# Patient Record
Sex: FEMALE | Race: BLACK OR AFRICAN AMERICAN | NOT HISPANIC OR LATINO | Employment: UNEMPLOYED | URBAN - METROPOLITAN AREA
[De-identification: names, ages, dates, MRNs, and addresses within clinical notes are randomized per-mention and may not be internally consistent; named-entity substitution may affect disease eponyms.]

---

## 2023-06-27 ENCOUNTER — HOSPITAL ENCOUNTER (INPATIENT)
Facility: CLINIC | Age: 58
LOS: 25 days | Discharge: HOME | DRG: 885 | End: 2023-07-26
Attending: EMERGENCY MEDICINE | Admitting: STUDENT IN AN ORGANIZED HEALTH CARE EDUCATION/TRAINING PROGRAM
Payer: COMMERCIAL

## 2023-06-27 DIAGNOSIS — F31.12 BIPOLAR I DISORDER, MOST RECENT EPISODE (OR CURRENT) MANIC, MODERATE (H): ICD-10-CM

## 2023-06-27 DIAGNOSIS — F30.9 MANIA (H): ICD-10-CM

## 2023-06-27 DIAGNOSIS — F31.9 BIPOLAR AFFECTIVE DISORDER, REMISSION STATUS UNSPECIFIED (H): ICD-10-CM

## 2023-06-27 DIAGNOSIS — K59.03 DRUG-INDUCED CONSTIPATION: Primary | ICD-10-CM

## 2023-06-27 LAB
ALBUMIN UR-MCNC: NEGATIVE MG/DL
AMPHETAMINES UR QL SCN: NORMAL
APPEARANCE UR: CLEAR
BARBITURATES UR QL SCN: NORMAL
BENZODIAZ UR QL SCN: NORMAL
BILIRUB UR QL STRIP: NEGATIVE
BZE UR QL SCN: NORMAL
CANNABINOIDS UR QL SCN: NORMAL
COLOR UR AUTO: NORMAL
GLUCOSE UR STRIP-MCNC: NEGATIVE MG/DL
HCG UR QL: NEGATIVE
HGB UR QL STRIP: NEGATIVE
KETONES UR STRIP-MCNC: NEGATIVE MG/DL
LEUKOCYTE ESTERASE UR QL STRIP: NEGATIVE
NITRATE UR QL: NEGATIVE
OPIATES UR QL SCN: NORMAL
PH UR STRIP: 5 [PH] (ref 5–7)
RBC URINE: <1 /HPF
SP GR UR STRIP: 1 (ref 1–1.03)
SQUAMOUS EPITHELIAL: 1 /HPF
UROBILINOGEN UR STRIP-MCNC: NORMAL MG/DL
WBC URINE: 1 /HPF

## 2023-06-27 PROCEDURE — 90791 PSYCH DIAGNOSTIC EVALUATION: CPT

## 2023-06-27 PROCEDURE — 250N000013 HC RX MED GY IP 250 OP 250 PS 637: Performed by: FAMILY MEDICINE

## 2023-06-27 PROCEDURE — 81025 URINE PREGNANCY TEST: CPT | Performed by: EMERGENCY MEDICINE

## 2023-06-27 PROCEDURE — 250N000013 HC RX MED GY IP 250 OP 250 PS 637: Performed by: EMERGENCY MEDICINE

## 2023-06-27 PROCEDURE — 80307 DRUG TEST PRSMV CHEM ANLYZR: CPT | Performed by: EMERGENCY MEDICINE

## 2023-06-27 PROCEDURE — 99285 EMERGENCY DEPT VISIT HI MDM: CPT | Mod: 25 | Performed by: EMERGENCY MEDICINE

## 2023-06-27 PROCEDURE — 99285 EMERGENCY DEPT VISIT HI MDM: CPT | Performed by: EMERGENCY MEDICINE

## 2023-06-27 PROCEDURE — 81001 URINALYSIS AUTO W/SCOPE: CPT | Performed by: FAMILY MEDICINE

## 2023-06-27 RX ORDER — LAMOTRIGINE 25 MG/1
100 TABLET ORAL ONCE
Status: COMPLETED | OUTPATIENT
Start: 2023-06-27 | End: 2023-06-27

## 2023-06-27 RX ORDER — ARIPIPRAZOLE 10 MG/1
TABLET ORAL DAILY
COMMUNITY
End: 2023-06-27

## 2023-06-27 RX ORDER — OLANZAPINE 5 MG/1
5 TABLET ORAL AT BEDTIME
Status: DISCONTINUED | OUTPATIENT
Start: 2023-06-27 | End: 2023-06-29

## 2023-06-27 RX ORDER — LAMOTRIGINE 100 MG/1
200 TABLET ORAL DAILY
Status: ON HOLD | COMMUNITY
End: 2023-07-26

## 2023-06-27 RX ORDER — OLANZAPINE 5 MG/1
5 TABLET, ORALLY DISINTEGRATING ORAL ONCE
Status: COMPLETED | OUTPATIENT
Start: 2023-06-27 | End: 2023-06-27

## 2023-06-27 RX ORDER — OLANZAPINE 5 MG/1
5 TABLET ORAL AT BEDTIME
Qty: 14 TABLET | Refills: 0 | Status: SHIPPED | OUTPATIENT
Start: 2023-06-27 | End: 2023-07-26

## 2023-06-27 RX ORDER — OLANZAPINE 5 MG/1
5 TABLET ORAL AT BEDTIME
Status: ON HOLD | COMMUNITY
End: 2023-07-26

## 2023-06-27 RX ORDER — LAMOTRIGINE 25 MG/1
25 TABLET ORAL DAILY
Status: DISCONTINUED | OUTPATIENT
Start: 2023-06-28 | End: 2023-06-29

## 2023-06-27 RX ORDER — DIVALPROEX SODIUM 125 MG/1
TABLET, DELAYED RELEASE ORAL
COMMUNITY
End: 2023-06-27

## 2023-06-27 RX ADMIN — OLANZAPINE 5 MG: 5 TABLET, FILM COATED ORAL at 21:00

## 2023-06-27 RX ADMIN — OLANZAPINE 5 MG: 5 TABLET, ORALLY DISINTEGRATING ORAL at 06:23

## 2023-06-27 RX ADMIN — LAMOTRIGINE 100 MG: 25 TABLET ORAL at 09:15

## 2023-06-27 RX ADMIN — OLANZAPINE 5 MG: 5 TABLET, ORALLY DISINTEGRATING ORAL at 04:29

## 2023-06-27 ASSESSMENT — COLUMBIA-SUICIDE SEVERITY RATING SCALE - C-SSRS
1. IN THE PAST MONTH, HAVE YOU WISHED YOU WERE DEAD OR WISHED YOU COULD GO TO SLEEP AND NOT WAKE UP?: YES
TOTAL  NUMBER OF INTERRUPTED ATTEMPTS LIFETIME: NO
1. HAVE YOU WISHED YOU WERE DEAD OR WISHED YOU COULD GO TO SLEEP AND NOT WAKE UP?: YES
6. HAVE YOU EVER DONE ANYTHING, STARTED TO DO ANYTHING, OR PREPARED TO DO ANYTHING TO END YOUR LIFE?: NO
REASONS FOR IDEATION PAST MONTH: MOSTLY TO END OR STOP THE PAIN (YOU COULDN'T GO ON LIVING WITH THE PAIN OR HOW YOU WERE FEELING)
TOTAL  NUMBER OF ABORTED OR SELF INTERRUPTED ATTEMPTS LIFETIME: NO
ATTEMPT LIFETIME: NO
2. HAVE YOU ACTUALLY HAD ANY THOUGHTS OF KILLING YOURSELF?: NO
REASONS FOR IDEATION LIFETIME: MOSTLY TO END OR STOP THE PAIN (YOU COULDN'T GO ON LIVING WITH THE PAIN OR HOW YOU WERE FEELING)

## 2023-06-27 ASSESSMENT — ACTIVITIES OF DAILY LIVING (ADL)
ADLS_ACUITY_SCORE: 35

## 2023-06-27 NOTE — ED NOTES
I received report on this patient and will take over care at 05:00am. Speaks English and answers questions in English good. Requested sandwich and something to drink.Denies any SI/HI thoughts.Vitals are with normal limits. Has been calm and has good eye contact during interactions.Patient observed to care about others patients well being by offering blankets and having difficulty with boundaries not harm just need to monitor she stays in her own personal space. MD did approve 1-1 and also gave another dose of Medication. Patient coloring at the time with The 1-1 PA.Patient is from [place] and is praying this am, and she has lived in the [place] many years it states in notes also speaks good English.

## 2023-06-27 NOTE — PROGRESS NOTES
"Triage & Transition Services, Extended Care     Therapy Progress Note    Patient: Angela goes by \"Angela,\" uses she/her pronouns  Date of Service: June 27, 2023  Site of Service:  ED   Patient was seen in-person.     Presenting problem:   Angela is followed related to Boarding Status. Please see initial DEC/[facility] Crisis Assessment completed by Rohini Daugherty on 6/27 for complete assessment information. Notable concerns include manic behavior, not taking medications.     Individuals Present: Angela & Rachana MCGOWAN Martin    Session 1: 468f-613w  Session 2: 230p-245p  Session duration in minutes: 33  Session number: 1  Anticipated number of sessions or this episode of care: 1  CPT utilized: 04674 - Psychotherapy (with patient) - 30 (16-37*) min    Current Presentation:   Pt presented with calm affect, mood congruent. She was organized and oriented x4. She was slightly confused about her medications, stated that she stopped taking them 2 days ago \"I started to feel better, so I decided to stop\". Pscyhoeducation provided to pt regarding medication adherence.     Pt said that she would like her morning meds and then to go home. She noted that her ex  and her son can help remind her to take her medications. She also requested assistance with getting a dentist appointment, the psych associate present offered to assist with this.      Mental Status Exam:   Appearance: awake, alert and adequately groomed  Attitude: cooperative  Eye Contact: good  Mood: better  Affect: appropriate and in normal range  Speech: clear, coherent  Psychomotor Behavior: no evidence of tardive dyskinesia, dystonia, or tics  Thought Process:  logical and goal oriented  Associations: no loose associations  Thought Content: no evidence of suicidal ideation or homicidal ideation  Insight: fair  Judgement: fair  Oriented to: time, person, and place  Attention Span and Concentration: fair  Recent and Remote Memory: fair    Diagnosis:   296.42 Bipolar I " Disorder Current or Most Recent Episode Manic, Moderate    300.00 (F41.9) Unspecified Anxiety Disorder  - provisional     Therapeutic Intervention(s):   Provided active listening, unconditional positive regard, and validation.     Treatment Objective(s) Addressed:   The focus of this session was on identifying an appropriate aftercare plan .     Case Management:   Spoke to Golden, pt's son. He stated that pt takes 100mg Lamictal in the AM, 5mg Zyprexa in the PM. He will call her in the morning and stop by her house at night to help remind her to take her medications. Golden will call pt's family to see who can pick her up.      General Recommendations:   Continue to monitor for harm.     Plan:   Discharge: Pt presenting as calm, oriented, and organized. She slept for a couple of hours and reports that she feels better. Pt requesting to discharge. Pt's son is able to help with reminders for medications. Pt will take her morning medication here in the ED, and discharge with medications. Pt's son will help her gather refills of her medications.       Plan for Care reviewed with Assigned Medical Provider? Yes. Provider, Brittany, response: agree     Rachana Martin   Licensed Mental Health Professional (LMHP), [facility]  [phone]

## 2023-06-27 NOTE — ED NOTES
Writer was monitoring patient on 1:1 when patient suddenly stood up and ran towards another patient's room. Writer and staff attempted to redirect patient before entering room, but patient entered the room and closed the door behind her and stood in front of the door. She took a bite of the other patient's sandwich then was told it was ham (pork) and spit it out. Patient was then able to be redirected out of the room to rinse her mouth out and brush her teeth. Patient appeared confused, but had no intent to do harm to the other patient and/or staff. Writer continues to monitor and redirect patient through 1:1.    Negative Screen

## 2023-06-27 NOTE — PROGRESS NOTES
Staff reported to this writer that the patient walked into another patient's room, grabbed the patient's sandwich, and started to eat it. After pt. was told it was a ham sandwich, she was spitting a lot and asked to brush her teeth. Pt continues to monitor by 1:1 sitter.

## 2023-06-27 NOTE — PROGRESS NOTES
Triage & Transition Services, Extended Care    Client Name: Angela Basurto    Date: June 27, 2023  Service Type:  Group Therapy     Topic:   Positive Affirmations    Intervention:    Group process: support, challenge, affirm, psycho-education.     Response:  Patient did not participate in group.        Ngozi Denton

## 2023-06-27 NOTE — PHARMACY-ADMISSION MEDICATION HISTORY
Admission Medication History Completed by Pharmacy    See [facility] Admission Navigator for allergy information, preferred outpatient pharmacy, prior to admission medications and immunization status.     Medication history sources:  Patient; patient's son (Golden via phone); [employer] dispense report     Pertinent changes made to PTA medication list:  Added:   - Lamotrigine 100 mg tablets   - Olanzapine 5 mg tablets   Deleted: N/A  Changed: N/A     Additional medication history information:   - Patient has been off her lamotrigine for more than 5 days. Will need to re-titrate starting back at 25 mg daily.   - Patient denies taking any additional Rx/OTC medications other than the ones listed below.    Prior to Admission medications    Medication Sig Last Dose Taking? Auth Provider Long Term End Date   lamoTRIgine (LAMICTAL) 100 MG tablet Take 200 mg by mouth daily Past Month Yes Unknown, Entered By History     OLANZapine (ZYPREXA) 5 MG tablet Take 5 mg by mouth At Bedtime Past Week Yes Unknown, Entered By History Yes           Date completed: 06/27/23    Medication history completed by:   Marilyn Araujo, PharmD  *[id number]

## 2023-06-27 NOTE — PROGRESS NOTES
Message from pt's RN that pt seems to be decompensating.     Spoke with pt alongside ED MD. Pt was responding to internal stimuli, paranoid towards other patients, presents as slightly confused. Endorsing AH. States that she sometimes sees VH, too, of people killing others. She did have periods of linear, logical thinking. States that when she takes her medications consistently, she doesn't hear the voices as much.     Pt agreed to stay overnight for further stabilization prior to discharge. Pt placed in obs status, ED MD in agreement.     Pt's family present. They are visiting with pt.      Extended Care will continue to follow pt in obs status. EC will check in with pt tomorrow for disposition planning.         Rachana Martin on 6/27/2023 at 3:13 PM

## 2023-06-27 NOTE — PLAN OF CARE
Pt address is [address] [place] [address]    Patient's phone is (currently with son Golden)  [phone]  Son who lives with pt is approximately 31 years old.  His phone is  (son has no car but does work often)  Son's grandfather (pt's  father likely) [place] Basurto  who may be able to provide transport when patient stabilizes (writer left vm early am)  Son's grandmother (maybe pt's mother, Nimcho/Nimora) is at   may be able to provide transport when patient stabilizes    Angela Basurto  June 27, 2023  Plan of Care Hand-off Note     Patient Care Path: Discharge -- Following stabilization/observation     Plan for Care:     Patient has been exhibiting manic behavior recently at home and here in ED.  Attending physician Kayli Pimentel, to administer medication prescribed patient to help stabilize patient until pt appears rod enough to discharge to care of family or appears to need a reassessment    Critical Safety Issues: Patient has not shown aggression in ED or recently at home toward self or others.  However, patient is showing signs of active elvis with odd behavior such as removing clothing to wash and intermittent laughter without apparent stimulus.       Overview:  This patient is a child/adolescent: No    This patient has additional special visitor precautions: No    Legal Status: Voluntary    Contacts:   Son Christian; Father Verona; mother, --  See far above     Psychiatry Consult:  Psychiatry Consult not requested because pt has rx medications she has not been recently taking -- both pt and son report these are helpful to pt when taken as Rx     Updated RN and Attending Provider regarding plan of care.    Rohini Daugherty, QAMARSW
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
## 2023-06-27 NOTE — PROGRESS NOTES
Triage & Transition Services, Extended Care    Client Name: Angela Basurto    Date: June 27, 2023  Service Type:  Group Therapy  Session Start Time:  11:20am    Session End Time: 11:35am   Session Length: 15 minutes   Site Location: [facility]  Attendees: Patient and other group members  Facilitator: JULIA Olivarez     Topic:   mindfulness    Intervention:    Group process: support, challenge, affirm, psycho-education.     Response:  Patient did not participate in group.        JULIA Olivarez

## 2023-06-27 NOTE — PROGRESS NOTES
Triage & Transition Services, Extended Care    Client Name: Angela Basurto    Date: June 27, 2023  Service Type:  Group Therapy     Topic:   Masks    Intervention:    Group process: support, challenge, affirm, psycho-education.     Response:  Patient did not participate in group.        Ngozi Denton

## 2023-06-27 NOTE — ED PROVIDER NOTES
ED Provider Note  [facility]      History     Chief Complaint   Patient presents with     Psychiatric Evaluation     Bipolar     HPI  Angela Basurto is a 58 year old female who is a past medical history of bipolar who presents to the emergency department for mental health evaluation.  History is provided by the patient and as well as the son Golden (via telephone).  Son reports that patient has a history of bipolar, and reports that she has not been taking her medications for the past 1 months.  Patient's brother recently had a stroke and since that time she has not been caring for self as she has been his primary caregiver.  Son reports that patient typically takes lamotrigine during the day and olanzapine at night.  States that she has not been taking her last the pain at night.  Son reports this is 5 mg olanzapine at bedtime.  Son reports that he noticed his mother has had some strange behaviors at home, believes she is in a manic episode, has been continuously cleaning the house, and cleaning his close as well as his uncles close.  Son reports that his close were ready clean and the patient continues to clean them.  Uncle reports that she was moving around the house and talking to herself all day.  Patient denies any suicide ideation, homicidal ideation, or intent to self-harm.  No acute medical complaints.      Get meds and son to work with [facility] to follow up with therapist and provider [facility]  [facility] her [facility]  Some behaviors and manic but ok to discahrge if stabilize  Son to help reconnect with provider  No SI, no HI  Not caring for herself since her brother had a stroke  olanzipine 5 mg at night  lamotigine 100 mg   continous cleaning the house, manic  Sons clothes (which are already clean), unlces clothes  Moving around the house, talking to herself all day  Strange behavior at home      Past Medical History  No past medical history on file.  No past  "surgical history on file.  ARIPiprazole (ABILIFY) 10 MG tablet  divalproex sodium delayed-release (DEPAKOTE) 125 MG DR tablet  OLANZapine (ZYPREXA) 5 MG tablet      No Known Allergies  Family History  No family history on file.  Social History       Past medical history, past surgical history, medications, allergies, family history, and social history were reviewed with the patient. No additional pertinent items.      A medically appropriate review of systems was performed with pertinent positives and negatives noted in the HPI, and all other systems negative.    Physical Exam   BP: (!) 145/76  Pulse: 98  Temp: 98  F (36.7  C)  Resp: 18  Height: 175.3 cm (5' 9\")  Weight: 106.6 kg (235 lb)  SpO2: 98 %  Physical Exam  General: Afebrile, no acute distress   HEENT: Normocephalic, atraumatic, conjunctivae normal. MMM  Neck: non-tender, supple  Cardio: regular rate. regular rhythm   Resp: Normal work of breathing, no respiratory distress, lungs clear bilaterally, no wheezing, rhonchi, rales  Chest/Back: no visual signs of trauma, no CVA tenderness   Abdomen: soft, non distension, no tenderness, no peritoneal signs   Neuro: alert and fully oriented. CN II-XII grossly intact. Grossly normal strength and sensation in all extremities.   MSK: no deformities. Normal range of motion  Integumentary/Skin: no rash visualized, normal color  Psych: normal affect, normal behavior, denies suicide ideation, homicidal ideation, or intent to self harm.       ED Course, Procedures, & Data      Procedures    Results for orders placed or performed during the hospital encounter of 06/27/23   HCG qualitative urine     Status: Normal   Result Value Ref Range    hCG Urine Qualitative Negative Negative   Drug abuse screen 1 urine (ED)     Status: Normal   Result Value Ref Range    Amphetamines Urine Screen Negative Screen Negative    Barbituates Urine Screen Negative Screen Negative    Benzodiazepine Urine Screen Negative Screen Negative    " Cannabinoids Urine Screen Negative Screen Negative    Cocaine Urine Screen Negative Screen Negative    Opiates Urine Screen Negative Screen Negative   Urine Drugs of Abuse Screen     Status: Normal    Narrative    The following orders were created for panel order Urine Drugs of Abuse Screen.  Procedure                               Abnormality         Status                     ---------                               -----------         ------                     Drug abuse screen 1 urin...[[id number]]  Normal              Final result                 Please view results for these tests on the individual orders.     Medications   OLANZapine zydis (zyPREXA) ODT tab 5 mg (5 mg Oral $Given 6/27/23 4781)     Labs Ordered and Resulted from Time of ED Arrival to Time of ED Departure   HCG QUALITATIVE URINE - Normal       Result Value    hCG Urine Qualitative Negative     DRUG ABUSE SCREEN 1 URINE (ED) - Normal    Amphetamines Urine Screen Negative      Barbituates Urine Screen Negative      Benzodiazepine Urine Screen Negative      Cannabinoids Urine Screen Negative      Cocaine Urine Screen Negative      Opiates Urine Screen Negative       No orders to display     Critical care was not performed.     Medical Decision Making  The patient's presentation was of high complexity (a chronic illness severe exacerbation, progression, or side effect of treatment).    The patient's evaluation involved:  an assessment requiring an independent historian (Son)  ordering and/or review of 1 test(s) in this encounter (urine test)  discussion of management or test interpretation with another health professional (Behavioral health , morning provider)    The patient's management necessitated moderate risk (prescription drug management including medications given in the ED), high risk (a decision regarding hospitalization) and further care after sign-out to morning provider (see their note for further management).      Assessment &  Plan    Angela Basurto is a 58 year old female who is a past medical history of bipolar who presents to the emergency department for mental health evaluation.  Upon arrival patient is nontoxic-appearing, afebrile, moderate distress.  Patient here with a history of bipolar, brought in for further evaluation by family with concerns as patient is not taking care of herself, not taking her medication, and having a manic episode.  Patient with strange behaviors at home, continuously cleaning the house, talking to herself.  Son reports patient has not been taking her Zyprexa at bedtime.  Behavioral health  eval the patient as well as myself.  In the ED patient in the bathroom continuously brushing her teeth, removing her undergarments and washing them.  I discussed patient's management with son, behavioral health , at this time patient's home dose of Zyprexa was given in the emergency department, will have patient rest, and reevaluate in the morning.  Son is agreeable to discharge home as they do have outpatient resources and are agreeable to close outpatient follow-up with a provider as well as a therapist.  Patient signed out to morning provider pending reevaluation.  If patient stabilizes.  Plan for discharge home.  Patient and son understand agree with the plan.    I have reviewed the nursing notes. I have reviewed the findings, diagnosis, plan and need for follow up with the patient.    New Prescriptions    OLANZAPINE (ZYPREXA) 5 MG TABLET    Take 1 tablet (5 mg) by mouth At Bedtime for 14 days       Final diagnoses:   Bipolar affective disorder, remission status unspecified (H)   Mary (H)       Kayli Pimentel MD  [facility] EMERGENCY DEPARTMENT  6/27/2023     Kayli Pimentel MD  06/27/23 0456

## 2023-06-27 NOTE — DISCHARGE INSTRUCTIONS
Behavioral Discharge Planning and Instructions    Summary: You were admitted on 6/27/2023  due to Bipolar I Disorder, Most Recent Episode ***  You were treated by Dr. Champagne and discharged on 07/26/2023 from [facility] Home    Main Diagnosis: Bipolar 1 Disorder    Psychiatry Appointment: Thursday August 17th at 10:20am in-person  Provider: Dr. Staci Mcdonough  [facility]  [facility], [address], [address] [place] [place] [address]  Phone: [phone]  Fax: [phone]  Notes: Please talk to Dr. Mcdonough at your appointment about therapy and they will create a referral. An appointment for therapy will be made the same day that the referral is placed. [facility] please fax AVS    Attend all scheduled appointments with your outpatient providers. Call at least 24 hours in advance if you need to reschedule an appointment to ensure continued access to your outpatient providers.     Major Treatments, Procedures and Findings:  You were provided with: a psychiatric assessment, assessed for medical stability, medication evaluation and/or management, and milieu management    Symptoms to Report: feeling more aggressive, increased confusion, losing more sleep, mood getting worse, or thoughts of suicide    Early warning signs can include: increased depression or anxiety sleep disturbances increased thoughts or behaviors of suicide or self-harm  increased unusual thinking, such as paranoia or hearing voices    Safety and Wellness:  Take all medicines as directed.  Make no changes unless your doctor suggests them.      Follow treatment recommendations.  Refrain from alcohol and non-prescribed drugs.  If there is a concern for safety, call 911.    Resources:   If I am feeling unsafe or I am in a crisis, I will:   Contact my established care providers   Call the National Suicide Prevention Lifeline: 988  Go to the nearest emergency room   Call 911   [facility] Crisis Line Number:  [phone]        Today you were seen by a licensed mental health professional through Triage and Transition services, Behavioral Healthcare Providers ([facility])  for a crisis assessment in the Emergency Department at [facility].  It is recommended that you follow up with your established providers (psychiatrist, mental health therapist, and/or primary care doctor - as relevant) as soon as possible.     Coordinators from [facility] will be calling you in the next 24-48 hours to ensure that you have the resources you need.  You can also contact [facility] coordinators directly at [phone]. You may have been scheduled for or offered an appointment with a mental health provider. [facility] maintains an extensive network of licensed behavioral health providers to connect patients with the services they need.  We do not charge providers a fee to participate in our referral network.  We match patients with providers based on a patient's specific needs, insurance coverage, and location.  Our first effort will be to refer you to a provider within your care system, and will utilize providers outside your care system as needed.      Warning signs that I or other people might notice when a crisis is developing for me: not taking my medication I need that does help me as prescribed by my doctor.   Putting others' needs so far ahead of my own that I do not get the care I need; having a noticeable mood or behavior change that is concerning to them; worrying about things that may or may not happen to extent that it interferes with my functioning; not regularly getting the care and support of a good therapist and medication provider to give me the kind of support I deserve as much as I give to others!    Things I am able to do on my own to cope or help me feel better:  make sure I have a medication prescriber I can get to as needed and as requested; find a way to get medications that is convenient for me;  get adequate rest; engage in some of  "the activities for my own enjoyment and well-being;     Things that I am able to do with others to cope or help me better: work with [facility] at  to find a good therapist and medication provider who work well with my location, schedule, and other important considerations     Things I can use or do for distraction: get out when weather and time of day allow; engage in healthy activities that bring me enjoyment;     Changes I can make to support my mental health and wellness: take medications regularly; work with [facility] to find a good therapist or counselor for support and ideas for my own overall wellbeing    People in my life that I can ask for help: My new therapist, my medication provider; my various family members who want me to be well and safe     Your [facility] has a mental health crisis team you can call 24/7: [facility] Mobile Crisis  [phone] (adults)  [phone] (children)      Crisis Lines  Crisis Text Line  Text 633755  You will be connected with a trained live crisis counselor to provide support.    Por espanol, texto  CEH a 288219 o texto a 442-AYUDAME en WhatsApp    [employer] (LGBTQ Youth Crisis Line)  9.884.270.0432  text START to 913-989      Community Resources  Fast Tracker  Linking people to mental health and substance use disorder resources  [employer].org     [place] Mental Health Warm Line  Peer to peer support  Monday thru Saturday, 12 pm to 10 pm  754.949.4338 or 3.826.911.7081  Text \"Support\" to 60563    National Independence on Mental Illness (ANAYA)  352.977.2823 or 1.888.ANAYA.HELPS      Mental Health Apps  My3  https://my3app.org/    VirtualHopeBox  https://[employer].org/apps/virtual-hope-box/    General Medication Instructions:   See your medication sheet(s) for instructions.   Take all medicines as directed.  Make no changes unless your doctor suggests them.   Go to all your doctor visits.  Be sure to have all your required lab tests. This way, " your medicines can be refilled on time.  Do not use any drugs not prescribed by your doctor.  Avoid alcohol.    Advance Directives:   Scanned document on file with [place]? No scanned doc  Is document scanned? Pt states no documents  Honoring Choices Your Rights Handout: Informed and given  Was more information offered? Materials given    The Treatment team has appreciated the opportunity to work with you. If you have any questions or concerns about your recent admission, you can contact the unit which can receive your call 24 hours a day, 7 days a week. They will be able to get in touch with a Provider if needed. The unit number is *** .      -Take your medications daily. Have family help remind you to take them every morning and every night.   -Get refills of your medications  -Follow up with your psychiatrist (medication doctor)
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
## 2023-06-27 NOTE — PROGRESS NOTES
Called pt's son, Golden [phone]    Golden has her medications with him. He notes that she is out of Zyprexa, but she has 1/2 bottle full of Lamictal at home. He would like pt to discharge with Zyprexa.     Golden is coordinating with family to  pt from ED and stay with her. Golden has the next two days off of work and will stay with pt.

## 2023-06-28 ENCOUNTER — TELEPHONE (OUTPATIENT)
Dept: BEHAVIORAL HEALTH | Facility: CLINIC | Age: 58
End: 2023-06-28
Payer: COMMERCIAL

## 2023-06-28 PROCEDURE — 250N000013 HC RX MED GY IP 250 OP 250 PS 637: Performed by: EMERGENCY MEDICINE

## 2023-06-28 PROCEDURE — 99254 IP/OBS CNSLTJ NEW/EST MOD 60: CPT | Performed by: PSYCHIATRY & NEUROLOGY

## 2023-06-28 PROCEDURE — 250N000013 HC RX MED GY IP 250 OP 250 PS 637: Performed by: FAMILY MEDICINE

## 2023-06-28 RX ORDER — HYDROXYZINE HYDROCHLORIDE 25 MG/1
25-50 TABLET, FILM COATED ORAL EVERY 6 HOURS PRN
Status: DISCONTINUED | OUTPATIENT
Start: 2023-06-28 | End: 2023-07-01

## 2023-06-28 RX ORDER — OLANZAPINE 5 MG/1
5 TABLET ORAL 2 TIMES DAILY PRN
Status: DISCONTINUED | OUTPATIENT
Start: 2023-06-28 | End: 2023-07-01

## 2023-06-28 RX ORDER — OLANZAPINE 10 MG/2ML
10 INJECTION, POWDER, FOR SOLUTION INTRAMUSCULAR ONCE
Status: COMPLETED | OUTPATIENT
Start: 2023-06-28 | End: 2023-06-28

## 2023-06-28 RX ORDER — OLANZAPINE 5 MG/1
5 TABLET, ORALLY DISINTEGRATING ORAL 2 TIMES DAILY PRN
Status: DISCONTINUED | OUTPATIENT
Start: 2023-06-28 | End: 2023-06-28

## 2023-06-28 RX ADMIN — OLANZAPINE 5 MG: 5 TABLET, FILM COATED ORAL at 22:36

## 2023-06-28 RX ADMIN — LAMOTRIGINE 25 MG: 25 TABLET ORAL at 09:30

## 2023-06-28 RX ADMIN — OLANZAPINE 5 MG: 5 TABLET, FILM COATED ORAL at 23:53

## 2023-06-28 ASSESSMENT — ACTIVITIES OF DAILY LIVING (ADL)
ADLS_ACUITY_SCORE: 35

## 2023-06-28 ASSESSMENT — COLUMBIA-SUICIDE SEVERITY RATING SCALE - C-SSRS
SUICIDE, SINCE LAST CONTACT: NO
6. HAVE YOU EVER DONE ANYTHING, STARTED TO DO ANYTHING, OR PREPARED TO DO ANYTHING TO END YOUR LIFE?: NO
TOTAL  NUMBER OF INTERRUPTED ATTEMPTS SINCE LAST CONTACT: NO
ATTEMPT SINCE LAST CONTACT: NO
1. SINCE LAST CONTACT, HAVE YOU WISHED YOU WERE DEAD OR WISHED YOU COULD GO TO SLEEP AND NOT WAKE UP?: NO
2. HAVE YOU ACTUALLY HAD ANY THOUGHTS OF KILLING YOURSELF?: NO
TOTAL  NUMBER OF ABORTED OR SELF INTERRUPTED ATTEMPTS SINCE LAST CONTACT: NO

## 2023-06-28 NOTE — PROGRESS NOTES
"Triage & Transition Services, Extended Care      Client Name: Angela Basurto \"Angela\"   Date: June 28, 2023  Service Type:  Group Therapy  Site Location: [facility]  Facilitator: Allison Nur     Topic:   Art Group: Coloring/collaging    Patient was in her room and writer asked pt if she would like to participate in group. Patient agreed but then was informed her family had just arrived to visit her, so she met with them instead and did not participate in group.     Allison Nur  Extended Care Coordinator  "

## 2023-06-28 NOTE — ED PROVIDER NOTES
[facility] ED Mental Health Handoff Note:       Brief HPI:  This is a 58 year old female signed out to me by Dr. Antonio.  See initial ED Provider note for full details of the presentation. Interval history is pertinent for persistently paranoid, delusional, experiencing hallucinations, anxious.    Home meds reviewed and ordered/administered: Yes    Medically stable for inpatient mental health admission: Yes.    Evaluated by mental health: Yes.  The recommendation is now for inpatient mental health treatment.    Safety concerns: At the time I received sign out, there were no safety concerns.    Hold Status:  Active Orders   N/A            Exam:   No data found.        ED Course:    Medications   OLANZapine (zyPREXA) tablet 5 mg (5 mg Oral $Given 6/27/23 2100)   lamoTRIgine (LaMICtal) tablet 25 mg (25 mg Oral $Given 6/28/23 0930)   OLANZapine zydis (zyPREXA) ODT tab 5 mg (has no administration in time range)   hydrOXYzine (ATARAX) tablet 25-50 mg (has no administration in time range)   OLANZapine zydis (zyPREXA) ODT tab 5 mg (5 mg Oral $Given 6/27/23 0429)   OLANZapine zydis (zyPREXA) ODT tab 5 mg (5 mg Oral $Given 6/27/23 0623)   lamoTRIgine (LaMICtal) tablet 100 mg (100 mg Oral $Given 6/27/23 0915)            There were significant events during my shift.  She was seen by the behavioral extended care team.  Continues to be paranoid, hallucinating, anxious, delusional and manic.  Recommendation is made to change the patient to inpatient status.  A bed was requested.  Patient had orders placed for as needed hydroxyzine and Zyprexa.    Patient was signed out to the oncoming provider      Impression:    ICD-10-CM    1. Bipolar affective disorder, remission status unspecified (H)  F31.9       2. Mary (H)  F30.9           Plan:    1. Awaiting inpatient mental health admission/transfer.      RESULTS:   No results found for this visit on 06/27/23 (from the past 24 hour(s)).          Nickolas Bailey,  Nickolas Sellers MD  06/28/23 1557
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
## 2023-06-28 NOTE — TELEPHONE ENCOUNTER
11:45 AM: Intake left a voicemail for Dr. Cross for a call back to present Pt for 3B.    11:47 AM: Intake informed that Pt would need a private room d/t aggression and poor boundaries.    12:38 PM: Marifer from [facility] reported that they are not able to accommodate aggression.    1:02 PM: Intake called station 12 CRN to present Pt for case-by-case placement. CRN not available. Intake to call back.    1:07 PM: Pt presented to CRN on station 12. CRN to call Intake after further review.    2:56 PM: Intake called CRN to check the status of Pt review. Pt is not appropriate at this time d/t the acuity and number of SIOs currently in milieu.          [facility] Access Inpatient Bed Call Log 6/28/23 @9:15AM    Intake has called facilities that have not updated their bed status within the last 12 hours.                [facility] is posting 0 beds.                [facility] is posting 0 beds. [phone] Per call @9:18am, No beds available    [facility] is posting 0 beds. [phone]                [facility] is posting 0 beds. [phone] Per call at 9:17am, full    [facility] is posting 0 beds. [phone]              [facility] is posting 0 beds. [phone]              [facility] is posting 0 beds. [phone]              [facility] is posting 0 beds. [phone]       [facility] is posting 0 beds. [phone]              [facility] is posting 0 beds. [phone]                [facility] is posting 3 beds. Mixed unit 12+. Low acuity only [phone] Per website @7:35am    [facility] is positing 0 bed. No aggression.  [phone]      [facility] is posting 0 beds. [phone]      [facility] is posting 2 beds. Low acuity only. [phone] Per website @5:01am    [facility] is posting 1 bed. Low acuity. No current aggression. [phone] Per website @7;00am    [facility] is posting 0 bed. Low acuity. [phone] Per  website @8:51am    [facility] is posting 1 bed. 72 HH preferred. Low acuity. [phone] Per website @8:19am    [facility] is posting 5 beds. Low acuity. [phone] Per website @8:28am    [facility] is posting 2 beds. Vol only, No Hx of aggression, violence, or assault. No sexual offenders. No 72 hr holds.  Per website @12:26am    [facility] is posting 6 beds. (Must have the cognitive ability to do programming. No aggressive or violent behavior or recent HX in the last 2 yrs. MH must be primary.) [phone] 6:55am    [facility] is posting 3 beds. Low acuity only. Violence and aggression capped. [phone] Per call @8:53am    [facility] is posting 1 bed. Low acuity, Neg Covid. [phone] Per website @6:55am    [facility] is posting 6 beds. Covid neg. Vol only. Combined adolescent and adult unit. No aggressive or violent behavior. No registered sex offenders. [phone]. 7:12am    [facility] posting 3 beds. Negative covid. Per website@11:01pm    [facility] is posting 1 bed. [phone] no wounds, lines, drains, C-paps, tubes and must be able to care for themselves; no hx of aggression. Per call @9:01am    [facility] is posting 20 beds. Call for details. [phone] Per call @8:25am    [facility] is posting 4 beds. Mixed unit.  [phone] Per call @8:50am, beds available        Pt remains on work list pending appropriate bed availability.
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
## 2023-06-28 NOTE — ED NOTES
IP MH Referral Acuity Rating Score (RARS)    LMHP complete at referral to IP MH, with DEC; and, daily while awaiting IP MH placement. Call score to PPS.  CRITERIA SCORING   New 72 HH and Involuntary for IP MH (not adolescent) 0/1   Boarding over 24 hours 1/1   Vulnerable adult at least 55+ with multiple co morbidities; or, Patient age 11 or under 1/1   Suicide ideation without relief of precipitating factors 0/1   Current plan for suicide 0/1   Current plan for homicide 0/1   Imminent risk or actual attempt to seriously harm another without relief of factors precipitating the attempt 0/1   Severe dysfunction in daily living (ex: complete neglect for self care, extreme disruption in vegetative function, extreme deterioration in social interactions) 1/1   Recent (last 2 weeks) or current physical aggression in the ED 1/1   Restraints or seclusion episode in ED 0/1   Verbal aggression, agitation, yelling, etc., while in the ED 0/1   Active psychosis with psychomotor agitation or catatonia 1/1   Need for constant or near constant redirection (from leaving, from others, etc).  1/1   Intrusive or disruptive behaviors 1/1   TOTAL Acuity Total Score: 7

## 2023-06-28 NOTE — PROGRESS NOTES
Triage & Transition Services, Extended Care    Client Name: Angela Basurto    Date: June 28, 2023  Service Type:  Group Therapy       Response:  Patient did not participate in group.        Uziel Murrell LGSW

## 2023-06-28 NOTE — CONSULTS
PSYCHIATRIC CONSULTATION    Requesting Physician: Marilynn Silva MD    Admission Date: 06/27/2023  Date of Service: 06/28/2023    The patient was seen, her chart reviewed, report to follow.    Dx: Bipolar Disorder, most recent episode manic         Anxiety Disorder NOS    Plan: Transfer the patient to the psychiatric unit for further evaluation and medication adjustment. Resume Depakote  mg BID and increase Zyprexa to 10 mg at bedtime. Discontinue Lamictal.    Thanks,    Rc Minor MD  [phone] pager

## 2023-06-28 NOTE — CONSULTS
"Consult Date: 06/28/2023    PSYCHIATRIC CONSULTATION    REQUESTED BY:  Dr. Marilynn Silva      IDENTIFICATION: The patient is a 58-year-old  [place] mother of 1 with a long history of bipolar disorder, who was brought to our emergency department for mental health evaluation.  Reportedly, the patient has not been taking her psychotropic medications for about a month.  She has not been sleeping at night and has been increasingly more agitated, continually cleaning the house, her clothes and her brother's clothes.  She has been talking to herself.  Her son started to worry a lot about her and facilitated her admission to the hospital.  Here in the hospital, the patient refused to participate in any groups, was observed to be talking to herself and laughing.  Psychiatric consultation was ordered to assess her current status and advise on further medications.    HISTORY OF PRESENT ILLNESS:  I had reviewed the patient's chart and interviewed the patient in the exam room. She came to the exam room semi-reluctantly, after the nursing staff spent some time convincing her that she needed to come in. While there, she was generally cooperative and somewhat engaging.  She told me that she has a long history of rapid mood swings whereby she would feel depressed for months and then would become \"hyper\" for about 2 weeks.  She admitted to have been hearing voices, but would not elaborate on their content other than saying that they have been saying \"scary things\". Surprisingly, she has not been hospitalized psychiatrically.  She has been seeing a psychiatrist at [facility] by the name of Staci Mcdonough who has been prescribing her medications.  Apparently, she used to be on Lamictal, Abilify and Zyprexa, but as I mentioned before, has not been taking these medications for at least a month.    CHEMICAL USE HISTORY:  The patient adamantly denies any history of alcohol abuse, illicit drug abuse or prescription medication " abuse.    FAMILY HISTORY:  The patient reports that her father might have been depressed.  Outside of that, she was not aware of any incidence of mental illness or chemical dependency in her blood relatives.    PAST MEDICAL HISTORY:  Not contributory.    ALLERGIES:  THE PATIENT IS ALLERGIC TO PORK DERIVED PRODUCTS. TYPE OF REACTION UNKNOWN.    BRIEF SOCIAL HISTORY:  The patient was born in [place], the oldest of 4 children to her parents (she has 3 younger brothers).  She graduated from high school, but her work history prior to her coming to [place] is unclear.  She came here from [place] in 2021 and used to work at [employer] for a while, but stopped working about 1 year ago.  She was  and .  She had 1 son from this marriage who is currently in his 30s.  She has been living with her son and her brother in an apartment in [address].    MENTAL STATUS EXAMINATION:  Revealed a normally built and normally developed 58-year-old [facility] female, appearing about her stated age.  She was alert and oriented x 3.  Her speech was hesitant and non elaborative with mainly monosyllabic answers.  She appeared to be responding to internal stimuli and has been whispering periodically in response to her voices.  However, she was unable to tell me the content of her voices.  Her mood currently appeared to be neutral, but by description, she had clear manic behavior prior to admission.  She denied suicidal ideation or intent.  She had marginal, if any, insight into her problems and her judgment was impaired.    DIAGNOSTIC IMPRESSION:    1.  Bipolar disorder, most recent episode manic with psychotic features.  2.  Anxiety disorder, not otherwise specified.    PLAN:  I will transfer the patient to the psychiatric unit for further evaluation and medication adjustment.  I will resume Depakote  mg b.i.d. and increase Zyprexa to 10 mg at bedtime.  I will discontinue Lamictal at this time.    I thank you very much for letting me  participate in the care of this patient.    Rc Minor MD        D: 2023   T: 2023   MT: leonardo    Name:     JAYLIN WREN  MRN:      [id number]        Account:      [id number]   :      1965           Consult Date: 2023     Document: [id number]    cc:  Marilynn Silva MD
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
## 2023-06-29 ENCOUNTER — TELEPHONE (OUTPATIENT)
Dept: BEHAVIORAL HEALTH | Facility: CLINIC | Age: 58
End: 2023-06-29
Payer: COMMERCIAL

## 2023-06-29 PROCEDURE — 250N000013 HC RX MED GY IP 250 OP 250 PS 637: Performed by: FAMILY MEDICINE

## 2023-06-29 PROCEDURE — 250N000013 HC RX MED GY IP 250 OP 250 PS 637: Performed by: EMERGENCY MEDICINE

## 2023-06-29 PROCEDURE — 250N000013 HC RX MED GY IP 250 OP 250 PS 637: Performed by: PSYCHIATRY & NEUROLOGY

## 2023-06-29 RX ORDER — OLANZAPINE 10 MG/1
10 TABLET ORAL AT BEDTIME
Status: DISCONTINUED | OUTPATIENT
Start: 2023-06-29 | End: 2023-07-05

## 2023-06-29 RX ORDER — DIVALPROEX SODIUM 500 MG/1
500 TABLET, EXTENDED RELEASE ORAL DAILY
Status: DISCONTINUED | OUTPATIENT
Start: 2023-06-29 | End: 2023-07-01

## 2023-06-29 RX ADMIN — OLANZAPINE 5 MG: 5 TABLET, FILM COATED ORAL at 06:26

## 2023-06-29 RX ADMIN — HYDROXYZINE HYDROCHLORIDE 50 MG: 25 TABLET, FILM COATED ORAL at 05:29

## 2023-06-29 RX ADMIN — OLANZAPINE 10 MG: 10 TABLET, FILM COATED ORAL at 22:07

## 2023-06-29 RX ADMIN — DIVALPROEX SODIUM 500 MG: 500 TABLET, FILM COATED, EXTENDED RELEASE ORAL at 14:51

## 2023-06-29 RX ADMIN — LAMOTRIGINE 25 MG: 25 TABLET ORAL at 08:45

## 2023-06-29 ASSESSMENT — ACTIVITIES OF DAILY LIVING (ADL)
ADLS_ACUITY_SCORE: 35

## 2023-06-29 NOTE — TELEPHONE ENCOUNTER
[facility] Access Inpatient Bed Call Log 6/29/23 @1 AM   Intake has called facilities that have not updated their bed status within the last 12 hours.                  [facility] is posting 0 beds.                 [facility] is posting 0 beds. [phone]      [facility] is posting 0 beds. [phone]                 [facility] is posting 0 beds. [phone]      [facility] is posting 0 beds. [phone]               [facility] is posting 0 beds. [phone]               [facility] is posting 0 beds. [phone]               [facility] is posting 0 beds. [phone]        [facility] is posting 0 beds. [phone]                [facility] is posting 0 beds. [phone]                 [facility] is posting 3 beds. Mixed unit 12+. Low acuity only [phone]      [facility] is positing 0 bed. No aggression.  [phone]       [facility] is posting 0 beds. [phone]       [facility] is posting 0 beds. Low acuity only. [phone]     [facility] is posting 0 bed. Low acuity. No current aggression. [phone]      [facility] is posting 0 bed. Low acuity. [phone]      [facility] is posting 2 bed. 72 HH preferred. Low acuity. [phone]     [facility] is posting 5 beds. Low acuity. [phone]      [facility] is posting 2 beds. Vol only, No Hx of aggression, violence, or assault. No sexual offenders. No 72 hr holds. [phone]      [facility] is posting 5 beds. (Must have the cognitive ability to do programming. No aggressive or violent behavior or recent HX in the last 2 yrs. MH must be primary.) [phone] 6:55am     [facility] is posting 0 beds. Low acuity only. Violence and aggression capped. [phone]      [facility] is posting 3 bed. Low acuity, Neg Covid. [phone]      [facility] is posting  6 beds. Covid neg. Vol only. Combined adolescent and adult unit. No aggressive or violent behavior. No registered sex offenders. [phone].      [facility] [place] posting 0 beds. Negative covid.      [facility] is posting 2 bed. [phone] no wounds, lines, drains, C-paps, tubes and must be able to care for themselves; no hx of aggression.      [facility] is posting 14 beds. Call for details. [phone]      [facility] is posting 3 beds. Mixed unit.  [phone]                    Pt remains on work list pending appropriate bed availability.

## 2023-06-29 NOTE — PROGRESS NOTES
"Triage & Transition Services, Extended Care     Angela Basurto  June 29, 2023    Angela is followed related to Boarding Status. Please see initial DEC Crisis Assessment completed for complete assessment information. Medical record is reviewed.  While patient is in the ED, care team is working towards Learn and Demonstrate at Le[facility] One Skill Focused on Crisis Stabilization. Additional notes include symptoms of psychosis     There are not significant status changes.     Met with pt for about 15 minutes. She remains acutely psychotic, responding to internal stimuli by whispering to the voices. She often appears to be in distress while here. She says that she has been hearing the voices all morning, unsure what they are saying to her. She began to cry, saying that the voices are making her sad. She also endorsed visual hallucinations of \"a bunch of people that keep running around\".  Pt did mention that she thinks she jumped off her balcony last week \"I think I did, yeah. I think I jumped\". When attempting to discuss further with pt, she began responding to internal stimuli.     Spoke with pt about psychiatry consult from yesterday in ED. She said that she hopes the new medications help decrease the voices.     She requested water, to brush her teeth, and to use the restroom. Pt's ED staff assisted pt with these requests.     Plan:  Inpatient Mental Health: Pt remains voluntary for admission to inpatient mental health unit and is on the inpatient worklist. Pt continues to present with symptoms of psychosis, is in distress due to these symptoms. She would benefit from further stabilization prior to discharge planning.     Plan for Care reviewed with Assigned Medical Provider? Yes. Provider, Dr. Crandall, response: agree    Extended Care will follow and meet with patient/family/care team as able or requested.     Rachana Martin  [facility], Extended Care   [phone]          "

## 2023-06-29 NOTE — TELEPHONE ENCOUNTER
Updated Bed Search @ 1pm  Per chart review, [place] the [place] and 2 hours out for placement     [facility] has 0 appropriate beds available. Phone: [phone]  [facility] posting 0 available beds. Phone: [phone]  [facility] posting 0 available beds. Phone: [phone]  [facility] posting 0 available beds. Phone: [phone]  [place] posting 0 available beds. Phone: [phone]  [facility] posting 0 available beds. Phone:[phone]  [facility] posting 0 available beds. Phone: [phone]. Negative covid required.   [facility] posting 0 available beds. Phone: [phone]   [place] posting 0 available beds. Phone: [phone] Negative covid required. Low acuity only.   [facility] posting 0 available beds. Phone: [phone]  [facility] posting 3 available beds. Phone: [phone]. Need a negative covid. Mixed unit with adolescents, adults, geriatric patients. Age 12 and up. Low acuity referrals only. Pt not appropriate based on presentation. Pt not appropriate at this time   [place] posting 0 available beds. Phone: [phone]. No aggression. Negative covid required.   [facility] posting 0 available beds. Phone: [phone]  [facility] posting 1 available beds. Phone: [phone]. Covid negative. Per call to Margarita, the pt would not be appropriate for their available beds at this time.   [place] posting 0 available beds. Phone: [phone]. No current aggression. Negative covid required.   [facility] posting 0 available beds. Phone:[phone]  [facility] posting 0 available beds. Phone: [phone]. Very low acuity only.   [facility] posting 0 available beds. Phone:[phone]. 72 HH preferred, low acuity.     Pt remains on work list pending appropriate bed placement.

## 2023-06-29 NOTE — PROGRESS NOTES
Pt's aunt, [facility], [phone] called for update. DENI is signed. Left VM for Vickie.

## 2023-06-29 NOTE — PROGRESS NOTES
Triage & Transition Services, Extended Care    Client Name: Angela Basurto    Date: June 29, 2023  Service Type:  Group Therapy     Topic:   Bottled-Up Emotions    Intervention:    Group process: support, challenge, affirm, psycho-education.     Response:  Patient did not participate in group d/t sleeping.       Ngozi Denton

## 2023-06-29 NOTE — ED PROVIDER NOTES
[facility] ED Mental Health Handoff Note:       Brief HPI:  This is a 58 year old female signed out to me.  See initial ED Provider note for full details of the presentation. Interval history is pertinent for ongoing ED boarding.    Home meds reviewed and ordered/administered: Yes    Medically stable for inpatient mental health admission: Yes.    Evaluated by mental health: Yes. The recommendation is for inpatient mental health treatment. Bed search in process    Safety concerns: At the time I received sign out, there were no safety concerns.    Hold Status:  Active Orders   N/A       Exam:   Patient Vitals for the past 24 hrs:   BP Temp Pulse SpO2   06/29/23 0858 (!) 155/73 98.4  F (36.9  C) 89 100 %       ED Course:    Medications   hydrOXYzine (ATARAX) tablet 25-50 mg (50 mg Oral $Given 6/29/23 0529)   OLANZapine (zyPREXA) tablet 5 mg (5 mg Oral $Given 6/29/23 0626)   OLANZapine (zyPREXA) tablet 10 mg (has no administration in time range)   divalproex sodium extended-release (DEPAKOTE ER) 24 hr tablet 500 mg (500 mg Oral $Given 6/29/23 1451)   OLANZapine zydis (zyPREXA) ODT tab 5 mg (5 mg Oral $Given 6/27/23 0429)   OLANZapine zydis (zyPREXA) ODT tab 5 mg (5 mg Oral $Given 6/27/23 0623)   lamoTRIgine (LaMICtal) tablet 100 mg (100 mg Oral $Given 6/27/23 0915)   OLANZapine (zyPREXA) injection 10 mg (10 mg Intramuscular Not Given 6/28/23 1406)            There were no significant events during my shift.      Impression:    ICD-10-CM    1. Bipolar affective disorder, remission status unspecified (H)  F31.9       2. Mary (H)  F30.9           Plan:    1. Awaiting mental health evaluation/recommendations.      RESULTS:   No results found for this visit on 06/27/23 (from the past 24 hour(s)).          MD Raz Gracia Dung Hoang, MD  06/29/23 9333

## 2023-06-29 NOTE — PROGRESS NOTES
Triage & Transition Services, Extended Care    Client Name: Angela Basurto    Date: June 29, 2023  Service Type:  Group Therapy  Session Start Time:  5:20P    Session End Time: 5:40P  Session Length: 20min  Site Location: [facility]  Attendees: Patient and other group members  Facilitator: Ngozi Denton     Topic:   Vision Board    Intervention:    Group process: support, challenge, affirm, psycho-education.     Response:  Patient did participate in group. Behavior in group was appropriate when prompted, at times distracted but redirectable. Patient shared her work and areas of interest.       Ngozi Denton

## 2023-06-29 NOTE — ED NOTES
Patient became very agitated and was delusional and extremely anxious. PRN vistaril was given, and Zyprexa was requested from pharmacy. Patient has been offered a snack. We will continue to monitor patient.

## 2023-06-29 NOTE — TELEPHONE ENCOUNTER
03:40 - [facility] CRN called to report pt has been declined d/t acuity. [facility] updated. Pt remains on wait list pending appropriate placement

## 2023-06-29 NOTE — ED NOTES
Patient is very delusion and very agitated .ATARAX and zyPREXA PRNs were given.Patient still on 1:1.Will continue to monitor.

## 2023-06-29 NOTE — PROGRESS NOTES
Pt was in her room at the start of the shift, calm, pleasant, and cooperative. The patient appeared anxious, fearful, and confused during the shift. The patient denies SI/HI/SIB but reported auditory hallucinations and cannot elaborate on what the voices are saying. Pt stayed in her room most of the shift, talking and laughing to herself. PT continues on 1:1 sitter. This evening, while the patient was going to the bathroom, the patient unexpectedly hugged another patient. Will continue to monitor PT.

## 2023-06-29 NOTE — PROGRESS NOTES
"Triage & Transition Services, Extended Care      Client Name: Angela Basurto \"Angela\"   Date: June 28, 2023  Service Type:  Group Therapy  Site Location: [facility]  Facilitator: Allison Nur     Topic:   Self Love questions     Intervention:    Patient was in her room and writer asked pt if she would like to participate in group.     Response:  Patient said she would participate in group after she got some tea to drink. Patient did not participate in group.     Allison Nur  Extended Care Coordinator  "

## 2023-06-30 ENCOUNTER — TELEPHONE (OUTPATIENT)
Dept: BEHAVIORAL HEALTH | Facility: CLINIC | Age: 58
End: 2023-06-30
Payer: COMMERCIAL

## 2023-06-30 PROCEDURE — 250N000013 HC RX MED GY IP 250 OP 250 PS 637: Performed by: FAMILY MEDICINE

## 2023-06-30 PROCEDURE — 250N000013 HC RX MED GY IP 250 OP 250 PS 637: Performed by: PSYCHIATRY & NEUROLOGY

## 2023-06-30 PROCEDURE — 250N000013 HC RX MED GY IP 250 OP 250 PS 637: Performed by: EMERGENCY MEDICINE

## 2023-06-30 RX ADMIN — OLANZAPINE 10 MG: 10 TABLET, FILM COATED ORAL at 21:11

## 2023-06-30 RX ADMIN — OLANZAPINE 5 MG: 5 TABLET, FILM COATED ORAL at 03:08

## 2023-06-30 RX ADMIN — DIVALPROEX SODIUM 500 MG: 500 TABLET, FILM COATED, EXTENDED RELEASE ORAL at 09:11

## 2023-06-30 RX ADMIN — HYDROXYZINE HYDROCHLORIDE 50 MG: 25 TABLET, FILM COATED ORAL at 01:28

## 2023-06-30 ASSESSMENT — ACTIVITIES OF DAILY LIVING (ADL)
ADLS_ACUITY_SCORE: 35

## 2023-06-30 NOTE — PROGRESS NOTES
"Triage & Transition Services, Extended Care      Client Name: Angela Basurto \"Angela\"   Date: June 30, 2023  Service Type:  Group Therapy  Site Location: [facility]  Facilitator: Allison Nur     Topic:   Art Group: Collaging     Intervention:    Patient was in the lounge eating and laughing at what appeared to be internal stimuli, and did not participate in group.     Allison Nur  Extended Care Coordinator  "

## 2023-06-30 NOTE — PROGRESS NOTES
"Triage & Transition Services, Extended Care     Angela Basurto  June 30, 2023    Angela is followed related to Long wait time for admission: 81+ hours in the ED. Please see initial DEC Crisis Assessment  for complete assessment information. Medical record is reviewed. Additional notes include: symptoms of psychosis     Patient was observed to be responding to internal stimuli as evidence by looking into the corner and whispering to herself. Patient endorsed auditory hallucinations that are not of command nature. She reports the voices are loud but not saying much. Patient declined check-in stating \"I dont have any questions\" and expressing a desire to be alone in her room.     Plan:  Inpatient Mental Health: Pt remains voluntary for admission to inpatient mental health unit and is on the inpatient worklist. Pt continues to present with symptoms of psychosis, is in distress due to these symptoms. She would benefit from further stabilization prior to discharge planning.     Plan for Care reviewed with Assigned Medical Provider? Yes. Provider, , response: acknowledged    Extended Care will follow and meet with patient/family/care team as able or requested.     Steffany Ignacio, [facility], Extended Care   [phone]        "

## 2023-06-30 NOTE — ED NOTES
Patient slept about 3 hours on this shift.Patient was very delusion in middle of the night talking to herself and laughing very loud. Patient denies any SI but she stated that she endures auditory hallucinations that commends her to laugh.ATARAX 50 mg and olanzapine 5mg PRNs were given.Patient still on 1:1 sitter and she is sleeping at this moment.Will continue to monitor.

## 2023-06-30 NOTE — PROGRESS NOTES
Triage & Transition Services, Extended Care    Client Name: Angela Basurto    Date: June 30, 2023  Service Type:  Group Therapy  Session Start Time:  11:05am                        Session End Time:    11:55am  Session Length: 50 minutes  Site Location: [facility]  Attendees: Patient and other group members  Facilitator: BROOK Juan     Topic:   Gratitude     Intervention:    Group process: support, challenge, affirm, psycho-education.     Response:  Patient did not participate in group.         BROOK Juan

## 2023-06-30 NOTE — TELEPHONE ENCOUNTER
1:16 PM: intake contacted Provider to review Pt for station 10 double.    2:12 PM: Intake paged Provider for review after review of previous Pt.     2:16 PM: Waiting for current admits to be completed and can review after a discharge            [place]  Access Inpatient Bed Call Log 6/30/2023  0926   Intake has called facilities that have not updated their bed status within the last 12 hours.         Adults:          [facility] is posting 0 beds.              [facility] is posting 0 beds. [phone]    [place] is posting 0 beds. [phone]              [facility] is posting 0 beds. [phone]    [facility] is posting 0 beds. [phone]            [facility] is posting 0 beds. [phone]            [facility] is posting 0 beds. [phone]            [facility] is posting 0 beds. [phone]     [facility] is posting 0 beds. [phone]      [facility] is posting 0 beds. [phone]                 [facility] is posting 5 beds. Mixed unit 12+. Low acuity only. [phone]      [facility] is positing 0 bed. No aggression.  [phone]         [facility] is posting 0 beds. [phone]     [facility] is posting 1 bed. Low acuity only. [phone]      [facility] is posting 3 beds. Low acuity. No current aggression. [phone]   [facility] is posting 0 beds. Low acuity. [phone]   [facility] is posting 2 beds. 72 HH preferred. Low acuity. [phone]   [facility] is posting 1 beds. Low acuity. [phone]      [facility] is posting 1 beds. Vol only, No Hx of aggression, violence, or assault. No sexual offenders. No 72 hr holds. [phone]     [facility] is posting 6 beds. (Must have the cognitive ability to do programming. No aggressive or violent behavior or recent HX in the last 2 yrs. MH must be primary.) [phone]     [facility] is posting 2 beds. Low acuity only. Violence and aggression capped. [phone]     [facility] is posting 1 beds. Low acuity, Neg Covid. [phone]    [facility] is posting 4 beds. Covid neg. Vol only. Combined adolescent and adult unit. No aggressive or violent behavior. No registered sex offenders. [phone]   [facility] posting 2 beds. Negative covid.       [facility] is posting 2 beds. [phone] no wounds, lines, drains, C-paps, tubes and must be able to care for themselves; no hx of aggression.    [facility] is posting 16 beds. Call for details. [phone]   [facility] is posting 7 beds. Mixed unit.  [phone]          Pt remains on the work list pending appropriate bed availability.
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
## 2023-06-30 NOTE — TELEPHONE ENCOUNTER
[facility] Access Inpatient Bed Call Log 6/30/23 @1 AM   Intake has called facilities that have not updated their bed status within the last 12 hours.   Placement preference: [facility] + 2 HRS                 [facility] is posting 0 beds.                 [facility] is posting 0 beds. [phone]      [facility] is posting 0 beds. [phone]                 [facility] is posting 0 beds. [phone]. Per call @ 00:24, they do not have beds available [facility] is posting 0 beds. [phone]               [facility] is posting 0 beds. [phone]               [facility] is posting 0 beds. [phone]               [facility] is posting 0 beds. [phone]        [facility] is posting 0 beds. [phone]                [facility] is posting 0 beds. [phone]                 [facility] is posting 5 beds. Mixed unit 12+. Low acuity only [phone]      [facility] is positing 0 bed. No aggression.  [phone]       [facility] is posting 0 beds. [phone]       [facility] is posting 1 beds. Low acuity only. [phone]     [facility] is posting 2 bed. Low acuity. No current aggression. [phone]      [facility] is posting 0 bed. Low acuity. [phone]      [facility] is posting 0 bed. 72 HH preferred. Low acuity. [phone]     [facility] is posting 3 beds. Low acuity. [phone]      [facility] is posting 1 beds. Vol only, No Hx of aggression, violence, or assault. No sexual offenders. No 72 hr holds. [phone]                   Pt remains on work list pending appropriate bed availability.
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
## 2023-06-30 NOTE — TELEPHONE ENCOUNTER
R:  No appropriate beds within [place].  Bed Search update [facility] plus 2 hrs 7:30PM  Facilities not updated in the past 12 hours have been called    [facility]: @ cap per website  [facility]: @cap per website  [facility]: @ cap per website.    [facility]: @ cap per website  [facility]: @ cap per website  [facility]: @ cap per website  [facility]: @ cap per website  [facility]:  @ cap per website  [facility]: 7 LOW ACUITY bed posted.   Mixed unit with children.  Not appropriate.  [facility]: @ cap per website   [facility]: @ cap per website  [facility]: @ cap per website  [facility]:  2 LOW ACUITY bed posted.  Per [facility], pt needs higher acuity bed which is not available.  [facility]: @ cap per website  [facility]: 1 LOW ACUITY bed posted.  Per Annita, pt not appropriate based on acuity  [facility]: 3 LOW ACUITY beds posted.  Per Annita, pt not appropriate based on acuity  [facility]: 6 LOW ACUITY beds posted.  Per Marcie, not appropriate, very low acuity only    Pt remains on PPS worklist awaiting appropriate placement.

## 2023-06-30 NOTE — TELEPHONE ENCOUNTER
5:21 PM Intake received call from Resident on call, intake provided MRN for review for admission onto station 20 and patient under review. Resident provider to call back.

## 2023-06-30 NOTE — TELEPHONE ENCOUNTER
R:  20/Anita    6:09 PM Resident Olga accepted Pt for unit 20.  Per Resident Charge RN is worried about staffing if Pt needs an SIO and is checking with [facility].  Pt can go into que but will be admitted after staffing issue is figured out.    Updated Worklist; did Indicia and added to the admit board.    7:09 PM Updated unit 20; Charge RN said no word from [facility] about additional staffing yet.  Hopefully unit can admit Pt at the end of evening shift.  Otherwise definitely on overnights.  Also reminded unit Charge of need for a Marshallese speaker.    7:03 PM Updated [facility] and the staffing issue that will delay the admit.

## 2023-06-30 NOTE — ED PROVIDER NOTES
[facility] ED Mental Health Handoff Note:       Brief HPI:  This is a 58 year old female signed out to me.  See initial ED Provider note for full details of the presentation. Interval history is pertinent for ongoing ED boarding while she awaits an inpatient MH bed.    Home meds reviewed and ordered/administered: Yes    Medically stable for inpatient mental health admission: Yes.    Evaluated by mental health: Yes. The recommendation is for inpatient mental health treatment. Bed search in process    Safety concerns: At the time I received sign out, there were no safety concerns.    Hold Status:  Active Orders   N/A       Exam:   Patient Vitals for the past 24 hrs:   BP Temp Temp src Pulse Resp SpO2   06/30/23 1222 138/81 97.9  F (36.6  C) Oral 78 18 100 %   06/30/23 0300 (!) 169/90 97.8  F (36.6  C) Oral 81 18 100 %       ED Course:    Medications   hydrOXYzine (ATARAX) tablet 25-50 mg (50 mg Oral $Given 6/30/23 0128)   OLANZapine (zyPREXA) tablet 5 mg (5 mg Oral $Given 6/30/23 0308)   OLANZapine (zyPREXA) tablet 10 mg (10 mg Oral $Given 6/29/23 2207)   divalproex sodium extended-release (DEPAKOTE ER) 24 hr tablet 500 mg (500 mg Oral $Given 6/30/23 0911)   OLANZapine zydis (zyPREXA) ODT tab 5 mg (5 mg Oral $Given 6/27/23 0429)   OLANZapine zydis (zyPREXA) ODT tab 5 mg (5 mg Oral $Given 6/27/23 0623)   lamoTRIgine (LaMICtal) tablet 100 mg (100 mg Oral $Given 6/27/23 0915)   OLANZapine (zyPREXA) injection 10 mg (10 mg Intramuscular Not Given 6/28/23 1406)            There were no significant events during my shift.    Impression:    ICD-10-CM    1. Bipolar affective disorder, remission status unspecified (H)  F31.9       2. Mary (H)  F30.9           Plan:    1. Awaiting mental health evaluation/recommendations.      RESULTS:   No results found for this visit on 06/27/23 (from the past 24 hour(s)).          MD Raz Gracia Dung Hoang, MD  06/30/23 9799

## 2023-07-01 PROBLEM — F30.9 MANIA (H): Status: ACTIVE | Noted: 2023-07-01

## 2023-07-01 PROBLEM — F31.9 BIPOLAR AFFECTIVE DISORDER, REMISSION STATUS UNSPECIFIED (H): Status: ACTIVE | Noted: 2023-07-01

## 2023-07-01 LAB
ALBUMIN SERPL BCG-MCNC: 4.2 G/DL (ref 3.5–5.2)
ALP SERPL-CCNC: 64 U/L (ref 35–104)
ALT SERPL W P-5'-P-CCNC: 34 U/L (ref 0–50)
ANION GAP SERPL CALCULATED.3IONS-SCNC: 11 MMOL/L (ref 7–15)
AST SERPL W P-5'-P-CCNC: 23 U/L (ref 0–45)
BASOPHILS # BLD AUTO: 0 10E3/UL (ref 0–0.2)
BASOPHILS NFR BLD AUTO: 0 %
BILIRUB SERPL-MCNC: 0.4 MG/DL
BUN SERPL-MCNC: 9.1 MG/DL (ref 6–20)
CALCIUM SERPL-MCNC: 9.8 MG/DL (ref 8.6–10)
CHLORIDE SERPL-SCNC: 100 MMOL/L (ref 98–107)
CREAT SERPL-MCNC: 0.66 MG/DL (ref 0.51–0.95)
DEPRECATED HCO3 PLAS-SCNC: 28 MMOL/L (ref 22–29)
EOSINOPHIL # BLD AUTO: 0 10E3/UL (ref 0–0.7)
EOSINOPHIL NFR BLD AUTO: 1 %
ERYTHROCYTE [DISTWIDTH] IN BLOOD BY AUTOMATED COUNT: 12.6 % (ref 10–15)
FOLATE SERPL-MCNC: 18.4 NG/ML (ref 4.6–34.8)
GFR SERPL CREATININE-BSD FRML MDRD: >90 ML/MIN/1.73M2
GLUCOSE SERPL-MCNC: 117 MG/DL (ref 70–99)
HBA1C MFR BLD: 5.6 %
HCT VFR BLD AUTO: 37.9 % (ref 35–47)
HGB BLD-MCNC: 12.5 G/DL (ref 11.7–15.7)
IMM GRANULOCYTES # BLD: 0 10E3/UL
IMM GRANULOCYTES NFR BLD: 0 %
LYMPHOCYTES # BLD AUTO: 2.4 10E3/UL (ref 0.8–5.3)
LYMPHOCYTES NFR BLD AUTO: 43 %
MCH RBC QN AUTO: 32.5 PG (ref 26.5–33)
MCHC RBC AUTO-ENTMCNC: 33 G/DL (ref 31.5–36.5)
MCV RBC AUTO: 98 FL (ref 78–100)
MONOCYTES # BLD AUTO: 0.4 10E3/UL (ref 0–1.3)
MONOCYTES NFR BLD AUTO: 6 %
NEUTROPHILS # BLD AUTO: 2.7 10E3/UL (ref 1.6–8.3)
NEUTROPHILS NFR BLD AUTO: 50 %
NRBC # BLD AUTO: 0 10E3/UL
NRBC BLD AUTO-RTO: 0 /100
PLATELET # BLD AUTO: 269 10E3/UL (ref 150–450)
POTASSIUM SERPL-SCNC: 3.8 MMOL/L (ref 3.4–5.3)
PROT SERPL-MCNC: 7.3 G/DL (ref 6.4–8.3)
RBC # BLD AUTO: 3.85 10E6/UL (ref 3.8–5.2)
SODIUM SERPL-SCNC: 139 MMOL/L (ref 136–145)
TSH SERPL DL<=0.005 MIU/L-ACNC: 0.83 UIU/ML (ref 0.3–4.2)
VIT B12 SERPL-MCNC: 297 PG/ML (ref 232–1245)
WBC # BLD AUTO: 5.5 10E3/UL (ref 4–11)

## 2023-07-01 PROCEDURE — 250N000013 HC RX MED GY IP 250 OP 250 PS 637

## 2023-07-01 PROCEDURE — 93010 ELECTROCARDIOGRAM REPORT: CPT | Performed by: INTERNAL MEDICINE

## 2023-07-01 PROCEDURE — 84443 ASSAY THYROID STIM HORMONE: CPT

## 2023-07-01 PROCEDURE — 250N000013 HC RX MED GY IP 250 OP 250 PS 637: Performed by: FAMILY MEDICINE

## 2023-07-01 PROCEDURE — 124N000002 HC R&B MH UMMC

## 2023-07-01 PROCEDURE — 83036 HEMOGLOBIN GLYCOSYLATED A1C: CPT

## 2023-07-01 PROCEDURE — 93005 ELECTROCARDIOGRAM TRACING: CPT

## 2023-07-01 PROCEDURE — 82607 VITAMIN B-12: CPT

## 2023-07-01 PROCEDURE — 85025 COMPLETE CBC W/AUTO DIFF WBC: CPT

## 2023-07-01 PROCEDURE — G0177 OPPS/PHP; TRAIN & EDUC SERV: HCPCS

## 2023-07-01 PROCEDURE — 250N000013 HC RX MED GY IP 250 OP 250 PS 637: Performed by: STUDENT IN AN ORGANIZED HEALTH CARE EDUCATION/TRAINING PROGRAM

## 2023-07-01 PROCEDURE — 250N000013 HC RX MED GY IP 250 OP 250 PS 637: Performed by: EMERGENCY MEDICINE

## 2023-07-01 PROCEDURE — 82746 ASSAY OF FOLIC ACID SERUM: CPT

## 2023-07-01 PROCEDURE — 36415 COLL VENOUS BLD VENIPUNCTURE: CPT

## 2023-07-01 PROCEDURE — 80053 COMPREHEN METABOLIC PANEL: CPT

## 2023-07-01 RX ORDER — HYDROXYZINE HYDROCHLORIDE 25 MG/1
25 TABLET, FILM COATED ORAL EVERY 4 HOURS PRN
Status: DISCONTINUED | OUTPATIENT
Start: 2023-07-01 | End: 2023-07-26 | Stop reason: HOSPADM

## 2023-07-01 RX ORDER — TRAZODONE HYDROCHLORIDE 50 MG/1
50 TABLET, FILM COATED ORAL
Status: DISCONTINUED | OUTPATIENT
Start: 2023-07-01 | End: 2023-07-26 | Stop reason: HOSPADM

## 2023-07-01 RX ORDER — DIVALPROEX SODIUM 500 MG/1
500 TABLET, EXTENDED RELEASE ORAL ONCE
Status: COMPLETED | OUTPATIENT
Start: 2023-07-01 | End: 2023-07-01

## 2023-07-01 RX ORDER — ACETAMINOPHEN 325 MG/1
650 TABLET ORAL EVERY 4 HOURS PRN
Status: DISCONTINUED | OUTPATIENT
Start: 2023-07-01 | End: 2023-07-26 | Stop reason: HOSPADM

## 2023-07-01 RX ORDER — OLANZAPINE 10 MG/1
10 TABLET ORAL 3 TIMES DAILY PRN
Status: DISCONTINUED | OUTPATIENT
Start: 2023-07-01 | End: 2023-07-26 | Stop reason: HOSPADM

## 2023-07-01 RX ORDER — OLANZAPINE 5 MG/1
5 TABLET ORAL EVERY MORNING
Status: DISCONTINUED | OUTPATIENT
Start: 2023-07-01 | End: 2023-07-06

## 2023-07-01 RX ORDER — DIVALPROEX SODIUM 500 MG/1
1000 TABLET, EXTENDED RELEASE ORAL EVERY EVENING
Status: DISCONTINUED | OUTPATIENT
Start: 2023-07-02 | End: 2023-07-07

## 2023-07-01 RX ORDER — BENZONATATE 100 MG/1
100 CAPSULE ORAL 3 TIMES DAILY PRN
Status: DISCONTINUED | OUTPATIENT
Start: 2023-07-01 | End: 2023-07-26 | Stop reason: HOSPADM

## 2023-07-01 RX ORDER — POLYETHYLENE GLYCOL 3350 17 G/17G
17 POWDER, FOR SOLUTION ORAL DAILY PRN
Status: DISCONTINUED | OUTPATIENT
Start: 2023-07-01 | End: 2023-07-26 | Stop reason: HOSPADM

## 2023-07-01 RX ORDER — OLANZAPINE 10 MG/2ML
10 INJECTION, POWDER, FOR SOLUTION INTRAMUSCULAR 3 TIMES DAILY PRN
Status: DISCONTINUED | OUTPATIENT
Start: 2023-07-01 | End: 2023-07-26 | Stop reason: HOSPADM

## 2023-07-01 RX ADMIN — DIVALPROEX SODIUM 500 MG: 500 TABLET, FILM COATED, EXTENDED RELEASE ORAL at 08:54

## 2023-07-01 RX ADMIN — ACETAMINOPHEN 650 MG: 325 TABLET, FILM COATED ORAL at 08:00

## 2023-07-01 RX ADMIN — OLANZAPINE 5 MG: 5 TABLET, FILM COATED ORAL at 12:05

## 2023-07-01 RX ADMIN — HYDROXYZINE HYDROCHLORIDE 50 MG: 25 TABLET, FILM COATED ORAL at 02:44

## 2023-07-01 RX ADMIN — OLANZAPINE 5 MG: 5 TABLET, FILM COATED ORAL at 02:44

## 2023-07-01 RX ADMIN — TRAZODONE HYDROCHLORIDE 50 MG: 50 TABLET ORAL at 03:34

## 2023-07-01 RX ADMIN — OLANZAPINE 10 MG: 10 TABLET, FILM COATED ORAL at 21:43

## 2023-07-01 RX ADMIN — DIVALPROEX SODIUM 500 MG: 500 TABLET, FILM COATED, EXTENDED RELEASE ORAL at 21:43

## 2023-07-01 ASSESSMENT — ACTIVITIES OF DAILY LIVING (ADL)
ADLS_ACUITY_SCORE: 30
TOILETING_ISSUES: NO
DRESS: INDEPENDENT
DIFFICULTY_EATING/SWALLOWING: NO
ADLS_ACUITY_SCORE: 40
ADLS_ACUITY_SCORE: 40
DRESSING/BATHING_DIFFICULTY: NO
HEARING_DIFFICULTY_OR_DEAF: NO
WEAR_GLASSES_OR_BLIND: YES
ADLS_ACUITY_SCORE: 30
ORAL_HYGIENE: INDEPENDENT
WALKING_OR_CLIMBING_STAIRS_DIFFICULTY: NO
CONCENTRATING,_REMEMBERING_OR_MAKING_DECISIONS_DIFFICULTY: NO
ADLS_ACUITY_SCORE: 30
DIFFICULTY_COMMUNICATING: NO
ADLS_ACUITY_SCORE: 40
ADLS_ACUITY_SCORE: 30
CHANGE_IN_FUNCTIONAL_STATUS_SINCE_ONSET_OF_CURRENT_ILLNESS/INJURY: NO
ADLS_ACUITY_SCORE: 40
ADLS_ACUITY_SCORE: 40
HYGIENE/GROOMING: INDEPENDENT;PROMPTS
ADLS_ACUITY_SCORE: 40
FALL_HISTORY_WITHIN_LAST_SIX_MONTHS: NO
ADLS_ACUITY_SCORE: 30
LAUNDRY: WITH SUPERVISION
ADLS_ACUITY_SCORE: 35
DOING_ERRANDS_INDEPENDENTLY_DIFFICULTY: NO

## 2023-07-01 NOTE — CARE PLAN
Occupational Therapy Group Note:     07/01/23 1534   Group Therapy Session   Group Attendance attended group session   Time Session Began 1315   Time Session Ended 1415   Total Time (minutes) 45   Total # Attendees 7   Group Type recreation   Group Topic Covered leisure exploration/use of leisure time;structured socialization   Group Session Detail OT Leisure Group   Patient Response/Contribution cooperative with task;listened actively   Patient Participation Detail Patient was an active participant in a discussion based bingo activity in order to promote positive socialization, introspection, and cognitive skills. Patient required 1:1 attention and cueing for modifying and simplifying wording on bingo handout. Patient was better able to respond to therapeutic prompts verbally as opposed to writing responses down. One episode of unprompted laughter in group was noted. Patient's verbal responses to therapeutic prompts were generally one worded and superficial. No spontaneous social interaction with other's noted. Patient left group early to use restroom; no return. SIO present for duration.

## 2023-07-01 NOTE — CARE PLAN
07/01/23 0159   Patient Belongings   Did you bring any home meds/supplements to the hospital?  No   Patient Belongings locker   Patient Belongings Put in Hospital Secure Location (Security or Locker, etc.) clothing;shoes   Belongings Search Yes   Clothing Search Yes   Second Staff Claudia Robertson     IN LOCKER:  2 dresses  Small bottle of perfume  Bra  Underwear  Jacket  Shoes  2 scarfs  1 headtie  2 skirts  Socks  Bottle of lotion  A               Admission:  I am responsible for any personal items that are not sent to the safe or pharmacy.  [facility] is not responsible for loss, theft or damage of any property in my possession.    Signature:  _________________________________ Date: _______  Time: _____                                              Staff Signature:  ____________________________ Date: ________  Time: _____      2nd Staff person, if patient is unable/unwilling to sign:    Signature: ________________________________ Date: ________  Time: _____     Discharge:  [facility] has returned all of my personal belongings:    Signature: _________________________________ Date: ________  Time: _____                                          Staff Signature:  ____________________________ Date: ________  Time: _____

## 2023-07-01 NOTE — ED NOTES
I received report and on this patient and will take over care. Patient suppose to move to station 20 [facility]. 1-1 sitting outdoor of patient. Report was given and Station will send someone to pick her up.Medications sent along with Staff and personal items. Patient been up using bathroom about every 40 min observed with even gait and balance.

## 2023-07-01 NOTE — PLAN OF CARE
"  INITIAL PSYCHOSOCIAL ASSESSMENT AND NOTE  I have reviewed the chart met with the patient, and developed Care Plan.  Information for assessment was obtained from: chart review and two charts were reviewed (marked for merge).  Met briefly with pt who was disoriented and unable to participate in an interview.    PRESENTING PROBLEM:  Pt is a 58 year old [place] female admitted to station 20 from the ED due to decompensation: manic symptoms and hallucinations.  She has a hx of Bipolar Disorder and has been off of her medications for the past month it appears.  Her sleep has been disrupted and she is reporting hearing voices.  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  Unit staff indicated pt has been responding to internal stimuli and has been removing her clean bed linen insisting it is soiled.  At home, it was noted she had been cleaning compulsively.    The following areas have been assessed:  History of Mental Health and Chemical Dependency:  There are two open charts for pt that are currently marked for a merger.  From the other chart, her mental health history was located:  Note from an admission on 9/13/2004:   \"The patient is a 39-year-old  [place] female.  She recently returned from [facility] to take a break from her life in [facility].  She gave birth 2 months ago to her 5th child and she has left her 5 children in [facility] and came here with her .  On arrival, she was brought to the ER for psychosis with delusions, hallucinations, and depression.  She was not sleeping.  She has been hostile to her family, and the family stated that she was unable to care for herself and had increased energy levels.\"   PAST MEDICAL HISTORY:    1. This patient was hospitalized in [place] in 1987 for psychosis, and I believe she has a history of a hanging attempt at this time.    2. Post-partum depression in 1992, and she also had depression with the   birth of her 4th child in " "2002.    Note from an admission on 11/4/2004:  \"This 39-year-old woman was admitted to the [facility] psychiatric section for psychotic symptoms.  Initially she was laughing and responding to internal stimulation, was confused, psychotic, delusional. She was treated with group therapy, individual therapy, occupational therapy, and perea activities.  Additionally she was treated with neuroleptic medication and at time of transfer to the [facility] was taking Zyprexa Zydis 30 mg at bedtime, Depakote 250 mg in the morning and 500 mg at bedtime, Geodon 20 mg in the morning and 40 mg at bedtime, and temazepam 15 mg at bedtime.  She also took some Benadryl p.r.n. At the time of transfer to the [facility] she was less psychotic, more focused, and reported feeling considerably like herself.  Followup is uncertain because of her transfer to the [facility].  DISCHARGE DIAGNOSES    AXIS I:   Schizoaffective disorder,   Rule out bipolar,   Rule out paranoid schizophrenia.\"    Substance use history: no known hx of any substance use or abuse.   Living Situation: lives with her son and brother.  Significant Life Events (Illness, Abuse, Trauma, Death): pt was born in [facility].    Family Description (Constellation, Family Psychiatric History): she was the eldest in a sibship of four.  She has three younger brothers.  She apparently moved to the  from [facility].   once, now  she has five children.     Educational Background:  education level completed in [facility] is equivalent to a high school degree.    Occupational History: last job held was a year ago at [employer].   Financial Status: supported by her son.  Legal Issues: unknown   Service History: none  Ethnic/Cultural/Spiritual considerations: [facility]   Social Functioning (organizations, interests, support system): unable to assess  Current Treatment providers:   Unknown   Social Service Assessment/Plan: "   Patient will have psychiatric assessment and medication management by the psychiatrist. Medications will be reviewed and adjusted per DO/MD/APRN CNP as indicated. The treatment team will continue to assess and stabilize the patient's mental health symptoms with the use of medications and therapeutic programming. Hospital staff will provide a safe environment and a therapeutic milieu. Staff will continue to assess patient as needed. Patient will participate in unit groups and activities. Patient will receive individual and group support on the unit.      CTC will do individual inpatient treatment planning and after care planning.    CTC will discuss options for increasing community supports with the patient. CTC will coordinate with outpatient providers and will place referrals to ensure appropriate follow up care is in place.

## 2023-07-01 NOTE — PROGRESS NOTES
"Triage & Transition Services, Extended Care      Client Name: Angela Basurto \"Angela\"   Date: June 30, 2023  Service Type:  Group Therapy  Site Location: [facility]  Facilitator: Allison Nur     Topic:   TIPP Skill practice    Writer stopped by patient's room and she was visiting with family when group started.  Writer came back later and she was eating dinner, so she did not participate in group.     Allison Nur  Extended Care Coordinator  "

## 2023-07-01 NOTE — PLAN OF CARE
"Goal Outcome Evaluation:    Plan of Care Reviewed With: patient        Angela continues with SIO 1:1 staffing for recent intrusive and assaultive behavior.   Angela slept less than 2 hours last night shift. Today, Pt spent 75% of the shift in her room, Pt did come out to eat in the dining room for both meals, and is currently attending 1:30 pm OT group.     Pt denied having suicidal ideation or self harm thoughts. When asked if Pt was feeling sad or depressed, Pt responded \"yes\" When asked if Pt were experiencing auditory or visual hallucinations, Angela replied \"yes\", when asked to elaborate/describe what she was experiencing Angela replied \"I don't know\"  Writer asked Pt if she believed it may be helpful to have an  present for conversations, Pt declined.   It is not clear to this writer that Pt is fully understanding questions.    Pt was observed on multiple occasions laughing to herself. Pt also was observed on one occasion peering down the hallway outside of her room door, and then quickly retreating back into her room.   Pt has had to be repeatedly reminded which room was hers as she would walk past her assigned room and approach other Pt rooms to enter.    Pt was med adherent, her food and fluid intake were WNL.           "

## 2023-07-01 NOTE — PLAN OF CARE
"Pt is a 58 years old female admitted in sta 20 for manic behavior. As per report, pt has not been taking her medications. Pt has Hx of Bipolar, paranoid, delusional, anxiety, and  hallucinations.     Pt was cooperative with search and the admission process. Pt is here voluntarily , contracted for safety while in the unit.  Able to communicate in English ,  stated \" I dont need an interptreter\" when this writer asked if she needed one. Denied SI/SIB ; endorsed for anxiety 7/10.   Denied pain.  PRN Atarax given as ordered. Pt continues to be on SIO for severe intrusiveness.    At one point pt noted laughing loudly inappropriately as she requested for Zyprexa ;  stated,  \" I feel  irritable, I need something \". PRN Zyprexa 5 mg was given with minimal relief.  PRN Trazadone was also given as pt was noted having difficult falling asleep; occassionally noted sitted on the floor by her bed side \" I just can't fall asleep\" she would repeatedly say. Pt  appears to have slept for 1.50 hours. Will continue to monitor and offer support.                       "

## 2023-07-02 LAB
CHOLEST SERPL-MCNC: 195 MG/DL
HDLC SERPL-MCNC: 84 MG/DL
HOLD SPECIMEN: NORMAL
LDLC SERPL CALC-MCNC: 92 MG/DL
NONHDLC SERPL-MCNC: 111 MG/DL
TRIGL SERPL-MCNC: 97 MG/DL

## 2023-07-02 PROCEDURE — 250N000013 HC RX MED GY IP 250 OP 250 PS 637

## 2023-07-02 PROCEDURE — 124N000002 HC R&B MH UMMC

## 2023-07-02 PROCEDURE — 80061 LIPID PANEL: CPT

## 2023-07-02 PROCEDURE — 250N000013 HC RX MED GY IP 250 OP 250 PS 637: Performed by: STUDENT IN AN ORGANIZED HEALTH CARE EDUCATION/TRAINING PROGRAM

## 2023-07-02 PROCEDURE — 36415 COLL VENOUS BLD VENIPUNCTURE: CPT

## 2023-07-02 RX ORDER — LORAZEPAM 2 MG/1
2 TABLET ORAL EVERY 8 HOURS PRN
Status: DISCONTINUED | OUTPATIENT
Start: 2023-07-02 | End: 2023-07-26 | Stop reason: HOSPADM

## 2023-07-02 RX ORDER — HALOPERIDOL 5 MG/1
5 TABLET ORAL EVERY 8 HOURS PRN
Status: DISCONTINUED | OUTPATIENT
Start: 2023-07-02 | End: 2023-07-26 | Stop reason: HOSPADM

## 2023-07-02 RX ORDER — DIPHENHYDRAMINE HCL 50 MG
50 CAPSULE ORAL EVERY 8 HOURS PRN
Status: DISCONTINUED | OUTPATIENT
Start: 2023-07-02 | End: 2023-07-26 | Stop reason: HOSPADM

## 2023-07-02 RX ORDER — LORAZEPAM 2 MG/ML
2 INJECTION INTRAMUSCULAR EVERY 8 HOURS PRN
Status: DISCONTINUED | OUTPATIENT
Start: 2023-07-02 | End: 2023-07-26 | Stop reason: HOSPADM

## 2023-07-02 RX ORDER — HALOPERIDOL 5 MG/ML
5 INJECTION INTRAMUSCULAR EVERY 8 HOURS PRN
Status: DISCONTINUED | OUTPATIENT
Start: 2023-07-02 | End: 2023-07-26 | Stop reason: HOSPADM

## 2023-07-02 RX ORDER — DIPHENHYDRAMINE HYDROCHLORIDE 50 MG/ML
50 INJECTION INTRAMUSCULAR; INTRAVENOUS EVERY 8 HOURS PRN
Status: DISCONTINUED | OUTPATIENT
Start: 2023-07-02 | End: 2023-07-26 | Stop reason: HOSPADM

## 2023-07-02 RX ADMIN — HALOPERIDOL 5 MG: 5 TABLET ORAL at 16:00

## 2023-07-02 RX ADMIN — DIPHENHYDRAMINE HYDROCHLORIDE 50 MG: 50 CAPSULE ORAL at 16:00

## 2023-07-02 RX ADMIN — DIVALPROEX SODIUM 1000 MG: 500 TABLET, FILM COATED, EXTENDED RELEASE ORAL at 19:58

## 2023-07-02 RX ADMIN — OLANZAPINE 10 MG: 10 TABLET, FILM COATED ORAL at 14:00

## 2023-07-02 RX ADMIN — TRAZODONE HYDROCHLORIDE 50 MG: 50 TABLET ORAL at 00:47

## 2023-07-02 RX ADMIN — OLANZAPINE 10 MG: 10 TABLET, FILM COATED ORAL at 20:00

## 2023-07-02 RX ADMIN — OLANZAPINE 5 MG: 5 TABLET, FILM COATED ORAL at 08:07

## 2023-07-02 RX ADMIN — LORAZEPAM 2 MG: 2 TABLET ORAL at 16:00

## 2023-07-02 RX ADMIN — ACETAMINOPHEN 650 MG: 325 TABLET, FILM COATED ORAL at 19:58

## 2023-07-02 ASSESSMENT — ACTIVITIES OF DAILY LIVING (ADL)
ADLS_ACUITY_SCORE: 40

## 2023-07-02 NOTE — PLAN OF CARE
Patient was isolative to room appearing responding to internal stimuli, had family visit twice this shift, the first family went well and patient was appropriately engaging and receive to their cues, however when her son and mother came to visit later and during  visit patient became agitated, aggressive and increasingly impulsive, she required multiple attempts to redirect, she declined to eat dinner, hygiene was adequate, patient was intermittently intrusive with other peers, insight and judgement was poor, will continue to offer support.   Problem: Plan of Care - These are the overarching goals to be used throughout the patient stay.    Goal: Optimal Comfort and Wellbeing  Outcome: Not Progressing  Intervention: Provide Person-Centered Care  Recent Flowsheet Documentation  Taken 7/1/2023 2016 by Renetta Jones, RN  Trust Relationship/Rapport:   choices provided   emotional support provided   empathic listening provided     Problem: Violence Risk or Actual  Goal: Anger and Impulse Control  Outcome: Progressing   Goal Outcome Evaluation:    Plan of Care Reviewed With: patient

## 2023-07-02 NOTE — PLAN OF CARE
At the beginning of the shift pt noted sited on her bed c/o not being able to fall asleep. Denied any mental health symptoms  denied pain as well. PRN Trazodone given as ordered; was helpful. Pt remains on SIO as ordered. Pt appears to have slept for 4.25 hours. Will continue to monitor and offer support.     Problem: Sleep Disturbance  Goal: Adequate Sleep/Rest  Outcome: Progressing   Goal Outcome Evaluation:
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
## 2023-07-03 PROCEDURE — 250N000013 HC RX MED GY IP 250 OP 250 PS 637

## 2023-07-03 PROCEDURE — 250N000013 HC RX MED GY IP 250 OP 250 PS 637: Performed by: STUDENT IN AN ORGANIZED HEALTH CARE EDUCATION/TRAINING PROGRAM

## 2023-07-03 PROCEDURE — 124N000002 HC R&B MH UMMC

## 2023-07-03 PROCEDURE — 99232 SBSQ HOSP IP/OBS MODERATE 35: CPT | Mod: GC | Performed by: PSYCHIATRY & NEUROLOGY

## 2023-07-03 RX ADMIN — TRAZODONE HYDROCHLORIDE 50 MG: 50 TABLET ORAL at 02:39

## 2023-07-03 RX ADMIN — DIVALPROEX SODIUM 1000 MG: 500 TABLET, FILM COATED, EXTENDED RELEASE ORAL at 20:50

## 2023-07-03 RX ADMIN — HYDROXYZINE HYDROCHLORIDE 25 MG: 25 TABLET, FILM COATED ORAL at 17:30

## 2023-07-03 RX ADMIN — ACETAMINOPHEN 650 MG: 325 TABLET, FILM COATED ORAL at 17:30

## 2023-07-03 RX ADMIN — OLANZAPINE 5 MG: 5 TABLET, FILM COATED ORAL at 08:01

## 2023-07-03 RX ADMIN — TRAZODONE HYDROCHLORIDE 50 MG: 50 TABLET ORAL at 20:50

## 2023-07-03 RX ADMIN — OLANZAPINE 10 MG: 10 TABLET, FILM COATED ORAL at 20:50

## 2023-07-03 ASSESSMENT — ACTIVITIES OF DAILY LIVING (ADL)
LAUNDRY: WITH SUPERVISION
ADLS_ACUITY_SCORE: 40
ADLS_ACUITY_SCORE: 30
ADLS_ACUITY_SCORE: 40
ORAL_HYGIENE: INDEPENDENT
DRESS: INDEPENDENT
HYGIENE/GROOMING: INDEPENDENT
ADLS_ACUITY_SCORE: 40
ADLS_ACUITY_SCORE: 40
ADLS_ACUITY_SCORE: 30
ADLS_ACUITY_SCORE: 30
ADLS_ACUITY_SCORE: 40
ADLS_ACUITY_SCORE: 40

## 2023-07-03 NOTE — PROGRESS NOTES
"  ----------------------------------------------------------------------------------------------------------  [facility]  Psychiatry Progress Note  Hospital Day #2     Interim History:     The patient's care was discussed with the treatment team and chart notes were reviewed.    Vitals: VSS, slightly hypertensive (142/[80-83])  Sleep: 6.5 hours (07/03/23 0600)  Scheduled medications: Took all scheduled medications as prescribed  Psychiatric PRN medications: 5 mg haldol / 2mg ativan / benadryl 50 mg (7/2 @1600), atarax (7/1 @ 0244, has since been discontinued), Zyprexa, trazodone.    Staff Report:   Angela was admitted on Saturday 7/1 (slept 2 hours). On Sunday 7/2 (slept 4.25 hours) Angela was generally disorganized and RTIS. She became agitated and received PRN Zyprexa. B52 was administered after continued agitation, disorganization, and inability to respond to cues, after which she was calmer and more receptive to directions. Continues to be on SIO with no roommate, appears paranoid to staff but quieter today. No safety concerns overnight. Please see staff notes for details.      Subjective:     Patient Interview:  Angela Basurto was seen in the interview room. She states her mood is \"okay\" and states she has been hearing voices over the past two weeks. She states she has been diagnosed with bipolar disorder and agrees with this diagnosis. She reports exercise and Koran typically help with manic symptoms. She states she has had similar episodes of elvis and visual and auditory hallucinations in the past (may last one day but less than one week, also said they occur every other day in frequency). She endorsed that she received Depakote which she thinks helped, though she later stated that Depakote does not help. She states she typically stops taking the Depakote after noticing improvement in symptoms, also endorses itchiness with Depakote use.    She reports she slept " "well, but is still kind of tired. Her mood is okay, denies depressive symptoms.  Reports she received her medications, denies side effects. No problem using bathroom, appetite is ok. She denies any other hospitalizations apart from reported 2003 stay, though details around this remain unclear. States she would like to talk to her mother, who is in [place] and will be coming in two days. During the interview there was one instance where Angela appeared to be a bit dazed, mumbled to herself, perhaps responding to internal stimuli, and then appeared to be redirected to our conversation after ~15 seconds. She generally appeared to have a flat affect, and spoke in a soft voice with slow kacey.     Phone call with son, Golden 13:45 7/3/23: Golden reports that his mom has \"always had\" bipolar, throughout his childhood he recalls instances of hyper-irritability, mood swings, and other \"behavior issues.\" States his mom was in denial of her condition for a while and his dad (now ) would try to help but also was not aware of mental health challenges people experience. He learned after calling EMS for his mom's escalating behavioral changes in this recent episode that she had stopped taking her medications secondary to added stressors back in April when they had moved and after her brother suffered a stroke and she needed to take care of him. States more generally that she is usually \"good about her meds\" where she asks him to remind her to take them and get refills from the pharmacy.     oGlden states that she has been hospitalized many times. Last hospitalization was in 2022 after similar sequelae as this episode secondary to stopping her medications when she was visiting [facility]. Episodes between medication non-adherence and subsequent manic episodes are largely uneventful. She has mood swings but notes signficant changes to baseline in episodes like this where she is getting hardly any sleep, laughing hysterically to " "herself, and is especially irritable and anxious. States he does not believe she has ever taken lamictal.     ROS:  Patient has no bothersome physical symptoms  Patient denies acute concerns     Objective:     Vitals:  BP (!) 142/80 (BP Location: Right arm, Patient Position: Sitting, Cuff Size: Adult Large)   Pulse 89   Temp 97.8  F (36.6  C) (Temporal)   Resp 20   Ht 1.753 m (5' 9\")   Wt 104.9 kg (231 lb 3.2 oz)   SpO2 100%   BMI 34.14 kg/m      Allergies:  Allergies   Allergen Reactions     Pork-Derived Products Unknown     Rastafari does not eat pork       Current Medications:  Scheduled:  Current Facility-Administered Medications   Medication Dose Route Frequency     divalproex sodium extended-release  1,000 mg Oral QPM     OLANZapine  10 mg Oral At Bedtime     OLANZapine  5 mg Oral QAM       PRN:  Current Facility-Administered Medications   Medication Dose Route Frequency     acetaminophen  650 mg Oral Q4H PRN     benzonatate  100 mg Oral TID PRN     haloperidol  5 mg Oral Q8H PRN    And     LORazepam  2 mg Oral Q8H PRN    And     diphenhydrAMINE  50 mg Oral Q8H PRN     haloperidol lactate  5 mg Intramuscular Q8H PRN    And     LORazepam  2 mg Intramuscular Q8H PRN    And     diphenhydrAMINE  50 mg Intramuscular Q8H PRN     hydrOXYzine  25 mg Oral Q4H PRN     OLANZapine  10 mg Oral TID PRN    Or     OLANZapine  10 mg Intramuscular TID PRN     polyethylene glycol  17 g Oral Daily PRN     traZODone  50 mg Oral At Bedtime PRN       Labs and Imaging:  New results:   No results found for this or any previous visit (from the past 24 hour(s)).    Data this admission:  - CBC 7/1/23 unremarkable  - CMP 7/1/23 unremarkable  - TSH 7/1/23 normal  - B12 7/1/23 normal  - UDS 6/27/23 negative  - Hgb A1c 7/1/23 normal  - Lipids 7/2/23 unremarkable  - Urinalysis 6/27/23 unremarkable  - EKG sinus rhythm 7/1/23, QTc 447 ms, Per report \"possible left atrial enlargement, cannot rule out anterior infarct, age undetermined. " "Abnormal ECG. No previous ECGs available\"     Mental Status Exam:     Oriented to:  Grossly Oriented, Person/Self, Situation and unclear if oriented to time.  General:  Awake and Alert  Appearance:  appears stated age and Grooming is adequate  Behavior/Attitude:  calm, cooperative, easy to redirect and one episode of apparent disengagement where she was RTIS  Eye Contact:  appropriate  Psychomotor: normal and no evidence of tics, dystonia, or tardive dyskinesia no catatonia present  Speech:  soft volume/tone and normal rate  Language: Fluent in English with appropriate syntax and vocabulary.  Mood:  \"okay\"  Affect:  appeared flat, though this could be more an artifact of restriction  Thought Process:  coherent, responsive to internal stimuli and spoke in brief, short sentences, so thought process not obvious at times  Thought Content:  No SI/HI/AH/VH and appears to be responding to internal stimuli; delusions of paranoia  Associations:  questionable  Insight:  impaired due to being brought in by family, limited by psychosis  Judgment:  impaired due to psychosis, states she does not think her meds work, self-discontinues when she notes an apparent benefit  Impulse control: good during interview, though staff reports patient has attempted to eat other's food, enter other rooms and close the door, attempt to look at other patient's vitals  Attention Span:  adequate for conversation  Concentration:  grossly intact  Recent and Remote Memory:  not formally assessed, able to remember some details about medication history though will reference collateral  Fund of Knowledge:  average  Muscle Strength and Tone: normal  Gait and Station: Normal     Psychiatric Assessment     Angela Basurto is a 58 year old female previously diagnosed with Bipolar Disorder Type 1 and anxiety who presented with son for elvis and psychosis in the context of medication nonadherence after her brother recently had a stroke and 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 Significant symptoms on admission include paranoia, disorganized thinking and behaviors, AVH, and emotional lability. The MSE on admission was pertinent for RTIS. Biological contributions to mental health presentation include diagnosis of Bipolar Disorder, type 1 and taking Lamictal, Depakote, Zyprexa, and Abilify. Psychological contributions to mental health presentation include poor insight into her condition. While she understands why she's in the hospital, she displays intrusive behaviors that require a 1:1. 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 Protective factors include family support and being [place]. Most recent psychiatric hospitalization was in 2022.     In summary, the patient's reported symptoms of elvis and psychosis in the context of medication nonadherence are consistent with Bipolar Disorder, type 1. She will likely benefit from optimizing psychiatric medications this admission.     Given that she presented with psychosis and elvis, patient warranted inpatient psychiatric hospitalization to maintain her safety.      Psychiatric Plan by Diagnosis      Today's changes:  - will note elopement, assault, and sexual precautions after weekend events  - remove seizure precautions     # Bipolar Disorder, current episode, elvis with psychotic features  1. Medications:  - Depakote 1000mg qpm  - Olanzapine 15mg (5mg AM and 10mg PM)     2. Pertinent Labs/Monitoring:   - EKG QTc 447     3. Additional Plans:  - Patient will be treated in therapeutic milieu with appropriate individual and group therapies as described     # Anxiety  - Hydroxyzine prn     Psychiatric Hospital Course:      Angela Basurto was admitted to [facility] as a voluntary patient.     Medications:  ? PTA Olanzapine was continued and dose increased to 15mg for better symptom control (7/1/23).   ? PTA Depakote was continued at  a lower dose of 500mg in the ED. Dose was increased today back to 1000mg daily dose but as a once daily medication (7/2/23).   ? PTA Lamictal were held due to nonadherence and risk for Nava Ruben in outpatient setting (6/29/23).  ? Patient's history of Abilify use is unclear. Would benefit from speaking to outpatient provider.      The risks, benefits, alternatives, and side effects were discussed and understood by the patient and other caregivers.         Medical Assessment and Plan     Medical diagnoses to be addressed this admission:    #Hx of Seizures  -Unclear what precipitated the seizure and what type it was. Given remote history and low concern today, can remove seizure pre-cautions (7/3/23).     Medical course: Patient was physically examined by the ED prior to being transferred to the unit and was found to be medically stable and appropriate for admission.      Consults:  none     Checklist     Legal Status: Voluntary     Safety Assessment:   Behavioral Orders   Procedures     Code 1 - Restrict to Unit     Elopement precautions     Routine Programming     As clinically indicated     Seizure precautions     Sexual precautions     Status 15     Every 15 minutes.     Status Individual Observation     Patient SIO status reviewed with team/RN.  Please also refer to RN/team documentation for add'l detail.    -SIO staff to monitor following which have contributed to patient being on SIO:intrusiveness    -Possible interventions SIO staff could use to support patient's treatment progress: redirection    -When following observed, team will review discontinuation of SIO: improvement of psychosis     Order Specific Question:   CONTINUOUS 24 hours / day     Answer:   Other     Order Specific Question:   Specify distance     Answer:   10 ft     Order Specific Question:   Indications for SIO     Answer:   Assault risk     Order Specific Question:   Indications for SIO     Answer:   Severe intrusiveness       Risk  Assessment:  Risk for harm is moderate.  Risk factors: trauma, impulsive and past behaviors  Protective factors: family, peers and engaged in treatment     SIO: Yes    Disposition: TBD. Disposition pending clinical stabilization, medication optimization and development of an appropriate discharge plan.     Attestations     This patient was seen and discussed with my attending physician.  Melony Storey, MS3  [employer]    Resident Attestation:   I was present with the medical student who participated in the service and in the documentation of the note.  I have verified the history and personally performed the physical exam, mental status exam, and medical decision making. I agree with the assessment and plan of care as documented in the note.    Opal Marshall PGY1  Psychiatry Resident Physician    Attestation:  This patient has been seen and evaluated by me, Mariely Howard MD.  I have discussed this patient with the house staff team including the resident and medical student and I agree with the findings and plan in this note.    I have reviewed today's vital signs, medications, labs and imaging. Mariely Howard MD , PhD.

## 2023-07-03 NOTE — CARE PLAN
07/03/23 5535   Individualization/Patient Specific Goals   Patient Personal Strengths motivated for treatment;family/social support;stable living environment   Patient Vulnerabilities lacks insight into illness   Anxieties, Fears or Concerns Mental healht concerns   Individualized Care Needs None   Patient/Family-Specific Goals (Include Timeframe) Wants to be on the right medications   Interprofessional Rounds   Summary Initial assessment, new pt   Participants psychiatrist;nursing;CTC;other (see comments)   Behavioral Team Discussion   Participants Dr. Howard, Psychiatry resident, 3rd year Med students, Halina RING, Preethi OKEEFE   Progress Initial assessment   Anticipated length of stay 5-7 days   Continued Stay Criteria/Rationale Admitted for psychosis. Needs clinical stabilization and medication management   Medical/Physical No acute medical concerns   Precautions See below   Plan Psychiatry Team will meet with patient daily to assess psychiatric needs and to discuss medication options/side effects; during hospitalization patient will be encouraged to attend therapy groups and to participate in unit programming. CTC will coordinate disposition and aftercare plan   Rationale for change in precautions or plan None   Safety Plan See below   Anticipated Discharge Disposition home with family     PRECAUTIONS AND SAFETY    Behavioral Orders   Procedures    Assault precautions    Code 1 - Restrict to Unit    Elopement precautions    Routine Programming     As clinically indicated    Sexual precautions    Status 15     Every 15 minutes.    Status Individual Observation     Patient SIO status reviewed with team/RN.  Please also refer to RN/team documentation for add'l detail.    -SIO staff to monitor following which have contributed to patient being on SIO:intrusiveness    -Possible interventions SIO staff could use to support patient's treatment progress: redirection    -When following observed, team will review  discontinuation of SIO: improvement of psychosis     Order Specific Question:   CONTINUOUS 24 hours / day     Answer:   Other     Order Specific Question:   Specify distance     Answer:   10 ft     Order Specific Question:   Indications for SIO     Answer:   Assault risk     Order Specific Question:   Indications for SIO     Answer:   Severe intrusiveness       Safety  Safety WDL: WDL  Patient Location: dining room, lounge, patient room, own, bathroom  Observed Behavior: calm, angry/hostile, walking, sitting  Observed Behavior (Comment): See care plan notes.  Safety Measures: safety rounds completed, 1:1 observation maintained  Suicidality: Status 15  Withdrawal: seizure precautions (past history of withdrawal seizure)  Seizure precautions: other (see comment) (Floor pads)  Assault: status 15

## 2023-07-03 NOTE — CARE PLAN
07/03/23 1401   Group Therapy Session   Group Attendance attended group session   Time Session Began 1015   Time Session Ended 1200   Total Time (minutes) 25   Total # Attendees 6-7   Group Type community;recreation   Group Topic Covered balanced lifestyle;leisure exploration/use of leisure time   Group Session Detail OT Clinic: open group activities to promote self-expression and leisure exploration while working to demonstrate cognitive skills   Patient Participation Detail Pt participated in open OT group to explore leisure activities; pt was joined by SIO. Pt spent the first half of their time in group eating an apple and observing peers at the table work on projects. Pt announced they wanted to throw the uneaten core of the apple away; staff directed the pt to the garbage in the group room. Pt appeared interested in completing activities; writer set up and demonstrated activities for pt, such as spot-the-difference picture puzzles and sticker-by-number projects. Pt would begin activities and quickly abandoned them. Pt worked on the sticker-by-number project, with encouragement from a peer, for 10 minutes, peeling stickers and placing them incorrectly on the design. Pt left group after 25 minutes (no charge).

## 2023-07-03 NOTE — PLAN OF CARE
Patient was seen hysterically laughing responding to internal stimuli, patient required prompts and redirections, B52 was administered and patient was receptive, since administration her mood was calm and was receptive to cues, unable to assess patient's emotional state due to guardedness and suspicion, staff will continue to provide care and support.   Problem: Violence Risk or Actual  Goal: Anger and Impulse Control  Outcome: Progressing     Problem: Plan of Care - These are the overarching goals to be used throughout the patient stay.    Goal: Optimal Comfort and Wellbeing  Intervention: Provide Person-Centered Care  Recent Flowsheet Documentation  Taken 7/2/2023 1942 by Renetta Jones, RN  Trust Relationship/Rapport:   choices provided   emotional support provided   empathic listening provided   Goal Outcome Evaluation:    Plan of Care Reviewed With: patient

## 2023-07-03 NOTE — CARE PLAN
"   07/03/23 1535   Group Therapy Session   Group Attendance attended group session   Time Session Began 1415   Time Session Ended 1500   Total Time (minutes) 20   Total # Attendees 3   Group Type community;recreation   Group Topic Covered cognitive activities;leisure exploration/use of leisure time;structured socialization   Group Session Detail OT Leisure Group: Group activities to exercise cognitive skills while exploring leisure and socializing opportunities. \"Betcha Can't\" is a card game that asks players to name items in a category in a time frame (\"Name 8 potato chip flavors\"); collaborative exploration of team work, problem-solving, tracking, and following new directions.   Patient Participation Detail Pt participated in a group activity playing Velteo Can't; pt was joined by SIO. Pt was open to learning and playing a game that was new to them. Pt needed occasional reminders of the instructions and clarification (language barriers) of the instructions on the game cards. Pt was able to list 7 items associated with summer in less than 20 seconds. Pt reported feeling tired and stayed for a round longer before leaving (no charge).       "

## 2023-07-03 NOTE — PLAN OF CARE
Pt did request and given PRN Trazodone for sleep as pt was finding it hard to fall asleep. Denied any mental health symptoms  denied pain as well. Pt remains on SIO as ordered. Prn given for sleep aid was helpful as pt was able to fall asleep for 6.50 hours. Will continue to monitor and offer support.     Problem: Sleep Disturbance  Goal: Adequate Sleep/Rest  Outcome: Progressing   Goal Outcome Evaluation:

## 2023-07-03 NOTE — PLAN OF CARE
"Pt presented with a flat and blunted affect. Medication complainant. Intermittently visible in the milieu.  Comes in and out of her room. She was able to attend community meeting and groups.  Pt appeared suspicious inside her room, looking around while in her room. Pt said to her SIO that she looked familiar, then threw hot water to said staff. She was observed to be laughing out loud while on the hallway /the lounge area. No interactions with peers noted.  Pt was responding to internal stimuli.     Problem: Adult Behavioral Health Plan of Care  Goal: Patient-Specific Goal (Individualization)  Description: You can add care plan individualizations to a care plan. Examples of Individualization might be:  \"Parent requests to be called daily at 9am for status\", \"I have a hard time hearing out of my right ear\", or \"Do not touch me to wake me up as it startles me\".  Outcome: Not Progressing  Goal: Individualized Daily Interaction Plan (IDIP)  Outcome: Not Progressing     Problem: Violence Risk or Actual  Goal: Anger and Impulse Control  Outcome: Progressing   Goal Outcome Evaluation:                        "

## 2023-07-04 LAB
ATRIAL RATE - MUSE: 94 BPM
DIASTOLIC BLOOD PRESSURE - MUSE: NORMAL MMHG
INTERPRETATION ECG - MUSE: NORMAL
P AXIS - MUSE: 58 DEGREES
PR INTERVAL - MUSE: 150 MS
QRS DURATION - MUSE: 70 MS
QT - MUSE: 358 MS
QTC - MUSE: 447 MS
R AXIS - MUSE: 16 DEGREES
SYSTOLIC BLOOD PRESSURE - MUSE: NORMAL MMHG
T AXIS - MUSE: 49 DEGREES
VENTRICULAR RATE- MUSE: 94 BPM

## 2023-07-04 PROCEDURE — 124N000002 HC R&B MH UMMC

## 2023-07-04 PROCEDURE — 250N000013 HC RX MED GY IP 250 OP 250 PS 637: Performed by: STUDENT IN AN ORGANIZED HEALTH CARE EDUCATION/TRAINING PROGRAM

## 2023-07-04 PROCEDURE — 250N000013 HC RX MED GY IP 250 OP 250 PS 637

## 2023-07-04 RX ADMIN — OLANZAPINE 10 MG: 10 TABLET, FILM COATED ORAL at 20:16

## 2023-07-04 RX ADMIN — DIVALPROEX SODIUM 1000 MG: 500 TABLET, FILM COATED, EXTENDED RELEASE ORAL at 20:16

## 2023-07-04 RX ADMIN — OLANZAPINE 5 MG: 5 TABLET, FILM COATED ORAL at 08:00

## 2023-07-04 RX ADMIN — TRAZODONE HYDROCHLORIDE 50 MG: 50 TABLET ORAL at 00:01

## 2023-07-04 RX ADMIN — DIPHENHYDRAMINE HYDROCHLORIDE 50 MG: 50 CAPSULE ORAL at 20:16

## 2023-07-04 RX ADMIN — TRAZODONE HYDROCHLORIDE 50 MG: 50 TABLET ORAL at 23:48

## 2023-07-04 RX ADMIN — TRAZODONE HYDROCHLORIDE 50 MG: 50 TABLET ORAL at 20:16

## 2023-07-04 ASSESSMENT — ACTIVITIES OF DAILY LIVING (ADL)
ADLS_ACUITY_SCORE: 30

## 2023-07-04 NOTE — PLAN OF CARE
"Problem: Adult Behavioral Health Plan of Care  Goal: Plan of Care Review  Outcome: Progressing  Flowsheets (Taken 7/3/2023 1730)  Patient Agreement with Plan of Care: agrees   Goal Outcome Evaluation:    Plan of Care Reviewed With: patient          Pt continued on SIO 1:1 10 Ft for assault risk and severe intrusiveness. Pt was calm and quiet this shift. Affect flat and blunted but pleasant on approach. She was isolative to her room most of the shift. She came out some few times but kept to he self. She did not socialize or engaged with peers or staff. She has no insight of her situation and is forgetful. She complained of a headache, 8/10 and was given prn Tylenol 650 mg which was helpful. She rated anxiety 5/10 and depression 9/10. Pt stated her anxiety and depression is as a result of her missing her family. She was given prn Atarax 25 mg which was helpful. She denied SI and SIB but said she usually have passive SIO but today she is doing okay. She denied HI. She endorsed auditory hallucination stating that the voices are saying, \"go home and see your children and father.\" She contracted for safety. She was cooperative and med compliant. Trazodone 50 mg was given with her bed time medications. She was out for dinner and snacks with good food and fluid intake. She attended OT group partially. Family came to visit her but she refused and said they should come tomorrow. No safety concern noted this shift. Will continue to monitor and will assist if need arise.          "

## 2023-07-04 NOTE — PLAN OF CARE
"  Pt is intermittently visible in the milieu. She was observed mumbling to self  while sitting by the TV area. Comes out for meals. She is medication compliant. She took shower in the morning. She said that her mood is good, breakfast was good and shower was nice. Pt denies anxiety, depression and SI at the time of assessment. When asked if she hears voices, she answered \" Not now\". She used the exercise bike for a short while. She was a little paranoid, would not want to give the bike chord back to a certain staff, but would give it back to others. Pt mood is labile. Played cards with 1 staff. She did mostly talking to self. After taking a nap in the afternoon, pt was laughing out loud inside her room. No behavioral outburst this shift. Pt now doing some coloring by the dining area.     Problem: Adult Behavioral Health Plan of Care  Goal: Patient-Specific Goal (Individualization)  Description: You can add care plan individualizations to a care plan. Examples of Individualization might be:  \"Parent requests to be called daily at 9am for status\", \"I have a hard time hearing out of my right ear\", or \"Do not touch me to wake me up as it startles me\".  Outcome: Not Progressing  Goal: Individualized Daily Interaction Plan (IDIP)  Outcome: Not Progressing     Problem: Violence Risk or Actual  Goal: Anger and Impulse Control  Outcome: Progressing   Goal Outcome Evaluation:    Plan of Care Reviewed With: patient                   "

## 2023-07-04 NOTE — PLAN OF CARE
Per pt's request, PRN Trazodone for sleep was administered. Denied any mental health symptoms ; denied pain as well. Pt remains on SIO for severe intrusiveness. Prn given was useful; pt appears to have slept for 5 hours. Will continue to monitor and offer support.     Problem: Sleep Disturbance  Goal: Adequate Sleep/Rest  Outcome: Progressing   Goal Outcome Evaluation:

## 2023-07-05 LAB
HOLD SPECIMEN: NORMAL
VALPROATE SERPL-MCNC: 94 UG/ML

## 2023-07-05 PROCEDURE — 124N000002 HC R&B MH UMMC

## 2023-07-05 PROCEDURE — 250N000013 HC RX MED GY IP 250 OP 250 PS 637

## 2023-07-05 PROCEDURE — 250N000013 HC RX MED GY IP 250 OP 250 PS 637: Performed by: STUDENT IN AN ORGANIZED HEALTH CARE EDUCATION/TRAINING PROGRAM

## 2023-07-05 PROCEDURE — 36416 COLLJ CAPILLARY BLOOD SPEC: CPT

## 2023-07-05 PROCEDURE — 80164 ASSAY DIPROPYLACETIC ACD TOT: CPT

## 2023-07-05 PROCEDURE — 99232 SBSQ HOSP IP/OBS MODERATE 35: CPT | Mod: GC | Performed by: PSYCHIATRY & NEUROLOGY

## 2023-07-05 RX ADMIN — OLANZAPINE 5 MG: 5 TABLET, FILM COATED ORAL at 08:58

## 2023-07-05 RX ADMIN — OLANZAPINE 10 MG: 10 TABLET, FILM COATED ORAL at 01:45

## 2023-07-05 RX ADMIN — OLANZAPINE 15 MG: 10 TABLET, FILM COATED ORAL at 20:41

## 2023-07-05 RX ADMIN — TRAZODONE HYDROCHLORIDE 50 MG: 50 TABLET ORAL at 20:41

## 2023-07-05 ASSESSMENT — ACTIVITIES OF DAILY LIVING (ADL)
ADLS_ACUITY_SCORE: 31
ADLS_ACUITY_SCORE: 30
HYGIENE/GROOMING: HANDWASHING;INDEPENDENT
ADLS_ACUITY_SCORE: 30
ADLS_ACUITY_SCORE: 31
ADLS_ACUITY_SCORE: 30
ADLS_ACUITY_SCORE: 30
LAUNDRY: UNABLE TO COMPLETE
ADLS_ACUITY_SCORE: 31
HYGIENE/GROOMING: HANDWASHING;SHOWER;INDEPENDENT
DRESS: SCRUBS (BEHAVIORAL HEALTH);INDEPENDENT
DRESS: STREET CLOTHES;INDEPENDENT
ORAL_HYGIENE: INDEPENDENT
ADLS_ACUITY_SCORE: 31
ADLS_ACUITY_SCORE: 30
ADLS_ACUITY_SCORE: 31
ADLS_ACUITY_SCORE: 31
ADLS_ACUITY_SCORE: 30
ORAL_HYGIENE: INDEPENDENT

## 2023-07-05 NOTE — PLAN OF CARE
PRN Trazodone for sleep was given as pt c/o having difficult to fall asleep.  Denied SI/SIB/VH/AH at this time; denied pain as well. Pt remains on SIO for severe intrusiveness. Prn given was effective; pt appears to have slept for 5 hours. Will continue to monitor and offer support    Problem: Sleep Disturbance  Goal: Adequate Sleep/Rest  Outcome: Progressing   Goal Outcome Evaluation:

## 2023-07-05 NOTE — CARE PLAN
[facility] Occupational Therapy Group Intervention Note     07/05/23 1449   Group Therapy Session   Group Attendance attended group session   Time Session Began 1315   Time Session Ended 1430   Total Time (minutes) 25  (no charge)   Group Type task skill;life skill   Group Topic Covered cognitive activities;coping skills/lifestyle management;relapse prevention   Group Session Detail Clinic - coping skill exploration, creative expression within personally meaningful activities, and observation of social, cognitive, and kinesthetic performance skills   Patient Response/Contribution cooperative with task;disorganized   Patient Participation Detail Somewhat restricted affect. SIO present. Dependent for set-up and able to select 1 coloring sheet to complete from 3 options. Able to engage in a simple coloring task for ~5 minute increments.  Intermittently laughed to self and appeared distracted / preoccupied internally. Min-no social interaction with peers. Excused self early w/o return.      Brittany Sheth OT on 7/5/2023 at 2:50 PM

## 2023-07-05 NOTE — PLAN OF CARE
Problem: Suicidal Behavior  Goal: Suicidal Behavior is Absent or Managed  Outcome: Progressing     Problem: Violence Risk or Actual  Goal: Anger and Impulse Control  Outcome: Progressing     Problem: Adult Behavioral Health Plan of Care  Goal: Absence of New-Onset Illness or Injury  Intervention: Identify and Manage Fall Risk  Recent Flowsheet Documentation  Taken 7/5/2023 1717 by Rich Celis RN  Safety Measures:    environmental rounds completed    safety rounds completed   Goal Outcome Evaluation:    Plan of Care Reviewed With: patient        Patient was pleasant, she was visible in milieu. Patientwas cooperative, Interactive with staff members only. Continues on SIO 1:1, no intrusive behaviors noted, continues to be disorganized. Noted responding to internal stimuli (talking and laughing to self). Had periods of irritability, redirectable. Denied any pain or discomfort, ADLs WNL. She denied anxiety or depression, no SI/HI. Declined scheduled HS Depakote.

## 2023-07-05 NOTE — PLAN OF CARE
Assessment/Intervention/Current Symptoms and Care Coordination: Met with team. Reviewed patient's chart and progress notes.     - Patient is on a 1:1 due disorganized behavior and intrusiveness. She is medication compliant. [employer]  was utilized when pt was meeting with tx team.   - Pt is responding to internal stimuli and can be observed talking to self when she out in the milieu.   - Writer placed request with care coordinator to schedule follow up appt with md doan provider at [facility]. Writer placed request for follow up with therapist, unclear if therapist is also at [facility].        Discharge Plan or Goal: Will return home. Will follow up with outpatient providers at [facility].          Barriers to Discharge: Admitted for psychosis. Needs clinical stabilization and medication management.         Referral Status:         Legal Status:  Voluntary

## 2023-07-05 NOTE — PLAN OF CARE
Problem: Plan of Care - These are the overarching goals to be used throughout the patient stay.    Goal: Plan of Care Review  Description: The Plan of Care Review/Shift note should be completed every shift.  The Outcome Evaluation is a brief statement about your assessment that the patient is improving, declining, or no change.  This information will be displayed automatically on your shift note.  Outcome: Progressing     Problem: Adult Behavioral Health Plan of Care  Goal: Plan of Care Review  7/4/2023 2102 by Sarah Stuart, RN  Outcome: Progressing  7/4/2023 1707 by Sarah Stuart, RN  Outcome: Progressing   Goal Outcome Evaluation:      Current Status: Patient continues to be on SIO. She was visible in the milieu this shift. She paced in the [place]arro a few times. She appeared distressed, agitated and irritable. Her hygiene appears to be inadequate. She was socially isolative and withdrawn. She appeared to be responding to internal stimuli. She was observed talking and laughing to herself. She had one episode of yelling at the [facility] during visiting hours when the [facility] was trying setting up the visit for her. No suicidal, homicidal ideations noted or observed. Was given benadryl along with scheduled Zyprexa for agitation. Patient is adherence to medications. No side effects noted or reported. Will continue with current plan of care.

## 2023-07-05 NOTE — PLAN OF CARE
"Goal Outcome Evaluation:    Plan of Care Reviewed With: patient               Problem: Plan of Care - These are the overarching goals to be used throughout the patient stay.    Goal: Plan of Care Review  Description: The Plan of Care Review/Shift note should be completed every shift.  The Outcome Evaluation is a brief statement about your assessment that the patient is improving, declining, or no change.  This information will be displayed automatically on your shift note.  Outcome: Progressing       Behavioral  Pt slept 7 hours overnight; remain on SIO intrusive and assault behavior. Continue presenting with disorganized behavior. Self laughs and responds to internal stimuli. Pt is intermittently irritable and agitated, especially when trying to re direct her from male pt whom she believes he is her son. Pt has no interactions with peers or staff. Observed pacing on chap[address] way. There is minimal poor boundaries observed today. Adequate intake of foods and fluids. Compliant with medications. Pt attended groups. speech is rambling and hyper verbal. Pt denied SI, SIB, HI, and hallucinations; denies pain, depression or anxiety. Pt has poor insight to her mental illness. Affect is flat and blunted; mood is elevated and animated; has Valproic acid labs drawn today. Zyprexa was changed to 15 mg at HS and 5 mg in the morning. Family would like a care conference scheduled. Provider was notified and will contact the family.     Medical    No complaints of physical pain/discomfort this shift. Vital signs stable. Blood pressure 139/83, pulse 77, temperature 97  F (36.1  C), temperature source Temporal, resp. rate 16, height 1.753 m (5' 9\"), weight 105.8 kg (233 lb 4.8 oz), SpO2 100 %.      PRNS:      Plan     "

## 2023-07-05 NOTE — PROGRESS NOTES
----------------------------------------------------------------------------------------------------------  [facility]  Psychiatry Progress Note  Hospital Day #4     Interim History:     The patient's care was discussed with the treatment team and chart notes were reviewed.    Vitals: BP occasionally elevated ([124-156]/[64-82]), rest of vital signs stable and within normal limits  Sleep: 5 hours (07/05/23 0600)  Scheduled medications: Took all scheduled medications as prescribed  Psychiatric PRN medications: Zyprexa, trazodone    Staff Report:   No acute events or safety concerns overnight. Remains on SIO with no roommate. Per staff she is disorganized, is often laughing to herself. Continues to sleep well w/ PRN trazodone. Could be appropriate for groups when calmer. Please see staff notes for details.      Subjective:     Patient Interview:  Angela was seen in the interview room with the aid of a Vincentian . She states she feels good, better than on admission,  but doesn't remember meeting writer. Angela reports that she hears voices fairly regularly though perhaps have decreased in frequency since admission though still quite bothersome.  The voices tend to be that of her friends or familiar figures which is why she finds her laughing aloud. She also notes visual hallucinations. Does not note any side effects with her medications and broadly speaking thinks they have helped, though is agreeable for plan to increase dosing to keep hallucinations at bay. Reports she slept well with trazodone. States she has enjoyed using the exercise bike and playing cards.     Angela expressed some safety concerns for herself in the unit and for her five adult children, as well as concern regarding the up-keep of her house. She was reassured of her personal safety and that her children are safe and her home is secure to our knowledge, writer suggested she call them to check in.  "Angela disclosed her sadness and became tearful when discussing how her children were taken away from her by her ex-. She thought one of her children was on the unit at the time of the interview, though it is suspected by the writer that this is another patient who may look like her son.     ROS:  Patient has no bothersome physical symptoms  Patient denies acute concerns     Objective:     Vitals:  BP (!) 156/82 (Patient Position: Sitting)   Pulse 91   Temp 98.1  F (36.7  C) (Oral)   Resp 16   Ht 1.753 m (5' 9\")   Wt 105.8 kg (233 lb 4.8 oz)   SpO2 100%   BMI 34.45 kg/m      Allergies:  Allergies   Allergen Reactions     Pork-Derived Products Unknown     Hoahaoism does not eat pork       Current Medications:  Scheduled:  Current Facility-Administered Medications   Medication Dose Route Frequency     divalproex sodium extended-release  1,000 mg Oral QPM     OLANZapine  10 mg Oral At Bedtime     OLANZapine  5 mg Oral QAM       PRN:  Current Facility-Administered Medications   Medication Dose Route Frequency     acetaminophen  650 mg Oral Q4H PRN     benzonatate  100 mg Oral TID PRN     haloperidol  5 mg Oral Q8H PRN    And     LORazepam  2 mg Oral Q8H PRN    And     diphenhydrAMINE  50 mg Oral Q8H PRN     haloperidol lactate  5 mg Intramuscular Q8H PRN    And     LORazepam  2 mg Intramuscular Q8H PRN    And     diphenhydrAMINE  50 mg Intramuscular Q8H PRN     hydrOXYzine  25 mg Oral Q4H PRN     OLANZapine  10 mg Oral TID PRN    Or     OLANZapine  10 mg Intramuscular TID PRN     polyethylene glycol  17 g Oral Daily PRN     traZODone  50 mg Oral At Bedtime PRN       Labs and Imaging:  New results:   No results found for this or any previous visit (from the past 24 hour(s)).    Data this admission:  - CBC 7/1/23 unremarkable  - CMP 7/1/23 unremarkable  - TSH 7/1/23 normal  - B12 7/1/23 normal  - UDS 6/27/23 negative  - Hgb A1c 7/1/23 normal  - Lipids 7/2/23 unremarkable  - Urinalysis 6/27/23 unremarkable  - " "EKG sinus rhythm 7/1/23, QTc 447 ms, Per report \"possible left atrial enlargement, cannot rule out anterior infarct, age undetermined. Abnormal ECG. No previous ECGs available\"     Mental Status Exam:     Oriented to:  Grossly Oriented, Person/Self, Situation and unclear if oriented to time.  General:  Awake and Alert  Appearance:  appears stated age and Grooming is adequate, wearing headscarf  Behavior/Attitude:  calm, cooperative and engaged  Eye Contact:  appropriate  Psychomotor: normal and no evidence of tics, dystonia, or tardive dyskinesia no catatonia present  Speech:  appropriate volume/tone and normal rate  Language: Fluent in English with appropriate syntax and vocabulary. [employer]  present for our conversation and interpreted occasionally.  Mood:  \"good\"  Affect:  became tearful when discussing sadness surrounding children, appropriate and congruent with mood  Thought Process:  coherent and no episodes where obviously RTIS  Thought Content:  No SI/HI/AH/VH and reports AVH though none during interview; delusions of paranoia  Associations:  questionable  Insight:  limited due to psychosis, brought in by family, though she is aware she has bipolar and of her hallucinations.  Judgment:  limited due to psychosis, though thinks her medications help her hallucinations and agreeable to continued use to minimize her psychiatric symptoms.  Impulse control: good  Attention Span:  adequate for conversation  Concentration:  grossly intact  Recent and Remote Memory:  not formally assessed, did not remember seeing writer on Monday 7/3 in same context.  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 Significant symptoms on admission include paranoia, disorganized thinking and behaviors, AVH, and emotional lability. The MSE on admission was pertinent for RTIS. Biological contributions to mental health presentation include diagnosis of Bipolar Disorder, type 1 and taking Lamictal, Depakote, Zyprexa, and Abilify. Psychological contributions to mental health presentation include poor insight into her condition. While she understands why she's in the hospital, she displays intrusive behaviors that require a 1:1. 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 Protective factors include family support and being Protestant. Most recent psychiatric hospitalization was in 2022.     In summary, the patient's reported symptoms of elvis and psychosis in the context of medication nonadherence are consistent with Bipolar Disorder, type 1. She will likely benefit from optimizing psychiatric medications this admission.     Given that she presented with psychosis and elvis, patient warranted inpatient psychiatric hospitalization to maintain her safety.      Psychiatric Plan by Diagnosis      Today's changes:  - increase nighttime dose of olanzapine to 15 mg (20 mg daily)  - check serum valproic acid       # Bipolar Disorder, current episode, elvis with psychotic features  1. Medications:  - Depakote 1000mg qpm  - Olanzapine 15mg (5mg AM and 10mg PM), increased to 20 mg daily (5 mg AM and 15 mg PM)     2. Pertinent Labs/Monitoring:   - EKG QTc 447  - serum valproic acid     3. Additional Plans:  - Patient will be treated in therapeutic milieu with appropriate individual and group therapies as described     # Anxiety  - Hydroxyzine prn     Psychiatric Hospital Course:      Angela Basurto was admitted to [facility] as a voluntary patient.     Medications:  ? PTA Olanzapine was continued and dose increased to 15mg  for better symptom control (7/1/23). Dose increased to 20 mg daily (5 mg AM and 15 mg PM) due to continued hallucinations (7/5/23).   ? PTA Depakote was continued at a lower dose of 500mg in the ED. Dose was increased today back to 1000mg daily dose but as a once daily medication (7/2/23).   ? PTA Lamictal were held due to nonadherence and risk for Elijah Miranda in outpatient setting (6/29/23).  ? Patient's history of Abilify use is unclear. Would benefit from speaking to outpatient provider.      The risks, benefits, alternatives, and side effects were discussed and understood by the patient and other caregivers.         Medical Assessment and Plan     Medical diagnoses to be addressed this admission:    #Hx of Seizures  -Unclear what precipitated the seizure and what type it was. Given remote history and low concern today, can remove seizure pre-cautions (7/3/23).     Medical course: Patient was physically examined by the ED prior to being transferred to the unit and was found to be medically stable and appropriate for admission.      Consults:  none     Checklist     Legal Status: Voluntary     Safety Assessment:   Behavioral Orders   Procedures     Assault precautions     Code 1 - Restrict to Unit     Elopement precautions     Routine Programming     As clinically indicated     Sexual precautions     Status 15     Every 15 minutes.     Status Individual Observation     Patient SIO status reviewed with team/RN.  Please also refer to RN/team documentation for add'l detail.    -SIO staff to monitor following which have contributed to patient being on SIO:intrusiveness    -Possible interventions SIO staff could use to support patient's treatment progress: redirection    -When following observed, team will review discontinuation of SIO: improvement of psychosis     Order Specific Question:   CONTINUOUS 24 hours / day     Answer:   Other     Order Specific Question:   Specify distance     Answer:   10 ft     Order  Specific Question:   Indications for SIO     Answer:   Assault risk     Order Specific Question:   Indications for SIO     Answer:   Severe intrusiveness       Risk Assessment:  Risk for harm is moderate.  Risk factors: trauma, impulsive and past behaviors  Protective factors: family, peers and engaged in treatment     SIO: Yes    Disposition: TBD. Disposition pending clinical stabilization, medication optimization and development of an appropriate discharge plan.     Attestations     This patient was seen and discussed with my attending physician.  Melony Storey, MS3  [employer]    Resident Attestation:   I was present with the medical student who participated in the service and in the documentation of the note.  I have verified the history and personally performed the physical exam, mental status exam, and medical decision making. I agree with the assessment and plan of care as documented in the note.    Opal Marshall PGY1  Psychiatry Resident Physician    Attestation:  This patient has been seen and evaluated by me, Mariely Howard MD.  I have discussed this patient with the house staff team including the resident and medical student and I agree with the findings and plan in this note.    I have reviewed today's vital signs, medications, labs and imaging. Mariely Howard MD , PhD.

## 2023-07-06 PROCEDURE — 124N000002 HC R&B MH UMMC

## 2023-07-06 PROCEDURE — 99232 SBSQ HOSP IP/OBS MODERATE 35: CPT | Mod: GC | Performed by: PSYCHIATRY & NEUROLOGY

## 2023-07-06 PROCEDURE — 250N000013 HC RX MED GY IP 250 OP 250 PS 637: Performed by: STUDENT IN AN ORGANIZED HEALTH CARE EDUCATION/TRAINING PROGRAM

## 2023-07-06 PROCEDURE — 250N000013 HC RX MED GY IP 250 OP 250 PS 637

## 2023-07-06 RX ORDER — OLANZAPINE 20 MG/1
20 TABLET ORAL AT BEDTIME
Status: DISCONTINUED | OUTPATIENT
Start: 2023-07-06 | End: 2023-07-26 | Stop reason: HOSPADM

## 2023-07-06 RX ORDER — OLANZAPINE 10 MG/1
10 TABLET ORAL EVERY MORNING
Status: DISCONTINUED | OUTPATIENT
Start: 2023-07-07 | End: 2023-07-26 | Stop reason: HOSPADM

## 2023-07-06 RX ADMIN — OLANZAPINE 20 MG: 20 TABLET, FILM COATED ORAL at 20:31

## 2023-07-06 RX ADMIN — BENZONATATE 100 MG: 100 CAPSULE ORAL at 01:47

## 2023-07-06 RX ADMIN — OLANZAPINE 10 MG: 10 TABLET, FILM COATED ORAL at 02:20

## 2023-07-06 RX ADMIN — TRAZODONE HYDROCHLORIDE 50 MG: 50 TABLET ORAL at 02:17

## 2023-07-06 RX ADMIN — OLANZAPINE 5 MG: 5 TABLET, FILM COATED ORAL at 08:55

## 2023-07-06 RX ADMIN — TRAZODONE HYDROCHLORIDE 50 MG: 50 TABLET ORAL at 20:31

## 2023-07-06 RX ADMIN — DIVALPROEX SODIUM 1000 MG: 500 TABLET, FILM COATED, EXTENDED RELEASE ORAL at 23:09

## 2023-07-06 ASSESSMENT — ACTIVITIES OF DAILY LIVING (ADL)
ADLS_ACUITY_SCORE: 31
DRESS: STREET CLOTHES;INDEPENDENT
ADLS_ACUITY_SCORE: 31
ORAL_HYGIENE: INDEPENDENT
ADLS_ACUITY_SCORE: 31
LAUNDRY: WITH SUPERVISION
LAUNDRY: UNABLE TO COMPLETE
ADLS_ACUITY_SCORE: 31
ADLS_ACUITY_SCORE: 31
HYGIENE/GROOMING: HANDWASHING;SHOWER;INDEPENDENT
HYGIENE/GROOMING: HANDWASHING;INDEPENDENT
DRESS: STREET CLOTHES;INDEPENDENT
ADLS_ACUITY_SCORE: 31
ORAL_HYGIENE: INDEPENDENT
ADLS_ACUITY_SCORE: 31

## 2023-07-06 NOTE — PLAN OF CARE
Assessment/Intervention/Current Symptoms and Care Coordination: Met with team. Reviewed patient's chart and progress notes.      - Patient is on a 1:1 due disorganized behavior and intrusiveness. She is medication compliant. [employer]uc  was utilized when pt was meeting with tx team.   - Pt is responding to internal stimuli and can be observed talking to self and laughing.   - Writer placed request with care coordinator to schedule follow up appt with md mgmt provider at [facility].  - Writer informed by care coordinators that pt will need to schedule therapy appt the day she attend her in person med mgmt appt.    - Writer called [facility] and rescheduled med mgmt appt for 7/11 to 8/17 10:20 am.         Discharge Plan or Goal: Will return home when stable. Will follow up with outpatient providers at [facility].            Barriers to Discharge: Admitted for psychosis. Needs clinical stabilization and medication management.            Referral Status:           Legal Status: Voluntary

## 2023-07-06 NOTE — PROGRESS NOTES
"  ----------------------------------------------------------------------------------------------------------  [facility]  Psychiatry Progress Note  Hospital Day #5     Interim History:     The patient's care was discussed with the treatment team and chart notes were reviewed.    Vitals: BP (!) 150/88 (BP Location: Left arm, Patient Position: Sitting, Cuff Size: Adult Regular)   Pulse 95   Temp 97  F (36.1  C) (Temporal)   Resp 16   Ht 1.753 m (5' 9\")   Wt 105.8 kg (233 lb 4.8 oz)   SpO2 99%   BMI 34.45 kg/m    Sleep: 5.2 hours (07/06/23)  Scheduled medications: Did not take depakote 1000 mg 7/5  Psychiatric PRN medications: Zyprexa, trazodone    Staff Report: Denied Depakote last night. Out in common area laughing to herself. Slept for about 5.25 hours. Still on SIO. No acute events or safety concerns overnight. Remains on SIO with no roommate. Per staff she is disorganized, is often laughing to herself. Continues to sleep well w/ PRN trazodone. Could be appropriate for groups when calmer. Please see staff notes for details.      Subjective:     Patient Interview:  Angela was seen in the interview room with the aid of a [facility] . Reports appetite is normal, ate breakfast. Says she did not sleep well last night because of voices. Says she was talking to a friend named Serenity, like she was here. She says like her brain was playing a trick on her. Says her mood is \"not normal.\" Says a 0/10 for mood.  When asked why she didn't take depakote yesterday, she said \"I don't know.\" but also reports some side effects. She says that depakote might be causing the voices. The team reassures her this is not the case and that depakote will help with her sleep and mood. She expresses concern about there being two beds in her room. Team explains that she does not have a roommate and once there is a discharge she could be moved into a single bed room. Increasing zyprexa was " "discussed. She is agreeable to this plan. Endorses constipation. Reports that milk of magnesia has been helpful in the past.    ROS:  Patient has some constipation.   Patient denies acute concerns     Objective:     Vitals:  BP (!) 150/88 (BP Location: Left arm, Patient Position: Sitting, Cuff Size: Adult Regular)   Pulse 95   Temp 97  F (36.1  C) (Temporal)   Resp 16   Ht 1.753 m (5' 9\")   Wt 105.8 kg (233 lb 4.8 oz)   SpO2 99%   BMI 34.45 kg/m      Allergies:  Allergies   Allergen Reactions     Pork-Derived Products Unknown     Protestant does not eat pork       Current Medications:  Scheduled:  Current Facility-Administered Medications   Medication Dose Route Frequency     divalproex sodium extended-release  1,000 mg Oral QPM     OLANZapine  15 mg Oral At Bedtime     OLANZapine  5 mg Oral QAM       PRN:  Current Facility-Administered Medications   Medication Dose Route Frequency     acetaminophen  650 mg Oral Q4H PRN     benzonatate  100 mg Oral TID PRN     haloperidol  5 mg Oral Q8H PRN    And     LORazepam  2 mg Oral Q8H PRN    And     diphenhydrAMINE  50 mg Oral Q8H PRN     haloperidol lactate  5 mg Intramuscular Q8H PRN    And     LORazepam  2 mg Intramuscular Q8H PRN    And     diphenhydrAMINE  50 mg Intramuscular Q8H PRN     hydrOXYzine  25 mg Oral Q4H PRN     OLANZapine  10 mg Oral TID PRN    Or     OLANZapine  10 mg Intramuscular TID PRN     polyethylene glycol  17 g Oral Daily PRN     traZODone  50 mg Oral At Bedtime PRN       Labs and Imaging:  New results:   Recent Results (from the past 24 hour(s))   Valproic acid    Collection Time: 07/05/23 12:55 PM   Result Value Ref Range    Valproic acid 94.0   ug/mL   Extra Red Top Tube    Collection Time: 07/05/23 12:55 PM   Result Value Ref Range    Hold Specimen JI        Data this admission:  - Depakote 7/5/23 unremarkable  - CBC 7/1/23 unremarkable  - CMP 7/1/23 unremarkable  - TSH 7/1/23 normal  - B12 7/1/23 normal  - UDS 6/27/23 negative  - Hgb A1c " "7/1/23 normal  - Lipids 7/2/23 unremarkable  - Urinalysis 6/27/23 unremarkable  - EKG sinus rhythm 7/1/23, QTc 447 ms, Per report \"possible left atrial enlargement, cannot rule out anterior infarct, age undetermined. Abnormal ECG. No previous ECGs available\"     Mental Status Exam:     Oriented to:  Grossly Oriented, Person/Self, Situation and unclear if oriented to time.  General:  Awake and Alert,   Appearance:  appears stated age and Grooming is adequate, wearing headscarf  Behavior/Attitude:  calm, cooperative and engaged  Eye Contact:  appropriate  Psychomotor: normal and no evidence of tics, dystonia, or tardive dyskinesia no catatonia present  Speech:  Low volume/tone and normal rate. Uses few words  Language: Fluent in English with appropriate syntax and vocabulary. Norwegian  present for our conversation and interpreted occasionally.  Mood:  \"not good\" rated 0/10  Affect:  Euthymic, appropriate and congruent with mood  Thought Process:  coherent and no episodes where obviously RTIS  Thought Content:  No VH but reports AH though none during interview; delusions of paranoia  Associations:  questionable  Insight:  limited due to psychosis, brought in by family, though she is aware she has bipolar and of her hallucinations.  Judgment:  limited due to psychosis, though thinks her medication (depakote) may cause hallucinations agreed to continue use to minimize psychiatric symptoms.  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 " Significant symptoms on admission include paranoia, disorganized thinking and behaviors, AVH, and emotional lability. The MSE on admission was pertinent for RTIS. Biological contributions to mental health presentation include diagnosis of Bipolar Disorder, type 1 and taking Lamictal, Depakote, Zyprexa, and Abilify. Psychological contributions to mental health presentation include poor insight into her condition. While she understands why she's in the hospital, she displays intrusive behaviors that require a 1:1. 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 Protective factors include family support and being Hoahaoism. Most recent psychiatric hospitalization was in 2022.     In summary, the patient's reported symptoms of elvis and psychosis in the context of medication nonadherence are consistent with Bipolar Disorder, type 1. She will likely benefit from optimizing psychiatric medications this admission.     Given that she presented with psychosis and elvis, patient warranted inpatient psychiatric hospitalization to maintain her safety.      Psychiatric Plan by Diagnosis      Today's changes:  - increase nighttime dose of olanzapine to 20 mg, 10mg  in AM (30 mg daily) 7/6       # Bipolar Disorder, current episode, elvis with psychotic features  1. Medications:  - Depakote 1000mg qpm  - Olanzapine increased to 30 mg (10mg AM and 20 mg PM) 7/6     2. Pertinent Labs/Monitoring:   - EKG QTc 447  - serum valproic acid, 94 7/6     3. Additional Plans:  - Patient will be treated in therapeutic milieu with appropriate individual and group therapies as described     # Anxiety  - Hydroxyzine prn     Psychiatric Hospital Course:      Angela Basurto was admitted to Station 20 as a voluntary patient.     Medications:  ? PTA Olanzapine was continued and dose increased to 15mg for better symptom control (7/1/23). Dose increased to  20 mg daily (5 mg AM and 15 mg PM) due to continued hallucinations (7/5/23).   ? Olanzapine increased to 30mg daily (10 mg AM and 20 mg PM) 7/6/23  ? PTA Depakote was continued at a lower dose of 500mg in the ED. Dose was increased today back to 1000mg daily dose but as a once daily medication (7/2/23).   ? PTA Lamictal were held due to nonadherence and risk for Elijah Ruben in outpatient setting (6/29/23).  ? Patient's history of Abilify use is unclear. Would benefit from speaking to outpatient provider.      The risks, benefits, alternatives, and side effects were discussed and understood by the patient and other caregivers.         Medical Assessment and Plan     Medical diagnoses to be addressed this admission:    #Hx of Seizures  -Unclear what precipitated the seizure and what type it was. Given remote history and low concern today, can remove seizure pre-cautions (7/3/23).    #constipation  - PRN milk of magnesia ordered 7/6/23    Medical course: Patient was physically examined by the ED prior to being transferred to the unit and was found to be medically stable and appropriate for admission.      Consults:  none     Checklist     Legal Status: Voluntary     Safety Assessment:   Behavioral Orders   Procedures     Assault precautions     Code 1 - Restrict to Unit     Elopement precautions     Routine Programming     As clinically indicated     Sexual precautions     Status 15     Every 15 minutes.     Status Individual Observation     Patient SIO status reviewed with team/RN.  Please also refer to RN/team documentation for add'l detail.    -SIO staff to monitor following which have contributed to patient being on SIO:intrusiveness    -Possible interventions SIO staff could use to support patient's treatment progress: redirection    -When following observed, team will review discontinuation of SIO: improvement of psychosis     Order Specific Question:   CONTINUOUS 24 hours / day     Answer:   Other     Order  Specific Question:   Specify distance     Answer:   10 ft     Order Specific Question:   Indications for SIO     Answer:   Assault risk     Order Specific Question:   Indications for SIO     Answer:   Severe intrusiveness       Risk Assessment:  Risk for harm is moderate.  Risk factors: trauma, impulsive and past behaviors  Protective factors: family, peers and engaged in treatment     SIO: Yes    Disposition: Disposition pending clinical stabilization, medication optimization and development of an appropriate discharge plan.     Attestations     This patient was seen and discussed with my attending physician.  Rhea Harris, MS3  [employer]    Resident Attestation:   I was present with the medical student who participated in the service and in the documentation of the note.  I have verified the history and personally performed the physical exam, mental status exam, and medical decision making. I agree with the assessment and plan of care as documented in the note.    Opal Marshall PGY1  Psychiatry Resident Physician    Attestation:  This patient has been seen and evaluated by me, Mariely Howard MD.  I have discussed this patient with the house staff team including the resident and medical student and I agree with the findings and plan in this note.    I have reviewed today's vital signs, medications, labs and imaging. Mariely Howard MD , PhD.

## 2023-07-06 NOTE — PLAN OF CARE
Coordination of Care NOTE:    Care Coordinator scheduled the following appointments:    Psychiatry Appointment: Tuesday July 11th at 11:40am in-person  Provider: Dr. Ceasar You [facility]  [facility], [address], [address] [address]  Phone: [phone]  Fax: [phone]  Notes: HUC please fax AVS    Care Coordinator updated pt's AVS

## 2023-07-06 NOTE — PLAN OF CARE
Pt was awake in room at the start of this shift. She was noted laughing out loud and talking to herself She appeared a little agitated and c/o of not being able to fall asleep. PRN Zyprexa and Trazodone given x 1 and was effective as pt was able to fall asleep later. Pt continues to be SIO. No pain issues reported. Appears to have slept for about 5.2 hours. Continue to monitor and provide support.     Problem: Sleep Disturbance  Goal: Adequate Sleep/Rest  Outcome: Progressing

## 2023-07-07 ENCOUNTER — TELEPHONE (OUTPATIENT)
Dept: BEHAVIORAL HEALTH | Facility: CLINIC | Age: 58
End: 2023-07-07
Payer: COMMERCIAL

## 2023-07-07 PROCEDURE — 124N000002 HC R&B MH UMMC

## 2023-07-07 PROCEDURE — 250N000013 HC RX MED GY IP 250 OP 250 PS 637

## 2023-07-07 PROCEDURE — 99232 SBSQ HOSP IP/OBS MODERATE 35: CPT | Performed by: PSYCHIATRY & NEUROLOGY

## 2023-07-07 RX ORDER — DIVALPROEX SODIUM 125 MG/1
500 CAPSULE, COATED PELLETS ORAL 2 TIMES DAILY
Status: DISCONTINUED | OUTPATIENT
Start: 2023-07-07 | End: 2023-07-12

## 2023-07-07 RX ORDER — DIVALPROEX SODIUM 125 MG/1
500 CAPSULE, COATED PELLETS ORAL 2 TIMES DAILY
Status: DISCONTINUED | OUTPATIENT
Start: 2023-07-07 | End: 2023-07-07

## 2023-07-07 RX ORDER — DIVALPROEX SODIUM 125 MG/1
1000 CAPSULE, COATED PELLETS ORAL EVERY EVENING
Status: DISCONTINUED | OUTPATIENT
Start: 2023-07-07 | End: 2023-07-07

## 2023-07-07 RX ADMIN — OLANZAPINE 10 MG: 10 TABLET, FILM COATED ORAL at 08:18

## 2023-07-07 ASSESSMENT — ACTIVITIES OF DAILY LIVING (ADL)
ADLS_ACUITY_SCORE: 31
ADLS_ACUITY_SCORE: 31
HYGIENE/GROOMING: INDEPENDENT
LAUNDRY: WITH SUPERVISION
DRESS: INDEPENDENT
ADLS_ACUITY_SCORE: 31
ADLS_ACUITY_SCORE: 31
ORAL_HYGIENE: INDEPENDENT
ADLS_ACUITY_SCORE: 31
HYGIENE/GROOMING: INDEPENDENT
LAUNDRY: WITH SUPERVISION
ADLS_ACUITY_SCORE: 31
ADLS_ACUITY_SCORE: 31
ORAL_HYGIENE: INDEPENDENT
ADLS_ACUITY_SCORE: 31
ADLS_ACUITY_SCORE: 31
DRESS: INDEPENDENT

## 2023-07-07 NOTE — TELEPHONE ENCOUNTER
3:01 PM Per Dr. Champagne message in provider chat: [2:43 PM] MRN [id number] has been accepted for transfer from station 20 to 12    3:04 PM Intake placed patient in queue for station 12. Writer called station 12, charge RN to reach out for report. 3:16 PM Writer called station 20 with update on patient disposition, station 20 requested callback at 4pm.     4:08 PM Intake called station 20 charge RN, charge RN to callback in 5 minutes.      4:20 PM Intake received call from station 20 charge RN, charge RN to reach out to station 12 charge RN to provide report for transfer to station 12

## 2023-07-07 NOTE — PLAN OF CARE
"Goal Outcome Evaluation:    Plan of Care Reviewed With: patient          Problem: Plan of Care - These are the overarching goals to be used throughout the patient stay.    Goal: Plan of Care Review  Description: The Plan of Care Review/Shift note should be completed every shift.  The Outcome Evaluation is a brief statement about your assessment that the patient is improving, declining, or no change.  This information will be displayed automatically on your shift note.  7/6/2023 2001 by Elizabeth Mendieta RN  Outcome: Progressing     Behavioral  Pt up and visible in milieu, observed intermittently siting on the lounge and pacing on hallway. Pt  remain on SIO intrusive and assault behavior.  Continue presenting with disorganized behavior. Self laughs and responds to internal stimuli. Pt has no interactions with peers or staff.  There is minimal poor boundaries observed today. Adequate intake of foods and fluids. Family brought cultural foods. Partially compliant with medications. She took one Depakote and refused the other one. Several attempted make but pt continue to refuse. Pt speech is rambling and hyper verbal but able to make needs known. Pt denied SI, SIB, HI, and hallucinations; denies pain, depression or anxiety. Pt has poor insight to her mental illness. Affect is flat and blunted; mood is elevated and animated;      Medical     No complaints of physical pain/discomfort this shift. Vital signs stable. Blood pressure (!) 152/91, pulse 98, temperature 97.4  F (36.3  C), temperature source Oral, resp. rate 16, height 1.753 m (5' 9\"), weight 105.8 kg (233 lb 4.8 oz), SpO2 100 %.       PRNS:   - Trazodone      Plan               "

## 2023-07-07 NOTE — PLAN OF CARE
Goal Outcome Evaluation:    Pt refused to be transferred to station 12 with 2 attempts @ the beginning. After an hr pt accepted to be transferred @ 1850 with no issues. Report was given to the receiving Nurse @ st 12, pt was accompanied by staff and security.      Plan of Care Reviewed With: patient

## 2023-07-07 NOTE — PLAN OF CARE
Pt continues on SIO 1:1 for intrusiveness and assault risk. She denies all mental health symptoms upon assessment. She appears responding to internal stimuli; pt frequently can be observed laughing/talking to self. Pt mostly keeps to self, she is selectively social with peers. Pt has maintained appropriate boundaries this shift without issue. Pt presents with a labile mood, she has been generally pleasant today but is irritable at times. She is med compliant without issue, no PRNs given.    Problem: Violence Risk or Actual  Goal: Anger and Impulse Control  Outcome: Progressing   Goal Outcome Evaluation:    Plan of Care Reviewed With: patient

## 2023-07-07 NOTE — PROGRESS NOTES
"  ----------------------------------------------------------------------------------------------------------  [facility]  Psychiatry Progress Note  Hospital Day #6     Interim History:     The patient's care was discussed with the treatment team and chart notes were reviewed.    Vitals: BP (!) 152/91   Pulse 98   Temp 97.4  F (36.3  C) (Oral)   Resp 16   Ht 1.753 m (5' 9\")   Wt 105.8 kg (233 lb 4.8 oz)   SpO2 100%   BMI 34.45 kg/m    Sleep: 4.75 hours (07/06/23)  Scheduled medications: Did not take depakote 1000 mg 7/5 [facility]  Psychiatric PRN medications: Zyprexa, trazodone    Staff Report:  Doing okay. Has been better with boundaries. Still responding to internal stimuli. May have a hard time swallowing. Has been pleasant. Continues to sleep well w/ PRN trazodone. Could be appropriate for groups when calmer. Please see staff notes for details.      Subjective:     Patient Interview:  Angela was seen in the interview room with the aid of a Tajik . She says she slept well and she ate breakfast. When asked about mood, she says that 'I'm not good.\" She misses her children. She feels better than yesterday. Rates mood 2/10. She says she feels her medication is not working and reports she was compliant. Team asks her about depakote sprinkles, she agrees it would be easier to take medications with apple sauce.    Endorses hallucinations, and that it has worsened from yesterday. She says she continues to hear her friend's voice. The voices have led her to believe her friend gave her evil eye because she is jealous of her beauty. She also believes the evil eye may be causing the auditory hallucinations. She says she went to [place] to visit her friend in 1996 and that was when this started. She says she does not feel safe in her room. Feels that  is always looking through the window which is scary to her. The team reassures her that there are " "safety checks every 15 minutes. She requests to be moved to a single bedroom. She requests to see her brothers bill and Marquez. Reports her daughter, cousin, and father came to visit yesterday and they brought her \"delicious\" food.       ROS:  Patient has some constipation.Patient denies acute concerns     Objective:     Vitals:  BP (!) 152/91   Pulse 98   Temp 97.4  F (36.3  C) (Oral)   Resp 16   Ht 1.753 m (5' 9\")   Wt 105.8 kg (233 lb 4.8 oz)   SpO2 100%   BMI 34.45 kg/m      Allergies:  Allergies   Allergen Reactions     Pork-Derived Products Unknown     Mormon does not eat pork       Current Medications:  Scheduled:  Current Facility-Administered Medications   Medication Dose Route Frequency     divalproex sodium extended-release  1,000 mg Oral QPM     OLANZapine  10 mg Oral QAM     OLANZapine  20 mg Oral At Bedtime       PRN:  Current Facility-Administered Medications   Medication Dose Route Frequency     acetaminophen  650 mg Oral Q4H PRN     benzonatate  100 mg Oral TID PRN     haloperidol  5 mg Oral Q8H PRN    And     LORazepam  2 mg Oral Q8H PRN    And     diphenhydrAMINE  50 mg Oral Q8H PRN     haloperidol lactate  5 mg Intramuscular Q8H PRN    And     LORazepam  2 mg Intramuscular Q8H PRN    And     diphenhydrAMINE  50 mg Intramuscular Q8H PRN     hydrOXYzine  25 mg Oral Q4H PRN     magnesium hydroxide  30 mL Oral Daily PRN     OLANZapine  10 mg Oral TID PRN    Or     OLANZapine  10 mg Intramuscular TID PRN     polyethylene glycol  17 g Oral Daily PRN     traZODone  50 mg Oral At Bedtime PRN       Labs and Imaging:  New results:   No results found for this or any previous visit (from the past 24 hour(s)).    Data this admission:  - Depakote 7/5/23 unremarkable  - CBC 7/1/23 unremarkable  - CMP 7/1/23 unremarkable  - TSH 7/1/23 normal  - B12 7/1/23 normal  - UDS 6/27/23 negative  - Hgb A1c 7/1/23 normal  - Lipids 7/2/23 unremarkable  - Urinalysis 6/27/23 unremarkable  - EKG sinus rhythm " "7/1/23, QTc 447 ms, Per report \"possible left atrial enlargement, cannot rule out anterior infarct, age undetermined. Abnormal ECG. No previous ECGs available\"     Mental Status Exam:     Oriented to: Person/Self, not oriented to location, and unclear if oriented to time.  General:  Awake and Alert, covers mouth partially with hand while speaking  Appearance:  appears stated age and Grooming is adequate, wearing headscarf  Behavior/Attitude:  calm, cooperative and engaged  Eye Contact:  appropriate  Psychomotor: normal and no evidence of tics, dystonia, or tardive dyskinesia no catatonia present  Speech:  Low volume/tone and normal rate. Uses few words  Language: Fluent in English with appropriate syntax and vocabulary. Burundian  present for our conversation and interpreted occasionally.  Mood:  \"bad\" rated 2/10  Affect:  Euthymic, appropriate and congruent with mood  Thought Process:  coherent and no episodes where obviously RTIS  Thought Content:  No VH but reports AH though none during interview; delusions of paranoia  Associations:  questionable  Insight:  limited due to psychosis, brought in by family, though she is aware she has bipolar and of her hallucinations.  Judgment:  limited due to psychosis, though thinks her medication (depakote) may cause hallucinations agreed to continue use to minimize psychiatric symptoms.  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 Significant symptoms on admission include paranoia, " disorganized thinking and behaviors, AVH, and emotional lability. The MSE on admission was pertinent for RTIS. Biological contributions to mental health presentation include diagnosis of Bipolar Disorder, type 1 and taking Lamictal, Depakote, Zyprexa, and Abilify. Psychological contributions to mental health presentation include poor insight into her condition. While she understands why she's in the hospital, she displays intrusive behaviors that require a 1:1. 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 Protective factors include family support and being Denominational. Most recent psychiatric hospitalization was in 2022.     In summary, the patient's reported symptoms of elvis and psychosis in the context of medication nonadherence are consistent with Bipolar Disorder, type 1. She will likely benefit from optimizing psychiatric medications this admission.     Given that she presented with psychosis and elvis, patient warranted inpatient psychiatric hospitalization to maintain her safety.      Psychiatric Plan by Diagnosis      Today's changes:  - change depokate pills to sprinkles 7/7/23  - changed to single room 7/7/23       # Bipolar Disorder, current episode, elvis with psychotic features  1. Medications:  - Depakote 1000mg qpm   - Olanzapine increased to 30 mg (10mg AM and 20 mg PM) 7/6     2. Pertinent Labs/Monitoring:   - EKG QTc 447  - serum valproic acid, 94 7/6     3. Additional Plans:  - Patient will be treated in therapeutic milieu with appropriate individual and group therapies as described     # Anxiety  - Hydroxyzine prn     Psychiatric Hospital Course:      Angela Basurto was admitted to [facility] as a voluntary patient.     Medications:  ? PTA Olanzapine was continued and dose increased to 15mg for better symptom control (7/1/23). Dose increased to 20 mg daily (5 mg AM and 15 mg PM) due to continued  hallucinations (7/5/23).   ? Olanzapine increased to 30mg daily (10 mg AM and 20 mg PM) 7/6/23  ? PTA Depakote was continued at a lower dose of 500mg in the ED. Dose was increased today back to 1000mg daily dose but as a once daily medication (7/2/23).   ? PTA 1000 mg Depakote changed to sprinkles 7/7 due to trouble swallowing.  ? PTA Lamictal were held due to nonadherence and risk for Elijah Miranda in outpatient setting (6/29/23).  ? Patient's history of Abilify use is unclear. Would benefit from speaking to outpatient provider.      The risks, benefits, alternatives, and side effects were discussed and understood by the patient and other caregivers.         Medical Assessment and Plan     Medical diagnoses to be addressed this admission:    #Hx of Seizures  -Unclear what precipitated the seizure and what type it was. Given remote history and low concern today, can remove seizure pre-cautions (7/3/23).    #constipation  - PRN milk of magnesia ordered 7/6/23    Medical course: Patient was physically examined by the ED prior to being transferred to the unit and was found to be medically stable and appropriate for admission.      Consults:  none     Checklist     Legal Status: Voluntary     Safety Assessment:   Behavioral Orders   Procedures     Assault precautions     Code 1 - Restrict to Unit     Elopement precautions     Routine Programming     As clinically indicated     Sexual precautions     Status 15     Every 15 minutes.     Status Individual Observation     Patient SIO status reviewed with team/RN.  Please also refer to RN/team documentation for add'l detail.    -SIO staff to monitor following which have contributed to patient being on SIO:intrusiveness    -Possible interventions SIO staff could use to support patient's treatment progress: redirection    -When following observed, team will review discontinuation of SIO: improvement of psychosis     Order Specific Question:   CONTINUOUS 24 hours / day      Answer:   Other     Order Specific Question:   Specify distance     Answer:   10 ft     Order Specific Question:   Indications for SIO     Answer:   Assault risk     Order Specific Question:   Indications for SIO     Answer:   Severe intrusiveness       Risk Assessment:  Risk for harm is moderate.  Risk factors: trauma, impulsive and past behaviors  Protective factors: family, peers and engaged in treatment     SIO: Yes    Disposition: Disposition pending clinical stabilization, medication optimization and development of an appropriate discharge plan.     Attestations     This patient was seen and discussed with my attending physician.  Rhea Harris, MS3  [facility] [employer]    Resident Attestation:   I was present with the medical student who participated in the service and in the documentation of the note.  I have verified the history and personally performed the physical exam, mental status exam, and medical decision making. I agree with the assessment and plan of care as documented in the note.    Opal Marshall PGY1  Psychiatry Resident Physician    Attestation:  This patient has been seen and evaluated by me, Mariely Howard MD.  I have discussed this patient with the house staff team including the resident and medical student and I agree with the findings and plan in this note.    I have reviewed today's vital signs, medications, labs and imaging. Mariely Howard MD , PhD.

## 2023-07-07 NOTE — PLAN OF CARE
Problem: Sleep Disturbance  Goal: Adequate Sleep/Rest  Outcome: Progressing   Goal Outcome Evaluation:  Patient was awake at the beginning of the shift, at times laughing and talking loudly while in own room. Patient also had an episode where she attempted to follow a staff member into the kitchen but was immediately redirected and was receptive. Thought process appears disorganized. No complaints of pain. Remains on SIO for severe intrusiveness. Appears to have slept for 4.75 hours.

## 2023-07-07 NOTE — PLAN OF CARE
Assessment/Intervention/Current Symptoms and Care Coordination: Met with team. Reviewed patient's chart and progress notes.      - Patient is on a 1:1 due disorganized behavior and intrusiveness. She is partially compliant with her medications. [employer]  was utilized when pt was meeting with tx team. Patient requested to be transferred to a single room, she does not want a roommate. Tx team will accommodate pt's request.        Discharge Plan or Goal: Will return home when stable. Will follow up with outpatient providers at [facility].            Barriers to Discharge: Admitted for psychosis. Needs clinical stabilization and medication management.            Referral Status:  [facility] and rescheduled med mgmt appt for 7/11 to 8/17 10:20 am.            Legal Status: Voluntary

## 2023-07-08 PROCEDURE — 124N000002 HC R&B MH UMMC

## 2023-07-08 PROCEDURE — 250N000013 HC RX MED GY IP 250 OP 250 PS 637

## 2023-07-08 RX ADMIN — DIVALPROEX SODIUM 500 MG: 125 CAPSULE, COATED PELLETS ORAL at 08:00

## 2023-07-08 RX ADMIN — DIVALPROEX SODIUM 500 MG: 125 CAPSULE, COATED PELLETS ORAL at 20:55

## 2023-07-08 RX ADMIN — OLANZAPINE 10 MG: 10 TABLET, FILM COATED ORAL at 08:01

## 2023-07-08 RX ADMIN — OLANZAPINE 20 MG: 20 TABLET, FILM COATED ORAL at 20:55

## 2023-07-08 ASSESSMENT — ACTIVITIES OF DAILY LIVING (ADL)
HYGIENE/GROOMING: INDEPENDENT
LAUNDRY: UNABLE TO COMPLETE
ADLS_ACUITY_SCORE: 31
DRESS: INDEPENDENT
ADLS_ACUITY_SCORE: 31
HYGIENE/GROOMING: INDEPENDENT
ADLS_ACUITY_SCORE: 31
ORAL_HYGIENE: INDEPENDENT
ADLS_ACUITY_SCORE: 31

## 2023-07-08 NOTE — PROGRESS NOTES
Pt  Came from unit 20 at 1855 and she was oriented to the unit. Pt immediately got into another pt room but was quickly redirected to her room. Pt quickly unmake her bed and make it bad. Pt speech disorganized, incoherent with poor eye contact. Pt was visited by family briefly in the unit and pt stated she will like to return back to unit 20 because she have other Somalian ladies she socialized with.  Pt refused her HS medications and providing applesauce with her med per her request. No outburst behavior observed or reported.

## 2023-07-08 NOTE — PLAN OF CARE
Problem: Violence Risk or Actual  Goal: Anger and Impulse Control  Outcome: Progressing  Note: Patient manifests no anger/impulsivity thus far     Problem: Sleep Disturbance  Goal: Adequate Sleep/Rest  Outcome: Not Progressing  Note: Patient mostly awake per the sleep and wake board documentation.   Goal Outcome Evaluation:       Patient was mostly awake but remains calm in her room with no complaints or requests. Patient was cooperative with safety checks with no demonstrated behavioral disturbance. Patient achieved 1.75 hours of sleep. Will continue to monitor.           Sammy Mckeon DNP, RN

## 2023-07-08 NOTE — PLAN OF CARE
Problem: Violence Risk or Actual  Goal: Anger and Impulse Control  Outcome: Progressing    Milieu Participation:Behavior:Pt isolative much of the evening coming out of room occasionally to sit in the lounge or get water.  Patient observed yelling to self and pacing while in room.  Pt denied feeling unsafe, or responding to internal stimuli, but state she was appreciative of the check in to make certain she was doing well. Pt denies SI, SIB, AVH, depression and anxiety at this time. Continues on SIO 1:1 for assault, sexual, elopement precautions. Pt ate 100 % of dinner with adequate fluid intake. Denies pain or discomfort. Medication compliant with no side effects noted by pt or observed by writer. Late in shift pt observed laughing to self while in room. Will continue to monitor.    Aggression/agitation/HI: denies, exhibited safe behavior  AVH: denies Affect: labile Mood: calm  Physical Complaints/Issues: denies    PRN Med: No PRNs administered this shift  Medication AE: denies    I & O: eating and drinking well 100%   Sleep: denies concerns  LBM: denies concerns  ADLs: independent  Visits/calls: none  Vitals: Milieu Participation:Behavior: calm, cooperative with staff    Safety: status 15 SI/Self harm: denies  Aggression/agitation/HI: denies, exhibited safe behavior  Affect: blunted Mood: calm    Physical Complaints/Issues: denies    PRN Med: No PRNs administered this shift  Sleep: denies concerns  LBM: denies concerns  ADLs: independent  Visits/calls: none  Vitals:     BP: 165/83  Temp: 98.3  F (36.8  C)  Temp src: Temporal  Pulse: 96  Resp: 16  SpO2: 98 % (07/08 1845)

## 2023-07-08 NOTE — PLAN OF CARE
"Pt reports mood as \"ok\" and presents with blunt affect. Pt was med compliant with AM zyprexa and took Depakote with much encouragement. Pt is responding to internal stimuli, whispering to self and laughing inappropriately. Pt appears paranoid as she is continually scanning the milieu when out for meals and spending the rest of the day isolated to room.    Pt is in street clothes and agreed to take shower, but declined and isolated to room when approached with shower supplies.    Pt denies SI, HI, SIB and thoughts of hurting others. Pt denies depression, anxiety and VH/AH.    No acute medical concerns this day.  "

## 2023-07-09 PROCEDURE — 250N000013 HC RX MED GY IP 250 OP 250 PS 637

## 2023-07-09 PROCEDURE — 250N000013 HC RX MED GY IP 250 OP 250 PS 637: Performed by: STUDENT IN AN ORGANIZED HEALTH CARE EDUCATION/TRAINING PROGRAM

## 2023-07-09 PROCEDURE — 124N000002 HC R&B MH UMMC

## 2023-07-09 RX ADMIN — OLANZAPINE 20 MG: 20 TABLET, FILM COATED ORAL at 20:33

## 2023-07-09 RX ADMIN — ACETAMINOPHEN 650 MG: 325 TABLET, FILM COATED ORAL at 08:50

## 2023-07-09 RX ADMIN — DIVALPROEX SODIUM 500 MG: 125 CAPSULE, COATED PELLETS ORAL at 10:39

## 2023-07-09 RX ADMIN — OLANZAPINE 10 MG: 10 TABLET, FILM COATED ORAL at 09:19

## 2023-07-09 ASSESSMENT — ACTIVITIES OF DAILY LIVING (ADL)
ADLS_ACUITY_SCORE: 31
LAUNDRY: UNABLE TO COMPLETE
ADLS_ACUITY_SCORE: 31
DRESS: SCRUBS (BEHAVIORAL HEALTH)
ORAL_HYGIENE: INDEPENDENT
ADLS_ACUITY_SCORE: 31
HYGIENE/GROOMING: INDEPENDENT
ADLS_ACUITY_SCORE: 31
ADLS_ACUITY_SCORE: 31

## 2023-07-09 NOTE — PLAN OF CARE
Problem: Adult Behavioral Health Plan of Care  Goal: Adheres to Safety Considerations for Self and Others  Outcome: Progressing   Goal Outcome Evaluation:  Patient was present in the milieu observed talking to herself and laughing. She is medication compliant and overall calm. She has a flat affect, but mood is bright. She ate 100% of her breakfast and lunch with no issues. She spent most of this shift in her room watching tv and relaxing. Patient complained of headache rated 5/10. Prn was given with relief. Patient was overall calm and pleasant this shift, with no behavioral issues. She denies hearing voices but writer observed her talking to herself as if she was engaged in a conversation with someone else.

## 2023-07-09 NOTE — PLAN OF CARE
Problem: Violence Risk or Actual  Goal: Anger and Impulse Control  Outcome: Progressing  Note: Patient appears calm, demonstrates no impulsivity     Problem: Suicidal Behavior  Goal: Suicidal Behavior is Absent or Managed  Outcome: Progressing  Note: Patient manifests no suicidal behavior   Goal Outcome Evaluation:       Patient was awake almost all night, though remains calm in room, demonstrates no negative behavior. Achieved 1.5 hours of sleep.           Sammy Mckeon, DNP, RN

## 2023-07-09 NOTE — CARE PLAN
07/09/23 1837   Group Therapy Session   Group Attendance refused to attend group session   Time Session Began 1600   Time Session Ended 1650   Total Time (minutes) 0   Total # Attendees 2   Group Type psychotherapeutic   Group Topic Covered coping skills/lifestyle management;emotions/expression;self-care activities   Group Session Detail Group members discussed/completed worksheets on stress exploration. Worksheet included listing common annoyances or strains on life, healthy coping strategies, and protective factors.     CHRISTINE Liang, LGSW  Clinical Treatment Coordinator  [facility]

## 2023-07-10 ENCOUNTER — APPOINTMENT (OUTPATIENT)
Dept: INTERPRETER SERVICES | Facility: CLINIC | Age: 58
DRG: 885 | End: 2023-07-10
Payer: COMMERCIAL

## 2023-07-10 PROCEDURE — 99232 SBSQ HOSP IP/OBS MODERATE 35: CPT | Performed by: PSYCHIATRY & NEUROLOGY

## 2023-07-10 PROCEDURE — 250N000013 HC RX MED GY IP 250 OP 250 PS 637

## 2023-07-10 PROCEDURE — 124N000002 HC R&B MH UMMC

## 2023-07-10 PROCEDURE — 250N000013 HC RX MED GY IP 250 OP 250 PS 637: Performed by: STUDENT IN AN ORGANIZED HEALTH CARE EDUCATION/TRAINING PROGRAM

## 2023-07-10 RX ADMIN — OLANZAPINE 20 MG: 20 TABLET, FILM COATED ORAL at 20:13

## 2023-07-10 RX ADMIN — TRAZODONE HYDROCHLORIDE 50 MG: 50 TABLET ORAL at 00:56

## 2023-07-10 RX ADMIN — DIVALPROEX SODIUM 500 MG: 125 CAPSULE, COATED PELLETS ORAL at 19:46

## 2023-07-10 RX ADMIN — OLANZAPINE 10 MG: 10 TABLET, FILM COATED ORAL at 08:06

## 2023-07-10 RX ADMIN — DIVALPROEX SODIUM 500 MG: 125 CAPSULE, COATED PELLETS ORAL at 08:06

## 2023-07-10 RX ADMIN — ACETAMINOPHEN 650 MG: 325 TABLET, FILM COATED ORAL at 15:00

## 2023-07-10 ASSESSMENT — ACTIVITIES OF DAILY LIVING (ADL)
ADLS_ACUITY_SCORE: 31
ADLS_ACUITY_SCORE: 31
ORAL_HYGIENE: INDEPENDENT
DRESS: INDEPENDENT
ADLS_ACUITY_SCORE: 31
HYGIENE/GROOMING: INDEPENDENT
ADLS_ACUITY_SCORE: 31
ADLS_ACUITY_SCORE: 31
LAUNDRY: UNABLE TO COMPLETE
ORAL_HYGIENE: INDEPENDENT
ADLS_ACUITY_SCORE: 31
DRESS: INDEPENDENT
ADLS_ACUITY_SCORE: 31
HYGIENE/GROOMING: INDEPENDENT
LAUNDRY: UNABLE TO COMPLETE
ADLS_ACUITY_SCORE: 31

## 2023-07-10 NOTE — PLAN OF CARE
"Pt presents as calm, cooperative with flat affect. Pt denies SI, HI. Pt denies depression, anxiety and VH. Pt endorses SIB thoughts \"once in awhile\" but has no plan or intent to do so. Pt endorses AH, stating the voices are not command in nature and are mostly annoying. Pt states she has aggressive thoughts that come and go. Pt is able to control these thoughts and pt has shown no aggressive behavior since arriving to the unit. Pt also agreed to safe behaviors. Provider made aware and SIO was dc'd. Of note, pt shared that listening to the Koran can help to calm her down if necessary.    Pt continues to appear paranoid as she is constantly scanning the milieu but mostly stays isolated to her room. PA staff reported pt whispers and laughs to herself when she in alone in her room.    Appetite and fluid intake are adequate. No acute behavioral or medical concerns this day.    VS reviewed: /72 (BP Location: Right arm, Patient Position: Sitting, Cuff Size: Adult Large)   Pulse 88   Temp 97.4  F (36.3  C) (Temporal)   Resp 16   Ht 1.753 m (5' 9\")   Wt 105.8 kg (233 lb 4.8 oz)   SpO2 97%   BMI 34.45 kg/m   . Patient denies pain.    Length of stay: 9  "

## 2023-07-10 NOTE — PROVIDER NOTIFICATION
07/10/23 1510   Individualization/Patient Specific Goals   Patient Personal Strengths family/social support;independent living skills;resilient;stable living environment   Patient Vulnerabilities traumatic event   Interprofessional Rounds   Summary Angela presents with symptoms of psychosis. She requires ongoing stabilization prior to a safe discharge.   Participants CTC;nursing;psychiatrist   Behavioral Team Discussion   Participants Dr. Champagne, Dr. Ruano, Kaitlynn RING, Lucinda [facility]   Anticipated length of stay 5 days   Continued Stay Criteria/Rationale Symptoms interfering with safe discharge   Anticipated Discharge Disposition home with family     PRECAUTIONS AND SAFETY    Behavioral Orders   Procedures    Assault precautions    Cheeking Precautions (behavioral units)     Patient Observed swallowing PO medications; Patient asked to drink water after swallowing medication; Patient in Staff line of sight for 15 minutes after medication given; Mouth checks after PO administration (patient asked to open mouth and stick out their tongue).    Code 2    Elopement precautions    Routine Programming     As clinically indicated    Sexual precautions    Status 15     Every 15 minutes.       Safety  Safety WDL: WDL  [facility], own  Observed Behavior: sitting, walking  Observed Behavior (Comment): sitting/walking  Safety Measures: clinical history reviewed, safety plan reviewed (1:1 SIB discontinued)  Diversional Activity: television (taneshaan helps her keep calm)  Suicidality: Status 15  Withdrawal: seizure precautions (past history of withdrawal seizure)  Seizure precautions: room close to team care station (desk), clutter free environment  Assault: status 15  Elopement Assessment: Statements about wanting to leave  Elopement Interventions: status 15, behavioral scrubs (pajamas)  Sexual: status 15, private room

## 2023-07-10 NOTE — PLAN OF CARE
Problem: Violence Risk or Actual  Goal: Anger and Impulse Control  Outcome: Progressing   Milieu Participation:Behavior:Pt isolative to room much of the shift. Observed laughing to self in room. Untrusting of staff at times claiming the sitter outside of room was her daughter and that they were worried about her. Pt supported and reassured which satisfied patient.   Pt had two sets of visitors which went well. Ate 100% of dinner drinking fluids adequately.   Pt requested female nurse to give medications which writer accommodated for patient. Patient took the Zyprexa 20 mg and threw the Depakote 500 mg in the garbage hastily and requested tea from female staff to calm. No PRN medications given. No medication side effects observed or reported.    Recommend female only staff for SIO as patient is more trusting of females at this time.    calm, cooperative with staff    Safety: Continues on SIO 1:1 SI/Self harm: denies  Aggression/agitation/HI: denies, exhibited safe behavior  AVH: denies Affect: blunted Mood: calm    Physical Complaints/Issues: denies  LBM: denies concerns  ADLs: prompts  Vitals:  BP: 154/79  Temp: 98.5  F (36.9  C)  Temp src: Oral  Pulse: 92  Resp: 16  SpO2: 97 % (07/09 0818-07/09 1833)

## 2023-07-10 NOTE — PROGRESS NOTES
[facility], [place]   Psychiatric Progress Note  Hospital Day: 9        Interim History:   The patient's care was discussed with the treatment team during the daily team meeting and/or staff's chart notes were reviewed.  Staff report patient reporting that voices are still loud and derogatory in nature, though noncommand. Does appear to be responding to internal stimuli. Pt said that listening to Quran really helps her. No disrobing over the weekend. She is isolative to her room. Patient did not report any acute medical concerns or side effects with exception of headache on 7/9. No behavioral issues overnight, including violent or aggressive behaviors. Patient did not require seclusion or restraints. Patient is exhibiting signs/sx of psychosis or elvis. Patient did not endorse suicidal ideation. Patient did not endorse HI. Patient is medication adherent, though intermittently reluctant to take Depakote. She also attempted to divert prn hydroxyzine. Patient is not attending groups. Patient is not sleeping well. Patient is eating adequately. Patient is attending to ADLs.    Upon interview, the patient was guarded and appeared suspicious of writer. She declined to meet in her room, though did stand at her room door with  present to address some of my questions. She did not have any questions for this writer. She said that she does not need an , and did answer questions in English.     Suicidal ideation: denies current or recent suicidal ideation or behaviors.    Homicidal ideation: denies current or recent homicidal ideation or behaviors.    Psychotic symptoms: Patient denies AH, VH, paranoia, delusions.     Medication side effects reported: No significant side effects.    Acute medical concerns: none    Other issues reported by patient: Patient had no further questions or concerns.           Medications:       divalproex sodium delayed-release  500 mg Oral BID      "OLANZapine  10 mg Oral QAM     OLANZapine  20 mg Oral At Bedtime          Allergies:     Allergies   Allergen Reactions     Pork-Derived Products Unknown     Yazdanism does not eat pork          Labs:   No results found for this or any previous visit (from the past 24 hour(s)).       Psychiatric Examination:     /72 (BP Location: Right arm, Patient Position: Sitting, Cuff Size: Adult Large)   Pulse 88   Temp 97.4  F (36.3  C) (Temporal)   Resp 16   Ht 1.753 m (5' 9\")   Wt 105.8 kg (233 lb 4.8 oz)   SpO2 97%   BMI 34.45 kg/m    Weight is 233 lbs 4.8 oz  Body mass index is 34.45 kg/m .    Weight over time:  Vitals:    06/27/23 0121 07/02/23 0800 07/04/23 0854   Weight: 106.6 kg (235 lb) 104.9 kg (231 lb 3.2 oz) 105.8 kg (233 lb 4.8 oz)       Orthostatic Vitals       Most Recent      Sitting Orthostatic /72 07/10 0800    Sitting Orthostatic Pulse (bpm) 88 07/10 0800    Standing Orthostatic /81 07/10 0800    Standing Orthostatic Pulse (bpm) 103 07/10 0800            Cardiometabolic risk assessment. 07/10/23      Reviewed patient profile for cardiometabolic risk factors    Date taken /Value  REFERENCE RANGE   Abdominal Obesity  (Waist Circumference)   See nursing flowsheet Women ?35 in (88 cm)   Men ?40 in (102 cm)      Triglycerides  Triglycerides   Date Value Ref Range Status   07/02/2023 97 <150 mg/dL Final       ?150 mg/dL (1.7 mmol/L) or current treatment for elevated triglycerides   HDL cholesterol  Direct Measure HDL   Date Value Ref Range Status   07/02/2023 84 >=50 mg/dL Final   ]   Women <50 mg/dL (1.3 mmol/L) in women or current treatment for low HDL cholesterol  Men <40 mg/dL (1 mmol/L) in men or current treatment for low HDL cholesterol     Fasting plasma glucose (FPG) Lab Results   Component Value Date     07/01/2023      FPG ?100 mg/dL (5.6 mmol/L) or treatment for elevated blood glucose   Blood pressure  BP Readings from Last 3 Encounters:   07/10/23 137/72    Blood " "pressure ?130/85 mmHg or treatment for elevated blood pressure   Family History  See family history     Mental Status Exam:  Appearance: awake, alert, unkempt and disheveled   Attitude:  evasive, guarded and somewhat cooperative  Eye Contact:  intense  Mood:  \"joyful\"  Affect:  mood incongruent, constricted mobility, guarded and suspicious  Speech:  clear, coherent  Language: fluent and intact in English  Psychomotor, Gait, Musculoskeletal:  no evidence of tardive dyskinesia, dystonia, or tics  Throught Process:  linear, evidence of thought blocking present and illogical  Associations:  no loose associations  Thought Content:  no evidence of suicidal ideation or homicidal ideation, patient appears to be responding to internal stimuli and paranoia present  Insight:  limited  Judgement:  limited  Oriented to:  person and place  Attention Span and Concentration:  limited  Recent and Remote Memory:  fair  Fund of Knowledge:  appropriate           Precautions:     Behavioral Orders   Procedures     Assault precautions     Code 2     Elopement precautions     Routine Programming     As clinically indicated     Sexual precautions     Status 15     Every 15 minutes.     Status Individual Observation     Patient SIO status reviewed with team/RN.  Please also refer to RN/team documentation for add'l detail.    -SIO staff to monitor following which have contributed to patient being on SIO:intrusiveness    -Possible interventions SIO staff could use to support patient's treatment progress: redirection    -When following observed, team will review discontinuation of SIO: improvement of psychosis     Order Specific Question:   CONTINUOUS 24 hours / day     Answer:   Other     Order Specific Question:   Specify distance     Answer:   10 ft     Order Specific Question:   Indications for SIO     Answer:   Assault risk     Order Specific Question:   Indications for SIO     Answer:   Severe intrusiveness          Diagnoses: "     Bipolar affective disorder, remission status unspecified (H)  Mary (H)  Bipolar I disorder, most recent episode (or current) manic, moderate (H)         Assessment & Plan:     Assessment and hospital summary:  Angela Basurto is a 58 year old female previously diagnosed with Bipolar Disorder Type 1 and anxiety who presented with son for mary and psychosis in the context of medication nonadherence after her brother recently had a stroke and 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 Significant symptoms on admission include paranoia, disorganized thinking and behaviors, AVH, and emotional lability. The MSE on admission was pertinent for RTIS. Biological contributions to mental health presentation include diagnosis of Bipolar Disorder, type 1 and taking Lamictal, Depakote, Zyprexa, and Abilify. Psychological contributions to mental health presentation include poor insight into her condition. While she understands why she's in the hospital, she displays intrusive behaviors that require a 1:1. 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 Protective factors include family support and being Anabaptism. Most recent psychiatric hospitalization was in 2022.     In summary, the patient's reported symptoms of mary and psychosis in the context of medication nonadherence are consistent with Bipolar Disorder, type 1. She will likely benefit from optimizing psychiatric medications this admission.    ? PTA Olanzapine was continued and dose increased to 15mg for better symptom control (7/1/23). Dose increased to 20 mg daily (5 mg AM and 15 mg PM) due to continued hallucinations (7/5/23).   ? Olanzapine increased to 30mg daily (10 mg AM and 20 mg PM) 7/6/23  ? PTA Depakote was continued at a lower dose of 500mg in the ED. Dose was increased today back to 1000mg daily dose but as a once daily medication (7/2/23).   ? PTA 1000 mg Depakote changed  "to sprinkles 7/7 due to trouble swallowing.  ? PTA Lamictal were held due to nonadherence and risk for Elijah Miranda in outpatient setting (6/29/23).  ? Patient's history of Abilify use is unclear. Would benefit from speaking to outpatient provider.      Given that she presented with psychosis and elvis, patient warranted inpatient psychiatric hospitalization to maintain her safety.       Today's Changes:  Discontinue SIO  Add cheeking precautions  Encourage adherence to Depakote and will check level later this week    Target psychiatric symptoms and interventions:  Depakote  mg BID  Zyprexa 10 mg daily and 20 mg at bedtime    Risks, benefits, and alternatives discussed at length with patient.     Acute Medical Problems and Treatments:  Acute medical concerns:  #Hx of Seizures  -Unclear what precipitated the seizure and what type it was. Given remote history and low concern today, can remove seizure pre-cautions (7/3/23).     #constipation  - PRN milk of magnesia ordered 7/6/23     Medical course: Patient was physically examined by the ED prior to being transferred to the unit and was found to be medically stable and appropriate for admission.     Pertinent labs/imaging:  - Depakote 7/5/23 unremarkable  - CBC 7/1/23 unremarkable  - CMP 7/1/23 unremarkable  - TSH 7/1/23 normal  - B12 7/1/23 normal  - UDS 6/27/23 negative  - Hgb A1c 7/1/23 normal  - Lipids 7/2/23 unremarkable  - Urinalysis 6/27/23 unremarkable  - EKG sinus rhythm 7/1/23, QTc 447 ms, Per report \"possible left atrial enlargement, cannot rule out anterior infarct, age undetermined. Abnormal ECG. No previous ECGs available\"    Behavioral/Psychological/Social:  - Encourage unit programming    Safety:  - Continue precautions as noted above  - Status 15 minute checks  - SIO discontinued    Legal Status: voluntary    Disposition Plan   Reason for ongoing admission: poses an imminent risk to self and is unable to care for self due to severe psychosis " or elvis  Discharge location: TBD  Discharge Medications: not ordered  Follow-up Appointments: not scheduled    Entered by: Denia Champagne MD on 7/10/2023 at 9:11 AM

## 2023-07-10 NOTE — PLAN OF CARE
Assessment/Intervention/Current Symtoms and Care Coordination:  Chart review and met with team, discussed pt progress, symptomology, and response to treatment.  Discussed the discharge plan and any potential impediments to discharge.    Discharge Plan or Goal:  Home     Barriers to Discharge:  Patient presents with paranoia, disorganized thinking and behaviors, AVH, and mood lability     Referral Status:  Psychiatry follow up scheduled with Dr. Mcdonough at [facility] [place]     Legal Status:  Voluntary    Contacts:  Aunt: Vickie, [phone] (DENI)  Son: Golden, [phone]     Upcoming Meetings and Dates/Important Information and next steps:

## 2023-07-10 NOTE — PLAN OF CARE
"  Problem: Sleep Disturbance  Goal: Adequate Sleep/Rest  Outcome: Progressing  Note: Patient seems to be sleeping at the start of the shift     Problem: Suicidal Behavior  Goal: Suicidal Behavior is Absent or Managed  Outcome: Progressing  Note: Patient manifests no suicidal behavior   Goal Outcome Evaluation:       Patient observed not sleeping, writer approached patient for prn medications to help with sleep, patient agreed to take trazodone and atarax stated \" I trust you\" pointing at this RN writer. When medications were brought to her, patient took the Trazodone and disguised the atarax in her hand covered her hand with her hijab, writer instructed patient to show her hand and the medication was in her hand, further educated that the medication would help her to sleep, patient then agreed to take the medication, drank little water and cheeked the medication, then spit it out into the trash bin in her room immediately staff were out but she was caught doing this.  Medication was then flushed in the sink as it was obvious patient didn't want to take it.   Despite taking the trazodone, patient recorded zero sleep hour for the night. Though patient remained calm in her room with no behavioral issues.             Sammy Mckeon, POLINA, RN    "

## 2023-07-11 PROCEDURE — 250N000013 HC RX MED GY IP 250 OP 250 PS 637

## 2023-07-11 PROCEDURE — 250N000013 HC RX MED GY IP 250 OP 250 PS 637: Performed by: STUDENT IN AN ORGANIZED HEALTH CARE EDUCATION/TRAINING PROGRAM

## 2023-07-11 PROCEDURE — 99231 SBSQ HOSP IP/OBS SF/LOW 25: CPT | Performed by: PSYCHIATRY & NEUROLOGY

## 2023-07-11 PROCEDURE — 124N000002 HC R&B MH UMMC

## 2023-07-11 RX ADMIN — OLANZAPINE 20 MG: 20 TABLET, FILM COATED ORAL at 20:41

## 2023-07-11 RX ADMIN — TRAZODONE HYDROCHLORIDE 50 MG: 50 TABLET ORAL at 02:37

## 2023-07-11 RX ADMIN — ACETAMINOPHEN 650 MG: 325 TABLET, FILM COATED ORAL at 22:22

## 2023-07-11 RX ADMIN — HYDROXYZINE HYDROCHLORIDE 25 MG: 25 TABLET, FILM COATED ORAL at 20:45

## 2023-07-11 RX ADMIN — OLANZAPINE 10 MG: 10 TABLET, FILM COATED ORAL at 08:16

## 2023-07-11 RX ADMIN — TRAZODONE HYDROCHLORIDE 50 MG: 50 TABLET ORAL at 20:41

## 2023-07-11 RX ADMIN — DIVALPROEX SODIUM 500 MG: 125 CAPSULE, COATED PELLETS ORAL at 08:16

## 2023-07-11 RX ADMIN — OLANZAPINE 10 MG: 10 TABLET, FILM COATED ORAL at 02:37

## 2023-07-11 RX ADMIN — POLYETHYLENE GLYCOL 3350 17 G: 17 POWDER, FOR SOLUTION ORAL at 20:42

## 2023-07-11 RX ADMIN — DIVALPROEX SODIUM 500 MG: 125 CAPSULE, COATED PELLETS ORAL at 20:28

## 2023-07-11 ASSESSMENT — ACTIVITIES OF DAILY LIVING (ADL)
ORAL_HYGIENE: INDEPENDENT
HYGIENE/GROOMING: INDEPENDENT
LAUNDRY: UNABLE TO COMPLETE
DRESS: INDEPENDENT
ADLS_ACUITY_SCORE: 31
HYGIENE/GROOMING: INDEPENDENT
ADLS_ACUITY_SCORE: 31
ORAL_HYGIENE: INDEPENDENT
ADLS_ACUITY_SCORE: 31
DRESS: INDEPENDENT

## 2023-07-11 NOTE — PLAN OF CARE
"Pt presents as calm, cooperative with flat affect. Pt is med compliant. Pt denies SI, HI. Pt denies depression, anxiety and VH. Pt endorses SIB thoughts \"once in awhile\" but has no plan or intent follow through. Pt endorses AH, stating the voices are not command in nature and are mostly annoying. Pt thinks the medications are helping to \"quiet\" the voices. Pt denies current thoughts of aggression and agrees to safe behaviors.     Pt is visible on the fringes of the milieu and continues to be hypervigilant but mostly stays isolated to her room. PA staff reported pt whispers and laughs to herself when alone in her room.     Appetite and fluid intake are adequate. No acute behavioral or medical concerns this day.  "

## 2023-07-11 NOTE — PLAN OF CARE
Goal Outcome Evaluation:               Problem: Sleep Disturbance  Goal: Adequate Sleep/Rest  Outcome: Not Progressing        Patient was awake all shift, with 0 hours of sleep. Patient noted to be disrobed in room and talking to self. PRN Zyprexa and Trazodone administered, with minimal effect.

## 2023-07-11 NOTE — CARE PLAN
Occupational Therapy Group Note:     07/11/23 1127   Group Therapy Session   Group Attendance attended group session   Time Session Began 1015   Time Session Ended 1100   Total Time (minutes) 35 (no charge)   Total # Attendees 2   Group Type recreation   Group Topic Covered leisure exploration/use of leisure time;structured socialization   Group Session Detail OT Leisure Group   Patient Response/Contribution confronted peers appropriately;cooperative with task;listened actively   Patient Participation Detail Patient engaged in a leisure activity with a visuospatial and cognitive component in order to promote: problem solving skills, improve attention, emphasize new learning, exercise cognitive skills, and foster leisure and relaxation. Patient was agreeable to attend OT group with personal invite. Patient listened actively to instructions of game; verbalized understanding. However, patient's lack of understanding of game rules and instructions was evident throughout game. Patient required continuous verbal cueing and visual demonstration throughout activity. Patient's understanding did not seem to improve despite repetition and cueing. Potential distraction noted as patient would intermittently scan lounge area throughout group. No social interaction with peer. Pleasant in interactions with writer. Patient was able to attend to task for roughly 30 minutes before excusing herself and retreating back to her room. Mood: calm, cooperative, pleasant.

## 2023-07-11 NOTE — PLAN OF CARE
Pt presented in milieu calmly without socializing with peer. Affect is flat  with poor concentration  Pt asked about when she will be discharging and was informed to communicate  it with the provider tomorrow.   Continue on assault, elopement and sexual precautions and no such precautionary behavior this shift.   denies Pain, SI/HI, delusions, hallucination and contracted for safety.   Medication compliant without  stated or observed side effect.   Will continue to monitor and assist as needed.      Problem: Suicidal Behavior  Goal: Suicidal Behavior is Absent or Managed  Outcome: Progressing     Problem: Adult Behavioral Health Plan of Care  Goal: Plan of Care Review  Recent Flowsheet Documentation  Taken 7/10/2023 1900 by Lety Tamez RN  Patient Agreement with Plan of Care: (when  am I going home) agrees with comment (describe)  Goal: Adheres to Safety Considerations for Self and Others  Intervention: Develop and Maintain Individualized Safety Plan  Recent Flowsheet Documentation  Taken 7/10/2023 1900 by Lety Tamez RN  Safety Measures: safety rounds completed  Goal: Absence of New-Onset Illness or Injury  Intervention: Identify and Manage Fall Risk  Recent Flowsheet Documentation  Taken 7/10/2023 1900 by Lety Tamez RN  Safety Measures: safety rounds completed  Goal: Develops/Participates in Therapeutic Otter to Support Successful Transition  Intervention: Foster Therapeutic Otter  Recent Flowsheet Documentation  Taken 7/10/2023 1900 by Lety Tamez RN  Trust Relationship/Rapport:    care explained    choices provided    questions answered    thoughts/feelings acknowledged    reassurance provided   Goal Outcome Evaluation:    Plan of Care Reviewed With: patient

## 2023-07-11 NOTE — PROGRESS NOTES
"[facility], [place]   Psychiatric Progress Note  Hospital Day: 10        Interim History:     Patient seen and chart reviewed. Case discussed in multi-disciplinary treatment team      According to Nursing report: Patient continues to have auditory hallucinations but she states that she is less bothered by them. She feels that the medications are improving. She is doing well without SIO and has no recent aggressive behaviors. She is visible on unit, but can isolate. Minimal interactions with peers. She does stands and stares frequently. She has good self cares.          According to Nursing notes:  Pt presents as calm, cooperative with flat affect. Pt denies SI, HI. Pt denies depression, anxiety and VH. Pt endorses SIB thoughts \"once in awhile\" but has no plan or intent to do so. Pt endorses AH, stating the voices are not command in nature and are mostly annoying. Pt states she has aggressive thoughts that come and go. Pt is able to control these thoughts and pt has shown no aggressive behavior since arriving to the unit. Pt also agreed to safe behaviors. Provider made aware and SIO was dc'd. Of note, pt shared that listening to the Koran can help to calm her down if necessary.     Pt continues to appear paranoid as she is constantly scanning the milieu but mostly stays isolated to her room. PA staff reported pt whispers and laughs to herself when she in alone in her room.     Appetite and fluid intake are adequate. No acute behavioral or medical concerns this day.     Pt presented in milieu calmly without socializing with peer. Affect is flat  with poor concentration  Pt asked about when she will be discharging and was informed to communicate  it with the provider tomorrow.   Continue on assault, elopement and sexual precautions and no such precautionary behavior this shift.   denies Pain, SI/HI, delusions, hallucination and contracted for safety.   Medication compliant without  stated " "or observed side effect.   Will continue to monitor and assist as needed.    Patient was awake all shift, with 0 hours of sleep. Patient noted to be disrobed in room and talking to self. PRN Zyprexa and Trazodone administered, with minimal effect.         According to  :  Patient is voluntary. She will discharge to home when stable      On interview today:  Patient denies suicidal or homicidal thoughts and but still has hallulcination. Patient is not revealing delusions on interview but can be unrealistic. Patient denies side effects to medications.           ROS:  Patient has no other physical complaints today.      Vital signs:  Temp: 98.1  F (36.7  C) Temp src: Temporal BP: 124/79 Pulse: 84   Resp: 16 SpO2: 100 % O2 Device: None (Room air)   Height: 175.3 cm (5' 9\") Weight: 105.8 kg (233 lb 4.8 oz)  Estimated body mass index is 34.45 kg/m  as calculated from the following:    Height as of this encounter: 1.753 m (5' 9\").    Weight as of this encounter: 105.8 kg (233 lb 4.8 oz).         MSE:  Mental Status Exam:  Appearance: awake, alert, unkempt and disheveled   Attitude:  evasive, guarded and somewhat cooperative  Eye Contact:  intense  Mood:  \"joyful\"  Affect:  mood incongruent, constricted mobility, guarded and suspicious  Speech:  clear, coherent  Language: fluent and intact in English  Psychomotor, Gait, Musculoskeletal:  no evidence of tardive dyskinesia, dystonia, or tics  Throught Process:  linear, evidence of thought blocking present and illogical  Associations:  no loose associations  Thought Content:  no evidence of suicidal ideation or homicidal ideation, patient appears to be responding to internal stimuli and paranoia present  Insight:  limited  Judgement:  limited  Oriented to:  person and place  Attention Span and Concentration:  limited  Recent and Remote Memory:  fair  Fund of Knowledge:  Appropriate    Minimal change in mental status in the past 24 hours               " "Medications:       divalproex sodium delayed-release  500 mg Oral BID     OLANZapine  10 mg Oral QAM     OLANZapine  20 mg Oral At Bedtime          Allergies:     Allergies   Allergen Reactions     Pork-Derived Products Unknown     Mu-ism does not eat pork          Labs:   No results found for this or any previous visit (from the past 24 hour(s)).       Psychiatric Examination:     /79   Pulse 84   Temp 98.1  F (36.7  C) (Temporal)   Resp 16   Ht 1.753 m (5' 9\")   Wt 105.8 kg (233 lb 4.8 oz)   SpO2 100%   BMI 34.45 kg/m    Weight is 233 lbs 4.8 oz  Body mass index is 34.45 kg/m .    Weight over time:  Vitals:    06/27/23 0121 07/02/23 0800 07/04/23 0854   Weight: 106.6 kg (235 lb) 104.9 kg (231 lb 3.2 oz) 105.8 kg (233 lb 4.8 oz)       Orthostatic Vitals       Most Recent      Sitting Orthostatic /72 07/10 0800    Sitting Orthostatic Pulse (bpm) 88 07/10 0800    Standing Orthostatic /81 07/10 0800    Standing Orthostatic Pulse (bpm) 103 07/10 0800            Cardiometabolic risk assessment. 07/10/23      Reviewed patient profile for cardiometabolic risk factors    Date taken /Value  REFERENCE RANGE   Abdominal Obesity  (Waist Circumference)   See nursing flowsheet Women ?35 in (88 cm)   Men ?40 in (102 cm)      Triglycerides  Triglycerides   Date Value Ref Range Status   07/02/2023 97 <150 mg/dL Final       ?150 mg/dL (1.7 mmol/L) or current treatment for elevated triglycerides   HDL cholesterol  Direct Measure HDL   Date Value Ref Range Status   07/02/2023 84 >=50 mg/dL Final   ]   Women <50 mg/dL (1.3 mmol/L) in women or current treatment for low HDL cholesterol  Men <40 mg/dL (1 mmol/L) in men or current treatment for low HDL cholesterol     Fasting plasma glucose (FPG) Lab Results   Component Value Date     07/01/2023      FPG ?100 mg/dL (5.6 mmol/L) or treatment for elevated blood glucose   Blood pressure  BP Readings from Last 3 Encounters:   07/11/23 124/79    Blood pressure " ?130/85 mmHg or treatment for elevated blood pressure   Family History  See family history                Precautions:     Behavioral Orders   Procedures     Assault precautions     Cheeking Precautions (behavioral units)     Patient Observed swallowing PO medications; Patient asked to drink water after swallowing medication; Patient in Staff line of sight for 15 minutes after medication given; Mouth checks after PO administration (patient asked to open mouth and stick out their tongue).     Code 2     Elopement precautions     Routine Programming     As clinically indicated     Sexual precautions     Status 15     Every 15 minutes.          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 Significant symptoms on admission include paranoia, disorganized thinking and behaviors, AVH, and emotional lability. The MSE on admission was pertinent for RTIS. Biological contributions to mental health presentation include diagnosis of Bipolar Disorder, type 1 and taking Lamictal, Depakote, Zyprexa, and Abilify. Psychological contributions to mental health presentation include poor insight into her condition. While she understands why she's in the hospital, she displays intrusive behaviors that require a 1:1. 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 Protective factors include family support and being Jewish. Most recent psychiatric hospitalization was in 2022.     In summary, the patient's  reported symptoms of elvis and psychosis in the context of medication nonadherence are consistent with Bipolar Disorder, type 1. She will likely benefit from optimizing psychiatric medications this admission.    ? PTA Olanzapine was continued and dose increased to 15mg for better symptom control (7/1/23). Dose increased to 20 mg daily (5 mg AM and 15 mg PM) due to continued hallucinations (7/5/23).   ? Olanzapine increased to 30mg daily (10 mg AM and 20 mg PM) 7/6/23  ? PTA Depakote was continued at a lower dose of 500mg in the ED. Dose was increased today back to 1000mg daily dose but as a once daily medication (7/2/23).   ? PTA 1000 mg Depakote changed to sprinkles 7/7 due to trouble swallowing.  ? PTA Lamictal were held due to nonadherence and risk for Elijah Ruben in outpatient setting (6/29/23).  ? Patient's history of Abilify use is unclear. Would benefit from speaking to outpatient provider.      Given that she presented with psychosis and elvis, patient warranted inpatient psychiatric hospitalization to maintain her safety.       Today's Changes:  Encourage adherence to Depakote and will check level later this week  No change in treatment plan today    Target psychiatric symptoms and interventions:  Depakote  mg BID  Zyprexa 10 mg daily and 20 mg at bedtime      Acute Medical Problems and Treatments:  Acute medical concerns:  #Hx of Seizures  -Unclear what precipitated the seizure and what type it was. Given remote history and low concern today, can remove seizure pre-cautions (7/3/23).     #constipation  - PRN milk of magnesia ordered 7/6/23     Medical course: Patient was physically examined by the ED prior to being transferred to the unit and was found to be medically stable and appropriate for admission.     Pertinent labs/imaging:  - Depakote 7/5/23 unremarkable  - CBC 7/1/23 unremarkable  - CMP 7/1/23 unremarkable  - TSH 7/1/23 normal  - B12 7/1/23 normal  - UDS 6/27/23 negative  - Hgb A1c  "7/1/23 normal  - Lipids 7/2/23 unremarkable  - Urinalysis 6/27/23 unremarkable  - EKG sinus rhythm 7/1/23, QTc 447 ms, Per report \"possible left atrial enlargement, cannot rule out anterior infarct, age undetermined. Abnormal ECG. No previous ECGs available\"    Behavioral/Psychological/Social:  - Encourage unit programming    Safety:  - Continue precautions as noted above  - Status 15 minute checks  - SIO discontinued    Legal Status: voluntary    Disposition Plan   Reason for ongoing admission: poses an imminent risk to self and is unable to care for self due to severe psychosis or elvis  Discharge location: TBD  Discharge Medications: not ordered  Follow-up Appointments: not scheduled    No further change in treatment plan  Patient seen, chart reviewed, case reviewed with  and with nursing.   Case reviewed in multi-disciplinary treatment team.    Octavio Parikh MD           "

## 2023-07-11 NOTE — PLAN OF CARE
Assessment/Intervention/Current Symtoms and Care Coordination:  Chart review and met with team. Met with patient to verify discharge to home when stable. Wrote down family phone numbers and assisted patient with calling her son.     Discharge Plan or Goal:  Home with family     Barriers to Discharge:  Patient presents with paranoia, disorganized thinking and behaviors, AVH, and mood lability     Referral Status:  Psychiatry follow up [place] with [place]. Mcdonough [place] [facility] [place]     [place] Status:  Voluntary    Contacts:  Aunt: Vickie, [phone] (DENI)  Son: Golden, [phone]     Upcoming Meetings and Dates/Important Information and next steps:

## 2023-07-12 PROCEDURE — 99233 SBSQ HOSP IP/OBS HIGH 50: CPT | Performed by: PSYCHIATRY & NEUROLOGY

## 2023-07-12 PROCEDURE — 250N000013 HC RX MED GY IP 250 OP 250 PS 637

## 2023-07-12 PROCEDURE — 124N000002 HC R&B MH UMMC

## 2023-07-12 PROCEDURE — 250N000013 HC RX MED GY IP 250 OP 250 PS 637: Performed by: PSYCHIATRY & NEUROLOGY

## 2023-07-12 RX ORDER — DIVALPROEX SODIUM 125 MG/1
500 CAPSULE, COATED PELLETS ORAL 2 TIMES DAILY
Status: DISCONTINUED | OUTPATIENT
Start: 2023-07-12 | End: 2023-07-26 | Stop reason: HOSPADM

## 2023-07-12 RX ADMIN — DIVALPROEX SODIUM 500 MG: 125 CAPSULE, COATED PELLETS ORAL at 20:24

## 2023-07-12 RX ADMIN — OLANZAPINE 10 MG: 10 TABLET, FILM COATED ORAL at 08:47

## 2023-07-12 RX ADMIN — OLANZAPINE 20 MG: 20 TABLET, FILM COATED ORAL at 20:24

## 2023-07-12 RX ADMIN — DIVALPROEX SODIUM 500 MG: 125 CAPSULE, COATED PELLETS ORAL at 08:47

## 2023-07-12 ASSESSMENT — ACTIVITIES OF DAILY LIVING (ADL)
HYGIENE/GROOMING: INDEPENDENT
ADLS_ACUITY_SCORE: 31
LAUNDRY: UNABLE TO COMPLETE
HYGIENE/GROOMING: INDEPENDENT
ADLS_ACUITY_SCORE: 31
DRESS: INDEPENDENT
ADLS_ACUITY_SCORE: 31
ORAL_HYGIENE: INDEPENDENT
ORAL_HYGIENE: INDEPENDENT
ADLS_ACUITY_SCORE: 31
DRESS: INDEPENDENT
ADLS_ACUITY_SCORE: 31

## 2023-07-12 NOTE — PLAN OF CARE
Problem: Plan of Care - These are the overarching goals to be used throughout the patient stay.    Goal: Plan of Care Review  Description: The Plan of Care Review/Shift note should be completed every shift.  The Outcome Evaluation is a brief statement about your assessment that the patient is improving, declining, or no change.  This information will be displayed automatically on your shift note.  Outcome: Progressing   Goal Outcome Evaluation:    Plan of Care Reviewed With: patient                 Patient alert on approach. She denied pain. She was seen out in walking the hallways talking to herself. Also seen in her room talking to herself / responding to internal stimuli. Patient has been calm mostly in her room resting. She came out once and after introduction of writer and nurse training, she asked when writer was leaving and writer told her 2300 and she whisper to herself like she was speaking to someone else.    Patient found in another female patient's room during 15 minutes at about 1800 touching the sheets lie she was trying to make the bed -not entirely sure what she was doing with the sheets while the patient was in her room confused and not sure why Angela was there. Staff PA redirected patient out of the room (all report per staff PA). Patient came out and was shown her room and she went into her room. Charge nurse informed by writer and acuity staff was requested. Order placed and patient is now placed on acuity.     Patient visited with family and it went well. She noted that it was her daughter and aunty and the visit was good. She had a bright affect on approach and when in conversation with writer. Patient was medication complaint using apple sauce to take her medications.   No further complaints noted.

## 2023-07-12 NOTE — PLAN OF CARE
Problem: Adult Behavioral Health Plan of Care  Goal: Plan of Care Review  Outcome: Progressing  Flowsheets (Taken 7/11/2023 7462)  Patient Agreement with Plan of Care: agrees   Goal Outcome Evaluation:    Plan of Care Reviewed With: patient      Angela stayed in her room the whole evening but occasionally stood outside to observe activity in the hallway and lounge area. In her room, she watched TV instead. The patient was pleasant, at ease, lively, and engaging. She was x4 oriented and alert. Pt denied VH, HI, and SI. She had depression, anxiety, and auditory hallucinations (the voices were about her friend who traveled). The patient usually eats but has constipation and an odd sleep pattern. She skipped the 4 pm group activity. The patient took Trazodone, Vistaril, Miralax, and her routine medications. She didn't mention any adverse effects.At about 9:30 pm, Pt responded to internal stimuli in her room. She giggled, laughed, and cried. She

## 2023-07-12 NOTE — PLAN OF CARE
"Pt presents as calm, cooperative with flat affect. Pt to scheduled medications w/o incident, no PRNs given this shift.. Pt denies SI, HI. Pt denies depression, anxiety and VH. Pt endorses SIB thoughts \"once in awhile\", but has no plan or intent follow through. Pt endorses AH, stating the voices are not command in nature and are mostly annoying keeping her up at night. Pt states she has restless sleep due to the voices talking about a friend that had traveled. Pt still believes the medication is helping. Pt denies current thoughts of aggression and agrees to safe behaviors.     Pt is visible on the fringes of the milieu and continues to be hypervigilant but mostly stays isolated to her room. PA staff reported pt whispers and laughs to herself when alone in her room.     Appetite and fluid intake are adequate. No acute behavioral or medical concerns this day  "

## 2023-07-12 NOTE — PLAN OF CARE
Goal Outcome Evaluation:           Problem: Violence Risk or Actual  Goal: Anger and Impulse Control  Outcome: Progressing   Patient slept for 4 hours. Patient seen talking to self, laughing and sometime crying. Seems to be responding both inter and external stimuli. No any other  complaints or concerns reported or noticed.

## 2023-07-12 NOTE — PROGRESS NOTES
"[facility], [place]   Psychiatric Progress Note  Hospital Day: 11        Interim History:   The patient's care was discussed with the treatment team during the daily team meeting and/or staff's chart notes were reviewed.  Staff report patient reporting that voices are still loud and derogatory in nature, though noncommand. She mentioned that medications are \"quieting\" the voices. Does appear to be responding to internal stimuli. She is isolative to her room. Patient did not report any acute medical concerns or side effects. No behavioral issues overnight, including violent or aggressive behaviors. Patient did not require seclusion or restraints. Patient is exhibiting signs/sx of psychosis or elvis. Patient did not endorse suicidal ideation. Patient did not endorse HI. Patient is medication adherent, though intermittently reluctant to take Depakote. She also attempted to divert prn hydroxyzine. Patient is not attending groups. Patient is not sleeping well, slept 4 hours overnight. Patient is eating adequately. Patient is attending to ADLs.    Upon interview, the patient was again guarded and appeared suspicious of writer. She did allow me to enter her room briefly. She explained that she had been on psychotropic medications for several years but then stopped Zyprexa \"a few years ago.\" She said that she remained on Lamictal, however. She was actively responding to internal stimuli during the itnerview, but denied hearing voices at that time. She was hyperfocused on the hallway, and said that it is \"calmer now.\" She then said that she wanted to show me something in the hallway, clarified that she was referring to another peer, \"but the hallway is all clear now.\" She then went to the food cart to grab her lunch, but staff brought her lunch a few minutes earlier and she was consuming it while writer was meeting with her. She appeared confused and disoriented. She thought she was at " "\"[place],\" but when asked again, she replied \"[place]?\" She knew she was in [place] and knew the month and year, but not the date today. She then said \"That's it. That's enough for today.\" She then retreated to her room to finish her lunch, and terminated the interview.     Suicidal ideation: denies current or recent suicidal ideation or behaviors.    Homicidal ideation: denies current or recent homicidal ideation or behaviors.    Psychotic symptoms: Patient denies AH, VH, paranoia, delusions.     Medication side effects reported: No significant side effects.    Acute medical concerns: none    Other issues reported by patient: Patient had no further questions or concerns.           Medications:       divalproex sodium delayed-release  500 mg Oral BID     OLANZapine  10 mg Oral QAM     OLANZapine  20 mg Oral At Bedtime          Allergies:     Allergies   Allergen Reactions     Pork-Derived Products Unknown     Confucianism does not eat pork          Labs:   No results found for this or any previous visit (from the past 24 hour(s)).       Psychiatric Examination:     BP (!) 142/84 (BP Location: Left arm, Patient Position: Sitting, Cuff Size: Adult Large)   Pulse 79   Temp (!) 96.7  F (35.9  C) (Temporal)   Resp 16   Ht 1.753 m (5' 9\")   Wt 105.8 kg (233 lb 4.8 oz)   SpO2 98%   BMI 34.45 kg/m    Weight is 233 lbs 4.8 oz  Body mass index is 34.45 kg/m .    Weight over time:  Vitals:    06/27/23 0121 07/02/23 0800 07/04/23 0854   Weight: 106.6 kg (235 lb) 104.9 kg (231 lb 3.2 oz) 105.8 kg (233 lb 4.8 oz)       Orthostatic Vitals       Most Recent      Sitting Orthostatic /72 07/10 0800    Sitting Orthostatic Pulse (bpm) 88 07/10 0800    Standing Orthostatic /81 07/10 0800    Standing Orthostatic Pulse (bpm) 103 07/10 0800            Cardiometabolic risk assessment. 07/10/23      Reviewed patient profile for cardiometabolic risk factors    Date taken /Value  REFERENCE RANGE   Abdominal " "Obesity  (Waist Circumference)   See nursing flowsheet Women ?35 in (88 cm)   Men ?40 in (102 cm)      Triglycerides  Triglycerides   Date Value Ref Range Status   07/02/2023 97 <150 mg/dL Final       ?150 mg/dL (1.7 mmol/L) or current treatment for elevated triglycerides   HDL cholesterol  Direct Measure HDL   Date Value Ref Range Status   07/02/2023 84 >=50 mg/dL Final   ]   Women <50 mg/dL (1.3 mmol/L) in women or current treatment for low HDL cholesterol  Men <40 mg/dL (1 mmol/L) in men or current treatment for low HDL cholesterol     Fasting plasma glucose (FPG) Lab Results   Component Value Date     07/01/2023      FPG ?100 mg/dL (5.6 mmol/L) or treatment for elevated blood glucose   Blood pressure  BP Readings from Last 3 Encounters:   07/11/23 (!) 142/84    Blood pressure ?130/85 mmHg or treatment for elevated blood pressure   Family History  See family history     Mental Status Exam:  Appearance: awake, alert, unkempt and disheveled   Attitude:  evasive, guarded and somewhat cooperative  Eye Contact:  intense  Mood:  \"joyful\"  Affect:  mood incongruent, constricted mobility, guarded and suspicious  Speech:  clear, coherent  Language: fluent and intact in English  Psychomotor, Gait, Musculoskeletal:  no evidence of tardive dyskinesia, dystonia, or tics  Throught Process:  linear, evidence of thought blocking present and illogical  Associations:  no loose associations  Thought Content:  no evidence of suicidal ideation or homicidal ideation, patient appears to be responding to internal stimuli and paranoia present  Insight:  limited  Judgement:  limited  Oriented to:  person and place  Attention Span and Concentration:  limited  Recent and Remote Memory:  fair  Fund of Knowledge:  appropriate           Precautions:     Behavioral Orders   Procedures     Assault precautions     Cheeking Precautions (behavioral units)     Patient Observed swallowing PO medications; Patient asked to drink water after " swallowing medication; Patient in Staff line of sight for 15 minutes after medication given; Mouth checks after PO administration (patient asked to open mouth and stick out their tongue).     Code 2     Elopement precautions     Routine Programming     As clinically indicated     Sexual precautions     Status 15     Every 15 minutes.          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 Significant symptoms on admission include paranoia, disorganized thinking and behaviors, AVH, and emotional lability. The MSE on admission was pertinent for RTIS. Biological contributions to mental health presentation include diagnosis of Bipolar Disorder, type 1 and taking Lamictal, Depakote, Zyprexa, and Abilify. Psychological contributions to mental health presentation include poor insight into her condition. While she understands why she's in the hospital, she displays intrusive behaviors that require a 1:1. 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 Protective factors include family support and being [employer]. Most recent psychiatric hospitalization was in 2022.     In summary, the patient's reported symptoms of mary and psychosis in the context of medication nonadherence are consistent with Bipolar Disorder, type 1. She will likely benefit from optimizing psychiatric medications this admission.    ? PTA Olanzapine was continued and dose increased to 15mg for  "better symptom control (7/1/23). Dose increased to 20 mg daily (5 mg AM and 15 mg PM) due to continued hallucinations (7/5/23).   ? Olanzapine increased to 30mg daily (10 mg AM and 20 mg PM) 7/6/23  ? PTA Depakote was continued at a lower dose of 500mg in the ED. Dose was increased today back to 1000mg daily dose but as a once daily medication (7/2/23).   ? PTA 1000 mg Depakote changed to sprinkles 7/7 due to trouble swallowing.  ? PTA Lamictal were held due to nonadherence and risk for Nava Ruben in outpatient setting (6/29/23).  ? Patient's history of Abilify use is unclear. Would benefit from speaking to outpatient provider.      Given that she presented with psychosis and elvis, patient warranted inpatient psychiatric hospitalization to maintain her safety.       Today's Changes:  Check Depakote level in AM. HOLD AM dose of Depakote prior to blood draw, then may administer.     Target psychiatric symptoms and interventions:  Depakote  mg BID  Zyprexa 10 mg daily and 20 mg at bedtime    Risks, benefits, and alternatives discussed at length with patient.     Acute Medical Problems and Treatments:  Acute medical concerns:  #Hx of Seizures  -Unclear what precipitated the seizure and what type it was. Given remote history and low concern today, can remove seizure pre-cautions (7/3/23).     #constipation  - PRN milk of magnesia ordered 7/6/23     Medical course: Patient was physically examined by the ED prior to being transferred to the unit and was found to be medically stable and appropriate for admission.     Pertinent labs/imaging:  - Depakote 7/5/23 unremarkable  - CBC 7/1/23 unremarkable  - CMP 7/1/23 unremarkable  - TSH 7/1/23 normal  - B12 7/1/23 normal  - UDS 6/27/23 negative  - Hgb A1c 7/1/23 normal  - Lipids 7/2/23 unremarkable  - Urinalysis 6/27/23 unremarkable  - EKG sinus rhythm 7/1/23, QTc 447 ms, Per report \"possible left atrial enlargement, cannot rule out anterior infarct, age " "undetermined. Abnormal ECG. No previous ECGs available\"    Behavioral/Psychological/Social:  - Encourage unit programming    Safety:  - Continue precautions as noted above  - Status 15 minute checks  - SIO discontinued on 7/10    Legal Status: voluntary    Disposition Plan   Reason for ongoing admission: poses an imminent risk to self and is unable to care for self due to severe psychosis or elvis  Discharge location: TBD  Discharge Medications: not ordered  Follow-up Appointments: not scheduled    Entered by: Denia Champagne MD on 7/12/2023 at 8:17 AM              "

## 2023-07-12 NOTE — PLAN OF CARE
Assessment/Intervention/Current Symtoms and Care Coordination:  Chart review and met with team, discussed pt progress, symptomology, and response to treatment.  Discussed the discharge plan and any potential impediments to discharge.    Discharge Plan or Goal:  Home with family     Barriers to Discharge:  Patient presents with paranoia, disorganized thinking and behaviors, AVH, and mood lability     Referral Status:  [place] [facility] [place]:  [place]untary    Contacts:  Aunt: Vickie, [phone] (DENI)  Son: Golden, [phone]      Upcoming Meetings and Dates/Important Information and next steps:

## 2023-07-13 LAB — VALPROATE SERPL-MCNC: 83.5 UG/ML

## 2023-07-13 PROCEDURE — 99233 SBSQ HOSP IP/OBS HIGH 50: CPT | Performed by: PSYCHIATRY & NEUROLOGY

## 2023-07-13 PROCEDURE — 250N000013 HC RX MED GY IP 250 OP 250 PS 637: Performed by: STUDENT IN AN ORGANIZED HEALTH CARE EDUCATION/TRAINING PROGRAM

## 2023-07-13 PROCEDURE — 250N000013 HC RX MED GY IP 250 OP 250 PS 637

## 2023-07-13 PROCEDURE — 250N000013 HC RX MED GY IP 250 OP 250 PS 637: Performed by: PSYCHIATRY & NEUROLOGY

## 2023-07-13 PROCEDURE — 80164 ASSAY DIPROPYLACETIC ACD TOT: CPT | Performed by: PSYCHIATRY & NEUROLOGY

## 2023-07-13 PROCEDURE — 36415 COLL VENOUS BLD VENIPUNCTURE: CPT | Performed by: PSYCHIATRY & NEUROLOGY

## 2023-07-13 PROCEDURE — G0177 OPPS/PHP; TRAIN & EDUC SERV: HCPCS

## 2023-07-13 PROCEDURE — 124N000002 HC R&B MH UMMC

## 2023-07-13 RX ORDER — BENZTROPINE MESYLATE 1 MG/ML
2 INJECTION, SOLUTION INTRAMUSCULAR; INTRAVENOUS DAILY PRN
Status: DISCONTINUED | OUTPATIENT
Start: 2023-07-13 | End: 2023-07-26 | Stop reason: HOSPADM

## 2023-07-13 RX ORDER — BENZTROPINE MESYLATE 1 MG/1
1 TABLET ORAL 2 TIMES DAILY PRN
Status: DISCONTINUED | OUTPATIENT
Start: 2023-07-13 | End: 2023-07-26 | Stop reason: HOSPADM

## 2023-07-13 RX ORDER — HALOPERIDOL 5 MG/1
5 TABLET ORAL 2 TIMES DAILY
Status: DISCONTINUED | OUTPATIENT
Start: 2023-07-13 | End: 2023-07-20

## 2023-07-13 RX ADMIN — OLANZAPINE 10 MG: 10 TABLET, FILM COATED ORAL at 08:09

## 2023-07-13 RX ADMIN — DIVALPROEX SODIUM 500 MG: 125 CAPSULE, COATED PELLETS ORAL at 08:09

## 2023-07-13 RX ADMIN — HALOPERIDOL 5 MG: 5 TABLET ORAL at 12:41

## 2023-07-13 RX ADMIN — OLANZAPINE 20 MG: 20 TABLET, FILM COATED ORAL at 20:15

## 2023-07-13 RX ADMIN — HALOPERIDOL 5 MG: 5 TABLET ORAL at 20:16

## 2023-07-13 RX ADMIN — DIVALPROEX SODIUM 500 MG: 125 CAPSULE, COATED PELLETS ORAL at 20:40

## 2023-07-13 RX ADMIN — OLANZAPINE 10 MG: 10 TABLET, FILM COATED ORAL at 11:47

## 2023-07-13 ASSESSMENT — ACTIVITIES OF DAILY LIVING (ADL)
ADLS_ACUITY_SCORE: 31
HYGIENE/GROOMING: INDEPENDENT
ADLS_ACUITY_SCORE: 31
ADLS_ACUITY_SCORE: 31
ORAL_HYGIENE: INDEPENDENT
ADLS_ACUITY_SCORE: 31
ADLS_ACUITY_SCORE: 31
LAUNDRY: UNABLE TO COMPLETE
ADLS_ACUITY_SCORE: 31
DRESS: INDEPENDENT
ADLS_ACUITY_SCORE: 31
ORAL_HYGIENE: INDEPENDENT
HYGIENE/GROOMING: INDEPENDENT
ADLS_ACUITY_SCORE: 31
LAUNDRY: UNABLE TO COMPLETE
DRESS: INDEPENDENT
ADLS_ACUITY_SCORE: 31
ADLS_ACUITY_SCORE: 31

## 2023-07-13 NOTE — PLAN OF CARE
"Pt presents as increasingly dysregulated this morning as compared to the last two mornings. At AM medication administration, pt was found standing in dark bathroom engaging in self talk. Pt requested to delay medication until breakfast arrived. RN writer reapproached pt in room as she was eating breakfast. Pt intially declined to take medication then immediately said she would take it, during interaction, pt was constantly whispering to self. Speech was unintelligible. Pt was med compliant and compliant with mouth check.    Approx 0940, RN writer observed pt sitting alone in the dining area laughing out loud, covering her face with her hijab. Pt was then observed to sit in her room staring at the door.    Pt remains paranoid, withdrawn to self, internally preoccupied and responding to internal stimuli. When present in the miliue, pt hangs on the fringes with no social interaction with peers/staff.    1145 PA reported pt responding to internal stimuli during group. PRN zyprexa offered and accepted with no cheeking or diversion of medication attempted. Pt complied with mouth check but refused to lift tongue. RN writer stayed within close proximity for 10 minutes to ensure med compliance.    Pt was hypertensive this am. BP reassessed and improved after AM medication administration.    VS reviewed: BP (!) 150/84 (BP Location: Left arm, Patient Position: Sitting, Cuff Size: Adult Large)   Pulse 83   Temp 98.2  F (36.8  C)   Resp 16   Ht 1.753 m (5' 9\")   Wt 105.8 kg (233 lb 4.8 oz)   SpO2 99%   BMI 34.45 kg/m   . Patient denies pain.    Length of stay: 12  "

## 2023-07-13 NOTE — PROGRESS NOTES
"[facility], [place]   Psychiatric Progress Note  Hospital Day: 12        Interim History:   The patient's care was discussed with the treatment team during the daily team meeting and/or staff's chart notes were reviewed.  Staff report pt is visible on the fringes of the milieu and continues to be hypervigilant but mostly stays isolated to her room. PA staff reported pt whispers and laughs to herself when alone in her room. Patient reporting that voices are still loud and derogatory in nature, though noncommand. Does appear to be responding to internal stimuli. She is isolative to her room. Patient did not report any acute medical concerns or side effects. No violent or aggressive behaviors, though pt has been attempting to enter other patients' rooms (see below). Patient did not require seclusion or restraints. Patient is exhibiting signs/sx of psychosis. Patient did not endorse suicidal ideation, though did report SIB thoughts \"once in awhile.\" Denied any plan. Patient did not endorse HI. Patient is now medication adherent per recent notes. She also attempted to divert prn hydroxyzine. Patient is not attending groups. Patient is not sleeping well, slept 4.25 hours overnight. Patient is eating adequately. Patient is attending to ADLs. No reported evidence of cheeking. Hypertensive this morning with BP of 173/97. Repeat was 150/84.    Per RN note dated 7/12:   Patient found in another female patient's room during 15 minutes at about 1800 touching the sheets lie she was trying to make the bed -not entirely sure what she was doing with the sheets while the patient was in her room confused and not sure why Angela was there. Staff PA redirected patient out of the room (all report per staff PA). Patient came out and was shown her room and she went into her room. Charge nurse informed by writer and acuity staff was requested. Order placed and patient is now placed on acuity.      Patient visited " "with family and it went well. She noted that it was her daughter and aunty and the visit was good. She had a bright affect on approach and when in conversation with writer. Patient was medication complaint using apple sauce to take her medications.   No further complaints noted.      Upon interview, Angela [place] [facility] [place] hysterically and responding to internal stimuli. I asked her what she was laughing about, and she replied \"The voices.\" She agreed to enter her room with writer for privacy. She said that she entered another patient's room because \"My ex  told me to take a pill and then go in there.\" I explained why this behavior is concerning, and she said \"You're right.\"  When attempting to reality test, she said that she understands that he is not physically present, but \"I don't know.\" She said that she feels safe in the hospital. She again acknowledges that the voices can be distracting and bothersome for her. She is open to taking Haldol with eventual plan to cross-titrate from Zyprexa to Haldol given ongoing AH. She said that she has been on Haldol in the past, and does not recall experiencing any side effects from it. She is aware that it is an antipsychotic medication and that it falls under the same category as Zyprexa. At this time, she is agreeable to ongoing voluntary hospitalization with tentative plan to have care conference with her family late next week.     Suicidal ideation: denies current or recent suicidal ideation or behaviors.    Homicidal ideation: denies current or recent homicidal ideation or behaviors.    Psychotic symptoms: Patient denies AH, VH, paranoia, delusions.     Medication side effects reported: No significant side effects.    Acute medical concerns: none    Other issues reported by patient: Patient had no further questions or concerns.           Medications:       divalproex sodium delayed-release  500 mg Oral BID     OLANZapine  10 mg Oral QAM     " "OLANZapine  20 mg Oral At Bedtime          Allergies:     Allergies   Allergen Reactions     Pork-Derived Products Unknown     Adventism does not eat pork          Labs:   No results found for this or any previous visit (from the past 24 hour(s)).       Psychiatric Examination:     /85 (BP Location: Left arm, Patient Position: Sitting, Cuff Size: Adult Large)   Pulse 87   Temp 98.2  F (36.8  C)   Resp 16   Ht 1.753 m (5' 9\")   Wt 105.8 kg (233 lb 4.8 oz)   SpO2 97%   BMI 34.45 kg/m    Weight is 233 lbs 4.8 oz  Body mass index is 34.45 kg/m .    Weight over time:  Vitals:    06/27/23 0121 07/02/23 0800 07/04/23 0854   Weight: 106.6 kg (235 lb) 104.9 kg (231 lb 3.2 oz) 105.8 kg (233 lb 4.8 oz)       Orthostatic Vitals       Most Recent      Sitting Orthostatic /72 07/10 0800    Sitting Orthostatic Pulse (bpm) 88 07/10 0800    Standing Orthostatic /81 07/10 0800    Standing Orthostatic Pulse (bpm) 103 07/10 0800            Cardiometabolic risk assessment. 07/10/23      Reviewed patient profile for cardiometabolic risk factors    Date taken /Value  REFERENCE RANGE   Abdominal Obesity  (Waist Circumference)   See nursing flowsheet Women ?35 in (88 cm)   Men ?40 in (102 cm)      Triglycerides  Triglycerides   Date Value Ref Range Status   07/02/2023 97 <150 mg/dL Final       ?150 mg/dL (1.7 mmol/L) or current treatment for elevated triglycerides   HDL cholesterol  Direct Measure HDL   Date Value Ref Range Status   07/02/2023 84 >=50 mg/dL Final   ]   Women <50 mg/dL (1.3 mmol/L) in women or current treatment for low HDL cholesterol  Men <40 mg/dL (1 mmol/L) in men or current treatment for low HDL cholesterol     Fasting plasma glucose (FPG) Lab Results   Component Value Date     07/01/2023      FPG ?100 mg/dL (5.6 mmol/L) or treatment for elevated blood glucose   Blood pressure  BP Readings from Last 3 Encounters:   07/12/23 137/85    Blood pressure ?130/85 mmHg or treatment for elevated " "blood pressure   Family History  See family history     Mental Status Exam:  Appearance: awake, alert, unkempt and disheveled , laughing in lounge while sitting alone  Attitude:  evasive, guarded though cooperative  Eye Contact:  intense  Mood:  \"good\"  Affect:  mood incongruent, constricted mobility, guarded and suspicious  Speech:  clear, coherent  Language: fluent and intact in English, though while responding to internal stimuli, did speak in Uzbek  Psychomotor, Gait, Musculoskeletal:  no evidence of tardive dyskinesia, dystonia, or tics  Throught Process:  linear, evidence of thought blocking present and illogical  Associations:  no loose associations  Thought Content:  no evidence of suicidal ideation or homicidal ideation, patient appears to be responding to internal stimuli and paranoia present  Insight:  limited  Judgement:  limited  Oriented to:  person and place  Attention Span and Concentration:  limited  Recent and Remote Memory:  fair  Fund of Knowledge:  appropriate           Precautions:     Behavioral Orders   Procedures     Assault precautions     Cheeking Precautions (behavioral units)     Patient Observed swallowing PO medications; Patient asked to drink water after swallowing medication; Patient in Staff line of sight for 15 minutes after medication given; Mouth checks after PO administration (patient asked to open mouth and stick out their tongue).     Code 2     Elopement precautions     Routine Programming     As clinically indicated     Sexual precautions     Status 15     Every 15 minutes.          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 Significant symptoms on admission include paranoia, disorganized thinking and behaviors, AVH, and emotional lability. The MSE on admission was pertinent for RTIS. Biological contributions to mental health presentation include diagnosis of Bipolar Disorder, type 1 and taking Lamictal, Depakote, Zyprexa, and Abilify. Psychological contributions to mental health presentation include poor insight into her condition. While she understands why she's in the hospital, she displays intrusive behaviors that require a 1:1. 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 Protective factors include family support and being Quaker. Most recent psychiatric hospitalization was in 2022.     In summary, the patient's reported symptoms of elvis and psychosis in the context of medication nonadherence are consistent with Bipolar Disorder, type 1. She will likely benefit from optimizing psychiatric medications this admission.    ? PTA Olanzapine was continued and dose increased to 15mg for better symptom control (7/1/23). Dose increased to 20 mg daily (5 mg AM and 15 mg PM) due to continued hallucinations (7/5/23).   ? Olanzapine increased to 30mg daily (10 mg AM and 20 mg PM) 7/6/23  ? PTA Depakote was continued at a lower dose of 500mg in the ED. Dose was increased today back to 1000mg daily dose but as a once daily medication (7/2/23).   ? PTA 1000 mg Depakote changed to sprinkles 7/7 due to trouble swallowing.  ? PTA Lamictal were held due to nonadherence and risk for Nava Ruben in outpatient setting (6/29/23).  ? Patient's history of Abilify use is unclear. Would benefit from speaking to outpatient provider.      Given that she presented with psychosis and elvis, patient warranted inpatient psychiatric  "hospitalization to maintain her safety.       Today's Changes:  Add Haldol 5 mg BID to target ongoing severe sx of psychosis  Add Cogentin 1 mg BID prn for potential EPSE  Add Cogentin 2 mg IM daily prn for acute dystonic reaction  Continue 1:1 acuity staff as patient has been quite intrusive, and has entered other patients' rooms on multiple occasions on 7/12  Checked Depakote level this AM and it is therapeutic at 83.5. No changes indicated.      Target psychiatric symptoms and interventions:  Depakote  mg BID  Zyprexa 10 mg daily and 20 mg at bedtime    Risks, benefits, and alternatives discussed at length with patient.     Acute Medical Problems and Treatments:  Acute medical concerns:  #Hx of Seizures  -Unclear what precipitated the seizure and what type it was. Given remote history and low concern today, can remove seizure pre-cautions (7/3/23).     #constipation  - PRN milk of magnesia ordered 7/6/23     Medical course: Patient was physically examined by the ED prior to being transferred to the unit and was found to be medically stable and appropriate for admission.     Pertinent labs/imaging:  - Depakote 7/5/23 unremarkable  - CBC 7/1/23 unremarkable  - CMP 7/1/23 unremarkable  - TSH 7/1/23 normal  - B12 7/1/23 normal  - UDS 6/27/23 negative  - Hgb A1c 7/1/23 normal  - Lipids 7/2/23 unremarkable  - Urinalysis 6/27/23 unremarkable  - EKG sinus rhythm 7/1/23, QTc 447 ms, Per report \"possible left atrial enlargement, cannot rule out anterior infarct, age undetermined. Abnormal ECG. No previous ECGs available\"  - Depakote level on 7/13 was 83.5    Behavioral/Psychological/Social:  - Encourage unit programming    Safety:  - Continue precautions as noted above  - Status 15 minute checks  - SIO discontinued on 7/10, acuity staff added on 7/12 as patient was entering other patients' rooms    Legal Status: voluntary    Disposition Plan   Reason for ongoing admission: poses an imminent risk to self and is unable " to care for self due to severe psychosis or elvis  Discharge location: TBD  Discharge Medications: not ordered  Follow-up Appointments: not scheduled    Entered by: Denia Champagne MD on 7/13/2023 at 8:17 AM

## 2023-07-13 NOTE — CARE PLAN
07/13/23 1556   Group Therapy Session   Group Attendance attended group session   Time Session Began 1030   Time Session Ended 1115   Total Time (minutes) 45   Total # Attendees 2-3   Group Type psychoeducation;task skill   Group Topic Covered coping skills/lifestyle management;leisure exploration/use of leisure time;structured socialization   Group Session Detail check-in, OT clinic   Patient Response/Contribution disorganized;cooperative with task     Pt readily left her room and came to group when invited by writer.  During check-in, pt did share she feels good, and has been helped by the medication, though would like to go home now, or soon.  Pt had difficulty expressing much more, somewhat apologized and said she needed an .  Writer assured her I could understand her, and she was doing well, though I would look into  services so she could better understand me.  As different task activities were offered in OT clinic, pt replied that she would like to color.  Pt also offered her the opportunity to listen to music, and after putting on a [place]ammar[place] female martini, pt became more elated, and focused on this the majority of the session.  Increase in laughing and episodes of talking to self.

## 2023-07-13 NOTE — PLAN OF CARE
Assessment/Intervention/Current Symtoms and Care Coordination:  Chart review and met with team, discussed pt progress, symptomology, and response to treatment.  Discussed the discharge plan and any potential impediments to discharge.  Met with Angela to ask about an DENI for her cousin who left a voicemail wanting to schedule a care conference. Angela declined at this time, discussed mental health status with nurse who reports she's more symptomatic today. Writer will attempt again tomorrow.     Discharge Plan or Goal:  Home with family     Barriers to Discharge:  Patient presents with paranoia, disorganized thinking and behaviors, AVH, and mood lability     Referral Status:  Psychiatry follow up scheduled with Dr. Mcdonough at [facility]     Legal Status:  Voluntary    Contacts:  Aunt: Vickie, [phone] (DENI)  Son: Golden, [phone]   [place]: Lashawn, [phone]     Upcoming Meetings and Dates/Important Information and next steps:  Get DENI for Lashawn when patient is less symptomatic  Care conference prior to discharge with family

## 2023-07-13 NOTE — CARE PLAN
"Occupational Therapy Group Note:     07/13/23 1200   Group Therapy Session   Group Attendance attended group session   Time Session Began 1120   Time Session Ended 1150   Total Time (minutes) 30  (no charge)   Total # Attendees 4   Group Type life skill   Group Topic Covered balanced lifestyle;coping skills/lifestyle management;structured socialization   Group Session Detail dice game - discussion questions & gentle movement   Patient Response/Contribution disorganized;cooperative with task   Patient Participation Detail Pt actively participated in a structured occupational therapy group with a focus on social engagement and movement. Pt had the option to choose a self-reflection question or a gentle exercise/stretching movement, and pt primarily chose to answer questions. Pt appeared comfortable responding to discussion prompts, and also followed along with all guided movements. When prompted to identify what type of work she would like to do, she identified \"cleaning.\" About x5 minutes into group, she began laughing uncontrollably, which continued for a majority of the session. She impulsively attempted to drink a peer's orange juice that was sitting on the table, though was redirectable. Affect appeared elated.       "

## 2023-07-14 PROCEDURE — 99233 SBSQ HOSP IP/OBS HIGH 50: CPT | Performed by: PSYCHIATRY & NEUROLOGY

## 2023-07-14 PROCEDURE — 250N000013 HC RX MED GY IP 250 OP 250 PS 637

## 2023-07-14 PROCEDURE — 250N000013 HC RX MED GY IP 250 OP 250 PS 637: Performed by: PSYCHIATRY & NEUROLOGY

## 2023-07-14 PROCEDURE — 124N000002 HC R&B MH UMMC

## 2023-07-14 RX ADMIN — OLANZAPINE 20 MG: 20 TABLET, FILM COATED ORAL at 19:34

## 2023-07-14 RX ADMIN — DIVALPROEX SODIUM 500 MG: 125 CAPSULE, COATED PELLETS ORAL at 19:34

## 2023-07-14 RX ADMIN — OLANZAPINE 10 MG: 10 TABLET, FILM COATED ORAL at 08:20

## 2023-07-14 RX ADMIN — HALOPERIDOL 5 MG: 5 TABLET ORAL at 19:34

## 2023-07-14 RX ADMIN — DIVALPROEX SODIUM 500 MG: 125 CAPSULE, COATED PELLETS ORAL at 08:20

## 2023-07-14 RX ADMIN — HALOPERIDOL 5 MG: 5 TABLET ORAL at 12:21

## 2023-07-14 ASSESSMENT — ACTIVITIES OF DAILY LIVING (ADL)
ADLS_ACUITY_SCORE: 31
ADLS_ACUITY_SCORE: 31
ORAL_HYGIENE: INDEPENDENT
ORAL_HYGIENE: INDEPENDENT
HYGIENE/GROOMING: INDEPENDENT
HYGIENE/GROOMING: INDEPENDENT
LAUNDRY: WITH SUPERVISION
ADLS_ACUITY_SCORE: 31
ADLS_ACUITY_SCORE: 31
DRESS: INDEPENDENT
ADLS_ACUITY_SCORE: 31
ADLS_ACUITY_SCORE: 31
LAUNDRY: UNABLE TO COMPLETE
ADLS_ACUITY_SCORE: 31
DRESS: INDEPENDENT
ADLS_ACUITY_SCORE: 31

## 2023-07-14 NOTE — PLAN OF CARE
Problem: Violence Risk or Actual  Goal: Anger and Impulse Control  Outcome: Progressing  Note: Patient demonstrates no anger or impulsivity     Problem: Suicidal Behavior  Goal: Suicidal Behavior is Absent or Managed  Outcome: Progressing  Note: Patient manifests no suicidal behavior   Goal Outcome Evaluation:       Patient was up most of the night, patient was in/out of the room coming to the dinning area to pick one or two things from the fridge. Patient would stand by her room door or sits for a short period of time in the lounge before going back to her room. Patient seems to be responding to IS, would frequently burst into laughter and then presents with affective flattening. Patient is labile and sometimes paranoid. Achieved 3.75 hours of sleep. Will continue to monitor and follow plan of care.           Sammy Mckeon DNP, RN
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
## 2023-07-14 NOTE — PLAN OF CARE
"  Patient was observed responding to internal stimuli. Patient does endorse feeling anxious and feeling depressed but denies auditory hallucinations/visual hallucinations. She was encouraged to take a shower at the start of the shift, and patient was able to take a shower.    Patient took her medicattion with verbal prompts. She took Haldol and Zyprexa with out an issue but when she was given Depakote she throw it in the garbage at first and told writer leave me alone , but was able to take it with after verbally promoted to take it. Patient also laughs to herself inappropriately. Patient's blood pressure was a elevated when writer was trying to re-check the patient's blood pressure the patient took of the blood pressure cuff off her hands .     Continue to Monitor Blood pressure.    Blood pressure (!) 144/79, pulse 77, temperature 97  F (36.1  C), temperature source Temporal, resp. rate 16, height 1.753 m (5' 9\"), weight 105.8 kg (233 lb 4.8 oz), SpO2 100 %.              "

## 2023-07-14 NOTE — PLAN OF CARE
Assessment/Intervention/Current Symtoms and Care Coordination:  Chart review and met with team, discussed pt progress, symptomology, and response to treatment.  Discussed the discharge plan and any potential impediments to discharge.  Talked to her cousin, Nona, on the phone to discuss updates and current presentation. Tentatively scheduled a care conference for Thursday, July 20th at 2pm with family. Nona reported that Angela lives at home with her father, brother and son. Discussed challenges with medication adherence and family is confident that they can be influential in encouraging Angela to take medication.   Completed Exhibit A and sent Petition for Commitment to appropriate place, will send those with Examiner's Statement, Affidavit, and Neuroleptic Petition on Monday to [facility].    Discharge Plan or Goal:  Home with family     Barriers to Discharge:  Patient presents with paranoia, disorganized thinking and behaviors, AVH, and mood lability     Referral Status:  Psychiatry follow up scheduled with Dr. Mcdonough at [facility]     Legal Status:  Voluntary    Contacts:  Aunt: Vickie, [phone] (DENI)  Son: Golden, [phone]   Cousin: Nona, [phone] (Consent)     Upcoming Meetings and Dates/Important Information and next steps:  Care conference prior to discharge with family

## 2023-07-14 NOTE — PLAN OF CARE
"Nursing Assessment    Recent Vitals: B/P: 111/65, T: 97.1, P: 88, R: 20     Sleep:  Hours of sleep at night: 3.75    General Shift Summary  Patient has been present in the milieu. She presents as withdrawn. Patient denies AVH but appears to be responding to internal stimuli. She is frequently seen laughing to herself or quietly mumbling to herself. Writer is unable to make out what she is saying since she is speaking in Grenadian when talking to herself. Incite and judgement are limited. Patient is distracted when talking to her. She presents as suspicious when talking medications and made statements such as \"You trying to make me go to sleep?\". Writer assured her that I wasn't. Patient disagreed and refused her Haldol stating that the medication was the wrong color and that it made her sleepy. She was accepting of her Depakote and Zyprexa. Later around noon patient was offered her Haldol again and was accepting of it. Hygiene is fair. She is eating well.    Linn Menendez, RN MSN  "

## 2023-07-14 NOTE — PLAN OF CARE
"Goal Outcome Evaluation:    Plan of Care Reviewed With: patient      Problem: Violence Risk or Actual  Goal: Anger and Impulse Control  Outcome: Progressing     Patient was met in her room. Patient noted too be talking to herself and inappropriately laughing. Patient stated that she was fine in response to RN's question.. RN tried to continue conversing with her, patient removed one of her foot wear, picked it up,  and pointed it to RN and said \"Get away from me.\" Patient observed to be mainly isolative this shift. No interaction with peers. Briefly seen at one time on the exercise bike. Patient did not eat her dinner but goes to the fridge to get snacks. She was visited by her family this evening. In their presence, patient took all her night medications. Patient likes her Depakote Sprinkle opened and mixed with Apple sauce. No other behavior concerns. Vital signs were within normal limit.               "

## 2023-07-15 PROCEDURE — 250N000013 HC RX MED GY IP 250 OP 250 PS 637: Performed by: PSYCHIATRY & NEUROLOGY

## 2023-07-15 PROCEDURE — 250N000013 HC RX MED GY IP 250 OP 250 PS 637

## 2023-07-15 PROCEDURE — 124N000002 HC R&B MH UMMC

## 2023-07-15 PROCEDURE — 250N000013 HC RX MED GY IP 250 OP 250 PS 637: Performed by: STUDENT IN AN ORGANIZED HEALTH CARE EDUCATION/TRAINING PROGRAM

## 2023-07-15 RX ADMIN — HALOPERIDOL 5 MG: 5 TABLET ORAL at 08:34

## 2023-07-15 RX ADMIN — OLANZAPINE 20 MG: 20 TABLET, FILM COATED ORAL at 19:39

## 2023-07-15 RX ADMIN — POLYETHYLENE GLYCOL 3350 17 G: 17 POWDER, FOR SOLUTION ORAL at 16:51

## 2023-07-15 RX ADMIN — DIVALPROEX SODIUM 500 MG: 125 CAPSULE, COATED PELLETS ORAL at 19:38

## 2023-07-15 RX ADMIN — DIVALPROEX SODIUM 500 MG: 125 CAPSULE, COATED PELLETS ORAL at 08:34

## 2023-07-15 RX ADMIN — OLANZAPINE 10 MG: 10 TABLET, FILM COATED ORAL at 08:34

## 2023-07-15 RX ADMIN — HALOPERIDOL 5 MG: 5 TABLET ORAL at 19:39

## 2023-07-15 ASSESSMENT — ACTIVITIES OF DAILY LIVING (ADL)
ADLS_ACUITY_SCORE: 31
ADLS_ACUITY_SCORE: 31
HYGIENE/GROOMING: INDEPENDENT
ADLS_ACUITY_SCORE: 31
ORAL_HYGIENE: INDEPENDENT
ADLS_ACUITY_SCORE: 31
DRESS: INDEPENDENT
ADLS_ACUITY_SCORE: 31
LAUNDRY: WITH SUPERVISION

## 2023-07-15 NOTE — CARE PLAN
Occupational Therapy Group Note:     07/15/23 1511   Group Therapy Session   Group Attendance attended group session   Time Session Began 1115   Time Session Ended 1150   Total Time (minutes) 15 (no charge)   Total # Attendees 4   Group Type recreation   Group Topic Covered leisure exploration/use of leisure time;structured socialization   Group Session Detail OT Leisure Group   Patient Response/Contribution disorganized;listened actively;unable to sequence the task;other (see comments)  (Responding to internal stimuli)   Patient Participation Detail Patient actively engaged in a occupational therapy group with leisure exploration and engagement being the topic and focus. Leisure exploration offered for increased intrinsic motivation to engage in social situations with peers and exercise cognitive skills. Patient agreeable to engage in group activity. Patient reported unfamiliarity with game; however, verbalized willingness to learn. Patient listened actively to instructions of game provided 1:1 by writer. Patient appeared to have difficulty comprehending and following multi-step instructions of game. Patient appeared to have impaired concentration and seemed to be responding to internal stimuli throughout group (laughing to self). Patient left group abruptly without reason and without return. Calm and cooperative, pleasant in interactions.

## 2023-07-15 NOTE — PLAN OF CARE
Problem: Adult Behavioral Health Plan of Care  Goal: Absence of New-Onset Illness or Injury  Outcome: Progressing  Note: Patient reports no new-onset illness or injury     Problem: Suicidal Behavior  Goal: Suicidal Behavior is Absent or Managed  Outcome: Progressing  Note: Patient manifests no suicidal behavior   Goal Outcome Evaluation:        Patient slept well, appears well rested upon awakening this morning. Patient was cooperative with care with no demonstrated physical or verbal aggression toward staff or peers. No signs or symptoms of elvis or psychosis reported or observed, no SI/HI or SIB. Patient did not report or observed responding to visual or auditory hallucination and no angry outburst. Overall, patient achieved 6 hours of sleep.  will continue to monitor and follow plan of care.           Sammy Mckeon DNP, RN

## 2023-07-15 NOTE — PLAN OF CARE
Nursing Assessment    Recent Vitals: B/P: 136/75, T: 97, P: 88, R: 16     General Shift Summary  Patient has been in and out of the milieu, participated in bead making with peers, and presents with a flat affect however brightens upon approach. She is seen responding to internal stimuli when quietly talking to herself or laughing when no one has said anything to her. Writer can't understand what patient is saying to herself, she mumbles to herself in Kenyan. She continues to deny all mental health signs and symptoms. Hygiene is fair, showering was encouraged but patient refused. She was accepting of her medications and didn't question the colors like she did yesterday. Patient tolerates meds well in applesauce. Appetite is good. Incite is limited. Judgment is poor. Concentration is poor and she presents as distracted.

## 2023-07-16 PROCEDURE — 124N000002 HC R&B MH UMMC

## 2023-07-16 PROCEDURE — 250N000013 HC RX MED GY IP 250 OP 250 PS 637

## 2023-07-16 PROCEDURE — 250N000013 HC RX MED GY IP 250 OP 250 PS 637: Performed by: PSYCHIATRY & NEUROLOGY

## 2023-07-16 RX ADMIN — DIVALPROEX SODIUM 500 MG: 125 CAPSULE, COATED PELLETS ORAL at 19:32

## 2023-07-16 RX ADMIN — HALOPERIDOL 5 MG: 5 TABLET ORAL at 08:14

## 2023-07-16 RX ADMIN — DIVALPROEX SODIUM 500 MG: 125 CAPSULE, COATED PELLETS ORAL at 08:14

## 2023-07-16 RX ADMIN — HALOPERIDOL 5 MG: 5 TABLET ORAL at 19:32

## 2023-07-16 RX ADMIN — OLANZAPINE 20 MG: 20 TABLET, FILM COATED ORAL at 19:33

## 2023-07-16 RX ADMIN — OLANZAPINE 10 MG: 10 TABLET, FILM COATED ORAL at 08:14

## 2023-07-16 ASSESSMENT — ACTIVITIES OF DAILY LIVING (ADL)
ADLS_ACUITY_SCORE: 31
DRESS: INDEPENDENT
ADLS_ACUITY_SCORE: 31
HYGIENE/GROOMING: INDEPENDENT
ADLS_ACUITY_SCORE: 31
ORAL_HYGIENE: INDEPENDENT

## 2023-07-16 NOTE — PLAN OF CARE
"Problem: Adult Behavioral Health Plan of Care  Goal: Adheres to Safety Considerations for Self and Others  Outcome: Progressing     Problem: Adult Behavioral Health Plan of Care  Goal: Optimized Coping Skills in Response to Life Stressors  Outcome: Progressing      Goal Outcome Evaluation:     Plan of Care Reviewed With: patient     Patient observed isolative and withdrawn. She spent most of her time in her room watching television. She was out briefly in the milieu to sit in the lounge. Presents with blunted affect and was in a calm mood. She described her day as \"smooth\". Pt denies SI/SIB/HI. She contracted for safety. She claimed she experiences auditory hallucinations. She verbalized \"I hear voices but not right now\". She does not want to elaborate further when asked to described the voices. She does appear to respond to internal stimuli. She was observed talking and laughing to self in her room or during conversations with staff. Pt denies having anxiety and depression. Pt claimed she uses music and television as part of her coping while in the unit.   Pt was visited by family, she claimed it went well.   Medical Concerns: pt denies pain.   Appetite: Consumed 100% of share of dinner and took approximately 720 ml of fluids.   Sleep: pt denies concerns.   LBM: pt could not remember the last time she had a bowel movement and claimed she feels constipated. PRN Miralax given per request.   ADLs: Independent.   PRNs given: PRN Miralax for constipation given per request.     Due medications given. Denies experiencing side effects.    On cheeking, assault, sexual, and elopement precautions. Needs attended to. No further concerns noted as of this time.                    "

## 2023-07-16 NOTE — PLAN OF CARE
Goal Outcome Evaluation:       Pt has acuity staff for safety.  She tried to open all the exits doors on and off throughout the shift.  She ate several snacks of yogurt and juice during the night.  She would have outbreaks of laughing on and off.  She was in and out of her room most of the shift pacing and sitting in the [place].  Pt slept 3.75 hours.

## 2023-07-16 NOTE — PLAN OF CARE
Nursing Assessment    Recent Vitals: B/P: 120/78, T: 96.8, P: 82, R: 16     General Shift Summary  Patient has been visible in the milieu, she primarily has kept to herself. Patient denied all mental health signs and symptoms but is seen responding to internal stimuli. Patient often stares off in space and laughs to herself when no one is talking to her or talking around her. She also mumbles to herself however it is difficult to make out what she is saying and most of the words are in Bots[place]an. Patient started to cry when writer gave her morning medications to her. Writer asked what was wrong but patient didn't want to say and shook her head. She was compliant with her morning medications and accepted the meds in apple sauce. Incite and judgement are poor. Concentration is fair. She is eating well. Hygiene is fair, patient wears her street clothing.

## 2023-07-17 PROCEDURE — 124N000002 HC R&B MH UMMC

## 2023-07-17 PROCEDURE — 99231 SBSQ HOSP IP/OBS SF/LOW 25: CPT | Performed by: PSYCHIATRY & NEUROLOGY

## 2023-07-17 PROCEDURE — 250N000013 HC RX MED GY IP 250 OP 250 PS 637

## 2023-07-17 PROCEDURE — 250N000013 HC RX MED GY IP 250 OP 250 PS 637: Performed by: STUDENT IN AN ORGANIZED HEALTH CARE EDUCATION/TRAINING PROGRAM

## 2023-07-17 PROCEDURE — 250N000013 HC RX MED GY IP 250 OP 250 PS 637: Performed by: PSYCHIATRY & NEUROLOGY

## 2023-07-17 PROCEDURE — H2032 ACTIVITY THERAPY, PER 15 MIN: HCPCS

## 2023-07-17 RX ADMIN — DIVALPROEX SODIUM 500 MG: 125 CAPSULE, COATED PELLETS ORAL at 19:56

## 2023-07-17 RX ADMIN — OLANZAPINE 10 MG: 10 TABLET, FILM COATED ORAL at 10:07

## 2023-07-17 RX ADMIN — HALOPERIDOL 5 MG: 5 TABLET ORAL at 19:56

## 2023-07-17 RX ADMIN — OLANZAPINE 20 MG: 20 TABLET, FILM COATED ORAL at 19:56

## 2023-07-17 RX ADMIN — DIVALPROEX SODIUM 500 MG: 125 CAPSULE, COATED PELLETS ORAL at 10:06

## 2023-07-17 RX ADMIN — HALOPERIDOL 5 MG: 5 TABLET ORAL at 10:07

## 2023-07-17 RX ADMIN — BENZONATATE 100 MG: 100 CAPSULE ORAL at 14:23

## 2023-07-17 ASSESSMENT — ACTIVITIES OF DAILY LIVING (ADL)
ADLS_ACUITY_SCORE: 31
ORAL_HYGIENE: INDEPENDENT
ADLS_ACUITY_SCORE: 31
HYGIENE/GROOMING: INDEPENDENT
LAUNDRY: UNABLE TO COMPLETE
DRESS: INDEPENDENT

## 2023-07-17 NOTE — PLAN OF CARE
Pt has been visible in the milieu withdrawn to self, laughing to self and and engaging in self talk. Pt denies SI, SIB, HI and thoughts of hurting others. Pt was med compliant, hygiene is good. Appetite and fluid intake adequate. No acute behavioral or medical concerns this day.
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
## 2023-07-17 NOTE — PLAN OF CARE
Assessment/Intervention/Current Symtoms and Care Coordination:  Chart review and met with team, discussed pt progress, symptomology, and response to treatment.  Discussed the discharge plan and any potential impediments to discharge.  Examiner's Statement, Petition for Commitment, Exhibit A, Neuroleptic Affidavit, and Neuroleptic Petition sent via secure fax to [facility] at 10:23am. Called [facility] Pre-Petition to verify that they received it, it was received.     Discharge Plan or Goal:  Home with family     Barriers to Discharge:  Patient presents with paranoia, disorganized thinking and behaviors, AVH, and mood lability     Referral Status:  Psychiatry follow up [place]. [place]cdonough [place] [facility] [place]     [place] Status:  Voluntary    Contacts:  Aunt: Vickie, [phone] (DENI)  Son: Golden, [phone]   Cousin: Nona, [phone] (Consent)     Upcoming Meetings and Dates/Important Information and next steps:  Care conference prior to discharge with family

## 2023-07-17 NOTE — PLAN OF CARE
Pt asleep at start of shift.     Breathing quiet and unlabored when sleeping.     Pt had no c/o pain or discomfort during the HS.     Patient attempted to go into peers room during the HS and staff needed to redirect her. She took redirection well and returned to sitting in the hallway. Patient has been talking and laughing out loud when sitting alone.     Appears to have slept 4.25 hours.     Goal Outcome Evaluation:  Problem: Adult Behavioral Health Plan of Care  Goal: Adheres to Safety Considerations for Self and Others  Intervention: Develop and Maintain Individualized Safety Plan  Recent Flowsheet Documentation  Taken 7/17/2023 0000 by Opal Wu, RN  Safety Measures: safety rounds completed  Goal: Absence of New-Onset Illness or Injury  Intervention: Identify and Manage Fall Risk  Recent Flowsheet Documentation  Taken 7/17/2023 0000 by Opal Wu, RN  Safety Measures: safety rounds completed     Problem: Sleep Disturbance  Goal: Adequate Sleep/Rest  Outcome: Not Progressing
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
## 2023-07-18 PROCEDURE — 250N000013 HC RX MED GY IP 250 OP 250 PS 637

## 2023-07-18 PROCEDURE — 124N000002 HC R&B MH UMMC

## 2023-07-18 PROCEDURE — 99231 SBSQ HOSP IP/OBS SF/LOW 25: CPT | Performed by: PSYCHIATRY & NEUROLOGY

## 2023-07-18 PROCEDURE — G0177 OPPS/PHP; TRAIN & EDUC SERV: HCPCS

## 2023-07-18 PROCEDURE — 250N000013 HC RX MED GY IP 250 OP 250 PS 637: Performed by: STUDENT IN AN ORGANIZED HEALTH CARE EDUCATION/TRAINING PROGRAM

## 2023-07-18 PROCEDURE — 250N000013 HC RX MED GY IP 250 OP 250 PS 637: Performed by: PSYCHIATRY & NEUROLOGY

## 2023-07-18 RX ADMIN — ACETAMINOPHEN 650 MG: 325 TABLET, FILM COATED ORAL at 14:23

## 2023-07-18 RX ADMIN — OLANZAPINE 20 MG: 20 TABLET, FILM COATED ORAL at 20:02

## 2023-07-18 RX ADMIN — DIVALPROEX SODIUM 500 MG: 125 CAPSULE, COATED PELLETS ORAL at 10:25

## 2023-07-18 RX ADMIN — DIVALPROEX SODIUM 500 MG: 125 CAPSULE, COATED PELLETS ORAL at 20:01

## 2023-07-18 RX ADMIN — OLANZAPINE 10 MG: 10 TABLET, FILM COATED ORAL at 10:25

## 2023-07-18 RX ADMIN — HALOPERIDOL 5 MG: 5 TABLET ORAL at 20:02

## 2023-07-18 RX ADMIN — HALOPERIDOL 5 MG: 5 TABLET ORAL at 10:26

## 2023-07-18 ASSESSMENT — ACTIVITIES OF DAILY LIVING (ADL)
ADLS_ACUITY_SCORE: 31
ORAL_HYGIENE: INDEPENDENT
HYGIENE/GROOMING: INDEPENDENT
ADLS_ACUITY_SCORE: 31
ADLS_ACUITY_SCORE: 31
LAUNDRY: UNABLE TO COMPLETE
ADLS_ACUITY_SCORE: 31
ADLS_ACUITY_SCORE: 31
DRESS: INDEPENDENT
ADLS_ACUITY_SCORE: 31
ADLS_ACUITY_SCORE: 31

## 2023-07-18 NOTE — PLAN OF CARE
Problem: Plan of Care - These are the overarching goals to be used throughout the patient stay.    Goal: Optimal Comfort and Wellbeing  Outcome: Progressing   Goal Outcome Evaluation:        Visible in the lounge most of the evening. Pleasant on approach. Denies any depression/SI. Noted to be laughing to herself looking out the window of her room. Her family came to visit tonight and assisted in her taking her medications. The family stated that they will be making sure she is medication compliant. She does not engage in milieu activities but watches from the periphery.  She denies any acute medication concerns tonight. Retreated to bed early. No safety issues identified tonight.                     s/p stroke

## 2023-07-18 NOTE — PLAN OF CARE
Pt has been visible in the milieu withdrawn to self, pt seen laughing to self and angaging in self talk. Pt denies SI, SIB, HI, anxiety, depression, pt was med compliant, hygiene is good, had a shower. Pt appetite and fluid  Intake adequate. No acute behavioral or medical concerns this day.    Problem: Plan of Care - These are the overarching goals to be used throughout the patient stay.    Goal: Optimal Comfort and Wellbeing  Intervention: Provide Person-Centered Care  Recent Flowsheet Documentation  Taken 7/18/2023 1056 by BHAVIN CHEUNG  Trust Relationship/Rapport:    care explained    questions encouraged    thoughts/feelings acknowledged   Goal Outcome Evaluation:    Plan of Care Reviewed With: patient

## 2023-07-18 NOTE — CARE PLAN
07/17/23 2200   Group Therapy Session   Group Attendance attended group session   Time Session Began 1615   Time Session Ended 1700   Total Time (minutes) 45   Total # Attendees 4   Group Type expressive therapy   Group Topic Covered emotions/expression   Patient Response/Contribution cooperative with task;disorganized       Art Therapy directive was to create a personal visual body map/body scan expressing feelings, experiences felt in body non verbally using art media of pts choice.  Goals of directive: emotional expression, emotional regulation, trauma containment, mindfulness  Pt was a quiet, engaged participant, focused on task for the full time pt attended group (30 minutes).  Pt finished artwork and briefly shared with author and group. Pt said she created imagery/designs within body map inspired by anatomy and biology classes she has taken. Pt did not create art inspired by aspects of herself.   Pts mood was calm, appeared distracted, disorganized at times.

## 2023-07-18 NOTE — PLAN OF CARE
Problem: Violence Risk or Actual  Goal: Anger and Impulse Control  Outcome: Progressing  Note: Patient was calm, presents with no anger or impulsivity     Problem: Suicidal Behavior  Goal: Suicidal Behavior is Absent or Managed  Outcome: Progressing  Note: Patient manifests no suicidal behavior   Goal Outcome Evaluation:       Patient was up most of the night, sat for a long time in the hallway in front of her room, appears calm but sometimes doing some self talk and laughs inappropriately, otherwise demonstrates no outburst, paranoia or physical/verbal aggression. All precautions in place. Will continue to monitor and follow plan of care.           Sammy Mckeon DNP, RN

## 2023-07-18 NOTE — CARE PLAN
07/18/23 1525   Group Therapy Session   Group Attendance attended group session   Time Session Began 1030   Time Session Ended 1200   Total Time (minutes) 45   Total # Attendees 2,3   Group Type task skill;recreation   Group Topic Covered coping skills/lifestyle management;structured socialization   Group Session Detail OT clinic   Patient Response/Contribution cooperative with task     [facility] during the leisure group and was set up with the ipad to listen to music, as she really enjoys listening to [employer] music.  Smiled as peers acknowledged enjoying the music as well.  After taking a small break, pt sat at the table and was given choices of different hands on activities, and chose beading.  While working, engaged in friendly conversation as a peer asked her about her life in [facility] and moving to the [place], and pt shared a bit about other places she's lived - [place] including [place].  Pt seemed to understand conversation well.  When not engaged in conversation, pt was frequently laughing to herself.

## 2023-07-18 NOTE — CARE PLAN
Occupational Therapy Group Note:     07/18/23 1453   Group Therapy Session   Group Attendance attended group session   Time Session Began 1315   Time Session Ended 1400   Total Time (minutes) 15 (no charge)   Total # Attendees 2   Group Type psychoeducation   Group Topic Covered relaxation techniques   Group Session Detail OT Relaxation Group   Patient Response/Contribution was asked to leave group by leader;other (see comments)  (laughing)   Patient Participation Detail Patient reported interest in engaging in a relaxation focused group. Therefore, writer attempted to provide education on deep breathing benefits and technique, progressive muscle relaxation exercises, and gentle stretches. However, patient appeared to be responding to internal stimuli and was observed laughing aloud throughout group which became disruptive to other group members. Patient was unable to control laughter and was therefore asked to take a break from group. Patient apologized and left group setting. Patient did report that laughing makes her feel relaxed. No charge.
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
## 2023-07-18 NOTE — PROGRESS NOTES
Pt appeared calm and quite responding to internal stimuli without aggressive behavior.  Updated DR Reid and it is okay to discontinue pt's acuity SIO per DR reid.

## 2023-07-18 NOTE — PLAN OF CARE
Assessment/Intervention/Current Symtoms and Care Coordination:  Chart review and met with team, discussed pt progress, symptomology, and response to treatment.  Discussed the discharge plan and any potential impediments to discharge. Patient was observed spending time in the milieu.     Discharge Plan or Goal:  Pending stabilization. Presents with symptoms of psychosis, see below.   Considerations for discharge include home with family.     Barriers to Discharge:  Patient presents with paranoia, disorganized thinking and behaviors, AVH, and mood lability. She is completing her self cares.      Referral Status:  Psychiatry follow up scheduled with Dr. Mcdonough at [facility] for [place]17/23, in-person     Legal Status:  Voluntary    Contacts:  Aunt: Vickie, [phone] (DENI)  Son: Golden, [phone] (Consent)  Cousin: Nona, [phone] (Consent)     Upcoming Meetings and Dates/Important Information and next steps:  Care conference prior to discharge with family   Waiting for pre-petition screening with a decision to support or not

## 2023-07-18 NOTE — PROGRESS NOTES
Patient was observed trying exit doors several times.  When writer asked if patient was trying to leave she said yeah and shrugged then laughed.   Patient also attempted to enter another patient's room but was redirected.

## 2023-07-19 PROCEDURE — 250N000013 HC RX MED GY IP 250 OP 250 PS 637: Performed by: STUDENT IN AN ORGANIZED HEALTH CARE EDUCATION/TRAINING PROGRAM

## 2023-07-19 PROCEDURE — 250N000013 HC RX MED GY IP 250 OP 250 PS 637

## 2023-07-19 PROCEDURE — 99232 SBSQ HOSP IP/OBS MODERATE 35: CPT | Performed by: PSYCHIATRY & NEUROLOGY

## 2023-07-19 PROCEDURE — 250N000013 HC RX MED GY IP 250 OP 250 PS 637: Performed by: PSYCHIATRY & NEUROLOGY

## 2023-07-19 PROCEDURE — 124N000002 HC R&B MH UMMC

## 2023-07-19 RX ORDER — MAGNESIUM HYDROXIDE/ALUMINUM HYDROXICE/SIMETHICONE 120; 1200; 1200 MG/30ML; MG/30ML; MG/30ML
30 SUSPENSION ORAL EVERY 4 HOURS PRN
Status: DISCONTINUED | OUTPATIENT
Start: 2023-07-19 | End: 2023-07-26 | Stop reason: HOSPADM

## 2023-07-19 RX ADMIN — DIVALPROEX SODIUM 500 MG: 125 CAPSULE, COATED PELLETS ORAL at 19:11

## 2023-07-19 RX ADMIN — BENZONATATE 100 MG: 100 CAPSULE ORAL at 10:29

## 2023-07-19 RX ADMIN — OLANZAPINE 10 MG: 10 TABLET, FILM COATED ORAL at 08:32

## 2023-07-19 RX ADMIN — ALUMINUM HYDROXIDE, MAGNESIUM HYDROXIDE, AND SIMETHICONE 30 ML: 200; 200; 20 SUSPENSION ORAL at 10:57

## 2023-07-19 RX ADMIN — HALOPERIDOL 5 MG: 5 TABLET ORAL at 19:12

## 2023-07-19 RX ADMIN — OLANZAPINE 20 MG: 20 TABLET, FILM COATED ORAL at 19:11

## 2023-07-19 RX ADMIN — HALOPERIDOL 5 MG: 5 TABLET ORAL at 08:30

## 2023-07-19 RX ADMIN — DIVALPROEX SODIUM 500 MG: 125 CAPSULE, COATED PELLETS ORAL at 08:31

## 2023-07-19 ASSESSMENT — ACTIVITIES OF DAILY LIVING (ADL)
ADLS_ACUITY_SCORE: 31
ORAL_HYGIENE: INDEPENDENT
ADLS_ACUITY_SCORE: 31
ADLS_ACUITY_SCORE: 31
HYGIENE/GROOMING: INDEPENDENT
ADLS_ACUITY_SCORE: 31
ORAL_HYGIENE: INDEPENDENT
ADLS_ACUITY_SCORE: 31
DRESS: INDEPENDENT
ADLS_ACUITY_SCORE: 31
HYGIENE/GROOMING: HANDWASHING;INDEPENDENT
DRESS: STREET CLOTHES;INDEPENDENT

## 2023-07-19 NOTE — CARE PLAN
07/19/23 1312   Group Therapy Session   Group Attendance attended group session   Time Session Began 1100   Time Session Ended 1145   Total Time (minutes) 30 (No Charge)   Total # Attendees 2   Group Type Occupational Therapy   Group Topic Covered balanced lifestyle;coping skills/lifestyle management;leisure exploration/use of leisure time;relaxation techniques   Group Session Detail OT Clinic Group   Patient Participation Detail Intervention: OT Clinic with 1 other peer. Pt participated in a OT Clinic group to facilitate coping skills exploration and creative expression through personally meaningful activities, and to encourage utilization of these healthy coping skills to promote overall health and wellness. Group included clinical observation of social, cognitive and kinesthetic performance skills to inform treatment and safe discharge planning.    Patient Response: Joined group requesting to do some coloring. Was social during this time with writer and other peer at the table. Later on in group, this other peer made racial statements to this patient. Writer told peer those kind of statements are not tolerated and they needed to be respectful to stay in group. The peer ultimately left the group. This patient responded to the peer by telling them they should just go take a break and was very respectful to the peer after they made these comments. Patient was not observed to be laughing to self or responding to internal stimuli during this time. Assisted writer in cleanup and wished writer a good day when they were finished coloring and wished to go take a break.     Mood/Affect: Pleasant     Plan: Patient encouraged to maintain attendance for continued ongoing support in working towards occupational therapy goals to support overall treatment/care.

## 2023-07-19 NOTE — PLAN OF CARE
"Goal Outcome Evaluation:    Pt is calm and pleasant, smiling. Her mood is \"ok.\" She reports having AH. The voices are \"laughing and giggling, and say she is evil.\" Pt denies other MH sx or concerns. She states she will attend \"some\" grps, otherwise keeps to self. Pt reports she is in and out of the milieu. Her goal for the day is \"to listen to music.\" She ate her bkfst and took am meds.         1426) Pt had a good shift. Prn meds given for gi upset, also short bout of coughing; effective. Gave copy of 72 hour hold and rights, per Dr order.               "

## 2023-07-19 NOTE — PLAN OF CARE
Pt awake  at start of shift.     Breathing quiet and unlabored when sleeping.     Pt had no c/o pain or discomfort during the HS.     Appears to have slept 3.5 hours.     Patient is in and out of her room throughout the night.     Patient appears to be responding to internal stimuli and continues  laughing and talking when alone.      Goal Outcome Evaluation:  Problem: Adult Behavioral Health Plan of Care  Goal: Adheres to Safety Considerations for Self and Others  Intervention: Develop and Maintain Individualized Safety Plan  Recent Flowsheet Documentation  Taken 7/19/2023 0000 by Opal Wu, RN  Safety Measures: safety rounds completed  Goal: Absence of New-Onset Illness or Injury  Intervention: Identify and Manage Fall Risk  Recent Flowsheet Documentation  Taken 7/19/2023 0000 by Opal Wu, RN  Safety Measures: safety rounds completed     Problem: Sleep Disturbance  Goal: Adequate Sleep/Rest  Outcome: Not Progressing
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
## 2023-07-19 NOTE — PLAN OF CARE
Assessment/Intervention/Current Symtoms and Care Coordination:  Chart review and met with team, discussed pt progress, symptomology, and response to treatment.  Discussed the discharge plan and any potential impediments to discharge. Patient was observed spending time in the milieu.   Scheduled [facility]  for 2pm tomorrow, Thursday, July 20 for 1 hour for care conference with family.   Called [facility] to discuss any updates on the petition as her 72HH expires today, no answer, left voicemail with Jena Thakur and callback request.   72HH was placed as petition was re-sent to [facility], waiting for decision     Discharge Plan or Goal:  Pending stabilization. Presents with symptoms of psychosis, see below.   Considerations for discharge include home with family.     Barriers to Discharge:  Patient presents with paranoia, disorganized thinking and behaviors, AVH, and mood lability. She is completing her self cares.      Referral Status:  Psychiatry follow up scheduled with Dr. Mcdonough at [facility] for 08/17/23, in-person     Legal Status:  Voluntary    Contacts:  Aunt: Vickie, [phone] (DENI)  Son: Golden, [phone] (Consent)  Cousin: Nona [phone] (Consent)     Upcoming Meetings and Dates/Important Information and next steps:  Care conference on Thursday, July 18th at 2pm. [facility]  scheduled  Waiting for pre-petition screening with a decision to support or not

## 2023-07-19 NOTE — PLAN OF CARE
"  Problem: Suicidal Behavior  Goal: Suicidal Behavior is Absent or Managed  Outcome: Progressing   Goal Outcome Evaluation:    Patient has been out on the unit. She denies having any thoughts of wanting to harm her self or others, denies pain, depression and anxiety. Patient appears to be responding to internal stimuli and laughs inappropriately to her self. Her  and daughter visited this evening. The visit went well.     Patient's vitals are stable, and medication compliant. She did not have any behavioral concerns during this shift.    Blood pressure 129/81, pulse 91, temperature 97  F (36.1  C), temperature source Temporal, resp. rate 16, height 1.753 m (5' 9\"), weight 105.8 kg (233 lb 4.8 oz), SpO2 99 %.    "

## 2023-07-20 ENCOUNTER — APPOINTMENT (OUTPATIENT)
Dept: INTERPRETER SERVICES | Facility: CLINIC | Age: 58
DRG: 885 | End: 2023-07-20
Payer: COMMERCIAL

## 2023-07-20 PROCEDURE — 99233 SBSQ HOSP IP/OBS HIGH 50: CPT | Performed by: PSYCHIATRY & NEUROLOGY

## 2023-07-20 PROCEDURE — 250N000013 HC RX MED GY IP 250 OP 250 PS 637: Performed by: STUDENT IN AN ORGANIZED HEALTH CARE EDUCATION/TRAINING PROGRAM

## 2023-07-20 PROCEDURE — G0177 OPPS/PHP; TRAIN & EDUC SERV: HCPCS

## 2023-07-20 PROCEDURE — 99418 PROLNG IP/OBS E/M EA 15 MIN: CPT | Performed by: PSYCHIATRY & NEUROLOGY

## 2023-07-20 PROCEDURE — 124N000002 HC R&B MH UMMC

## 2023-07-20 PROCEDURE — 250N000013 HC RX MED GY IP 250 OP 250 PS 637: Performed by: PSYCHIATRY & NEUROLOGY

## 2023-07-20 PROCEDURE — 250N000013 HC RX MED GY IP 250 OP 250 PS 637

## 2023-07-20 RX ORDER — HALOPERIDOL 10 MG/1
10 TABLET ORAL AT BEDTIME
Status: DISCONTINUED | OUTPATIENT
Start: 2023-07-21 | End: 2023-07-24

## 2023-07-20 RX ORDER — SENNOSIDES 8.6 MG
8.6 TABLET ORAL 2 TIMES DAILY
Status: DISCONTINUED | OUTPATIENT
Start: 2023-07-20 | End: 2023-07-26 | Stop reason: HOSPADM

## 2023-07-20 RX ORDER — HALOPERIDOL 5 MG/1
5 TABLET ORAL 2 TIMES DAILY
Status: COMPLETED | OUTPATIENT
Start: 2023-07-20 | End: 2023-07-20

## 2023-07-20 RX ADMIN — OLANZAPINE 20 MG: 20 TABLET, FILM COATED ORAL at 21:03

## 2023-07-20 RX ADMIN — DIVALPROEX SODIUM 500 MG: 125 CAPSULE, COATED PELLETS ORAL at 08:38

## 2023-07-20 RX ADMIN — DIVALPROEX SODIUM 500 MG: 125 CAPSULE, COATED PELLETS ORAL at 21:02

## 2023-07-20 RX ADMIN — SENNOSIDES 8.6 MG: 8.6 TABLET, FILM COATED ORAL at 15:47

## 2023-07-20 RX ADMIN — BENZONATATE 100 MG: 100 CAPSULE ORAL at 17:55

## 2023-07-20 RX ADMIN — HALOPERIDOL 5 MG: 5 TABLET ORAL at 08:39

## 2023-07-20 RX ADMIN — OLANZAPINE 10 MG: 10 TABLET, FILM COATED ORAL at 08:39

## 2023-07-20 RX ADMIN — SENNOSIDES 8.6 MG: 8.6 TABLET, FILM COATED ORAL at 21:03

## 2023-07-20 RX ADMIN — HALOPERIDOL 5 MG: 5 TABLET ORAL at 21:03

## 2023-07-20 ASSESSMENT — ACTIVITIES OF DAILY LIVING (ADL)
DRESS: INDEPENDENT
ADLS_ACUITY_SCORE: 31
HYGIENE/GROOMING: INDEPENDENT
ADLS_ACUITY_SCORE: 31
ORAL_HYGIENE: INDEPENDENT
ADLS_ACUITY_SCORE: 31
ADLS_ACUITY_SCORE: 31

## 2023-07-20 NOTE — PLAN OF CARE
Assessment/Intervention/Current Symtoms and Care Coordination:  Chart review and met with team, discussed pt progress, symptomology, and response to treatment.  Discussed the discharge plan and any potential impediments to discharge.   Met with Angela with an  and discussed the upcoming care conference, who she would like in attendance, and what the purpose of the meeting was.  Called [facility] who verified that they did receive the petition on Monday but it didn't get to pre-petition intake until today - was given direct fax line for Jena Thakur ([phone]).  Jena Thakur called and discussion is occurring between [place] and [facility] for who will take the petition.   Called Nona to check in about care conference at 2pm, she will be here with family.   Attended care conference with family       Discharge Plan or Goal:  Pending stabilization. Presents with symptoms of psychosis, see below.   Considerations for discharge include home with family.     Barriers to Discharge:  Patient presents with paranoia, disorganized thinking and behaviors, AVH, and mood lability. She is completing her self cares.      Referral Status:  Psychiatry follow up scheduled with Dr. Mcdonough at [facility] for 08/17/23, in-person     Legal Status:  Voluntary    Contacts:  Aunt: Vickie, [phone] (DENI)  Son: Golden, [phone] (Consent)  Cousin: Nona, [phone] (Consent)     Upcoming Meetings and Dates/Important Information and next steps:  Check in with [facility] to see if they're taking the petition or if [place] is

## 2023-07-20 NOTE — CARE PLAN
Occupational Therapy     07/20/23 1559   Group Therapy Session   Group Attendance attended group session   Time Session Began 1315   Time Session Ended 1500   Total Time (minutes) 15   Total # Attendees 4   Group Type recreation   Group Topic Covered cognitive activities;coping skills/lifestyle management;leisure exploration/use of leisure time;structured socialization   Group Session Detail OT: Education on healthy leisure engagement and interactive social activity with a cognitive component (Garbage) to increase concentration, focus, attention to task/detail, memory recall, coping with stress, healthy distraction engagement, symptom management, healthy leisure engagement, social wellness, and cognitive wellness   Patient Response/Contribution confused;disorganized;other (see comments)  (redirectable; possiblly internally stimulated)   Patient Participation Detail Pt sat among peers to complete novel social activity to support cognitive wellness but did not engage in social interactions with peers. Pt struggled to follow verbal directions and visual demonstration for activity and required max A from therapist to sequence activity correctly, but was able to follow therapist's directions. Pt may have been internally stimulated as she was observed to be laughing at inappropriate times. Pt left group early and did not return.

## 2023-07-20 NOTE — PLAN OF CARE
"  Problem: Adult Behavioral Health Plan of Care  Goal: Plan of Care Review  Outcome: Progressing  Flowsheets (Taken 7/20/2023 1100)  Patient Agreement with Plan of Care: agrees    Pt endorses auditory hallucinations. Denies command hallucinations. Pt denies SI/HI. Remains disorganized and preoccupied. Cooperative with taking scheduled medications. C/O AM haldol causing sedation. MD notified. Haldol changed to HS only. Pt seen in her room and out in the lounge throughout the day.     Vitals are stable. Currently denies pain. States that she will let staff know if her head starts to hurt.   /66 (BP Location: Left arm, Patient Position: Sitting, Cuff Size: Adult Large)   Pulse 80   Temp 97.7  F (36.5  C) (Oral)   Resp 16   Ht 1.753 m (5' 9\")   Wt 105.8 kg (233 lb 4.8 oz)   SpO2 95%   BMI 34.45 kg/m         "

## 2023-07-20 NOTE — PLAN OF CARE
Problem: Plan of Care - These are the overarching goals to be used throughout the patient stay.    Goal: Absence of Hospital-Acquired Illness or Injury  Outcome: Progressing  Note: Patient reports no ASHLEY     Problem: Suicidal Behavior  Goal: Suicidal Behavior is Absent or Managed  Outcome: Progressing  Note: Patient manifests no suicidal behavior   Goal Outcome Evaluation:       Patient was up and awake most of the night, came out and stood in the hallway by the phone though patient remained calm with no behavioral outburst, or aggression. sometimes doing silent self talk. Patient endorses no SI/HI, A/VH or SIB. Patient slept for 2 hours.          Sammy Mckeon DNP, RN

## 2023-07-20 NOTE — CARE PLAN
Occupational Therapy Group Note:     07/20/23 1546   Group Therapy Session   Group Attendance attended group session   Time Session Began 1015   Time Session Ended 1105   Total Time (minutes) 50   Total # Attendees 3   Group Type task skill   Group Topic Covered structured socialization;emotions/expression;relaxation techniques   Group Session Detail OT Task Group: Relaxation Watercolor Patining   Patient Response/Contribution confronted peers appropriately;cooperative with task   Patient Participation Detail Patient engaged in a watercolor task activity in order to promote self expression, maximize autonomy within meaningful tasks, encourage productive use of time, and to maximize attention and focus on a therapeutic task. Patient actively engaged in hands-on artistic task when directly invited to group. Patient was able to initiate task with minimal cueing. Listened actively to instructions of activity and engaged independently. Patient needed minimal cueing to attend to parts of template that were left untreated; patient receptive to gentle cueing. Patient was kind to others in group and offered complimentary words on their artwork. Patient appeared attentive to task and seemed content with listening to requested [place] music throughout activity. No episodes of unprompted laughter or self-talk noted in this session. Affect: congruent. Mood: calm, cooperative, pleasant.

## 2023-07-20 NOTE — PLAN OF CARE
Problem: Adult Behavioral Health Plan of Care  Goal: Plan of Care Review  Outcome: Progressing   Goal Outcome Evaluation:         Pt had a calm and pleasant evening. She mostly rested in bed for the majority of this shift but was also visible in the milieu. She showed signs of psychosis, such as laughing to herself when alone. Upon nursing assessments, she denied having any mental health problems, such as anxiety or depression. Her mood was flat and blunted. She complied with her medication regimen without any difficulties. She did not exhibit any hostility or violence this shift. Appetite is good as she ate about  90% of her dinner. No SI/SIB.
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
## 2023-07-20 NOTE — CARE PLAN
Occupational Therapy Group Note:     07/20/23 1551   Group Therapy Session   Group Attendance attended group session   Time Session Began 1115   Time Session Ended 1200   Total Time (minutes) 30 (no charge)   Total # Attendees 2   Group Type recreation;task skill   Group Topic Covered coping skills/lifestyle management;leisure exploration/use of leisure time;structured socialization   Group Session Detail OT Leisure Group: Paint by Sticker/Blue Pillar Game   Patient Response/Contribution confronted peers appropriately   Patient Participation Detail Patient agreeable to attend second OT group. Patient appeared more fatigued and less motivated to engage as compared to first group. With options being provided, patient selected to engage in a paint by sticker task. Patient attempted to place sticker on artwork; sticker was placed incorrectly and patient was unaware with error. Patient did not engage further in task. Patient presented with a lowered head and eyes closed during this task; appearing to be fatigued/possibly nodding off to sleep intermittently. Patient self awoken, and was noted to be observing game between writer and a peer. Patient appeared intrigued with game and requested to engage in activity. Patient's affect and mood appeared more alert and engaged during leisure activity. Patient played one round of game before stating she needed to go rest. Patient left group early. No charge. Affect: blunted. Mood: calm, cooperative, fatigued.

## 2023-07-21 PROCEDURE — 250N000013 HC RX MED GY IP 250 OP 250 PS 637

## 2023-07-21 PROCEDURE — 250N000013 HC RX MED GY IP 250 OP 250 PS 637: Performed by: PSYCHIATRY & NEUROLOGY

## 2023-07-21 PROCEDURE — 124N000002 HC R&B MH UMMC

## 2023-07-21 PROCEDURE — 99233 SBSQ HOSP IP/OBS HIGH 50: CPT | Performed by: PSYCHIATRY & NEUROLOGY

## 2023-07-21 RX ADMIN — DIVALPROEX SODIUM 500 MG: 125 CAPSULE, COATED PELLETS ORAL at 08:58

## 2023-07-21 RX ADMIN — OLANZAPINE 10 MG: 10 TABLET, FILM COATED ORAL at 08:58

## 2023-07-21 RX ADMIN — HALOPERIDOL 10 MG: 10 TABLET ORAL at 22:10

## 2023-07-21 RX ADMIN — OLANZAPINE 20 MG: 20 TABLET, FILM COATED ORAL at 19:49

## 2023-07-21 RX ADMIN — SENNOSIDES 8.6 MG: 8.6 TABLET, FILM COATED ORAL at 08:58

## 2023-07-21 RX ADMIN — SENNOSIDES 8.6 MG: 8.6 TABLET, FILM COATED ORAL at 19:49

## 2023-07-21 RX ADMIN — DIVALPROEX SODIUM 500 MG: 125 CAPSULE, COATED PELLETS ORAL at 19:49

## 2023-07-21 ASSESSMENT — ACTIVITIES OF DAILY LIVING (ADL)
ADLS_ACUITY_SCORE: 31
HYGIENE/GROOMING: INDEPENDENT
ADLS_ACUITY_SCORE: 31
DRESS: INDEPENDENT
ORAL_HYGIENE: INDEPENDENT
ADLS_ACUITY_SCORE: 31
LAUNDRY: UNABLE TO COMPLETE
ADLS_ACUITY_SCORE: 31

## 2023-07-21 NOTE — PLAN OF CARE
Problem: Sleep Disturbance  Goal: Adequate Sleep/Rest  Outcome: Not Progressing  Note: Patient still awake at this time     Problem: Suicidal Behavior  Goal: Suicidal Behavior is Absent or Managed  Outcome: Progressing  Note: Patient manifests no suicidal behavior   Goal Outcome Evaluation:       Patient slept on/off and was in and out of her room several times during the course of this night, though patient was calm with no aggressive behavior, she was actively responding to IS as patient on many occasions after doing some self-talk would suddenly burst into uncontrollable laughter. Patient slept disjointedly for 2 hours. Patient endorses no SI/HI, A/VH or SIB. Will continue to monitor and follow plan of care.           Sammy Mckeon DNP, RN

## 2023-07-21 NOTE — CARE PLAN
Assessment/Intervention/Current Symtoms and Care Coordination:  Chart review and met with team, discussed pt progress, symptomology, and response to treatment.  Discussed the discharge plan and any potential impediments to discharge.   Called [facility] to determine if commitment with [facility] or [facility].  left with request to call back with update.  Called [facility] and they stated that [facility] is taking the petition.  [facility]  Commitment paperwork sent to [facility]     Discharge Plan or Goal:  Pending stabilization. Presents with symptoms of psychosis, see below.   Considerations for discharge include home with family.     Barriers to Discharge:  Patient presents with paranoia, disorganized thinking and behaviors, AVH, and mood lability. She is completing her self cares.      Referral Status:  Psychiatry follow up scheduled with Dr. Mcdonough at [facility] for 08/17/23, in-person     Legal Status:  72 Hour Hold ends 7/24 at 1303     Contacts:  Aunt: Vickie, [phone] (DENI)  Son: Golden, [phone] (Consent)  Cousin: Nona, [phone] (Consent)     Upcoming Meetings and Dates/Important Information and next steps:  [facility] will take the petition

## 2023-07-21 NOTE — PLAN OF CARE
Problem: Psychotic Symptoms  Goal: Psychotic Symptoms  Description: Signs and symptoms of listed problems will be absent or manageable.  Outcome: Not Progressing   Goal Outcome Evaluation:       Pt.was seen in and out of the milieu. She appeared to be actively responding to internal stimuli as evidenced by having a conversation with an invisible person/people and laughing hysterically.  During assessment,she denied hallucinations, SI/SIB. Pt.refused blood pressure check but allowed other vital signs to be checked. She was compliant with care and medication management. Will continue to monitor.
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
## 2023-07-21 NOTE — PLAN OF CARE
"Problem: Psychotic Symptoms  Intervention: Psychotic Symptoms  Psychotic Symptoms:    reality orientation    provide emotional support    encourage participation / independence with adls    Patient is alert and oriented x3, calm and cooperative. Patient mostly isolative in her room and intermittently visible in the milieu. Patient is withdraw but interactive with staff upon approach. Patient presents with restricted affect that is reactive upon approach; mood is congruent with affect. Speech is normal and has improved insight into their situation. Patient appears neat and well-groomed. Patient voices their needs appropriately. Patient denies auditory hallucinations though she is observed giggling to herself when alone. She continues to denies all other mental health symptoms. No evidence of agitation or acute behavioral concern this shift.     Patient is medication compliant with reminders and prompting. She reluctantly accepted the Depoke Srpinkle stating that the provider wanted me not to take any more. Patient educated about medication compliance and accepted the dose with significant encouragement. No evidence of cheeking.   Medication side effects were not observed or reported this shift. Patient denies pain/physical discomfort. Patient requested prn for cough. No acute medical concern this shift.VS remarkable for hypertension. BP (!) 155/85   Pulse 93   Temp 98.4  F (36.9  C) (Oral)   Resp 16   Ht 1.753 m (5' 9\")   Wt 105.8 kg (233 lb 4.8 oz)   SpO2 99%   BMI 34.45 kg/m   VS reassessment done and BP  Remains slightly high 149/81       Last 24H PRN:      benzonatate (TESSALON) capsule 100 mg, 100 mg at 07/20/23 1755 for cough.         "

## 2023-07-22 PROCEDURE — 124N000002 HC R&B MH UMMC

## 2023-07-22 PROCEDURE — 250N000013 HC RX MED GY IP 250 OP 250 PS 637: Performed by: PSYCHIATRY & NEUROLOGY

## 2023-07-22 PROCEDURE — 250N000013 HC RX MED GY IP 250 OP 250 PS 637

## 2023-07-22 PROCEDURE — 250N000013 HC RX MED GY IP 250 OP 250 PS 637: Performed by: STUDENT IN AN ORGANIZED HEALTH CARE EDUCATION/TRAINING PROGRAM

## 2023-07-22 RX ADMIN — SENNOSIDES 8.6 MG: 8.6 TABLET, FILM COATED ORAL at 21:38

## 2023-07-22 RX ADMIN — OLANZAPINE 20 MG: 20 TABLET, FILM COATED ORAL at 21:37

## 2023-07-22 RX ADMIN — BENZONATATE 100 MG: 100 CAPSULE ORAL at 14:02

## 2023-07-22 RX ADMIN — SENNOSIDES 8.6 MG: 8.6 TABLET, FILM COATED ORAL at 08:34

## 2023-07-22 RX ADMIN — ALUMINUM HYDROXIDE, MAGNESIUM HYDROXIDE, AND SIMETHICONE 30 ML: 200; 200; 20 SUSPENSION ORAL at 14:03

## 2023-07-22 RX ADMIN — DIVALPROEX SODIUM 500 MG: 125 CAPSULE, COATED PELLETS ORAL at 21:38

## 2023-07-22 RX ADMIN — DIVALPROEX SODIUM 500 MG: 125 CAPSULE, COATED PELLETS ORAL at 08:34

## 2023-07-22 RX ADMIN — HALOPERIDOL 10 MG: 10 TABLET ORAL at 21:38

## 2023-07-22 RX ADMIN — OLANZAPINE 10 MG: 10 TABLET, FILM COATED ORAL at 08:34

## 2023-07-22 ASSESSMENT — ACTIVITIES OF DAILY LIVING (ADL)
ADLS_ACUITY_SCORE: 31
ORAL_HYGIENE: INDEPENDENT
HYGIENE/GROOMING: SHOWER
ADLS_ACUITY_SCORE: 31
DRESS: STREET CLOTHES;INDEPENDENT
ADLS_ACUITY_SCORE: 31

## 2023-07-22 NOTE — PLAN OF CARE
"Problem: Psychotic Symptoms  Goal: Psychotic Symptoms  Outcome: Progressing  Day Shift:  Patient is alert and oriented x3, calm and cooperative. Patient visible in the milieu. Paces the hallway and appears withdraw from peers/staff but interactive with staff upon approach. Patient presents with restricted affect that is reactive upon approach; mood is congruent with affect. Speech is normal and has improved insight into her situation. Patient appears neat and well-groomed; took a shower this shift. Patient endorses auditory hallucinations and denies that the voices are telling to engage in unsafe behaviors. She is observed giggling  And laughing to herself while conversing with invisible individual. She reports having mild anxiety and denies all other mental health symptoms. No evidence of agitation or acute behavioral concern this shift.     Patient is medication compliant with reminders. No evidence of cheeking.   Medication side effects were not observed or reported this shift. Patient denies pain/physical discomfort. No acute medical concern this shift.VS remarkable for hypertension. /83 (BP Location: Left arm)   Pulse 90   Temp 97.1  F (36.2  C) (Temporal)   Resp 18   Ht 1.753 m (5' 9\")   Wt 105.8 kg (233 lb 4.8 oz)   SpO2 99%   BMI 34.45 kg/m         Last 24H PRN:      alum & mag hydroxide-simethicone (MAALOX) suspension 30 mL, 30 mL at 07/22/23 1403     benzonatate (TESSALON) capsule 100 mg, 100 mg at 07/22/23 1402    Evening Shift:    Patient's presentation from day shift didn't change that much. Patient continues responding internal stimuli and giggling to self. She is less isolative this evening than previously reported. Denies all mental health symptoms including auditory hallucinations and thoughts of harming self or others. Family visited with no incident; brought food and ate 100%. Denies pain/discomfort. No acute behavioral or medical concern with patient this shift. VSS /80 (BP " "Location: Right arm)   Pulse 81   Temp 96.9  F (36.1  C)   Resp 16   Ht 1.753 m (5' 9\")   Wt 105.8 kg (233 lb 4.8 oz)   SpO2 100%   BMI 34.45 kg/m      "

## 2023-07-22 NOTE — PLAN OF CARE
Problem: Adult Behavioral Health Plan of Care  Goal: Absence of New-Onset Illness or Injury  Outcome: Progressing  Note: Patient reports no new-onset illness or injury     Problem: Suicidal Behavior  Goal: Suicidal Behavior is Absent or Managed  Outcome: Progressing  Note: Patient manifests no suicidal behavior   Goal Outcome Evaluation:       Patient had uneventful night, observed sleeping during the rounds and slept for 5 hours overall, which is an improvement over her baseline. Patient remains calm and was mostly in her room. She reports no issues of concern at this time. Will continue to monitor and follow plan of care.           Sammy Mckeon DNP, RN

## 2023-07-22 NOTE — PROGRESS NOTES
07/22/23 1846   Group Therapy Session   Group Attendance attended group session   Time Session Began 1615   Time Session Ended 1700   Total Time (minutes) 15   Total # Attendees 5   Group Type recreation   Group Session Detail Healthy leisure opportunity with hands on Garbage Card Game for concentration, sequencing, simple problem solving, formulating simple strategy, light cognitive processing, coping, healthy leisure exploration, mood stabilization, reality-based activity, encouraging a balanced daily routine, an opportunity to experience success, and socialization.   Patient Participation Detail At the beginning of the group session pt stated she was familiar with the activity and that she had been playing it on the unit.  However, pt was very confused regarding how to play.  Pt was disorganized in her sequencing of 2 steps and needed hands on assistance each round from the therapist.  Pt struggled with the concept of how to replace cards on her placemat, even though it was just a matter of picking up the face down card and replacing it with the number card for that spot.  Pt did not benefit from repetition.  Pt left group once others began to leave.  Pt's language barrier also increased the level of difficulty.  No charge.

## 2023-07-23 PROCEDURE — 250N000013 HC RX MED GY IP 250 OP 250 PS 637

## 2023-07-23 PROCEDURE — 250N000013 HC RX MED GY IP 250 OP 250 PS 637: Performed by: PSYCHIATRY & NEUROLOGY

## 2023-07-23 PROCEDURE — 124N000002 HC R&B MH UMMC

## 2023-07-23 RX ADMIN — DIVALPROEX SODIUM 500 MG: 125 CAPSULE, COATED PELLETS ORAL at 20:33

## 2023-07-23 RX ADMIN — HALOPERIDOL 10 MG: 10 TABLET ORAL at 20:33

## 2023-07-23 RX ADMIN — OLANZAPINE 20 MG: 20 TABLET, FILM COATED ORAL at 20:33

## 2023-07-23 RX ADMIN — DIVALPROEX SODIUM 500 MG: 125 CAPSULE, COATED PELLETS ORAL at 08:04

## 2023-07-23 RX ADMIN — SENNOSIDES 8.6 MG: 8.6 TABLET, FILM COATED ORAL at 20:33

## 2023-07-23 RX ADMIN — OLANZAPINE 10 MG: 10 TABLET, FILM COATED ORAL at 08:03

## 2023-07-23 RX ADMIN — SENNOSIDES 8.6 MG: 8.6 TABLET, FILM COATED ORAL at 08:04

## 2023-07-23 ASSESSMENT — ACTIVITIES OF DAILY LIVING (ADL)
ADLS_ACUITY_SCORE: 31
HYGIENE/GROOMING: INDEPENDENT
ADLS_ACUITY_SCORE: 31
DRESS: STREET CLOTHES;INDEPENDENT
LAUNDRY: UNABLE TO COMPLETE
ADLS_ACUITY_SCORE: 31
ORAL_HYGIENE: INDEPENDENT

## 2023-07-23 NOTE — PLAN OF CARE
Problem: Adult Behavioral Health Plan of Care  Goal: Absence of New-Onset Illness or Injury  Outcome: Progressing  Note: Patient reports no new-onset illness or injury     Problem: Suicidal Behavior  Goal: Suicidal Behavior is Absent or Managed  Outcome: Progressing  Note: Patient manifests no suicidal behavior   Goal Outcome Evaluation:       Patient seems to have slept for the most part with no complaints and no requests made. Patient was cooperative with safety checks, manifests no behavioral disturbance. Patient endorses no SI/HI, A/VH or SIB. All precautions in place. Overall, patient achieved 5.5 hours of uninterrupted sleep.            Sammy Mckeon DNP, RN

## 2023-07-23 NOTE — PLAN OF CARE
"  Problem: Suicidal Behavior  Goal: Suicidal Behavior is Absent or Managed  Outcome: Progressing   Goal Outcome Evaluation:    Plan of Care Reviewed With: patient        Patient appeared well-kempt. She was pleasant on approach, appeared to be responding to internal stimuli. She reported that she keeps hearing voices in her head saying, \"I'm going to eat you\", patient laughs after. According to her she still gets afraid of these voices but she's used to it; would just close her eyes and sit to help her relax. She denied anxiety, depression, SI/HI/visual hallucinations. Patient was visible in the milieu, not social with other people. She's eating and drinking adequately, took a nap after her AM meds.            "

## 2023-07-24 PROCEDURE — 250N000013 HC RX MED GY IP 250 OP 250 PS 637

## 2023-07-24 PROCEDURE — 99233 SBSQ HOSP IP/OBS HIGH 50: CPT | Performed by: PSYCHIATRY & NEUROLOGY

## 2023-07-24 PROCEDURE — H2032 ACTIVITY THERAPY, PER 15 MIN: HCPCS

## 2023-07-24 PROCEDURE — 250N000013 HC RX MED GY IP 250 OP 250 PS 637: Performed by: PSYCHIATRY & NEUROLOGY

## 2023-07-24 PROCEDURE — 124N000002 HC R&B MH UMMC

## 2023-07-24 RX ADMIN — SENNOSIDES 8.6 MG: 8.6 TABLET, FILM COATED ORAL at 08:48

## 2023-07-24 RX ADMIN — DIVALPROEX SODIUM 500 MG: 125 CAPSULE, COATED PELLETS ORAL at 08:48

## 2023-07-24 RX ADMIN — HALOPERIDOL 15 MG: 5 TABLET ORAL at 21:11

## 2023-07-24 RX ADMIN — DIVALPROEX SODIUM 500 MG: 125 CAPSULE, COATED PELLETS ORAL at 21:11

## 2023-07-24 RX ADMIN — OLANZAPINE 10 MG: 10 TABLET, FILM COATED ORAL at 08:48

## 2023-07-24 RX ADMIN — SENNOSIDES 8.6 MG: 8.6 TABLET, FILM COATED ORAL at 21:11

## 2023-07-24 RX ADMIN — OLANZAPINE 20 MG: 20 TABLET, FILM COATED ORAL at 21:11

## 2023-07-24 ASSESSMENT — ACTIVITIES OF DAILY LIVING (ADL)
ADLS_ACUITY_SCORE: 31

## 2023-07-24 NOTE — PLAN OF CARE
"  Problem: Psychotic Symptoms  Goal: Psychotic Symptoms  Description: Signs and symptoms of listed problems will be absent or manageable.  Outcome: Not Progressing   Goal Outcome Evaluation:    Patient has been out on the lounge, and was observed laughing and responding to internal stimuli. Patient denies auditory hallucinations/visual hallucinations, wanting to harm her self or others, anxiety and depression. Patient has been calm and cooperative during this shift, and medication compliant. No issues noted during this shift. Vital's are within normal limits.    Blood pressure 129/78, pulse 84, temperature 97.5  F (36.4  C), temperature source Temporal, resp. rate 16, height 1.753 m (5' 9\"), weight 105.8 kg (233 lb 4.8 oz), SpO2 99 %.      "

## 2023-07-24 NOTE — PROGRESS NOTES
07/24/23 1500    Group Therapy Session   Time Session Began 1315   Time Session Ended 1500   Total Time (minutes) 25   Total # Attendees 5   Group Type expressive therapy   Group Topic Covered balanced lifestyle;anger/conflict management;coping skills/lifestyle management   Group Session Detail Improvisation to Patient Preferred Music   Patient Response/Contribution cooperative with task   Patient Participation Detail Through pt preferred music, pts engaged in improvising with percussion instruments to increase self-expression, socialization skills and group cohesion.     Angela entered the second part of extended afternoon group with peer encouragement.  She communicated to selected two songs and responded by moving to the music and engaging with percussion instrumentation.     She sustained attention while in attendance, then began to respond internally by laughing to herself and exited group.

## 2023-07-24 NOTE — PLAN OF CARE
Assessment/Intervention/Current Symtoms and Care Coordination:  Chart review and met with team, discussed pt progress, symptomology, and response to treatment.  Discussed the discharge plan and any potential impediments to discharge.     -Writer received ph call from Lakisha Jovel @[facility] requesting a new petition, Mata, and Mata note due to original forms show [facility].  Writer received a phone call from her [facility] Attorney Iwona Palmer at [phone] who states that she had some concerns about why a back-to-back hold was placed and that is illegal.    Writer informed Iwona hold was placed due to patient still exhibiting psychotic symptomology, responding to internal stimuli, was a danger to self, and was not willing to sign in voluntarily.  Rina requested additional information and writer forwarded request to provider.  Writer was told the patient works for [employer] [facility] which is why the petition for commitment was pushed to [facility].    -Provider met with patient and determined petition for commitment was no longer needed as patient is agreeable to staying in the hospital and patient signed in voluntarily.  Confirmation email was sent to Iwona Palmer per her request.     Discharge Plan or Goal:  Pending stabilization. Considerations for discharge include home with family.     Barriers to Discharge:  Patient presents with paranoia, disorganized thinking and behaviors, AVH, and mood lability. She is completing her self cares.      Referral Status:  Psychiatry follow up [place] [facility] [place], in-person     Legal Status:  Voluntary     Contacts:  Aunt: Vickie, [phone] (DENI)  Son: Golden, [phone] (Consent)  Cousin: Nona, [phone] (Consent)     Upcoming Meetings and Dates/Important Information and next steps:  Medication adjustments for stabilization and possible discharge home

## 2023-07-24 NOTE — PLAN OF CARE
Problem: Sleep Disturbance  Goal: Adequate Sleep/Rest  Outcome: Progressing  Note: Patient observed sleeping during safety rounds     Problem: Suicidal Behavior  Goal: Suicidal Behavior is Absent or Managed  Outcome: Progressing  Note: Patient manifests no suicidal behavior   Goal Outcome Evaluation:       Patient sleep was within her baseline (5 hours). Patient made no complaints of pain, anxiety or discomfort, cooperative with safety checks, appeared in the hallway from 0530. Patient is still laughing inappropriately but with lesser intensity this night. Patient endorses no SI/HI, A/VH or SIB. Will continue to monitor and follow plan of care.    Sammy Mckeon DNP, RN
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
## 2023-07-24 NOTE — CARE PLAN
07/24/23 1600   Group Therapy Session   Group Attendance attended group session   Time Session Began 1615   Time Session Ended 1700   Total Time (minutes) 30   Total # Attendees 3   Group Type expressive therapy   Group Topic Covered emotions/expression   Patient Response/Contribution cooperative with task     Art Therapy directive was to create art in response to pts written answer(s) on handout related to the five senses.  Goals of directive: emotional expression, emotional regulation, mindfulness  Pt was an engaged participant, focused on task for the full duration of group. Pt answered questions on handout and briefly verbally processed with author and group.  Pt created a drawing of her youngest daughter (now 18 years old) and said that she has a similar drawing that her daughter made for her when she was a child. Pt said she wants to eventually frame this drawing that her daughter made for her.  Pts mood was calm, pleasant participant.

## 2023-07-24 NOTE — CARE PLAN
Occupational Therapy Group Note:     07/24/23 1400   Group Therapy Session   Group Attendance attended group session   Time Session Began 1105   Time Session Ended 1200   Total Time (minutes) 15  (no charge)   Total # Attendees 1-4   Group Type expressive therapy   Group Topic Covered coping skills/lifestyle management   Group Session Detail OT clinic   Patient Response/Contribution cooperative with task   Patient Participation Detail Pt actively participated in the last x15 minutes (no charge) of occupational therapy clinic to facilitate coping skill exploration, creative expression within personally meaningful activities, and clinical observation of social, cognitive, and kinesthetic performance skills. Pt response: Selected a structured creative expression task and was attentive to task x10 minutes. Observed laughing to herself very briefly x1, otherwise she was calmly focused on her task throughout her duration in group. Endorsed feeling tired. Calm and pleasant in brief interactions.

## 2023-07-24 NOTE — PLAN OF CARE
"Days  Nursing assessment completed. Patient awake and sitting in the lounge for the majority of the shift. She is observed to be laughing and talking to herself for much of the shift. She told writer that she was \"hearing voices telling her funny things\". Writer attempted to discuss Consent for treatment form with patient, but she was having a difficult time focusing. She stated \"my head is really busy\". When asked, she stated the current medications \"so nothing for me\". She requested to discuss form with her doctor, which was accommodated. She agreed to speak to Dr. Champagne and writer, and after discussion and explanation, she agreed to sign in voluntarily. She agreed to a medication increase and agreed to voluntarily stay in the hospital for a \"couple more days\". Patient remained mostly withdrawn to herself, but did participate in part of an OT group. She denies SI/SIB or thoughts to harm others. Denies AH being command in nature. Medication compliant. Continue to monitor and assess.   Evening  Patient attends and participates in both music and art groups. She spends time sitting in the lounge on the periphery watching the TV.   She is talking and mumbling to herself. Pleasant upon approach. Denies SI/SIB or thoughts to harm others. Medication compliant. Continue to monitor and assess.                       "

## 2023-07-25 PROCEDURE — 250N000013 HC RX MED GY IP 250 OP 250 PS 637: Performed by: PSYCHIATRY & NEUROLOGY

## 2023-07-25 PROCEDURE — 124N000002 HC R&B MH UMMC

## 2023-07-25 PROCEDURE — 250N000013 HC RX MED GY IP 250 OP 250 PS 637

## 2023-07-25 PROCEDURE — 250N000013 HC RX MED GY IP 250 OP 250 PS 637: Performed by: STUDENT IN AN ORGANIZED HEALTH CARE EDUCATION/TRAINING PROGRAM

## 2023-07-25 RX ADMIN — SENNOSIDES 8.6 MG: 8.6 TABLET, FILM COATED ORAL at 08:27

## 2023-07-25 RX ADMIN — SENNOSIDES 8.6 MG: 8.6 TABLET, FILM COATED ORAL at 20:56

## 2023-07-25 RX ADMIN — DIVALPROEX SODIUM 500 MG: 125 CAPSULE, COATED PELLETS ORAL at 20:56

## 2023-07-25 RX ADMIN — MAGNESIUM HYDROXIDE 30 ML: 400 SUSPENSION ORAL at 11:21

## 2023-07-25 RX ADMIN — OLANZAPINE 10 MG: 10 TABLET, FILM COATED ORAL at 08:27

## 2023-07-25 RX ADMIN — HALOPERIDOL 15 MG: 5 TABLET ORAL at 20:56

## 2023-07-25 RX ADMIN — DIVALPROEX SODIUM 500 MG: 125 CAPSULE, COATED PELLETS ORAL at 08:27

## 2023-07-25 RX ADMIN — POLYETHYLENE GLYCOL 3350 17 G: 17 POWDER, FOR SOLUTION ORAL at 21:28

## 2023-07-25 RX ADMIN — OLANZAPINE 20 MG: 20 TABLET, FILM COATED ORAL at 20:56

## 2023-07-25 ASSESSMENT — ACTIVITIES OF DAILY LIVING (ADL)
HYGIENE/GROOMING: INDEPENDENT
DRESS: INDEPENDENT
ADLS_ACUITY_SCORE: 31
ORAL_HYGIENE: INDEPENDENT
ADLS_ACUITY_SCORE: 31

## 2023-07-25 NOTE — PLAN OF CARE
Assessment/Intervention/Current Symtoms and Care Coordination:  Chart review and met with team, discussed pt progress, symptomology, and response to treatment.  Discussed the discharge plan and any potential impediments to discharge.     Discharge Plan or Goal:  Pending stabilization. Presents with symptoms of psychosis, see below.   Considerations for discharge include home with family.     Barriers to Discharge:  Patient presents with paranoia, disorganized thinking and behaviors, AVH, and mood lability. She is completing her self cares.      Referral Status:  Psychiatry follow up scheduled with Dr. Mcdonough at [facility] [place]17/23, in-person     Legal Status:  Voluntary    Contacts:  Aunt: Vickie, [phone] (DENI)  Son: Golden, [phone] (Consent)  Cousin: Nona, [phone] (Consent)     Upcoming Meetings and Dates/Important Information and next steps:  Next steps include psychiatric stabilization for a safe discharge back home with family

## 2023-07-25 NOTE — PLAN OF CARE
Goal Outcome Evaluation:    Problem: Sleep Disturbance  Goal: Adequate Sleep/Rest  Outcome: Progressing     Pt slept for 4.25 hours. No safety or behavioral concerns observed.

## 2023-07-25 NOTE — PLAN OF CARE
Nursing assessment completed. Patient awake and visible throughout the shift. Patient appears responding to internal stimuli, talking to herself, often observed laughing alone. She does endorse having auditory hallucinations. Denies they are command in nature. Denies side effects. Denies SI/SIB or thoughts to harm others. Medication compliant. Continue to monitor and assess.     Patient c/o constipation and states no BM X2 days. MOM PRN given at 1120

## 2023-07-26 ENCOUNTER — APPOINTMENT (OUTPATIENT)
Dept: INTERPRETER SERVICES | Facility: CLINIC | Age: 58
DRG: 885 | End: 2023-07-26
Payer: COMMERCIAL

## 2023-07-26 VITALS
RESPIRATION RATE: 16 BRPM | HEART RATE: 92 BPM | WEIGHT: 233.3 LBS | BODY MASS INDEX: 34.55 KG/M2 | SYSTOLIC BLOOD PRESSURE: 143 MMHG | DIASTOLIC BLOOD PRESSURE: 79 MMHG | HEIGHT: 69 IN | TEMPERATURE: 96.7 F | OXYGEN SATURATION: 97 %

## 2023-07-26 PROCEDURE — 99239 HOSP IP/OBS DSCHRG MGMT >30: CPT | Performed by: PSYCHIATRY & NEUROLOGY

## 2023-07-26 PROCEDURE — 250N000013 HC RX MED GY IP 250 OP 250 PS 637

## 2023-07-26 PROCEDURE — 250N000013 HC RX MED GY IP 250 OP 250 PS 637: Performed by: PSYCHIATRY & NEUROLOGY

## 2023-07-26 RX ORDER — DIVALPROEX SODIUM 125 MG/1
500 CAPSULE, COATED PELLETS ORAL 2 TIMES DAILY
Qty: 60 CAPSULE | Refills: 0 | Status: ON HOLD | OUTPATIENT
Start: 2023-07-26 | End: 2023-10-19

## 2023-07-26 RX ORDER — OLANZAPINE 10 MG/1
10 TABLET ORAL EVERY MORNING
Qty: 30 TABLET | Refills: 0 | Status: ON HOLD | OUTPATIENT
Start: 2023-07-27 | End: 2023-10-19

## 2023-07-26 RX ORDER — OLANZAPINE 20 MG/1
20 TABLET ORAL AT BEDTIME
Qty: 30 TABLET | Refills: 0 | Status: ON HOLD | OUTPATIENT
Start: 2023-07-26 | End: 2023-10-19

## 2023-07-26 RX ORDER — SENNOSIDES 8.6 MG
1 TABLET ORAL 2 TIMES DAILY
Qty: 60 TABLET | Refills: 0 | Status: ON HOLD | OUTPATIENT
Start: 2023-07-26 | End: 2023-10-19

## 2023-07-26 RX ORDER — HALOPERIDOL 5 MG/1
15 TABLET ORAL AT BEDTIME
Qty: 90 TABLET | Refills: 0 | Status: ON HOLD | OUTPATIENT
Start: 2023-07-26 | End: 2023-10-19

## 2023-07-26 RX ADMIN — DIVALPROEX SODIUM 500 MG: 125 CAPSULE, COATED PELLETS ORAL at 08:49

## 2023-07-26 RX ADMIN — SENNOSIDES 8.6 MG: 8.6 TABLET, FILM COATED ORAL at 08:50

## 2023-07-26 RX ADMIN — OLANZAPINE 10 MG: 10 TABLET, FILM COATED ORAL at 08:50

## 2023-07-26 ASSESSMENT — ACTIVITIES OF DAILY LIVING (ADL)
ADLS_ACUITY_SCORE: 31
LAUNDRY: UNABLE TO COMPLETE
ADLS_ACUITY_SCORE: 31
ADLS_ACUITY_SCORE: 31
DRESS: INDEPENDENT
ADLS_ACUITY_SCORE: 31
HYGIENE/GROOMING: INDEPENDENT
ADLS_ACUITY_SCORE: 31
ORAL_HYGIENE: INDEPENDENT

## 2023-07-26 NOTE — PLAN OF CARE
Pt asleep at start of shift.     Breathing quiet and unlabored when sleeping.     Pt had no c/o pain or discomfort during the HS.     Patient has been laughing out loud continuously this morning.     Appears to have slept 4.75 hours.     Goal Outcome Evaluation:  Problem: Sleep Disturbance  Goal: Adequate Sleep/Rest  Outcome: Progressing

## 2023-07-26 NOTE — PLAN OF CARE
Nursing Assessment    Sleep:  Hours of sleep at night: 4.75    General Shift Summary  Patient has been present in the lounge, social with select peers, partakes in groups, and has been calm and cooperative. Patient was noted to be responding to internal stimuli, however it was much less compared to days prior. She was seen mumbling to herself while in her room. No loud laughing or loudly responding to internal was noted. Writer utilized an interpretor to go over AMA paperwork. Patient had all questions answered and voiced having an understanding. She is eating well. Hygiene is good. Insight and judgment are fair. Patient was medication cooperative. Plan is for her to discharge AMA and be picked up by family at 1600.    Linn Menendez RN MSN

## 2023-07-26 NOTE — PLAN OF CARE
"  Problem: Adult Behavioral Health Plan of Care  Goal: Plan of Care Review  Outcome: Progressing  Flowsheets (Taken 7/25/2023 5947)  Patient Agreement with Plan of Care: agrees     Problem: Psychotic Symptoms  Goal: Psychotic Symptoms  Description: Signs and symptoms of listed problems will be absent or manageable.  Outcome: Progressing   Goal Outcome Evaluation:Plan of Care Reviewed With: patient    Angela was mainly visible in the lounge, sitting in a chair or at the phone espinoza, but she sat alone. She avoided social interaction. In her room, she watched TV or sat quietly on her bed. However, she was pleasant during the routine check-in, but she endorsed auditory hallucinations. The writer saw her self-talk or appeared to be responding to internal stimuli in the lounge. She talked and laughed. Pt said the voices were in foreign languages and were not telling her to do anything terrible. Pt denied VH, SI/HI, anxiety, and depression. The patient's appetite was okay, and her sleep was \"so, so,\" and she requested medication for constipation( Had her routine Senna) and prn Miralax. She did not attend the music group activity. She was medication compliant without an attempt to Cheek them, and she reported no adverse reactions.                     "

## 2023-07-26 NOTE — DISCHARGE SUMMARY
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  678.627.6300 or 1.888.ANAYA.HELPS        Mental Health Apps  My3  https://Click & Grow.org/     VirtualHopeBox  https://Sightlogixorg/apps/virtual-hope-box/     General Medication Instructions:   See your medication sheet(s) for instructions.   Take all medicines as directed.  Make no changes unless your doctor suggests them.   Go to all your doctor visits.  Be sure to have all your required lab tests. This way, your medicines can be refilled on time.  Do not use any drugs not prescribed by your doctor.  Avoid alcohol.     Advance Directives:   Scanned document on file with [facility]? No scanned doc  Is document scanned? Pt states no documents  Honoring Choices Your Rights Handout: Informed and given  Was more information offered? Materials given     The Treatment team has appreciated the opportunity to work with you. If you have any questions or concerns about your recent admission, you can contact the unit which can receive your call 24 hours a day, 7 days a week. They will be able to get in touch with a Provider if needed. The unit number is [phone] .        -Take your medications daily. Have family help remind you to take them every morning and every " night.   -Get refills of your medications  -Follow up with your psychiatrist (medication doctor)          Attestation:  The patient has been seen and evaluated by me,  Denia Champagne MD    > 60 minutes total time that was spent and over 50% of this time was spent in counseling and coordination of care with staff, reviewing medical record, educating patient about treatment options, side effects and benefits and alternative treatments for medications, providing supportive therapy and redirection regarding above symptoms.     This document is created with the help of Dragon dictation system.  All grammatical/typing errors or context distortion are unintentional and inherent to software.

## 2023-07-26 NOTE — PLAN OF CARE
"Assessment/Intervention/Current Symtoms and Care Coordination:  Chart review and met with team, discussed pt progress, symptomology, and response to treatment.  Discussed the discharge plan and any potential impediments to discharge.   Called her daughter, Mya, to discuss discharge plans at which time she stated, \"She's doing much better now, she's headed in the right direction.\" When asked if she was comfortable with Angela discharging back to the home with family she stated, \"Yeah I'm comfortable with that.\" She stated that there are no safety concerns and that the family will assist with medication administration. Called Angela's aunt, Vickie, to request that she pick her up per Angela's request. No answer, left a voicemail. Called Nona who confirmed that she and her mom, Vickie, will pick her up at 4pm for discharge.     Discharge Plan or Goal:  Pending stabilization. Presents with symptoms of psychosis, see below.   Considerations for discharge include home with family.     Barriers to Discharge:  Patient presents with paranoia, disorganized thinking and behaviors, AVH, and mood lability. She is completing her self cares.      Referral Status:  Psychiatry follow up scheduled with Dr. Mcdonough at [facility] for 08/17/23, in-person     Legal Status:  Voluntary    Contacts:  Aunt: Vickie, [phone] (DENI)  Son: Golden, [phone] (Consent)  Cousin: Nona, [phone] (Consent)     Upcoming Meetings and Dates/Important Information and next steps:  Next steps include psychiatric stabilization for a safe discharge back home with family                           "

## 2023-07-26 NOTE — DISCHARGE SUMMARY
Goal Outcome Evaluation:      Plan of Care Reviewed With: patient    Overall Patient Progress: improvingOverall Patient Progress: improving    Pt denies all psych symptoms and contracts for safety. Pt given all medications and belongings. Pt verbalized understanding of education. Family member picked up pt at door and was walked out of building with staff at 1620.

## 2023-08-16 ENCOUNTER — TELEPHONE (OUTPATIENT)
Dept: BEHAVIORAL HEALTH | Facility: CLINIC | Age: 58
End: 2023-08-16
Payer: COMMERCIAL

## 2023-08-16 ENCOUNTER — HOSPITAL ENCOUNTER (INPATIENT)
Facility: CLINIC | Age: 58
LOS: 34 days | Discharge: ANOTHER HOSPITAL | DRG: 885 | End: 2023-09-20
Attending: PSYCHIATRY & NEUROLOGY | Admitting: PSYCHIATRY & NEUROLOGY
Payer: COMMERCIAL

## 2023-08-16 ENCOUNTER — MEDICAL CORRESPONDENCE (OUTPATIENT)
Dept: BEHAVIORAL HEALTH | Facility: CLINIC | Age: 58
End: 2023-08-16

## 2023-08-16 DIAGNOSIS — F25.0 SCHIZOAFFECTIVE DISORDER, BIPOLAR TYPE (H): ICD-10-CM

## 2023-08-16 PROCEDURE — 99285 EMERGENCY DEPT VISIT HI MDM: CPT | Mod: 25 | Performed by: PSYCHIATRY & NEUROLOGY

## 2023-08-16 PROCEDURE — 99285 EMERGENCY DEPT VISIT HI MDM: CPT | Performed by: PSYCHIATRY & NEUROLOGY

## 2023-08-16 PROCEDURE — 90791 PSYCH DIAGNOSTIC EVALUATION: CPT

## 2023-08-16 PROCEDURE — 250N000013 HC RX MED GY IP 250 OP 250 PS 637: Performed by: PSYCHIATRY & NEUROLOGY

## 2023-08-16 RX ORDER — SENNOSIDES 8.6 MG
8.6 TABLET ORAL 2 TIMES DAILY PRN
Status: DISCONTINUED | OUTPATIENT
Start: 2023-08-16 | End: 2023-09-08

## 2023-08-16 RX ORDER — OLANZAPINE 10 MG/1
10 TABLET, ORALLY DISINTEGRATING ORAL 2 TIMES DAILY
Status: DISCONTINUED | OUTPATIENT
Start: 2023-08-16 | End: 2023-08-16

## 2023-08-16 RX ORDER — OLANZAPINE 10 MG/1
10 TABLET, ORALLY DISINTEGRATING ORAL ONCE
Status: DISCONTINUED | OUTPATIENT
Start: 2023-08-16 | End: 2023-08-16

## 2023-08-16 RX ORDER — OLANZAPINE 10 MG/2ML
10 INJECTION, POWDER, FOR SOLUTION INTRAMUSCULAR 2 TIMES DAILY PRN
Status: DISCONTINUED | OUTPATIENT
Start: 2023-08-16 | End: 2023-08-17

## 2023-08-16 RX ORDER — OLANZAPINE 10 MG/1
10 TABLET ORAL ONCE
Status: DISCONTINUED | OUTPATIENT
Start: 2023-08-16 | End: 2023-08-16 | Stop reason: ALTCHOICE

## 2023-08-16 RX ORDER — IBUPROFEN 600 MG/1
600 TABLET, FILM COATED ORAL ONCE
Status: COMPLETED | OUTPATIENT
Start: 2023-08-16 | End: 2023-08-16

## 2023-08-16 RX ORDER — HALOPERIDOL 10 MG/1
20 TABLET ORAL AT BEDTIME
Status: DISCONTINUED | OUTPATIENT
Start: 2023-08-16 | End: 2023-09-15

## 2023-08-16 RX ORDER — OLANZAPINE 10 MG/1
10 TABLET ORAL 2 TIMES DAILY
Status: DISCONTINUED | OUTPATIENT
Start: 2023-08-16 | End: 2023-09-01

## 2023-08-16 RX ORDER — HALOPERIDOL 5 MG/1
5 TABLET ORAL 2 TIMES DAILY PRN
Status: DISCONTINUED | OUTPATIENT
Start: 2023-08-16 | End: 2023-08-17

## 2023-08-16 RX ORDER — DIVALPROEX SODIUM 500 MG/1
500 TABLET, DELAYED RELEASE ORAL EVERY 12 HOURS SCHEDULED
Status: DISCONTINUED | OUTPATIENT
Start: 2023-08-16 | End: 2023-08-28

## 2023-08-16 RX ADMIN — IBUPROFEN 600 MG: 600 TABLET ORAL at 20:58

## 2023-08-16 ASSESSMENT — ACTIVITIES OF DAILY LIVING (ADL)
ADLS_ACUITY_SCORE: 35

## 2023-08-16 NOTE — ED NOTES
Bed: ED16  Expected date:   Expected time:   Means of arrival:   Comments:  Hold [facility] 1 57 y/o F Homicidal/psychosis
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
## 2023-08-17 ENCOUNTER — TELEPHONE (OUTPATIENT)
Dept: BEHAVIORAL HEALTH | Facility: CLINIC | Age: 58
End: 2023-08-17
Payer: COMMERCIAL

## 2023-08-17 LAB
ALBUMIN SERPL BCG-MCNC: 4.3 G/DL (ref 3.5–5.2)
ALP SERPL-CCNC: 71 U/L (ref 35–104)
ALT SERPL W P-5'-P-CCNC: 14 U/L (ref 0–50)
ANION GAP SERPL CALCULATED.3IONS-SCNC: 13 MMOL/L (ref 7–15)
AST SERPL W P-5'-P-CCNC: 17 U/L (ref 0–45)
BASOPHILS # BLD AUTO: 0 10E3/UL (ref 0–0.2)
BASOPHILS NFR BLD AUTO: 0 %
BILIRUB SERPL-MCNC: 0.4 MG/DL
BUN SERPL-MCNC: 6.5 MG/DL (ref 6–20)
CALCIUM SERPL-MCNC: 10 MG/DL (ref 8.6–10)
CHLORIDE SERPL-SCNC: 103 MMOL/L (ref 98–107)
CREAT SERPL-MCNC: 0.49 MG/DL (ref 0.51–0.95)
DEPRECATED HCO3 PLAS-SCNC: 25 MMOL/L (ref 22–29)
EOSINOPHIL # BLD AUTO: 0 10E3/UL (ref 0–0.7)
EOSINOPHIL NFR BLD AUTO: 0 %
ERYTHROCYTE [DISTWIDTH] IN BLOOD BY AUTOMATED COUNT: 12.3 % (ref 10–15)
GFR SERPL CREATININE-BSD FRML MDRD: >90 ML/MIN/1.73M2
GLUCOSE SERPL-MCNC: 151 MG/DL (ref 70–99)
HCT VFR BLD AUTO: 40.6 % (ref 35–47)
HGB BLD-MCNC: 13.5 G/DL (ref 11.7–15.7)
IMM GRANULOCYTES # BLD: 0 10E3/UL
IMM GRANULOCYTES NFR BLD: 0 %
LYMPHOCYTES # BLD AUTO: 1.8 10E3/UL (ref 0.8–5.3)
LYMPHOCYTES NFR BLD AUTO: 25 %
MCH RBC QN AUTO: 31.8 PG (ref 26.5–33)
MCHC RBC AUTO-ENTMCNC: 33.3 G/DL (ref 31.5–36.5)
MCV RBC AUTO: 96 FL (ref 78–100)
MONOCYTES # BLD AUTO: 0.4 10E3/UL (ref 0–1.3)
MONOCYTES NFR BLD AUTO: 6 %
NEUTROPHILS # BLD AUTO: 4.9 10E3/UL (ref 1.6–8.3)
NEUTROPHILS NFR BLD AUTO: 69 %
NRBC # BLD AUTO: 0 10E3/UL
NRBC BLD AUTO-RTO: 0 /100
PLATELET # BLD AUTO: 256 10E3/UL (ref 150–450)
POTASSIUM SERPL-SCNC: 3.9 MMOL/L (ref 3.4–5.3)
PROT SERPL-MCNC: 7.6 G/DL (ref 6.4–8.3)
RBC # BLD AUTO: 4.25 10E6/UL (ref 3.8–5.2)
SODIUM SERPL-SCNC: 141 MMOL/L (ref 136–145)
VALPROATE SERPL-MCNC: 18.2 UG/ML
WBC # BLD AUTO: 7.1 10E3/UL (ref 4–11)

## 2023-08-17 PROCEDURE — 250N000013 HC RX MED GY IP 250 OP 250 PS 637: Performed by: FAMILY MEDICINE

## 2023-08-17 PROCEDURE — 99223 1ST HOSP IP/OBS HIGH 75: CPT

## 2023-08-17 PROCEDURE — 80164 ASSAY DIPROPYLACETIC ACD TOT: CPT | Performed by: PSYCHIATRY & NEUROLOGY

## 2023-08-17 PROCEDURE — 250N000013 HC RX MED GY IP 250 OP 250 PS 637: Performed by: PSYCHIATRY & NEUROLOGY

## 2023-08-17 PROCEDURE — 85025 COMPLETE CBC W/AUTO DIFF WBC: CPT | Performed by: PSYCHIATRY & NEUROLOGY

## 2023-08-17 PROCEDURE — 36415 COLL VENOUS BLD VENIPUNCTURE: CPT | Performed by: PSYCHIATRY & NEUROLOGY

## 2023-08-17 PROCEDURE — 80053 COMPREHEN METABOLIC PANEL: CPT | Performed by: PSYCHIATRY & NEUROLOGY

## 2023-08-17 PROCEDURE — 124N000002 HC R&B MH UMMC

## 2023-08-17 RX ORDER — CLONIDINE HYDROCHLORIDE 0.1 MG/1
0.1 TABLET ORAL 2 TIMES DAILY
Status: DISCONTINUED | OUTPATIENT
Start: 2023-08-17 | End: 2023-09-01

## 2023-08-17 RX ORDER — OLANZAPINE 10 MG/2ML
10 INJECTION, POWDER, FOR SOLUTION INTRAMUSCULAR ONCE
Status: COMPLETED | OUTPATIENT
Start: 2023-08-17 | End: 2023-08-17

## 2023-08-17 RX ORDER — DIPHENHYDRAMINE HYDROCHLORIDE 50 MG/ML
50 INJECTION INTRAMUSCULAR; INTRAVENOUS EVERY 8 HOURS PRN
Status: DISCONTINUED | OUTPATIENT
Start: 2023-08-17 | End: 2023-09-20 | Stop reason: HOSPADM

## 2023-08-17 RX ORDER — DIPHENHYDRAMINE HCL 50 MG
50 CAPSULE ORAL EVERY 8 HOURS PRN
Status: DISCONTINUED | OUTPATIENT
Start: 2023-08-17 | End: 2023-09-20 | Stop reason: HOSPADM

## 2023-08-17 RX ORDER — LORAZEPAM 2 MG/ML
2 INJECTION INTRAMUSCULAR EVERY 8 HOURS PRN
Status: DISCONTINUED | OUTPATIENT
Start: 2023-08-17 | End: 2023-09-20 | Stop reason: HOSPADM

## 2023-08-17 RX ORDER — HYDROXYZINE HYDROCHLORIDE 25 MG/1
25 TABLET, FILM COATED ORAL EVERY 6 HOURS PRN
Status: DISCONTINUED | OUTPATIENT
Start: 2023-08-17 | End: 2023-09-20 | Stop reason: HOSPADM

## 2023-08-17 RX ORDER — LORAZEPAM 2 MG/1
2 TABLET ORAL EVERY 8 HOURS PRN
Status: DISCONTINUED | OUTPATIENT
Start: 2023-08-17 | End: 2023-09-20 | Stop reason: HOSPADM

## 2023-08-17 RX ORDER — HALOPERIDOL 5 MG/ML
5 INJECTION INTRAMUSCULAR EVERY 8 HOURS PRN
Status: DISCONTINUED | OUTPATIENT
Start: 2023-08-17 | End: 2023-09-20 | Stop reason: HOSPADM

## 2023-08-17 RX ORDER — HYDROXYZINE HYDROCHLORIDE 50 MG/1
50 TABLET, FILM COATED ORAL EVERY 6 HOURS PRN
Status: DISCONTINUED | OUTPATIENT
Start: 2023-08-17 | End: 2023-09-20 | Stop reason: HOSPADM

## 2023-08-17 RX ORDER — HALOPERIDOL 5 MG/1
5 TABLET ORAL EVERY 8 HOURS PRN
Status: DISCONTINUED | OUTPATIENT
Start: 2023-08-17 | End: 2023-09-20 | Stop reason: HOSPADM

## 2023-08-17 RX ADMIN — CLONIDINE HYDROCHLORIDE 0.1 MG: 0.1 TABLET ORAL at 20:32

## 2023-08-17 RX ADMIN — HALOPERIDOL 20 MG: 10 TABLET ORAL at 21:59

## 2023-08-17 RX ADMIN — DIVALPROEX SODIUM 500 MG: 500 TABLET, DELAYED RELEASE ORAL at 12:37

## 2023-08-17 RX ADMIN — OLANZAPINE 10 MG: 10 TABLET, FILM COATED ORAL at 12:39

## 2023-08-17 RX ADMIN — DIVALPROEX SODIUM 500 MG: 500 TABLET, DELAYED RELEASE ORAL at 19:37

## 2023-08-17 RX ADMIN — OLANZAPINE 10 MG: 10 TABLET, FILM COATED ORAL at 19:37

## 2023-08-17 ASSESSMENT — ACTIVITIES OF DAILY LIVING (ADL)
DRESS: INDEPENDENT;SCRUBS (BEHAVIORAL HEALTH)
HYGIENE/GROOMING: INDEPENDENT
ADLS_ACUITY_SCORE: 35
ADLS_ACUITY_SCORE: 35
ORAL_HYGIENE: INDEPENDENT
ADLS_ACUITY_SCORE: 35
ADLS_ACUITY_SCORE: 41
ADLS_ACUITY_SCORE: 35

## 2023-08-17 NOTE — PHARMACY-ADMISSION MEDICATION HISTORY
Pharmacy Intern Admission Medication History    Admission medication history is complete. The information provided in this note is only as accurate as the sources available at the time of the update.    Medication reconciliation/reorder completed by provider prior to medication history? Yes    Information Source(s): Patient and CareEverywhere/SureScripts via in-person    Pertinent Information: Patient was very knowledgeable of medications that she's been taking at home. She's been taking medications everyday except DEPAKOTE which she felt like she doesn't need them. Her last dose was more than a month ago. Patient was very aware of two different prescriptions of Olanzapine (10 mg AM and 20 mg PM) and additional PRN tablet of haloperidol.    Changes made to PTA medication list:  Added: None  Deleted: None  Changed: None    Allergies reviewed with patient and updates made in EHR: yes    Medication History Completed By: Hayeong Yun 8/16/2023 8:15 PM    Prior to Admission medications    Medication Sig Last Dose Taking? Auth Provider Long Term End Date   divalproex sodium delayed-release (DEPAKOTE SPRINKLE) 125 MG DR capsule Take 500 mg by mouth 2 times daily More than a month Yes Denia Champagne MD Yes    haloperidol (HALDOL) 5 MG tablet Take 3 tablets (15 mg) by mouth At Bedtime And one additional tab (5 mg) daily as needed for hallucinations or sleep disturbances 8/15/2023 at PM Yes Denia Champagne MD Yes    OLANZapine (ZYPREXA) 10 MG tablet Take 1 tablet (10 mg) by mouth every morning 8/16/2023 at AM Yes Denia Champagne MD Yes    OLANZapine (ZYPREXA) 20 MG tablet Take 1 tablet (20 mg) by mouth At Bedtime 8/15/2023 at PM Yes Denia Champagne MD Yes    sennosides (SENOKOT) 8.6 MG tablet Take 1 tablet by mouth 2 times daily 8/16/2023 at AM Yes Denia Champagne MD

## 2023-08-17 NOTE — PROGRESS NOTES
Triage & Transition Services, Extended Care    Client Name: Angela Basurto    Date: August 17, 2023  Service Type:  Group Therapy    Intervention:    Group process: support, challenge, affirm, psycho-education.     Response:  Patient did not participate in group.        Ngozi Denton

## 2023-08-17 NOTE — ED NOTES
Pt is awake, calm, sitting in milieu. Noted to be mumbling/talking to self. She is cooperative with vitals assessment but declines scheduled AM medications. VSS - systolic is elevated. She denies pain. Will re approach.
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
## 2023-08-17 NOTE — ED NOTES
Pt transported to [facility], report called
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
## 2023-08-17 NOTE — ED NOTES
IP MH Referral Acuity Rating Score (RARS)    LMHP complete at referral to IP MH, with DEC; and, daily while awaiting IP MH placement. Call score to PPS.  CRITERIA SCORING   New 72 HH and Involuntary for IP MH (not adolescent) 1/1   Boarding over 24 hours 0/1   Vulnerable adult at least 55+ with multiple co morbidities; or, Patient age 11 or under 0/1   Suicide ideation without relief of precipitating factors 0/1   Current plan for suicide 0/1   Current plan for homicide 0/1   Imminent risk or actual attempt to seriously harm another without relief of factors precipitating the attempt 0/1   Severe dysfunction in daily living (ex: complete neglect for self care, extreme disruption in vegetative function, extreme deterioration in social interactions) 1/1   Recent (last 2 weeks) or current physical aggression in the ED 1/1   Restraints or seclusion episode in ED 0/1   Verbal aggression, agitation, yelling, etc., while in the ED 1/1   Active psychosis with psychomotor agitation or catatonia 1/1   Need for constant or near constant redirection (from leaving, from others, etc).  1/1   Intrusive or disruptive behaviors 1/1   TOTAL Acuity Total Score: 7     JLUIA Acevedo

## 2023-08-17 NOTE — PROGRESS NOTES
Triage & Transition Services, Extended Care    Client Name: Angela Basurto    Date: August 17, 2023  Service Type:  Group Therapy  Session Start Time:  5:30P    Session End Time: 5:45P  Session Length: 15min  Site Location: [facility]  Attendees: Patient and other group members  Facilitator: Ngozi Denton     Topic:   Self-Care    Intervention:    Group process: support, challenge, affirm, psycho-education.     Response:  Patient did participate in group. Behavior in group was appropriate. Patient shared her activities she engages in for self-care.       Ngozi Denton
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
## 2023-08-17 NOTE — PROGRESS NOTES
IP MH Referral Acuity Rating Score (RARS)    LMHP complete at referral to IP MH, with DEC; and, daily while awaiting IP MH placement. Call score to PPS.  CRITERIA SCORING   New 72 HH and Involuntary for IP MH (not adolescent) 0/1   Boarding over 24 hours 0/1   Vulnerable adult at least 55+ with multiple co morbidities; or, Patient age 11 or under 1/1   Suicide ideation without relief of precipitating factors 0/1   Current plan for suicide 0/1   Current plan for homicide 0/1   Imminent risk or actual attempt to seriously harm another without relief of factors precipitating the attempt 0/1   Severe dysfunction in daily living (ex: complete neglect for self care, extreme disruption in vegetative function, extreme deterioration in social interactions) 1/1   Recent (last 2 weeks) or current physical aggression in the ED 0/1   Restraints or seclusion episode in ED 0/1   Verbal aggression, agitation, yelling, etc., while in the ED 0/1   Active psychosis with psychomotor agitation or catatonia 1/1   Need for constant or near constant redirection (from leaving, from others, etc).  0/1   Intrusive or disruptive behaviors 0/1   TOTAL Acuity Total Score: 3

## 2023-08-17 NOTE — ED NOTES
"Pt presents to BEC, calm, pleasant, and cooperative. Pt oriented to unit, BEC process explained. Pt denies SI/HI/SIB and hallucinations but was minimally responsive to assessment question. Writer asked Pt if she is aware why she is at the hospital, and Pt nodded and yes and responded \"I was hearing voices\" but explained that is not hearing them not.  Pt spent much of shift sitting in Milieu or pacing and became confused a couple times trying to enter another Pt's room as this was her room during the last visit to the [facility]. Pt reported headache and received ibuprofen PRN.  VSS, however BP was elevated -- provider notified of this value. Pt was not med compliant to all scheduled psych meds. Pt behaviorally in control throughout shift.   "

## 2023-08-17 NOTE — TELEPHONE ENCOUNTER
No appropriate beds are currently available within the [facility] system.   Bed search update on 8/17/23 @ 4:42AM     [facility]:  [facility]:  @ cap per website   [facility]: @ cap per website   [facility]: @ cap per website.   [facility]: @ cap per website   [facility]: @ cap per website   [facility]: **Opening in OCT 2023  [facility]: @ cap per website   [facility]: @ cap per website   [facility]: Posting 1 Bed  [facility]: @ cap per website     [facility]:  [facility]: Posting 4 beds. Mixed unit/Low acuity only.   [facility]: Posting 1 bed.  [facility]: @ cap per website - per [place], call after 9AM  [facility]:  Posting 2 beds. Low acuity only, no current aggression.  [facility]: Posting 1 bed. Does not review after 10PM.     [facility]: Posting 2 beds. COVID negative test req.  [facility]: @ cap per website Low acuity only. Prefer med adjustments placement.  [facility]: Posting 4 beds.  [facility]: Posting 3 Beds. (No hx of aggression. No sexual offenders. Voluntary patients only).   [facility]:  Posting 5 beds. (Low acuity only. Must have the cognitive ability to do programming. No aggressive or violent behavior or recent HX in the last 2 yrs. MH must be primary).   [facility]: Posting 4 beds. COVID negative test. Must be low acuity ONLY.  [facility]: Posting 2 beds. Low acuity, Neg Covid.   [facility]: Posting 3 beds. Covid neg. Voluntary only. Mixed unit. No aggressive or violent behavior. No registered sex offenders.   [facility]: Posting 2 beds. COVID negative test  [facility]: Posting 3 beds. (No hx of aggression/assault. No lines, drains or tubes. Does not provide detox or CD treatment).   [facility]: Posting 14 beds. Facility is in [place].   [facility]: Posting 5 beds. (Mixed unit/Low acuity/no medical devices - IV, CPAP  etc.).           Pt remains on work list until appropriate placement is available
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
## 2023-08-17 NOTE — PROGRESS NOTES
The patient did not sleep all night. Pt was observed laughing and talking to herself. Pt appears paranoid and hallucinating. The patient declined to take any medication. A safety check is completed every 15 minutes. No respiratory distress was noted or observed. Will continue to monitor PT.

## 2023-08-17 NOTE — TELEPHONE ENCOUNTER
5:24AM - Intake paged Resident for Station 22/Francine to review pt    6:45am - Dr. Valencia called back declining pt admission to station 22 due to pt needing higher level of care and at this time cannot accommodate a roommate.     Pt remains on waitlist
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
## 2023-08-17 NOTE — PLAN OF CARE
Angela Flores Basurto  August 16, 2023  Plan of Care Hand-off Note     Patient Care Path: inpatient mental health    Plan for Care:   Pt presents with elvis and psychosis. Her insight and judgement are impaired.  She is not sleeping much. She is not taking her medications.    Legal Status:  Voluntary.  Has agreed to inpatient.     Psychiatry Consult:  No plan is for admission.      Updated   regarding plan of care.      Raine Tucker, [facility]

## 2023-08-17 NOTE — TELEPHONE ENCOUNTER
R: [place]  Access Inpatient Bed Call Log 8/16/23 3:30 PM ( [facility])   Intake has called facilities that have not updated the bed status within the last 12 hours.                           [facility] is at capacity.            [facility] is posting 0 beds. [phone].    [facility] is posting 0 beds. Negative covid required.               [facility] is posting 1 beds. Negative covid required. [phone]. 8/16 Per call @3:42 pm to Leonard, call or fax chart to review.   [facility] is posting 0 beds.      [facility] is posting 0 beds. [phone]        [facility] is posting 0 beds         [facility] ([facility]) is posting 1 beds.      [facility] is posting 0 beds. Low acuity only.        [facility] is posting 4 beds. LOW acuity ONLY. Mixed unit 12+. Negative covid- [phone]. 8/16 Pt not appropriate for bed d/t presenting with Psychosis.  [facility] has 1 bed posted. No aggression. Negative Covid. Low acuity.  8/16 Pt not appropriate for bed d/t current aggression.    [facility] is posting 0 beds. Negative covid. [phone]. Per call@3:27 pm Brittany at capacity .Call back @ 5pm.   [facility] ([place]) 3 beds. Low acuity only. Negative covid.  [phone].    [facility] is posting 2 beds. Low acuity. No current aggression. 8/16 Pt not appropriate for bed d/t current aggression.   [facility] ([place]) is posting 0 beds Low acuity only. Negative covid.  [phone].      [facility] is posting 2 beds. Low acuity. 72 HH hold preferred. Negative covid required. [phone]    [facility] ([place]) 3 beds. Low acuity only. Negative covid. - [phone].     [facility] in [place] is posting 3 beds.  Negative covid required.   Vol only, No history of aggression, violence, or assault. No sexual offenders. No 72 HH holds. [phone]/16 Pt not appropriate for  beds d/t current aggression.    [facility] is posting 4 beds. Negative covid required.  (Must have the cognitive ability to do programming. No aggressive or violent behavior or recent HX in the last 2 yrs. MH must be primary.) Always low acuity. [phone].  8/16 Pt not appropriate for beds d/t current aggression.    [facility] has 4 beds posted. Negative covid required.  Low acuity only. Violence and aggression capped.  [phone]. Low acuity only. 8/16 Pt not appropriate for beds d/t current aggression.   [facility] is posting 2 beds. Low acuity, Negative covid required.  [phone].    [facility] is posting 3 beds. Negative covid required.  Vol only. Combined adolescent and adult unit. No aggressive or violent behavior. No registered sex offenders.  [phone]. 8/16 Pt not appropriate d/t aggressive behaviors threatening with knife.   [facility] posting 2 beds Negative covid required.  [phone].     [facility] [place] is posting 3 beds. Negative covid. (No lines, drains, or tubes, oxygen, CPAP, IV, etc.). [phone].    [facility] is posting 10 beds. No covid test required.  [phone].     [facility] ([facility]) is posting 5 beds. Negative covid. (No. lines, drains, or tubes, oxygen, CPAP, IV, etc.).   [phone]          Patient remains on the work list pending appropriate bed availability

## 2023-08-17 NOTE — ED PROVIDER NOTES
[facility] ED Mental Health Handoff Note:       Brief HPI:  This is a 58 year old female signed out to me by Dr. Soler.  See initial ED Provider note for full details of the presentation. Interval history is pertinent for patient placed on 72-hour hold on the earlier shift, did agree to take her medications.    Home meds reviewed and ordered/administered: Yes    Medically stable for inpatient mental health admission: Yes.    Evaluated by mental health: Yes. The recommendation is for inpatient mental health treatment. Bed search in process    Safety concerns: At the time I received sign out, there were no safety concerns.    Hold Status:  Active Orders   Legal    Emergency Hospitalization Hold (72 Hr Hold)     Frequency: Effective Now     Start Date/Time: 08/17/23 1230      Number of Occurrences: Until Specified            Exam:   Patient Vitals for the past 24 hrs:   BP Pulse Resp SpO2   08/17/23 0751 (!) 160/80 75 18 99 %   08/16/23 2058 (!) 168/94 92 18 100 %       General: Patient is in no acute distress and is resting comfortably.  HEENT: Normocephalic atraumatic  Neck: Supple  Cardiovascular: Heart rate normal  Pulmonary: Patient is in no respiratory distress  Extremities: No signs of any significant or life-threatening trauma.  Neurologic: No new focal neurologic deficits.      ED Course:    Medications   OLANZapine (zyPREXA) tablet 10 mg (10 mg Oral $Given by Other 8/17/23 1239)   haloperidol (HALDOL) tablet 20 mg (20 mg Oral Not Given 8/16/23 2126)   divalproex sodium delayed-release (DEPAKOTE) DR tablet 500 mg (500 mg Oral $Given by Other 8/17/23 1237)   sennosides (SENOKOT) tablet 8.6 mg (has no administration in time range)   haloperidol lactate (HALDOL) injection 5 mg (has no administration in time range)     And   LORazepam (ATIVAN) injection 2 mg (has no administration in time range)     And   diphenhydrAMINE (BENADRYL) injection 50 mg (has no administration in time range)   haloperidol  (HALDOL) tablet 5 mg (has no administration in time range)     And   LORazepam (ATIVAN) tablet 2 mg (has no administration in time range)     And   diphenhydrAMINE (BENADRYL) capsule 50 mg (has no administration in time range)   hydrOXYzine (ATARAX) tablet 25 mg (has no administration in time range)     Or   hydrOXYzine (ATARAX) tablet 50 mg (has no administration in time range)   ibuprofen (ADVIL/MOTRIN) tablet 600 mg (600 mg Oral $Given 8/16/23 2058)   OLANZapine (zyPREXA) injection 10 mg (10 mg Intramuscular Not Given 8/17/23 1246)            There were no significant events during my shift.    Patient was signed out to the oncoming provider      Impression:    ICD-10-CM    1. Schizoaffective disorder, bipolar type (H)  F25.0           Plan:    Awaiting inpatient mental health admission/transfer.      RESULTS:   Results for orders placed or performed during the hospital encounter of 08/16/23 (from the past 24 hour(s))   Valproic acid (Depakote level)     Status: Abnormal    Collection Time: 08/17/23  9:28 AM   Result Value Ref Range    Valproic acid 18.2 (L)   ug/mL   CBC with platelets differential     Status: None    Collection Time: 08/17/23  9:29 AM    Narrative    The following orders were created for panel order CBC with platelets differential.  Procedure                               Abnormality         Status                     ---------                               -----------         ------                     CBC with platelets and d...[[id number]]                      Final result                 Please view results for these tests on the individual orders.   Comprehensive metabolic panel     Status: Abnormal    Collection Time: 08/17/23  9:29 AM   Result Value Ref Range    Sodium 141 136 - 145 mmol/L    Potassium 3.9 3.4 - 5.3 mmol/L    Chloride 103 98 - 107 mmol/L    Carbon Dioxide (CO2) 25 22 - 29 mmol/L    Anion Gap 13 7 - 15 mmol/L    Urea Nitrogen 6.5 6.0 - 20.0 mg/dL    Creatinine 0.49 (L)  0.51 - 0.95 mg/dL    Calcium 10.0 8.6 - 10.0 mg/dL    Glucose 151 (H) 70 - 99 mg/dL    Alkaline Phosphatase 71 35 - 104 U/L    AST 17 0 - 45 U/L    ALT 14 0 - 50 U/L    Protein Total 7.6 6.4 - 8.3 g/dL    Albumin 4.3 3.5 - 5.2 g/dL    Bilirubin Total 0.4 <=1.2 mg/dL    GFR Estimate >90 >60 mL/min/1.73m2   CBC with platelets and differential     Status: None    Collection Time: 08/17/23  9:29 AM   Result Value Ref Range    WBC Count 7.1 4.0 - 11.0 10e3/uL    RBC Count 4.25 3.80 - 5.20 10e6/uL    Hemoglobin 13.5 11.7 - 15.7 g/dL    Hematocrit 40.6 35.0 - 47.0 %    MCV 96 78 - 100 fL    MCH 31.8 26.5 - 33.0 pg    MCHC 33.3 31.5 - 36.5 g/dL    RDW 12.3 10.0 - 15.0 %    Platelet Count 256 150 - 450 10e3/uL    % Neutrophils 69 %    % Lymphocytes 25 %    % Monocytes 6 %    % Eosinophils 0 %    % Basophils 0 %    % Immature Granulocytes 0 %    NRBCs per 100 WBC 0 <1 /100    Absolute Neutrophils 4.9 1.6 - 8.3 10e3/uL    Absolute Lymphocytes 1.8 0.8 - 5.3 10e3/uL    Absolute Monocytes 0.4 0.0 - 1.3 10e3/uL    Absolute Eosinophils 0.0 0.0 - 0.7 10e3/uL    Absolute Basophils 0.0 0.0 - 0.2 10e3/uL    Absolute Immature Granulocytes 0.0 <=0.4 10e3/uL    Absolute NRBCs 0.0 10e3/uL   Psychiatry IP Consult: 72 , review for commitment; Consultant may enter orders: Yes; Requesting provider? ED Provider     Status: None ()    Collection Time: 08/17/23  1:01 PM    Jose Collazo APRN CNP     8/17/2023  2:55 PM    Angela Basurto MRN# [id number]   Age: 58 year old YOB: 1965   Date of Admission to ED: 8/16/2023    In person visit Details:     Patient was assessed and interviewed face-to-face in person with   this writer shady. Patient was observed to be able to participate in   the assessment as evidenced by verbal consent. Assessment methods   included conducting a formal interview with patient, review of   medical records, collaboration with medical staff, and obtaining   relevant collateral information from  family and community   providers when available.        Reason for Consult:   This note is being entered to supplement the psychiatry   consultation note that was completed on August 16, 2023 by the   licensed mental health professional Garrett [facility]    have reviewed the pertinent clinical details related to their   encounter. I am being consulted to offer additional guidance on   psychiatric pharmacological interventions    Writer met patient in her room face-to-face by herself with   [place]  by phone.  Patient was alert to herself very   confused and disorganized during assessment and interview   continued to respond to internal stimuli unable to follow   command.  Patient is very paranoid while staying in [facility] patient   strikes the nurses very aggressive received as needed Zyprexa IM.  Patient was just discharged last month on July 27, 2023 under the   care of Dr. Champagne for 26 days, patient was discharged home   AGAINST MEDICAL ADVICE  Patient was presented to emergency room due to homicidal threats   toward her family using knife, and exhibiting paranoid and severe   disorganized thought and responding to her internal stimuli while   staying in the emergency room.  We will file civil commitment with [facility] through [facility]   patient may benefit from VELIZ    I have reviewed the nursing notes. I have reviewed the findings,   diagnosis, plan and need for follow up with the patient.         HPI:   Per ED provider Dr. Crandall note Angela Basurto is a 58 year   old female with previous psychiatric diagnoses of schizoaffective   disorder, bipolar type and anxiety who was brought in by EMS from   home for psychiatric evaluation.  Per medics, patient has been   experiencing auditory hallucinations telling her to harm her aunt   and uncle and threatened to kill family with a knife. Patient's   daughter called 911. Knife was removed from patient.      Patient was seen by DEC  as well as  "resident, please see   consult note for further details.  Patient was interviewed with   assistance of .  History is somewhat limited,   throughout the interview she was noted to be laughing, talking to   herself, talking under her breath. Patient does endorse auditory   hallucinations, states that the voices are \"old friends\" and then   muttered something inaudible in Niuean.  When DEC  asked    for clarification,  reports that patient   said that she could not understand what the voices were saying.    Patient states that these are command auditory hallucinations.    She states that she has difficulty sleeping, has only been   sleeping 10pm to midnight and then roaming throughout the house   the rest of the night. She states she is taking her medications.    Denies knife or any unusual events at home. At this point in the   interview she states that she has told us everything she has   wanted to tell us and stopped offering additional details. DEC    attempting to obtain additional history from family via   phone at this time.           Pt has  required locked seclusion or restraints in the past 24   hours to maintain safety, please refer to RN documentation for   further details.  Substance use does not appear to be playing a contributing role   in the patient's presentation.  Brief Therapeutic Intervention(s):   Provided active listening, unconditional positive regard, and   validation. Engaged in cognitive restructuring/ reframing, looked   at common cognitive distortions and challenged negative thoughts.   Engaged in guided discovery, explored patient's perspectives and   helped expand them through socratic dialogue. Provided positive   reinforcement for progress towards goals, gains in knowledge, and   application of skills previously taught.  Engaged in social   skills training. Explored and identified early warning signs to   anger*        Past Psychiatric " History:   Pt with history of Schizoaffective Bipolar Type. She was most   recently hospitalized at [facility] 6/27-7/26/2023.  Left AGAINST   MEDICAL ADVICE        Substance Use and History:     none        Past Medical History:   PAST MEDICAL HISTORY: No past medical history on file.    PAST SURGICAL HISTORY: No past surgical history on file.            Allergies:     Allergies   Allergen Reactions    Pork-Derived Products Unknown     Zoroastrianism does not eat pork             Medications:   I have reviewed this patient's current medications  Current Facility-Administered Medications   Medication    haloperidol (HALDOL) tablet 5 mg    And    LORazepam (ATIVAN) tablet 2 mg    And    diphenhydrAMINE (BENADRYL) capsule 50 mg    haloperidol lactate (HALDOL) injection 5 mg    And    LORazepam (ATIVAN) injection 2 mg    And    diphenhydrAMINE (BENADRYL) injection 50 mg    divalproex sodium delayed-release (DEPAKOTE) DR tablet 500 mg    haloperidol (HALDOL) tablet 20 mg    hydrOXYzine (ATARAX) tablet 25 mg    Or    hydrOXYzine (ATARAX) tablet 50 mg    OLANZapine (zyPREXA) tablet 10 mg    sennosides (SENOKOT) tablet 8.6 mg     Current Outpatient Medications   Medication Sig    divalproex sodium delayed-release (DEPAKOTE SPRINKLE) 125 MG DR   capsule Take 500 mg by mouth 2 times daily    haloperidol (HALDOL) 5 MG tablet Take 3 tablets (15 mg) by mouth   At Bedtime And one additional tab (5 mg) daily as needed for   hallucinations or sleep disturbances    OLANZapine (ZYPREXA) 10 MG tablet Take 1 tablet (10 mg) by mouth   every morning    OLANZapine (ZYPREXA) 20 MG tablet Take 1 tablet (20 mg) by mouth   At Bedtime    sennosides (SENOKOT) 8.6 MG tablet Take 1 tablet by mouth 2   times daily              Family History:   FAMILY HISTORY: No family history on file.        Social History:   SOCIAL HISTORY:   Social History     Tobacco Use    Smoking status: Not on file    Smokeless tobacco: Not on file   Substance Use Topics    Alcohol  use: Not on file            PTA Medications:   (Not in a hospital admission)         Allergies:     Allergies   Allergen Reactions    Pork-Derived Products Unknown     Confucianist does not eat pork          Labs:     Recent Results (from the past 48 hour(s))   Valproic acid (Depakote level)    Collection Time: 08/17/23  9:28 AM   Result Value Ref Range    Valproic acid 18.2 (L)   ug/mL   Comprehensive metabolic panel    Collection Time: 08/17/23  9:29 AM   Result Value Ref Range    Sodium 141 136 - 145 mmol/L    Potassium 3.9 3.4 - 5.3 mmol/L    Chloride 103 98 - 107 mmol/L    Carbon Dioxide (CO2) 25 22 - 29 mmol/L    Anion Gap 13 7 - 15 mmol/L    Urea Nitrogen 6.5 6.0 - 20.0 mg/dL    Creatinine 0.49 (L) 0.51 - 0.95 mg/dL    Calcium 10.0 8.6 - 10.0 mg/dL    Glucose 151 (H) 70 - 99 mg/dL    Alkaline Phosphatase 71 35 - 104 U/L    AST 17 0 - 45 U/L    ALT 14 0 - 50 U/L    Protein Total 7.6 6.4 - 8.3 g/dL    Albumin 4.3 3.5 - 5.2 g/dL    Bilirubin Total 0.4 <=1.2 mg/dL    GFR Estimate >90 >60 mL/min/1.73m2   CBC with platelets and differential    Collection Time: 08/17/23  9:29 AM   Result Value Ref Range    WBC Count 7.1 4.0 - 11.0 10e3/uL    RBC Count 4.25 3.80 - 5.20 10e6/uL    Hemoglobin 13.5 11.7 - 15.7 g/dL    Hematocrit 40.6 35.0 - 47.0 %    MCV 96 78 - 100 fL    MCH 31.8 26.5 - 33.0 pg    MCHC 33.3 31.5 - 36.5 g/dL    RDW 12.3 10.0 - 15.0 %    Platelet Count 256 150 - 450 10e3/uL    % Neutrophils 69 %    % Lymphocytes 25 %    % Monocytes 6 %    % Eosinophils 0 %    % Basophils 0 %    % Immature Granulocytes 0 %    NRBCs per 100 WBC 0 <1 /100    Absolute Neutrophils 4.9 1.6 - 8.3 10e3/uL    Absolute Lymphocytes 1.8 0.8 - 5.3 10e3/uL    Absolute Monocytes 0.4 0.0 - 1.3 10e3/uL    Absolute Eosinophils 0.0 0.0 - 0.7 10e3/uL    Absolute Basophils 0.0 0.0 - 0.2 10e3/uL    Absolute Immature Granulocytes 0.0 <=0.4 10e3/uL    Absolute NRBCs 0.0 10e3/uL          Physical and Psychiatric Examination:     BP (!) 160/80    Pulse 75   Temp 98.1  F (36.7  C) (Oral)     Resp 18   SpO2 99%   Weight is 0 lbs 0 oz  There is no height or weight on file to   calculate BMI.    Mental Status Exam:  Appearance: awake, alert  Attitude:  guarded and uncooperative  Eye Contact:  poor   Mood:  angry  Affect:  guarded  Speech:  mumbling and rambling  Language: fluent and intact in English  Psychomotor, Gait, Musculoskeletal:  physical agitation  Thought Process:  disorganized, illogical, and tangental  Associations:  no loose associations  Thought Content:  auditory hallucinations present, visual   hallucinations present, and patient appears to be responding to   internal stimuli  Insight:  limited  Judgement:  poor  Oriented to: Oriented to time place  Attention Span and Concentration:  limited  Recent and Remote Memory:  limited  Fund of Knowledge:  low-normal         Diagnoses:   Schizoaffective disorder, bipolar type (H)         Recommendations:     1. Pt displays the following risk factors that support , severe   agitation and aggression, strike nurses while in emergency room,   homicidal ideation threatening family with knife unable to   contract for safety. Pt is unable to engage in safety planning to   mitigate risk level in a non-secure setting. Lower levels of care   have not been successful in mitigating risk. Due to this IP is   the least restrictive option of care for pt. Pt should remain in   IP until deemed safe to return to the community and engage in OP   MH supports    - Continue to recommend inpatient psychiatric hospitalizations   for further stabilization   2.  Continue 72-hour hold will file civil commitment with [facility]   due to risk of severe agitation and aggression  3.  Continue current Zyprexa, Haldol as PTA medication add as   needed Haldol 5 mg Ativan 2 mg Benadryl 50 mg p.o. IM for severe   agitation and aggression    4.  Consult psychiatry as needed  5.   Refer to psychiatric provider for medication management.    treatment per ED team    - Consulted with Nayla YAN [facility], ED physician Dr. Chente Mortensen asked if they would like this writer to enter orders   in the EHR,  patient's ED RN regarding this case.    Please call [facility]/DEC at [phone] if you have follow-up   questions or wish to place another consult.  Jose Wilkerson, Psychiatric Nurse practitioner    Attestation:  Time with:  Patient: 20 minutes  Treatment Team: 30 Minutes  Chart Review: 30 minutes    Total time spent was 80 minutes. Over 50% of times was spent   counseling and coordination of care.    I thank  primary ED provider and [facility] teams care team very much   for letting me participate in the care of this patient.    I, oJse Wilkerson, CNP, APRN, Psychiatric Nurse Practitioner have   personally performed an examination of this patient.  I have   edited the note to reflect all relevant changes.  I have   discussed this patient with the care team on August 17, 2023 I   have reviewed all vitals and laboratory findings.    Disclaimer: This note consists of symbols derived from   Nickolas wright MD Gubbrud, David, MD  08/17/23 5016

## 2023-08-18 LAB — GLUCOSE BLDC GLUCOMTR-MCNC: 250 MG/DL (ref 70–99)

## 2023-08-18 PROCEDURE — 250N000013 HC RX MED GY IP 250 OP 250 PS 637: Performed by: PSYCHIATRY & NEUROLOGY

## 2023-08-18 PROCEDURE — 250N000013 HC RX MED GY IP 250 OP 250 PS 637: Performed by: FAMILY MEDICINE

## 2023-08-18 PROCEDURE — 124N000002 HC R&B MH UMMC

## 2023-08-18 PROCEDURE — 99222 1ST HOSP IP/OBS MODERATE 55: CPT | Mod: 95 | Performed by: PSYCHIATRY & NEUROLOGY

## 2023-08-18 RX ORDER — SENNOSIDES 8.6 MG
1 TABLET ORAL 2 TIMES DAILY
Status: DISCONTINUED | OUTPATIENT
Start: 2023-08-18 | End: 2023-09-08

## 2023-08-18 RX ADMIN — SENNOSIDES 1 TABLET: 8.6 TABLET, FILM COATED ORAL at 21:11

## 2023-08-18 RX ADMIN — HALOPERIDOL 20 MG: 10 TABLET ORAL at 21:12

## 2023-08-18 RX ADMIN — CLONIDINE HYDROCHLORIDE 0.1 MG: 0.1 TABLET ORAL at 21:11

## 2023-08-18 RX ADMIN — OLANZAPINE 10 MG: 10 TABLET, FILM COATED ORAL at 21:11

## 2023-08-18 RX ADMIN — OLANZAPINE 10 MG: 10 TABLET, FILM COATED ORAL at 08:37

## 2023-08-18 RX ADMIN — CLONIDINE HYDROCHLORIDE 0.1 MG: 0.1 TABLET ORAL at 08:37

## 2023-08-18 RX ADMIN — DIVALPROEX SODIUM 500 MG: 500 TABLET, DELAYED RELEASE ORAL at 08:37

## 2023-08-18 ASSESSMENT — ACTIVITIES OF DAILY LIVING (ADL)
ADLS_ACUITY_SCORE: 41
LAUNDRY: UNABLE TO COMPLETE
ADLS_ACUITY_SCORE: 41
LAUNDRY: UNABLE TO COMPLETE
HYGIENE/GROOMING: INDEPENDENT
DRESS: SCRUBS (BEHAVIORAL HEALTH)
ORAL_HYGIENE: INDEPENDENT
ADLS_ACUITY_SCORE: 41
DRESS: INDEPENDENT
HYGIENE/GROOMING: INDEPENDENT
ADLS_ACUITY_SCORE: 41
ORAL_HYGIENE: INDEPENDENT

## 2023-08-18 NOTE — H&P
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 past 24 hour(s))   Valproic acid (Depakote level)    Collection Time: 08/17/23  9:28 AM   Result Value Ref Range    Valproic acid 18.2 (L)   ug/mL   Comprehensive metabolic panel    Collection Time: 08/17/23  9:29 AM   Result Value Ref Range    Sodium 141 136 - 145 mmol/L    Potassium 3.9 3.4 - 5.3 mmol/L    Chloride 103 98 - 107 mmol/L    Carbon Dioxide (CO2) 25 22 - 29 mmol/L    Anion Gap 13 7 - 15 mmol/L    Urea Nitrogen 6.5 6.0 - 20.0 mg/dL    Creatinine 0.49 (L) 0.51 - 0.95 mg/dL    Calcium 10.0 8.6 - 10.0 mg/dL    Glucose 151 (H) 70 - 99 mg/dL    Alkaline Phosphatase 71 35 - 104 U/L    AST 17 0 - 45 U/L    ALT 14 0 - 50 U/L    Protein Total 7.6 6.4 - 8.3 g/dL    Albumin 4.3 3.5 - 5.2 g/dL    Bilirubin Total 0.4 <=1.2 mg/dL    GFR Estimate >90 >60 mL/min/1.73m2   CBC with platelets and differential    Collection Time: 08/17/23  9:29 AM   Result Value Ref Range    WBC Count 7.1 4.0 - 11.0 10e3/uL    RBC Count 4.25 3.80 - 5.20 10e6/uL    Hemoglobin 13.5 11.7 - 15.7 g/dL    Hematocrit 40.6 35.0 - 47.0 %    MCV 96 78 - 100 fL    MCH 31.8 26.5 - 33.0 pg    MCHC 33.3 31.5 - 36.5 g/dL    RDW 12.3 10.0 - 15.0 %    Platelet Count 256 150  "- 450 10e3/uL    % Neutrophils 69 %    % Lymphocytes 25 %    % Monocytes 6 %    % Eosinophils 0 %    % Basophils 0 %    % Immature Granulocytes 0 %    NRBCs per 100 WBC 0 <1 /100    Absolute Neutrophils 4.9 1.6 - 8.3 10e3/uL    Absolute Lymphocytes 1.8 0.8 - 5.3 10e3/uL    Absolute Monocytes 0.4 0.0 - 1.3 10e3/uL    Absolute Eosinophils 0.0 0.0 - 0.7 10e3/uL    Absolute Basophils 0.0 0.0 - 0.2 10e3/uL    Absolute Immature Granulocytes 0.0 <=0.4 10e3/uL    Absolute NRBCs 0.0 10e3/uL       /72 (BP Location: Left arm, Patient Position: Sitting, Cuff Size: Adult Large)   Pulse 94   Temp 97.8  F (36.6  C) (Temporal)   Resp 16   Ht 1.753 m (5' 9\")   Wt 100.9 kg (222 lb 6.4 oz)   SpO2 97%   BMI 32.84 kg/m    Weight is 222 lbs 6.4 oz  Body mass index is 32.84 kg/m .         Psychiatric Mental Status Examination:   Appearance: awake, alert, disheveled, sitting in bed  Attitude: evasive, guarded, suspicious of writer  Eye Contact: intense  Mood:  irritable  Affect: mood congruent and heightened intensity, labile  Speech:  clear, coherent and normal prosody  Language: fluent in English  Psychomotor Behavior:  no evidence of tardive dyskinesia, dystonia, or tics  Gait/Station: normal  Thought Process:  disorganized, illogical  Associations:  no loose associations  Thought Content:  Endorsing command AH and paranoia. Denying SI/HI; Exhibiting delusional and paranoid thought content  Insight:  limited  Judgement: limited  Oriented to:  MIGUEL ÁNGEL due to lack of participation in full interview  Attention Span and Concentration:  limited  Recent and Remote Memory:  limited due to psychosis  Fund of Knowledge: appropriate           Physical Exam:   Please refer to physical exam completed by ED provider, Tal Crandall MD, on 8/16/23. I agree with the findings and assessment and have no additional findings to add at this time.     "
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
## 2023-08-18 NOTE — PLAN OF CARE
Problem: Psychotic Signs/Symptoms  Goal: Improved Behavioral Control (Psychotic Signs/Symptoms)  Outcome: Not Progressing   Goal Outcome Evaluation:    Patient was present in the [facility] area, going in and out of her room for most of this shift. Her mood is calm but her affect is guarded, and blunted at times. She seems a bit restless, and responding to internal stimuli. Writer observed her talking and laughing to herself but when writer asked if she is hearing voices, she said no and continued talking to herself. It was reported that patient was entering other patient's rooms and laying in their bed. She was redirected by staff and was able to follow redirection. Writer called and obtained an order for an SIO from her attending doctor. Patient is now on a 10 feet space restriction. She did not complain of pain and no prn was given. Patient is medication compliant, and she is eating and drinking fine. She denies all psych symptoms at the time.     Patient medical coordinator Nathalie ([phone]) called to get an update on patient. She said she will call back as sometime during the week next week to get an update on patient's progress, and plan for after discharge.

## 2023-08-18 NOTE — PLAN OF CARE
Assessment/Intervention/Current Symtoms and Care Coordination:  Chart review and met with team, discussed pt progress, symptomology, and response to treatment.  Discussed the discharge plan and any potential impediments to discharge.    Met with patient and had consent to communicate signed with the assistance of an .     Observed Angela coming out of a different patient's room. Redirected Angela to her bedroom with no issues.     Securely faxed Petition for Judicial Commitment, Exhibit A, Examiner's Statement in Support of Petition for Commitment, Written Request for Authorization to Impose Treatment and Request for Hearing, and Neuroleptic Medication Note for [place] Proceeding to [facility] Pre-Petition. Left voicemail for [facility] letting them know about the historical attempts at petitioning with [facility] and that her employment with the [facility] [employer] [facility] [employer] resulted in [facility] taking the case prior to it being withdrawn last month.     Discharge Plan or Goal:  Patient presents with symptoms of psychosis that put her at risk of harming herself and others. When patient stabilizes considerations include home with family or IRTS.     Barriers to Discharge:  Patient presents with symptoms of psychosis that put her at risk of harming herself and others.      Referral Status:  Referral needs being assessed as patient stabilizes      Legal Status:  72HH    Contacts:  Daughter: Mya, [phone] (Consent)  Aunt: Vickie, [phone] (Consent)     Upcoming Meetings and Dates/Important Information and next steps:      Team Note Due:  Friday

## 2023-08-18 NOTE — ED NOTES
"Pt agitated earlier, pacing, asking to \"see my son!\". Pt was also exhibiting elopement behavior; standing by exit door trying to open it. Pt was redirectable. Cooperative with Vital signs assessment. B/P elevated. MD notified and Clonidine ordered. Pt has been medication compliant this shift. Denies pain. Pt currently laying in bed, resting. Appears to be in behavioral control at this time.   "

## 2023-08-18 NOTE — CARE PLAN
08/17/23 2221   Patient Belongings   Did you bring any home meds/supplements to the hospital?  No   Patient Belongings remains on inpatient care area;locker   Patient Belongings Put in Hospital Secure Location (Security or Locker, etc.) clothing;purse/wallet;shoes;jewelry   Belongings Search Yes   Clothing Search Yes   Second Staff Lorena MORENO RN     Locker: Purple headscarf, black sandals, toothbrushes, green sandals, (2) black headscarf, black and silver dress, leggings, black bra, underwear, red scrunchie, yellow purse, various makeup, body spray, body lotion, deodorant, alexander, gold ring, green brooch    Items brought in 9/2/23  2 sweatshirts  4 scarf/shawls  3 pairs of underwear  4 books  Prayer rug      A               Admission:  I am responsible for any personal items that are not sent to the safe or pharmacy.  [facility] is not responsible for loss, theft or damage of any property in my possession.    Signature:  _________________________________ Date: _______  Time: _____                                              Staff Signature:  ____________________________ Date: ________  Time: _____      2nd Staff person, if patient is unable/unwilling to sign:    Signature: ________________________________ Date: ________  Time: _____     Discharge:  [facility] has returned all of my personal belongings:    Signature: _________________________________ Date: ________  Time: _____                                          Staff Signature:  ____________________________ Date: ________  Time: _____
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
## 2023-08-18 NOTE — TELEPHONE ENCOUNTER
R: 9:20PM Pt presented to Dr Mota for unit 12/Mahi. Dr Mota accepts pt    9:30PM pt added to unit queue, unit called with disposition    9:39PM ED updated with pt placement

## 2023-08-18 NOTE — PLAN OF CARE
"  Problem: Suicidal Behavior  Goal: Suicidal Behavior is Absent or Managed  Outcome: Progressing  Note: Patient manifests no suicidal behavior     Problem: Violence Risk or Actual  Goal: Anger and Impulse Control  Outcome: Progressing  Note: Patient demonstrates no anger or impulsive behavior   Goal Outcome Evaluation:       Patient slept well, she was up at about 0250, wearing hospital scrub, Patient was demanding to have her clothing, she was reassured that she could have them on days. Patient states \"I know you on station 12.\" Patient returned to her room with no further concerns or requests. Patient endorses no SI/HI, A/VH or SIB. Patient manifests no behavioral dyscontrol or impulsivity. Patient achieved 6.25 hours of sleep. Will continue to monitor and follow plan of care.            Sammy Mckeon DNP, RN  "

## 2023-08-18 NOTE — PROGRESS NOTES
"SPIRITUAL HEALTH SERVICES Progress Note  [facility] ([facility] [address]     Met with Angela rose manriquez at admission and introduce myself and oriented her to [facility]. Angela shared that she had been previously hospitalized and is here to \"feel better\". She asked for a hijab and for a prayer mat. I shared that I would check with her nurse and bring one over as soon as possible. She appreciated the visit and welcomed duas (prayers) for her health.     Aide Gee  Chaplain Resident  Pager [phone]    * [facility] remains available 24/7 for emergent requests/referrals, either by having the switchboard page the on-call  or by entering an ASAP/STAT consult in Epic (this will also page the on-call ). Routine Epic consults receive an initial response within 24 hours.*    "

## 2023-08-19 PROCEDURE — 250N000013 HC RX MED GY IP 250 OP 250 PS 637: Performed by: FAMILY MEDICINE

## 2023-08-19 PROCEDURE — 124N000002 HC R&B MH UMMC

## 2023-08-19 PROCEDURE — 250N000013 HC RX MED GY IP 250 OP 250 PS 637: Performed by: PSYCHIATRY & NEUROLOGY

## 2023-08-19 RX ORDER — ACETAMINOPHEN 325 MG/1
325 TABLET ORAL EVERY 4 HOURS PRN
Status: COMPLETED | OUTPATIENT
Start: 2023-08-19 | End: 2023-08-19

## 2023-08-19 RX ADMIN — CLONIDINE HYDROCHLORIDE 0.1 MG: 0.1 TABLET ORAL at 08:56

## 2023-08-19 RX ADMIN — SENNOSIDES 1 TABLET: 8.6 TABLET, FILM COATED ORAL at 20:13

## 2023-08-19 RX ADMIN — DIVALPROEX SODIUM 500 MG: 500 TABLET, DELAYED RELEASE ORAL at 20:13

## 2023-08-19 RX ADMIN — ACETAMINOPHEN 325 MG: 325 TABLET, FILM COATED ORAL at 11:51

## 2023-08-19 RX ADMIN — SENNOSIDES 1 TABLET: 8.6 TABLET, FILM COATED ORAL at 08:57

## 2023-08-19 RX ADMIN — OLANZAPINE 10 MG: 10 TABLET, FILM COATED ORAL at 08:56

## 2023-08-19 RX ADMIN — HALOPERIDOL 20 MG: 10 TABLET ORAL at 20:12

## 2023-08-19 RX ADMIN — DIVALPROEX SODIUM 500 MG: 500 TABLET, DELAYED RELEASE ORAL at 08:56

## 2023-08-19 RX ADMIN — OLANZAPINE 10 MG: 10 TABLET, FILM COATED ORAL at 20:13

## 2023-08-19 RX ADMIN — CLONIDINE HYDROCHLORIDE 0.1 MG: 0.1 TABLET ORAL at 20:13

## 2023-08-19 ASSESSMENT — ACTIVITIES OF DAILY LIVING (ADL)
ADLS_ACUITY_SCORE: 41
DRESS: INDEPENDENT
ORAL_HYGIENE: INDEPENDENT
ADLS_ACUITY_SCORE: 41
HYGIENE/GROOMING: INDEPENDENT
ADLS_ACUITY_SCORE: 41
LAUNDRY: UNABLE TO COMPLETE
ADLS_ACUITY_SCORE: 41
ADLS_ACUITY_SCORE: 41
HYGIENE/GROOMING: INDEPENDENT
ADLS_ACUITY_SCORE: 41

## 2023-08-19 NOTE — PLAN OF CARE
"  Problem: Violence Risk or Actual  Goal: Anger and Impulse Control  Outcome: Progressing        Patient affect brighten upon approach with pleasant/calm mood, had no complaint of pain or discomfort. She was observed on multiple occasions sitting in the lounge area but avoiding social interaction with peers. Patient ate about 50% of dinner with no nausea or stomach discomfort. Continues to be on SIO 1:1 due to severe intrusiveness, had no aggressive or assaultive behavior and made no attempts to eloped from the unit.  Patient family visited around 1915 and they met in the therapy room, patient reported having a good meeting with family. Denies SI/SIB/HI and hallucination, contract for safety and medication compliant.         /77 (BP Location: Left arm)   Pulse 78   Temp 98.2  F (36.8  C) (Temporal)   Resp 16   Ht 1.753 m (5' 9\")   Wt 100.9 kg (222 lb 6.4 oz)   SpO2 98%   BMI 32.84 kg/m                             "

## 2023-08-19 NOTE — PLAN OF CARE
"  Problem: Suicidal Behavior  Goal: Suicidal Behavior is Absent or Managed  Outcome: Progressing     Problem: Violence Risk or Actual  Goal: Anger and Impulse Control  Outcome: Progressing   Goal Outcome Evaluation:       Patient spent the majority of the shift isolated to her room. Writer offered to complete assessment with patient with a Cook Island[place] . Patient declined the use of [place]er  when writer had one on the phone for her. Patient stated in English, \"My mood is fine today\". Patient had asked for a PRN tylenol for a headache. When writer gave the tylenol to her, and completed the pain assessment, patient began laughing hysterically. When writer asked patient what was funny she could not say because she was laughing so hard she couldn't make out words. Patient endorsed hearing voices, and reported that they are funny. Patient endorsed the voices being distressing, however reported that she likes it when they are funny. Writer tried to interview patient again and she began laughing again, but was able to answer more questions. Patient denied the voices being command in nature, she denied visual hallucinations, and denied SI and HI.                  "

## 2023-08-19 NOTE — PLAN OF CARE
Problem: Suicidal Behavior  Goal: Suicidal Behavior is Absent or Managed  Outcome: Progressing  Note: Patient manifests no suicidal behavior     Problem: Psychotic Signs/Symptoms  Goal: Improved Behavioral Control (Psychotic Signs/Symptoms)  Outcome: Progressing  Note: Patient demonstrates no impulsivity or behavioral dyscontrol   Goal Outcome Evaluation:       Patient slept on/off, seen couple times in the hallway covering with herself a blanket, patient remains calm while awake, demonstrates no outburst, anger, paranoia, assaultive or aggressive behavior toward staff or peers. Patient endorses no SI/HI, or SIB. Patient's affect remains blunted, states she is in good mood, not observed responding to IS or laughing inappropriately. Patient achieved 5 hours of sleep. Will continue to monitor and follow plan of care.           Sammy Mckeon DNP, RN

## 2023-08-19 NOTE — PLAN OF CARE
"Problem: Psychotic Signs/Symptoms  Goal: Improved Behavioral Control (Psychotic Signs/Symptoms)  8/18/2023 2013 by Rika Felix, RN  Outcome: Progressing  8/18/2023 1258 by Rika Felix RN  Outcome: Not Progressing   Goal Outcome Evaluation:    Patient spent most of this shift in her room resting. She continues to display auditory hallucination, observed talking to herself and laughing. Her mood is calm and affect is guarded. She was not social with peer and did not attend any of the groups offered. She continues to be on a 10 feet sio due to entering other patients room and laying in their bed. She refused her night time medication when this writer offered them, but took them when another RN offered them with apple sauce. She took all of her med except for the Depakote because \"it is too big\". She is eating and drinking without any issues. She denies hearing voices, and denies thoughts of harming self and others. A shower was encouraged but she did not take a shower. No complain of pain and no prn given.         /59 (BP Location: Left arm, Patient Position: Sitting, Cuff Size: Adult Large)   Pulse 92   Temp 97.8  F (36.6  C) (Temporal)   Resp 16   Ht 1.753 m (5' 9\")   Wt 100.9 kg (222 lb 6.4 oz)   SpO2 98%   BMI 32.84 kg/m     "

## 2023-08-20 PROCEDURE — 250N000013 HC RX MED GY IP 250 OP 250 PS 637: Performed by: FAMILY MEDICINE

## 2023-08-20 PROCEDURE — 124N000002 HC R&B MH UMMC

## 2023-08-20 PROCEDURE — 250N000013 HC RX MED GY IP 250 OP 250 PS 637: Performed by: PSYCHIATRY & NEUROLOGY

## 2023-08-20 RX ADMIN — HALOPERIDOL 20 MG: 10 TABLET ORAL at 20:07

## 2023-08-20 RX ADMIN — SENNOSIDES 1 TABLET: 8.6 TABLET, FILM COATED ORAL at 20:07

## 2023-08-20 RX ADMIN — DIVALPROEX SODIUM 500 MG: 500 TABLET, DELAYED RELEASE ORAL at 07:46

## 2023-08-20 RX ADMIN — DIVALPROEX SODIUM 500 MG: 500 TABLET, DELAYED RELEASE ORAL at 20:07

## 2023-08-20 RX ADMIN — CLONIDINE HYDROCHLORIDE 0.1 MG: 0.1 TABLET ORAL at 20:07

## 2023-08-20 RX ADMIN — SENNOSIDES 1 TABLET: 8.6 TABLET, FILM COATED ORAL at 07:46

## 2023-08-20 RX ADMIN — OLANZAPINE 10 MG: 10 TABLET, FILM COATED ORAL at 20:07

## 2023-08-20 RX ADMIN — OLANZAPINE 10 MG: 10 TABLET, FILM COATED ORAL at 07:46

## 2023-08-20 RX ADMIN — CLONIDINE HYDROCHLORIDE 0.1 MG: 0.1 TABLET ORAL at 07:46

## 2023-08-20 ASSESSMENT — ACTIVITIES OF DAILY LIVING (ADL)
ORAL_HYGIENE: INDEPENDENT
ADLS_ACUITY_SCORE: 41
ADLS_ACUITY_SCORE: 41
HYGIENE/GROOMING: INDEPENDENT
ADLS_ACUITY_SCORE: 41
ORAL_HYGIENE: INDEPENDENT
HYGIENE/GROOMING: INDEPENDENT
DRESS: INDEPENDENT
DRESS: INDEPENDENT
ADLS_ACUITY_SCORE: 41
LAUNDRY: UNABLE TO COMPLETE
ADLS_ACUITY_SCORE: 41

## 2023-08-20 NOTE — PLAN OF CARE
Problem: Suicidal Behavior  Goal: Suicidal Behavior is Absent or Managed  Outcome: Progressing  Note: Patient manifests no suicidal behavior     Problem: Psychotic Signs/Symptoms  Goal: Improved Behavioral Control (Psychotic Signs/Symptoms)  Outcome: Progressing  Note: Patient demonstrates no behavioral dyscontrol   Goal Outcome Evaluation:       Patient had uneventful night, slept well not visible in the hallway like the previous nights. Patient was cooperative with safety checks, upon awakening at about 0540, patient observed responding to IS  and laughing inappropriate and excessively in the hallway. All precautions in place, no attempt made to elope. Patient endorses no SI/HI, or SIB. Patient achieved 6 hours of sleep. Will continue to monitor and follow plan of care.           Sammy Mckeon DNP, RN

## 2023-08-21 PROCEDURE — 250N000013 HC RX MED GY IP 250 OP 250 PS 637: Performed by: FAMILY MEDICINE

## 2023-08-21 PROCEDURE — 124N000002 HC R&B MH UMMC

## 2023-08-21 PROCEDURE — 250N000013 HC RX MED GY IP 250 OP 250 PS 637: Performed by: PSYCHIATRY & NEUROLOGY

## 2023-08-21 PROCEDURE — 99232 SBSQ HOSP IP/OBS MODERATE 35: CPT | Performed by: PSYCHIATRY & NEUROLOGY

## 2023-08-21 RX ADMIN — HALOPERIDOL 20 MG: 10 TABLET ORAL at 20:49

## 2023-08-21 RX ADMIN — CLONIDINE HYDROCHLORIDE 0.1 MG: 0.1 TABLET ORAL at 20:50

## 2023-08-21 RX ADMIN — SENNOSIDES 1 TABLET: 8.6 TABLET, FILM COATED ORAL at 08:51

## 2023-08-21 RX ADMIN — DIVALPROEX SODIUM 500 MG: 500 TABLET, DELAYED RELEASE ORAL at 20:38

## 2023-08-21 RX ADMIN — OLANZAPINE 10 MG: 10 TABLET, FILM COATED ORAL at 08:51

## 2023-08-21 RX ADMIN — CLONIDINE HYDROCHLORIDE 0.1 MG: 0.1 TABLET ORAL at 08:51

## 2023-08-21 RX ADMIN — OLANZAPINE 10 MG: 10 TABLET, FILM COATED ORAL at 20:38

## 2023-08-21 RX ADMIN — SENNOSIDES 1 TABLET: 8.6 TABLET, FILM COATED ORAL at 20:38

## 2023-08-21 RX ADMIN — DIVALPROEX SODIUM 500 MG: 500 TABLET, DELAYED RELEASE ORAL at 08:51

## 2023-08-21 ASSESSMENT — ACTIVITIES OF DAILY LIVING (ADL)
ADLS_ACUITY_SCORE: 41

## 2023-08-21 NOTE — PLAN OF CARE
Problem: Psychotic Signs/Symptoms  Goal: Improved Behavioral Control (Psychotic Signs/Symptoms)  Outcome: Progressing  Note: Patient manifests no behavioral dyscontrol  Goal: Improved Sleep (Psychotic Signs/Symptoms)  Outcome: Progressing  Note: Patient in bed sleeping at the start of the shift     Problem: Psychotic Signs/Symptoms  Goal: Improved Sleep (Psychotic Signs/Symptoms)  Outcome: Progressing  Note: Patient in bed sleeping at the start of the shift   Goal Outcome Evaluation:       Patient slept on/off, vacillated between her room and the hallway, patient was not responding to IS until later in the morning when patient was seen pacing in the [place] with some laughter, otherwise no behavioral dysregulation. Patient endorses no SI/HI or SIB. All precautions in place,patient reports no medication side effect. Overall, patient attained 4.25 hours disjointed sleep. Will continue to monitor and follow plan of care.           Sammy Mckeon DNP, RN

## 2023-08-21 NOTE — PLAN OF CARE
"  Problem: Psychotic Signs/Symptoms  Goal: Optimal Cognitive Function (Psychotic Signs/Symptoms)  Outcome: Not Progressing   Goal Outcome Evaluation:    Patient has been out on the unit pacing, and has been isolative/withdrawn to her self. Patient denies having any thoughts of wanting to harm her self or others, visual hallucinations/auditory hallucinations, depression, and anxiety. Patient was observed to be responding to internal stimuli and laughing inappropriately.patient had two visitor's today her father and her daughter visited.    Vitals are stable, and she was medication compliant, no behavioral concerns noted during this shift.    Blood pressure 110/69, pulse 84, temperature 97.6  F (36.4  C), temperature source Temporal, resp. rate 16, height 1.753 m (5' 9\"), weight 100.9 kg (222 lb 6.4 oz), SpO2 100 %.      "

## 2023-08-21 NOTE — PLAN OF CARE
INITIAL PSYCHOSOCIAL ASSESSMENT AND NOTE    Information for assessment was obtained from:       [x]Patient     []Parent     []Community provider    [x]Hospital records   []Other     []Guardian       Presenting Problem:  Patient is a 58 year old female who uses she/her. Patient was admitted to [facility] on a 72 hour hold placed on 08/17/23 at 1230  on 8/16/2023.     Presenting issues and presentation for admit: Per DEC, Angela Basurto presents to the ED via EMS. Patient is presenting to the ED for the following concerns: Physical aggression, Paranoia. Factors that make the mental health crisis life threatening or complex are: Pt brought to the ED by EMS.  EMS reported that family had called 911 because pt was threatening family members with a knife. Pt is talking and laughing to herself.  She minimizes presenting concerns and denies any of the events reported by her family.  She does identify that she is hearing voices. She begins to say they are the voices of old friends, she then begins to speak Swazi and  is unable to understand what she is saying. She denies command hallucinations to hurt herself or others. She states that she is taking her medications. She is sleeping from about 10p-12midnight and then is up wandering the house.     The following areas have been assessed:    History of Mental Health and Chemical Dependency:  Mental Health History:  Patient has a historical diagnosis of schizoaffective disorder. The patient does not have a history of suicide attempts. Patient has not engaged in non-suicidal self-injury (NSSI). She has engaged in mental health services including inpatient hospitalizations.     Previous psychiatric hospitalizations and treatments:     Most recently hospitalized at [facility] on [facility] from 06/27/23 - 07/26/23.     [facility] 06/03/13 - 06/06/13    Substance Use History  None per chart review        Patient's current relationship status is   . Patient reported having two child(lawson).       Family Description (Constellation, significant information and events, Family Psychiatric History):  Patient is . She lives in [place] with her son, Golden, who is approximately 31 years old. 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 Her daughter, Mya, is in the area for the summer and is supportive. Her aunt, Vickie, and cousin, Lashawn, are also supportive.     Significant Medical issues, Life events or Trauma history:   Patient has a long history of bipolar 1 disorder. Per her son she has been medication adherent in the past, but recently this has been more challenging. She reports taking all of the medications she was prescribed during her last hospitalization with the exception of Depakote.     Living Situation:  Patient's current living/housing situation is staying in own home/apartment. They live with family and they report that housing is stable and they are able to return upon discharge.       Educational Background:    Patient's highest education level is unknown. Patient reports they are able to understand written materials in Maldivian.     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 She reported that the last time she worked was a month and a half ago, approximately right before she was hospitalized the first time at [facility]. Patient reports income is obtained through employment and that her family helps financially support each other.  Patient does identify finances as a current stressor. They are insured under [employer]. Restrictions (No/Yes): Unknown.    Occupational History: PCA    Legal Concerns (current or past history):     Current Concerns: None    Past History: None      Legal Status:  72HH started 8/17 at 1230, expires 8/22 at 1230    Petition sent for civil  "commitment in [facility], they are currently screening.       Commitment History:   She has not been under commitment before.       Service History: None    Ethnic/Cultural/Spiritual considerations:   The patient describes their cultural background as Black/, heterosexual, cisgender.  Contextual influences on patient's health include acuity of illness and cultural considerations.   Patient identified their preferred language to be Pakistani. Patient has a history of declining to meet with staff with an  present, and at times is willing to meet with staff with an  present. She requests the use of an  when signing forms.  Spiritual considerations include: Patient reports that she is Amish. Patient requested a Quran. Spiritual health consult ordered by RN.     Social Functioning (organizations, interests, support system):   In their free time, patient reports they like to \"go to the [employer], listen to music, watch news, and cook.\"     Patient identified her son, Golden, her daughter, Mya, her cousin, Lashawn, and her aunt, Vickie, as part of her support system. Patient identified the quality of these relationships as stable and meaningful.       Current Treatment Providers are:    Medication Management/Psychiatry:  Name/Clinic: Dr. Staci Mcdonough at [facility]    GOALS FOR HOSPITALIZATION:  What do patient want to accomplish during this hospitalization to make things better for the patient.?   Patient priorities: They identified \"stopping Depakote\" as a goal of this hospitalization.    Social Service Assessment/Plan:  Patient will have psychiatric assessment and medication management by the psychiatrist. Medications will be reviewed and adjusted per DO/MD/APRN CNP as indicated. The treatment team will continue to assess and stabilize the patient's mental health symptoms with the use of medications and therapeutic programming. Hospital staff will provide a safe " environment and a therapeutic milieu. Staff will continue to assess patient as needed. Patient will participate in unit groups and activities. Patient will receive individual and group support on the unit.      CTC will do individual inpatient treatment planning and after care planning. CTC will discuss options for increasing community supports with the patient. CTC will coordinate with outpatient providers and will place referrals to ensure appropriate follow up care is in place.

## 2023-08-21 NOTE — PLAN OF CARE
"Nursing assessment completed. Patient continues on 1:1 SIO for intrusiveness. She is up early, ate breakfast in her room. Cooperative with taking scheduled am medications. She states that she does believe her medications are helping. When asked what they help with, she states the medications help with \"the voices\". He states they tell her to laugh. She denies they tell her to hurt herself or others. Affect bright, almost elated while speaking to writer. Spends time resting in her room and using the exercise bike. Denies SI/SIB or thoughts to harm others. Continue to monitor and assess.                       "

## 2023-08-21 NOTE — PLAN OF CARE
Assessment/Intervention/Current Symtoms and Care Coordination:  Chart review and met with team, discussed pt progress, symptomology, and response to treatment.  Discussed the discharge plan and any potential impediments to discharge.    Left voicemail with [facility] asking for an update on who is taking the case.     Received voicemail from Jose Angel Zamora at [facility]Petition.    Talked to Jose Angel Zamora at [facility] who verified that they are taking Angela's case. It has been assigned to Carlos Rushing, [phone].     Met with Angela to complete the Initial Psychosocial Assessment. Identified current needs. Asked RN for assistance in ordering a spiritual health consult.     Discharge Plan or Goal:  Patient presents with symptoms of psychosis that put her at risk of harming herself and others. When patient stabilizes considerations include home with family or IRTS.     Barriers to Discharge:  Patient presents with symptoms of psychosis that put her at risk of harming herself and others.      Referral Status:  Referral needs being assessed as patient stabilizes      Legal Status:  72HH expires 8/22 at 1230    Prepetition screener: Carlos Rushing, [phone]    [facility]: [place]    Contacts:  Daughter: Mya, [phone] (Consent)  Aunt: Vickie, [phone] (Consent)  Son: Golden, [phone]   Cousin: Lashawn, [phone] (Consent)     Upcoming Meetings and Dates/Important Information and next steps:  Prepetition screening interview    Team Note Due:  Friday   [de-identified] : Constitutional:Well nourished , well developed and in no acute distress\par Psychiatric: Alert and oriented to time place and person.Appropriate affect \par Skin:Head, neck, arms and lower extremities:no lesions or discoloration\par HEENT: Normocephalic, EOM intact, Nasal septum midline,\par Respiratory: Unlabored respirations,no audible wheezing ,no tachypnea, no cyanosis\par Cardiovascular: no leg swelling  no ankle edema no JVD, pulse regular\par Vascular: no calf or thigh tenderness, \par Peripheral pulses; intact\par Lymphatics:No groin adenopathy,no lymphedema lower  or upper extremities\par Right knee flexion 0/120 ligaments intact crepitus dorsal pedal pulse 1+\par Left knee range of motion 0/115 crepitus ligaments intact dorsal pedal pulse 1+ [de-identified] : X-rays left knee lateral patellofemoral degenerative narrowing till bone-on-bone contact medial compartment degenerative narrowing varus alignment subchondral sclerosis\par Right knee lateral patellofemoral compartment joint space narrowing with tilt

## 2023-08-21 NOTE — PROGRESS NOTES
"  ----------------------------------------------------------------------------------------------------------  [facility], [place]   Psychiatric Progress Note  Hospital Day #4     Interim History:   The patient's care was discussed with the treatment team and chart notes were reviewed.    Sleep 4.25 hours (08/21/23 0600)  Scheduled Medications: took all scheduled medications as prescribed   PRN medications: no psychiatric PRNs given     Staff Report:  Calm, cooperative, adherent with meds.     Patient Interview:   Angela Basurto was interviewed with the assistance of a telephone  and says \"my life and my kids\" when asked what brought her to the hospital.  She said \"I have voices\" when asked if she is experiencing auditory hallucinations says that she stopped her medication and cannot remember why she stopped it.  She says today that she feels better, denies SI/HI.  Throughout the interview she was mumbling to herself and occasionally laughing inappropriately as if responding to internal stimuli.  She said \"that is all\" repeatedly and terminated the interview.    Review of systems:     ROS was negative unless noted above.          Allergies:     Allergies   Allergen Reactions    Pork-Derived Products Unknown     Sikh does not eat pork            Psychiatric Examination:   /69   Pulse 84   Temp 97.6  F (36.4  C) (Temporal)   Resp 16   Ht 1.753 m (5' 9\")   Wt 100.9 kg (222 lb 6.4 oz)   SpO2 100%   BMI 32.84 kg/m    Weight is 222 lbs 6.4 oz  Body mass index is 32.84 kg/m .    MENTAL STATUS EXAM    Appearance:  well-developed, well-nourished, appears stated age, and disheveled  Attitude:  guarded and disorganized behavior  Psychomotor:  normal  Eye Contact: glances  Speech:  mumbling  Mood: \"can't remember\"  Affect:  restricted  Thought Content: denies suicidal ideation and denies homicidal ideation  Thought Process: rambling, responsive to internal stimuli, and " disorganized  Sensorium: awake, alert, and active auditory hallucinations  Cognition: impaired concentration  Impulse control: impaired  Insight: poor  Judgment: poor         Labs:   No results found for this or any previous visit (from the past 24 hour(s)).     Assessment    Diagnoses:   # Bipolar I disorder, currently manic with psychotic features   # Hx of possible seizure in 1983     Diagnostic Impression:   Angela Basurto is a 58 year old female previously diagnosed with Bipolar Disorder Type 1 and anxiety who presented with elvis and psychosis in the context of medication nonadherence. Patient was presented to emergency room due to homicidal threats toward her family using knife, and exhibiting paranoid and severe disorganized thought and responding to her internal stimuli while staying in the emergency room. Significant symptoms on admission include paranoia, disorganized thinking and behaviors, AVH, and emotional lability. The MSE on admission was pertinent for RTIS. Biological contributions to mental health presentation include diagnosis of Bipolar Disorder, type 1 and taking Depakote, Zyprexa, and Haldol. Psychological contributions to mental health presentation include poor insight into her condition. While she understands why she's in the hospital, she displays intrusive behaviors that require a 1:1. 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 Protective factors include family support and being [employer]. Most recent psychiatric hospitalization was in July of this year during which time she left [place]. In summary, the patient's reported symptoms of elvis and psychosis in the context of medication nonadherence are consistent with Bipolar Disorder, type 1.     Psychiatric Hospital course:   Angela Basurto was admitted to [facility] on a 72 hour hold. The patient's PTA Zyprexa 10 mg BID & Depakote, and  Haldol 20 mg were restarted. PTA hydroxyzine and trazodone were restarted. A petition for MI commitment with Adol[facility] was filed today through [facility].      Discontinued Medications (& Rationale):  N/a     Medical course   Acetaminophen as needed utilized for pain occasionally.  No other major medical issues arose during hospitalization. PTA Senokot continued for constipation.     Data:   Depakote level 18.2 on 8/17/2023, subtherapeutic    Consults:   None    Plan     Today's Changes:  -No changes, awaiting results from petition    Scheduled Medicationss:   cloNIDine  0.1 mg Oral BID    divalproex sodium delayed-release  500 mg Oral Q12H ESTHER (08/20)    haloperidol  20 mg Oral At Bedtime    OLANZapine  10 mg Oral BID    sennosides  1 tablet Oral BID       PRN Medications:  - haloperidol **AND** LORazepam **AND** diphenhydrAMINE, haloperidol lactate **AND** LORazepam **AND** diphenhydrAMINE, hydrOXYzine **OR** hydrOXYzine, sennosides    Patient will be treated in therapeutic milieu with appropriate individual and group therapies as described.    Medical diagnoses to be addressed this admission:    # Constipation:   -PTA Senokot daily    Legal Status:   Orders Placed This Encounter      Emergency Hospitalization Hold (72 Hr Hold)      Safety Assessment:   Behavioral Orders   Procedures    Assault precautions    Code 1 - Restrict to Unit    Elopement precautions    Routine Programming     As clinically indicated    Status 15     Every 15 minutes.    Status Individual Observation     Patient SIO status reviewed with team/RN.  Please also refer to RN/team documentation for add'l detail.    -SIO staff to monitor following which have contributed to patient being on SIO:  Patient is entering patients' rooms and laying in their beds  -Possible interventions SIO staff could use to support patient's treatment progress:  Pharmacologic interventions for psychosis and redirection from staff  -When following observed, team  will review discontinuation of SIO:  Absence of severe intrusiveness for > 24 hours     Order Specific Question:   CONTINUOUS 24 hours / day     Answer:   Other     Order Specific Question:   Specify distance     Answer:   10 ft     Order Specific Question:   Indications for SIO     Answer:   Severe intrusiveness       Disposition: Discharge in approximately 12 days pending stabilization & development of a safe discharge plan.     _________________________________________________________________    This patient was seen and discussed with my attending physician.  -  Henry Thomson,    Psychiatry Resident     _________________________________________________________________  Attending Attestation:

## 2023-08-22 PROCEDURE — 80164 ASSAY DIPROPYLACETIC ACD TOT: CPT | Performed by: STUDENT IN AN ORGANIZED HEALTH CARE EDUCATION/TRAINING PROGRAM

## 2023-08-22 PROCEDURE — 124N000002 HC R&B MH UMMC

## 2023-08-22 PROCEDURE — 250N000013 HC RX MED GY IP 250 OP 250 PS 637: Performed by: FAMILY MEDICINE

## 2023-08-22 PROCEDURE — 36415 COLL VENOUS BLD VENIPUNCTURE: CPT | Performed by: STUDENT IN AN ORGANIZED HEALTH CARE EDUCATION/TRAINING PROGRAM

## 2023-08-22 PROCEDURE — 250N000013 HC RX MED GY IP 250 OP 250 PS 637: Performed by: PSYCHIATRY & NEUROLOGY

## 2023-08-22 RX ADMIN — SENNOSIDES 1 TABLET: 8.6 TABLET, FILM COATED ORAL at 09:04

## 2023-08-22 RX ADMIN — CLONIDINE HYDROCHLORIDE 0.1 MG: 0.1 TABLET ORAL at 20:30

## 2023-08-22 RX ADMIN — SENNOSIDES 1 TABLET: 8.6 TABLET, FILM COATED ORAL at 20:29

## 2023-08-22 RX ADMIN — OLANZAPINE 10 MG: 10 TABLET, FILM COATED ORAL at 20:30

## 2023-08-22 RX ADMIN — DIVALPROEX SODIUM 500 MG: 500 TABLET, DELAYED RELEASE ORAL at 09:04

## 2023-08-22 RX ADMIN — OLANZAPINE 10 MG: 10 TABLET, FILM COATED ORAL at 09:04

## 2023-08-22 RX ADMIN — HALOPERIDOL 20 MG: 10 TABLET ORAL at 20:30

## 2023-08-22 RX ADMIN — DIVALPROEX SODIUM 500 MG: 500 TABLET, DELAYED RELEASE ORAL at 20:30

## 2023-08-22 ASSESSMENT — ACTIVITIES OF DAILY LIVING (ADL)
ADLS_ACUITY_SCORE: 41

## 2023-08-22 NOTE — PROGRESS NOTES
received a phone call from Terrence ([phone]) at [facility] court, stating that as of 8/22/23 at 1226, patient is on a court hold

## 2023-08-22 NOTE — PLAN OF CARE
"  Problem: Psychotic Signs/Symptoms  Goal: Improved Behavioral Control (Psychotic Signs/Symptoms)  Outcome: Not Progressing   Goal Outcome Evaluation:    Plan of Care Reviewed With: patient      Patient was observed out on the unit withdrawn/Isolative to her self, and was responding to internal stimuli. She denies having any physical pain, visual hallucinations/auditory hallucinations, anxiety and depression. Her family visited her today and she was happy about that. She took her night time medication with verbal prompts, but was hesitant to take it at first. Her vitals are stable no behavioral concerns noted during this shift.     Blood pressure 114/72, pulse 69, temperature 97.1  F (36.2  C), temperature source Temporal, resp. rate 16, height 1.753 m (5' 9\"), weight 100.9 kg (222 lb 6.4 oz), SpO2 100 %.             "

## 2023-08-22 NOTE — PROGRESS NOTES
08/22/23 1651   Group Therapy Session   Group Attendance attended group session   Time Session Began 1315   Time Session Ended 1400   Total Time (minutes) 35   Total # Attendees 3   Group Type recreation   Group Session Detail Education and group discussion provided on the importance of healthy leisure participation and the challenge of maintaining healthy leisure activities when struggling with mental illness and/or chemical dependency.  Hands on [place] category activity for concentration, cognitive processing, flexible thinking, creativity, tracking, mood stabilization, reality-based activity, healthy leisure, frustration tolerance, follow through, socialization and an opportunity to experience success   Patient Participation Detail Pt joined group for about 35 min.  She was able to briefly participate in the Compliance 360 game, though needed frequent reminders that her responses for the activity needed to start with the selected letter for each round.  Eventually pt opted to color, though she did not follow through with this.  She was easily distracted, often looking off into space.  Pt was observed to be intermittently talking quietly to herself.  No charge,

## 2023-08-22 NOTE — PLAN OF CARE
Problem: Plan of Care - These are the overarching goals to be used throughout the patient stay.    Goal: Absence of Hospital-Acquired Illness or Injury  Outcome: Progressing  Note: Patient reports no ASHLEY     Problem: Violence Risk or Actual  Goal: Anger and Impulse Control  Outcome: Progressing  Note: Patient demonstrates no impulsiveness or behavioral dyscontrol   Goal Outcome Evaluation:       Patient was awake most of the night, pacing the chaparro.but remains calm with no behavioral demonstration. At times patient would sit for sometimes before heading back to her room. Patient endorses no SI/HI, or SIB, Patient was responding to IS but at a much lesser frequency and intensity. Overall patient slept 4.75 hours.           Sammy Mckeon DNP, RN

## 2023-08-22 NOTE — PLAN OF CARE
Assessment/Intervention/Current Symtoms and Care Coordination:  Chart review and met with team, discussed pt progress, symptomology, and response to treatment.  Discussed the discharge plan and any potential impediments to discharge.    Received verbal support from [facility].     Securely emailed Juliana Hernández at [facility] revised Exhibit A and page 2 of the Mata note.     Called Angela's daughter, Mya, to let her know that [facility] supported the petition, she was encouraged by the news.     Discharge Plan or Goal:  Patient presents with symptoms of psychosis that put her at risk of harming herself and others. When patient stabilizes considerations include home with family or IRTS.     Barriers to Discharge:  Patient presents with symptoms of psychosis that put her at risk of harming herself and others.      Referral Status:  Referral needs being assessed as patient stabilizes      Legal Status:  Court Hold    County: [place]    Contacts:  Daughter: Mya, [phone] (Consent)  Aunt: Vickie, [phone] (Consent)  Son: Golden, [phone]   Cousin: Lashawn, [phone] (Consent)  Prepetition screener: Carlos Rushing, [phone]     Upcoming Meetings and Dates/Important Information and next steps:  Examination and preliminary hearing     Team Note Due:  Friday

## 2023-08-22 NOTE — PLAN OF CARE
"Days  Nursing assessment completed. Patient awake and visible for much of the shift. Continues with 1:1 SIO for intrusive behaviors, which appear to be improving. Will continue to monitor for SIO need. Patient compliant with scheduled am medications. Clonidine held for systolic <110  Patient met with the  today, which appeared to go well. She tells writer that she is \"doing well\", and that her medications are helpful. She tells writer that she is not currently hearing voices, but does appear to be responding to internal stimuli. Laughing and talking to herself almost constantly. Pleasant mood, bright affect. Denies SI/SIB or thoughts to harm others.  Evening  Patient had an uneventful evening. Attended and attempted to participate in OT group, but struggled with concentration and focus. Appeared preoccupied. Appears responding to internal stimuli. Laughing and talking to self. Medication compliant and asking appropriate medication questions. Continue to monitor and assess.                         "

## 2023-08-22 NOTE — CONSULTS
"SPIRITUAL HEALTH SERVICES  SPIRITUAL ASSESSMENT Progress Note  [facility]       REFERRAL SOURCE: Routine [facility] consult for emotional support, Baptism specific.     DEMOGRAPHIC: Pt Angela Basurto identifies as Baptism and is of [place]i descent.        ILLNESS CIRCUMSTANCE: I introduced myself to Angela as the Lead Baptism  and oriented her to [facility]. Assessed emotional/spiritual needs and resources while offering reflective conversation, which integrated elements of illness and family narratives.     Angela stated that, \"I am doing better today. I just could not sleep last night\". Angela shared with me that she is  and has 5 grown children.     SPIRITUAL INTERVENTIONS: Angela welcomed Islamic incantations/prayer at bedside. We prayed together at her request. Angela has received an Korean copy of the Holy Quran. I also provided her with an Islamic prayer booklet for Baptism patients.      PLAN: I will follow up with Angela for the duration of her stay. [facility] is available to Angela per request.     Chandra Truong  Lead Baptism   Pager [phone]    [facility] remains available 24/7 for emergent requests/referrals, either by having the switchboard page the on-call  or by entering an ASAP/STAT consult in Epic (this will also page the on-call ).    "

## 2023-08-23 LAB
VALPROATE FREE MFR SERPL: 16 %
VALPROATE FREE SERPL-MCNC: 17 UG/ML
VALPROATE SERPL-MCNC: 106 UG/ML

## 2023-08-23 PROCEDURE — 250N000013 HC RX MED GY IP 250 OP 250 PS 637: Performed by: PSYCHIATRY & NEUROLOGY

## 2023-08-23 PROCEDURE — 99232 SBSQ HOSP IP/OBS MODERATE 35: CPT | Performed by: PSYCHIATRY & NEUROLOGY

## 2023-08-23 PROCEDURE — 124N000002 HC R&B MH UMMC

## 2023-08-23 PROCEDURE — 250N000013 HC RX MED GY IP 250 OP 250 PS 637: Performed by: FAMILY MEDICINE

## 2023-08-23 RX ADMIN — SENNOSIDES 1 TABLET: 8.6 TABLET, FILM COATED ORAL at 21:11

## 2023-08-23 RX ADMIN — OLANZAPINE 10 MG: 10 TABLET, FILM COATED ORAL at 10:37

## 2023-08-23 RX ADMIN — OLANZAPINE 10 MG: 10 TABLET, FILM COATED ORAL at 21:11

## 2023-08-23 RX ADMIN — DIVALPROEX SODIUM 500 MG: 500 TABLET, DELAYED RELEASE ORAL at 10:36

## 2023-08-23 RX ADMIN — SENNOSIDES 1 TABLET: 8.6 TABLET, FILM COATED ORAL at 10:37

## 2023-08-23 RX ADMIN — CLONIDINE HYDROCHLORIDE 0.1 MG: 0.1 TABLET ORAL at 21:11

## 2023-08-23 RX ADMIN — HALOPERIDOL 20 MG: 10 TABLET ORAL at 21:11

## 2023-08-23 RX ADMIN — DIVALPROEX SODIUM 500 MG: 500 TABLET, DELAYED RELEASE ORAL at 21:11

## 2023-08-23 ASSESSMENT — ACTIVITIES OF DAILY LIVING (ADL)
ADLS_ACUITY_SCORE: 41

## 2023-08-23 NOTE — PLAN OF CARE
"Days  Nursing assessment completed. Patient awake and visible during the morning. Spends time using the exercise bike. She is observed to be, almost non stop, laughing and talking to herself. She tells writer that she does not want to take the depakote because \"it makes her voices worse\". She states sometimes her voices are bad, sometimes they are good. During medication administration, writer encouraged he to take the depakote today and to discuss her concerns with the depakote with the doctor, so they could discuss it together. She agreed. Writer attempted to ask her about when she was going in other patients rooms, to assess need to continue SIO. During this time, patient was extremely distracted and talking to herself. She stated she does not remember and then looked at another patient's room and stated \"is this room empty?\" And started to walk into the room, which was redirected, as it was another pt's room. She then went into her room and strait into the bathroom. Will continue to monitor for diverting meds, as patient does have a history of this.   Evening     Patient walking the hallway and using the exercise bike, but slept for much of the evening. She was cooperative with taking all scheduled HS medications. Denied symptoms or side effects. Writer closely monitored for cheeking or diverting meds. Denies SI/SIB or thoughts to harm others. Continue to monitor and assess.                  "

## 2023-08-23 NOTE — PLAN OF CARE
Assessment/Intervention/Current Symtoms and Care Coordination:  Chart review and met with team, discussed pt progress, symptomology, and response to treatment.  Discussed the discharge plan and any potential impediments to discharge.    Met with Angela to check in about hospitalization and see if she had received a Quran which she had.     Discharge Plan or Goal:  Patient presents with symptoms of psychosis that put her at risk of harming herself and others. When patient stabilizes considerations include home with family or IRTS.     Barriers to Discharge:  Patient presents with symptoms of psychosis that put her at risk of harming herself and others.      Referral Status:  Referral needs being assessed as patient stabilizes      Legal Status:  Court Hold    County: [place]    Contacts:  Daughter: Mya, [phone] (Consent)  Aunt: Vickie, [phone] (Consent)  Son: Golden, [phone]   Cousin: Lashawn, [phone] (Consent)  Prepetition screener: Carlos Rushing, [phone]     Upcoming Meetings and Dates/Important Information and next steps:  Examination and preliminary hearing     Team Note Due:  Friday

## 2023-08-23 NOTE — PROGRESS NOTES
"  ----------------------------------------------------------------------------------------------------------  [facility], [place]   Psychiatric Progress Note  Hospital Day #6     Interim History:   The patient's care was discussed with the treatment team and chart notes were reviewed.    Sleep 6 hours (08/23/23 0600)  Scheduled Medications: took all scheduled medications as prescribed   PRN medications: no psychiatric PRNs given     Staff Report:  Nursing assessment completed. Patient awake and visible for much of the shift. Continues with 1:1 SIO for intrusive behaviors, which appear to be improving. Will continue to monitor for SIO need. Patient compliant with scheduled am medications. Clonidine held for systolic <110  Patient met with the  today, which appeared to go well. She tells writer that she is \"doing well\", and that her medications are helpful. She tells writer that she is not currently hearing voices, but does appear to be responding to internal stimuli. Laughing and talking to herself almost constantly. Pleasant mood, bright affect. Denies SI/SIB or thoughts to harm others.  Evening  Patient had an uneventful evening. Attended and attempted to participate in OT group, but struggled with concentration and focus. Appeared preoccupied. Appears responding to internal stimuli. Laughing and talking to self. Medication compliant and asking appropriate medication questions. Continue to monitor and assess.     Patient Interview:   Angela Basurto was interviewed in the common area where she sat wrapped in a sheet from her bed.  She was occasionally laughing inappropriately to self throughout the interview.  She says that she does not like the Depakote because it makes her gain weight so she does not want to take it.  She was repeating many phrases from the interviewer as answers to questions.  She said when asked if she was taking her meds \"spitting out \" She said she spit " "out her medications yesterday but that was the last time she had done this.  She requested female staff for when she is checked in on overnight as she feels uncomfortable if some male staff checks on her at night.  She said that she acknowledged understanding that it could be potentially dangerous for her to be entering other patient's rooms and that her medications are reducing her auditory hallucinations.    Review of systems:     ROS was negative unless noted above.          Allergies:     Allergies   Allergen Reactions    Pork-Derived Products Unknown     [facility] does not eat pork            Psychiatric Examination:   /70 (BP Location: Right arm)   Pulse 82   Temp 97.3  F (36.3  C) (Tympanic)   Resp 16   Ht 1.753 m (5' 9\")   Wt 100.9 kg (222 lb 6.4 oz)   SpO2 98%   BMI 32.84 kg/m    Weight is 222 lbs 6.4 oz  Body mass index is 32.84 kg/m .    MENTAL STATUS EXAM    Appearance:  well-developed, well-nourished, appears stated age, and disheveled  Attitude:  guarded and disorganized behavior  Psychomotor:  normal  Eye Contact: glances  Speech:  non-fluent, normal tone, normal rate, and mumbling  Mood: \"Good\"  Affect:  inappropriate and restricted  Thought Content: denies suicidal ideation and denies homicidal ideation  Thought Process: rambling, responsive to internal stimuli, and disorganized  Sensorium: awake, alert, and active auditory hallucinations  Cognition: impaired concentration  Impulse control: impaired  Insight: poor  Judgment: poor         Labs:   No results found for this or any previous visit (from the past 24 hour(s)).     Assessment    Diagnoses:   # Bipolar I disorder, currently manic with psychotic features   # Hx of possible seizure in 1983     Diagnostic Impression:   Angela Basurto is a 58 year old female previously diagnosed with Bipolar Disorder Type 1 and anxiety who presented with elvis and psychosis in the context of medication nonadherence. Patient was presented to " emergency room due to homicidal threats toward her family using knife, and exhibiting paranoid and severe disorganized thought and responding to her internal stimuli while staying in the emergency room. Significant symptoms on admission include paranoia, disorganized thinking and behaviors, AVH, and emotional lability. The MSE on admission was pertinent for RTIS. Biological contributions to mental health presentation include diagnosis of Bipolar Disorder, type 1 and taking Depakote, Zyprexa, and Haldol. Psychological contributions to mental health presentation include poor insight into her condition. While she understands why she's in the hospital, she displays intrusive behaviors that require a 1:1. 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 Protective factors include family support and being Gnosticist. Most recent psychiatric hospitalization was in July of this year during which time she left [place]. In summary, the patient's reported symptoms of elvis and psychosis in the context of medication nonadherence are consistent with Bipolar Disorder, type 1.     Psychiatric Hospital course:   Angela Basurto was admitted to [facility] on a 72 hour hold. The patient's PTA Zyprexa 10 mg BID & Depakote, and Haldol 20 mg were restarted. PTA hydroxyzine and trazodone were restarted. A petition for MI commitment with Adolfo was filed today through [facility].      Discontinued Medications (& Rationale):  N/a     Medical course   Acetaminophen as needed utilized for pain occasionally.  No other major medical issues arose during hospitalization. PTA Senokot continued for constipation.     Data:   Depakote level 18.2 on 8/17/2023, subtherapeutic    Consults:   None    Plan     Today's Changes:  -Patient care order for female staff checking added today    Scheduled Medicationss:   cloNIDine  0.1 mg Oral BID    divalproex sodium  delayed-release  500 mg Oral Q12H [facility] (08/20)    haloperidol  20 mg Oral At Bedtime    OLANZapine  10 mg Oral BID    sennosides  1 tablet Oral BID       PRN Medications:  - haloperidol **AND** LORazepam **AND** diphenhydrAMINE, haloperidol lactate **AND** LORazepam **AND** diphenhydrAMINE, hydrOXYzine **OR** hydrOXYzine, sennosides    Patient will be treated in therapeutic milieu with appropriate individual and group therapies as described.    Medical diagnoses to be addressed this admission:    # Constipation:   -PTA Senokot daily    Legal Status:   Orders Placed This Encounter      Legal status Court Hold      Safety Assessment:   Behavioral Orders   Procedures    Assault precautions    Cheeking Precautions (behavioral units)     Patient Observed swallowing PO medications; Patient asked to drink water after swallowing medication; Patient in Staff line of sight for 15 minutes after medication given; Mouth checks after PO administration (patient asked to open mouth and stick out their tongue).    Code 1 - Restrict to Unit    Elopement precautions    Routine Programming     As clinically indicated    Status 15     Every 15 minutes.    Status Individual Observation     Patient SIO status reviewed with team/RN.  Please also refer to RN/team documentation for add'l detail.    -SIO staff to monitor following which have contributed to patient being on SIO:  Patient is entering patients' rooms and laying in their beds  -Possible interventions SIO staff could use to support patient's treatment progress:  Pharmacologic interventions for psychosis and redirection from staff  -When following observed, team will review discontinuation of SIO:  Absence of severe intrusiveness for > 24 hours     Order Specific Question:   CONTINUOUS 24 hours / day     Answer:   Other     Order Specific Question:   Specify distance     Answer:   10 ft     Order Specific Question:   Indications for SIO     Answer:   Severe intrusiveness        Disposition: Discharge in approximately 12 days pending stabilization & development of a safe discharge plan.     _________________________________________________________________    This patient was seen and discussed with my attending physician.  Jules Thomson    Psychiatry Resident     _________________________________________________________________  Attending Attestation:

## 2023-08-23 NOTE — PLAN OF CARE
Pt asleep at start of shift.     Breathing quiet and unlabored when sleeping.     Pt had no c/o pain or discomfort during the HS.     Appears to have slept 6 hours.     Pt continues on 1:1 SIO/10 ft space distance.     Goal Outcome Evaluation:  Problem: Adult Behavioral Health Plan of Care  Goal: Adheres to Safety Considerations for Self and Others  Intervention: Develop and Maintain Individualized Safety Plan  Recent Flowsheet Documentation  Taken 8/23/2023 0000 by Opal Wu, RN  Safety Measures:   1:1 observation maintained   safety rounds completed  Goal: Absence of New-Onset Illness or Injury  Intervention: Identify and Manage Fall Risk  Recent Flowsheet Documentation  Taken 8/23/2023 0000 by Opal Wu, RN  Safety Measures:   1:1 observation maintained   safety rounds completed     Problem: Psychotic Signs/Symptoms  Goal: Improved Sleep (Psychotic Signs/Symptoms)  Outcome: Progressing

## 2023-08-24 PROCEDURE — 99232 SBSQ HOSP IP/OBS MODERATE 35: CPT | Performed by: PSYCHIATRY & NEUROLOGY

## 2023-08-24 PROCEDURE — 250N000013 HC RX MED GY IP 250 OP 250 PS 637: Performed by: PSYCHIATRY & NEUROLOGY

## 2023-08-24 PROCEDURE — 124N000002 HC R&B MH UMMC

## 2023-08-24 PROCEDURE — 250N000013 HC RX MED GY IP 250 OP 250 PS 637: Performed by: FAMILY MEDICINE

## 2023-08-24 RX ADMIN — OLANZAPINE 10 MG: 10 TABLET, FILM COATED ORAL at 19:16

## 2023-08-24 RX ADMIN — OLANZAPINE 10 MG: 10 TABLET, FILM COATED ORAL at 08:56

## 2023-08-24 RX ADMIN — SENNOSIDES 1 TABLET: 8.6 TABLET, FILM COATED ORAL at 08:56

## 2023-08-24 RX ADMIN — DIVALPROEX SODIUM 500 MG: 500 TABLET, DELAYED RELEASE ORAL at 08:55

## 2023-08-24 RX ADMIN — SENNOSIDES 1 TABLET: 8.6 TABLET, FILM COATED ORAL at 19:16

## 2023-08-24 RX ADMIN — HALOPERIDOL 20 MG: 10 TABLET ORAL at 20:15

## 2023-08-24 RX ADMIN — DIVALPROEX SODIUM 500 MG: 500 TABLET, DELAYED RELEASE ORAL at 19:16

## 2023-08-24 ASSESSMENT — ACTIVITIES OF DAILY LIVING (ADL)
ADLS_ACUITY_SCORE: 41

## 2023-08-24 NOTE — PLAN OF CARE
"Nursing assessment completed. Am clonidine held for SBP<110. Provider aware. Cooperative with scheduled am medications. Monitored for cheeking or med diversion. She states her voices are \"going down\". Observed to be laughing and talking to herself all morning. Bright, pleasant. During the afternoon, patient was sitting next to one of the hallway phones, holding a packet of paperwork. Writer asked her what they were, and she responded \"discharge papers\". Upon looking at the papers, they were her court paperwork, stating she has court in the morning. Per CM, CM had recently gone over all this information with patient, indicating patient is not tracking or retaining information well. She told writer that she was thinking about calling her family. When asked why she has not called them yet, she stated \"my mind is too busy\". Denies SI/SIB or thoughts to harm others. Medication compliant. Continue to monitor and assess.                         "

## 2023-08-24 NOTE — PLAN OF CARE
Goal Outcome Evaluation:       Pt remains on an SIO for safety.  She slept 3.25 hours.  She sat in the lounge for the majority of the first half of the shift.  No behaviors noted.  In her room she talked and laughed to herself.

## 2023-08-24 NOTE — PLAN OF CARE
Assessment/Intervention/Current Symtoms and Care Coordination:  Chart review and met with team, discussed pt progress, symptomology, and response to treatment.  Discussed the discharge plan and any potential impediments to discharge.     dropped off court hearing information. Copy given to patient and copy put in chart.     Completed the diagnostic assessment for [facility].     Attempted to complete the WHODAS but was unable to due to symptoms.    Discharge Plan or Goal:  Patient presents with symptoms of psychosis that put her at risk of harming herself and others. When patient stabilizes considerations include home with family or IRTS.     Barriers to Discharge:  Patient presents with symptoms of psychosis that put her at risk of harming herself and others.      Referral Status:  Referral needs being assessed as patient stabilizes      Legal Status:  Court Hold    County: [place]  File: [id number]  Exam: 8/25 at 9:00am  Prelim: 8/25 following exam  Final: 8/30 TBD    Contacts:  Daughter: Mya, [phone] (Consent)  Aunt: Vickie, [phone] (Consent)  Son: Golden, [phone]   Cousin: Lashawn [phone] (Consent)  Prepetition screener: Carlos Rushing, [phone]  Her : John Maynard, [phone]     Upcoming Meetings and Dates/Important Information and next steps:  Examination and preliminary hearing     Team Note Due:  Friday

## 2023-08-24 NOTE — PROGRESS NOTES
[facility], [place]   Psychiatric Progress Note  Hospital Day: 7        Interim History:   The patient's care was discussed with the treatment team during the daily team meeting and/or staff's chart notes were reviewed.  Staff report patient continues to respond loudly to internal stimuli. Patient did not report any acute medical concerns or side effects. No behavioral issues overnight, including violent or aggressive behaviors. Patient did not require seclusion or restraints. Patient is exhibiting signs/sx of psychosis or elvis. Patient did not endorse suicidal ideation. Patient did not endorse HI. Patient is medication adherent, though she has a hx of cheeking during past hospital stay, and recently admitted to spitting out medication. Patient is attending groups. Patient is not sleeping well. Patient is eating adequately. Patient is attending to ADLs. Of note, patient told another patient not to eat his mahajan, and the other patient became severely agitated and threw his tray of breakfast in the lounge next to patient.     Upon interview, the patient was lying in bed. She said that the voices have lessened today. Denied cheeking medications, and believes it is possible that medications are helping her. She again requested female staff only for status 15 minute checks overnight. She said that when it is a male staff member, it scares her. She did not elaborate further. She said that she was tired and wanted to sleep.     Suicidal ideation: denies current or recent suicidal ideation or behaviors.    Homicidal ideation: denies current or recent homicidal ideation or behaviors.    Psychotic symptoms: Pt is experiencing AH and paranoia. Patient denies VH.    Medication side effects reported: No significant side effects.    Acute medical concerns: none    Other issues reported by patient: Patient had no further questions or concerns.           Medications:      cloNIDine  0.1 mg Oral BID     "divalproex sodium delayed-release  500 mg Oral Q12H [facility] (08/20)    haloperidol  20 mg Oral At Bedtime    OLANZapine  10 mg Oral BID    sennosides  1 tablet Oral BID          Allergies:     Allergies   Allergen Reactions    Pork-Derived Products Unknown     [facility]astrian does not eat pork          Labs:   No results found for this or any previous visit (from the past 24 hour(s)).       Psychiatric Examination:     /61 (BP Location: Right arm)   Pulse 82   Temp 97.6  F (36.4  C) (Temporal)   Resp 16   Ht 1.753 m (5' 9\")   Wt 100.9 kg (222 lb 6.4 oz)   SpO2 99%   BMI 32.84 kg/m    Weight is 222 lbs 6.4 oz  Body mass index is 32.84 kg/m .    Weight over time:  Vitals:    08/17/23 2218   Weight: 100.9 kg (222 lb 6.4 oz)       Orthostatic Vitals       None              Cardiometabolic risk assessment. 08/24/23      Reviewed patient profile for cardiometabolic risk factors    Date taken /Value  REFERENCE RANGE   Abdominal Obesity  (Waist Circumference)   See nursing flowsheet Women ?35 in (88 cm)   Men ?40 in (102 cm)      Triglycerides  Triglycerides   Date Value Ref Range Status   07/02/2023 97 <150 mg/dL Final       ?150 mg/dL (1.7 mmol/L) or current treatment for elevated triglycerides   HDL cholesterol  Direct Measure HDL   Date Value Ref Range Status   07/02/2023 84 >=50 mg/dL Final   ]   Women <50 mg/dL (1.3 mmol/L) in women or current treatment for low HDL cholesterol  Men <40 mg/dL (1 mmol/L) in men or current treatment for low HDL cholesterol     Fasting plasma glucose (FPG) Lab Results   Component Value Date     08/18/2023      FPG ?100 mg/dL (5.6 mmol/L) or treatment for elevated blood glucose   Blood pressure  BP Readings from Last 3 Encounters:   08/23/23 122/61   07/25/23 (!) 143/79    Blood pressure ?130/85 mmHg or treatment for elevated blood pressure   Family History  See family history     Mental Status Exam:  Appearance: awake, alert, adequately groomed, and dressed in hospital " scrubs  Attitude:  cooperative and guarded  Eye Contact:  intense  Mood:  anxious  Affect:  mood congruent and intensity is blunted  Speech:  mumbling  Language: fluent and intact in English  Psychomotor, Gait, Musculoskeletal:  no evidence of tardive dyskinesia, dystonia, or tics  Throught Process:  disorganized, evidence of thought blocking present, and illogical  Associations:  no loose associations  Thought Content:  no evidence of suicidal ideation or homicidal ideation, auditory hallucinations present, and patient appears to be responding to internal stimuli  Insight:  fair  Judgement:  fair  Oriented to:  time, person, and place  Attention Span and Concentration:  fair  Recent and Remote Memory:  fair  Fund of Knowledge:  appropriate           Precautions:     Behavioral Orders   Procedures    Assault precautions    Cheeking Precautions (behavioral units)     Patient Observed swallowing PO medications; Patient asked to drink water after swallowing medication; Patient in Staff line of sight for 15 minutes after medication given; Mouth checks after PO administration (patient asked to open mouth and stick out their tongue).    Code 1 - Restrict to Unit    Elopement precautions    Routine Programming     As clinically indicated    Status 15     Every 15 minutes.    Status Individual Observation     Patient SIO status reviewed with team/RN.  Please also refer to RN/team documentation for add'l detail.    -SIO staff to monitor following which have contributed to patient being on SIO:  Patient is entering patients' rooms and laying in their beds  -Possible interventions SIO staff could use to support patient's treatment progress:  Pharmacologic interventions for psychosis and redirection from staff  -When following observed, team will review discontinuation of SIO:  Absence of severe intrusiveness for > 24 hours     Order Specific Question:   CONTINUOUS 24 hours / day     Answer:   Other     Order Specific Question:    Specify distance     Answer:   10 ft     Order Specific Question:   Indications for SIO     Answer:   Severe intrusiveness          Diagnoses:     # Bipolar I disorder, currently manic with psychotic features   # Hx of possible seizure in 1983          Assessment & Plan:     Assessment and hospital summary:  Angela Basurto is a 58 year old female previously diagnosed with Bipolar Disorder Type 1 and anxiety who presented with elvis and psychosis in the context of medication nonadherence. Patient was presented to emergency room due to homicidal threats toward her family using knife, and exhibiting paranoid and severe disorganized thought and responding to her internal stimuli while staying in the emergency room. Significant symptoms on admission include paranoia, disorganized thinking and behaviors, AVH, and emotional lability. The MSE on admission was pertinent for RTIS. Biological contributions to mental health presentation include diagnosis of Bipolar Disorder, type 1 and taking Depakote, Zyprexa, and Haldol. Psychological contributions to mental health presentation include poor insight into her condition. While she understands why she's in the hospital, she displays intrusive behaviors that require a 1:1. 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 Protective factors include family support and being Gnosticist. Most recent psychiatric hospitalization was in July of this year during which time she left [place]. In summary, the patient's reported symptoms of elvis and psychosis in the context of medication nonadherence are consistent with Bipolar Disorder, type 1.      Angela Basurto was admitted to [facility] on a 72 hour hold. The patient's PTA Zyprexa 10 mg BID & Depakote, and Haldol 20 mg were restarted. PTA hydroxyzine and trazodone were restarted. A petition for MI commitment with [facility] was filed on 8/18  through [facility].  She is now on court hold.     Today's Changes:  HOLD clonidine as patient's pressures have been consistently soft.     Target psychiatric symptoms and interventions:  Depakote  mg BID  Haldol 20 mg at bedtime  Zyprexa 10 mg BID    Risks, benefits, and alternatives discussed at length with patient.     Acute Medical Problems and Treatments:  Acute medical concerns:  - No acute medical concerns    Acetaminophen as needed utilized for pain occasionally. No other major medical issues arose during hospitalization. PTA Senokot continued for constipation.     Pertinent labs/imaging:  Depakote level 18.2 on 8/17/2023, subtherapeutic. Concerns for cheeking as patient admitted to diverting medication.     Level obtained again on 8/22 is now therapeutic at 106.    Behavioral/Psychological/Social:  - Encourage unit programming    Safety:  - Continue precautions as noted above  - Status 15 minute checks  - On SIO for severe intrusiveness and vulnerability around other high acuity patients    Legal Status: court hold    Disposition Plan   Reason for ongoing admission: poses an imminent risk to self, poses an imminent risk to others, and is unable to care for self due to severe psychosis or elvis  Discharge location:  TBD  Discharge Medications: not ordered  Follow-up Appointments: not scheduled    Entered by: Denia Champagne MD on 8/24/2023 at 8:19 AM

## 2023-08-24 NOTE — PLAN OF CARE
Pt presents as guarded, withdrawn. Visible in milieu, not observed socializing. Responding to internal stimuli, talking to herself, laughing to herself frequently for no apparent reason. Poor attention at times. Pt demonstrated difficulty following questioning during psychological assessment. Pt endorsed hearing voices. Stated that the voices are her friends. Despite prompting, did not elaborate on content of the voices. Denied anxiety, depression, SI/HI.     Initially refused scheduled haldol despite numerous promptings. Was reluctant to provide a reason. Accepted medication from a different staff member. Refused scheduled vitals.     No behavioral outburst noted. No physical concern voiced.     Problem: Psychotic Signs/Symptoms  Goal: Optimal Cognitive Function (Psychotic Signs/Symptoms)  Outcome: Not Progressing

## 2023-08-25 PROCEDURE — 250N000013 HC RX MED GY IP 250 OP 250 PS 637: Performed by: PSYCHIATRY & NEUROLOGY

## 2023-08-25 PROCEDURE — 124N000002 HC R&B MH UMMC

## 2023-08-25 PROCEDURE — 99233 SBSQ HOSP IP/OBS HIGH 50: CPT | Performed by: PSYCHIATRY & NEUROLOGY

## 2023-08-25 RX ADMIN — OLANZAPINE 10 MG: 10 TABLET, FILM COATED ORAL at 21:02

## 2023-08-25 RX ADMIN — DIVALPROEX SODIUM 500 MG: 500 TABLET, DELAYED RELEASE ORAL at 08:46

## 2023-08-25 RX ADMIN — HALOPERIDOL 20 MG: 10 TABLET ORAL at 21:01

## 2023-08-25 RX ADMIN — SENNOSIDES 1 TABLET: 8.6 TABLET, FILM COATED ORAL at 08:46

## 2023-08-25 RX ADMIN — SENNOSIDES 1 TABLET: 8.6 TABLET, FILM COATED ORAL at 21:01

## 2023-08-25 RX ADMIN — OLANZAPINE 10 MG: 10 TABLET, FILM COATED ORAL at 08:46

## 2023-08-25 ASSESSMENT — ACTIVITIES OF DAILY LIVING (ADL)
ADLS_ACUITY_SCORE: 41
HYGIENE/GROOMING: INDEPENDENT
ADLS_ACUITY_SCORE: 41
ADLS_ACUITY_SCORE: 41
LAUNDRY: UNABLE TO COMPLETE
ORAL_HYGIENE: INDEPENDENT
ADLS_ACUITY_SCORE: 41
ADLS_ACUITY_SCORE: 41
DRESS: INDEPENDENT
ADLS_ACUITY_SCORE: 41

## 2023-08-25 NOTE — PROVIDER NOTIFICATION
08/25/23 6482   Individualization/Patient Specific Goals   Patient Personal Strengths expressive of emotions;family/social support;humor;independent living skills;interests/hobbies;parenting skills;positive attitude;resilient;resourceful;stable living environment;spiritual/Sabianism support   Patient Vulnerabilities lacks insight into illness;traumatic event   Interprofessional Rounds   Summary Angela attended the examination and preliminary hearing for a mental health commitment today. Final hearing is next week. She is still presenting with symptoms of psychosis as evidenced by frequent engagement with internal stimuli.   Participants CTC;psychiatrist;nursing   Behavioral Team Discussion   Participants Dr. Champagne, Laurel Haq, Lucinda Mayes   Anticipated length of stay 7 days   Continued Stay Criteria/Rationale In the commitment process. Medication management ongoing.     PRECAUTIONS AND SAFETY    Behavioral Orders   Procedures    Assault precautions    Cheeking Precautions (behavioral units)     Patient Observed swallowing PO medications; Patient asked to drink water after swallowing medication; Patient in Staff line of sight for 15 minutes after medication given; Mouth checks after PO administration (patient asked to open mouth and stick out their tongue).    Code 1 - Restrict to Unit    Elopement precautions    Routine Programming     As clinically indicated    Status 15     Every 15 minutes.    Status Individual Observation     Patient SIO status reviewed with team/RN.  Please also refer to RN/team documentation for add'l detail.    -SIO staff to monitor following which have contributed to patient being on SIO:  Patient is entering patients' rooms and laying in their beds  -Possible interventions SIO staff could use to support patient's treatment progress:  Pharmacologic interventions for psychosis and redirection from staff  -When following observed, team will review discontinuation of SIO:  Absence  of severe intrusiveness for > 24 hours     Order Specific Question:   CONTINUOUS 24 hours / day     Answer:   Other     Order Specific Question:   Specify distance     Answer:   10 ft     Order Specific Question:   Indications for SIO     Answer:   Severe intrusiveness       Safety  Safety WDL: WDL  Patient Location: [place]  Observed Behavior: sleeping  Safety Measures: safety rounds completed  Diversional Activity: television  Suicidality: Status 15, SIO (Status Individual Observation)  (NOTE - order will specify distance  Assault: private room, behavioral scrubs (pajamas), status continuous sight  Elopement Interventions: status continuous sight, room away from unit doors, behavioral scrubs (pajamas), signs posted on unit entrance / exit doors

## 2023-08-25 NOTE — PLAN OF CARE
"Nursing assessment completed. Patient continues with 1:1 SIO for intrusiveness and vulnerability. Writer checked in with patient during am medication administration. She stated she did not sleep well last night. She stated she only slept for 4 hours.She stated the reason she slept poorly was because he mind was \"so busy\". She stated she was happy her family visited yesterday evening. She stated she did not want to take the Depakote, but did agree to take it, and was cooperative with all schedule am medicatins. During interaction, patient was very distrcted, and almost contantly responding to internal stimuli, whispering to herself. Affect more flat that previous days. Writer asked her if she knew why staff was sitting with her on SIO, which she stated she did not. Writer explained that she had been going into other pt's rooms, which can be bothersome to others, as well as potentially dangerous for her. She stated she did not recall doing that, but did state she gets confused at which room is her room. Patient states she last had a BM yesterday evening, denies current constipation. Patient attended her ecam and preliminary hearing this morning. Final hearing next week.                         "

## 2023-08-25 NOTE — PLAN OF CARE
NOC Shift Report     Pt in bed at beginning of shift, breathing quiet and unlabored. Pt awake for part of the shift. She sat in the chaparro, awake but nodding her head off to sleep. Pain was assessed and patient denied having any. Patient declined medications for sleep. Pt slept 4.75 hours.       No PRNs given. Will continue to monitor.

## 2023-08-25 NOTE — PROGRESS NOTES
"[facility], [place]   Psychiatric Progress Note  Hospital Day: 8        Interim History:   The patient's care was discussed with the treatment team during the daily team meeting and/or staff's chart notes were reviewed.  Staff report patient continues to respond loudly to internal stimuli. Reported that the voices are her friends. Patient did not report any acute medical concerns or side effects. No behavioral issues overnight, including violent or aggressive behaviors. Patient did not require seclusion or restraints. Patient is exhibiting signs/sx of psychosis or elvis. Patient did not endorse suicidal ideation. Patient did not endorse HI. Patient is medication adherent, though she has a hx of cheeking during past hospital stay, and recently admitted to spitting out medication. Patient is attending groups. Patient is not sleeping well, slept 4.75 hours overnight. Patient is eating adequately. Patient is attending to ADLs. Of note, patient continues to appear confused and disorganized. Requiring ongoing redirection.     Upon interview, Angela was actively responding to internal stimuli throughout the interview. She was unable to answer many of my questions due to distractibility and responding to internal stimuli. She said that she had a court hearing this morning and \"they said to refuse Depakote. I need to be on Lamictal and Zyprexa.\" When asked about AH, she said \"all gone.\" She reported pain in her right shoulder, though no other medical concerns. Unclear how long she has been experiencing this pain. She acknowledged feeling confused at times on the unit.     Suicidal ideation: denies current or recent suicidal ideation or behaviors.    Homicidal ideation: denies current or recent homicidal ideation or behaviors.    Psychotic symptoms: Pt is experiencing AH and paranoia. Patient denies VH.    Medication side effects reported: No significant side effects.    Acute medical concerns: " "none    Other issues reported by patient: Patient had no further questions or concerns.           Medications:      [Held by provider] cloNIDine  0.1 mg Oral BID    divalproex sodium delayed-release  500 mg Oral Q12H ESTHER (08/20)    haloperidol  20 mg Oral At Bedtime    OLANZapine  10 mg Oral BID    sennosides  1 tablet Oral BID          Allergies:     Allergies   Allergen Reactions    Pork-Derived Products Unknown     Worship does not eat pork          Labs:   No results found for this or any previous visit (from the past 24 hour(s)).       Psychiatric Examination:     /83 (BP Location: Left arm, Patient Position: Sitting, Cuff Size: Adult Large)   Pulse 90   Temp 98  F (36.7  C) (Temporal)   Resp 16   Ht 1.753 m (5' 9\")   Wt 102.7 kg (226 lb 6.4 oz)   SpO2 100%   BMI 33.43 kg/m    Weight is 226 lbs 6.4 oz  Body mass index is 33.43 kg/m .    Weight over time:  Vitals:    08/17/23 2218 08/24/23 0800   Weight: 100.9 kg (222 lb 6.4 oz) 102.7 kg (226 lb 6.4 oz)       Orthostatic Vitals       None              Cardiometabolic risk assessment. 08/24/23      Reviewed patient profile for cardiometabolic risk factors    Date taken /Value  REFERENCE RANGE   Abdominal Obesity  (Waist Circumference)   See nursing flowsheet Women ?35 in (88 cm)   Men ?40 in (102 cm)      Triglycerides  Triglycerides   Date Value Ref Range Status   07/02/2023 97 <150 mg/dL Final       ?150 mg/dL (1.7 mmol/L) or current treatment for elevated triglycerides   HDL cholesterol  Direct Measure HDL   Date Value Ref Range Status   07/02/2023 84 >=50 mg/dL Final   ]   Women <50 mg/dL (1.3 mmol/L) in women or current treatment for low HDL cholesterol  Men <40 mg/dL (1 mmol/L) in men or current treatment for low HDL cholesterol     Fasting plasma glucose (FPG) Lab Results   Component Value Date     08/18/2023      FPG ?100 mg/dL (5.6 mmol/L) or treatment for elevated blood glucose   Blood pressure  BP Readings from Last 3 Encounters: "   08/25/23 124/83   07/25/23 (!) 143/79    Blood pressure ?130/85 mmHg or treatment for elevated blood pressure   Family History  See family history     Mental Status Exam:  Appearance: awake, alert, adequately groomed, and dressed in hospital scrubs  Attitude:  cooperative and guarded  Eye Contact:  intense  Mood:  anxious  Affect:  mood congruent and intensity is blunted  Speech:  mumbling  Language: fluent and intact in English  Psychomotor, Gait, Musculoskeletal:  no evidence of tardive dyskinesia, dystonia, or tics  Throught Process:  disorganized, evidence of thought blocking present, and illogical  Associations:  no loose associations  Thought Content:  no evidence of suicidal ideation or homicidal ideation, auditory hallucinations present, and patient appears to be responding to internal stimuli throughout entire interview  Insight:  fair  Judgement:  fair  Oriented to:  time, person, and place  Attention Span and Concentration:  fair  Recent and Remote Memory:  fair  Fund of Knowledge:  appropriate           Precautions:     Behavioral Orders   Procedures    Assault precautions    Cheeking Precautions (behavioral units)     Patient Observed swallowing PO medications; Patient asked to drink water after swallowing medication; Patient in Staff line of sight for 15 minutes after medication given; Mouth checks after PO administration (patient asked to open mouth and stick out their tongue).    Code 1 - Restrict to Unit    Elopement precautions    Routine Programming     As clinically indicated    Status 15     Every 15 minutes.    Status Individual Observation     Patient SIO status reviewed with team/RN.  Please also refer to RN/team documentation for add'l detail.    -SIO staff to monitor following which have contributed to patient being on SIO:  Patient is entering patients' rooms and laying in their beds  -Possible interventions SIO staff could use to support patient's treatment progress:  Pharmacologic  interventions for psychosis and redirection from staff  -When following observed, team will review discontinuation of SIO:  Absence of severe intrusiveness for > 24 hours     Order Specific Question:   CONTINUOUS 24 hours / day     Answer:   Other     Order Specific Question:   Specify distance     Answer:   10 ft     Order Specific Question:   Indications for SIO     Answer:   Severe intrusiveness          Diagnoses:     # Bipolar I disorder, currently manic with psychotic features   # Hx of possible seizure in 1983          Assessment & Plan:     Assessment and hospital summary:  Angela Basurto is a 58 year old female previously diagnosed with Bipolar Disorder Type 1 and anxiety who presented with elvis and psychosis in the context of medication nonadherence. Patient was presented to emergency room due to homicidal threats toward her family using knife, and exhibiting paranoid and severe disorganized thought and responding to her internal stimuli while staying in the emergency room. Significant symptoms on admission include paranoia, disorganized thinking and behaviors, AVH, and emotional lability. The MSE on admission was pertinent for RTIS. Biological contributions to mental health presentation include diagnosis of Bipolar Disorder, type 1 and taking Depakote, Zyprexa, and Haldol. Psychological contributions to mental health presentation include poor insight into her condition. While she understands why she's in the hospital, she displays intrusive behaviors that require a 1:1. 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 Protective factors include family support and being Muslim. Most recent psychiatric hospitalization was in July of this year during which time she left [facility]. In summary, the patient's reported symptoms of elvis and psychosis in the context of medication nonadherence are consistent with Bipolar  Disorder, type 1.      Angela Basurto was admitted to [facility] on a 72 hour hold. The patient's PTA Zyprexa 10 mg BID & Depakote, and Haldol 20 mg were restarted. PTA hydroxyzine and trazodone were restarted. A petition for MI commitment with Adol[facility] was filed on 8/18 through [facility].  She is now on court hold.     Today's Changes:  Add Haldol level in AM  Tentatively planning on initiating clozapine if Adolfo is approved.     Target psychiatric symptoms and interventions:  Depakote  mg BID  Haldol 20 mg at bedtime  Zyprexa 10 mg BID    Risks, benefits, and alternatives discussed at length with patient.     Acute Medical Problems and Treatments:  Acute medical concerns:  - No acute medical concerns    Hx of HTN  HOLD clonidine as patient's pressures have been consistently soft.     Acetaminophen as needed utilized for pain occasionally. No other major medical issues arose during hospitalization. PTA Senokot continued for constipation.     Pertinent labs/imaging:  Depakote level 18.2 on 8/17/2023, subtherapeutic. Concerns for cheeking as patient admitted to diverting medication.     Level obtained again on 8/22 is now therapeutic at 106.    Behavioral/Psychological/Social:  - Encourage unit programming    Safety:  - Continue precautions as noted above  - Status 15 minute checks  - On SIO for severe intrusiveness and vulnerability around other high acuity patients    Legal Status: court hold    Disposition Plan   Reason for ongoing admission: poses an imminent risk to self, poses an imminent risk to others, and is unable to care for self due to severe psychosis or elvis  Discharge location:  TBD  Discharge Medications: not ordered  Follow-up Appointments: not scheduled    Entered by: Denia Champagne MD on 8/25/2023 at 8:40 AM

## 2023-08-25 NOTE — PLAN OF CARE
Assessment/Intervention/Current Symtoms and Care Coordination:  Chart review and met with team, discussed pt progress, symptomology, and response to treatment.  Discussed the discharge plan and any potential impediments to discharge.     Angela attended the examination and preliminary hearing with [facility] this morning.     Writer checked in with Angela about hospitalization, she was happy to have seen her family last evening.     Discharge Plan or Goal:  Patient presents with symptoms of psychosis that put her at risk of harming herself and others. When patient stabilizes considerations include home with family or IRTS.     Barriers to Discharge:  Patient presents with symptoms of psychosis that put her at risk of harming herself and others.      Referral Status:  Referral needs being assessed as patient stabilizes      Legal Status:  Court Hold    County: [place]  File: [id number]  Exam: 8/25 at 9:00am  Prelim: 8/25 following exam  Final: 8/30 TBD    Contacts:  Daughter: Mya, [phone] (Consent)  Aunt: [facility], [phone] (Consent)  Son: Golden [phone]   Cousin: Lashawn [phone] (Consent)  Prepetition screener: Carlos Rushing, [phone]  Her : John Maynard, [phone]     Upcoming Meetings and Dates/Important Information and next steps:  Examination and preliminary hearing     Team Note Due:  Friday   [FreeTextEntry1] : Jamil 

## 2023-08-26 PROCEDURE — 250N000013 HC RX MED GY IP 250 OP 250 PS 637: Performed by: PSYCHIATRY & NEUROLOGY

## 2023-08-26 PROCEDURE — 124N000002 HC R&B MH UMMC

## 2023-08-26 RX ADMIN — OLANZAPINE 10 MG: 10 TABLET, FILM COATED ORAL at 08:22

## 2023-08-26 RX ADMIN — SENNOSIDES 1 TABLET: 8.6 TABLET, FILM COATED ORAL at 08:22

## 2023-08-26 RX ADMIN — DIVALPROEX SODIUM 500 MG: 500 TABLET, DELAYED RELEASE ORAL at 08:22

## 2023-08-26 RX ADMIN — OLANZAPINE 10 MG: 10 TABLET, FILM COATED ORAL at 21:38

## 2023-08-26 RX ADMIN — HALOPERIDOL 20 MG: 10 TABLET ORAL at 21:37

## 2023-08-26 RX ADMIN — SENNOSIDES 1 TABLET: 8.6 TABLET, FILM COATED ORAL at 21:36

## 2023-08-26 ASSESSMENT — ACTIVITIES OF DAILY LIVING (ADL)
ORAL_HYGIENE: INDEPENDENT
ADLS_ACUITY_SCORE: 41
DRESS: INDEPENDENT
ADLS_ACUITY_SCORE: 41
LAUNDRY: UNABLE TO COMPLETE
HYGIENE/GROOMING: INDEPENDENT
ADLS_ACUITY_SCORE: 41
ADLS_ACUITY_SCORE: 29
ADLS_ACUITY_SCORE: 41
DRESS: INDEPENDENT
HYGIENE/GROOMING: INDEPENDENT
ADLS_ACUITY_SCORE: 29
ADLS_ACUITY_SCORE: 41
LAUNDRY: UNABLE TO COMPLETE
ORAL_HYGIENE: INDEPENDENT

## 2023-08-26 NOTE — PLAN OF CARE
NOC Shift Report     Pt in bed at beginning of shift, breathing quiet and unlabored. Pt woke up frequently. She was offered medication for sleep but refused. She was noted to be engaging with another peer in the chaparro. Conversation was redirected due to it becoming loud with very loud laughter. Both patients were speaking in [place] of [place]. Pt slept 2.5 hours.     No PRNs given. Will continue to monitor.

## 2023-08-26 NOTE — PLAN OF CARE
Was unable to maintain focus in conversation: almost constantly attending to inner stimuli by talking and laughing. Denied imperatives. Denied fear and anger and thought of harming anyone. No intrusive behaviour. Noticeably clearer in the later evening.    Could not name her diagnosis or any symptoms. Did not know what Depakote is for but acknowledged weight gain is an unwanted issue to deal with. Refused HS Depakote.  Accepted reminder that her insomnia may be a reflection on her mental state. Able to listen to explanations and very polite in voicing appreciation of staff's concern.    Next court date 8/25/23    Remains with 1:1 assistance  for intrusiveness    Plan: Monitor and document mood and behaviour, thought process and content. Establish and maintain therapeutic relationship. Educate about diagnoses, medications, treatment, legal status, plan of care. Address preexisting and concurrent medical concerns.     P:Disturbed thought process  G:Rational thought process  O:Not progressing

## 2023-08-26 NOTE — CARE PLAN
Occupational Therapy Group Note:     08/26/23 1616   Group Therapy Session   Group Attendance attended group session   Time Session Began 1115   Time Session Ended 1200   Total Time (minutes) 30 (no charge)   Total # Attendees 3   Group Type task skill   Group Topic Covered coping skills/lifestyle management;emotions/expression;relaxation techniques;structured socialization   Group Session Detail OT Activity Group   Patient Response/Contribution cooperative with task   Patient Participation Detail Patient entered group late. Patient was agreeable to participate in a relaxing fuzzy poster coloring task to act as a positive distraction to distressing feelings/emotions. Patient selected a poster independently and initiated engagement in task. Patient worked in a slow manner; made minimal progress on project. Patient appeared to be responding to internal stimuli as evidenced by unprompted laughter in group. Affect: blunted. Mood: calm, cooperative.

## 2023-08-26 NOTE — PLAN OF CARE
"  Problem: Adult Behavioral Health Plan of Care  Goal: Adheres to Safety Considerations for Self and Others  Outcome: Progressing   Goal Outcome Evaluation:    Plan of Care Reviewed With: patient      Patient has been calm and cooperative during this shift. Patient has been sitting out on the unit Isolative to her self/withdrawn. She denies having any thoughts of wanting to harm her self or others, depression, and anxiety. Patient was observed to be responding to internal stimuli, and laughing to her self. She was medication compliant. Vitals are stable    Blood pressure 131/82, pulse 87, temperature 97.3  F (36.3  C), temperature source Temporal, resp. rate 16, height 1.753 m (5' 9\"), weight 102.7 kg (226 lb 6.4 oz), SpO2 98 %.            "

## 2023-08-27 PROCEDURE — 124N000002 HC R&B MH UMMC

## 2023-08-27 PROCEDURE — H2032 ACTIVITY THERAPY, PER 15 MIN: HCPCS

## 2023-08-27 PROCEDURE — 80173 ASSAY OF HALOPERIDOL: CPT | Performed by: PSYCHIATRY & NEUROLOGY

## 2023-08-27 PROCEDURE — 36415 COLL VENOUS BLD VENIPUNCTURE: CPT | Performed by: PSYCHIATRY & NEUROLOGY

## 2023-08-27 PROCEDURE — 250N000013 HC RX MED GY IP 250 OP 250 PS 637: Performed by: PSYCHIATRY & NEUROLOGY

## 2023-08-27 RX ADMIN — HALOPERIDOL 20 MG: 10 TABLET ORAL at 20:39

## 2023-08-27 RX ADMIN — SENNOSIDES 1 TABLET: 8.6 TABLET, FILM COATED ORAL at 20:39

## 2023-08-27 RX ADMIN — OLANZAPINE 10 MG: 10 TABLET, FILM COATED ORAL at 20:39

## 2023-08-27 RX ADMIN — SENNOSIDES 1 TABLET: 8.6 TABLET, FILM COATED ORAL at 08:55

## 2023-08-27 RX ADMIN — OLANZAPINE 10 MG: 10 TABLET, FILM COATED ORAL at 08:55

## 2023-08-27 RX ADMIN — DIVALPROEX SODIUM 500 MG: 500 TABLET, DELAYED RELEASE ORAL at 08:55

## 2023-08-27 ASSESSMENT — ACTIVITIES OF DAILY LIVING (ADL)
ADLS_ACUITY_SCORE: 29
ORAL_HYGIENE: INDEPENDENT
HYGIENE/GROOMING: INDEPENDENT
ADLS_ACUITY_SCORE: 29
LAUNDRY: UNABLE TO COMPLETE
DRESS: INDEPENDENT
ADLS_ACUITY_SCORE: 29

## 2023-08-27 NOTE — PLAN OF CARE
Nursing assessment completed. Patient up early this morning, sitting in the lounge. She initially declined to do her lab draw, but was receptive to writer explaining to her that the labs are important, and she did agree and cooperate with the lab draw. She is almost constantly observed to be laughing and talking to herself. She states she understands that she can not go in other patient's rooms, and this might not be safe if she did. She stated she will not go into another pt's room, and that if she sees or hears something in another room that she wants to look at, she will tell writer right away. Denies SI/SIB or thoughts to harm others. Medication compliant, but did ask if she still needed to take the depakote. Writer provided education, and she agreed to take the Depakote. Continue to monitor and assess.

## 2023-08-27 NOTE — PLAN OF CARE
NOC Shift Report     Pt in bed at beginning of shift, breathing quiet and unlabored. Pt woke up a few times during the shift. She walked out to the hallway phone and would sit there for several minutes before going back to her room. She denies pain. NO PRN's given. Pt slept 4.25 hours.

## 2023-08-27 NOTE — PLAN OF CARE
Almost continuous responding to inner stimuli by laughing and talking to self. Also observed walking to windows while still talking to self- did not answer when asked if experiencing visual hallucinations. Denies imperatives, denies fear and anger. Unchanged difficulty trying to focus in conversation and on television. Expressed the desire for the auditory hallucinations to stop but does not believe this will happen.    Will not reconsider her Depakote refusal- politely declines.    Recent sleep at night/hours: 2.5, 3.25, 6, 4.75, 4.25, 6. She stated this is due to the voices she hears, and that this is Allah's will.     Calm behaviour. Initiates hygiene. Does not initate socialisation.       Next court date 8/25/23.     Plan: Monitor and document mood and behaviour, thought process and content. Establish and maintain therapeutic relationship. Educate about diagnoses, medications, treatment, legal status, plan of care. Address preexisting and concurrent medical concerns.      P:Disturbed thought process  G:Rational thought process  O:Not progressing

## 2023-08-28 PROCEDURE — H2032 ACTIVITY THERAPY, PER 15 MIN: HCPCS

## 2023-08-28 PROCEDURE — 124N000002 HC R&B MH UMMC

## 2023-08-28 PROCEDURE — 250N000013 HC RX MED GY IP 250 OP 250 PS 637: Performed by: PSYCHIATRY & NEUROLOGY

## 2023-08-28 PROCEDURE — 99233 SBSQ HOSP IP/OBS HIGH 50: CPT | Performed by: PSYCHIATRY & NEUROLOGY

## 2023-08-28 RX ORDER — DIVALPROEX SODIUM 500 MG/1
1000 TABLET, EXTENDED RELEASE ORAL DAILY
Status: DISCONTINUED | OUTPATIENT
Start: 2023-08-29 | End: 2023-09-06

## 2023-08-28 RX ORDER — ACETAMINOPHEN 325 MG/1
650 TABLET ORAL EVERY 4 HOURS PRN
Status: DISCONTINUED | OUTPATIENT
Start: 2023-08-28 | End: 2023-09-20 | Stop reason: HOSPADM

## 2023-08-28 RX ORDER — DIVALPROEX SODIUM 500 MG/1
500 TABLET, DELAYED RELEASE ORAL ONCE
Status: COMPLETED | OUTPATIENT
Start: 2023-08-28 | End: 2023-08-28

## 2023-08-28 RX ADMIN — HALOPERIDOL 20 MG: 10 TABLET ORAL at 20:00

## 2023-08-28 RX ADMIN — OLANZAPINE 10 MG: 10 TABLET, FILM COATED ORAL at 08:41

## 2023-08-28 RX ADMIN — SENNOSIDES 1 TABLET: 8.6 TABLET, FILM COATED ORAL at 20:00

## 2023-08-28 RX ADMIN — DIVALPROEX SODIUM 500 MG: 500 TABLET, DELAYED RELEASE ORAL at 17:52

## 2023-08-28 RX ADMIN — ACETAMINOPHEN 650 MG: 325 TABLET, FILM COATED ORAL at 14:58

## 2023-08-28 RX ADMIN — OLANZAPINE 10 MG: 10 TABLET, FILM COATED ORAL at 20:00

## 2023-08-28 RX ADMIN — DIVALPROEX SODIUM 500 MG: 500 TABLET, DELAYED RELEASE ORAL at 08:41

## 2023-08-28 RX ADMIN — SENNOSIDES 1 TABLET: 8.6 TABLET, FILM COATED ORAL at 08:41

## 2023-08-28 ASSESSMENT — ACTIVITIES OF DAILY LIVING (ADL)
ADLS_ACUITY_SCORE: 29
LAUNDRY: UNABLE TO COMPLETE
ADLS_ACUITY_SCORE: 29
ORAL_HYGIENE: INDEPENDENT
ADLS_ACUITY_SCORE: 29
DRESS: SCRUBS (BEHAVIORAL HEALTH)
ADLS_ACUITY_SCORE: 29
ADLS_ACUITY_SCORE: 29
HYGIENE/GROOMING: INDEPENDENT

## 2023-08-28 NOTE — PROGRESS NOTES
Angela entered group room this morning where Music Therapy was to be held.  Initially she was laughing hysterically to herself, but when the music started, she calmed and appeared to focus more.  Then staff called her out of session to meet and she did not return.  No charge.    Sangita South MT-BC  Board-Certified Music Therapist

## 2023-08-28 NOTE — PLAN OF CARE
Unchanged almost continuous responding to inner stimuli. Carrying  on conversation with internal stimuli while speaking with this nurse. Sounds discouraged although often laughing and talking  to herself.    Denies thought of harm to anyone as well as anger and fear. Fatalistic attitude.    Also unchanged sleep disturbance related to unremitting auditory hallucinations. (Slept 4.25 hours last night.)    Refuses Depakote for the 3rd day.    Independent ADLs. Ability to participate in conversation but not to stay focused. No initiation of interaction with peers. No intrusiveness.    Final court date: 8/30/23.    Plan: Monitor and document mood and behaviour, thought process and content. Establish and maintain therapeutic relationship. Educate about diagnoses, medications, treatment, legal status, plan of care. Address preexisting and concurrent medical concerns.      P:Disturbed thought process  G:Rational thought process  O:Not progressing

## 2023-08-28 NOTE — CARE PLAN
"   08/27/23 2100   Group Therapy Session   Group Attendance attended group session   Time Session Began 1615   Time Session Ended 1700   Total Time (minutes) 30   Total # Attendees 4   Group Type expressive therapy   Group Topic Covered emotions/expression   Patient Response/Contribution cooperative with task       Art Therapy directive is to draw an image of a safe place and to identify five items within safe place that represent each of the five senses.   Goals of directive: emotional expression, emotional regulation, trauma containment.  Pt hurried through drawing and was a passive observer for the remainder of group. Pt was observed responding to internal stimuli for the majority of time pt was in group. Pt was laughing to self often, however was not disruptive to group process. Pt briefly held up artwork at the end of group to share. Pt rosalind a stick-figure style image with the words \"I miss mom.\" Pt said that her mom currently lives in [place]. Pt was asked about the five senses in regards to her drawing (spending time with her mother.) Pt was laughing and responding to internal stimuli and it was difficult to understand pts answers.           "

## 2023-08-28 NOTE — PLAN OF CARE
Patient was sleeping at the start of this shift. She ate breakfast when she woke up, and took all scheduled medication. She is alert and oriented to person with a flat, guarded affect. She attended group and after group took a shower. While in the [facility] area, patient was observed laughing and talking to herself, but denies auditory hallucination, and psych symptoms. Patient was calm this shift with no episode of agitation, anxiety or behavior disturbance. She complained of pain rated 7/10 and prn tylenol was given. It was overall a good shift

## 2023-08-28 NOTE — PLAN OF CARE
NOC Shift Report     Pt in bed at beginning of shift, breathing quiet and unlabored. Pt woke up once for an hour. She laughed loudly and talked to herself while sitting in the dinning area. Writer assessed patient and offered medication. Patient stated that she wasn't experiencing any hallucinations and didn't need medications. Pt slept 6 hours.      No PRNs given. Will continue to monitor.

## 2023-08-28 NOTE — PROGRESS NOTES
"[facility], [place]   Psychiatric Progress Note  Hospital Day: 11        Interim History:   The patient's care was discussed with the treatment team during the daily team meeting and/or staff's chart notes were reviewed.  Staff report patient continues to respond loudly to internal stimuli. Declining PM dose of Depakote since 8/25. No significant behavioral concerns, acute medical concerns, or side effects. Attending some groups.     Upon interview, Angela appeared slightly less distracted by internal stimuli, though still is actively responding. She said that the voices are laughing, which makes her laugh. Sometimes they tell her she has \"evil eyes.\" We discussed R/B/A of clozapine, and she asked if it was \"more powerful\" than Depakote. We discussed how it differs from Depakote. She said that she would be willing to start clozapine, and would be compliant with need for weekly blood draws. She is aware that Mata is being pursued. She still is uncertain whether she has a mental illness. She is feeling safe in the hospital, but again asked that a female staff do 15 minute checks overnight.     Suicidal ideation: denies current or recent suicidal ideation or behaviors.    Homicidal ideation: denies current or recent homicidal ideation or behaviors.    Psychotic symptoms: Pt is experiencing AH and paranoia. Patient denies VH.    Medication side effects reported: No significant side effects.    Acute medical concerns: none    Other issues reported by patient: Patient had no further questions or concerns.           Medications:      [Held by provider] cloNIDine  0.1 mg Oral BID    divalproex sodium delayed-release  500 mg Oral Once    [START ON 8/29/2023] divalproex sodium extended-release  1,000 mg Oral Daily    haloperidol  20 mg Oral At Bedtime    OLANZapine  10 mg Oral BID    sennosides  1 tablet Oral BID          Allergies:     Allergies   Allergen Reactions    Pork-Derived Products " "Unknown     Bahai does not eat pork          Labs:   No results found for this or any previous visit (from the past 24 hour(s)).       Psychiatric Examination:     /74 (Patient Position: Sitting, Cuff Size: Adult Large)   Pulse 74   Temp 97.5  F (36.4  C) (Oral)   Resp 17   Ht 1.753 m (5' 9\")   Wt 102.7 kg (226 lb 6.4 oz)   SpO2 100%   BMI 33.43 kg/m    Weight is 226 lbs 6.4 oz  Body mass index is 33.43 kg/m .    Weight over time:  Vitals:    08/17/23 2218 08/24/23 0800   Weight: 100.9 kg (222 lb 6.4 oz) 102.7 kg (226 lb 6.4 oz)       Orthostatic Vitals       None              Cardiometabolic risk assessment. 08/24/23      Reviewed patient profile for cardiometabolic risk factors    Date taken /Value  REFERENCE RANGE   Abdominal Obesity  (Waist Circumference)   See nursing flowsheet Women ?35 in (88 cm)   Men ?40 in (102 cm)      Triglycerides  Triglycerides   Date Value Ref Range Status   07/02/2023 97 <150 mg/dL Final       ?150 mg/dL (1.7 mmol/L) or current treatment for elevated triglycerides   HDL cholesterol  Direct Measure HDL   Date Value Ref Range Status   07/02/2023 84 >=50 mg/dL Final   ]   Women <50 mg/dL (1.3 mmol/L) in women or current treatment for low HDL cholesterol  Men <40 mg/dL (1 mmol/L) in men or current treatment for low HDL cholesterol     Fasting plasma glucose (FPG) Lab Results   Component Value Date     08/18/2023      FPG ?100 mg/dL (5.6 mmol/L) or treatment for elevated blood glucose   Blood pressure  BP Readings from Last 3 Encounters:   08/28/23 116/74   07/25/23 (!) 143/79    Blood pressure ?130/85 mmHg or treatment for elevated blood pressure   Family History  See family history     Mental Status Exam:  Appearance: awake, alert, adequately groomed, and dressed in hospital scrubs  Attitude:  cooperative and guarded  Eye Contact:  intense  Mood:  anxious  Affect:  mood congruent and intensity is blunted  Speech:  clear, coherent  Language: fluent and intact in " English  Psychomotor, Gait, Musculoskeletal:  no evidence of tardive dyskinesia, dystonia, or tics  Throught Process:  disorganized, evidence of thought blocking present, and illogical  Associations:  no loose associations  Thought Content:  no evidence of suicidal ideation or homicidal ideation, auditory hallucinations present, and patient appears to be responding to internal stimuli throughout entire interview  Insight:  fair  Judgement:  fair  Oriented to:  time, person, and place  Attention Span and Concentration:  fair  Recent and Remote Memory:  fair  Fund of Knowledge:  appropriate           Precautions:     Behavioral Orders   Procedures    Assault precautions    Cheeking Precautions (behavioral units)     Patient Observed swallowing PO medications; Patient asked to drink water after swallowing medication; Patient in Staff line of sight for 15 minutes after medication given; Mouth checks after PO administration (patient asked to open mouth and stick out their tongue).    Code 1 - Restrict to Unit    Elopement precautions    Routine Programming     As clinically indicated    Status 15     Every 15 minutes.          Diagnoses:     # Bipolar I disorder, currently manic with psychotic features   # Hx of possible seizure in 1983          Assessment & Plan:     Assessment and hospital summary:  Angela Basurto is a 58 year old female previously diagnosed with Bipolar Disorder Type 1 and anxiety who presented with elvis and psychosis in the context of medication nonadherence. Patient was presented to emergency room due to homicidal threats toward her family using knife, and exhibiting paranoid and severe disorganized thought and responding to her internal stimuli while staying in the emergency room. Significant symptoms on admission include paranoia, disorganized thinking and behaviors, AVH, and emotional lability. The MSE on admission was pertinent for RTIS. Biological contributions to mental health presentation  include diagnosis of Bipolar Disorder, type 1 and taking Depakote, Zyprexa, and Haldol. Psychological contributions to mental health presentation include poor insight into her condition. While she understands why she's in the hospital, she displays intrusive behaviors that require a 1:1. 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 Protective factors include family support and being Zoroastrianism. Most recent psychiatric hospitalization was in July of this year during which time she left A. In summary, the patient's reported symptoms of elvis and psychosis in the context of medication nonadherence are consistent with Bipolar Disorder, type 1.      Angela Basurto was admitted to [facility] on a 72 hour hold. The patient's PTA Zyprexa 10 mg BID & Depakote, and Haldol 20 mg were restarted. PTA hydroxyzine and trazodone were restarted. A petition for MI commitment with [employer] was filed on 8/18 through [facility].  She is now on court hold.     Today's Changes:  Haldol level is pending  Tentatively planning on initiating clozapine if Mata is approved.   Switch Depakote  mg BID to Depakote ER 1,000 mg daily as patient has been consistently declining evening doses    Target psychiatric symptoms and interventions:  Depakote  mg BID  Haldol 20 mg at bedtime  Zyprexa 10 mg BID    Risks, benefits, and alternatives discussed at length with patient.     Acute Medical Problems and Treatments:  Acute medical concerns:  - No acute medical concerns    Hx of HTN  HOLD clonidine as patient's pressures have been consistently soft.     Acetaminophen as needed utilized for pain occasionally. No other major medical issues arose during hospitalization. PTA Senokot continued for constipation.     Pertinent labs/imaging:  Depakote level 18.2 on 8/17/2023, subtherapeutic. Concerns for cheeking as patient admitted to diverting  medication.     Level obtained again on 8/22 is now therapeutic at 106.    Behavioral/Psychological/Social:  - Encourage unit programming    Safety:  - Continue precautions as noted above  - Status 15 minute checks  - On SIO for severe intrusiveness and vulnerability around other high acuity patients    Legal Status: court hold    Disposition Plan   Reason for ongoing admission: poses an imminent risk to self, poses an imminent risk to others, and is unable to care for self due to severe psychosis or elvis  Discharge location:  TBD  Discharge Medications: not ordered  Follow-up Appointments: not scheduled    Entered by: Denia Champagne MD on 8/28/2023 at 12:20 PM

## 2023-08-28 NOTE — CARE PLAN
08/28/23 1800   Group Therapy Session   Group Attendance attended group session   Time Session Began 1615   Time Session Ended 1700   Total Time (minutes) 45   Total # Attendees 4   Group Type expressive therapy   Group Topic Covered emotions/expression   Patient Response/Contribution cooperative with task     Art Therapy directive was to create group collaborative art by moving around the room and contributing to each group art piece with a variety of art media.  Goals of directive: to assess how individual functions within a group process, social interest, media exploration, emotional regulation.  Pt was disorganized and distractible. Pt appeared to be responding to internal stimuli throughout group. Pt contributed minimally to each group drawing, creating basic lines, shapes and stick figures. Pt did not share or add to group discussion at the end of group.  Pt was polite and would smile when approached by author.  Pts mood was calm.

## 2023-08-29 LAB — HALOPERIDOL SERPL-MCNC: 13.7 NG/ML

## 2023-08-29 PROCEDURE — 250N000013 HC RX MED GY IP 250 OP 250 PS 637: Performed by: PSYCHIATRY & NEUROLOGY

## 2023-08-29 PROCEDURE — 124N000002 HC R&B MH UMMC

## 2023-08-29 RX ADMIN — OLANZAPINE 10 MG: 10 TABLET, FILM COATED ORAL at 20:17

## 2023-08-29 RX ADMIN — HALOPERIDOL 20 MG: 10 TABLET ORAL at 20:17

## 2023-08-29 RX ADMIN — OLANZAPINE 10 MG: 10 TABLET, FILM COATED ORAL at 08:50

## 2023-08-29 RX ADMIN — SENNOSIDES 1 TABLET: 8.6 TABLET, FILM COATED ORAL at 08:50

## 2023-08-29 RX ADMIN — SENNOSIDES 1 TABLET: 8.6 TABLET, FILM COATED ORAL at 20:17

## 2023-08-29 ASSESSMENT — ACTIVITIES OF DAILY LIVING (ADL)
DRESS: SCRUBS (BEHAVIORAL HEALTH)
ADLS_ACUITY_SCORE: 29
ORAL_HYGIENE: INDEPENDENT
ADLS_ACUITY_SCORE: 29
ADLS_ACUITY_SCORE: 29
ORAL_HYGIENE: INDEPENDENT
ADLS_ACUITY_SCORE: 29
DRESS: SCRUBS (BEHAVIORAL HEALTH)
ADLS_ACUITY_SCORE: 29
LAUNDRY: UNABLE TO COMPLETE
ADLS_ACUITY_SCORE: 29
ADLS_ACUITY_SCORE: 29
HYGIENE/GROOMING: INDEPENDENT
LAUNDRY: UNABLE TO COMPLETE
HYGIENE/GROOMING: INDEPENDENT

## 2023-08-29 NOTE — PLAN OF CARE
Problem: Adult Behavioral Health Plan of Care  Goal: Optimized Coping Skills in Response to Life Stressors  Outcome: Progressing   Goal Outcome Evaluation:             Patient had a good shift. She was out in the lounge much of the evening. She did not require redirection. She has been medication compliant. She denies any physical concerns. She is noted to be smiling and laughing intermittently. She had visitors tonight. She is eating and drinking in good amounts. Uneventful evening noted.

## 2023-08-29 NOTE — PLAN OF CARE
NOC Shift Report     Pt in bed at beginning of shift, breathing quiet and unlabored. Pt slept 5.5 hours.      No pt complaints or concerns at this time.      No PRNs given. Will continue to monitor.

## 2023-08-29 NOTE — CARE PLAN
Occupational Therapy Group Note:     08/29/23 1612   Group Therapy Session   Group Attendance attended group session   Time Session Began 1315   Time Session Ended 1425   Total Time (minutes) 15 (no charge)   Total # Attendees 3   Group Type recreation   Group Topic Covered leisure exploration/use of leisure time;structured socialization   Group Session Detail OT Leisure Group   Patient Response/Contribution other (see comments)  (Responding to internal stimuli)   Patient Participation Detail Patient actively engaged in therapeutic leisure activity in order to promote: cognitive skills (attention, planning, sequencing), leisure exploration, and structured socialization. Patient presented with a bright smiling affect and calm mood. Patient was noted to be laughing and mumbling to self throughout group; seemed to be responding to internal stimuli. Writer encouraged patient to take a break from group as patient's laughter was beginning to become somewhat disruptive to group dynamic; patient agreeable and excused self from group. No return.

## 2023-08-29 NOTE — PLAN OF CARE
"Pt was seen sitting in the lounge by herself around 1115. Pt endorses AH and stated that, \"I hear people laughing, that's it\". Pt denies all other psych symptoms and pain. Pt has been isolative to self and withdrawn. She took all her morning medications except Depakote. Recommend switching the Depakote to sprinkles so that pt can take it in apple sauce as she did in the past. Pt has been calm and pleasant. No behavioral outbursts.     /80 (Patient Position: Sitting, Cuff Size: Adult Large)   Pulse 97   Temp 98.1  F (36.7  C) (Temporal)   Resp 16   Ht 1.753 m (5' 9\")   Wt 102.7 kg (226 lb 6.4 oz)   SpO2 100%   BMI 33.43 kg/m      Problem: Adult Behavioral Health Plan of Care  Goal: Adheres to Safety Considerations for Self and Others  Outcome: Progressing  Intervention: Develop and Maintain Individualized Safety Plan  Recent Flowsheet Documentation  Taken 8/29/2023 1145 by Becky Jeffery, RN  Safety Measures: safety rounds completed     "

## 2023-08-30 PROCEDURE — 250N000013 HC RX MED GY IP 250 OP 250 PS 637: Performed by: PSYCHIATRY & NEUROLOGY

## 2023-08-30 PROCEDURE — 124N000002 HC R&B MH UMMC

## 2023-08-30 PROCEDURE — 99232 SBSQ HOSP IP/OBS MODERATE 35: CPT | Performed by: PSYCHIATRY & NEUROLOGY

## 2023-08-30 RX ADMIN — SENNOSIDES 1 TABLET: 8.6 TABLET, FILM COATED ORAL at 08:51

## 2023-08-30 RX ADMIN — OLANZAPINE 10 MG: 10 TABLET, FILM COATED ORAL at 20:05

## 2023-08-30 RX ADMIN — HALOPERIDOL 20 MG: 10 TABLET ORAL at 20:05

## 2023-08-30 RX ADMIN — SENNOSIDES 1 TABLET: 8.6 TABLET, FILM COATED ORAL at 20:05

## 2023-08-30 RX ADMIN — ACETAMINOPHEN 650 MG: 325 TABLET, FILM COATED ORAL at 20:15

## 2023-08-30 RX ADMIN — OLANZAPINE 10 MG: 10 TABLET, FILM COATED ORAL at 08:51

## 2023-08-30 ASSESSMENT — ACTIVITIES OF DAILY LIVING (ADL)
ORAL_HYGIENE: INDEPENDENT
ADLS_ACUITY_SCORE: 29
HYGIENE/GROOMING: INDEPENDENT
ADLS_ACUITY_SCORE: 29
DRESS: SCRUBS (BEHAVIORAL HEALTH)
ADLS_ACUITY_SCORE: 29
ADLS_ACUITY_SCORE: 29
HYGIENE/GROOMING: INDEPENDENT
DRESS: SCRUBS (BEHAVIORAL HEALTH)
LAUNDRY: UNABLE TO COMPLETE
ADLS_ACUITY_SCORE: 29
ADLS_ACUITY_SCORE: 29
ORAL_HYGIENE: INDEPENDENT
ADLS_ACUITY_SCORE: 29

## 2023-08-30 NOTE — CARE PLAN
08/29/23 2124   Group Therapy Session   Group Attendance attended group session   Time Session Began 1615   Time Session Ended 1700   Total Time (minutes) 20   Total # Attendees 3   Group Type psychotherapeutic   Group Topic Covered coping skills/lifestyle management;emotions/expression   Group Session Detail CTC-Group members completed/discussed worksheets on self-care assessment and ways they can improve areas they were not doing well areas including physical, spiritual, and professional self-care.   Patient Response/Contribution confused;unable to sequence the task   Patient Participation Detail Pt presented to group, pt asked the lights be turned on, asked for glasses to see material pt was given some readers. Pt reviewed worksheet and left group.

## 2023-08-30 NOTE — PLAN OF CARE
"Patient slept for 2.75 hours last night. She was in and out of her room this shift, observed sitting in the lounge area talking and laughing to herself. She is alert and oriented x2 with a flat affect and calm mood. She was not social with peers and she did not attend group. She is medication compliant, but refused to take the schedule Depakote ER because \"it is too big and it makes me dizzy and tired\" Writer asked if she will take it if the tab are smaller and she said \"yes\". She ate 100% of breakfast an lunch without any issue. She denies all psych symptoms and she did not complain of pain this shift.       "

## 2023-08-30 NOTE — PLAN OF CARE
Pt was seen sitting in the lounge at the start of the shift. Pt has been calm and cooperative. Her daughter visited and requested her social workers name and number to get in contact with them. It was given. Order placed for pt to have culturally sensitive food stored on the unit for her. Pt was cooperative with taking scheduled HS medications. Continues to endorse AH's of laughter. Denies all other psych symptoms and pain. She participated in group this evening.     Problem: Adult Behavioral Health Plan of Care  Goal: Adheres to Safety Considerations for Self and Others  Outcome: Progressing  Intervention: Develop and Maintain Individualized Safety Plan  Recent Flowsheet Documentation  Taken 8/29/2023 1823 by Becky Jeffery, RN  Safety Measures: safety rounds completed  Taken 8/29/2023 1145 by Becky Jeffery, RN  Safety Measures: safety rounds completed

## 2023-08-30 NOTE — PLAN OF CARE
Problem: Adult Behavioral Health Plan of Care  Goal: Adheres to Safety Considerations for Self and Others  Outcome: Progressing  Intervention: Develop and Maintain Individualized Safety Plan  Recent Flowsheet Documentation  Taken 8/30/2023 0000 by Juan Ramon Machado RN  Safety Measures: safety rounds completed     Problem: Psychotic Signs/Symptoms  Goal: Improved Sleep (Psychotic Signs/Symptoms)  Outcome: Progressing     Pt was awake walking on the unit and in room for most of the shift. Pt reported no pain or discomfort. No problems with behavior. No concerns reported by pt. Pt slept  2.75 hours.  Staff will continue to monitor.

## 2023-08-30 NOTE — CARE PLAN
08/30/23 1410   Group Therapy Session   Group Attendance attended group session   Time Session Began 1030   Time Session Ended 1115   Total Time (minutes) 25 (No charge)   Total # Attendees 2   Group Type Occupational Therapy   Group Topic Covered balanced lifestyle;coping skills/lifestyle management;leisure exploration/use of leisure time;relaxation techniques   Group Session Detail OT Clinic Group   Patient Participation Detail Intervention: OT Clinic with 1 peer. Pt participated in a OT Clinic group to facilitate coping skills exploration and creative expression through personally meaningful activities, and to encourage utilization of these healthy coping skills to promote overall health and wellness. Group included clinical observation of social, cognitive and kinesthetic performance skills to inform treatment and safe discharge planning.    Patient Response: Patient was observed to be sitting in lounge area. Writer invited to group to continue working on a coloring project started the day prior. Patient worked well on this project for about 15 minutes, making progress. After this however patient made minimal progress and began more actively responding to internal stimuli and laughing. After about 25 minutes patient was no longer working on project and was solely responding and laughing. Patient left group a few minutes later, thanking writer for their time.     Cognition: Distractible    Mood/Affect: Bright,  Pleasant      Thought Content: Delusional, responding and laughing to internal stimuli     Plan: Patient encouraged to maintain attendance for continued ongoing support in working towards occupational therapy goals to support overall treatment/care.

## 2023-08-30 NOTE — PLAN OF CARE
Assessment/Intervention/Current Symtoms and Care Coordination:  Chart review and met with team, discussed pt progress, symptomology, and response to treatment. Discussed the discharge plan and any potential impediments to discharge.    Talked to her daughter, Mya. She is requesting a care conference and information from providers about medications. Writer updated her that we're waiting on the court's decision to guide next steps on medication and we'd have more information once that's received. She was open to waiting until tomorrow for an update. Writer will meet with treatment team and call daughter back tomorrow with information on court orders, medication adjustments, and tentative care conference dates.     Emailed her  with updates.     Checked MCRO at 3:20, case still under advisement at this time.     Discharge Plan or Goal:  Patient presents with symptoms of psychosis that put her at risk of harming herself and others. When patient stabilizes considerations include home with family or IRTS.     Barriers to Discharge:  Patient presents with symptoms of psychosis that put her at risk of harming herself and others.      Referral Status:  Referral needs being assessed as patient stabilizes      Legal Status:  Court Hold    County: [place]  File: [id number]  Final: 8/30 at 9am    Contacts:  Daughter: Mya, [phone] (Consent)  Aunt: Vickie, [phone] (Consent)  Son: Golden, [phone]   Cousin: Lashawn, [phone] (Consent)  Prepetition screener: Carlos Rushing, [phone]  Her : John Maynard, [phone]     Upcoming Meetings and Dates/Important Information and next steps:  Call her daughter, Mya, with court update, medication progress, and tentative care conference dates.     Team Note Due:  Friday

## 2023-08-30 NOTE — PROGRESS NOTES
[facility], [place]   Psychiatric Progress Note  Hospital Day: 13        Interim History:   The patient's care was discussed with the treatment team during the daily team meeting and/or staff's chart notes were reviewed.  Staff report patient declined last two doses of Depakote. Continues to respond to internal stimuli. Not sleeping well. No significant behavioral concerns. Requiring less redirection.     Upon interview, Angela again expressed concerns about weight gain associated with Depakote. After additional education, she agreed to take Depakote, though would like to take it at bedtime. We discussed clozapine at length, and she agreed to a trial once Mata is approved. She is aware of the need for weekly blood draws, and agreed to comply with this as well. She said that she lives with her two children, and they will assist her with medications. Of note, she was less distracted by internal stimuli today. She did share that voices are not as loud.     Suicidal ideation: denies current or recent suicidal ideation or behaviors.    Homicidal ideation: denies current or recent homicidal ideation or behaviors.    Psychotic symptoms: Pt is experiencing AH and paranoia. Patient denies VH.    Medication side effects reported: No significant side effects.    Acute medical concerns: none    Other issues reported by patient: Patient had no further questions or concerns.           Medications:      [Held by provider] cloNIDine  0.1 mg Oral BID    divalproex sodium extended-release  1,000 mg Oral Daily    haloperidol  20 mg Oral At Bedtime    OLANZapine  10 mg Oral BID    sennosides  1 tablet Oral BID          Allergies:     Allergies   Allergen Reactions    Pork-Derived Products Unknown     Jain does not eat pork          Labs:   No results found for this or any previous visit (from the past 24 hour(s)).       Psychiatric Examination:     /82 (BP Location: Right arm)   Pulse 97   Temp  "97  F (36.1  C) (Temporal)   Resp 16   Ht 1.753 m (5' 9\")   Wt 102.7 kg (226 lb 6.4 oz)   SpO2 99%   BMI 33.43 kg/m    Weight is 226 lbs 6.4 oz  Body mass index is 33.43 kg/m .    Weight over time:  Vitals:    08/17/23 2218 08/24/23 0800 08/29/23 0800   Weight: 100.9 kg (222 lb 6.4 oz) 102.7 kg (226 lb 6.4 oz) 102.7 kg (226 lb 6.4 oz)       Orthostatic Vitals       None              Cardiometabolic risk assessment. 08/24/23      Reviewed patient profile for cardiometabolic risk factors    Date taken /Value  REFERENCE RANGE   Abdominal Obesity  (Waist Circumference)   See nursing flowsheet Women ?35 in (88 cm)   Men ?40 in (102 cm)      Triglycerides  Triglycerides   Date Value Ref Range Status   07/02/2023 97 <150 mg/dL Final       ?150 mg/dL (1.7 mmol/L) or current treatment for elevated triglycerides   HDL cholesterol  Direct Measure HDL   Date Value Ref Range Status   07/02/2023 84 >=50 mg/dL Final   ]   Women <50 mg/dL (1.3 mmol/L) in women or current treatment for low HDL cholesterol  Men <40 mg/dL (1 mmol/L) in men or current treatment for low HDL cholesterol     Fasting plasma glucose (FPG) Lab Results   Component Value Date     08/18/2023      FPG ?100 mg/dL (5.6 mmol/L) or treatment for elevated blood glucose   Blood pressure  BP Readings from Last 3 Encounters:   08/30/23 131/82   07/25/23 (!) 143/79    Blood pressure ?130/85 mmHg or treatment for elevated blood pressure   Family History  See family history     Mental Status Exam:  Appearance: awake, alert, adequately groomed, and dressed in hospital scrubs  Attitude:  cooperative and guarded  Eye Contact:  intense  Mood:  anxious  Affect:  mood congruent and intensity is blunted  Speech:  clear, coherent  Language: fluent and intact in English  Psychomotor, Gait, Musculoskeletal:  no evidence of tardive dyskinesia, dystonia, or tics  Throught Process:  disorganized, evidence of thought blocking present, and illogical  Associations:  no loose " associations  Thought Content:  no evidence of suicidal ideation or homicidal ideation, auditory hallucinations present, and patient appears to be responding to internal stimuli throughout entire interview  Insight:  fair  Judgement:  fair  Oriented to:  time, person, and place  Attention Span and Concentration:  fair  Recent and Remote Memory:  fair  Fund of Knowledge:  appropriate           Precautions:     Behavioral Orders   Procedures    Assault precautions    Cheeking Precautions (behavioral units)     Patient Observed swallowing PO medications; Patient asked to drink water after swallowing medication; Patient in Staff line of sight for 15 minutes after medication given; Mouth checks after PO administration (patient asked to open mouth and stick out their tongue).    Code 1 - Restrict to Unit    Elopement precautions    Routine Programming     As clinically indicated    Status 15     Every 15 minutes.          Diagnoses:     # Bipolar I disorder, currently manic with psychotic features   # Hx of possible seizure in 1983          Assessment & Plan:     Assessment and hospital summary:  Angela Basurto is a 58 year old female previously diagnosed with Bipolar Disorder Type 1 and anxiety who presented with evlis and psychosis in the context of medication nonadherence. Patient was presented to emergency room due to homicidal threats toward her family using knife, and exhibiting paranoid and severe disorganized thought and responding to her internal stimuli while staying in the emergency room. Significant symptoms on admission include paranoia, disorganized thinking and behaviors, AVH, and emotional lability. The MSE on admission was pertinent for RTIS. Biological contributions to mental health presentation include diagnosis of Bipolar Disorder, type 1 and taking Depakote, Zyprexa, and Haldol. Psychological contributions to mental health presentation include poor insight into her condition. While she understands  why she's in the hospital, she displays intrusive behaviors that require a 1:1. 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 Protective factors include family support and being [employer]. Most recent psychiatric hospitalization was in July of this year during which time she left AMA. In summary, the patient's reported symptoms of elvis and psychosis in the context of medication nonadherence are consistent with Bipolar Disorder, type 1.      Angela Basurto was [facility] to [facility] on a 72 hour hold. The patient's PTA Zyprexa 10 mg BID & Depakote, and Haldol 20 mg were restarted. PTA hydroxyzine and trazodone were restarted. A petition for MI commitment with [facility]ol[facility] was filed on 8/18 through [facility].  She is now on court hold.     Today's Changes:  Haldol level reviewed and is 13.7.   Tentatively planning on initiating clozapine if Mata is approved.   Switch Depakote ER 1,000 mg to at bedtime dosing per pt request.   Once Mata is approved, will obtain baseline CBC with diff and enroll patient in REMS.     Target psychiatric symptoms and interventions:  Depakote ET 1000 mg QHS  Haldol 20 mg at bedtime  Zyprexa 10 mg BID    Risks, benefits, and alternatives discussed at length with patient.     Acute Medical Problems and Treatments:  Acute medical concerns:  - No acute medical concerns    Hx of HTN  HOLD clonidine as patient's pressures have been consistently soft.     Acetaminophen as needed utilized for pain occasionally. No other major medical issues arose during hospitalization. PTA Senokot continued for constipation.     Pertinent labs/imaging:  Depakote level 18.2 on 8/17/2023, subtherapeutic. Concerns for cheeking as patient admitted to diverting medication.     Level obtained again on 8/22 is now therapeutic at 106.    Behavioral/Psychological/Social:  - Encourage unit programming    Safety:  -  Continue precautions as noted above  - Status 15 minute checks  - On SIO for severe intrusiveness and vulnerability around other high acuity patients    Legal Status: court hold    Disposition Plan   Reason for ongoing admission: poses an imminent risk to self, poses an imminent risk to others, and is unable to care for self due to severe psychosis or elvis  Discharge location:  TBD  Discharge Medications: not ordered  Follow-up Appointments: not scheduled    Entered by: Denia Champagne MD on 8/30/2023 at 1:21 PM

## 2023-08-31 LAB
BASOPHILS # BLD AUTO: 0 10E3/UL (ref 0–0.2)
BASOPHILS NFR BLD AUTO: 0 %
EOSINOPHIL # BLD AUTO: 0 10E3/UL (ref 0–0.7)
EOSINOPHIL NFR BLD AUTO: 0 %
ERYTHROCYTE [DISTWIDTH] IN BLOOD BY AUTOMATED COUNT: 12.4 % (ref 10–15)
HCT VFR BLD AUTO: 37 % (ref 35–47)
HGB BLD-MCNC: 12.2 G/DL (ref 11.7–15.7)
IMM GRANULOCYTES # BLD: 0 10E3/UL
IMM GRANULOCYTES NFR BLD: 0 %
LYMPHOCYTES # BLD AUTO: 2.5 10E3/UL (ref 0.8–5.3)
LYMPHOCYTES NFR BLD AUTO: 38 %
MCH RBC QN AUTO: 32 PG (ref 26.5–33)
MCHC RBC AUTO-ENTMCNC: 33 G/DL (ref 31.5–36.5)
MCV RBC AUTO: 97 FL (ref 78–100)
MONOCYTES # BLD AUTO: 0.6 10E3/UL (ref 0–1.3)
MONOCYTES NFR BLD AUTO: 9 %
NEUTROPHILS # BLD AUTO: 3.5 10E3/UL (ref 1.6–8.3)
NEUTROPHILS NFR BLD AUTO: 53 %
NRBC # BLD AUTO: 0 10E3/UL
NRBC BLD AUTO-RTO: 0 /100
PLATELET # BLD AUTO: 229 10E3/UL (ref 150–450)
RBC # BLD AUTO: 3.81 10E6/UL (ref 3.8–5.2)
WBC # BLD AUTO: 6.5 10E3/UL (ref 4–11)

## 2023-08-31 PROCEDURE — 250N000013 HC RX MED GY IP 250 OP 250 PS 637: Performed by: PSYCHIATRY & NEUROLOGY

## 2023-08-31 PROCEDURE — 99233 SBSQ HOSP IP/OBS HIGH 50: CPT | Performed by: PSYCHIATRY & NEUROLOGY

## 2023-08-31 PROCEDURE — 85004 AUTOMATED DIFF WBC COUNT: CPT | Performed by: PSYCHIATRY & NEUROLOGY

## 2023-08-31 PROCEDURE — 36415 COLL VENOUS BLD VENIPUNCTURE: CPT | Performed by: PSYCHIATRY & NEUROLOGY

## 2023-08-31 PROCEDURE — 124N000002 HC R&B MH UMMC

## 2023-08-31 RX ORDER — CLOZAPINE 25 MG/1
25 TABLET ORAL AT BEDTIME
Status: DISCONTINUED | OUTPATIENT
Start: 2023-08-31 | End: 2023-09-01

## 2023-08-31 RX ORDER — HALOPERIDOL 5 MG/ML
2 INJECTION INTRAMUSCULAR AT BEDTIME
Status: DISCONTINUED | OUTPATIENT
Start: 2023-08-31 | End: 2023-09-01

## 2023-08-31 RX ADMIN — SENNOSIDES 1 TABLET: 8.6 TABLET, FILM COATED ORAL at 21:16

## 2023-08-31 RX ADMIN — CLOZAPINE 25 MG: 25 TABLET ORAL at 21:16

## 2023-08-31 RX ADMIN — OLANZAPINE 10 MG: 10 TABLET, FILM COATED ORAL at 21:16

## 2023-08-31 RX ADMIN — SENNOSIDES 1 TABLET: 8.6 TABLET, FILM COATED ORAL at 10:13

## 2023-08-31 RX ADMIN — HALOPERIDOL 20 MG: 10 TABLET ORAL at 21:16

## 2023-08-31 RX ADMIN — DIVALPROEX SODIUM 1000 MG: 500 TABLET, FILM COATED, EXTENDED RELEASE ORAL at 10:13

## 2023-08-31 RX ADMIN — OLANZAPINE 10 MG: 10 TABLET, FILM COATED ORAL at 10:13

## 2023-08-31 RX ADMIN — ACETAMINOPHEN 650 MG: 325 TABLET, FILM COATED ORAL at 21:22

## 2023-08-31 ASSESSMENT — ACTIVITIES OF DAILY LIVING (ADL)
HYGIENE/GROOMING: INDEPENDENT
ADLS_ACUITY_SCORE: 29
LAUNDRY: UNABLE TO COMPLETE
ADLS_ACUITY_SCORE: 29
HYGIENE/GROOMING: INDEPENDENT
ORAL_HYGIENE: INDEPENDENT
ADLS_ACUITY_SCORE: 29
DRESS: INDEPENDENT
ADLS_ACUITY_SCORE: 29
ADLS_ACUITY_SCORE: 29
DRESS: INDEPENDENT;SCRUBS (BEHAVIORAL HEALTH)
ORAL_HYGIENE: INDEPENDENT
ADLS_ACUITY_SCORE: 29

## 2023-08-31 NOTE — PLAN OF CARE
"Mostly isolating to room. When in milieu, appears withdrawn. Not observed socializing. Upon assessment, denied anxiety, depression, SI/HI, A/VH. Denied pain.     Continues to occasionally laugh to herself for no apparent reason, at times talking to herself while she does. Later in night, endorsed a headache. Given PRN Tylenol. Pt subsequently went to sleep after administration    After initially offering pt her HS medication, pt stated that she wanted her medications later. Upon reapproach, pt took medications without issue.     /67 (BP Location: Left arm, Patient Position: Sitting, Cuff Size: Adult Large)   Pulse 81   Temp 97.2  F (36.2  C) (Temporal)   Resp 16   Ht 1.753 m (5' 9\")   Wt 102.7 kg (226 lb 6.4 oz)   SpO2 100%   BMI 33.43 kg/m      "

## 2023-08-31 NOTE — PLAN OF CARE
Assessment/Intervention/Current Symtoms and Care Coordination:  Chart review and met with team, discussed pt progress, symptomology, and response to treatment. Discussed the discharge plan and any potential impediments to discharge.    Checked MCRO at 8:50am, orders still under advisement.     Called her daughter, care conference is scheduled for 1:30pm tomorrow, Friday, September 1st.     As of 11:08am, per [facility], there is an order for commitment and order authorizing use of neuroleptic medications.     Received order for commitment and order for use of neuroleptics via email at 12:33pm. Copy given to Angela and copy put in chart.     Discharge Plan or Goal:  Patient presents with symptoms of psychosis that put her at risk of harming herself and others. When patient stabilizes considerations include home with family or IRTS.     Barriers to Discharge:  Patient presents with symptoms of psychosis that put her at risk of harming herself and others.      Referral Status:  Referral needs being assessed as patient stabilizes      Legal Status:  Commitment with [facility]: [place]  File: [id number]  Expires: 08/30/23 to 03/01/24    Medications on [facility]: Haloperidol, clozapine, risperidone, olanzapine    Contacts:  Daughter: Mya, [phone] (Consent)  Aunt: Vickie, [phone] (Consent)  Son: Golden, [phone]   Cousin: Lashawn, [phone] (Consent)  Prepetition screener: Carlos Rushing, [phone]  Her : John Maynard, [phone]     Upcoming Meetings and Dates/Important Information and next steps:  Care conference 9/1 at 1:30pm.    Team Note Due:  Friday

## 2023-08-31 NOTE — PLAN OF CARE
"Pt observed intermittently in the milieu, remains asocial, isolated to self. Pt spent the afternoon napping. Pt denies SI, SIB, HI and thoughts of hurting others. Pt denies depression and anxiety. Pt endorses auditory hallucinations and asked RN writer to pause with assessment to respond to internal stimuli. Pt denies command hallucinations, stating the voices are often funny and not scary or mean. Pt observed laughing and talking to self. Pt was med compliant and compliant with mouth checks. No attempt of medication diversion noted.    No physical compliant reported or observed. Food and fluid intake adequate.    VS reviewed: /67 (BP Location: Left arm, Patient Position: Sitting, Cuff Size: Adult Large)   Pulse 84   Temp 97.1  F (36.2  C) (Temporal)   Resp 16   Ht 1.753 m (5' 9\")   Wt 102.7 kg (226 lb 6.4 oz)   SpO2 98%   BMI 33.43 kg/m   . Patient denies  pain.    Length of stay: 14  "

## 2023-08-31 NOTE — PLAN OF CARE
"Pt isolated to room for much of the shift, appeared to be sleeping. Came out to [facility] to watch a movie later in the night. Not observed socializing. Continues to be heard laughing to self and appeared to be responding to internal stimuli. Denied all psych symptoms.     Took all scheduled medication without issue. Complained of a headache, rated pain at 6/10, given PRN Tylenol, with nonverbals indicating relief.     /82 (BP Location: Right arm)   Pulse 97   Temp 97  F (36.1  C) (Temporal)   Resp 16   Ht 1.753 m (5' 9\")   Wt 102.7 kg (226 lb 6.4 oz)   SpO2 99%   BMI 33.43 kg/m          "

## 2023-08-31 NOTE — PLAN OF CARE
Problem: Psychotic Signs/Symptoms  Goal: Improved Behavioral Control (Psychotic Signs/Symptoms)  Outcome: Progressing  Note: Patient manifests no behavioral dyscontrol     Problem: Sleep Disturbance  Goal: Adequate Sleep/Rest  Outcome: Progressing  Note: Patient observed to be sleeping during safety checks   Goal Outcome Evaluation:    Patient slept on/off, came out to the hallway on few occasions, patient was responding to IS, laughing hilariously while pacing the chaparro but was cooperative with redirection and not intrusive to other patients rooms. Patient manifests no behavioral outburst or physical/verbal aggression, reports no side effect from her medication regimen. Patient slept for a total of 4.25 hours. Will continue to monitor                Sammy Mckeon DNP, RN

## 2023-08-31 NOTE — PROGRESS NOTES
"[facility], [place]   Psychiatric Progress Note  Hospital Day: 14        Interim History:   The patient's care was discussed with the treatment team during the daily team meeting and/or staff's chart notes were reviewed.  Staff report patient declined last two doses of Depakote. Continues to respond to internal stimuli. Not sleeping well. No significant behavioral concerns. Requiring less redirection overall.     Upon interview, Angela said that the voices have \"lowered\" since reinitiation of her PTA medication regimen, however, they still persist and are bothersome at times. Again discussed R/B/A of clozapine and tentative plan to start today after baseline CBC with diff and will titrate to 300 mg at bedtime. Discussed timeline for discharge is 2-3 weeks. She tolerated this update well. She is in agreement with plan to have family meeting tomorrow.     Suicidal ideation: denies current or recent suicidal ideation or behaviors.    Homicidal ideation: denies current or recent homicidal ideation or behaviors.    Psychotic symptoms: Pt is experiencing AH and paranoia. Patient denies VH.    Medication side effects reported: No significant side effects.    Acute medical concerns: none    Other issues reported by patient: Patient had no further questions or concerns.           Medications:      [Held by provider] cloNIDine  0.1 mg Oral BID    cloZAPine  25 mg Oral At Bedtime    Or    haloperidol lactate  2 mg Intramuscular At Bedtime    divalproex sodium extended-release  1,000 mg Oral Daily    haloperidol  20 mg Oral At Bedtime    OLANZapine  10 mg Oral BID    sennosides  1 tablet Oral BID          Allergies:     Allergies   Allergen Reactions    Pork-Derived Products Unknown     Gnosticism does not eat pork          Labs:     Recent Results (from the past 24 hour(s))   CBC with platelets and differential    Collection Time: 08/31/23  1:56 PM   Result Value Ref Range    WBC Count 6.5 4.0 - 11.0 " "10e3/uL    RBC Count 3.81 3.80 - 5.20 10e6/uL    Hemoglobin 12.2 11.7 - 15.7 g/dL    Hematocrit 37.0 35.0 - 47.0 %    MCV 97 78 - 100 fL    MCH 32.0 26.5 - 33.0 pg    MCHC 33.0 31.5 - 36.5 g/dL    RDW 12.4 10.0 - 15.0 %    Platelet Count 229 150 - 450 10e3/uL    % Neutrophils 53 %    % Lymphocytes 38 %    % Monocytes 9 %    % Eosinophils 0 %    % Basophils 0 %    % Immature Granulocytes 0 %    NRBCs per 100 WBC 0 <1 /100    Absolute Neutrophils 3.5 1.6 - 8.3 10e3/uL    Absolute Lymphocytes 2.5 0.8 - 5.3 10e3/uL    Absolute Monocytes 0.6 0.0 - 1.3 10e3/uL    Absolute Eosinophils 0.0 0.0 - 0.7 10e3/uL    Absolute Basophils 0.0 0.0 - 0.2 10e3/uL    Absolute Immature Granulocytes 0.0 <=0.4 10e3/uL    Absolute NRBCs 0.0 10e3/uL          Psychiatric Examination:     /67 (BP Location: Left arm, Patient Position: Sitting, Cuff Size: Adult Large)   Pulse 84   Temp 97.1  F (36.2  C) (Temporal)   Resp 16   Ht 1.753 m (5' 9\")   Wt 102.7 kg (226 lb 6.4 oz)   SpO2 98%   BMI 33.43 kg/m    Weight is 226 lbs 6.4 oz  Body mass index is 33.43 kg/m .    Weight over time:  Vitals:    08/17/23 2218 08/24/23 0800 08/29/23 0800   Weight: 100.9 kg (222 lb 6.4 oz) 102.7 kg (226 lb 6.4 oz) 102.7 kg (226 lb 6.4 oz)       Orthostatic Vitals       None              Cardiometabolic risk assessment. 08/24/23      Reviewed patient profile for cardiometabolic risk factors    Date taken /Value  REFERENCE RANGE   Abdominal Obesity  (Waist Circumference)   See nursing flowsheet Women ?35 in (88 cm)   Men ?40 in (102 cm)      Triglycerides  Triglycerides   Date Value Ref Range Status   07/02/2023 97 <150 mg/dL Final       ?150 mg/dL (1.7 mmol/L) or current treatment for elevated triglycerides   HDL cholesterol  Direct Measure HDL   Date Value Ref Range Status   07/02/2023 84 >=50 mg/dL Final   ]   Women <50 mg/dL (1.3 mmol/L) in women or current treatment for low HDL cholesterol  Men <40 mg/dL (1 mmol/L) in men or current treatment for low " HDL cholesterol     Fasting plasma glucose (FPG) Lab Results   Component Value Date     08/18/2023      FPG ?100 mg/dL (5.6 mmol/L) or treatment for elevated blood glucose   Blood pressure  BP Readings from Last 3 Encounters:   08/31/23 115/67   07/25/23 (!) 143/79    Blood pressure ?130/85 mmHg or treatment for elevated blood pressure   Family History  See family history     Mental Status Exam:  Appearance: awake, alert, adequately groomed, and dressed in hospital scrubs  Attitude:  more cooperative, less suspicious of writer.   Eye Contact:  intense  Mood:  anxious  Affect:  mood congruent and intensity is blunted  Speech:  clear, coherent  Language: fluent and intact in English  Psychomotor, Gait, Musculoskeletal:  no evidence of tardive dyskinesia, dystonia, or tics  Throught Process:  disorganized, evidence of thought blocking present, and illogical  Associations:  no loose associations  Thought Content:  no evidence of suicidal ideation or homicidal ideation, auditory hallucinations present, and patient appears to be responding to internal stimuli throughout entire interview  Insight:  fair  Judgement:  fair  Oriented to:  time, person, and place  Attention Span and Concentration:  fair  Recent and Remote Memory:  fair  Fund of Knowledge:  appropriate           Precautions:     Behavioral Orders   Procedures    Assault precautions    Cheeking Precautions (behavioral units)     Patient Observed swallowing PO medications; Patient asked to drink water after swallowing medication; Patient in Staff line of sight for 15 minutes after medication given; Mouth checks after PO administration (patient asked to open mouth and stick out their tongue).    Code 1 - Restrict to Unit    Elopement precautions    Routine Programming     As clinically indicated    Status 15     Every 15 minutes.          Diagnoses:     # Bipolar I disorder, currently manic with psychotic features   # Hx of possible seizure in 1983           Assessment & Plan:     Assessment and hospital summary:  Angela Basurto is a 58 year old female previously diagnosed with Bipolar Disorder Type 1 and anxiety who presented with elvis and psychosis in the context of medication nonadherence. Patient was presented to emergency room due to homicidal threats toward her family using knife, and exhibiting paranoid and severe disorganized thought and responding to her internal stimuli while staying in the emergency room. Significant symptoms on admission include paranoia, disorganized thinking and behaviors, AVH, and emotional lability. The MSE on admission was pertinent for RTIS. Biological contributions to mental health presentation include diagnosis of Bipolar Disorder, type 1 and taking Depakote, Zyprexa, and Haldol. Psychological contributions to mental health presentation include poor insight into her condition. While she understands why she's in the hospital, she displays intrusive behaviors that require a 1:1. 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 Protective factors include family support and being Sikh. Most recent psychiatric hospitalization was in July of this year during which time she left A. In summary, the patient's reported symptoms of elvis and psychosis in the context of medication nonadherence are consistent with Bipolar Disorder, type 1.      Angela Basurto was [facility] to [facility] on a 72 hour hold. The patient's PTA Zyprexa 10 mg BID & Depakote, and Haldol 20 mg were restarted. PTA hydroxyzine and trazodone were restarted. A petition for MI commitment with [facility] was filed on 8/18 through [facility].  She is now committed with [facility]olfo. Clozapine will be initiated on 8/31/23.     Today's Changes:  Obtain baseline CBC with diff and enroll patient in REMS.   Start clozapine 25 mg at bedtime and back up with IM Haldol  Family care  conference tomorrow    Target psychiatric symptoms and interventions:  Depakote ET 1000 mg QHS  Haldol 20 mg at bedtime  Zyprexa 10 mg BID    Risks, benefits, and alternatives discussed at length with patient.     Acute Medical Problems and Treatments:  Acute medical concerns:  - No acute medical concerns    Hx of HTN  HOLD clonidine as patient's pressures have been consistently soft.     Acetaminophen as needed utilized for pain occasionally. No other major medical issues arose during hospitalization. PTA Senokot continued for constipation.     Pertinent labs/imaging:  Depakote level 18.2 on 8/17/2023, subtherapeutic. Concerns for cheeking as patient admitted to diverting medication. Level obtained again on 8/22 is now therapeutic at 106.    Haldol level reviewed and is 13.7 at current dose of 20 mg daily.     Behavioral/Psychological/Social:  - Encourage unit programming    Safety:  - Continue precautions as noted above  - Status 15 minute checks  - On SIO for severe intrusiveness and vulnerability around other high acuity patients    Legal Status: court hold    Disposition Plan   Reason for ongoing admission: poses an imminent risk to self, poses an imminent risk to others, and is unable to care for self due to severe psychosis or elvis  Discharge location:  TBD  Discharge Medications: not ordered  Follow-up Appointments: not scheduled    Entered by: Denia Champagne MD on 8/31/2023 at 3:28 PM

## 2023-09-01 PROCEDURE — 250N000013 HC RX MED GY IP 250 OP 250 PS 637: Performed by: PSYCHIATRY & NEUROLOGY

## 2023-09-01 PROCEDURE — 124N000002 HC R&B MH UMMC

## 2023-09-01 PROCEDURE — 90887 INTERPJ/EXPLNAJ RSLT PSYC XM: CPT

## 2023-09-01 PROCEDURE — 99358 PROLONG SERVICE W/O CONTACT: CPT | Performed by: PSYCHIATRY & NEUROLOGY

## 2023-09-01 RX ORDER — OLANZAPINE 5 MG/1
5 TABLET ORAL 2 TIMES DAILY
Status: DISCONTINUED | OUTPATIENT
Start: 2023-09-01 | End: 2023-09-06

## 2023-09-01 RX ORDER — CLOZAPINE 50 MG/1
50 TABLET ORAL AT BEDTIME
Status: COMPLETED | OUTPATIENT
Start: 2023-09-01 | End: 2023-09-01

## 2023-09-01 RX ORDER — CLOZAPINE 100 MG/1
100 TABLET ORAL AT BEDTIME
Status: COMPLETED | OUTPATIENT
Start: 2023-09-03 | End: 2023-09-03

## 2023-09-01 RX ADMIN — SENNOSIDES 1 TABLET: 8.6 TABLET, FILM COATED ORAL at 20:53

## 2023-09-01 RX ADMIN — DIVALPROEX SODIUM 1000 MG: 500 TABLET, FILM COATED, EXTENDED RELEASE ORAL at 07:54

## 2023-09-01 RX ADMIN — OLANZAPINE 10 MG: 10 TABLET, FILM COATED ORAL at 07:55

## 2023-09-01 RX ADMIN — OLANZAPINE 5 MG: 5 TABLET, FILM COATED ORAL at 20:54

## 2023-09-01 RX ADMIN — HALOPERIDOL 20 MG: 10 TABLET ORAL at 20:54

## 2023-09-01 RX ADMIN — SENNOSIDES 1 TABLET: 8.6 TABLET, FILM COATED ORAL at 07:55

## 2023-09-01 RX ADMIN — CLOZAPINE 50 MG: 50 TABLET ORAL at 20:55

## 2023-09-01 ASSESSMENT — ACTIVITIES OF DAILY LIVING (ADL)
ADLS_ACUITY_SCORE: 29
HYGIENE/GROOMING: INDEPENDENT
ADLS_ACUITY_SCORE: 29
DRESS: INDEPENDENT
ADLS_ACUITY_SCORE: 29
ADLS_ACUITY_SCORE: 29
LAUNDRY: UNABLE TO COMPLETE
ORAL_HYGIENE: INDEPENDENT
HYGIENE/GROOMING: INDEPENDENT
ORAL_HYGIENE: INDEPENDENT
DRESS: INDEPENDENT
ADLS_ACUITY_SCORE: 29
LAUNDRY: UNABLE TO COMPLETE
ADLS_ACUITY_SCORE: 29
ADLS_ACUITY_SCORE: 29

## 2023-09-01 NOTE — PLAN OF CARE
Assessment/Intervention/Current Symtoms and Care Coordination:  Chart review and met with team, discussed pt progress, symptomology, and response to treatment. Discussed the discharge plan and any potential impediments to discharge.    Checked in with Angela, she was pleasant and reported sleeping more last night and that she felt well rested.     Family care conference from 1:40pm to 2:20pm. We discussed medication management and the plan to titrate to a therapeutic dose of Clozapine while tapering other current antipsychotics. We discussed aftercare resources, will refer to an ACT team. Will email daughter with additional resources.     Discharge Plan or Goal:  Patient presents with symptoms of psychosis that put her at risk of harming herself and others. When patient stabilizes considerations include home with family or IRTS.     Barriers to Discharge:  Patient presents with symptoms of psychosis that put her at risk of harming herself and others.      Referral Status:  Referral needs being assessed as patient stabilizes      Legal Status:  Commitment with [facility]: [place]  File: [id number]  Expires: 08/30/23 to 03/01/24    Medications on [facility]: Haloperidol, clozapine, risperidone, olanzapine    Contacts:  Daughter: Mya, [phone], [email or web address] (Consent)  Aunt: [facility], [phone] (Consent)  Son: Golden, [phone]   Cousin: Lashawn, [phone] (Consent)  Prepetition screener: Carlos Rushing, [phone]  Her : John Maynard, [phone]     Upcoming Meetings and Dates/Important Information and next steps:  Care conference 9/1 at 1:30pm.    Team Note Due:  Friday

## 2023-09-01 NOTE — PROGRESS NOTES
[facility], [place]   Psychiatric Progress Note  Hospital Day: 15        Interim History:   The patient's care was discussed with the treatment team during the daily team meeting and/or staff's chart notes were reviewed.  Staff report patient declined last two doses of Depakote. Continues to respond to internal stimuli. Not sleeping well though improving overall. No significant behavioral concerns. Requiring less redirection overall.     Upon interview, Angela said that she continues hearing voices. Denies any problems with clozapine thus far.     Suicidal ideation: denies current or recent suicidal ideation or behaviors.    Homicidal ideation: denies current or recent homicidal ideation or behaviors.    Psychotic symptoms: Pt is experiencing AH and paranoia. Patient denies VH.    Medication side effects reported: No significant side effects.    Acute medical concerns: none    Other issues reported by patient: Patient had no further questions or concerns.      Care conference held this afternoon with patient's family, patient, and CTC for 45 minutes. Plan of care discussed at length. Family supportive.          Medications:      [Held by provider] cloNIDine  0.1 mg Oral BID    cloZAPine  25 mg Oral At Bedtime    Or    haloperidol lactate  2 mg Intramuscular At Bedtime    divalproex sodium extended-release  1,000 mg Oral Daily    haloperidol  20 mg Oral At Bedtime    OLANZapine  10 mg Oral BID    sennosides  1 tablet Oral BID          Allergies:     Allergies   Allergen Reactions    Pork-Derived Products Unknown     Denominational does not eat pork          Labs:     Recent Results (from the past 24 hour(s))   CBC with platelets and differential    Collection Time: 08/31/23  1:56 PM   Result Value Ref Range    WBC Count 6.5 4.0 - 11.0 10e3/uL    RBC Count 3.81 3.80 - 5.20 10e6/uL    Hemoglobin 12.2 11.7 - 15.7 g/dL    Hematocrit 37.0 35.0 - 47.0 %    MCV 97 78 - 100 fL    MCH 32.0 26.5 - 33.0 pg  "   MCHC 33.0 31.5 - 36.5 g/dL    RDW 12.4 10.0 - 15.0 %    Platelet Count 229 150 - 450 10e3/uL    % Neutrophils 53 %    % Lymphocytes 38 %    % Monocytes 9 %    % Eosinophils 0 %    % Basophils 0 %    % Immature Granulocytes 0 %    NRBCs per 100 WBC 0 <1 /100    Absolute Neutrophils 3.5 1.6 - 8.3 10e3/uL    Absolute Lymphocytes 2.5 0.8 - 5.3 10e3/uL    Absolute Monocytes 0.6 0.0 - 1.3 10e3/uL    Absolute Eosinophils 0.0 0.0 - 0.7 10e3/uL    Absolute Basophils 0.0 0.0 - 0.2 10e3/uL    Absolute Immature Granulocytes 0.0 <=0.4 10e3/uL    Absolute NRBCs 0.0 10e3/uL          Psychiatric Examination:     BP 99/62 (BP Location: Left arm, Patient Position: Sitting, Cuff Size: Adult Large)   Pulse 98   Temp 97.5  F (36.4  C) (Oral)   Resp 16   Ht 1.753 m (5' 9\")   Wt 102.7 kg (226 lb 6.4 oz)   SpO2 97%   BMI 33.43 kg/m    Weight is 226 lbs 6.4 oz  Body mass index is 33.43 kg/m .    Weight over time:  Vitals:    08/17/23 2218 08/24/23 0800 08/29/23 0800   Weight: 100.9 kg (222 lb 6.4 oz) 102.7 kg (226 lb 6.4 oz) 102.7 kg (226 lb 6.4 oz)       Orthostatic Vitals       None              Cardiometabolic risk assessment. 08/24/23      Reviewed patient profile for cardiometabolic risk factors    Date taken /Value  REFERENCE RANGE   Abdominal Obesity  (Waist Circumference)   See nursing flowsheet Women ?35 in (88 cm)   Men ?40 in (102 cm)      Triglycerides  Triglycerides   Date Value Ref Range Status   07/02/2023 97 <150 mg/dL Final       ?150 mg/dL (1.7 mmol/L) or current treatment for elevated triglycerides   HDL cholesterol  Direct Measure HDL   Date Value Ref Range Status   07/02/2023 84 >=50 mg/dL Final   ]   Women <50 mg/dL (1.3 mmol/L) in women or current treatment for low HDL cholesterol  Men <40 mg/dL (1 mmol/L) in men or current treatment for low HDL cholesterol     Fasting plasma glucose (FPG) Lab Results   Component Value Date     08/18/2023      FPG ?100 mg/dL (5.6 mmol/L) or treatment for elevated " blood glucose   Blood pressure  BP Readings from Last 3 Encounters:   09/01/23 99/62   07/25/23 (!) 143/79    Blood pressure ?130/85 mmHg or treatment for elevated blood pressure   Family History  See family history     Mental Status Exam:  Appearance: awake, alert, adequately groomed, and dressed in hospital scrubs  Attitude:  more cooperative, less suspicious of writer.   Eye Contact:  intense  Mood:  calm  Affect:  mood congruent and intensity is blunted  Speech:  clear, coherent  Language: fluent and intact in English  Psychomotor, Gait, Musculoskeletal:  no evidence of tardive dyskinesia, dystonia, or tics  Throught Process:  disorganized, evidence of thought blocking present, and illogical  Associations:  no loose associations  Thought Content:  no evidence of suicidal ideation or homicidal ideation, auditory hallucinations present, and patient appears to be responding to internal stimuli throughout entire interview  Insight:  fair  Judgement:  fair  Oriented to:  time, person, and place  Attention Span and Concentration:  fair  Recent and Remote Memory:  fair  Fund of Knowledge:  appropriate           Precautions:     Behavioral Orders   Procedures    Assault precautions    Cheeking Precautions (behavioral units)     Patient Observed swallowing PO medications; Patient asked to drink water after swallowing medication; Patient in Staff line of sight for 15 minutes after medication given; Mouth checks after PO administration (patient asked to open mouth and stick out their tongue).    Code 1 - Restrict to Unit    Elopement precautions    Routine Programming     As clinically indicated    Status 15     Every 15 minutes.          Diagnoses:     # Bipolar I disorder, currently manic with psychotic features   # Hx of possible seizure in 1983          Assessment & Plan:     Assessment and hospital summary:  Angela Basurto is a 58 year old female previously diagnosed with Bipolar Disorder Type 1 and anxiety who  presented with elvis and psychosis in the context of medication nonadherence. Patient was presented to emergency room due to homicidal threats toward her family using knife, and exhibiting paranoid and severe disorganized thought and responding to her internal stimuli while staying in the emergency room. Significant symptoms on admission include paranoia, disorganized thinking and behaviors, AVH, and emotional lability. The MSE on admission was pertinent for RTIS. Biological contributions to mental health presentation include diagnosis of Bipolar Disorder, type 1 and taking Depakote, Zyprexa, and Haldol. Psychological contributions to mental health presentation include poor insight into her condition. While she understands why she's in the hospital, she displays intrusive behaviors that require a 1:1. 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 Protective factors include family support and being [employer]. Most recent psychiatric hospitalization was in July of this year during which time she left [facility]. In summary, the patient's reported symptoms of elvis and psychosis in the context of medication nonadherence are consistent with Bipolar Disorder, type 1.      Angela Basurto was admitted to [facility] on a 72 hour hold. The patient's PTA Zyprexa 10 mg BID & Depakote, and Haldol 20 mg were restarted. PTA hydroxyzine and trazodone were restarted. A petition for MI commitment with [facility] was filed on 8/18 through [facility].  She is now committed with [facility]. Clozapine will be initiated on 8/31/23.     Today's Changes:  Continue clozapine titration, tentative plan is to increase by 25 mg daily until reaching dose of 300 mg at bedtime. Will then check a level.   Reduce Zyprexa to 5 mg BID due to failed trial, ineffectiveness, polypharmacy, and fost pressures. Plan to discontinue next week.   Family care conference  today    Target psychiatric symptoms and interventions:    Clozapine titration  Depakote ER 1000 mg QHS  Haldol 20 mg at bedtime  Zyprexa 5 mg BID    Risks, benefits, and alternatives discussed at length with patient.     Acute Medical Problems and Treatments:  Acute medical concerns:  - No acute medical concerns    Hx of HTN  HOLD clonidine as patient's pressures have been consistently soft.     Acetaminophen as needed utilized for pain occasionally. No other major medical issues arose during hospitalization. PTA Senokot continued for constipation.     Pertinent labs/imaging:  Depakote level 18.2 on 8/17/2023, subtherapeutic. Concerns for cheeking as patient admitted to diverting medication. Level obtained again on 8/22 is now therapeutic at 106.    Haldol level reviewed and is 13.7 at current dose of 20 mg daily.     Behavioral/Psychological/Social:  - Encourage unit programming    Safety:  - Continue precautions as noted above  - Status 15 minute checks  - On SIO for severe intrusiveness and vulnerability around other high acuity patients    Legal Status: court hold    Disposition Plan   Reason for ongoing admission: poses an imminent risk to self, poses an imminent risk to others, and is unable to care for self due to severe psychosis or elvis  Discharge location:  TBD  Discharge Medications: not ordered  Follow-up Appointments: not scheduled    Entered by: Denia Champagne MD on 9/1/2023 at 12:50 PM     Medical Decision Making       120 MINUTES SPENT BY ME on the date of service doing chart review, history, exam, documentation & further activities per the note.

## 2023-09-01 NOTE — PLAN OF CARE
"Pt visible in the milieu throughout the morning, remained isolated to self, observed responding to internal stimuli aeb self talk and loud laughter at times. Pt spent the afternoon in room napping intermittently. Pt denies SI, SIB, HI and thoughts of hurting others. Pt endorses AH and denies VH. Pt denies depression and anxiety. When asked if pt felt safe on the unit, pt stated \"there is a man that comes in my room at night and looks at me\" and it makes her scared. RN writer assured pt that it is only the staff doing 15 minute checks and have no ill intent towards her. Pt did not seem to understand. Pt requires much encouragement regarding this matter.    Pt was med compliant. Food and fluid intake adequate. No physical complaint reported or observed.    VS reviewed: BP 99/62 (BP Location: Left arm, Patient Position: Sitting, Cuff Size: Adult Large)   Pulse 98   Temp 97.5  F (36.4  C) (Oral)   Resp 16   Ht 1.753 m (5' 9\")   Wt 102.7 kg (226 lb 6.4 oz)   SpO2 97%   BMI 33.43 kg/m   . Patient denies  pain.    Length of stay: 15      "

## 2023-09-01 NOTE — PLAN OF CARE
Problem: Plan of Care - These are the overarching goals to be used throughout the patient stay.    Goal: Absence of Hospital-Acquired Illness or Injury  Outcome: Progressing  Note: Patient endorses no ASHLEY     Problem: Sleep Disturbance  Goal: Adequate Sleep/Rest  Note: Patient seems to have no difficulty initiating and maintaining sleep this night   Goal Outcome Evaluation:       Patient observed mostly sleeping during safety checks, this is a great improvement over previous nights, patient was not seen pacing in the hallway as usual, cooperative with safety rounds with no demonstrated behavioral disturbance. Patient reports no side effect from her medication regimen, patient endorses no SI/HI, or SIB. Patient was not observed responding to IS. No symptoms of elvis or psychosis reported or observed. Patient attained a total of 6 hours of uninterrupted sleep. Will continue to monitor.           Sammy Mckeon DNP, RN

## 2023-09-02 PROCEDURE — 250N000013 HC RX MED GY IP 250 OP 250 PS 637: Performed by: PSYCHIATRY & NEUROLOGY

## 2023-09-02 PROCEDURE — 124N000002 HC R&B MH UMMC

## 2023-09-02 RX ADMIN — CLOZAPINE 75 MG: 25 TABLET ORAL at 20:30

## 2023-09-02 RX ADMIN — SENNOSIDES 1 TABLET: 8.6 TABLET, FILM COATED ORAL at 08:40

## 2023-09-02 RX ADMIN — ACETAMINOPHEN 650 MG: 325 TABLET, FILM COATED ORAL at 20:41

## 2023-09-02 RX ADMIN — OLANZAPINE 5 MG: 5 TABLET, FILM COATED ORAL at 20:30

## 2023-09-02 RX ADMIN — HALOPERIDOL 20 MG: 10 TABLET ORAL at 20:30

## 2023-09-02 RX ADMIN — SENNOSIDES 1 TABLET: 8.6 TABLET, FILM COATED ORAL at 20:30

## 2023-09-02 RX ADMIN — OLANZAPINE 5 MG: 5 TABLET, FILM COATED ORAL at 08:41

## 2023-09-02 ASSESSMENT — ACTIVITIES OF DAILY LIVING (ADL)
ADLS_ACUITY_SCORE: 29
ORAL_HYGIENE: INDEPENDENT
ADLS_ACUITY_SCORE: 29
HYGIENE/GROOMING: INDEPENDENT
ADLS_ACUITY_SCORE: 29
LAUNDRY: UNABLE TO COMPLETE
ADLS_ACUITY_SCORE: 29
DRESS: SCRUBS (BEHAVIORAL HEALTH)
ADLS_ACUITY_SCORE: 29

## 2023-09-02 NOTE — PLAN OF CARE
Problem: Plan of Care - These are the overarching goals to be used throughout the patient stay.    Goal: Absence of Hospital-Acquired Illness or Injury  Note: Patient reports no ASHLEY     Problem: Sleep Disturbance  Goal: Adequate Sleep/Rest  Outcome: Progressing  Note: Patient slept maximally this night   Goal Outcome Evaluation:       Patient slept through the night, did not complain of pain, anxiety or any form of discomfort. Cooperative with safety checks without demonstrating any form of negative behavior. All precautions in place. Patient reports no side effect from his medication regimen. Will continue to monitor and follow plan of care.           Sammy Mckeon DNP, RN

## 2023-09-02 NOTE — PLAN OF CARE
"  Problem: Adult Behavioral Health Plan of Care  Goal: Adheres to Safety Considerations for Self and Others  Outcome: Progressing   Goal Outcome Evaluation:    Plan of Care Reviewed With: patient      Patient has been calm and pleasant during this shift. Patient denies having any thoughts of wanting hurt her self or others, anxiety, and depression. She states \" I do hear voices\". When asked about what the voices are saying to her she responded \" laugh\" she then continues to state \" laughing is medicine\". She also states \" I see my family and friends in the room\". Patent was not observed inappropriately laughing this shift. Vitals are stable.    .Blood pressure 99/62, pulse 98, temperature 97.5  F (36.4  C), temperature source Oral, resp. rate 16, height 1.753 m (5' 9\"), weight 102.7 kg (226 lb 6.4 oz), SpO2 97 %.             "

## 2023-09-02 NOTE — PLAN OF CARE
Patient was up and in the [facility] area at the start of this shift. She is isolative and withdrawn to herself, and did not interact with peers. She sat in the hallway by the phone next to her room, talking to herself for most of the afternoon. She has a flat guarded affect and mood is calm. Her insight into her mental health is limited and does not feel she has mental illness. She appears to be responding into internal stimuli, observed talking to herself and laughing. She told writer that she is hearing voices, but did not tell writer what the voices are saying. She had visitors this shift and they brought her some clothes, a prayer mat and the qur'an (items were added to her belongings list and put in patients locker). She denies, SI/SIB, and paranoid thinking. She is medication compliant but refused her morning dose of Depakote.

## 2023-09-03 PROCEDURE — 250N000013 HC RX MED GY IP 250 OP 250 PS 637: Performed by: PSYCHIATRY & NEUROLOGY

## 2023-09-03 PROCEDURE — 124N000002 HC R&B MH UMMC

## 2023-09-03 RX ADMIN — OLANZAPINE 5 MG: 5 TABLET, FILM COATED ORAL at 08:39

## 2023-09-03 RX ADMIN — SENNOSIDES 1 TABLET: 8.6 TABLET, FILM COATED ORAL at 20:45

## 2023-09-03 RX ADMIN — OLANZAPINE 5 MG: 5 TABLET, FILM COATED ORAL at 20:45

## 2023-09-03 RX ADMIN — CLOZAPINE 100 MG: 100 TABLET ORAL at 20:45

## 2023-09-03 RX ADMIN — HALOPERIDOL 20 MG: 10 TABLET ORAL at 20:44

## 2023-09-03 RX ADMIN — SENNOSIDES 1 TABLET: 8.6 TABLET, FILM COATED ORAL at 08:39

## 2023-09-03 ASSESSMENT — ACTIVITIES OF DAILY LIVING (ADL)
ADLS_ACUITY_SCORE: 29

## 2023-09-03 NOTE — PLAN OF CARE
Problem: Psychotic Signs/Symptoms  Goal: Improved Behavioral Control (Psychotic Signs/Symptoms)  Outcome: Progressing  Note: Patient manifests no behavioral dyscontrol     Problem: Sleep Disturbance  Goal: Adequate Sleep/Rest  Outcome: Progressing  Note: Patient observed sleeping during safety rounds   Goal Outcome Evaluation:       Patient slept for the most part,(6 hrs) made no requests for prn medications, cooperative with care, no observable response to IS or inordinate laughter. All precautions in place, enforced with no violation. Patient reports no side effect from her medication regimen. Patient endorses no SI/HI or SIB. Will continue to monitor and follow plan of care.            Sammy Mckeon DNP, RN

## 2023-09-03 NOTE — PLAN OF CARE
Patient slept 6 hours during the night shift. She was up at the start of the shift sitting in the lounge area. She has a guarded affect and her mood is calm. She is medication compliant but continues to refuse to take her schedule Depakote ER. She spent most of this shift sitting in the lounge area talking to herself and laughing. She took a shower after eating breakfast and spent the afternoon withdrawn to herself. She did not complain of pain and no prn was given. She was appropriate and polite with no agitation, and aggression. She denies all psych symptoms but appears to be responding to internal stimuli.

## 2023-09-03 NOTE — PLAN OF CARE
Problem: Adult Behavioral Health Plan of Care  Goal: Adheres to Safety Considerations for Self and Others  Outcome: Progressing   Goal Outcome Evaluation:    Patient has been Isolative to herself during this shift, and appears with flat affect.She denies having anxiety, depression, and thoughts of wanting to harm herself or others. She does endorse hearing voices that are telling her to laugh and that she feels sad because she missed her family. Patient took night time medication. She requested for pain medication and was complaining of having cramps. She rated her pain 5/10, and she was given PRN Tylenol to help relieve the pain.she  appears to be sleeping in her room at this time. No behavioral concerns noted during this shift.

## 2023-09-04 PROCEDURE — H2032 ACTIVITY THERAPY, PER 15 MIN: HCPCS

## 2023-09-04 PROCEDURE — 250N000013 HC RX MED GY IP 250 OP 250 PS 637: Performed by: PSYCHIATRY & NEUROLOGY

## 2023-09-04 PROCEDURE — 124N000002 HC R&B MH UMMC

## 2023-09-04 RX ADMIN — OLANZAPINE 5 MG: 5 TABLET, FILM COATED ORAL at 08:38

## 2023-09-04 RX ADMIN — OLANZAPINE 5 MG: 5 TABLET, FILM COATED ORAL at 20:37

## 2023-09-04 RX ADMIN — SENNOSIDES 1 TABLET: 8.6 TABLET, FILM COATED ORAL at 20:37

## 2023-09-04 RX ADMIN — DIVALPROEX SODIUM 1000 MG: 500 TABLET, FILM COATED, EXTENDED RELEASE ORAL at 08:38

## 2023-09-04 RX ADMIN — SENNOSIDES 1 TABLET: 8.6 TABLET, FILM COATED ORAL at 08:38

## 2023-09-04 RX ADMIN — CLOZAPINE 125 MG: 100 TABLET ORAL at 20:36

## 2023-09-04 RX ADMIN — HALOPERIDOL 20 MG: 10 TABLET ORAL at 20:37

## 2023-09-04 ASSESSMENT — ACTIVITIES OF DAILY LIVING (ADL)
ADLS_ACUITY_SCORE: 29

## 2023-09-04 NOTE — PLAN OF CARE
"  Problem: Psychotic Signs/Symptoms  Goal: Decreased Sensory Symptoms (Psychotic Signs/Symptoms)  Outcome: Not Progressing   Goal Outcome Evaluation:    Patient has been calm and cooperative this shift. She appears isolative/withdrawn to herself. She was observed sitting in the lounge area laughing to herself. She denies having any thoughts of wanting to harm herself or others, depression, and anxiety. Her families came to visit her this evening and the visit went well. Vitals are within normal limits.    Blood pressure 126/70, pulse 82, temperature 98.2  F (36.8  C), temperature source Oral, resp. rate 16, height 1.753 m (5' 9\"), weight 100.6 kg (221 lb 12.8 oz), SpO2 100 %.    "

## 2023-09-04 NOTE — PLAN OF CARE
"Nursing assessment completed. Patient awake and sitting in the lounge at the start of the shift. Ate breakfast in the lounge. Observed to be talking quietly to herself. She tells writer that she is \"somewhat better\". Is unable to tell writer in what way. Endorses she still has AH. During medication administration, she declines to take her depakote. She first states she will not take 2 tablets, then states she does not need any depakote at all and hands it back to writer. After several minutes of education and encouragement, she does agree to take the depakote. Poor insight into medications. Patient spent the majority of the shift sitting on the periphery of the lounge or in the hallway, appearing preoccupied with internal stimuli. Denies SI/SIB or thoughts to harm others. Continue to monitor and assess.                         "

## 2023-09-05 PROCEDURE — 124N000002 HC R&B MH UMMC

## 2023-09-05 PROCEDURE — 250N000013 HC RX MED GY IP 250 OP 250 PS 637: Performed by: PSYCHIATRY & NEUROLOGY

## 2023-09-05 RX ADMIN — CLOZAPINE 150 MG: 100 TABLET ORAL at 21:11

## 2023-09-05 RX ADMIN — OLANZAPINE 5 MG: 5 TABLET, FILM COATED ORAL at 21:11

## 2023-09-05 RX ADMIN — DIVALPROEX SODIUM 1000 MG: 500 TABLET, FILM COATED, EXTENDED RELEASE ORAL at 08:25

## 2023-09-05 RX ADMIN — SENNOSIDES 1 TABLET: 8.6 TABLET, FILM COATED ORAL at 08:25

## 2023-09-05 RX ADMIN — ACETAMINOPHEN 650 MG: 325 TABLET, FILM COATED ORAL at 21:14

## 2023-09-05 RX ADMIN — OLANZAPINE 5 MG: 5 TABLET, FILM COATED ORAL at 08:25

## 2023-09-05 RX ADMIN — HALOPERIDOL 20 MG: 10 TABLET ORAL at 21:11

## 2023-09-05 RX ADMIN — SENNOSIDES 1 TABLET: 8.6 TABLET, FILM COATED ORAL at 21:11

## 2023-09-05 ASSESSMENT — ACTIVITIES OF DAILY LIVING (ADL)
ADLS_ACUITY_SCORE: 29
HYGIENE/GROOMING: INDEPENDENT
ADLS_ACUITY_SCORE: 29
ORAL_HYGIENE: INDEPENDENT
ADLS_ACUITY_SCORE: 29
ADLS_ACUITY_SCORE: 29
DRESS: INDEPENDENT
ADLS_ACUITY_SCORE: 29

## 2023-09-05 NOTE — PLAN OF CARE
Pt in bed at beginning of shift breathing quiet and unlabored.  Pt slept 5.75 hours. No prn given, no patient complaint or concerns.

## 2023-09-05 NOTE — PLAN OF CARE
Nursing assessment completed. Patient awake and visible. Sitting in the lounge, appearing to be talking and mumbling quietly to herself. She is pleasant upon approach, and engages in convorsation with writer and select patients. She states she is feeling better than when she was admitted. During am medication administration, she again, stated she did not want to take the Depakote. She stated the pills are too big and taste bad. She then stated she would take one of the two tablets. Writer provided education and encouraged her to take the full prescribed dose to keep her level consistent. Writer encouraged her to discuss any medication concerns with her doctor, and not not make medication changes without first doing so. Patient showered and completed ADLs independently. Denies SI/SIB or thoughts to harm others. Continue to monitor and assess.

## 2023-09-05 NOTE — PLAN OF CARE
Problem: Psychotic Signs/Symptoms  Goal: Optimal Cognitive Function (Psychotic Signs/Symptoms)  Outcome: Not Progressing   Goal Outcome Evaluation:      Patient has been isolative to herself and spent most of the shift in her room. During assessment questions patient was responding to internal stimuli which most of the conversation is responding to herself with her native language, and she was also laughing inappropriately. Patient denies having any thoughts of wanting to hurt herself or others, and anxiety but does endorse feeling depressed and hearing voices. Which she states the voices are telling her to laugh. She took her night time medication with out any concern . She likes taking her medication with apple sauce. Patient refused to get her evening shift vitals to be checked. No behavioral concerns during this shift.

## 2023-09-05 NOTE — CARE PLAN
09/04/23 1900   Group Therapy Session   Time Session Began 1615   Time Session Ended 1700   Total Time (minutes) 45   Total # Attendees 5   Group Type expressive therapy   Group Topic Covered emotions/expression   Patient Response/Contribution cooperative with task       Art Therapy directive is to create art (using oil pastels) expressing a recent pleasant experience in which pt felt a positive emotion. Pts were then encouraged to create a Haiku poem to further express experience and emotion.  Goals of directive: emotional expression, emotional regulation, mindfulness  Pt was an engaged participant, was more focused and less distractible than in previous author's groups. Pt did not appear to be responding to internal stimuli while in group.    Pt finished artwork and briefly shared with author and group. Pt roaslind her daughter's face and a long poem summarizing her daughter's life from birth to present. Pt seems to enjoy poetry and also recited another long poem for the group.  Pts mood was calm, pleasant participant.

## 2023-09-06 PROCEDURE — 99233 SBSQ HOSP IP/OBS HIGH 50: CPT | Performed by: PSYCHIATRY & NEUROLOGY

## 2023-09-06 PROCEDURE — 250N000013 HC RX MED GY IP 250 OP 250 PS 637: Performed by: PSYCHIATRY & NEUROLOGY

## 2023-09-06 PROCEDURE — 124N000002 HC R&B MH UMMC

## 2023-09-06 RX ORDER — DIVALPROEX SODIUM 125 MG/1
500 CAPSULE, COATED PELLETS ORAL EVERY 12 HOURS SCHEDULED
Status: DISCONTINUED | OUTPATIENT
Start: 2023-09-07 | End: 2023-09-20 | Stop reason: HOSPADM

## 2023-09-06 RX ORDER — OLANZAPINE 5 MG/1
5 TABLET ORAL AT BEDTIME
Status: DISCONTINUED | OUTPATIENT
Start: 2023-09-06 | End: 2023-09-07

## 2023-09-06 RX ORDER — FAMOTIDINE 10 MG
10 TABLET ORAL 2 TIMES DAILY
Status: DISCONTINUED | OUTPATIENT
Start: 2023-09-06 | End: 2023-09-20 | Stop reason: HOSPADM

## 2023-09-06 RX ADMIN — FAMOTIDINE 10 MG: 10 TABLET ORAL at 22:31

## 2023-09-06 RX ADMIN — CLOZAPINE 175 MG: 100 TABLET ORAL at 22:30

## 2023-09-06 RX ADMIN — OLANZAPINE 5 MG: 5 TABLET, FILM COATED ORAL at 08:45

## 2023-09-06 RX ADMIN — SENNOSIDES 1 TABLET: 8.6 TABLET, FILM COATED ORAL at 08:45

## 2023-09-06 RX ADMIN — OLANZAPINE 5 MG: 5 TABLET, FILM COATED ORAL at 22:30

## 2023-09-06 RX ADMIN — SENNOSIDES 1 TABLET: 8.6 TABLET, FILM COATED ORAL at 22:30

## 2023-09-06 RX ADMIN — DIVALPROEX SODIUM 1000 MG: 500 TABLET, FILM COATED, EXTENDED RELEASE ORAL at 08:45

## 2023-09-06 RX ADMIN — HALOPERIDOL 20 MG: 10 TABLET ORAL at 22:29

## 2023-09-06 ASSESSMENT — ACTIVITIES OF DAILY LIVING (ADL)
ADLS_ACUITY_SCORE: 29

## 2023-09-06 NOTE — PLAN OF CARE
Pt asleep at start of shift.     Breathing quiet and unlabored when sleeping.     Pt had no c/o pain or discomfort during the HS.     Appears to have slept 6.5 hours.     Goal Outcome Evaluation:  Problem: Adult Behavioral Health Plan of Care  Goal: Adheres to Safety Considerations for Self and Others  Intervention: Develop and Maintain Individualized Safety Plan  Recent Flowsheet Documentation  Taken 9/6/2023 0000 by Opal Wu, RN  Safety Measures: safety rounds completed  Goal: Absence of New-Onset Illness or Injury  Intervention: Identify and Manage Fall Risk  Recent Flowsheet Documentation  Taken 9/6/2023 0000 by Opal Wu, RN  Safety Measures: safety rounds completed     Problem: Sleep Disturbance  Goal: Adequate Sleep/Rest  Outcome: Progressing

## 2023-09-06 NOTE — PLAN OF CARE
Days  Nursing assessment completed. Patient hesitant to take am scheduled Depakote. She takes one of the two tablets and gives the 2nd tablet to writer, refusing to take it. She states she does not like taking it. She declines writer's offer to half it. After several minutes of discussion and education, she does agree to take the full scheduled dose of Depakote. Patient spends much of the shift sitting in the lounge, withdrawn to self. She is pleasant upon approach. Continues to be observed talking to herself. She states she still has auditory hallucination, and denies they are command in nature. EKG ordered.  Evenings  Patient spends time sitting in the lounge and watching a movie with peers. Scheduled to have EKG. Am WBC lab draw in the morning. Patient's daily Depakote changed to sprinkles with BID dosing, to start tomorrow am. Patient denies current SI/SIB or thoughts to harm others. Medication compliant. Continue to monitor and assess.

## 2023-09-06 NOTE — PROGRESS NOTES
"[facility], [place]   Psychiatric Progress Note  Hospital Day: 20        Interim History:   The patient's care was discussed with the treatment team during the daily team meeting and/or staff's chart notes were reviewed.  Staff report patient does not interact with peers. Continuing to respond to internal stimuli. Declining Depakote at times. Reporting intermittent headache.     Upon interview, Angela reported new cough. Denies fevers/chills, sore throat. She also mentioned experiencing \"heart burn.\" She said that she feels burning in central chest and that it worsened after eating spicy food yesterday. She said that she normally takes Zantac at home. She is OK with taking famotidine. She does not want to take Depakote pills because they are too big. She asked to be switched back to sprinkles. She understands that it will be switched back to BID dosing. I requested a baseline EKG and she agreed. She said that she continues to experience bothersome voices.     Suicidal ideation: denies current or recent suicidal ideation or behaviors.    Homicidal ideation: denies current or recent homicidal ideation or behaviors.    Psychotic symptoms: Pt is experiencing AH and paranoia. Patient denies VH.    Medication side effects reported: No significant side effects.    Acute medical concerns: none    Other issues reported by patient: Patient had no further questions or concerns.           Medications:      cloZAPine  150 mg Oral At Bedtime    divalproex sodium extended-release  1,000 mg Oral Daily    famotidine  10 mg Oral BID    haloperidol  20 mg Oral At Bedtime    OLANZapine  5 mg Oral BID    sennosides  1 tablet Oral BID          Allergies:     Allergies   Allergen Reactions    Pork-Derived Products Unknown     [employer] does not eat pork          Labs:     No results found for this or any previous visit (from the past 24 hour(s)).         Psychiatric Examination:     /64 (BP Location: Right " "arm, Patient Position: Sitting, Cuff Size: Adult Regular)   Pulse 90   Temp 97.3  F (36.3  C) (Temporal)   Resp 16   Ht 1.753 m (5' 9\")   Wt 100.6 kg (221 lb 12.8 oz)   SpO2 99%   BMI 32.75 kg/m    Weight is 221 lbs 12.8 oz  Body mass index is 32.75 kg/m .    Weight over time:  Vitals:    08/17/23 2218 08/24/23 0800 08/29/23 0800 09/03/23 0820   Weight: 100.9 kg (222 lb 6.4 oz) 102.7 kg (226 lb 6.4 oz) 102.7 kg (226 lb 6.4 oz) 100.6 kg (221 lb 12.8 oz)       Orthostatic Vitals       None              Cardiometabolic risk assessment. 08/24/23      Reviewed patient profile for cardiometabolic risk factors    Date taken /Value  REFERENCE RANGE   Abdominal Obesity  (Waist Circumference)   See nursing flowsheet Women ?35 in (88 cm)   Men ?40 in (102 cm)      Triglycerides  Triglycerides   Date Value Ref Range Status   07/02/2023 97 <150 mg/dL Final       ?150 mg/dL (1.7 mmol/L) or current treatment for elevated triglycerides   HDL cholesterol  Direct Measure HDL   Date Value Ref Range Status   07/02/2023 84 >=50 mg/dL Final   ]   Women <50 mg/dL (1.3 mmol/L) in women or current treatment for low HDL cholesterol  Men <40 mg/dL (1 mmol/L) in men or current treatment for low HDL cholesterol     Fasting plasma glucose (FPG) Lab Results   Component Value Date     08/18/2023      FPG ?100 mg/dL (5.6 mmol/L) or treatment for elevated blood glucose   Blood pressure  BP Readings from Last 3 Encounters:   09/06/23 113/64   07/25/23 (!) 143/79    Blood pressure ?130/85 mmHg or treatment for elevated blood pressure   Family History  See family history     Mental Status Exam:  Appearance: awake, alert, adequately groomed, and dressed in hospital scrubs  Attitude:  more cooperative, less suspicious of writer.   Eye Contact:  intense  Mood:  calm  Affect:  mood congruent and intensity is blunted  Speech:  clear, coherent  Language: fluent and intact in English  Psychomotor, Gait, Musculoskeletal:  no evidence of tardive " dyskinesia, dystonia, or tics  Throught Process:  disorganized, evidence of thought blocking present, and illogical  Associations:  no loose associations  Thought Content:  no evidence of suicidal ideation or homicidal ideation, auditory hallucinations present, and patient appears to be responding to internal stimuli throughout entire interview  Insight:  fair  Judgement:  fair  Oriented to:  time, person, and place  Attention Span and Concentration:  fair  Recent and Remote Memory:  fair  Fund of Knowledge:  appropriate           Precautions:     Behavioral Orders   Procedures    Assault precautions    Cheeking Precautions (behavioral units)     Patient Observed swallowing PO medications; Patient asked to drink water after swallowing medication; Patient in Staff line of sight for 15 minutes after medication given; Mouth checks after PO administration (patient asked to open mouth and stick out their tongue).    Code 1 - Restrict to Unit    Elopement precautions    Routine Programming     As clinically indicated    Status 15     Every 15 minutes.          Diagnoses:     # Bipolar I disorder, currently manic with psychotic features   # Hx of possible seizure in 1983          Assessment & Plan:     Assessment and hospital summary:  Angela Basurto is a 58 year old female previously diagnosed with Bipolar Disorder Type 1 and anxiety who presented with elvis and psychosis in the context of medication nonadherence. Patient was presented to emergency room due to homicidal threats toward her family using knife, and exhibiting paranoid and severe disorganized thought and responding to her internal stimuli while staying in the emergency room. Significant symptoms on admission include paranoia, disorganized thinking and behaviors, AVH, and emotional lability. The MSE on admission was pertinent for RTIS. Biological contributions to mental health presentation include diagnosis of Bipolar Disorder, type 1 and taking Depakote,  Zyprexa, and Haldol. Psychological contributions to mental health presentation include poor insight into her condition. While she understands why she's in the hospital, she displays intrusive behaviors that require a 1:1. 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 Protective factors include family support and being Gnosticist. Most recent psychiatric hospitalization was in July of this year during which time she left [facility]. In summary, the patient's reported symptoms of elvis and psychosis in the context of medication nonadherence are consistent with Bipolar Disorder, type 1.      Angela Basurto was [facility] to [facility] on a 72 hour hold. The patient's PTA Zyprexa 10 mg BID & Depakote, and Haldol 20 mg were restarted. PTA hydroxyzine and trazodone were restarted. A petition for MI commitment with [facility] was filed on 8/18 through [facility].  She is now committed with [facility]. Clozapine will be initiated on 8/31/23. Zyprexa reduced to 5 mg BID on 9/1.     Today's Changes:  Continue clozapine titration, tentative plan is to increase by 25 mg daily until reaching dose of 300 mg at bedtime. Will then check a level.   Switch Depakote to sprinkles  mg BID per pt request  Add famotidine 10 mg BID due to suspected acid reflux  Routine EKG ordered  Weekly WBC and diff tomorrow  Reduce Zyprexa to 5 mg QHS due to failed trial, ineffectiveness, polypharmacy, and soft pressures intermittently. Plan to discontinue next week.   OK for pt to have her undergarments, sleepwear, and hijab. Order placed.     Target psychiatric symptoms and interventions:  Clozapine titration  Depakote ER 1000 mg QHS  Haldol 20 mg at bedtime  Zyprexa 5 mg BID    Risks, benefits, and alternatives discussed at length with patient.     Acute Medical Problems and Treatments:  Acute medical concerns:  - No acute medical concerns    Hx of HTN  HOLD  clonidine as patient's pressures have been consistently soft.     Acetaminophen as needed utilized for pain occasionally. No other major medical issues arose during hospitalization. PTA Senokot continued for constipation.     Pertinent labs/imaging:  Depakote level 18.2 on 8/17/2023, subtherapeutic. Concerns for cheeking as patient admitted to diverting medication. Level obtained again on 8/22 is now therapeutic at 106.    Haldol level reviewed and is 13.7 at current dose of 20 mg daily.     Behavioral/Psychological/Social:  - Encourage unit programming    Safety:  - Continue precautions as noted above  - Status 15 minute checks    Legal Status: court hold    Disposition Plan   Reason for ongoing admission: poses an imminent risk to self, poses an imminent risk to others, and is unable to care for self due to severe psychosis or elvis  Discharge location:  TBD  Discharge Medications: not ordered  Follow-up Appointments: not scheduled    Entered by: Denia Champagne MD on 9/6/2023 at 12:41 PM     Nighat Champagne MD  [facility] Psychiatry

## 2023-09-06 NOTE — PLAN OF CARE
Assessment/Intervention/Current Symtoms and Care Coordination:  Chart review and met with team, discussed pt progress, symptomology, and response to treatment. Discussed the discharge plan and any potential impediments to discharge.    Discharge Plan or Goal:  Patient presents with symptoms of psychosis that put her at risk of harming herself and others. When patient stabilizes considerations include home with family or IRTS.     Barriers to Discharge:  Patient presents with symptoms of psychosis that put her at risk of harming herself and others.      Referral Status:  Referral needs being assessed as patient stabilizes      Legal Status:  Commitment with [facility]: [place]  File: [id number]  Expires: 08/30/23 to 03/01/24    Medications on [facility]: Haloperidol, clozapine, risperidone, olanzapine    Contacts:  Daughter: Mya, [phone], [email or web address] (Consent)  Aunt: Vickie, [phone] (Consent)  Son: Golden, [phone]   Cousin: Lashawn, [phone] (Consent)  Prepetition screener: Carlos Rushing, [phone]  Her : John Maynard, [phone]     Upcoming Meetings and Dates/Important Information and next steps:  ACT referral     Team Note Due:  Friday

## 2023-09-06 NOTE — PLAN OF CARE
Problem: Psychotic Signs/Symptoms  Goal: Improved Behavioral Control (Psychotic Signs/Symptoms)  Outcome: Progressing     Problem: Psychotic Signs/Symptoms  Goal: Improved Mood Symptoms  Outcome: Progressing   Goal Outcome Evaluation:    Plan of Care Reviewed With: patient      At the start of the shift, the Pt sat in the lounge and watched TV. She did not interact with her peers. They attended the music group activity. She mainly stayed in the lounge and visited with her family after supper before going to bed. She endorsed anxiety, headaches, poor sleeping patterns, and visual and auditory hallucinations but denied depression, constipation, and decreased appetite. She received Tylenol with her routine meds at , which relieved her headaches. The Pt's mood was calm, and her affect was flat, but she brightened up during the assessment. She was medication compliant, with no adverse effects reported. The writer left a note for the Doctor requesting approval for the pt to have her undergarments, sleepwear, and hijab. The daughter was following up on this request.

## 2023-09-07 LAB
BASOPHILS # BLD AUTO: 0 10E3/UL (ref 0–0.2)
BASOPHILS NFR BLD AUTO: 0 %
EOSINOPHIL # BLD AUTO: 0 10E3/UL (ref 0–0.7)
EOSINOPHIL NFR BLD AUTO: 1 %
IMM GRANULOCYTES # BLD: 0 10E3/UL
IMM GRANULOCYTES NFR BLD: 0 %
LYMPHOCYTES # BLD AUTO: 2.2 10E3/UL (ref 0.8–5.3)
LYMPHOCYTES NFR BLD AUTO: 35 %
MONOCYTES # BLD AUTO: 0.5 10E3/UL (ref 0–1.3)
MONOCYTES NFR BLD AUTO: 7 %
NEUTROPHILS # BLD AUTO: 3.5 10E3/UL (ref 1.6–8.3)
NEUTROPHILS NFR BLD AUTO: 57 %
NRBC # BLD AUTO: 0 10E3/UL
NRBC BLD AUTO-RTO: 0 /100
WBC # BLD AUTO: 6.2 10E3/UL (ref 4–11)

## 2023-09-07 PROCEDURE — 250N000013 HC RX MED GY IP 250 OP 250 PS 637: Performed by: PSYCHIATRY & NEUROLOGY

## 2023-09-07 PROCEDURE — 36415 COLL VENOUS BLD VENIPUNCTURE: CPT | Performed by: PSYCHIATRY & NEUROLOGY

## 2023-09-07 PROCEDURE — 85048 AUTOMATED LEUKOCYTE COUNT: CPT | Performed by: PSYCHIATRY & NEUROLOGY

## 2023-09-07 PROCEDURE — 124N000002 HC R&B MH UMMC

## 2023-09-07 PROCEDURE — 99233 SBSQ HOSP IP/OBS HIGH 50: CPT | Performed by: PSYCHIATRY & NEUROLOGY

## 2023-09-07 PROCEDURE — 93010 ELECTROCARDIOGRAM REPORT: CPT | Performed by: INTERNAL MEDICINE

## 2023-09-07 PROCEDURE — 93005 ELECTROCARDIOGRAM TRACING: CPT

## 2023-09-07 RX ORDER — OLANZAPINE 5 MG/1
5 TABLET ORAL AT BEDTIME
Status: DISCONTINUED | OUTPATIENT
Start: 2023-09-07 | End: 2023-09-08

## 2023-09-07 RX ORDER — CLOZAPINE 200 MG/1
200 TABLET ORAL AT BEDTIME
Status: DISCONTINUED | OUTPATIENT
Start: 2023-09-07 | End: 2023-09-08

## 2023-09-07 RX ADMIN — SENNOSIDES 1 TABLET: 8.6 TABLET, FILM COATED ORAL at 20:09

## 2023-09-07 RX ADMIN — FAMOTIDINE 10 MG: 10 TABLET ORAL at 20:09

## 2023-09-07 RX ADMIN — DIVALPROEX SODIUM 500 MG: 125 CAPSULE, COATED PELLETS ORAL at 08:54

## 2023-09-07 RX ADMIN — HALOPERIDOL 20 MG: 10 TABLET ORAL at 20:10

## 2023-09-07 RX ADMIN — FAMOTIDINE 10 MG: 10 TABLET ORAL at 08:55

## 2023-09-07 RX ADMIN — OLANZAPINE 5 MG: 5 TABLET, FILM COATED ORAL at 20:11

## 2023-09-07 RX ADMIN — CLOZAPINE 200 MG: 200 TABLET ORAL at 20:10

## 2023-09-07 RX ADMIN — SENNOSIDES 1 TABLET: 8.6 TABLET, FILM COATED ORAL at 08:54

## 2023-09-07 RX ADMIN — DIVALPROEX SODIUM 500 MG: 125 CAPSULE, COATED PELLETS ORAL at 20:09

## 2023-09-07 ASSESSMENT — ACTIVITIES OF DAILY LIVING (ADL)
ADLS_ACUITY_SCORE: 29
HYGIENE/GROOMING: INDEPENDENT
ADLS_ACUITY_SCORE: 29
DRESS: INDEPENDENT
ORAL_HYGIENE: INDEPENDENT
ADLS_ACUITY_SCORE: 29

## 2023-09-07 NOTE — PLAN OF CARE
Nursing assessment completed. Patient cooperative with am lab draw. Patient Cooperative with scheduled am medications. Per her request, scheduled depakpote was changed to sprinkles and she took that cooperatively in applesauce. She endorses feeling tired, and rests in her room for much of the shift. Does eat meals in the lounge. Blunted affect, but brightens on approach. Calm and cooperative. Appears to be preoccupied with internal stimuli, but this appears reduced from previous days. Denies SI/SIB or thoughts to harm others. Continue to monitor and assess.

## 2023-09-07 NOTE — PROGRESS NOTES
"[facility], [place]   Psychiatric Progress Note  Hospital Day: 21        Interim History:   The patient's care was discussed with the treatment team during the daily team meeting and/or staff's chart notes were reviewed.    Per RN note dated 9/6/23:  Days  Nursing assessment completed. Patient hesitant to take am scheduled Depakote. She takes one of the two tablets and gives the 2nd tablet to writer, refusing to take it. She states she does not like taking it. She declines writer's offer to half it. After several minutes of discussion and education, she does agree to take the full scheduled dose of Depakote. Patient spends much of the shift sitting in the lounge, withdrawn to self. She is pleasant upon approach. Continues to be observed talking to herself. She states she still has auditory hallucination, and denies they are command in nature. EKG ordered.  Evenings  Patient spends time sitting in the lounge and watching a movie with peers. Scheduled to have EKG. Am WBC lab draw in the morning. Patient's daily Depakote changed to sprinkles with BID dosing, to start tomorrow am. Patient denies current SI/SIB or thoughts to harm others. Medication compliant. Continue to monitor and assess.     Upon interview, Angela reported that she is sleeping better. \"Laughing less.\" However, when saying that, she began laughing. Said that voices were telling her jokes. She asked about timeline for discharge.     Suicidal ideation: denies current or recent suicidal ideation or behaviors.    Homicidal ideation: denies current or recent homicidal ideation or behaviors.    Psychotic symptoms: Pt is experiencing AH. Patient denies VH.    Medication side effects reported: No significant side effects. Having normal BMs. Most recent BM was yesterday.     Acute medical concerns: none    Other issues reported by patient: Patient had no further questions or concerns.           Medications:      cloZAPine  175 mg " "Oral At Bedtime    divalproex sodium delayed-release  500 mg Oral Q12H [facility] (08/20)    famotidine  10 mg Oral BID    haloperidol  20 mg Oral At Bedtime    OLANZapine  5 mg Oral At Bedtime    sennosides  1 tablet Oral BID          Allergies:     Allergies   Allergen Reactions    Pork-Derived Products Unknown     [employer] does not eat pork          Labs:     No results found for this or any previous visit (from the past 24 hour(s)).         Psychiatric Examination:     /79 (BP Location: Left arm, Patient Position: Sitting, Cuff Size: Adult Large)   Pulse 91   Temp 96.9  F (36.1  C) (Temporal)   Resp 16   Ht 1.753 m (5' 9\")   Wt 103.1 kg (227 lb 4.8 oz)   SpO2 99%   BMI 33.57 kg/m    Weight is 227 lbs 4.8 oz  Body mass index is 33.57 kg/m .    Weight over time:  Vitals:    08/17/23 2218 08/24/23 0800 08/29/23 0800 09/03/23 0820   Weight: 100.9 kg (222 lb 6.4 oz) 102.7 kg (226 lb 6.4 oz) 102.7 kg (226 lb 6.4 oz) 100.6 kg (221 lb 12.8 oz)    09/07/23 0800   Weight: 103.1 kg (227 lb 4.8 oz)       Orthostatic Vitals       None              Cardiometabolic risk assessment. 08/24/23      Reviewed patient profile for cardiometabolic risk factors    Date taken /Value  REFERENCE RANGE   Abdominal Obesity  (Waist Circumference)   See nursing flowsheet Women ?35 in (88 cm)   Men ?40 in (102 cm)      Triglycerides  Triglycerides   Date Value Ref Range Status   07/02/2023 97 <150 mg/dL Final       ?150 mg/dL (1.7 mmol/L) or current treatment for elevated triglycerides   HDL cholesterol  Direct Measure HDL   Date Value Ref Range Status   07/02/2023 84 >=50 mg/dL Final   ]   Women <50 mg/dL (1.3 mmol/L) in women or current treatment for low HDL cholesterol  Men <40 mg/dL (1 mmol/L) in men or current treatment for low HDL cholesterol     Fasting plasma glucose (FPG) Lab Results   Component Value Date     08/18/2023      FPG ?100 mg/dL (5.6 mmol/L) or treatment for elevated blood glucose   Blood pressure  BP " Readings from Last 3 Encounters:   09/07/23 114/79   07/25/23 (!) 143/79    Blood pressure ?130/85 mmHg or treatment for elevated blood pressure   Family History  See family history     Mental Status Exam:  Appearance: awake, alert, adequately groomed, and dressed in hospital scrubs  Attitude:  cooperative  Eye Contact:  good  Mood:  calm  Affect:  mood congruent and intensity is blunted  Speech:  clear, coherent  Language: fluent and intact in English  Psychomotor, Gait, Musculoskeletal:  no evidence of tardive dyskinesia, dystonia, or tics  Throught Process:  disorganized, evidence of thought blocking present, and illogical  Associations:  no loose associations  Thought Content:  no evidence of suicidal ideation or homicidal ideation, auditory hallucinations present, and patient appears to be responding to internal stimuli throughout entire interview  Insight:  fair  Judgement:  fair  Oriented to:  time, person, and place  Attention Span and Concentration:  fair  Recent and Remote Memory:  fair  Fund of Knowledge:  appropriate           Precautions:     Behavioral Orders   Procedures    Assault precautions    Cheeking Precautions (behavioral units)     Patient Observed swallowing PO medications; Patient asked to drink water after swallowing medication; Patient in Staff line of sight for 15 minutes after medication given; Mouth checks after PO administration (patient asked to open mouth and stick out their tongue).    Code 1 - Restrict to Unit    Elopement precautions    Routine Programming     As clinically indicated    Status 15     Every 15 minutes.          Diagnoses:     # Bipolar I disorder, currently manic with psychotic features   # Hx of possible seizure in 1983          Assessment & Plan:     Assessment and hospital summary:  Angela Basurto is a 58 year old female previously diagnosed with Bipolar Disorder Type 1 and anxiety who presented with elvis and psychosis in the context of medication  nonadherence. Patient was presented to emergency room due to homicidal threats toward her family using knife, and exhibiting paranoid and severe disorganized thought and responding to her internal stimuli while staying in the emergency room. Significant symptoms on admission include paranoia, disorganized thinking and behaviors, AVH, and emotional lability. The MSE on admission was pertinent for RTIS. Biological contributions to mental health presentation include diagnosis of Bipolar Disorder, type 1 and taking Depakote, Zyprexa, and Haldol. Psychological contributions to mental health presentation include poor insight into her condition. While she understands why she's in the hospital, she displays intrusive behaviors that require a 1:1. 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 Protective factors include family support and being Jainism. Most recent psychiatric hospitalization was in July of this year during which time she left [place]. In summary, the patient's reported symptoms of elvis and psychosis in the context of medication nonadherence are consistent with Bipolar Disorder, type 1.      Angela Basurto was [facility] to [facility] on a 72 hour hold. The patient's PTA Zyprexa 10 mg BID & Depakote, and Haldol 20 mg were restarted. PTA hydroxyzine and trazodone were restarted. A petition for MI commitment with [facility] was filed on 8/18 through [facility].  She is now committed with [facility]ol[facility]. Clozapine will be initiated on 8/31/23. Zyprexa reduced to 5 mg BID on 9/1. On 9/6, Switched Depakote to sprinkles  mg BID per pt request. Reduced Zyprexa to 5 mg QHS due to failed trial, ineffectiveness, polypharmacy, and soft pressures intermittently.     Today's Changes:  Continue clozapine titration, tentative plan is to increase by 25 mg daily until reaching dose of 300 mg at bedtime. Will then check a level.    Depakote level ordered for Tuesday  Routine EKG ordered and reviewed. Normal with normal QTc.   Weekly WBC and diff reviewed today. Stable.   Discontinue scheduled Zyprexa on Monday    Target psychiatric symptoms and interventions:  Clozapine titration  Depakote ER 1000 mg QHS  Haldol 20 mg at bedtime  Zyprexa 5 mg QHS    Risks, benefits, and alternatives discussed at length with patient.     Acute Medical Problems and Treatments:  Acute medical concerns:  - No acute medical concerns    Hx of HTN  HOLD clonidine as patient's pressures have been consistently soft.     Acetaminophen as needed utilized for pain occasionally. No other major medical issues arose during hospitalization. PTA Senokot continued for constipation.     GERD:  Famotidine started on 9/6    Pertinent labs/imaging:  Depakote level 18.2 on 8/17/2023, subtherapeutic. Concerns for cheeking as patient admitted to diverting medication. Level obtained again on 8/22 is now therapeutic at 106.    Haldol level reviewed and is 13.7 at current dose of 20 mg daily.     Behavioral/Psychological/Social:  - Encourage unit programming    Safety:  - Continue precautions as noted above  - Status 15 minute checks    Legal Status: full commitment with [facility]    Disposition Plan   Reason for ongoing admission: poses an imminent risk to self, poses an imminent risk to others, and is unable to care for self due to severe psychosis or elvis  Discharge location:  TBD  Discharge Medications: not ordered  Follow-up Appointments: not scheduled    Entered by: Denia Champagne MD on 9/7/2023 at 8:50 AM     Nighat Champagne MD  [facility] Psychiatry

## 2023-09-07 NOTE — PLAN OF CARE
Goal Outcome Evaluation:         Pt a wake x1 briefly otherwise appeared to be a sleep at all other safety checks.  Pt slept 6.5 hours.

## 2023-09-07 NOTE — PLAN OF CARE
Assessment/Intervention/Current Symtoms and Care Coordination:  Chart review and met with team, discussed pt progress, symptomology, and response to treatment. Discussed the discharge plan and any potential impediments to discharge.    Emailed ACT referral to [employer] at [email or web address] at 11:12am.     Checked in with Angela, she reports feeling better than she did last week. She was smiling and pleasant during our conversation.     Discharge Plan or Goal:  Patient presents with symptoms of psychosis that put her at risk of harming herself and others. When patient stabilizes considerations include home with family or IRTS.     Barriers to Discharge:  Patient presents with symptoms of psychosis that put her at risk of harming herself and others.      Referral Status:  Referral needs being assessed as patient stabilizes      Legal Status:  Commitment with [facility]: [place]  File: [id number]  Expires: 08/30/23 to 03/01/24    Medications on [facility]: Haloperidol, clozapine, risperidone, olanzapine    Contacts:  Daughter: Mya, [phone], [email or web address] (Consent)  Aunt: Vickie, [phone] (Consent)  Son: Golden, [phone]   Cousin: Lashawn, [phone] (Consent)  Prepetition screener: Carlos Rushing, [phone]  Her : John Maynard, [phone]     Upcoming Meetings and Dates/Important Information and next steps:  ACT referral     Team Note Due:  Friday

## 2023-09-08 PROCEDURE — 99233 SBSQ HOSP IP/OBS HIGH 50: CPT | Performed by: PSYCHIATRY & NEUROLOGY

## 2023-09-08 PROCEDURE — 124N000002 HC R&B MH UMMC

## 2023-09-08 PROCEDURE — 250N000013 HC RX MED GY IP 250 OP 250 PS 637: Performed by: PSYCHIATRY & NEUROLOGY

## 2023-09-08 RX ORDER — POLYETHYLENE GLYCOL 3350 17 G/17G
17 POWDER, FOR SOLUTION ORAL DAILY PRN
Status: DISCONTINUED | OUTPATIENT
Start: 2023-09-08 | End: 2023-09-18

## 2023-09-08 RX ORDER — SENNOSIDES 8.6 MG
2 TABLET ORAL 2 TIMES DAILY
Status: DISCONTINUED | OUTPATIENT
Start: 2023-09-08 | End: 2023-09-20 | Stop reason: HOSPADM

## 2023-09-08 RX ORDER — SENNOSIDES 8.6 MG
2 TABLET ORAL 2 TIMES DAILY
Status: DISCONTINUED | OUTPATIENT
Start: 2023-09-08 | End: 2023-09-08

## 2023-09-08 RX ORDER — ATROPINE SULFATE 10 MG/ML
2 SOLUTION/ DROPS OPHTHALMIC
Status: DISCONTINUED | OUTPATIENT
Start: 2023-09-08 | End: 2023-09-14

## 2023-09-08 RX ADMIN — DIVALPROEX SODIUM 500 MG: 125 CAPSULE, COATED PELLETS ORAL at 20:39

## 2023-09-08 RX ADMIN — HALOPERIDOL 20 MG: 10 TABLET ORAL at 20:39

## 2023-09-08 RX ADMIN — FAMOTIDINE 10 MG: 10 TABLET ORAL at 20:39

## 2023-09-08 RX ADMIN — MAGNESIUM HYDROXIDE 30 ML: 400 SUSPENSION ORAL at 13:25

## 2023-09-08 RX ADMIN — FAMOTIDINE 10 MG: 10 TABLET ORAL at 10:35

## 2023-09-08 RX ADMIN — SENNOSIDES 2 TABLET: 8.6 TABLET, FILM COATED ORAL at 13:25

## 2023-09-08 RX ADMIN — CLOZAPINE 225 MG: 200 TABLET ORAL at 20:38

## 2023-09-08 RX ADMIN — DIVALPROEX SODIUM 500 MG: 125 CAPSULE, COATED PELLETS ORAL at 10:35

## 2023-09-08 RX ADMIN — SENNOSIDES 2 TABLET: 8.6 TABLET, FILM COATED ORAL at 20:38

## 2023-09-08 RX ADMIN — POLYETHYLENE GLYCOL 3350 17 G: 17 POWDER, FOR SOLUTION ORAL at 10:35

## 2023-09-08 ASSESSMENT — ACTIVITIES OF DAILY LIVING (ADL)
ADLS_ACUITY_SCORE: 29
ORAL_HYGIENE: INDEPENDENT
ADLS_ACUITY_SCORE: 29
ADLS_ACUITY_SCORE: 29
DRESS: INDEPENDENT
ADLS_ACUITY_SCORE: 29
LAUNDRY: UNABLE TO COMPLETE
ADLS_ACUITY_SCORE: 29
HYGIENE/GROOMING: INDEPENDENT
ADLS_ACUITY_SCORE: 29

## 2023-09-08 NOTE — PROVIDER NOTIFICATION
09/08/23 1429   Individualization/Patient Specific Goals   Patient Personal Strengths expressive of emotions;family/social support;interests/hobbies;motivated for treatment;medication/treatment adherence;positive attitude;resilient;resourceful;spiritual/Gnosticism support;stable living environment   Patient Vulnerabilities traumatic event;occupational insecurity   Interprofessional Rounds   Summary Ongoing medication management to reduce symptoms. ACT referrals being sent.   Participants CTC;psychiatrist;nursing   Behavioral Team Discussion   Participants Dr. Champagne, Laurel YAN RN, Becky PEREZ RN, Malia OKEEFE, Lucinda OKEEFE   Anticipated length of stay 21 days   Continued Stay Criteria/Rationale Clozaril titration, medication management   Anticipated Discharge Disposition home with family     PRECAUTIONS AND SAFETY    Behavioral Orders   Procedures    Assault precautions    Cheeking Precautions (behavioral units)     Patient Observed swallowing PO medications; Patient asked to drink water after swallowing medication; Patient in Staff line of sight for 15 minutes after medication given; Mouth checks after PO administration (patient asked to open mouth and stick out their tongue).    Code 1 - Restrict to Unit    Elopement precautions    Routine Programming     As clinically indicated    Status 15     Every 15 minutes.       Safety  Safety WDL: WDL  Patient Location: patient room, own  Observed Behavior: sleeping  Safety Measures: safety rounds completed  Diversional Activity: television  Suicidality: Status 15, Behavioral scrubs (pajamas), Minimal furniture in room, Minimal personal belongings in room, Perform mouth checks after medication administration to prevent hoarding (CHEEKING precautions)  Assault: status 15, private room  Elopement Assessment: Statements about wanting to leave  Elopement Interventions: status 15, room away from unit doors  Additional Documentation:  (cheeking)

## 2023-09-08 NOTE — PROGRESS NOTES
"[facility], [place]   Psychiatric Progress Note  Hospital Day: 22        Interim History:   The patient's care was discussed with the treatment team during the daily team meeting and/or staff's chart notes were reviewed.  Pt was medication adherent. Calm and cooperative. Still responding to internal stimuli but reduced from previous days per RN staff. Family visited and visit went well. No behavioral or medical concerns. Slept 6.75 hours on night shift.     Upon interview, Angela reports that the voices have \"gone down.\" Denies that they are command in nature or saying anything that is bothersome. States that her sleep has improved. Has not had BM in two days. Feeling mildly sedated this morning. Denies drooling or increased appetite.     Suicidal ideation: denies current or recent suicidal ideation or behaviors.    Homicidal ideation: denies current or recent homicidal ideation or behaviors.    Psychotic symptoms: Pt is experiencing AH. Patient denies VH.    Medication side effects reported: As above    Acute medical concerns: none    Other issues reported by patient: Patient had no further questions or concerns.           Medications:      cloZAPine  200 mg Oral At Bedtime    divalproex sodium delayed-release  500 mg Oral Q12H [facility] (08/20)    famotidine  10 mg Oral BID    haloperidol  20 mg Oral At Bedtime    OLANZapine  5 mg Oral At Bedtime    sennosides  1 tablet Oral BID          Allergies:     Allergies   Allergen Reactions    Pork-Derived Products Unknown     Sikh does not eat pork          Labs:     Recent Results (from the past 24 hour(s))   WBC and Differential    Collection Time: 09/07/23  9:38 AM   Result Value Ref Range    WBC Count 6.2 4.0 - 11.0 10e3/uL    % Neutrophils 57 %    % Lymphocytes 35 %    % Monocytes 7 %    % Eosinophils 1 %    % Basophils 0 %    % Immature Granulocytes 0 %    NRBCs per 100 WBC 0 <1 /100    Absolute Neutrophils 3.5 1.6 - 8.3 10e3/uL    " done "Absolute Lymphocytes 2.2 0.8 - 5.3 10e3/uL    Absolute Monocytes 0.5 0.0 - 1.3 10e3/uL    Absolute Eosinophils 0.0 0.0 - 0.7 10e3/uL    Absolute Basophils 0.0 0.0 - 0.2 10e3/uL    Absolute Immature Granulocytes 0.0 <=0.4 10e3/uL    Absolute NRBCs 0.0 10e3/uL   EKG 12-lead, complete    Collection Time: 09/07/23 10:26 AM   Result Value Ref Range    Systolic Blood Pressure  mmHg    Diastolic Blood Pressure  mmHg    Ventricular Rate 94 BPM    Atrial Rate 94 BPM    AL Interval 176 ms    QRS Duration 72 ms     ms    QTc 440 ms    P Axis 64 degrees    R AXIS 35 degrees    T Axis 33 degrees    Interpretation ECG       Sinus rhythm  Nonspecific T wave abnormality  Abnormal ECG  When compared with ECG of 13-SEP-2004 07:53,  Nonspecific T wave abnormality now evident in Inferior leads  Nonspecific T wave abnormality now evident in Lateral leads              Psychiatric Examination:     /79 (BP Location: Left arm, Patient Position: Sitting, Cuff Size: Adult Large)   Pulse 91   Temp 96.9  F (36.1  C) (Temporal)   Resp 16   Ht 1.753 m (5' 9\")   Wt 103.1 kg (227 lb 4.8 oz)   SpO2 99%   BMI 33.57 kg/m    Weight is 227 lbs 4.8 oz  Body mass index is 33.57 kg/m .    Weight over time:  Vitals:    08/17/23 2218 08/24/23 0800 08/29/23 0800 09/03/23 0820   Weight: 100.9 kg (222 lb 6.4 oz) 102.7 kg (226 lb 6.4 oz) 102.7 kg (226 lb 6.4 oz) 100.6 kg (221 lb 12.8 oz)    09/07/23 0800   Weight: 103.1 kg (227 lb 4.8 oz)       Orthostatic Vitals       None              Cardiometabolic risk assessment. 08/24/23      Reviewed patient profile for cardiometabolic risk factors    Date taken /Value  REFERENCE RANGE   Abdominal Obesity  (Waist Circumference)   See nursing flowsheet Women ?35 in (88 cm)   Men ?40 in (102 cm)      Triglycerides  Triglycerides   Date Value Ref Range Status   07/02/2023 97 <150 mg/dL Final       ?150 mg/dL (1.7 mmol/L) or current treatment for elevated triglycerides   HDL cholesterol  Direct Measure HDL "   Date Value Ref Range Status   07/02/2023 84 >=50 mg/dL Final   ]   Women <50 mg/dL (1.3 mmol/L) in women or current treatment for low HDL cholesterol  Men <40 mg/dL (1 mmol/L) in men or current treatment for low HDL cholesterol     Fasting plasma glucose (FPG) Lab Results   Component Value Date     08/18/2023      FPG ?100 mg/dL (5.6 mmol/L) or treatment for elevated blood glucose   Blood pressure  BP Readings from Last 3 Encounters:   09/07/23 114/79   07/25/23 (!) 143/79    Blood pressure ?130/85 mmHg or treatment for elevated blood pressure   Family History  See family history     Mental Status Exam:  Appearance: awake, alert, adequately groomed, and dressed in her own clothing. Sitting in lounge and responding to internal stimuli.   Attitude:  cooperative  Eye Contact:  good  Mood:  calm  Affect:  mood congruent and intensity is blunted  Speech:  clear, coherent  Language: fluent and intact in English  Psychomotor, Gait, Musculoskeletal:  no evidence of tardive dyskinesia, dystonia, or tics  Throught Process:  disorganized, evidence of thought blocking present, and illogical  Associations:  no loose associations  Thought Content:  no evidence of suicidal ideation or homicidal ideation, auditory hallucinations present, and patient appears to be responding to internal stimuli throughout entire interview  Insight:  fair  Judgement:  fair  Oriented to:  time, person, and place  Attention Span and Concentration:  fair  Recent and Remote Memory:  fair  Fund of Knowledge:  appropriate           Precautions:     Behavioral Orders   Procedures    Assault precautions    Cheeking Precautions (behavioral units)     Patient Observed swallowing PO medications; Patient asked to drink water after swallowing medication; Patient in Staff line of sight for 15 minutes after medication given; Mouth checks after PO administration (patient asked to open mouth and stick out their tongue).    Code 1 - Restrict to Unit     Elopement precautions    Routine Programming     As clinically indicated    Status 15     Every 15 minutes.          Diagnoses:     # Bipolar I disorder, currently manic with psychotic features   # Hx of possible seizure in 1983          Assessment & Plan:     Assessment and hospital summary:  Angela Basurto is a 58 year old female previously diagnosed with Bipolar Disorder Type 1 and anxiety who presented with elvis and psychosis in the context of medication nonadherence. Patient was presented to emergency room due to homicidal threats toward her family using knife, and exhibiting paranoid and severe disorganized thought and responding to her internal stimuli while staying in the emergency room. Significant symptoms on admission include paranoia, disorganized thinking and behaviors, AVH, and emotional lability. The MSE on admission was pertinent for RTIS. Biological contributions to mental health presentation include diagnosis of Bipolar Disorder, type 1 and taking Depakote, Zyprexa, and Haldol. Psychological contributions to mental health presentation include poor insight into her condition. While she understands why she's in the hospital, she displays intrusive behaviors that require a 1:1. 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 Protective factors include family support and being Tenriism. Most recent psychiatric hospitalization was in July of this year during which time she left [facility]. In summary, the patient's reported symptoms of elvis and psychosis in the context of medication nonadherence are consistent with Bipolar Disorder, type 1.      Angela Basurto was admitted to [facility] on a 72 hour hold. The patient's PTA Zyprexa 10 mg BID & Depakote, and Haldol 20 mg were restarted. PTA hydroxyzine and trazodone were restarted. A petition for MI commitment with [facility] was filed on 8/18 through [facility].  She is now committed with [facility]. Clozapine will be initiated on 8/31/23. Zyprexa reduced to 5 mg BID on 9/1. On 9/6, Switched Depakote to sprinkles  mg BID per pt request. Reduced Zyprexa to 5 mg QHS due to failed trial, ineffectiveness, polypharmacy, and soft pressures intermittently. On 9/7, baseline EKG ordered and reviewed. It is normal with normal QTc.     Today's Changes:  Continue clozapine titration, tentative plan is to increase by 25 mg daily until reaching dose of 300 mg at bedtime (titration ordered up to dose of 300 mg). Will then check a level. May consider increasing further on Monday if ongoing sx of psychosis and tolerating well.   Depakote level and clozapine level ordered for Tuesday  Stop Zyprexa due to ineffectiveness and mild sedation in context of clozapine titration  Increase Senokot to 2 tabs BID  Placed pt care order to monitor bowel habits closely  Add Miralax daily prn    Target psychiatric symptoms and interventions:  Clozapine titration (ordered through Monday)  Depakote DT sprinkles 500 mg BID  Haldol 20 mg at bedtime    Risks, benefits, and alternatives discussed at length with patient.     Acute Medical Problems and Treatments:  Acute medical concerns:  - No acute medical concerns    Hx of HTN  HOLDING PTA clonidine as patient's pressures have been consistently soft.     Acetaminophen as needed utilized for pain occasionally. No other major medical issues arose during hospitalization. PTA Senokot continued for constipation.     GERD:  Famotidine started on 9/6    Pertinent labs/imaging:  Depakote level 18.2 on 8/17/2023, subtherapeutic. Concerns for cheeking as patient admitted to diverting medication. Level obtained again on 8/22 is now therapeutic at 106.    Haldol level reviewed and is 13.7 at current dose of 20 mg daily.     Behavioral/Psychological/Social:  - Encourage unit programming    Safety:  - Continue precautions as noted above  - Status 15 minute  checks    Legal Status: full commitment with [facility]    Disposition Plan   Reason for ongoing admission: poses an imminent risk to self, poses an imminent risk to others, and is unable to care for self due to severe psychosis or elvis  Discharge location: Home with family  Discharge Medications: not ordered  Follow-up Appointments: not scheduled    Entered by: Denia Champagne MD on 9/8/2023 at 7:55 AM     Nighat Champagne MD  [facility] Psychiatry

## 2023-09-08 NOTE — PLAN OF CARE
NOC Shift Report     Pt in bed at beginning of shift, breathing quiet and unlabored. Pt slept through shift. Pt slept 6.75 hours.      No pt complaints or concerns at this time.      No PRNs given. Will continue to monitor.

## 2023-09-08 NOTE — PLAN OF CARE
Pt was received sleeping in her room until about 1715. Came out to the lounge around dinner time but  did not socialized with any peer.  Pt was withdrawn to self and brightens up later when her family visited.  has an order for  some of her personal clothing- sleepwear and Hijab  were given to her per her request when her daughter visited.   2200 medications administered early per pt's request when she wanted to go to bed.  No outburst or aggressive behavior this shift. Pt was calm and cooperative with care. denies SI/HI, delusions, and hallucination   Pt is medication compliant  and okay with taking Depakote sprinkle with applesauce.    Problem: Psychotic Signs/Symptoms  Goal: Improved Behavioral Control (Psychotic Signs/Symptoms)  Outcome: Progressing  Goal: Optimal Cognitive Function (Psychotic Signs/Symptoms)  Intervention: Support and Promote Cognitive Ability  Recent Flowsheet Documentation  Taken 9/7/2023 2231 by Lety Tamez RN  Trust Relationship/Rapport:   care explained   thoughts/feelings acknowledged  Goal: Enhanced Social, Occupational or Functional Skills (Psychotic Signs/Symptoms)  Intervention: Promote Social, Occupational and Functional Ability  Recent Flowsheet Documentation  Taken 9/7/2023 2231 by Lety Tamez RN  Trust Relationship/Rapport:   care explained   thoughts/feelings acknowledged   Goal Outcome Evaluation:    Plan of Care Reviewed With: patient

## 2023-09-08 NOTE — PLAN OF CARE
Assessment/Intervention/Current Symtoms and Care Coordination:  Chart review and met with team, discussed pt progress, symptomology, and response to treatment. Discussed the discharge plan and any potential impediments to discharge.    Called [facility] to follow up on ACT referral. They are not taking applicants at this time.     Sent ACT referral to [facility] via email at 1:43 pm.     Discharge Plan or Goal:  Patient presents with symptoms of psychosis that put her at risk of harming herself and others. When patient stabilizes considerations include home with family or IRTS.     Barriers to Discharge:  Patient presents with symptoms of psychosis that put her at risk of harming herself and others.      Referral Status:  ACT at [facility] 9/7, denied 9/8  ACT at [facility] 9/8     Legal Status:  Commitment with [facility]: [place]  File: [id number]  Expires: 08/30/23 to 03/01/24    Medications on [facility]: Haloperidol, clozapine, risperidone, olanzapine    Contacts:  Daughter: Mya, [phone], [email or web address] (Consent)  Aunt: Vickie, [phone] (Consent)  Son: Golden, [phone]   Cousin: Lashawn, [phone] (Consent)  Prepetition screener: Carlos Rushing, [phone]  Her : John Maynard, [phone]     Upcoming Meetings and Dates/Important Information and next steps:  ACT referral     Team Note Due:  Friday

## 2023-09-08 NOTE — PLAN OF CARE
Nursing assessment completed. Patient sleeping in her room for much of the shift. Comes to the lounge for meals. Cooperative with am scheduled medications. She c/o increased sedation and constipation. PRN miralax given with am medications. During the afternoon, she stated she had still not had BM results, with today being day 3 of no BM. Writer contacted provider and order for PRN milk of mag received and administered. Patient stated she will report if successful. Patient sleeping in room except for meals. Denies SI/SIB or thoughts to harm others. Medication compliant. Continue to monitor and document BM and sedation from clozapine titration.

## 2023-09-09 LAB
ATRIAL RATE - MUSE: 94 BPM
DIASTOLIC BLOOD PRESSURE - MUSE: NORMAL MMHG
INTERPRETATION ECG - MUSE: NORMAL
P AXIS - MUSE: 64 DEGREES
PR INTERVAL - MUSE: 176 MS
QRS DURATION - MUSE: 72 MS
QT - MUSE: 352 MS
QTC - MUSE: 440 MS
R AXIS - MUSE: 35 DEGREES
SYSTOLIC BLOOD PRESSURE - MUSE: NORMAL MMHG
T AXIS - MUSE: 33 DEGREES
VENTRICULAR RATE- MUSE: 94 BPM

## 2023-09-09 PROCEDURE — 250N000013 HC RX MED GY IP 250 OP 250 PS 637: Performed by: PSYCHIATRY & NEUROLOGY

## 2023-09-09 PROCEDURE — 124N000002 HC R&B MH UMMC

## 2023-09-09 RX ADMIN — SENNOSIDES 2 TABLET: 8.6 TABLET, FILM COATED ORAL at 20:00

## 2023-09-09 RX ADMIN — MAGNESIUM HYDROXIDE 30 ML: 400 SUSPENSION ORAL at 13:39

## 2023-09-09 RX ADMIN — FAMOTIDINE 10 MG: 10 TABLET ORAL at 20:00

## 2023-09-09 RX ADMIN — POLYETHYLENE GLYCOL 3350 17 G: 17 POWDER, FOR SOLUTION ORAL at 09:03

## 2023-09-09 RX ADMIN — FAMOTIDINE 10 MG: 10 TABLET ORAL at 09:03

## 2023-09-09 RX ADMIN — DIVALPROEX SODIUM 500 MG: 125 CAPSULE, COATED PELLETS ORAL at 09:04

## 2023-09-09 RX ADMIN — DIVALPROEX SODIUM 500 MG: 125 CAPSULE, COATED PELLETS ORAL at 20:00

## 2023-09-09 RX ADMIN — HALOPERIDOL 20 MG: 10 TABLET ORAL at 19:59

## 2023-09-09 RX ADMIN — SENNOSIDES 2 TABLET: 8.6 TABLET, FILM COATED ORAL at 09:03

## 2023-09-09 RX ADMIN — CLOZAPINE 250 MG: 200 TABLET ORAL at 20:07

## 2023-09-09 ASSESSMENT — ACTIVITIES OF DAILY LIVING (ADL)
ADLS_ACUITY_SCORE: 29

## 2023-09-09 NOTE — PLAN OF CARE
Goal Outcome Evaluation:       Pt a wake x1 briefly otherwise appeared to be a sleep at all other safety checks.  Pt slept 6 hours.  No BM noted.

## 2023-09-09 NOTE — PLAN OF CARE
"Continuing endorsement of auditory hallucinations without imperatives. Stated they have decreased in frequency and do not disturb her at night any more. Observed less frequently  to be responding to internal stimuli.  Denies feelings of fear and anger. C/o \"too many pills\" and \"didn't I already take them?\"    Restricted affect, good eye contact, low volume clear speech. No initiation of activity or interactions with staff or peers. Guarded or poverty of thought?     No abnormal muscle movements, no sialorrhea.    Ankles appear swollen but she said they always appear like that.     C/o of no bowel movement for the third day today. Positive bowel sounds, good appetite and fluid intake, no pain. (Had MOM and Miralax and scheduled senna today.) Minimal physical activity.      Plan: Monitor and document mood and behaviour, thought process and content. Establish and maintain therapeutic relationship. Educate about diagnoses, medications, treatment, legal status, plan of care. Address preexisting and concurrent medical concerns.       P:Disturbed thought process  G:Rational thought process  O:Not progressing  "

## 2023-09-09 NOTE — PLAN OF CARE
"Days  Nursing assessment completed. Patient up in the [place] briefly prior to breakfast. Patient c/o increased tiredness. She does not eat her breakfast in the lounge this morning, and instead requests to eat in her room. She states she is \"too tired\". Writer observed her to be fully awake while eating breakfast in her room. Not falling asleep during meal. Do sialorrhea noted. She is cooperative with scheduled am medications. She states she is still constipated. She states she did have a small BM yesterday evening after receiving MOM, but is still feeling constipated. PRN mirallax given with am medications. Patient slept for the remainder of the shift. Awakens to voice. No BM results, so PRN milk of magnesia given at 1340. Patient tells writer that the voices are \"lessening\", and believes the medications are helpful. She denies thoughts of SI/SIB or thoughts to harm others. Medication compliant. Continue to monitor and document sedation and BM. Writer updated on call Dr. Parikh this morning. No changes to orders. Continue to monitor and assess.   Evenings  Patient slept in her room for the majority of the shift. She ate dinner in the lounge when prompted to eat in the lounge. Her daughter visited during visiting hours, which went well. Pt stated she did have a successful BM, and feels better. Cooperative with scheduled HS medications.                       "

## 2023-09-10 PROCEDURE — 250N000013 HC RX MED GY IP 250 OP 250 PS 637: Performed by: PSYCHIATRY & NEUROLOGY

## 2023-09-10 PROCEDURE — 124N000002 HC R&B MH UMMC

## 2023-09-10 RX ADMIN — FAMOTIDINE 10 MG: 10 TABLET ORAL at 09:12

## 2023-09-10 RX ADMIN — DIVALPROEX SODIUM 500 MG: 125 CAPSULE, COATED PELLETS ORAL at 20:30

## 2023-09-10 RX ADMIN — FAMOTIDINE 10 MG: 10 TABLET ORAL at 20:30

## 2023-09-10 RX ADMIN — SENNOSIDES 2 TABLET: 8.6 TABLET, FILM COATED ORAL at 20:30

## 2023-09-10 RX ADMIN — SENNOSIDES 2 TABLET: 8.6 TABLET, FILM COATED ORAL at 09:12

## 2023-09-10 RX ADMIN — CLOZAPINE 275 MG: 200 TABLET ORAL at 21:18

## 2023-09-10 RX ADMIN — HALOPERIDOL 20 MG: 10 TABLET ORAL at 20:30

## 2023-09-10 RX ADMIN — DIVALPROEX SODIUM 500 MG: 125 CAPSULE, COATED PELLETS ORAL at 09:12

## 2023-09-10 ASSESSMENT — ACTIVITIES OF DAILY LIVING (ADL)
ADLS_ACUITY_SCORE: 29
HYGIENE/GROOMING: INDEPENDENT
ORAL_HYGIENE: INDEPENDENT
ADLS_ACUITY_SCORE: 29
ADLS_ACUITY_SCORE: 29
DRESS: INDEPENDENT
ADLS_ACUITY_SCORE: 29

## 2023-09-10 NOTE — PLAN OF CARE
Goal Outcome Evaluation:      Pt a wake x1 briefly otherwise appeared to be a sleep at all other safety checks.  Pt slept 6.75 hours.

## 2023-09-10 NOTE — PLAN OF CARE
"Nursing assessment completed. Patient hypotensive with am BP checks, 90/58. She states she is tired and slightly dizzy, but not unstable on feet. She is encouraged to increase fluid intake. Upon reassessment after breakfast, /74 Pulse 88. She states she started to become dizzy at times last night. Patient ate breakfast in bed, but states she is agreeable to showering and allowing writer to help her with changing her linens after lunch. Patient slept until lunch, ate lunch in the lounge and then returned to her room to rest. She came out to the lounge about an hour latter asking for her lunch. Patient confused that she already ate her lunch. Stated she was hungary and snack provided. Writer encouraged he to shower, but she declined. She states \"I will tomorrow\". Writer reminded her she said she would shower after lunch, but she continued to decline at this time.                  "

## 2023-09-11 PROCEDURE — 124N000002 HC R&B MH UMMC

## 2023-09-11 PROCEDURE — 99231 SBSQ HOSP IP/OBS SF/LOW 25: CPT | Performed by: PSYCHIATRY & NEUROLOGY

## 2023-09-11 PROCEDURE — 250N000013 HC RX MED GY IP 250 OP 250 PS 637: Performed by: PSYCHIATRY & NEUROLOGY

## 2023-09-11 RX ADMIN — FAMOTIDINE 10 MG: 10 TABLET ORAL at 21:04

## 2023-09-11 RX ADMIN — FAMOTIDINE 10 MG: 10 TABLET ORAL at 08:57

## 2023-09-11 RX ADMIN — DIVALPROEX SODIUM 500 MG: 125 CAPSULE, COATED PELLETS ORAL at 21:04

## 2023-09-11 RX ADMIN — DIVALPROEX SODIUM 500 MG: 125 CAPSULE, COATED PELLETS ORAL at 08:57

## 2023-09-11 RX ADMIN — HALOPERIDOL 20 MG: 10 TABLET ORAL at 21:08

## 2023-09-11 RX ADMIN — CLOZAPINE 300 MG: 200 TABLET ORAL at 21:09

## 2023-09-11 RX ADMIN — SENNOSIDES 2 TABLET: 8.6 TABLET, FILM COATED ORAL at 08:57

## 2023-09-11 RX ADMIN — SENNOSIDES 2 TABLET: 8.6 TABLET, FILM COATED ORAL at 21:05

## 2023-09-11 ASSESSMENT — ACTIVITIES OF DAILY LIVING (ADL)
LAUNDRY: UNABLE TO COMPLETE
ADLS_ACUITY_SCORE: 29
ADLS_ACUITY_SCORE: 29
ORAL_HYGIENE: INDEPENDENT
DRESS: INDEPENDENT
ADLS_ACUITY_SCORE: 29
DRESS: INDEPENDENT
HYGIENE/GROOMING: INDEPENDENT
ADLS_ACUITY_SCORE: 29
ORAL_HYGIENE: INDEPENDENT
ADLS_ACUITY_SCORE: 29
LAUNDRY: UNABLE TO COMPLETE
HYGIENE/GROOMING: INDEPENDENT
ADLS_ACUITY_SCORE: 29

## 2023-09-11 NOTE — PROGRESS NOTES
"[facility], [place]   Psychiatric Progress Note  Hospital Day: 25        Interim History:       Patient seen and chart reviewed. Case discussed in multi-disciplinary treatment team      According to Nursing report:  Patient is visible on unit and is not responding to internal stimuli. She is quiet. She has marginal self cares. Constipation is under control.        According to Nursing notes from yesterday:  Nursing assessment completed. Patient hypotensive with am BP checks, 90/58. She states she is tired and slightly dizzy, but not unstable on feet. She is encouraged to increase fluid intake. Upon reassessment after breakfast, /74 Pulse 88. She states she started to become dizzy at times last night. Patient ate breakfast in bed, but states she is agreeable to showering and allowing writer to help her with changing her linens after lunch. Patient slept until lunch, ate lunch in the lounge and then returned to her room to rest. She came out to the lounge about an hour latter asking for her lunch. Patient confused that she already ate her lunch. Stated she was hungary and snack provided. Writer encouraged he to shower, but she declined. She states \"I will tomorrow\". Writer reminded her she said she would shower after lunch, but she continued to decline at this time.    Patient alert and oriented, spent most of the shift sitting in the lounge or in her room lying down. Encouraged patient to take a shower patient declined , and stated \"I am too cold I will take a shower  tomorrow\". Affect flat, but bright on approach. Mood was calmed and quiet not talking to self or laughing. Denied hearing voices and all other mental  health symptoms. Didn't attend group. Took scheduled bedtime medications with encouragement. Refused for writer to check her vitals.  Patient reported not having BM on this shift. Family visited patient this evening. Patient reported eating all her dinner. Patient denied " "pain, no behavior issues or any safety concern.          According to  :  Patient is on commitment      On interview today:  Patient denies suicidal or homicidal thoughts and denies hallucinations. Patient is not revealing delusions on interview. Patient denies side effects to medications.   Patient is mostly vague but denies voices today.      ROS: some constipation    Vital signs:  Temp: 97.1  F (36.2  C) Temp src: Temporal BP: 119/73 Pulse: 94     SpO2: 98 %     Height: 175.3 cm (5' 9\") Weight: 101.9 kg (224 lb 9.6 oz)  Estimated body mass index is 33.17 kg/m  as calculated from the following:    Height as of this encounter: 1.753 m (5' 9\").    Weight as of this encounter: 101.9 kg (224 lb 9.6 oz).             Medications:      cloZAPine  300 mg Oral At Bedtime    divalproex sodium delayed-release  500 mg Oral Q12H ESTHER (08/20)    famotidine  10 mg Oral BID    haloperidol  20 mg Oral At Bedtime    sennosides  2 tablet Oral BID          Allergies:     Allergies   Allergen Reactions    Pork-Derived Products Unknown     Jewish does not eat pork          Labs:     No results found for this or any previous visit (from the past 24 hour(s)).           Psychiatric Examination:     /74 (BP Location: Left arm, Patient Position: Other (comments), Cuff Size: Adult Large)   Pulse 88   Temp 97.4  F (36.3  C) (Temporal)   Resp 16   Ht 1.753 m (5' 9\")   Wt 101.9 kg (224 lb 9.6 oz)   SpO2 99%   BMI 33.17 kg/m    Weight is 224 lbs 9.6 oz  Body mass index is 33.17 kg/m .    Weight over time:  Vitals:    08/17/23 2218 08/24/23 0800 08/29/23 0800 09/03/23 0820   Weight: 100.9 kg (222 lb 6.4 oz) 102.7 kg (226 lb 6.4 oz) 102.7 kg (226 lb 6.4 oz) 100.6 kg (221 lb 12.8 oz)    09/07/23 0800 09/10/23 0800   Weight: 103.1 kg (227 lb 4.8 oz) 101.9 kg (224 lb 9.6 oz)       Orthostatic Vitals       None              Cardiometabolic risk assessment. 08/24/23      Reviewed patient profile for cardiometabolic risk " factors    Date taken /Value  REFERENCE RANGE   Abdominal Obesity  (Waist Circumference)   See nursing flowsheet Women ?35 in (88 cm)   Men ?40 in (102 cm)      Triglycerides  Triglycerides   Date Value Ref Range Status   07/02/2023 97 <150 mg/dL Final       ?150 mg/dL (1.7 mmol/L) or current treatment for elevated triglycerides   HDL cholesterol  Direct Measure HDL   Date Value Ref Range Status   07/02/2023 84 >=50 mg/dL Final   ]   Women <50 mg/dL (1.3 mmol/L) in women or current treatment for low HDL cholesterol  Men <40 mg/dL (1 mmol/L) in men or current treatment for low HDL cholesterol     Fasting plasma glucose (FPG) Lab Results   Component Value Date     08/18/2023      FPG ?100 mg/dL (5.6 mmol/L) or treatment for elevated blood glucose   Blood pressure  BP Readings from Last 3 Encounters:   09/10/23 127/74   07/25/23 (!) 143/79    Blood pressure ?130/85 mmHg or treatment for elevated blood pressure   Family History  See family history     Mental Status Exam:  Appearance: awake, alert, adequately groomed, and dressed in her own clothing. Sitting in lounge and responding to internal stimuli.   Attitude:  cooperative  Eye Contact:  good  Mood:  calm  Affect:  mood congruent and intensity is blunted  Speech:  clear, coherent  Language: fluent and intact in English  Psychomotor, Gait, Musculoskeletal:  no evidence of tardive dyskinesia, dystonia, or tics  Throught Process:  disorganized, evidence of thought blocking present, and illogical  Associations:  no loose associations  Thought Content:  no evidence of suicidal ideation or homicidal ideation throughout entire interview less hallucinations  Insight:  fair  Judgement:  fair  Oriented to:  time, person, and place  Attention Span and Concentration:  fair  Recent and Remote Memory:  fair  Fund of Knowledge:  appropriate    Minimal change in mental status in the past 24 hours but improving over past week           Precautions:     Behavioral Orders    Procedures    Assault precautions    Cheeking Precautions (behavioral units)     Patient Observed swallowing PO medications; Patient asked to drink water after swallowing medication; Patient in Staff line of sight for 15 minutes after medication given; Mouth checks after PO administration (patient asked to open mouth and stick out their tongue).    Code 1 - Restrict to Unit    Elopement precautions    Routine Programming     As clinically indicated    Status 15     Every 15 minutes.          Diagnoses:   Schizoaffective disorder, bipolar type (H)    # Bipolar I disorder, currently manic with psychotic features   # Hx of possible seizure in 1983          Assessment & Plan:     Assessment and hospital summary:  Angela Basurto is a 58 year old female previously diagnosed with Bipolar Disorder Type 1 and anxiety who presented with elvis and psychosis in the context of medication nonadherence. Patient was presented to emergency room due to homicidal threats toward her family using knife, and exhibiting paranoid and severe disorganized thought and responding to her internal stimuli while staying in the emergency room. Significant symptoms on admission include paranoia, disorganized thinking and behaviors, AVH, and emotional lability. The MSE on admission was pertinent for RTIS. Biological contributions to mental health presentation include diagnosis of Bipolar Disorder, type 1 and taking Depakote, Zyprexa, and Haldol. Psychological contributions to mental health presentation include poor insight into her condition. While she understands why she's in the hospital, she displays intrusive behaviors that require a 1:1. 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 Protective factors include family support and being Jew. Most recent psychiatric hospitalization was in July of this year during which time she left [facility]. In  summary, the patient's reported symptoms of elvis and psychosis in the context of medication nonadherence are consistent with Bipolar Disorder, type 1.      Angela Basurto was [facility] to [facility] on a 72 hour hold. The patient's PTA Zyprexa 10 mg BID & Depakote, and Haldol 20 mg were restarted. PTA hydroxyzine and trazodone were restarted. A petition for MI commitment with [facility] was filed on 8/18 through [facility].  She is now committed with [facility]. Clozapine will be initiated on 8/31/23. Zyprexa reduced to 5 mg BID on 9/1. On 9/6, Switched Depakote to sprinkles  mg BID per pt request. Reduced Zyprexa to 5 mg QHS due to failed trial, ineffectiveness, polypharmacy, and soft pressures intermittently. On 9/7, baseline EKG ordered and reviewed. It is normal with normal QTc.     Patient is improving on Clozaril    Today's Changes:  Continue clozapine titration, tentative plan is to increase by 25 mg daily until reaching dose of 300 mg at bedtime (titration ordered up to dose of 300 mg). Will then check a level. May consider increasing further on Monday if ongoing sx of psychosis and tolerating well.   Depakote level and clozapine level ordered for Tuesday  Stopped Zyprexa due to ineffectiveness and mild sedation in context of clozapine titration  Senokot to 2 tabs BID  Monitor bowel habits closely  Miralax daily prn    Target psychiatric symptoms and interventions:  Clozapine titration  Depakote DT sprinkles 500 mg BID  Haldol 20 mg at bedtime    Risks, benefits, and alternatives discussed at length with patient.     Acute Medical Problems and Treatments:  Acute medical concerns:  - No acute medical concerns    Hx of HTN  HOLDING PTA clonidine as patient's pressures have been consistently soft.     Acetaminophen as needed utilized for pain occasionally. No other major medical issues arose during hospitalization. PTA Senokot continued for constipation.     GERD:  Famotidine started on 9/6    Pertinent  labs/imaging:  Depakote level 18.2 on 8/17/2023, subtherapeutic. Concerns for cheeking as patient admitted to diverting medication. Level obtained again on 8/22 is now therapeutic at 106.    Haldol level reviewed and is 13.7 at current dose of 20 mg daily.     Behavioral/Psychological/Social:  - Encourage unit programming    Safety:  - Continue precautions as noted above  - Status 15 minute checks    Legal Status: full commitment with [facility]    Disposition Plan   Reason for ongoing admission: poses an imminent risk to self, poses an imminent risk to others, and is unable to care for self due to severe psychosis or elvis  Discharge location: Home with family  Discharge Medications: not ordered  Follow-up Appointments: not scheduled    .Octavio Parikh MD

## 2023-09-11 NOTE — PLAN OF CARE
Assessment/Intervention/Current Symtoms and Care Coordination:  Chart review and met with team, discussed pt progress, symptomology, and response to treatment. Discussed the discharge plan and any potential impediments to discharge.    Received an email from Lisa Plata with [facility] who is assigning a  in the near future. Will update contact information when received.     Discharge Plan or Goal:  Patient presents with symptoms of psychosis that put her at risk of harming herself and others. When patient stabilizes considerations include home with family or IRTS.     Barriers to Discharge:  Patient presents with symptoms of psychosis that put her at risk of harming herself and others.      Referral Status:  ACT at [facility] 9/7, denied 9/8 (no openings)  ACT at [facility] 9/8     Legal Status:  Commitment with [facility]: [place]  File: [id number]  Expires: 08/30/23 to 03/01/24    Medications on [facility]: Haloperidol, clozapine, risperidone, olanzapine    Contacts:  Daughter: Mya, [phone], [email or web address] (Consent)  Aunt: Vickie, [phone] (Consent)  Son: Golden, [phone]   Cousin: Lashawn, [phone] (Consent)  Prepetition screener: Carlos Rushing, [phone]  Her : John Maynard, [phone]     Upcoming Meetings and Dates/Important Information and next steps:  ACT referral     Team Note Due:  Friday

## 2023-09-11 NOTE — PLAN OF CARE
Goal Outcome Evaluation:     Pt appeared to be a sleep @ all safety checks.  Pt slept 7 hours.

## 2023-09-11 NOTE — PLAN OF CARE
"  Problem: Psychotic Signs/Symptoms  Goal: Decreased Sensory Symptoms (Psychotic Signs/Symptoms)  Outcome: Progressing   Goal Outcome Evaluation:    Patient alert and oriented, spent most of the shift sitting in the lounge or in her room lying down. Encouraged patient to take a shower patient declined , and stated \"I am too cold I will take a shower  tomorrow\". Affect flat, but bright on approach. Mood was calmed and quiet not talking to self or laughing. Denied hearing voices and all other mental  health symptoms. Didn't attend group. Took scheduled bedtime medications with encouragement. Refused for writer to check her vitals.  Patient reported not having BM on this shift. Family visited patient this evening. Patient reported eating all her dinner. Patient denied pain, no behavior issues or any safety concern.                 "

## 2023-09-11 NOTE — PLAN OF CARE
Pt was  withdrawn and isolative to self. denies discomfort  and pain   Pt is on assault, elopement, cheeking precautions. None of these precautionary behavior occurred this shift   Pt denies  SI/HI, delusions, and hallucination   Pt is medication compliant and contracted for safety.   Will continue to monitor and assist as needed.     Problem: Psychotic Signs/Symptoms  Goal: Improved Behavioral Control (Psychotic Signs/Symptoms)  Outcome: Progressing  Goal: Optimal Cognitive Function (Psychotic Signs/Symptoms)  Intervention: Support and Promote Cognitive Ability  Recent Flowsheet Documentation  Taken 9/11/2023 1000 by Lety Tamez RN  Trust Relationship/Rapport: care explained  Goal: Enhanced Social, Occupational or Functional Skills (Psychotic Signs/Symptoms)  Intervention: Promote Social, Occupational and Functional Ability  Recent Flowsheet Documentation  Taken 9/11/2023 1000 by Lety Tamez RN  Trust Relationship/Rapport: care explained   Goal Outcome Evaluation:    Plan of Care Reviewed With: patient

## 2023-09-12 LAB — VALPROATE SERPL-MCNC: 55.9 UG/ML

## 2023-09-12 PROCEDURE — 124N000002 HC R&B MH UMMC

## 2023-09-12 PROCEDURE — 36415 COLL VENOUS BLD VENIPUNCTURE: CPT | Performed by: PSYCHIATRY & NEUROLOGY

## 2023-09-12 PROCEDURE — 250N000013 HC RX MED GY IP 250 OP 250 PS 637: Performed by: PSYCHIATRY & NEUROLOGY

## 2023-09-12 PROCEDURE — 99231 SBSQ HOSP IP/OBS SF/LOW 25: CPT | Performed by: PSYCHIATRY & NEUROLOGY

## 2023-09-12 PROCEDURE — 80159 DRUG ASSAY CLOZAPINE: CPT | Performed by: PSYCHIATRY & NEUROLOGY

## 2023-09-12 PROCEDURE — 80164 ASSAY DIPROPYLACETIC ACD TOT: CPT | Performed by: PSYCHIATRY & NEUROLOGY

## 2023-09-12 RX ADMIN — FAMOTIDINE 10 MG: 10 TABLET ORAL at 08:42

## 2023-09-12 RX ADMIN — CLOZAPINE 300 MG: 200 TABLET ORAL at 20:37

## 2023-09-12 RX ADMIN — HALOPERIDOL 20 MG: 10 TABLET ORAL at 20:37

## 2023-09-12 RX ADMIN — DIVALPROEX SODIUM 500 MG: 125 CAPSULE, COATED PELLETS ORAL at 08:42

## 2023-09-12 RX ADMIN — FAMOTIDINE 10 MG: 10 TABLET ORAL at 20:38

## 2023-09-12 RX ADMIN — SENNOSIDES 2 TABLET: 8.6 TABLET, FILM COATED ORAL at 20:37

## 2023-09-12 RX ADMIN — DIVALPROEX SODIUM 500 MG: 125 CAPSULE, COATED PELLETS ORAL at 20:36

## 2023-09-12 RX ADMIN — SENNOSIDES 2 TABLET: 8.6 TABLET, FILM COATED ORAL at 08:42

## 2023-09-12 ASSESSMENT — ACTIVITIES OF DAILY LIVING (ADL)
ADLS_ACUITY_SCORE: 29
ADLS_ACUITY_SCORE: 29
DRESS: INDEPENDENT
ADLS_ACUITY_SCORE: 29
ORAL_HYGIENE: INDEPENDENT
ADLS_ACUITY_SCORE: 29
HYGIENE/GROOMING: INDEPENDENT
LAUNDRY: UNABLE TO COMPLETE
ADLS_ACUITY_SCORE: 29
ADLS_ACUITY_SCORE: 29

## 2023-09-12 NOTE — PROGRESS NOTES
"[facility], [place]   Psychiatric Progress Note  Hospital Day: 26        Interim History:       Patient seen and chart reviewed. Case discussed in multi-disciplinary treatment team      According to Nursing report:  Patient will laugh quietly to herself at times but no longer laughs loudly. She is isolative. She is still constipated.        According to Nursing notes from yesterday:  Pt was  withdrawn and isolative to self. denies discomfort  and pain   Pt is on assault, elopement, cheeking precautions. None of these precautionary behavior occurred this shift   Pt denies  SI/HI, delusions, and hallucination   Pt is medication compliant and contracted for safety.   Will continue to monitor and assist as needed.  Observed to be responding to inner stimuli but much less frequently as compared to last week.   Acknowledged auditory hallucinations, denied imperatives, denies being disturbed by them during the night. Almost flat affect.   Denied thoughts of harming anyone.  No initiation of activity or interactions with others  Mostly in bed and appearing asleep and expressing  indifference to getting up.   Exasperated with how many medications she is supposed to take (and Famotidine was just added). Disheveled.  Last BM 9/9/23.      According to  :  Legal Status:  Commitment with [facility]      On interview today:  Patient denies suicidal or homicidal thoughts and denies hallucinations. Patient is not revealing delusions on interview. Patient denies side effects to medications except for sedation and constipation  Patient is mostly vague but denies voices today.      ROS: Patient complains of constipation    Vital signs:  Temp: 98.2  F (36.8  C) Temp src: Temporal BP: 117/79 Pulse: 100     SpO2: 97 %     Height: 175.3 cm (5' 9\") Weight: 101.9 kg (224 lb 9.6 oz)  Estimated body mass index is 33.17 kg/m  as calculated from the following:    Height as of this encounter: 1.753 m (5' " "9\").    Weight as of this encounter: 101.9 kg (224 lb 9.6 oz).             Medications:      cloZAPine  300 mg Oral At Bedtime    divalproex sodium delayed-release  500 mg Oral Q12H ESTHER (08/20)    famotidine  10 mg Oral BID    haloperidol  20 mg Oral At Bedtime    sennosides  2 tablet Oral BID          Allergies:     Allergies   Allergen Reactions    Pork-Derived Products Unknown     Episcopalian does not eat pork          Labs:     No results found for this or any previous visit (from the past 24 hour(s)).           Psychiatric Examination:     /79   Pulse 100   Temp 98.2  F (36.8  C)   Resp 16   Ht 1.753 m (5' 9\")   Wt 101.9 kg (224 lb 9.6 oz)   SpO2 97%   BMI 33.17 kg/m    Weight is 224 lbs 9.6 oz  Body mass index is 33.17 kg/m .    Weight over time:  Vitals:    08/17/23 2218 08/24/23 0800 08/29/23 0800 09/03/23 0820   Weight: 100.9 kg (222 lb 6.4 oz) 102.7 kg (226 lb 6.4 oz) 102.7 kg (226 lb 6.4 oz) 100.6 kg (221 lb 12.8 oz)    09/07/23 0800 09/10/23 0800   Weight: 103.1 kg (227 lb 4.8 oz) 101.9 kg (224 lb 9.6 oz)       Orthostatic Vitals       None              Cardiometabolic risk assessment. 08/24/23      Reviewed patient profile for cardiometabolic risk factors    Date taken /Value  REFERENCE RANGE   Abdominal Obesity  (Waist Circumference)   See nursing flowsheet Women ?35 in (88 cm)   Men ?40 in (102 cm)      Triglycerides  Triglycerides   Date Value Ref Range Status   07/02/2023 97 <150 mg/dL Final       ?150 mg/dL (1.7 mmol/L) or current treatment for elevated triglycerides   HDL cholesterol  Direct Measure HDL   Date Value Ref Range Status   07/02/2023 84 >=50 mg/dL Final   ]   Women <50 mg/dL (1.3 mmol/L) in women or current treatment for low HDL cholesterol  Men <40 mg/dL (1 mmol/L) in men or current treatment for low HDL cholesterol     Fasting plasma glucose (FPG) Lab Results   Component Value Date     08/18/2023      FPG ?100 mg/dL (5.6 mmol/L) or treatment for elevated blood " glucose   Blood pressure  BP Readings from Last 3 Encounters:   09/11/23 117/79   07/25/23 (!) 143/79    Blood pressure ?130/85 mmHg or treatment for elevated blood pressure   Family History  See family history     Mental Status Exam:  Appearance: awake, alert, adequately groomed, and dressed in her own clothing. Sitting in lounge and responding to internal stimuli.   Attitude:  cooperative  Eye Contact:  good  Mood:  calm  Affect:  mood congruent and intensity is blunted  Speech:  clear, coherent  Language: fluent and intact in English  Psychomotor, Gait, Musculoskeletal:  no evidence of tardive dyskinesia, dystonia, or tics  Throught Process:  evidence of thought blocking present,  Associations:  no loose associations  Thought Content:  no evidence of suicidal ideation or homicidal ideation  less delusions and less hallucinations  Insight:  fair  Judgement:  fair  Oriented to:  time, person, and place  Attention Span and Concentration:  fair  Recent and Remote Memory:  fair  Fund of Knowledge:  appropriate    Gradual improvement in symptoms noted           Precautions:     Behavioral Orders   Procedures    Assault precautions    Cheeking Precautions (behavioral units)     Patient Observed swallowing PO medications; Patient asked to drink water after swallowing medication; Patient in Staff line of sight for 15 minutes after medication given; Mouth checks after PO administration (patient asked to open mouth and stick out their tongue).    Code 1 - Restrict to Unit    Elopement precautions    Routine Programming     As clinically indicated    Status 15     Every 15 minutes.          Diagnoses:   Schizoaffective disorder, bipolar type (H)    # Bipolar I disorder, currently manic with psychotic features   # Hx of possible seizure in 1983          Assessment & Plan:     Assessment and hospital summary:  Angela Basurto is a 58 year old female previously diagnosed with Bipolar Disorder Type 1 and anxiety who presented  with elvis and psychosis in the context of medication nonadherence. Patient was presented to emergency room due to homicidal threats toward her family using knife, and exhibiting paranoid and severe disorganized thought and responding to her internal stimuli while staying in the emergency room. Significant symptoms on admission include paranoia, disorganized thinking and behaviors, AVH, and emotional lability. The MSE on admission was pertinent for RTIS. Biological contributions to mental health presentation include diagnosis of Bipolar Disorder, type 1 and taking Depakote, Zyprexa, and Haldol. Psychological contributions to mental health presentation include poor insight into her condition. While she understands why she's in the hospital, she displays intrusive behaviors that require a 1:1. 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 Protective factors include family support and being Cheondoism. Most recent psychiatric hospitalization was in July of this year during which time she left [facility]. In summary, the patient's reported symptoms of elvis and psychosis in the context of medication nonadherence are consistent with Bipolar Disorder, type 1.      Angela Basurto was admitted to [facility] on a 72 hour hold. The patient's PTA Zyprexa 10 mg BID & Depakote, and Haldol 20 mg were restarted. PTA hydroxyzine and trazodone were restarted. A petition for MI commitment with [facility] was filed on 8/18 through [facility].  She is now committed with [facility]. Clozapine will be initiated on 8/31/23. Zyprexa reduced to 5 mg BID on 9/1. On 9/6, Switched Depakote to sprinkles  mg BID per pt request. Reduced Zyprexa to 5 mg QHS due to failed trial, ineffectiveness, polypharmacy, and soft pressures intermittently. On 9/7, baseline EKG ordered and reviewed. It is normal with normal QTc.     Patient is improving on Clozaril and  is revealing much less psychosis but does have substantial sedation and constipation    Today's Changes:  Continue clozapine titration, will hold at 300 mg a day for a few days considering side effects and notable improvement in symptoms. Will then check a level. May consider increasing further later this week if ongoing sx of psychosis and tolerating well.   Depakote level and clozapine level ordered for today,Tuesday 9/12  Stopped Zyprexa due to ineffectiveness and mild sedation in context of clozapine titration  Senokot to 2 tabs BID  Monitor bowel habits closely  Miralax daily prn    Target psychiatric symptoms and interventions:  Clozapine titration  Depakote DT sprinkles 500 mg BID  Haldol 20 mg at bedtime    Risks, benefits, and alternatives discussed at length with patient.     Acute Medical Problems and Treatments:  Acute medical concerns:  - No acute medical concerns    Hx of HTN  HOLDING PTA clonidine as patient's pressures have been consistently soft.     Acetaminophen as needed utilized for pain occasionally. No other major medical issues arose during hospitalization. PTA Senokot continued for constipation.     GERD:  Famotidine started on 9/6    Pertinent labs/imaging:  Depakote level 18.2 on 8/17/2023, subtherapeutic. Concerns for cheeking as patient admitted to diverting medication. Level obtained again on 8/22 is now therapeutic at 106.    Haldol level reviewed and is 13.7 at current dose of 20 mg daily.     Behavioral/Psychological/Social:  - Encourage unit programming    Safety:  - Continue precautions as noted above  - Status 15 minute checks    Legal Status: full commitment with [facility]    Disposition Plan   Reason for ongoing admission: poses an imminent risk to self, poses an imminent risk to others, and is unable to care for self due to severe psychosis or elvis  Discharge location: Home with family  Discharge Medications: not ordered  Follow-up Appointments: not scheduled    No further change  in treatment plan  Patient seen, chart reviewed, case reviewed with  and with nursing.   Case reviewed in multi-disciplinary treatment team.    .Octavio Parikh MD

## 2023-09-12 NOTE — PLAN OF CARE
Pt is withdrawn to self. Observed laughing independently  but very quiet  and not disruptive to the unit  Upon interview verbalized that she had a bowel movement yesterday afternoon. Pt denies constipation and abdominal pain.   Denies hallucination and delusions.  Continue on elopement, assault and cheeking precaution without relative issues.    Problem: Psychotic Signs/Symptoms  Goal: Improved Behavioral Control (Psychotic Signs/Symptoms)  Outcome: Progressing  Goal: Optimal Cognitive Function (Psychotic Signs/Symptoms)  Intervention: Support and Promote Cognitive Ability  Recent Flowsheet Documentation  Taken 9/12/2023 1012 by Lety Tamez RN  Trust Relationship/Rapport:   care explained   choices provided  Goal: Enhanced Social, Occupational or Functional Skills (Psychotic Signs/Symptoms)  Intervention: Promote Social, Occupational and Functional Ability  Recent Flowsheet Documentation  Taken 9/12/2023 1012 by Lety Tamez RN  Trust Relationship/Rapport:   care explained   choices provided   Goal Outcome Evaluation:    Plan of Care Reviewed With: patient

## 2023-09-12 NOTE — PLAN OF CARE
Assessment/Intervention/Current Symtoms and Care Coordination:  Chart review and met with team, discussed pt progress, symptomology, and response to treatment. Discussed the discharge plan and any potential impediments to discharge.    Emailed [email or web address] inquiring into ACT availability.     Called [employer] to refer to an ACT team. She was ruled out of eligibility because she is not at risk of homelessness.     Emailed [facility] referral to [facility] ([facility]) ([email or web address]).     Received a voicemail from Helen at [facility] about opening targeted case management with them. Called her back and let her know a referral for ACT had just been sent. She is communicating with the ACT coordinator to see if they will open her case before they open for TCM.     Discharge Plan or Goal:  Patient presents with symptoms of psychosis that put her at risk of harming herself and others. When patient stabilizes considerations include home with family or IRTS.     Barriers to Discharge:  Patient presents with symptoms of psychosis that put her at risk of harming herself and others.      Referral Status:  ACT at the [place] 9/7, denied 9/8 (no openings)  ACT at [facility] 9/8     Legal Status:  Commitment with [facility]: [place]  File: [id number]  Expires: 08/30/23 to 03/01/24    Medications on [facility]: Haloperidol, clozapine, risperidone, olanzapine    Contacts:  Daughter: Mya, [phone], [email or web address] (Consent)  Aunt: [facility], [phone] (Consent)  Son: Golden, [phone]   Cousin: Lashawn, [phone] (Consent)  Prepetition screener: Carlos Rushing, [phone]  Her : John Maynard, [phone]     Upcoming Meetings and Dates/Important Information and next steps:  ACT referral     Team Note Due:  Friday

## 2023-09-12 NOTE — PLAN OF CARE
Goal Outcome Evaluation:    Patient asleep at start of the shift. Breathing quiet and unlabored when sleeping. Pt had no c/o pain or discomfort during the HS. Appears to have slept 7 hours.

## 2023-09-12 NOTE — PLAN OF CARE
Observed to be responding to inner stimuli but much less frequently as compared to last week.   Acknowledged auditory hallucinations, denied imperatives, denies being disturbed by them during the night. Almost flat affect.   Denied thoughts of harming anyone.    No initiation of activity or interactions with others  Mostly in bed and appearing asleep and expressing  indifference to getting up.     Exasperated with how many medications she is supposed to take (and Famotidine was just added). Disheveled.  Last BM 9/9/23.    Plan: Monitor and document mood and behaviour, thought process and content. Establish and maintain therapeutic relationship. Educate about diagnoses, medications, treatment, legal status, plan of care. Address preexisting and concurrent medical concerns.      P:Disturbed thought process  G:Rational thought process  O:Not progressing

## 2023-09-13 PROCEDURE — 99231 SBSQ HOSP IP/OBS SF/LOW 25: CPT | Performed by: PSYCHIATRY & NEUROLOGY

## 2023-09-13 PROCEDURE — 250N000013 HC RX MED GY IP 250 OP 250 PS 637: Performed by: PSYCHIATRY & NEUROLOGY

## 2023-09-13 PROCEDURE — 124N000002 HC R&B MH UMMC

## 2023-09-13 RX ADMIN — FAMOTIDINE 10 MG: 10 TABLET ORAL at 21:13

## 2023-09-13 RX ADMIN — DIVALPROEX SODIUM 500 MG: 125 CAPSULE, COATED PELLETS ORAL at 08:31

## 2023-09-13 RX ADMIN — DIVALPROEX SODIUM 500 MG: 125 CAPSULE, COATED PELLETS ORAL at 21:13

## 2023-09-13 RX ADMIN — CLOZAPINE 300 MG: 200 TABLET ORAL at 21:12

## 2023-09-13 RX ADMIN — HALOPERIDOL 20 MG: 10 TABLET ORAL at 21:13

## 2023-09-13 RX ADMIN — SENNOSIDES 2 TABLET: 8.6 TABLET, FILM COATED ORAL at 08:31

## 2023-09-13 RX ADMIN — SENNOSIDES 2 TABLET: 8.6 TABLET, FILM COATED ORAL at 21:14

## 2023-09-13 RX ADMIN — FAMOTIDINE 10 MG: 10 TABLET ORAL at 08:31

## 2023-09-13 ASSESSMENT — ACTIVITIES OF DAILY LIVING (ADL)
HYGIENE/GROOMING: INDEPENDENT
LAUNDRY: UNABLE TO COMPLETE
DRESS: INDEPENDENT
HYGIENE/GROOMING: INDEPENDENT
DRESS: INDEPENDENT
ADLS_ACUITY_SCORE: 29
ORAL_HYGIENE: INDEPENDENT
ADLS_ACUITY_SCORE: 29
ADLS_ACUITY_SCORE: 29
ORAL_HYGIENE: INDEPENDENT
ADLS_ACUITY_SCORE: 29

## 2023-09-13 NOTE — PROGRESS NOTES
[facility], [place]   Psychiatric Progress Note  Hospital Day: 27        Interim History:       Patient seen and chart reviewed. Case discussed in multi-disciplinary treatment team      According to Nursing report: Patient remains isolative. She responds to internal stimuli but less than before. She reported bowel movement yesterday. She complains of sedation.          According to Nursing notes from yesterday:  Pt is withdrawn to self. Observed laughing independently  but very quiet  and not disruptive to the unit  Upon interview verbalized that she had a bowel movement yesterday afternoon. Pt denies constipation and abdominal pain.   Denies hallucination and delusions.  Continue on elopement, assault and cheeking precaution without relative issues.  On arrival of shift pt sleeping in room. Pt got up to eat dinner in her room. She is isolative and withdrawn in room. Pt denies hearing voices. Denies SI/SIB/HI. Denies paranoia, anxiety and depression. Pt stated the she is in her room most of the shift because she is tired. She stated that the medication is making her tired. She also stated that the medications are helping her. She is calm and cooperative on approach. Pt had visit from family members this shift. She was happy to see her family. She is eating and sleeping fine. No acute behavioral concerns this shift. No medical concerns this shift. No medication side effects stated or observed.      According to  :  Legal Status:  Commitment with [facility]  Referral Status:  ACT at the [place] 9/7, denied 9/8 (no openings)  ACT at [facility] 9/8  Legal Status:  Commitment with [facility]       On interview today:  Patient denies suicidal or homicidal thoughts and denies hallucinations. Patient is not revealing delusions on interview. Patient denies side effects to medications.   Patient is mostly vague but denies hallucinations today      ROS: Less constipation    Vital  "signs:  Temp: 97.9  F (36.6  C) Temp src: Temporal BP: 118/78 Pulse: 101     SpO2: 100 % O2 Device: None (Room air)   Height: 175.3 cm (5' 9\") Weight: 101.9 kg (224 lb 9.6 oz)  Estimated body mass index is 33.17 kg/m  as calculated from the following:    Height as of this encounter: 1.753 m (5' 9\").    Weight as of this encounter: 101.9 kg (224 lb 9.6 oz).             Medications:      cloZAPine  300 mg Oral At Bedtime    divalproex sodium delayed-release  500 mg Oral Q12H ESTHER (08/20)    famotidine  10 mg Oral BID    haloperidol  20 mg Oral At Bedtime    sennosides  2 tablet Oral BID          Allergies:     Allergies   Allergen Reactions    Pork-Derived Products Unknown     Mandaeism does not eat pork          Labs:     No results found for this or any previous visit (from the past 24 hour(s)).           Psychiatric Examination:     /78 (BP Location: Left arm, Patient Position: Sitting, Cuff Size: Adult Large)   Pulse 101   Temp 97.9  F (36.6  C) (Temporal)   Resp 16   Ht 1.753 m (5' 9\")   Wt 101.9 kg (224 lb 9.6 oz)   SpO2 100%   BMI 33.17 kg/m    Weight is 224 lbs 9.6 oz  Body mass index is 33.17 kg/m .    Weight over time:  Vitals:    08/17/23 2218 08/24/23 0800 08/29/23 0800 09/03/23 0820   Weight: 100.9 kg (222 lb 6.4 oz) 102.7 kg (226 lb 6.4 oz) 102.7 kg (226 lb 6.4 oz) 100.6 kg (221 lb 12.8 oz)    09/07/23 0800 09/10/23 0800   Weight: 103.1 kg (227 lb 4.8 oz) 101.9 kg (224 lb 9.6 oz)       Orthostatic Vitals       None              Cardiometabolic risk assessment. 08/24/23      Reviewed patient profile for cardiometabolic risk factors    Date taken /Value  REFERENCE RANGE   Abdominal Obesity  (Waist Circumference)   See nursing flowsheet Women ?35 in (88 cm)   Men ?40 in (102 cm)      Triglycerides  Triglycerides   Date Value Ref Range Status   07/02/2023 97 <150 mg/dL Final       ?150 mg/dL (1.7 mmol/L) or current treatment for elevated triglycerides   HDL cholesterol  Direct Measure HDL   Date " Value Ref Range Status   07/02/2023 84 >=50 mg/dL Final   ]   Women <50 mg/dL (1.3 mmol/L) in women or current treatment for low HDL cholesterol  Men <40 mg/dL (1 mmol/L) in men or current treatment for low HDL cholesterol     Fasting plasma glucose (FPG) Lab Results   Component Value Date     08/18/2023      FPG ?100 mg/dL (5.6 mmol/L) or treatment for elevated blood glucose   Blood pressure  BP Readings from Last 3 Encounters:   09/12/23 118/78   07/25/23 (!) 143/79    Blood pressure ?130/85 mmHg or treatment for elevated blood pressure   Family History  See family history     Mental Status Exam:  Appearance: awake, alert, adequately groomed, and dressed in her own clothing. Sitting in lounge and responding to internal stimuli.   Attitude:  cooperative  Eye Contact:  good  Mood:  calm  Affect:  mood congruent and intensity is blunted  Speech:  clear, coherent  Language: fluent and intact in English  Psychomotor, Gait, Musculoskeletal:  no evidence of tardive dyskinesia, dystonia, or tics  Throught Process:  evidence of thought blocking present,  Associations:  no loose associations  Thought Content:  no evidence of suicidal ideation or homicidal ideation  less delusions and less hallucinations, but still responds to internal stimuli  Insight:  fair  Judgement:  fair  Oriented to:  time, person, and place  Attention Span and Concentration:  fair  Recent and Remote Memory:  fair  Fund of Knowledge:  appropriate    Gradual improvement in symptoms noted           Precautions:     Behavioral Orders   Procedures    Assault precautions    Cheeking Precautions (behavioral units)     Patient Observed swallowing PO medications; Patient asked to drink water after swallowing medication; Patient in Staff line of sight for 15 minutes after medication given; Mouth checks after PO administration (patient asked to open mouth and stick out their tongue).    Code 1 - Restrict to Unit    Elopement precautions    Routine  Programming     As clinically indicated    Status 15     Every 15 minutes.          Diagnoses:   Schizoaffective disorder, bipolar type (H)    # Bipolar I disorder, currently manic with psychotic features   # Hx of possible seizure in 1983          Assessment & Plan:     Assessment and hospital summary:  Angela Basurto is a 58 year old female previously diagnosed with Bipolar Disorder Type 1 and anxiety who presented with elvis and psychosis in the context of medication nonadherence. Patient was presented to emergency room due to homicidal threats toward her family using knife, and exhibiting paranoid and severe disorganized thought and responding to her internal stimuli while staying in the emergency room. Significant symptoms on admission include paranoia, disorganized thinking and behaviors, AVH, and emotional lability. The MSE on admission was pertinent for RTIS. Biological contributions to mental health presentation include diagnosis of Bipolar Disorder, type 1 and taking Depakote, Zyprexa, and Haldol. Psychological contributions to mental health presentation include poor insight into her condition. While she understands why she's in the hospital, she displays intrusive behaviors that require a 1:1. 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 Protective factors include family support and being Adventism. Most recent psychiatric hospitalization was in July of this year during which time she left [facility]. In summary, the patient's reported symptoms of elvis and psychosis in the context of medication nonadherence are consistent with Bipolar Disorder, type 1.      Angela Basurto was admitted to [facility] on a 72 hour hold. The patient's PTA Zyprexa 10 mg BID & Depakote, and Haldol 20 mg were restarted. PTA hydroxyzine and trazodone were restarted. A petition for MI commitment with [facility] was filed on 8/18 through  [facility].  She is now committed with [facility]. Clozapine will be initiated on 8/31/23. Zyprexa reduced to 5 mg BID on 9/1. On 9/6, Switched Depakote to sprinkles  mg BID per pt request. Reduced Zyprexa to 5 mg QHS due to failed trial, ineffectiveness, polypharmacy, and soft pressures intermittently. On 9/7, baseline EKG ordered and reviewed. It is normal with normal QTc.     Patient is improving on Clozaril and is revealing much less psychosis but does have substantial sedation and constipation. She still responds to internal stimuli but much less since starting Clozaril    Today's Changes:  Continue clozapine titration, will hold at 300 mg a day for a few days considering side effects and notable improvement in symptoms. Will then consider checking a level and/or titrating further.  May consider increasing dose further later this week if ongoing sx of psychosis and tolerating well.   Depakote level and clozapine level ordered for today,Tuesday 9/12  Stopped Zyprexa due to ineffectiveness and mild sedation in context of clozapine titration  Senokot to 2 tabs BID  Monitor bowel habits closely  Miralax daily prn    Target psychiatric symptoms and interventions:  Clozapine titration  Depakote DT sprinkles 500 mg BID  Haldol 20 mg at bedtime    Risks, benefits, and alternatives discussed at length with patient.     Acute Medical Problems and Treatments:  Acute medical concerns:  - No acute medical concerns    Hx of HTN  HOLDING PTA clonidine as patient's pressures have been consistently soft.     Acetaminophen as needed utilized for pain occasionally. No other major medical issues arose during hospitalization. PTA Senokot continued for constipation.     GERD:  Famotidine started on 9/6    Pertinent labs/imaging:  Depakote level 18.2 on 8/17/2023, subtherapeutic. Concerns for cheeking as patient admitted to diverting medication. Level obtained again on 8/22 is now therapeutic at 106.    Haldol level reviewed  and is 13.7 at current dose of 20 mg daily.     Behavioral/Psychological/Social:  - Encourage unit programming    Safety:  - Continue precautions as noted above  - Status 15 minute checks    Legal Status: full commitment with [facility]    Disposition Plan   Reason for ongoing admission: poses an imminent risk to self, poses an imminent risk to others, and is unable to care for self due to severe psychosis or elvis  Discharge location: Home with family  Discharge Medications: not ordered  Follow-up Appointments: not scheduled    No further change in treatment plan  Patient seen, chart reviewed, case reviewed with  and with nursing.   Case reviewed in multi-disciplinary treatment team.      .Octavio Parikh MD

## 2023-09-13 NOTE — PLAN OF CARE
"Pt had an uneventful day. Pt was sleeping at the start of the shift but was easily awakened for breakfast. Pt presents in street clothes and appears unkept. Pt states she showered \"a couple days ago\" and agreed to a shower this day but ultimately refused. Pt mood was calm with flat affect which brightens slightly upon approach. Pt pleasant with check in. Pt denies SI, SIB, HI. Pt denies anxiety and endorses feelings of depression due to being away from and missing her kids. Pt denies VH and endorses AH but states the voices have greatly reduced and states the medications are helping. Insight is improving. When asked if pt ever feels like she want to harm others, she stated \"not since the voices have quieted\". Pt observed to be responding less to internal stimuli.    Food and fluid intake adequate. No acute behavioral or physical concerns this day.     VS reviewed: BP 97/62   Pulse 101   Temp 97.1  F (36.2  C) (Temporal)   Resp 16   Ht 1.753 m (5' 9\")   Wt 101.9 kg (224 lb 9.6 oz)   SpO2 98%   BMI 33.17 kg/m   . Patient denies  pain.    Patient evaluation continues. Assessed mood,anxiety,thoughts and behavior. Patient is progressing towards goals. Patient is encouraged to participate in groups and assisted to develop healthy coping skills.     Length of stay: 27  "

## 2023-09-13 NOTE — PLAN OF CARE
"Assessment/Intervention/Current Symtoms and Care Coordination:  Chart review and met with team, discussed pt progress, symptomology, and response to treatment. Discussed the discharge plan and any potential impediments to discharge.    Met with Angela to check in. She reported that she slept \"well\" last night. Bright affect, congruent mood.     Coordinated care with medica coordinator.     Discharge Plan or Goal:  Patient presents with symptoms of psychosis that put her at risk of harming herself and others. When patient stabilizes considerations include home with family or IRTS.     Barriers to Discharge:  Patient presents with symptoms of psychosis that put her at risk of harming herself and others.      Referral Status:  ACT at the [place] 9/7, denied 9/8 (no openings)  ACT at [facility] 9/8  [facility] 9/12     Legal Status:  Commitment with [facility]: [place]  File: [id number]  Expires: 08/30/23 to 03/01/24    Medications on [facility]: Haloperidol, clozapine, risperidone, olanzapine    Contacts:  Daughter: Mya, [phone], [email or web address] (Consent)  Aunt: [facility], [phone] (Consent)  Son: Golden, [phone]   Cousin: Lashawn, [phone] (Consent)  Prepetition screener: Carlos Rushing, [phone]  Her : John Maynard, [phone]     Upcoming Meetings and Dates/Important Information and next steps:  ACT referral     Team Note Due:  Friday  "

## 2023-09-13 NOTE — PROGRESS NOTES
"On arrival of shift pt sleeping in room. Pt got up to eat dinner in her room. She is isolative and withdrawn in room. Pt denies hearing voices. Denies SI/SIB/HI. Denies paranoia, anxiety and depression. Pt stated the she is in her room most of the shift because she is tired. She stated that the medication is making her tired. She also stated that the medications are helping her. She is calm and cooperative on approach. Pt had visit from family members this shift. She was happy to see her family. She is eating and sleeping fine. No acute behavioral concerns this shift. No medical concerns this shift. No medication side effects stated or observed.     /78 (BP Location: Left arm, Patient Position: Sitting, Cuff Size: Adult Large)   Pulse 101   Temp 97.9  F (36.6  C) (Temporal)   Resp 16   Ht 1.753 m (5' 9\")   Wt 101.9 kg (224 lb 9.6 oz)   SpO2 100%   BMI 33.17 kg/m     "

## 2023-09-13 NOTE — PLAN OF CARE
Pt asleep at start of shift.     Breathing quiet and unlabored when sleeping.     Pt had no c/o pain or discomfort during the HS.     Appears to have slept 6.5 hours.     Goal Outcome Evaluation:  Problem: Sleep Disturbance  Goal: Adequate Sleep/Rest  Outcome: Progressing

## 2023-09-14 LAB
BASOPHILS # BLD AUTO: 0 10E3/UL (ref 0–0.2)
BASOPHILS NFR BLD AUTO: 0 %
CLOZAPINE SERPL-MCNC: 702 NG/ML
CLOZAPINE+NOR SERPL-MCNC: 860 NG/ML
CNO SERPL-MCNC: <100 NG/ML
EOSINOPHIL # BLD AUTO: 0.1 10E3/UL (ref 0–0.7)
EOSINOPHIL NFR BLD AUTO: 1 %
IMM GRANULOCYTES # BLD: 0 10E3/UL
IMM GRANULOCYTES NFR BLD: 0 %
LYMPHOCYTES # BLD AUTO: 0.9 10E3/UL (ref 0.8–5.3)
LYMPHOCYTES NFR BLD AUTO: 10 %
MONOCYTES # BLD AUTO: 0.6 10E3/UL (ref 0–1.3)
MONOCYTES NFR BLD AUTO: 6 %
NEUTROPHILS # BLD AUTO: 7.2 10E3/UL (ref 1.6–8.3)
NEUTROPHILS NFR BLD AUTO: 83 %
NORCLOZAPINE SERPL-MCNC: 158 NG/ML
NRBC # BLD AUTO: 0 10E3/UL
NRBC BLD AUTO-RTO: 0 /100
WBC # BLD AUTO: 8.7 10E3/UL (ref 4–11)

## 2023-09-14 PROCEDURE — 250N000013 HC RX MED GY IP 250 OP 250 PS 637: Performed by: PSYCHIATRY & NEUROLOGY

## 2023-09-14 PROCEDURE — 85048 AUTOMATED LEUKOCYTE COUNT: CPT | Performed by: PSYCHIATRY & NEUROLOGY

## 2023-09-14 PROCEDURE — 36415 COLL VENOUS BLD VENIPUNCTURE: CPT | Performed by: PSYCHIATRY & NEUROLOGY

## 2023-09-14 PROCEDURE — 124N000002 HC R&B MH UMMC

## 2023-09-14 PROCEDURE — 99233 SBSQ HOSP IP/OBS HIGH 50: CPT | Performed by: PSYCHIATRY & NEUROLOGY

## 2023-09-14 RX ORDER — ATROPINE SULFATE 10 MG/ML
2 SOLUTION/ DROPS OPHTHALMIC 3 TIMES DAILY PRN
Status: DISCONTINUED | OUTPATIENT
Start: 2023-09-14 | End: 2023-09-20 | Stop reason: HOSPADM

## 2023-09-14 RX ORDER — POLYETHYLENE GLYCOL 3350 17 G/17G
17 POWDER, FOR SOLUTION ORAL DAILY
Status: DISCONTINUED | OUTPATIENT
Start: 2023-09-14 | End: 2023-09-18

## 2023-09-14 RX ADMIN — FAMOTIDINE 10 MG: 10 TABLET ORAL at 19:31

## 2023-09-14 RX ADMIN — DIVALPROEX SODIUM 500 MG: 125 CAPSULE, COATED PELLETS ORAL at 08:38

## 2023-09-14 RX ADMIN — POLYETHYLENE GLYCOL 3350 17 G: 17 POWDER, FOR SOLUTION ORAL at 15:06

## 2023-09-14 RX ADMIN — FAMOTIDINE 10 MG: 10 TABLET ORAL at 08:38

## 2023-09-14 RX ADMIN — CLOZAPINE 300 MG: 200 TABLET ORAL at 21:27

## 2023-09-14 RX ADMIN — DIVALPROEX SODIUM 500 MG: 125 CAPSULE, COATED PELLETS ORAL at 19:30

## 2023-09-14 RX ADMIN — HALOPERIDOL 20 MG: 10 TABLET ORAL at 19:31

## 2023-09-14 RX ADMIN — SENNOSIDES 2 TABLET: 8.6 TABLET, FILM COATED ORAL at 19:31

## 2023-09-14 RX ADMIN — ACETAMINOPHEN 650 MG: 325 TABLET, FILM COATED ORAL at 08:38

## 2023-09-14 RX ADMIN — SENNOSIDES 2 TABLET: 8.6 TABLET, FILM COATED ORAL at 08:38

## 2023-09-14 ASSESSMENT — ACTIVITIES OF DAILY LIVING (ADL)
DRESS: INDEPENDENT
ADLS_ACUITY_SCORE: 39
ADLS_ACUITY_SCORE: 29
ORAL_HYGIENE: INDEPENDENT
ADLS_ACUITY_SCORE: 29
LAUNDRY: UNABLE TO COMPLETE
ORAL_HYGIENE: INDEPENDENT
ADLS_ACUITY_SCORE: 29
DRESS: INDEPENDENT
HYGIENE/GROOMING: INDEPENDENT
LAUNDRY: UNABLE TO COMPLETE
ADLS_ACUITY_SCORE: 29
HYGIENE/GROOMING: PROMPTS
ADLS_ACUITY_SCORE: 29

## 2023-09-14 NOTE — PROGRESS NOTES
"[facility], [place]   Psychiatric Progress Note  Hospital Day: 28        Interim History:   The patient's care was discussed with the treatment team during the daily team meeting and/or staff's chart notes were reviewed.  Pt was medication adherent. Calm and cooperative. Still responding to internal stimuli but reduced from previous days per RN staff. Family visited and visit went well. No behavioral or medical concerns. Slept 6.75 hours on night shift.     Upon interview, Angela reports that the voices have \"gone down.\" Denies that they are command in nature or saying anything that is bothersome. States that her sleep has improved. Continues to feel sedated from clozapine. Experiencing some drooling. Last BM was yesterday but was hard. She is OK starting scheduled Miralax.     Suicidal ideation: denies current or recent suicidal ideation or behaviors.    Homicidal ideation: denies current or recent homicidal ideation or behaviors.    Psychotic symptoms: Pt is experiencing AH, though lessened. Not responding to internal stimuli today. Patient denies VH.    Medication side effects reported: As above    Acute medical concerns: none    Other issues reported by patient: Patient had no further questions or concerns.           Medications:      cloZAPine  300 mg Oral At Bedtime    divalproex sodium delayed-release  500 mg Oral Q12H [facility] (08/20)    famotidine  10 mg Oral BID    haloperidol  20 mg Oral At Bedtime    sennosides  2 tablet Oral BID          Allergies:     Allergies   Allergen Reactions    Pork-Derived Products Unknown     Protestant does not eat pork          Labs:     No results found for this or any previous visit (from the past 24 hour(s)).           Psychiatric Examination:     /71 (BP Location: Left arm)   Pulse 93   Temp 97.9  F (36.6  C) (Temporal)   Resp 16   Ht 1.753 m (5' 9\")   Wt 101.9 kg (224 lb 9.6 oz)   SpO2 100%   BMI 33.17 kg/m    Weight is 224 lbs 9.6 oz  " Body mass index is 33.17 kg/m .    Weight over time:  Vitals:    08/17/23 2218 08/24/23 0800 08/29/23 0800 09/03/23 0820   Weight: 100.9 kg (222 lb 6.4 oz) 102.7 kg (226 lb 6.4 oz) 102.7 kg (226 lb 6.4 oz) 100.6 kg (221 lb 12.8 oz)    09/07/23 0800 09/10/23 0800   Weight: 103.1 kg (227 lb 4.8 oz) 101.9 kg (224 lb 9.6 oz)       Orthostatic Vitals       None              Cardiometabolic risk assessment. 08/24/23      Reviewed patient profile for cardiometabolic risk factors    Date taken /Value  REFERENCE RANGE   Abdominal Obesity  (Waist Circumference)   See nursing flowsheet Women ?35 in (88 cm)   Men ?40 in (102 cm)      Triglycerides  Triglycerides   Date Value Ref Range Status   07/02/2023 97 <150 mg/dL Final       ?150 mg/dL (1.7 mmol/L) or current treatment for elevated triglycerides   HDL cholesterol  Direct Measure HDL   Date Value Ref Range Status   07/02/2023 84 >=50 mg/dL Final   ]   Women <50 mg/dL (1.3 mmol/L) in women or current treatment for low HDL cholesterol  Men <40 mg/dL (1 mmol/L) in men or current treatment for low HDL cholesterol     Fasting plasma glucose (FPG) Lab Results   Component Value Date     08/18/2023      FPG ?100 mg/dL (5.6 mmol/L) or treatment for elevated blood glucose   Blood pressure  BP Readings from Last 3 Encounters:   09/13/23 127/71   07/25/23 (!) 143/79    Blood pressure ?130/85 mmHg or treatment for elevated blood pressure   Family History  See family history     Mental Status Exam:  Appearance: sleeping upon entering her room, awakened to voice. adequately groomed, and dressed in her own clothing.   Attitude:  cooperative  Eye Contact:  good  Mood:  calm  Affect:  mood congruent and intensity is blunted  Speech:  clear, coherent  Language: fluent and intact in English  Psychomotor, Gait, Musculoskeletal:  no evidence of tardive dyskinesia, dystonia, or tics  Throught Process:  disorganized, evidence of thought blocking present, and illogical  Associations:  no  loose associations  Thought Content:  no evidence of suicidal ideation or homicidal ideation, auditory hallucinations present, and patient appears to be responding to internal stimuli throughout entire interview  Insight:  fair  Judgement:  fair  Oriented to:  time, person, and place  Attention Span and Concentration:  fair  Recent and Remote Memory:  fair  Fund of Knowledge:  appropriate           Precautions:     Behavioral Orders   Procedures    Assault precautions    Cheeking Precautions (behavioral units)     Patient Observed swallowing PO medications; Patient asked to drink water after swallowing medication; Patient in Staff line of sight for 15 minutes after medication given; Mouth checks after PO administration (patient asked to open mouth and stick out their tongue).    Code 1 - Restrict to Unit    Elopement precautions    Routine Programming     As clinically indicated    Status 15     Every 15 minutes.          Diagnoses:     # Bipolar I disorder, currently manic with psychotic features   # Hx of possible seizure in 1983          Assessment & Plan:     Assessment and hospital summary:  Angela Basurto is a 58 year old female previously diagnosed with Bipolar Disorder Type 1 and anxiety who presented with elvis and psychosis in the context of medication nonadherence. Patient was presented to emergency room due to homicidal threats toward her family using knife, and exhibiting paranoid and severe disorganized thought and responding to her internal stimuli while staying in the emergency room. Significant symptoms on admission include paranoia, disorganized thinking and behaviors, AVH, and emotional lability. The MSE on admission was pertinent for RTIS. Biological contributions to mental health presentation include diagnosis of Bipolar Disorder, type 1 and taking Depakote, Zyprexa, and Haldol. Psychological contributions to mental health presentation include poor insight into her condition. While she  understands why she's in the hospital, she displays intrusive behaviors that require a 1:1. 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 Protective factors include family support and being Adventist. Most recent psychiatric hospitalization was in July of this year during which time she left AMA. In summary, the patient's reported symptoms of elvis and psychosis in the context of medication nonadherence are consistent with Bipolar Disorder, type 1.      Angela Basurto was admitted to [facility] on a 72 hour hold. The patient's PTA Zyprexa 10 mg BID & Depakote, and Haldol 20 mg were restarted. PTA hydroxyzine and trazodone were restarted. A petition for MI commitment with Adolfo was filed on 8/18 through [facility].  She is now committed with [facility]olfo. Clozapine will be initiated on 8/31/23. Zyprexa reduced to 5 mg BID on 9/1. On 9/6, Switched Depakote to sprinkles  mg BID per pt request. Reduced Zyprexa to 5 mg QHS due to failed trial, ineffectiveness, polypharmacy, and soft pressures intermittently. On 9/7, baseline EKG ordered and reviewed. It is normal with normal QTc. Zyprexa stopped on 9/8 due to ineffectiveness and mild sedation in context of clozapine titration. Senokot increased to 2 tabs BID due to constipation at that time as well. Achieved dose of 300 mg of clozapine daily on 9/11. Improvement in psychosis noted. Family agrees.     Today's Changes:  Add atropine drops prn for drooling  Add scheduled Miralax daily  Clozapine level pending    Target psychiatric symptoms and interventions:  Clozapine 300 mg QHS  Depakote DT sprinkles 500 mg BID  Haldol 20 mg at bedtime    Risks, benefits, and alternatives discussed at length with patient.     Acute Medical Problems and Treatments:  Acute medical concerns:  - No acute medical concerns    Hx of HTN  HOLDING PTA clonidine as patient's pressures have been  consistently soft.     Acetaminophen as needed utilized for pain occasionally. No other major medical issues arose during hospitalization. PTA Senokot continued for constipation.     GERD:  Famotidine started on 9/6    Pertinent labs/imaging:  Depakote level 18.2 on 8/17/2023, subtherapeutic. Concerns for cheeking as patient admitted to diverting medication. Level obtained again on 8/22 is now therapeutic at 106.    Depakote level checked on 9/12 and was 55.9.   Haldol level reviewed and is 13.7 at current dose of 20 mg daily.     Behavioral/Psychological/Social:  - Encourage unit programming    Safety:  - Continue precautions as noted above  - Status 15 minute checks    Legal Status: full commitment with [facility]    Disposition Plan   Reason for ongoing admission: poses an imminent risk to self, poses an imminent risk to others, and is unable to care for self due to severe psychosis or elvis  Discharge location: Home with family  Discharge Medications: not ordered  Follow-up Appointments: not scheduled    Entered by: Denia Champagne MD on 9/14/2023 at 8:41 AM     Nighat Champagne MD  [facility]

## 2023-09-14 NOTE — PLAN OF CARE
NOC Shift Report     Pt in bed at beginning of shift, breathing quiet and unlabored. Pt slept 5.25 hours.      No pt complaints or concerns at this time.      No PRNs given. Will continue to monitor.

## 2023-09-14 NOTE — PLAN OF CARE
"  Problem: Psychotic Signs/Symptoms  Goal: Increased Participation and Engagement (Psychotic Signs/Symptoms)  Outcome: Not Progressing     Problem: Psychotic Signs/Symptoms  Goal: Improved Behavioral Control (Psychotic Signs/Symptoms)  Outcome: Progressing   Goal Outcome Evaluation:    Plan of Care Reviewed With: patient      Angela remained in bed, sleeping off, but tasia for dinner. She chose to visit with her family at 7 p.m. rather than participate in the group activity or socialize with her peers. When asked how she was doing, she said, \"I am fine, but I have been sleeping a lot since I started the new medication.\" She denied SI/HI, AVHs, anxiety, depression, or sadness. Eating, and sleeping are all okay in her view but she c/o constipation. She took her prescribed medications without showing any signs of skipping them. The Pt exhibited a blunted affect.                "

## 2023-09-15 LAB
ALBUMIN SERPL BCG-MCNC: 3.4 G/DL (ref 3.5–5.2)
ALP SERPL-CCNC: 67 U/L (ref 35–104)
ALT SERPL W P-5'-P-CCNC: 13 U/L (ref 0–50)
ANION GAP SERPL CALCULATED.3IONS-SCNC: 12 MMOL/L (ref 7–15)
AST SERPL W P-5'-P-CCNC: 15 U/L (ref 0–45)
BILIRUB SERPL-MCNC: 0.2 MG/DL
BUN SERPL-MCNC: 5 MG/DL (ref 6–20)
CALCIUM SERPL-MCNC: 8.9 MG/DL (ref 8.6–10)
CHLORIDE SERPL-SCNC: 99 MMOL/L (ref 98–107)
CREAT SERPL-MCNC: 0.56 MG/DL (ref 0.51–0.95)
DEPRECATED HCO3 PLAS-SCNC: 25 MMOL/L (ref 22–29)
EGFRCR SERPLBLD CKD-EPI 2021: >90 ML/MIN/1.73M2
ERYTHROCYTE [DISTWIDTH] IN BLOOD BY AUTOMATED COUNT: 12.7 % (ref 10–15)
GLUCOSE SERPL-MCNC: 134 MG/DL (ref 70–99)
HCT VFR BLD AUTO: 38 % (ref 35–47)
HGB BLD-MCNC: 12.7 G/DL (ref 11.7–15.7)
MCH RBC QN AUTO: 31.9 PG (ref 26.5–33)
MCHC RBC AUTO-ENTMCNC: 33.4 G/DL (ref 31.5–36.5)
MCV RBC AUTO: 96 FL (ref 78–100)
PLATELET # BLD AUTO: 201 10E3/UL (ref 150–450)
POTASSIUM SERPL-SCNC: 3.6 MMOL/L (ref 3.4–5.3)
PROT SERPL-MCNC: 6.6 G/DL (ref 6.4–8.3)
RBC # BLD AUTO: 3.98 10E6/UL (ref 3.8–5.2)
SODIUM SERPL-SCNC: 136 MMOL/L (ref 136–145)
WBC # BLD AUTO: 8.4 10E3/UL (ref 4–11)

## 2023-09-15 PROCEDURE — 85027 COMPLETE CBC AUTOMATED: CPT | Performed by: PHYSICIAN ASSISTANT

## 2023-09-15 PROCEDURE — 36415 COLL VENOUS BLD VENIPUNCTURE: CPT | Performed by: PHYSICIAN ASSISTANT

## 2023-09-15 PROCEDURE — 99254 IP/OBS CNSLTJ NEW/EST MOD 60: CPT | Performed by: PHYSICIAN ASSISTANT

## 2023-09-15 PROCEDURE — 250N000013 HC RX MED GY IP 250 OP 250 PS 637: Performed by: PSYCHIATRY & NEUROLOGY

## 2023-09-15 PROCEDURE — 99233 SBSQ HOSP IP/OBS HIGH 50: CPT | Performed by: PSYCHIATRY & NEUROLOGY

## 2023-09-15 PROCEDURE — 80053 COMPREHEN METABOLIC PANEL: CPT | Performed by: PHYSICIAN ASSISTANT

## 2023-09-15 PROCEDURE — 124N000002 HC R&B MH UMMC

## 2023-09-15 RX ORDER — HALOPERIDOL 10 MG/1
10 TABLET ORAL AT BEDTIME
Status: DISCONTINUED | OUTPATIENT
Start: 2023-09-15 | End: 2023-09-16

## 2023-09-15 RX ADMIN — SENNOSIDES 2 TABLET: 8.6 TABLET, FILM COATED ORAL at 21:17

## 2023-09-15 RX ADMIN — FAMOTIDINE 10 MG: 10 TABLET ORAL at 08:22

## 2023-09-15 RX ADMIN — DIVALPROEX SODIUM 500 MG: 125 CAPSULE, COATED PELLETS ORAL at 08:21

## 2023-09-15 RX ADMIN — SENNOSIDES 2 TABLET: 8.6 TABLET, FILM COATED ORAL at 08:22

## 2023-09-15 RX ADMIN — CLOZAPINE 250 MG: 200 TABLET ORAL at 21:12

## 2023-09-15 RX ADMIN — HALOPERIDOL 10 MG: 10 TABLET ORAL at 21:13

## 2023-09-15 RX ADMIN — FAMOTIDINE 10 MG: 10 TABLET ORAL at 21:18

## 2023-09-15 RX ADMIN — DIVALPROEX SODIUM 500 MG: 125 CAPSULE, COATED PELLETS ORAL at 21:13

## 2023-09-15 RX ADMIN — POLYETHYLENE GLYCOL 3350 17 G: 17 POWDER, FOR SOLUTION ORAL at 08:21

## 2023-09-15 ASSESSMENT — ACTIVITIES OF DAILY LIVING (ADL)
DRESS: INDEPENDENT
ADLS_ACUITY_SCORE: 39
ADLS_ACUITY_SCORE: 39
ORAL_HYGIENE: INDEPENDENT
DRESS: INDEPENDENT
ADLS_ACUITY_SCORE: 39
LAUNDRY: UNABLE TO COMPLETE
ADLS_ACUITY_SCORE: 39
ORAL_HYGIENE: INDEPENDENT
LAUNDRY: UNABLE TO COMPLETE
ADLS_ACUITY_SCORE: 39

## 2023-09-15 NOTE — PROVIDER NOTIFICATION
09/15/23 7667   Individualization/Patient Specific Goals   Patient Personal Strengths coping skills;expressive of emotions;family/social support;humor;interests/hobbies;medication/treatment adherence;motivated for treatment;positive attitude;relationship stability;resilient;resourceful;spiritual/Nondenominational support;stable living environment   Patient Vulnerabilities traumatic event;lacks insight into illness   Interprofessional Rounds   Participants CTC;psychiatrist;nursing   Behavioral Team Discussion   Participants Lucinda Pate, Nayla EDOUARD RN, Kaitlynn Gan RN, Beatriz RN, Jaime RN   Anticipated length of stay 5 days   Continued Stay Criteria/Rationale Medication management, monitoring Clozaril toleration, side effects and benefits     PRECAUTIONS AND SAFETY    Behavioral Orders   Procedures    Assault precautions    Cheeking Precautions (behavioral units)     Patient Observed swallowing PO medications; Patient asked to drink water after swallowing medication; Patient in Staff line of sight for 15 minutes after medication given; Mouth checks after PO administration (patient asked to open mouth and stick out their tongue).    Code 1 - Restrict to Unit    Elopement precautions    Routine Programming     As clinically indicated    Status 15     Every 15 minutes.       Safety  Safety WDL: WDL  Patient Location: patient room, own, dining room  Observed Behavior: calm, sleeping, sitting  Observed Behavior (Comment): calm, less responding to IS  Safety Measures: safety rounds completed, suicide check-in completed, suicide assessment completed, environmental rounds completed  Diversional Activity: television  Suicidality: Status 15  Assault: status 15, private room  Elopement Assessment: Statements about wanting to leave  Elopement Interventions: status 15, behavioral scrubs (pajamas)  Additional Documentation:  (Cheeking precaution)

## 2023-09-15 NOTE — PROGRESS NOTES
"[facility], [place]   Psychiatric Progress Note  Hospital Day: 29        Interim History:   The patient's care was discussed with the treatment team during the daily team meeting and/or staff's chart notes were reviewed.  Pt remained isolated to room despite encouragement to attend groups. Pt stated \"I just don't feel like it\". Pt participated in nursing assessment with good eye contact and thoughtful answers. Pt denies SI, HI, SIB and thoughts of hurting others. Pt denies depression and anxiety, noting some sadness related to missing her children. Pt denies AH/VH reporting good effect from medications. Pt advised of medication side effects of constipation and that nursing staff was closely monitoring bowel habits. Pt verbalized understanding and is to notify her nurse of bm status daily. No concerns with appetite. Pt is calm with flat affect. No acute behavioral outbursts. No acute physical complaints other than being \"tired\". Pt is observed to sleep most of the day, getting up for meals. This morning, she was noted to have orthostatic hypotension.     Upon interview, Angela was lying in bed. Awakened to voice, though appeared mildly sedated. She did report lightheadedness upon standing this morning. We discussed the importance of adequate hydration and allowing body to acclimate to change in BP before standing. She expressed understanding. Discussed plan to reduce medication doses due to ongoing side effects. She reports ongoing improvement in AH, though feels too sleepy. She was encouraged to drink from water cup in her room, but she took two sips and then laid back down. She was also encouraged to shower today, though she said that she doesn't feel well enough today, maybe tomorrow. She is malodorous. She reported BM yesterday, but could not recall what time.     Suicidal ideation: denies current or recent suicidal ideation or behaviors.    Homicidal ideation: denies current or recent " "homicidal ideation or behaviors.    Psychotic symptoms: Pt is experiencing AH, though lessened. Not responding to internal stimuli today. Patient denies VH.    Medication side effects reported: As above    Acute medical concerns: none    Other issues reported by patient: Patient had no further questions or concerns.           Medications:      cloZAPine  250 mg Oral At Bedtime    divalproex sodium delayed-release  500 mg Oral Q12H [facility] (08/20)    famotidine  10 mg Oral BID    haloperidol  10 mg Oral At Bedtime    polyethylene glycol  17 g Oral Daily    sennosides  2 tablet Oral BID          Allergies:     Allergies   Allergen Reactions    Pork-Derived Products Unknown     Gnosticism does not eat pork          Labs:     Recent Results (from the past 24 hour(s))   Comprehensive metabolic panel    Collection Time: 09/15/23 10:37 AM   Result Value Ref Range    Sodium 136 136 - 145 mmol/L    Potassium 3.6 3.4 - 5.3 mmol/L    Chloride 99 98 - 107 mmol/L    Carbon Dioxide (CO2) 25 22 - 29 mmol/L    Anion Gap 12 7 - 15 mmol/L    Urea Nitrogen 5.0 (L) 6.0 - 20.0 mg/dL    Creatinine 0.56 0.51 - 0.95 mg/dL    Calcium 8.9 8.6 - 10.0 mg/dL    Glucose 134 (H) 70 - 99 mg/dL    Alkaline Phosphatase 67 35 - 104 U/L    AST 15 0 - 45 U/L    ALT 13 0 - 50 U/L    Protein Total 6.6 6.4 - 8.3 g/dL    Albumin 3.4 (L) 3.5 - 5.2 g/dL    Bilirubin Total 0.2 <=1.2 mg/dL    GFR Estimate >90 >60 mL/min/1.73m2   CBC with platelets    Collection Time: 09/15/23 10:37 AM   Result Value Ref Range    WBC Count 8.4 4.0 - 11.0 10e3/uL    RBC Count 3.98 3.80 - 5.20 10e6/uL    Hemoglobin 12.7 11.7 - 15.7 g/dL    Hematocrit 38.0 35.0 - 47.0 %    MCV 96 78 - 100 fL    MCH 31.9 26.5 - 33.0 pg    MCHC 33.4 31.5 - 36.5 g/dL    RDW 12.7 10.0 - 15.0 %    Platelet Count 201 150 - 450 10e3/uL              Psychiatric Examination:     BP (!) 85/53 (Patient Position: Sitting)   Pulse 107   Temp 97.5  F (36.4  C) (Temporal)   Resp 16   Ht 1.753 m (5' 9\")   Wt " 101.9 kg (224 lb 9.6 oz)   SpO2 98%   BMI 33.17 kg/m    Weight is 224 lbs 9.6 oz  Body mass index is 33.17 kg/m .    Weight over time:  Vitals:    08/17/23 2218 08/24/23 0800 08/29/23 0800 09/03/23 0820   Weight: 100.9 kg (222 lb 6.4 oz) 102.7 kg (226 lb 6.4 oz) 102.7 kg (226 lb 6.4 oz) 100.6 kg (221 lb 12.8 oz)    09/07/23 0800 09/10/23 0800   Weight: 103.1 kg (227 lb 4.8 oz) 101.9 kg (224 lb 9.6 oz)       Orthostatic Vitals       None              Cardiometabolic risk assessment. 08/24/23      Reviewed patient profile for cardiometabolic risk factors    Date taken /Value  REFERENCE RANGE   Abdominal Obesity  (Waist Circumference)   See nursing flowsheet Women ?35 in (88 cm)   Men ?40 in (102 cm)      Triglycerides  Triglycerides   Date Value Ref Range Status   07/02/2023 97 <150 mg/dL Final       ?150 mg/dL (1.7 mmol/L) or current treatment for elevated triglycerides   HDL cholesterol  Direct Measure HDL   Date Value Ref Range Status   07/02/2023 84 >=50 mg/dL Final   ]   Women <50 mg/dL (1.3 mmol/L) in women or current treatment for low HDL cholesterol  Men <40 mg/dL (1 mmol/L) in men or current treatment for low HDL cholesterol     Fasting plasma glucose (FPG) Lab Results   Component Value Date     08/18/2023      FPG ?100 mg/dL (5.6 mmol/L) or treatment for elevated blood glucose   Blood pressure  BP Readings from Last 3 Encounters:   09/15/23 (!) 85/53   07/25/23 (!) 143/79    Blood pressure ?130/85 mmHg or treatment for elevated blood pressure   Family History  See family history     Mental Status Exam:  Appearance: sleeping upon entering her room, awakened to voice. Disheveled, malodorous. Mildly sedated. Falling asleep intermittently while writer attempting to discuss care plan.   Attitude:  cooperative  Eye Contact:  fair  Mood:  calm  Affect:  mood congruent and intensity is blunted  Speech:  clear, coherent  Language: fluent and intact in English  Psychomotor, Gait, Musculoskeletal:  no evidence  of tardive dyskinesia, dystonia, or tics  Throught Process:  disorganized, evidence of thought blocking present, and illogical  Associations:  no loose associations  Thought Content:  no evidence of suicidal ideation or homicidal ideation, auditory hallucinations present, and patient appears to be responding to internal stimuli throughout entire interview  Insight:  fair  Judgement:  fair  Oriented to:  time, person, and place  Attention Span and Concentration:  fair  Recent and Remote Memory:  fair  Fund of Knowledge:  appropriate           Precautions:     Behavioral Orders   Procedures    Assault precautions    Cheeking Precautions (behavioral units)     Patient Observed swallowing PO medications; Patient asked to drink water after swallowing medication; Patient in Staff line of sight for 15 minutes after medication given; Mouth checks after PO administration (patient asked to open mouth and stick out their tongue).    Code 1 - Restrict to Unit    Elopement precautions    Routine Programming     As clinically indicated    Status 15     Every 15 minutes.          Diagnoses:     # Bipolar I disorder, currently manic with psychotic features   # Hx of possible seizure in 1983          Assessment & Plan:     Assessment and hospital summary:  Angela Basurto is a 58 year old female previously diagnosed with Bipolar Disorder Type 1 and anxiety who presented with elvis and psychosis in the context of medication nonadherence. Patient was presented to emergency room due to homicidal threats toward her family using knife, and exhibiting paranoid and severe disorganized thought and responding to her internal stimuli while staying in the emergency room. Significant symptoms on admission include paranoia, disorganized thinking and behaviors, AVH, and emotional lability. The MSE on admission was pertinent for RTIS. Biological contributions to mental health presentation include diagnosis of Bipolar Disorder, type 1 and taking  Depakote, Zyprexa, and Haldol. Psychological contributions to mental health presentation include poor insight into her condition. While she understands why she's in the hospital, she displays intrusive behaviors that require a 1:1. 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 Protective factors include family support and being Catholic. Most recent psychiatric hospitalization was in July of this year during which time she left A. In summary, the patient's reported symptoms of elvis and psychosis in the context of medication nonadherence are consistent with Bipolar Disorder, type 1.      Angela Basurto was admitted to [facility] on a 72 hour hold. The patient's PTA Zyprexa 10 mg BID & Depakote, and Haldol 20 mg were restarted. PTA hydroxyzine and trazodone were restarted. A petition for MI commitment with [facility]fo was filed on 8/18 through [facility].  She is now committed with Adolfo. Clozapine will be initiated on 8/31/23. Zyprexa reduced to 5 mg BID on 9/1. On 9/6, Switched Depakote to sprinkles  mg BID per pt request. Reduced Zyprexa to 5 mg QHS due to failed trial, ineffectiveness, polypharmacy, and soft pressures intermittently. On 9/7, baseline EKG ordered and reviewed. It is normal with normal QTc. Zyprexa stopped on 9/8 due to ineffectiveness and mild sedation in context of clozapine titration. Senokot increased to 2 tabs BID due to constipation at that time as well. Achieved dose of 300 mg of clozapine daily on 9/11. Improvement in psychosis noted. Family agrees. Due to orthostatic hypotension, constipation, and sedation, will reduce clozapine dose to 250 mg at bedtime this evening and will reduce Haldol to 10 mg at bedtime. IM consult placed to assist with management.     Today's Changes:  IM consult for symptomatic orthostatic hypotension  Clozapine level resulted and was on the higher end of  therapeutic range (702)  Reduce Haldol to 10 mg at bedtime  Reduce clozapine to 250 mg QHS    Target psychiatric symptoms and interventions:  Clozapine 300 mg QHS  Depakote DT sprinkles 500 mg BID  Haldol 20 mg at bedtime    Risks, benefits, and alternatives discussed at length with patient.     Acute Medical Problems and Treatments:  Acute medical concerns:  Orthostatic hypotension:  - IM consult placed. Appreciate assistance   - ENCOURAGE FLUIDS    Hx of HTN  HOLDING PTA clonidine as patient's pressures have been consistently soft.     Acetaminophen as needed utilized for pain occasionally. No other major medical issues arose during hospitalization. PTA Senokot continued for constipation.     GERD:  Famotidine started on 9/6    Constipation:  - Senokot BID  - Miralax daily    Pertinent labs/imaging:  Depakote level 18.2 on 8/17/2023, subtherapeutic. Concerns for cheeking as patient admitted to diverting medication. Level obtained again on 8/22 is now therapeutic at 106.    Depakote level checked on 9/12 and was 55.9.   Haldol level reviewed and is 13.7 at current dose of 20 mg daily.     Behavioral/Psychological/Social:  - Encourage unit programming    Safety:  - Continue precautions as noted above  - Status 15 minute checks    Legal Status: full commitment with [facility]    Disposition Plan   Reason for ongoing admission: poses an imminent risk to self, poses an imminent risk to others, and is unable to care for self due to severe psychosis or elvis. Possibly discharge next week if ongoing improvement noted.   Discharge location: Home with family  Discharge Medications: not ordered  Follow-up Appointments: not scheduled    Entered by: Denia Champagne MD on 9/15/2023 at 12:48 PM     Nighat Champagne MD  [facility]

## 2023-09-15 NOTE — CONSULTS
"[facility]  Consult Note - Hospitalist Service  Date of Admission:  8/16/2023  Consult Requested by: Denia Champagne MD  Reason for Consult: Symptomatic orthostatic hypotension     Assessment & Plan   Angela Basurto is a 58 year old female with PMHx of obesity, HTN, and schizoaffective disorder, bipolar type admitted on 8/16/2023 to inpatient psychiatry for elvis and psychosis and homicidal threats towards family.     # Orthostatic hypotension  Symptomatic this morning, with blood pressure drop from 123/72 with sitting to 96/55 with standing. Patient reports this is new since admission and starting new medications. Patient initiated on clozapine on 8/31 with dose increases, and currently on haldol. Per nursing patient has been increasingly sleepy, and possibly not taking in as much water. Orthostatic hypotension likely related to medications and possible dehydration.   - Check CMP and CBC, which are unremarkable.   - Encourage hydration   - Compression stockings  - Agree with decreasing dose of clozapine and haldol. Recommend minimize offending agents (clozapine, haldol, etc)   - trend orthostatic vital signs daily   - if persistently orthostatic with soft BP, consider further work up with AM cortisol     # Hx of hypertension   Agree with holding PTA clonidine for consistently soft BP.     # Schizoaffective disorder, bipolar type  # Psychosis   # Elvis  - Management per psychiatry          The patient's care was discussed with the Bedside Nurse, Patient, and Primary team.  Medicine will continue to follow orthostatic vital signs for orthostatic hypotension.     Clinically Significant Risk Factors                         # Obesity: Estimated body mass index is 33.17 kg/m  as calculated from the following:    Height as of this encounter: 1.753 m (5' 9\").    Weight as of this encounter: 101.9 kg (224 lb 9.6 oz).             Daniela Mckinney PA-C  Hospitalist " Service  Securely message with [employer] (more info)  Text page via [facility] Paging/Directory   ______________________________________________________________________    Chief Complaint   Dizziness with standing, sleepy    History is obtained from the patient    History of Present Illness   Angela Basurto is a 58 year old female with PMHx of obesity, HTN, and schizoaffective disorder, bipolar type admitted on 8/16/2023 to inpatient psychiatry for elvis and psychosis and homicidal threats towards family.     Patient reports dizziness with standing today.  With the standing she felt like she was about to pass out.  Denies any dizziness with lying flat or sitting.  States this dizziness started after she came to the hospital and started new medications.  Denies any sensation of room spinning.  Reports feeling very sleepy.  States she has been eating with without difficulty.  States she is also been drinking fluids.  Denies any fevers, chills, chest pain, shortness of breath, nausea, vomiting, diarrhea, abdominal pain, or urinary symptoms.     Past Medical History    Past Medical History:   Diagnosis Date    Obesity     Schizoaffective disorder, bipolar type (H)        Past Surgical History   No past surgical history on file.    Medications   I have reviewed this patient's current medications  Current Facility-Administered Medications   Medication    acetaminophen (TYLENOL) tablet 650 mg    atropine 1 % ophthalmic solution 2 drop    cloZAPine (CLOZARIL) tablet 250 mg    haloperidol (HALDOL) tablet 5 mg    And    LORazepam (ATIVAN) tablet 2 mg    And    diphenhydrAMINE (BENADRYL) capsule 50 mg    haloperidol lactate (HALDOL) injection 5 mg    And    LORazepam (ATIVAN) injection 2 mg    And    diphenhydrAMINE (BENADRYL) injection 50 mg    divalproex sodium delayed-release (DEPAKOTE SPRINKLE) DR capsule 500 mg    famotidine (PEPCID) tablet 10 mg    haloperidol (HALDOL) tablet 10 mg    hydrOXYzine (ATARAX) tablet 25 mg    Or     hydrOXYzine (ATARAX) tablet 50 mg    magnesium hydroxide (MILK OF MAGNESIA) suspension 30 mL    polyethylene glycol (MIRALAX) Packet 17 g    polyethylene glycol (MIRALAX) Packet 17 g    sennosides (SENOKOT) tablet 2 tablet             Physical Exam   Vital Signs: Temp: 97.5  F (36.4  C) Temp src: Temporal BP: (!) 85/53 Pulse: 107     SpO2: 98 % O2 Device: None (Room air)    Weight: 224 lbs 9.6 oz    GENERAL: sleepy but easily awakes to voice. Answering questions appropriately. Oriented x 3. NAD. Pleasant and conversational   HEENT: Anicteric sclera. Mucous membranes moist   CARDIOVASCULAR: RRR. S1, S2. No murmurs, rubs, or gallops.   RESPIRATORY: Effort normal on RA. Clear to auscultation bilaterally, no rales, rhonchi or wheezes  GI: Obese. Abdomen soft, Mild discomfort with palpation of epigastrium, without rebound or guarding, normoactive bowel sounds present  EXTREMITIES: No peripheral edema.   NEUROLOGICAL: CN II-XII grossly intact. Moving all extremities symmetrically.   SKIN: Intact. Warm and dry. No jaundice.     Medical Decision Making       55 MINUTES SPENT BY ME on the date of service doing chart review, history, exam, documentation & further activities per the note.      Data     I have personally reviewed the following data over the past 24 hrs:    8.4  \   12.7   / 201     136 99 5.0 (L) /  134 (H)   3.6 25 0.56 \     ALT: 13 AST: 15 AP: 67 TBILI: 0.2   ALB: 3.4 (L) TOT PROTEIN: 6.6 LIPASE: N/A       Imaging results reviewed over the past 24 hrs:   No results found for this or any previous visit (from the past 24 hour(s)).

## 2023-09-15 NOTE — PLAN OF CARE
"  Problem: Psychotic Signs/Symptoms  Goal: Decreased Sensory Symptoms (Psychotic Signs/Symptoms)  Outcome: Progressing   Goal Outcome Evaluation:Rechecked blood pressure around 0930 was 123/72 sitting and 96/65 standing.          Patient blood pressure at 0800 was low 85/53   sitting and 86/58   standing., patient complained of feeling dizzy when standing up. Re checked patient's blood pressure around 0930 was 123/72 sitting and 96/65 standing.  Low blood pressure and patient complained of feeling dizzy was discussed in meeting with provider. Internal medicine consult for low BP placed by provider. Patient was seen by internal medicine. Provider said, staff should encourage patient to drink lots of fluid, Patient refused blood pressure recheck around noon and stated,  \"I am tired\". Patient denied feeling dizzy this afternoon. patient visible on the unit sitting in the lounge at times, affect flat and blunted, denied  all mental health symptoms and pain. Ate 100% of breakfast and 80% of lunch, No behavior concern.              "

## 2023-09-15 NOTE — PLAN OF CARE
"  Problem: Plan of Care - These are the overarching goals to be used throughout the patient stay.    Goal: Plan of Care Review  Description: The Plan of Care Review/Shift note should be completed every shift.  The Outcome Evaluation is a brief statement about your assessment that the patient is improving, declining, or no change.  This information will be displayed automatically on your shift note.  Outcome: Progressing   Goal Outcome Evaluation:       Pt.was isolative and withdrawn to her room sleeping/napping throughout evening shift. There was no social interaction with other peers or staff. Affect was blunted/flat. She was pleasant upon approach. Appeared guarded, pausity, slow with answering questions. Denied auditory and visual hallucinations \"today\". Endorsed mild anxiety. Denied feeling depressed. She was compliant with her scheduled medications even though she complained the medications were many. No medication adverse side effects observed. No safety or behavioral concerns reported or noted.  Will continue to monitor.                  "

## 2023-09-15 NOTE — PLAN OF CARE
NOC Shift Report     Pt in bed at beginning of shift, breathing quiet and unlabored. Pt slept 6.25 hours.      No pt complaints or concerns at this time.      No PRNs given. Will continue to monitor.

## 2023-09-15 NOTE — PLAN OF CARE
Assessment/Intervention/Current Symtoms and Care Coordination:  Chart review and met with team, discussed pt progress, symptomology, and response to treatment. Discussed the discharge plan and any potential impediments to discharge.    Requested referral follow up with care coordinators to [facility] for ACT.     Referral faxed to [facility] for ACT in addition to the email sent on Tuesday. Care coordinator following up on referral.     Discharge Plan or Goal:  Patient presents with symptoms of psychosis that put her at risk of harming herself and others. When patient stabilizes considerations include home with family or IRTS.     Barriers to Discharge:  Patient presents with symptoms of psychosis that put her at risk of harming herself and others.      Referral Status:  ACT at the [facility] 9/7, denied 9/8 (no openings)  ACT at [facility] 9/8  ACT at [facility] 9/12     Legal Status:  Commitment with [facility]: [place]  File: [id number]  Expires: 08/30/23 to 03/01/24    Medications on [facility]: Haloperidol, clozapine, risperidone, olanzapine    Contacts:  Daughter: Mya, [phone], [email or web address] (Consent)  Aunt: Vickie, [phone] (Consent)  Son: Golden, [phone]   Cousin: Lashawn, [phone] (Consent)  Prepetition screener: Carlos Rushing, [phone]  Her : John Maynard, [phone]     Upcoming Meetings and Dates/Important Information and next steps:  ACT referral     Team Note Due:  Friday

## 2023-09-15 NOTE — PLAN OF CARE
"Pt had an uneventful shift this day. Pt remained isolated to room despite encouragement to attend groups. Pt stated \"I just don't feel like it\". Pt participated in nursing assessment with good eye contact and thoughtful answers. Pt denies SI, HI, SIB and thoughts of hurting others. Pt denies depression and anxiety, noting some sadness related to missing her children. Pt denies AH/VH reporting good effect from medications. Pt reports having bowel movement yesterday. Pt advised of medication side effects of constipation and that nursing staff was closely monitoring bowel habits. Pt verbalized understanding and is to notify her nurse of bm status daily.    No concerns with appetite. Pt is calm with flat affect. No acute behavioral outbursts. No acute physical complaints other than being \"tired\". Pt is observed to sleep most of the day, getting up for meals.  "

## 2023-09-16 PROCEDURE — 250N000013 HC RX MED GY IP 250 OP 250 PS 637: Performed by: PSYCHIATRY & NEUROLOGY

## 2023-09-16 PROCEDURE — 124N000002 HC R&B MH UMMC

## 2023-09-16 PROCEDURE — 99232 SBSQ HOSP IP/OBS MODERATE 35: CPT

## 2023-09-16 RX ADMIN — CLOZAPINE 250 MG: 200 TABLET ORAL at 20:48

## 2023-09-16 RX ADMIN — ACETAMINOPHEN 650 MG: 325 TABLET, FILM COATED ORAL at 20:48

## 2023-09-16 RX ADMIN — SENNOSIDES 2 TABLET: 8.6 TABLET, FILM COATED ORAL at 09:49

## 2023-09-16 RX ADMIN — DIVALPROEX SODIUM 500 MG: 125 CAPSULE, COATED PELLETS ORAL at 20:48

## 2023-09-16 RX ADMIN — FAMOTIDINE 10 MG: 10 TABLET ORAL at 20:48

## 2023-09-16 RX ADMIN — FAMOTIDINE 10 MG: 10 TABLET ORAL at 09:50

## 2023-09-16 RX ADMIN — HALOPERIDOL 10 MG: 10 TABLET ORAL at 20:50

## 2023-09-16 RX ADMIN — DIVALPROEX SODIUM 500 MG: 125 CAPSULE, COATED PELLETS ORAL at 09:49

## 2023-09-16 RX ADMIN — SENNOSIDES 2 TABLET: 8.6 TABLET, FILM COATED ORAL at 20:48

## 2023-09-16 RX ADMIN — POLYETHYLENE GLYCOL 3350 17 G: 17 POWDER, FOR SOLUTION ORAL at 09:50

## 2023-09-16 ASSESSMENT — ACTIVITIES OF DAILY LIVING (ADL)
ADLS_ACUITY_SCORE: 29
LAUNDRY: WITH SUPERVISION
ADLS_ACUITY_SCORE: 39
ADLS_ACUITY_SCORE: 29
HYGIENE/GROOMING: INDEPENDENT
ADLS_ACUITY_SCORE: 39
ORAL_HYGIENE: INDEPENDENT
ORAL_HYGIENE: INDEPENDENT
ADLS_ACUITY_SCORE: 39
DRESS: SCRUBS (BEHAVIORAL HEALTH);INDEPENDENT
ADLS_ACUITY_SCORE: 29
HYGIENE/GROOMING: HANDWASHING;INDEPENDENT
ADLS_ACUITY_SCORE: 29
ADLS_ACUITY_SCORE: 29
DRESS: SCRUBS (BEHAVIORAL HEALTH)
LAUNDRY: WITH SUPERVISION

## 2023-09-16 NOTE — PLAN OF CARE
RN Assessment   Patient presented as mostly isolated and spent the majority of the shift in her room. Notably sedated and somnolent. She briefly got up for breakfast but returned to bed. During lunch, she consumed less than 25% of the meal, claiming that the chicken on her tray was pork. Despite reassurances that it was chicken, she insisted she was not hungry. Fluids encouraged.   Patient expressed feelings of sadness and missing her family, but denied any safety concerns, including thoughts of suicide or self-harm. Patient denied any psychiatric symptoms such as hallucinations or delusion, however suspiciousness observed as per above.   She reported feeling unwell in general and mentioned experiencing dizziness upon standing up. Hypotensive in the morning. In the afternoon, sitting blood pressure was 139/81, standing dropped to 86/56 mmHg with a heart rate of 115 bpm. Patient expressed dizziness and was unable to tolerate standing for the full two minutes for ortho assessment, having to sit down mid BP assessment.   Psychiatry on call and internal medicine were notified and IM evaluated the patient on the unit. Vital signs will continue to be monitored, fall precautions will be implemented, and a bracelet has been placed on the patient. Patient has been encouraged to rise from the bed slowly. Will continue to monitor ortho VS

## 2023-09-16 NOTE — PLAN OF CARE
Problem: Adult Behavioral Health Plan of Care  Goal: Adheres to Safety Considerations for Self and Others  Outcome: Progressing     Problem: Psychotic Signs/Symptoms  Goal: Improved Sleep (Psychotic Signs/Symptoms)  Outcome: Progressing     Pt slept through the night without distress. Pt reported no pain or discomfort. No problems with behavior. No concerns reported by pt. Pt slept 6.75 hours.  Staff will continue to monitor.

## 2023-09-16 NOTE — PROGRESS NOTES
[facility]    Medicine Progress Note - Hospitalist Service    Date of Admission:  8/16/2023    Assessment & Plan   Angela Basurto is a 58 year old female with PMHx of obesity, HTN, and schizoaffective disorder, bipolar type admitted on 8/16/2023 to inpatient psychiatry for elvis and psychosis and homicidal threats towards family.     Orthostatic hypotension  Symptomatic this morning, with blood pressure drop from 123/72 with sitting to 96/55 with standing. Patient reports this is new since admission and starting new medications. Patient initiated on clozapine on 8/31 with dose increases, and currently on haldol. Per nursing patient has been increasingly sleepy, and possibly not taking in as much water and eating less than half of meals. Orthostatic hypotension likely related to medications and possible dehydration. Repeat CMP and CBC are unremarkable.   - orthostatics today: 139/81 while sitting, 86/56 while standing, unable to tolerate standing a full 2 minutes.   - Encourage hydration   - Compression stockings  - Agree with decreasing dose of clozapine and haldol. Recommend minimize offending agents (clozapine, haldol, etc)   - trend orthostatic vital signs daily   - will obtain AM cortisol     # Hx of hypertension   Agree with holding PTA clonidine for consistently soft BP.        Medicine will continue to follow for orthostatic hypotension.        Disposition Plan     Expected Discharge Date: 09/20/2023        Discharge Comments: Tentative D/C mid-week next week        The patient's care was discussed with the Bedside Nurse and Patient.    Nunu Tirado NP  Hospitalist Service  [facility]  Securely message with [employer] (more info)  Text page via [employer] Paging/Directory   ______________________________________________________________________    Interval History   Patient was seen lying in bed. She confirmed that she  has been having low blood pressure for the past few days, denies having hypotension in the past. Angela feels like her hypotension started after being in the hospital a few days and the new medications that she is taking. She says that she feels like she is going to faint when she goes from a sitting to a standing position. She feels like she is eating and drinking adequately. She denies any other symptoms during or around instances of orthostatic hypotension. Angela does endorse a cough that started a few days ago. Denies shortness of breath, chest pain, or difficulty breathing.     Angela has no further concerns for me today.     Physical Exam   Vital Signs: Temp: 97.8  F (36.6  C) Temp src: Temporal BP: 139/81 Pulse: 101   Resp: 16 SpO2: 99 % O2 Device: None (Room air)    Weight: 224 lbs 9.6 oz      GENERAL: Drowsy, falling asleep easily. Answering questions appropriately. Pleasant and conversational   HEENT: Anicteric sclera. EOMI. Mucous membranes moist   CARDIOVASCULAR: RRR. S1, S2. No murmurs, rubs, or gallops.   RESPIRATORY: Effort normal on RA. Course lung sound in bilateral upper lobes on expiration.   MUSCULOSKELETAL: Moves all extremities.   EXTREMITIES: No peripheral edema. No calf asymmetry, erythema, or tenderness.   NEUROLOGICAL: No focal deficits. Moving all extremities symmetrically.   SKIN: Intact. Warm and dry. No jaundice.     Medical Decision Making       45 MINUTES SPENT BY ME on the date of service doing chart review, history, exam, documentation & further activities per the note.      Data   Imaging results reviewed over the past 24 hrs:   No results found for this or any previous visit (from the past 24 hour(s)).  Recent Labs   Lab 09/15/23  1037 09/14/23  0913   WBC 8.4 8.7   HGB 12.7  --    MCV 96  --      --      --    POTASSIUM 3.6  --    CHLORIDE 99  --    CO2 25  --    BUN 5.0*  --    CR 0.56  --    ANIONGAP 12  --    FERMIN 8.9  --    *  --    ALBUMIN 3.4*  --    PROTTOTAL  6.6  --    BILITOTAL 0.2  --    ALKPHOS 67  --    ALT 13  --    AST 15  --

## 2023-09-16 NOTE — PLAN OF CARE
"  Problem: Psychotic Signs/Symptoms  Goal: Decreased Sensory Symptoms (Psychotic Signs/Symptoms)  9/15/2023 1920 by Sheryl Shah, RN  Outcome: Progressing  9/15/2023 1059 by Sheryl Shah, RN  Outcome: Progressing  9/15/2023 1059 by Sheryl Shah, RN  Outcome: Progressing   Goal Outcome Evaluation:    Patient had an issue with low blood pressure on the day shift, and complaining of dizziness when she stands up. On this shift patient blood pressure was 124/71, patient denied feeling dizzy or any discomfort. Patient had order for compression stocking.  It was delivered from supply this evening, writer offered to put compression stocking  on, patient stated, \"Not today I will put it on tomorrow\". Patient reported having a small BM on the day shift, zero BM on this shift. Patient ate 100% of supper. Patient spent sometimes sitting in the lounge. Patient still sleeping a lot and complaining of feeling tired. She was medication compliant, patient denied all mental health symptoms. No safety concern with behavior.                      "

## 2023-09-17 LAB — CORTIS SERPL-MCNC: 17.7 UG/DL

## 2023-09-17 PROCEDURE — 124N000002 HC R&B MH UMMC

## 2023-09-17 PROCEDURE — 36415 COLL VENOUS BLD VENIPUNCTURE: CPT

## 2023-09-17 PROCEDURE — 250N000013 HC RX MED GY IP 250 OP 250 PS 637: Performed by: PSYCHIATRY & NEUROLOGY

## 2023-09-17 PROCEDURE — 82533 TOTAL CORTISOL: CPT

## 2023-09-17 PROCEDURE — 99232 SBSQ HOSP IP/OBS MODERATE 35: CPT

## 2023-09-17 RX ORDER — CLOZAPINE 200 MG/1
200 TABLET ORAL AT BEDTIME
Status: DISCONTINUED | OUTPATIENT
Start: 2023-09-17 | End: 2023-09-18

## 2023-09-17 RX ADMIN — DIVALPROEX SODIUM 500 MG: 125 CAPSULE, COATED PELLETS ORAL at 10:26

## 2023-09-17 RX ADMIN — FAMOTIDINE 10 MG: 10 TABLET ORAL at 20:01

## 2023-09-17 RX ADMIN — DIVALPROEX SODIUM 500 MG: 125 CAPSULE, COATED PELLETS ORAL at 20:01

## 2023-09-17 RX ADMIN — FAMOTIDINE 10 MG: 10 TABLET ORAL at 10:26

## 2023-09-17 RX ADMIN — CLOZAPINE 200 MG: 200 TABLET ORAL at 20:00

## 2023-09-17 ASSESSMENT — ACTIVITIES OF DAILY LIVING (ADL)
ORAL_HYGIENE: INDEPENDENT
HYGIENE/GROOMING: INDEPENDENT
LAUNDRY: UNABLE TO COMPLETE
ADLS_ACUITY_SCORE: 29
ADLS_ACUITY_SCORE: 29
ORAL_HYGIENE: INDEPENDENT
ADLS_ACUITY_SCORE: 29
DRESS: SCRUBS (BEHAVIORAL HEALTH)
HYGIENE/GROOMING: SHOWER;WITH ASSISTANCE
ADLS_ACUITY_SCORE: 29
ADLS_ACUITY_SCORE: 29
DRESS: SCRUBS (BEHAVIORAL HEALTH);WITH ASSISTANCE
ADLS_ACUITY_SCORE: 49
ADLS_ACUITY_SCORE: 29
LAUNDRY: WITH SUPERVISION
ADLS_ACUITY_SCORE: 29

## 2023-09-17 NOTE — PLAN OF CARE
Problem: Psychotic Signs/Symptoms  Goal: Improved Behavioral Control (Psychotic Signs/Symptoms)  Outcome: Progressing  Goal: Optimal Cognitive Function (Psychotic Signs/Symptoms)  Intervention: Support and Promote Cognitive Ability  Recent Flowsheet Documentation  Taken 9/17/2023 1651 by Alex Perdue RN  Trust Relationship/Rapport:   care explained   choices provided   emotional support provided   empathic listening provided   questions answered   questions encouraged   reassurance provided   thoughts/feelings acknowledged  Goal: Improved Psychomotor Symptoms (Psychotic Signs/Symptoms)  Intervention: Manage Psychomotor Movement  Recent Flowsheet Documentation  Taken 9/17/2023 1651 by Alex Perdue RN  Activity (Behavioral Health): up ad ascencion  Goal: Enhanced Social, Occupational or Functional Skills (Psychotic Signs/Symptoms)  Intervention: Promote Social, Occupational and Functional Ability  Recent Flowsheet Documentation  Taken 9/17/2023 1651 by Alex Perdue RN  Trust Relationship/Rapport:   care explained   choices provided   emotional support provided   empathic listening provided   questions answered   questions encouraged   reassurance provided   thoughts/feelings acknowledged      Patient was resting and sleeping comfortably in bed at the beginning of the shift. Completed shift assessment with writer without any difficulties. Patient brother brought her food from home and she ate about 50% with no nausea or stomach discomfort. Patient had no complaint of dizziness or SOB, had no episode of fall or unsteady gait. Patient spent all evening in her room and had no interaction with peers. Patient bp was 115/59 standing and 94/56 sitting. Writer kyle PARRISH and Dr Rani Casey call back with no new order. Patient daughter visited for few minutes on the evening. Held patient senokot due to loose stool. Patient denies auditory and visual hallucination, denies SI/SIB and HI, contract for safety and medication  "compliant.       BP 94/56 (BP Location: Left arm)   Pulse 112   Temp 98.3  F (36.8  C) (Temporal)   Resp 16   Ht 1.753 m (5' 9\")   Wt 101.9 kg (224 lb 9.6 oz)   SpO2 99%   BMI 33.17 kg/m                        "

## 2023-09-17 NOTE — PLAN OF CARE
"RN Assessment   Patient is observed to be isolative and withdrawn in her room for most of the shift today. She required encouragement to eat both of her meals and reported feeling tired and fatigued. During the examination, she experienced difficulties in fully participating due to sedation. The patient also reported experiencing diarrhea and was found to be incontinent of feces, although the exact timing of this occurrence is unclear, it likely that it happened overnight. Bowel medications were held this AM. She was cooperative during her shower, but needed assistance of 2 due to dizziness and sedation. Hypotension was also noted throughout the shift, with vital signs obtained three times today. The patient was unable to cooperate for the orthostatic vital signs and standing blood pressure measurement secondary to dizziness and took blood pressure cuff off.  During the interview, the patient denied any mental health concerns, including feelings of depression or anxiety. The patient denied experiencing auditory, visual, or tactile hallucinations, as well as paranoid or delusional thinking. No paranoid or delusional comments were made by the patient today. She denied having any active or passive suicidal ideations, as well as any self-injury behaviors. TShe did inquire about discharge plan.   Shower today. The patient was assisted with putting on ANGIE stockings. Internal medicine evaluated the patient on the unit today, but no new orders were placed. Patient was moved into a room across from nursing station to accommodate for increased level of observation and support and fall risk.   /66 (BP Location: Left arm)   Pulse 108   Temp 98.8  F (37.1  C)   Resp 16   Ht 1.753 m (5' 9\")   Wt 101.9 kg (224 lb 9.6 oz)   SpO2 98%   BMI 33.17 kg/m       "

## 2023-09-17 NOTE — PROGRESS NOTES
Provider Note:     Writer not on call today but reviewed IM note this evening and ongoing concern about orthostatic hypotension. Will stop Haldol. Will consider further reduction in clozapine vs splitting dose if orthostatic hypotension persists despite this change.     Nighat Champagne MD

## 2023-09-17 NOTE — PLAN OF CARE
"Patient has been isolative to her self during this shift. Patient was encouraged to drink fluids.She appears to be unclean/unkempt, isolative/withdrawn to herself. She was encouraged to take a shower by writer and another staff but she refused and states \" I promise I will do it tomorrow\". Her family came to visit her during this shift and she was observed to be eating with her family in the [facility] area. She denies feeling anxious, depressed and having any thoughts of wanting to harm herself or others. Patient was refusing for her vitals to be checked but latter on was encouraged to take her vitals, and she agreed. She reported having a headache and feeling drowsy. She was given Tylenol to help relive her headache.She took night time medication with verbal prompts. she did report having large bowel movement today. Continue to monitor patient's blood pressure, monitor bowel movement and encourage patient to take a shower.    Blood pressure 135/85, pulse 109, temperature 98.9  F (37.2  C), temperature source Temporal, resp. rate 16, height 1.753 m (5' 9\"), weight 101.9 kg (224 lb 9.6 oz), SpO2 98 %.    "

## 2023-09-17 NOTE — PROGRESS NOTES
[facility]    Medicine Progress Note - Hospitalist Service    Date of Admission:  2023    Assessment & Plan   Angela Basurto is a 58 year old female with PMHx of obesity, HTN, and schizoaffective disorder, bipolar type admitted on 2023 to inpatient psychiatry for elvis and psychosis and homicidal threats towards family.      Orthostatic hypotension  Symptomatic this morning, with blood pressure drop from 123/72 with sitting to 96/55 with standing. Patient reports this is new since admission and starting new medications. Patient initiated on clozapine on  with dose increases, and currently on haldol. Per nursing patient has been increasingly sleepy, and possibly not taking in as much water and eating less than half of meals. Orthostatic hypotension likely related to medications and possible dehydration. Repeat CMP and CBC are unremarkable. Haldol discontinued evening of  per psychiatry.   - unable to obtain orthostatic blood pressures today as the patient was unable to tolerate standing per nursing   - most recent blood pressures while sittin/74 with   - AM cortisol WNL: 17.7  - Encourage hydration   - Compression stockings  - Recommend minimize offending agents (clozapine, haldol, etc)   - trend orthostatic vital signs daily     Hx of hypertension   Agree with holding PTA clonidine for consistently soft BP.         The patient's care was discussed with the Bedside Nurse.    Nunu Tirado NP  Hospitalist Service  [facility]  Securely message with [employer] (more info)  Text page via [employer] Paging/Directory   ______________________________________________________________________    Interval History   Angela was seen lying in bed after her shower. She said that she is still feeling very tired today but feeling less dizzy when compared to yesterday. We discussed making sure she is eating and  drinking enough throughout the day and she says that she is. She denies dizziness, chest pain, palpitations, SOB, cough, nausea/vomiting, and abdominal pain.     Physical Exam   Vital Signs: Temp: 98  F (36.7  C) Temp src: Temporal BP: 135/85 Pulse: 109   Resp: 16 SpO2: 98 % O2 Device: None (Room air)    Weight: 224 lbs 9.6 oz    GENERAL: Drowsy, falling asleep easily. Answering questions appropriately. Pleasant and conversational   HEENT: Anicteric sclera. EOMI. Mucous membranes moist   CARDIOVASCULAR: RRR. S1, S2. No murmurs, rubs, or gallops.   RESPIRATORY: Effort normal on RA. Course lung sound in bilateral upper lobes on expiration.   MUSCULOSKELETAL: Moves all extremities.   EXTREMITIES: No peripheral edema. No calf asymmetry, erythema, or tenderness.   NEUROLOGICAL: No focal deficits. Moving all extremities symmetrically.   SKIN: Intact. Warm and dry. No jaundice.     Medical Decision Making       35 MINUTES SPENT BY ME on the date of service doing chart review, history, exam, documentation & further activities per the note.      Data   Imaging results reviewed over the past 24 hrs:   No results found for this or any previous visit (from the past 24 hour(s)).

## 2023-09-18 ENCOUNTER — APPOINTMENT (OUTPATIENT)
Dept: CT IMAGING | Facility: CLINIC | Age: 58
DRG: 885 | End: 2023-09-18
Payer: COMMERCIAL

## 2023-09-18 ENCOUNTER — APPOINTMENT (OUTPATIENT)
Dept: GENERAL RADIOLOGY | Facility: CLINIC | Age: 58
DRG: 885 | End: 2023-09-18
Attending: PHYSICIAN ASSISTANT
Payer: COMMERCIAL

## 2023-09-18 LAB
ALBUMIN SERPL BCG-MCNC: 3.2 G/DL (ref 3.5–5.2)
ALP SERPL-CCNC: 88 U/L (ref 35–104)
ALT SERPL W P-5'-P-CCNC: 35 U/L (ref 0–50)
AMMONIA PLAS-SCNC: 19 UMOL/L (ref 11–51)
ANION GAP SERPL CALCULATED.3IONS-SCNC: 8 MMOL/L (ref 7–15)
AST SERPL W P-5'-P-CCNC: 34 U/L (ref 0–45)
BASOPHILS # BLD AUTO: 0 10E3/UL (ref 0–0.2)
BASOPHILS NFR BLD AUTO: 0 %
BILIRUB SERPL-MCNC: 0.3 MG/DL
BUN SERPL-MCNC: 7.6 MG/DL (ref 6–20)
CALCIUM SERPL-MCNC: 8.7 MG/DL (ref 8.6–10)
CHLORIDE SERPL-SCNC: 102 MMOL/L (ref 98–107)
CREAT SERPL-MCNC: 0.58 MG/DL (ref 0.51–0.95)
CRP SERPL-MCNC: 174.33 MG/L
DEPRECATED HCO3 PLAS-SCNC: 29 MMOL/L (ref 22–29)
EGFRCR SERPLBLD CKD-EPI 2021: >90 ML/MIN/1.73M2
EOSINOPHIL # BLD AUTO: 0.1 10E3/UL (ref 0–0.7)
EOSINOPHIL NFR BLD AUTO: 2 %
ERYTHROCYTE [DISTWIDTH] IN BLOOD BY AUTOMATED COUNT: 12.7 % (ref 10–15)
GLUCOSE SERPL-MCNC: 136 MG/DL (ref 70–99)
HCT VFR BLD AUTO: 34.7 % (ref 35–47)
HGB BLD-MCNC: 11.6 G/DL (ref 11.7–15.7)
IMM GRANULOCYTES # BLD: 0 10E3/UL
IMM GRANULOCYTES NFR BLD: 1 %
LYMPHOCYTES # BLD AUTO: 1.6 10E3/UL (ref 0.8–5.3)
LYMPHOCYTES NFR BLD AUTO: 22 %
MAGNESIUM SERPL-MCNC: 1.8 MG/DL (ref 1.7–2.3)
MCH RBC QN AUTO: 31.6 PG (ref 26.5–33)
MCHC RBC AUTO-ENTMCNC: 33.4 G/DL (ref 31.5–36.5)
MCV RBC AUTO: 95 FL (ref 78–100)
MONOCYTES # BLD AUTO: 0.9 10E3/UL (ref 0–1.3)
MONOCYTES NFR BLD AUTO: 12 %
NEUTROPHILS # BLD AUTO: 4.5 10E3/UL (ref 1.6–8.3)
NEUTROPHILS NFR BLD AUTO: 63 %
NRBC # BLD AUTO: 0 10E3/UL
NRBC BLD AUTO-RTO: 0 /100
NT-PROBNP SERPL-MCNC: 641 PG/ML (ref 0–900)
PLATELET # BLD AUTO: 207 10E3/UL (ref 150–450)
POTASSIUM SERPL-SCNC: 3.5 MMOL/L (ref 3.4–5.3)
PROCALCITONIN SERPL IA-MCNC: 0.11 NG/ML
PROT SERPL-MCNC: 6.4 G/DL (ref 6.4–8.3)
RBC # BLD AUTO: 3.67 10E6/UL (ref 3.8–5.2)
SODIUM SERPL-SCNC: 139 MMOL/L (ref 136–145)
TROPONIN T SERPL HS-MCNC: 11 NG/L
TROPONIN T SERPL HS-MCNC: 8 NG/L
WBC # BLD AUTO: 7.2 10E3/UL (ref 4–11)

## 2023-09-18 PROCEDURE — 84484 ASSAY OF TROPONIN QUANT: CPT | Performed by: PHYSICIAN ASSISTANT

## 2023-09-18 PROCEDURE — 36415 COLL VENOUS BLD VENIPUNCTURE: CPT | Performed by: PHYSICIAN ASSISTANT

## 2023-09-18 PROCEDURE — 84484 ASSAY OF TROPONIN QUANT: CPT | Performed by: PSYCHIATRY & NEUROLOGY

## 2023-09-18 PROCEDURE — 82140 ASSAY OF AMMONIA: CPT | Performed by: PSYCHIATRY & NEUROLOGY

## 2023-09-18 PROCEDURE — 70450 CT HEAD/BRAIN W/O DYE: CPT | Mod: 26 | Performed by: RADIOLOGY

## 2023-09-18 PROCEDURE — 99207 PR APP CREDIT; MD BILLING SHARED VISIT: CPT | Performed by: PHYSICIAN ASSISTANT

## 2023-09-18 PROCEDURE — 84145 PROCALCITONIN (PCT): CPT | Performed by: PHYSICIAN ASSISTANT

## 2023-09-18 PROCEDURE — 99232 SBSQ HOSP IP/OBS MODERATE 35: CPT

## 2023-09-18 PROCEDURE — 83880 ASSAY OF NATRIURETIC PEPTIDE: CPT | Performed by: PHYSICIAN ASSISTANT

## 2023-09-18 PROCEDURE — 80053 COMPREHEN METABOLIC PANEL: CPT | Performed by: PSYCHIATRY & NEUROLOGY

## 2023-09-18 PROCEDURE — 86140 C-REACTIVE PROTEIN: CPT | Performed by: PSYCHIATRY & NEUROLOGY

## 2023-09-18 PROCEDURE — 93005 ELECTROCARDIOGRAM TRACING: CPT

## 2023-09-18 PROCEDURE — 99239 HOSP IP/OBS DSCHRG MGMT >30: CPT | Performed by: PSYCHIATRY & NEUROLOGY

## 2023-09-18 PROCEDURE — 71046 X-RAY EXAM CHEST 2 VIEWS: CPT

## 2023-09-18 PROCEDURE — 71046 X-RAY EXAM CHEST 2 VIEWS: CPT | Mod: 26 | Performed by: RADIOLOGY

## 2023-09-18 PROCEDURE — 124N000002 HC R&B MH UMMC

## 2023-09-18 PROCEDURE — 250N000013 HC RX MED GY IP 250 OP 250 PS 637: Performed by: PSYCHIATRY & NEUROLOGY

## 2023-09-18 PROCEDURE — 85025 COMPLETE CBC W/AUTO DIFF WBC: CPT | Performed by: PSYCHIATRY & NEUROLOGY

## 2023-09-18 PROCEDURE — 258N000003 HC RX IP 258 OP 636

## 2023-09-18 PROCEDURE — 93010 ELECTROCARDIOGRAM REPORT: CPT | Performed by: INTERNAL MEDICINE

## 2023-09-18 PROCEDURE — 70450 CT HEAD/BRAIN W/O DYE: CPT

## 2023-09-18 PROCEDURE — 83735 ASSAY OF MAGNESIUM: CPT | Performed by: PHYSICIAN ASSISTANT

## 2023-09-18 PROCEDURE — 36415 COLL VENOUS BLD VENIPUNCTURE: CPT | Performed by: PSYCHIATRY & NEUROLOGY

## 2023-09-18 RX ORDER — POLYETHYLENE GLYCOL 3350 17 G/17G
17 POWDER, FOR SOLUTION ORAL DAILY PRN
Status: DISCONTINUED | OUTPATIENT
Start: 2023-09-18 | End: 2023-09-20 | Stop reason: HOSPADM

## 2023-09-18 RX ORDER — CLOZAPINE 100 MG/1
100 TABLET ORAL AT BEDTIME
Status: DISCONTINUED | OUTPATIENT
Start: 2023-09-18 | End: 2023-09-18 | Stop reason: SINTOL

## 2023-09-18 RX ORDER — LIDOCAINE 40 MG/G
CREAM TOPICAL
Status: DISCONTINUED | OUTPATIENT
Start: 2023-09-18 | End: 2023-09-20 | Stop reason: HOSPADM

## 2023-09-18 RX ORDER — CLOZAPINE 200 MG/1
200 TABLET ORAL AT BEDTIME
Status: DISCONTINUED | OUTPATIENT
Start: 2023-09-18 | End: 2023-09-20 | Stop reason: HOSPADM

## 2023-09-18 RX ADMIN — SENNOSIDES 2 TABLET: 8.6 TABLET, FILM COATED ORAL at 22:33

## 2023-09-18 RX ADMIN — SODIUM CHLORIDE, POTASSIUM CHLORIDE, SODIUM LACTATE AND CALCIUM CHLORIDE 1000 ML: 600; 310; 30; 20 INJECTION, SOLUTION INTRAVENOUS at 16:56

## 2023-09-18 RX ADMIN — SODIUM CHLORIDE, POTASSIUM CHLORIDE, SODIUM LACTATE AND CALCIUM CHLORIDE 1000 ML: 600; 310; 30; 20 INJECTION, SOLUTION INTRAVENOUS at 13:42

## 2023-09-18 RX ADMIN — FAMOTIDINE 10 MG: 10 TABLET ORAL at 22:25

## 2023-09-18 RX ADMIN — SODIUM CHLORIDE, POTASSIUM CHLORIDE, SODIUM LACTATE AND CALCIUM CHLORIDE 1000 ML: 600; 310; 30; 20 INJECTION, SOLUTION INTRAVENOUS at 10:48

## 2023-09-18 RX ADMIN — DIVALPROEX SODIUM 500 MG: 125 CAPSULE, COATED PELLETS ORAL at 22:25

## 2023-09-18 ASSESSMENT — ACTIVITIES OF DAILY LIVING (ADL)
ADLS_ACUITY_SCORE: 29
HYGIENE/GROOMING: INDEPENDENT
DRESS: SCRUBS (BEHAVIORAL HEALTH);INDEPENDENT
ADLS_ACUITY_SCORE: 29
ADLS_ACUITY_SCORE: 29
ORAL_HYGIENE: INDEPENDENT
ADLS_ACUITY_SCORE: 29
ADLS_ACUITY_SCORE: 29
DRESS: SCRUBS (BEHAVIORAL HEALTH);INDEPENDENT
HYGIENE/GROOMING: HANDWASHING;INDEPENDENT
ADLS_ACUITY_SCORE: 29
ADLS_ACUITY_SCORE: 29
ORAL_HYGIENE: INDEPENDENT
LAUNDRY: WITH SUPERVISION
ADLS_ACUITY_SCORE: 29
ADLS_ACUITY_SCORE: 29

## 2023-09-18 NOTE — PLAN OF CARE
"  Problem: Plan of Care - These are the overarching goals to be used throughout the patient stay.    Goal: Optimal Comfort and Wellbeing  Intervention: Provide Person-Centered Care  Recent Flowsheet Documentation  Taken 9/18/2023 1110 by Priya Medina RN  Trust Relationship/Rapport:   care explained   choices provided   emotional support provided   empathic listening provided   questions answered   questions encouraged   thoughts/feelings acknowledged      Goal Outcome Evaluation:    Plan of Care Reviewed With: patient          Pt appears lethargic. She had a hard time rising after she woke up, as she was feeling weak. Orthostatic B/P was attempted but there was no reading while the pt was standing, and she requested to try again later. While attempting the standing B/P she had to have two staff help her remain standing.  Writer informed PCP and she wanted writer to contact IM to possibly start fluids. IM added LR bolus.   Pt remained in her room without issue while the IV fluid was running. After the bolus was complete she sat up and ate approx 50% of her meal.   Othostatic B/P was attempted, but the pt was too weak to stand long enough. She laid back down in the bed, and covered her head up.   Writer made MD aware of the pt status, and she ordered another bolus of LR. Stat labs and EKG were ordered, as providers want to rule out myocarditis.   EKG had no significant changes when compared to the previous on 9/7/23. Labs results were not critical.   CT head w/o contrast was ordered, and code 2 placed.   Pt refused AM meds as she was not feeling well, MD updated.   Pt denied all mental health symptoms, and remained withdrawn to her room as she was feeling unwell.   Pt had no other acute physical or behavioral concerns this shift.   BP 90/60 (BP Location: Left arm, Patient Position: Sitting, Cuff Size: Adult Large)   Pulse 82   Temp 98.1  F (36.7  C) (Temporal)   Resp 16   Ht 1.753 m (5' 9\")   Wt 101.9 kg (224 " lb 9.6 oz)   SpO2 100%   BMI 33.17 kg/m

## 2023-09-18 NOTE — PLAN OF CARE
Assessment/Intervention/Current Symtoms and Care Coordination:  Chart review and met with team, discussed pt progress, symptomology, and response to treatment. Discussed the discharge plan and any potential impediments to discharge.    Emailed her daughter, Mya, with an update and resources for mental health support in the community.     Received voicemail from Constantino Joseph ([phone]) at [facility] about ACT referral. Left voicemail for Constantino.     Followed up with [facility] about ACT referral via email.     Discharge Plan or Goal:  Patient presents with symptoms of psychosis that put her at risk of harming herself and others. When patient stabilizes considerations include home with family or IRTS.     Barriers to Discharge:  Patient presents with symptoms of psychosis that put her at risk of harming herself and others.      Referral Status:  ACT at the [facility] 9/7, denied 9/8 (no openings)  ACT at [facility] 9/8  ACT at [facility] 9/12     Legal Status:  Commitment with [facility]: [place]  File: [id number]  Expires: 08/30/23 to 03/01/24    Medications on [facility]: Haloperidol, clozapine, risperidone, olanzapine    Contacts:  Daughter: Mya, [phone], [email or web address] (Consent)  Aunt: Vickie, [phone] (Consent)  Son: Golden, [phone]   Cousin: Lashawn, [phone] (Consent)  Prepetition screener: Carlos Rushing, [phone]  Her : John Maynard, [phone]     Upcoming Meetings and Dates/Important Information and next steps:  ACT referral     Team Note Due:  Friday

## 2023-09-18 NOTE — PROVIDER NOTIFICATION
09/18/23 1600   Sitting Orthostatic BP   Sitting Orthostatic BP 99/65   Sitting Orthostatic Pulse 106 bpm   Standing Orthostatic BP   Standing Orthostatic BP 79/54   Standing Orthostatic Pulse 112 bpm     Symptomatic and complained of dizziness on assessment upon standing. Was only able to to tolerate 1 min in between sitting and standing VS. Internal medicine alerted. Will be ordering additional IV fluid bolus.

## 2023-09-18 NOTE — PLAN OF CARE
Goal Outcome Evaluation:    Patient asleep at start of the shift. Breathing quiet and unlabored when sleeping. Pt had no c/o pain or discomfort during the HS. No PRNs given and no any other concerns. Appears to have slept 6.5 hours.

## 2023-09-18 NOTE — PROGRESS NOTES
"[facility], [place]   Psychiatric Progress Note  Hospital Day: 32        Interim History:   The patient's care was discussed with the treatment team during the daily team meeting and/or staff's chart notes were reviewed.  Pt continued to experience symptomatic orthostatic hypotension over the weekend. Writer monitored over the weekend and reduced clozapine to 200 mg on 9/17 and stopped Haldol on 9/16. IM also aware an following. She has been isolative to her room. Incontinent of feces overnight on one occasion. Also had one episode of diarrhea.     Upon interview, Angela was lying in bed. She awakened to voice. She said that she is feeling \"overmedicated\" with \"too many pills.\" Reassurance provided that we have been reducing the dose of her medications. She has not been taking in much water per her report. Again encouraged her to consume more fluids. She continues to feel dizzy upon standing. She denies SOB and chest pain. She is feeling weaker. She is oriented x 3 (she knew it was the middle of September but could not say specifically which day, however).     Suicidal ideation: denies current or recent suicidal ideation or behaviors.    Homicidal ideation: denies current or recent homicidal ideation or behaviors.    Psychotic symptoms: Pt is experiencing AH, though lessened. Not responding to internal stimuli today. Patient denies VH.    Medication side effects reported: As above    Acute medical concerns: none    Other issues reported by patient: Patient had no further questions or concerns.           Medications:      cloZAPine  200 mg Oral At Bedtime    divalproex sodium delayed-release  500 mg Oral Q12H [facility] (08/20)    famotidine  10 mg Oral BID    polyethylene glycol  17 g Oral Daily    sennosides  2 tablet Oral BID          Allergies:     Allergies   Allergen Reactions    Pork-Derived Products Unknown     Cheondoism does not eat pork          Labs:     No results found for this or any " "previous visit (from the past 24 hour(s)).             Psychiatric Examination:     BP 94/56 (BP Location: Left arm)   Pulse 112   Temp 98.3  F (36.8  C) (Temporal)   Resp 16   Ht 1.753 m (5' 9\")   Wt 101.9 kg (224 lb 9.6 oz)   SpO2 99%   BMI 33.17 kg/m    Weight is 224 lbs 9.6 oz  Body mass index is 33.17 kg/m .    Weight over time:  Vitals:    08/17/23 2218 08/24/23 0800 08/29/23 0800 09/03/23 0820   Weight: 100.9 kg (222 lb 6.4 oz) 102.7 kg (226 lb 6.4 oz) 102.7 kg (226 lb 6.4 oz) 100.6 kg (221 lb 12.8 oz)    09/07/23 0800 09/10/23 0800   Weight: 103.1 kg (227 lb 4.8 oz) 101.9 kg (224 lb 9.6 oz)       Orthostatic Vitals       None              Cardiometabolic risk assessment. 08/24/23      Reviewed patient profile for cardiometabolic risk factors    Date taken /Value  REFERENCE RANGE   Abdominal Obesity  (Waist Circumference)   See nursing flowsheet Women ?35 in (88 cm)   Men ?40 in (102 cm)      Triglycerides  Triglycerides   Date Value Ref Range Status   07/02/2023 97 <150 mg/dL Final       ?150 mg/dL (1.7 mmol/L) or current treatment for elevated triglycerides   HDL cholesterol  Direct Measure HDL   Date Value Ref Range Status   07/02/2023 84 >=50 mg/dL Final   ]   Women <50 mg/dL (1.3 mmol/L) in women or current treatment for low HDL cholesterol  Men <40 mg/dL (1 mmol/L) in men or current treatment for low HDL cholesterol     Fasting plasma glucose (FPG) Lab Results   Component Value Date     08/18/2023      FPG ?100 mg/dL (5.6 mmol/L) or treatment for elevated blood glucose   Blood pressure  BP Readings from Last 3 Encounters:   09/17/23 94/56   07/25/23 (!) 143/79    Blood pressure ?130/85 mmHg or treatment for elevated blood pressure   Family History  See family history     Mental Status Exam:  Appearance: sleeping upon entering her room, awakened to voice. Disheveled, malodorous. Mildly sedated. Falling asleep intermittently while writer attempting to discuss care plan.   Attitude:  " cooperative  Eye Contact:  fair  Mood:  calm  Affect:  mood congruent and intensity is blunted  Speech:  clear, coherent  Language: fluent and intact in English  Psychomotor, Gait, Musculoskeletal:  no evidence of tardive dyskinesia, dystonia, or tics  Throught Process:  disorganized, evidence of thought blocking present, and illogical  Associations:  no loose associations  Thought Content:  no evidence of suicidal ideation or homicidal ideation, auditory hallucinations present, and patient appears to be responding to internal stimuli throughout entire interview  Insight:  fair  Judgement:  fair  Oriented to:  time, person, and place  Attention Span and Concentration:  fair  Recent and Remote Memory:  fair  Fund of Knowledge:  appropriate           Precautions:     Behavioral Orders   Procedures    Assault precautions    Cheeking Precautions (behavioral units)     Patient Observed swallowing PO medications; Patient asked to drink water after swallowing medication; Patient in Staff line of sight for 15 minutes after medication given; Mouth checks after PO administration (patient asked to open mouth and stick out their tongue).    Code 1 - Restrict to Unit    Elopement precautions    Fall precautions    Routine Programming     As clinically indicated    Status 15     Every 15 minutes.          Diagnoses:     # Bipolar I disorder, currently manic with psychotic features   # Hx of possible seizure in 1983          Assessment & Plan:     Assessment and hospital summary:  Angela Basurto is a 58 year old female previously diagnosed with Bipolar Disorder Type 1 and anxiety who presented with elvis and psychosis in the context of medication nonadherence. Patient was presented to emergency room due to homicidal threats toward her family using knife, and exhibiting paranoid and severe disorganized thought and responding to her internal stimuli while staying in the emergency room. Significant symptoms on admission include  paranoia, disorganized thinking and behaviors, AVH, and emotional lability. The MSE on admission was pertinent for RTIS. Biological contributions to mental health presentation include diagnosis of Bipolar Disorder, type 1 and taking Depakote, Zyprexa, and Haldol. Psychological contributions to mental health presentation include poor insight into her condition. While she understands why she's in the hospital, she displays intrusive behaviors that require a 1:1. 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 Protective factors include family support and being Alevism. Most recent psychiatric hospitalization was in July of this year during which time she left [place]. In summary, the patient's reported symptoms of elvis and psychosis in the context of medication nonadherence are consistent with Bipolar Disorder, type 1.      Angela Basurto was admitted to [facility] on a 72 hour hold. The patient's PTA Zyprexa 10 mg BID & Depakote, and Haldol 20 mg were restarted. PTA hydroxyzine and trazodone were restarted. A petition for MI commitment with [facility] was filed on 8/18 through [facility].  She is now committed with [facility]. Clozapine will be initiated on 8/31/23. Zyprexa reduced to 5 mg BID on 9/1. On 9/6, Switched Depakote to sprinkles  mg BID per pt request. Reduced Zyprexa to 5 mg QHS due to failed trial, ineffectiveness, polypharmacy, and soft pressures intermittently. On 9/7, baseline EKG ordered and reviewed. It is normal with normal QTc. Zyprexa stopped on 9/8 due to ineffectiveness and mild sedation in context of clozapine titration. Senokot increased to 2 tabs BID due to constipation at that time as well. Achieved dose of 300 mg of clozapine daily on 9/11. Improvement in psychosis noted. Family agrees. Clozapine level at corresponding dose of 300 mg at bedtime was 702 ng/ml. Due to orthostatic hypotension,  constipation, and sedation, will reduce clozapine dose to 250 mg at bedtime this evening and will reduce Haldol to 10 mg at bedtime. IM consult placed to assist with management. Reduced clozapine to 200 mg on  and stopped Haldol on . IM also aware an following.     Today's Changes:  Constipation expected to improve with reduction in neuroleptic medications and patient has had loose stools over the weekend. Will switch Miralax to prn.     Concern for possible myocarditis given lack of improvement in lethargy and weakness despite stopping Haldol and reducing clozapine. Pt denying CP and SOB, though will check CRP, ammonia, Trops. Discussed case with both IM and PharmD. Appreciate assistance.     Pt receiving IVF for orthostatic hypotension today    UPDATE: Labs reviewed and are unremarkable with exception of elevated CRP. EKG normal. IM updated. Will reduce clozapine to 100 mg at bedtime for tonight and reassess daily.     Target psychiatric symptoms and interventions:  Clozapine 200 mg QHS  Depakote DT sprinkles 500 mg BID    Risks, benefits, and alternatives discussed at length with patient.     Acute Medical Problems and Treatments:  Acute medical concerns:  Orthostatic hypotension  Symptomatic this morning, with blood pressure drop from 123/72 with sitting to 96/55 with standing. Patient reports this is new since admission and starting new medications. Patient initiated on clozapine on  with dose increases, and currently on haldol. Per nursing patient has been increasingly sleepy, and possibly not taking in as much water and eating less than half of meals. Orthostatic hypotension likely related to medications and possible dehydration. Repeat CMP and CBC are unremarkable. Haldol discontinued evening of  per psychiatry.   - unable to obtain orthostatic blood pressures today as the patient was unable to tolerate standing per nursing   - most recent blood pressures while sittin/74 with   -  AM cortisol WNL: 17.7  - Encourage hydration   - Compression stockings  - Recommend minimize offending agents (clozapine, haldol, etc)   - trend orthostatic vital signs daily     Hx of HTN  HOLDING PTA clonidine as patient's pressures have been consistently soft.     Acetaminophen as needed utilized for pain occasionally. No other major medical issues arose during hospitalization. PTA Senokot continued for constipation.     GERD:  Famotidine started on 9/6    Constipation:  - Senokot BID  - Miralax daily ---> prn    Pertinent labs/imaging:  Depakote level 18.2 on 8/17/2023, subtherapeutic. Concerns for cheeking as patient admitted to diverting medication. Level obtained again on 8/22 is now therapeutic at 106.    Depakote level checked on 9/12 and was 55.9.   Haldol level reviewed and is 13.7 at current dose of 20 mg daily.     Behavioral/Psychological/Social:  - Encourage unit programming    Safety:  - Continue precautions as noted above  - Status 15 minute checks    Legal Status: full commitment with [facility]    Disposition Plan   Reason for ongoing admission: poses an imminent risk to self, poses an imminent risk to others, and is unable to care for self due to severe psychosis or elvis. Possibly discharge next week if ongoing improvement noted.   Discharge location: Home with family  Discharge Medications: not ordered  Follow-up Appointments: not scheduled    Entered by: Denia Champagne MD on 9/18/2023 at 8:19 AM     Nighat Champagne MD  [facility]

## 2023-09-18 NOTE — PROGRESS NOTES
"Internal Medicine Progress Note      Assessment and plan:  Angela Basurto is a 58 year old female with a history of obesity, hypertension, and schizoaffective disorder.  Medicine following for orthostatic hypotension.    #Decreased LOC  Increasing lethargy and decreased LOC ongoing. Today, pt is barely able to stand up 2/2 to dizziness and orthostatic hypotension. Psychiatry working pt up for myocarditis 2/2 clozaril. Trop normal. No significant changes in CBC, BMP. Ammonia normal. WBC normal. No cough, dyspnea. Normal rhythm and rate, EKG NSR. Afebrile.   -head CT   -UA/UC    #Orthostatic hypotension  Ongoing orthostatic hypotension with dizziness and lightheadedness with standing.  Today, staff was unable to even get a blood pressure when standing and patient was too weak to remain standing.  Patient started taking clozapine 8/31 with dosage increases and is also on Haldol.  Patient has become increasingly sleepy and has poor oral intake.  Cortisol serum in a.m. normal at 17.7.  -Peripheral IV for now  -LR fluid bolus 1 L x 2  -Check orthostatic vital signs after bolus of fluid  -Notify medicine if remains orthostatic to repeat fluid bolus.  -Compression stockings  -consider ECHO for persistent hypotension  -Encourage p.o. intake      Objective:  BP 94/56 (BP Location: Left arm)   Pulse 112   Temp 98.3  F (36.8  C) (Temporal)   Resp 16   Ht 1.753 m (5' 9\")   Wt 101.9 kg (224 lb 9.6 oz)   SpO2 99%   BMI 33.17 kg/m      Vitals signs reviewed and noted    GENERAL: Alert and oriented x3  HEENT: Anicteric sclera. MMM  CV: RRR, S1, S2. No murmur noted  RESPIRATORY: Effort normal on RA. Lungs CTAB with no wheezing or rales  GI: Abdomen soft, non-tender with active bowel sounds. No rebound or guarding  NEUROLOGICAL: No focal deficits. Moves all extremities  EXTREMITIES: No peripheral edema. Intact bilateral pedal pulses  SKIN: No jaundice, no rash    Pertinent labs and procedures were reviewed     Subjective: "     Patient difficult having a difficult time staying awake for exam and assessment.  Fluid bolus running into peripheral IV.  Explained to patient that should help with dizzy, lightheaded, fatigue symptoms.    ROSITA Martell CNP  Internal Medicine [facility] Hospitalist  Page job code [id number] (3B), [id number] (3A), or [id number] ([facility] and 4A)  Text paging via [facility] is appreciated  2023    Medical Decision Makin MINUTES SPENT BY ME on the date of service doing chart review, history, exam, documentation & further activities per the note.

## 2023-09-19 ENCOUNTER — APPOINTMENT (OUTPATIENT)
Dept: CARDIOLOGY | Facility: CLINIC | Age: 58
DRG: 885 | End: 2023-09-19
Attending: PHYSICIAN ASSISTANT
Payer: COMMERCIAL

## 2023-09-19 VITALS
WEIGHT: 224.6 LBS | SYSTOLIC BLOOD PRESSURE: 94 MMHG | DIASTOLIC BLOOD PRESSURE: 52 MMHG | HEART RATE: 103 BPM | OXYGEN SATURATION: 94 % | BODY MASS INDEX: 33.27 KG/M2 | RESPIRATION RATE: 18 BRPM | TEMPERATURE: 99.9 F | HEIGHT: 69 IN

## 2023-09-19 LAB
ALBUMIN UR-MCNC: 10 MG/DL
ANION GAP SERPL CALCULATED.3IONS-SCNC: 9 MMOL/L (ref 7–15)
APPEARANCE UR: CLEAR
APTT PPP: 34 SECONDS (ref 22–38)
ATRIAL RATE - MUSE: 98 BPM
BILIRUB UR QL STRIP: NEGATIVE
BUN SERPL-MCNC: 5.8 MG/DL (ref 6–20)
C PNEUM DNA SPEC QL NAA+PROBE: NOT DETECTED
CALCIUM SERPL-MCNC: 8.5 MG/DL (ref 8.6–10)
CHLORIDE SERPL-SCNC: 100 MMOL/L (ref 98–107)
COLOR UR AUTO: YELLOW
CREAT SERPL-MCNC: 0.53 MG/DL (ref 0.51–0.95)
CRP SERPL-MCNC: 186 MG/L
DEPRECATED HCO3 PLAS-SCNC: 28 MMOL/L (ref 22–29)
DIASTOLIC BLOOD PRESSURE - MUSE: NORMAL MMHG
EGFRCR SERPLBLD CKD-EPI 2021: >90 ML/MIN/1.73M2
ERYTHROCYTE [DISTWIDTH] IN BLOOD BY AUTOMATED COUNT: 12.9 % (ref 10–15)
FLUAV H1 2009 PAND RNA SPEC QL NAA+PROBE: NOT DETECTED
FLUAV H1 RNA SPEC QL NAA+PROBE: NOT DETECTED
FLUAV H3 RNA SPEC QL NAA+PROBE: NOT DETECTED
FLUAV RNA SPEC QL NAA+PROBE: NOT DETECTED
FLUBV RNA SPEC QL NAA+PROBE: NOT DETECTED
GLUCOSE SERPL-MCNC: 123 MG/DL (ref 70–99)
GLUCOSE UR STRIP-MCNC: NEGATIVE MG/DL
HADV DNA SPEC QL NAA+PROBE: NOT DETECTED
HCOV PNL SPEC NAA+PROBE: NOT DETECTED
HCT VFR BLD AUTO: 30.9 % (ref 35–47)
HGB BLD-MCNC: 10.1 G/DL (ref 11.7–15.7)
HGB UR QL STRIP: NEGATIVE
HMPV RNA SPEC QL NAA+PROBE: NOT DETECTED
HOLD SPECIMEN: NORMAL
HPIV1 RNA SPEC QL NAA+PROBE: NOT DETECTED
HPIV2 RNA SPEC QL NAA+PROBE: NOT DETECTED
HPIV3 RNA SPEC QL NAA+PROBE: NOT DETECTED
HPIV4 RNA SPEC QL NAA+PROBE: NOT DETECTED
INR PPP: 1.19 (ref 0.85–1.15)
INTERPRETATION ECG - MUSE: NORMAL
KETONES UR STRIP-MCNC: NEGATIVE MG/DL
LACTATE SERPL-SCNC: 4.3 MMOL/L (ref 0.7–2)
LEUKOCYTE ESTERASE UR QL STRIP: NEGATIVE
LVEF ECHO: NORMAL
M PNEUMO DNA SPEC QL NAA+PROBE: NOT DETECTED
MCH RBC QN AUTO: 30.8 PG (ref 26.5–33)
MCHC RBC AUTO-ENTMCNC: 32.7 G/DL (ref 31.5–36.5)
MCV RBC AUTO: 94 FL (ref 78–100)
MUCOUS THREADS #/AREA URNS LPF: PRESENT /LPF
NITRATE UR QL: NEGATIVE
P AXIS - MUSE: 36 DEGREES
PH UR STRIP: 6.5 [PH] (ref 5–7)
PLATELET # BLD AUTO: 219 10E3/UL (ref 150–450)
POTASSIUM SERPL-SCNC: 3.6 MMOL/L (ref 3.4–5.3)
PR INTERVAL - MUSE: 138 MS
QRS DURATION - MUSE: 76 MS
QT - MUSE: 314 MS
QTC - MUSE: 400 MS
R AXIS - MUSE: 20 DEGREES
RBC # BLD AUTO: 3.28 10E6/UL (ref 3.8–5.2)
RBC URINE: 1 /HPF
RSV RNA SPEC QL NAA+PROBE: NOT DETECTED
RSV RNA SPEC QL NAA+PROBE: NOT DETECTED
RV+EV RNA SPEC QL NAA+PROBE: NOT DETECTED
SARS-COV-2 RNA RESP QL NAA+PROBE: NEGATIVE
SODIUM SERPL-SCNC: 137 MMOL/L (ref 136–145)
SP GR UR STRIP: 1.02 (ref 1–1.03)
SQUAMOUS EPITHELIAL: 2 /HPF
SYSTOLIC BLOOD PRESSURE - MUSE: NORMAL MMHG
T AXIS - MUSE: 18 DEGREES
TRANSITIONAL EPI: <1 /HPF
UROBILINOGEN UR STRIP-MCNC: 2 MG/DL
VENTRICULAR RATE- MUSE: 98 BPM
WBC # BLD AUTO: 9.8 10E3/UL (ref 4–11)
WBC URINE: 5 /HPF

## 2023-09-19 PROCEDURE — 99231 SBSQ HOSP IP/OBS SF/LOW 25: CPT

## 2023-09-19 PROCEDURE — 93306 TTE W/DOPPLER COMPLETE: CPT

## 2023-09-19 PROCEDURE — 82310 ASSAY OF CALCIUM: CPT | Performed by: PHYSICIAN ASSISTANT

## 2023-09-19 PROCEDURE — 99253 IP/OBS CNSLTJ NEW/EST LOW 45: CPT | Mod: 25 | Performed by: INTERNAL MEDICINE

## 2023-09-19 PROCEDURE — 93306 TTE W/DOPPLER COMPLETE: CPT | Mod: 26 | Performed by: INTERNAL MEDICINE

## 2023-09-19 PROCEDURE — 36415 COLL VENOUS BLD VENIPUNCTURE: CPT

## 2023-09-19 PROCEDURE — 86140 C-REACTIVE PROTEIN: CPT | Performed by: PHYSICIAN ASSISTANT

## 2023-09-19 PROCEDURE — 83605 ASSAY OF LACTIC ACID: CPT | Performed by: PHYSICIAN ASSISTANT

## 2023-09-19 PROCEDURE — 87149 DNA/RNA DIRECT PROBE: CPT | Performed by: PHYSICIAN ASSISTANT

## 2023-09-19 PROCEDURE — 99207 PR APP CREDIT; MD BILLING SHARED VISIT: CPT | Performed by: PHYSICIAN ASSISTANT

## 2023-09-19 PROCEDURE — 85730 THROMBOPLASTIN TIME PARTIAL: CPT | Performed by: PHYSICIAN ASSISTANT

## 2023-09-19 PROCEDURE — 99232 SBSQ HOSP IP/OBS MODERATE 35: CPT

## 2023-09-19 PROCEDURE — 999N000157 HC STATISTIC RCP TIME EA 10 MIN

## 2023-09-19 PROCEDURE — 81001 URINALYSIS AUTO W/SCOPE: CPT

## 2023-09-19 PROCEDURE — 124N000002 HC R&B MH UMMC

## 2023-09-19 PROCEDURE — 250N000013 HC RX MED GY IP 250 OP 250 PS 637: Performed by: PSYCHIATRY & NEUROLOGY

## 2023-09-19 PROCEDURE — 87486 CHLMYD PNEUM DNA AMP PROBE: CPT | Performed by: PHYSICIAN ASSISTANT

## 2023-09-19 PROCEDURE — 85027 COMPLETE CBC AUTOMATED: CPT | Performed by: PHYSICIAN ASSISTANT

## 2023-09-19 PROCEDURE — 87077 CULTURE AEROBIC IDENTIFY: CPT | Performed by: PHYSICIAN ASSISTANT

## 2023-09-19 PROCEDURE — 83605 ASSAY OF LACTIC ACID: CPT

## 2023-09-19 PROCEDURE — 85610 PROTHROMBIN TIME: CPT | Performed by: PHYSICIAN ASSISTANT

## 2023-09-19 PROCEDURE — 36415 COLL VENOUS BLD VENIPUNCTURE: CPT | Performed by: PHYSICIAN ASSISTANT

## 2023-09-19 PROCEDURE — 84145 PROCALCITONIN (PCT): CPT | Performed by: PHYSICIAN ASSISTANT

## 2023-09-19 PROCEDURE — 87635 SARS-COV-2 COVID-19 AMP PRB: CPT | Performed by: PHYSICIAN ASSISTANT

## 2023-09-19 RX ORDER — LORAZEPAM 2 MG/1
2 TABLET ORAL EVERY 8 HOURS PRN
Status: CANCELLED | OUTPATIENT
Start: 2023-09-19

## 2023-09-19 RX ORDER — LIDOCAINE 40 MG/G
CREAM TOPICAL
Status: CANCELLED | OUTPATIENT
Start: 2023-09-19

## 2023-09-19 RX ORDER — ATROPINE SULFATE 10 MG/ML
2 SOLUTION/ DROPS OPHTHALMIC 3 TIMES DAILY PRN
Status: CANCELLED | OUTPATIENT
Start: 2023-09-19

## 2023-09-19 RX ORDER — DIVALPROEX SODIUM 125 MG/1
500 CAPSULE, COATED PELLETS ORAL EVERY 12 HOURS SCHEDULED
Status: CANCELLED | OUTPATIENT
Start: 2023-09-20

## 2023-09-19 RX ORDER — FAMOTIDINE 10 MG
10 TABLET ORAL 2 TIMES DAILY
Status: CANCELLED | OUTPATIENT
Start: 2023-09-20

## 2023-09-19 RX ORDER — HYDROXYZINE HYDROCHLORIDE 25 MG/1
25 TABLET, FILM COATED ORAL EVERY 6 HOURS PRN
Status: CANCELLED | OUTPATIENT
Start: 2023-09-19

## 2023-09-19 RX ORDER — LORAZEPAM 2 MG/ML
2 INJECTION INTRAMUSCULAR EVERY 8 HOURS PRN
Status: CANCELLED | OUTPATIENT
Start: 2023-09-19

## 2023-09-19 RX ORDER — DIPHENHYDRAMINE HYDROCHLORIDE 50 MG/ML
50 INJECTION INTRAMUSCULAR; INTRAVENOUS EVERY 8 HOURS PRN
Status: CANCELLED | OUTPATIENT
Start: 2023-09-19

## 2023-09-19 RX ORDER — ACETAMINOPHEN 325 MG/1
650 TABLET ORAL EVERY 4 HOURS PRN
Status: CANCELLED | OUTPATIENT
Start: 2023-09-19

## 2023-09-19 RX ORDER — DIPHENHYDRAMINE HCL 50 MG
50 CAPSULE ORAL EVERY 8 HOURS PRN
Status: CANCELLED | OUTPATIENT
Start: 2023-09-19

## 2023-09-19 RX ORDER — HALOPERIDOL 5 MG/1
5 TABLET ORAL EVERY 8 HOURS PRN
Status: CANCELLED | OUTPATIENT
Start: 2023-09-19

## 2023-09-19 RX ORDER — HYDROXYZINE HYDROCHLORIDE 50 MG/1
50 TABLET, FILM COATED ORAL EVERY 6 HOURS PRN
Status: CANCELLED | OUTPATIENT
Start: 2023-09-19

## 2023-09-19 RX ORDER — HALOPERIDOL 5 MG/ML
5 INJECTION INTRAMUSCULAR EVERY 8 HOURS PRN
Status: CANCELLED | OUTPATIENT
Start: 2023-09-19

## 2023-09-19 RX ORDER — POLYETHYLENE GLYCOL 3350 17 G/17G
17 POWDER, FOR SOLUTION ORAL DAILY PRN
Status: CANCELLED | OUTPATIENT
Start: 2023-09-19

## 2023-09-19 RX ORDER — SENNOSIDES 8.6 MG
2 TABLET ORAL 2 TIMES DAILY
Status: CANCELLED | OUTPATIENT
Start: 2023-09-20

## 2023-09-19 RX ORDER — PIPERACILLIN SODIUM, TAZOBACTAM SODIUM 3; .375 G/15ML; G/15ML
3.38 INJECTION, POWDER, LYOPHILIZED, FOR SOLUTION INTRAVENOUS EVERY 6 HOURS
Status: DISCONTINUED | OUTPATIENT
Start: 2023-09-19 | End: 2023-09-20

## 2023-09-19 RX ORDER — LIDOCAINE 40 MG/G
CREAM TOPICAL
Status: DISCONTINUED | OUTPATIENT
Start: 2023-09-19 | End: 2023-09-20 | Stop reason: HOSPADM

## 2023-09-19 RX ADMIN — SENNOSIDES 2 TABLET: 8.6 TABLET, FILM COATED ORAL at 20:58

## 2023-09-19 RX ADMIN — ACETAMINOPHEN 650 MG: 325 TABLET, FILM COATED ORAL at 18:33

## 2023-09-19 RX ADMIN — FAMOTIDINE 10 MG: 10 TABLET ORAL at 12:28

## 2023-09-19 RX ADMIN — DIVALPROEX SODIUM 500 MG: 125 CAPSULE, COATED PELLETS ORAL at 20:58

## 2023-09-19 RX ADMIN — FAMOTIDINE 10 MG: 10 TABLET ORAL at 20:58

## 2023-09-19 RX ADMIN — DIVALPROEX SODIUM 500 MG: 125 CAPSULE, COATED PELLETS ORAL at 12:28

## 2023-09-19 ASSESSMENT — ACTIVITIES OF DAILY LIVING (ADL)
ADLS_ACUITY_SCORE: 29
ADLS_ACUITY_SCORE: 29
ADLS_ACUITY_SCORE: 30
ADLS_ACUITY_SCORE: 29
HYGIENE/GROOMING: HANDWASHING;INDEPENDENT
ADLS_ACUITY_SCORE: 29
ADLS_ACUITY_SCORE: 29
ADLS_ACUITY_SCORE: 30
ORAL_HYGIENE: INDEPENDENT
DRESS: INDEPENDENT
ADLS_ACUITY_SCORE: 29

## 2023-09-19 NOTE — PLAN OF CARE
Problem: Plan of Care - These are the overarching goals to be used throughout the patient stay.    Goal: Optimal Comfort and Wellbeing  Outcome: Progressing   Goal Outcome Evaluation:             Patient presents as improved today. She stood up and exited the room this afternoon without assistance. She is able to get herself to the BR. She still c/o of increased tiredness. Cardiology seen virtually today. Cardiology indicated that her symptoms are not related to clozapine induced cardiomyopathy. She ate breakfast and dinner. Took medications with prompting. She was able stand for orthostatic b/p. We continue to push fluids. During the afternoon she preferred to nap. She will be encouraged to come out of room for the evening shift.

## 2023-09-19 NOTE — PROVIDER NOTIFICATION
"       09/19/23 0217   Lying Orthostatic BP   Lying Orthostatic /66   Lying Orthostatic Pulse 105 bpm   Sitting Orthostatic BP   Sitting Orthostatic /71   Sitting Orthostatic Pulse 108 bpm   Standing Orthostatic BP   Standing Orthostatic BP   (paitent \"  I am too tired\")   Standing Orthostatic Pulse   (unable to assess)   Oxygen Therapy   SpO2 96 %   O2 Device None (Room air)       Patient's lab was drawn for PTT/INR. Her PTT was within a range but  INR is elevated.   Patient's vitals were checked during this shift once. Patient continues to report feeling dizzy and was not able to complete the standing blood pressure check, she states \" I am too tired\". Patient was encouraged to drink water and was given orange juice and she was agreeable to drink the orange juice. Patient's  Peripheral IV/Saline lock was flushed with normal saline.    Patient was observed resting in room at the start of the shift. Patient slept for 5.5  hours. Continue to monitor patient's blood pressure.   "

## 2023-09-19 NOTE — PROVIDER NOTIFICATION
09/18/23 1900   Sitting Orthostatic BP   Sitting Orthostatic /71   Sitting Orthostatic Pulse 114 bpm   Standing Orthostatic BP   Standing Orthostatic BP 86/56   Standing Orthostatic Pulse 132 bpm     Ortho VS, following 3rd IV bolus. Remains symptomatic complaining of dizziness, but was able to tolerate full 2 min in between sitting and standing VS. IM alerted and plans to be on the unit to evaluate the patient.

## 2023-09-19 NOTE — PLAN OF CARE
"Assessment/Intervention/Current Symtoms and Care Coordination:  Chart review and met with team, discussed pt progress, symptomology, and response to treatment. Discussed the discharge plan and any potential impediments to discharge.    Checked in with Angela in the afternoon. She was in bed with the blanket over her head, writer asked if she was feeling okay, she responded, \"Yes, I'm doing okay.\" Writer asked if she would like some water, she stated, \"No, no water.\" Writer let her know staff would continue to check in with her.     Discharge Plan or Goal:  Patient presents with symptoms of psychosis that put her at risk of harming herself and others. When patient stabilizes considerations include home with family or IRTS.     Barriers to Discharge:  Patient presents with symptoms of psychosis that put her at risk of harming herself and others.      Referral Status:  ACT at the [facility] 9/7, denied 9/8 (no openings)  ACT at [facility] 9/8  ACT at [facility] 9/12     Legal Status:  Commitment with [facility]: [place]  File: 20-YV-CT-  Expires: 08/30/23 to 03/01/24    Medications on [facility]: Haloperidol, clozapine, risperidone, olanzapine    Contacts:  Daughter: Mya, [phone], [email or web address] (Consent)  Aunt: Vickie, [phone] (Consent)  Son: Golden, [phone]   Cousin: Lashawn, [phone] (Consent)  Prepetition screener: Carlos Rushing, [phone]  Her : John Maynard, [phone]     Upcoming Meetings and Dates/Important Information and next steps:  ACT referral     Team Note Due:  Friday  "

## 2023-09-19 NOTE — CONSULTS
Cardiology Inpatient Consultation  September 19, 2023      ASSESSMENT/RECOMMENDATIONS  Ms. Angela Basurto is a 58 year old female with a history of obesity, hypertension, and schizoaffective disorder who has been admitted for episodes of elvis/psychosis. On admission, patient was noted to have low blood pressure with positive orthostatics and an elevated CRP level. Cardiology team was consulted to rule out clozapine induced myocarditis.     #Orthostatic Hypotension 2/2 Clozapine use   - Monitor vitals especially blood pressure on sitting and standing. Measure orthostatic vitals daily   - Encourage PO intake   - If unable to take PO consider, IVF (either LR or D5W) as a 1L bolus or a standing rate of 75 mls/hr   - May need to continue to adjust the dose of Clozapine and/or consider an alternative regimen due to alpha blockade property of Clozapine causing a decrease in blood pressure   - Consider using compression stockings at bedtime     #Clozapine Induced Myocarditis - Very low suspicion   - Even though the patient has an elevated CRP, symptomatology, lab results, and echocardiography results makes this diagnosis less likely   - Troponin 8 > 11 signifies myocardial injury is less likely   - NT pro    - Echocardiography results reviewed, trivial pericardial effusion with no signs of myocardial or pericardial injury in terms of focal wall motion abnormality    #Lethargy   - Consider checking for Thyroid function tests     #Elevated CRP     Plan of care discussed with Dr. Octavio Marquis who agrees with above plan.    Thank you for consulting the cardiovascular services at the [facility]. Please do not hesitate to call us with any questions.     Domingo Talbot MD, MS  Internal Medicine Resident     HISTORY OF PRESENT ILLNESS  Ms. Anegla Basurto is a 58 year old female previously diagnosed with Bipolar Disorder Type 1 and anxiety who presented with elvis and psychosis in the  context of medication nonadherence. Patient was presented to emergency room due to homicidal threats toward her family using knife, and exhibiting paranoid and severe disorganized thought and responding to her internal stimuli while staying in the emergency room. Significant symptoms on admission include paranoia, disorganized thinking and behaviors, AVH, and emotional lability. The MSE on admission was pertinent for RTIS. Biological contributions to mental health presentation include diagnosis of Bipolar Disorder, type 1 and taking Depakote, Zyprexa, and Haldol. Psychological contributions to mental health presentation include poor insight into her condition. While she understands why she's in the hospital, she displays intrusive behaviors that require a 1:1. 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 Protective factors include family support and being Mormon. Most recent psychiatric hospitalization was in July of this year during which time she left [facility]. In summary, the patient's reported symptoms of elvis and psychosis in the context of medication nonadherence are consistent with Bipolar Disorder, type 1.       Angela Basurto was admitted to [facility] on a 72 hour hold. The patient's PTA Zyprexa 10 mg BID & Depakote, and Haldol 20 mg were restarted. PTA hydroxyzine and trazodone were restarted. A petition for MI commitment with [facility] was filed on 8/18 through [facility].  She is now committed with [facility]. Clozapine will be initiated on 8/31/23. Zyprexa reduced to 5 mg BID on 9/1. On 9/6, Switched Depakote to sprinkles  mg BID per pt request. Reduced Zyprexa to 5 mg QHS due to failed trial, ineffectiveness, polypharmacy, and soft pressures intermittently. On 9/7, baseline EKG ordered and reviewed. It is normal with normal QTc. Zyprexa stopped on 9/8 due to ineffectiveness and mild sedation in  context of clozapine titration. Senokot increased to 2 tabs BID due to constipation at that time as well. Achieved dose of 300 mg of clozapine daily on 9/11. Improvement in psychosis noted. Family agrees. Clozapine level at corresponding dose of 300 mg at bedtime was 702 ng/ml. Due to orthostatic hypotension, constipation, and sedation, will reduce clozapine dose to 250 mg at bedtime this evening and will reduce Haldol to 10 mg at bedtime. IM consult placed to assist with management. Reduced clozapine to 200 mg on 9/17 and stopped Haldol on 9/16. IM also aware an following.     Cardiology team was consulted to rule out clozapine induced myocarditis.      At the time of interview, patient did complain of swelling of legs that started day before yesterday and is currently improving. Patient also mentioned that she was lethargic since Thursday. She denies chest pain, dyspnea at rest or with exertion, orthopnea, PND, palpitations, nausea, vomiting, lightheadedness, or syncope.     Review of Systems:    Complete review of systems was performed and negative except per HPI.    CARDIAC HISTORY:  EKG: NSR @ 100 bpm     Transthoracic echocardiogram:   Global and regional left ventricular function is normal with an EF of 60-65%.  Global right ventricular function is normal.  No significant valvular abnormalities present.  Estimated mean right atrial pressure is normal.  Trivial pericardial effusion is present.    Additional imaging:     Catheterization(s):     PMH:  Past Medical History:   Diagnosis Date    Obesity     Schizoaffective disorder, bipolar type (H)      Active Problems:  Patient Active Problem List    Diagnosis Date Noted    Mary (H) 07/01/2023     Priority: Medium    Schizoaffective disorder, bipolar type (H) 07/01/2023     Priority: Medium     Social History:     Family History:  No family history on file.    Medications:   [Held by provider] cloZAPine  200 mg Oral At Bedtime    divalproex sodium  delayed-release  500 mg Oral Q12H [facility] (08/20)    famotidine  10 mg Oral BID    sennosides  2 tablet Oral BID    sodium chloride (PF)  3 mL Intracatheter Q8H         PHYSICAL EXAM:  Temp:  [98.5  F (36.9  C)-98.7  F (37.1  C)] 98.7  F (37.1  C)  Pulse:  [] 107  Resp:  [16] 16  BP: ()/(48-70) 108/70  SpO2:  [92 %-100 %] 95 %  No intake or output data in the 24 hours ending 09/19/23 1249    Physical examination could not be performed as patient is located at [facility]. Cardiology team spoke to the patient using an  along with the care team of station [facility] at [facility] using the ipad.     DIAGNOSTICS  All labs and imaging were reviewed, of note:    CMP  Recent Labs   Lab 09/19/23  0909 09/18/23  1349 09/15/23  1037    139 136   POTASSIUM 3.6 3.5 3.6   CHLORIDE 100 102 99   CO2 28 29 25   ANIONGAP 9 8 12   * 136* 134*   BUN 5.8* 7.6 5.0*   CR 0.53 0.58 0.56   GFRESTIMATED >90 >90 >90   FERMIN 8.5* 8.7 8.9   MAG  --  1.8  --    PROTTOTAL  --  6.4 6.6   ALBUMIN  --  3.2* 3.4*   BILITOTAL  --  0.3 0.2   ALKPHOS  --  88 67   AST  --  34 15   ALT  --  35 13     CBC  Recent Labs   Lab 09/19/23  0909 09/18/23  1349 09/15/23  1037 09/14/23  0913   WBC 9.8 7.2 8.4 8.7   RBC 3.28* 3.67* 3.98  --    HGB 10.1* 11.6* 12.7  --    HCT 30.9* 34.7* 38.0  --    MCV 94 95 96  --    MCH 30.8 31.6 31.9  --    MCHC 32.7 33.4 33.4  --    RDW 12.9 12.7 12.7  --     207 201  --      INR  Recent Labs   Lab 09/19/23  0021   INR 1.19*     Arterial Blood GasNo lab results found in last 7 days.    No results found for: TROPI, TROPONIN, TROPR, TROPN       I saw and evaluated patient with CV fellow. I examined patient with CV fellow. I discussed the results with patient and CV fellow. I discussed our plan with patient and CV fellow.  I agree with CV fellow's note and I edited the CV fellow's note to make it a more comprehensive document.    45 min were spent directly with patient    Octavio Marquis MD,  PhD  Professor of Medicine  Division of Cardiology

## 2023-09-19 NOTE — PROGRESS NOTES
Patient has a fever of 101.3, reports a headache and some body aches. Patient was also observed coughing, and reports that she has been coughing slightly all day. PRN Tylenol 650mg has been given to the patient. Internal medicine paged, no new orders at this time. Staff will continue to assess the patient and follow the plan of care.

## 2023-09-19 NOTE — PROGRESS NOTES
"Patient had difficulty sitting up in bed. Has excessive generalized weakness and needed assistance of 2 to sit up. Encouraged to deep breath. She reports left flank pain today but could not isolate the location. Appears sedated and has difficulty keeping her eyes open during concentration. Will offer po fluids. Internal medicine paged to assess patient. Angela needed assistance sitting up in bed. /70   Pulse 107   Temp 98.7  F (37.1  C) (Temporal)   Resp 16   Ht 1.753 m (5' 9\")   Wt 101.9 kg (224 lb 9.6 oz)   SpO2 95%   BMI 33.17 kg/m     "

## 2023-09-19 NOTE — PROGRESS NOTES
"Internal Medicine Progress Note      Assessment and plan:  Angela Basurto is a 58 year old female with a history of  obesity, hypertension, and schizoaffective disorder.  Medicine following for orthostatic hypotension and lethargy.     #Decreased LOC  Increasing lethargy and decreased LOC ongoing. Today, pt is barely able to stand up 2/2 to dizziness and orthostatic hypotension. Psychiatry working pt up for myocarditis 2/2 clozaril. Trop normal. No significant changes in CBC, CMP. Ammonia normal. WBC normal. No cough, dyspnea. Normal rhythm and rate, EKG NSR. Afebrile. Head CT no evidence of acute intracranial pathology. CRP elevated 174.33-->186. Chest XR-small b/l pleural effusions. ECHO- EF 55-60% normal. Haldol stopped and weaning off clozapine.   -UA/UC- needs to be collected     #Orthostatic hypotension-resolving   Yesterday, pt had profound orthostatic hypotension with dizziness and lightheadedness with standing. Today, blood pressures rising with position changes.  Patient started taking clozapine 8/31 with dosage increases and is also on Haldol.  Patient has become increasingly sleepy and has poor oral intake.  Cortisol serum in a.m. normal at 17.7. Yesterday, received 3 liters of LR boluses.   -Notify medicine if remains orthostatic   -Compression stockings  -Encourage p.o. intake      Objective:  /70   Pulse 107   Temp 98.7  F (37.1  C) (Temporal)   Resp 16   Ht 1.753 m (5' 9\")   Wt 101.9 kg (224 lb 9.6 oz)   SpO2 95%   BMI 33.17 kg/m      Vitals signs reviewed and noted    GENERAL: Lethargic, oriented x 3  HEENT: Anicteric sclera. MMM  CV: Mild tachycardia, regular rhythm, S1, S2. No murmur noted  RESPIRATORY: Effort normal on RA. Lungs CTAB with no wheezing or rales  GI: Abdomen soft, non-tender with active bowel sounds. No rebound or guarding  NEUROLOGICAL: No focal deficits. Moves all extremities  EXTREMITIES: No peripheral edema. Intact bilateral pedal pulses  SKIN: No jaundice, no " rash    Pertinent labs and procedures were reviewed     Subjective:     Remains sedated and lethargic. Responding to stimuli more easily and will answer questions with 2-3 word answers. Still need to collect UA as patient has been emptying collection container herself.   Discussed medical bed transfer with Dr Harrison. Would like to continue monitoring for improvement on psych unit. If worsens will re contact admitting MD for review. Currently receiving same treatment plan as she would on medical floor. Not requiring IVF boluses for hypotension. Mildly tachycardic but otherwise vitally stable.     Medicine will continue to follow along.  Recommendations relayed to primary team via this progress note.  Thank you for the opportunity to be involved in this patient's care.    ROSITA Martell CNP  Internal Medicine [facility] Hospitalist  Page job code [phone] (3B), [phone] (3A), or [phone] ([facility] and 4A)  Text paging via [facility] is appreciated  2023    Medical Decision Makin MINUTES SPENT BY ME on the date of service doing chart review, history, exam, documentation & further activities per the note.

## 2023-09-19 NOTE — PROGRESS NOTES
Cross-Cover Progress Note:    Assessment   I was requested to see this patient at bedside by Dr. Champagne for possible clozapine induced myocarditis and persistent orthostatic hypotension. Patient was seen earlier today by medicine team. There have been no new clinical changes, and patient reporting she actually is feeling better from previous. EKG and previously labs reviewed. Patient denying chest pain/pressure, shortness of breath, nausea, vomiting, abdominal pain. Endorses some lightheadedness when standing up too fast. She has had a total of ~3L IVF.    Plan   - Head CT is still pending  - Increase frequency of vital sign checks. Suspect orthostatic hypotension will gradually improve with cessation of clozapine.  - Check magnesium  - Troponin 8. . Overall reassuring, especially in absence of chest pain/pressure. Will check delta troponin. TTE ordered and agree with holding clozapine and consulting cardiology.  - Procalcitonin reassuring at 0.11 despite .33 (sepsis less likely). UA and CXR pending.  - Check blood cultures for any fevers  - Repeat BMP, CBC and CRP in am  -  Disposition: The patient will remain on the current unit. We will continue to monitor this patient closely.    Medicine team will continue to follow for orthostatic hypotension, CXR, UA, and labs as outlined above. Please do not hesitate to reach out with any questions or if there are any concerning clinical changes.    Total time spent reviewing chart, examining patient, placing orders was 30 minutes.  Bar Banuelos PA-C  [facility] Paging/Directory    Hospital Course   Brief summary of events:  Psychiatrist wanting medicine provider to re-evaluate this patient with persistent orthostatic hypotension. Patient was seen earlier today by medicine team. There have been no new clinical changes. EKG and previously labs reviewed. Patient denying chest pain/pressure, shortness of breath, nausea, vomiting, abdominal pain. Endorses some  lightheadedness when standing up too fast. She has had a total of ~3L IVF so far.    Sitting /71, though remains orthostatic with BP drop to 86/57 with standing up. Patient was seen resting in bed and reporting she is feeling better than before, ate some dinner. Still endorsing some lightheadedness when she stands up but improving. Is asymptomatic while resting in bed.    Admission Diagnosis:   Schizoaffective disorder, bipolar type (H) [F25.0]    Physical Exam   Temp: 98.1  F (36.7  C) Temp  Min: 98.1  F (36.7  C)  Max: 98.1  F (36.7  C)    No data recorded  SpO2: 100 % SpO2  Min: 97 %  Max: 100 %  Pulse: 82 Pulse  Min: 64  Max: 82    No data recorded  BP: 90/60 Systolic (24hrs), Av , Min:90 , Max:107   Diastolic (24hrs), Av, Min:60, Max:71     I/Os: No intake/output data recorded.     Exam:   GENERAL: Alert and oriented x 3. Well nourished, well developed.  No acute distress.  Resting in bed.  Responds appropriately.  Pleasant.  HEENT: Normocephalic, atraumatic. Anicteric sclera. Mucous membranes moist.   CV: RRR. S1, S2. No murmurs appreciated.   RESPIRATORY: Effort normal on room air. Lungs CTAB with no wheezing, rales, or rhonchi.   GI: Abdomen soft and non distended, bowel sounds present x all 4 quadrants. No tenderness, rebound, or guarding.   NEUROLOGICAL: No focal deficits. Follows commands.  Strength 5/5 in upper and lower extremities. Cranial nerves II-XII intact.  MUSCULOSKELETAL: No joint swelling or tenderness. Moves all extremities.   EXTREMITIES: No gross deformities. No peripheral edema.   SKIN: Grossly warm, dry, and intact. No jaundice. No rashes.     Significant Results and Procedures   Lactic Acid: No results for input(s): LACT, LACTS in the last 78113 hours.  CBC:   Recent Labs   Lab Test 23  1349 09/15/23  1037 23  0913 23  0938 23  1356   WBC 7.2 8.4 8.7   < > 6.5   HGB 11.6* 12.7  --   --  12.2   HCT 34.7* 38.0  --   --  37.0    201  --   --  229     < > = values in this interval not displayed.      Sepsis Evaluation   The patient is not known to have an infection.

## 2023-09-19 NOTE — PLAN OF CARE
"RN ASSESSMENT   The patient remained isolated and withdrawn to her room throughout the shift. She spent the majority of the shift in bed and napping. The patient continues to endorse fatigue and dizziness upon standing up. Vital signs were obtained three times this shift, with two of the readings notable for orthostasis. The patient was unable to provide a urine sample this shift, as she removed the toilet hat and urinated directly into the toilet despite being instructed otherwise.Lactate Ringer x1000 ml infused over 2 hours today. Peripheral IV/saline lock in.   On examination, the patient was more alert and was oriented x 3. She denied mental health symptoms, including hallucinations, delusions, paranoia, depressive thoughts and feelings, or anxiety. The patient stated, \"I'm just very sick physically.\" She also denied any safety concerns, including active or passive suicidal ideations or thoughts of non-suicidal self-injury.   Internal medicine evaluated the patient on the unit today. Labs + echo have been ordered for tomorrow. She cooperated with the blood draw this shift and completed Chest X ray [ provider on call alerted of results ] and had a CT scan.       "

## 2023-09-19 NOTE — PROGRESS NOTES
Brief Medicine Cross-Cover Note:    Was notified by RN regarding fever 101.3F. Additionally patient reporting a new cough and wide spread body aches today. This morning, , WBC 9.8. UA negative for infection. Yesterday CXR with streaky right greater than left perihilar and basilar opacities, likely atelectasis and/or edema without focal consolidation. Procalc 0.11 yesterday. Vitals currently stable and no hypoxia.   - Check blood cultures, lactate   - Check COVID swab and RVP   - Continue to monitor patient closely. Low threshold for transfer to medicine if condition worsens or hypoxia develops.    Bar Banuelos PA-C on 9/19/2023 at 6:59 PM

## 2023-09-20 ENCOUNTER — APPOINTMENT (OUTPATIENT)
Dept: INTERPRETER SERVICES | Facility: CLINIC | Age: 58
DRG: 871 | End: 2023-09-20
Attending: FAMILY MEDICINE
Payer: COMMERCIAL

## 2023-09-20 ENCOUNTER — HOSPITAL ENCOUNTER (INPATIENT)
Facility: CLINIC | Age: 58
LOS: 29 days | Discharge: HOME | DRG: 871 | End: 2023-10-19
Attending: FAMILY MEDICINE | Admitting: STUDENT IN AN ORGANIZED HEALTH CARE EDUCATION/TRAINING PROGRAM
Payer: COMMERCIAL

## 2023-09-20 ENCOUNTER — APPOINTMENT (OUTPATIENT)
Dept: GENERAL RADIOLOGY | Facility: CLINIC | Age: 58
DRG: 885 | End: 2023-09-20
Payer: COMMERCIAL

## 2023-09-20 DIAGNOSIS — F25.0 SCHIZOAFFECTIVE DISORDER, BIPOLAR TYPE (H): ICD-10-CM

## 2023-09-20 DIAGNOSIS — N17.8 OTHER ACUTE KIDNEY FAILURE (H): Primary | ICD-10-CM

## 2023-09-20 DIAGNOSIS — K59.00 CONSTIPATION, UNSPECIFIED CONSTIPATION TYPE: ICD-10-CM

## 2023-09-20 PROBLEM — J18.9 PNEUMONIA DUE TO INFECTIOUS ORGANISM: Status: ACTIVE | Noted: 2023-09-20

## 2023-09-20 LAB
ALBUMIN SERPL BCG-MCNC: 2.4 G/DL (ref 3.5–5.2)
ALBUMIN SERPL BCG-MCNC: 2.7 G/DL (ref 3.5–5.2)
ALP SERPL-CCNC: 101 U/L (ref 35–104)
ALP SERPL-CCNC: 99 U/L (ref 35–104)
ALT SERPL W P-5'-P-CCNC: 36 U/L (ref 0–50)
ALT SERPL W P-5'-P-CCNC: 39 U/L (ref 0–50)
ANION GAP SERPL CALCULATED.3IONS-SCNC: 10 MMOL/L (ref 7–15)
ANION GAP SERPL CALCULATED.3IONS-SCNC: 11 MMOL/L (ref 7–15)
AST SERPL W P-5'-P-CCNC: 26 U/L (ref 0–45)
AST SERPL W P-5'-P-CCNC: 45 U/L (ref 0–45)
BASE EXCESS BLDV CALC-SCNC: 6.4 MMOL/L (ref -7.7–1.9)
BASOPHILS # BLD AUTO: 0 10E3/UL (ref 0–0.2)
BASOPHILS NFR BLD AUTO: 0 %
BILIRUB SERPL-MCNC: 0.3 MG/DL
BILIRUB SERPL-MCNC: 0.4 MG/DL
BUN SERPL-MCNC: 5 MG/DL (ref 6–20)
BUN SERPL-MCNC: 5.7 MG/DL (ref 6–20)
CALCIUM SERPL-MCNC: 8.2 MG/DL (ref 8.6–10)
CALCIUM SERPL-MCNC: 8.3 MG/DL (ref 8.6–10)
CHLORIDE SERPL-SCNC: 102 MMOL/L (ref 98–107)
CHLORIDE SERPL-SCNC: 98 MMOL/L (ref 98–107)
CREAT SERPL-MCNC: 0.49 MG/DL (ref 0.51–0.95)
CREAT SERPL-MCNC: 0.61 MG/DL (ref 0.51–0.95)
CRP SERPL-MCNC: 199.02 MG/L
DEPRECATED HCO3 PLAS-SCNC: 26 MMOL/L (ref 22–29)
DEPRECATED HCO3 PLAS-SCNC: 26 MMOL/L (ref 22–29)
EGFRCR SERPLBLD CKD-EPI 2021: >90 ML/MIN/1.73M2
EGFRCR SERPLBLD CKD-EPI 2021: >90 ML/MIN/1.73M2
EOSINOPHIL # BLD AUTO: 0.2 10E3/UL (ref 0–0.7)
EOSINOPHIL NFR BLD AUTO: 2 %
ERYTHROCYTE [DISTWIDTH] IN BLOOD BY AUTOMATED COUNT: 13 % (ref 10–15)
ERYTHROCYTE [DISTWIDTH] IN BLOOD BY AUTOMATED COUNT: 13.1 % (ref 10–15)
GLUCOSE SERPL-MCNC: 128 MG/DL (ref 70–99)
GLUCOSE SERPL-MCNC: 214 MG/DL (ref 70–99)
HCO3 BLDV-SCNC: 31 MMOL/L (ref 21–28)
HCT VFR BLD AUTO: 30.1 % (ref 35–47)
HCT VFR BLD AUTO: 31.1 % (ref 35–47)
HGB BLD-MCNC: 10.1 G/DL (ref 11.7–15.7)
HGB BLD-MCNC: 10.3 G/DL (ref 11.7–15.7)
IMM GRANULOCYTES # BLD: 0.2 10E3/UL
IMM GRANULOCYTES NFR BLD: 2 %
LACTATE SERPL-SCNC: 2.6 MMOL/L (ref 0.7–2)
LYMPHOCYTES # BLD AUTO: 1.7 10E3/UL (ref 0.8–5.3)
LYMPHOCYTES NFR BLD AUTO: 15 %
MCH RBC QN AUTO: 31.8 PG (ref 26.5–33)
MCH RBC QN AUTO: 31.9 PG (ref 26.5–33)
MCHC RBC AUTO-ENTMCNC: 33.1 G/DL (ref 31.5–36.5)
MCHC RBC AUTO-ENTMCNC: 33.6 G/DL (ref 31.5–36.5)
MCV RBC AUTO: 95 FL (ref 78–100)
MCV RBC AUTO: 96 FL (ref 78–100)
MONOCYTES # BLD AUTO: 1.2 10E3/UL (ref 0–1.3)
MONOCYTES NFR BLD AUTO: 11 %
MRSA DNA SPEC QL NAA+PROBE: NEGATIVE
NEUTROPHILS # BLD AUTO: 7.9 10E3/UL (ref 1.6–8.3)
NEUTROPHILS NFR BLD AUTO: 70 %
NRBC # BLD AUTO: 0 10E3/UL
NRBC BLD AUTO-RTO: 0 /100
O2/TOTAL GAS SETTING VFR VENT: 21 %
PCO2 BLDV: 44 MM HG (ref 40–50)
PH BLDV: 7.45 [PH] (ref 7.32–7.43)
PLATELET # BLD AUTO: 226 10E3/UL (ref 150–450)
PLATELET # BLD AUTO: 236 10E3/UL (ref 150–450)
PO2 BLDV: 37 MM HG (ref 25–47)
POTASSIUM SERPL-SCNC: 3.6 MMOL/L (ref 3.4–5.3)
POTASSIUM SERPL-SCNC: 4 MMOL/L (ref 3.4–5.3)
PROCALCITONIN SERPL IA-MCNC: 0.1 NG/ML
PROT SERPL-MCNC: 5.9 G/DL (ref 6.4–8.3)
PROT SERPL-MCNC: 6 G/DL (ref 6.4–8.3)
RBC # BLD AUTO: 3.17 10E6/UL (ref 3.8–5.2)
RBC # BLD AUTO: 3.24 10E6/UL (ref 3.8–5.2)
SA TARGET DNA: NEGATIVE
SODIUM SERPL-SCNC: 135 MMOL/L (ref 136–145)
SODIUM SERPL-SCNC: 138 MMOL/L (ref 136–145)
WBC # BLD AUTO: 10.8 10E3/UL (ref 4–11)
WBC # BLD AUTO: 11.2 10E3/UL (ref 4–11)

## 2023-09-20 PROCEDURE — 71046 X-RAY EXAM CHEST 2 VIEWS: CPT

## 2023-09-20 PROCEDURE — 120N000002 HC R&B MED SURG/OB UMMC

## 2023-09-20 PROCEDURE — 85025 COMPLETE CBC W/AUTO DIFF WBC: CPT

## 2023-09-20 PROCEDURE — 250N000011 HC RX IP 250 OP 636: Mod: JZ

## 2023-09-20 PROCEDURE — 99254 IP/OBS CNSLTJ NEW/EST MOD 60: CPT | Performed by: PSYCHIATRY & NEUROLOGY

## 2023-09-20 PROCEDURE — 82803 BLOOD GASES ANY COMBINATION: CPT | Performed by: PHYSICIAN ASSISTANT

## 2023-09-20 PROCEDURE — 36415 COLL VENOUS BLD VENIPUNCTURE: CPT

## 2023-09-20 PROCEDURE — 2894A VOIDCORRECT: CPT | Mod: GC | Performed by: STUDENT IN AN ORGANIZED HEALTH CARE EDUCATION/TRAINING PROGRAM

## 2023-09-20 PROCEDURE — 36415 COLL VENOUS BLD VENIPUNCTURE: CPT | Performed by: PHYSICIAN ASSISTANT

## 2023-09-20 PROCEDURE — 86140 C-REACTIVE PROTEIN: CPT

## 2023-09-20 PROCEDURE — 80053 COMPREHEN METABOLIC PANEL: CPT

## 2023-09-20 PROCEDURE — 250N000013 HC RX MED GY IP 250 OP 250 PS 637

## 2023-09-20 PROCEDURE — 87641 MR-STAPH DNA AMP PROBE: CPT

## 2023-09-20 PROCEDURE — 99222 1ST HOSP IP/OBS MODERATE 55: CPT | Mod: AI | Performed by: STUDENT IN AN ORGANIZED HEALTH CARE EDUCATION/TRAINING PROGRAM

## 2023-09-20 PROCEDURE — 71046 X-RAY EXAM CHEST 2 VIEWS: CPT | Mod: 26 | Performed by: RADIOLOGY

## 2023-09-20 PROCEDURE — 250N000013 HC RX MED GY IP 250 OP 250 PS 637: Performed by: PSYCHIATRY & NEUROLOGY

## 2023-09-20 PROCEDURE — 258N000003 HC RX IP 258 OP 636

## 2023-09-20 RX ORDER — POLYETHYLENE GLYCOL 3350 17 G/17G
17 POWDER, FOR SOLUTION ORAL DAILY PRN
Status: DISCONTINUED | OUTPATIENT
Start: 2023-09-20 | End: 2023-10-19 | Stop reason: HOSPADM

## 2023-09-20 RX ORDER — PIPERACILLIN SODIUM, TAZOBACTAM SODIUM 4; .5 G/20ML; G/20ML
4.5 INJECTION, POWDER, LYOPHILIZED, FOR SOLUTION INTRAVENOUS EVERY 6 HOURS
Status: DISCONTINUED | OUTPATIENT
Start: 2023-09-20 | End: 2023-09-20

## 2023-09-20 RX ORDER — DIVALPROEX SODIUM 125 MG/1
500 CAPSULE, COATED PELLETS ORAL EVERY 12 HOURS SCHEDULED
Status: DISCONTINUED | OUTPATIENT
Start: 2023-09-20 | End: 2023-09-20

## 2023-09-20 RX ORDER — SENNOSIDES 8.6 MG
2 TABLET ORAL 2 TIMES DAILY
Status: DISCONTINUED | OUTPATIENT
Start: 2023-09-20 | End: 2023-09-20

## 2023-09-20 RX ORDER — LORAZEPAM 1 MG/1
2 TABLET ORAL EVERY 8 HOURS PRN
Status: DISCONTINUED | OUTPATIENT
Start: 2023-09-20 | End: 2023-09-29

## 2023-09-20 RX ORDER — HYDROXYZINE HYDROCHLORIDE 25 MG/1
25 TABLET, FILM COATED ORAL EVERY 6 HOURS PRN
Status: DISCONTINUED | OUTPATIENT
Start: 2023-09-20 | End: 2023-09-29

## 2023-09-20 RX ORDER — HYDROXYZINE HYDROCHLORIDE 25 MG/1
25 TABLET, FILM COATED ORAL EVERY 6 HOURS PRN
Status: DISCONTINUED | OUTPATIENT
Start: 2023-09-20 | End: 2023-09-20

## 2023-09-20 RX ORDER — DIPHENHYDRAMINE HCL 25 MG
50 CAPSULE ORAL EVERY 8 HOURS PRN
Status: DISCONTINUED | OUTPATIENT
Start: 2023-09-20 | End: 2023-10-19 | Stop reason: HOSPADM

## 2023-09-20 RX ORDER — HALOPERIDOL 5 MG/ML
5 INJECTION INTRAMUSCULAR EVERY 8 HOURS PRN
Status: DISCONTINUED | OUTPATIENT
Start: 2023-09-20 | End: 2023-10-19 | Stop reason: HOSPADM

## 2023-09-20 RX ORDER — HYDROXYZINE HYDROCHLORIDE 50 MG/1
50 TABLET, FILM COATED ORAL EVERY 6 HOURS PRN
Status: DISCONTINUED | OUTPATIENT
Start: 2023-09-20 | End: 2023-09-29

## 2023-09-20 RX ORDER — PIPERACILLIN SODIUM, TAZOBACTAM SODIUM 4; .5 G/20ML; G/20ML
4.5 INJECTION, POWDER, LYOPHILIZED, FOR SOLUTION INTRAVENOUS EVERY 6 HOURS
Status: DISCONTINUED | OUTPATIENT
Start: 2023-09-20 | End: 2023-09-22

## 2023-09-20 RX ORDER — LIDOCAINE 40 MG/G
CREAM TOPICAL
Status: DISCONTINUED | OUTPATIENT
Start: 2023-09-20 | End: 2023-09-20

## 2023-09-20 RX ORDER — LORAZEPAM 2 MG/ML
2 INJECTION INTRAMUSCULAR EVERY 8 HOURS PRN
Status: DISCONTINUED | OUTPATIENT
Start: 2023-09-20 | End: 2023-09-29

## 2023-09-20 RX ORDER — ACETAMINOPHEN 325 MG/1
650 TABLET ORAL EVERY 4 HOURS PRN
Status: DISCONTINUED | OUTPATIENT
Start: 2023-09-20 | End: 2023-10-19 | Stop reason: HOSPADM

## 2023-09-20 RX ORDER — DIVALPROEX SODIUM 125 MG/1
500 CAPSULE, COATED PELLETS ORAL EVERY 12 HOURS SCHEDULED
Status: DISCONTINUED | OUTPATIENT
Start: 2023-09-20 | End: 2023-10-06

## 2023-09-20 RX ORDER — HALOPERIDOL 5 MG/1
5 TABLET ORAL EVERY 8 HOURS PRN
Status: DISCONTINUED | OUTPATIENT
Start: 2023-09-20 | End: 2023-10-19 | Stop reason: HOSPADM

## 2023-09-20 RX ORDER — HYDROXYZINE HYDROCHLORIDE 50 MG/1
50 TABLET, FILM COATED ORAL EVERY 6 HOURS PRN
Status: DISCONTINUED | OUTPATIENT
Start: 2023-09-20 | End: 2023-09-20

## 2023-09-20 RX ORDER — PIPERACILLIN SODIUM, TAZOBACTAM SODIUM 4; .5 G/20ML; G/20ML
4.5 INJECTION, POWDER, LYOPHILIZED, FOR SOLUTION INTRAVENOUS EVERY 6 HOURS
Status: DISCONTINUED | OUTPATIENT
Start: 2023-09-20 | End: 2023-09-20 | Stop reason: HOSPADM

## 2023-09-20 RX ORDER — CLOZAPINE 100 MG/1
100 TABLET ORAL AT BEDTIME
Status: DISCONTINUED | OUTPATIENT
Start: 2023-09-20 | End: 2023-09-26

## 2023-09-20 RX ORDER — FAMOTIDINE 10 MG
10 TABLET ORAL 2 TIMES DAILY
Status: DISCONTINUED | OUTPATIENT
Start: 2023-09-20 | End: 2023-09-22

## 2023-09-20 RX ORDER — DIPHENHYDRAMINE HYDROCHLORIDE 50 MG/ML
50 INJECTION INTRAMUSCULAR; INTRAVENOUS EVERY 8 HOURS PRN
Status: DISCONTINUED | OUTPATIENT
Start: 2023-09-20 | End: 2023-10-19 | Stop reason: HOSPADM

## 2023-09-20 RX ORDER — SENNOSIDES 8.6 MG
2 TABLET ORAL 2 TIMES DAILY PRN
Status: DISCONTINUED | OUTPATIENT
Start: 2023-09-20 | End: 2023-10-19 | Stop reason: HOSPADM

## 2023-09-20 RX ADMIN — DIVALPROEX SODIUM 500 MG: 125 CAPSULE, COATED PELLETS ORAL at 21:08

## 2023-09-20 RX ADMIN — PIPERACILLIN AND TAZOBACTAM 3.38 G: 3; .375 INJECTION, POWDER, LYOPHILIZED, FOR SOLUTION INTRAVENOUS at 03:41

## 2023-09-20 RX ADMIN — PIPERACILLIN AND TAZOBACTAM 4.5 G: 4; .5 INJECTION, POWDER, FOR SOLUTION INTRAVENOUS at 16:48

## 2023-09-20 RX ADMIN — FAMOTIDINE 10 MG: 10 TABLET ORAL at 21:08

## 2023-09-20 RX ADMIN — SODIUM CHLORIDE, POTASSIUM CHLORIDE, SODIUM LACTATE AND CALCIUM CHLORIDE 1000 ML: 600; 310; 30; 20 INJECTION, SOLUTION INTRAVENOUS at 02:12

## 2023-09-20 RX ADMIN — PIPERACILLIN AND TAZOBACTAM 4.5 G: 4; .5 INJECTION, POWDER, FOR SOLUTION INTRAVENOUS at 09:00

## 2023-09-20 RX ADMIN — DIVALPROEX SODIUM 500 MG: 125 CAPSULE, COATED PELLETS ORAL at 09:01

## 2023-09-20 RX ADMIN — FAMOTIDINE 10 MG: 10 TABLET ORAL at 09:00

## 2023-09-20 RX ADMIN — CLOZAPINE 100 MG: 100 TABLET ORAL at 21:08

## 2023-09-20 RX ADMIN — PIPERACILLIN AND TAZOBACTAM 4.5 G: 4; .5 INJECTION, POWDER, FOR SOLUTION INTRAVENOUS at 21:08

## 2023-09-20 ASSESSMENT — ACTIVITIES OF DAILY LIVING (ADL)
ADLS_ACUITY_SCORE: 27
DOING_ERRANDS_INDEPENDENTLY_DIFFICULTY: NO
ADLS_ACUITY_SCORE: 30
WALKING_OR_CLIMBING_STAIRS_DIFFICULTY: NO
DIFFICULTY_EATING/SWALLOWING: NO
ADLS_ACUITY_SCORE: 25
ADLS_ACUITY_SCORE: 20
ADLS_ACUITY_SCORE: 20
ADLS_ACUITY_SCORE: 36
WEAR_GLASSES_OR_BLIND: NO
ADLS_ACUITY_SCORE: 30
ADLS_ACUITY_SCORE: 36
TOILETING_ISSUES: NO
CONCENTRATING,_REMEMBERING_OR_MAKING_DECISIONS_DIFFICULTY: YES
FALL_HISTORY_WITHIN_LAST_SIX_MONTHS: NO
ADLS_ACUITY_SCORE: 19
CHANGE_IN_FUNCTIONAL_STATUS_SINCE_ONSET_OF_CURRENT_ILLNESS/INJURY: NO
ADLS_ACUITY_SCORE: 27
DRESSING/BATHING_DIFFICULTY: NO
ADLS_ACUITY_SCORE: 36
ADLS_ACUITY_SCORE: 20

## 2023-09-20 NOTE — PLAN OF CARE
Goal Outcome Evaluation:  /48 (BP Location: Right arm, Patient Position: Supine, Cuff Size: Adult Large)   Pulse 103   Temp 98.9  F (37.2  C) (Oral)   Resp 18   SpO2 95%    Arrived to unit at [address].Up SBA1.Steady gait.  Denies pain.Denies SOB.LS diminished,tolerating well on room air.  Flat affect.Irritable.Wanting to be left alone.Refusing cont pulse oximeter.  Skin intact,some swelling on both feet.Will order  compression stockings.  PIV line L forearm TKO for IV abx.  Reports last BM yesterday.Denies problems voiding.  Pt on commitment,is jarvised for meds.  Bedside attendant in room.  Belongings checked for pt includes only clothings,is placed on closet in room.  Clarified from MD Megan Little,that pt is IMC with no tele.

## 2023-09-20 NOTE — PROGRESS NOTES
Post RR -peripheral IV placed in left lower forearm. Order obtained to discharge and admit to 5 med/surg. Report given to med/surg RN. Before discharge from unit; lactated ringers BOLUS 1,000 mL, vancomycin (VANCOCIN) 1,500 mg in 0.9% NaCl 250 mL intermittent infusion, and piperacillin-tazobactam (ZOSYN) 3.375 g vial to attach to  mL bag were started by medical flyer RN. MRSA and MSSA swab completed and sent to lab. Patient brought for XR and then discharged and brought to 5 med/surg.

## 2023-09-20 NOTE — CONSULTS
"  PSYCHIATRIC CONSULTATION    Requesting Physician: Gonzalez Reno MD    Admission Date: 09/20/2023  Date of Service: 09/20/2023    The patient was seen, her chart reviewed. She is currently on 1:1 observation.  She is fully committed and Jar[facility] through [facility].  Her Mata medications include Haldol, Clozaril, Zyprexa and Risperdal.  She was soundly asleep when I came to see her. She would not awake to voice stimulation but did finally awake to a gentle touch. Her mood was slightly depressed and she admitted to hearing \"voices of my relatives\" but would not elaborate on their content. She assured me that they were not threatening or demanding.    HISTORY OF PRESENT ILLNESS  The patient was just transferred to the medical floor from [facility] where she was under the care of Dr. Champagne since 08/18/23 when she presented with manic episode with psychosis and homicidal ideation. Reportedly, the \"voices\" were telling her to harm her aunt and uncle and do so with a knife. The knife was removed by paramedics.   A petition for commitment was initiated and supported and Mata hearing was held.  She was initially on a combination of Haldol, Zyprexa and Depakote with PRN Trazodone and Hydroxyzine but eventually was switched to Clozaril up to 300 mg at bedtime. It was later decreased to 200 mg because or orthostatic hypotension and held when she was transferred to the medical floor because of Sepsis likely 2/2 hospital-acquired pneumonia.  The patient has a long history of mental illness with [place] [facility] was diagnosed with Bipolar Disorder with psychotic features vs Schizoaffective Disorder, bipolar type. She does have a history of both manic and depressive episodes. She has a history of poor medication compliance with resultant frequent exacerbations. She used to be treated with Abilify, Zyprexa, Risperdal and Depakote.  She has no history of Chemical " Dependency    FAMILY HISTORY  Is remarkable for some kind of mental illness in two maternal uncles    PAST MEDICAL HISTORY  Hypertension. Possible seizures in 1983. Malnutrition    ALLERGIES  Pork-derived products, type of reaction unknown    MEDICATIONS, psychotrolpic  Depakote  mg BID  Haldol 5 mg PO or IM Q8H PRN  Ativan 2 mg PO or IM Q8H PRN  Hydroxyzine 25 mg Q6H PRN  Benadryl 50 mg PO or IM Q8H PRN    VS: Pulse - 96, BP - 97/61, Temp - 99.8, Resp - 16, SpO2 - 93%    BRIEF SOCIAL HISTORY  The patient was born and raised in [facility] [place] [facility] where she graduated from high school.  She immigrated to the  in 1990's. She was  and . The marriage had produced 5 children. 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 She has been living alone.    MENTAL STATUS EXAMINATION  Revealed a normally built and normally developed 58-year-old  Iranian mother of 5 appearing her stated age. She was rater sleepy but her speech was coherent and goal related. She appeared to be very mildly depressed but adamantly denies SI or HI. She readily admitted to auditory hallucinations but could not elaborate on their content. She showed no delusional interpretation of her perceptual disturbances. She has only marginal, if any insight into her problems and her judgement was questionable.    DIAGNOSTIC IMPRESSION  Schizoaffective Disorder, bipolar type vs  Bipolar Disorder, last episode manic with psychotic features    Plan: I will reorder Clozaril in a smaller dose of 100 mg QHS and monitor her BP closely. The dose may be further increased depending on her tolerance and clinical response. Continue Depakote DR in the same doses. Utilize PRN Haldol for agitation, aggressive behavior. Continue 1:1.  Upon medical stabilization transfer the patient back to the psychiatric unit.    Thanks,    Rc Minor MD  [phone] pager

## 2023-09-20 NOTE — PLAN OF CARE
"Patient transferred to 5 Med/Surg.      Goal Outcome Evaluation:  Problem: Plan of Care - These are the overarching goals to be used throughout the patient stay.    Goal: Plan of Care Review  Description: The Plan of Care Review/Shift note should be completed every shift.  The Outcome Evaluation is a brief statement about your assessment that the patient is improving, declining, or no change.  This information will be displayed automatically on your shift note.  Outcome: Adequate for Care Transition  Goal: Patient-Specific Goal (Individualized)  Description: You can add care plan individualizations to a care plan. Examples of Individualization might be:  \"Parent requests to be called daily at 9am for status\", \"I have a hard time hearing out of my right ear\", or \"Do not touch me to wake me up as it startles me\".  Outcome: Adequate for Care Transition  Goal: Absence of Hospital-Acquired Illness or Injury  Outcome: Adequate for Care Transition  Goal: Optimal Comfort and Wellbeing  Outcome: Adequate for Care Transition  Goal: Readiness for Transition of Care  Outcome: Adequate for Care Transition     Problem: Adult Behavioral Health Plan of Care  Goal: Plan of Care Review  Outcome: Adequate for Care Transition  Goal: Patient-Specific Goal (Individualization)  Description: You can add care plan individualizations to a care plan. Examples of Individualization might be:  \"Parent requests to be called daily at 9am for status\", \"I have a hard time hearing out of my right ear\", or \"Do not touch me to wake me up as it startles me\".  Outcome: Adequate for Care Transition  Goal: Individualized Daily Interaction Plan (IDIP)  Outcome: Adequate for Care Transition  Goal: Adheres to Safety Considerations for Self and Others  Outcome: Adequate for Care Transition  Goal: Absence of New-Onset Illness or Injury  Outcome: Adequate for Care Transition  Goal: Optimized Coping Skills in Response to Life Stressors  Outcome: Adequate for Care " Transition  Goal: Develops/Participates in Therapeutic Lyndon to Support Successful Transition  Outcome: Adequate for Care Transition     Problem: Psychotic Signs/Symptoms  Goal: Improved Behavioral Control (Psychotic Signs/Symptoms)  Outcome: Adequate for Care Transition  Goal: Optimal Cognitive Function (Psychotic Signs/Symptoms)  Outcome: Adequate for Care Transition  Goal: Increased Participation and Engagement (Psychotic Signs/Symptoms)  Outcome: Adequate for Care Transition  Goal: Improved Mood Symptoms  Outcome: Adequate for Care Transition  Goal: Improved Psychomotor Symptoms (Psychotic Signs/Symptoms)  Outcome: Adequate for Care Transition  Goal: Decreased Sensory Symptoms (Psychotic Signs/Symptoms)  Outcome: Adequate for Care Transition  Goal: Improved Sleep (Psychotic Signs/Symptoms)  Outcome: Adequate for Care Transition  Goal: Enhanced Social, Occupational or Functional Skills (Psychotic Signs/Symptoms)  Outcome: Adequate for Care Transition     Problem: Sleep Disturbance  Goal: Adequate Sleep/Rest  Outcome: Adequate for Care Transition

## 2023-09-20 NOTE — PHARMACY-ADMISSION MEDICATION HISTORY
Please see Admission Medication History note completed by pharmacist on 8/16/23 under previous encounter at [facility] for information regarding prior to admission medications.    Jeffy Johnson, PharmD, BCPS

## 2023-09-20 NOTE — PROGRESS NOTES
Critical lab called for Lactic acid-4.3. Internal medicine notified via text page. No new orders at this time. Staff will continue to assess and monitor patient as well as follow the plan of care.

## 2023-09-20 NOTE — H&P
[facility]    History and Physical - Hospitalist Service       Date of Admission:  9/20/2023    Assessment & Plan      Angela Basurto is a 58 year old female with bipolar 1 who was admitted to psych on 8/16/23 for elvis and psychosis in the context of medication nonadherence. On 9/18 she developed orthostatic hypotension, fevered 9/19 w/ lactate 4.3, and was admitted to our service for sepsis likely 2/2 hospital acquired pneumonia. Currently, Mata, full commitment, 1:1, IV abx.     # Sepsis, severe (fever, tachycardia, LA 4.3), without septic shock  # Likely hospital-acquired pneumonia  9/19 fever 101.3F, LA 4.3. New cough and wide spread body aches. , WBC 9.8. UA negative for infection. 9/18 CXR with streaky right greater than left perihilar and basilar opacities, likely atelectasis and/or edema without focal consolidation. Echo WNL, cardiology does not feel it is myocarditis. Procalc low risk for systemic infection 0.11. COVID negative.  LA improved to 2.6 without fluids (delay in getting IV). Vitals borderline w/ BP 94/52 and  so will bolus another 1L NS. SPO2 nL on RA. Zosyn for HAP, psych unit not considered high risk exposure for MRSA so will not start vanc, but MRSA in process.   Workup/monitoring  - Daily CBC  - Daily procal until normal  - CRP q48h until downtrend   - 9/19 bcx drawn before abx, in process   - Repeat LA w/ AM labs  - RVP in process  - Repeat cxray in process  - MRSA nares in process  Treatment  - piperacillin/tazobactam (ZOSYN) IV 4.5g, Intravenous, EVERY 6 HOURS X 7 Days (9/20-9/27)       # Bipolar I disorder, currently manic with psychotic features   # Hx of possible seizure in 1983   # Mata: Haldol, clozapine, risperidone, olanzapine  # Full commitment   Presented with elvis and psychosis in the context of medication nonadherence. Patient was presented to emergency room due to homicidal threats toward her family using  knife, and exhibiting paranoid and severe disorganized thought and responding to her internal stimuli while staying in the emergency room. Significant symptoms on admission include paranoia, disorganized thinking and behaviors, AVH, and emotional lability. While she understands why she's in the hospital, she displays intrusive behaviors that require a 1:1. 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 Most recent psychiatric hospitalization was in July of this year during which time she left [facility]. On admission, restarted PTA zyprexa, depakote, and haldol. Started clozapine 8/31. Zyprexa stopped on 9/8 due to ineffectiveness and mild sedation in context of clozapine titration. Achieved dose of 300 mg of clozapine daily on 9/11 with improvement in psychosis noted. Due to orthostatic hypotension, constipation, and sedation, reduced clozapine to 200 mg on 9/17, stopped Haldol on 9/16, psych stopped clozapine 9/18 due to continued hypotension . In summary, the patient's reported symptoms of elvis and psychosis in the context of medication nonadherence are consistent with Bipolar Disorder, type 1.   - See psychiatry note for details of medication titrations  - Precautions: 1:1 sitter, restrict to unit, assault  precuations, cheeking precautions, elopement, fall  - Legal status: Mata and full commitment: A petition for MI commitment with Mata was filed on 8/18 through [facility].    - Depakote DT sprinkles 500 mg BID   - Agitation PRN per last psychiatry orders  - EMERGENCY for agitation: Haldol 5mg + 2mg ativan + 50mg q8h PRN, PO or IM  - Preferred for agitation, anxiety: Hydroxyzine 25 - 50mg PO q6h PRN  - ASAP psych consult placed due to now only on Depakote    # Orthostatic hypotension  Symptomatic 9/18 with blood pressure drop from 123/72 with sitting to 96/55 with standing. Patient reports this is new since  admission and starting new medications. Patient initiated on clozapine on 8/31 with dose increases, and currently on haldol. Per nursing patient has been increasingly sleepy, and possibly not taking in as much water and eating less than half of meals. Orthostatic hypotension likely related to medications and possible dehydration. Haldol discontinued evening of 9/16 per psychiatry. AM cortisol WNL: 17.7. Cardiology team was consulted to rule out clozapine induced myocarditis, they have very low suspicion based on echo and troponin.  - Encourage hydration   - Compression stockings  - Recommend minimize offending agents (clozapine, haldol, etc)   - trend orthostatic vital signs daily     # Hypocalcemia  # Hypoalbuminemia  Suspect possible malnutrition due to poor PO intake.   - Nutrition IP consult    # Mosque  Important to her value system  - Spiritual care consult      Chronic/stable  Hx of HTN  - HOLDING PTA clonidine as patient's pressures have been consistently soft.      GERD:   - Famotidine started on 9/6     Constipation:  - Senokot BID PRN  - Miralax prn        Disposition Plan  Reason for ongoing admission: poses an imminent risk to self, poses an imminent risk to others, and is unable to care for self due to severe psychosis or elvis. Possibly discharge next week if ongoing improvement noted.   Discharge location: Home with family  Discharge Medications: not ordered  Follow-up Appointments: not scheduled     Diet: Combination Diet Regular Diet; Safe Tray - with utensils    DVT Prophylaxis: Low Risk/Ambulatory with no VTE prophylaxis indicated  Wilson Catheter: Not present  Fluids: PO  Lines: None     Cardiac Monitoring: None  Code Status: Full Code      Clinically Significant Risk Factors Present on Admission              # Hypoalbuminemia: Lowest albumin = 2.4 g/dL at 9/19/2023 11:53 PM, will monitor as appropriate  # Coagulation Defect: INR = 1.19 (Ref range: 0.85 - 1.15) and/or PTT = 34 Seconds (Ref  range: 22 - 38 Seconds), will monitor for bleeding                    Disposition Plan      Expected Discharge Date: 09/24/2023                The patient's care was discussed with the Attending Physician, Dr. Albarado, Bedside Nurse, and Patient.      Megan Little MD  Hospitalist Service  [facility]  Securely message with [employer] (more info)  Text page via [facility] Paging/Directory   ______________________________________________________________________    Chief Complaint   Cough, fever, body aches, dizzy    History is obtained from the patient, electronic health record, ICU QUAN    History of Present Illness   Angela Basurto is a 58 year old female previously diagnosed with Bipolar Disorder Type 1 and anxiety who presented with elvis and psychosis in the context of medication nonadherence. Patient was presented to emergency room due to homicidal threats toward her family using knife, and exhibiting paranoid and severe disorganized thought and responding to her internal stimuli while staying in the emergency room. Significant symptoms on admission include paranoia, disorganized thinking and behaviors, AVH, and emotional lability. The MSE on admission was pertinent for RTIS. Biological contributions to mental health presentation include diagnosis of Bipolar Disorder, type 1 and taking Depakote, Zyprexa, and Haldol. Psychological contributions to mental health presentation include poor insight into her condition. While she understands why she's in the hospital, she displays intrusive behaviors that require a 1:1. 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 Protective factors include family support and being [employer]. Most recent psychiatric hospitalization was in July of this year during which time she left [facility]. In summary, the patient's reported symptoms of elvis and psychosis  "in the context of medication nonadherence are consistent with Bipolar Disorder, type 1.       Angela Basurto was admitted to [facility] on a 72 hour hold. The patient's PTA Zyprexa 10 mg BID & Depakote, and Haldol 20 mg were restarted. PTA hydroxyzine and trazodone were restarted. A petition for MI commitment with Mata was filed on 8/18 through [facility].  She is now committed with Mata. Clozapine will be initiated on 8/31/23. Zyprexa reduced to 5 mg BID on 9/1. On 9/6, Switched Depakote to sprinkles  mg BID per pt request. Reduced Zyprexa to 5 mg QHS due to failed trial, ineffectiveness, polypharmacy, and soft pressures intermittently. On 9/7, baseline EKG ordered and reviewed. It is normal with normal QTc. Zyprexa stopped on 9/8 due to ineffectiveness and mild sedation in context of clozapine titration. Senokot increased to 2 tabs BID due to constipation at that time as well. Achieved dose of 300 mg of clozapine daily on 9/11. Improvement in psychosis noted. Family agrees. Clozapine level at corresponding dose of 300 mg at bedtime was 702 ng/ml. Due to orthostatic hypotension, constipation, and sedation, will reduce clozapine dose to 250 mg at bedtime this evening and will reduce Haldol to 10 mg at bedtime. IM consult placed to assist with management. Reduced clozapine to 200 mg on 9/17 and stopped Haldol on 9/16. IM also aware an following.      Then on 9/19 fever 101.3F. Additionally patient reporting a new cough and wide spread body aches. Patient describes \"not feeling well.\" She endorses intermittent non-productive cough. She also denies nasal drainage.         Transthoracic echocardiogram:   Global and regional left ventricular function is normal with an EF of 60-65%.  Global right ventricular function is normal.  No significant valvular abnormalities present.  Estimated mean right atrial pressure is normal.  Trivial pericardial effusion is present.       Past Medical History    Past " Medical History:   Diagnosis Date    Obesity     Schizoaffective disorder, bipolar type (H)        Past Surgical History   No past surgical history on file.    Prior to Admission Medications   Prior to Admission Medications   Prescriptions Last Dose Informant Patient Reported? Taking?   OLANZapine (ZYPREXA) 10 MG tablet   No No   Sig: Take 1 tablet (10 mg) by mouth every morning   OLANZapine (ZYPREXA) 20 MG tablet   No No   Sig: Take 1 tablet (20 mg) by mouth At Bedtime   divalproex sodium delayed-release (DEPAKOTE SPRINKLE) 125 MG DR capsule   No No   Sig: Take 500 mg by mouth 2 times daily   haloperidol (HALDOL) 5 MG tablet   No No   Sig: Take 3 tablets (15 mg) by mouth At Bedtime And one additional tab (5 mg) daily as needed for hallucinations or sleep disturbances   sennosides (SENOKOT) 8.6 MG tablet   No No   Sig: Take 1 tablet by mouth 2 times daily      Facility-Administered Medications: None           Physical Exam   Vital Signs:     BP: 137/48 Pulse: 103   Resp: 18 SpO2: 95 % O2 Device: None (Room air)    Weight: 0 lbs 0 oz    General: Alert and oriented, in no acute distress.  Skin: Warm and dry, no abnormalities noted.  ENT: Speech intact, congested  CV: No cyanosis or pallor, warm and well perfused. Tachycardic, no murmurs  Respiratory: No respiratory distress, no accessory muscle use. Crackles in right middle lung field. No basilar crackles, no wheeze.   Psychiatric: Mood and affect appear normal.

## 2023-09-20 NOTE — PROGRESS NOTES
[facility]    Progress Note - [facility]       Date of Admission:  9/20/2023    Main plans for today:  - Continue IV Zosyn   - 9/19 bcx drawn before abx, in process   - Psych consult placed for ongoing medication management   - Nutrition IP consult, appreciate recs    Assessment & Plan   Angela Basurto is a 58 year old female with bipolar 1 who was admitted to psych on 8/16/23 for elvis and psychosis in the context of medication nonadherence. On 9/18 she developed orthostatic hypotension, fevered 9/19 w/ lactate 4.3, and was admitted to our service for sepsis likely 2/2 hospital acquired pneumonia. Currently, Mata, full commitment, 1:1, IV abx.      # Sepsis, severe (fever, tachycardia, LA 4.3), without septic shock  # Likely hospital-acquired pneumonia  9/19 fever 101.3F, LA 4.3. New cough and wide spread body aches. , WBC 9.8. UA negative for infection. 9/18 CXR with streaky right greater than left perihilar and basilar opacities, likely atelectasis and/or edema without focal consolidation. Echo WNL, cardiology does not feel it is myocarditis. Procalc low risk for systemic infection 0.10. COVID negative.  LA improved to 2.6 without fluids (delay in getting IV). Today, CRP up trending at 199.02. Elevated WBC at 11.2. MRSA negative. CXR reveals similar to previous xray with Rt> Lt perihilar/ bibasilar opacities noted. VSS. Afebrile. SPO2 93 % on RA. Will continue IV Zosyn for HAP and consider transitioning to PO Levaquin once CRP, procal down trends with stable vitals.   Workup/monitoring  - Daily CBC  - Daily procal until normal  - CRP q48h until downtrend  - 9/19 bcx drawn before abx, in process   - Repeat LA   - RVP in process    Treatment  - piperacillin/tazobactam (ZOSYN) IV 4.5g, Intravenous, EVERY 6 HOURS X 7 Days (9/20-9/27)  - consider transitioning to PO Levaquin with down trending CRP/procal and stable vitals         #  Bipolar I disorder, currently manic with psychotic features   # Hx of possible seizure in 1983   # Mata: Haldol, clozapine, risperidone, olanzapine  # Full commitment   Presented with elvis and psychosis in the context of medication nonadherence. Patient was presented to emergency room due to homicidal threats toward her family using knife, and exhibiting paranoid and severe disorganized thought and responding to her internal stimuli while staying in the emergency room. Significant symptoms on admission include paranoia, disorganized thinking and behaviors, AVH, and emotional lability. While she understands why she's in the hospital, she displays intrusive behaviors that require a 1:1. 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 Most recent psychiatric hospitalization was in July of this year during which time she left AMA. On admission, restarted PTA zyprexa, depakote, and haldol. Started clozapine 8/31. Zyprexa stopped on 9/8 due to ineffectiveness and mild sedation in context of clozapine titration. Achieved dose of 300 mg of clozapine daily on 9/11 with improvement in psychosis noted. Due to orthostatic hypotension, constipation, and sedation, reduced clozapine to 200 mg on 9/17, stopped Haldol on 9/16, psych stopped clozapine 9/18 due to continued hypotension . In summary, the patient's reported symptoms of elvis and psychosis in the context of medication nonadherence are consistent with Bipolar Disorder, type 1. Nurse reports seeing and hearing patient talk to herself throughout the night. Psych will be seeing patient this afternoon. Will follow up their recommendation.   - See psychiatry note for details of medication titrations  - Precautions: 1:1 sitter, restrict to unit, assault  precuations, cheeking precautions, elopement, fall  - Legal status: Mata and full commitment: A petition for MI commitment with  [employer] was filed on 8/18 through [facility].    - Depakote DT sprinkles 500 mg BID   - Agitation PRN per last psychiatry orders  - EMERGENCY for agitation: Haldol 5mg + 2mg ativan + 50mg q8h PRN, PO or IM  - Preferred for agitation, anxiety: Hydroxyzine 25 - 50mg PO q6h PRN  - Psych consult placed due to now only being on Depakote, appreciate recs     # Orthostatic hypotension  Symptomatic 9/18 with blood pressure drop from 123/72 with sitting to 96/55 with standing. Patient reports this is new since admission and starting new medications. Patient initiated on clozapine on 8/31 with dose increases, and currently on haldol. Per nursing patient has been increasingly sleepy, and possibly not taking in as much water and eating less than half of meals. Orthostatic hypotension likely related to medications and possible dehydration. Haldol discontinued evening of 9/16 per psychiatry. AM cortisol WNL: 17.7. Cardiology team was consulted to rule out clozapine induced myocarditis, they have very low suspicion based on echo and troponin.  - Encourage hydration   - Compression stockings  - Recommend minimize offending agents (clozapine, haldol, etc)   - trend orthostatic vital signs daily      # Hypocalcemia  # Hypoalbuminemia  Suspect possible malnutrition due to poor PO intake.   - Nutrition IP consult, appreciate recs     # Scientology  Important to her value system  - Spiritual care consult      Chronic/stable  Hx of HTN  - HOLDING PTA clonidine as patient's pressures have been consistently soft.     Diet: Combination Diet Regular Diet; Safe Tray - with utensils    DVT Prophylaxis: Low Risk/Ambulatory with no VTE prophylaxis indicated  Wilson Catheter: Not present  Fluids: PO  Lines: None     Cardiac Monitoring: None  Code Status: Full Code      Clinically Significant Risk Factors Present on Admission              # Hypoalbuminemia: Lowest albumin = 2.4 g/dL at 9/19/2023 11:53 PM, will monitor as appropriate    #  Coagulation Defect: INR = 1.19 (Ref range: 0.85 - 1.15) and/or PTT = 34 Seconds (Ref range: 22 - 38 Seconds), will monitor for bleeding                    Disposition Plan      Expected Discharge Date: 09/24/2023                The patient's care was discussed with the Attending Physician, Dr. Mann .    Gonzalez Reno MD  [facility]  [facility]  Securely message with [employer] (more info)  Text page via [employer] Paging/Directory   See signed in provider for up to date coverage information  ______________________________________________________________________    Interval History   Overnight:  Nurse reports patient was seen talking to herself. Patient notes she was talking to someone in the room.     Subjective:  VSS. Afebrile. Patient was active and communicating appropriately with resident in the morning but was very somnolent during rounds. She mentions feeling dizziness whenever she stands up to use the restroom. Last orthostatic vital, sitting =108/62 and gjdsuzhb=056/62. Angela denies any fever, cough, body aches, or constipation.       Physical Exam   Vital Signs: Temp: 99.8  F (37.7  C) Temp src: Oral BP: 97/61 Pulse: 96   Resp: 16 SpO2: 93 % O2 Device: None (Room air)    Weight: 240 lbs 8.35 oz    Constitutional: Somnolent, cooperative, no apparent distress, and appears stated age  Respiratory: No increased work of breathing, good air exchange, clear to auscultation bilaterally, no crackles or wheezing  Cardiovascular: Normal apical impulse, regular rate and rhythm, normal S1 and S2, no S3 or S4, and no murmur noted  GI: No scars, normal bowel sounds, soft, non-distended, non-tender, no masses palpated, no hepatosplenomegally  Skin: no redness, warmth, or swelling  Neurologic: Lethargic, oriented to name and place  Neuropsychiatric: General: normal, calm, and normal eye contact    Medical Decision Making       Please see A&P for additional  details of medical decision making.      Data     I have personally reviewed the following data over the past 24 hrs:    11.2 (H)  \   10.3 (L)   / 236     138 102 5.7 (L) /  128 (H)   3.6 26 0.61 \     ALT: 36 AST: 26 AP: 101 TBILI: 0.4   ALB: 2.7 (L) TOT PROTEIN: 5.9 (L) LIPASE: N/A     Procal: 0.10 (H) CRP: 199.02 (H) Lactic Acid: 2.6 (H)         Imaging results reviewed over the past 24 hrs:   Recent Results (from the past 24 hour(s))   Echocardiogram Complete   Result Value    LVEF  60-65%    Narrative    [id number]  [id number]  [id number]  [id number]^MARCOS^FLAQUITA^ANA     [facility],[place]  Echocardiography Laboratory  [address] [place] [id number]     Name: JAYLIN WREN  MRN: [id number]  : 1965  Study Date: 2023 10:10 AM  Age: 58 yrs  Gender: Female  Patient Location: [facility]  Reason For Study: Myocarditis  Ordering Physician: FLAQUITA RODRÍGUEZ     BSA: 2.2 m2  Height: 69 in  Weight: 224 lb  BP: 90/60 mmHg  ______________________________________________________________________________  Procedure  Echocardiogram with two-dimensional, color and spectral Doppler performed.  ______________________________________________________________________________  Interpretation Summary  Global and regional left ventricular function is normal with an EF of 60-65%.  Global right ventricular function is normal.  No significant valvular abnormalities present.  Estimated mean right atrial pressure is normal.  Trivial pericardial effusion is present.  ______________________________________________________________________________  Left Ventricle  Left ventricular size is normal. Left ventricular wall thickness is normal.  Global and regional left ventricular function is normal with an EF of 60-65%.  No regional wall motion abnormalities are seen.     Right Ventricle  The right ventricle is normal size. Global right ventricular function is  normal.     Atria  Both atria appear  normal.     Mitral Valve  The mitral valve is normal.     Aortic Valve  Aortic valve is normal in structure and function.     Tricuspid Valve  The tricuspid valve is normal. Trace to mild tricuspid insufficiency is  present. The peak velocity of the tricuspid regurgitant jet is not obtainable.  Pulmonary artery systolic pressure cannot be assessed.     Pulmonic Valve  The pulmonic valve is normal.     Vessels  The aorta root is normal. The inferior vena cava is normal. Estimated mean  right atrial pressure is normal.     Pericardium  Trivial pericardial effusion is present.     Miscellaneous  No significant valvular abnormalities present.     Compared to Previous Study  Previous study not available for comparison.  ______________________________________________________________________________  MMode/2D Measurements & Calculations  IVSd: 0.95 cm  LVIDd: 4.7 cm  LVIDs: 2.9 cm  LVPWd: 0.99 cm  FS: 37.9 %  LV mass(C)d: 158.1 grams  LV mass(C)dI: 72.9 grams/m2  Ao root diam: 3.2 cm  asc Aorta Diam: 2.6 cm  LVOT diam: 1.9 cm  LVOT area: 2.8 cm2  Ao root diam index Ht(cm/m): 1.8  Ao root diam index BSA (cm/m2): 1.5  Asc Ao diam index BSA (cm/m2): 1.2  Asc Ao diam index Ht(cm/m): 1.5     LA Volume (BP): 55.2 ml  LA Volume Index (BP): 25.4 ml/m2  RV Base: 3.0 cm  RWT: 0.42  TAPSE: 2.3 cm     Doppler Measurements & Calculations  MV E max efrain: 100.0 cm/sec  MV A max efrain: 67.4 cm/sec  MV E/A: 1.5  MV dec slope: 689.0 cm/sec2  LV V1 max P.1 mmHg  LV V1 max: 112.0 cm/sec  LV V1 VTI: 22.1 cm     SV(LVOT): 62.7 ml  SI(LVOT): 28.9 ml/m2  E/E' av.2  Lateral E/e': 11.2  Medial E/e': 13.1  RV S Efrain: 14.5 cm/sec     ______________________________________________________________________________  Report approved by: Bryce Ann 2023 11:34 AM         X-ray Chest 2 vws*    Narrative    Exam: XR CHEST 2 VIEWS, 2023 4:18 AM    Comparison: 2023    History: eval for infection    Findings:  PA and lateral views of  the chest. Cardiac silhouette is within normal  limits. No pneumothorax. Small bilateral pleural effusions. Stable  streaky perihilar and bibasilar opacities. No acute osseous  abnormality.      Impression    Impression:   1. Stable streaky perihilar bibasilar opacities, likely atelectasis  and/or edema. Pneumonia not excluded.  2. Stable small bilateral pleural effusions.    I have personally reviewed the examination and initial interpretation  and I agree with the findings.    AFSHIN PULIDO MD         SYSTEM ID:  [id number]

## 2023-09-20 NOTE — PHARMACY-VANCOMYCIN DOSING SERVICE
"Pharmacy Vancomycin Initial Note  Date of Service 2023  Patient's  1965  58 year old, female    Indication: Sepsis    Current estimated CrCl = Estimated Creatinine Clearance: 162.9 mL/min (A) (based on SCr of 0.49 mg/dL (L)).    Creatinine for last 3 days  2023:  1:49 PM Creatinine 0.58 mg/dL  2023:  9:09 AM Creatinine 0.53 mg/dL; 11:53 PM Creatinine 0.49 mg/dL    Recent Vancomycin Level(s) for last 3 days  No results found for requested labs within last 3 days.      Vancomycin IV Administrations (past 72 hours)        No vancomycin orders with administrations in past 72 hours.                    Nephrotoxins and other renal medications (From now, onward)      Start     Dose/Rate Route Frequency Ordered Stop    23 0130  vancomycin (VANCOCIN) 1,500 mg in 0.9% NaCl 250 mL intermittent infusion         1,500 mg  over 90 Minutes Intravenous EVERY 12 HOURS 23 0113      23 2330  piperacillin-tazobactam (ZOSYN) 3.375 g vial to attach to  mL bag        Note to Pharmacy: For [facility], [facility] and [facility]: For Zosyn-naive patients, use the \"Zosyn initial dose + extended infusion\" order panel.    3.375 g  over 30 Minutes Intravenous EVERY 6 HOURS 23 2327              Contrast Orders - past 72 hours (72h ago, onward)      None            [employer] Prediction of Planned Initial Vancomycin Regimen    Loading dose: N/A  Regimen: 1500 mg IV every 12 hours.  Start time: 01:17 on 2023  Exposure target: AUC24 (range)400-600 mg/L.hr   AUC24,ss: 546 mg/L.hr  Probability of AUC24 > 400: 81 %  Ctrough,ss: 16.8 mg/L  Probability of Ctrough,ss > 20: 36 %  Probability of nephrotoxicity (Lodise DOMINIQUE ): 12 %        Plan:  Start vancomycin  1500 mg IV q12h.   Vancomycin monitoring method: AUC  Vancomycin therapeutic monitoring goal: 400-600 mg*h/L  Pharmacy will check vancomycin levels as appropriate in 1-3 Days.    Serum creatinine levels will be ordered daily for the first week of " therapy and at least twice weekly for subsequent weeks.      Bjorn Mitchell, [facility]

## 2023-09-20 NOTE — PLAN OF CARE
"  Problem: Adult Behavioral Health Plan of Care  Goal: Adheres to Safety Considerations for Self and Others  Outcome: Progressing  Intervention: Develop and Maintain Individualized Safety Plan  Recent Flowsheet Documentation  Taken 9/19/2023 1914 by Caren Pelletier RN  Safety Measures:   clinical history reviewed   safety rounds completed   suicide check-in completed   Goal Outcome Evaluation:    Plan of Care Reviewed With: patient            Patient was isolative to the room all shift, ambulated to the bathroom and sat up in bed. Patient presents with a flat affect, mood is calm. Patient denied all mental health symptoms, reports feeling tired. Patient ate 40% of dinner, family visited and that seemed to go well. Last bowel movement, per patient was yesterday. Reports a headache, shoulder, and neck pain. VS check done and T-101.3; RR-18; BP-104/67; HR-103; O2-97%. PRN Tylenol 650mg was given to the patient and internal medicine was notified. Lab orders were received including COVID. Patient is negative for COVID, Lab culture pending. Recheck vitals were /67 (BP Location: Left arm, Patient Position: Supine, Cuff Size: Adult Large)   Pulse 102   Temp 99.5  F (37.5  C) (Oral)   Resp 18   Ht 1.753 m (5' 9\")   Wt 101.9 kg (224 lb 9.6 oz)   SpO2 97%   BMI 33.17 kg/m  . Internal medicine provider notified of critical lactic lab value.  Per Internal medicine, patient will need to be transferred to a medical unit given such high lactic value of 4.3. Rapid response called.        " No COVID test required

## 2023-09-20 NOTE — DISCHARGE SUMMARY
"Psychiatric Discharge Summary    Angela Basurto MRN# [id number]   Age: 58 year old YOB: 1965     Date of Admission:  8/16/2023  Date of Discharge:  9/20/2023  4:21 AM (service date of 9/18/23)  Admitting Physician:  Denia Champagne MD  Discharge Physician:  Denia Champagne MD (Contact: [phone])         Event Leading to Hospitalization:   Per H&P:    Per ED Provider Note dated 8/16/23:     The history is provided by medical records and the patient. The history is limited by the condition of the patient (Responding to internal stimuli). A  was used (Professional ).      Angela Basurto is a 58 year old female with previous psychiatric diagnoses of schizoaffective disorder, bipolar type and anxiety who was brought in by EMS from home for psychiatric evaluation.  Per medics, patient has been experiencing auditory hallucinations telling her to harm her aunt and uncle and threatened to kill family with a knife. Patient's daughter called 911. Knife was removed from patient.      Patient was seen by DEC  as well as resident, please see consult note for further details.  Patient was interviewed with assistance of .  History is somewhat limited, throughout the interview she was noted to be laughing, talking to herself, talking under her breath. Patient does endorse auditory hallucinations, states that the voices are \"old friends\" and then muttered something inaudible in [place] citizen.  When DEC  asked  for clarification,  reports that patient said that she could not understand what the voices were saying.  Patient states that these are command auditory hallucinations.  She states that she has difficulty sleeping, has only been sleeping 10pm to midnight and then roaming throughout the house the rest of the night. She states she is taking her medications.  Denies knife or any unusual events at home. At this point in the " "interview she states that she has told us everything she has wanted to tell us and stopped offering additional details. DEC  attempting to obtain additional history from family via phone at this time.      The history is provided by medical records and the patient. The history is limited by the condition of the patient (Responding to internal stimuli). A  was used (Professional ).      Angela Basurto is a 58 year old female with previous psychiatric diagnoses of schizoaffective disorder, bipolar type and anxiety who was brought in by EMS from home for psychiatric evaluation.  Per medics, patient has been experiencing auditory hallucinations telling her to harm her aunt and uncle and threatened to kill family with a knife. Patient's daughter called 911. Knife was removed from patient.      Patient was seen by DEC  as well as resident, please see consult note for further details.  Patient was interviewed with assistance of .  History is somewhat limited, throughout the interview she was noted to be laughing, talking to herself, talking under her breath. Patient does endorse auditory hallucinations, states that the voices are \"old friends\" and then muttered something inaudible in Tog[place]ese.  When DEC  asked  for clarification,  reports that patient said that she could not understand what the voices were saying.  Patient states that these are command auditory hallucinations.  She states that she has difficulty sleeping, has only been sleeping 10pm to midnight and then roaming throughout the house the rest of the night. She states she is taking her medications.  Denies knife or any unusual events at home. At this point in the interview she states that she has told us everything she has wanted to tell us and stopped offering additional details. DEC  attempting to obtain additional history from family via phone at this time.    "   Per [facility] Assessment dated 8/16/23:     Referral Data and Chief Complaint  Angela Basurto presents to the ED via EMS. Patient is presenting to the ED for the following concerns: Physical aggression, Paranoia.   Factors that make the mental health crisis life threatening or complex are:  Pt brought to the ED by EMS.  EMS reported that family had called 911 because pt was threatening family members with a knife.   Pt is talking and laughing to herself.  She minimizes presenting concerns and denies any of the events reported by her family.  She does identify that she is hearing voices. She begins to say they are the voices of old friends, she then begins to speak Macedonian and  is unable to understand what she is saying.   She denies command hallucinations to hurt herself or others.   She states that she is taking her medications. She is sleeping from about 10p-12midnight and then is up wandering the house..        Informed Consent and Assessment Methods  Explained the crisis assessment process, including applicable information disclosures and limits to confidentiality, assessed understanding of the process, and obtained consent to proceed with the assessment.  Assessment methods included conducting a formal interview with patient, review of medical records, collaboration with medical staff, and obtaining relevant collateral information from family and community providers when available.  : done        Patient response to interventions: acceptance expressed  Coping skills were attempted to reduce the crisis:        History of the Crisis   Pt with history of Schizoaffective Bipolar Type.    She was most recently hospitalized at [facility] 6/27-7/26/2023.     Brief Psychosocial History  Family:  , Children yes  Support System:  Children  Employment Status:     Source of Income:     Financial Environmental Concerns:     Current Hobbies:     Barriers in Personal Life:        Significant Clinical  "History  Current Anxiety Symptoms:     Current Depression/Trauma:  impaired decision making  Current Somatic Symptoms:  wandering, racing thoughts  Current Psychosis/Thought Disturbance:  hostile/aggressive, auditory hallucinations  Current Eating Symptoms:     Chemical Use History:  Alcohol: None  Opiates: None  Cocaine: None  Marijuana: None  Other Use: None   Past diagnosis:  Other (Schizoaffective Disorder, Bipolar Type)  Family history:     Past treatment:  Individual therapy, Inpatient Hospitalization  Details of most recent treatment:  [facility] 2023  Other relevant history:           Collateral Information  Is there collateral information: Yes      Collateral information name, relationship, phone number:  Daughter Mya at [phone] and aunt Vickie at [phone]     What happened today: Patient has been experiencing \"really bad psychosis\" the past couple days. Patient's daughter was driving, while patient was talking to herself. Patient was threatening to fight and kill people. Patient swerved the steering wheel and tried to grab the car keys in the middle of the street. Patient tried to jump out of the car. Patient's daughter called patient's aunt to help return patient home. Patient then grabbed a knife and threatned again to harm people. Patient's family called EMS at this point.      What is different about patient's functioning: Patient has not been sleeping, stays awake at night talking to herself. Patient's family believes her medication is not working.      Concern about alcohol/drug use:       What do you think the patient needs:       Has patient made comments about wanting to kill themselves/others: yes     If d/c is recommended, can they take part in safety/aftercare planning:  no     Additional collateral information:        Risk Assessment  [place] Suicide Severity Rating Scale Full Clinical Version:  Suicidal Ideation  Q1 Wish to be Dead (Lifetime): No  Q2 Non-Specific Active Suicidal Thoughts " (Lifetime): No     Suicidal Behavior (Lifetime)  Actual Attempt (Lifetime): Yes  Has subject engaged in non-suicidal self-injurious behavior? (Lifetime): No  Interrupted Attempts (Lifetime): No  Aborted or Self-Interrupted Attempt (Lifetime): No  Preparatory Acts or Behavior (Lifetime): No        Environmental or Psychosocial Events: work or task failure, challenging interpersonal relationships  Protective Factors: Protective Factors: responsibilities and duties to others, including pets and children, lives in a responsibly safe and stable environment, supportive ongoing medical and mental health care relationships     Does the patient have thoughts of harming others? Feels Like Hurting Others: no  Previous Attempt to Hurt Others: no  Current presentation:  (cooperative)  Is the patient engaging in sexually inappropriate behavior?: no     Is the patient engaging in sexually inappropriate behavior?  no         Mental Status Exam   Affect: Constricted  Appearance: Appropriate  Attention Span/Concentration: Inattentive  Eye Contact: Variable    Fund of Knowledge: Appropriate   Language /Speech Content: Fluent  Language /Speech Volume: Normal  Language /Speech Rate/Productions: Normal  Recent Memory: Intact  Remote Memory: Intact  Mood: Apathetic  Orientation to Person: Yes   Orientation to Place: Yes  Orientation to Time of Day: Yes  Orientation to Date: Yes     Situation (Do they understand why they are here?): Yes  Psychomotor Behavior: Normal  Thought Content: Hallucinations  Thought Form: Paranoia     Primary Problem This Admission  Active Hospital Problems    Schizoaffective disorder, bipolar type (H)        Clinical Summary and Substantiation of Recommendations   Pt presents with elvis and psychosis. Her insight and judgement are impaired.  She is not sleeping much. She is not taking her medications.        Severe psychiatric, behavioral or other comorbid conditions are appropriate for management at inpatient  mental health as indicated by at least one of the following: Psychiatric Symptoms, Impaired impulse control, judgement, or insight  Severe dysfunction in daily living is present as indicated by at least one of the following: Incapacitation because of grave disability  Situation and expectations are appropriate for inpatient care: Voluntary treatment at lower level of care is not feasible  Inpatient mental health services are necessary to meet patient needs and at least one of the following: Specific condition related to admission diagnosis is present and judged likely to further improve at proposed level of care        Patient coping skills attempted to reduce the crisis:        Disposition  Recommended disposition: Inpatient Mental Health. Intake called and pt placed on bed list [facility]/16@702p [facility].         Reviewed case and recommendations with attending provider. Attending Name: Dr Chavez       Attending concurs with disposition: yes       Patient and/or validated legal guardian concurs with disposition:   yes        Final disposition:  inpatient mental health     Legal status on admission:       Assessment Details   Total duration spent on the patient case in minutes: 20 min        Per psych provider note dated 8/17/23:     This note is being entered to supplement the psychiatry consultation note that was completed on August 16, 2023 by the licensed mental health professional Garrett Shah [facility]  have reviewed the pertinent clinical details related to their encounter. I am being consulted to offer additional guidance on psychiatric pharmacological interventions     Writer met patient in her room face-to-face by herself with [place]ian  by phone.  Patient was alert to herself very confused and disorganized during assessment and interview continued to respond to internal stimuli unable to follow command.  Patient is very paranoid while staying in [facility] patient strikes the nurses very aggressive received as  needed Zyprexa IM.  Patient was just discharged last month on July 27, 2023 under the care of Dr. Champagne for 26 days, patient was discharged home AGAINST MEDICAL ADVICE  Patient was presented to emergency room due to homicidal threats toward her family using knife, and exhibiting paranoid and severe disorganized thought and responding to her internal stimuli while staying in the emergency room.  We will file civil commitment with [facility] patient may benefit from VELIZ     I have reviewed the nursing notes. I have reviewed the findings, diagnosis, plan and need for follow up with the patient.     This note is being entered to supplement the psychiatry consultation note that was completed on August 16, 2023 by the licensed mental health professional Garrett FOOTESW  have reviewed the pertinent clinical details related to their encounter. I am being consulted to offer additional guidance on psychiatric pharmacological interventions     Writer met patient in her room face-to-face by herself with [place]  by phone.  Patient was alert to herself very confused and disorganized during assessment and interview continued to respond to internal stimuli unable to follow command.  Patient is very paranoid while staying in [facility] patient strikes the nurses very aggressive received as needed Zyprexa IM.  Patient was just discharged last month on July 27, 2023 under the care of Dr. Champagne for 26 days, patient was discharged home AGAINST MEDICAL ADVICE  Patient was presented to emergency room due to homicidal threats toward her family using knife, and exhibiting paranoid and severe disorganized thought and responding to her internal stimuli while staying in the emergency room.  We will file civil commitment with [facility] through [facility] patient may benefit from VELIZ     I have reviewed the nursing notes. I have reviewed the findings, diagnosis, plan and need for follow up with the patient.    "  1. Pt displays the following risk factors that support , severe agitation and aggression, strike nurses while in emergency room, homicidal ideation threatening family with knife unable to contract for safety. Pt is unable to engage in safety planning to mitigate risk level in a non-secure setting. Lower levels of care have not been successful in mitigating risk. Due to this IP is the least restrictive option of care for pt. Pt should remain in IP until deemed safe to return to the community and engage in OP MH supports     - Continue to recommend inpatient psychiatric hospitalizations for further stabilization   2.  Continue 72-hour hold will file civil commitment with [facility] due to risk of severe agitation and aggression  3.  Continue current Zyprexa, Haldol as PTA medication add as needed Haldol 5 mg Ativan 2 mg Benadryl 50 mg p.o. IM for severe agitation and aggression     4.  Consult psychiatry as needed  5.   Refer to psychiatric provider for medication management.   treatment per ED team     - Consulted with [facility], ED physician Dr. Chente Mortensen asked if they would like this writer to enter orders in the EHR,  patient's ED RN regarding this case.     Please call [facility]/DEC at [phone] if you have follow-up questions or wish to place another consult.  Jose Wilkerson, Psychiatric Nurse practitioner     Per my interview with patient:     Patient was lying in bed when writer and  approached patient in her room. Appeared paranoid and disheveled, though did agree to meet with writer via polycom. Patient said that she is \"happy to see you again [writer cared for her during last hospital stay].\" She said that she is back in the hospital because \"I am hearing voices; the voices came back.\" When asked why she believes the voices re-emerged, she replied \"I have no idea.\" She noted that they re-emerged yesterday (inconsistent with above records). She mentioned that she missed medications for " "one day only, but cannot recall which day. She said that she believes the Depakote is causing her to experience hallucinations. The voices are \"reminding me of my friends.\" She denied that they were command in nature, or telling her to harm herself or others, but then added \"but they were close to saying that.\" When asked about use of a knife prior to admission, she adamantly denied threatening anyone with a knife. She said that she did have a knife in her possession, but \"I was cutting the knife with lettuce and vegetables. I never threatened anyone.\" She said that she does have concerns about her ex-. When asked to elaborate, she replied \"He got  and he is saying that he is jealous and is going to kill me.\" When asked how he is communicating with her, she initially said \"verbally, in person.\" She then clarified that she has not seen him in person recently, but \"I get the feeling. He is listening to us right now.\" Attempted to provide reassurance though she was not receptive and became increasingly more suspicious of writer. She looked intensely into the I-pad screen and said \"Is this person (writer) Angela?\" I attempted to reassure her and re-introduce myself, and she said \"You're not helping me with anything!\" She then laid in bed and asked to terminate interview because she is tired. She had no additional questions or concerns for writer. She put her blanket around her head and then would not respond to additional questions.        See Admission note by Christian HARRIS on 9/20/23 for additional details.          Diagnoses:     # Bipolar I disorder, currently manic with psychotic features   # Hx of possible seizure in 1983          Labs:     Recent Results (from the past 1008 hour(s))   Valproic acid (Depakote level)    Collection Time: 08/17/23  9:28 AM   Result Value Ref Range    Valproic acid 18.2 (L)   ug/mL   Comprehensive metabolic panel    Collection Time: 08/17/23  9:29 AM   Result Value Ref " Range    Sodium 141 136 - 145 mmol/L    Potassium 3.9 3.4 - 5.3 mmol/L    Chloride 103 98 - 107 mmol/L    Carbon Dioxide (CO2) 25 22 - 29 mmol/L    Anion Gap 13 7 - 15 mmol/L    Urea Nitrogen 6.5 6.0 - 20.0 mg/dL    Creatinine 0.49 (L) 0.51 - 0.95 mg/dL    Calcium 10.0 8.6 - 10.0 mg/dL    Glucose 151 (H) 70 - 99 mg/dL    Alkaline Phosphatase 71 35 - 104 U/L    AST 17 0 - 45 U/L    ALT 14 0 - 50 U/L    Protein Total 7.6 6.4 - 8.3 g/dL    Albumin 4.3 3.5 - 5.2 g/dL    Bilirubin Total 0.4 <=1.2 mg/dL    GFR Estimate >90 >60 mL/min/1.73m2   CBC with platelets and differential    Collection Time: 08/17/23  9:29 AM   Result Value Ref Range    WBC Count 7.1 4.0 - 11.0 10e3/uL    RBC Count 4.25 3.80 - 5.20 10e6/uL    Hemoglobin 13.5 11.7 - 15.7 g/dL    Hematocrit 40.6 35.0 - 47.0 %    MCV 96 78 - 100 fL    MCH 31.8 26.5 - 33.0 pg    MCHC 33.3 31.5 - 36.5 g/dL    RDW 12.3 10.0 - 15.0 %    Platelet Count 256 150 - 450 10e3/uL    % Neutrophils 69 %    % Lymphocytes 25 %    % Monocytes 6 %    % Eosinophils 0 %    % Basophils 0 %    % Immature Granulocytes 0 %    NRBCs per 100 WBC 0 <1 /100    Absolute Neutrophils 4.9 1.6 - 8.3 10e3/uL    Absolute Lymphocytes 1.8 0.8 - 5.3 10e3/uL    Absolute Monocytes 0.4 0.0 - 1.3 10e3/uL    Absolute Eosinophils 0.0 0.0 - 0.7 10e3/uL    Absolute Basophils 0.0 0.0 - 0.2 10e3/uL    Absolute Immature Granulocytes 0.0 <=0.4 10e3/uL    Absolute NRBCs 0.0 10e3/uL   Glucose by meter    Collection Time: 08/18/23 10:18 PM   Result Value Ref Range    GLUCOSE BY METER POCT 250 (H) 70 - 99 mg/dL   Valproic Acid Free & Total    Collection Time: 08/22/23  7:07 AM   Result Value Ref Range    Valproic Acid Free 17 7 - 23 ug/mL    Valproic Acid Total 106 50 - 125 ug/mL    Valproic Acid, Percent Free 16 5 - 18 %   Haloperidol Level    Collection Time: 08/27/23  8:08 AM   Result Value Ref Range    Haloperidol 13.7 5.0 - 20.0 ng/mL   CBC with platelets and differential    Collection Time: 08/31/23  1:56 PM    Result Value Ref Range    WBC Count 6.5 4.0 - 11.0 10e3/uL    RBC Count 3.81 3.80 - 5.20 10e6/uL    Hemoglobin 12.2 11.7 - 15.7 g/dL    Hematocrit 37.0 35.0 - 47.0 %    MCV 97 78 - 100 fL    MCH 32.0 26.5 - 33.0 pg    MCHC 33.0 31.5 - 36.5 g/dL    RDW 12.4 10.0 - 15.0 %    Platelet Count 229 150 - 450 10e3/uL    % Neutrophils 53 %    % Lymphocytes 38 %    % Monocytes 9 %    % Eosinophils 0 %    % Basophils 0 %    % Immature Granulocytes 0 %    NRBCs per 100 WBC 0 <1 /100    Absolute Neutrophils 3.5 1.6 - 8.3 10e3/uL    Absolute Lymphocytes 2.5 0.8 - 5.3 10e3/uL    Absolute Monocytes 0.6 0.0 - 1.3 10e3/uL    Absolute Eosinophils 0.0 0.0 - 0.7 10e3/uL    Absolute Basophils 0.0 0.0 - 0.2 10e3/uL    Absolute Immature Granulocytes 0.0 <=0.4 10e3/uL    Absolute NRBCs 0.0 10e3/uL   WBC and Differential    Collection Time: 09/07/23  9:38 AM   Result Value Ref Range    WBC Count 6.2 4.0 - 11.0 10e3/uL    % Neutrophils 57 %    % Lymphocytes 35 %    % Monocytes 7 %    % Eosinophils 1 %    % Basophils 0 %    % Immature Granulocytes 0 %    NRBCs per 100 WBC 0 <1 /100    Absolute Neutrophils 3.5 1.6 - 8.3 10e3/uL    Absolute Lymphocytes 2.2 0.8 - 5.3 10e3/uL    Absolute Monocytes 0.5 0.0 - 1.3 10e3/uL    Absolute Eosinophils 0.0 0.0 - 0.7 10e3/uL    Absolute Basophils 0.0 0.0 - 0.2 10e3/uL    Absolute Immature Granulocytes 0.0 <=0.4 10e3/uL    Absolute NRBCs 0.0 10e3/uL   EKG 12-lead, complete    Collection Time: 09/07/23 10:26 AM   Result Value Ref Range    Systolic Blood Pressure  mmHg    Diastolic Blood Pressure  mmHg    Ventricular Rate 94 BPM    Atrial Rate 94 BPM    MI Interval 176 ms    QRS Duration 72 ms     ms    QTc 440 ms    P Axis 64 degrees    R AXIS 35 degrees    T Axis 33 degrees    Interpretation ECG       Sinus rhythm  Nonspecific T wave abnormality  Abnormal ECG  When compared with ECG of 13-SEP-2004 07:53,  Nonspecific T wave abnormality now evident in Inferior leads  Nonspecific T wave abnormality now  evident in Lateral leads  Confirmed by MD NAHED, LUIS CARLOS ([id number]) on 9/9/2023 2:50:23 PM     Valproic acid    Collection Time: 09/12/23  7:59 AM   Result Value Ref Range    Valproic acid 55.9   ug/mL   Clozapine and Metabolites Quant    Collection Time: 09/12/23  7:59 AM   Result Value Ref Range    Clozapine Quant 702 ng/mL    Norclozapine Quant 158 ng/mL    Clozapine-N-Oxide Quant <100 ng/mL    Clozapine and Metabolites Total 860 <=1500 ng/mL   WBC and Differential    Collection Time: 09/14/23  9:13 AM   Result Value Ref Range    WBC Count 8.7 4.0 - 11.0 10e3/uL    % Neutrophils 83 %    % Lymphocytes 10 %    % Monocytes 6 %    % Eosinophils 1 %    % Basophils 0 %    % Immature Granulocytes 0 %    NRBCs per 100 WBC 0 <1 /100    Absolute Neutrophils 7.2 1.6 - 8.3 10e3/uL    Absolute Lymphocytes 0.9 0.8 - 5.3 10e3/uL    Absolute Monocytes 0.6 0.0 - 1.3 10e3/uL    Absolute Eosinophils 0.1 0.0 - 0.7 10e3/uL    Absolute Basophils 0.0 0.0 - 0.2 10e3/uL    Absolute Immature Granulocytes 0.0 <=0.4 10e3/uL    Absolute NRBCs 0.0 10e3/uL   Comprehensive metabolic panel    Collection Time: 09/15/23 10:37 AM   Result Value Ref Range    Sodium 136 136 - 145 mmol/L    Potassium 3.6 3.4 - 5.3 mmol/L    Chloride 99 98 - 107 mmol/L    Carbon Dioxide (CO2) 25 22 - 29 mmol/L    Anion Gap 12 7 - 15 mmol/L    Urea Nitrogen 5.0 (L) 6.0 - 20.0 mg/dL    Creatinine 0.56 0.51 - 0.95 mg/dL    Calcium 8.9 8.6 - 10.0 mg/dL    Glucose 134 (H) 70 - 99 mg/dL    Alkaline Phosphatase 67 35 - 104 U/L    AST 15 0 - 45 U/L    ALT 13 0 - 50 U/L    Protein Total 6.6 6.4 - 8.3 g/dL    Albumin 3.4 (L) 3.5 - 5.2 g/dL    Bilirubin Total 0.2 <=1.2 mg/dL    GFR Estimate >90 >60 mL/min/1.73m2   CBC with platelets    Collection Time: 09/15/23 10:37 AM   Result Value Ref Range    WBC Count 8.4 4.0 - 11.0 10e3/uL    RBC Count 3.98 3.80 - 5.20 10e6/uL    Hemoglobin 12.7 11.7 - 15.7 g/dL    Hematocrit 38.0 35.0 - 47.0 %    MCV 96 78 - 100 fL    MCH 31.9 26.5 - 33.0 pg     MCHC 33.4 31.5 - 36.5 g/dL    RDW 12.7 10.0 - 15.0 %    Platelet Count 201 150 - 450 10e3/uL   Cortisol    Collection Time: 09/17/23  6:38 AM   Result Value Ref Range    Cortisol 17.7   ug/dL   EKG 12-lead, complete    Collection Time: 09/18/23  1:34 PM   Result Value Ref Range    Systolic Blood Pressure  mmHg    Diastolic Blood Pressure  mmHg    Ventricular Rate 98 BPM    Atrial Rate 98 BPM    AL Interval 138 ms    QRS Duration 76 ms     ms    QTc 400 ms    P Axis 36 degrees    R AXIS 20 degrees    T Axis 18 degrees    Interpretation ECG       Sinus rhythm  Normal ECG  When compared with ECG of 07-SEP-2023 10:26,  No significant change was found  Confirmed by Thai Whittaker ([id number]) on 9/19/2023 11:05:53 AM     Ammonia    Collection Time: 09/18/23  1:49 PM   Result Value Ref Range    Ammonia 19 11 - 51 umol/L   Troponin T, High Sensitivity    Collection Time: 09/18/23  1:49 PM   Result Value Ref Range    Troponin T, High Sensitivity 8 <=14 ng/L   CRP inflammation    Collection Time: 09/18/23  1:49 PM   Result Value Ref Range    CRP Inflammation 174.33 (H) <5.00 mg/L   Comprehensive metabolic panel    Collection Time: 09/18/23  1:49 PM   Result Value Ref Range    Sodium 139 136 - 145 mmol/L    Potassium 3.5 3.4 - 5.3 mmol/L    Chloride 102 98 - 107 mmol/L    Carbon Dioxide (CO2) 29 22 - 29 mmol/L    Anion Gap 8 7 - 15 mmol/L    Urea Nitrogen 7.6 6.0 - 20.0 mg/dL    Creatinine 0.58 0.51 - 0.95 mg/dL    Calcium 8.7 8.6 - 10.0 mg/dL    Glucose 136 (H) 70 - 99 mg/dL    Alkaline Phosphatase 88 35 - 104 U/L    AST 34 0 - 45 U/L    ALT 35 0 - 50 U/L    Protein Total 6.4 6.4 - 8.3 g/dL    Albumin 3.2 (L) 3.5 - 5.2 g/dL    Bilirubin Total 0.3 <=1.2 mg/dL    GFR Estimate >90 >60 mL/min/1.73m2   CBC with platelets and differential    Collection Time: 09/18/23  1:49 PM   Result Value Ref Range    WBC Count 7.2 4.0 - 11.0 10e3/uL    RBC Count 3.67 (L) 3.80 - 5.20 10e6/uL    Hemoglobin 11.6 (L) 11.7 - 15.7 g/dL     Hematocrit 34.7 (L) 35.0 - 47.0 %    MCV 95 78 - 100 fL    MCH 31.6 26.5 - 33.0 pg    MCHC 33.4 31.5 - 36.5 g/dL    RDW 12.7 10.0 - 15.0 %    Platelet Count 207 150 - 450 10e3/uL    % Neutrophils 63 %    % Lymphocytes 22 %    % Monocytes 12 %    % Eosinophils 2 %    % Basophils 0 %    % Immature Granulocytes 1 %    NRBCs per 100 WBC 0 <1 /100    Absolute Neutrophils 4.5 1.6 - 8.3 10e3/uL    Absolute Lymphocytes 1.6 0.8 - 5.3 10e3/uL    Absolute Monocytes 0.9 0.0 - 1.3 10e3/uL    Absolute Eosinophils 0.1 0.0 - 0.7 10e3/uL    Absolute Basophils 0.0 0.0 - 0.2 10e3/uL    Absolute Immature Granulocytes 0.0 <=0.4 10e3/uL    Absolute NRBCs 0.0 10e3/uL   Nt probnp inpatient    Collection Time: 09/18/23  1:49 PM   Result Value Ref Range    N terminal Pro BNP Inpatient 641 0 - 900 pg/mL   Procalcitonin    Collection Time: 09/18/23  1:49 PM   Result Value Ref Range    Procalcitonin 0.11 (H) <0.05 ng/mL   Magnesium    Collection Time: 09/18/23  1:49 PM   Result Value Ref Range    Magnesium 1.8 1.7 - 2.3 mg/dL   Troponin T, High Sensitivity    Collection Time: 09/18/23 10:38 PM   Result Value Ref Range    Troponin T, High Sensitivity 11 <=14 ng/L   Extra Purple Top Tube    Collection Time: 09/18/23 10:38 PM   Result Value Ref Range    Hold Specimen JIC    INR    Collection Time: 09/19/23 12:21 AM   Result Value Ref Range    INR 1.19 (H) 0.85 - 1.15   Partial thromboplastin time    Collection Time: 09/19/23 12:21 AM   Result Value Ref Range    aPTT 34 22 - 38 Seconds   Basic metabolic panel    Collection Time: 09/19/23  9:09 AM   Result Value Ref Range    Sodium 137 136 - 145 mmol/L    Potassium 3.6 3.4 - 5.3 mmol/L    Chloride 100 98 - 107 mmol/L    Carbon Dioxide (CO2) 28 22 - 29 mmol/L    Anion Gap 9 7 - 15 mmol/L    Urea Nitrogen 5.8 (L) 6.0 - 20.0 mg/dL    Creatinine 0.53 0.51 - 0.95 mg/dL    Calcium 8.5 (L) 8.6 - 10.0 mg/dL    Glucose 123 (H) 70 - 99 mg/dL    GFR Estimate >90 >60 mL/min/1.73m2   CBC with platelets     Collection Time: 09/19/23  9:09 AM   Result Value Ref Range    WBC Count 9.8 4.0 - 11.0 10e3/uL    RBC Count 3.28 (L) 3.80 - 5.20 10e6/uL    Hemoglobin 10.1 (L) 11.7 - 15.7 g/dL    Hematocrit 30.9 (L) 35.0 - 47.0 %    MCV 94 78 - 100 fL    MCH 30.8 26.5 - 33.0 pg    MCHC 32.7 31.5 - 36.5 g/dL    RDW 12.9 10.0 - 15.0 %    Platelet Count 219 150 - 450 10e3/uL   CRP inflammation    Collection Time: 09/19/23  9:09 AM   Result Value Ref Range    CRP Inflammation 186.00 (H) <5.00 mg/L   Echocardiogram Complete    Collection Time: 09/19/23 11:50 AM   Result Value Ref Range    LVEF  60-65%    UA with Microscopic reflex to Culture    Collection Time: 09/19/23  5:04 PM    Specimen: Urine, Midstream   Result Value Ref Range    Color Urine Yellow Colorless, Straw, Light Yellow, Yellow    Appearance Urine Clear Clear    Glucose Urine Negative Negative mg/dL    Bilirubin Urine Negative Negative    Ketones Urine Negative Negative mg/dL    Specific Gravity Urine 1.017 1.003 - 1.035    Blood Urine Negative Negative    pH Urine 6.5 5.0 - 7.0    Protein Albumin Urine 10 (A) Negative mg/dL    Urobilinogen Urine 2.0 Normal, 2.0 mg/dL    Nitrite Urine Negative Negative    Leukocyte Esterase Urine Negative Negative    Mucus Urine Present (A) None Seen /LPF    RBC Urine 1 <=2 /HPF    WBC Urine 5 <=5 /HPF    Squamous Epithelials Urine 2 (H) <=1 /HPF    Transitional Epithelials Urine <1 <=1 /HPF   Symptomatic COVID-19 Virus (Coronavirus) by PCR Nasopharyngeal    Collection Time: 09/19/23  7:02 PM    Specimen: Nasopharyngeal; Swab   Result Value Ref Range    SARS CoV2 PCR Negative Negative   Respiratory Panel PCR    Collection Time: 09/19/23  7:02 PM    Specimen: Nasopharyngeal; Swab   Result Value Ref Range    Adenovirus Not Detected Not Detected    Coronavirus Not Detected Not Detected    Human Metapneumovirus Not Detected Not Detected    Human Rhin/Enterovirus Not Detected Not Detected    Influenza A Not Detected Not Detected    Influenza  A, H1 Not Detected Not Detected    Influenza A 2009 H1N1 Not Detected Not Detected    Influenza A, H3 Not Detected Not Detected    Influenza B Not Detected Not Detected    Parainfluenza Virus 1 Not Detected Not Detected    Parainfluenza Virus 2 Not Detected Not Detected    Parainfluenza Virus 3 Not Detected Not Detected    Parainfluenza Virus 4 Not Detected Not Detected    Respiratory Syncytial Virus A Not Detected Not Detected    Respiratory Syncytial Virus B Not Detected Not Detected    Chlamydia Pneumoniae Not Detected Not Detected    Mycoplasma Pneumoniae Not Detected Not Detected   Lactic acid whole blood    Collection Time: 09/19/23 10:12 PM   Result Value Ref Range    Lactic Acid 4.3 (HH) 0.7 - 2.0 mmol/L   CBC with platelets    Collection Time: 09/19/23 11:53 PM   Result Value Ref Range    WBC Count 10.8 4.0 - 11.0 10e3/uL    RBC Count 3.17 (L) 3.80 - 5.20 10e6/uL    Hemoglobin 10.1 (L) 11.7 - 15.7 g/dL    Hematocrit 30.1 (L) 35.0 - 47.0 %    MCV 95 78 - 100 fL    MCH 31.9 26.5 - 33.0 pg    MCHC 33.6 31.5 - 36.5 g/dL    RDW 13.0 10.0 - 15.0 %    Platelet Count 226 150 - 450 10e3/uL   Comprehensive metabolic panel    Collection Time: 09/19/23 11:53 PM   Result Value Ref Range    Sodium 135 (L) 136 - 145 mmol/L    Potassium 4.0 3.4 - 5.3 mmol/L    Chloride 98 98 - 107 mmol/L    Carbon Dioxide (CO2) 26 22 - 29 mmol/L    Anion Gap 11 7 - 15 mmol/L    Urea Nitrogen 5.0 (L) 6.0 - 20.0 mg/dL    Creatinine 0.49 (L) 0.51 - 0.95 mg/dL    Calcium 8.2 (L) 8.6 - 10.0 mg/dL    Glucose 214 (H) 70 - 99 mg/dL    Alkaline Phosphatase 99 35 - 104 U/L    AST 45 0 - 45 U/L    ALT 39 0 - 50 U/L    Protein Total 6.0 (L) 6.4 - 8.3 g/dL    Albumin 2.4 (L) 3.5 - 5.2 g/dL    Bilirubin Total 0.3 <=1.2 mg/dL    GFR Estimate >90 >60 mL/min/1.73m2   Procalcitonin    Collection Time: 09/19/23 11:53 PM   Result Value Ref Range    Procalcitonin 0.10 (H) <0.05 ng/mL   Lactic acid whole blood    Collection Time: 09/19/23 11:53 PM   Result  Value Ref Range    Lactic Acid 2.6 (H) 0.7 - 2.0 mmol/L   MRSA MSSA PCR, Nasal Swab    Collection Time: 09/20/23  2:14 AM    Specimen: Nares, Bilateral; Swab   Result Value Ref Range    MRSA Target DNA Negative Negative    SA Target DNA Negative    Blood gas venous    Collection Time: 09/20/23  3:42 AM   Result Value Ref Range    pH Venous 7.45 (H) 7.32 - 7.43    pCO2 Venous 44 40 - 50 mm Hg    pO2 Venous 37 25 - 47 mm Hg    Bicarbonate Venous 31 (H) 21 - 28 mmol/L    Base Excess/Deficit 6.4 (H) -7.7 - 1.9 mmol/L    FIO2 21           Consults:   Consultation during this admission received from internal medicine and cardiology:    Assessment & Plan  Angela Basurto is a 58 year old female with PMHx of obesity, HTN, and schizoaffective disorder, bipolar type admitted on 8/16/2023 to inpatient psychiatry for mary and psychosis and homicidal threats towards family.      # Orthostatic hypotension  Symptomatic this morning, with blood pressure drop from 123/72 with sitting to 96/55 with standing. Patient reports this is new since admission and starting new medications. Patient initiated on clozapine on 8/31 with dose increases, and currently on haldol. Per nursing patient has been increasingly sleepy, and possibly not taking in as much water. Orthostatic hypotension likely related to medications and possible dehydration.   - Check CMP and CBC, which are unremarkable.   - Encourage hydration   - Compression stockings  - Agree with decreasing dose of clozapine and haldol. Recommend minimize offending agents (clozapine, haldol, etc)   - trend orthostatic vital signs daily   - if persistently orthostatic with soft BP, consider further work up with AM cortisol     # Hx of hypertension   Agree with holding PTA clonidine for consistently soft BP.      # Schizoaffective disorder, bipolar type  # Psychosis   # Mary  - Management per psychiatry      The patient's care was discussed with the Bedside Nurse, Patient, and Primary  team.  Medicine will continue to follow orthostatic vital signs for orthostatic hypotension.       ---------------------------------------------------------------    Assessment & Plan  Angela Basurto is a 58 year old female with PMHx of obesity, HTN, and schizoaffective disorder, bipolar type admitted on 8/16/2023 to inpatient psychiatry for elvis and psychosis and homicidal threats towards family.      Orthostatic hypotension  Symptomatic this morning, with blood pressure drop from 123/72 with sitting to 96/55 with standing. Patient reports this is new since admission and starting new medications. Patient initiated on clozapine on 8/31 with dose increases, and currently on haldol. Per nursing patient has been increasingly sleepy, and possibly not taking in as much water and eating less than half of meals. Orthostatic hypotension likely related to medications and possible dehydration. Repeat CMP and CBC are unremarkable.   - orthostatics today: 139/81 while sitting, 86/56 while standing, unable to tolerate standing a full 2 minutes.   - Encourage hydration   - Compression stockings  - Agree with decreasing dose of clozapine and haldol. Recommend minimize offending agents (clozapine, haldol, etc)   - trend orthostatic vital signs daily   - will obtain AM cortisol     # Hx of hypertension   Agree with holding PTA clonidine for consistently soft BP.         Medicine will continue to follow for orthostatic hypotension.       ---------------------------------------------------------------------------------------------     Assessment & Plan  Angela Basurto is a 58 year old female with PMHx of obesity, HTN, and schizoaffective disorder, bipolar type admitted on 8/16/2023 to inpatient psychiatry for elvis and psychosis and homicidal threats towards family.       Orthostatic hypotension  Symptomatic this morning, with blood pressure drop from 123/72 with sitting to 96/55 with standing. Patient reports this is new since  admission and starting new medications. Patient initiated on clozapine on  with dose increases, and currently on haldol. Per nursing patient has been increasingly sleepy, and possibly not taking in as much water and eating less than half of meals. Orthostatic hypotension likely related to medications and possible dehydration. Repeat CMP and CBC are unremarkable. Haldol discontinued evening of  per psychiatry.   - unable to obtain orthostatic blood pressures today as the patient was unable to tolerate standing per nursing   - most recent blood pressures while sittin/74 with   - AM cortisol WNL: 17.7  - Encourage hydration   - Compression stockings  - Recommend minimize offending agents (clozapine, haldol, etc)   - trend orthostatic vital signs daily     Hx of hypertension   Agree with holding PTA clonidine for consistently soft BP.            The patient's care was discussed with the Bedside Nurse.     Nunu Tirado NP    ----------------------------------------------------------------------------------------------      Internal Medicine Progress Note        Assessment and plan:  Angela Basurto is a 58 year old female with a history of obesity, hypertension, and schizoaffective disorder.  Medicine following for orthostatic hypotension.     #Decreased LOC  Increasing lethargy and decreased LOC ongoing. Today, pt is barely able to stand up 2/2 to dizziness and orthostatic hypotension. Psychiatry working pt up for myocarditis 2/2 clozaril. Trop normal. No significant changes in CBC, BMP. Ammonia normal. WBC normal. No cough, dyspnea. Normal rhythm and rate, EKG NSR. Afebrile.   -head CT   -UA/UC     #Orthostatic hypotension  Ongoing orthostatic hypotension with dizziness and lightheadedness with standing.  Today, staff was unable to even get a blood pressure when standing and patient was too weak to remain standing.  Patient started taking clozapine  with dosage increases and is also on  Haldol.  Patient has become increasingly sleepy and has poor oral intake.  Cortisol serum in a.m. normal at 17.7.  -Peripheral IV for now  -LR fluid bolus 1 L x 2  -Check orthostatic vital signs after bolus of fluid  -Notify medicine if remains orthostatic to repeat fluid bolus.  -Compression stockings  -consider ECHO for persistent hypotension  -Encourage p.o. intake      --------------------------------------------------------------------------------    Assessment  I was requested to see this patient at bedside by Dr. Champagne for possible clozapine induced myocarditis and persistent orthostatic hypotension. Patient was seen earlier today by medicine team. There have been no new clinical changes, and patient reporting she actually is feeling better from previous. EKG and previously labs reviewed. Patient denying chest pain/pressure, shortness of breath, nausea, vomiting, abdominal pain. Endorses some lightheadedness when standing up too fast. She has had a total of ~3L IVF.           Plan  - Head CT is still pending  - Increase frequency of vital sign checks. Suspect orthostatic hypotension will gradually improve with cessation of clozapine.  - Check magnesium  - Troponin 8. . Overall reassuring, especially in absence of chest pain/pressure. Will check delta troponin. TTE ordered and agree with holding clozapine and consulting cardiology.  - Procalcitonin reassuring at 0.11 despite .33 (sepsis less likely). UA and CXR pending.  - Check blood cultures for any fevers  - Repeat BMP, CBC and CRP in am  -  Disposition: The patient will remain on the current unit. We will continue to monitor this patient closely.     Medicine team will continue to follow for orthostatic hypotension, CXR, UA, and labs as outlined above. Please do not hesitate to reach out with any questions or if there are any concerning clinical changes.     Total time spent reviewing chart, examining patient, placing orders was 30  minutes.    ----------------------------------------------------------------      Internal Medicine Progress Note        Assessment and plan:  Angela Basurto is a 58 year old female with a history of  obesity, hypertension, and schizoaffective disorder.  Medicine following for orthostatic hypotension and lethargy.      #Decreased LOC  Increasing lethargy and decreased LOC ongoing. Today, pt is barely able to stand up 2/2 to dizziness and orthostatic hypotension. Psychiatry working pt up for myocarditis 2/2 clozaril. Trop normal. No significant changes in CBC, CMP. Ammonia normal. WBC normal. No cough, dyspnea. Normal rhythm and rate, EKG NSR. Afebrile. Head CT no evidence of acute intracranial pathology. CRP elevated 174.33-->186. Chest XR-small b/l pleural effusions. ECHO- EF 55-60% normal. Haldol stopped and weaning off clozapine.   -UA/UC- needs to be collected     #Orthostatic hypotension-resolving   Yesterday, pt had profound orthostatic hypotension with dizziness and lightheadedness with standing. Today, blood pressures rising with position changes.  Patient started taking clozapine 8/31 with dosage increases and is also on Haldol.  Patient has become increasingly sleepy and has poor oral intake.  Cortisol serum in a.m. normal at 17.7. Yesterday, received 3 liters of LR boluses.   -Notify medicine if remains orthostatic   -Compression stockings  -Encourage p.o. intake      --------------------------------------------------------------------------------------        ASSESSMENT/RECOMMENDATIONS  Ms. Angela Basurto is a 58 year old female with a history of obesity, hypertension, and schizoaffective disorder who has been admitted for episodes of elvis/psychosis. On admission, patient was noted to have low blood pressure with positive orthostatics and an elevated CRP level. Cardiology team was consulted to rule out clozapine induced myocarditis.      #Orthostatic Hypotension 2/2 Clozapine use   - Monitor  vitals especially blood pressure on sitting and standing. Measure orthostatic vitals daily   - Encourage PO intake   - If unable to take PO consider, IVF (either LR or D5W) as a 1L bolus or a standing rate of 75 mls/hr   - May need to continue to adjust the dose of Clozapine and/or consider an alternative regimen due to alpha blockade property of Clozapine causing a decrease in blood pressure   - Consider using compression stockings at bedtime      #Clozapine Induced Myocarditis - Very low suspicion   - Even though the patient has an elevated CRP, symptomatology, lab results, and echocardiography results makes this diagnosis less likely   - Troponin 8 > 11 signifies myocardial injury is less likely   - NT pro    - Echocardiography results reviewed, trivial pericardial effusion with no signs of myocardial or pericardial injury in terms of focal wall motion abnormality     #Lethargy   - Consider checking for Thyroid function tests      #Elevated CRP      Plan of care discussed with Dr. Octavio Marquis who agrees with above plan.     Thank you for consulting the cardiovascular services at the [facility]. Please do not hesitate to call us with any questions.      Domingo Talbot MD, MS    --------------------------------------------------------------------------------          Brief Medicine Cross-Cover Note:     Was notified by RN regarding fever 101.3F. Additionally patient reporting a new cough and wide spread body aches today. This morning, , WBC 9.8. UA negative for infection. Yesterday CXR with streaky right greater than left perihilar and basilar opacities, likely atelectasis and/or edema without focal consolidation. Procalc 0.11 yesterday. Vitals currently stable and no hypoxia.   - Check blood cultures, lactate   - Check COVID swab and RVP   - Continue to monitor patient closely. Low threshold for transfer to medicine if condition worsens or hypoxia develops.     Bar DONNELLY  "EDELMIRA Banuelos on 9/19/2023 at 6:59 PM    -------------------------------------------------    Brief Medicine Cross-Cover Note:     Fever improved to 99.5F. Lactate not drawn until 2212 and resulted as 4.3. RRT called per protocol. Will need IV placement, IVF bolus, and transfer to medicine service. Will check CMP, CBC, procalc, repeat lactate, VBG. TTE was done today with EF 60-65%, trivial pericardial effusion.     Discussed with RRT QUAN who is en route (appreciate their assistance) and with oncoming medicine nocturnist Dr. Coleman who is planning for hospital admission for this patient. Patient accepted to [facility]'s team.     Bar Banuelos PA-C on 9/19/2023 at 11:09 PM    ---------------------------------------------------------------------      Rapid Response Team Note       Assessment   In assessment a rapid response was called on Angela Basurto due to lactic acidosis. This presentation is likely due to Infection. Patient describes \"not feeling well.\" She endorses intermittent non-productive cough. She also denies nasal drainage. General medicine was previously consulted on this patient and were in agreement with the plan described below.      GEN: not in distress  EYES: PERRL, Anicteric sclera.   HEENT:  Normocephalic, atraumatic, trachea midline, Pupils PERRLA  CV: RRR, no gallops, rubs, or murmurs  PULM/CHEST: Clear breath sounds bilaterally without rhonchi, crackles or wheeze, symmetric chest rise  GI: normal bowel sounds, soft, non-tender, no rebound tenderness or guarding, no masses, BM yesterday described normal bowel movement patterns  : Voids spontaneously  EXTREMITIES: No peripheral edema, moving all extremities, peripheral pulses intact  NEURO: Cranial nerves II-XII grossly intact, no motor-sensory deficits noted  PSYCH:  Affect: appropriate      Plan   -  Labs ordered prior to my arrival  -  Ordered 1 L LR  -  CXR ordered  -  Started broad spectrum antibiotics (zosyn/vanco)  -  The Internal " "Medicine primary team was able to be reached and they are in agreement with the above plan.  -  Disposition: The patient will be transferred to [facility]. Under the [facility]'s service.   -  Reassessment and plan follow-up will be performed by the primary team     ROSITA Rivas CNP        -------------------------------------------------------------------------     Sepsis Evaluation      I was called to see Angela Basurto due to abnormal vital signs triggering the Sepsis SIRS screening alert. She is known to have an infection.      PHYSICAL EXAM  Vital Signs:  Temp: 99.9  F (37.7  C) Temp src: Oral BP: 94/52 Pulse: 103   Resp: 18 SpO2: 94 % O2 Device: None (Room air)       General: acutely ill appearing  Mental Status:  awake and alert .      Remainder of physical exam is significant for Clear breath sounds bilaterally without rhonchi, crackles or wheeze, symmetric chest rise, moving all extremities      DATA          Lactic Acid   Date Value Ref Range Status   09/19/2023 2.6 (H) 0.7 - 2.0 mmol/L Final   09/19/2023 4.3 (HH) 0.7 - 2.0 mmol/L Final       Comment:       Value above critical limit         ASSESSMENT AND PLAN  Angela Basurto meets SIRS criteria AND has a lactate >2 or other evidence of acute organ damage.  These vital signs, lab and physical exam findings constitute a diagnosis of SEVERE SEPSIS, based on: Lactate resulted, and the level was > 2.0     Lactate is down-trending on recheck        Anti-infectives (From now, onward)        Start     Dose/Rate Route Frequency Ordered Stop     09/19/23 2330   piperacillin-tazobactam (ZOSYN) 3.375 g vial to attach to  mL bag        Note to Pharmacy: For [facility], [facility] and [facility]: For Zosyn-naive patients, use the \"Zosyn initial dose + extended infusion\" order panel.    3.375 g  over 30 Minutes Intravenous EVERY 6 HOURS 09/19/23 4447               Current antibiotic coverage is appropriate for source of infection.     3 Hour Severe Sepsis Bundle Completion:  1. " Initial Lactic Acid result shown above. Repeat lactic acid ordered by reflex for 3 hours from initial collection.  2. Blood Cultures before Antibiotics: Yes  3. Broad Spectrum Antibiotics Administered: yes  4. Is hypotension present? No (IV fluid bolus NOT required). IV Fluid volume administered: 1 L ORDERED BUT NOT ADMINISTERED YET DUE TO difficulty with IV access. Currently in process     Disposition: The patient will be transferred to [facility]. To our service from psychiatry.  - Currently working on transfer coordination, needs a discharge order from psychiatrist in order to be transferred. IMC aware and ready for when she arrives. 1:1 sitter requested     Megan Little MD                         Hospital Course:   Angela Basurto was admitted to [facility] with attending Denia Champagne MD on a 72 hour mental health hold. The patient was placed under status 15 (15 minute checks) to ensure patient safety. Pt now under full commitment with [facility].     Assessment and hospital summary:  Angela Basurto is a 58 year old female previously diagnosed with Bipolar Disorder Type 1 and anxiety who presented with elvis and psychosis in the context of medication nonadherence. Patient was presented to emergency room due to homicidal threats toward her family using knife, and exhibiting paranoid and severe disorganized thought and responding to her internal stimuli while staying in the emergency room. Significant symptoms on admission include paranoia, disorganized thinking and behaviors, AVH, and emotional lability. The MSE on admission was pertinent for RTIS. Biological contributions to mental health presentation include diagnosis of Bipolar Disorder, type 1 and taking Depakote, Zyprexa, and Haldol. Psychological contributions to mental health presentation include poor insight into her condition. While she understands why she's in the hospital, she displays intrusive behaviors that require a 1:1. 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 Protective factors include family support and being Anabaptism. Most recent psychiatric hospitalization was in July of this year during which time she left AMA. In summary, the patient's reported symptoms of elvis and psychosis in the context of medication nonadherence are consistent with Bipolar Disorder, type 1.       Angela Basurto was admitted to [facility] on a 72 hour hold. The patient's PTA Zyprexa 10 mg BID & Depakote, and Haldol 20 mg were restarted. PTA hydroxyzine and trazodone were restarted. A petition for MI commitment with Ad[facility] was filed on 8/18 through [facility].  She is now committed with Adolfo. Clozapine will be initiated on 8/31/23. Zyprexa reduced to 5 mg BID on 9/1. On 9/6, Switched Depakote to sprinkles  mg BID per pt request. Reduced Zyprexa to 5 mg QHS due to failed trial, ineffectiveness, polypharmacy, and soft pressures intermittently. On 9/7, baseline EKG ordered and reviewed. It is normal with normal QTc. Zyprexa stopped on 9/8 due to ineffectiveness and mild sedation in context of clozapine titration. Senokot increased to 2 tabs BID due to constipation at that time as well. Achieved dose of 300 mg of clozapine daily on 9/11. Improvement in psychosis noted. Family agrees. Clozapine level at corresponding dose of 300 mg at bedtime was 702 ng/ml. Due to orthostatic hypotension, constipation, and sedation, will reduce clozapine dose to 250 mg at bedtime this evening and will reduce Haldol to 10 mg at bedtime. IM consult placed to assist with management. Reduced clozapine to 200 mg on 9/17 and stopped Haldol on 9/16. IM also aware an following. On 9/18, Angela continued to experience orthostatic hypotension, reported feeling weak and tired. IM assessed again and additional work up initiated. Due to ongoing orthostatic hypotension, clozapine was held. Cardiology also  saw patient on 9/19 and felt that there was low suspicion for myocarditis. Pt spiked temp of 101.3 and had abnormal findings on CXR. She was transferred to medical floor for sepsis on 9/20 with concern for hospital acquired pneumonia.      Angela Basurto did not participate in groups and was not visible in the milieu.     The patient's symptoms of psychosis improved on clozapine.     Angela Basurto was released to medical floor. At the time of discharge Angela Basurto was determined to not be a danger to herself or others.     Recommendations:    Patient has treatment refractory psychosis, though improvement observed on clozapine. Would consider cautious reintroduction of clozapine once BPs and acute infection resolves. Target dose would be 250 mg at bedtime.          Discharge Medications:     Discharge Medication List as of 9/7/2023 11:01 AM        CONTINUE these medications which have NOT CHANGED    Details   divalproex sodium delayed-release (DEPAKOTE SPRINKLE) 125 MG DR capsule Take 500 mg by mouth 2 times daily, Disp-60 capsule, R-0, E-Prescribe      haloperidol (HALDOL) 5 MG tablet Take 3 tablets (15 mg) by mouth At Bedtime And one additional tab (5 mg) daily as needed for hallucinations or sleep disturbances, Disp-90 tablet, R-0, E-Prescribe      !! OLANZapine (ZYPREXA) 10 MG tablet Take 1 tablet (10 mg) by mouth every morning, Disp-30 tablet, R-0, E-Prescribe      !! OLANZapine (ZYPREXA) 20 MG tablet Take 1 tablet (20 mg) by mouth At Bedtime, Disp-30 tablet, R-0, E-Prescribe      sennosides (SENOKOT) 8.6 MG tablet Take 1 tablet by mouth 2 times daily, Disp-60 tablet, R-0, E-Prescribe       !! - Potential duplicate medications found. Please discuss with provider.               Psychiatric Examination:   Mental Status Exam:  Appearance: sleeping upon entering her room, awakened to voice. Disheveled, malodorous. Mildly sedated. Falling asleep intermittently while writer attempting to discuss care  plan.   Attitude:  cooperative  Eye Contact:  fair  Mood:  calm  Affect:  mood congruent and intensity is blunted  Speech:  clear, coherent  Language: fluent and intact in English  Psychomotor, Gait, Musculoskeletal:  no evidence of tardive dyskinesia, dystonia, or tics  Throught Process:  disorganized, evidence of thought blocking present, and illogical  Associations:  no loose associations  Thought Content:  no evidence of suicidal ideation or homicidal ideation, auditory hallucinations present, and patient appears to be responding to internal stimuli throughout entire interview  Insight:  fair  Judgement:  fair  Oriented to:  time, person, and place  Attention Span and Concentration:  fair  Recent and Remote Memory:  fair  Fund of Knowledge:  appropriate            Discharge Plan:   Transfer to medical floor    Attestation:  The patient has been seen and evaluated by me,  Denia Champagne MD

## 2023-09-20 NOTE — CODE/RAPID RESPONSE
"Rapid Response Team Note    Assessment   In assessment a rapid response was called on Angela Basurto due to lactic acidosis. This presentation is likely due to Infection. Patient describes \"not feeling well.\" She endorses intermittent non-productive cough. She also denies nasal drainage. General medicine was previously consulted on this patient and were in agreement with the plan described below.     GEN: not in distress  EYES: PERRL, Anicteric sclera.   HEENT:  Normocephalic, atraumatic, trachea midline, Pupils PERRLA  CV: RRR, no gallops, rubs, or murmurs  PULM/CHEST: Clear breath sounds bilaterally without rhonchi, crackles or wheeze, symmetric chest rise  GI: normal bowel sounds, soft, non-tender, no rebound tenderness or guarding, no masses, BM yesterday described normal bowel movement patterns  : Voids spontaneously  EXTREMITIES: No peripheral edema, moving all extremities, peripheral pulses intact  NEURO: Cranial nerves II-XII grossly intact, no motor-sensory deficits noted  PSYCH:  Affect: appropriate     Plan   -  Labs ordered prior to my arrival  -  Ordered 1 L LR  -  CXR ordered  -  Started broad spectrum antibiotics (zosyn/vanco)  -  The Internal Medicine primary team was able to be reached and they are in agreement with the above plan.  -  Disposition: The patient will be transferred [place] [facility] [place] Under the [facility]'s service.   -  Reassessment and plan follow-up will be performed by the primary team    ROSITA Rivas CNP    [facility] Paging/Directory      Admission Diagnosis:   Schizoaffective disorder, bipolar type (H) [F25.0]    Physical Exam   Temp: 99.9  F (37.7  C) Temp  Min: 98.5  F (36.9  C)  Max: 101.3  F (38.5  C)  Resp: 18 Resp  Min: 16  Max: 18  SpO2: 94 % SpO2  Min: 92 %  Max: 97 %  Pulse: 103 Pulse  Min: 102  Max: 107    No data recorded  BP: 94/52 Systolic (24hrs), Av , Min:87 , Max:116   Diastolic (24hrs), Av, Min:48, Max:70     I/Os: No intake/output data recorded. " "        Significant Results and Procedures   Lactic Acid:   Recent Labs   Lab Test 09/19/23  2212   LACT 4.3*     CBC:   Recent Labs   Lab Test 09/19/23  0909 09/18/23  1349 09/15/23  1037   WBC 9.8 7.2 8.4   HGB 10.1* 11.6* 12.7   HCT 30.9* 34.7* 38.0    207 201                 Anti-infectives (From now, onward)      Start     Dose/Rate Route Frequency Ordered Stop    09/19/23 2330  piperacillin-tazobactam (ZOSYN) 3.375 g vial to attach to  mL bag        Note to Pharmacy: For [facility], [facility] and [facility]: For Zosyn-naive patients, use the \"Zosyn initial dose + extended infusion\" order panel.    3.375 g  over 30 Minutes Intravenous EVERY 6 HOURS 09/19/23 5861            "

## 2023-09-20 NOTE — LETTER
Transition Communication Hand-off for Care Transitions to Next Level of Care Provider    Name: Angela Basurto  : 1965  MRN #: [id number]  Primary Care Provider: Cielo Escoto     Primary Clinic: [address]  [facility] [address]     Reason for Hospitalization:  Schizoaffective disorder, bipolar type (H) [F25.0]  Admit Date/Time: 2023  4:31 AM  Discharge Date: 10/19/23  Payor Source: Payor: MEDICA / Plan: MEDICA ACCESS ABILITY MA / Product Type: HMO /          Reason for Communication Hand-off Referral: Continuity of Care    Discharge Plan:  Pt has been cleared by Psychiatry to return to home with daughter. Pt will continue to be followed by Commitment CM, ACT Team, and [facility] in the community.       Concern for non-adherence with plan of care:   Y/N No  Discharge Needs Assessment:  Needs      Flowsheet Row Most Recent Value   Equipment Currently Used at Home none   # of Referrals Placed by CM Financial Services            Already enrolled in Tele-monitoring program and name of program:  No  Follow-up specialty is recommended: No    Follow-up plan:  No future appointments.    Any outstanding tests or procedures:              Key Recommendations:      KEAGAN ORTIZ    AVS/Discharge Summary is the source of truth; this is a helpful guide for improved communication of patient story

## 2023-09-20 NOTE — PROGRESS NOTES
"SPIRITUAL HEALTH SERVICES Progress Note  [facility] ([facility]) 5B    Referral Source: consult/admission request    Summary: Met with Angela and introduced myself and oriented her to [facility]. She had shared that she's been sleepy and groggy and that \"her closing her eyes doesn't mean she doesn't want to talk\". She shared that she's been here for about two months and came because she didn't take her bi-polar medication. Angela has five children, aged 19 to 30 years old. Most of her children live in [place] with their dad and has one daughter here in [place] living with her and her brother and father. Angela spoke a bit about the strain of her divorce and requested to hear some Quran recitation and prayers which I offered. I plan to follow up with Angela per LOS and as needed/requested.     Aide Gee  Staff    Pager [phone]    * [facility] remains available 24/7 for emergent requests/referrals, either by having the switchboard page the on-call  or by entering an ASAP/STAT consult in Epic (this will also page the on-call ). Routine Epic consults receive an initial response within 24 hours.*    "

## 2023-09-20 NOTE — PROGRESS NOTES
Brief Medicine Cross-Cover Note:    Fever improved to 99.5F. Lactate not drawn until 2212 and resulted as 4.3. RRT called per protocol. Will need IV placement, IVF bolus, and transfer to medicine service. Will check CMP, CBC, procalc, repeat lactate, VBG. TTE was done today with EF 60-65%, trivial pericardial effusion.    Discussed with RRT QUAN who is en route (appreciate their assistance) and with oncoming medicine nocturnist Dr. Coleman who is planning for hospital admission for this patient. Patient accepted to Aiyana's team.    Bar Banuelos PA-C on 9/19/2023 at 11:09 PM

## 2023-09-20 NOTE — PROGRESS NOTES
RT responded to RRT  Patient on RA SpO2 93% .    Patient needing IV placement and transfer to medical unit

## 2023-09-20 NOTE — PROGRESS NOTES
"Sepsis Evaluation     I was called to see Angela Basurto due to abnormal vital signs triggering the Sepsis SIRS screening alert. She is known to have an infection.     PHYSICAL EXAM  Vital Signs:  Temp: 99.9  F (37.7  C) Temp src: Oral BP: 94/52 Pulse: 103   Resp: 18 SpO2: 94 % O2 Device: None (Room air)      General: acutely ill appearing  Mental Status:  awake and alert .     Remainder of physical exam is significant for Clear breath sounds bilaterally without rhonchi, crackles or wheeze, symmetric chest rise, moving all extremities     DATA  Lactic Acid   Date Value Ref Range Status   09/19/2023 2.6 (H) 0.7 - 2.0 mmol/L Final   09/19/2023 4.3 (HH) 0.7 - 2.0 mmol/L Final     Comment:     Value above critical limit       ASSESSMENT AND PLAN  Angela Basurto meets SIRS criteria AND has a lactate >2 or other evidence of acute organ damage.  These vital signs, lab and physical exam findings constitute a diagnosis of SEVERE SEPSIS, based on: Lactate resulted, and the level was > 2.0    Lactate is down-trending on recheck        Anti-infectives (From now, onward)      Start     Dose/Rate Route Frequency Ordered Stop    09/19/23 2330  piperacillin-tazobactam (ZOSYN) 3.375 g vial to attach to  mL bag        Note to Pharmacy: For [facility], [facility] and [facility]: For Zosyn-naive patients, use the \"Zosyn initial dose + extended infusion\" order panel.    3.375 g  over 30 Minutes Intravenous EVERY 6 HOURS 09/19/23 2327            Current antibiotic coverage is appropriate for source of infection.    3 Hour Severe Sepsis Bundle Completion:  1. Initial Lactic Acid result shown above. Repeat lactic acid ordered by reflex for 3 hours from initial collection.  2. Blood Cultures before Antibiotics: Yes  3. Broad Spectrum Antibiotics Administered: yes  4. Is hypotension present? No (IV fluid bolus NOT required). IV Fluid volume administered: 1 L ORDERED BUT NOT ADMINISTERED YET DUE TO difficulty with IV access. Currently in " process    Disposition: The patient will be transferred to [facility] [place]. To our service from psychiatry.  - Currently working on transfer coordination, needs a discharge order from psychiatrist in order to be transferred. IMC aware and ready for when she arrives. 1:1 remy Little MD  09/20/23, 12:14 AM    Sepsis Criteria   Sepsis: The body's generalized inflammatory state as a response to an infection. Sepsis Predictive Model includes >80 variable to alert to potential sepsis.  Severe Sepsis: Sepsis plus one or more variables of acute organ dysfunction (Note: lactic acid >2 or acute encephalopathy each qualify as organ dysfunction)  Septic Shock: Sepsis AND hypotension despite adequate volume resuscitation with crystalloid or lactic acid >=4  Note: HYPOTENSION is defined as 2 BP readings measured 3 hrs apart that have a SBP <90, MAP <65, or decrease >40 mmHg, occurring 6 hrs before or after t-zero

## 2023-09-21 LAB
ANION GAP SERPL CALCULATED.3IONS-SCNC: 12 MMOL/L (ref 7–15)
BASOPHILS # BLD AUTO: 0 10E3/UL (ref 0–0.2)
BASOPHILS # BLD AUTO: 0 10E3/UL (ref 0–0.2)
BASOPHILS NFR BLD AUTO: 0 %
BASOPHILS NFR BLD AUTO: 0 %
BUN SERPL-MCNC: 9.2 MG/DL (ref 6–20)
CALCIUM SERPL-MCNC: 8.4 MG/DL (ref 8.6–10)
CHLORIDE SERPL-SCNC: 103 MMOL/L (ref 98–107)
CREAT SERPL-MCNC: 1.09 MG/DL (ref 0.51–0.95)
CREAT SERPL-MCNC: 1.1 MG/DL (ref 0.51–0.95)
CREAT SERPL-MCNC: 1.42 MG/DL (ref 0.51–0.95)
DEPRECATED HCO3 PLAS-SCNC: 26 MMOL/L (ref 22–29)
EGFRCR SERPLBLD CKD-EPI 2021: 43 ML/MIN/1.73M2
EGFRCR SERPLBLD CKD-EPI 2021: 59 ML/MIN/1.73M2
EGFRCR SERPLBLD CKD-EPI 2021: 59 ML/MIN/1.73M2
ENTEROCOCCUS FAECALIS: NOT DETECTED
ENTEROCOCCUS FAECIUM: NOT DETECTED
EOSINOPHIL # BLD AUTO: 0.2 10E3/UL (ref 0–0.7)
EOSINOPHIL # BLD AUTO: 0.2 10E3/UL (ref 0–0.7)
EOSINOPHIL NFR BLD AUTO: 2 %
EOSINOPHIL NFR BLD AUTO: 2 %
ERYTHROCYTE [DISTWIDTH] IN BLOOD BY AUTOMATED COUNT: 13.2 % (ref 10–15)
ERYTHROCYTE [DISTWIDTH] IN BLOOD BY AUTOMATED COUNT: 13.3 % (ref 10–15)
GLUCOSE SERPL-MCNC: 129 MG/DL (ref 70–99)
HCT VFR BLD AUTO: 31.2 % (ref 35–47)
HCT VFR BLD AUTO: 32.4 % (ref 35–47)
HGB BLD-MCNC: 10.3 G/DL (ref 11.7–15.7)
HGB BLD-MCNC: 10.5 G/DL (ref 11.7–15.7)
HOLD SPECIMEN: NORMAL
IMM GRANULOCYTES # BLD: 0.3 10E3/UL
IMM GRANULOCYTES # BLD: 0.4 10E3/UL
IMM GRANULOCYTES NFR BLD: 2 %
IMM GRANULOCYTES NFR BLD: 3 %
LISTERIA SPECIES (DETECTED/NOT DETECTED): NOT DETECTED
LYMPHOCYTES # BLD AUTO: 1.6 10E3/UL (ref 0.8–5.3)
LYMPHOCYTES # BLD AUTO: 1.8 10E3/UL (ref 0.8–5.3)
LYMPHOCYTES NFR BLD AUTO: 12 %
LYMPHOCYTES NFR BLD AUTO: 13 %
MCH RBC QN AUTO: 31.2 PG (ref 26.5–33)
MCH RBC QN AUTO: 31.8 PG (ref 26.5–33)
MCHC RBC AUTO-ENTMCNC: 32.4 G/DL (ref 31.5–36.5)
MCHC RBC AUTO-ENTMCNC: 33 G/DL (ref 31.5–36.5)
MCV RBC AUTO: 96 FL (ref 78–100)
MCV RBC AUTO: 96 FL (ref 78–100)
MONOCYTES # BLD AUTO: 1.3 10E3/UL (ref 0–1.3)
MONOCYTES # BLD AUTO: 1.6 10E3/UL (ref 0–1.3)
MONOCYTES NFR BLD AUTO: 10 %
MONOCYTES NFR BLD AUTO: 12 %
NEUTROPHILS # BLD AUTO: 9.5 10E3/UL (ref 1.6–8.3)
NEUTROPHILS # BLD AUTO: 9.7 10E3/UL (ref 1.6–8.3)
NEUTROPHILS NFR BLD AUTO: 70 %
NEUTROPHILS NFR BLD AUTO: 74 %
NRBC # BLD AUTO: 0 10E3/UL
NRBC # BLD AUTO: 0 10E3/UL
NRBC BLD AUTO-RTO: 0 /100
NRBC BLD AUTO-RTO: 0 /100
PLATELET # BLD AUTO: 276 10E3/UL (ref 150–450)
PLATELET # BLD AUTO: 301 10E3/UL (ref 150–450)
POTASSIUM SERPL-SCNC: 3.7 MMOL/L (ref 3.4–5.3)
PROCALCITONIN SERPL IA-MCNC: 0.21 NG/ML
PROCALCITONIN SERPL IA-MCNC: 0.26 NG/ML
RBC # BLD AUTO: 3.24 10E6/UL (ref 3.8–5.2)
RBC # BLD AUTO: 3.37 10E6/UL (ref 3.8–5.2)
SODIUM SERPL-SCNC: 141 MMOL/L (ref 136–145)
STAPHYLOCOCCUS AUREUS: NOT DETECTED
STAPHYLOCOCCUS EPIDERMIDIS: NOT DETECTED
STAPHYLOCOCCUS LUGDUNENSIS: NOT DETECTED
STAPHYLOCOCCUS SPECIES: DETECTED
STREPTOCOCCUS AGALACTIAE: NOT DETECTED
STREPTOCOCCUS ANGINOSUS GROUP: NOT DETECTED
STREPTOCOCCUS PNEUMONIAE: NOT DETECTED
STREPTOCOCCUS PYOGENES: NOT DETECTED
STREPTOCOCCUS SPECIES: NOT DETECTED
VANCOMYCIN SERPL-MCNC: 7.6 UG/ML
WBC # BLD AUTO: 13.1 10E3/UL (ref 4–11)
WBC # BLD AUTO: 13.5 10E3/UL (ref 4–11)

## 2023-09-21 PROCEDURE — 82565 ASSAY OF CREATININE: CPT | Performed by: STUDENT IN AN ORGANIZED HEALTH CARE EDUCATION/TRAINING PROGRAM

## 2023-09-21 PROCEDURE — 258N000003 HC RX IP 258 OP 636: Performed by: STUDENT IN AN ORGANIZED HEALTH CARE EDUCATION/TRAINING PROGRAM

## 2023-09-21 PROCEDURE — 80202 ASSAY OF VANCOMYCIN: CPT | Performed by: STUDENT IN AN ORGANIZED HEALTH CARE EDUCATION/TRAINING PROGRAM

## 2023-09-21 PROCEDURE — 99232 SBSQ HOSP IP/OBS MODERATE 35: CPT | Mod: GC | Performed by: STUDENT IN AN ORGANIZED HEALTH CARE EDUCATION/TRAINING PROGRAM

## 2023-09-21 PROCEDURE — 87040 BLOOD CULTURE FOR BACTERIA: CPT

## 2023-09-21 PROCEDURE — 84145 PROCALCITONIN (PCT): CPT

## 2023-09-21 PROCEDURE — 250N000013 HC RX MED GY IP 250 OP 250 PS 637: Performed by: PSYCHIATRY & NEUROLOGY

## 2023-09-21 PROCEDURE — 120N000002 HC R&B MED SURG/OB UMMC

## 2023-09-21 PROCEDURE — 250N000011 HC RX IP 250 OP 636: Mod: JZ

## 2023-09-21 PROCEDURE — 85025 COMPLETE CBC W/AUTO DIFF WBC: CPT | Performed by: STUDENT IN AN ORGANIZED HEALTH CARE EDUCATION/TRAINING PROGRAM

## 2023-09-21 PROCEDURE — 36415 COLL VENOUS BLD VENIPUNCTURE: CPT | Performed by: STUDENT IN AN ORGANIZED HEALTH CARE EDUCATION/TRAINING PROGRAM

## 2023-09-21 PROCEDURE — 80048 BASIC METABOLIC PNL TOTAL CA: CPT | Performed by: STUDENT IN AN ORGANIZED HEALTH CARE EDUCATION/TRAINING PROGRAM

## 2023-09-21 PROCEDURE — 36415 COLL VENOUS BLD VENIPUNCTURE: CPT

## 2023-09-21 PROCEDURE — 87040 BLOOD CULTURE FOR BACTERIA: CPT | Performed by: STUDENT IN AN ORGANIZED HEALTH CARE EDUCATION/TRAINING PROGRAM

## 2023-09-21 PROCEDURE — 85025 COMPLETE CBC W/AUTO DIFF WBC: CPT

## 2023-09-21 PROCEDURE — 84145 PROCALCITONIN (PCT): CPT | Performed by: STUDENT IN AN ORGANIZED HEALTH CARE EDUCATION/TRAINING PROGRAM

## 2023-09-21 PROCEDURE — 99233 SBSQ HOSP IP/OBS HIGH 50: CPT

## 2023-09-21 PROCEDURE — 250N000013 HC RX MED GY IP 250 OP 250 PS 637

## 2023-09-21 PROCEDURE — 250N000011 HC RX IP 250 OP 636: Performed by: STUDENT IN AN ORGANIZED HEALTH CARE EDUCATION/TRAINING PROGRAM

## 2023-09-21 RX ADMIN — Medication 1500 MG: at 17:30

## 2023-09-21 RX ADMIN — PIPERACILLIN AND TAZOBACTAM 4.5 G: 4; .5 INJECTION, POWDER, FOR SOLUTION INTRAVENOUS at 20:50

## 2023-09-21 RX ADMIN — DIVALPROEX SODIUM 500 MG: 125 CAPSULE, COATED PELLETS ORAL at 11:14

## 2023-09-21 RX ADMIN — CLOZAPINE 100 MG: 100 TABLET ORAL at 22:40

## 2023-09-21 RX ADMIN — DIVALPROEX SODIUM 500 MG: 125 CAPSULE, COATED PELLETS ORAL at 20:50

## 2023-09-21 RX ADMIN — SODIUM CHLORIDE, POTASSIUM CHLORIDE, SODIUM LACTATE AND CALCIUM CHLORIDE 1000 ML: 600; 310; 30; 20 INJECTION, SOLUTION INTRAVENOUS at 13:50

## 2023-09-21 RX ADMIN — FAMOTIDINE 10 MG: 10 TABLET ORAL at 11:14

## 2023-09-21 RX ADMIN — PIPERACILLIN AND TAZOBACTAM 4.5 G: 4; .5 INJECTION, POWDER, FOR SOLUTION INTRAVENOUS at 11:14

## 2023-09-21 RX ADMIN — PIPERACILLIN AND TAZOBACTAM 4.5 G: 4; .5 INJECTION, POWDER, FOR SOLUTION INTRAVENOUS at 03:48

## 2023-09-21 RX ADMIN — FAMOTIDINE 10 MG: 10 TABLET ORAL at 20:50

## 2023-09-21 RX ADMIN — VANCOMYCIN HYDROCHLORIDE 1500 MG: 500 INJECTION, POWDER, LYOPHILIZED, FOR SOLUTION INTRAVENOUS at 00:42

## 2023-09-21 RX ADMIN — PIPERACILLIN AND TAZOBACTAM 4.5 G: 4; .5 INJECTION, POWDER, FOR SOLUTION INTRAVENOUS at 15:00

## 2023-09-21 ASSESSMENT — ACTIVITIES OF DAILY LIVING (ADL)
ADLS_ACUITY_SCORE: 27
ADLS_ACUITY_SCORE: 23
ADLS_ACUITY_SCORE: 27

## 2023-09-21 NOTE — PROGRESS NOTES
[facility]   BRIEF PROGRESS NOTE    11:20PM  Blood cultures from 9/19 show prelim gram positive cocci in clusters. Will start vancomycin as we await speciation of the culture specifics.     Please see daily rounding note for full A/P.  Reggie Neves DO

## 2023-09-21 NOTE — PROGRESS NOTES
"  VS: /42 (BP Location: Right arm)   Pulse 57   Temp 97.6  F (36.4  C) (Oral)   Resp 14   Wt 109.1 kg (240 lb 8.4 oz)   SpO2 97%   BMI 35.52 kg/m       O2: RA; Denies SOB & CP   Output: Up to BR   Last BM: 09/20   Activity: STB   Skin: Visible skin intact   Pain: Denies   Neuro: Alert; Disoriented to situation   Diet: Regular; Safe tray   LDA: L hand PIV   Additional Info: No acute changes on shift  Pt. arouse from her sleep and pulled IV out. When told she needs her IV for antibiotics, pt. stated \"Ohh sorry\" and went back to sleep; New L hand PIV put in     "

## 2023-09-21 NOTE — PLAN OF CARE
Goal Outcome Evaluation:    Assumed care of patient from [id number].   Patient alert and oriented x3, disoriented to situation.  Cooperative with cares, up to bathroom SBA.  1:1 sitter in room for safety.  Medications whole in applesauce.   Handoff to MARÍA ELENA Riley.

## 2023-09-21 NOTE — PLAN OF CARE
End of shift Summary: See flowsheet for VS and detail assessments.     Changes this Shift:      Pulmonary: LS clear in upper lobes with crackles heard in RLL and LLL. Pt remains on RA, infrequent non-productive cough noted.     Output: Continent of B&B     Activity: Up w/ SBA d/t dizziness upon standing.     Skin: Declined full skin assessment     Pain: Denies pain     Neuro/CMS: Pt disoriented to situation and was re-oriented to      Dressings/Drains:      IV: L PIV running TKO     Additional info: Pt takes pills in applesauce, ok to open depakote sprinkles into applesauce.     Plan: Continue POC        Plan of Care Reviewed With: patient    Overall Patient Progress: improving    Outcome Evaluation: Pt allowed cares to be completed towards the evening. Ordered food and was able to stand with minimal dizziness. Orthostatic BP taken.

## 2023-09-21 NOTE — CONSULTS
"      Psychiatry Consultation; Follow up              Reason for Consult, requesting source:    Clozaril   Requesting source: Malorie Della    Labs and imaging reviewed, seen by ROSITA Cronin CNP    Total time spent in chart review, patient interview and coordination of care; 60 minutes - all time was spent on the date of the encounter that I saw patient                Interim history:    The patient was just transferred to the medical floor from [facility] where she was under the care of Dr. Champagne since 08/18/23 when she presented with manic episode with psychosis and homicidal ideation. Reportedly, the \"voices\" were telling her to harm her aunt and uncle and do so with a knife. The knife was removed by paramedics.   A petition for commitment was initiated and supported and M[facility] hearing was held.  She was initially on a combination of Haldol, Zyprexa and Depakote with PRN Trazodone and Hydroxyzine but eventually was switched to Clozaril up to 300 mg at bedtime. It was later decreased to 200 mg because or orthostatic hypotension and held when she was transferred to the medical floor because of Sepsis likely 2/2 hospital-acquired pneumonia.    Patients Clozaril was held for 72 hours related to orthostatic hypotension and was restarted yesterday at 100mg. She tolerated well, denies feeling dizzy. Reports she slept well last night. Denies SI, HI, AVH. Her daughter was at bedside and agrees her mom's mental health is improved.         Current Medications:      cloZAPine  100 mg Oral At Bedtime    divalproex sodium delayed-release  500 mg Oral Q12H [facility] (08/20)    famotidine  10 mg Oral BID    piperacillin-tazobactam  4.5 g Intravenous Q6H    sodium chloride (PF)  3 mL Intracatheter Q8H    sodium chloride (PF)  3 mL Intracatheter Q8H    vancomycin  1,500 mg Intravenous Q12H              MSE:   Appearance: awake, alert and adequately groomed  Attitude:  cooperative  Eye Contact:  good  Mood:  \"fair\"  Affect: " " slightly restricted  Speech:  clear, coherent  Psychomotor Behavior:  no evidence of tardive dyskinesia, dystonia, or tics  Muscle strength and tone: baseline   Thought Process:  logical, linear, and goal oriented  Associations:  no loose associations  Thought Content:  no evidence of suicidal ideation or homicidal ideation and no evidence of psychotic thought  Insight:  partial  Judgement:  fair  Oriented to:  time, person, and place  Attention Span and Concentration:  fair  Recent and Remote Memory:  fair    Vital signs:  Temp: 97.6  F (36.4  C) Temp src: Oral BP: 119/42 Pulse: 57   Resp: 14 SpO2: 97 % O2 Device: None (Room air)     Weight: 109.1 kg (240 lb 8.4 oz)  Estimated body mass index is 35.52 kg/m  as calculated from the following:    Height as of 8/17/23: 1.753 m (5' 9\").    Weight as of this encounter: 109.1 kg (240 lb 8.4 oz).    Qtc: 400 on 9/18         DSM-5 Diagnosis:   Schizoaffective Disorder, bipolar type vs  Bipolar Disorder, last episode manic with psychotic features          Assessment:   Angela is a 58 year old female with a history of the above diagnoses, transferred from [facility]. Clozaril was restarted at 100mg yesterday after being held for 72 hours. I followed up on patient today to see if she tolerated the 100mg well. She denied dizziness and reported no side effects. Mood is \"okay.\" Will continue 100mg again tonight.           Summary of Recommendations:     Continue Clozaril 100mg daily for now      Suzie Kohler, PMHNP-BC  Consult/Liaison Psychiatry   [facility]       \"Much or all of the text in this note was generated through the use of Dragon Dictate voice to text software. Errors in spelling or words which appear to be out of contact are unintentional, may be present due having escaped editing\"           "

## 2023-09-21 NOTE — PROGRESS NOTES
[facility]    Progress Note - [facility]       Date of Admission:  9/20/2023    Main plans for today:  - IV Vanco and Zosyn   - Started vancomycin as we await speciation of the culture specifics    - 9/19 bcx positive for 1/2 bottles gram positive cocci  - Daily BMP  - restart Clozapine 100 mg PO per psych  - Compression stocking   - Nutrition IP consult, appreciate recs    Assessment & Plan   Angela Basurto is a 58 year old female with bipolar 1 who was admitted to psych on 8/16/23 for elvis and psychosis in the context of medication nonadherence. On 9/18 she developed orthostatic hypotension, fevered 9/19 w/ lactate 4.3, and was admitted to our service for sepsis likely 2/2 hospital acquired pneumonia. Currently, Adolfo, full commitment, 1:1, IV abx.      # Hospital-acquired pneumonia  # Positive blood culture x1 - G+ cocci  # Severe sepsis - resolved  Transferred from inpatient psych on 9/19 with fever 101.3F, LA 4.3. New cough and wide spread body aches. , WBC 9.8. 9/18 CXR with streaky right greater than left perihilar and basilar opacities, likely atelectasis and/or edema without focal consolidation. Echo WNL, cardiology did not feel it is myocarditis. Today, WBC and procal up trending at 13.5 and 0.21 respectively. VSS. Afebrile. SPO2 97 % on RA. IV vancomycin added with to 9/19 bcx positive for 1/2 bottles gram positive cocci, verigene negative. Possibly contaminant with only 1/2 bottles positive and negative verigene but will await speciation. Pt has no symptoms of any other infectious source.    Workup/monitoring  - Daily CBC  - Daily procal   - CRP q48h    - daily blood cultures until speciation / 48h negative BC  - RVP in process    Treatment  - IV vancomycin at pharmacy dosing     - piperacillin/tazobactam (ZOSYN) IV 4.5g, Intravenous, EVERY 6 HOURS   - consider transitioning to PO Levaquin with down trending CRP/procal  and stable vitals        # Bipolar I disorder, currently manic with psychotic features   # Hx of possible seizure in 1983   # Mata: Haldol, clozapine, risperidone, olanzapine  # Full commitment   Presented with elvis and psychosis in the context of medication nonadherence. Patient was presented to emergency room due to homicidal threats toward her family using knife, and exhibiting paranoid and severe disorganized thought and responding to her internal stimuli while staying in the emergency room. Significant symptoms on admission include paranoia, disorganized thinking and behaviors, AVH, and emotional lability. While she understands why she's in the hospital, she displays intrusive behaviors that require a 1:1. 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 Most recent psychiatric hospitalization was in July of this year during which time she left [place]. On admission, restarted PTA zyprexa, depakote, and haldol. Started clozapine 8/31. Zyprexa stopped on 9/8 due to ineffectiveness and mild sedation in context of clozapine titration. Achieved dose of 300 mg of clozapine daily on 9/11 with improvement in psychosis noted. Due to orthostatic hypotension, constipation, and sedation, reduced clozapine to 200 mg on 9/17, stopped Haldol on 9/16, psych stopped clozapine 9/18 due to continued hypotension . In summary, the patient's reported symptoms of elvis and psychosis in the context of medication nonadherence are consistent with Bipolar Disorder, type 1. Nurse reports seeing and hearing patient talk to herself throughout the night. Psych evaluated patient and restarted Clozapine 100 mg at bedtime. They will reevaluated patient tomorrow for orthostatic hypotension, constipation, and sedation.      - See psychiatry note for details of medication titrations  - Precautions: 1:1 sitter, restrict to unit, assault  precuations,  cheeking precautions, elopement, fall  - Legal status: Mata and full commitment: A petition for MI commitment with Mata was filed on 8/18 through [facility].    - Depakote DT sprinkles 500 mg BID   - Agitation PRN per last psychiatry orders  - EMERGENCY for agitation: Haldol 5mg + 2mg ativan + 50mg q8h PRN, PO or IM  - Preferred for agitation, anxiety: Hydroxyzine 25 - 50mg PO q6h PRN  - Psych consulted  - restarted Clozapine 100 mg PO      # Orthostatic hypotension  Symptomatic 9/18 with blood pressure drop from 123/72 with sitting to 96/55 with standing. Patient reports this is new since admission and starting new medications. Patient initiated on clozapine on 8/31 with dose increases, and currently on haldol. Per nursing patient has been increasingly sleepy, and possibly not taking in as much water and eating less than half of meals. Orthostatic hypotension likely related to medications and possible dehydration. Haldol discontinued evening of 9/16 per psychiatry. AM cortisol WNL: 17.7. Cardiology team was consulted to rule out clozapine induced myocarditis, they have very low suspicion based on echo and troponin. Nurse has been monitoring orthostatic vital and are positive. Encourage hydration and compression stockings.     - Encourage hydration   - Compression stockings  - Recommend minimize offending agents (clozapine, haldol, etc)   - trend orthostatic vital signs daily     ## ETHAN   Creatinine level elevated at 1.09. Likely 2/2 to IV vanco/zosyn and poor fluid intake. Changed Vanco to intermittent dosing and give 1 L LR bolus. Will monitor with daily BMP.     - Daily BMP  - 1 L LR Bolus     # Hypocalcemia  # Hypoalbuminemia  Suspect possible malnutrition due to poor PO intake.   - Nutrition IP consult, appreciate recs     # Jain  Important to her value system  - Spiritual care consult      Chronic/stable  Hx of HTN  - HOLDING PTA clonidine as patient's pressures have been consistently soft.      Diet: Combination Diet Regular Diet; Safe Tray - with utensils    DVT Prophylaxis: Low Risk/Ambulatory with no VTE prophylaxis indicated  Wilson Catheter: Not present  Fluids: PO  Lines: None     Cardiac Monitoring: None  Code Status: Full Code      Clinically Significant Risk Factors              # Hypoalbuminemia: Lowest albumin = 2.4 g/dL at 9/19/2023 11:53 PM, will monitor as appropriate    # Coagulation Defect: INR = 1.19 (Ref range: 0.85 - 1.15) and/or PTT = 34 Seconds (Ref range: 22 - 38 Seconds), will monitor for bleeding                      Disposition Plan     Expected Discharge Date: 09/24/2023              Anticipate transfer back to inpatient psychiatry once medically stable/cleared     The patient's care was discussed with the Attending Physician, Dr. Hull .    Gonzalez Reno MD  [facility]  [facility]  Securely message with [employer] (more info)  Text page via [employer] Paging/Directory   See signed in provider for up to date coverage information  ______________________________________________________________________    Interval History   Overnight:  Critical lab results: gram positive cocci 1/2 bottles 1st day of incubation. IV Vanco at pharmacy dosing started.      Subjective:  VSS. Afebrile. Patient was more active today, although still somnolent. She still reports dizziness with standing. Nurse have been performing orthostatic vital and are positive. Reports to be eating fine but not drinking as much fluids, about 3 cups of water daily. Angela denies any chest pain, cough, congestion, body aches, or SOB.     Physical Exam   Vital Signs: Temp: 97.6  F (36.4  C) Temp src: Oral BP: 119/42 Pulse: 57   Resp: 14 SpO2: 97 % O2 Device: None (Room air)    Weight: 240 lbs 8.35 oz    Constitutional: Somnolent, cooperative, no apparent distress, and appears stated age  Respiratory: Crackles heard along RLL and LLL. No increased work of  breathing, good air exchange, clear to auscultation bilaterally, no crackles or wheezing in upper lobes.   Cardiovascular: Normal apical impulse, regular rate and rhythm, normal S1 and S2, no S3 or S4, and no murmur noted  GI: No scars, normal bowel sounds, soft, non-distended, non-tender, no masses palpated, no hepatosplenomegally  Skin: no redness, warmth, or swelling  Neurologic: Lethargic, oriented to name and place  Neuropsychiatric: General: normal, calm, and normal eye contact    Medical Decision Making       Please see A&P for additional details of medical decision making.      Data     I have personally reviewed the following data over the past 24 hrs:    13.5 (H)  \   10.5 (L)   / 276     N/A N/A N/A /  N/A   N/A N/A 1.09 (H) \     Procal: 0.21 (H) CRP: N/A Lactic Acid: N/A         Imaging results reviewed over the past 24 hrs:   No results found for this or any previous visit (from the past 24 hour(s)).

## 2023-09-21 NOTE — PHARMACY-VANCOMYCIN DOSING SERVICE
"Pharmacy Vancomycin Note  Date of Service 2023  Patient's  1965   58 year old, female    Indication: Bacteremia  Day of Therapy: started 23 (day 2)  Current vancomycin regimen:  1500 mg given once  Current vancomycin monitoring method:  intermittent dosing based on levels & renal function  Current vancomycin therapeutic monitoring goal: 15-20 mg/L    Current estimated CrCl = Estimated Creatinine Clearance: 58.2 mL/min (A) (based on SCr of 1.42 mg/dL (H)).    Creatinine for last 3 days  2023:  9:09 AM Creatinine 0.53 mg/dL; 11:53 PM Creatinine 0.49 mg/dL  2023:  9:26 AM Creatinine 0.61 mg/dL  2023: 10:47 AM Creatinine 1.09 mg/dL; 10:47 AM Creatinine 1.10 mg/dL;  4:19 PM Creatinine 1.42 mg/dL    Recent Vancomycin Levels (past 3 days)  2023:  4:19 PM Vancomycin 7.6 ug/mL    Vancomycin IV Administrations (past 72 hours)                     vancomycin (VANCOCIN) 1,500 mg in 0.9% NaCl 250 mL intermittent infusion (mg) 1,500 mg New Bag 23 0042                    Nephrotoxins and other renal medications (From now, onward)      Start     Dose/Rate Route Frequency Ordered Stop    23 1254  vancomycin place fernandez - receiving intermittent dosing         1 each Intravenous SEE ADMIN INSTRUCTIONS 23 1256      23 0900  piperacillin-tazobactam (ZOSYN) 4.5 g vial to attach to  mL bag        Note to Pharmacy: For [facility], [facility] and [facility]: For Zosyn-naive patients, use the \"Zosyn initial dose + extended infusion\" order panel.    4.5 g  over 30 Minutes Intravenous EVERY 6 HOURS 23 0533                 Contrast Orders - past 72 hours (72h ago, onward)      None            Interpretation of levels and current regimen:  Vancomycin level is reflective of subtherapeutic level    Has serum creatinine changed greater than 50% in last 72 hours: Yes    Urine output:  unable to determine    Renal Function: Worsening      Plan:  1500 mg IV once  Vancomycin monitoring " method:  intermittent levels  Vancomycin therapeutic monitoring goal: 15-20 mg/L  Pharmacy will check vancomycin levels as appropriate in 1-3 Days.  Serum creatinine levels will be ordered daily for the first week of therapy and at least twice weekly for subsequent weeks.    SANGITA CRAWFORD RPH

## 2023-09-21 NOTE — PHARMACY-VANCOMYCIN DOSING SERVICE
"Pharmacy Vancomycin Initial Note  Date of Service 2023  Patient's  1965  58 year old, female    Indication: Bacteremia    Current estimated CrCl = Estimated Creatinine Clearance: 135.6 mL/min (based on SCr of 0.61 mg/dL).    Creatinine for last 3 days  2023:  1:49 PM Creatinine 0.58 mg/dL  2023:  9:09 AM Creatinine 0.53 mg/dL; 11:53 PM Creatinine 0.49 mg/dL  2023:  9:26 AM Creatinine 0.61 mg/dL    Recent Vancomycin Level(s) for last 3 days  No results found for requested labs within last 3 days.      Vancomycin IV Administrations (past 72 hours)        No vancomycin orders with administrations in past 72 hours.                    Nephrotoxins and other renal medications (From now, onward)      Start     Dose/Rate Route Frequency Ordered Stop    23 0000  vancomycin (VANCOCIN) 1,500 mg in 0.9% NaCl 250 mL intermittent infusion         1,500 mg  over 90 Minutes Intravenous EVERY 12 HOURS 23 2331      23 0900  piperacillin-tazobactam (ZOSYN) 4.5 g vial to attach to  mL bag        Note to Pharmacy: For [facility], [facility] and [facility]: For Zosyn-naive patients, use the \"Zosyn initial dose + extended infusion\" order panel.    4.5 g  over 30 Minutes Intravenous EVERY 6 HOURS 23 0533              Contrast Orders - past 72 hours (72h ago, onward)      None            [employer] Prediction of Planned Initial Vancomycin Regimen  Regimen: 1500 mg IV every 12 hours.  Start time: 00:00 on 2023  Exposure target: AUC24 (range)400-600 mg/L.hr   AUC24,ss: 544 mg/L.hr  Probability of AUC24 > 400: 81 %  Ctrough,ss: 17 mg/L  Probability of Ctrough,ss > 20: 36 %  Probability of nephrotoxicity (Lodise DOMINIQUE ): 13 %          Plan:  Start vancomycin  1500 mg IV q12h.   Vancomycin monitoring method: AUC  Vancomycin therapeutic monitoring goal: 400-600 mg*h/L  Pharmacy will check vancomycin levels as appropriate in 1-3 Days.    Serum creatinine levels will be ordered a minimum of twice " weekly.      Alfred Kwok [facility]

## 2023-09-22 ENCOUNTER — APPOINTMENT (OUTPATIENT)
Dept: ULTRASOUND IMAGING | Facility: CLINIC | Age: 58
DRG: 871 | End: 2023-09-22
Attending: FAMILY MEDICINE
Payer: COMMERCIAL

## 2023-09-22 ENCOUNTER — APPOINTMENT (OUTPATIENT)
Dept: GENERAL RADIOLOGY | Facility: CLINIC | Age: 58
DRG: 871 | End: 2023-09-22
Attending: FAMILY MEDICINE
Payer: COMMERCIAL

## 2023-09-22 PROBLEM — I95.1 ORTHOSTATIC HYPOTENSION: Status: ACTIVE | Noted: 2023-09-22

## 2023-09-22 PROBLEM — R78.81 POSITIVE BLOOD CULTURE: Status: ACTIVE | Noted: 2023-09-22

## 2023-09-22 LAB
ANION GAP SERPL CALCULATED.3IONS-SCNC: 12 MMOL/L (ref 7–15)
ANION GAP SERPL CALCULATED.3IONS-SCNC: 15 MMOL/L (ref 7–15)
BASE EXCESS BLDV CALC-SCNC: 4.7 MMOL/L (ref -7.7–1.9)
BASOPHILS # BLD AUTO: 0 10E3/UL (ref 0–0.2)
BASOPHILS NFR BLD AUTO: 0 %
BUN SERPL-MCNC: 14.6 MG/DL (ref 6–20)
BUN SERPL-MCNC: 15.6 MG/DL (ref 6–20)
CALCIUM SERPL-MCNC: 7.9 MG/DL (ref 8.6–10)
CALCIUM SERPL-MCNC: 8.2 MG/DL (ref 8.6–10)
CHLORIDE SERPL-SCNC: 102 MMOL/L (ref 98–107)
CHLORIDE SERPL-SCNC: 103 MMOL/L (ref 98–107)
CREAT SERPL-MCNC: 1.85 MG/DL (ref 0.51–0.95)
CREAT SERPL-MCNC: 1.99 MG/DL (ref 0.51–0.95)
CRP SERPL-MCNC: 247.71 MG/L
DEPRECATED HCO3 PLAS-SCNC: 22 MMOL/L (ref 22–29)
DEPRECATED HCO3 PLAS-SCNC: 27 MMOL/L (ref 22–29)
EGFRCR SERPLBLD CKD-EPI 2021: 29 ML/MIN/1.73M2
EGFRCR SERPLBLD CKD-EPI 2021: 31 ML/MIN/1.73M2
EOSINOPHIL # BLD AUTO: 0.3 10E3/UL (ref 0–0.7)
EOSINOPHIL NFR BLD AUTO: 2 %
ERYTHROCYTE [DISTWIDTH] IN BLOOD BY AUTOMATED COUNT: 13.4 % (ref 10–15)
FLUAV RNA SPEC QL NAA+PROBE: NEGATIVE
FLUBV RNA RESP QL NAA+PROBE: NEGATIVE
GLUCOSE SERPL-MCNC: 142 MG/DL (ref 70–99)
GLUCOSE SERPL-MCNC: 148 MG/DL (ref 70–99)
HCO3 BLDV-SCNC: 30 MMOL/L (ref 21–28)
HCT VFR BLD AUTO: 30.1 % (ref 35–47)
HGB BLD-MCNC: 10.2 G/DL (ref 11.7–15.7)
IMM GRANULOCYTES # BLD: 0.3 10E3/UL
IMM GRANULOCYTES NFR BLD: 2 %
LACTATE SERPL-SCNC: 1.1 MMOL/L (ref 0.7–2)
LYMPHOCYTES # BLD AUTO: 1.4 10E3/UL (ref 0.8–5.3)
LYMPHOCYTES NFR BLD AUTO: 10 %
MCH RBC QN AUTO: 31.9 PG (ref 26.5–33)
MCHC RBC AUTO-ENTMCNC: 33.9 G/DL (ref 31.5–36.5)
MCV RBC AUTO: 94 FL (ref 78–100)
MONOCYTES # BLD AUTO: 1.5 10E3/UL (ref 0–1.3)
MONOCYTES NFR BLD AUTO: 11 %
NEUTROPHILS # BLD AUTO: 10.2 10E3/UL (ref 1.6–8.3)
NEUTROPHILS NFR BLD AUTO: 75 %
NRBC # BLD AUTO: 0 10E3/UL
NRBC BLD AUTO-RTO: 0 /100
O2/TOTAL GAS SETTING VFR VENT: 97 %
PCO2 BLDV: 44 MM HG (ref 40–50)
PH BLDV: 7.44 [PH] (ref 7.32–7.43)
PLATELET # BLD AUTO: 290 10E3/UL (ref 150–450)
PO2 BLDV: 20 MM HG (ref 25–47)
POTASSIUM SERPL-SCNC: 3.4 MMOL/L (ref 3.4–5.3)
POTASSIUM SERPL-SCNC: 3.8 MMOL/L (ref 3.4–5.3)
PROCALCITONIN SERPL IA-MCNC: 0.34 NG/ML
RBC # BLD AUTO: 3.2 10E6/UL (ref 3.8–5.2)
RSV RNA SPEC NAA+PROBE: NEGATIVE
SARS-COV-2 RNA RESP QL NAA+PROBE: NEGATIVE
SODIUM SERPL-SCNC: 140 MMOL/L (ref 136–145)
SODIUM SERPL-SCNC: 141 MMOL/L (ref 136–145)
VANCOMYCIN SERPL-MCNC: 15.8 UG/ML
WBC # BLD AUTO: 13.7 10E3/UL (ref 4–11)

## 2023-09-22 PROCEDURE — 99232 SBSQ HOSP IP/OBS MODERATE 35: CPT | Mod: GC | Performed by: STUDENT IN AN ORGANIZED HEALTH CARE EDUCATION/TRAINING PROGRAM

## 2023-09-22 PROCEDURE — 85025 COMPLETE CBC W/AUTO DIFF WBC: CPT | Performed by: STUDENT IN AN ORGANIZED HEALTH CARE EDUCATION/TRAINING PROGRAM

## 2023-09-22 PROCEDURE — 36415 COLL VENOUS BLD VENIPUNCTURE: CPT | Performed by: STUDENT IN AN ORGANIZED HEALTH CARE EDUCATION/TRAINING PROGRAM

## 2023-09-22 PROCEDURE — 93970 EXTREMITY STUDY: CPT

## 2023-09-22 PROCEDURE — 87040 BLOOD CULTURE FOR BACTERIA: CPT | Performed by: STUDENT IN AN ORGANIZED HEALTH CARE EDUCATION/TRAINING PROGRAM

## 2023-09-22 PROCEDURE — 82803 BLOOD GASES ANY COMBINATION: CPT

## 2023-09-22 PROCEDURE — 71046 X-RAY EXAM CHEST 2 VIEWS: CPT

## 2023-09-22 PROCEDURE — 250N000013 HC RX MED GY IP 250 OP 250 PS 637

## 2023-09-22 PROCEDURE — 87637 SARSCOV2&INF A&B&RSV AMP PRB: CPT

## 2023-09-22 PROCEDURE — 250N000011 HC RX IP 250 OP 636: Mod: JZ

## 2023-09-22 PROCEDURE — 80048 BASIC METABOLIC PNL TOTAL CA: CPT | Performed by: STUDENT IN AN ORGANIZED HEALTH CARE EDUCATION/TRAINING PROGRAM

## 2023-09-22 PROCEDURE — 93970 EXTREMITY STUDY: CPT | Mod: 26 | Performed by: RADIOLOGY

## 2023-09-22 PROCEDURE — 120N000002 HC R&B MED SURG/OB UMMC

## 2023-09-22 PROCEDURE — 83605 ASSAY OF LACTIC ACID: CPT | Performed by: STUDENT IN AN ORGANIZED HEALTH CARE EDUCATION/TRAINING PROGRAM

## 2023-09-22 PROCEDURE — 99255 IP/OBS CONSLTJ NEW/EST HI 80: CPT | Performed by: STUDENT IN AN ORGANIZED HEALTH CARE EDUCATION/TRAINING PROGRAM

## 2023-09-22 PROCEDURE — 36415 COLL VENOUS BLD VENIPUNCTURE: CPT

## 2023-09-22 PROCEDURE — 250N000013 HC RX MED GY IP 250 OP 250 PS 637: Performed by: PSYCHIATRY & NEUROLOGY

## 2023-09-22 PROCEDURE — 84145 PROCALCITONIN (PCT): CPT | Performed by: STUDENT IN AN ORGANIZED HEALTH CARE EDUCATION/TRAINING PROGRAM

## 2023-09-22 PROCEDURE — 250N000011 HC RX IP 250 OP 636: Performed by: STUDENT IN AN ORGANIZED HEALTH CARE EDUCATION/TRAINING PROGRAM

## 2023-09-22 PROCEDURE — 80202 ASSAY OF VANCOMYCIN: CPT | Performed by: STUDENT IN AN ORGANIZED HEALTH CARE EDUCATION/TRAINING PROGRAM

## 2023-09-22 PROCEDURE — 86140 C-REACTIVE PROTEIN: CPT

## 2023-09-22 PROCEDURE — 250N000013 HC RX MED GY IP 250 OP 250 PS 637: Performed by: STUDENT IN AN ORGANIZED HEALTH CARE EDUCATION/TRAINING PROGRAM

## 2023-09-22 PROCEDURE — 71046 X-RAY EXAM CHEST 2 VIEWS: CPT | Mod: 26 | Performed by: STUDENT IN AN ORGANIZED HEALTH CARE EDUCATION/TRAINING PROGRAM

## 2023-09-22 RX ORDER — PIPERACILLIN SODIUM, TAZOBACTAM SODIUM 3; .375 G/15ML; G/15ML
3.38 INJECTION, POWDER, LYOPHILIZED, FOR SOLUTION INTRAVENOUS EVERY 6 HOURS
Status: COMPLETED | OUTPATIENT
Start: 2023-09-22 | End: 2023-09-24

## 2023-09-22 RX ORDER — FAMOTIDINE 10 MG
10 TABLET ORAL DAILY
Status: DISCONTINUED | OUTPATIENT
Start: 2023-09-22 | End: 2023-10-02

## 2023-09-22 RX ADMIN — DIVALPROEX SODIUM 500 MG: 125 CAPSULE, COATED PELLETS ORAL at 11:03

## 2023-09-22 RX ADMIN — ACETAMINOPHEN 650 MG: 325 TABLET, FILM COATED ORAL at 19:47

## 2023-09-22 RX ADMIN — PIPERACILLIN AND TAZOBACTAM 4.5 G: 4; .5 INJECTION, POWDER, FOR SOLUTION INTRAVENOUS at 11:03

## 2023-09-22 RX ADMIN — Medication 1500 MG: at 09:20

## 2023-09-22 RX ADMIN — FAMOTIDINE 10 MG: 10 TABLET ORAL at 11:03

## 2023-09-22 RX ADMIN — PIPERACILLIN AND TAZOBACTAM 3.38 G: 3; .375 INJECTION, POWDER, LYOPHILIZED, FOR SOLUTION INTRAVENOUS at 15:00

## 2023-09-22 RX ADMIN — PIPERACILLIN AND TAZOBACTAM 3.38 G: 3; .375 INJECTION, POWDER, LYOPHILIZED, FOR SOLUTION INTRAVENOUS at 21:35

## 2023-09-22 RX ADMIN — CLOZAPINE 100 MG: 100 TABLET ORAL at 21:35

## 2023-09-22 RX ADMIN — DIVALPROEX SODIUM 500 MG: 125 CAPSULE, COATED PELLETS ORAL at 19:48

## 2023-09-22 RX ADMIN — PIPERACILLIN AND TAZOBACTAM 4.5 G: 4; .5 INJECTION, POWDER, FOR SOLUTION INTRAVENOUS at 03:08

## 2023-09-22 ASSESSMENT — ACTIVITIES OF DAILY LIVING (ADL)
DEPENDENT_IADLS:: INDEPENDENT
ADLS_ACUITY_SCORE: 26
ADLS_ACUITY_SCORE: 23
ADLS_ACUITY_SCORE: 23
ADLS_ACUITY_SCORE: 26
ADLS_ACUITY_SCORE: 23
ADLS_ACUITY_SCORE: 23
ADLS_ACUITY_SCORE: 26
ADLS_ACUITY_SCORE: 23
ADLS_ACUITY_SCORE: 26

## 2023-09-22 NOTE — PROGRESS NOTES
CLINICAL NUTRITION SERVICES - ASSESSMENT NOTE     Nutrition Prescription    RECOMMENDATIONS FOR MDs/PROVIDERS TO ORDER:  Consider adding daily MVI in setting of possible inadequate intakes pta and during current admission    Malnutrition Status:    Patient does not meet two of the established criteria necessary for diagnosing malnutrition but is at risk for malnutrition    Recommendations already ordered by Registered Dietitian (RD):  Ensure Enlive BID    Future/Additional Recommendations:  Monitor weight trends, intakes and supplement tolerance       REASON FOR ASSESSMENT  Angela Basurto is a 58 year old female assessed by the dietitian for Provider Order - labs concerning for malnutrition     Albumin low at 2.7 with elevated CRP (199).  Due to albumin being a negative acute phase protein, would expect low albumin level in response to inflammation, and is therefore not an appropriate indicator of protein/nutrition status in the acute care setting    Reason for admission: sepsis likely 2/2 hospital acquired pneumonia.  Originally admitted to  unit 32 for elvis and psychosis in the context of medication nonadherence     PMH:  Past Medical History:   Diagnosis Date    Obesity     Schizoaffective disorder, bipolar type (H)        NUTRITION HISTORY  MIGUEL ÁNGEL. Pt not very responsive to writer's inquiries today. Per H&P note: Per nursing patient has been increasingly sleepy, and possibly not taking in as much water and eating less than half of meals.   Pt with 1:1 sitter, sitter reports pt had oatmeal and juice this morning.   Limited intakes recorded in EMR.    CURRENT NUTRITION ORDERS  Diet: Regular  Intake/Tolerance: MIGUEL ÁNGEL, only one intake recorded in EMR.   Per RN note 9/22:   Diet: Regular. Appetite was good.  Denies N/V.      LABS  Labs reviewed   09/22/23 10:17   Sodium 141   Potassium 3.4   Chloride 102   Carbon Dioxide (CO2) 27   Urea Nitrogen 15.6   Creatinine 1.99 (H)   GFR Estimate 29 (L)   Calcium 8.2 (L)  "  Anion Gap 12   Glucose 142 (H)   FIO2 97   Ph Venous 7.44 (H)   PCO2 Venous 44   PO2 Venous 20 (L)   Bicarbonate Venous 30 (H)   Base Excess Venous 4.7 (H)     MEDICATIONS  Medications reviewed  Current Facility-Administered Medications   Medication    acetaminophen (TYLENOL) tablet 650 mg    cloZAPine (CLOZARIL) tablet 100 mg    haloperidol (HALDOL) tablet 5 mg    And    LORazepam (ATIVAN) tablet 2 mg    And    diphenhydrAMINE (BENADRYL) capsule 50 mg    haloperidol lactate (HALDOL) injection 5 mg    And    LORazepam (ATIVAN) injection 2 mg    And    diphenhydrAMINE (BENADRYL) injection 50 mg    divalproex sodium delayed-release (DEPAKOTE SPRINKLE) DR capsule 500 mg    famotidine (PEPCID) tablet 10 mg    hydrOXYzine (ATARAX) tablet 25 mg    Or    hydrOXYzine (ATARAX) tablet 50 mg    lactated ringers BOLUS 1,000 mL    magnesium hydroxide (MILK OF MAGNESIA) suspension 30 mL    piperacillin-tazobactam (ZOSYN) 4.5 g vial to attach to  mL bag    polyethylene glycol (MIRALAX) Packet 17 g    sennosides (SENOKOT) tablet 2 tablet    sodium chloride (PF) 0.9% PF flush 3 mL    sodium chloride (PF) 0.9% PF flush 3 mL    sodium chloride (PF) 0.9% PF flush 3 mL    sodium chloride (PF) 0.9% PF flush 3 mL    vancomycin (VANCOCIN) 1,500 mg in 0.9% NaCl 250 mL intermittent infusion    vancomycin place fernandez - receiving intermittent dosing       ANTHROPOMETRICS  Height: 0 cm (Data Unavailable) 5' 9\"  (8/17/23)  Most Recent Weight: 109.1 kg (240 lb 8.4 oz)    IBW: 66 kg  Body mass index is 35.52 kg/m .  BMI: Obesity Grade II BMI 35-39.9  Weight History: Pt reports  lbs and denies any weight changes.  Wt Readings from Last 15 Encounters:   09/20/23 109.1 kg (240 lb 8.4 oz)   09/10/23 101.9 kg (224 lb 9.6 oz)   07/04/23 105.8 kg (233 lb 4.8 oz)   Per Care Everywhere:  107 kg (236 lb) 06/12/2023  106.6 kg (235 lb) 02/07/2023    Dosing Weight: 87.6 kg (adjusted) kcals, 66 kg protein    ASSESSED NUTRITION NEEDS  Estimated " Energy Needs: 2190 - 2630 kcals/day (25 - 30 kcals/kg Adjusted)  Justification: Obese  Estimated Protein Needs: 99 - 132 grams protein/day (1.5 - 2 grams of pro/kg IBW)  Justification: Obesity guidelines  Estimated Fluid Needs: 2190 - 2630  mL/day (25 - 30 mL/kg)   Justification: Maintenance    PHYSICAL FINDINGS  See malnutrition section below.  No abnormal nutrition-related physical findings observed.   Scott: 20, nutrition scored probably inadequate    GI:   LBM: 9/20/23, diarrhea. Bowel regimen in place  Bowel sounds active x4. Passing flatus     MALNUTRITION  % Intake: Unable to assess  % Weight Loss: None noted  Subcutaneous Fat Loss: None observed  Muscle Loss: None observed  Fluid Accumulation/Edema: None noted  Malnutrition Diagnosis: Patient does not meet two of the established criteria necessary for diagnosing malnutrition but is at risk for malnutrition    NUTRITION DIAGNOSIS  Predicted inadequate nutrient intake related to lethargy, mental health status as evidenced by possible inadequate intakes      INTERVENTIONS  Implementation    Medical food supplement therapy     Goals  Patient to consume % of nutritionally adequate meal trays TID, or the equivalent with supplements/snacks.     Monitoring/Evaluation  Progress toward goals will be monitored and evaluated per protocol.  Jena Guajardo RDN LD  5 med/surge pager [phone]   On Call Pager (weekends only): [phone]

## 2023-09-22 NOTE — PROGRESS NOTES
[facility] [place]    Progress Note - [facility]       Date of Admission:  9/20/2023    Main plans for today:  - ID consult, appreciate recs  - IV Vanc and Zosyn   - Will wait on ID recommendation before discontinuing Vanc  - 1 L LR AM  - NC and pulse ox  - US doppler B/L lower extremities  - Repeat CXR   - VBG  - Strict I & O   - Decrease Zosyn 3.375 g dosing per Pharm   - Decrease Famotidine 10 mg daily  - Daily BMP  - Compression stocking   - Nutrition recommends Medical food supplement therapy    Assessment & Plan   Angela Basurto is a 58 year old female with bipolar 1 who was admitted to psych on 8/16/23 for elvis and psychosis in the context of medication nonadherence. On 9/18 she developed orthostatic hypotension, fevered 9/19 w/ lactate 4.3, and was admitted to our service for sepsis likely 2/2 hospital acquired pneumonia. Currently, Adolfo, full commitment, 1:1, IV abx.      # Hospital-acquired pneumonia  # Positive blood culture x1 - G+ cocci  # Severe sepsis - resolved  # Acute Hypoxic respiratory failure   Transferred from inpatient psych on 9/19 with fever 101.3F, LA 4.3. New cough and wide spread body aches. , WBC 9.8. 9/18 CXR with streaky right greater than left perihilar and basilar opacities, likely atelectasis and/or edema without focal consolidation. Echo WNL, cardiology did not feel it is myocarditis. Today, WBC and procal up trending at 13.5 and 0.21 respectively. VSS. Afebrile. SPO2 97 % on RA. IV vancomycin added with 9/19 bcx positive for 1/2 bottles gram positive cocci, verigene negative. Blood culture speciation for Staph Hominis. Patient met SIRS criteria overnight with fever, tachycardia, and a known source. Lactic acid check this morning and within normal ranges at 1.1. 1 L LR given. With patient developing fevers overnight, elevating WBC at 13.7, CRP at 247.71 and procal at 0.34 while on antibiotic, will consult  ID for recommendation at this time. Nurse reports patient to also experiencing declining O2 levels around 88 % overnight as well as pleuritic chest pain. Started patient on NC and ordered pulse ox. Notable b/l lower extremity swelling was seen on examination. Will proceed with US b/l lower extremity and CXR to r/o DVT and Pleural effusion/edema.     Workup/monitoring  - ID consult, appreciate recs   - US b/l lower extremity  - CXR   - VBG  - NC and pulse ox  - Strict I & O   - Daily CBC  - Daily procal   - CRP q48h    - daily blood cultures until speciation / 48h negative BC  - RVP in process    Treatment  - IV vancomycin at intermittent infusion  - piperacillin/tazobactam (ZOSYN) IV 3.375 g, Intravenous, EVERY 6 HOURS   - consider transitioning to PO Levaquin with down trending CRP/procal and stable vitals        # Bipolar I disorder, currently manic with psychotic features   # Hx of possible seizure in 1983   # Mata: Haldol, clozapine, risperidone, olanzapine  # Full commitment   Presented with elvis and psychosis in the context of medication nonadherence. Patient was presented to emergency room due to homicidal threats toward her family using knife, and exhibiting paranoid and severe disorganized thought and responding to her internal stimuli while staying in the emergency room. Significant symptoms on admission include paranoia, disorganized thinking and behaviors, AVH, and emotional lability. While she understands why she's in the hospital, she displays intrusive behaviors that require a 1:1. 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 Most recent psychiatric hospitalization was in July of this year during which time she left A. On admission, restarted PTA zyprexa, depakote, and haldol. Started clozapine 8/31. Zyprexa stopped on 9/8 due to ineffectiveness and mild sedation in context of clozapine titration.  Achieved dose of 300 mg of clozapine daily on 9/11 with improvement in psychosis noted. Due to orthostatic hypotension, constipation, and sedation, reduced clozapine to 200 mg on 9/17, stopped Haldol on 9/16, psych stopped clozapine 9/18 due to continued hypotension . In summary, the patient's reported symptoms of elvis and psychosis in the context of medication nonadherence are consistent with Bipolar Disorder, type 1. Nurse reports seeing and hearing patient talk to herself throughout the night. Psych evaluated patient and restarted Clozapine 100 mg at bedtime. They will reevaluated patient tomorrow for orthostatic hypotension, constipation, and sedation.      - See psychiatry note for details of medication titrations  - Precautions: 1:1 sitter, restrict to unit, assault  precuations, cheeking precautions, elopement, fall  - Legal status: Mata and full commitment: A petition for MI commitment with Mata was filed on 8/18 through [facility].    - Depakote DT sprinkles 500 mg BID   - Agitation PRN per last psychiatry orders  - EMERGENCY for agitation: Haldol 5mg + 2mg ativan + 50mg q8h PRN, PO or IM  - Preferred for agitation, anxiety: Hydroxyzine 25 - 50mg PO q6h PRN  - Psych consulted  - restarted Clozapine 100 mg PO      # Orthostatic hypotension  Symptomatic 9/18 with blood pressure drop from 123/72 with sitting to 96/55 with standing. Patient reports this is new since admission and starting new medications. Patient initiated on clozapine on 8/31 with dose increases, and currently on haldol. Per nursing patient has been increasingly sleepy, and possibly not taking in as much water and eating less than half of meals. Orthostatic hypotension likely related to medications and possible dehydration. Haldol discontinued evening of 9/16 per psychiatry. AM cortisol WNL: 17.7. Cardiology team was consulted to rule out clozapine induced myocarditis, they have very low suspicion based on echo and troponin. Nurse  has been monitoring orthostatic vital and are positive. Encourage hydration and compression stockings.     - Encourage hydration   - Compression stockings  - Recommend minimize offending agents (clozapine, haldol, etc)   - trend orthostatic vital signs daily     ## ETHAN   Creatinine level elevated at 1.09. Likely 2/2 to IV vanco/zosyn and poor fluid intake. Changed Vanco to intermittent dosing, decrease Zosyn to 3.375 g, and change Pepcid to once daily. Gave additional 1 L LR bolus. Will monitor with daily BMP and ID recommendation on antibiotic adjustment.      - Daily BMP  - Decrease Zosyn 3.375 g dosing per Pharm   - Decrease Famotidine 10 mg daily  - 1 L LR Bolus AM    # Hypocalcemia  # Hypoalbuminemia  Suspect possible malnutrition due to poor PO intake.   - Nutrition IP consult, appreciate recs   - Medical food supplement therapy  - Patient to consume % of nutritionally adequate meal trays TID, or the equivalent with supplements/snacks.      # Taoism  Important to her value system  - Spiritual care consult      Chronic/stable  Hx of HTN  - HOLDING PTA clonidine as patient's pressures have been consistently soft.     Diet: Combination Diet Regular Diet; Safe Tray - with utensils    DVT Prophylaxis: Low Risk/Ambulatory with no VTE prophylaxis indicated  Wilson Catheter: Not present  Fluids: PO  Lines: None     Cardiac Monitoring: None  Code Status: Full Code      Clinically Significant Risk Factors              # Hypoalbuminemia: Lowest albumin = 2.4 g/dL at 9/19/2023 11:53 PM, will monitor as appropriate      # Acute Kidney Injury, unspecified: based on a >150% or 0.3 mg/dL increase in last creatinine compared to past 90 day average, will monitor renal function                    Disposition Plan     Expected Discharge Date: 09/24/2023              Anticipate transfer back to inpatient psychiatry once medically stable/cleared     The patient's care was discussed with the Attending Physician,   Della .    Gonzalez Reno MD  [facility]  [facility]  Securely message with kaleo (more info)  Text page via [employer] Paging/Directory   See signed in provider for up to date coverage information  ______________________________________________________________________    Interval History   Overnight:  Declining O2 level around 88% reported by nurse. Patient reports pleuritic chest pain overnight, but has improved this morning. Fever of 100.3 F.     Subjective:  Temp: 100.1 F and SpO2 of 89 %. Resident informed the nurse to start nasal cannula. Patient is still very somnolent. Although she was up sitting in a chair during morning rounds. She notes to still experience dizziness while standing but denies any SOB or chest pain. Patient reports some LT sided neck tenderness overnight but is better this morning. Compression stocking have not been given to patient. Nurse will look into getting compression stocking for the patient. Encourage patient to continue to eat and drink as tolerated.     Physical Exam   Vital Signs: Temp: 100.1  F (37.8  C) Temp src: Oral BP: 132/60 Pulse: 95   Resp: 20 SpO2: (!) 89 % O2 Device: None (Room air)    Weight: 240 lbs 8.35 oz    Constitutional: Somnolent, cooperative, no apparent distress, and appears stated age  Respiratory: Crackles heard along RLL and LLL. No increased work of breathing, good air exchange, clear to auscultation bilaterally, no crackles or wheezing in upper lobes.   Cardiovascular: Normal apical impulse, regular rate and rhythm, normal S1 and S2, no S3 or S4, and no murmur noted  GI: No scars, normal bowel sounds, soft, non-distended, non-tender, no masses palpated, no hepatosplenomegally  MSK: Non pitting edema of b/l lower extremity   Neurologic: Lethargic, oriented to name and place. Negative kernig and brudzinski signs  Neuropsychiatric: General: normal, calm, and normal eye contact    Medical  Decision Making       Please see A&P for additional details of medical decision making.      Data     I have personally reviewed the following data over the past 24 hrs:    13.7 (H)  \   10.2 (L)   / 290     141 102 15.6 /  142 (H)   3.4 27 1.99 (H) \     Procal: 0.34 (H) CRP: 247.71 (H) Lactic Acid: 1.1         Imaging results reviewed over the past 24 hrs:   No results found for this or any previous visit (from the past 24 hour(s)).

## 2023-09-22 NOTE — PLAN OF CARE
Goal Outcome Evaluation:    VS:    O2: Sating >90% on RA. Lung sounds clear and diminished. Denies chest pain and SOB.   Output: Voids spontaneously and adequately. In the bathroom   Last BM: Bowel sounds active x4. Passing flatus.    Activity: Up with 1 assist, FWW, and gait belt.    Skin: none   Pain: Pain was managed with Tylenol.    CMS: A&Ox4. Denies N/T.    Dressing: None   Diet: Regular. Appetite was good.  Denies N/V.    LDA: PIV to R is S/L.    Equipment: IV pole,  and personal belongings. Call light within reach and uses appropriately.   Plan:  TBD   Additional Info: Continue POC

## 2023-09-22 NOTE — CONSULTS
General ID Service: Initial Consultation     Patient:  Angela Basurto, Date of birth 1965, Medical record number [id number]  Date of Visit:  September 22, 2023  Consult Requested by: Malorie Hull DO         Assessment and Recommendations:   ID Problem List:  Possible HAP  CoNS growth in blood culture  Fever, borderline    Recommendations:  Stop vancomycin  CoNS growth (particularly S hominis) in 1 of 4 bottles is almost certainly contaminant  Continue piperacillin-tazobactam for possible HAP  If significant clinical worsening (sudden marked increase in O2 demands, significant hypotension) would change to meropenem overnight and obtain new blood cultures.  If possible, would obtain sputum sample for bacterial stain and culture  Would repeat COVID and influenza PCRs (last on 9/19 were negative)    ID will continue to follow. Dr. Martina Purcell will be covering this weekend, with Dr. Natalie Torres picking up the team on Monday.    Discussion:  59yo F admitted to inpatient psychiatry for management of schizoaffective disorder now on medicine service due to new fever and hypoxia requiring supplemental O2.    My suspicion for a edie bacterial process is lower (though not zero). Pt was off oxygen when I saw her and throughout interview/exam (had just been transported back from x-ray) without notable dyspnea or hypoxia. She was having chills, but temperature recorded during interview was 98.3F. Certainly, there could be some degree of symptom masking from current antibiotics.     I would continue piperacillin-tazobactam while work-up is ongoing. If she does produce any sputum, would send this for bacterial stain and culture. I think it would also be worth re-testing for COVID and influenza given non-specific changes on CXR and fairly low procalcitonin despite symptoms.     Regarding the CoNS growth in blood culture, this is almost certainly contaminant, so we can stop the vancomycin.    Pasha Santana  MD  Division of Infectious Diseases and International Medicine  P: [phone]        History of Present Illness:     59yo F with history of schizoaffective disorder, bipolar disorder, anxiety, and obesity admitted on 8/16/23 for mental health evaluation due to recent auditory hallucinations telling her to harm others. ID consulted today for recent fever, possible pneumonia, and coagulase-negative Staphylococcus growth in one blood culture bottle.    Pt was admitted for above-mentioned reasons on 8/18 and was on the inpatient psychiatry service receiving treatment for her mental health issues without other medical concerns until 9/15 when medicine was consulted for apparent orthostatic hypotension episode. At that time, nursing reported pt had been becoming increasingly sleepy during the day, taking less liquid PO. Conservative measures (medication dose adjustments, increasing fluid intake) were attempted. By 9/18, pt was reportedly barely able to stand due to dizziness and orthostatic hypotension. The psychiatry team initiated a work-up for myocarditis (possibly 2/2 clozapine), which was unrevealing (troponins normal, EKG with NSF). Cardiology was also consulted (9/19) for this and felt myocarditis was unlikely with normal TTE. Throughout this time, pt was afebrule with normal WBC.     A head CT was was performed on 9/18 without signs of intracranial pathology. UA was obtained and was overall benign. Respiratory virus panel and COVID PCR were negative (9/19). Procalcitonin was 0.11, .     On 9/19 evening, pt developed a fever to 101.3F, along with new cough and body aches. CXR was performed and showed streak R>L perihilar and basilar opacities but no consolidations. Blood cultures were collected, with 1 of 4 bottles now growing Staphylococcus hominis. Repeat blood cultures on 9/21 have shown no growth to date.     Pt was transferred to the medicine service on 9/20 after elevated lactate resulted. The  working diagnosis at the time of transfer was hospital-acquired pneumonia due to symptoms and increasing oxygen support needs (from room air to 1L NC). She was started on piperacillin-tazobactam for possible pneumonia, with vancomycin added a day later once above-mentioned blood culture results were available.         Review of Systems:   Full 10 point ROS obtained, pertinent positives and negatives as above.       Past Medical History:     Past Medical History:   Diagnosis Date    Obesity     Schizoaffective disorder, bipolar type (H)      PSHx: Reviewed and none      Allergies:      Allergies   Allergen Reactions    Pork-Derived Products Unknown     Alevism does not eat pork            Current Antimicrobials:     Piperacillin-tazobactam  Vancomycin       Family History:   Reviewed and non-contributory       Social History:     Social History     Socioeconomic History    Marital status: Single     Spouse name: Not on file    Number of children: Not on file    Years of education: Not on file    Highest education level: Not on file   Occupational History    Not on file   Tobacco Use    Smoking status: Not on file    Smokeless tobacco: Not on file   Substance and Sexual Activity    Alcohol use: Not on file    Drug use: Not on file    Sexual activity: Not on file   Other Topics Concern    Not on file   Social History Narrative    Not on file     Social Determinants of Health     Financial Resource Strain: Not on file   Food Insecurity: Not on file   Transportation Needs: Not on file   Physical Activity: Not on file   Stress: Not on file   Social Connections: Not on file   Interpersonal Safety: Not on file   Housing Stability: Not on file          Physical Exam:   Ranges forvital signs:  Temp:  [98.2  F (36.8  C)-100.3  F (37.9  C)] 98.2  F (36.8  C)  Pulse:  [] 99  Resp:  [18-20] 18  BP: (130-147)/(60-74) 136/74  SpO2:  [87 %-99 %] 99 %    Intake/Output Summary (Last 24 hours) at 9/22/2023 1602  Last data filed  at 9/21/2023 2217  Gross per 24 hour   Intake 640 ml   Output --   Net 640 ml     Exam:   GENERAL:  well-developed, well-nourished, lying in bed in no acute distress.   ENT:  Head is normocephalic, atraumatic. Oropharynx is moist without exudates or ulcers.  EYES:  Eyes have anicteric sclerae.    NECK:  Supple.  LUNGS:  Slight muffling in lower lung fields, otherwise clear to auscultation.  CARDIOVASCULAR:  Regular rate and rhythm with no murmurs, gallops or rubs.  ABDOMEN:  Normal bowel sounds, soft, nontender.  EXT: Extremities warm   SKIN:  No acute rashes.   NEUROLOGIC:  Grossly nonfocal.         Laboratory Data:     Reviewed.  Pertinent for:    Microbiology:  Culture   Date Value Ref Range Status   09/21/2023 No growth after 12 hours  Preliminary   09/21/2023 No growth after 12 hours  Preliminary   09/21/2023 No growth after 1 day  Preliminary   09/19/2023 Positive on the 1st day of incubation (A)  Preliminary   09/19/2023 Staphylococcus hominis (AA)  Preliminary     Comment:     1 of 2 bottles   09/19/2023 No growth after 2 days  Preliminary     Metabolic Studies       Recent Labs   Lab Test 09/22/23  1017 09/22/23  0815 09/22/23  0519 09/21/23  1619 09/21/23  1047 09/20/23  0926 09/19/23  2353 09/19/23  2212 09/19/23  0909 09/18/23  1349 08/17/23  0929 07/01/23  1003     --  140  --  141 138 135*  --  137 139   < > 139   POTASSIUM 3.4  --  3.8  --  3.7 3.6 4.0  --  3.6 3.5   < > 3.8   CHLORIDE 102  --  103  --  103 102 98  --  100 102   < > 100   CO2 27  --  22  --  26 26 26  --  28 29   < > 28   ANIONGAP 12  --  15  --  12 10 11  --  9 8   < > 11   BUN 15.6  --  14.6  --  9.2 5.7* 5.0*  --  5.8* 7.6   < > 9.1   CR 1.99*  --  1.85* 1.42* 1.10*  1.09* 0.61 0.49*  --  0.53 0.58   < > 0.66   GFRESTIMATED 29*  --  31* 43* 59*  59* >90 >90  --  >90 >90   < > >90   *  --  148*  --  129* 128* 214*  --  123* 136*   < > 117*   A1C  --   --   --   --   --   --   --   --   --   --   --  5.6   FERMIN 8.2*  --   7.9*  --  8.4* 8.3* 8.2*  --  8.5* 8.7   < > 9.8   MAG  --   --   --   --   --   --   --   --   --  1.8  --   --    LACT  --  1.1  --   --   --   --  2.6*   < >  --   --   --   --     < > = values in this interval not displayed.     Hepatic Studies    Recent Labs   Lab Test 09/20/23  0926 09/19/23  2353 09/18/23  1349 09/15/23  1037 08/17/23  0929 07/01/23  1003   BILITOTAL 0.4 0.3 0.3 0.2 0.4 0.4   ALKPHOS 101 99 88 67 71 64   ALBUMIN 2.7* 2.4* 3.2* 3.4* 4.3 4.2   AST 26 45 34 15 17 23   ALT 36 39 35 13 14 34     Hematology Studies      Recent Labs   Lab Test 09/22/23  0519 09/21/23  1619 09/21/23  0750 09/20/23  0926 09/19/23  2353 09/19/23  0909   WBC 13.7* 13.1* 13.5* 11.2* 10.8 9.8   HGB 10.2* 10.3* 10.5* 10.3* 10.1* 10.1*   HCT 30.1* 31.2* 32.4* 31.1* 30.1* 30.9*    301 276 236 226 219     Arterial Blood Gas Testing    Recent Labs   Lab Test 09/22/23  1017 09/20/23  0342   O2PER 97 21      Urine Studies     Recent Labs   Lab Test 09/19/23  1704 06/27/23  0302   URINEPH 6.5 5.0   NITRITE Negative Negative   LEUKEST Negative Negative   WBCU 5 1     Vancomycin Levels     Recent Labs   Lab Test 09/22/23  0519 09/21/23  1619   VANCOMYCIN 15.8 7.6         Latest Ref Rng & Units 9/22/2023    10:17 AM 9/22/2023     5:19 AM 9/21/2023     4:19 PM 9/21/2023    10:47 AM 9/21/2023     7:50 AM   Transplant Immunosuppression Labs   Creat 0.51 - 0.95 mg/dL 1.99  1.85  1.42  1.09     1.10     Urea Nitrogen 6.0 - 20.0 mg/dL 15.6  14.6   9.2     WBC 4.0 - 11.0 10e3/uL  13.7  13.1   13.5    Neutrophil %  75  74   70           Imaging:   X-ray Chest 2 vws*  Result Date: 9/20/2023  Impression: 1. Stable streaky perihilar bibasilar opacities, likely atelectasis and/or edema. Pneumonia not excluded. 2. Stable small bilateral pleural effusions. I have personally reviewed the examination and initial interpretation and I agree with the findings. AFSHIN PULIDO MD   SYSTEM ID:  [id number]    Echocardiogram Complete  Result Date:  9/19/2023  Interpretation Summary Global and regional left ventricular function is normal with an EF of 60-65%. Global right ventricular function is normal. No significant valvular abnormalities present. Estimated mean right atrial pressure is normal. Trivial pericardial effusion is present.     XR Chest 2 Views  Result Date: 9/19/2023  Impression: 1. Streaky right greater than left perihilar and basilar opacities, likely atelectasis and/or edema. No focal consolidation. 2. Small bilateral pleural effusions. I have personally reviewed the examination and initial interpretation and I agree with the findings. NAYAN STEELE MD   SYSTEM ID:  [id number]    CT Head w/o contrast*  Result Date: 9/19/2023  IMPRESSION: No CT evidence of acute intracranial pathology. I have personally reviewed the examination and initial interpretation and I agree with the findings. JERI XIE MD   SYSTEM ID:  [id number]

## 2023-09-22 NOTE — PHARMACY-VANCOMYCIN DOSING SERVICE
"Pharmacy Vancomycin Note  Date of Service 2023  Patient's  1965   58 year old, female    Indication: CONS Bacteremia  Day of Therapy: 3 started 23  Current vancomycin regimen:  intermittent dosing d/t ETHAN, last dose 1500 mg IV on  at 1730  Current vancomycin monitoring method: Trough (Method 2 = manual dose calculation)  Current vancomycin therapeutic monitoring goal: 15-20 mg/L    Current estimated CrCl = Estimated Creatinine Clearance: 44.7 mL/min (A) (based on SCr of 1.85 mg/dL (H)).    Creatinine for last 3 days  2023:  9:09 AM Creatinine 0.53 mg/dL; 11:53 PM Creatinine 0.49 mg/dL  2023:  9:26 AM Creatinine 0.61 mg/dL  2023: 10:47 AM Creatinine 1.09 mg/dL; 10:47 AM Creatinine 1.10 mg/dL;  4:19 PM Creatinine 1.42 mg/dL  2023:  5:19 AM Creatinine 1.85 mg/dL    Recent Vancomycin Levels (past 3 days)  2023:  4:19 PM Vancomycin 7.6 ug/mL  2023:  5:19 AM Vancomycin 15.8 ug/mL    Vancomycin IV Administrations (past 72 hours)                     vancomycin (VANCOCIN) 1,500 mg in 0.9% NaCl 250 mL intermittent infusion (mg) 1,500 mg New Bag 23 1730    vancomycin (VANCOCIN) 1,500 mg in 0.9% NaCl 250 mL intermittent infusion (mg) 1,500 mg New Bag 23 0042                    Nephrotoxins and other renal medications (From now, onward)      Start     Dose/Rate Route Frequency Ordered Stop    23 0900  vancomycin (VANCOCIN) 1,500 mg in 0.9% NaCl 250 mL intermittent infusion         1,500 mg  over 90 Minutes Intravenous ONCE 23 0828      23 1254  vancomycin place fernandez - receiving intermittent dosing         1 each Intravenous SEE ADMIN INSTRUCTIONS 23 1256      23 0900  piperacillin-tazobactam (ZOSYN) 4.5 g vial to attach to  mL bag        Note to Pharmacy: For [facility], [facility] and [facility]: For Zosyn-naive patients, use the \"Zosyn initial dose + extended infusion\" order panel.    4.5 g  over 30 Minutes Intravenous EVERY 6 HOURS " 09/20/23 0533                 Contrast Orders - past 72 hours (72h ago, onward)      None            Interpretation of levels and current regimen:  Vancomycin level is reflective of therapeutic level, ok to redose     Has serum creatinine changed greater than 50% in last 72 hours: Yes    Urine output:  volume not measured but only 1 occurrence charted on 9/21/23    Renal Function: Worsening    Plan:  OK to give vancomycin 1500 mg (15 mg/kg) IV x1 and continue intermittent dosing per levels d/t ETHAN  Vancomycin monitoring method: Trough (Method 2 = manual dose calculation)  Vancomycin therapeutic monitoring goal: 15-20 mg/L  Pharmacy will check vancomycin levels as appropriate in  9/23/23 AM labs .  Serum creatinine levels will be ordered daily for the first week of therapy and at least twice weekly for subsequent weeks.    Tobias Winchester RPH

## 2023-09-22 NOTE — CONSULTS
Care Management Initial Consult    General Information  Assessment completed with: Chart review    Type of CM/SW Visit: Initial Assessment  Primary Care Provider verified and updated as needed: (No PCP on file)   Readmission within the last 30 days: other (see comments) (Transferred from [facility])  Reason for Consult: mental health concerns  Advance Care Planning: other (see comments) (Did not speak with patient at this time).    Communication Assessment  Patient's communication style: spoken language (non-English)    Hearing Difficulty or Deaf: no   Wear Glasses or Blind: no    Cognitive  Cognitive/Neuro/Behavioral: .WDL except    Level of Consciousness: lethargic    Arousal Level: opens eyes spontaneously    Orientation: disoriented to, situation    Mood/Behavior: flat affect, hypoactive (quiet, withdrawn)    Best Language: 0 - No aphasia    Speech: slow, clear    Living Environment:   People in home: child(lawson), adult, parent(s)     Current living Arrangements: apartment      Able to return to prior arrangements: other (see comments)  Living Arrangement Comments: Unknown at this time, plan to discharge back to [facility]    Family/Social Support  Care provided by: self  Provides care for: parent(s)  Marital Status:   Support System: Children, Sibling(s)          Description of Support System: Supportive, Involved    Support Assessment: Adequate family and caregiver support, Adequate social supports    Current Resources  Patient receiving home care services: No  Community Resources: None  Equipment currently used at home: none  Supplies currently used at home: None    Employment/Financial:  Employment Status: other (see comments) (Unknown)     Financial Concerns: No concerns identified   Referral to Financial Worker: No  Does the patient's insurance plan have a 3 day qualifying hospital stay waiver?  No    Lifestyle & Psychosocial Needs  Social Determinants of Health     Food Insecurity: Not on file    Depression: Not on file   Housing Stability: Not on file   Tobacco Use: Not on file   Financial Resource Strain: Not on file   Alcohol Use: Not on file   Transportation Needs: Not on file   Physical Activity: Not on file   Interpersonal Safety: Not on file   Stress: Not on file   Social Connections: Not on file     Functional Status  Prior to admission patient needed assistance: No  Dependent ADLs: Independent  Dependent IADLs: Independent  Assesssment of Functional Status: Not at  functional baseline    Mental Health Status  Mental Health Status: Current Concern    Mental Health Management: Medication, Individual Therapy, Group Therapy, Psychiatrist    Chemical Dependency Status  Chemical Dependency Status: No Current Concerns           Values/Beliefs  Spiritual, Cultural Beliefs, Caodaism Practices, Values that affect care: yes  Description of Beliefs that Will Affect Care: Congregational    Additional Information  Angela Basurto is a 58 year old female with bipolar 1 who was admitted to psych on 8/16/23 for elvis and psychosis in the context of medication nonadherence. On 9/18 she developed orthostatic hypotension, fevered 9/19 w/ lactate 4.3, and was admitted to our service for sepsis likely 2/2 hospital acquired pneumonia. Currently, Mata, full commitment, 1:1, IV abx.     Per IDT rounds, patient may need IV ABX, therefore, awaiting ID to weigh in. If so, patient may need to stay on the unit until completion. Current recs are to discharge back to  psych when medically appropriate. Care management will continue to follow.     CHRISTINE Couch, LGSW  5 Med Surg and 10 ICU   [facility]  Phone: [phone]  Pager: [phone]

## 2023-09-23 LAB
ANION GAP SERPL CALCULATED.3IONS-SCNC: 9 MMOL/L (ref 7–15)
BACTERIA BLD CULT: ABNORMAL
BACTERIA BLD CULT: ABNORMAL
BASOPHILS # BLD AUTO: 0 10E3/UL (ref 0–0.2)
BASOPHILS NFR BLD AUTO: 0 %
BUN SERPL-MCNC: 15.8 MG/DL (ref 6–20)
C DIFF TOX B STL QL: NEGATIVE
CALCIUM SERPL-MCNC: 8 MG/DL (ref 8.6–10)
CHLORIDE SERPL-SCNC: 107 MMOL/L (ref 98–107)
CREAT SERPL-MCNC: 1.84 MG/DL (ref 0.51–0.95)
DEPRECATED HCO3 PLAS-SCNC: 26 MMOL/L (ref 22–29)
EGFRCR SERPLBLD CKD-EPI 2021: 32 ML/MIN/1.73M2
EOSINOPHIL # BLD AUTO: 0.6 10E3/UL (ref 0–0.7)
EOSINOPHIL NFR BLD AUTO: 4 %
ERYTHROCYTE [DISTWIDTH] IN BLOOD BY AUTOMATED COUNT: 13.6 % (ref 10–15)
GLUCOSE SERPL-MCNC: 174 MG/DL (ref 70–99)
HCT VFR BLD AUTO: 29.1 % (ref 35–47)
HGB BLD-MCNC: 9.6 G/DL (ref 11.7–15.7)
IMM GRANULOCYTES # BLD: 0.5 10E3/UL
IMM GRANULOCYTES NFR BLD: 3 %
LYMPHOCYTES # BLD AUTO: 1.6 10E3/UL (ref 0.8–5.3)
LYMPHOCYTES NFR BLD AUTO: 11 %
MCH RBC QN AUTO: 31 PG (ref 26.5–33)
MCHC RBC AUTO-ENTMCNC: 33 G/DL (ref 31.5–36.5)
MCV RBC AUTO: 94 FL (ref 78–100)
MONOCYTES # BLD AUTO: 1.8 10E3/UL (ref 0–1.3)
MONOCYTES NFR BLD AUTO: 13 %
NEUTROPHILS # BLD AUTO: 10.2 10E3/UL (ref 1.6–8.3)
NEUTROPHILS NFR BLD AUTO: 69 %
NRBC # BLD AUTO: 0 10E3/UL
NRBC BLD AUTO-RTO: 0 /100
PLATELET # BLD AUTO: 311 10E3/UL (ref 150–450)
POTASSIUM SERPL-SCNC: 3.4 MMOL/L (ref 3.4–5.3)
PROCALCITONIN SERPL IA-MCNC: 0.33 NG/ML
RBC # BLD AUTO: 3.1 10E6/UL (ref 3.8–5.2)
SODIUM SERPL-SCNC: 142 MMOL/L (ref 136–145)
WBC # BLD AUTO: 14.6 10E3/UL (ref 4–11)

## 2023-09-23 PROCEDURE — 87493 C DIFF AMPLIFIED PROBE: CPT | Performed by: STUDENT IN AN ORGANIZED HEALTH CARE EDUCATION/TRAINING PROGRAM

## 2023-09-23 PROCEDURE — 85025 COMPLETE CBC W/AUTO DIFF WBC: CPT | Performed by: STUDENT IN AN ORGANIZED HEALTH CARE EDUCATION/TRAINING PROGRAM

## 2023-09-23 PROCEDURE — 250N000013 HC RX MED GY IP 250 OP 250 PS 637: Performed by: PSYCHIATRY & NEUROLOGY

## 2023-09-23 PROCEDURE — 120N000002 HC R&B MED SURG/OB UMMC

## 2023-09-23 PROCEDURE — 80048 BASIC METABOLIC PNL TOTAL CA: CPT | Performed by: STUDENT IN AN ORGANIZED HEALTH CARE EDUCATION/TRAINING PROGRAM

## 2023-09-23 PROCEDURE — 87040 BLOOD CULTURE FOR BACTERIA: CPT | Performed by: STUDENT IN AN ORGANIZED HEALTH CARE EDUCATION/TRAINING PROGRAM

## 2023-09-23 PROCEDURE — 250N000013 HC RX MED GY IP 250 OP 250 PS 637

## 2023-09-23 PROCEDURE — 250N000013 HC RX MED GY IP 250 OP 250 PS 637: Performed by: STUDENT IN AN ORGANIZED HEALTH CARE EDUCATION/TRAINING PROGRAM

## 2023-09-23 PROCEDURE — 36415 COLL VENOUS BLD VENIPUNCTURE: CPT | Performed by: STUDENT IN AN ORGANIZED HEALTH CARE EDUCATION/TRAINING PROGRAM

## 2023-09-23 PROCEDURE — 84145 PROCALCITONIN (PCT): CPT | Performed by: STUDENT IN AN ORGANIZED HEALTH CARE EDUCATION/TRAINING PROGRAM

## 2023-09-23 PROCEDURE — 99232 SBSQ HOSP IP/OBS MODERATE 35: CPT | Mod: GC | Performed by: STUDENT IN AN ORGANIZED HEALTH CARE EDUCATION/TRAINING PROGRAM

## 2023-09-23 PROCEDURE — 250N000011 HC RX IP 250 OP 636: Mod: JZ | Performed by: STUDENT IN AN ORGANIZED HEALTH CARE EDUCATION/TRAINING PROGRAM

## 2023-09-23 RX ADMIN — PIPERACILLIN AND TAZOBACTAM 3.38 G: 3; .375 INJECTION, POWDER, LYOPHILIZED, FOR SOLUTION INTRAVENOUS at 22:42

## 2023-09-23 RX ADMIN — CLOZAPINE 100 MG: 100 TABLET ORAL at 22:52

## 2023-09-23 RX ADMIN — DIVALPROEX SODIUM 500 MG: 125 CAPSULE, COATED PELLETS ORAL at 09:18

## 2023-09-23 RX ADMIN — FAMOTIDINE 10 MG: 10 TABLET ORAL at 09:18

## 2023-09-23 RX ADMIN — PIPERACILLIN AND TAZOBACTAM 3.38 G: 3; .375 INJECTION, POWDER, LYOPHILIZED, FOR SOLUTION INTRAVENOUS at 15:04

## 2023-09-23 RX ADMIN — PIPERACILLIN AND TAZOBACTAM 3.38 G: 3; .375 INJECTION, POWDER, LYOPHILIZED, FOR SOLUTION INTRAVENOUS at 09:17

## 2023-09-23 RX ADMIN — DIVALPROEX SODIUM 500 MG: 125 CAPSULE, COATED PELLETS ORAL at 21:08

## 2023-09-23 RX ADMIN — PIPERACILLIN AND TAZOBACTAM 3.38 G: 3; .375 INJECTION, POWDER, LYOPHILIZED, FOR SOLUTION INTRAVENOUS at 02:59

## 2023-09-23 RX ADMIN — Medication 1 LOZENGE: at 13:42

## 2023-09-23 ASSESSMENT — ACTIVITIES OF DAILY LIVING (ADL)
ADLS_ACUITY_SCORE: 23
ADLS_ACUITY_SCORE: 26
ADLS_ACUITY_SCORE: 23
ADLS_ACUITY_SCORE: 23
ADLS_ACUITY_SCORE: 21
ADLS_ACUITY_SCORE: 23

## 2023-09-23 NOTE — PLAN OF CARE
Goal Outcome Evaluation:  More awake and conversant with staffs when family present.  Compliant with meds after much convincing.  Taking meds with apple sauce.  Up SBA1,voiding.Wears pull up for urine leakage.  Low grade temp,tylenol given.  Tested for covid and flu,result came negative to all.  On IV abx  q 6hrs.  Denies pain.  +2-3 edema to bilateral ankle/feet.Compression socks placed.  Sleeping but noticed talking to self while asleep.  Otherwise,cooperative with staffs.  On commitment,jarvised for meds.Bedside attendant in room.     0614:Loose stools x3  since 7pm.MD ordered c-diff check.

## 2023-09-23 NOTE — PROGRESS NOTES
[facility]    Progress Note - [facility]       Date of Admission:  9/20/2023    Main plans for today:  - ID consult, following   - Discontinue Vanc  - IV Zosyn 3.375 g  - Famotidine 10 mg daily  - Strict I & O    - Daily BMP  - Compression stocking   - Nutrition recommends Medical food supplement therapy    Assessment & Plan   Angela Basurto is a 58 year old female with bipolar 1 who was admitted to psych on 8/16/23 for elvis and psychosis in the context of medication nonadherence. On 9/18 she developed orthostatic hypotension, fevered 9/19 w/ lactate 4.3, and was admitted to our service for sepsis likely 2/2 hospital acquired pneumonia. Currently, Adolfo, full commitment, 1:1, IV abx.      # Hospital-acquired pneumonia  # Positive blood culture x1 - G+ cocci  # Severe sepsis - resolved  # Acute Hypoxic respiratory failure   Transferred from inpatient psych on 9/19 with fever 101.3F, LA 4.3. New cough and wide spread body aches. , WBC 9.8. 9/18 CXR with streaky right greater than left perihilar and basilar opacities, likely atelectasis and/or edema without focal consolidation. Echo WNL, cardiology did not feel it is myocarditis. Today, WBC and procal continues up trending at 14.6 and 0.33 respectively. Afebrile. SPO2 95 % on RA. IV vancomycin added with 9/19 bcx positive for 1/4 bottles gram positive cocci, verigene negative. Blood culture speciation for Staph Hominis. ID has assessed the patient and I agree with their recommendation on discontinuing Vanco with blood culture likely being an contaminant. Will continue to monitor patient's inflammatory markers and vitals for improvement on Zosyn. SpO2 overnight into this morning have been around 90-98% on room air. Per ID, If significant clinical worsening (sudden marked increase in O2 demands, significant hypotension) would change to meropenem. Repeat influ/RSV/COVID negative. CXR  reassuring. US b/l lower extremity negative for DVT but will consider CTPE if patient continue to require persist oxygen demand.     Workup/monitoring  - ID consult, appreciate recs   - Stop vancomycin  CoNS growth (particularly S hominis) in 1 of 4 bottles is almost certainly contaminant  -Continue piperacillin-tazobactam for possible HAP  If significant clinical worsening (sudden marked increase in O2 demands, significant hypotension) would change to meropenem overnight and obtain new blood cultures.  - If possible, would obtain sputum sample for bacterial stain and culture (ordered 9/22)  - Would repeat COVID and influenza PCRs (repeated 9/22- negative)  - Strict I & O   - Daily CBC  - Daily procal   - CRP q48h    - daily blood cultures until speciation / 48h negative BC  - Influ/RSV/COVID (negative)    Treatment  -  Discontinue IV vancomycin at intermittent infusion (9/21-9/22)  - piperacillin/tazobactam (ZOSYN) IV 3.375 g, Intravenous, EVERY 6 HOURS (9/20- P)  - consider transitioning to PO Levaquin with down trending CRP/procal and stable vitals        # Bipolar I disorder, currently manic with psychotic features   # Hx of possible seizure in 1983   # Mata: Haldol, clozapine, risperidone, olanzapine  # Full commitment   Presented with elvis and psychosis in the context of medication nonadherence. Patient was presented to emergency room due to homicidal threats toward her family using knife, and exhibiting paranoid and severe disorganized thought and responding to her internal stimuli while staying in the emergency room. Significant symptoms on admission include paranoia, disorganized thinking and behaviors, AVH, and emotional lability. While she understands why she's in the hospital, she displays intrusive behaviors that require a 1:1. 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 Most recent psychiatric  hospitalization was in July of this year during which time she left [place]. On admission, restarted PTA zyprexa, depakote, and haldol. Started clozapine 8/31. Zyprexa stopped on 9/8 due to ineffectiveness and mild sedation in context of clozapine titration. Achieved dose of 300 mg of clozapine daily on 9/11 with improvement in psychosis noted. Due to orthostatic hypotension, constipation, and sedation, reduced clozapine to 200 mg on 9/17, stopped Haldol on 9/16, psych stopped clozapine 9/18 due to continued hypotension . In summary, the patient's reported symptoms of elvis and psychosis in the context of medication nonadherence are consistent with Bipolar Disorder, type 1. Nurse reports seeing and hearing patient talk to herself throughout the night. Restarted Clozapine 100 mg at bedtime per psych. They continue to follow for evaluation of orthostatic hypotension, constipation, and sedation.      - See psychiatry note for details of medication titrations  - Precautions: 1:1 sitter, restrict to unit, assault  precuations, cheeking precautions, elopement, fall  - Legal status: Mata and full commitment: A petition for MI commitment with Mata was filed on 8/18 through [facility].    - Depakote DT sprinkles 500 mg BID   - Agitation PRN per last psychiatry orders  - EMERGENCY for agitation: Haldol 5mg + 2mg ativan + 50mg q8h PRN, PO or IM  - Preferred for agitation, anxiety: Hydroxyzine 25 - 50mg PO q6h PRN  - Psych consulted  - restarted Clozapine 100 mg PO      # Orthostatic hypotension  Symptomatic 9/18 with blood pressure drop from 123/72 with sitting to 96/55 with standing. Patient reports this is new since admission and starting new medications. Patient initiated on clozapine on 8/31 with dose increases, and currently on haldol. Per nursing patient has been increasingly sleepy, and possibly not taking in as much water and eating less than half of meals. Orthostatic hypotension likely related to medications and  possible dehydration. Haldol discontinued evening of 9/16 per psychiatry. AM cortisol WNL: 17.7. Cardiology team was consulted to rule out clozapine induced myocarditis, they have very low suspicion based on echo and troponin. Nurse has been monitoring orthostatic vital and are positive. Encourage hydration and compression stockings.     - Encourage hydration   - Compression stockings  - Recommend minimize offending agents (clozapine, haldol, etc)   - trend orthostatic vital signs daily     ## ETHAN   Creatinine level elevated at 1.84. Likely 2/2 to IV vanco/zosyn and poor fluid intake. IV Zosyn to 3.375 g q6hr and Pepcid to once daily currently at renal dosing. Will monitor with daily BMP and ID recommendation on antibiotic adjustment.      - Daily BMP  - IV Zosyn 3.375 g q6hr  - Famotidine 10 mg daily  - S/P 2 L LR Bolus AM    # Hypocalcemia  # Hypoalbuminemia  Suspect possible malnutrition due to poor PO intake.   - Nutrition IP consult, appreciate recs   - Medical food supplement therapy  - Patient to consume % of nutritionally adequate meal trays TID, or the equivalent with supplements/snacks.      # Jain  Important to her value system  - Spiritual care consult      Chronic/stable  Hx of HTN  - HOLDING PTA clonidine as patient's pressures have been consistently soft.     Diet: Combination Diet Regular Diet; Safe Tray - with utensils    DVT Prophylaxis: Low Risk/Ambulatory with no VTE prophylaxis indicated  Wilson Catheter: Not present  Fluids: PO  Lines: None     Cardiac Monitoring: None  Code Status: Full Code      Clinically Significant Risk Factors              # Hypoalbuminemia: Lowest albumin = 2.4 g/dL at 9/19/2023 11:53 PM, will monitor as appropriate      # Acute Kidney Injury, unspecified: based on a >150% or 0.3 mg/dL increase in last creatinine compared to past 90 day average, will monitor renal function                      Disposition Plan      Expected Discharge Date: 09/24/2023         Discharge Comments: Psych placement      Anticipate transfer back to inpatient psychiatry once medically stable/cleared     The patient's care was discussed with the Attending Physician, Dr. Hull .    Gonzalez Reno MD  [facility]  [facility]  Securely message with [employer] (more info)  Text page via [facility] Paging/Directory   See signed in provider for up to date coverage information  ______________________________________________________________________    Interval History   Overnight:  Nurse continue to report patient is talking herself overnight. SpO2 overnight has been around 90-98 % RA. No fever overnight.     Subjective:  Temp: 98.8 F and SpO2 of 95 %. Although somnolent, patient was more active this morning. She reports to be feeling much better with no concerns at this time. She still reports dizziness with standing but has improved. She denies any SOB or chest pain. Angela is wearing compression stocking. Encourage patient to continue to eat and drink as tolerated.       Physical Exam   Vital Signs: Temp: 98.8  F (37.1  C) Temp src: Oral BP: (!) 170/102 Pulse: 101   Resp: 18 SpO2: 95 % O2 Device: Nasal cannula Oxygen Delivery: 2 LPM  Weight: 240 lbs 8.35 oz    Constitutional: Somnolent, cooperative, no apparent distress, and appears stated age  Respiratory: Crackles heard along RLL and LLL. No increased work of breathing, good air exchange, clear to auscultation bilaterally, no crackles or wheezing in upper lobes.   Cardiovascular: Normal apical impulse, regular rate and rhythm, normal S1 and S2, no S3 or S4, and no murmur noted  GI: No scars, normal bowel sounds, soft, non-distended, non-tender, no masses palpated, no hepatosplenomegally  MSK: Non pitting edema of b/l lower extremity   Neurologic: Lethargic, oriented to name and place. Negative kernig and brudzinski signs  Neuropsychiatric: General: normal, calm, and normal eye  contact    Medical Decision Making       Please see A&P for additional details of medical decision making.      Data     I have personally reviewed the following data over the past 24 hrs:    14.6 (H)  \   9.6 (L)   / 311     142 107 15.8 /  174 (H)   3.4 26 1.84 (H) \     Procal: 0.33 (H) CRP: N/A Lactic Acid: N/A         Imaging results reviewed over the past 24 hrs:   Recent Results (from the past 24 hour(s))   XR Chest 2 Views    Narrative    EXAM: XR CHEST 2 VIEWS  9/22/2023 4:05 PM     HISTORY:  worsening O2 needs not improving on antibiotics       COMPARISON:  Chest x-ray 9/20/2023    FINDINGS:     AP and lateral chest radiographs. Rotated film. Trachea is midline.  Cardiomediastinal silhouette and pulmonary vasculature are within  normal limits. Right hemidiaphragm elevation with lower lung volumes.  Progression of right lower lobe opacities with interval development of  right upper and left sided pulmonary hazy and streaky opacities.  Retrocardiac consolidation. Stable bilateral costophrenic angle  blunting. No pneumothorax.    No acute osseous abnormality. Visualized upper abdomen is  unremarkable.        Impression    IMPRESSION:   1. Low lung volumes with increased atelectasis. Superimposed pneumonia  is not excluded  3. Persistent small bilateral pleural effusions.    I have personally reviewed the examination and initial interpretation  and I agree with the findings.    RICHARD VILLEGAS DO         SYSTEM ID:  [id number]   US Lower Extremity Venous Duplex Bilateral    Narrative    ULTRASOUND LOWER EXTREMITY VENOUS DUPLEX BILATERAL 9/22/2023 4:22 PM    CLINICAL HISTORY: B/L leg swelling,tachycardic, SpO2 of 89 % this  morning, concerns for DVT      COMPARISONS: None available.    REFERRING PROVIDER: ARCENIO TOUSSAINT    TECHNIQUE: Grayscale, color Doppler, Doppler waveform ultrasound  evaluation was performed through the bilateral common femoral,  femoral, and popliteal veins. Bilateral posterior tibial and  "peroneal  veins were evaluated with grayscale imaging and compression.    FINDINGS: Right common femoral, femoral, and popliteal veins are fully  compressible, patent, and demonstrate normal phasic Doppler waveforms  and/or augment normally.    Right posterior tibial veins are fully compressible to the ankle.    Right peroneal veins are fully compressible to the distal calf.    Left common femoral, femoral, and popliteal veins are fully  compressible, patent, and demonstrate normal phasic Doppler waveforms  and/or augment normally.    Left posterior tibial veins are fully compressible to the ankle.    Left peroneal veins are fully compressible to the distal calf.      Impression    IMPRESSION: No deep venous thrombosis demonstrated in either leg.    Reference: \"Duplex Ultrasound in the Diagnosis of Lower-Extremity Deep  Venous Thrombosis\"- Dhara Wilks MD, S; Aditya Merino MD  (Circulation. 2014;129:917-921. http://circ.ahajournals.org)    PINKY CRAWFORD MD         SYSTEM ID:  [id number]       "

## 2023-09-23 NOTE — PLAN OF CARE
Goal Outcome Evaluation:    Patient A&O x3, O2 via NC started d/t patient de-sating while laying down, VS otherwise stable. Denies pain, SOB and cough noted throughout shift. IV ABX given via L PIV, swelling noted around IV site. RN flyer started new IV access, R PIV currently running LR at 500 mL/hr. Pt requested multiple warm blankets while lying in bed shivering, last temp taken was 100.1. Patient also had BM at the end of this shift which was loose. No further concerns, continue with POC.    Patient's most recent vitals:  /68 (BP Location: Left arm)   Pulse 110   Temp 100.1  F (37.8  C) (Oral)   Resp 18   Wt 109.1 kg (240 lb 8.4 oz)   SpO2 99%   BMI 35.52 kg/m

## 2023-09-24 LAB
ALBUMIN SERPL BCG-MCNC: 2 G/DL (ref 3.5–5.2)
ALP SERPL-CCNC: 97 U/L (ref 35–104)
ALT SERPL W P-5'-P-CCNC: 20 U/L (ref 0–50)
ANION GAP SERPL CALCULATED.3IONS-SCNC: 9 MMOL/L (ref 7–15)
AST SERPL W P-5'-P-CCNC: 21 U/L (ref 0–45)
BASOPHILS # BLD AUTO: 0.1 10E3/UL (ref 0–0.2)
BASOPHILS NFR BLD AUTO: 0 %
BILIRUB DIRECT SERPL-MCNC: 0.22 MG/DL (ref 0–0.3)
BILIRUB SERPL-MCNC: 0.5 MG/DL
BUN SERPL-MCNC: 13.6 MG/DL (ref 6–20)
CALCIUM SERPL-MCNC: 7.8 MG/DL (ref 8.6–10)
CHLORIDE SERPL-SCNC: 107 MMOL/L (ref 98–107)
CREAT SERPL-MCNC: 1.63 MG/DL (ref 0.51–0.95)
CRP SERPL-MCNC: 196.96 MG/L
DEPRECATED HCO3 PLAS-SCNC: 24 MMOL/L (ref 22–29)
EGFRCR SERPLBLD CKD-EPI 2021: 37 ML/MIN/1.73M2
EOSINOPHIL # BLD AUTO: 0.6 10E3/UL (ref 0–0.7)
EOSINOPHIL NFR BLD AUTO: 4 %
ERYTHROCYTE [DISTWIDTH] IN BLOOD BY AUTOMATED COUNT: 13.6 % (ref 10–15)
GLUCOSE SERPL-MCNC: 186 MG/DL (ref 70–99)
HCT VFR BLD AUTO: 29.4 % (ref 35–47)
HGB BLD-MCNC: 9.6 G/DL (ref 11.7–15.7)
HOLD SPECIMEN: NORMAL
IMM GRANULOCYTES # BLD: 0.7 10E3/UL
IMM GRANULOCYTES NFR BLD: 4 %
LIPASE SERPL-CCNC: 177 U/L (ref 13–60)
LYMPHOCYTES # BLD AUTO: 1.4 10E3/UL (ref 0.8–5.3)
LYMPHOCYTES NFR BLD AUTO: 8 %
MCH RBC QN AUTO: 31.1 PG (ref 26.5–33)
MCHC RBC AUTO-ENTMCNC: 32.7 G/DL (ref 31.5–36.5)
MCV RBC AUTO: 95 FL (ref 78–100)
MONOCYTES # BLD AUTO: 1.8 10E3/UL (ref 0–1.3)
MONOCYTES NFR BLD AUTO: 11 %
NEUTROPHILS # BLD AUTO: 11.6 10E3/UL (ref 1.6–8.3)
NEUTROPHILS NFR BLD AUTO: 73 %
NRBC # BLD AUTO: 0 10E3/UL
NRBC BLD AUTO-RTO: 0 /100
PLATELET # BLD AUTO: 304 10E3/UL (ref 150–450)
POTASSIUM SERPL-SCNC: 3.3 MMOL/L (ref 3.4–5.3)
POTASSIUM SERPL-SCNC: 3.6 MMOL/L (ref 3.4–5.3)
PROCALCITONIN SERPL IA-MCNC: 0.32 NG/ML
PROT SERPL-MCNC: 5.7 G/DL (ref 6.4–8.3)
RBC # BLD AUTO: 3.09 10E6/UL (ref 3.8–5.2)
SODIUM SERPL-SCNC: 140 MMOL/L (ref 136–145)
WBC # BLD AUTO: 16.1 10E3/UL (ref 4–11)

## 2023-09-24 PROCEDURE — 82248 BILIRUBIN DIRECT: CPT

## 2023-09-24 PROCEDURE — 94669 MECHANICAL CHEST WALL OSCILL: CPT

## 2023-09-24 PROCEDURE — 250N000013 HC RX MED GY IP 250 OP 250 PS 637: Performed by: STUDENT IN AN ORGANIZED HEALTH CARE EDUCATION/TRAINING PROGRAM

## 2023-09-24 PROCEDURE — 36415 COLL VENOUS BLD VENIPUNCTURE: CPT

## 2023-09-24 PROCEDURE — 36415 COLL VENOUS BLD VENIPUNCTURE: CPT | Performed by: STUDENT IN AN ORGANIZED HEALTH CARE EDUCATION/TRAINING PROGRAM

## 2023-09-24 PROCEDURE — 86140 C-REACTIVE PROTEIN: CPT

## 2023-09-24 PROCEDURE — 84145 PROCALCITONIN (PCT): CPT | Performed by: STUDENT IN AN ORGANIZED HEALTH CARE EDUCATION/TRAINING PROGRAM

## 2023-09-24 PROCEDURE — 80053 COMPREHEN METABOLIC PANEL: CPT | Performed by: STUDENT IN AN ORGANIZED HEALTH CARE EDUCATION/TRAINING PROGRAM

## 2023-09-24 PROCEDURE — 250N000013 HC RX MED GY IP 250 OP 250 PS 637

## 2023-09-24 PROCEDURE — 83690 ASSAY OF LIPASE: CPT

## 2023-09-24 PROCEDURE — 85025 COMPLETE CBC W/AUTO DIFF WBC: CPT | Performed by: STUDENT IN AN ORGANIZED HEALTH CARE EDUCATION/TRAINING PROGRAM

## 2023-09-24 PROCEDURE — 120N000002 HC R&B MED SURG/OB UMMC

## 2023-09-24 PROCEDURE — 99232 SBSQ HOSP IP/OBS MODERATE 35: CPT | Mod: GC | Performed by: STUDENT IN AN ORGANIZED HEALTH CARE EDUCATION/TRAINING PROGRAM

## 2023-09-24 PROCEDURE — 250N000013 HC RX MED GY IP 250 OP 250 PS 637: Performed by: PSYCHIATRY & NEUROLOGY

## 2023-09-24 PROCEDURE — 999N000157 HC STATISTIC RCP TIME EA 10 MIN

## 2023-09-24 PROCEDURE — 250N000011 HC RX IP 250 OP 636: Mod: JZ | Performed by: STUDENT IN AN ORGANIZED HEALTH CARE EDUCATION/TRAINING PROGRAM

## 2023-09-24 PROCEDURE — 84132 ASSAY OF SERUM POTASSIUM: CPT

## 2023-09-24 RX ORDER — POTASSIUM CHLORIDE 1500 MG/1
20 TABLET, EXTENDED RELEASE ORAL ONCE
Status: COMPLETED | OUTPATIENT
Start: 2023-09-24 | End: 2023-09-24

## 2023-09-24 RX ORDER — PIPERACILLIN SODIUM, TAZOBACTAM SODIUM 4; .5 G/20ML; G/20ML
4.5 INJECTION, POWDER, LYOPHILIZED, FOR SOLUTION INTRAVENOUS EVERY 6 HOURS
Status: DISCONTINUED | OUTPATIENT
Start: 2023-09-24 | End: 2023-09-25

## 2023-09-24 RX ORDER — GUAIFENESIN/DEXTROMETHORPHAN 100-10MG/5
10 SYRUP ORAL EVERY 4 HOURS PRN
Status: DISCONTINUED | OUTPATIENT
Start: 2023-09-24 | End: 2023-10-19 | Stop reason: HOSPADM

## 2023-09-24 RX ORDER — POTASSIUM CHLORIDE 1500 MG/1
40 TABLET, EXTENDED RELEASE ORAL ONCE
Status: COMPLETED | OUTPATIENT
Start: 2023-09-24 | End: 2023-09-24

## 2023-09-24 RX ADMIN — POTASSIUM CHLORIDE 40 MEQ: 1500 TABLET, EXTENDED RELEASE ORAL at 11:03

## 2023-09-24 RX ADMIN — PIPERACILLIN AND TAZOBACTAM 3.38 G: 3; .375 INJECTION, POWDER, LYOPHILIZED, FOR SOLUTION INTRAVENOUS at 03:22

## 2023-09-24 RX ADMIN — HYDROXYZINE HYDROCHLORIDE 25 MG: 25 TABLET, FILM COATED ORAL at 00:59

## 2023-09-24 RX ADMIN — POTASSIUM CHLORIDE 20 MEQ: 1500 TABLET, EXTENDED RELEASE ORAL at 21:22

## 2023-09-24 RX ADMIN — PIPERACILLIN AND TAZOBACTAM 3.38 G: 3; .375 INJECTION, POWDER, LYOPHILIZED, FOR SOLUTION INTRAVENOUS at 11:02

## 2023-09-24 RX ADMIN — FAMOTIDINE 10 MG: 10 TABLET ORAL at 09:48

## 2023-09-24 RX ADMIN — PIPERACILLIN SODIUM AND TAZOBACTAM SODIUM 4.5 G: 4; .5 INJECTION, POWDER, LYOPHILIZED, FOR SOLUTION INTRAVENOUS at 16:50

## 2023-09-24 RX ADMIN — ACETAMINOPHEN 650 MG: 325 TABLET, FILM COATED ORAL at 21:22

## 2023-09-24 RX ADMIN — GUAIFENESIN AND DEXTROMETHORPHAN 10 ML: 100; 10 SYRUP ORAL at 00:59

## 2023-09-24 RX ADMIN — CLOZAPINE 100 MG: 100 TABLET ORAL at 21:22

## 2023-09-24 RX ADMIN — DIVALPROEX SODIUM 500 MG: 125 CAPSULE, COATED PELLETS ORAL at 21:21

## 2023-09-24 RX ADMIN — PIPERACILLIN SODIUM AND TAZOBACTAM SODIUM 4.5 G: 4; .5 INJECTION, POWDER, LYOPHILIZED, FOR SOLUTION INTRAVENOUS at 22:16

## 2023-09-24 RX ADMIN — DIVALPROEX SODIUM 500 MG: 125 CAPSULE, COATED PELLETS ORAL at 09:48

## 2023-09-24 ASSESSMENT — ACTIVITIES OF DAILY LIVING (ADL)
ADLS_ACUITY_SCORE: 21
ADLS_ACUITY_SCORE: 29
ADLS_ACUITY_SCORE: 21

## 2023-09-24 NOTE — PROGRESS NOTES
[facility]    [facility]       Date of Admission:  9/20/2023    Main plans for today:  - Continue current antibiotic plan, today is day 5 (HAP tx is 5-7 days)   - Strict I & O    - Daily BMP  - Aspiration precautions  - RT to evaluate for pulmonary clearance needs   - Discontinue daily blood cultures     Assessment & Plan   Angela Basurto is a 58 year old female with bipolar 1 who was admitted to psychiatry on 8/16/23 for elvis and psychosis in the context of medication nonadherence. On 9/18 she developed orthostatic hypotension, fevered 9/19 w/ lactate 4.3, and was admitted to our service for sepsis due to presumed hospital acquired pneumonia. Currently, Adolfo, full commitment, 1:1, IV abx.      # Possible Hospital-acquired pneumonia  # Positive blood culture x1 - consistent with contaminant  # Severe sepsis, resolved  # Acute Hypoxic respiratory failure, resolved  Initially presented 9/19 with temperature 101.3F, lactic acid 4.3 in the setting of new cough and widespread body aches. , WBC 9.8. 9/18 CXR with streaky right greater than left perihilar and basilar opacities, likely atelectasis and/or edema without focal consolidation. Respiratory viral panels (9/19, 9/22) have been negative. Echocardiogram not consistent with myocarditis. Briefly treated with vancomycin 9/19-9/23 due to a blood culture growing gram positive cocci; this was later determined to be Staph Hominis; in the context of no growth on other cultures, this was determined to be a contaminant. Pulmonary exam at this point without focal findings consistent with bacterial pneumonia, though patient has an ongoing cough and appears to have a significant burden of respiratory secretions.     Workup/monitoring  - Infectious Disease following, appreciate recommendations:  - Aspiration precautions given altered mental status and current presentation   -  Respiratory therapist to see Angela and consider utility of pulmonary clearance regimen, possibly vest treatments   If significant clinical worsening (sudden marked increase in O2 demands, significant hypotension) would change to meropenem and obtain new blood cultures.  - If possible, would obtain sputum sample for bacterial stain and culture (ordered 9/22)  - Strict I & O   - Daily CBC  - Daily procal   - CRP q48h      Treatment  - piperacillin/tazobactam (ZOSYN) IV 3.375 g, Intravenous, EVERY 6 HOURS (9/20- P); today is day 5 out of 5-7 day course. Will re-assess for discontinuation of antibiotics vs. Transition to PO levaquin on 9/25.   - consider transitioning to PO Levaquin with down trending CRP/procal and stable vitals      # Bipolar I disorder  # Hx of possible seizure in 1983   # Mata: Haldol, clozapine, risperidone, olanzapine  # Full commitment   Presented with elvis and psychosis in the context of medication nonadherence. Was admitted with psychiatry from 8/16 until transfer to this service 9/20. She has required a 1:1 to maintain her safety. Upon admission, her home meds were restarted, including zyprexa, depakote, and haldol. Initiated clozapine 8/31. Zyprexa stopped on 9/8 due to lack fo benefit and mild sedation.. Achieved dose of 300 mg of clozapine daily on 9/11 with improvement in psychosis noted. Due to orthostatic hypotension, constipation, and sedation, reduced clozapine to 200 mg on 9/17, stopped Haldol on 9/16, and held clozapine 9/18-9/20. Restarted Clozapine at 100mg on 9/20. Psychiatry is following closely and managing her psychiatric medications.      - See psychiatry note for details of medication titrations  - Precautions: 1:1 sitter, restrict to unit, precautions in place for assault, cheeking, elopement, falls  - Legal status: Mata and full commitment: A petition for MI commitment with Adolfo was filed on 8/18 through [facility].    - Depakote DT sprinkles 500 mg BID   -  Clozapine 100mg qHS  -PRN for agitation:   - EMERGENCY for agitation: Haldol 5mg + 2mg ativan + 50mg q8h PRN, PO or IM  - Preferred for agitation, anxiety: Hydroxyzine 25 - 50mg PO q6h PRN    # Orthostatic hypotension  Symptomatic 9/18 with blood pressure drop from 123/72 with sitting to 96/55 with standing. Patient reported this was new since admission and starting new medications. Patient initiated on clozapine on 8/31 with dose increases, now on reduced dose of 100mg nightly. Somnolence and poor PO intake have been challenges during this admission, both likely contributing. Workups for endocrine and cardiac causes were negative.     - Encourage oral hydration   - Compression stockings  - Will continue to minimize exposure to medications which can worsen orthostatic hypotension.   - Daily orthostatic vitals     # Acute Kidney Injury   Creatinine was 0.49 at the time of presentation for medical illness, gradually tasia to peak of 1.99 9/22, now decreasing. This likely represents a combination of pre-renal ETHAN due to poor PO intake in the setting of psychiatric illness and intra-renal process due to medications. Zosyn and Pepcid are ordered in accordance with renal dosing. Reassured by improvement in the setting of recent discontinuation of vancomycin; will continue to monitor. Will likely complete antibiotic course in the next 1-2 days, which will hopefully hasten recovery from ETHAN.     - Daily BMP  - IV Zosyn 3.375 g q6hr  - Famotidine 10 mg daily    # Diarrhea   # Hypokalemia   # Abdominal tenderness   Increased stool frequency and loose stools, with 4 stools noted 9/22, 7 stools 9/23. Negative stool testing for C. Difficile. Patient denies blood in stools, as well as abdominal pain associated with bowel movements. May be related to ongoing antibiotic treatment. Diffuse abdominal tenderness noted 9/24 without rebound or guarding.   - Strict I's and O's  - RN-driven potassium replacement protocol - High  - Will  consider adding loperamide if diarrhea continues today   - Add-on lipase, hepatic panel     # Hypocalcemia  # Hypoalbuminemia  Suspect possible malnutrition due to poor PO intake.   - Nutrition IP consult, appreciate recommendations    - Medical food supplement therapy  - Patient to consume % of nutritionally adequate meal trays TID, or the equivalent with supplements/snacks.      #Hx of HTN  Had been admitted to this service in the setting of intermittent hypotension, therefore home antihypertensive was held. She had asymptomatic hypertension to max 182/99 9/23, which resolved spontaneously.   - HOLD PTA clonidine; if she develops consistent hypertension, would consider restarting.     # Hindu  Important to her value system  - Spiritual care consult      Diet: Combination Diet Regular Diet; Safe Tray - with utensils    DVT Prophylaxis: Low Risk/Ambulatory with no VTE prophylaxis indicated  Wilson Catheter: Not present  Fluids: PO  Lines: Peripheral IV   Cardiac Monitoring: None  Code Status: Full Code      Clinically Significant Risk Factors        # Hypokalemia: Lowest K = 3.3 mmol/L in last 2 days, will replace as needed       # Hypoalbuminemia: Lowest albumin = 2 g/dL at 9/24/2023  8:46 AM, will monitor as appropriate      # Acute Kidney Injury, unspecified: based on a >150% or 0.3 mg/dL increase in last creatinine compared to past 90 day average, will monitor renal function                      Disposition Plan      Expected Discharge Date: 09/25/2023        Discharge Comments: Psych placement      Anticipate transfer back to inpatient psychiatry once medically stable/cleared     The patient's care was discussed with the Attending Physician, Dr. Hull .    SUAD Trotter MD  [facility]  [facility]  Securely message with Vocera (more info)  Text page via [employer] Paging/Directory   See signed in provider for up to date coverage  information  ______________________________________________________________________    Interval History     Somnolent this morning; does not report pain. She has no questions this morning.     Angela has had diarrhea over the last day, she reports no associated abdominal pain and denies bloody stools. She was not wearing a nasal cannula this AM and her vitals flowsheet does not reflect ongoing use of supplemental oxygen, though other nursing documentation reports use of either 1L or 2L via nasal cannula. Working with nursing team to clarify whether she was requiring supplemental oxygen.     Angela was hypertensive to max 182/99 yesterday afternoon; she was asymptomatic at the time.     Physical Exam   Vital Signs: Temp: 99  F (37.2  C) Temp src: Axillary BP: 136/70 Pulse: 89   Resp: 18 SpO2: 93 % O2 Device: Nasal cannula    Weight: 233 lbs 3.95 oz    Constitutional: Somnolent, cooperative, no apparent distress, and appears stated age  Respiratory: Rhonchorous breath sounds bilaterally. She appears to be snoring heavily both while asleep and awake. Some tenting of lips seen on expiration, as well as intermittently blowing bubbles with her saliva.   Cardiovascular: Normal apical impulse, regular rate and rhythm, normal S1 and S2. Extremities warm.   GI: No scars, normal bowel sounds, soft, non-distended, diffusely tender to palpation without rebound or guarding.   MSK: Non pitting edema of b/l lower extremity   Neurologic: Lethargic. Briefly answers questions with assistance of Faroese , and then closes her eyes and appears to fall asleep mid-statement.   Neuropsychiatric: Difficult to assess given somnolence. Does not appear to be responding to internal stimuli during this exam.     Lab Results:    Lab Results   Component Value Date    AST 21 09/24/2023     Lab Results   Component Value Date    ALT 20 09/24/2023     No results found for: BILICONJ   Lab Results   Component Value Date    BILITOTAL 0.5 09/24/2023      Lab Results   Component Value Date    ALBUMIN 2.0 09/24/2023     Lab Results   Component Value Date    PROTTOTAL 5.7 09/24/2023      Lab Results   Component Value Date    ALKPHOS 97 09/24/2023     Lipase   Date Value Ref Range Status   09/24/2023 177 (H) 13 - 60 U/L Final     Comment:     On 02/07/2023 the assay method at [facility] laboratory was changed to the Roche Lipase method on the Yusuf c6000. Results obtained with different assay methods or kits cannot be used interchangeably, and therefore, direct comparison to results obtained from this laboratory prior to 02/07/2023 should be interpreted with caution, with each result interpreted in the context of its own reference interval.     CRP Inflammation   Date Value Ref Range Status   09/24/2023 196.96 (H) <5.00 mg/L Final     Last Comprehensive Metabolic Panel:  Lab Results   Component Value Date     09/24/2023    POTASSIUM 3.3 (L) 09/24/2023    CHLORIDE 107 09/24/2023    CO2 24 09/24/2023    ANIONGAP 9 09/24/2023     (H) 09/24/2023    BUN 13.6 09/24/2023    CR 1.63 (H) 09/24/2023    GFRESTIMATED 37 (L) 09/24/2023    FERMIN 7.8 (L) 09/24/2023     Procalcitonin: 0.32    Lab Results   Component Value Date    WBC 16.1 09/24/2023     Lab Results   Component Value Date    RBC 3.09 09/24/2023     Lab Results   Component Value Date    HGB 9.6 09/24/2023     Lab Results   Component Value Date    HCT 29.4 09/24/2023     Lab Results   Component Value Date    MCV 95 09/24/2023     Lab Results   Component Value Date    MCH 31.1 09/24/2023     Lab Results   Component Value Date    MCHC 32.7 09/24/2023     Lab Results   Component Value Date    RDW 13.6 09/24/2023     Lab Results   Component Value Date     09/24/2023       Please see A&P for additional details of medical decision making.      Data     I have personally reviewed the following data over the past 24 hrs:    16.1 (H)  \   9.6 (L)   / 304     140 107 13.6 /  186 (H)   3.3  (L) 24 1.63 (H) \     ALT: 20 AST: 21 AP: 97 TBILI: 0.5   ALB: 2.0 (L) TOT PROTEIN: 5.7 (L) LIPASE: 177 (H)     Procal: 0.32 (H) CRP: 196.96 (H) Lactic Acid: N/A         Imaging results reviewed over the past 24 hrs:   No results found for this or any previous visit (from the past 24 hour(s)).

## 2023-09-24 NOTE — PLAN OF CARE
Goal Outcome Evaluation:      Plan of Care Reviewed With: patient    Overall Patient Progress: no changeOverall Patient Progress: no change    Outcome Evaluation: Pt is compliant with medications. Pt was observed talking to helf in [facility] and then proceeded to laugh. VSS stable, pt is on RA, sitter at bedside. Pt slept the entire shift. Some drooling noted when pt was asleep. Pt potassium was 3.3 MD aware and RN managed order put in place with replacement initiated. Pt has a few loose stools this shift x3 according to sitter.  SBA with walker and gb. No other concerns, will continue with POC.    Patient most recent vitals:    /70 (BP Location: Right arm)   Pulse 89   Temp 99  F (37.2  C) (Axillary)   Resp 18   Wt 105.8 kg (233 lb 4 oz)   SpO2 93%   BMI 34.44 kg/m

## 2023-09-24 NOTE — PLAN OF CARE
Goal Outcome Evaluation:       Overall Patient Progress: no changeOverall Patient Progress: no change    Outcome Evaluation: Pt compliant w/ meds & allowed cares to be completed. Still no appetite, only had Ensure & water. Hypertensive this shift w/ lowest  and highest . Pt reported still hearing voices.    VS:  BP (!) 140/56 (BP Location: Right arm)   Pulse 99   Temp 99.8  F (37.7  C) (Oral)   Resp 18   Wt 109.1 kg (240 lb 8.4 oz)   SpO2 95%   BMI 35.52 kg/m      O2:  >95% on 2L; IS w/ instruction given, RN encouraged pt to use as well as reminded sitter to encourage pt to use whenever pt awake   Output:  Voids w/o difficulty;    Last BM:  9/23; loose stool, pending result from lab for c-diff   Activity:  SBA w/ walker; 1:1 for psyhosis & elopement risk   Skin:  WDL, no further skin issues noticed   Pain:  Complained of L shoulder/neck pain 5/10; sore throat managed w/ PRN benzocaine-menthol   CMS:  A&O X4; denied numbness or tingling   Dressing:  N/A   Diet:  Reg w/ thin; poor appetite, only had Ensure and water   LDA:  R PIV SL   L PIV SL   Equipment:  IV pole & personal belongings   Plan:  Continue POC   Additional Info:  On enteric precaution to rule out c-diff

## 2023-09-24 NOTE — PLAN OF CARE
Goal Outcome Evaluation:      Plan of Care Reviewed With: patient    Overall Patient Progress: no changeOverall Patient Progress: no change    Outcome Evaluation: Pt compliant w/ medications. Auditory hallucinations present. On 1L oxygen w/ nonproductive cough. Having loose BMs.

## 2023-09-25 LAB
ANION GAP SERPL CALCULATED.3IONS-SCNC: 9 MMOL/L (ref 7–15)
BACTERIA BLD CULT: NO GROWTH
BASOPHILS # BLD AUTO: 0.1 10E3/UL (ref 0–0.2)
BASOPHILS NFR BLD AUTO: 0 %
BUN SERPL-MCNC: 13.5 MG/DL (ref 6–20)
CALCIUM SERPL-MCNC: 8.1 MG/DL (ref 8.6–10)
CHLORIDE SERPL-SCNC: 110 MMOL/L (ref 98–107)
CREAT SERPL-MCNC: 1.66 MG/DL (ref 0.51–0.95)
DEPRECATED HCO3 PLAS-SCNC: 25 MMOL/L (ref 22–29)
EGFRCR SERPLBLD CKD-EPI 2021: 36 ML/MIN/1.73M2
EOSINOPHIL # BLD AUTO: 0.8 10E3/UL (ref 0–0.7)
EOSINOPHIL NFR BLD AUTO: 5 %
ERYTHROCYTE [DISTWIDTH] IN BLOOD BY AUTOMATED COUNT: 13.8 % (ref 10–15)
GLUCOSE SERPL-MCNC: 204 MG/DL (ref 70–99)
HCT VFR BLD AUTO: 29.7 % (ref 35–47)
HGB BLD-MCNC: 9.5 G/DL (ref 11.7–15.7)
IMM GRANULOCYTES # BLD: 0.7 10E3/UL
IMM GRANULOCYTES NFR BLD: 4 %
LYMPHOCYTES # BLD AUTO: 1.7 10E3/UL (ref 0.8–5.3)
LYMPHOCYTES NFR BLD AUTO: 10 %
MCH RBC QN AUTO: 30.9 PG (ref 26.5–33)
MCHC RBC AUTO-ENTMCNC: 32 G/DL (ref 31.5–36.5)
MCV RBC AUTO: 97 FL (ref 78–100)
MONOCYTES # BLD AUTO: 1.5 10E3/UL (ref 0–1.3)
MONOCYTES NFR BLD AUTO: 9 %
NEUTROPHILS # BLD AUTO: 12 10E3/UL (ref 1.6–8.3)
NEUTROPHILS NFR BLD AUTO: 72 %
NRBC # BLD AUTO: 0 10E3/UL
NRBC BLD AUTO-RTO: 0 /100
PLATELET # BLD AUTO: 328 10E3/UL (ref 150–450)
POTASSIUM SERPL-SCNC: 3.7 MMOL/L (ref 3.4–5.3)
PROCALCITONIN SERPL IA-MCNC: 0.35 NG/ML
RBC # BLD AUTO: 3.07 10E6/UL (ref 3.8–5.2)
SODIUM SERPL-SCNC: 144 MMOL/L (ref 136–145)
WBC # BLD AUTO: 16.8 10E3/UL (ref 4–11)

## 2023-09-25 PROCEDURE — 250N000013 HC RX MED GY IP 250 OP 250 PS 637: Performed by: PSYCHIATRY & NEUROLOGY

## 2023-09-25 PROCEDURE — 85025 COMPLETE CBC W/AUTO DIFF WBC: CPT | Performed by: STUDENT IN AN ORGANIZED HEALTH CARE EDUCATION/TRAINING PROGRAM

## 2023-09-25 PROCEDURE — 999N000157 HC STATISTIC RCP TIME EA 10 MIN

## 2023-09-25 PROCEDURE — 120N000002 HC R&B MED SURG/OB UMMC

## 2023-09-25 PROCEDURE — 250N000013 HC RX MED GY IP 250 OP 250 PS 637: Performed by: STUDENT IN AN ORGANIZED HEALTH CARE EDUCATION/TRAINING PROGRAM

## 2023-09-25 PROCEDURE — 84145 PROCALCITONIN (PCT): CPT | Performed by: STUDENT IN AN ORGANIZED HEALTH CARE EDUCATION/TRAINING PROGRAM

## 2023-09-25 PROCEDURE — 250N000013 HC RX MED GY IP 250 OP 250 PS 637

## 2023-09-25 PROCEDURE — 99232 SBSQ HOSP IP/OBS MODERATE 35: CPT | Mod: GC | Performed by: STUDENT IN AN ORGANIZED HEALTH CARE EDUCATION/TRAINING PROGRAM

## 2023-09-25 PROCEDURE — 250N000011 HC RX IP 250 OP 636: Mod: JZ

## 2023-09-25 PROCEDURE — 250N000011 HC RX IP 250 OP 636: Mod: JZ | Performed by: STUDENT IN AN ORGANIZED HEALTH CARE EDUCATION/TRAINING PROGRAM

## 2023-09-25 PROCEDURE — 250N000011 HC RX IP 250 OP 636: Performed by: STUDENT IN AN ORGANIZED HEALTH CARE EDUCATION/TRAINING PROGRAM

## 2023-09-25 PROCEDURE — 94669 MECHANICAL CHEST WALL OSCILL: CPT

## 2023-09-25 PROCEDURE — 36415 COLL VENOUS BLD VENIPUNCTURE: CPT | Performed by: STUDENT IN AN ORGANIZED HEALTH CARE EDUCATION/TRAINING PROGRAM

## 2023-09-25 PROCEDURE — 99233 SBSQ HOSP IP/OBS HIGH 50: CPT | Performed by: INTERNAL MEDICINE

## 2023-09-25 PROCEDURE — 80048 BASIC METABOLIC PNL TOTAL CA: CPT | Performed by: STUDENT IN AN ORGANIZED HEALTH CARE EDUCATION/TRAINING PROGRAM

## 2023-09-25 RX ORDER — POTASSIUM CHLORIDE 1500 MG/1
20 TABLET, EXTENDED RELEASE ORAL ONCE
Status: COMPLETED | OUTPATIENT
Start: 2023-09-25 | End: 2023-09-25

## 2023-09-25 RX ORDER — ONDANSETRON 4 MG/1
4 TABLET, ORALLY DISINTEGRATING ORAL EVERY 6 HOURS PRN
Status: DISCONTINUED | OUTPATIENT
Start: 2023-09-25 | End: 2023-10-02

## 2023-09-25 RX ORDER — LOPERAMIDE HCL 2 MG
2 CAPSULE ORAL 4 TIMES DAILY PRN
Status: DISCONTINUED | OUTPATIENT
Start: 2023-09-25 | End: 2023-10-09

## 2023-09-25 RX ORDER — PIPERACILLIN SODIUM, TAZOBACTAM SODIUM 4; .5 G/20ML; G/20ML
4.5 INJECTION, POWDER, LYOPHILIZED, FOR SOLUTION INTRAVENOUS EVERY 6 HOURS
Status: COMPLETED | OUTPATIENT
Start: 2023-09-25 | End: 2023-09-26

## 2023-09-25 RX ADMIN — ACETAMINOPHEN 650 MG: 325 TABLET, FILM COATED ORAL at 10:42

## 2023-09-25 RX ADMIN — PIPERACILLIN SODIUM AND TAZOBACTAM SODIUM 4.5 G: 4; .5 INJECTION, POWDER, LYOPHILIZED, FOR SOLUTION INTRAVENOUS at 03:47

## 2023-09-25 RX ADMIN — FAMOTIDINE 10 MG: 10 TABLET ORAL at 09:10

## 2023-09-25 RX ADMIN — PIPERACILLIN SODIUM AND TAZOBACTAM SODIUM 4.5 G: 4; .5 INJECTION, POWDER, LYOPHILIZED, FOR SOLUTION INTRAVENOUS at 09:11

## 2023-09-25 RX ADMIN — ONDANSETRON 4 MG: 4 TABLET, ORALLY DISINTEGRATING ORAL at 20:55

## 2023-09-25 RX ADMIN — POTASSIUM CHLORIDE 20 MEQ: 1500 TABLET, EXTENDED RELEASE ORAL at 15:48

## 2023-09-25 RX ADMIN — CLOZAPINE 100 MG: 100 TABLET ORAL at 21:26

## 2023-09-25 RX ADMIN — Medication 1 LOZENGE: at 21:10

## 2023-09-25 RX ADMIN — PIPERACILLIN SODIUM AND TAZOBACTAM SODIUM 4.5 G: 4; .5 INJECTION, POWDER, LYOPHILIZED, FOR SOLUTION INTRAVENOUS at 20:38

## 2023-09-25 RX ADMIN — DIVALPROEX SODIUM 500 MG: 125 CAPSULE, COATED PELLETS ORAL at 20:38

## 2023-09-25 RX ADMIN — PIPERACILLIN SODIUM AND TAZOBACTAM SODIUM 4.5 G: 4; .5 INJECTION, POWDER, LYOPHILIZED, FOR SOLUTION INTRAVENOUS at 15:48

## 2023-09-25 RX ADMIN — ACETAMINOPHEN 650 MG: 325 TABLET, FILM COATED ORAL at 21:09

## 2023-09-25 RX ADMIN — DIVALPROEX SODIUM 500 MG: 125 CAPSULE, COATED PELLETS ORAL at 09:10

## 2023-09-25 ASSESSMENT — ACTIVITIES OF DAILY LIVING (ADL)
ADLS_ACUITY_SCORE: 25
ADLS_ACUITY_SCORE: 21
ADLS_ACUITY_SCORE: 31
ADLS_ACUITY_SCORE: 23
ADLS_ACUITY_SCORE: 23
ADLS_ACUITY_SCORE: 25
ADLS_ACUITY_SCORE: 23
ADLS_ACUITY_SCORE: 21
ADLS_ACUITY_SCORE: 23
ADLS_ACUITY_SCORE: 21

## 2023-09-25 NOTE — PLAN OF CARE
/73 (BP Location: Left arm)   Pulse 86   Temp 99.3  F (37.4  C) (Axillary)   Resp 21   Wt 105.8 kg (233 lb 4 oz)   SpO2 94%   BMI 34.44 kg/m      1:1 present throughout shift. No behavioral events this shift. Pt noted to be responding to internal stimuli throughout shift.   C/o head and neck pain well managed with tylenol.  Frequent productive cough noted throughout shift.   Pt received shower this shift.  Took pills whole without difficulty.    Alina Viramontes RN

## 2023-09-25 NOTE — PLAN OF CARE
Problem: Plan of Care - These are the overarching goals to be used throughout the patient stay.    Goal: Absence of Hospital-Acquired Illness or Injury  Outcome: Progressing  Intervention: Identify and Manage Fall Risk  Recent Flowsheet Documentation  Taken 9/24/2023 2100 by Bekah Small RN  Safety Promotion/Fall Prevention:   clutter free environment maintained   increased rounding and observation   increase visualization of patient   lighting adjusted   mobility aid in reach   nonskid shoes/slippers when out of bed   room door open   room near nurse's station   supervised activity   toileting scheduled   bedside attendant   activity supervised  Intervention: Prevent Skin Injury  Recent Flowsheet Documentation  Taken 9/24/2023 2300 by Bekah Small RN  Body Position: side-lying 30 degrees     Problem: Psychotic Signs/Symptoms  Goal: Improved Behavioral Control (Psychotic Signs/Symptoms)  Outcome: Met  Flowsheets (Taken 9/25/2023 0044)  Mutually Determined Action Steps (Improved Behavioral Control):   verbalizes gratifying activity   verbalizes personal treatment goal   identifies future-oriented goal  Individualized Action Step (Improved Behavioral Control): Pt states that she can't wait to go back with her daughter. Pt understands importance of the meds she takes.  Intervention: Manage Behavior  Flowsheets (Taken 9/25/2023 0044)  De-Escalation Techniques: (1:1 sitter maintained)   appropriate behavior reinforced   increased round frequency   other (see comments)   quiet time facilitated   stimulation decreased  Goal: Optimal Cognitive Function (Psychotic Signs/Symptoms)  Outcome: Progressing  Flowsheets (Taken 9/25/2023 0044)  Individualized Action Step (Optimal Cognitive Function): Pt allows staff to help her with ADLs.  Mutually Determined Action Steps (Optimal Cognitive Function):   remains focused during activity   follows step-by-step instructions  Intervention: Support and Promote Cognitive  Ability  Recent Flowsheet Documentation  Taken 9/25/2023 0044 by Bekah Small RN  Trust Relationship/Rapport:   care explained   choices provided  Taken 9/24/2023 2100 by Bekah Small RN  Trust Relationship/Rapport:   care explained   choices provided  Goal: Increased Participation and Engagement (Psychotic Signs/Symptoms)  Outcome: Progressing  Goal: Improved Mood Symptoms  Outcome: Progressing  Goal: Improved Psychomotor Symptoms (Psychotic Signs/Symptoms)  Outcome: Progressing  Goal: Decreased Sensory Symptoms (Psychotic Signs/Symptoms)  Outcome: Progressing  Goal: Improved Sleep (Psychotic Signs/Symptoms)  Outcome: Met  Flowsheets (Taken 9/25/2023 0044)  Mutually Determined Action Steps (Improved Sleep):   sleeps 4-6 hours at night   uses relaxation techniques  Goal: Enhanced Social, Occupational or Functional Skills (Psychotic Signs/Symptoms)  Outcome: Progressing  Intervention: Promote Social, Occupational and Functional Ability  Flowsheets  Taken 9/25/2023 0044  Social Functional Ability Promotion:   self-expression encouraged   autonomy promoted  Trust Relationship/Rapport:   care explained   choices provided  Taken 9/24/2023 2100  Trust Relationship/Rapport:   care explained   choices provided     Problem: Psychotic Signs/Symptoms  Goal: Optimal Cognitive Function (Psychotic Signs/Symptoms)  Outcome: Progressing  Flowsheets (Taken 9/25/2023 0044)  Individualized Action Step (Optimal Cognitive Function): Pt allows staff to help her with ADLs.  Mutually Determined Action Steps (Optimal Cognitive Function):   remains focused during activity   follows step-by-step instructions  Intervention: Support and Promote Cognitive Ability  Recent Flowsheet Documentation  Taken 9/25/2023 0044 by Bekah Small RN  Trust Relationship/Rapport:   care explained   choices provided  Taken 9/24/2023 2100 by Bekah Small RN  Trust Relationship/Rapport:   care explained   choices provided     Problem: Psychotic  Signs/Symptoms  Goal: Enhanced Social, Occupational or Functional Skills (Psychotic Signs/Symptoms)  Outcome: Progressing  Intervention: Promote Social, Occupational and Functional Ability  Flowsheets  Taken 9/25/2023 0044  Social Functional Ability Promotion:   self-expression encouraged   autonomy promoted  Trust Relationship/Rapport:   care explained   choices provided  Taken 9/24/2023 2100  Trust Relationship/Rapport:   care explained   choices provided   Goal Outcome Evaluation:    Pt progressing in goals, participates in ADLs. Pt has goals toward future such as rekindling with daughter. Demeanor was pleasant and cooperative today, denies hallucinations/delusions and was agreeable to taking meds and understanding her hospitalization.

## 2023-09-25 NOTE — PROGRESS NOTES
[place]ious Disease [place]  [place] [facility] [place]  [place]ela Basurto, Date of birth 1965, Medical record number [id number]  Date of Admission: 9/20/2023  Date of Visit:  9/25/2023         Assessment and Recommendations:   ID Problem List:  Possible HAP  CoNS growth in blood culture  Fever, borderline    Discussion:  Angela Basurto is a 59 yo F admitted to inpatient psychiatry for management of schizoaffective disorder now on medicine service due to new fever and hypoxia requiring supplemental O2.     Overall suspicion for a edie bacterial process is lower (though not zero). Pt was off oxygen when my colleague (Dr. Santana) saw her on 9/22/23 and throughout interview/exam (had just been transported back from x-ray) without notable dyspnea or hypoxia. She was having chills on 9/22 but T ax was 100.1 F. She was started on vancomycin and zosyn initially, but vancomycin was stopped on 9/22/23 the CoNS (Staph hominis) just grew in one isolated bottle of blood culture from 9/19 and repeated blood cultures on 9/21, 9/22 and 9/23 are negative -> most likely a contaminant.      She continues to be on piperacillin-tazobactam while work-up is ongoing. She is afebrile and she is on room air. She does not report cough or SOB on my exam today. COVID and influenza PCR are negative. Noted white count elevation today at 16.8 however no clear focus of infection and other parameters (fever curve, O2 requirement, respiratory symptoms are improving). C diff obtained on 9/23 is negative. Blood cultures are negative.         Recommendations:  Continue piperacillin-tazobactam for possible HAP - plan to complete 7 days total of treatment  2.  I will sign off today, but will follow peripherally for at least 72h (at least until 9/28)    Recommendations discussed with primary team    The General ID team will continue to follow this patient. Please feel free to call with any questions.     GELY SORIANO,  MD  Date of Service: 09/25/23  Pager: [phone]             Physical Exam:   /73 (BP Location: Left arm)   Pulse 86   Temp 99.3  F (37.4  C) (Axillary)   Resp 21   Wt 105.8 kg (233 lb 4 oz)   SpO2 94%   BMI 34.44 kg/m         Exam:  GENERAL: Not in acute distress.   HEAD: Normocephalic and atraumatic  ENT:  No hearing impairment, oral mucous membranes moist  EYES:  Eyes grossly normal to inspection, PERRL and conjunctivae and sclerae normal   LUNGS:  Clear to auscultation - no rales, rhonchi or wheezes  CARDIOVASCULAR:  Regular rate and rhythm, normal S1 S2,  no murmur  ABDOMEN:  Soft, non-tender           Laboratory Data:     Creatinine   Date Value Ref Range Status   09/25/2023 1.66 (H) 0.51 - 0.95 mg/dL Final   09/24/2023 1.63 (H) 0.51 - 0.95 mg/dL Final   09/23/2023 1.84 (H) 0.51 - 0.95 mg/dL Final   09/22/2023 1.99 (H) 0.51 - 0.95 mg/dL Final   09/22/2023 1.85 (H) 0.51 - 0.95 mg/dL Final     WBC Count   Date Value Ref Range Status   09/25/2023 16.8 (H) 4.0 - 11.0 10e3/uL Final   09/24/2023 16.1 (H) 4.0 - 11.0 10e3/uL Final   09/23/2023 14.6 (H) 4.0 - 11.0 10e3/uL Final   09/22/2023 13.7 (H) 4.0 - 11.0 10e3/uL Final   09/21/2023 13.1 (H) 4.0 - 11.0 10e3/uL Final     Hemoglobin   Date Value Ref Range Status   09/25/2023 9.5 (L) 11.7 - 15.7 g/dL Final     Platelet Count   Date Value Ref Range Status   09/25/2023 328 150 - 450 10e3/uL Final     Lab Results   Component Value Date     09/25/2023    BUN 13.5 09/25/2023    CO2 25 09/25/2023     No results found for: CRP

## 2023-09-25 NOTE — PLAN OF CARE
Goal Outcome Evaluation:  BP (!) 147/70 (BP Location: Left arm)   Pulse 104   Temp 98.7  F (37.1  C) (Oral)   Resp 16   Wt 105.8 kg (233 lb 4 oz)   SpO2 91%   BMI 34.44 kg/m   .  On room air.  Up SBA1 to BR,steady gait.  X1 loose stool.Voiding.  PIV line R AC TKO for IV abx.  Skin intact.Compression socks for BLE edema.  Slept mostly through the night.  On commitment,is jarvised for meds.  Bedside attendant in room.  Plan for discharge back to [facility] once done with IV abx.

## 2023-09-25 NOTE — PROGRESS NOTES
[facility]    Progress Note - [facility] Medicine Service       Date of Admission:  9/20/2023    Main plans for today:  - Continue current antibiotic plan, today is day 6 of 7, will plan to discontinue tomorrow   - Loperamide 2 mg capsule for diarrhea   - Discontinue Strict I & O    - Daily BMP      Assessment & Plan   Angela Basurto is a 58 year old female with bipolar 1 who was admitted to psychiatry on 8/16/23 for elvis and psychosis in the context of medication nonadherence. On 9/18 she developed orthostatic hypotension, fevered 9/19 w/ lactate 4.3, and was admitted to our service for sepsis (resolved) due to presumed hospital acquired pneumonia. Currently, Adolfo, full commitment, 1:1, IV abx.      # Possible Hospital-acquired pneumonia  # Positive blood culture x1 - consistent with contaminant  # Severe sepsis, resolved  # Acute Hypoxic respiratory failure, resolved  Initially presented 9/19 with temperature 101.3F, lactic acid 4.3 in the setting of new cough and widespread body aches. , WBC 9.8. 9/18 CXR with streaky right greater than left perihilar and basilar opacities, likely atelectasis and/or edema without focal consolidation. Respiratory viral panels (9/19, 9/22) have been negative. Echocardiogram not consistent with myocarditis. Briefly treated with vancomycin 9/19-9/23 due to a blood culture growing gram positive cocci; this was later determined to be Staph Hominis; in the context of no growth on other cultures, this was determined to be a contaminant. Pulmonary exam at this point without focal findings consistent with bacterial pneumonia, though patient has an ongoing cough and appears to have a significant burden of respiratory secretions seen on 9/24. Vest therapy completed this morning with RT. White count elevation today at 16.8  and procla at 0.35 with no clear focus of infection. Currently, on day 6 on IV Zosyn, discussed with ID  about antibiotic plan moving forward, agree to complete IV course and discontinue tomorrow.     Workup/monitoring  - Infectious Disease following, appreciate recommendations:  - Aspiration precautions given altered mental status and current presentation   - Respiratory therapist saw Angela and she tolerated vest therapy  If significant clinical worsening (sudden marked increase in O2 demands, significant hypotension) would change to meropenem and obtain new blood cultures.  - If possible, would obtain sputum sample for bacterial stain and culture (ordered 9/22)  - Discontinue Strict I & O   - Daily CBC  - Daily procal   - CRP q48h      Treatment  - piperacillin/tazobactam (ZOSYN) IV 3.375 g, Intravenous, EVERY 6 HOURS (9/20- P); today is day 6 out of 7 day course. Will continue antibiotics and discontinue tomorrow  - consider transitioning to PO Levaquin with down trending CRP/procal and stable vitals      # Bipolar I disorder  # Hx of possible seizure in 1983   # Mata: Haldol, clozapine, risperidone, olanzapine  # Full commitment   Presented with elvis and psychosis in the context of medication nonadherence. Was admitted with psychiatry from 8/16 until transfer to this service 9/20. She has required a 1:1 to maintain her safety. Upon admission, her home meds were restarted, including zyprexa, depakote, and haldol. Initiated clozapine 8/31. Zyprexa stopped on 9/8 due to lack fo benefit and mild sedation. Achieved dose of 300 mg of clozapine daily on 9/11 with improvement in psychosis noted. Due to orthostatic hypotension, constipation, and sedation, reduced clozapine to 200 mg on 9/17, stopped Haldol on 9/16, and held clozapine 9/18-9/20. Restarted Clozapine at 100 mg on 9/20. Psychiatry is following closely and managing her psychiatric medications. Dizziness has continue to improve while on clozapine. Will continue at this dose. Plan to consult Psychiatry tomorrow about discharge back to psychiatry unit.     - See  psychiatry note for details of medication titrations  - Precautions: 1:1 sitter, restrict to unit, precautions in place for assault, cheeking, elopement, falls  - Legal status: Mata and full commitment: A petition for MI commitment with Adolfo was filed on 8/18 through [facility].    - Depakote DT sprinkles 500 mg BID   - Clozapine 100mg qHS  -PRN for agitation:   - EMERGENCY for agitation: Haldol 5mg + 2mg ativan + 50mg q8h PRN, PO or IM  - Preferred for agitation, anxiety: Hydroxyzine 25 - 50mg PO q6h PRN    # Orthostatic hypotension  Symptomatic 9/18 with blood pressure drop from 123/72 with sitting to 96/55 with standing. Patient reported this was new since admission and starting new medications. Patient initiated on clozapine on 8/31 with dose increases, now on reduced dose of 100mg nightly. Dizziness with standing has been improving while on clozapine. Somnolence and poor PO intake have been challenges during this admission, both likely contributing. Workups for endocrine and cardiac causes were negative.     - Encourage oral hydration   - Compression stockings  - Will continue to minimize exposure to medications which can worsen orthostatic hypotension.   - Daily orthostatic vitals     # Acute Kidney Injury   Creatinine was 0.49 at the time of presentation for medical illness, gradually tasia to peak of 1.99 9/22, now decreasing. This likely represents a combination of pre-renal ETHAN due to poor PO intake in the setting of psychiatric illness and intra-renal process due to medications. Zosyn and Pepcid are ordered in accordance with renal dosing. Reassured by improvement in the setting of recent discontinuation of vancomycin; will continue to monitor. Will complete antibiotic course tomorrow, which will hopefully hasten recovery from ETHAN.     - Daily BMP  - IV Zosyn 3.375 g q6hr  - Famotidine 10 mg daily    # Diarrhea   # Hypokalemia- Resolved  # Abdominal tenderness   Increased stool frequency and loose  stools, with 4 stools noted 9/22, 7 stools 9/23. Negative stool testing for C. Difficile. Patient denies blood in stools, as well as abdominal pain associated with bowel movements. May be related to ongoing antibiotic treatment. Sitter reports two loose stool overnight. Diffuse abdominal tenderness noted without rebound or guarding. Lipase elevated at 177. Low supicious for pancreatitis due to improving abdominal tenderness and lipase < 3x normal upper limits.    - Loperamide capsule 2 mg   - Discontinue Strict I's and O's  - RN-driven potassium replacement protocol - High    # Hypocalcemia  # Hypoalbuminemia  Suspect possible malnutrition due to poor PO intake.   - Nutrition IP consult, appreciate recommendations    - Medical food supplement therapy  - Patient to consume % of nutritionally adequate meal trays TID, or the equivalent with supplements/snacks.      #Hx of HTN  Had been admitted to this service in the setting of intermittent hypotension, therefore home antihypertensive was held. She had asymptomatic hypertension to max 182/99 9/23, which resolved spontaneously.   - HOLD PTA clonidine; if she develops consistent hypertension, would consider restarting.     # Anglican  Important to her value system  - Spiritual care consult      Diet: Combination Diet Regular Diet; Safe Tray - with utensils    DVT Prophylaxis: Low Risk/Ambulatory with no VTE prophylaxis indicated  Wilson Catheter: Not present  Fluids: PO  Lines: Peripheral IV   Cardiac Monitoring: None  Code Status: Full Code      Clinically Significant Risk Factors        # Hypokalemia: Lowest K = 3.3 mmol/L in last 2 days, will replace as needed       # Hypoalbuminemia: Lowest albumin = 2 g/dL at 9/24/2023  8:46 AM, will monitor as appropriate      # Acute Kidney Injury, unspecified: based on a >150% or 0.3 mg/dL increase in last creatinine compared to past 90 day average, will monitor renal function                      Disposition Plan       Expected Discharge Date: 09/26/2023        Discharge Comments: transfer to IP psych      Anticipate transfer back to inpatient psychiatry once medically stable/cleared     The patient's care was discussed with the Attending Physician, Dr. Hull .    Gonzalez Reno MD  [facility]  [facility]  Securely message with [employer] (more info)  Text page via [employer] Paging/Directory   See signed in provider for up to date coverage information  ______________________________________________________________________    Interval History   Overnight:  No overnight events.     Subjective:  Somnolent this morning; does not report pain. She has no questions this morning.     Angela continue to have diarrhea over the last day, 2 loose stool reported overnight. She reports no associated abdominal pain and denies bloody stools. Patient is no longer on NC, SpO2 94 % on room air. Blood pressure have come down at 135/60.     She continues to endorse a non-productive cough without SOB or chest pain. RT saw patient his morning and vest therapy completed.      Physical Exam   Vital Signs: Temp: 98.7  F (37.1  C) Temp src: Oral BP: (!) 147/70 Pulse: 104   Resp: 16 SpO2: 94 % O2 Device: None (Room air)    Weight: 233 lbs 3.95 oz    Constitutional: Somnolent, cooperative, no apparent distress, and appears stated age  Respiratory: Unable to examine posterior lungs fields.   Anterior: No increased work of breathing, good air exchange, clear to auscultation bilaterally, no crackles or wheezing in upper lobes.   Cardiovascular: Normal apical impulse, regular rate and rhythm, normal S1 and S2. Extremities warm.   GI: No scars, normal bowel sounds, soft, non-distended, diffusely tender to palpation without rebound or guarding.   MSK: Non pitting edema of b/l lower extremity   Neurologic: Lethargic. Briefly answers questions with assistance of Montenegrin , and then closes her  eyes and appears to fall asleep mid-statement.   Neuropsychiatric: Difficult to assess given somnolence. Does not appear to be responding to internal stimuli during this exam.     Lab Results:    Lab Results   Component Value Date    AST 21 09/24/2023     Lab Results   Component Value Date    ALT 20 09/24/2023     No results found for: BILICONJ   Lab Results   Component Value Date    BILITOTAL 0.5 09/24/2023     Lab Results   Component Value Date    ALBUMIN 2.0 09/24/2023     Lab Results   Component Value Date    PROTTOTAL 5.7 09/24/2023      Lab Results   Component Value Date    ALKPHOS 97 09/24/2023     Lipase   Date Value Ref Range Status   09/24/2023 177 (H) 13 - 60 U/L Final     Comment:     On 02/07/2023 the assay method at [facility] laboratory was changed to the Roche Lipase method on the Yusuf [id number]. Results obtained with different assay methods or kits cannot be used interchangeably, and therefore, direct comparison to results obtained from this laboratory prior to 02/07/2023 should be interpreted with caution, with each result interpreted in the context of its own reference interval.     CRP Inflammation   Date Value Ref Range Status   09/24/2023 196.96 (H) <5.00 mg/L Final     Last Comprehensive Metabolic Panel:  Lab Results   Component Value Date     09/24/2023    POTASSIUM 3.6 09/24/2023    CHLORIDE 107 09/24/2023    CO2 24 09/24/2023    ANIONGAP 9 09/24/2023     (H) 09/24/2023    BUN 13.6 09/24/2023    CR 1.63 (H) 09/24/2023    GFRESTIMATED 37 (L) 09/24/2023    FERMIN 7.8 (L) 09/24/2023     Procalcitonin: 0.32    Lab Results   Component Value Date    WBC 16.1 09/24/2023     Lab Results   Component Value Date    RBC 3.09 09/24/2023     Lab Results   Component Value Date    HGB 9.6 09/24/2023     Lab Results   Component Value Date    HCT 29.4 09/24/2023     Lab Results   Component Value Date    MCV 95 09/24/2023     Lab Results   Component Value Date    MCH 31.1  09/24/2023     Lab Results   Component Value Date    MCHC 32.7 09/24/2023     Lab Results   Component Value Date    RDW 13.6 09/24/2023     Lab Results   Component Value Date     09/24/2023       Please see A&P for additional details of medical decision making.      Data     I have personally reviewed the following data over the past 24 hrs:    16.8 (H)  \   9.5 (L)   / 328     144 110 (H) 13.5 /  204 (H)   3.7 25 1.66 (H) \     Procal: 0.35 (H) CRP: N/A Lactic Acid: N/A         Imaging results reviewed over the past 24 hrs:   No results found for this or any previous visit (from the past 24 hour(s)).

## 2023-09-26 ENCOUNTER — TELEPHONE (OUTPATIENT)
Dept: BEHAVIORAL HEALTH | Facility: CLINIC | Age: 58
End: 2023-09-26
Payer: COMMERCIAL

## 2023-09-26 LAB
ANION GAP SERPL CALCULATED.3IONS-SCNC: 9 MMOL/L (ref 7–15)
BACTERIA BLD CULT: NO GROWTH
BASOPHILS # BLD AUTO: 0.1 10E3/UL (ref 0–0.2)
BASOPHILS NFR BLD AUTO: 0 %
BUN SERPL-MCNC: 11.4 MG/DL (ref 6–20)
CALCIUM SERPL-MCNC: 8.4 MG/DL (ref 8.6–10)
CHLORIDE SERPL-SCNC: 109 MMOL/L (ref 98–107)
CREAT SERPL-MCNC: 1.48 MG/DL (ref 0.51–0.95)
CRP SERPL-MCNC: 168.59 MG/L
DEPRECATED HCO3 PLAS-SCNC: 25 MMOL/L (ref 22–29)
EGFRCR SERPLBLD CKD-EPI 2021: 41 ML/MIN/1.73M2
EOSINOPHIL # BLD AUTO: 0.7 10E3/UL (ref 0–0.7)
EOSINOPHIL NFR BLD AUTO: 4 %
ERYTHROCYTE [DISTWIDTH] IN BLOOD BY AUTOMATED COUNT: 13.9 % (ref 10–15)
GLUCOSE SERPL-MCNC: 189 MG/DL (ref 70–99)
HCT VFR BLD AUTO: 29.5 % (ref 35–47)
HGB BLD-MCNC: 9.7 G/DL (ref 11.7–15.7)
IMM GRANULOCYTES # BLD: 0.7 10E3/UL
IMM GRANULOCYTES NFR BLD: 4 %
LYMPHOCYTES # BLD AUTO: 1.5 10E3/UL (ref 0.8–5.3)
LYMPHOCYTES NFR BLD AUTO: 9 %
MCH RBC QN AUTO: 31.6 PG (ref 26.5–33)
MCHC RBC AUTO-ENTMCNC: 32.9 G/DL (ref 31.5–36.5)
MCV RBC AUTO: 96 FL (ref 78–100)
MONOCYTES # BLD AUTO: 1.5 10E3/UL (ref 0–1.3)
MONOCYTES NFR BLD AUTO: 9 %
NEUTROPHILS # BLD AUTO: 12.4 10E3/UL (ref 1.6–8.3)
NEUTROPHILS NFR BLD AUTO: 74 %
NRBC # BLD AUTO: 0 10E3/UL
NRBC BLD AUTO-RTO: 0 /100
PLATELET # BLD AUTO: 328 10E3/UL (ref 150–450)
POTASSIUM SERPL-SCNC: 3.6 MMOL/L (ref 3.4–5.3)
PROCALCITONIN SERPL IA-MCNC: 0.35 NG/ML
RBC # BLD AUTO: 3.07 10E6/UL (ref 3.8–5.2)
SODIUM SERPL-SCNC: 143 MMOL/L (ref 136–145)
WBC # BLD AUTO: 16.9 10E3/UL (ref 4–11)

## 2023-09-26 PROCEDURE — 250N000013 HC RX MED GY IP 250 OP 250 PS 637

## 2023-09-26 PROCEDURE — 99233 SBSQ HOSP IP/OBS HIGH 50: CPT

## 2023-09-26 PROCEDURE — 250N000013 HC RX MED GY IP 250 OP 250 PS 637: Performed by: STUDENT IN AN ORGANIZED HEALTH CARE EDUCATION/TRAINING PROGRAM

## 2023-09-26 PROCEDURE — 86140 C-REACTIVE PROTEIN: CPT

## 2023-09-26 PROCEDURE — 84145 PROCALCITONIN (PCT): CPT | Performed by: STUDENT IN AN ORGANIZED HEALTH CARE EDUCATION/TRAINING PROGRAM

## 2023-09-26 PROCEDURE — 36415 COLL VENOUS BLD VENIPUNCTURE: CPT | Performed by: STUDENT IN AN ORGANIZED HEALTH CARE EDUCATION/TRAINING PROGRAM

## 2023-09-26 PROCEDURE — 82310 ASSAY OF CALCIUM: CPT | Performed by: STUDENT IN AN ORGANIZED HEALTH CARE EDUCATION/TRAINING PROGRAM

## 2023-09-26 PROCEDURE — 99232 SBSQ HOSP IP/OBS MODERATE 35: CPT | Mod: GC | Performed by: STUDENT IN AN ORGANIZED HEALTH CARE EDUCATION/TRAINING PROGRAM

## 2023-09-26 PROCEDURE — 85025 COMPLETE CBC W/AUTO DIFF WBC: CPT | Performed by: STUDENT IN AN ORGANIZED HEALTH CARE EDUCATION/TRAINING PROGRAM

## 2023-09-26 PROCEDURE — 250N000011 HC RX IP 250 OP 636: Performed by: STUDENT IN AN ORGANIZED HEALTH CARE EDUCATION/TRAINING PROGRAM

## 2023-09-26 PROCEDURE — 120N000002 HC R&B MED SURG/OB UMMC

## 2023-09-26 PROCEDURE — 250N000011 HC RX IP 250 OP 636: Mod: JZ

## 2023-09-26 RX ORDER — POTASSIUM CHLORIDE 1500 MG/1
20 TABLET, EXTENDED RELEASE ORAL ONCE
Status: COMPLETED | OUTPATIENT
Start: 2023-09-26 | End: 2023-09-26

## 2023-09-26 RX ADMIN — GUAIFENESIN AND DEXTROMETHORPHAN 10 ML: 100; 10 SYRUP ORAL at 15:03

## 2023-09-26 RX ADMIN — ACETAMINOPHEN 650 MG: 325 TABLET, FILM COATED ORAL at 15:03

## 2023-09-26 RX ADMIN — FAMOTIDINE 10 MG: 10 TABLET ORAL at 08:56

## 2023-09-26 RX ADMIN — DIVALPROEX SODIUM 500 MG: 125 CAPSULE, COATED PELLETS ORAL at 20:24

## 2023-09-26 RX ADMIN — CLOZAPINE 150 MG: 100 TABLET ORAL at 22:19

## 2023-09-26 RX ADMIN — PIPERACILLIN SODIUM AND TAZOBACTAM SODIUM 4.5 G: 4; .5 INJECTION, POWDER, LYOPHILIZED, FOR SOLUTION INTRAVENOUS at 09:17

## 2023-09-26 RX ADMIN — DIVALPROEX SODIUM 500 MG: 125 CAPSULE, COATED PELLETS ORAL at 08:56

## 2023-09-26 RX ADMIN — POTASSIUM CHLORIDE 20 MEQ: 1500 TABLET, EXTENDED RELEASE ORAL at 12:06

## 2023-09-26 RX ADMIN — PIPERACILLIN SODIUM AND TAZOBACTAM SODIUM 4.5 G: 4; .5 INJECTION, POWDER, LYOPHILIZED, FOR SOLUTION INTRAVENOUS at 03:18

## 2023-09-26 RX ADMIN — ONDANSETRON 4 MG: 4 TABLET, ORALLY DISINTEGRATING ORAL at 18:26

## 2023-09-26 ASSESSMENT — ACTIVITIES OF DAILY LIVING (ADL)
ADLS_ACUITY_SCORE: 21
ADLS_ACUITY_SCORE: 21
ADLS_ACUITY_SCORE: 29
ADLS_ACUITY_SCORE: 21
ADLS_ACUITY_SCORE: 27
ADLS_ACUITY_SCORE: 29
ADLS_ACUITY_SCORE: 29
ADLS_ACUITY_SCORE: 27
ADLS_ACUITY_SCORE: 27
ADLS_ACUITY_SCORE: 21

## 2023-09-26 NOTE — PLAN OF CARE
Goal Outcome Evaluation:    VS:    O2: Sating >90% on RA. Lung sounds clear. Denies chest pain and SOB.   Output: Voids spontaneously and adequately in the toilet   Last BM: Bowel sounds active x4. Passing flatus /23   Activity: Up with 1 assist, FWW, and gait belt.    Skin:    Pain: Pain was managed with Tylenol.    CMS: A&Ox4. Denies N/T.    Dressing: None   Diet: Regular. Appetite was good. Complaint of  Nausea  no vomiting.    LDA: PIV to R AC is S/L with intermittent ABX infusion     IV pole, FWW, gait belt, and personal belongings. Call light within reach and uses appropriately.   Plan:  TBD   Additional Info: Sitter as bedside no behavior. Continue POC

## 2023-09-26 NOTE — PLAN OF CARE
BP (!) 145/64 (BP Location: Left arm)   Pulse 96   Temp 99.1  F (37.3  C) (Oral)   Resp 16   Wt 105.8 kg (233 lb 4 oz)   SpO2 96%   BMI 34.44 kg/m      1:1 present at bedside throughout shift.  No behavioral events this shift. Appears to be responding to internal stimuli throughout day. Speech is very quiet and mumbled. Took all meds whole with water without difficulty.  Frequent productive cough observed. Use of IS encouraged. SpO2 stable on RA.   IV abx course completed this AM. Pt is medically ready to return to inpt psych per provider.   Up with Ax1. Gait is slow but steady.     Alina Viramontes RN

## 2023-09-26 NOTE — PROGRESS NOTES
[facility]    Progress Note - [facility]       Date of Admission:  9/20/2023    Main plans for today:  - Medically stable for transfer to psychiatric unit  - Psychiatry consulted, appreciate recs for transfer back to psychiatric unit  - Finished day 7 of 7 IV Zosyn  - Vitals q8h    Assessment & Plan   Angela Basurto is a 58 year old female with bipolar 1 who was admitted to psychiatry on 8/16/23 for elvis and psychosis in the context of medication nonadherence. On 9/18 she developed orthostatic hypotension, fevered 9/19 w/ lactate 4.3, and was admitted to our service for sepsis (resolved) due to presumed hospital acquired pneumonia. Patient is medically stable and ready for transfer back to psychiatric unit. Currently, Adolfo, full commitment, 1:1, IV abx.      # Possible Hospital-acquired pneumonia  # Positive blood culture x1 - consistent with contaminant  # Severe sepsis, resolved  # Acute Hypoxic respiratory failure, resolved  Initially presented 9/19 with temperature 101.3F, lactic acid 4.3 in the setting of new cough and widespread body aches. , WBC 9.8. 9/18 CXR with streaky right greater than left perihilar and basilar opacities, likely atelectasis and/or edema without focal consolidation. Respiratory viral panels (9/19, 9/22) have been negative. Echocardiogram not consistent with myocarditis. Briefly treated with vancomycin 9/19-9/23 due to a blood culture growing gram positive cocci; this was later determined to be Staph Hominis; in the context of no growth on other cultures, this was determined to be a contaminant. Pulmonary exam at this point without focal findings consistent with bacterial pneumonia, though patient has an ongoing cough and appears to have a significant burden of respiratory secretions seen on 9/24 (resolved). RT completed vest therapy x1 and discontinued due to improved breathing on RA and clear breath sounds.  Instead, recommend that patient would benefit from an aerobika flutter valve. White count elevation today at 16.9  and unchanged procal at 0.35 with no clear focus of infection. Completed IV zosyn this morning. Patient is medically stable to return to psychiatry unit.     Workup/monitoring  - Infectious Disease following, appreciate recommendations:   - IV Zosyn 3.375 g q6hr for a 7 days  - If significant clinical worsening (sudden marked increase in O2 demands, significant hypotension) would change to meropenem and obtain new blood cultures.  - If possible, would obtain sputum sample for bacterial stain and culture (ordered 9/22)  - Aspiration precautions given altered mental status and current presentation   - Respiratory therapist saw Angela and she tolerated vest therapy x1  - Discontinue vest therapy with improved breathing and reassuring physical examination   - Aerobika flutter valve would be an effective solution now that vest therapy is not required at this time  - Daily CBC  - Daily procal   - CRP q48h      Treatment  - piperacillin/tazobactam (ZOSYN) IV 3.375 g, Intravenous, EVERY 6 HOURS (9/20- P); today is day 7 out of 7 day course. Completed last dose this morning        # Bipolar I disorder  # Hx of possible seizure in 1983   # Mata: Haldol, clozapine, risperidone, olanzapine  # Full commitment   Presented with elvis and psychosis in the context of medication nonadherence. Was admitted with psychiatry from 8/16 until transfer to this service 9/20. She has required a 1:1 to maintain her safety. Upon admission, her home meds were restarted, including zyprexa, depakote, and haldol. Initiated clozapine 8/31. Zyprexa stopped on 9/8 due to lack fo benefit and mild sedation. Achieved dose of 300 mg of clozapine daily on 9/11 with improvement in psychosis noted. Due to orthostatic hypotension, constipation, and sedation, reduced clozapine to 200 mg on 9/17, stopped Haldol on 9/16, and held clozapine  9/18-9/20. Restarted Clozapine at 100 mg on 9/20. Psychiatry is following closely and managing her psychiatric medications. Dizziness has continue to improve while on clozapine. Will continue at this dose. She reports shakiness of her RT arm and mouth since this morning. Likely 2/2 to current psychiatric medication. Psychiatry consulted this morning for discharge back to psychiatry unit.     - See psychiatry note for details of medication titrations  - Precautions: 1:1 sitter, restrict to unit, precautions in place for assault, cheeking, elopement, falls  - Legal status: Mata and full commitment: A petition for MI commitment with Mata was filed on 8/18 through [facility].    - Depakote DT sprinkles 500 mg BID   - Clozapine 100mg qHS  -PRN for agitation:   - EMERGENCY for agitation: Haldol 5mg + 2mg ativan + 50mg q8h PRN, PO or IM  - Preferred for agitation, anxiety: Hydroxyzine 25 - 50mg PO q6h PRN    # Orthostatic hypotension  Symptomatic 9/18 with blood pressure drop from 123/72 while laying to 96/55 with standing. Patient reported this was new since admission and starting new medications. Patient initiated on clozapine on 8/31 with dose increases, now on reduced dose of 100mg nightly. Dizziness with standing has been improving while on clozapine. Somnolence and poor PO intake have been challenges during this admission, both likely contributing. Workups for endocrine and cardiac causes were negative.     - Encourage oral hydration   - Compression stockings  - Will continue to minimize exposure to medications which can worsen orthostatic hypotension.       # Acute Kidney Injury   Creatinine was 0.49 at the time of presentation for medical illness, gradually tasia to peak of 1.99 9/22, now decreasing. This likely represents a combination of pre-renal ETHAN due to poor PO intake in the setting of psychiatric illness and intra-renal process due to medications. Zosyn and Pepcid are ordered in accordance with  renal dosing. Reassured by improvement in the setting of discontinuation of vancomycin and improving creatinine of 1.48. Completed last dose of IV Zosyn this morning, which will hopefully hasten recovery from ETHAN. If transfer to psychiatric unit tonight would recommend repeat BMP in 5-7 days.     - Daily BMP  - repeat BMP in 5-7 days  - IV Zosyn 3.375 g q6hr  - Famotidine 10 mg daily    # Diarrhea   # Hypokalemia- Resolved  # Abdominal tenderness   Increased stool frequency and loose stools, with 4 stools noted 9/22, 7 stools 9/23. Negative stool testing for C. Difficile. Patient denies blood in stools, as well as abdominal pain associated with bowel movements. May be related to ongoing antibiotic treatment. Sitter reports one loose stool overnight. Diffuse abdominal tenderness noted without rebound or guarding during her admission. Lipase elevated at 177. Low supicious for pancreatitis due to improved abdominal tenderness and lipase < 3x normal upper limits.    - Loperamide capsule 2 mg   - RN-driven potassium replacement protocol - High    # Hypocalcemia  # Hypoalbuminemia  Suspect possible malnutrition due to poor PO intake.   - Nutrition IP consult, appreciate recommendations    - Medical food supplement therapy  - Patient to consume % of nutritionally adequate meal trays TID, or the equivalent with supplements/snacks.      #Hx of HTN  Had been admitted to this service in the setting of intermittent hypotension, therefore home antihypertensive was held. She had asymptomatic hypertension to max 182/99 9/23, which resolved spontaneously.   - HOLD PTA clonidine; if she develops consistent hypertension, would consider restarting.     # Muslim  Important to her value system  - Spiritual care consult      Diet: Combination Diet Regular Diet; Safe Tray - with utensils    DVT Prophylaxis: Low Risk/Ambulatory with no VTE prophylaxis indicated  Wilson Catheter: Not present  Fluids: PO  Lines: Peripheral IV    Cardiac Monitoring: None  Code Status: Full Code      Clinically Significant Risk Factors              # Hypoalbuminemia: Lowest albumin = 2 g/dL at 9/24/2023  8:46 AM, will monitor as appropriate      # Acute Kidney Injury, unspecified: based on a >150% or 0.3 mg/dL increase in last creatinine compared to past 90 day average, will monitor renal function                      Disposition Plan      Expected Discharge Date: 09/27/2023        Discharge Comments: transfer to  psych after IV abs complete and medically stable/cleared     The patient's care was discussed with the Attending Physician, Dr. Scott.    Gonzalez Reno MD  [facility]  [employer]  Securely message with [employer] (more info)  Text page via [employer] Paging/Directory   See signed in provider for up to date coverage information  ______________________________________________________________________    Interval History   Overnight:  No overnight events.     Subjective:  Somnolent this morning; does not report abdominal pain. She has no questions this morning.     Diarrhea has improve over the past couple of days, 1 loose stool reported overnight. Currently on loperamide. No longer having abdominal pain and denies any bloody stools. Stable on RA, SpO2 96%. She reports to having shakiness of her RT arm and mouth. Discuss with patient that this is likely due to her psychiatric medications.       Physical Exam   Vital Signs: Temp: 99.1  F (37.3  C) Temp src: Oral BP: (!) 145/64 Pulse: 96   Resp: 16 SpO2: 96 % O2 Device: None (Room air)    Weight: 233 lbs 3.95 oz    Constitutional: Somnolent, cooperative, no apparent distress, and appears stated age  Respiratory: Unable to examine posterior lungs fields.  Anterior: No increased work of breathing, good air exchange, clear to auscultation bilaterally, no crackles or wheezing in upper lobes.   Cardiovascular: Normal apical impulse, regular rate  and rhythm, normal S1 and S2. Extremities warm.   GI: No scars, normal bowel sounds, soft, non-distended, diffusely tender to palpation without rebound or guarding.   MSK: Non pitting edema of b/l lower extremity, improve with compression stocking  Neurologic: Lethargic. Briefly answers questions with assistance of Stat[employer] , and then closes her eyes and appears to fall asleep mid-statement.   Neuropsychiatric: Difficult to assess given somnolence. Does not appear to be responding to internal stimuli during this exam.       Medical Decision Making       Please see A&P for additional details of medical decision making.      Data     I have personally reviewed the following data over the past 24 hrs:    16.9 (H)  \   9.7 (L)   / 328     143 109 (H) 11.4 /  189 (H)   3.6 25 1.48 (H) \     Procal: 0.35 (H) CRP: 168.59 (H) Lactic Acid: N/A

## 2023-09-26 NOTE — CONSULTS
"      Psychiatry Consultation; Follow up              Reason for Consult, requesting source:    Clozaril   Requesting source: Jose  Labs and imaging reviewed, seen by ROSITA Cronin CNP     Total time spent in chart review, patient interview and coordination of care; 60 minutes - all time was spent on the date of the encounter that I saw patient             Interim history:    The patient was just transferred to the medical floor from [facility] where she was under the care of Dr. Champagne since 08/18/23 when she presented with manic episode with psychosis and homicidal ideation. Reportedly, the \"voices\" were telling her to harm her aunt and uncle and do so with a knife. The knife was removed by paramedics.   A petition for commitment was initiated and supported and Mata hearing was held.  She was initially on a combination of Haldol, Zyprexa and Depakote with PRN Trazodone and Hydroxyzine but eventually was switched to Clozaril up to 300 mg at bedtime. It was later decreased to 200 mg because or orthostatic hypotension and held when she was transferred to the medical floor because of Sepsis likely 2/2 hospital-acquired pneumonia.     Patients Clozaril was held for 72 hours related to orthostatic hypotension and was restarted 9/20 at 100mg. She tolerated well, denies feeling dizzy.     Patient is now medically cleared and on the list to transfer back to inpatient psychiatry. Today she is quite somnolent, telling me she didn't sleep at all last night. She begins mumbling incoherently. She repeats \"no, no, no, no\" when I asked her about SI, HI. Her bedside sitter reports that when she talks, she does not make sense and is often heard talking to herself.         Current Medications:      cloZAPine  100 mg Oral At Bedtime    divalproex sodium delayed-release  500 mg Oral Q12H [facility] (08/20)    famotidine  10 mg Oral Daily    sodium chloride (PF)  3 mL Intracatheter Q8H    sodium chloride (PF)  3 mL Intracatheter " "Q8H              MSE:   Appearance: fatigued  Attitude:  cooperative  Eye Contact:  poor   Mood:  \"fair\"  Affect:  slightly restricted  Speech:  mumbling and rambling  Psychomotor Behavior:  no evidence of tardive dyskinesia, dystonia, or tics  Muscle strength and tone: baseline   Thought Process:  disorganized  Associations:  loosening of associations present  Thought Content:  no evidence of suicidal ideation or homicidal ideation and likely responding to internal stimuli   Insight:  partial  Judgement:  limited  Oriented to:   MIGUEL ÁNGEL  Attention Span and Concentration:  limited  Recent and Remote Memory:  fair    Vital signs:  Temp: 99.1  F (37.3  C) Temp src: Oral BP: (!) 145/64 Pulse: 96   Resp: 16 SpO2: 96 % O2 Device: None (Room air) Oxygen Delivery: 2 LPM   Weight: 105.8 kg (233 lb 4 oz)  Estimated body mass index is 34.44 kg/m  as calculated from the following:    Height as of 8/17/23: 1.753 m (5' 9\").    Weight as of this encounter: 105.8 kg (233 lb 4 oz).    Qtc: 400 on 9/18          DSM-5 Diagnosis:   Schizoaffective Disorder, bipolar type           Assessment:   Angela is a 58 year old female with a history of the above diagnoses, transferred from IP psych. She is now medically ready for transfer back to inpatient psychiatry. She has been mumbling to herself and not making sense per bedside sitter. She was fatigued and scattered on my interview. I will increase her Clozaril and continue to await transfer to IP psychiatry.           Summary of Recommendations:     Increased Clozaril to 150mg at bedtime  Continue to pursue  psychiatry       Suzie Kohler, [id number]  Consult/Liaison Psychiatry   [facility]             "

## 2023-09-26 NOTE — TELEPHONE ENCOUNTER
S:  5 med surg ([phone]) ,  Opal RING  calling at 11:15 am  about a 58 year old/Female presenting with psychosis and elvis. Pt was on psych and transferred to medical on 9/20 due to sepsis. She finished her antibiotics and she is med cleared. She is under commitment and Mata.     B: Presenting problem, stressors:  she presented with elvis and psychosis with HI toward her family. She was paranoid. See chart for further info.     Pt affect in ED: Flat (on medical)  Pt Dx: Bipolar Disorder  Previous [facility] hx? Yes:  prior to going to medical unit  Pt denies SI   Hx of suicide attempt? No  Pt denies SIB  Pt endorses HI towards family members with plans and intent- plan to stab them with a knife  Pt denies hallucinations .   Pt RARS Score: 3    Hx of aggression/violence, sexual offenses, legal concerns, Epic care plan? describe: aggression to her family; no; under commitment and Mata; no care plan   Current concerns for aggression this visit? No  Does pt have a history of Civil Commitment? Yes, currently on MI civil commitment and a Mata   Is Pt their own guardian? Yes    Pt is prescribed medication. Is patient medication compliant? Yes  Pt endorses OP services: Medication Management  CD concerns: None  Acute or chronic medical concerns: hypotention  Does Pt present with specific needs, assistive devices, or exclusionary criteria? None      Pt is ambulatory  Pt is able to perform ADLs independently      A: Pt to be reviewed for [facility] admission. Pt is on a court hold in [facility]   Preferred placement: [facility] ONLY    COVID Symptoms: No  If yes, COVID test required   Utox:  appears order for test was discontinued    CMP: Abnormalities: urea nitrogen 5.7, calcium 8.3, glucose 128, protein total 5.9, albumin 2.7 (all on 9/20/23_  CBC: Abnormalities: WBC 16.9, RBC 3.07, hemoglobin 9.7, hematocrit 29.5. absolute neutrophils 12.4, absolute monocytes 1.5,  absolute immature granulocytes 0.7  HCG: N/A    R: Patient  cleared and ready for behavioral bed placement: Yes  Pt placed on [facility] worklist? Yes    Does Patient need a Transfer Center request created? Yes, writer completed Transfer Center request at: 11:35 am

## 2023-09-26 NOTE — PROGRESS NOTES
[facility]   BRIEF PROGRESS NOTE    SUBJECTIVE    7:30PM    RT paged to inform us that she did not think vest therapy would be required since she was breathing comfortably on RA and her lungs sounded clear when she assessed her just before calling us.     ASSESSMENT/PLAN:    7:30PM    We decided along with RT that an aerobika flutter valve would be an effective solution now that we have determined that vest therapy is not required at this time. The patient also has incentive spirometry ordered (which was already ordered at the time the RT assessed her). RT stated that her team would let us know if she believed that the patient's respiratory status were to worsen to the point of needing vest therapy.    Please see daily rounding note for full A/P.  Lionel Chung MD  7:41 PM

## 2023-09-27 ENCOUNTER — TELEPHONE (OUTPATIENT)
Dept: BEHAVIORAL HEALTH | Facility: CLINIC | Age: 58
End: 2023-09-27
Payer: COMMERCIAL

## 2023-09-27 PROBLEM — N17.8 OTHER ACUTE KIDNEY FAILURE (H): Status: ACTIVE | Noted: 2023-09-27

## 2023-09-27 LAB
ANION GAP SERPL CALCULATED.3IONS-SCNC: 14 MMOL/L (ref 7–15)
BACTERIA BLD CULT: NO GROWTH
BACTERIA BLD CULT: NO GROWTH
BASOPHILS # BLD MANUAL: 0 10E3/UL (ref 0–0.2)
BASOPHILS NFR BLD MANUAL: 0 %
BUN SERPL-MCNC: 12.9 MG/DL (ref 6–20)
CALCIUM SERPL-MCNC: 8.5 MG/DL (ref 8.6–10)
CHLORIDE SERPL-SCNC: 106 MMOL/L (ref 98–107)
CREAT SERPL-MCNC: 1.39 MG/DL (ref 0.51–0.95)
DEPRECATED HCO3 PLAS-SCNC: 20 MMOL/L (ref 22–29)
EGFRCR SERPLBLD CKD-EPI 2021: 44 ML/MIN/1.73M2
EOSINOPHIL # BLD MANUAL: 0.6 10E3/UL (ref 0–0.7)
EOSINOPHIL NFR BLD MANUAL: 4 %
ERYTHROCYTE [DISTWIDTH] IN BLOOD BY AUTOMATED COUNT: 14 % (ref 10–15)
GLUCOSE BLDC GLUCOMTR-MCNC: 384 MG/DL (ref 70–99)
GLUCOSE BLDC GLUCOMTR-MCNC: 396 MG/DL (ref 70–99)
GLUCOSE BLDC GLUCOMTR-MCNC: 407 MG/DL (ref 70–99)
GLUCOSE BLDC GLUCOMTR-MCNC: 447 MG/DL (ref 70–99)
GLUCOSE SERPL-MCNC: 349 MG/DL (ref 70–99)
HBA1C MFR BLD: 6.2 %
HCT VFR BLD AUTO: 29 % (ref 35–47)
HGB BLD-MCNC: 9.3 G/DL (ref 11.7–15.7)
LACTATE SERPL-SCNC: 1.1 MMOL/L (ref 0.7–2)
LYMPHOCYTES # BLD MANUAL: 1.2 10E3/UL (ref 0.8–5.3)
LYMPHOCYTES NFR BLD MANUAL: 8 %
MCH RBC QN AUTO: 31.3 PG (ref 26.5–33)
MCHC RBC AUTO-ENTMCNC: 32.1 G/DL (ref 31.5–36.5)
MCV RBC AUTO: 98 FL (ref 78–100)
METAMYELOCYTES # BLD MANUAL: 0.2 10E3/UL
METAMYELOCYTES NFR BLD MANUAL: 1 %
MONOCYTES # BLD MANUAL: 0.5 10E3/UL (ref 0–1.3)
MONOCYTES NFR BLD MANUAL: 3 %
MYELOCYTES # BLD MANUAL: 0.2 10E3/UL
MYELOCYTES NFR BLD MANUAL: 1 %
NEUTROPHILS # BLD MANUAL: 12.6 10E3/UL (ref 1.6–8.3)
NEUTROPHILS NFR BLD MANUAL: 83 %
PLAT MORPH BLD: ABNORMAL
PLATELET # BLD AUTO: 302 10E3/UL (ref 150–450)
POTASSIUM SERPL-SCNC: 3.3 MMOL/L (ref 3.4–5.3)
POTASSIUM SERPL-SCNC: 3.8 MMOL/L (ref 3.4–5.3)
PROCALCITONIN SERPL IA-MCNC: 0.45 NG/ML
RBC # BLD AUTO: 2.97 10E6/UL (ref 3.8–5.2)
RBC MORPH BLD: ABNORMAL
SODIUM SERPL-SCNC: 140 MMOL/L (ref 135–145)
WBC # BLD AUTO: 15.2 10E3/UL (ref 4–11)

## 2023-09-27 PROCEDURE — 250N000013 HC RX MED GY IP 250 OP 250 PS 637

## 2023-09-27 PROCEDURE — 84145 PROCALCITONIN (PCT): CPT | Performed by: STUDENT IN AN ORGANIZED HEALTH CARE EDUCATION/TRAINING PROGRAM

## 2023-09-27 PROCEDURE — 36415 COLL VENOUS BLD VENIPUNCTURE: CPT | Performed by: STUDENT IN AN ORGANIZED HEALTH CARE EDUCATION/TRAINING PROGRAM

## 2023-09-27 PROCEDURE — 85007 BL SMEAR W/DIFF WBC COUNT: CPT | Performed by: STUDENT IN AN ORGANIZED HEALTH CARE EDUCATION/TRAINING PROGRAM

## 2023-09-27 PROCEDURE — 99231 SBSQ HOSP IP/OBS SF/LOW 25: CPT | Mod: GC | Performed by: STUDENT IN AN ORGANIZED HEALTH CARE EDUCATION/TRAINING PROGRAM

## 2023-09-27 PROCEDURE — 250N000012 HC RX MED GY IP 250 OP 636 PS 637: Performed by: STUDENT IN AN ORGANIZED HEALTH CARE EDUCATION/TRAINING PROGRAM

## 2023-09-27 PROCEDURE — 84132 ASSAY OF SERUM POTASSIUM: CPT | Performed by: STUDENT IN AN ORGANIZED HEALTH CARE EDUCATION/TRAINING PROGRAM

## 2023-09-27 PROCEDURE — 85041 AUTOMATED RBC COUNT: CPT | Performed by: STUDENT IN AN ORGANIZED HEALTH CARE EDUCATION/TRAINING PROGRAM

## 2023-09-27 PROCEDURE — 120N000002 HC R&B MED SURG/OB UMMC

## 2023-09-27 PROCEDURE — 83605 ASSAY OF LACTIC ACID: CPT | Performed by: STUDENT IN AN ORGANIZED HEALTH CARE EDUCATION/TRAINING PROGRAM

## 2023-09-27 PROCEDURE — 83036 HEMOGLOBIN GLYCOSYLATED A1C: CPT | Performed by: STUDENT IN AN ORGANIZED HEALTH CARE EDUCATION/TRAINING PROGRAM

## 2023-09-27 PROCEDURE — 80048 BASIC METABOLIC PNL TOTAL CA: CPT | Performed by: STUDENT IN AN ORGANIZED HEALTH CARE EDUCATION/TRAINING PROGRAM

## 2023-09-27 PROCEDURE — 250N000013 HC RX MED GY IP 250 OP 250 PS 637: Performed by: STUDENT IN AN ORGANIZED HEALTH CARE EDUCATION/TRAINING PROGRAM

## 2023-09-27 RX ORDER — DEXTROSE MONOHYDRATE 25 G/50ML
25-50 INJECTION, SOLUTION INTRAVENOUS
Status: DISCONTINUED | OUTPATIENT
Start: 2023-09-27 | End: 2023-09-30

## 2023-09-27 RX ORDER — METFORMIN HCL 500 MG
500 TABLET, EXTENDED RELEASE 24 HR ORAL
Status: DISCONTINUED | OUTPATIENT
Start: 2023-09-27 | End: 2023-09-30

## 2023-09-27 RX ORDER — NICOTINE POLACRILEX 4 MG
15-30 LOZENGE BUCCAL
Status: DISCONTINUED | OUTPATIENT
Start: 2023-09-27 | End: 2023-09-30

## 2023-09-27 RX ORDER — POTASSIUM CHLORIDE 1500 MG/1
40 TABLET, EXTENDED RELEASE ORAL ONCE
Status: COMPLETED | OUTPATIENT
Start: 2023-09-27 | End: 2023-09-27

## 2023-09-27 RX ADMIN — FAMOTIDINE 10 MG: 10 TABLET ORAL at 09:18

## 2023-09-27 RX ADMIN — METFORMIN HYDROCHLORIDE 500 MG: 500 TABLET, EXTENDED RELEASE ORAL at 17:43

## 2023-09-27 RX ADMIN — CLOZAPINE 150 MG: 100 TABLET ORAL at 21:48

## 2023-09-27 RX ADMIN — SENNOSIDES 2 TABLET: 8.6 TABLET, FILM COATED ORAL at 21:48

## 2023-09-27 RX ADMIN — DIVALPROEX SODIUM 500 MG: 125 CAPSULE, COATED PELLETS ORAL at 09:17

## 2023-09-27 RX ADMIN — HYDROXYZINE HYDROCHLORIDE 50 MG: 50 TABLET, FILM COATED ORAL at 10:13

## 2023-09-27 RX ADMIN — INSULIN ASPART 5 UNITS: 100 INJECTION, SOLUTION INTRAVENOUS; SUBCUTANEOUS at 03:25

## 2023-09-27 RX ADMIN — POTASSIUM CHLORIDE 40 MEQ: 1500 TABLET, EXTENDED RELEASE ORAL at 12:24

## 2023-09-27 RX ADMIN — DIVALPROEX SODIUM 500 MG: 125 CAPSULE, COATED PELLETS ORAL at 21:48

## 2023-09-27 ASSESSMENT — ACTIVITIES OF DAILY LIVING (ADL)
ADLS_ACUITY_SCORE: 29
ADLS_ACUITY_SCORE: 27
ADLS_ACUITY_SCORE: 27
ADLS_ACUITY_SCORE: 29
ADLS_ACUITY_SCORE: 27
ADLS_ACUITY_SCORE: 29
ADLS_ACUITY_SCORE: 27
ADLS_ACUITY_SCORE: 29
ADLS_ACUITY_SCORE: 27
ADLS_ACUITY_SCORE: 27

## 2023-09-27 NOTE — PROGRESS NOTES
[facility]    Progress Note - [facility]       Date of Admission:  9/20/2023    Main plans for today:  - Medically stable for transfer to psychiatric unit  - Psychiatry consulted, increase Clozaril to 150mg at bedtime   - A1c   - Start Metformin   - Met SIRS, LA WNL (1.1)   - Awaiting transfer to  psychiatry    Assessment & Plan   Angela Basurto is a 58 year old female with bipolar 1 who was admitted to psychiatry on 8/16/23 for elvis and psychosis in the context of medication nonadherence. On 9/18 she developed orthostatic hypotension, fevered 9/19 w/ lactate 4.3, and was admitted to our service for sepsis (resolved) due to presumed hospital acquired pneumonia. Completed antibiotic on 9/26. Patient is medically stable and ready for transfer back to psychiatric unit. Currently, Adolfo, full commitment, 1:1     # Possible Hospital-acquired pneumonia  # Positive blood culture x1 - consistent with contaminant  # Severe sepsis, resolved  # Acute Hypoxic respiratory failure, resolved  Initially presented 9/19 with temperature 101.3F, lactic acid 4.3 in the setting of new cough and widespread body aches. , WBC 9.8. 9/18 CXR with streaky right greater than left perihilar and basilar opacities, likely atelectasis and/or edema without focal consolidation. Respiratory viral panels (9/19, 9/22) have been negative. Echocardiogram not consistent with myocarditis. Briefly treated with vancomycin 9/19-9/23 due to a blood culture growing gram positive cocci; this was later determined to be Staph Hominis; in the context of no growth on other cultures, this was determined to be a contaminant. Pulmonary exam at this point without focal findings consistent with bacterial pneumonia, though patient has an ongoing cough and appears to have a significant burden of respiratory secretions seen on 9/24 (resolved). RT evaluated and on an aerobika flutter valve.  White count decrease today at 15.2  and elevated procal at 0.45 with no clear focus of infection. Completed IV zosyn on 9/26. Met SIRS criteria (Tachycardic, elevated WBC, infection) at 0900, lactic acid at 1.1. Patient has had elevating HR intermittently throughout her hospital stay. Patient is medically stable to return to psychiatry unit.     Workup/monitoring  - Infectious Disease following, appreciate recommendations:  - If significant clinical worsening (sudden marked increase in O2 demands, significant hypotension) would change to meropenem and obtain new blood cultures.  - If possible, would obtain sputum sample for bacterial stain and culture (ordered 9/22)  - Aspiration precautions given altered mental status and current presentation   - Respiratory therapist saw Angela and she tolerated vest therapy x1  - Discontinue vest therapy with improved breathing and reassuring physical examination   - Aerobika flutter valve   - Daily CBC  - Daily procal   - CRP q48h      Treatment  - piperacillin/tazobactam (ZOSYN) IV 3.375 g, Intravenous, EVERY 6 HOURS (9/20- 9/26)    # Pre-diabetes (A1c 09/27/23, 6.2)  Notify by nursing staff around 0300 for BG of 447. 5 unit(s) of Aspart administer. BG recheck at 0500 is 407. Additional 10 unit(s) Aspart given, 15 unit(s) total overnight. 0600 BG check at 396. Asymptomatic. Likely 2/2 to increase Clozapine 9/26. Repeat A1c of 6.2. Will start Metformin.    - A1c  - Metformin  mg daily      # Bipolar I disorder  # Hx of possible seizure in 1983   # Mata: Haldol, clozapine, risperidone, olanzapine  # Full commitment   Presented with elvis and psychosis in the context of medication nonadherence. Was admitted with psychiatry from 8/16 until transfer to this service 9/20. She has required a 1:1 to maintain her safety. Upon admission, her home meds were restarted, including zyprexa, depakote, and haldol. Initiated clozapine 8/31. Zyprexa stopped on 9/8 due to lack fo benefit and  mild sedation. Achieved dose of 300 mg of clozapine daily on 9/11 with improvement in psychosis noted. Due to orthostatic hypotension, constipation, and sedation, reduced clozapine to 200 mg on 9/17, stopped Haldol on 9/16, and held clozapine 9/18-9/20. Restarted Clozapine at 100 mg on 9/20. Psychiatry is following closely and managing her psychiatric medications. Dizziness has continue to improve while on clozapine. No longer having shakiness of her RT arm and mouth. Psychiatry saw patient 9/26 and increase Clozapine 150 mg at bedtime. Awaiting transfer to  Psychiatric unit.    - See psychiatry note for details of medication titrations  - Precautions: 1:1 sitter, restrict to unit, precautions in place for assault, cheeking, elopement, falls  - Legal status: Mata and full commitment: A petition for MI commitment with Mata was filed on 8/18 through [facility].    - Depakote DT sprinkles 500 mg BID   - Increase Clozapine 150 mg qHS  -PRN for agitation:   - EMERGENCY for agitation: Haldol 5mg + 2mg ativan + 50mg q8h PRN, PO or IM  - Preferred for agitation, anxiety: Hydroxyzine 25 - 50mg PO q6h PRN    # Orthostatic hypotension  Symptomatic 9/18 with blood pressure drop from 123/72 while laying to 96/55 with standing. Patient reported this was new since admission and starting new medications. Patient initiated on clozapine on 8/31 with dose increases, now on reduced dose of 100mg nightly. Dizziness with standing has been improving while on clozapine. Somnolence and poor PO intake have been challenges during this admission, both likely contributing. Workups for endocrine and cardiac causes were negative.     - Encourage oral hydration   - Compression stockings  - Will continue to minimize exposure to medications which can worsen orthostatic hypotension.       # Acute Kidney Injury   Creatinine was 0.49 at the time of presentation for medical illness, gradually tasia to peak of 1.99 9/22, now decreasing. This  likely represents a combination of pre-renal ETHAN due to poor PO intake in the setting of psychiatric illness and intra-renal process due to medications. Zosyn and Pepcid are ordered in accordance with renal dosing. Reassured by improvement in the setting of discontinuation of antibiotics and improving creatinine of 1.39. If transfer to psychiatric unit would recommend repeat BMP in 5-7 days.     - Daily BMP  - repeat BMP in 5-7 days  - Completed antibiotics above  - Famotidine 10 mg daily    # Diarrhea   # Hypokalemia- Resolved  # Abdominal tenderness- Resolved  Increased stool frequency and loose stools, with 4 stools noted 9/22, 7 stools 9/23. Negative stool testing for C. Difficile. Patient denies blood in stools, as well as abdominal pain associated with bowel movements. May be related to ongoing antibiotic treatment. Sitter reports one loose stool overnight. Diffuse abdominal tenderness noted without rebound or guarding during her admission, now resolved.  Pacreatitis r/o with improved abdominal tenderness and lipase < 3x normal upper limits.    - Loperamide capsule 2 mg   - RN-driven potassium replacement protocol - High    # Hypocalcemia  # Hypoalbuminemia  Suspect possible malnutrition due to poor PO intake.   - Nutrition IP consult, appreciate recommendations    - Medical food supplement therapy  - Patient to consume % of nutritionally adequate meal trays TID, or the equivalent with supplements/snacks.      #Hx of HTN  Had been admitted to this service in the setting of intermittent hypotension, therefore home antihypertensive was held. She had asymptomatic hypertension to max 182/99 9/23, which resolved spontaneously.   - HOLD PTA clonidine; if she develops consistent hypertension, would consider restarting.     # [employer]ist  Important to her value system  - Spiritual care consult      Diet: Combination Diet Regular Diet; Safe Tray - with utensils    DVT Prophylaxis: Low Risk/Ambulatory with no VTE  prophylaxis indicated  Wilson Catheter: Not present  Fluids: PO  Lines: Peripheral IV   Cardiac Monitoring: None  Code Status: Full Code      Clinically Significant Risk Factors              # Hypoalbuminemia: Lowest albumin = 2 g/dL at 9/24/2023  8:46 AM, will monitor as appropriate      # Acute Kidney Injury, unspecified: based on a >150% or 0.3 mg/dL increase in last creatinine compared to past 90 day average, will monitor renal function                   Disposition Plan      Expected Discharge Date: 09/27/2023        Discharge Comments: transfer to [facility], medically stable/cleared     The patient's care was discussed with the Attending Physician, Dr. Scott.    Gonzalez Reno MD  [facility]  [facility]  Securely message with [employer] info)  Text page via [facility] Paging/Directory   See signed in provider for up to date coverage information  ______________________________________________________________________    Interval History   Overnight:  @3AM Pt BS was 447. Order one-time 5U Aspart.   @5AM BG is 407, so only down 47. Provider order a one-time 10U Aspar, so now 15U total.   @ 6AM BG is 396.     Subjective:  Somnolent this morning. Continues to briefly answer questions and then fall asleep mid-statement. Diarrhea seems to be improving over the past couple of days, unsure how many BM overnight, but sitter mentions Angela went to the bathroom a couple of times. Currently on loperamide. Voids spontaneously without difficulty. No longer having abdominal pain and denies any bloody stools. Stable on RA, SpO2 96%. No longer having shakiness of her RT arm and mouth.       Physical Exam   Vital Signs: Temp: 98.7  F (37.3  C) Temp src: Oral BP: (!) 152/64 Pulse: 104   Resp: 18 SpO2: 96 % O2 Device: None (Room air)    Weight: 233 lbs 3.95 oz    Constitutional: Somnolent, cooperative, no apparent distress, and appears stated age  Respiratory: No  increased work of breathing, good air exchange, clear to auscultation bilaterally, no crackles or wheezing in upper lobes.   Cardiovascular: Normal apical impulse, regular rate and rhythm, normal S1 and S2. Extremities warm.   GI: No scars, normal bowel sounds, soft, non-distended, diffusely tender to palpation without rebound or guarding.   MSK: Non pitting edema of b/l lower extremity, improve with compression stocking  Neurologic: Lethargic. Briefly answers questions with assistance of Gambian , and then closes her eyes and appears to fall asleep mid-statement.   Neuropsychiatric: Difficult to assess given somnolence. Does not appear to be responding to internal stimuli during this exam.       Medical Decision Making       Please see A&P for additional details of medical decision making.      Data     I have personally reviewed the following data over the past 24 hrs:    15.2 (H)  \   9.3 (L)   / 302     140 106 12.9 /  349 (H)   3.3 (L) 20 (L) 1.39 (H) \     Procal: 0.45 (H) CRP: N/A Lactic Acid: N/A

## 2023-09-27 NOTE — TELEPHONE ENCOUNTER
R: [place]  Access Inpatient Bed Call Log  9/27/2023 12:11 AM  Intake has called facilities that have not updated their bed status within the last 12 hours.??      ADULTS:     *[facility] -- [facility]: @ [facility] per website.       Pt remains on waitlist pending appropriate placement availability.

## 2023-09-27 NOTE — PROGRESS NOTES
End of Shift Update: 0700-1930     Summary: Awaiting IP psych transfer. Overnight patients blood sugar was elevated, given 16 units of insulin, per MD no checks today, Hgb A1c 6.8 - started on metformin today. IV abx completed 9/26.      Feeding/Fluids: Regular diet, minimal intake today. Thin liquids.   Analgesics: n/a. Given hydroxyzine this AM for restlessness.   Anticoagulation: n/a  Skin: Skin integrity ok.   Tele: no tele   Orientation: able to make needs known, alert and oriented to self, place and partially situation. Minimal response with orientation questions   Neuro/Emotional: participates in cares, sleeping most of day.   Respiratory: on RA  Glycemic Control: n/a - started on metformin this evening.   Ambulation: independent  Bowel/Bladder: continent   Lines/Tubes/Drains: PIV x1  Treatment: Awaiting transfer back to IP psych         Discharge: Awaiting transfer to IP psych.

## 2023-09-27 NOTE — PLAN OF CARE
Goal Outcome Evaluation:  Plan of Care Reviewed With: patient  Overall Patient Progress: no change    Shift: 1500 - 1930    Vital Signs: Temp: 99.4  F (37.4  C) Temp src: Oral BP: (!) 169/76 Pulse: 101   Resp: 16 SpO2: 93 % O2 Device: None (Room air)     Output: Continent of B/B - LBM: 9/26/23     Activity: Assist x w/ 1     Pain: Denied     LDA: L. Anterior lower forearm PIV - Patent, Currently SL     Diet: Regular, Thin liquids, Good appetite, Medication whole  Complained of nausea - PRN given     Additional Info: Call light w/in reach and able to make needs known.  IV abx completed today     Plan: Continue POC - Discharge TBD - Medically ready to return to inpt psych.       Negar Diaz, RN, BSN, PHN

## 2023-09-27 NOTE — PLAN OF CARE
Goal Outcome Evaluation:       1:1 present throughout shift. No behavioral events this shift. Pt noted to be responding to internal stimuli throughout shift. SpO2 stable on RA. Pt denies SOB, chest pain, N/V. Pt denies any other pain. Frequent productive cough noted throughout shift. L. Anterior lower forearm PIV - SL.  Up SBA1 to BR, slow but steady gait. Continent of B/B. Voids spontaneously without difficulty, and adequately in the toilet. Bowel sounds active x4. Passing flatus /23. Took pills whole without difficulty. Slept mostly through the night. Call light w/in reach and able to make needs known.  @3AM Pt BS was 447 Notified the provider. order one-time 5U Aspart. @5AM BG is 407, so only down 47. Provider order a one-time 10U Aspar, so now 15U total. @ 6AM BG is 396. Provider stated to leave the rest to day team. Plan for discharge back to psych. Continue POC.

## 2023-09-27 NOTE — TELEPHONE ENCOUNTER
:R: [place]  Access Inpatient Bed Call Log 9/27/23 @ 7:50 AM   Intake has called facilities that have not updated the bed status within the last 12 hours.   ([facility] Only)                                   [facility] is at capacity.                      Patient remains on the work list pending appropriate bed availability.

## 2023-09-27 NOTE — PROGRESS NOTES
Brief Progress Note    3AM  Notified by RN that patients BG was 447. Will OK a one-time 5U Aspart and see response.    5AM  BG is 407, so only down 47. Will ok a one-time 10U Aspar, so now 15U total.     Reggie Neves, DO  PGY3 Family Medicine Resident   [facility]/ [facility]    Department of Family Medicine and [facility]

## 2023-09-28 ENCOUNTER — TELEPHONE (OUTPATIENT)
Dept: BEHAVIORAL HEALTH | Facility: CLINIC | Age: 58
End: 2023-09-28
Payer: COMMERCIAL

## 2023-09-28 LAB
ANION GAP SERPL CALCULATED.3IONS-SCNC: 12 MMOL/L (ref 7–15)
BACTERIA SPEC CULT: NO GROWTH
BACTERIA SPEC CULT: NO GROWTH
BASOPHILS # BLD MANUAL: 0 10E3/UL (ref 0–0.2)
BASOPHILS NFR BLD MANUAL: 0 %
BUN SERPL-MCNC: 8.6 MG/DL (ref 6–20)
CALCIUM SERPL-MCNC: 8.4 MG/DL (ref 8.6–10)
CHLORIDE SERPL-SCNC: 106 MMOL/L (ref 98–107)
CREAT SERPL-MCNC: 1.25 MG/DL (ref 0.51–0.95)
EGFRCR SERPLBLD CKD-EPI 2021: 50 ML/MIN/1.73M2
EOSINOPHIL # BLD MANUAL: 0.7 10E3/UL (ref 0–0.7)
EOSINOPHIL NFR BLD MANUAL: 4 %
ERYTHROCYTE [DISTWIDTH] IN BLOOD BY AUTOMATED COUNT: 14.2 % (ref 10–15)
GLUCOSE SERPL-MCNC: 298 MG/DL (ref 70–99)
HCO3 SERPL-SCNC: 22 MMOL/L (ref 22–29)
HCT VFR BLD AUTO: 30 % (ref 35–47)
HGB BLD-MCNC: 9.7 G/DL (ref 11.7–15.7)
LACTATE SERPL-SCNC: 0.8 MMOL/L (ref 0.7–2)
LYMPHOCYTES # BLD MANUAL: 1.5 10E3/UL (ref 0.8–5.3)
LYMPHOCYTES NFR BLD MANUAL: 9 %
MCH RBC QN AUTO: 31 PG (ref 26.5–33)
MCHC RBC AUTO-ENTMCNC: 32.3 G/DL (ref 31.5–36.5)
MCV RBC AUTO: 96 FL (ref 78–100)
METAMYELOCYTES # BLD MANUAL: 0.5 10E3/UL
METAMYELOCYTES NFR BLD MANUAL: 3 %
MONOCYTES # BLD MANUAL: 1 10E3/UL (ref 0–1.3)
MONOCYTES NFR BLD MANUAL: 6 %
NEUTROPHILS # BLD MANUAL: 12.9 10E3/UL (ref 1.6–8.3)
NEUTROPHILS NFR BLD MANUAL: 78 %
NRBC # BLD AUTO: 0.2 10E3/UL
NRBC BLD MANUAL-RTO: 1 %
PLAT MORPH BLD: ABNORMAL
PLATELET # BLD AUTO: 326 10E3/UL (ref 150–450)
POLYCHROMASIA BLD QL SMEAR: SLIGHT
POTASSIUM SERPL-SCNC: 3.9 MMOL/L (ref 3.4–5.3)
RBC # BLD AUTO: 3.13 10E6/UL (ref 3.8–5.2)
RBC MORPH BLD: ABNORMAL
SODIUM SERPL-SCNC: 140 MMOL/L (ref 135–145)
WBC # BLD AUTO: 16.5 10E3/UL (ref 4–11)

## 2023-09-28 PROCEDURE — 250N000013 HC RX MED GY IP 250 OP 250 PS 637

## 2023-09-28 PROCEDURE — 250N000013 HC RX MED GY IP 250 OP 250 PS 637: Performed by: STUDENT IN AN ORGANIZED HEALTH CARE EDUCATION/TRAINING PROGRAM

## 2023-09-28 PROCEDURE — 80048 BASIC METABOLIC PNL TOTAL CA: CPT | Performed by: STUDENT IN AN ORGANIZED HEALTH CARE EDUCATION/TRAINING PROGRAM

## 2023-09-28 PROCEDURE — 83605 ASSAY OF LACTIC ACID: CPT | Performed by: STUDENT IN AN ORGANIZED HEALTH CARE EDUCATION/TRAINING PROGRAM

## 2023-09-28 PROCEDURE — 120N000002 HC R&B MED SURG/OB UMMC

## 2023-09-28 PROCEDURE — 36415 COLL VENOUS BLD VENIPUNCTURE: CPT | Performed by: STUDENT IN AN ORGANIZED HEALTH CARE EDUCATION/TRAINING PROGRAM

## 2023-09-28 PROCEDURE — 85041 AUTOMATED RBC COUNT: CPT | Performed by: STUDENT IN AN ORGANIZED HEALTH CARE EDUCATION/TRAINING PROGRAM

## 2023-09-28 PROCEDURE — 99231 SBSQ HOSP IP/OBS SF/LOW 25: CPT | Mod: GC | Performed by: STUDENT IN AN ORGANIZED HEALTH CARE EDUCATION/TRAINING PROGRAM

## 2023-09-28 PROCEDURE — 85007 BL SMEAR W/DIFF WBC COUNT: CPT | Performed by: STUDENT IN AN ORGANIZED HEALTH CARE EDUCATION/TRAINING PROGRAM

## 2023-09-28 RX ORDER — LANOLIN ALCOHOL/MO/W.PET/CERES
3 CREAM (GRAM) TOPICAL
Status: DISCONTINUED | OUTPATIENT
Start: 2023-09-28 | End: 2023-10-19 | Stop reason: HOSPADM

## 2023-09-28 RX ADMIN — GUAIFENESIN AND DEXTROMETHORPHAN 10 ML: 100; 10 SYRUP ORAL at 08:22

## 2023-09-28 RX ADMIN — Medication 3 MG: at 23:52

## 2023-09-28 RX ADMIN — DIVALPROEX SODIUM 500 MG: 125 CAPSULE, COATED PELLETS ORAL at 21:24

## 2023-09-28 RX ADMIN — METFORMIN HYDROCHLORIDE 500 MG: 500 TABLET, EXTENDED RELEASE ORAL at 17:15

## 2023-09-28 RX ADMIN — ACETAMINOPHEN 650 MG: 325 TABLET, FILM COATED ORAL at 17:15

## 2023-09-28 RX ADMIN — DIVALPROEX SODIUM 500 MG: 125 CAPSULE, COATED PELLETS ORAL at 08:22

## 2023-09-28 RX ADMIN — CLOZAPINE 150 MG: 100 TABLET ORAL at 21:26

## 2023-09-28 ASSESSMENT — ACTIVITIES OF DAILY LIVING (ADL)
ADLS_ACUITY_SCORE: 25

## 2023-09-28 NOTE — PLAN OF CARE
Goal Outcome Evaluation:       1:1 present throughout shift. No behavioral events this shift. Noted restlessness noted and to be turning side to side in the bed. Pt noted to be responding to internal stimuli throughout shift. SpO2 stable on RA. Pt denies SOB, chest pain, N/V. Pt denies any other pain. unchanged productive cough. L-anterior lower forearm PIV - SL. Up SBA1 to BR, slow but steady gait. Continent of B/B. Voids spontaneously without difficulty, and adequately in the toilet. Bowel sounds active x4. Passing flatus LBM 9/27. Took pills whole without difficulty. Slept mostly through the night. Call light w/in reach and able to make needs known. Plan for discharge back to psych. Continue POC.

## 2023-09-28 NOTE — PLAN OF CARE
/79 (BP Location: Right arm)   Pulse 94   Temp 98.4  F (36.9  C) (Oral)   Resp 16   Wt 105.8 kg (233 lb 4 oz)   SpO2 94%   BMI 34.44 kg/m      1:1 at bedside throughout shift. Intermittent confusion noted but is otherwise A&Ox4. Appears to be responding to internal stimuli throughout shift. Noted to be sleeping throughout majority of day. Denies pain.   Continues to have productive cough and increased WOB with exertion.   Up to BR with SBA. Gait is slow but steady.   Pt continues to have decreased appetite. Meals ordered for pt. Very minimal intake noted.    Discharge to inpt psych pending bed availability.     Alina Viramontes RN

## 2023-09-28 NOTE — PROGRESS NOTES
[facility]    Progress Note - [facility]       Date of Admission:  9/20/2023    Main plans for today:  - Medically stable for transfer to psychiatric unit, awaiting bed  - Clozaril 150mg at bedtime   - Labs q72h  - Vital per unit routine/PRN      Assessment & Plan   Angela Basurto is a 58 year old female with bipolar 1 who was admitted to psychiatry on 8/16/23 for elvis and psychosis in the context of medication nonadherence. On 9/18 she developed orthostatic hypotension, fevered 9/19 w/ lactate 4.3, and was admitted to our service for sepsis (resolved) due to presumed hospital acquired pneumonia. Completed antibiotic on 9/26. Patient is medically stable and ready for transfer back to psychiatric unit. Currently, Adolfo, full commitment, 1:1     # Possible Hospital-acquired pneumonia  # Positive blood culture x1 - consistent with contaminant  # Severe sepsis, resolved  # Acute Hypoxic respiratory failure, resolved  Initially presented 9/19 with temperature 101.3F, lactic acid 4.3 in the setting of new cough and widespread body aches. , WBC 9.8. 9/18 CXR with streaky right greater than left perihilar and basilar opacities, likely atelectasis and/or edema without focal consolidation. Respiratory viral panels (9/19, 9/22) have been negative. Echocardiogram not consistent with myocarditis. Briefly treated with vancomycin 9/19-9/23 due to a blood culture growing gram positive cocci; this was later determined to be Staph Hominis; in the context of no growth on other cultures, this was determined to be a contaminant. Pulmonary exam at this point without focal findings consistent with bacterial pneumonia, though patient has an ongoing cough and appears to have a significant burden of respiratory secretions seen on 9/24 (resolved). RT evaluated and recommended use of aerobika flutter valve. White count decrease today at 16.5 with still no clear  focus of infection. Patient is medically stable to return to psychiatry unit.     Workup/monitoring  - Infectious Disease following, appreciate recommendations:  - If significant clinical worsening (sudden marked increase in O2 demands, significant hypotension) would change to meropenem and obtain new blood cultures.  - If possible, would obtain sputum sample for bacterial stain and culture (ordered 9/22)  - Aspiration precautions given altered mental status and current presentation   - Respiratory therapist saw Angela and she tolerated vest therapy x1  - Discontinue vest therapy with improved breathing and reassuring physical examination   - Aerobika flutter valve   - CBC q72h  - Discontinue daily procal   - Discontinue CRP q48h      Treatment  - piperacillin/tazobactam (ZOSYN) IV 3.375 g, Intravenous, EVERY 6 HOURS (9/20- 9/26)    # Pre-diabetes (A1c 09/27/23, 6.2)  Asymptomatic. Likely 2/2 to increase Clozapine 9/26. Repeat A1c of 6.2. BG this morning at 298. Continue Metformin.    - Metformin  mg daily      # Bipolar I disorder  # Hx of possible seizure in 1983   # Mata: Haldol, clozapine, risperidone, olanzapine  # Full commitment   Presented with elvis and psychosis in the context of medication nonadherence. Was admitted with psychiatry from 8/16 until transfer to this service 9/20. She has required a 1:1 to maintain her safety. Upon admission, her home meds were restarted, including zyprexa, depakote, and haldol. Initiated clozapine 8/31. Zyprexa stopped on 9/8 due to lack fo benefit and mild sedation. Achieved dose of 300 mg of clozapine daily on 9/11 with improvement in psychosis noted. Due to orthostatic hypotension, constipation, and sedation, reduced clozapine to 200 mg on 9/17, stopped Haldol on 9/16, and held clozapine 9/18-9/20. Restarted Clozapine at 100 mg on 9/20. Psychiatry is following closely and managing her psychiatric medications. Dizziness has continue to improve while on clozapine. No  longer having shakiness of her RT arm and mouth. Psychiatry saw patient 9/26 and increase Clozapine 150 mg at bedtime. Awaiting transfer to  Psychiatric unit.    - See psychiatry note for details of medication titrations  - Precautions: 1:1 sitter, restrict to unit, precautions in place for assault, cheeking, elopement, falls  - Legal status: Mata and full commitment: A petition for MI commitment with Mata was filed on 8/18 through [facility].    - Depakote DT sprinkles 500 mg BID   - Clozapine 150 mg qHS  -PRN for agitation:   - EMERGENCY for agitation: Haldol 5mg + 2mg ativan + 50mg q8h PRN, PO or IM  - Preferred for agitation, anxiety: Hydroxyzine 25 - 50mg PO q6h PRN    # Orthostatic hypotension  Symptomatic 9/18 with blood pressure drop from 123/72 while laying to 96/55 with standing. Patient reported this was new since admission and starting new medications. Patient initiated on clozapine on 8/31 with dose increases, now on reduced dose of 100mg nightly. Dizziness with standing has been improving while on clozapine. Somnolence and poor PO intake have been challenges during this admission, both likely contributing. Workups for endocrine and cardiac causes were negative.     - Encourage oral hydration   - Compression stockings  - Will continue to minimize exposure to medications which can worsen orthostatic hypotension.       # Acute Kidney Injury   Creatinine was 0.49 at the time of presentation for medical illness, gradually tasia to peak of 1.99 9/22, now decreasing. This likely represents a combination of pre-renal ETHAN due to poor PO intake in the setting of psychiatric illness and intra-renal process due to medications. Zosyn and Pepcid are ordered in accordance with renal dosing. Reassured by improvement in the setting of discontinuation of antibiotics and improving creatinine of 1.39. If transfer to psychiatric unit would recommend repeat BMP in 5-7 days.     - BMP q72h  - repeat BMP in 5-7  days  - Completed antibiotics above  - Famotidine 10 mg daily    # Diarrhea- Resolved    # Hypokalemia- Resolved  # Abdominal tenderness- Resolved  Increased stool frequency and loose stools, with 4 stools noted 9/22, 7 stools 9/23. Negative stool testing for C. Difficile. Patient denies blood in stools, as well as abdominal pain associated with bowel movements. May be related to ongoing antibiotic treatment. Sitter continues to reports one loose stool overnight. Diffuse abdominal tenderness noted without rebound or guarding during her admission, now resolved.  Pacreatitis r/o with improved abdominal tenderness and lipase < 3x normal upper limits.    - Loperamide capsule 2 mg   - RN-driven potassium replacement protocol - High    # Hypocalcemia  # Hypoalbuminemia  Suspect possible malnutrition due to poor PO intake.   - Nutrition IP consult, appreciate recommendations    - Medical food supplement therapy  - Patient to consume % of nutritionally adequate meal trays TID, or the equivalent with supplements/snacks.      #Hx of HTN  Had been admitted to this service in the setting of intermittent hypotension, therefore home antihypertensive was held. She had asymptomatic hypertension to max 182/99 9/23, which resolved spontaneously.   - HOLD PTA clonidine; if she develops consistent hypertension, would consider restarting.     # Sabianist  Important to her value system  - Spiritual care consult, met with Angela 9/20      Diet: Combination Diet Regular Diet; Safe Tray - with utensils    DVT Prophylaxis: Low Risk/Ambulatory with no VTE prophylaxis indicated  Wilson Catheter: Not present  Fluids: PO  Lines: Peripheral IV   Cardiac Monitoring: None  Code Status: Full Code      Clinically Significant Risk Factors              # Hypoalbuminemia: Lowest albumin = 2 g/dL at 9/24/2023  8:46 AM, will monitor as appropriate      # Acute Kidney Injury, unspecified: based on a >150% or 0.3 mg/dL increase in last creatinine  "compared to past 90 day average, will monitor renal function                   Disposition Plan      Expected Discharge Date: 09/27/2023        Discharge Comments: transfer to [facility], medically stable/cleared     The patient's care was discussed with the Attending Physician, Dr. Scott.    Gonzalez Reno MD  [facility]  [facility]  Securely message with GetThis (more info)  Text page via [facility] Paging/Directory   See signed in provider for up to date coverage information  ______________________________________________________________________    Interval History   Overnight:  No acute overnight event.     Subjective:  Somnolent this morning. Continues to briefly answer questions and then fall asleep mid-statement. One BM overnight, sitter was unsure if loose or not. Currently on loperamide. Voids spontaneously without difficulty. States  \"I am better\" when ask how she is doing. Stable on RA, SpO2 95%. She reports no cough, abdominal pain, or shakiness. Awaiting transfer to psychiatric unit.       Physical Exam   Vital Signs: Temp: 98.2  F (37.3  C) Temp src: Oral BP: (!) 159/64 Pulse: 98   Resp: 18 SpO2: 97 % O2 Device: None (Room air)    Weight: 233 lbs 4.00 oz    Constitutional: Somnolent, cooperative, no apparent distress, and appears stated age  Respiratory: No increased work of breathing, good air exchange, clear to auscultation bilaterally, no crackles or wheezing in upper lobes.   Cardiovascular: Normal apical impulse, regular rate and rhythm, normal S1 and S2. Extremities warm.   GI: No scars, normal bowel sounds, soft, non-distended, diffusely tender to palpation without rebound or guarding.   MSK: Non pitting edema of b/l lower extremity, improve with compression stocking  Neurologic: Lethargic. Briefly answers questions with assistance of [place] , and then closes her eyes and appears to fall asleep mid-statement. "   Neuropsychiatric: Difficult to assess given somnolence. Does not appear to be responding to internal stimuli during this exam.       Medical Decision Making       Please see A&P for additional details of medical decision making.      Data     I have personally reviewed the following data over the past 24 hrs:    16.5 (H)  \   9.7 (L)   / 326     140 106 8.6 /  298 (H)   3.9 22 1.25 (H) \

## 2023-09-28 NOTE — TELEPHONE ENCOUNTER
:R: [place]  Access Inpatient Bed Call Log 9/28/23 @ 7:08 am:    Intake has called facilities that have not updated the bed status within the last 12 hours. ([facility] Only)                        [facility] is at capacity.                    Patient remains on the work list pending appropriate bed availability.

## 2023-09-28 NOTE — TELEPHONE ENCOUNTER
R: [place]  Access Inpatient Bed Call Log 9/28/23 @ 1:30am   Intake has called facilities that have not updated their bed status within the last 12 hours.??      ADULTS:     *[facility] Only:  [place] [facility]: @ cap per website.     Pt remains on waitlist pending appropriate placement availability

## 2023-09-29 ENCOUNTER — TELEPHONE (OUTPATIENT)
Dept: BEHAVIORAL HEALTH | Facility: CLINIC | Age: 58
End: 2023-09-29
Payer: COMMERCIAL

## 2023-09-29 ENCOUNTER — APPOINTMENT (OUTPATIENT)
Dept: GENERAL RADIOLOGY | Facility: CLINIC | Age: 58
DRG: 871 | End: 2023-09-29
Attending: STUDENT IN AN ORGANIZED HEALTH CARE EDUCATION/TRAINING PROGRAM
Payer: COMMERCIAL

## 2023-09-29 ENCOUNTER — TELEPHONE (OUTPATIENT)
Dept: BEHAVIORAL HEALTH | Facility: CLINIC | Age: 58
End: 2023-09-29

## 2023-09-29 LAB
ALBUMIN SERPL BCG-MCNC: 2.4 G/DL (ref 3.5–5.2)
ALP SERPL-CCNC: 96 U/L (ref 35–104)
ALT SERPL W P-5'-P-CCNC: 13 U/L (ref 0–50)
ANION GAP SERPL CALCULATED.3IONS-SCNC: 8 MMOL/L (ref 7–15)
AST SERPL W P-5'-P-CCNC: 10 U/L (ref 0–45)
ATRIAL RATE - MUSE: 96 BPM
BASOPHILS # BLD AUTO: 0.1 10E3/UL (ref 0–0.2)
BASOPHILS NFR BLD AUTO: 0 %
BILIRUB SERPL-MCNC: 0.2 MG/DL
BUN SERPL-MCNC: 9.4 MG/DL (ref 6–20)
CALCIUM SERPL-MCNC: 8.3 MG/DL (ref 8.6–10)
CHLORIDE SERPL-SCNC: 105 MMOL/L (ref 98–107)
CREAT SERPL-MCNC: 1.27 MG/DL (ref 0.51–0.95)
DEPRECATED HCO3 PLAS-SCNC: 26 MMOL/L (ref 22–29)
DIASTOLIC BLOOD PRESSURE - MUSE: NORMAL MMHG
EGFRCR SERPLBLD CKD-EPI 2021: 49 ML/MIN/1.73M2
EOSINOPHIL # BLD AUTO: 0.2 10E3/UL (ref 0–0.7)
EOSINOPHIL NFR BLD AUTO: 1 %
ERYTHROCYTE [DISTWIDTH] IN BLOOD BY AUTOMATED COUNT: 14.1 % (ref 10–15)
GLUCOSE SERPL-MCNC: 364 MG/DL (ref 70–99)
HCT VFR BLD AUTO: 28.6 % (ref 35–47)
HGB BLD-MCNC: 9.4 G/DL (ref 11.7–15.7)
IMM GRANULOCYTES # BLD: 0.7 10E3/UL
IMM GRANULOCYTES NFR BLD: 4 %
INTERPRETATION ECG - MUSE: NORMAL
LIPASE SERPL-CCNC: 112 U/L (ref 13–60)
LYMPHOCYTES # BLD AUTO: 1.4 10E3/UL (ref 0.8–5.3)
LYMPHOCYTES NFR BLD AUTO: 8 %
MCH RBC QN AUTO: 31 PG (ref 26.5–33)
MCHC RBC AUTO-ENTMCNC: 32.9 G/DL (ref 31.5–36.5)
MCV RBC AUTO: 94 FL (ref 78–100)
MONOCYTES # BLD AUTO: 1.5 10E3/UL (ref 0–1.3)
MONOCYTES NFR BLD AUTO: 9 %
NEUTROPHILS # BLD AUTO: 13.5 10E3/UL (ref 1.6–8.3)
NEUTROPHILS NFR BLD AUTO: 78 %
NRBC # BLD AUTO: 0 10E3/UL
NRBC BLD AUTO-RTO: 0 /100
P AXIS - MUSE: 60 DEGREES
PLATELET # BLD AUTO: 277 10E3/UL (ref 150–450)
POTASSIUM SERPL-SCNC: 3.8 MMOL/L (ref 3.4–5.3)
PR INTERVAL - MUSE: 150 MS
PROT SERPL-MCNC: 6.2 G/DL (ref 6.4–8.3)
QRS DURATION - MUSE: 70 MS
QT - MUSE: 354 MS
QTC - MUSE: 447 MS
R AXIS - MUSE: 16 DEGREES
RBC # BLD AUTO: 3.03 10E6/UL (ref 3.8–5.2)
SODIUM SERPL-SCNC: 139 MMOL/L (ref 135–145)
SYSTOLIC BLOOD PRESSURE - MUSE: NORMAL MMHG
T AXIS - MUSE: 36 DEGREES
TROPONIN T SERPL HS-MCNC: 9 NG/L
VENTRICULAR RATE- MUSE: 96 BPM
WBC # BLD AUTO: 17.4 10E3/UL (ref 4–11)

## 2023-09-29 PROCEDURE — 74018 RADEX ABDOMEN 1 VIEW: CPT

## 2023-09-29 PROCEDURE — 250N000013 HC RX MED GY IP 250 OP 250 PS 637

## 2023-09-29 PROCEDURE — 99231 SBSQ HOSP IP/OBS SF/LOW 25: CPT | Mod: GC | Performed by: STUDENT IN AN ORGANIZED HEALTH CARE EDUCATION/TRAINING PROGRAM

## 2023-09-29 PROCEDURE — 36415 COLL VENOUS BLD VENIPUNCTURE: CPT | Performed by: STUDENT IN AN ORGANIZED HEALTH CARE EDUCATION/TRAINING PROGRAM

## 2023-09-29 PROCEDURE — 93005 ELECTROCARDIOGRAM TRACING: CPT

## 2023-09-29 PROCEDURE — 120N000002 HC R&B MED SURG/OB UMMC

## 2023-09-29 PROCEDURE — 71045 X-RAY EXAM CHEST 1 VIEW: CPT | Mod: 26 | Performed by: RADIOLOGY

## 2023-09-29 PROCEDURE — 250N000013 HC RX MED GY IP 250 OP 250 PS 637: Performed by: STUDENT IN AN ORGANIZED HEALTH CARE EDUCATION/TRAINING PROGRAM

## 2023-09-29 PROCEDURE — 83690 ASSAY OF LIPASE: CPT | Performed by: STUDENT IN AN ORGANIZED HEALTH CARE EDUCATION/TRAINING PROGRAM

## 2023-09-29 PROCEDURE — 250N000011 HC RX IP 250 OP 636: Performed by: STUDENT IN AN ORGANIZED HEALTH CARE EDUCATION/TRAINING PROGRAM

## 2023-09-29 PROCEDURE — 80053 COMPREHEN METABOLIC PANEL: CPT | Performed by: STUDENT IN AN ORGANIZED HEALTH CARE EDUCATION/TRAINING PROGRAM

## 2023-09-29 PROCEDURE — 250N000013 HC RX MED GY IP 250 OP 250 PS 637: Performed by: PSYCHIATRY & NEUROLOGY

## 2023-09-29 PROCEDURE — 71045 X-RAY EXAM CHEST 1 VIEW: CPT

## 2023-09-29 PROCEDURE — 74018 RADEX ABDOMEN 1 VIEW: CPT | Mod: 26 | Performed by: RADIOLOGY

## 2023-09-29 PROCEDURE — 93010 ELECTROCARDIOGRAM REPORT: CPT | Performed by: INTERNAL MEDICINE

## 2023-09-29 PROCEDURE — 85014 HEMATOCRIT: CPT | Performed by: STUDENT IN AN ORGANIZED HEALTH CARE EDUCATION/TRAINING PROGRAM

## 2023-09-29 PROCEDURE — 84484 ASSAY OF TROPONIN QUANT: CPT | Performed by: STUDENT IN AN ORGANIZED HEALTH CARE EDUCATION/TRAINING PROGRAM

## 2023-09-29 PROCEDURE — 99232 SBSQ HOSP IP/OBS MODERATE 35: CPT | Performed by: PSYCHIATRY & NEUROLOGY

## 2023-09-29 RX ORDER — HALOPERIDOL 1 MG/1
2 TABLET ORAL EVERY 6 HOURS PRN
Status: DISCONTINUED | OUTPATIENT
Start: 2023-09-29 | End: 2023-10-19 | Stop reason: HOSPADM

## 2023-09-29 RX ORDER — CLOZAPINE 200 MG/1
200 TABLET ORAL AT BEDTIME
Status: DISCONTINUED | OUTPATIENT
Start: 2023-09-29 | End: 2023-10-01

## 2023-09-29 RX ADMIN — DIVALPROEX SODIUM 500 MG: 125 CAPSULE, COATED PELLETS ORAL at 20:06

## 2023-09-29 RX ADMIN — CLOZAPINE 200 MG: 200 TABLET ORAL at 22:01

## 2023-09-29 RX ADMIN — ONDANSETRON 4 MG: 4 TABLET, ORALLY DISINTEGRATING ORAL at 17:15

## 2023-09-29 RX ADMIN — DIVALPROEX SODIUM 500 MG: 125 CAPSULE, COATED PELLETS ORAL at 08:43

## 2023-09-29 RX ADMIN — METFORMIN HYDROCHLORIDE 500 MG: 500 TABLET, EXTENDED RELEASE ORAL at 17:15

## 2023-09-29 RX ADMIN — FAMOTIDINE 10 MG: 10 TABLET ORAL at 08:43

## 2023-09-29 ASSESSMENT — ACTIVITIES OF DAILY LIVING (ADL)
ADLS_ACUITY_SCORE: 25

## 2023-09-29 NOTE — TELEPHONE ENCOUNTER
R: [place] MH Access Inpatient Bed Call Log 9/29/23 @ 2:00am    [facility]: @ capacity for MH beds.     Pt remains on waitlist pending appropriate placement availability

## 2023-09-29 NOTE — TELEPHONE ENCOUNTER
R: [place]  Access Inpatient Bed Call Log 9/29/23 8:40 AM    Intake has called facilities that have not updated the bed status within the last 12 hours.                             [facility] is at capacity.                Pt remains on the work list pending appropriate bed availability.

## 2023-09-29 NOTE — PLAN OF CARE
Goal Outcome Evaluation:    [id number]    Pt. Is A/O x 4, makes needs known. Pt denies CP, SOB, N/V, N/T. Pt C/O of pain this shift and administer Tylenol . No acute issues. Continue POC.    Recent VS:    BP (!) 153/78 (BP Location: Right arm)   Pulse 100   Temp 99.3  F (37.4  C) (Oral)   Resp 16   Wt 105.8 kg (233 lb 4 oz)   SpO2 95%   BMI 34.44 kg/m

## 2023-09-29 NOTE — PROGRESS NOTES
Brief note:    Patient remains clinically stable, afebrile and breathing on room air. She completed treatment for HAP with zosyn on 9/26/23. White count is above normal range at 16.5, but stable in the past few days. Blood culture from 9/23/23 negative. C diff PCR from stool (9/23/23) negative. Given no signs of active infection at the current time I will be formally signing off at this moment. Please call us back if worsening of white count and/or if any lab or clinical evidence of infection. Please see my sign off note from 9/25/23.     Natalie Torres  09/29/23 2010

## 2023-09-29 NOTE — TELEPHONE ENCOUNTER
R: [place]  Access Inpatient Bed Call Log 9/29/23 6PM    Intake has called facilities that have not updated the bed status within the last 12 hours.                             [facility] only.      [facility] is at capacity.                   Pt remains on the work list pending appropriate bed availability.
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
## 2023-09-29 NOTE — PROGRESS NOTES
[facility]    Progress Note - [facility] Service       Date of Admission:  9/20/2023    Main plans for today:  - Medically stable for transfer to psychiatric unit, awaiting bed    Assessment & Plan   Angela Basurto is a 58 year old female with bipolar 1 who was admitted to psychiatry on 8/16/23 for elvis and psychosis in the context of medication nonadherence. On 9/18 she developed orthostatic hypotension, fevered 9/19 w/ lactate 4.3, and was admitted to our service for sepsis (resolved) due to presumed hospital acquired pneumonia. Completed antibiotic on 9/26. Patient is medically stable and ready for transfer back to psychiatric unit. Currently, Adolfo, full commitment, 1:1     # Possible Hospital-acquired pneumonia  # Positive blood culture x1 - consistent with contaminant  # Severe sepsis, resolved  # Acute Hypoxic respiratory failure, resolved  Initially presented 9/19 with temperature 101.3F, lactic acid 4.3 in the setting of new cough and widespread body aches. , WBC 9.8. 9/18 CXR with streaky right greater than left perihilar and basilar opacities, likely atelectasis and/or edema without focal consolidation. Respiratory viral panels (9/19, 9/22) have been negative. Echocardiogram not consistent with myocarditis. Briefly treated with vancomycin 9/19-9/23 due to a blood culture growing gram positive cocci; this was later determined to be Staph Hominis; in the context of no growth on other cultures, this was determined to be a contaminant. Pulmonary exam at this point without focal findings consistent with bacterial pneumonia, though patient has an ongoing cough and appears to have a significant burden of respiratory secretions seen on 9/24 (resolved). RT evaluated and recommended use of aerobika flutter valve. White count have been elevated although stable with still no clear focus of infection. Patient is medically stable to return to  psychiatry unit.     Workup/monitoring  - Infectious Disease following, appreciate recommendations:  - If significant clinical worsening (sudden marked increase in O2 demands, significant hypotension) would change to meropenem and obtain new blood cultures.  - If possible, would obtain sputum sample for bacterial stain and culture (ordered 9/22)  - Aspiration precautions given altered mental status and current presentation   - Respiratory therapist saw Angela and she tolerated vest therapy x1  - Discontinue vest therapy with improved breathing and reassuring physical examination   - Aerobika flutter valve   - CBC q72h  - Discontinue procal daily   - Discontinue CRP q48h      Treatment  - piperacillin/tazobactam (ZOSYN) IV 3.375 g, Intravenous, EVERY 6 HOURS (9/20- 9/26)    # Pre-diabetes (A1c 09/27/23, 6.2)  Asymptomatic. Likely 2/2 to increase Clozapine 9/26. Repeat A1c of 6.2.   - Metformin  mg daily      # Bipolar I disorder  # Hx of possible seizure in 1983   # Mata: Haldol, clozapine, risperidone, olanzapine  # Full commitment   Presented with elvis and psychosis in the context of medication nonadherence. Was admitted with psychiatry from 8/16 until transfer to this service 9/20. She has required a 1:1 to maintain her safety. Upon admission, her home meds were restarted, including zyprexa, depakote, and haldol. Initiated clozapine 8/31. Zyprexa stopped on 9/8 due to lack fo benefit and mild sedation. Achieved dose of 300 mg of clozapine daily on 9/11 with improvement in psychosis noted. Due to orthostatic hypotension, constipation, and sedation, reduced clozapine to 200 mg on 9/17, stopped Haldol on 9/16, and held clozapine 9/18-9/20. Restarted Clozapine at 100 mg on 9/20. Psychiatry is following closely and managing her psychiatric medications. Dizziness has continue to improve while on clozapine. Psychiatry saw patient 9/26 and increase Clozapine 150 mg at bedtime. Awaiting transfer to  Psychiatric  unit.    - See psychiatry note for details of medication titrations  - Precautions: 1:1 sitter, restrict to unit, precautions in place for assault, cheeking, elopement, falls  - Legal status: Mata and full commitment: A petition for MI commitment with Adolfo was filed on 8/18 through [facility].    - Depakote DT sprinkles 500 mg BID   - Clozapine 150 mg qHS  -PRN for agitation:   - EMERGENCY for agitation: Haldol 5mg + 2mg ativan + 50mg q8h PRN, PO or IM  - Preferred for agitation, anxiety: Hydroxyzine 25 - 50mg PO q6h PRN    # Orthostatic hypotension  Symptomatic 9/18 with blood pressure drop from 123/72 while laying to 96/55 with standing. Patient reported this was new since admission and starting new medications. Patient initiated on clozapine on 8/31 with dose increases, now on reduced dose of 100mg nightly. Dizziness with standing has been improving while on clozapine. Somnolence and poor PO intake have been challenges during this admission, both likely contributing. Workups for endocrine and cardiac causes were negative.     - Encourage oral hydration   - Compression stockings  - Will continue to minimize exposure to medications which can worsen orthostatic hypotension.       # Acute Kidney Injury   Creatinine was 0.49 at the time of presentation for medical illness, gradually tasia to peak of 1.99 9/22, now decreasing. This likely represents a combination of pre-renal ETHAN due to poor PO intake in the setting of psychiatric illness and intra-renal process due to medications. Zosyn and Pepcid are ordered in accordance with renal dosing. Reassured by improvement in the setting of discontinuation of antibiotics and improving creatinine of 1.25. If transfer to psychiatric unit would recommend repeat BMP in 5-7 days.     - BMP q72h  - repeat BMP in 5-7 days  - Completed antibiotics above  - Famotidine 10 mg daily    # Diarrhea- Resolved    # Hypokalemia- Resolved  # Abdominal tenderness- Resolved  Increased  stool frequency and loose stools, with 4 stools noted 9/22, 7 stools 9/23. Negative stool testing for C. Difficile. Patient denies blood in stools, as well as abdominal pain associated with bowel movements. May be related to ongoing antibiotic treatment. No reports of loose stool overnight. Diffuse abdominal tenderness noted without rebound or guarding during her admission, now resolved.  Pacreatitis r/o with improved abdominal tenderness and lipase < 3x normal upper limits.    - Loperamide capsule 2 mg   - RN-driven potassium replacement protocol - High    # Hypocalcemia  # Hypoalbuminemia  Suspect possible malnutrition due to poor PO intake.   - Encourage good PO intake   - Nutrition IP consult, appreciate recommendations    - Medical food supplement therapy  - Patient to consume % of nutritionally adequate meal trays TID, or the equivalent with supplements/snacks.      #Hx of HTN  Had been admitted to this service in the setting of intermittent hypotension, therefore home antihypertensive was held. She had asymptomatic hypertension for the past couple of day. Will continue to monitor, if continue elevation would consider starting a ARB with improving creatinine levels.   -HOLD PTA clonidine    # [employer]  Important to her value system  - Spiritual care consult, met with Angela 9/20      Diet: Combination Diet Regular Diet; Safe Tray - with utensils    DVT Prophylaxis: Low Risk/Ambulatory with no VTE prophylaxis indicated  Wilson Catheter: Not present  Fluids: PO  Lines: Peripheral IV   Cardiac Monitoring: None  Code Status: Full Code      Clinically Significant Risk Factors              # Hypoalbuminemia: Lowest albumin = 2 g/dL at 9/24/2023  8:46 AM, will monitor as appropriate      # Acute Kidney Injury, unspecified: based on a >150% or 0.3 mg/dL increase in last creatinine compared to past 90 day average, will monitor renal function                   Disposition Plan      Expected Discharge Date:  09/27/2023        Discharge Comments: transfer to [facility], medically stable/cleared     The patient's care was discussed with the Attending Physician, Dr. Scott.    Gonzalez Reno MD  [facility]  [facility]  Securely message with [employer] (more info)  Text page via [facility] Paging/Directory   See signed in provider for up to date coverage information  ______________________________________________________________________    Interval History   Overnight:  No acute overnight event.     Subjective:  More awake this morning, briefly answer questions but then fall asleep mid-statement. She did sit up on the side of the bed and was able to drink some water. No BM overnight. Currently on loperamide. Voids spontaneously without difficulty. Stable on RA, SpO2 91%. She reports no cough, abdominal pain, or shakiness. Awaiting transfer to psychiatric unit.       Physical Exam   Vital Signs: Temp: 98.6  F (37.3  C) Temp src: Oral BP: (!) 143/60 Pulse: 100   Resp: 15 SpO2: 91 % O2 Device: None (Room air)    Weight: 233 lbs 4.00 oz    Constitutional: Somnolent, cooperative, no apparent distress, and appears stated age  Respiratory: No increased work of breathing, good air exchange, clear to auscultation bilaterally, no crackles or wheezing in upper lobes.   Cardiovascular: Normal apical impulse, regular rate and rhythm, normal S1 and S2. Extremities warm.   GI: No scars, normal bowel sounds, soft, non-distended, diffusely tender to palpation without rebound or guarding.   MSK: Non pitting edema of b/l lower extremity, improve with compression stocking  Neurologic: Lethargic. Briefly answers questions with assistance of Citizen of [place] , and then closes her eyes and appears to fall asleep mid-statement.   Neuropsychiatric: Difficult to assess given somnolence. At times responding to internal stimuli during this exam.       Medical Decision Making       Please see  A&P for additional details of medical decision making.      Data     I have personally reviewed the following data over the past 24 hrs:    Procal: N/A CRP: N/A Lactic Acid: 0.8

## 2023-09-29 NOTE — PROGRESS NOTES
[facility]   BRIEF PROGRESS NOTE    SUBJECTIVE    Messaged by nurse that family was at bedside and requesting an update. Went to room, and patient was lying in bed. She reports worsening LUQ abdominal pain over the past 4 days, also has chest pain and cough. She is very fatigued and has no appetite. Says her abdominal pain slightly worsens when she eats, but hasn't eaten hardly anything, just few sips of ensure per sitter.     Sitter and patient's mother report that patient had 2 episodes of dark black watery stool today. No bright red blood.       OBJECTIVE:  BP (!) 143/60 (BP Location: Right arm)   Pulse 100   Temp 98.6  F (37  C) (Oral)   Resp 15   Wt 105.8 kg (233 lb 4 oz)   SpO2 91%   BMI 34.44 kg/m      Exam:   General: sedated, intermittently responds to questions  Skin: Warm and dry, no abnormalities noted.  Eyes: Extra-ocular muscles intact, pupils equal and reactive.  ENT: does not fully articulate words, congestion, upper respiratory snoring  CV: No cyanosis or pallor, warm and well perfused.  Respiratory: No respiratory distress, no accessory muscle use, breath sounds throughout, crackles at right base  Abdomen: nondistended, minimal bowel sounds,   Neuro/psych: answers questions appropriately, but nods off, and only intermittently answers questions  Extremities: Warm, able to move all four extremities at will. Bilateral peripheral edema in lower extremities, pitting      ASSESSMENT/PLAN:    #Chest pain  #LUQ  #Diarrhea  #SIRS (HR, WBC)  Patient s/p completion of IV zosyn for possible hospital acquired pneumonia. She continues to have leukocytosis and tachycardia without source of infection. Lactate yesterday and day prior have been normal. Has been having LUQ abdominal pain and today had two episodes of black diarrhea. Not meeting criteria for testing C diff. Abdominal pain and diarrhea could all be from the metformin, which was started on 9/27. Tachycardia, leukocytosis, and  drowsiness could be from clozapine. Lipase on 9/24 was mildly elevated to 177, not meeting criteria for pancreatitis. Will recheck today along with CMP for hepatobiliary pathology. Checking troponin and ECG to rule out ACS and assess QT. CXR for chest pain and RLL crackles, although suspect atelectasis as etiology of her cough.  - repeat CXR  - repeat lipase  - CBC, CMP  - troponin, ECG  - fecal occult blood    Please see daily rounding note for full A/P.  Diana Paula MD  3:15 PM

## 2023-09-29 NOTE — PLAN OF CARE
Restless at night -did not sleep. On 1;1 for safety.  Productive cough.   L PIV saline locked.  Independent in room -Steady gait. ambulates independently to bathroom.    Continent of bowel and bladder. Voids spontaneously without difficulty. LBM 9/28.   Takes meds whole

## 2023-09-30 ENCOUNTER — TELEPHONE (OUTPATIENT)
Dept: BEHAVIORAL HEALTH | Facility: CLINIC | Age: 58
End: 2023-09-30
Payer: COMMERCIAL

## 2023-09-30 LAB
GLUCOSE BLDC GLUCOMTR-MCNC: 265 MG/DL (ref 70–99)
GLUCOSE BLDC GLUCOMTR-MCNC: 289 MG/DL (ref 70–99)
GLUCOSE BLDC GLUCOMTR-MCNC: 336 MG/DL (ref 70–99)
LACTATE SERPL-SCNC: 0.9 MMOL/L (ref 0.7–2)
VALPROATE SERPL-MCNC: 40.1 UG/ML

## 2023-09-30 PROCEDURE — 250N000012 HC RX MED GY IP 250 OP 636 PS 637: Performed by: STUDENT IN AN ORGANIZED HEALTH CARE EDUCATION/TRAINING PROGRAM

## 2023-09-30 PROCEDURE — 36415 COLL VENOUS BLD VENIPUNCTURE: CPT | Performed by: STUDENT IN AN ORGANIZED HEALTH CARE EDUCATION/TRAINING PROGRAM

## 2023-09-30 PROCEDURE — 250N000013 HC RX MED GY IP 250 OP 250 PS 637: Performed by: STUDENT IN AN ORGANIZED HEALTH CARE EDUCATION/TRAINING PROGRAM

## 2023-09-30 PROCEDURE — 36415 COLL VENOUS BLD VENIPUNCTURE: CPT | Performed by: PSYCHIATRY & NEUROLOGY

## 2023-09-30 PROCEDURE — 80164 ASSAY DIPROPYLACETIC ACD TOT: CPT | Performed by: PSYCHIATRY & NEUROLOGY

## 2023-09-30 PROCEDURE — 250N000013 HC RX MED GY IP 250 OP 250 PS 637: Performed by: PSYCHIATRY & NEUROLOGY

## 2023-09-30 PROCEDURE — 99232 SBSQ HOSP IP/OBS MODERATE 35: CPT | Mod: GC | Performed by: STUDENT IN AN ORGANIZED HEALTH CARE EDUCATION/TRAINING PROGRAM

## 2023-09-30 PROCEDURE — 250N000013 HC RX MED GY IP 250 OP 250 PS 637

## 2023-09-30 PROCEDURE — 83605 ASSAY OF LACTIC ACID: CPT | Performed by: STUDENT IN AN ORGANIZED HEALTH CARE EDUCATION/TRAINING PROGRAM

## 2023-09-30 PROCEDURE — 120N000002 HC R&B MED SURG/OB UMMC

## 2023-09-30 RX ORDER — HYDROCHLOROTHIAZIDE 25 MG/1
25 TABLET ORAL DAILY
Status: DISCONTINUED | OUTPATIENT
Start: 2023-09-30 | End: 2023-10-18

## 2023-09-30 RX ORDER — NICOTINE POLACRILEX 4 MG
15-30 LOZENGE BUCCAL
Status: DISCONTINUED | OUTPATIENT
Start: 2023-09-30 | End: 2023-10-19 | Stop reason: HOSPADM

## 2023-09-30 RX ORDER — DEXTROSE MONOHYDRATE 25 G/50ML
25-50 INJECTION, SOLUTION INTRAVENOUS
Status: DISCONTINUED | OUTPATIENT
Start: 2023-09-30 | End: 2023-10-19 | Stop reason: HOSPADM

## 2023-09-30 RX ADMIN — CLOZAPINE 200 MG: 200 TABLET ORAL at 22:09

## 2023-09-30 RX ADMIN — DIVALPROEX SODIUM 500 MG: 125 CAPSULE, COATED PELLETS ORAL at 11:01

## 2023-09-30 RX ADMIN — DIVALPROEX SODIUM 500 MG: 125 CAPSULE, COATED PELLETS ORAL at 19:55

## 2023-09-30 RX ADMIN — Medication 3 MG: at 22:09

## 2023-09-30 RX ADMIN — FAMOTIDINE 10 MG: 10 TABLET ORAL at 11:01

## 2023-09-30 RX ADMIN — INSULIN ASPART 2 UNITS: 100 INJECTION, SOLUTION INTRAVENOUS; SUBCUTANEOUS at 17:53

## 2023-09-30 RX ADMIN — Medication 3 MG: at 02:34

## 2023-09-30 RX ADMIN — HYDROCHLOROTHIAZIDE 25 MG: 25 TABLET ORAL at 11:37

## 2023-09-30 RX ADMIN — INSULIN ASPART 2 UNITS: 100 INJECTION, SOLUTION INTRAVENOUS; SUBCUTANEOUS at 11:38

## 2023-09-30 ASSESSMENT — ACTIVITIES OF DAILY LIVING (ADL)
ADLS_ACUITY_SCORE: 25

## 2023-09-30 NOTE — TELEPHONE ENCOUNTER
[facility] ONLY:  R: [place] [facility] Access Inpatient Bed Call Log 9/30/23 @ 2:00am    [facility]: @ capacity for MH beds.     Pt remains on waitlist pending appropriate placement availability

## 2023-09-30 NOTE — PLAN OF CARE
End of shift Summary: See flowsheet for VS and detail assessments.     Changes this Shift: Intermittent confusion, disoriented to time. Denied chest pain to RN but endorses to family and provider, cardiac workup done. Xrays completed. Nauseous, zofran x1. Stool sample unable to be collected this shift, passed to overnight RN. Poor appetite. L PIV SL. 1:1 sitter maintained for safety. Family at bedside. Needs made known.     Plan: Continue POC.

## 2023-09-30 NOTE — TELEPHONE ENCOUNTER
R: [place]  Access Inpatient Bed Call Log 9/30/23 7:30 AM   Intake has called facilities that have not updated the bed status within the last 12 hours.         ([facility] Only)                      [facility] is at capacity.                Pt remains on the work list pending appropriate bed availability.

## 2023-09-30 NOTE — PROGRESS NOTES
[facility]    Progress Note - [facility]       Date of Admission:  9/20/2023    Main plans for today:   - Medically stable for transfer to psychiatric unit, awaiting bed  - Remove PIV   - Stop metformin due to possible GI side effects  - Start BG checks, low intensity sliding scale insulin  - Start hydrochlorothiazide 25 mg daily    Assessment & Plan   Angela Basurto is a 58 year old female admitted on 9/20/2023. She is a 58 year old female with bipolar 1 who was admitted to psychiatry on 8/16/23 for elvis and psychosis in the context of medication nonadherence. On 9/18 she developed orthostatic hypotension, fever w/ lactate 4.3, and was admitted to [facility] service for sepsis 2/2 presumed hospital acquired pneumonia, now resolved/treated. Patient is medically stable and ready for transfer back to psychiatric unit. Currently, Adolfo, full commitment, 1:1     # Bipolar I disorder  # Hx of possible seizure in 1983   # Adolfo: Haldol, clozapine, risperidone, olanzapine  # Full commitment   Presented with elvsi and psychosis in the context of medication nonadherence. Was admitted with psychiatry from 8/16 until transfer to this service 9/20. She has required a 1:1 to maintain her safety. Upon admission, her home meds were restarted, including zyprexa, depakote, and haldol. Initiated clozapine 8/31. Zyprexa stopped on 9/8 due to lack fo benefit and mild sedation. Achieved dose of 300 mg of clozapine daily on 9/11 with improvement in psychosis noted. Due to orthostatic hypotension, constipation, and sedation, reduced clozapine to 200 mg on 9/17, stopped Haldol on 9/16, and held clozapine 9/18-9/20. Restarted Clozapine at 100 mg on 9/20. Psychiatry is following closely and managing her psychiatric medications. Dizziness has continue to improve while on clozapine. Awaiting transfer to  Psychiatric unit.  - See psychiatry note for details of  "medication titrations  - Precautions: 1:1 sitter, restrict to unit, precautions in place for assault, cheeking, elopement, falls  - Legal status: Mata and full commitment: A petition for MI commitment with Mata was filed on 8/18 through [facility].    - Depakote DT sprinkles 500 mg BID   - Clozapine 200 mg qHS  - PRN for agitation:    - If soft agitation/\"anxiety, use PRN Haldol 2 mg PO  - EMERGENCY for agitation: Haldol 5mg + diphenhydramine 50mg q8h PRN, PO or IM  - STOP hydroxyzine per psych (see 9/29 note)    # Hyperglycemia   # Pre-diabetes (A1c 09/27/23, 6.2)  # Loose stools  Asymptomatic hyperglycemia on metabolic panels, possibly 2/2 to increase Clozapine 9/26. Repeat A1c of 6.2. Reported two episodes of \"dark black watery stool\" on 9/29 in the context of metformin start.   - Start monitoring with mealtime BG checks  - Low intensity sliding scale insulin  - Determine insulin needs based on BG data   - STOP Metformin  mg daily - monitor metabolic state/discuss treatment outpatient  - Hypoglycemia protocol     # Acute kidney injury, improving   Creatinine was 0.49 at the time of presentation for medical illness, gradually tasia to peak of 1.99 9/22, since then steadily decreasing. This likely represents a combination of pre-renal ETHAN due to poor PO intake in the setting of psychiatric illness and intra-renal process due to medications SE (Zosyn). Zosyn and Pepcid are ordered in accordance with renal dosing.   - BMP q72h  - repeat BMP 5-7 days after medicine discharge    # HTN  Had been admitted to this service in the setting of intermittent hypotension. And now she has asymptomatic hypertension. Given ongoing (though improving) ETHAN, will avoid ACEi and ARB.   - START hydrochlorothiazide 25 mg daily  - HOLD PTA clonidine per psych    # Orthostatic hypotension, resolved  Symptomatic 9/18 with blood pressure drop from 123/72 while laying to 96/55 with standing. Patient reported this was new since " admission and starting new medications, and has since improved. Patient initiated on clozapine on 8/31 with many dose changes. Somnolence and poor PO intake have been challenges during this admission, both likely contributing. Workups for endocrine and cardiac causes were negative.   - Encourage oral hydration   - Compression stockings  - Likely to continue to be tricky with clozapine dose changes    # Possible hospital-acquired pneumonia, resolved/treated  # Positive blood culture x1 - consistent with contaminant  # Severe sepsis, resolved  # Acute Hypoxic respiratory failure, resolved  # Leukocytosis, ongoing  Initially presented 9/19 from inpatient psych with temperature 101.3F, lactic acid 4.3 in the setting of new cough and widespread body aches. , WBC 9.8. 9/18 CXR with streaky right greater than left perihilar and basilar opacities, likely atelectasis and/or edema without focal consolidation. Respiratory viral panels (9/19, 9/22) have been negative. Echocardiogram not consistent with myocarditis. Briefly treated with vancomycin 9/19-9/23 due to a blood culture growing gram positive cocci; this was later determined to be Staph Hominis, suspected contaminant. Pulmonary exam without focal findings consistent with bacterial pneumonia, though patient had an ongoing cough and appears to have a significant burden of respiratory secretions seen on 9/24 (resolved). RT evaluated and recommended use of aerobika flutter valve. White count have been elevated although stable with still no clear focus of infection, could be clozapine side effect. Patient is medically stable to return to psychiatry unit.   - Infectious disease consulted, now off  - S/p piperacillin/tazobactam (9/20- 9/26)  - Respiratory therapist saw Angela and she tolerated vest therapy x1  - Discontinue vest therapy with improved breathing and reassuring physical examination   - Aerobika flutter valve   - CBC q72h    # Diarrhea- Resolved    #  Hypokalemia- Resolved  # Abdominal tenderness- Resolved  Increased stool frequency and loose stools, with 4 stools noted 9/22, 7 stools 9/23. Negative stool testing for C. Difficile. Patient denied blood in stools, pain with bowel movements. Likely due to ongoing antibiotic treatment.  Pacreatitis r/o with improved abdominal tenderness and lipase < 3x normal upper limits.  - Loperamide capsule 2 mg PRN  - RN-driven potassium replacement protocol - High     # Hypocalcemia  # Hypoalbuminemia  Suspect possible malnutrition due to poor PO intake.   - Encourage good PO intake   - Nutrition IP consult, appreciate recommendations               - Medical food supplement therapy  - Patient to consume % of nutritionally adequate meal trays TID, or the equivalent with supplements/snacks.     # [employer]ism  Important to her value system  - Spiritual care consult, met with Angela 9/20          Diet: Combination Diet Regular Diet; Safe Tray - with utensils  Snacks/Supplements Adult: Ensure Enlive; With Meals    DVT Prophylaxis: Low Risk/Ambulatory with no VTE prophylaxis indicated  Wilson Catheter: Not present  Fluids: PO  Lines: None     Cardiac Monitoring: None  Code Status: Full Code      Clinically Significant Risk Factors              # Hypoalbuminemia: Lowest albumin = 2 g/dL at 9/24/2023  8:46 AM, will monitor as appropriate                       Disposition Plan    Medically ready for transfer back to  psychiatry.     The patient's care was discussed with the Attending Physician, Dr. Scott .    Helen Zee MD  [facility]  [facility]  Securely message with [employer]) (more info)  Text page via [facility] Paging/Directory   See signed in provider for up to date coverage information  ______________________________________________________________________    Interval History   Overnight, was reported restless with increased BP. No labs needed/collected.  Clozapine again increased to 200 mg per psych.    At bedside, patient mumbling and difficult to understand. Does report pain when asked, and points to her left wrist where her PIV is.    Writer will update patient's family.    Physical Exam   Vital Signs: Temp: 98.8  F (37.1  C) Temp src: Oral BP: (!) 152/71 Pulse: 94   Resp: 16 SpO2: 98 % O2 Device: None (Room air)    Weight: 233 lbs 3.95 oz  Physical Exam  HENT:      Mouth/Throat:      Mouth: Mucous membranes are moist.   Cardiovascular:      Rate and Rhythm: Normal rate.   Pulmonary:      Effort: Pulmonary effort is normal.   Skin:     General: Skin is warm and dry.      Findings: No rash.   Neurological:      Mental Status: She is lethargic.      GCS: GCS eye subscore is 3. GCS verbal subscore is 2. GCS motor subscore is 6.      Comments: Per medical team who has been caring for her, she is at her baseline when she came to us         Medical Decision Making       Please see A&P for additional details of medical decision making.      Data   ------------------------- PAST 24 HR DATA REVIEWED -----------------------------------------------    I have personally reviewed the following data over the past 24 hrs:    17.4 (H)  \   9.4 (L)   / 277     139 105 9.4 /  265 (H)   3.8 26 1.27 (H) \     ALT: 13 AST: 10 AP: 96 TBILI: 0.2   ALB: 2.4 (L) TOT PROTEIN: 6.2 (L) LIPASE: 112 (H)     Trop: 9 BNP: N/A     Procal: N/A CRP: N/A Lactic Acid: 0.9       Imaging results reviewed over the past 24 hrs:   Recent Results (from the past 24 hour(s))   XR Chest Port 1 View    Narrative    Exam: XR CHEST PORT 1 VIEW, 9/29/2023 4:10 PM    Comparison: 9/22/2023    History: chest pain, crackles at right base, off antibiotics for  several days s/p treatment for possible HAP    Findings:  Portable AP view of the chest. Cardiac silhouette within normal  limits. Small right and trace left pleural effusions. No pneumothorax.  Mild bibasilar opacities. Low lung volumes.      Impression     Impression:   1. Similar low lung volumes with decreased pulmonary opacities, likely  atelectasis and/or mild edema.  2. Persistent small right and trace left pleural effusions, possibly  slightly decreased in size compared to 9/22/2023.    I have personally reviewed the examination and initial interpretation  and I agree with the findings.    DANIEL REYES MD         SYSTEM ID:  [id number]   XR Abdomen Port 1 View    Narrative    Exam: XR ABDOMEN PORT 1 VIEW, 9/29/2023 4:11 PM    Indication: decreased appetite, LUQ abdominal pain, assess for  constipation/obstruction    Comparison: None    Findings:   Supine portable abdominal radiographs. No air-filled distended loops  of bowel. Cholecystectomy clips. No acute osseous abnormalities.  Epigastric region not fully included.      Impression    Impression: Nonobstructive bowel gas pattern. No significant fecal  retention.    DANIEL REYES MD         SYSTEM ID:  [id number]

## 2023-09-30 NOTE — PLAN OF CARE
Pt is A&Ox4 but forgetful. Pt restless throughout the night, prn melatonin given.1:1 attendant at bedside. VSS. LS clear, on RA. Garbled speech. BS active, LBM on 9/29/2023. Voiding well. Pt up independently. L PIV is patent and SL. Continue to monitor.

## 2023-09-30 NOTE — PLAN OF CARE
Goal Outcome Evaluation:      Plan of Care Reviewed With: patient, family    Overall Patient Progress: improvingOverall Patient Progress: improving  VS: Vital signs:  Temp: 98.8  F (37.1  C) Temp src: Oral BP: (!) 152/71 Pulse: 94   Resp: 16 SpO2: 98 % O2 Device: None (Room air) Oxygen Delivery: 2 LPM   Weight: 105.8 kg (233 lb 4 oz)   O2: >90% on room air   Output: Voiding spontaneously in bathroom    Last BM: 9/29/23.unable to collect stool sample as no BM this shift    Activity: Up ad ascencion   Skin: Intact    Pain: Denies    CMS: Garbled speech, hard to assess orientation. Patient wanting to sleep most shift.    Dressing: NA   Diet: Regular, poor appetite. Patient family encouraged to bring in cultural food from home. Patient started on Start BG checks, low intensity sliding scale insulin     LDA: PIV discontinued    Equipment: Personal belongings, bedside attendant   Plan: Medically stable for transfer to psychiatric unit, awaiting bed    Additional Info:

## 2023-09-30 NOTE — PLAN OF CARE
Pt has triggered sepsis twice , but refusing lab draw for stat lactic acid despite writer  educating her on the significance of it . Stating she is tired and wants to rest.

## 2023-09-30 NOTE — PROGRESS NOTES
[facility]   BRIEF PROGRESS NOTE    SUBJECTIVE    Angela had been having high Bps as high as 190s/120s since this afternoon as well as rising WBC count; had been in 16s on arrival, down to 15s, now up to 17.4. Triggered sepsis at about 20:00 for tachycardia and leukocytosis. On exam, her mental status is waxing and waning as usual; she was oriented enough to tell me that she did not have a headache but otherwise her speech was difficult to comprehend and she said she was tired and appeared drowsy. RN said this is her baseline and said that Angela had endorsed abdominal pain for at least a few days but that had started before high BP readings. She has been tachycardic to low 100s and has been afebrile--both since before her high BP readings. Lactate normalized since high readings on admission; last value afternoon of 9/29 was 1.1. Lab attempted to draw another lactate but patient refused.     Of note, 5 med surg RN said that because another patient who requires IMC will be coming to their unit, Angela will be transferred to 5 ortho. 5 ortho charge expressed concern that their staff would not be able to adequately monitor Angela's high BP readings if we were to require increased vital sign frequency. She is currently not on 5 med surg for IMC; she is only there for bed availability reasons.        OBJECTIVE:  BP (!) 173/73   Pulse 104   Temp 98.3  F (36.8  C) (Oral)   Resp 16   Wt 105.8 kg (233 lb 4 oz)   SpO2 91%   BMI 34.44 kg/m      Exam:   General: Drowsy, in no acute distress  CV: Tachycardic, normal rhythm, no murmur appreciated  Respiratory: No respiratory distress, no accessory muscle use.  Neuro: waxing and waning mental status per RN which is her baseline, difficult to discern patient's orientation as her speech was difficult to understand,  Psychiatric: Mood and affect appear normal, states she is tired  Extremities: Warm, able to move all four extremities at will.    ASSESSMENT/PLAN:    Patient  with BP readings from 190s/120s since this afternoon with worsening leukocytosis to 17s--slight increase from 15s a few days ago. Drowsy, speech difficult to comprehend, waxing and waning mental status, difficult to discern her level of orientation, but RN said this is her baseline. Abdominal pain for several days present before high BP readings. Difficult to discern symptoms of end-organ damage from patient but per RN she had not been expressing any concern for those symptoms. Grossly moving all extremities, tachycardia on CV exam. Lactate wnl 9/29. Given reassuring exam that matches her baseline, low concern for end-organ damage, and absence of fever, feel that she is stable and can move to 5 ortho. Given that she is stable, will not draw lactate tonight so that she can sleep and will have day team reassess tomorrow.    Please see daily rounding note for full A/P.  Lionel Chung MD  10:10 PM

## 2023-10-01 ENCOUNTER — TELEPHONE (OUTPATIENT)
Dept: BEHAVIORAL HEALTH | Facility: CLINIC | Age: 58
End: 2023-10-01
Payer: COMMERCIAL

## 2023-10-01 LAB
ANION GAP SERPL CALCULATED.3IONS-SCNC: 13 MMOL/L (ref 7–15)
BASOPHILS # BLD AUTO: 0 10E3/UL (ref 0–0.2)
BASOPHILS NFR BLD AUTO: 0 %
BUN SERPL-MCNC: 11 MG/DL (ref 6–20)
CALCIUM SERPL-MCNC: 8.1 MG/DL (ref 8.6–10)
CHLORIDE SERPL-SCNC: 105 MMOL/L (ref 98–107)
CREAT SERPL-MCNC: 1.39 MG/DL (ref 0.51–0.95)
DEPRECATED HCO3 PLAS-SCNC: 23 MMOL/L (ref 22–29)
EGFRCR SERPLBLD CKD-EPI 2021: 44 ML/MIN/1.73M2
EOSINOPHIL # BLD AUTO: 0.2 10E3/UL (ref 0–0.7)
EOSINOPHIL NFR BLD AUTO: 1 %
ERYTHROCYTE [DISTWIDTH] IN BLOOD BY AUTOMATED COUNT: 14 % (ref 10–15)
GLUCOSE BLDC GLUCOMTR-MCNC: 233 MG/DL (ref 70–99)
GLUCOSE BLDC GLUCOMTR-MCNC: 254 MG/DL (ref 70–99)
GLUCOSE BLDC GLUCOMTR-MCNC: 269 MG/DL (ref 70–99)
GLUCOSE BLDC GLUCOMTR-MCNC: 282 MG/DL (ref 70–99)
GLUCOSE BLDC GLUCOMTR-MCNC: 284 MG/DL (ref 70–99)
GLUCOSE SERPL-MCNC: 300 MG/DL (ref 70–99)
HCT VFR BLD AUTO: 27.2 % (ref 35–47)
HGB BLD-MCNC: 8.8 G/DL (ref 11.7–15.7)
IMM GRANULOCYTES # BLD: 0.5 10E3/UL
IMM GRANULOCYTES NFR BLD: 3 %
LYMPHOCYTES # BLD AUTO: 1.8 10E3/UL (ref 0.8–5.3)
LYMPHOCYTES NFR BLD AUTO: 10 %
MCH RBC QN AUTO: 30.3 PG (ref 26.5–33)
MCHC RBC AUTO-ENTMCNC: 32.4 G/DL (ref 31.5–36.5)
MCV RBC AUTO: 94 FL (ref 78–100)
MONOCYTES # BLD AUTO: 1.8 10E3/UL (ref 0–1.3)
MONOCYTES NFR BLD AUTO: 10 %
NEUTROPHILS # BLD AUTO: 14 10E3/UL (ref 1.6–8.3)
NEUTROPHILS NFR BLD AUTO: 76 %
NRBC # BLD AUTO: 0 10E3/UL
NRBC BLD AUTO-RTO: 0 /100
PLATELET # BLD AUTO: 244 10E3/UL (ref 150–450)
POTASSIUM SERPL-SCNC: 3.4 MMOL/L (ref 3.4–5.3)
RBC # BLD AUTO: 2.9 10E6/UL (ref 3.8–5.2)
SODIUM SERPL-SCNC: 141 MMOL/L (ref 135–145)
WBC # BLD AUTO: 18.4 10E3/UL (ref 4–11)

## 2023-10-01 PROCEDURE — 99232 SBSQ HOSP IP/OBS MODERATE 35: CPT | Mod: GC | Performed by: STUDENT IN AN ORGANIZED HEALTH CARE EDUCATION/TRAINING PROGRAM

## 2023-10-01 PROCEDURE — 250N000013 HC RX MED GY IP 250 OP 250 PS 637: Performed by: STUDENT IN AN ORGANIZED HEALTH CARE EDUCATION/TRAINING PROGRAM

## 2023-10-01 PROCEDURE — 84100 ASSAY OF PHOSPHORUS: CPT | Performed by: STUDENT IN AN ORGANIZED HEALTH CARE EDUCATION/TRAINING PROGRAM

## 2023-10-01 PROCEDURE — 83735 ASSAY OF MAGNESIUM: CPT | Performed by: STUDENT IN AN ORGANIZED HEALTH CARE EDUCATION/TRAINING PROGRAM

## 2023-10-01 PROCEDURE — 85025 COMPLETE CBC W/AUTO DIFF WBC: CPT | Performed by: STUDENT IN AN ORGANIZED HEALTH CARE EDUCATION/TRAINING PROGRAM

## 2023-10-01 PROCEDURE — 36415 COLL VENOUS BLD VENIPUNCTURE: CPT

## 2023-10-01 PROCEDURE — 250N000013 HC RX MED GY IP 250 OP 250 PS 637

## 2023-10-01 PROCEDURE — 120N000002 HC R&B MED SURG/OB UMMC

## 2023-10-01 PROCEDURE — 99233 SBSQ HOSP IP/OBS HIGH 50: CPT

## 2023-10-01 PROCEDURE — 36415 COLL VENOUS BLD VENIPUNCTURE: CPT | Performed by: STUDENT IN AN ORGANIZED HEALTH CARE EDUCATION/TRAINING PROGRAM

## 2023-10-01 PROCEDURE — 80159 DRUG ASSAY CLOZAPINE: CPT

## 2023-10-01 PROCEDURE — 80048 BASIC METABOLIC PNL TOTAL CA: CPT | Performed by: STUDENT IN AN ORGANIZED HEALTH CARE EDUCATION/TRAINING PROGRAM

## 2023-10-01 PROCEDURE — 82010 KETONE BODYS QUAN: CPT | Performed by: STUDENT IN AN ORGANIZED HEALTH CARE EDUCATION/TRAINING PROGRAM

## 2023-10-01 RX ADMIN — CLOZAPINE 175 MG: 100 TABLET ORAL at 19:55

## 2023-10-01 RX ADMIN — FAMOTIDINE 10 MG: 10 TABLET ORAL at 07:47

## 2023-10-01 RX ADMIN — DIVALPROEX SODIUM 500 MG: 125 CAPSULE, COATED PELLETS ORAL at 07:47

## 2023-10-01 RX ADMIN — Medication 3 MG: at 21:23

## 2023-10-01 RX ADMIN — INSULIN ASPART 1 UNITS: 100 INJECTION, SOLUTION INTRAVENOUS; SUBCUTANEOUS at 12:11

## 2023-10-01 RX ADMIN — HYDROCHLOROTHIAZIDE 25 MG: 25 TABLET ORAL at 07:47

## 2023-10-01 RX ADMIN — INSULIN ASPART 2 UNITS: 100 INJECTION, SOLUTION INTRAVENOUS; SUBCUTANEOUS at 07:47

## 2023-10-01 RX ADMIN — INSULIN ASPART 2 UNITS: 100 INJECTION, SOLUTION INTRAVENOUS; SUBCUTANEOUS at 17:19

## 2023-10-01 RX ADMIN — DIVALPROEX SODIUM 500 MG: 125 CAPSULE, COATED PELLETS ORAL at 19:50

## 2023-10-01 ASSESSMENT — ACTIVITIES OF DAILY LIVING (ADL)
ADLS_ACUITY_SCORE: 25

## 2023-10-01 NOTE — PLAN OF CARE
Goal Outcome Evaluation:      Plan of Care Reviewed With: patient, family    Overall Patient Progress: improvingOverall Patient Progress: improving       VS: /50 (BP Location: Left arm)   Pulse 106   Temp 98.8  F (37.1  C) (Oral)   Resp 16   Wt 105.8 kg (233 lb 4 oz)   SpO2 90%   BMI 34.44 kg/m     O2: >90% on room air.    Output: Voiding spontaneously in bathroom    Last BM: 10/01/23   Activity: SBA x1    Skin: Intact    Pain: Denies    CMS: Alert and oriented x3. Garbled speech.    Dressing: NA   Diet: Regular diet, poor appetite. BG before each meal, sliding scale insulin as ordered    LDA: No IV access    Equipment: Personal belongings, bedside attendant, call light within patient reach    Plan: Medically stable for transfer to psychiatric unit, awaiting bed    Additional Info:  patient family visited this afternoon and assisted patient with a shower and ambulated patient in hallway.

## 2023-10-01 NOTE — TELEPHONE ENCOUNTER
R: MN  Access Inpatient Bed Call Log 10/1/23 8:40 AM    Intake has called facilities that have not updated the bed status within the last 12 hours.                             [facility] is at capacity.                 Pt remains on the work list pending appropriate bed availability.

## 2023-10-01 NOTE — PLAN OF CARE
Pt is A&Ox4 but forgetful.1:1 attendant at bedside. VSS. LS clear, on RA. Garbled speech. BS active, LBM on 9/29/2023. Voiding well. Pt up independently. L PIV is patent and SL. Continue to monitor
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
## 2023-10-01 NOTE — PROGRESS NOTES
[facility]    Progress Note - [facility] Service       Date of Admission:  9/20/2023    Main plans for today:   - Medically stable for transfer to psychiatric unit, awaiting bed  - Low valproic acid levels, will page Psych   - Pt care: encouraging sitting in chair or up ~6 hours a day and walk with nurse 1:1 4x daily    Assessment & Plan   Angela Basurto is a 58 year old female admitted on 9/20/2023. She is a 58 year old female with bipolar 1 who was admitted to psychiatry on 8/16/23 for elvis and psychosis in the context of medication nonadherence. On 9/18 she developed orthostatic hypotension, fever w/ lactate 4.3, and was admitted to [facility] service for sepsis 2/2 presumed hospital acquired pneumonia, now resolved/treated. Patient is medically stable and ready for transfer back to psychiatric unit. Currently, Adolfo, full commitment, 1:1     # Bipolar I disorder  # Hx of possible seizure in 1983   # Mata: Haldol, clozapine, risperidone, olanzapine  # Full commitment   Presented with elvis and psychosis in the context of medication nonadherence. Was admitted with psychiatry from 8/16 until transfer to this service 9/20. She has required a 1:1 to maintain her safety. Upon admission, her home meds were restarted, including zyprexa, depakote, and haldol. Initiated clozapine 8/31. Zyprexa stopped on 9/8 due to lack fo benefit and mild sedation. Achieved dose of 300 mg of clozapine daily on 9/11 with improvement in psychosis noted. Due to orthostatic hypotension, constipation, and sedation, reduced clozapine to 200 mg on 9/17, stopped Haldol on 9/16, and held clozapine 9/18-9/20. Restarted Clozapine at 100 mg on 9/20. Psychiatry is following closely and managing her psychiatric medications. Dizziness has continue to improve while on clozapine. Valproic acid low at 40.1. Will page psychiatry for further evaluation. Awaiting transfer to   "Psychiatric unit.  - See psychiatry note for details of medication titrations  - Precautions: 1:1 sitter, restrict to unit, precautions in place for assault, cheeking, elopement, falls  - Legal status: Mata and full commitment: A petition for MI commitment with Adolfo was filed on 8/18 through [facility].    - Depakote DT sprinkles 500 mg BID   - Clozapine 200 mg at bedtime  - Pt care:   - encouraging sitting in chair or up ~6 hours a day  - walk with nurse 1:1 4x daily  - PRN for agitation:    - If soft agitation/\"anxiety, use PRN Haldol 2 mg PO  - EMERGENCY for agitation: Haldol 5mg + diphenhydramine 50mg q8h PRN, PO or IM  - STOP hydroxyzine per psych (see 9/29 note)    # Hyperglycemia   # Pre-diabetes (A1c 09/27/23, 6.2)  # Loose stools  Asymptomatic hyperglycemia on metabolic panels, possibly 2/2 to increase Clozapine 9/26 and 9/29. Repeat A1c of 6.2. Another episodes of \" black watery stool\" on 10/01, likely due to starting Metformin, discontinue on 9/30. On low intensity sliding scale.   - Start monitoring with mealtime BG checks  - Low intensity sliding scale insulin  - Determine insulin needs based on BG data   - STOP Metformin  mg daily - monitor metabolic state/discuss treatment outpatient  - Hypoglycemia protocol     # Acute kidney injury, improving   Creatinine was 0.49 at the time of presentation for medical illness, gradually tasia to peak of 1.99 9/22, since then steadily decreasing. This likely represents a combination of pre-renal ETHAN due to poor PO intake in the setting of psychiatric illness and intra-renal process due to medications SE (Zosyn). Zosyn and Pepcid are ordered in accordance with renal dosing.   - BMP q72h  - repeat BMP 5-7 days after medicine discharge    # HTN  Had been admitted to this service in the setting of intermittent hypotension. And now she has asymptomatic hypertension. Given ongoing (though improving) ETHAN, will avoid ACEi and ARB.   - Hydrochlorothiazide 25 " mg daily  - HOLD PTA clonidine per psych    # Orthostatic hypotension, resolved  Symptomatic 9/18 with blood pressure drop from 123/72 while laying to 96/55 with standing. Patient reported this was new since admission and starting new medications, and has since improved. Patient initiated on clozapine on 8/31 with many dose changes. Somnolence and poor PO intake have been challenges during this admission, both likely contributing. Workups for endocrine and cardiac causes were negative.   - Encourage oral hydration   - Compression stockings  - Likely to continue to be tricky with clozapine dose changes    # Possible hospital-acquired pneumonia, resolved/treated  # Positive blood culture x1 - consistent with contaminant  # Severe sepsis, resolved  # Acute Hypoxic respiratory failure, resolved  # Leukocytosis, ongoing  Initially presented 9/19 from inpatient psych with temperature 101.3F, lactic acid 4.3 in the setting of new cough and widespread body aches. , WBC 9.8. 9/18 CXR with streaky right greater than left perihilar and basilar opacities, likely atelectasis and/or edema without focal consolidation. Respiratory viral panels (9/19, 9/22) have been negative. Echocardiogram not consistent with myocarditis. Briefly treated with vancomycin 9/19-9/23 due to a blood culture growing gram positive cocci; this was later determined to be Staph Hominis, suspected contaminant. Pulmonary exam without focal findings consistent with bacterial pneumonia, though patient had an ongoing cough and appears to have a significant burden of respiratory secretions seen on 9/24 (resolved). RT evaluated and recommended use of aerobika flutter valve. White count have been elevated although stable with still no clear focus of infection, could be clozapine side effect. Patient is medically stable to return to psychiatry unit.   - Infectious disease consulted, now off  - S/p piperacillin/tazobactam (9/20- 9/26)  - Respiratory therapist  saw Angela and she tolerated vest therapy x1  - Discontinue vest therapy with improved breathing and reassuring physical examination   - Aerobika flutter valve   - CBC q72h    # Diarrhea- Resolved    # Hypokalemia- Resolved  # Abdominal tenderness  Increased stool frequency and loose stools, with 4 stools noted 9/22, 7 stools 9/23. Negative stool testing for C. Difficile. Patient denied blood in stools, pain with bowel movements. Likely due to ongoing antibiotic treatment. Patient report intermittent abdominal tenderness, unlikely pancreatitis with location abdominal tenderness and lipase x2 < 3x normal upper limits. Low concern for bowel obstruction with abdominal xray revealing nonobstructive bowel gas pattern and no significant fecal retention.   - Loperamide capsule 2 mg PRN  - RN-driven potassium replacement protocol - High     # Hypocalcemia  # Hypoalbuminemia  Suspect possible malnutrition due to poor PO intake.   - Encourage good PO intake   - Nutrition IP consult, appreciate recommendations               - Medical food supplement therapy  - Patient to consume % of nutritionally adequate meal trays TID, or the equivalent with supplements/snacks.     # Ep[employer]opalian  Important to her value system  - Spiritual care consult, met with Angela 9/20          Diet: Combination Diet Regular Diet; Safe Tray - with utensils  Snacks/Supplements Adult: Ensure Enlive; With Meals    DVT Prophylaxis: Low Risk/Ambulatory with no VTE prophylaxis indicated  Wilson Catheter: Not present  Fluids: PO  Lines: None     Cardiac Monitoring: None  Code Status: Full Code      Clinically Significant Risk Factors              # Hypoalbuminemia: Lowest albumin = 2 g/dL at 9/24/2023  8:46 AM, will monitor as appropriate                       Disposition Plan    Medically ready for transfer back to  psychiatry.     The patient's care was discussed with the Attending Physician, Dr. Scott .    Gonzalez Reno MD  [facility] Medicine  [facility]  Securely message with [employer] (more info)  Text page via [employer] Paging/Directory   See signed in provider for up to date coverage information  ______________________________________________________________________    Interval History   Overnight:  Sitter continues to report patient being restless. Clozapine 200 mg per psych.    At bedside, patient mumbling and very difficult for  to understand. When ask about pain, she points to to RUQ but states it is improving. Denies chest pain, difficulty breathing, or cough.       Physical Exam   Vital Signs: Temp: 99  F (37.2  C) Temp src: Oral BP: 139/50 Pulse: 106   Resp: 16 SpO2: 90 % O2 Device: None (Room air)    Weight: 233 lbs 3.95 oz    Constitutional: Somnolent, no apparent distress  Respiratory: No increased work of breathing, good air exchange, clear to auscultation bilaterally, no crackles or wheezing  Cardiovascular: Normal apical impulse, regular rate and rhythm, normal S1 and S2. Extremities warm.   GI: mild RUQ tenderness to palpation  MSK: Non pitting edema of b/l lower extremity, improve with compression stocking  Neurologic: Lethargic. Continues to briefly answers questions with assistance of Serbian , and then closes her eyes and appears to fall asleep mid-statement.   Neuropsychiatric: Difficult to assess given somnolence.     Medical Decision Making       Please see A&P for additional details of medical decision making.      Data   ------------------------- PAST 24 HR DATA REVIEWED -----------------------------------------------    I have personally reviewed the following data over the past 24 hrs:    18.4 (H)  \   8.8 (L)   / 244     141 105 11.0 /  282 (H)   3.4 23 1.39 (H) \     Procal: N/A CRP: N/A Lactic Acid: 0.9       Imaging results reviewed over the past 24 hrs:   No results found for this or any previous visit (from the past 24 hour(s)).

## 2023-10-01 NOTE — TELEPHONE ENCOUNTER
[facility] ONLY:  R: [place] [facility] Access Inpatient Bed Call Log 10/1/23 @ 2:00am    [facility]: @ capacity for MH beds.     Pt remains on waitlist pending appropriate placement availability

## 2023-10-02 ENCOUNTER — TELEPHONE (OUTPATIENT)
Dept: BEHAVIORAL HEALTH | Facility: CLINIC | Age: 58
End: 2023-10-02
Payer: COMMERCIAL

## 2023-10-02 LAB
ANION GAP SERPL CALCULATED.3IONS-SCNC: 12 MMOL/L (ref 7–15)
B-OH-BUTYR SERPL-SCNC: 0.6 MMOL/L
BUN SERPL-MCNC: 15 MG/DL (ref 6–20)
CALCIUM SERPL-MCNC: 8 MG/DL (ref 8.6–10)
CHLORIDE SERPL-SCNC: 104 MMOL/L (ref 98–107)
CREAT SERPL-MCNC: 1.41 MG/DL (ref 0.51–0.95)
CREAT UR-MCNC: 99.6 MG/DL
CRP SERPL-MCNC: 145.78 MG/L
DEPRECATED HCO3 PLAS-SCNC: 24 MMOL/L (ref 22–29)
EGFRCR SERPLBLD CKD-EPI 2021: 44 ML/MIN/1.73M2
FRACT EXCRET NA UR+SERPL-RTO: 0.5 %
GLUCOSE BLDC GLUCOMTR-MCNC: 273 MG/DL (ref 70–99)
GLUCOSE BLDC GLUCOMTR-MCNC: 292 MG/DL (ref 70–99)
GLUCOSE BLDC GLUCOMTR-MCNC: 298 MG/DL (ref 70–99)
GLUCOSE BLDC GLUCOMTR-MCNC: 311 MG/DL (ref 70–99)
GLUCOSE BLDC GLUCOMTR-MCNC: 330 MG/DL (ref 70–99)
GLUCOSE SERPL-MCNC: 292 MG/DL (ref 70–99)
HEMOCCULT STL QL: POSITIVE
MAGNESIUM SERPL-MCNC: 1.7 MG/DL (ref 1.7–2.3)
MAGNESIUM SERPL-MCNC: 1.8 MG/DL (ref 1.7–2.3)
PHOSPHATE SERPL-MCNC: 4.1 MG/DL (ref 2.5–4.5)
PHOSPHATE SERPL-MCNC: 4.6 MG/DL (ref 2.5–4.5)
POTASSIUM SERPL-SCNC: 3.4 MMOL/L (ref 3.4–5.3)
POTASSIUM SERPL-SCNC: 3.4 MMOL/L (ref 3.4–5.3)
SODIUM SERPL-SCNC: 140 MMOL/L (ref 135–145)
SODIUM UR-SCNC: 54 MMOL/L
TSH SERPL DL<=0.005 MIU/L-ACNC: 0.94 UIU/ML (ref 0.3–4.2)

## 2023-10-02 PROCEDURE — 99233 SBSQ HOSP IP/OBS HIGH 50: CPT

## 2023-10-02 PROCEDURE — 250N000013 HC RX MED GY IP 250 OP 250 PS 637: Performed by: STUDENT IN AN ORGANIZED HEALTH CARE EDUCATION/TRAINING PROGRAM

## 2023-10-02 PROCEDURE — 83735 ASSAY OF MAGNESIUM: CPT | Performed by: STUDENT IN AN ORGANIZED HEALTH CARE EDUCATION/TRAINING PROGRAM

## 2023-10-02 PROCEDURE — 99232 SBSQ HOSP IP/OBS MODERATE 35: CPT | Mod: GC | Performed by: STUDENT IN AN ORGANIZED HEALTH CARE EDUCATION/TRAINING PROGRAM

## 2023-10-02 PROCEDURE — 36415 COLL VENOUS BLD VENIPUNCTURE: CPT | Performed by: STUDENT IN AN ORGANIZED HEALTH CARE EDUCATION/TRAINING PROGRAM

## 2023-10-02 PROCEDURE — 84100 ASSAY OF PHOSPHORUS: CPT | Performed by: STUDENT IN AN ORGANIZED HEALTH CARE EDUCATION/TRAINING PROGRAM

## 2023-10-02 PROCEDURE — 250N000012 HC RX MED GY IP 250 OP 636 PS 637: Performed by: STUDENT IN AN ORGANIZED HEALTH CARE EDUCATION/TRAINING PROGRAM

## 2023-10-02 PROCEDURE — 250N000013 HC RX MED GY IP 250 OP 250 PS 637

## 2023-10-02 PROCEDURE — 82272 OCCULT BLD FECES 1-3 TESTS: CPT | Performed by: STUDENT IN AN ORGANIZED HEALTH CARE EDUCATION/TRAINING PROGRAM

## 2023-10-02 PROCEDURE — 86140 C-REACTIVE PROTEIN: CPT

## 2023-10-02 PROCEDURE — 82947 ASSAY GLUCOSE BLOOD QUANT: CPT | Performed by: STUDENT IN AN ORGANIZED HEALTH CARE EDUCATION/TRAINING PROGRAM

## 2023-10-02 PROCEDURE — 84300 ASSAY OF URINE SODIUM: CPT | Performed by: STUDENT IN AN ORGANIZED HEALTH CARE EDUCATION/TRAINING PROGRAM

## 2023-10-02 PROCEDURE — 80048 BASIC METABOLIC PNL TOTAL CA: CPT | Performed by: STUDENT IN AN ORGANIZED HEALTH CARE EDUCATION/TRAINING PROGRAM

## 2023-10-02 PROCEDURE — 120N000002 HC R&B MED SURG/OB UMMC

## 2023-10-02 PROCEDURE — 84443 ASSAY THYROID STIM HORMONE: CPT

## 2023-10-02 RX ORDER — ONDANSETRON 4 MG/1
4 TABLET, FILM COATED ORAL EVERY 6 HOURS PRN
Status: DISCONTINUED | OUTPATIENT
Start: 2023-10-02 | End: 2023-10-02

## 2023-10-02 RX ORDER — ONDANSETRON 4 MG/1
4 TABLET, FILM COATED ORAL EVERY 6 HOURS PRN
Status: DISCONTINUED | OUTPATIENT
Start: 2023-10-02 | End: 2023-10-03

## 2023-10-02 RX ORDER — CALCIUM CARBONATE 500 MG/1
500 TABLET, CHEWABLE ORAL 3 TIMES DAILY PRN
Status: DISCONTINUED | OUTPATIENT
Start: 2023-10-02 | End: 2023-10-19 | Stop reason: HOSPADM

## 2023-10-02 RX ORDER — ONDANSETRON 4 MG/1
4 TABLET, ORALLY DISINTEGRATING ORAL EVERY 6 HOURS PRN
Status: DISCONTINUED | OUTPATIENT
Start: 2023-10-02 | End: 2023-10-03

## 2023-10-02 RX ORDER — PANTOPRAZOLE SODIUM 40 MG/1
40 TABLET, DELAYED RELEASE ORAL
Status: DISCONTINUED | OUTPATIENT
Start: 2023-10-03 | End: 2023-10-14

## 2023-10-02 RX ORDER — CLOZAPINE 25 MG/1
25 TABLET ORAL EVERY MORNING
Status: DISCONTINUED | OUTPATIENT
Start: 2023-10-03 | End: 2023-10-03

## 2023-10-02 RX ADMIN — INSULIN GLARGINE 2 UNITS: 100 INJECTION, SOLUTION SUBCUTANEOUS at 13:42

## 2023-10-02 RX ADMIN — CLOZAPINE 150 MG: 100 TABLET ORAL at 22:23

## 2023-10-02 RX ADMIN — FAMOTIDINE 10 MG: 10 TABLET ORAL at 07:58

## 2023-10-02 RX ADMIN — INSULIN ASPART 2 UNITS: 100 INJECTION, SOLUTION INTRAVENOUS; SUBCUTANEOUS at 13:42

## 2023-10-02 RX ADMIN — INSULIN ASPART 2 UNITS: 100 INJECTION, SOLUTION INTRAVENOUS; SUBCUTANEOUS at 17:56

## 2023-10-02 RX ADMIN — INSULIN ASPART 2 UNITS: 100 INJECTION, SOLUTION INTRAVENOUS; SUBCUTANEOUS at 07:58

## 2023-10-02 RX ADMIN — DIVALPROEX SODIUM 500 MG: 125 CAPSULE, COATED PELLETS ORAL at 19:54

## 2023-10-02 RX ADMIN — LOPERAMIDE HYDROCHLORIDE 2 MG: 2 CAPSULE ORAL at 03:52

## 2023-10-02 RX ADMIN — HYDROCHLOROTHIAZIDE 25 MG: 25 TABLET ORAL at 07:58

## 2023-10-02 RX ADMIN — DIVALPROEX SODIUM 500 MG: 125 CAPSULE, COATED PELLETS ORAL at 07:57

## 2023-10-02 ASSESSMENT — ACTIVITIES OF DAILY LIVING (ADL)
ADLS_ACUITY_SCORE: 29
ADLS_ACUITY_SCORE: 25
ADLS_ACUITY_SCORE: 27
ADLS_ACUITY_SCORE: 25
ADLS_ACUITY_SCORE: 27
ADLS_ACUITY_SCORE: 25
ADLS_ACUITY_SCORE: 27
ADLS_ACUITY_SCORE: 25
ADLS_ACUITY_SCORE: 25

## 2023-10-02 NOTE — PLAN OF CARE
Pt is A&Ox4 but forgetful.1:1 attendant at bedside. VSS. LS clear, on RA. Garbled speech. BS active, LBM on 10/2/2023, imodium given for diarrhea. Need stool sample. Voiding well. Pt up independently. L PIV is patent and SL. Continue to monitor
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
## 2023-10-02 NOTE — TELEPHONE ENCOUNTER
R: [place]  Access Inpatient Bed Call Log 10/2/23 7:30 AM    Intake has called facilities that have not updated the bed status within the last 12 hours.                               [facility] is at capacity.                Pt remains on the work list pending appropriate bed availability.

## 2023-10-02 NOTE — PROGRESS NOTES
[facility]    Progress Note - [facility]       Date of Admission:  9/20/2023    Main plans for today:   - Medically stable for transfer to psychiatric unit, awaiting bed  - Psychiatry decrease cozaril to split dosing of 25 mg qAM and 150 mg at bedtime   - Beta-hydroxybutyrate for ketosis   - FENA   - Lantus 2 units AM  - Zofran PRN  30 mins before meals   - TUMS  - Pantoprazole EC 40 mg tablets      Assessment & Plan   Angela Basurto is a 58 year old female admitted on 9/20/2023. She is a 58 year old female with bipolar 1 who was admitted to psychiatry on 8/16/23 for elvis and psychosis in the context of medication nonadherence. On 9/18 she developed orthostatic hypotension, fever w/ lactate 4.3, and was admitted to [facility] service for sepsis 2/2 presumed hospital acquired pneumonia, now resolved/treated. Patient is medically stable and ready for transfer back to psychiatric unit. Currently, Adolfo, full commitment, 1:1     # Bipolar I disorder  # Hx of possible seizure in 1983   # Adolfo: Haldol, clozapine, risperidone, olanzapine  # Full commitment   Presented with elvis and psychosis in the context of medication nonadherence. Was admitted with psychiatry from 8/16 until transfer to this service 9/20. She has required a 1:1 to maintain her safety. Upon admission, her home meds were restarted, including zyprexa, depakote, and haldol. Initiated clozapine 8/31. Zyprexa stopped on 9/8 due to lack fo benefit and mild sedation. Achieved dose of 300 mg of clozapine daily on 9/11 with improvement in psychosis noted. Due to orthostatic hypotension, constipation, and sedation, reduced clozapine to 200 mg on 9/17, stopped Haldol on 9/16, and held clozapine 9/18-9/20. Restarted Clozapine at 100 mg on 9/20. Psychiatry is following closely and managing her psychiatric medications. Valproic acid low at 40.1 but due to somnolence psych decided against  "increasing. Clozapine decrease to 175 mg overnight and change to split dosing 10/02 due to somnolence. Awaiting transfer to  Psychiatric unit.  - See psychiatry note for details of medication titrations  - Precautions: 1:1 sitter, restrict to unit, precautions in place for assault, cheeking, elopement, falls  - Legal status: Mata and full commitment: A petition for MI commitment with Mata was filed on 8/18 through [facility].    - Depakote DT sprinkles 500 mg BID   - Decrease Clozapine 175 mg- 25 mg qAM and 150 mg at bedtime due to somnolence   - Pt care:   - encouraging sitting in chair or up ~6 hours a day  - walk with nurse 1:1 4x daily  - PRN for agitation:    - If soft agitation/\"anxiety, use PRN Haldol 2 mg PO  - EMERGENCY for agitation: Haldol 5mg + diphenhydramine 50mg q8h PRN, PO or IM  - STOP hydroxyzine per psych (see 9/29 note)    # Hyperglycemia   # Pre-diabetes (A1c 09/27/23, 6.2)  # Loose stools  Asymptomatic hyperglycemia on metabolic panels, possibly 2/2 to increase Clozapine 9/26 and 9/29. Repeat A1c of 6.2. Another episodes of \" black watery stool\" on 10/02, likely due to starting Metformin, discontinue on 9/30. On low intensity sliding scale. Due to continue elevating BG, ordered Beta-hydroxybutyrate for ketosis. Elevated at 0.60. Likely antipsychotic induced hyperglycemia vs starvation ketosis. Low suspicion for DKA with her lack of symptoms and UA negative for ketones on recent UAs.  - Lantus 2 units AM   - Low intensity sliding scale insulin  - STOP Metformin  mg daily  - Hypoglycemia protocol     # Acute kidney injury, up trending   Creatinine was 0.49 at the time of presentation for medical illness, gradually tasia to peak of 1.99 9/22, since then steadily decreasing. This likely represents a combination of pre-renal ETHAN due to poor PO intake in the setting of psychiatric illness and intra-renal process due to medications SE (Zosyn). Zosyn and Pepcid are ordered in accordance " with renal dosing. Elevated creatinine at 1.39 10/01. No longer on antibiotics, likely due to poor PO intake and recent start of Hydrochlorothiazide. Will continue to monitor creatinine levels, if up trending will consider decreasing HTCZ or discontinue all together.   -FENA  - BMP q72h  - repeat BMP 5-7 days after medicine discharge    # HTN  Had been admitted to this service in the setting of intermittent hypotension. And now she has asymptomatic hypertension. Given ongoing (though improving) ETHAN, will avoid ACEi and ARB.   - Hydrochlorothiazide 25 mg daily  - HOLD PTA clonidine per psych    # Orthostatic hypotension, resolved  Symptomatic 9/18 with blood pressure drop from 123/72 while laying to 96/55 with standing. Patient reported this was new since admission and starting new medications, and has since improved. Patient initiated on clozapine on 8/31 with many dose changes. Somnolence and poor PO intake have been challenges during this admission, both likely contributing. Workups for endocrine and cardiac causes were negative.   - Encourage oral hydration   - Compression stockings  - Likely to continue to be tricky with clozapine dose changes    # Possible hospital-acquired pneumonia, resolved/treated  # Positive blood culture x1 - consistent with contaminant  # Severe sepsis, resolved  # Acute Hypoxic respiratory failure, resolved  # Leukocytosis, ongoing  Initially presented 9/19 from inpatient psych with temperature 101.3F, lactic acid 4.3 in the setting of new cough and widespread body aches. , WBC 9.8. 9/18 CXR with streaky right greater than left perihilar and basilar opacities, likely atelectasis and/or edema without focal consolidation. Respiratory viral panels (9/19, 9/22) have been negative. Echocardiogram not consistent with myocarditis. Briefly treated with vancomycin 9/19-9/23 due to a blood culture growing gram positive cocci; this was later determined to be Staph Hominis, suspected  contaminant. Pulmonary exam without focal findings consistent with bacterial pneumonia, though patient had an ongoing cough and appears to have a significant burden of respiratory secretions seen on 9/24 (resolved). RT evaluated and recommended use of aerobika flutter valve. White count have been elevated although stable with still no clear focus of infection, could be clozapine side effect. Patient is medically stable to return to psychiatry unit.   - Infectious disease consulted, now off  - S/p piperacillin/tazobactam (9/20- 9/26)  - Respiratory therapist saw Angela and she tolerated vest therapy x1  - Discontinue vest therapy with improved breathing and reassuring physical examination   - Aerobika flutter valve   - CBC q72h    # Diarrhea- Resolved    # Hypokalemia- Resolved  # Abdominal tenderness  Increased stool frequency and loose stools, with 4 stools noted 9/22, 7 stools 9/23. Negative stool testing for C. Difficile. Patient denied blood in stools, pain with bowel movements. Likely due to ongoing antibiotic treatment. Patient report intermittent abdominal tenderness, unlikely pancreatitis with location abdominal tenderness and lipase x2 < 3x normal upper limits. Low concern for bowel obstruction with abdominal xray revealing nonobstructive bowel gas pattern and no significant fecal retention.   - Loperamide capsule 2 mg PRN  - RN-driven potassium replacement protocol - High     # Hypocalcemia  # Hypoalbuminemia  Suspect possible malnutrition due to poor PO intake. Sitter has reported that Angela makes facial expression like, having difficulty with swallowing her food. Discuss with nurses and sitters about encouraging Angela to eat and drink  - Encourage good PO intake   - Pantoprazole EC 40 mg tablets  - DEBORAH  - Nutrition IP consult, appreciate recommendations               - Medical food supplement therapy  - Patient to consume % of nutritionally adequate meal trays TID, or the equivalent with  supplements/snacks.     # Jainism  Important to her value system  - Spiritual care consult, met with Angela 9/20          Diet: Combination Diet Regular Diet; Safe Tray - with utensils  Snacks/Supplements Adult: Ensure Enlive; With Meals    DVT Prophylaxis: Low Risk/Ambulatory with no VTE prophylaxis indicated  Wilson Catheter: Not present  Fluids: PO  Lines: None     Cardiac Monitoring: None  Code Status: Full Code      Clinically Significant Risk Factors              # Hypoalbuminemia: Lowest albumin = 2 g/dL at 9/24/2023  8:46 AM, will monitor as appropriate                       Disposition Plan    Medically ready for transfer back to [facility].     The patient's care was discussed with the Attending Physician, Dr. Scott .    Gonzalez Reno MD  [facility]  [facility]  Securely message with [facility] (more info)  Text page via [facility] Paging/Directory   See signed in provider for up to date coverage information  ______________________________________________________________________    Interval History   Overnight:  Sitter continues to report patient being restless. One loose stool overnight. Clozapine decrease to 175 mg due to somnolence per psych.    Subjective:   At bedside, patient mumbling and very difficult for  to understand. She is more somnolent than yesterday, unable to fully arouse. Could not assess pain due to somnolence.      Physical Exam   Vital Signs: Temp: 98.6  F (37  C) Temp src: Oral BP: (!) 156/67 Pulse: 103   Resp: 18 SpO2: 96 % O2 Device: None (Room air)    Weight: 233 lbs 3.95 oz    Constitutional: Somnolent, no apparent distress  Respiratory: No increased work of breathing, good air exchange, clear to auscultation bilaterally, no crackles or wheezing  Cardiovascular: Normal apical impulse, regular rate and rhythm, normal S1 and S2. Extremities warm.   MSK: Non pitting edema of b/l lower extremity, improve with  compression stocking  Neurologic: Lethargic.    Neuropsychiatric: Difficult to assess given somnolence.     Medical Decision Making       Please see A&P for additional details of medical decision making.      Data   ------------------------- PAST 24 HR DATA REVIEWED -----------------------------------------------    I have personally reviewed the following data over the past 24 hrs:    N/A  \   N/A   / N/A     140 104 15.0 /  298 (H)   3.4 24 1.41 (H) \     TSH: 0.94 T4: N/A A1C: N/A     Procal: N/A CRP: 145.78 (H) Lactic Acid: N/A       Imaging results reviewed over the past 24 hrs:   No results found for this or any previous visit (from the past 24 hour(s)).

## 2023-10-03 ENCOUNTER — TELEPHONE (OUTPATIENT)
Dept: BEHAVIORAL HEALTH | Facility: CLINIC | Age: 58
End: 2023-10-03
Payer: COMMERCIAL

## 2023-10-03 ENCOUNTER — APPOINTMENT (OUTPATIENT)
Dept: INTERPRETER SERVICES | Facility: CLINIC | Age: 58
DRG: 871 | End: 2023-10-03
Attending: FAMILY MEDICINE
Payer: COMMERCIAL

## 2023-10-03 LAB
ANION GAP SERPL CALCULATED.3IONS-SCNC: 15 MMOL/L (ref 7–15)
B-OH-BUTYR SERPL-SCNC: <0.18 MMOL/L
BUN SERPL-MCNC: 17.8 MG/DL (ref 6–20)
CALCIUM SERPL-MCNC: 7.8 MG/DL (ref 8.6–10)
CHLORIDE SERPL-SCNC: 104 MMOL/L (ref 98–107)
CLOZAPINE SERPL-MCNC: 846 NG/ML
CLOZAPINE+NOR SERPL-MCNC: 1044 NG/ML
CNO SERPL-MCNC: <100 NG/ML
CREAT SERPL-MCNC: 1.45 MG/DL (ref 0.51–0.95)
DEPRECATED HCO3 PLAS-SCNC: 22 MMOL/L (ref 22–29)
EGFRCR SERPLBLD CKD-EPI 2021: 42 ML/MIN/1.73M2
GLUCOSE BLDC GLUCOMTR-MCNC: 213 MG/DL (ref 70–99)
GLUCOSE BLDC GLUCOMTR-MCNC: 217 MG/DL (ref 70–99)
GLUCOSE BLDC GLUCOMTR-MCNC: 298 MG/DL (ref 70–99)
GLUCOSE BLDC GLUCOMTR-MCNC: 365 MG/DL (ref 70–99)
GLUCOSE BLDC GLUCOMTR-MCNC: 380 MG/DL (ref 70–99)
GLUCOSE BLDC GLUCOMTR-MCNC: 387 MG/DL (ref 70–99)
GLUCOSE SERPL-MCNC: 391 MG/DL (ref 70–99)
HOLD SPECIMEN: NORMAL
MAGNESIUM SERPL-MCNC: 1.8 MG/DL (ref 1.7–2.3)
NORCLOZAPINE SERPL-MCNC: 198 NG/ML
PHOSPHATE SERPL-MCNC: 3.3 MG/DL (ref 2.5–4.5)
POTASSIUM SERPL-SCNC: 2.9 MMOL/L (ref 3.4–5.3)
POTASSIUM SERPL-SCNC: 3.1 MMOL/L (ref 3.4–5.3)
SODIUM SERPL-SCNC: 141 MMOL/L (ref 135–145)

## 2023-10-03 PROCEDURE — 36415 COLL VENOUS BLD VENIPUNCTURE: CPT | Performed by: FAMILY MEDICINE

## 2023-10-03 PROCEDURE — 36415 COLL VENOUS BLD VENIPUNCTURE: CPT | Performed by: STUDENT IN AN ORGANIZED HEALTH CARE EDUCATION/TRAINING PROGRAM

## 2023-10-03 PROCEDURE — 120N000002 HC R&B MED SURG/OB UMMC

## 2023-10-03 PROCEDURE — 80048 BASIC METABOLIC PNL TOTAL CA: CPT | Performed by: STUDENT IN AN ORGANIZED HEALTH CARE EDUCATION/TRAINING PROGRAM

## 2023-10-03 PROCEDURE — 250N000013 HC RX MED GY IP 250 OP 250 PS 637

## 2023-10-03 PROCEDURE — 250N000011 HC RX IP 250 OP 636: Performed by: STUDENT IN AN ORGANIZED HEALTH CARE EDUCATION/TRAINING PROGRAM

## 2023-10-03 PROCEDURE — 99232 SBSQ HOSP IP/OBS MODERATE 35: CPT | Mod: GC

## 2023-10-03 PROCEDURE — 84132 ASSAY OF SERUM POTASSIUM: CPT | Performed by: FAMILY MEDICINE

## 2023-10-03 PROCEDURE — 83735 ASSAY OF MAGNESIUM: CPT | Performed by: STUDENT IN AN ORGANIZED HEALTH CARE EDUCATION/TRAINING PROGRAM

## 2023-10-03 PROCEDURE — 36415 COLL VENOUS BLD VENIPUNCTURE: CPT

## 2023-10-03 PROCEDURE — 99233 SBSQ HOSP IP/OBS HIGH 50: CPT

## 2023-10-03 PROCEDURE — 84100 ASSAY OF PHOSPHORUS: CPT | Performed by: STUDENT IN AN ORGANIZED HEALTH CARE EDUCATION/TRAINING PROGRAM

## 2023-10-03 PROCEDURE — 82010 KETONE BODYS QUAN: CPT

## 2023-10-03 PROCEDURE — 250N000013 HC RX MED GY IP 250 OP 250 PS 637: Performed by: FAMILY MEDICINE

## 2023-10-03 RX ORDER — POTASSIUM CHLORIDE 750 MG/1
40 TABLET, EXTENDED RELEASE ORAL ONCE
Status: COMPLETED | OUTPATIENT
Start: 2023-10-03 | End: 2023-10-03

## 2023-10-03 RX ORDER — CLOZAPINE 100 MG/1
100 TABLET ORAL AT BEDTIME
Status: DISCONTINUED | OUTPATIENT
Start: 2023-10-03 | End: 2023-10-05

## 2023-10-03 RX ORDER — ONDANSETRON 4 MG/1
4 TABLET, FILM COATED ORAL
Status: DISCONTINUED | OUTPATIENT
Start: 2023-10-03 | End: 2023-10-14

## 2023-10-03 RX ORDER — POTASSIUM CHLORIDE 750 MG/1
20 TABLET, EXTENDED RELEASE ORAL ONCE
Status: COMPLETED | OUTPATIENT
Start: 2023-10-03 | End: 2023-10-03

## 2023-10-03 RX ADMIN — CLOZAPINE 100 MG: 100 TABLET ORAL at 22:06

## 2023-10-03 RX ADMIN — DIVALPROEX SODIUM 500 MG: 125 CAPSULE, COATED PELLETS ORAL at 20:15

## 2023-10-03 RX ADMIN — PANTOPRAZOLE SODIUM 40 MG: 40 TABLET, DELAYED RELEASE ORAL at 07:06

## 2023-10-03 RX ADMIN — POTASSIUM CHLORIDE 40 MEQ: 750 TABLET, EXTENDED RELEASE ORAL at 17:54

## 2023-10-03 RX ADMIN — ONDANSETRON 4 MG: 4 TABLET, FILM COATED ORAL at 08:59

## 2023-10-03 RX ADMIN — POTASSIUM CHLORIDE 20 MEQ: 750 TABLET, EXTENDED RELEASE ORAL at 20:15

## 2023-10-03 RX ADMIN — ONDANSETRON 4 MG: 4 TABLET, FILM COATED ORAL at 17:06

## 2023-10-03 RX ADMIN — DIVALPROEX SODIUM 500 MG: 125 CAPSULE, COATED PELLETS ORAL at 08:59

## 2023-10-03 RX ADMIN — POTASSIUM CHLORIDE 40 MEQ: 750 TABLET, EXTENDED RELEASE ORAL at 12:17

## 2023-10-03 RX ADMIN — CLOZAPINE 25 MG: 25 TABLET ORAL at 08:59

## 2023-10-03 ASSESSMENT — ACTIVITIES OF DAILY LIVING (ADL)
ADLS_ACUITY_SCORE: 29
ADLS_ACUITY_SCORE: 28
ADLS_ACUITY_SCORE: 29
ADLS_ACUITY_SCORE: 28
ADLS_ACUITY_SCORE: 29

## 2023-10-03 NOTE — PLAN OF CARE
Goal Outcome Evaluation:      Plan of Care Reviewed With: patient, family          Outcome Evaluation: patient complaint with medications. sleeping throught the shift, needing lots of encouragement to stay awake and participate in cares. VSS, O2 >90% on room air. no cough/SOB noted. regular diet, calorie count initated, however very poor appetite, family karley in dinner. BG before each meal, sliding scale insulin as ordered. denies pain. continent of bowel and bladder, loose stool x1 this shift. potassium replaced twiced this shift, Aiyana's team informed. bedside attenndant at bedside.

## 2023-10-03 NOTE — TELEPHONE ENCOUNTER
R: [place]  Access Inpatient Bed Call Log 10/3/23 7:10 AM    Intake has called facilities that have not updated the bed status within the last 12 hours.    ([facility] Only)                 [facility] is at capacity.                 Patient remains on the work list pending appropriate bed availability.

## 2023-10-03 NOTE — PLAN OF CARE
BP (!) 151/75 (BP Location: Right arm)   Pulse 94   Temp 97.5  F (36.4  C) (Axillary)   Resp 18   Wt 105.8 kg (233 lb 4 oz)   SpO2 93%   BMI 34.44 kg/m    Pt. Is A & O. Has a 1:1 sitter in room  to ensure safety .BG checked and recorded on flowsheet. No insulin coverage @ this time per standing order. Pt. noted to be sleeping most of this shift. No signs and symptoms of SOB, N/V, acute distress as at the time of documentation. Call button placed within reach. Will continue with monitor pt for signs and symptoms of hyperglycemia. Will continue with

## 2023-10-03 NOTE — PLAN OF CARE
VS: Temp: 98.9  F (37.2  C) Temp src: Axillary BP: 119/75 Pulse: 93   Resp: 18 SpO2: 95 % O2 Device: None (Room air)       O2: Sats above 90 on room air, denies shortness of breath, denies chest pain   Output: Voiding spontaneously in BR   Last BM: 10/2/2023   Activity: Up ad ascencion, independent with sitter   Skin: intact   Pain: denies   CMS: Alert to self and place, disoriented to time and situation   Dressing: none   Diet: Regular, safe tray. Denies nausea. Poor appetite, blood glucose checks   LDA: None, no piv   Equipment: Personal belongings, sitter   Plan: Awaiting bed on psych floor    Additional Info: Denies SI, hallucinations this shift, but pt. Is very sleepy, has a hard time waking enough to answer questions

## 2023-10-03 NOTE — TELEPHONE ENCOUNTER
Bed search ([facility] only) @ 2:40AM:    [facility] @ cap    Pt remains on work list until appropriate placement is available

## 2023-10-03 NOTE — TELEPHONE ENCOUNTER
R: 9:25PM Bed Search Update [facility] Only    No appropriate beds available.  Pt remains on PPS worklist awaiting appropriate placement.

## 2023-10-03 NOTE — PROGRESS NOTES
CLINICAL NUTRITION SERVICES - REASSESSMENT NOTE     Nutrition Prescription    RECOMMENDATIONS FOR MDs/PROVIDERS TO ORDER:  Encourage oral intakes    Malnutrition Status:    Moderate malnutrition in the context of acute illness    Recommendations already ordered by Registered Dietitian (RD):  Extended calorie counts through Friday  Changed Ensure Enlive to Ensure mixed with ice cream BID    Future/Additional Recommendations:  Monitor labs, intakes, and weight trends.     EVALUATION OF THE PROGRESS TOWARD GOALS   Diet: Regular  Intake/[place]: mostly 0 - 25% of documented meals.  Snacks/supplements: Ensure Enlive BID     NEW FINDINGS/REVIEW OF SYSTEMS    Nutrition/GI: RD visited pt and sitter/RN in room. Pt sleepy, snoring and writer not able to wake up pt. RN states the patient isn't eating much and might like the supplements mixed with ice cream instead of alone. Per RN, pt's family says that the patient also doesn't eat well at home and likes things such as     Weights: Pt with limited weight history prior to admission, with 19 lb (8%) weight loss over 1.5 weeks during admission.     Wt Readings from Last Encounters:   10/03/23 97.1 kg (214 lb)   10/03/23 97.9 kg (215 lb 13.3 oz)   09/23/23 105.8 kg (233 lb 4 oz)   09/10/23 101.9 kg (224 lb 9.6 oz)   07/04/23 105.8 kg (233 lb 4.8 oz)   02/07/23 106.6 kg (235 lb)         Labs reviewed   09/27/23 06:40 09/28/23 06:50 09/29/23 16:02 10/01/23 06:17 10/02/23 11:04 10/03/23 05:45   Sodium 140 140 139 141 140 141   Potassium 3.3 (L) 3.9 3.8 3.4 3.4 3.1 (L)   Urea Nitrogen 12.9 8.6 9.4 11.0 15.0 17.8   Creatinine 1.39 (H) 1.25 (H) 1.27 (H) 1.39 (H) 1.41 (H) 1.45 (H)   Magnesium    1.7 1.8 1.8   Phosphorus    4.6 (H) 4.1 3.3   Albumin   2.4 (L)      Glucose 349 (H) 298 (H) 364 (H) 300 (H) 292 (H) 391 (H)   Hemoglobin A1C 6.2 (H)        Ketone Quantitative    0.60 (H)        Medications reviewed: clozapine, hydrochlorothiazide, insulin (novolog, lantus), zofran, protonix,  potassium chloride, imodium PRN    MALNUTRITION  % Intake: < 75% for > 7 days (moderate)  % Weight Loss: >2% in 1 week (severe)   Subcutaneous Fat Loss: None observed  Muscle Loss: Temporal mild  Fluid Accumulation/Edema: Mild  Malnutrition Diagnosis: Moderate malnutrition in the context of acute illness    Previous Goals   Patient to consume % of nutritionally adequate meal trays TID, or the equivalent with supplements/snacks.  Evaluation: Not met    Previous Nutrition Diagnosis  Predicted inadequate nutrient intake related to lethargy, mental health status as evidenced by possible inadequate intakes    Evaluation: Declining    CURRENT NUTRITION DIAGNOSIS  Inadequate nutrient intake related to lethargy, mental health status, poor appetite as evidenced by documented intakes, significant weight loss over last two weeks.     INTERVENTIONS  Implementation  Calorie counts ordered  Medical food supplement therapy - changing to Ensure Enlive mixed with ice cream    Goals  Patient to consume % of nutritionally adequate meal trays TID, or the equivalent with supplements/snacks.    Monitoring/Evaluation  Progress toward goals will be monitored and evaluated per protocol.    Crystal Dawson MS, RDN, LD  RD pager: [phone]  [facility] Weekend/Holiday Pager: [phone]
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
## 2023-10-03 NOTE — PROGRESS NOTES
[facility]    Progress Note - [facility] Service       Date of Admission:  9/20/2023    Main plans for today:   - Medically stable for transfer to psychiatric unit, awaiting bed  - Cozaril at split dosing of 25 mg qAM and 150 mg at bedtime  - Psych consulted, for somnolence   - Lantus 10 units AM  - Medium sliding scale   - Zofran PRN 30 mins before meals   - Calorie count   - HOLD hydrochlorothiazide       Assessment & Plan   Angela Basurto is a 58 year old female admitted on 9/20/2023. She is a 58 year old female with bipolar 1 who was admitted to psychiatry on 8/16/23 for elvis and psychosis in the context of medication nonadherence. On 9/18 she developed orthostatic hypotension, fever w/ lactate 4.3, and was admitted to [facility] service for sepsis 2/2 presumed hospital acquired pneumonia, now resolved/treated. Patient is medically stable and ready for transfer back to psychiatric unit. Currently monitoring hyperglycemia and ETHAN. Adolfo, full commitment, 1:1     # Bipolar I disorder  # Hx of possible seizure in 1983   # Mata: Haldol, clozapine, risperidone, olanzapine  # Full commitment   Presented with elvis and psychosis in the context of medication nonadherence. Was admitted with psychiatry from 8/16 until transfer to this service 9/20. She has required a 1:1 to maintain her safety. Upon admission, her home meds were restarted, including zyprexa, depakote, and haldol. Initiated clozapine 8/31. Zyprexa stopped on 9/8 due to lack fo benefit and mild sedation. Achieved dose of 300 mg of clozapine daily on 9/11 with improvement in psychosis noted. Due to orthostatic hypotension, constipation, and sedation, reduced clozapine to 200 mg on 9/17, stopped Haldol on 9/16, and held clozapine 9/18-9/20. Restarted Clozapine at 100 mg on 9/20. Psychiatry is following closely and managing her psychiatric medications. Valproic acid low at 40.1 but due to  "somnolence psych decided against increasing. Clozapine decrease to 175 mg overnight and change to split dosing 10/02 due to somnolence. Due to worsening somnolence, will have psych reassess. Awaiting transfer to  Psychiatric unit.  - See psychiatry note for details of medication titrations  - Precautions: 1:1 sitter, restrict to unit, precautions in place for assault, cheeking, elopement, falls  - Legal status: Mata and full commitment: A petition for MI commitment with Mata was filed on 8/18 through [facility].    - Depakote DT sprinkles 500 mg BID   - Clozapine 175 mg- 25 mg qAM and 150 mg at bedtime due to somnolence   - Psych consulted for reassessment of somnolence 10/03  - Pt care:   - encouraging sitting in chair or up ~6 hours a day  - walk with nurse 1:1 4x daily  - PRN for agitation:    - If soft agitation/\"anxiety, use PRN Haldol 2 mg PO  - EMERGENCY for agitation: Haldol 5mg + diphenhydramine 50mg q8h PRN, PO or IM  - STOP hydroxyzine per psych (see 9/29 note)    # Hyperglycemia   # Pre-diabetes (A1c 09/27/23, 6.2)  # Loose stools  Asymptomatic hyperglycemia on metabolic panels, possibly 2/2 to increase Clozapine 9/26 and 9/29. Repeat A1c of 6.2. 3 loose stools noted overnight by sitter on 10/03, denies any blood, likely due to starting Metformin, discontinue on 9/30. Due to up trending BG, will increase Lantus to 10 unit(s) and change to medium intensity sliding scale. Likely antipsychotic induced hyperglycemia vs starvation ketosis. Beta-hydroxybutyrate was elevated at 0.60. Low suspicion for DKA with no anion gap metabolic acidosis. Will continue to monitor for progression.   - Lantus 10 units AM   - Medium intensity sliding scale insulin  - STOP Metformin  mg daily  - Hypoglycemia protocol     # Acute kidney injury, up trending   Creatinine was 0.49 at the time of presentation for medical illness, gradually tasia to peak of 1.99 9/22, since then steadily decreasing, but now up " trending the past couple of days. This likely represents a combination of pre-renal ETHAN due to poor PO intake in the setting of psychiatric illness and intra-renal process due to medications SE (Zosyn). Zosyn and Pepcid were ordered in accordance with renal dosing. Creatinine elevated at 1.45 10/03. No longer on antibiotics, FENA of 0.5, likely due to poor PO intake and recent start of Hydrochlorothiazide. HOLD Hydrochlorothiazide for now.      - BMP q72h  - repeat BMP 5-7 days after medicine discharge    # HTN  Had been admitted to this service in the setting of intermittent hypotension. And now she has asymptomatic hypertension. Given ongoing (though improving) ETHAN, will avoid ACEi and ARB.   - HOLD Hydrochlorothiazide 25 mg daily  - HOLD PTA clonidine per psych    # Orthostatic hypotension, resolved  Symptomatic 9/18 with blood pressure drop from 123/72 while laying to 96/55 with standing. Patient reported this was new since admission and starting new medications, and has since improved. Patient initiated on clozapine on 8/31 with many dose changes. Somnolence and poor PO intake have been challenges during this admission, both likely contributing. Workups for endocrine and cardiac causes were negative.   - Encourage oral hydration   - Compression stockings  - Likely to continue to be tricky with clozapine dose changes    # Possible hospital-acquired pneumonia, resolved/treated  # Positive blood culture x1 - consistent with contaminant  # Severe sepsis, resolved  # Acute Hypoxic respiratory failure, resolved  # Leukocytosis, ongoing  Initially presented 9/19 from inpatient psych with temperature 101.3F, lactic acid 4.3 in the setting of new cough and widespread body aches. , WBC 9.8. 9/18 CXR with streaky right greater than left perihilar and basilar opacities, likely atelectasis and/or edema without focal consolidation. Respiratory viral panels (9/19, 9/22) have been negative. Echocardiogram not consistent  with myocarditis. Briefly treated with vancomycin 9/19-9/23 due to a blood culture growing gram positive cocci; this was later determined to be Staph Hominis, suspected contaminant. Pulmonary exam without focal findings consistent with bacterial pneumonia, though patient had an ongoing cough and appears to have a significant burden of respiratory secretions seen on 9/24 (resolved). RT evaluated and recommended use of aerobika flutter valve. White count have been elevated although stable with still no clear focus of infection, could be clozapine side effect. Patient is medically stable to return to psychiatry unit.   - Infectious disease consulted, now off  - S/p piperacillin/tazobactam (9/20- 9/26)  - Respiratory therapist saw Angela and she tolerated vest therapy x1  - Discontinue vest therapy with improved breathing and reassuring physical examination   - Aerobika flutter valve   - CBC q72h    # Diarrhea- Resolved    # Hypokalemia- Resolved  # Abdominal tenderness  Increased stool frequency and loose stools, with 4 stools noted 9/22, 7 stools 9/23. Negative stool testing for C. Difficile. Patient denied blood in stools, pain with bowel movements. Likely due to ongoing antibiotic treatment. Patient report intermittent abdominal tenderness, unlikely pancreatitis with location abdominal tenderness and lipase x2 < 3x normal upper limits. Low concern for bowel obstruction with abdominal xray revealing nonobstructive bowel gas pattern and no significant fecal retention.   - Loperamide capsule 2 mg PRN  - RN-driven potassium replacement protocol - High     # Hypocalcemia  # Hypoalbuminemia  Suspect possible malnutrition due to poor PO intake. Sitter has reported that Angela makes facial expression expressing difficulty with swallowing her food. Discuss with nurses and sitters about encouraging Angela to eat and drink  - Encourage good PO intake   - Pantoprazole EC 40 mg tablets  - DEBORAH  - Zofran PRN, 30 mins before meals  -  Nutrition IP consult, appreciate recommendations               - Medical food supplement therapy  - Patient to consume % of nutritionally adequate meal trays TID, or the equivalent with supplements/snacks.     # Yazdanism  Important to her value system  - Spiritual care consult, met with Angela 9/20          Diet: Combination Diet Regular Diet; Safe Tray - with utensils  Snacks/Supplements Adult: Ensure Enlive; With Meals  Calorie Counts    DVT Prophylaxis: Low Risk/Ambulatory with no VTE prophylaxis indicated  Wilson Catheter: Not present  Fluids: PO  Lines: None     Cardiac Monitoring: None  Code Status: Full Code      Clinically Significant Risk Factors        # Hypokalemia: Lowest K = 3.1 mmol/L in last 2 days, will replace as needed       # Hypoalbuminemia: Lowest albumin = 2 g/dL at 9/24/2023  8:46 AM, will monitor as appropriate                       Disposition Plan      Expected Discharge Date: 10/04/2023        Discharge Comments: med ready for discharge back to psych Medically ready for transfer back to  psychiatry.     The patient's care was discussed with the Attending Physician, Dr. Noe .    Gonzalez Reno MD  [facility]  [facility]  Securely message with Vocera (more info)  Text page via [facility] Paging/Directory   See signed in provider for up to date coverage information  ______________________________________________________________________    Interval History   Overnight:  #3 loose stool overnight. Clozapine decrease to 175 mg and split 25 mg qAM and 150 mg at bedtime due to somnolence per psych.    Subjective:   At bedside, patient was very somnolent which makes it difficult to assess. Continues to wake up for a couple of seconds before falling back asleep. Unable to answer questions due to somnolence.       Physical Exam   Vital Signs: Temp: 97.5  F (36.4  C) Temp src: Axillary BP: (!) 151/75 Pulse: 94   Resp: 18 SpO2: 93  % O2 Device: None (Room air)    Weight: 214 lbs 0 oz    Constitutional: Somnolent, no apparent distress  Respiratory: No increased work of breathing, good air exchange, clear to auscultation bilaterally, no crackles or wheezing  Cardiovascular: Normal apical impulse, regular rate and rhythm, normal S1 and S2. Extremities warm.   MSK: No edema noted.   Neurologic: Lethargic.    Neuropsychiatric: Difficult to assess given somnolence.     Medical Decision Making       Please see A&P for additional details of medical decision making.      Data   ------------------------- PAST 24 HR DATA REVIEWED -----------------------------------------------    I have personally reviewed the following data over the past 24 hrs:    N/A  \   N/A   / N/A     141 104 17.8 /  298 (H)   3.1 (L) 22 1.45 (H) \     TSH: N/A T4: N/A A1C: N/A     Procal: N/A CRP: N/A Lactic Acid: N/A       Imaging results reviewed over the past 24 hrs:   No results found for this or any previous visit (from the past 24 hour(s)).

## 2023-10-04 ENCOUNTER — TELEPHONE (OUTPATIENT)
Dept: BEHAVIORAL HEALTH | Facility: CLINIC | Age: 58
End: 2023-10-04
Payer: COMMERCIAL

## 2023-10-04 LAB
ANION GAP SERPL CALCULATED.3IONS-SCNC: 14 MMOL/L (ref 7–15)
BASO+EOS+MONOS # BLD AUTO: ABNORMAL 10*3/UL
BASO+EOS+MONOS NFR BLD AUTO: ABNORMAL %
BASOPHILS # BLD AUTO: 0.1 10E3/UL (ref 0–0.2)
BASOPHILS NFR BLD AUTO: 0 %
BUN SERPL-MCNC: 19.2 MG/DL (ref 6–20)
CALCIUM SERPL-MCNC: 7.9 MG/DL (ref 8.6–10)
CHLORIDE SERPL-SCNC: 107 MMOL/L (ref 98–107)
CREAT SERPL-MCNC: 1.62 MG/DL (ref 0.51–0.95)
DEPRECATED HCO3 PLAS-SCNC: 19 MMOL/L (ref 22–29)
EGFRCR SERPLBLD CKD-EPI 2021: 37 ML/MIN/1.73M2
EOSINOPHIL # BLD AUTO: 0.1 10E3/UL (ref 0–0.7)
EOSINOPHIL NFR BLD AUTO: 1 %
ERYTHROCYTE [DISTWIDTH] IN BLOOD BY AUTOMATED COUNT: 14 % (ref 10–15)
GLUCOSE BLDC GLUCOMTR-MCNC: 160 MG/DL (ref 70–99)
GLUCOSE BLDC GLUCOMTR-MCNC: 166 MG/DL (ref 70–99)
GLUCOSE BLDC GLUCOMTR-MCNC: 183 MG/DL (ref 70–99)
GLUCOSE BLDC GLUCOMTR-MCNC: 199 MG/DL (ref 70–99)
GLUCOSE BLDC GLUCOMTR-MCNC: 238 MG/DL (ref 70–99)
GLUCOSE SERPL-MCNC: 236 MG/DL (ref 70–99)
HCT VFR BLD AUTO: 30.2 % (ref 35–47)
HGB BLD-MCNC: 9.6 G/DL (ref 11.7–15.7)
IMM GRANULOCYTES # BLD: 0.2 10E3/UL
IMM GRANULOCYTES NFR BLD: 1 %
LYMPHOCYTES # BLD AUTO: 1.6 10E3/UL (ref 0.8–5.3)
LYMPHOCYTES NFR BLD AUTO: 11 %
MAGNESIUM SERPL-MCNC: 2.5 MG/DL (ref 1.7–2.3)
MCH RBC QN AUTO: 30.6 PG (ref 26.5–33)
MCHC RBC AUTO-ENTMCNC: 31.8 G/DL (ref 31.5–36.5)
MCV RBC AUTO: 96 FL (ref 78–100)
MONOCYTES # BLD AUTO: 2 10E3/UL (ref 0–1.3)
MONOCYTES NFR BLD AUTO: 14 %
NEUTROPHILS # BLD AUTO: 10.1 10E3/UL (ref 1.6–8.3)
NEUTROPHILS NFR BLD AUTO: 73 %
NRBC # BLD AUTO: 0 10E3/UL
NRBC BLD AUTO-RTO: 0 /100
PHOSPHATE SERPL-MCNC: 3 MG/DL (ref 2.5–4.5)
PLATELET # BLD AUTO: 272 10E3/UL (ref 150–450)
POTASSIUM SERPL-SCNC: 3.3 MMOL/L (ref 3.4–5.3)
POTASSIUM SERPL-SCNC: 3.6 MMOL/L (ref 3.4–5.3)
RBC # BLD AUTO: 3.14 10E6/UL (ref 3.8–5.2)
SODIUM SERPL-SCNC: 140 MMOL/L (ref 135–145)
WBC # BLD AUTO: 14 10E3/UL (ref 4–11)

## 2023-10-04 PROCEDURE — 36415 COLL VENOUS BLD VENIPUNCTURE: CPT | Performed by: STUDENT IN AN ORGANIZED HEALTH CARE EDUCATION/TRAINING PROGRAM

## 2023-10-04 PROCEDURE — 85025 COMPLETE CBC W/AUTO DIFF WBC: CPT | Performed by: STUDENT IN AN ORGANIZED HEALTH CARE EDUCATION/TRAINING PROGRAM

## 2023-10-04 PROCEDURE — 83735 ASSAY OF MAGNESIUM: CPT | Performed by: STUDENT IN AN ORGANIZED HEALTH CARE EDUCATION/TRAINING PROGRAM

## 2023-10-04 PROCEDURE — 80048 BASIC METABOLIC PNL TOTAL CA: CPT | Performed by: FAMILY MEDICINE

## 2023-10-04 PROCEDURE — 250N000013 HC RX MED GY IP 250 OP 250 PS 637

## 2023-10-04 PROCEDURE — 84100 ASSAY OF PHOSPHORUS: CPT | Performed by: STUDENT IN AN ORGANIZED HEALTH CARE EDUCATION/TRAINING PROGRAM

## 2023-10-04 PROCEDURE — 258N000003 HC RX IP 258 OP 636

## 2023-10-04 PROCEDURE — 82310 ASSAY OF CALCIUM: CPT | Performed by: STUDENT IN AN ORGANIZED HEALTH CARE EDUCATION/TRAINING PROGRAM

## 2023-10-04 PROCEDURE — 120N000002 HC R&B MED SURG/OB UMMC

## 2023-10-04 PROCEDURE — 99232 SBSQ HOSP IP/OBS MODERATE 35: CPT | Mod: GC

## 2023-10-04 PROCEDURE — 36415 COLL VENOUS BLD VENIPUNCTURE: CPT | Performed by: FAMILY MEDICINE

## 2023-10-04 PROCEDURE — 250N000011 HC RX IP 250 OP 636: Performed by: STUDENT IN AN ORGANIZED HEALTH CARE EDUCATION/TRAINING PROGRAM

## 2023-10-04 PROCEDURE — 99233 SBSQ HOSP IP/OBS HIGH 50: CPT

## 2023-10-04 RX ADMIN — DIVALPROEX SODIUM 500 MG: 125 CAPSULE, COATED PELLETS ORAL at 07:42

## 2023-10-04 RX ADMIN — DIVALPROEX SODIUM 500 MG: 125 CAPSULE, COATED PELLETS ORAL at 20:30

## 2023-10-04 RX ADMIN — ONDANSETRON 4 MG: 4 TABLET, FILM COATED ORAL at 07:43

## 2023-10-04 RX ADMIN — LOPERAMIDE HYDROCHLORIDE 2 MG: 2 CAPSULE ORAL at 16:42

## 2023-10-04 RX ADMIN — SODIUM CHLORIDE, POTASSIUM CHLORIDE, SODIUM LACTATE AND CALCIUM CHLORIDE 1000 ML: 600; 310; 30; 20 INJECTION, SOLUTION INTRAVENOUS at 18:02

## 2023-10-04 RX ADMIN — CLOZAPINE 100 MG: 100 TABLET ORAL at 21:56

## 2023-10-04 RX ADMIN — LOPERAMIDE HYDROCHLORIDE 2 MG: 2 CAPSULE ORAL at 07:43

## 2023-10-04 RX ADMIN — ONDANSETRON 4 MG: 4 TABLET, FILM COATED ORAL at 16:42

## 2023-10-04 RX ADMIN — PANTOPRAZOLE SODIUM 40 MG: 40 TABLET, DELAYED RELEASE ORAL at 07:43

## 2023-10-04 ASSESSMENT — ACTIVITIES OF DAILY LIVING (ADL)
ADLS_ACUITY_SCORE: 26
ADLS_ACUITY_SCORE: 26
ADLS_ACUITY_SCORE: 28
ADLS_ACUITY_SCORE: 26
ADLS_ACUITY_SCORE: 28
ADLS_ACUITY_SCORE: 26
ADLS_ACUITY_SCORE: 28
ADLS_ACUITY_SCORE: 26
ADLS_ACUITY_SCORE: 28

## 2023-10-04 NOTE — TELEPHONE ENCOUNTER
R: [place]  Access Inpatient Bed Call Log 10/4/23 at 8:45AM  Intake has called facilities that have not updated the bed status within the last 12 hours.  ([facility] Only)                               [facility] is at capacity.                   Patient remains on the work list pending appropriate bed availability.

## 2023-10-04 NOTE — PLAN OF CARE
Pt is A&Ox4 but forgetful.1:1 attendant at bedside. VSS. LS clear, on RA. Garbled speech. BS active, LBM on 10/2/2023. Voiding well. Pt up independently. L PIV is patent and SL. Continue to monitor
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
## 2023-10-04 NOTE — TELEPHONE ENCOUNTER
R: [place]  Access Inpatient Bed Call Log  10/4/2023 12:40 AM  Intake has called facilities that have not updated their bed status within the last 12 hours.??      ADULTS:     *[facility]: @ [facility] per website.       Pt remains on waitlist pending appropriate placement availability.

## 2023-10-04 NOTE — PLAN OF CARE
Goal Outcome Evaluation:      Plan of Care Reviewed With: patient        VS: BP (!) 140/50 (BP Location: Right arm)   Pulse 96   Temp 98.6  F (37  C) (Oral)   Resp 16   Wt 97.1 kg (214 lb)   SpO2 99%   BMI 31.60 kg/m     O2: >90% on room air. Lungs CTA    Output: Voiding spontaneously without difficulties. Unable to obtained urine sample as urine was mix with stool.    Last BM: Multiple loose stool this shift, PRN Imodium given x2    Activity: Up ad ascencion in room    Skin: Intact    Pain: Denies    CMS: Alert and oriented x2. Sleeping on and off throughout shift.    Dressing: NA   Diet: Regular diet, poor appetite.    LDA: PIV infusing LR bolus    Equipment: IV pole/pump, personal belongings, bedside attendant for safety    Plan: Medically stable for transfer to psychiatric unit, awaiting bed    Additional Info:

## 2023-10-04 NOTE — TELEPHONE ENCOUNTER
R: [place]  Access Inpatient Bed Call Log 10/4/23 4:45pm   Intake has called facilities that have not updated the bed status within the last 12 hours. ([facility] Only)                    [facility] is at capacity.          Patient remains on the work list pending appropriate bed availability.

## 2023-10-04 NOTE — PROGRESS NOTES
[facility]    Progress Note - [facility] Service       Date of Admission:  9/20/2023    Main plans for today:   - Medically stable for transfer to psychiatric unit, awaiting bed  - Continue Clozaril 100 mg daily per Psych   - Increase Lantus to 16U AM   - Strict in/out   - UA for worsening ETHAN     Assessment & Plan   Angela Basurto is a 58-year-old female PMH bipolar 1 disorder admitted to [facility] 8/16 for elvis and psychosis in setting of medication non-adherence. Transferred to [facility] 9/18/23 for sepsis 2/2 HAP, now resolved. Currently medically stable and awaiting Psych bed. Monitoring hyperglycemia and ETHAN. Patient is on Mata with full commitment, current 1:1.     # Bipolar I disorder  # Hx of possible seizure (1983)  # Mata: Haldol, clozapine, risperidone, olanzapine  # Full commitment   Presented with elvis and psychosis in the context of medication nonadherence. Was admitted with Psychiatry from 8/16 until transfer to this service 9/20. Has required a 1:1 to maintain her safety. Psychiatry has been following and managing medications. Clozapine initiated during current admission and dose adjusted due to side effects including somnolence. Currently with slightly improved somnolence after dose decrease 10/3. Awaiting transfer to  Psychiatric unit.  - See psychiatry note for details of medication titrations  - Nursing care: 1:1 sitter, encouraging sitting in chair or up ~6 hours a day, walk with nurse 1:1 4x daily  - Legal status: MATA and full commitment   - Medications: Depakote DT sprinkles 500 mg BID, Clozapine 100 mg daily    - Agitation plan:    - Non-emergency: PRN Haldol 2 mg PO  - Emergency: Haldol 5mg + diphenhydramine 50mg q8h PRN, PO or IM  - STOP Hydroxyzine per psych (see 9/29 note)    # ETHAN, worsening   Baseline creatinine 0.5. Initially had ETHAN that peaked at 1.99 9/22. Now with worsening creatinine again. Initial concern  for pre-renal ETHAN with known poor PO intake and intrarenal due to recent Zosyn. However, given worsening creatinine, will expand workup with urine testing to evaluate for other causes.   - LR 1L   - Strict intake/output   - UA   - BMP daily   - Avoid nephrotoxic medications   - HOLD Hydrochlorothiazide     # Hyperglycemia   # Pre-diabetes (A1c 09/27/23, 6.2)  Patient with persistent hyperglycemia. A1c 6.2% 9/27/23. Suspected related to Clozapine given known metabolic side effects. Less concern for infection at this time, though will check UA given hyperglycemia and worsening ETHAN.   - Increase Lantus to 16U daily   - MDSSI   - Metformin discontinued due to diarrhea   - Hypoglycemia protocol  - UA for infection      # Loose stools  Patient with estimated four episodes of loose stools daily. Non-bloody and no associated odor per RN. May be related to medications. Does not meet criteria for C. Diff testing at this time, though at risk with prolonged hospitalization and recent antibiotics.   - Consider enteric panel/C diff if not improving   - Imodium PRN     # HTN  Has been intermittently hypertensive. Holding medications in setting of ETHAN and psychotropic interactions.   - HOLD Hydrochlorothiazide 25 mg daily  - HOLD PTA clonidine per Psych    Chronic/resolved/stable:    # Orthostatic hypotension, resolved  Symptomatic 9/18 with blood pressure drop from 123/72 while laying to 96/55 with standing. Patient reported this was new since admission and starting new medications, and has since improved. Patient initiated on clozapine on 8/31 with many dose changes. Somnolence and poor PO intake have been challenges during this admission, both likely contributing. Workups for endocrine and cardiac causes were negative.   - Encourage oral hydration   - Compression stockings  - Likely to continue to be tricky with clozapine dose changes    # Possible hospital-acquired pneumonia, resolved/treated  # Positive blood culture x1 -  consistent with contaminant  # Severe sepsis, resolved  # Acute hypoxic respiratory failure, resolved  # Leukocytosis, ongoing  Initially presented 9/19 from inpatient psych with temperature 101.3F, lactic acid 4.3 in the setting of new cough and widespread body aches. , WBC 9.8. 9/18 CXR with streaky right greater than left perihilar and basilar opacities, likely atelectasis and/or edema without focal consolidation. Respiratory viral panels (9/19, 9/22) have been negative. Echocardiogram not consistent with myocarditis. Briefly treated with vancomycin 9/19-9/23 due to a blood culture growing gram positive cocci; this was later determined to be Staph Hominis, suspected contaminant. Pulmonary exam without focal findings consistent with bacterial pneumonia, though patient had an ongoing cough and appears to have a significant burden of respiratory secretions seen on 9/24 (resolved). RT evaluated and recommended use of aerobika flutter valve. White count have been elevated although stable with still no clear focus of infection, could be clozapine side effect. Patient is medically stable to return to psychiatry unit.   - Infectious disease consulted, now off  - S/p piperacillin/tazobactam (9/20- 9/26)  - Respiratory therapist saw Angela and she tolerated vest therapy x1  - Discontinue vest therapy with improved breathing and reassuring physical examination   - Aerobika flutter valve   - CBC q72h    # Hypokalemia, resolved  # Abdominal tenderness, resolved    Patient report intermittent abdominal tenderness, unlikely pancreatitis with location abdominal tenderness and lipase x2 < 3x normal upper limits. Low concern for bowel obstruction with abdominal xray revealing nonobstructive bowel gas pattern and no significant fecal retention.   - Loperamide capsule 2 mg PRN  - RN-driven potassium replacement protocol - High     # Hypocalcemia  # Hypoalbuminemia  Suspect possible malnutrition due to poor PO intake.   -  Encourage good PO intake   - Pantoprazole EC 40 mg tablets  - DEBORAH  - Zofran PRN, 30 mins before meals  - Nutrition IP consult, appreciate recommendations               - Medical food supplement therapy  - Patient to consume % of nutritionally adequate meal trays TID, or the equivalent with supplements/snacks.     # Orthodoxy  Important to her value system.   - Spiritual care consult         Diet: Combination Diet Regular Diet; Safe Tray - with utensils  Snacks/Supplements Adult: Other; Ensure Enlive mixed with ice cream; With Meals  Calorie Counts    DVT Prophylaxis: Low Risk/Ambulatory with no VTE prophylaxis indicated  Wilson Catheter: Not present  Fluids: PO  Lines: None     Cardiac Monitoring: None  Code Status: Full Code      Clinically Significant Risk Factors        # Hypokalemia: Lowest K = 2.9 mmol/L in last 2 days, will replace as needed       # Hypoalbuminemia: Lowest albumin = 2 g/dL at 9/24/2023  8:46 AM, will monitor as appropriate    # Acute Kidney Injury, unspecified: based on a >150% or 0.3 mg/dL increase in last creatinine compared to past 90 day average, will monitor renal function          # Moderate Malnutrition: based on nutrition assessment           Disposition Plan      Expected Discharge Date: 10/06/2023        Discharge Comments: med ready for discharge back to psych Medically ready for transfer back to  psychiatry.     The patient's care was discussed with the Attending Physician, Dr. Noe .    Malka Nguyen MD  [facility]  [facility]  Securely message with [employer] (more info)  Text page via [facility] Paging/Directory   See signed in provider for up to date coverage information  ______________________________________________________________________    Interval History     Overnight:  NAOE.     Subjective:   Patient initially up in chair and reported feeling hungry. Per RN, had four loose stools overnight. Calorie  counts have been difficult as patient mostly eats Turkish food brought in by family. Patient does drink water per report.     Physical Exam   Vital Signs: Temp: 98.6  F (37  C) Temp src: Oral BP: (!) 140/50 Pulse: 96   Resp: 16 SpO2: 99 % O2 Device: None (Room air)    Weight: 214 lbs 0 oz    Constitutional: Somnolent, no apparent distress  Respiratory: No increased work of breathing, good air exchange, clear to auscultation bilaterally, no crackles or wheezing  Cardiovascular: Normal apical impulse, regular rate and rhythm, normal S1 and S2. Extremities warm.   MSK: No edema noted.   Neurologic: Lethargic.    Neuropsychiatric: Difficult to assess given somnolence.     Medical Decision Making   Please see A&P for additional details of medical decision making.      Data   ------------------------- PAST 24 HR DATA REVIEWED -----------------------------------------------    I have personally reviewed the following data over the past 24 hrs:    14.0 (H)  \   9.6 (L)   / 272     140 107 19.2 /  166 (H)   3.6 19 (L) 1.62 (H) \     Imaging results reviewed over the past 24 hrs:   No results found for this or any previous visit (from the past 24 hour(s)).

## 2023-10-05 ENCOUNTER — APPOINTMENT (OUTPATIENT)
Dept: GENERAL RADIOLOGY | Facility: CLINIC | Age: 58
DRG: 871 | End: 2023-10-05
Attending: FAMILY MEDICINE
Payer: COMMERCIAL

## 2023-10-05 ENCOUNTER — APPOINTMENT (OUTPATIENT)
Dept: ULTRASOUND IMAGING | Facility: CLINIC | Age: 58
DRG: 871 | End: 2023-10-05
Attending: FAMILY MEDICINE
Payer: COMMERCIAL

## 2023-10-05 ENCOUNTER — TELEPHONE (OUTPATIENT)
Dept: BEHAVIORAL HEALTH | Facility: CLINIC | Age: 58
End: 2023-10-05
Payer: COMMERCIAL

## 2023-10-05 LAB
ALBUMIN MFR UR ELPH: 63.6 MG/DL
ALBUMIN SERPL BCG-MCNC: 2.4 G/DL (ref 3.5–5.2)
ALP SERPL-CCNC: 85 U/L (ref 35–104)
ALT SERPL W P-5'-P-CCNC: 8 U/L (ref 0–50)
AMMONIA PLAS-SCNC: 52 UMOL/L (ref 11–51)
ANION GAP SERPL CALCULATED.3IONS-SCNC: 11 MMOL/L (ref 7–15)
AST SERPL W P-5'-P-CCNC: 23 U/L (ref 0–45)
BASE EXCESS BLDV CALC-SCNC: -3.1 MMOL/L (ref -7.7–1.9)
BASO+EOS+MONOS # BLD AUTO: ABNORMAL 10*3/UL
BASO+EOS+MONOS NFR BLD AUTO: ABNORMAL %
BASOPHILS # BLD AUTO: 0.1 10E3/UL (ref 0–0.2)
BASOPHILS NFR BLD AUTO: 0 %
BILIRUB DIRECT SERPL-MCNC: <0.2 MG/DL (ref 0–0.3)
BILIRUB SERPL-MCNC: 0.2 MG/DL
BUN SERPL-MCNC: 20.9 MG/DL (ref 6–20)
C DIFF TOX B STL QL: NEGATIVE
CALCIUM SERPL-MCNC: 8 MG/DL (ref 8.6–10)
CHLORIDE SERPL-SCNC: 107 MMOL/L (ref 98–107)
CK SERPL-CCNC: 18 U/L (ref 26–192)
CREAT SERPL-MCNC: 1.91 MG/DL (ref 0.51–0.95)
CREAT UR-MCNC: 143 MG/DL
CRP SERPL-MCNC: 120.14 MG/L
DEPRECATED HCO3 PLAS-SCNC: 21 MMOL/L (ref 22–29)
EGFRCR SERPLBLD CKD-EPI 2021: 30 ML/MIN/1.73M2
EOSINOPHIL # BLD AUTO: 0 10E3/UL (ref 0–0.7)
EOSINOPHIL NFR BLD AUTO: 0 %
ERYTHROCYTE [DISTWIDTH] IN BLOOD BY AUTOMATED COUNT: 13.7 % (ref 10–15)
FLUAV RNA SPEC QL NAA+PROBE: NEGATIVE
FLUBV RNA RESP QL NAA+PROBE: NEGATIVE
GLUCOSE BLDC GLUCOMTR-MCNC: 142 MG/DL (ref 70–99)
GLUCOSE BLDC GLUCOMTR-MCNC: 154 MG/DL (ref 70–99)
GLUCOSE BLDC GLUCOMTR-MCNC: 182 MG/DL (ref 70–99)
GLUCOSE BLDC GLUCOMTR-MCNC: 186 MG/DL (ref 70–99)
GLUCOSE SERPL-MCNC: 167 MG/DL (ref 70–99)
HCO3 BLDV-SCNC: 21 MMOL/L (ref 21–28)
HCT VFR BLD AUTO: 30.2 % (ref 35–47)
HGB BLD-MCNC: 9.8 G/DL (ref 11.7–15.7)
HOLD SPECIMEN: NORMAL
IMM GRANULOCYTES # BLD: 0.3 10E3/UL
IMM GRANULOCYTES NFR BLD: 2 %
LACTATE SERPL-SCNC: 0.8 MMOL/L (ref 0.7–2)
LYMPHOCYTES # BLD AUTO: 1.7 10E3/UL (ref 0.8–5.3)
LYMPHOCYTES NFR BLD AUTO: 11 %
MAGNESIUM SERPL-MCNC: 2.1 MG/DL (ref 1.7–2.3)
MCH RBC QN AUTO: 30.7 PG (ref 26.5–33)
MCHC RBC AUTO-ENTMCNC: 32.5 G/DL (ref 31.5–36.5)
MCV RBC AUTO: 95 FL (ref 78–100)
MONOCYTES # BLD AUTO: 1.9 10E3/UL (ref 0–1.3)
MONOCYTES NFR BLD AUTO: 12 %
NEUTROPHILS # BLD AUTO: 11.2 10E3/UL (ref 1.6–8.3)
NEUTROPHILS NFR BLD AUTO: 75 %
NRBC # BLD AUTO: 0 10E3/UL
NRBC BLD AUTO-RTO: 0 /100
O2/TOTAL GAS SETTING VFR VENT: 0 %
PCO2 BLDV: 34 MM HG (ref 40–50)
PH BLDV: 7.4 [PH] (ref 7.32–7.43)
PH UR STRIP: 6 [PH] (ref 5–7)
PHOSPHATE SERPL-MCNC: 4.1 MG/DL (ref 2.5–4.5)
PLATELET # BLD AUTO: 297 10E3/UL (ref 150–450)
PO2 BLDV: 67 MM HG (ref 25–47)
POTASSIUM SERPL-SCNC: 3.1 MMOL/L (ref 3.4–5.3)
POTASSIUM SERPL-SCNC: 3.3 MMOL/L (ref 3.4–5.3)
PROCALCITONIN SERPL IA-MCNC: 0.28 NG/ML
PROT SERPL-MCNC: 6.3 G/DL (ref 6.4–8.3)
PROT/CREAT 24H UR: 0.44 MG/MG CR (ref 0–0.2)
RBC # BLD AUTO: 3.19 10E6/UL (ref 3.8–5.2)
RSV RNA SPEC NAA+PROBE: NEGATIVE
SARS-COV-2 RNA RESP QL NAA+PROBE: NEGATIVE
SODIUM SERPL-SCNC: 139 MMOL/L (ref 135–145)
SODIUM UR-SCNC: <20 MMOL/L
WBC # BLD AUTO: 15.2 10E3/UL (ref 4–11)

## 2023-10-05 PROCEDURE — 250N000011 HC RX IP 250 OP 636: Performed by: STUDENT IN AN ORGANIZED HEALTH CARE EDUCATION/TRAINING PROGRAM

## 2023-10-05 PROCEDURE — 87077 CULTURE AEROBIC IDENTIFY: CPT

## 2023-10-05 PROCEDURE — 120N000002 HC R&B MED SURG/OB UMMC

## 2023-10-05 PROCEDURE — 87493 C DIFF AMPLIFIED PROBE: CPT

## 2023-10-05 PROCEDURE — 82140 ASSAY OF AMMONIA: CPT

## 2023-10-05 PROCEDURE — 81003 URINALYSIS AUTO W/O SCOPE: CPT | Performed by: STUDENT IN AN ORGANIZED HEALTH CARE EDUCATION/TRAINING PROGRAM

## 2023-10-05 PROCEDURE — 84145 PROCALCITONIN (PCT): CPT

## 2023-10-05 PROCEDURE — 84156 ASSAY OF PROTEIN URINE: CPT | Performed by: STUDENT IN AN ORGANIZED HEALTH CARE EDUCATION/TRAINING PROGRAM

## 2023-10-05 PROCEDURE — 99233 SBSQ HOSP IP/OBS HIGH 50: CPT

## 2023-10-05 PROCEDURE — 36415 COLL VENOUS BLD VENIPUNCTURE: CPT | Performed by: STUDENT IN AN ORGANIZED HEALTH CARE EDUCATION/TRAINING PROGRAM

## 2023-10-05 PROCEDURE — 82248 BILIRUBIN DIRECT: CPT

## 2023-10-05 PROCEDURE — 84300 ASSAY OF URINE SODIUM: CPT

## 2023-10-05 PROCEDURE — 82610 CYSTATIN C: CPT | Performed by: STUDENT IN AN ORGANIZED HEALTH CARE EDUCATION/TRAINING PROGRAM

## 2023-10-05 PROCEDURE — 250N000013 HC RX MED GY IP 250 OP 250 PS 637: Performed by: STUDENT IN AN ORGANIZED HEALTH CARE EDUCATION/TRAINING PROGRAM

## 2023-10-05 PROCEDURE — 71045 X-RAY EXAM CHEST 1 VIEW: CPT

## 2023-10-05 PROCEDURE — 258N000003 HC RX IP 258 OP 636

## 2023-10-05 PROCEDURE — 83605 ASSAY OF LACTIC ACID: CPT | Performed by: STUDENT IN AN ORGANIZED HEALTH CARE EDUCATION/TRAINING PROGRAM

## 2023-10-05 PROCEDURE — 76770 US EXAM ABDO BACK WALL COMP: CPT | Mod: 26 | Performed by: RADIOLOGY

## 2023-10-05 PROCEDURE — 250N000013 HC RX MED GY IP 250 OP 250 PS 637

## 2023-10-05 PROCEDURE — 81001 URINALYSIS AUTO W/SCOPE: CPT

## 2023-10-05 PROCEDURE — 82310 ASSAY OF CALCIUM: CPT | Performed by: STUDENT IN AN ORGANIZED HEALTH CARE EDUCATION/TRAINING PROGRAM

## 2023-10-05 PROCEDURE — 71045 X-RAY EXAM CHEST 1 VIEW: CPT | Mod: 26 | Performed by: RADIOLOGY

## 2023-10-05 PROCEDURE — 82803 BLOOD GASES ANY COMBINATION: CPT | Performed by: STUDENT IN AN ORGANIZED HEALTH CARE EDUCATION/TRAINING PROGRAM

## 2023-10-05 PROCEDURE — 83735 ASSAY OF MAGNESIUM: CPT | Performed by: STUDENT IN AN ORGANIZED HEALTH CARE EDUCATION/TRAINING PROGRAM

## 2023-10-05 PROCEDURE — 87149 DNA/RNA DIRECT PROBE: CPT

## 2023-10-05 PROCEDURE — 84132 ASSAY OF SERUM POTASSIUM: CPT | Performed by: STUDENT IN AN ORGANIZED HEALTH CARE EDUCATION/TRAINING PROGRAM

## 2023-10-05 PROCEDURE — 250N000011 HC RX IP 250 OP 636: Mod: JZ | Performed by: FAMILY MEDICINE

## 2023-10-05 PROCEDURE — 85004 AUTOMATED DIFF WBC COUNT: CPT

## 2023-10-05 PROCEDURE — 36415 COLL VENOUS BLD VENIPUNCTURE: CPT

## 2023-10-05 PROCEDURE — 76770 US EXAM ABDO BACK WALL COMP: CPT

## 2023-10-05 PROCEDURE — 86140 C-REACTIVE PROTEIN: CPT

## 2023-10-05 PROCEDURE — 82550 ASSAY OF CK (CPK): CPT | Performed by: STUDENT IN AN ORGANIZED HEALTH CARE EDUCATION/TRAINING PROGRAM

## 2023-10-05 PROCEDURE — 99232 SBSQ HOSP IP/OBS MODERATE 35: CPT | Mod: GC

## 2023-10-05 PROCEDURE — 84100 ASSAY OF PHOSPHORUS: CPT | Performed by: STUDENT IN AN ORGANIZED HEALTH CARE EDUCATION/TRAINING PROGRAM

## 2023-10-05 PROCEDURE — 87637 SARSCOV2&INF A&B&RSV AMP PRB: CPT

## 2023-10-05 PROCEDURE — 87086 URINE CULTURE/COLONY COUNT: CPT

## 2023-10-05 RX ORDER — CLOZAPINE 50 MG/1
50 TABLET ORAL AT BEDTIME
Status: DISCONTINUED | OUTPATIENT
Start: 2023-10-05 | End: 2023-10-12

## 2023-10-05 RX ORDER — SODIUM CHLORIDE, SODIUM LACTATE, POTASSIUM CHLORIDE, CALCIUM CHLORIDE 600; 310; 30; 20 MG/100ML; MG/100ML; MG/100ML; MG/100ML
INJECTION, SOLUTION INTRAVENOUS CONTINUOUS
Status: DISCONTINUED | OUTPATIENT
Start: 2023-10-05 | End: 2023-10-06

## 2023-10-05 RX ORDER — POTASSIUM CHLORIDE 750 MG/1
40 TABLET, EXTENDED RELEASE ORAL ONCE
Status: COMPLETED | OUTPATIENT
Start: 2023-10-05 | End: 2023-10-05

## 2023-10-05 RX ORDER — POTASSIUM CHLORIDE 7.45 MG/ML
10 INJECTION INTRAVENOUS
Status: COMPLETED | OUTPATIENT
Start: 2023-10-05 | End: 2023-10-06

## 2023-10-05 RX ADMIN — DIVALPROEX SODIUM 500 MG: 125 CAPSULE, COATED PELLETS ORAL at 09:11

## 2023-10-05 RX ADMIN — PANTOPRAZOLE SODIUM 40 MG: 40 TABLET, DELAYED RELEASE ORAL at 09:11

## 2023-10-05 RX ADMIN — POTASSIUM CHLORIDE 40 MEQ: 750 TABLET, EXTENDED RELEASE ORAL at 16:40

## 2023-10-05 RX ADMIN — POTASSIUM CHLORIDE 10 MEQ: 10 INJECTION, SOLUTION INTRAVENOUS at 21:43

## 2023-10-05 RX ADMIN — ONDANSETRON 4 MG: 4 TABLET, FILM COATED ORAL at 09:12

## 2023-10-05 RX ADMIN — POTASSIUM CHLORIDE 10 MEQ: 10 INJECTION, SOLUTION INTRAVENOUS at 19:57

## 2023-10-05 RX ADMIN — ONDANSETRON 4 MG: 4 TABLET, FILM COATED ORAL at 16:40

## 2023-10-05 RX ADMIN — POTASSIUM CHLORIDE 10 MEQ: 10 INJECTION, SOLUTION INTRAVENOUS at 22:46

## 2023-10-05 RX ADMIN — SODIUM CHLORIDE, POTASSIUM CHLORIDE, SODIUM LACTATE AND CALCIUM CHLORIDE: 600; 310; 30; 20 INJECTION, SOLUTION INTRAVENOUS at 16:43

## 2023-10-05 ASSESSMENT — ACTIVITIES OF DAILY LIVING (ADL)
ADLS_ACUITY_SCORE: 26
ADLS_ACUITY_SCORE: 28
ADLS_ACUITY_SCORE: 26
ADLS_ACUITY_SCORE: 28
ADLS_ACUITY_SCORE: 26

## 2023-10-05 NOTE — PROGRESS NOTES
[facility]    Progress Note - [facility] Service       Date of Admission:  9/20/2023    Main plans for today:   - HOLD Clozapine   -  mL/hr   - Infectious workup: CXR, COVID/flu, UA, enteric panel, C diff, blood cultures, LA  - ETHAN workup: UA, renal US   - Strict intake/output    Assessment & Plan   Angela Basurto is a 58-year-old female PMH bipolar 1 disorder admitted to [facility] 8/16 for elvis and psychosis in setting of medication non-adherence. [facility] 9[facility] for sepsis 2/2 HAP, now resolved. Monitoring hyperglycemia and ETHAN. Patient is on Mata with full commitment, current 1:1.     # Leukocytosis  # Recent HAP  # Aspiration risk   # Diarrhea   # Somnolence   Patient with persistent leukocytosis. Transferred to Medicine for HAP that has since resolved. WBCC initially trended following HAP treatment, but is now slightly up-trending. Procalcitonin moderately elevated (0.28). Patient at risk for recurrent pulmonary infection with drooling and aspiration. Has been having diarrhea and will check stool panel, as well as urine for common sources of infection. Intracranial infection considered with somnolence, but global somnolence not observed as patient able to ambulate to bathroom multiple times. Less likely intraabdominal without tenderness or SSTI without skin findings on exam.   - Enteric panel and C. Diff  - UA via straight catheter   - CXR  - COVID and flu swabs   - Blood cultures, LA, hepatic panel    # ETHAN, worsening, suspect pre-renal vs. ATN  Baseline creatinine 0.5. Initially had ETHAN that peaked at 1.99 9/22. Now with worsening creatinine again (1.91 10/5). Initial concern for pre-renal ETHAN with known poor PO intake. Will trial fluid challenge and observe effect. May also be ATN related to recent Zosyn (last dose 9/26) or current Clozapine (current). Has not received other nephrotoxic medications including NSAID's. Less  likely obstructive but will evaluate with US. Obtaining urine has been a challenge as consistently mixed with stool, so will pursue with catheter.   -  mL/hr (end at 0630 10/6)   - UA via straight catheter   - Renal US bilateral    - Strict intake/output   - BMP daily   - Avoid nephrotoxic medications   - HOLD Hydrochlorothiazide     # Bipolar I disorder  # Hx of possible seizure (1983)  # Mata: Haldol, clozapine, risperidone, olanzapine  # Full commitment   # Somnolence   Presented with elvis and psychosis in the context of medication nonadherence. Was admitted with Psychiatry from 8/16 until transfer to this service 9/20. Has required a 1:1 to maintain her safety. Psychiatry has been following and managing medications. Clozapine initiated during current admission. Concern that Clozapine is contributing to somnolence given current ETHAN and less renal clearance. Per Psych, inflammatory cytokines can increase Clozapine levels. Will need to rule out infection. If no source of infection and not improving, will need to rule out malignancy.   - See psychiatry note for details of medication titrations  - Nursing care: 1:1 sitter, encouraging sitting in chair or up ~6 hours a day, walk with nurse 1:1 4x daily  - Legal status: MATA and full commitment   - Medications: Depakote DT sprinkles 500 mg BID, Clozapine 50 mg daily   - Agitation plan:    - Non-emergency: PRN Haldol 2 mg PO  - Emergency: Haldol 5mg + diphenhydramine 50mg q8h PRN, PO or IM  - STOP Hydroxyzine per psych (see 9/29 note)    # Hyperglycemia   # Pre-diabetes (A1c 09/27/23, 6.2)  Patient with persistent hyperglycemia. A1c 6.2% 9/27/23. Suspected related to Clozapine given known metabolic side effects. May also be related to infection- workup ongoing.   - Increase Lantus to 16U daily   - MDSSI   - Metformin discontinued due to diarrhea   - Hypoglycemia protocol  - UA for infection      # HTN  Has been intermittently hypertensive. Holding medications  in setting of ETHAN and psychotropic interactions.   - HOLD Hydrochlorothiazide 25 mg daily  - HOLD PTA clonidine per Psych    Chronic/resolved/stable:    # Orthostatic hypotension, resolved  Symptomatic 9/18 with blood pressure drop from 123/72 while laying to 96/55 with standing. Patient reported this was new since admission and starting new medications, and has since improved. Patient initiated on clozapine on 8/31 with many dose changes. Somnolence and poor PO intake have been challenges during this admission, both likely contributing. Workups for endocrine and cardiac causes were negative.   - Encourage oral hydration   - Compression stockings  - Likely to continue to be tricky with clozapine dose changes    # Possible hospital-acquired pneumonia, resolved/treated  # Positive blood culture x1 - consistent with contaminant  # Severe sepsis, resolved  # Acute hypoxic respiratory failure, resolved  # Leukocytosis, ongoing  Initially presented 9/19 from inpatient psych with temperature 101.3F, lactic acid 4.3 in the setting of new cough and widespread body aches. , WBC 9.8. 9/18 CXR with streaky right greater than left perihilar and basilar opacities, likely atelectasis and/or edema without focal consolidation. Respiratory viral panels (9/19, 9/22) have been negative. Echocardiogram not consistent with myocarditis. Briefly treated with vancomycin 9/19-9/23 due to a blood culture growing gram positive cocci; this was later determined to be Staph Hominis, suspected contaminant. Pulmonary exam without focal findings consistent with bacterial pneumonia, though patient had an ongoing cough and appears to have a significant burden of respiratory secretions seen on 9/24 (resolved). RT evaluated and recommended use of aerobika flutter valve. White count have been elevated although stable with still no clear focus of infection, could be clozapine side effect. Patient is medically stable to return to psychiatry unit.    - Infectious disease consulted, now off  - S/p piperacillin/tazobactam (9/20- 9/26)  - Respiratory therapist saw Angela and she tolerated vest therapy x1  - Discontinue vest therapy with improved breathing and reassuring physical examination   - Aerobika flutter valve   - CBC q72h    # Hypokalemia, resolved  # Abdominal tenderness, resolved    Patient report intermittent abdominal tenderness, unlikely pancreatitis with location abdominal tenderness and lipase x2 < 3x normal upper limits. Low concern for bowel obstruction with abdominal xray revealing nonobstructive bowel gas pattern and no significant fecal retention.   - Loperamide capsule 2 mg PRN  - RN-driven potassium replacement protocol - High     # Hypocalcemia  # Hypoalbuminemia  Suspect possible malnutrition due to poor PO intake.   - Encourage good PO intake   - Pantoprazole EC 40 mg tablets  - DEBORAH  - Zofran PRN, 30 mins before meals  - Nutrition IP consult, appreciate recommendations               - Medical food supplement therapy  - Patient to consume % of nutritionally adequate meal trays TID, or the equivalent with supplements/snacks.     # Denominational  Important to her value system.   - Spiritual care consult         Diet: Combination Diet Regular Diet; Safe Tray - with utensils  Snacks/Supplements Adult: Other; Ensure Enlive mixed with ice cream; With Meals  Calorie Counts    DVT Prophylaxis: Low Risk/Ambulatory with no VTE prophylaxis indicated  Wilson Catheter: Not present  Fluids: PO  Lines: None     Cardiac Monitoring: None  Code Status: Full Code      Clinically Significant Risk Factors        # Hypokalemia: Lowest K = 2.9 mmol/L in last 2 days, will replace as needed       # Hypoalbuminemia: Lowest albumin = 2 g/dL at 9/24/2023  8:46 AM, will monitor as appropriate    # Acute Kidney Injury, unspecified: based on a >150% or 0.3 mg/dL increase in last creatinine compared to past 90 day average, will monitor renal function            # Moderate  Malnutrition: based on nutrition assessment             Disposition Plan     Expected Discharge Date: 10/06/2023        Discharge Comments: med ready for discharge back to psych Medically ready for transfer back to  psychiatry.     The patient's care was discussed with the Attending Physician, Dr. Noe .    Malka Nguyen MD  [facility]  [facility]  Securely message with [employer] (more info)  Text page via [employer] Paging/Directory   See signed in provider for up to date coverage information  ______________________________________________________________________    Interval History     Overnight:  NAOE.     Subjective:   Patient somnolent on repeat exams. Not able to provide history.     Physical Exam   Vital Signs: Temp: 97.5  F (36.4  C) Temp src: Oral BP: (!) 153/61 Pulse: 96   Resp: 18 SpO2: 92 % O2 Device: None (Room air)    Weight: 214 lbs 0 oz    Constitutional: Somnolent, no apparent distress  Respiratory: No increased work of breathing, good air exchange, clear to auscultation bilaterally, no crackles or wheezing  Cardiovascular: Normal apical impulse, regular rate and rhythm, normal S1 and S2. Extremities warm.   Abdomen: Soft, NTND.   MSK: No edema noted.   Neurologic: Lethargic.      Medical Decision Making   Please see A&P for additional details of medical decision making.      Data   ------------------------- PAST 24 HR DATA REVIEWED -----------------------------------------------    I have personally reviewed the following data over the past 24 hrs:    14.0 (H)  \   9.6 (L)   / 272     N/A N/A N/A /  186 (H)   N/A N/A N/A \     Imaging results reviewed over the past 24 hrs:   No results found for this or any previous visit (from the past 24 hour(s)).

## 2023-10-05 NOTE — PROGRESS NOTES
VS: BP (!) 144/49 (BP Location: Right arm, Patient Position: Left side, Cuff Size: Adult Large)   Pulse 94   Temp 98.1  F (36.7  C) (Axillary)   Resp 18   Wt 97.1 kg (214 lb)   SpO2 92%   BMI 31.60 kg/m         O2: SpO2 > 92% and stable on RA. LS clear and equal bilaterally. Denies chest pain and SOB.    Output: Voids spontaneously without difficulty to bathroom.   Last BM: 10/5/2023, pt had x4 loose stool this shift.     Activity: Up ad ascencion   Skin: intact   Pain: Pt denied pain.   CMS: Intact, AOx4. Denies numbness and tingling.   Dressing: None   Diet: Regular diet 7 calori count. Denies nausea/vomiting.    LDA: L PIV SL    Equipment: IV pole, personal belongings,    Plan: Continue with plan of care. Call light within reach, pt able to make needs known.    Additional Info:

## 2023-10-05 NOTE — PROGRESS NOTES
"Care Management Follow Up    Length of Stay (days): 15    Expected Discharge Date: TBD     Concerns to be Addressed:  Discharge planning     Patient plan of care discussed at interdisciplinary rounds: Yes    Anticipated Discharge Disposition:  IP Psych vs home with family     Anticipated Discharge Services:  ACT team, Commitment CM    ACT SW  Constantino Joseph  PH: [phone]  Fax: [phone]  [email or web address]    Civil Commitment Information:  Type of Commitment: [facility]: [facility] File Number: [id number]  Date of Commitment: September 1, 2023  Date Commitment Ends: March 1, 2023  Commitment : Unknown at this time.  Provisional Discharge needed at discharge.    Anticipated Discharge DME:  TBD    Patient/family educated on Medicare website which has current facility and service quality ratings:  No  Education Provided on the Discharge Plan:  Yes  Patient/Family in Agreement with the Plan: Yes     Referrals Placed by CM/SW:  None at this time  Private pay costs discussed: Not applicable    Additional Information:    Pt's ACT team stopped by to meet with pt and provided SW with contact info (documented above).    SW spoke to pt's daughter, Mya, via phone re: discharge plan. Mya was agreeable to pt returning home with family assistance, but also expressed, \"whatever is best for her.\" Mya reported that there have been no  or [facility]  that have been in contact with her.    1350: SW left  for [facility], requested a call back re: who's been assigned as pt's Commitment CM.        Aleida Huang, RAMANDEEPW, LSW  [facility]   PHONE: [phone]  Pager: [phone]      "

## 2023-10-05 NOTE — PLAN OF CARE
Goal Outcome Evaluation:           Overall Patient Progress: no changeOverall Patient Progress: no change    Outcome Evaluation: Pt remains in 1:1. Pt has been calm & cooperative this shift. Drowsy & sleeping between the cares. Couldn't get clean Urine sample this shift because pt keeps having loose stool with the urine.    VS: Temp: 97.5  F (36.4  C) Temp src: Oral BP: (!) 153/61 Pulse: 96   Resp: 18 SpO2: 92 % O2 Device: None (Room air)      O2: SpO2 > 92% and stable on RA. LS clear and equal bilaterally. Denies chest pain and SOB.    Output: Voids spontaneously without difficulty to bathroom.   Last BM: 10/5/2023, pt had x4 loose stool this shift.     Activity: Up ad ascencion   Skin: intact   Pain: Pt denied pain.   CMS: Intact, AOx4. Denies numbness and tingling.   Dressing: None   Diet: Regular diet 7 calori count. Denies nausea/vomiting.    LDA: L PIV SL    Equipment: IV pole, personal belongings,    Plan: Continue with plan of care. Call light within reach, pt able to make needs known.    Additional Info:

## 2023-10-05 NOTE — CONSULTS
Psychiatry Consultation; Follow up              Reason for Consult, requesting source:    Persistent somnolence.    Labs and imaging reviewed, discussed with Dr. Faith and Dr. Tovar from psychiatry and they agree with plan     Total time spent in chart review, patient interview and coordination of care; 60 minutes - all time was spent on the date of the encounter that I saw patient             Interim history:    This is a 58 year old  [place] mother of 5 with a long history of anxiety, schizoaffective disorder, bipolar type a history of poor medication compliance, with multiple hospitalizations since 2008 mainly at [facility] who was initially brought to our ED via ambulance from home because of auditory hallucinations telling her to harm her aunt and uncle and threatened to kill family with a knife and was admitted to inpatient psychiatry and Clozaril was initiated. Patient was transferred to medical related to [facility] and Clozaril was held related to concerns for orthostatic hypotension. Her Clozaril has been increased to 200mg by consult liaison psychiatry team. She continues to be very somnolent despite a reduction in clozapine to 25 mg am and 150 mg HS now. She had been off it for about 72 hours, then restarted at 100, and titrated to 200 mg. I drew a level which came back [phone] with her on 200mg of Clozaril daily. 10/3 I decreased Clozaril to 100mg daily and 10/4 she was sitting up, talking, and smiled at me.     10/5 she was somnolent again, not arousable. Bedside sitters report she did not eat breakfast, has only been up to go to the bathroom and of note her bowel movements were quite pungent. An ACT team psychiatrist and  came to assess Angela to see if she was appropriate for their services but she was not interviewable. I discussed the case with them and answered questions regarding medication                  Current Medications:      cloZAPine  50 mg Oral At Bedtime    divalproex sodium  "delayed-release  500 mg Oral Q12H ESTHER ()    [Held by provider] hydrochlorothiazide  25 mg Oral Daily    insulin aspart  1-7 Units Subcutaneous TID AC    insulin aspart  1-5 Units Subcutaneous At Bedtime    insulin glargine  16 Units Subcutaneous QAM AC    ondansetron  4 mg Oral BID AC    pantoprazole  40 mg Oral QAM AC              MSE:   Appearance:  somnolent  Attitude:  somewhat cooperative  Eye Contact:  poor   Mood:  \"fair\"  Affect:  slightly restricted  Speech:  mumbling  Psychomotor Behavior:  no evidence of tardive dyskinesia, dystonia, or tics  Muscle strength and tone: baseline  Thought Process:  disorganized  Associations:  no loose associations  Thought Content:  no evidence of suicidal ideation or homicidal ideation  Insight:  partial  Judgement:  limited  Oriented to:   person  Attention Span and Concentration:  limited  Recent and Remote Memory:  limited    Vital signs:  Temp: 97.5  F (36.4  C) Temp src: Oral BP: (!) 153/61 Pulse: 96   Resp: 18 SpO2: 92 % O2 Device: None (Room air) Oxygen Delivery: 2 LPM   Weight: 97.1 kg (214 lb)  Estimated body mass index is 31.6 kg/m  as calculated from the following:    Height as of 23: 1.753 m (5' 9\").    Weight as of this encounter: 97.1 kg (214 lb).    Qtc: 447 on           DSM-5 Diagnosis:   History of bipolar vs. Schizoaffective  Delirium          Assessment/Plan:   Patient continued to be somnolent without an identifiable medical cause. It has been found that inflammatory cytokines decrease metabolism of CY pathway which is responsible for metabolizing Clozapine. As her CRP remains elevated, this is likely the cause of her somnolence along with her worsening kidney functioning.     She seemed to be doing better on 100mg daily when I saw her yesterday, today is still very somnolent and unarousable. I am concerned with her continued reluctance to eat and diarrhea with continued elevated white count. I want to recheck ammonia given she's on " "Depakote, recheck CRP, and also draw another Clozapine level (this one will come back within a day). Given her somnolence, I will drop Clozaril to 50mg at bedtime.        Recheck Ammonia, CRP, Clozapine level (ordered)  Decreased Clozapine to 50mg  She remains on the list for IP psychiatry which will be helpful for finding an helpful medication regimen without side effects. If she continues to stabilize while on medical, we may be able to talk to family about discharge home.    Possible ID consult for continued malaise, persistent somnolence despite reduction of clozaril, diarrhea, elevated white count?                     Suzie Kohler, PMHNP-BC  Consult/Liaison Psychiatry   [facility]       \"Much or all of the text in this note was generated through the use of Dragon Dictate voice to text software. Errors in spelling or words which appear to be out of contact are unintentional, may be present due having escaped editing\"           "

## 2023-10-05 NOTE — TELEPHONE ENCOUNTER
R: MN  Access Inpatient Bed Call Log 10/5/23 at 12AM:   Intake has called facilities that have not updated the bed status within the last 12 hours.                                   [facility] is at capacity.                          Pt remains on the work list pending appropriate bed availability.

## 2023-10-05 NOTE — TELEPHONE ENCOUNTER
R: MN  Access Inpatient Bed Call Log 10/5/23 8:25 AM    Intake has called facilities that have not updated the bed status within the last 12 hours.                             [facility] is at capacity.               Pt remains on the work list pending appropriate bed availability.

## 2023-10-06 ENCOUNTER — TELEPHONE (OUTPATIENT)
Dept: BEHAVIORAL HEALTH | Facility: CLINIC | Age: 58
End: 2023-10-06
Payer: COMMERCIAL

## 2023-10-06 LAB
ADV 40+41 DNA STL QL NAA+NON-PROBE: NEGATIVE
ALBUMIN UR-MCNC: 100 MG/DL
ANION GAP SERPL CALCULATED.3IONS-SCNC: 12 MMOL/L (ref 7–15)
APPEARANCE UR: ABNORMAL
ASTRO TYP 1-8 RNA STL QL NAA+NON-PROBE: NEGATIVE
BACTERIA #/AREA URNS HPF: ABNORMAL /HPF
BASO+EOS+MONOS # BLD AUTO: ABNORMAL 10*3/UL
BASO+EOS+MONOS NFR BLD AUTO: ABNORMAL %
BASOPHILS # BLD AUTO: 0 10E3/UL (ref 0–0.2)
BASOPHILS NFR BLD AUTO: 0 %
BILIRUB UR QL STRIP: NEGATIVE
BUN SERPL-MCNC: 25 MG/DL (ref 6–20)
C CAYETANENSIS DNA STL QL NAA+NON-PROBE: NEGATIVE
CALCIUM SERPL-MCNC: 8.4 MG/DL (ref 8.6–10)
CAMPYLOBACTER DNA SPEC NAA+PROBE: NEGATIVE
CHLORIDE SERPL-SCNC: 107 MMOL/L (ref 98–107)
COLOR UR AUTO: YELLOW
CREAT SERPL-MCNC: 1.86 MG/DL (ref 0.51–0.95)
CRYPTOSP DNA STL QL NAA+NON-PROBE: NEGATIVE
CYSTATIN C (ROCHE): 2 MG/L (ref 0.6–1)
DEPRECATED HCO3 PLAS-SCNC: 20 MMOL/L (ref 22–29)
E COLI O157 DNA STL QL NAA+NON-PROBE: NORMAL
E HISTOLYT DNA STL QL NAA+NON-PROBE: NEGATIVE
EAEC ASTA GENE ISLT QL NAA+PROBE: NEGATIVE
EC STX1+STX2 GENES STL QL NAA+NON-PROBE: NEGATIVE
EGFRCR SERPLBLD CKD-EPI 2021: 31 ML/MIN/1.73M2
ENTEROCOCCUS FAECALIS: NOT DETECTED
ENTEROCOCCUS FAECIUM: NOT DETECTED
EOSINOPHIL # BLD AUTO: 0 10E3/UL (ref 0–0.7)
EOSINOPHIL NFR BLD AUTO: 0 %
EPEC EAE GENE STL QL NAA+NON-PROBE: NEGATIVE
ERYTHROCYTE [DISTWIDTH] IN BLOOD BY AUTOMATED COUNT: 13.9 % (ref 10–15)
ETEC LTA+ST1A+ST1B TOX ST NAA+NON-PROBE: NEGATIVE
G LAMBLIA DNA STL QL NAA+NON-PROBE: NEGATIVE
GFR SERPL CREATININE-BSD FRML MDRD: 29 ML/MIN/1.73M2
GLUCOSE BLDC GLUCOMTR-MCNC: 127 MG/DL (ref 70–99)
GLUCOSE BLDC GLUCOMTR-MCNC: 143 MG/DL (ref 70–99)
GLUCOSE BLDC GLUCOMTR-MCNC: 147 MG/DL (ref 70–99)
GLUCOSE BLDC GLUCOMTR-MCNC: 153 MG/DL (ref 70–99)
GLUCOSE BLDC GLUCOMTR-MCNC: 159 MG/DL (ref 70–99)
GLUCOSE SERPL-MCNC: 180 MG/DL (ref 70–99)
GLUCOSE UR STRIP-MCNC: NEGATIVE MG/DL
HCT VFR BLD AUTO: 32.9 % (ref 35–47)
HGB BLD-MCNC: 10.2 G/DL (ref 11.7–15.7)
HGB UR QL STRIP: ABNORMAL
HYALINE CASTS: 9 /LPF
IMM GRANULOCYTES # BLD: 0.2 10E3/UL
IMM GRANULOCYTES NFR BLD: 2 %
KETONES UR STRIP-MCNC: NEGATIVE MG/DL
LEUKOCYTE ESTERASE UR QL STRIP: ABNORMAL
LISTERIA SPECIES (DETECTED/NOT DETECTED): NOT DETECTED
LYMPHOCYTES # BLD AUTO: 1.4 10E3/UL (ref 0.8–5.3)
LYMPHOCYTES NFR BLD AUTO: 10 %
MAGNESIUM SERPL-MCNC: 1.7 MG/DL (ref 1.7–2.3)
MCH RBC QN AUTO: 29.9 PG (ref 26.5–33)
MCHC RBC AUTO-ENTMCNC: 31 G/DL (ref 31.5–36.5)
MCV RBC AUTO: 97 FL (ref 78–100)
MONOCYTES # BLD AUTO: 2.1 10E3/UL (ref 0–1.3)
MONOCYTES NFR BLD AUTO: 15 %
MRSA DNA SPEC QL NAA+PROBE: NEGATIVE
MUCOUS THREADS #/AREA URNS LPF: PRESENT /LPF
NEUTROPHILS # BLD AUTO: 9.9 10E3/UL (ref 1.6–8.3)
NEUTROPHILS NFR BLD AUTO: 73 %
NITRATE UR QL: NEGATIVE
NOROVIRUS GI+II RNA STL QL NAA+NON-PROBE: NEGATIVE
NRBC # BLD AUTO: 0 10E3/UL
NRBC BLD AUTO-RTO: 0 /100
NT-PROBNP SERPL-MCNC: 434 PG/ML (ref 0–900)
P SHIGELLOIDES DNA STL QL NAA+NON-PROBE: NEGATIVE
PH UR STRIP: 5.5 [PH] (ref 5–7)
PHOSPHATE SERPL-MCNC: 4.4 MG/DL (ref 2.5–4.5)
PLATELET # BLD AUTO: 290 10E3/UL (ref 150–450)
POTASSIUM SERPL-SCNC: 3.7 MMOL/L (ref 3.4–5.3)
POTASSIUM SERPL-SCNC: 3.8 MMOL/L (ref 3.4–5.3)
RBC # BLD AUTO: 3.41 10E6/UL (ref 3.8–5.2)
RBC URINE: 11 /HPF
RVA RNA STL QL NAA+NON-PROBE: NEGATIVE
SA TARGET DNA: POSITIVE
SALMONELLA SP RPOD STL QL NAA+PROBE: NEGATIVE
SAPO I+II+IV+V RNA STL QL NAA+NON-PROBE: NEGATIVE
SHIGELLA SP+EIEC IPAH ST NAA+NON-PROBE: NEGATIVE
SODIUM SERPL-SCNC: 139 MMOL/L (ref 135–145)
SP GR UR STRIP: 1.01 (ref 1–1.03)
SQUAMOUS EPITHELIAL: 4 /HPF
STAPHYLOCOCCUS AUREUS: NOT DETECTED
STAPHYLOCOCCUS EPIDERMIDIS: NOT DETECTED
STAPHYLOCOCCUS LUGDUNENSIS: NOT DETECTED
STAPHYLOCOCCUS SPECIES: NOT DETECTED
STREPTOCOCCUS AGALACTIAE: NOT DETECTED
STREPTOCOCCUS ANGINOSUS GROUP: NOT DETECTED
STREPTOCOCCUS PNEUMONIAE: NOT DETECTED
STREPTOCOCCUS PYOGENES: NOT DETECTED
STREPTOCOCCUS SPECIES: DETECTED
UROBILINOGEN UR STRIP-MCNC: 0.2 MG/DL
V CHOLERAE DNA SPEC QL NAA+PROBE: NEGATIVE
VIBRIO DNA SPEC NAA+PROBE: NEGATIVE
WBC # BLD AUTO: 13.6 10E3/UL (ref 4–11)
WBC CLUMPS #/AREA URNS HPF: PRESENT /HPF
WBC URINE: 81 /HPF
Y ENTEROCOL DNA STL QL NAA+PROBE: NEGATIVE

## 2023-10-06 PROCEDURE — 250N000011 HC RX IP 250 OP 636: Performed by: FAMILY MEDICINE

## 2023-10-06 PROCEDURE — 84100 ASSAY OF PHOSPHORUS: CPT | Performed by: STUDENT IN AN ORGANIZED HEALTH CARE EDUCATION/TRAINING PROGRAM

## 2023-10-06 PROCEDURE — 250N000013 HC RX MED GY IP 250 OP 250 PS 637

## 2023-10-06 PROCEDURE — 84132 ASSAY OF SERUM POTASSIUM: CPT

## 2023-10-06 PROCEDURE — 83735 ASSAY OF MAGNESIUM: CPT | Performed by: STUDENT IN AN ORGANIZED HEALTH CARE EDUCATION/TRAINING PROGRAM

## 2023-10-06 PROCEDURE — 120N000002 HC R&B MED SURG/OB UMMC

## 2023-10-06 PROCEDURE — 258N000003 HC RX IP 258 OP 636

## 2023-10-06 PROCEDURE — 36415 COLL VENOUS BLD VENIPUNCTURE: CPT

## 2023-10-06 PROCEDURE — 87641 MR-STAPH DNA AMP PROBE: CPT | Performed by: FAMILY MEDICINE

## 2023-10-06 PROCEDURE — 87040 BLOOD CULTURE FOR BACTERIA: CPT

## 2023-10-06 PROCEDURE — 83880 ASSAY OF NATRIURETIC PEPTIDE: CPT | Performed by: FAMILY MEDICINE

## 2023-10-06 PROCEDURE — 250N000011 HC RX IP 250 OP 636: Performed by: STUDENT IN AN ORGANIZED HEALTH CARE EDUCATION/TRAINING PROGRAM

## 2023-10-06 PROCEDURE — 258N000003 HC RX IP 258 OP 636: Performed by: FAMILY MEDICINE

## 2023-10-06 PROCEDURE — 85025 COMPLETE CBC W/AUTO DIFF WBC: CPT

## 2023-10-06 PROCEDURE — 80048 BASIC METABOLIC PNL TOTAL CA: CPT | Performed by: FAMILY MEDICINE

## 2023-10-06 PROCEDURE — 80342 ANTIPSYCHOTICS NOS 1-3: CPT

## 2023-10-06 PROCEDURE — 84999 UNLISTED CHEMISTRY PROCEDURE: CPT

## 2023-10-06 PROCEDURE — 99232 SBSQ HOSP IP/OBS MODERATE 35: CPT | Mod: GC

## 2023-10-06 RX ORDER — CEFAZOLIN SODIUM 1 G/50ML
1750 SOLUTION INTRAVENOUS ONCE
Status: COMPLETED | OUTPATIENT
Start: 2023-10-06 | End: 2023-10-06

## 2023-10-06 RX ORDER — CEFTRIAXONE 1 G/1
1 INJECTION, POWDER, FOR SOLUTION INTRAMUSCULAR; INTRAVENOUS EVERY 24 HOURS
Status: DISCONTINUED | OUTPATIENT
Start: 2023-10-06 | End: 2023-10-06

## 2023-10-06 RX ORDER — SODIUM CHLORIDE, SODIUM LACTATE, POTASSIUM CHLORIDE, CALCIUM CHLORIDE 600; 310; 30; 20 MG/100ML; MG/100ML; MG/100ML; MG/100ML
INJECTION, SOLUTION INTRAVENOUS CONTINUOUS
Status: DISCONTINUED | OUTPATIENT
Start: 2023-10-06 | End: 2023-10-10

## 2023-10-06 RX ORDER — DIVALPROEX SODIUM 125 MG/1
500 CAPSULE, COATED PELLETS ORAL AT BEDTIME
Status: DISCONTINUED | OUTPATIENT
Start: 2023-10-07 | End: 2023-10-11

## 2023-10-06 RX ADMIN — CEFTRIAXONE SODIUM 1 G: 1 INJECTION, POWDER, FOR SOLUTION INTRAMUSCULAR; INTRAVENOUS at 11:03

## 2023-10-06 RX ADMIN — ONDANSETRON 4 MG: 4 TABLET, FILM COATED ORAL at 08:08

## 2023-10-06 RX ADMIN — DIVALPROEX SODIUM 500 MG: 125 CAPSULE, COATED PELLETS ORAL at 08:07

## 2023-10-06 RX ADMIN — LOPERAMIDE HYDROCHLORIDE 2 MG: 2 CAPSULE ORAL at 10:46

## 2023-10-06 RX ADMIN — PANTOPRAZOLE SODIUM 40 MG: 40 TABLET, DELAYED RELEASE ORAL at 08:08

## 2023-10-06 RX ADMIN — SODIUM CHLORIDE, POTASSIUM CHLORIDE, SODIUM LACTATE AND CALCIUM CHLORIDE: 600; 310; 30; 20 INJECTION, SOLUTION INTRAVENOUS at 19:08

## 2023-10-06 RX ADMIN — POTASSIUM CHLORIDE 10 MEQ: 10 INJECTION, SOLUTION INTRAVENOUS at 02:12

## 2023-10-06 RX ADMIN — LOPERAMIDE HYDROCHLORIDE 2 MG: 2 CAPSULE ORAL at 02:23

## 2023-10-06 RX ADMIN — VANCOMYCIN HYDROCHLORIDE 1750 MG: 10 INJECTION, POWDER, LYOPHILIZED, FOR SOLUTION INTRAVENOUS at 18:57

## 2023-10-06 RX ADMIN — SODIUM CHLORIDE, POTASSIUM CHLORIDE, SODIUM LACTATE AND CALCIUM CHLORIDE: 600; 310; 30; 20 INJECTION, SOLUTION INTRAVENOUS at 01:48

## 2023-10-06 RX ADMIN — ONDANSETRON 4 MG: 4 TABLET, FILM COATED ORAL at 15:45

## 2023-10-06 RX ADMIN — SODIUM CHLORIDE, POTASSIUM CHLORIDE, SODIUM LACTATE AND CALCIUM CHLORIDE 1000 ML: 600; 310; 30; 20 INJECTION, SOLUTION INTRAVENOUS at 13:16

## 2023-10-06 ASSESSMENT — ACTIVITIES OF DAILY LIVING (ADL)
ADLS_ACUITY_SCORE: 29
ADLS_ACUITY_SCORE: 30
ADLS_ACUITY_SCORE: 28
ADLS_ACUITY_SCORE: 31
ADLS_ACUITY_SCORE: 28
ADLS_ACUITY_SCORE: 30
ADLS_ACUITY_SCORE: 29

## 2023-10-06 NOTE — SIGNIFICANT EVENT
Provider called back re: critical lab value. Awaiting additional blood test results before beginning IV abx.

## 2023-10-06 NOTE — PLAN OF CARE
Goal Outcome Evaluation:      Plan of Care Reviewed With: patient    Overall Patient Progress: no changeOverall Patient Progress: no change    Outcome Evaluation: Angela is very sleepy, arouses to voice but only alert to self. pt. has gram positive cocci blood culture results, awaiting additional results for abx plan.PO intake encouraged this shift. 1:1 in room      VS: Temp: 97.8  F (36.6  C) Temp src: Oral BP: 132/58 Pulse: 98   Resp: 16 SpO2: 100 % O2 Device: None (Room air)       O2: Sats above 90 on room air, denies shortness of breath, denies chest pain   Output: Pt voiding independently in bathroom   Last BM: 10/06/2023, loose   Activity: Pt. Is up ad ascencion, gets up to bathroom and then back to bed. Very sleepy this shift.    Skin: WDL   Pain: denies   CMS: Alert to self only, very sleepy. Denies hallucinations, denies N/T    Dressing: L PIV transparent dressing CDI   Diet: Regular diet, very poor appetite. PO intake encouraged. Pt. Ate a yogurt with help from family today and has been taking sips of water throughout the evening. Denies nausea and vomiting.    LDA: L PIV infusing    Equipment: IV pole, personal belongings, sitter   Plan: Continue IV abx, discharge TBD for psych placement   Additional Info:

## 2023-10-06 NOTE — PLAN OF CARE
Pt here for psychosis s/t Bipolar 1, pneuonia      Goal Outcome Evaluation:      Plan of Care Reviewed With: patient    Overall Patient Progress: no change    Outcome Evaluation: Very somnolent, completed CXR, renal ultrasound, respiratory virus, and enteric testing and nothing alarming.      VS: Temp: 98.1  F (36.7  C) Temp src: Axillary BP: (!) 144/49 Pulse: 94   Resp: 18 SpO2: 92 %        O2: RA   Output: Not successfully recorded, pt had episodes of incontinence. Did void and have several watery stools.    Last BM: Today   Activity: Independent to stand by assist for activities   Skin: Intact   Pain: Denies   Neuro: A&O x4 but very somnolent. Difficult to keep awake long enough to take medications, would not take depakote despite two attempts and use of . Pt became very angry with efforts to wake her up. Noted this is not one of the medications she has a Mata order to take.    Dressing: NA   Diet: Regular, intake very poor   LDA: L PIV infusing LR @200 mL/hr   Equipment: IV pole   Plan: Pending placement   Additional Info: K replaced x2 this shift. On enteric precautions while C Diff is ruled out.

## 2023-10-06 NOTE — PROGRESS NOTES
Infectious disease lab called with update to critical lab result: Streptococcus species detected. Provider notified.

## 2023-10-06 NOTE — TELEPHONE ENCOUNTER
R: MN  Access Inpatient Bed Call Log  10/6/2023 12:50 AM  Intake has called facilities that have not updated their bed status within the last 12 hours.??      ADULTS:     *METRO  [place] [facility]: @ cap per website.     Pt remains on waitlist pending appropriate placement availability.

## 2023-10-06 NOTE — PROGRESS NOTES
[facility]    Progress Note - [facility] Service       Date of Admission:  9/20/2023    Main plans for today:   - Start Ceftriaxone for UTI   - LR bolus followed by maintenance   - Decrease Depakote to daily per Psych   - Strict intake/output  - Encourage oral intake     Assessment & Plan   Angela Basurto is a 58-year-old female PMH bipolar 1 disorder admitted to [facility] 8/16 for elvis and psychosis in setting of medication non-adherence. Transferred to [facility] [place] 9/18/23 for sepsis 2/2 HAP, now resolved. Monitoring hyperglycemia and ETHAN. Patient is on Mata with full commitment, current 1:1.     # ETHAN, worsening, suspect pre-renal vs. ATN  Baseline creatinine 0.5. Initially had ETHAN that peaked at 1.99 9/22. Now with worsening creatinine again, though has stabilized today 10/6. Initial concern for pre-renal etiology given known poor PO intake and low urine sodium. Will continue fluid challenge and observe effect. Intrarenal also remains on differential with UA showing moderate blood, minimal protein, and hyaline casts. Patient may have ATN/AIN due to recent medications vs glomerulonephritis with recent illness. Less concern for nephrotic syndrome. Less likely post-renal with reassuring US or fluid overload with normal BNP.   - LR 1L followed by  mL/hr   - Start Ceftriaxone for UTI as below   - Strict intake/output   - BMP daily   - Avoid nephrotoxic medications   - HOLD Hydrochlorothiazide     # Leukocytosis  # UTI  # Recent HAP  # Aspiration risk   # Diarrhea   # Somnolence   Patient with persistent leukocytosis. Transferred to Medicine for HAP that has since resolved. WBCC initially trended following HAP treatment, but is now slightly up-trending. Procalcitonin moderately elevated (0.28). Most likely source  given suprapubic tenderness on exam- though UA not clearly consistent with infection. Otherwise CXR without new opacity; COVID/flu  negative; enteric panel and C. Diff negative; less likely intracranial with no focal neurologic findings; no skin findings to suggest SSTI.   - Start Ceftriaxone 1g q24hr for uncomplicated UTI   - Follow blood and urine cultures     # Bipolar I disorder  # Hx of possible seizure (1983)  # Mata: Haldol, clozapine, risperidone, olanzapine  # Full commitment   # Somnolence   Presented with elvis and psychosis in the context of medication nonadherence. Was admitted with Psychiatry from 8/16 until transfer to this service 9/20. Has required a 1:1 to maintain her safety. Psychiatry has been following and managing medications. Clozapine initiated during current admission. Concern that medications are contributing to somnolence given current ETHAN and less renal clearance. Per Psych, inflammatory cytokines can increase Clozapine levels. Will need to rule out infection. If no source of infection and not improving, will need to rule out malignancy.   - See psychiatry note for details of medication titrations  - Nursing care: 1:1 sitter, encouraging sitting in chair or up ~6 hours a day, walk with nurse 1:1 4x daily  - Legal status: MATA and full commitment   - Medications: Depakote DT sprinkles 500 mg daily, HOLD Clozapine 50 mg daily   - Agitation plan:    - Non-emergency: PRN Haldol 2 mg PO  - Emergency: Haldol 5mg + diphenhydramine 50mg q8h PRN, PO or IM  - STOP Hydroxyzine per psych (see 9/29 note)    # Hyperglycemia   # Pre-diabetes (A1c 09/27/23, 6.2)  Patient with persistent hyperglycemia. A1c 6.2% 9/27/23. Suspected related to Clozapine given known metabolic side effects. May also be related to infection- workup ongoing. Improved with titration of insulin  - Continue Lantus to 16U daily   - MDSSI   - Metformin discontinued due to diarrhea   - Hypoglycemia protocol  - UA for infection      # HTN  Has been intermittently hypertensive. Holding medications in setting of ETHAN and psychotropic interactions.   - HOLD  Hydrochlorothiazide 25 mg daily  - HOLD PTA clonidine per Psych    # Moderate protein-calorie malnutrition   Patient with poor PO intake. Prefers to eat traditional food brought by family, so calorie tracking has been difficult. Has had more success with encouragement to eat from staff. Somnolence currently is a major barrier to nutrition.   - Nutrition IP consult, appreciate recommendations   - Needs encouragement to eat, safe to swallow. Order food and encourage food and fluids   - Medical food supplement therapy    Chronic/resolved/stable:    # Orthostatic hypotension, resolved  Symptomatic 9/18 with blood pressure drop from 123/72 while laying to 96/55 with standing. Patient reported this was new since admission and starting new medications, and has since improved. Patient initiated on clozapine on 8/31 with many dose changes. Somnolence and poor PO intake have been challenges during this admission, both likely contributing. Workups for endocrine and cardiac causes were negative.   - Encourage oral hydration   - Compression stockings  - Likely to continue to be tricky with clozapine dose changes    # Possible hospital-acquired pneumonia, resolved/treated  # Positive blood culture x1 - consistent with contaminant  # Severe sepsis, resolved  # Acute hypoxic respiratory failure, resolved  # Leukocytosis, ongoing  Initially presented 9/19 from inpatient psych with temperature 101.3F, lactic acid 4.3 in the setting of new cough and widespread body aches. , WBC 9.8. 9/18 CXR with streaky right greater than left perihilar and basilar opacities, likely atelectasis and/or edema without focal consolidation. Respiratory viral panels (9/19, 9/22) have been negative. Echocardiogram not consistent with myocarditis. Briefly treated with vancomycin 9/19-9/23 due to a blood culture growing gram positive cocci; this was later determined to be Staph Hominis, suspected contaminant. Pulmonary exam without focal findings  consistent with bacterial pneumonia, though patient had an ongoing cough and appears to have a significant burden of respiratory secretions seen on 9/24 (resolved). RT evaluated and recommended use of aerobika flutter valve. White count have been elevated although stable with still no clear focus of infection, could be clozapine side effect. Patient is medically stable to return to psychiatry unit.   - Infectious disease consulted, now off  - S/p piperacillin/tazobactam (9/20- 9/26)  - Respiratory therapist saw Angela and she tolerated vest therapy x1  - Discontinue vest therapy with improved breathing and reassuring physical examination   - Aerobika flutter valve   - CBC q72h    # Hypokalemia, resolved  # Abdominal tenderness, resolved    Patient report intermittent abdominal tenderness, unlikely pancreatitis with location abdominal tenderness and lipase x2 < 3x normal upper limits. Low concern for bowel obstruction with abdominal xray revealing nonobstructive bowel gas pattern and no significant fecal retention.   - Loperamide capsule 2 mg PRN  - RN-driven potassium replacement protocol - High     # Hypocalcemia  # Hypoalbuminemia  Suspect possible malnutrition due to poor PO intake.   - Encourage good PO intake   - Pantoprazole EC 40 mg tablets  - DEBORAH  - Zofran PRN, 30 mins before meals  - Nutrition IP consult, appreciate recommendations               - Medical food supplement therapy  - Patient to consume % of nutritionally adequate meal trays TID, or the equivalent with supplements/snacks.     # Jehovah's witness  Important to her value system.   - Spiritual care consult         Diet: Combination Diet Regular Diet; Safe Tray - with utensils  Snacks/Supplements Adult: Other; Ensure Enlive mixed with ice cream; With Meals  Calorie Counts    DVT Prophylaxis: Low Risk/Ambulatory with no VTE prophylaxis indicated  Wilson Catheter: Not present  Fluids: PO  Lines: None     Cardiac Monitoring: None  Code Status: Full Code       Clinically Significant Risk Factors        # Hypokalemia: Lowest K = 3.1 mmol/L in last 2 days, will replace as needed       # Hypoalbuminemia: Lowest albumin = 2 g/dL at 9/24/2023  8:46 AM, will monitor as appropriate    # Acute Kidney Injury, unspecified: based on a >150% or 0.3 mg/dL increase in last creatinine compared to past 90 day average, will monitor renal function            # Moderate Malnutrition: based on nutrition assessment             Disposition Plan      Expected Discharge Date: 10/07/2023,  3:00 PM      Discharge Comments: med ready for discharge back to psych Medically ready for transfer back to  psychiatry.     The patient's care was discussed with the Attending Physician, Dr. Noe .    Malka Nguyen MD  [facility]  [facility]  Securely message with Vocera (more info)  Text page via [employer] Paging/Directory   See signed in provider for up to date coverage information  ______________________________________________________________________    Interval History     Overnight:  NAOE.     Subjective:   On initial exam, patient more alert than ever before. Walked out to hallway. Reported dizziness and indicated vertigo. Stated that she had pain all over. She was walked back to bed, at which point she laid down and became somnolent again.     Physical Exam   Vital Signs: Temp: 98.7  F (37.1  C) Temp src: Axillary BP: (!) 147/47 Pulse: 97   Resp: 16 SpO2: 97 % O2 Device: None (Room air)    Weight: 214 lbs 0 oz    Constitutional: Somnolent, no apparent distress  Respiratory: No increased work of breathing, good air exchange, clear to auscultation bilaterally, no crackles or wheezing  Cardiovascular: Normal apical impulse, regular rate and rhythm, normal S1 and S2. Extremities warm.   Abdomen: Soft, BS+, suprapubic tenderness to palpation without rebound or guarding   MSK: No edema noted.   Neurologic: Somnolent.     Medical  Decision Making   Please see A&P for additional details of medical decision making.      Data   ------------------------- PAST 24 HR DATA REVIEWED -----------------------------------------------    I have personally reviewed the following data over the past 24 hrs:    13.6 (H)  \   10.2 (L)   / 290     139 107 25.0 (H) /  127 (H)   3.7 20 (L) 1.86 (H) \     ALT: N/A AST: N/A AP: N/A TBILI: N/A   ALB: N/A TOT PROTEIN: N/A LIPASE: N/A     Trop: N/A BNP: 434     Procal: N/A CRP: 120.14 (H) Lactic Acid: 0.8       Imaging results reviewed over the past 24 hrs:   Recent Results (from the past 24 hour(s))   XR Chest Port 1 View    Narrative    Exam: XR CHEST PORT 1 VIEW, 10/5/2023 2:41 PM    Comparison: Chest radiograph 9/29/2023    History: Recent PNA, poor historian, worsening leukocytosis    Findings:  Portable AP view of the chest at 20 degrees. Patient is rotated  slightly LATHA.     Slightly decreased lung volumes with bilateral perihilar and bibasilar  streaky opacities. No focal acute airspace opacities. No pneumothorax.  Left costophrenic angle blunting. Clear right costophrenic angle.  Improved aeration of the left lower lobe. Cardiomediastinal silhouette  is within normal limits. Normal pulmonary vasculature. No acute or  suspicious osseous abnormalities. Partially visualized upper abdomen  is unremarkable.       Impression    Impression:   1. Similar-appearing decreased lung volumes with bilateral perihilar  and bibasilar streaky opacities, likely atelectasis. No focal acute  airspace opacity.  2. Stable small left-sided pleural effusion with resolution of  previous trace right-sided pleural effusion.   3. Improved aeration of the left lower lobe.    I have personally reviewed the examination and initial interpretation  and I agree with the findings.    AFSHIN PULIDO MD         SYSTEM ID:  [id number]   US Renal Complete Non-Vascular    Narrative    EXAMINATION: US RENAL COMPLETE NON-VASCULAR, 10/5/2023 3:59 PM      COMPARISON: None.    HISTORY: Worsening renal function, concern for obstruction    TECHNIQUE: The kidneys and bladder were scanned in the standard  fashion with specialized ultrasound transducer(s) using both gray  scale and limited color/spectral Doppler techniques.    FINDINGS:    Right kidney: Measures 12.8 cm in length. Normal sonographic  appearance of the renal parenchyma. No focal mass. Normal blood flow  on Doppler evaluation. No masses, cysts, or shadowing stones. No  hydronephrosis.    Left kidney: Measures 14.1 cm in length. Parenchyma is of normal  thickness and echogenicity. No focal mass. Intact blood flow. No  masses, cysts, or shadowing stones. Mildly prominent appearance of the  left renal pelvis.    Bladder: Incompletely distended but grossly unremarkable.      Impression    IMPRESSION: Normal kidneys.    I have personally reviewed the examination and initial interpretation  and I agree with the findings.    RICHA ROSARIO MD         SYSTEM ID:  [id number]

## 2023-10-06 NOTE — SIGNIFICANT EVENT
Lab called with critical result value: blood drawn yesterday 10/5 at 5:37pm from left arm showing gram positive cocci. Provider notified, awaiting response.

## 2023-10-06 NOTE — TELEPHONE ENCOUNTER
S: MN  Access Inpatient Bed Call Log 10/6/23   [id number]  Intake has called facilities that have not updated the bed status within the last 12 hours.                                 [facility] is at capacity.           Pt remains on the work list pending appropriate bed availability.

## 2023-10-06 NOTE — PLAN OF CARE
Goal Outcome Evaluation:      Plan of Care Reviewed With: patient          Outcome Evaluation: Angela not alert to understand POC. Somulent and not eating even with encouragement.

## 2023-10-06 NOTE — PLAN OF CARE
BP (!) 141/71 (BP Location: Right arm)   Pulse 92   Temp 98.6  F (37  C) (Oral)   Resp 16   Wt 97.1 kg (214 lb)   SpO2 93%   BMI 31.60 kg/m    Pt is A and oriented to person, place time and situation this shift. Pt intermittently mumble words. Pt had 5 episodes of diarrhea. PRN Imodium administered and tolerated. Pt comfortably sleeping in bed now. Redirected Pt. multiple times before medication administration. Pt occasionally adequately to simple step instructions. Scheduled medications administered this shift. 1:1 sitter in room with Pt. to ensure safety as pt can be compulsive. Pt pulled out I?V access while trying to go to the bathroom. RN flyer notified. LR stopped pending I/V access replacement. I/V access gained. I/V restarted.

## 2023-10-06 NOTE — PROGRESS NOTES
[facility]   BRIEF PROGRESS NOTE    SUBJECTIVE  Paged by RN that patient's blood culture from 10/5 returned positive for G+ cocci. Evaluated at bedside. Patient alert with family members at bedside. Eating yogurt. Conversing in Swedish. After eating, patient laid down and went to sleep. Was not able to gather additional history. However, previous activity was more alert than I have seen patient during past few days.     OBJECTIVE:  /58 (BP Location: Right arm, Patient Position: Semi-Birmingham's, Cuff Size: Adult Large)   Pulse 98   Temp 97.8  F (36.6  C) (Oral)   Resp 16   Wt 97.1 kg (214 lb)   SpO2 100%   BMI 31.60 kg/m      Exam:   General: Initially awake, then became somnolent.   Skin: Warm and dry, no abnormalities noted. Full skin exam per RN without evidence of abnormalities.   Eyes: Extra-ocular muscles intact, pupils equal and reactive.  ENT: Speech intact, nasal passages open, no hearing impairment noted.  CV: RRR. No murmur. No cyanosis or pallor, warm and well perfused.  Respiratory: Lungs CTAB. No respiratory distress, no accessory muscle use.  : Mild suprapubic tenderness to palpation. Soft, BS+, non-distended.   Neuro: Somnolent. Briefly responds to voice.   Extremities: Warm, able to move all four extremities at will. No joint warmth or tenderness to palpation.    ASSESSMENT/PLAN:    # Positive blood culture, gram positive, unknown source   Patient with persistent leukocytosis and worsening ETHAN. Obtained blood cultures 10/5. Informed that 1/2 bottles returned positive for G+ cocci on first day of incubation. Patient afebrile with normal vitals. Exam unrevealing as above. Given positive on first day of incubation, more likely true bacteremia rather than contaminant. Unclear source of infection. Previously being treated for UTI, but would not expect G+ bacteria. Has somnolent but no evidence of global encephalopathy concerning for intracranial infection. Reassuring full skin  exam, joints NT to palpation. Previous CXR 10/5 with pulmonary source, stool testing negative. Will start empiric treatment and consider broadening workup for source including ECHO (no history of IVDU, no murmur) and head imaging (would not expect G+ bacteremia).   - Start Vancomycin; Pharm to dose   - MRSA swab   - Stop Ceftriaxone   - Repeat blood cultures now and AM   - Consider additional workup including ECHO AM     Please see daily rounding note for full A/P.  Malka Nguyen MD  4:58 PM

## 2023-10-06 NOTE — PLAN OF CARE
[id number]:    BP (!) 147/47 (BP Location: Left arm)   Pulse 97   Temp 98.7  F (37.1  C) (Axillary)   Resp 16   Wt 97.1 kg (214 lb)   SpO2 97%   BMI 31.60 kg/m      Patient has been extremely lethargic this morning, primarily only waking to use the bathroom. She asked to walk in the hallway after using the bathroom around 0900 but got too dizzy and asked to go back to bed. Diarrhea x2 -- imodium administered. Encouraged oral intake but pt refused and only had a few sips of water. Left PIV currently infusing abx. Continue POC.

## 2023-10-06 NOTE — PROGRESS NOTES
[id number]:    BP (!) 147/47 (BP Location: Left arm)   Pulse 97   Temp 98.7  F (37.1  C) (Axillary)   Resp 16   Wt 97.1 kg (214 lb)   SpO2 97%   BMI 31.60 kg/m       Patient has been extremely lethargic this morning and afternoon, primarily only waking to use the bathroom. She asked to walk in the hallway after using the bathroom around 0900 but got too dizzy and asked to go back to bed. Diarrhea x2 this am-- imodium administered. Encouraged oral intake but pt refused and only had a few sips of water. No insulin given as she is not eating even with encouragement. Left PIV currently infusing 1000cc LR bolus, to be followed by LR continuous. May eventually go home with famil, psychiatry assessing.

## 2023-10-07 ENCOUNTER — TELEPHONE (OUTPATIENT)
Dept: BEHAVIORAL HEALTH | Facility: CLINIC | Age: 58
End: 2023-10-07
Payer: COMMERCIAL

## 2023-10-07 LAB
ANION GAP SERPL CALCULATED.3IONS-SCNC: 12 MMOL/L (ref 7–15)
BASO+EOS+MONOS # BLD AUTO: ABNORMAL 10*3/UL
BASO+EOS+MONOS NFR BLD AUTO: ABNORMAL %
BASOPHILS # BLD AUTO: 0.1 10E3/UL (ref 0–0.2)
BASOPHILS NFR BLD AUTO: 1 %
BUN SERPL-MCNC: 23.7 MG/DL (ref 6–20)
CALCIUM SERPL-MCNC: 8.2 MG/DL (ref 8.6–10)
CHLORIDE SERPL-SCNC: 109 MMOL/L (ref 98–107)
CREAT SERPL-MCNC: 1.87 MG/DL (ref 0.51–0.95)
CRP SERPL-MCNC: 119.08 MG/L
DEPRECATED HCO3 PLAS-SCNC: 19 MMOL/L (ref 22–29)
EGFRCR SERPLBLD CKD-EPI 2021: 31 ML/MIN/1.73M2
EOSINOPHIL # BLD AUTO: 0 10E3/UL (ref 0–0.7)
EOSINOPHIL NFR BLD AUTO: 0 %
ERYTHROCYTE [DISTWIDTH] IN BLOOD BY AUTOMATED COUNT: 14 % (ref 10–15)
GLUCOSE BLDC GLUCOMTR-MCNC: 114 MG/DL (ref 70–99)
GLUCOSE BLDC GLUCOMTR-MCNC: 126 MG/DL (ref 70–99)
GLUCOSE BLDC GLUCOMTR-MCNC: 128 MG/DL (ref 70–99)
GLUCOSE BLDC GLUCOMTR-MCNC: 82 MG/DL (ref 70–99)
GLUCOSE BLDC GLUCOMTR-MCNC: 88 MG/DL (ref 70–99)
GLUCOSE BLDC GLUCOMTR-MCNC: 92 MG/DL (ref 70–99)
GLUCOSE BLDC GLUCOMTR-MCNC: 95 MG/DL (ref 70–99)
GLUCOSE SERPL-MCNC: 133 MG/DL (ref 70–99)
HCT VFR BLD AUTO: 30.8 % (ref 35–47)
HGB BLD-MCNC: 9.8 G/DL (ref 11.7–15.7)
IMM GRANULOCYTES # BLD: 0.5 10E3/UL
IMM GRANULOCYTES NFR BLD: 4 %
LYMPHOCYTES # BLD AUTO: 1.5 10E3/UL (ref 0.8–5.3)
LYMPHOCYTES NFR BLD AUTO: 11 %
MAGNESIUM SERPL-MCNC: 1.8 MG/DL (ref 1.7–2.3)
MCH RBC QN AUTO: 30.5 PG (ref 26.5–33)
MCHC RBC AUTO-ENTMCNC: 31.8 G/DL (ref 31.5–36.5)
MCV RBC AUTO: 96 FL (ref 78–100)
MONOCYTES # BLD AUTO: 1.7 10E3/UL (ref 0–1.3)
MONOCYTES NFR BLD AUTO: 12 %
NEUTROPHILS # BLD AUTO: 9.8 10E3/UL (ref 1.6–8.3)
NEUTROPHILS NFR BLD AUTO: 72 %
NRBC # BLD AUTO: 0 10E3/UL
NRBC BLD AUTO-RTO: 0 /100
PHOSPHATE SERPL-MCNC: 4.7 MG/DL (ref 2.5–4.5)
PLATELET # BLD AUTO: 320 10E3/UL (ref 150–450)
POTASSIUM SERPL-SCNC: 3 MMOL/L (ref 3.4–5.3)
POTASSIUM SERPL-SCNC: 3.1 MMOL/L (ref 3.4–5.3)
RBC # BLD AUTO: 3.21 10E6/UL (ref 3.8–5.2)
SODIUM SERPL-SCNC: 140 MMOL/L (ref 135–145)
WBC # BLD AUTO: 13.6 10E3/UL (ref 4–11)

## 2023-10-07 PROCEDURE — 84100 ASSAY OF PHOSPHORUS: CPT | Performed by: STUDENT IN AN ORGANIZED HEALTH CARE EDUCATION/TRAINING PROGRAM

## 2023-10-07 PROCEDURE — 250N000013 HC RX MED GY IP 250 OP 250 PS 637

## 2023-10-07 PROCEDURE — 87040 BLOOD CULTURE FOR BACTERIA: CPT

## 2023-10-07 PROCEDURE — 250N000011 HC RX IP 250 OP 636: Mod: JZ

## 2023-10-07 PROCEDURE — 86140 C-REACTIVE PROTEIN: CPT

## 2023-10-07 PROCEDURE — 36415 COLL VENOUS BLD VENIPUNCTURE: CPT

## 2023-10-07 PROCEDURE — 83735 ASSAY OF MAGNESIUM: CPT | Performed by: STUDENT IN AN ORGANIZED HEALTH CARE EDUCATION/TRAINING PROGRAM

## 2023-10-07 PROCEDURE — 250N000011 HC RX IP 250 OP 636: Performed by: STUDENT IN AN ORGANIZED HEALTH CARE EDUCATION/TRAINING PROGRAM

## 2023-10-07 PROCEDURE — 84132 ASSAY OF SERUM POTASSIUM: CPT | Performed by: STUDENT IN AN ORGANIZED HEALTH CARE EDUCATION/TRAINING PROGRAM

## 2023-10-07 PROCEDURE — 250N000013 HC RX MED GY IP 250 OP 250 PS 637: Performed by: STUDENT IN AN ORGANIZED HEALTH CARE EDUCATION/TRAINING PROGRAM

## 2023-10-07 PROCEDURE — 80048 BASIC METABOLIC PNL TOTAL CA: CPT

## 2023-10-07 PROCEDURE — 120N000002 HC R&B MED SURG/OB UMMC

## 2023-10-07 PROCEDURE — 85025 COMPLETE CBC W/AUTO DIFF WBC: CPT

## 2023-10-07 PROCEDURE — 258N000003 HC RX IP 258 OP 636

## 2023-10-07 PROCEDURE — 250N000013 HC RX MED GY IP 250 OP 250 PS 637: Performed by: FAMILY MEDICINE

## 2023-10-07 PROCEDURE — 36415 COLL VENOUS BLD VENIPUNCTURE: CPT | Performed by: STUDENT IN AN ORGANIZED HEALTH CARE EDUCATION/TRAINING PROGRAM

## 2023-10-07 PROCEDURE — 99233 SBSQ HOSP IP/OBS HIGH 50: CPT | Performed by: INTERNAL MEDICINE

## 2023-10-07 RX ORDER — CEFTRIAXONE 1 G/1
1 INJECTION, POWDER, FOR SOLUTION INTRAMUSCULAR; INTRAVENOUS EVERY 24 HOURS
Status: DISCONTINUED | OUTPATIENT
Start: 2023-10-07 | End: 2023-10-07

## 2023-10-07 RX ORDER — CEFTRIAXONE 1 G/1
1 INJECTION, POWDER, FOR SOLUTION INTRAMUSCULAR; INTRAVENOUS EVERY 24 HOURS
Status: DISCONTINUED | OUTPATIENT
Start: 2023-10-08 | End: 2023-10-07

## 2023-10-07 RX ORDER — CEFDINIR 300 MG/1
300 CAPSULE ORAL EVERY 12 HOURS SCHEDULED
Status: DISCONTINUED | OUTPATIENT
Start: 2023-10-08 | End: 2023-10-12

## 2023-10-07 RX ORDER — POTASSIUM CHLORIDE 1.5 G/1.58G
40 POWDER, FOR SOLUTION ORAL ONCE
Status: COMPLETED | OUTPATIENT
Start: 2023-10-07 | End: 2023-10-07

## 2023-10-07 RX ORDER — CEFTRIAXONE 2 G/1
2 INJECTION, POWDER, FOR SOLUTION INTRAMUSCULAR; INTRAVENOUS EVERY 24 HOURS
Status: DISCONTINUED | OUTPATIENT
Start: 2023-10-07 | End: 2023-10-07

## 2023-10-07 RX ORDER — POTASSIUM CHLORIDE 750 MG/1
40 TABLET, EXTENDED RELEASE ORAL ONCE
Status: COMPLETED | OUTPATIENT
Start: 2023-10-07 | End: 2023-10-07

## 2023-10-07 RX ORDER — POTASSIUM CHLORIDE 1.5 G/1.58G
20 POWDER, FOR SOLUTION ORAL ONCE
Status: COMPLETED | OUTPATIENT
Start: 2023-10-08 | End: 2023-10-08

## 2023-10-07 RX ADMIN — CEFTRIAXONE SODIUM 2 G: 2 INJECTION, POWDER, FOR SOLUTION INTRAMUSCULAR; INTRAVENOUS at 11:23

## 2023-10-07 RX ADMIN — DIVALPROEX SODIUM 500 MG: 125 CAPSULE, COATED PELLETS ORAL at 21:33

## 2023-10-07 RX ADMIN — POTASSIUM CHLORIDE 40 MEQ: 750 TABLET, EXTENDED RELEASE ORAL at 11:33

## 2023-10-07 RX ADMIN — LOPERAMIDE HYDROCHLORIDE 2 MG: 2 CAPSULE ORAL at 02:02

## 2023-10-07 RX ADMIN — SODIUM CHLORIDE, POTASSIUM CHLORIDE, SODIUM LACTATE AND CALCIUM CHLORIDE: 600; 310; 30; 20 INJECTION, SOLUTION INTRAVENOUS at 23:52

## 2023-10-07 RX ADMIN — LOPERAMIDE HYDROCHLORIDE 2 MG: 2 CAPSULE ORAL at 23:52

## 2023-10-07 RX ADMIN — LOPERAMIDE HYDROCHLORIDE 2 MG: 2 CAPSULE ORAL at 14:39

## 2023-10-07 RX ADMIN — POTASSIUM CHLORIDE 40 MEQ: 1.5 POWDER, FOR SOLUTION ORAL at 23:52

## 2023-10-07 RX ADMIN — ONDANSETRON 4 MG: 4 TABLET, FILM COATED ORAL at 16:54

## 2023-10-07 RX ADMIN — SODIUM CHLORIDE, POTASSIUM CHLORIDE, SODIUM LACTATE AND CALCIUM CHLORIDE: 600; 310; 30; 20 INJECTION, SOLUTION INTRAVENOUS at 06:14

## 2023-10-07 RX ADMIN — PANTOPRAZOLE SODIUM 40 MG: 40 TABLET, DELAYED RELEASE ORAL at 07:17

## 2023-10-07 RX ADMIN — LOPERAMIDE HYDROCHLORIDE 2 MG: 2 CAPSULE ORAL at 08:32

## 2023-10-07 RX ADMIN — SODIUM CHLORIDE, POTASSIUM CHLORIDE, SODIUM LACTATE AND CALCIUM CHLORIDE: 600; 310; 30; 20 INJECTION, SOLUTION INTRAVENOUS at 14:56

## 2023-10-07 RX ADMIN — ONDANSETRON 4 MG: 4 TABLET, FILM COATED ORAL at 07:17

## 2023-10-07 ASSESSMENT — ACTIVITIES OF DAILY LIVING (ADL)
ADLS_ACUITY_SCORE: 29
ADLS_ACUITY_SCORE: 29
ADLS_ACUITY_SCORE: 28
ADLS_ACUITY_SCORE: 24
ADLS_ACUITY_SCORE: 29
ADLS_ACUITY_SCORE: 24
ADLS_ACUITY_SCORE: 29

## 2023-10-07 NOTE — PROGRESS NOTES
General Infectious Disease Service  - [facility] [place]    Patient:  Angela Basurto, Date of birth 1965, Medical record number [id number]  Date of Admission: 9/20/2023           Assessment and Recommendations:   ID Problem List:  Possible HAP, s/p treatment x7 days  CoNS growth in blood culture 9/20 obtained for fever that was ultimately attributed to HAP, regarded as contaminant, did not recur  Schizoaffective disorder, Bipolar, admitted to [facility] psych unit 8/2023 then transferred 9/20 for HAP  ETHAN, Cr nl at baseline and has been 1.5-1.8 since receiving abx for HAP. JENNIE normal. ETiology unclear, possibly ATN 2/to abx and exacerbated by hypovolemia (especially in recent past given diarrhea?) but warrants ongoing monitoring  Subrapubic pain, dysuria, urinary hesitancy, and diarrhea, all suggestive of cystitis in setting of pyuria, though UCx is not terribly impressive. Maybe exacerbated by lower UOP in the setting of ETHAN? JENNIE not suggesting upper tract dz. Seems to be improving on ceftriaxone  Diarrhea. Upper tract UTI, though JENNIE normal, antibiotic-associated diarrhea are both possibilities. Seems a bit better on immodium  Strep bacteremia, detected from bl cx obtained for lethargy which in hindsight may be more related to med s/e. Strep species other than pneumoniae, pyogenes, etc, is suspicious for a contaminant.    Recommendations:  Agree with ceftriaxone. While quinolones and bactrim probably have better efficacy for UTI, the former can have CNS s/e and the latter renal s/e, both undesirable here. Given possibility of possible antibiotic-associated diarrhea, and given clinical stability, consider starting cefdinir dosed for renal function beginning tomorrow. Planned duration 7-10 days, pending clinical course  Would stop IV vanco    Recommendations discussed with primary team    The General ID team will continue to follow this patient. Please feel free to call with any questions. Floor time =60 min    Brooke  MD [facility]   of Medicine, Division of Infectious Diseases  Contact me on the [employer] arielle or console  pgr [phone]    Interval events  Last seen by ID 9/25. Now with lethargy, new bladder symptoms, ongoing ETHAN.   History obtained from pt > bedside clinician. Reports diarrhea today and yesterday. Better with immodium. +Urinary hesitancy. Reports no more abdominal discomfort yesterday and today.           Physical Exam:   /60 (BP Location: Right arm, Patient Position: Semi-Birmingham's, Cuff Size: Adult Large)   Pulse 88   Temp 98.2  F (36.8  C) (Oral)   Resp 16   Wt 97.1 kg (214 lb)   SpO2 98%   BMI 31.60 kg/m         Exam:  GENERAL: Not in acute distress.   HEAD: Normocephalic and atraumatic  ENT:  No hearing impairment, oral mucous membranes moist  EYES:  Eyes grossly normal to inspection, PERRL and conjunctivae and sclerae normal   LUNGS:  Clear to auscultation - no rales, rhonchi or wheezes  CARDIOVASCULAR:  Regular rate and rhythm, normal S1 S2,  no murmur  ABDOMEN:  Soft, non-tender           Laboratory Data:     Creatinine   Date Value Ref Range Status   10/07/2023 1.87 (H) 0.51 - 0.95 mg/dL Final   10/06/2023 1.86 (H) 0.51 - 0.95 mg/dL Final   10/05/2023 1.91 (H) 0.51 - 0.95 mg/dL Final   10/04/2023 1.62 (H) 0.51 - 0.95 mg/dL Final   10/03/2023 1.45 (H) 0.51 - 0.95 mg/dL Final     WBC Count   Date Value Ref Range Status   10/07/2023 13.6 (H) 4.0 - 11.0 10e3/uL Final   10/06/2023 13.6 (H) 4.0 - 11.0 10e3/uL Final   10/05/2023 15.2 (H) 4.0 - 11.0 10e3/uL Final   10/04/2023 14.0 (H) 4.0 - 11.0 10e3/uL Final   10/01/2023 18.4 (H) 4.0 - 11.0 10e3/uL Final     Hemoglobin   Date Value Ref Range Status   10/07/2023 9.8 (L) 11.7 - 15.7 g/dL Final     Platelet Count   Date Value Ref Range Status   10/07/2023 320 150 - 450 10e3/uL Final     Lab Results   Component Value Date     10/07/2023    BUN 23.7 (H) 10/07/2023    CO2 19 (L) 10/07/2023     No results found for: CRP

## 2023-10-07 NOTE — TELEPHONE ENCOUNTER
R: 11:48PM Bed Search Update [facility] Only    No appropriate beds available.  Pt remains on PPS worklist awaiting appropriate placement.

## 2023-10-07 NOTE — TELEPHONE ENCOUNTER
R: 1:18AM Bed Search Update [facility] Only     No appropriate beds available.  Pt remains on PPS worklist awaiting appropriate placement.

## 2023-10-07 NOTE — PROGRESS NOTES
[facility]    Progress Note - [facility] Service       Date of Admission:  9/20/2023    Main plans for today:   - Discontinue Vancomycin   - Start Cefdinir   - Continue LR fluids   - Strict intake/output  - Encourage oral intake     Assessment & Plan   Angela Basurto is a 58-year-old female PMH bipolar 1 disorder admitted to [facility] 8/16 for elvis and psychosis in setting of medication non-adherence. Transferred to [facility] 9/18/23 for sepsis 2/2 HAP, now resolved. Monitoring UTI and ETHAN. Patient is on Mata with full commitment, current 1:1.     # UTI  # Blood culture positive for Strep sanguinis, 1/2 bottles 10/5   # Leukocytosis  # Recent HAP  # Aspiration risk   # Diarrhea   # Somnolence   Patient with persistent leukocytosis, prompting infectious workup. Given urinary frequency and suprapubic tenderness on exam, initiated treatment of UTI. Urine culture not consistent with infection. However, per ID, given symptom improvement with antibiotic- reasonable to complete treatment for acute cystitis. Recommended Cephalosporins with longer course to avoid diarrhea/worsened ETHAN. Infectious workup otherwise reassuring. Blood culture returned positive for strep (10/5, 1/2 bottles), suspected contaminant per ID. Diarrhea suspected to be related to antibiotics with reassuring enteric panel. Somnolence likely related to psychotropic medications given improvement with holding Clozaril. Stable leukocytosis likely also related to psychotropic medication. No indication for ECHO or head imaging per Psych.   - ID consult; appreciate recommendations   - Start Cefdinir for UTI; plan for 7-10 day course  - Could consider transition to Bactrim; though side effect of diarrhea.   - Discontinue Ceftriaxone   - Follow blood cultures 10/6 and 10/7     # ETHAN, worsening, suspect pre-renal vs. ATN  Baseline creatinine 0.5. Initially had ETHAN that peaked at 1.99 9/22. Now with  worsening creatinine again, though has stabilized at ~1.9. Most suspicious for ATN related to recent antibiotics, likely with superimposed pre-renal effect with known poor PO intake and low urine sodium. Will continue fluid challenge and observe effect. Less likely nephrotic syndrome with only mild proteinuria; glomerulonephritis without consistent UA; post-renal with reassuring US or fluid overload with normal BNP.   - Continue  mL/hr   - Treat UTI as above    - Strict intake/output   - BMP daily   - Avoid nephrotoxic medications   - HOLD Hydrochlorothiazide     # Bipolar I disorder  # Hx of possible seizure (1983)  # Mata: Haldol, clozapine, risperidone, olanzapine  # Full commitment   # Somnolence   Presented with elvis and psychosis in the context of medication nonadherence. Was admitted with Psychiatry from 8/16 until transfer to this service 9/20. Has required a 1:1 to maintain her safety. Psychiatry has been following and managing medications. Clozapine initiated during current admission. Concern that medications are contributing to somnolence given current ETHAN and less renal clearance. Per Psych, inflammatory cytokines can increase Clozapine levels. Will need to rule out infection. If no source of infection and not improving, will need to rule out malignancy.   - See psychiatry note for details of medication titrations  - Nursing care: 1:1 sitter, encouraging sitting in chair or up ~6 hours a day, walk with nurse 1:1 4x daily  - Legal status: MATA and full commitment   - Medications: Depakote DT sprinkles 500 mg daily, HOLD Clozapine 50 mg daily   - Agitation plan:    - Non-emergency: PRN Haldol 2 mg PO  - Emergency: Haldol 5mg + diphenhydramine 50mg q8h PRN, PO or IM  - STOP Hydroxyzine per psych (see 9/29 note)    # Hyperglycemia   # Pre-diabetes (A1c 09/27/23, 6.2)  Patient with persistent hyperglycemia. A1c 6.2% 9/27/23. Suspected related to Clozapine given known metabolic side effects. May  also be related to infection- workup ongoing. Improved with titration of insulin.   - Continue Lantus to 16U daily   - MDSSI   - Metformin discontinued due to diarrhea   - Hypoglycemia protocol  - UA for infection      # HTN  Has been intermittently hypertensive. Holding medications in setting of ETHAN and psychotropic interactions.   - HOLD Hydrochlorothiazide 25 mg daily  - HOLD PTA clonidine per Psych    # Moderate protein-calorie malnutrition   Patient with poor PO intake. Per family, this is chronic and occurs even with traditional foods. Patient reports frequent nausea with food intake. Could consider Phenergan for nausea control as well as appetite stimulation. Will avoid introducing new medications at this time with current ETHAN, but will consider for future.   - Nutrition IP consult, appreciate recommendations   - Needs encouragement to eat, safe to swallow. Order food and encourage food and fluids   - Medical food supplement therapy  - Consider Phenergan for nausea/appetite stimulation     Chronic/resolved/stable:    # Orthostatic hypotension, resolved  Symptomatic 9/18 with blood pressure drop from 123/72 while laying to 96/55 with standing. Patient reported this was new since admission and starting new medications, and has since improved. Patient initiated on clozapine on 8/31 with many dose changes. Somnolence and poor PO intake have been challenges during this admission, both likely contributing. Workups for endocrine and cardiac causes were negative.   - Encourage oral hydration   - Compression stockings  - Likely to continue to be tricky with clozapine dose changes    # Possible hospital-acquired pneumonia, resolved/treated  # Positive blood culture x1 - consistent with contaminant  # Severe sepsis, resolved  # Acute hypoxic respiratory failure, resolved  # Leukocytosis, ongoing  Initially presented 9/19 from inpatient psych with temperature 101.3F, lactic acid 4.3 in the setting of new cough and  widespread body aches. , WBC 9.8. 9/18 CXR with streaky right greater than left perihilar and basilar opacities, likely atelectasis and/or edema without focal consolidation. Respiratory viral panels (9/19, 9/22) have been negative. Echocardiogram not consistent with myocarditis. Briefly treated with vancomycin 9/19-9/23 due to a blood culture growing gram positive cocci; this was later determined to be Staph Hominis, suspected contaminant. Pulmonary exam without focal findings consistent with bacterial pneumonia, though patient had an ongoing cough and appears to have a significant burden of respiratory secretions seen on 9/24 (resolved). RT evaluated and recommended use of aerobika flutter valve. White count have been elevated although stable with still no clear focus of infection, could be clozapine side effect. Patient is medically stable to return to psychiatry unit.   - Infectious disease consulted, now off  - S/p piperacillin/tazobactam (9/20- 9/26)  - Respiratory therapist saw Angela and she tolerated vest therapy x1  - Discontinue vest therapy with improved breathing and reassuring physical examination   - Aerobika flutter valve   - CBC q72h    # Hypokalemia, resolved  # Abdominal tenderness, resolved    Patient report intermittent abdominal tenderness, unlikely pancreatitis with location abdominal tenderness and lipase x2 < 3x normal upper limits. Low concern for bowel obstruction with abdominal xray revealing nonobstructive bowel gas pattern and no significant fecal retention.   - Loperamide capsule 2 mg PRN  - RN-driven potassium replacement protocol - High     # Hypocalcemia  # Hypoalbuminemia  Suspect possible malnutrition due to poor PO intake.   - Encourage good PO intake   - Pantoprazole EC 40 mg tablets  - DEBORAH  - Zofran PRN, 30 mins before meals  - Nutrition IP consult, appreciate recommendations               - Medical food supplement therapy  - Patient to consume % of nutritionally  adequate meal trays TID, or the equivalent with supplements/snacks.     # [employer]eterian  Important to her value system.   - Spiritual care consult         Diet: Combination Diet Regular Diet; Safe Tray - with utensils  Snacks/Supplements Adult: Other; Ensure Enlive mixed with ice cream; With Meals    DVT Prophylaxis: Low Risk/Ambulatory with no VTE prophylaxis indicated  Wilson Catheter: Not present  Fluids: PO  Lines: None     Cardiac Monitoring: None  Code Status: Full Code      Clinically Significant Risk Factors        # Hypokalemia: Lowest K = 3.1 mmol/L in last 2 days, will replace as needed       # Hypoalbuminemia: Lowest albumin = 2 g/dL at 9/24/2023  8:46 AM, will monitor as appropriate    # Acute Kidney Injury, unspecified: based on a >150% or 0.3 mg/dL increase in last creatinine compared to past 90 day average, will monitor renal function            # Moderate Malnutrition: based on nutrition assessment             Disposition Plan     Expected Discharge Date: 10/07/2023,  3:00 PM      Discharge Comments: med ready for discharge back to [facility] Medically ready for transfer back to  psychiatry.     The patient's care was discussed with the Attending Physician, Dr. Noe .    Malka Nguyen MD  [facility]  [facility]  Securely message with [employer] (more info)  Text page via [facility] Paging/Directory   See signed in provider for up to date coverage information  ______________________________________________________________________    Interval History     Overnight:  BNP normal and started on fluids. BCx returned positive and started on Vancomycin. See brief progress note.     Subjective:   Patient initially sleeping. Wakes up to use bathroom. States that she has been using the bathroom a lot. Reports that abdominal pain has resolved. Patient says that whatever we did yesterday was good because it helped her pain.     Physical Exam   Vital Signs:  Temp: 97.6  F (36.4  C) Temp src: Oral BP: 137/63 Pulse: 98   Resp: 16 SpO2: 100 % O2 Device: None (Room air)    Weight: 214 lbs 0 oz    Constitutional: Somnolent, no apparent distress  Respiratory: No increased work of breathing, good air exchange, clear to auscultation bilaterally, no crackles or wheezing  Cardiovascular: Normal apical impulse, regular rate and rhythm, normal S1 and S2. Extremities warm.   Abdomen: Soft, BS+, NTND  MSK: No edema noted. Skin exam without abnormality. Joints non-tender to palpation.   Neurologic: Somnolent, but engages in appropriate conversation when awake. Able to ambulate to bathroom without difficulty.     Medical Decision Making   Please see A&P for additional details of medical decision making.      Data   ------------------------- PAST 24 HR DATA REVIEWED -----------------------------------------------    I have personally reviewed the following data over the past 24 hrs:    13.6 (H)  \   10.2 (L)   / 290     139 107 25.0 (H) /  126 (H)   3.7 20 (L) 1.86 (H) \     Trop: N/A BNP: 434     Imaging results reviewed over the past 24 hrs:   No results found for this or any previous visit (from the past 24 hour(s)).

## 2023-10-07 NOTE — PLAN OF CARE
/63 (BP Location: Right arm, Patient Position: Semi-Birmingham's, Cuff Size: Adult Large)   Pulse 98   Temp 97.6  F (36.4  C) (Oral)   Resp 16   Wt 97.1 kg (214 lb)   SpO2 100%   BMI 31.60 kg/m    Pt is alert to person & place but not oriented to time and situation this shift. Pt responded to simple sentence questions. Denies pain, SOB. No signs and symptoms of acute or respiratory distress. Imodium administered after 2 episodes of mixed continence of dark green BM. BG @ 0200 Hrs: 126. No signs and symptoms of hypo/hyperglycemia. Continuous` LR infusing @ 125 ml/hr. Line is patent with no signs and symptoms of infiltration. Pt made a total of 4 round trips to the restroom (mixed BM X 3, voided X1) .  Pt. Did not have problems falling asleep right after restroom trips. LR  bag replaced. 1 : 1 sitter available in room for suicide continuous observation. Due protocol maintained. Sitter also assisted pt to maintain personal and environmental hygeine by proving nick care and hand-wash after each restroom trip. Pt had 1 incontinence episode, brief changed. Lab drawn @ 0643 Hrs. For CBC, BMP, Mag, CRP and phos. Pt is able to verbalize needs call button placed within reach.Will continue with POC.   - Given irregular eating patterns and borderline low FSBG insulin adjusted from home regimen -FS relatively controlled with Lantus 20U QHS and Humalog 5U TID with meals + SSI  - will c/w current regimen as above and closely monitor FSBG, adjust as needed  - monitor FSBG qAC/HS  - consistent carbohydrate diet, encourage evening diet.   - c/w gabapentin for diabetic neuropathy

## 2023-10-07 NOTE — PLAN OF CARE
Goal Outcome Evaluation:  Overall patient progress: No change    Outcome Evaluation: Pt somnolent - arouses to repeated stimulation and voice, and pain. Multiple loose, green stools - Imodium given. PO intake encouraged. 1:1 sitter in room.     Shift: 0700 - 1930 - 1:1 sitter  - 1:1 sitter - Restrict to unit, precautions in place for assault, cheeking, elopement, falls  - Mata and full commitment    Vital Signs: Temp: 98.2  F (36.8  C) Temp src: Oral BP: 138/60 Pulse: 88   Resp: 16 SpO2: 98 % O2 Device: None (Room air)     CMS/Neuro: A&O x 2 (Person & place) - Somnolent w/ intermittent confusion     Cardio/Resp: No signs of SOB or chest pain.     Output: Mixed continence of B/B - Uses toilet  LBM: 10/7/2023 x 7 - Green & watery - Logged in flowsheet  >>> Imodium given @ 0832 & 1439 - some relief     Activity: SBA     Pain: Denied     LDA: L. forearm PIV - Patent, infusing LR @ 125 mL/hr  >>> New bag started at 1456     Diet: Regular, Thin liquids, Medication whole  Standard meal tray ordered for lunch - Ate 0%, Drank 50% of ensure/Ice cream     Additional Info: Needs assistance ordering meals - No pork  Strict I/Os     Plan: Continue POC - Discharge TBKEIRA Diaz, RN, BSN, PHN

## 2023-10-08 ENCOUNTER — TELEPHONE (OUTPATIENT)
Dept: BEHAVIORAL HEALTH | Facility: CLINIC | Age: 58
End: 2023-10-08
Payer: COMMERCIAL

## 2023-10-08 LAB
AMMONIA PLAS-SCNC: 27 UMOL/L (ref 11–51)
ANION GAP SERPL CALCULATED.3IONS-SCNC: 11 MMOL/L (ref 7–15)
ANION GAP SERPL CALCULATED.3IONS-SCNC: 11 MMOL/L (ref 7–15)
BASO+EOS+MONOS # BLD AUTO: ABNORMAL 10*3/UL
BASO+EOS+MONOS NFR BLD AUTO: ABNORMAL %
BASOPHILS # BLD AUTO: ABNORMAL 10*3/UL
BASOPHILS # BLD MANUAL: 0 10E3/UL (ref 0–0.2)
BASOPHILS NFR BLD AUTO: ABNORMAL %
BASOPHILS NFR BLD MANUAL: 0 %
BUN SERPL-MCNC: 19.7 MG/DL (ref 6–20)
BUN SERPL-MCNC: 20.9 MG/DL (ref 6–20)
C DIFF TOX B STL QL: NEGATIVE
CALCIUM SERPL-MCNC: 8.8 MG/DL (ref 8.6–10)
CALCIUM SERPL-MCNC: 8.8 MG/DL (ref 8.6–10)
CHLORIDE SERPL-SCNC: 111 MMOL/L (ref 98–107)
CHLORIDE SERPL-SCNC: 115 MMOL/L (ref 98–107)
CREAT SERPL-MCNC: 1.86 MG/DL (ref 0.51–0.95)
CREAT SERPL-MCNC: 1.91 MG/DL (ref 0.51–0.95)
CRP SERPL-MCNC: 116.94 MG/L
CYSTATIN C (ROCHE): 1.9 MG/L (ref 0.6–1)
DEPRECATED HCO3 PLAS-SCNC: 19 MMOL/L (ref 22–29)
DEPRECATED HCO3 PLAS-SCNC: 19 MMOL/L (ref 22–29)
EGFRCR SERPLBLD CKD-EPI 2021: 30 ML/MIN/1.73M2
EGFRCR SERPLBLD CKD-EPI 2021: 31 ML/MIN/1.73M2
EOSINOPHIL # BLD AUTO: ABNORMAL 10*3/UL
EOSINOPHIL # BLD MANUAL: 0.1 10E3/UL (ref 0–0.7)
EOSINOPHIL NFR BLD AUTO: ABNORMAL %
EOSINOPHIL NFR BLD MANUAL: 1 %
ERYTHROCYTE [DISTWIDTH] IN BLOOD BY AUTOMATED COUNT: 14.1 % (ref 10–15)
ERYTHROCYTE [DISTWIDTH] IN BLOOD BY AUTOMATED COUNT: 14.3 % (ref 10–15)
GFR SERPL CREATININE-BSD FRML MDRD: 31 ML/MIN/1.73M2
GLUCOSE BLDC GLUCOMTR-MCNC: 107 MG/DL (ref 70–99)
GLUCOSE BLDC GLUCOMTR-MCNC: 112 MG/DL (ref 70–99)
GLUCOSE BLDC GLUCOMTR-MCNC: 125 MG/DL (ref 70–99)
GLUCOSE BLDC GLUCOMTR-MCNC: 86 MG/DL (ref 70–99)
GLUCOSE BLDC GLUCOMTR-MCNC: 86 MG/DL (ref 70–99)
GLUCOSE BLDC GLUCOMTR-MCNC: 89 MG/DL (ref 70–99)
GLUCOSE SERPL-MCNC: 140 MG/DL (ref 70–99)
GLUCOSE SERPL-MCNC: 95 MG/DL (ref 70–99)
HCT VFR BLD AUTO: 32 % (ref 35–47)
HCT VFR BLD AUTO: 32.8 % (ref 35–47)
HGB BLD-MCNC: 10 G/DL (ref 11.7–15.7)
HGB BLD-MCNC: 10.1 G/DL (ref 11.7–15.7)
IMM GRANULOCYTES # BLD: ABNORMAL 10*3/UL
IMM GRANULOCYTES NFR BLD: ABNORMAL %
LIPASE SERPL-CCNC: 22 U/L (ref 13–60)
LYMPHOCYTES # BLD AUTO: ABNORMAL 10*3/UL
LYMPHOCYTES # BLD MANUAL: 1.8 10E3/UL (ref 0.8–5.3)
LYMPHOCYTES NFR BLD AUTO: ABNORMAL %
LYMPHOCYTES NFR BLD MANUAL: 13 %
MAGNESIUM SERPL-MCNC: 1.8 MG/DL (ref 1.7–2.3)
MCH RBC QN AUTO: 30.1 PG (ref 26.5–33)
MCH RBC QN AUTO: 30.4 PG (ref 26.5–33)
MCHC RBC AUTO-ENTMCNC: 30.8 G/DL (ref 31.5–36.5)
MCHC RBC AUTO-ENTMCNC: 31.3 G/DL (ref 31.5–36.5)
MCV RBC AUTO: 97 FL (ref 78–100)
MCV RBC AUTO: 98 FL (ref 78–100)
METAMYELOCYTES # BLD MANUAL: 0.1 10E3/UL
METAMYELOCYTES NFR BLD MANUAL: 1 %
MONOCYTES # BLD AUTO: ABNORMAL 10*3/UL
MONOCYTES # BLD MANUAL: 0.8 10E3/UL (ref 0–1.3)
MONOCYTES NFR BLD AUTO: ABNORMAL %
MONOCYTES NFR BLD MANUAL: 6 %
MYELOCYTES # BLD MANUAL: 0.3 10E3/UL
MYELOCYTES NFR BLD MANUAL: 2 %
NEUTROPHILS # BLD AUTO: ABNORMAL 10*3/UL
NEUTROPHILS # BLD MANUAL: 10.5 10E3/UL (ref 1.6–8.3)
NEUTROPHILS NFR BLD AUTO: ABNORMAL %
NEUTROPHILS NFR BLD MANUAL: 77 %
NRBC # BLD AUTO: 0 10E3/UL
NRBC BLD AUTO-RTO: 0 /100
PHOSPHATE SERPL-MCNC: 4.2 MG/DL (ref 2.5–4.5)
PLAT MORPH BLD: ABNORMAL
PLATELET # BLD AUTO: 328 10E3/UL (ref 150–450)
PLATELET # BLD AUTO: 348 10E3/UL (ref 150–450)
POTASSIUM SERPL-SCNC: 3 MMOL/L (ref 3.4–5.3)
POTASSIUM SERPL-SCNC: 3 MMOL/L (ref 3.4–5.3)
POTASSIUM SERPL-SCNC: 3.6 MMOL/L (ref 3.4–5.3)
POTASSIUM SERPL-SCNC: 3.9 MMOL/L (ref 3.4–5.3)
RBC # BLD AUTO: 3.29 10E6/UL (ref 3.8–5.2)
RBC # BLD AUTO: 3.35 10E6/UL (ref 3.8–5.2)
RBC MORPH BLD: ABNORMAL
SODIUM SERPL-SCNC: 141 MMOL/L (ref 135–145)
SODIUM SERPL-SCNC: 145 MMOL/L (ref 135–145)
TSH SERPL DL<=0.005 MIU/L-ACNC: 1.06 UIU/ML (ref 0.3–4.2)
WBC # BLD AUTO: 13.6 10E3/UL (ref 4–11)
WBC # BLD AUTO: 14.2 10E3/UL (ref 4–11)

## 2023-10-08 PROCEDURE — 99232 SBSQ HOSP IP/OBS MODERATE 35: CPT | Performed by: INTERNAL MEDICINE

## 2023-10-08 PROCEDURE — 83735 ASSAY OF MAGNESIUM: CPT | Performed by: STUDENT IN AN ORGANIZED HEALTH CARE EDUCATION/TRAINING PROGRAM

## 2023-10-08 PROCEDURE — 85027 COMPLETE CBC AUTOMATED: CPT

## 2023-10-08 PROCEDURE — 84100 ASSAY OF PHOSPHORUS: CPT | Performed by: STUDENT IN AN ORGANIZED HEALTH CARE EDUCATION/TRAINING PROGRAM

## 2023-10-08 PROCEDURE — 36415 COLL VENOUS BLD VENIPUNCTURE: CPT

## 2023-10-08 PROCEDURE — 250N000011 HC RX IP 250 OP 636: Performed by: STUDENT IN AN ORGANIZED HEALTH CARE EDUCATION/TRAINING PROGRAM

## 2023-10-08 PROCEDURE — 85007 BL SMEAR W/DIFF WBC COUNT: CPT

## 2023-10-08 PROCEDURE — 82140 ASSAY OF AMMONIA: CPT

## 2023-10-08 PROCEDURE — 250N000013 HC RX MED GY IP 250 OP 250 PS 637: Performed by: STUDENT IN AN ORGANIZED HEALTH CARE EDUCATION/TRAINING PROGRAM

## 2023-10-08 PROCEDURE — 250N000013 HC RX MED GY IP 250 OP 250 PS 637

## 2023-10-08 PROCEDURE — 84132 ASSAY OF SERUM POTASSIUM: CPT

## 2023-10-08 PROCEDURE — 250N000013 HC RX MED GY IP 250 OP 250 PS 637: Performed by: FAMILY MEDICINE

## 2023-10-08 PROCEDURE — 80048 BASIC METABOLIC PNL TOTAL CA: CPT | Performed by: STUDENT IN AN ORGANIZED HEALTH CARE EDUCATION/TRAINING PROGRAM

## 2023-10-08 PROCEDURE — 258N000003 HC RX IP 258 OP 636

## 2023-10-08 PROCEDURE — 83690 ASSAY OF LIPASE: CPT

## 2023-10-08 PROCEDURE — 87040 BLOOD CULTURE FOR BACTERIA: CPT

## 2023-10-08 PROCEDURE — 36415 COLL VENOUS BLD VENIPUNCTURE: CPT | Performed by: STUDENT IN AN ORGANIZED HEALTH CARE EDUCATION/TRAINING PROGRAM

## 2023-10-08 PROCEDURE — 80048 BASIC METABOLIC PNL TOTAL CA: CPT

## 2023-10-08 PROCEDURE — 120N000002 HC R&B MED SURG/OB UMMC

## 2023-10-08 PROCEDURE — 86140 C-REACTIVE PROTEIN: CPT

## 2023-10-08 PROCEDURE — 82610 CYSTATIN C: CPT | Performed by: STUDENT IN AN ORGANIZED HEALTH CARE EDUCATION/TRAINING PROGRAM

## 2023-10-08 PROCEDURE — 87493 C DIFF AMPLIFIED PROBE: CPT

## 2023-10-08 PROCEDURE — 84443 ASSAY THYROID STIM HORMONE: CPT

## 2023-10-08 PROCEDURE — 99232 SBSQ HOSP IP/OBS MODERATE 35: CPT | Mod: GC | Performed by: STUDENT IN AN ORGANIZED HEALTH CARE EDUCATION/TRAINING PROGRAM

## 2023-10-08 RX ORDER — POTASSIUM CHLORIDE 750 MG/1
40 TABLET, EXTENDED RELEASE ORAL ONCE
Status: COMPLETED | OUTPATIENT
Start: 2023-10-08 | End: 2023-10-08

## 2023-10-08 RX ORDER — SODIUM CHLORIDE, SODIUM LACTATE, POTASSIUM CHLORIDE, CALCIUM CHLORIDE 600; 310; 30; 20 MG/100ML; MG/100ML; MG/100ML; MG/100ML
INJECTION, SOLUTION INTRAVENOUS
Status: DISPENSED
Start: 2023-10-08 | End: 2023-10-08

## 2023-10-08 RX ORDER — POTASSIUM CHLORIDE 750 MG/1
20 TABLET, EXTENDED RELEASE ORAL ONCE
Status: COMPLETED | OUTPATIENT
Start: 2023-10-08 | End: 2023-10-08

## 2023-10-08 RX ADMIN — ONDANSETRON 4 MG: 4 TABLET, FILM COATED ORAL at 16:19

## 2023-10-08 RX ADMIN — ONDANSETRON 4 MG: 4 TABLET, FILM COATED ORAL at 09:11

## 2023-10-08 RX ADMIN — POTASSIUM CHLORIDE 20 MEQ: 750 TABLET, EXTENDED RELEASE ORAL at 19:12

## 2023-10-08 RX ADMIN — POTASSIUM CHLORIDE 40 MEQ: 750 TABLET, EXTENDED RELEASE ORAL at 09:49

## 2023-10-08 RX ADMIN — PANTOPRAZOLE SODIUM 40 MG: 40 TABLET, DELAYED RELEASE ORAL at 09:11

## 2023-10-08 RX ADMIN — POTASSIUM CHLORIDE 20 MEQ: 1.5 POWDER, FOR SOLUTION ORAL at 02:20

## 2023-10-08 RX ADMIN — LOPERAMIDE HYDROCHLORIDE 2 MG: 2 CAPSULE ORAL at 17:08

## 2023-10-08 RX ADMIN — DIVALPROEX SODIUM 500 MG: 125 CAPSULE, COATED PELLETS ORAL at 21:59

## 2023-10-08 RX ADMIN — CEFDINIR 300 MG: 300 CAPSULE ORAL at 09:10

## 2023-10-08 RX ADMIN — POTASSIUM CHLORIDE 20 MEQ: 750 TABLET, EXTENDED RELEASE ORAL at 11:44

## 2023-10-08 RX ADMIN — CEFDINIR 300 MG: 300 CAPSULE ORAL at 19:12

## 2023-10-08 RX ADMIN — SODIUM CHLORIDE, POTASSIUM CHLORIDE, SODIUM LACTATE AND CALCIUM CHLORIDE: 600; 310; 30; 20 INJECTION, SOLUTION INTRAVENOUS at 21:02

## 2023-10-08 ASSESSMENT — ACTIVITIES OF DAILY LIVING (ADL)
ADLS_ACUITY_SCORE: 28

## 2023-10-08 NOTE — PLAN OF CARE
VS: /61 (BP Location: Right arm, Patient Position: Semi-Birmingham's, Cuff Size: Adult Large)   Pulse 91   Temp 98.7  F (37.1  C) (Oral)   Resp 16   Wt 97.1 kg (214 lb)   SpO2 97%   BMI 31.60 kg/m      O2: To room air, no dyspnea or COB   Output: Mixed continence, uses toilet and wears disposable briefs   Last BM: Episode of small BM 10-   Activity: SBA without assistive device   Skin: Generally intact   Pain: Denies any pain   CMS: A&O x 2 (Person & place) - somnolent with intermittent confusion     Dressing: Denied   Diet: Regular diet with poor appetite   LDA: (L) PIV on continuous infusion of Lactated Ringer   Equipment: Sitter, IV pole and personal items   Plan: To continue plan of care.   Additional Info: RN managed potassium done and completed, to draw blood at 1530. Blood culture result came and with result granulicatella adiacens, paged and received a call from resident on duty and no new order noted.

## 2023-10-08 NOTE — TELEPHONE ENCOUNTER
R: Pt is on a commitment and henry.      No available beds within [facility] .  Pt prefers [facility] Only. No beds today

## 2023-10-08 NOTE — PLAN OF CARE
Goal Outcome Evaluation:      Plan of Care Reviewed With: patient    Overall Patient Progress: improvingOverall Patient Progress: improving    Outcome Evaluation: pt. able to speak and answer questions when sitting up at edge of bed, she is A&O x2, still disoriented to place and situation.      VS: Temp: 97.8  F (36.6  C) Temp src: Oral BP: (!) 150/68 Pulse: 83   Resp: 14 SpO2: 96 % O2 Device: None (Room air)       O2: Spo2 above 90 on room air, denies shortness of breath and chest pain   Output: Pt voiding independently in bathroom   Last BM: Today, pt. Is having diarrhea, prn immodium avail.    Activity: Up with standby assist    Skin: Intact, except some bruising on R forearm   Pain: denies   CMS: Alert to self and time, disoriented to place and situation. Pt. Is somnolent, only gets up to go to bathroom and then goes right back to bed and sleeps.   Dressing: none   Diet: Regular, thin liquids, very poor appetite. Pt eats better when family is in room helping her   LDA: L forearm PIV infusing LR at 100mL per hr   Equipment: IV pole, personal belongings, sitter in room   Plan: Continue poc, discharge TBD   Additional Info:

## 2023-10-08 NOTE — PROGRESS NOTES
[facility]    Progress Note - [facility] Service       Date of Admission:  9/20/2023    Main plans for today:   Continue Cefdinir   Add on Cystatin C to re-assess GFR - pharmacy may re-dose Cefdinir based on this   Continue current Lantus at 16U, will re-check glucoses later in the day - if still persistently below 100, will re-adjust to 14U for tomorrow     Assessment & Plan   Angela Basurto is a 58-year-old female PMH bipolar 1 disorder admitted to Psych 8/16 for elvis and psychosis in setting of medication non-adherence. Transferred to [facility] 9/18/23 for sepsis 2/2 HAP, now resolved. Monitoring UTI and ETHAN. Patient is on Mata with full commitment, current 1:1.     # UTI  # Blood culture positive for Strep sanguinis, 1/2 bottles 10/5   # Leukocytosis  # Recent HAP  # Aspiration risk   # Diarrhea   # Somnolence   Patient with persistent leukocytosis, prompting infectious workup. Given urinary frequency and suprapubic tenderness on exam, initiated treatment of UTI. Urine culture not consistent with infection. However, per ID, given symptom improvement with antibiotic- reasonable to complete treatment for acute cystitis. Recommended Cephalosporins with longer course to avoid diarrhea/worsened ETHAN. Infectious workup otherwise reassuring. Blood culture returned positive for strep (10/5, 1/2 bottles), suspected contaminant per ID. Diarrhea suspected to be related to antibiotics with reassuring enteric panel. Somnolence likely related to psychotropic medications given improvement with holding Clozaril and reduced renal clearance. Stable leukocytosis likely also related to psychotropic medication. No indication for ECHO or head imaging per Psych.   - ID consult; appreciate recommendations    -> Agree with Cefdinir   -> OK with Immodium, no concern for infectious enteritis   -> Agree with fluid resuscitation     Antimicrobials  Ceftriaxone  (19/7-10/8)  Cefdinir (10/8- anticipate 10/13 to 10/16)    Micro Data  10/5 - COVID/RSV/Influ - negative  10/5 - Ucx - <10K Ecoli  10/5 - Bld Cx - positive 1/2 - Strept Sanguinis and Granulicatella Adiacens (presumed contaminant)   10/5 - Cdiff - negative  10/5 - Enteric - negative  10/6 - MRSA Nares - negative  10/6 - Bld Cx - NGTD   10/7 - BldCx - NGTD  10/8 - Bld Cx - pending     # ETHAN, worsening, suspect pre-renal vs. ATN  Baseline creatinine 0.5. Initially had ETHAN that peaked at 1.99 9/22. Now with worsening creatinine again, though has stabilized at ~1.9. Most suspicious for ATN related to recent antibiotics, likely with superimposed pre-renal effect with known poor PO intake and low urine sodium. Will continue fluid challenge and observe effect. Less likely nephrotic syndrome with only mild proteinuria; glomerulonephritis without consistent UA; post-renal with reassuring US or fluid overload with normal BNP.   - Continue  mL/hr   - Treat UTI as above    - Strict intake/output   - BMP daily   - Avoid nephrotoxic medications   - HOLD Hydrochlorothiazide     # Bipolar I disorder  # Hx of possible seizure (1983)  # Mata: Haldol, clozapine, risperidone, olanzapine  # Full commitment   # Somnolence   Presented with elvis and psychosis in the context of medication nonadherence. Was admitted with Psychiatry from 8/16 until transfer to this service 9/20. Has required a 1:1 to maintain her safety. Psychiatry has been following and managing medications. Clozapine initiated during current admission. Concern that medications are contributing to somnolence given current ETHAN and less renal clearance. Per Psych, inflammatory cytokines can increase Clozapine levels. Will need to rule out infection. If no source of infection and not improving, will need to rule out malignancy.   - See psychiatry note for details of medication titrations  - Nursing care: 1:1 sitter, encouraging sitting in chair or up ~6 hours a day,  walk with nurse 1:1 4x daily  - Legal status: MEDINA and full commitment   - Medications: Depakote DT sprinkles 500 mg daily, HOLD Clozapine 50 mg daily   - Agitation plan:    - Non-emergency: PRN Haldol 2 mg PO  - Emergency: Haldol 5mg + diphenhydramine 50mg q8h PRN, PO or IM  - STOP Hydroxyzine per psych (see 9/29 note)    # Hyperglycemia   # Pre-diabetes (A1c 09/27/23, 6.2)  Patient with persistent hyperglycemia. A1c 6.2% 9/27/23. Suspected related to Clozapine given known metabolic side effects. May also be related to infection- workup ongoing. Improved with titration of insulin.   - Continue Lantus to 16U daily   - MDSSI   - Metformin discontinued due to diarrhea   - Hypoglycemia protocol  - UA for infection      # HTN  Has been intermittently hypertensive. Holding medications in setting of ETHAN and psychotropic interactions.   - HOLD Hydrochlorothiazide 25 mg daily  - HOLD PTA clonidine per Psych    # Moderate protein-calorie malnutrition   Patient with poor PO intake. Per family, this is chronic and occurs even with traditional foods. Patient reports frequent nausea with food intake. Could consider Phenergan for nausea control as well as appetite stimulation. Will avoid introducing new medications at this time with current ETHAN, but will consider for future.   - Nutrition IP consult, appreciate recommendations   - Needs encouragement to eat, safe to swallow. Order food and encourage food and fluids   - Medical food supplement therapy  - Consider Phenergan for nausea/appetite stimulation     Chronic/resolved/stable:    # Orthostatic hypotension, resolved  Symptomatic 9/18 with blood pressure drop from 123/72 while laying to 96/55 with standing. Patient reported this was new since admission and starting new medications, and has since improved. Patient initiated on clozapine on 8/31 with many dose changes. Somnolence and poor PO intake have been challenges during this admission, both likely contributing. Workups  for endocrine and cardiac causes were negative.   - Encourage oral hydration   - Compression stockings  - Likely to continue to be tricky with clozapine dose changes    # Possible hospital-acquired pneumonia, resolved/treated  # Positive blood culture x1 - consistent with contaminant  # Severe sepsis, resolved  # Acute hypoxic respiratory failure, resolved  # Leukocytosis, ongoing  Initially presented 9/19 from inpatient psych with temperature 101.3F, lactic acid 4.3 in the setting of new cough and widespread body aches. , WBC 9.8. 9/18 CXR with streaky right greater than left perihilar and basilar opacities, likely atelectasis and/or edema without focal consolidation. Respiratory viral panels (9/19, 9/22) have been negative. Echocardiogram not consistent with myocarditis. Briefly treated with vancomycin 9/19-9/23 due to a blood culture growing gram positive cocci; this was later determined to be Staph Hominis, suspected contaminant. Pulmonary exam without focal findings consistent with bacterial pneumonia, though patient had an ongoing cough and appears to have a significant burden of respiratory secretions seen on 9/24 (resolved). RT evaluated and recommended use of aerobika flutter valve. White count have been elevated although stable with still no clear focus of infection, could be clozapine side effect. Patient is medically stable to return to psychiatry unit.   - Infectious disease consulted, now off  - S/p piperacillin/tazobactam (9/20- 9/26)  - Respiratory therapist saw Angela and she tolerated vest therapy x1  - Discontinue vest therapy with improved breathing and reassuring physical examination   - Aerobika flutter valve   - CBC q72h    # Hypokalemia, resolved  # Abdominal tenderness, resolved    Patient report intermittent abdominal tenderness, unlikely pancreatitis with location abdominal tenderness and lipase x2 < 3x normal upper limits. Low concern for bowel obstruction with abdominal xray  revealing nonobstructive bowel gas pattern and no significant fecal retention.   - Loperamide capsule 2 mg PRN  - RN-driven potassium replacement protocol - High     # Hypocalcemia  # Hypoalbuminemia  Suspect possible malnutrition due to poor PO intake.   - Encourage good PO intake   - Pantoprazole EC 40 mg tablets  - DEBORAH  - Zofran PRN, 30 mins before meals  - Nutrition IP consult, appreciate recommendations               - Medical food supplement therapy  - Patient to consume % of nutritionally adequate meal trays TID, or the equivalent with supplements/snacks.     # Spiritism  Important to her value system.   - Spiritual care consult         Diet: Combination Diet Regular Diet; Safe Tray - with utensils  Snacks/Supplements Adult: Other; Ensure Enlive mixed with ice cream; With Meals    DVT Prophylaxis: Low Risk/Ambulatory with no VTE prophylaxis indicated  Wilson Catheter: Not present  Fluids: PO  Lines: None     Cardiac Monitoring: None  Code Status: Full Code      Clinically Significant Risk Factors        # Hypokalemia: Lowest K = 3 mmol/L in last 2 days, will replace as needed       # Hypoalbuminemia: Lowest albumin = 2 g/dL at 9/24/2023  8:46 AM, will monitor as appropriate    # Acute Kidney Injury, unspecified: based on a >150% or 0.3 mg/dL increase in last creatinine compared to past 90 day average, will monitor renal function            # Moderate Malnutrition: based on nutrition assessment             Disposition Plan    Medically ready for transfer back to  psychiatry.     The patient's care was discussed with the Attending Physician, Dr. Noe .    DO [facility]  [facility]  Securely message with [employer] (more info)  Text page via [employer] Paging/Directory   See signed in provider for up to date coverage information  ______________________________________________________________________    Interval History      Overnight:  NAEO    Subjective:   Sleep this AM. Per RN and bedside attendant - no concerns. Dud have loose stool this AM.     Physical Exam   Vital Signs: Temp: 97.8  F (36.6  C) Temp src: Oral BP: (!) 150/68 Pulse: 83   Resp: 14 SpO2: 96 % O2 Device: None (Room air)    Weight: 214 lbs 0 oz    Physical Exam  Constitutional:       General: She is not in acute distress.     Appearance: Normal appearance. She is not ill-appearing, toxic-appearing or diaphoretic.   Cardiovascular:      Rate and Rhythm: Normal rate and regular rhythm.   Pulmonary:      Effort: Pulmonary effort is normal. No respiratory distress.      Breath sounds: No wheezing or rales.   Abdominal:      Tenderness: There is no abdominal tenderness.   Neurological:      Comments: Somnolent / sleeping          Medical Decision Making   Please see A&P for additional details of medical decision making.      Data   ------------------------- PAST 24 HR DATA REVIEWED -----------------------------------------------    I have personally reviewed the following data over the past 24 hrs:    13.6 (H)  \   10.0 (L)   / 348     141 111 (H) 20.9 (H) /  95   3.0 (L); 3.0 (L) 19 (L) 1.91 (H) \     Procal: N/A CRP: 116.94 (H) Lactic Acid: N/A       Imaging results reviewed over the past 24 hrs:   No results found for this or any previous visit (from the past 24 hour(s)).

## 2023-10-08 NOTE — PLAN OF CARE
Goal Outcome Evaluation:      Plan of Care Reviewed With: patient    Overall Patient Progress: improvingOverall Patient Progress: improving    Outcome Evaluation: Pt disoriented to place and situation. Slept most of shift. Potassium replaced will recheck in AM. Immodium given for frequent diarhea.

## 2023-10-08 NOTE — PLAN OF CARE
Goal Outcome Evaluation:      Plan of Care Reviewed With: patient    Overall Patient Progress: no changeOverall Patient Progress: no change    Outcome Evaluation: pt. is hypersomnolent, only getting up to bathroom and then back to bed and goes to sleep. alert to self only, and pt. is experiencing diarrhea with occasional episodes of incontinence. potassium replaced on day shift, redraw at 1630 was 3.6.  1:1 sitter at bedside.    VS: Temp: 98.4  F (36.9  C) Temp src: Oral BP: 139/60 Pulse: 94   Resp: 16 SpO2: 98 % O2 Device: None (Room air)       O2: Sats above 90 on room air, denies sob and cp   Output: Voiding spontaneously without difficulty in bathroom   Last BM: 10/8/2023 diarrhea   Activity: Up with standby assist   Skin: Intact, some bruising on bilateral arms   Pain: denies   CMS: Alert to self only, unable to answer additional orientation questions   Dressing: none   Diet: Regular, poor appetite, eats better when family is here. Denies nausea.    LDA: R arm piv infusing    Equipment: IV pole, personal belongings, sitter   Plan: IV antibiotics, TBD   Additional Info: Pt. Was experiencing multiple episodes of watery diarrhea today and seemed more tired than usual. Provider paged. Fluids increased to 150/hr, blood draws ordered. C-diff ruled out but pt. Has been placed in contact isolation per provider order.

## 2023-10-08 NOTE — TELEPHONE ENCOUNTER
Bed search ([facility] only) @ 11:39PM:    [facility]: No appropriate beds available    Pt remains on work list until appropriate placement is available

## 2023-10-08 NOTE — PROGRESS NOTES
[facility]   BRIEF PROGRESS NOTE    SUBJECTIVE  Called by RN for profuse stool volume. Had multiple watery, mucous to green stools from 500ml to 200 ml over the day. Negative 2600ml stool loss per I/Os. Nurse has noticed patient has been more lethargic and reported shortness of breath. Saw the patient in room with family. She reports large stool volume with nausea with an episode of emesis yesterday but none since. This evening she is feeling better. Denies fevers, chills, headache, neck stiffness, chest pain, shortness of breath and abdominal pain.       OBJECTIVE:  BP (!) 147/68 (BP Location: Right arm, Patient Position: Right side, Cuff Size: Adult Large)   Pulse 96   Temp 98.4  F (36.9  C) (Oral)   Resp 20   Wt 97.1 kg (214 lb)   SpO2 98%   BMI 31.60 kg/m      Exam:   General: Alert and oriented, in no acute distress.  Skin: Warm and dry, no abnormalities noted.  Eyes: Extra-ocular muscles grossly intact, anicteric sclera   ENT: Speech intact, no hearing impairment noted, mild thyroid tenderness, no adenopathy  CV: RRR w/ 2/6 systolic flow murmur, 1+ pulses bilaterally, trace b/l LE edema, warm and well perfused.  GI: Normoactive BS, soft, slightly distended, mild epigastric tenderness w/o gaurding/rebound, no organomegaly or masses  Respiratory: CTAB, non tachypneic, adequate chest rise, non labored breathing  Neuro: alert and oriented to provider, normal speech, mentation intact. Gross and fine motor skills intact.   Psychiatric: Mood and affect appear normal.   Extremities: Warm, able to move all four extremities at will.      ASSESSMENT/PLAN:  58 year old female with history of bipolar type 1 complicated by inpatient admission for elvis currently being treated for ETHAN, UTI, and sepsis 2/2 HAP on cefdinir now concerning for acute onset large volume watery stools and consequential electrolyte imbalance. Differential of diarrhea is C. Diff leading to colitis with elevated CRP. Possible viral  gastroenteritis vs hyperthyroidism vs food poisoning vs pancreatitis. Enteric panel and C.diff on 10/5 previously unremarkable, but has since had worsening output on CTX->Cefdinir. The patient is significantly volume depleted with low diastolic and will need increased mIVF, but reassuringly hemodynamically stable. Low concern for worsening pneumonia, PE/DV or hepatobiliary process. With reported fatigue and dyspnea, as well as flow murmur on exam, potential element of anemia so will obtain CBC, but fatigue may be related to polypharmacy/antipsychotics and increasing serum levels secondary to inflammatory cytokines.     Diarrhea  Plan:   -maintenance IVF  ml/hr -> 150 ml/hr  -BMP to check for electrolyte abnormalities, worsening ETHAN  -Thyroid studies  -C. Diff PCR  -CBC to obtain white count  -Lipase  Fatigue  Plan  -CBC for Hgb    Please see daily rounding note for full A/P.  Zack Sharp MD  6:59 PM

## 2023-10-08 NOTE — TELEPHONE ENCOUNTER
R:  No appropriate [facility] beds available.    Pt will remain on IPMH until an appropriate [facility] bed is available.

## 2023-10-08 NOTE — PROGRESS NOTES
General Infectious Disease Service  - [facility]    Patient:  Angela Basurto, Date of birth 1965, Medical record number [id number]  Date of Admission: 9/20/2023           Assessment and Recommendations:   ID Problem List:  Possible HAP, s/p treatment x7 days 9/18-9/22  CoNS growth in blood culture 9/20 obtained for fever that was ultimately attributed to HAP, regarded as contaminant, did not recur  Schizoaffective disorder, Bipolar, admitted to [facility] psych unit 8/2023 then transferred 9/20 for HAP  ETHAN, Cr nl at baseline and has been 1.5-1.8 since receiving abx for HAP. JENNIE normal. ETiology unclear, possibly ATN 2/to abx and exacerbated by hypovolemia (especially in recent past given diarrhea?) but warrants ongoing monitoring  Subrapubic pain, dysuria, urinary hesitancy, and diarrhea, all suggestive of cystitis in setting of pyuria, though UCx is not terribly impressive. Maybe exacerbated by lower UOP in the setting of ETHAN? JENNIE not suggesting upper tract dz. Seems to be improving on ceftriaxone  Diarrhea. Upper tract UTI, though JENNIE normal, antibiotic-associated diarrhea are both possibilities. Seems a bit better on immodium  Strep bacteremia, detected from bl cx obtained for lethargy which in hindsight may be more related to med s/e. Strep species other than pneumoniae, pyogenes, etc, is suspicious for a contaminant.    Recommendations:  Agree with transitioning today to cefdinir dosed for renal function for presumed UTI. Planned duration 7-10 days, pending clinical course (until ~10/13)  Agree with immodium for diarrhea as I don't see evidence of infectious enteritis  Agree with fluid resuscitation for what appears to be hypovolemia.      Recommendations discussed with primary team    The General ID team will continue to follow this patient. Please feel free to call with any questions. Floor time =35min    Brooke oM MD   of Medicine, Division of Infectious Diseases  Contact me on  the [employer] arielle or console  pgr [phone]    Interval events  More sleepy today. Still having loose stool. One episode this am. No vocalized complaint of abdominal/suprapubid discomfort.           Physical Exam:   /61 (BP Location: Right arm, Patient Position: Semi-[place]'s, Cuff Size: Adult Large)   Pulse 91   Temp 98.7  F (37.1  C) (Oral)   Resp 16   Wt 97.1 kg (214 lb)   SpO2 97%   BMI 31.60 kg/m         Exam:  GENERAL: Not in acute distress, but did not rouse to talk with me.  HEAD: Normocephalic and atraumatic  ENT:  No hearing impairment, oral mucous membranes moist  EYES:  Eyes grossly normal to inspection.  LUNGS:  Clear to auscultation.  CARDIOVASCULAR:  Regular rate .  ABDOMEN:  Soft, non-tender           Laboratory Data:     Creatinine   Date Value Ref Range Status   10/08/2023 1.91 (H) 0.51 - 0.95 mg/dL Final   10/07/2023 1.87 (H) 0.51 - 0.95 mg/dL Final   10/06/2023 1.86 (H) 0.51 - 0.95 mg/dL Final   10/05/2023 1.91 (H) 0.51 - 0.95 mg/dL Final   10/04/2023 1.62 (H) 0.51 - 0.95 mg/dL Final     WBC Count   Date Value Ref Range Status   10/08/2023 13.6 (H) 4.0 - 11.0 10e3/uL Final   10/07/2023 13.6 (H) 4.0 - 11.0 10e3/uL Final   10/06/2023 13.6 (H) 4.0 - 11.0 10e3/uL Final   10/05/2023 15.2 (H) 4.0 - 11.0 10e3/uL Final   10/04/2023 14.0 (H) 4.0 - 11.0 10e3/uL Final     Hemoglobin   Date Value Ref Range Status   10/08/2023 10.0 (L) 11.7 - 15.7 g/dL Final     Platelet Count   Date Value Ref Range Status   10/08/2023 348 150 - 450 10e3/uL Final     Lab Results   Component Value Date     10/08/2023    BUN 20.9 (H) 10/08/2023    CO2 19 (L) 10/08/2023     No results found for: CRP

## 2023-10-08 NOTE — TELEPHONE ENCOUNTER
R:  Pt is on medical unit, but medically clear for IPMH.  Awaiting bed availability.    Currently no beds within [facility].     Pt is on a commitment with a henry -   placement search can be statewide.       [facility] is posting 0 beds. [phone].  per call at 7:12 am to [facility], they are at cap.      [facility] is posting 0 beds. Negative covid required.                    [facility] is posting 0 bed. Negative covid required. [phone].  per call at 7:14 am to [place], 0 beds     [facility] is posting 0 beds.           [facility] is posting 0 beds. [phone].  Per call with [facility], no beds available      [facility] is posting 0 beds              [facility] is posting 0 beds.      [facility] is posting 3 beds. LOW acuity ONLY. Mixed unit 12+. Negative covid- [phone].        [facility] has 0 beds posted. No aggression. Negative Covid. Low acuity.         [facility] is posting 0 beds. Negative covid. [phone].  per call at 7:19 am, recording said NOT to leave a message and no option to speak with a live rep.       Health system ([place] [place] is @ Capacity     [facility] is posting 0 bed. Low acuity. No current aggression.        [facility] is posting 0 bed. Low acuity. 72 HH hold preferred. Negative covid required. [phone] ;      [facility] in [place] is posting 7 beds.  Negative covid required.   Vol only, No history of aggression, violence, or assault. No sexual offenders. No 72 HH holds. [phone].  -  Pt is on a commitment and henry.       [facility] is posting 3 beds. Negative covid required.  (Must have the cognitive ability to do programming. No aggressive or violent behavior or recent HX in the last 2 yrs. MH must be primary.) Always low acuity. [phone]  - @ CAPACITY FOR REVIEWS     [facility] [place] has 0 beds posted. Negative covid required.  Low acuity only. Violence and aggression capped.  [phone]. Low acuity only.        [facility] is [facility] 0 beds Low acuity, Negative covid required.  [phone]. Per call at 7:31 am to Krishan, they may have a bed later but he is not sure. He said he will call back if they do.      [facility] is posting 2 beds. Negative covid required.  Vol only. Combined adolescent and adult unit. No aggressive or violent behavior. No registered sex offenders.  [phone].   per call at 8:33 am to Amanda, they have 2 beds avail.  PT is on a COMMITMENT/MEDINA     [facility] [place] posting 8 beds Negative covid required.  [phone] ; per Hina ANS at 7 am, they have 1 step down bed avail [place]  0/8 @ 10:30AM

## 2023-10-09 LAB
ANION GAP SERPL CALCULATED.3IONS-SCNC: 13 MMOL/L (ref 7–15)
BACTERIA UR CULT: ABNORMAL
BACTERIA UR CULT: ABNORMAL
BASO+EOS+MONOS # BLD AUTO: ABNORMAL 10*3/UL
BASO+EOS+MONOS NFR BLD AUTO: ABNORMAL %
BASOPHILS # BLD AUTO: ABNORMAL 10*3/UL
BASOPHILS # BLD MANUAL: 0 10E3/UL (ref 0–0.2)
BASOPHILS NFR BLD AUTO: ABNORMAL %
BASOPHILS NFR BLD MANUAL: 0 %
BUN SERPL-MCNC: 17 MG/DL (ref 6–20)
BURR CELLS BLD QL SMEAR: ABNORMAL
CALCIUM SERPL-MCNC: 9 MG/DL (ref 8.6–10)
CHLORIDE SERPL-SCNC: 115 MMOL/L (ref 98–107)
CREAT SERPL-MCNC: 1.83 MG/DL (ref 0.51–0.95)
CRP SERPL-MCNC: 108.79 MG/L
DEPRECATED HCO3 PLAS-SCNC: 18 MMOL/L (ref 22–29)
EGFRCR SERPLBLD CKD-EPI 2021: 32 ML/MIN/1.73M2
EOSINOPHIL # BLD AUTO: ABNORMAL 10*3/UL
EOSINOPHIL # BLD MANUAL: 0 10E3/UL (ref 0–0.7)
EOSINOPHIL NFR BLD AUTO: ABNORMAL %
EOSINOPHIL NFR BLD MANUAL: 0 %
ERYTHROCYTE [DISTWIDTH] IN BLOOD BY AUTOMATED COUNT: 14.3 % (ref 10–15)
GLUCOSE BLDC GLUCOMTR-MCNC: 115 MG/DL (ref 70–99)
GLUCOSE BLDC GLUCOMTR-MCNC: 133 MG/DL (ref 70–99)
GLUCOSE BLDC GLUCOMTR-MCNC: 135 MG/DL (ref 70–99)
GLUCOSE BLDC GLUCOMTR-MCNC: 146 MG/DL (ref 70–99)
GLUCOSE BLDC GLUCOMTR-MCNC: 150 MG/DL (ref 70–99)
GLUCOSE SERPL-MCNC: 145 MG/DL (ref 70–99)
HCT VFR BLD AUTO: 28.4 % (ref 35–47)
HGB BLD-MCNC: 9.2 G/DL (ref 11.7–15.7)
IMM GRANULOCYTES # BLD: ABNORMAL 10*3/UL
IMM GRANULOCYTES NFR BLD: ABNORMAL %
LYMPHOCYTES # BLD AUTO: ABNORMAL 10*3/UL
LYMPHOCYTES # BLD MANUAL: 2.8 10E3/UL (ref 0.8–5.3)
LYMPHOCYTES NFR BLD AUTO: ABNORMAL %
LYMPHOCYTES NFR BLD MANUAL: 19 %
MAGNESIUM SERPL-MCNC: 1.6 MG/DL (ref 1.7–2.3)
MCH RBC QN AUTO: 31 PG (ref 26.5–33)
MCHC RBC AUTO-ENTMCNC: 32.4 G/DL (ref 31.5–36.5)
MCV RBC AUTO: 96 FL (ref 78–100)
METAMYELOCYTES # BLD MANUAL: 0.1 10E3/UL
METAMYELOCYTES NFR BLD MANUAL: 1 %
MONOCYTES # BLD AUTO: ABNORMAL 10*3/UL
MONOCYTES # BLD MANUAL: 0.7 10E3/UL (ref 0–1.3)
MONOCYTES NFR BLD AUTO: ABNORMAL %
MONOCYTES NFR BLD MANUAL: 5 %
NEUTROPHILS # BLD AUTO: ABNORMAL 10*3/UL
NEUTROPHILS # BLD MANUAL: 11 10E3/UL (ref 1.6–8.3)
NEUTROPHILS NFR BLD AUTO: ABNORMAL %
NEUTROPHILS NFR BLD MANUAL: 75 %
NRBC # BLD AUTO: 0 10E3/UL
NRBC BLD AUTO-RTO: 0 /100
PHOSPHATE SERPL-MCNC: 3.5 MG/DL (ref 2.5–4.5)
PLAT MORPH BLD: ABNORMAL
PLATELET # BLD AUTO: 376 10E3/UL (ref 150–450)
POLYCHROMASIA BLD QL SMEAR: SLIGHT
POTASSIUM SERPL-SCNC: 3.3 MMOL/L (ref 3.4–5.3)
POTASSIUM SERPL-SCNC: 3.4 MMOL/L (ref 3.4–5.3)
POTASSIUM SERPL-SCNC: 3.5 MMOL/L (ref 3.4–5.3)
RBC # BLD AUTO: 2.97 10E6/UL (ref 3.8–5.2)
RBC MORPH BLD: ABNORMAL
SODIUM SERPL-SCNC: 146 MMOL/L (ref 135–145)
WBC # BLD AUTO: 14.7 10E3/UL (ref 4–11)

## 2023-10-09 PROCEDURE — 87040 BLOOD CULTURE FOR BACTERIA: CPT

## 2023-10-09 PROCEDURE — 80048 BASIC METABOLIC PNL TOTAL CA: CPT

## 2023-10-09 PROCEDURE — 250N000013 HC RX MED GY IP 250 OP 250 PS 637: Performed by: STUDENT IN AN ORGANIZED HEALTH CARE EDUCATION/TRAINING PROGRAM

## 2023-10-09 PROCEDURE — 86140 C-REACTIVE PROTEIN: CPT

## 2023-10-09 PROCEDURE — 250N000013 HC RX MED GY IP 250 OP 250 PS 637

## 2023-10-09 PROCEDURE — 83735 ASSAY OF MAGNESIUM: CPT | Performed by: STUDENT IN AN ORGANIZED HEALTH CARE EDUCATION/TRAINING PROGRAM

## 2023-10-09 PROCEDURE — 258N000003 HC RX IP 258 OP 636

## 2023-10-09 PROCEDURE — 84100 ASSAY OF PHOSPHORUS: CPT | Performed by: STUDENT IN AN ORGANIZED HEALTH CARE EDUCATION/TRAINING PROGRAM

## 2023-10-09 PROCEDURE — 85027 COMPLETE CBC AUTOMATED: CPT

## 2023-10-09 PROCEDURE — 120N000002 HC R&B MED SURG/OB UMMC

## 2023-10-09 PROCEDURE — 83735 ASSAY OF MAGNESIUM: CPT

## 2023-10-09 PROCEDURE — 84132 ASSAY OF SERUM POTASSIUM: CPT

## 2023-10-09 PROCEDURE — 36415 COLL VENOUS BLD VENIPUNCTURE: CPT

## 2023-10-09 PROCEDURE — 85007 BL SMEAR W/DIFF WBC COUNT: CPT

## 2023-10-09 PROCEDURE — 250N000011 HC RX IP 250 OP 636: Performed by: STUDENT IN AN ORGANIZED HEALTH CARE EDUCATION/TRAINING PROGRAM

## 2023-10-09 RX ORDER — LOPERAMIDE HCL 2 MG
2 CAPSULE ORAL 2 TIMES DAILY PRN
Status: DISCONTINUED | OUTPATIENT
Start: 2023-10-09 | End: 2023-10-19 | Stop reason: HOSPADM

## 2023-10-09 RX ORDER — LOPERAMIDE HCL 2 MG
2 CAPSULE ORAL 2 TIMES DAILY
Status: DISCONTINUED | OUTPATIENT
Start: 2023-10-09 | End: 2023-10-13

## 2023-10-09 RX ORDER — POTASSIUM CHLORIDE 750 MG/1
40 TABLET, EXTENDED RELEASE ORAL ONCE
Status: COMPLETED | OUTPATIENT
Start: 2023-10-09 | End: 2023-10-09

## 2023-10-09 RX ORDER — POTASSIUM CHLORIDE 750 MG/1
20 TABLET, EXTENDED RELEASE ORAL ONCE
Status: COMPLETED | OUTPATIENT
Start: 2023-10-09 | End: 2023-10-09

## 2023-10-09 RX ORDER — POTASSIUM CHLORIDE 1.5 G/1.58G
40 POWDER, FOR SOLUTION ORAL ONCE
Status: COMPLETED | OUTPATIENT
Start: 2023-10-09 | End: 2023-10-09

## 2023-10-09 RX ADMIN — LOPERAMIDE HYDROCHLORIDE 2 MG: 2 CAPSULE ORAL at 20:06

## 2023-10-09 RX ADMIN — POTASSIUM CHLORIDE 40 MEQ: 1.5 POWDER, FOR SOLUTION ORAL at 16:40

## 2023-10-09 RX ADMIN — POTASSIUM CHLORIDE 20 MEQ: 750 TABLET, EXTENDED RELEASE ORAL at 22:16

## 2023-10-09 RX ADMIN — SODIUM CHLORIDE, POTASSIUM CHLORIDE, SODIUM LACTATE AND CALCIUM CHLORIDE 1000 ML: 600; 310; 30; 20 INJECTION, SOLUTION INTRAVENOUS at 17:53

## 2023-10-09 RX ADMIN — POTASSIUM CHLORIDE 40 MEQ: 750 TABLET, EXTENDED RELEASE ORAL at 10:55

## 2023-10-09 RX ADMIN — ONDANSETRON 4 MG: 4 TABLET, FILM COATED ORAL at 08:12

## 2023-10-09 RX ADMIN — SODIUM CHLORIDE, POTASSIUM CHLORIDE, SODIUM LACTATE AND CALCIUM CHLORIDE: 600; 310; 30; 20 INJECTION, SOLUTION INTRAVENOUS at 10:55

## 2023-10-09 RX ADMIN — DIVALPROEX SODIUM 500 MG: 125 CAPSULE, COATED PELLETS ORAL at 22:16

## 2023-10-09 RX ADMIN — SODIUM CHLORIDE, POTASSIUM CHLORIDE, SODIUM LACTATE AND CALCIUM CHLORIDE: 600; 310; 30; 20 INJECTION, SOLUTION INTRAVENOUS at 03:56

## 2023-10-09 RX ADMIN — CEFDINIR 300 MG: 300 CAPSULE ORAL at 20:06

## 2023-10-09 RX ADMIN — CEFDINIR 300 MG: 300 CAPSULE ORAL at 08:12

## 2023-10-09 RX ADMIN — LOPERAMIDE HYDROCHLORIDE 2 MG: 2 CAPSULE ORAL at 01:58

## 2023-10-09 RX ADMIN — ONDANSETRON 4 MG: 4 TABLET, FILM COATED ORAL at 15:54

## 2023-10-09 RX ADMIN — PANTOPRAZOLE SODIUM 40 MG: 40 TABLET, DELAYED RELEASE ORAL at 08:12

## 2023-10-09 RX ADMIN — LOPERAMIDE HYDROCHLORIDE 2 MG: 2 CAPSULE ORAL at 08:12

## 2023-10-09 ASSESSMENT — ACTIVITIES OF DAILY LIVING (ADL)
ADLS_ACUITY_SCORE: 28
ADLS_ACUITY_SCORE: 27
ADLS_ACUITY_SCORE: 28
ADLS_ACUITY_SCORE: 28
ADLS_ACUITY_SCORE: 27
ADLS_ACUITY_SCORE: 28
ADLS_ACUITY_SCORE: 28

## 2023-10-09 NOTE — PROGRESS NOTES
[facility]    Progress Note - [facility] Service       Date of Admission:  9/20/2023    Main plans for today:   - Continue  mL/hr   - Continue Cefdinir for UTI   - Schedule Imodium for diarrhea; continue PRN in addition   - Decrease Lantus to 14U   - Per Psych, continue Depakote; monitor for symptoms of elvis and restart Clozaril 50 mg daily if needed     Assessment & Plan   Angela Basurto is a 58-year-old female PMH bipolar 1 disorder admitted to Psych 8/16 for elvis and psychosis in setting of medication non-adherence. Transferred to [facility] 9/18/23 for sepsis 2/2 HAP, now resolved. Monitoring UTI and ETHAN. Patient is on Mata with full commitment, current 1:1.     # ETHAN, worsening, suspect pre-renal vs. ATN  Baseline creatinine 0.5. Initially had ETHAN that peaked at 1.99 9/22. Now with worsening creatinine again, though has stabilized at ~1.9. Most suspicious for ATN related to pre-renal etiology given known poor PO intake and low urine sodium. Less likely nephrotic syndrome with only mild proteinuria; glomerulonephritis without consistent UA; post-renal with reassuring US or fluid overload with normal BNP.   - Continue  mL/hr    - Strict intake/output   - BMP daily   - Avoid nephrotoxic medications   - HOLD Hydrochlorothiazide     # UTI  # Blood culture positive for Strep sanguinis, 1/2 bottles 10/5   # Leukocytosis, stable  # Recent HAP, resolved   # Aspiration risk   # Diarrhea   # Somnolence   Patient with persistent leukocytosis, prompting infectious workup. Given urinary frequency and suprapubic tenderness on exam, initiated treatment of UTI. Urine culture not consistent with infection. However, per ID, given symptom improvement with antibiotic- reasonable to complete treatment for acute cystitis. Infectious workup otherwise reassuring. Blood culture returned positive for strep (10/5, 1/2 bottles), suspected contaminant per ID.  Diarrhea suspected to be related to antibiotics with reassuring enteric panel. Somnolence likely related to psychotropic medications given improvement with holding Clozaril and reduced renal clearance. Stable leukocytosis likely also related to psychotropic medication. No indication for ECHO or head imaging per Psych.   Consults:  - ID consult; appreciate recommendations   Antimicrobials  Ceftriaxone (19/7-10/8)  Cefdinir (10/8- anticipate 10/13 to 10/16)  Relevant cultures:  10/5 - COVID/RSV/Influ - negative  10/5 - Ucx - <10K Ecoli  10/5 - Bld Cx - positive 1/2 - Strept Sanguinis and Granulicatella Adiacens (presumed contaminant)   10/5 - Cdiff - negative  10/5 - Enteric - negative  10/6 - MRSA Nares - negative  10/6 - Bld Cx - NGTD   10/7 - BldCx - NGTD  10/8 - Bld Cx - NGTD    # Diarrhea, suspected antibiotic related vs. low fiber  Patient has had consistent loose stools. Has had negative enteric panels 10/5 and C diff (10/5, 10/8). Less concern for infectious etiology. Suspect related to antibiotics and low fiber intake.  - Schedule Imodium 2 mg BID   - Continue Imodium 2 mg BID PRN   - Encourage PO intake     # Moderate protein-calorie malnutrition   Patient with poor PO intake. Per family, this is chronic and occurs even with traditional foods. Patient reports frequent nausea with food intake. Could consider Phenergan for nausea control as well as appetite stimulation. Will avoid introducing new medications at this time with current ETHAN, but will consider for future.   - Nutrition IP consult, appreciate recommendations   - Needs encouragement to eat, safe to swallow. Order food and encourage food and fluids   - Medical food supplement therapy  - Consider Phenergan for nausea/appetite stimulation     # Bipolar I disorder  # Hx of possible seizure (1983)  # Mata: Haldol, clozapine, risperidone, olanzapine  # Full commitment   # Somnolence   Presented with elvis and psychosis in the context of medication  nonadherence. Was admitted with Psychiatry from 8/16 until transfer to this service 9/20. Has required a 1:1 to maintain her safety. Psychiatry has been following and managing medications. Clozapine initiated during current admission and suspected built up to supratherapeutic levels in the setting of inflammation/ETHAN. Somnolence has improved since holding Clozapine. Will need to monitor closely for elvis.   - See psychiatry note for details of medication titrations  - Nursing care: 1:1 sitter, encouraging sitting in chair or up ~6 hours a day, walk with nurse 1:1 4x daily  - Legal status: MEDINA and full commitment   - Medications: Depakote DT sprinkles 500 mg daily, HOLD Clozapine 50 mg daily  - If signs of elvis, restart Clozapine 50 mg daily and contact Psych    - Agitation plan:    - Non-emergency: PRN Haldol 2 mg PO  - Emergency: Haldol 5mg + diphenhydramine 50mg q8h PRN, PO or IM  - STOP Hydroxyzine per psych (see 9/29 note)    # Hyperglycemia   # Pre-diabetes (A1c 09/27/23, 6.2)  A1c 6.2% 9/27/23. Previously hyperglycemic in the setting of Clozaril. Blood sugars have improved since starting medications. Will titrate to avoid hypoglycemia.  - Decrease Lantus to 14U daily   - MDSSI   - Metformin discontinued due to diarrhea   - Hypoglycemia protocol     # HTN  Has been intermittently hypertensive. Holding medications in setting of ETHAN and psychotropic interactions.   - HOLD Hydrochlorothiazide 25 mg daily  - HOLD PTA clonidine per Psych    Chronic/resolved/stable:    # Orthostatic hypotension, resolved  Symptomatic 9/18 with blood pressure drop from 123/72 while laying to 96/55 with standing. Patient reported this was new since admission and starting new medications, and has since improved. Patient initiated on clozapine on 8/31 with many dose changes. Somnolence and poor PO intake have been challenges during this admission, both likely contributing. Workups for endocrine and cardiac causes were negative.   -  Encourage oral hydration   - Compression stockings  - Likely to continue to be tricky with clozapine dose changes    # Possible hospital-acquired pneumonia, resolved/treated  # Positive blood culture x1 - consistent with contaminant  # Severe sepsis, resolved  # Acute hypoxic respiratory failure, resolved  # Leukocytosis, ongoing  Initially presented 9/19 from inpatient psych with temperature 101.3F, lactic acid 4.3 in the setting of new cough and widespread body aches. , WBC 9.8. 9/18 CXR with streaky right greater than left perihilar and basilar opacities, likely atelectasis and/or edema without focal consolidation. Respiratory viral panels (9/19, 9/22) have been negative. Echocardiogram not consistent with myocarditis. Briefly treated with vancomycin 9/19-9/23 due to a blood culture growing gram positive cocci; this was later determined to be Staph Hominis, suspected contaminant. Pulmonary exam without focal findings consistent with bacterial pneumonia, though patient had an ongoing cough and appears to have a significant burden of respiratory secretions seen on 9/24 (resolved). RT evaluated and recommended use of aerobika flutter valve. White count have been elevated although stable with still no clear focus of infection, could be clozapine side effect. Patient is medically stable to return to psychiatry unit.   - Infectious disease consulted, now off  - S/p piperacillin/tazobactam (9/20- 9/26)  - Respiratory therapist saw Angela and she tolerated vest therapy x1  - Discontinue vest therapy with improved breathing and reassuring physical examination   - Aerobika flutter valve   - CBC q72h    # Hypokalemia, resolved  # Abdominal tenderness, resolved    Patient report intermittent abdominal tenderness, unlikely pancreatitis with location abdominal tenderness and lipase x2 < 3x normal upper limits. Low concern for bowel obstruction with abdominal xray revealing nonobstructive bowel gas pattern and no  significant fecal retention.   - Loperamide capsule 2 mg PRN  - RN-driven potassium replacement protocol - High     # Hypocalcemia  # Hypoalbuminemia  Suspect possible malnutrition due to poor PO intake.   - Encourage good PO intake   - Pantoprazole EC 40 mg tablets  - DEBORAH  - Zofran PRN, 30 mins before meals  - Nutrition IP consult, appreciate recommendations               - Medical food supplement therapy  - Patient to consume % of nutritionally adequate meal trays TID, or the equivalent with supplements/snacks.     # [employer]  Important to her value system.   - Spiritual care consult         Diet: Combination Diet Regular Diet; Safe Tray - with utensils  Snacks/Supplements Adult: Other; Ensure Enlive mixed with ice cream; With Meals    DVT Prophylaxis: Low Risk/Ambulatory with no VTE prophylaxis indicated  Wilson Catheter: Not present  Fluids: PO  Lines: None     Cardiac Monitoring: None  Code Status: Full Code      Clinically Significant Risk Factors        # Hypokalemia: Lowest K = 3 mmol/L in last 2 days, will replace as needed  # Hypernatremia: Highest Na = 146 mmol/L in last 2 days, will monitor as appropriate    # Hypomagnesemia: Lowest Mg = 1.6 mg/dL in last 2 days, will replace as needed   # Hypoalbuminemia: Lowest albumin = 2 g/dL at 9/24/2023  8:46 AM, will monitor as appropriate    # Acute Kidney Injury, unspecified: based on a >150% or 0.3 mg/dL increase in last creatinine compared to past 90 day average, will monitor renal function            # Moderate Malnutrition: based on nutrition assessment             Disposition Plan      Expected Discharge Date: 10/11/2023        Discharge Comments: Will need to be off continuous fluids, then can transfer to IP Psych.      The patient's care was discussed with the Attending Physician, Dr. Noe .    Malka Nguyen MD  [facility]  [facility]  Securely message with Vocera (more  info)  Text page via [facility] Paging/Directory   See signed in provider for up to date coverage information  ______________________________________________________________________    Interval History     Overnight:  NAEO    Subjective:   Sleep this AM. Per RN and bedside attendant - no concerns. Dud have loose stool this AM.     Physical Exam   Vital Signs: Temp: 97.9  F (36.6  C) Temp src: Oral BP: (!) 149/57 Pulse: 85   Resp: 16 SpO2: 98 % O2 Device: None (Room air)    Weight: 214 lbs 0 oz    Constitutional: Somnolent, no apparent distress  Respiratory: No increased work of breathing, good air exchange, clear to auscultation bilaterally, no crackles or wheezing  Cardiovascular: Normal apical impulse, regular rate and rhythm, normal S1 and S2. Extremities warm.   Abdomen: Soft, NTND.   MSK: No edema noted.   Neurologic: Intermittently somnolent. Responds appropriately in few words to questions- improved from previous exams.     Medical Decision Making   Please see A&P for additional details of medical decision making.      Data   ------------------------- PAST 24 HR DATA REVIEWED -----------------------------------------------    I have personally reviewed the following data over the past 24 hrs:    14.7 (H)  \   9.2 (L)   / 376     146 (H) 115 (H) 17.0 /  133 (H)   3.3 (L) 18 (L) 1.83 (H) \     ALT: N/A AST: N/A AP: N/A TBILI: N/A   ALB: N/A TOT PROTEIN: N/A LIPASE: 22     TSH: 1.06 T4: N/A A1C: N/A     Procal: N/A CRP: 108.79 (H) Lactic Acid: N/A       Imaging results reviewed over the past 24 hrs:   No results found for this or any previous visit (from the past 24 hour(s)).

## 2023-10-09 NOTE — PLAN OF CARE
Goal Outcome Evaluation:      Plan of Care Reviewed With: patient    Overall Patient Progress: no changeOverall Patient Progress: no change    Outcome Evaluation: Pt somnolent and withdrawn. Slept most of shift. Immodium given for frequent diarhea.

## 2023-10-09 NOTE — PROGRESS NOTES
Care Management Follow Up    Length of Stay (days): 19    Expected Discharge Date: 10/11/2023     Concerns to be Addressed: Discharge planning      Patient plan of care discussed at interdisciplinary rounds: Yes    Anticipated Discharge Disposition:  Transfer back to [facility] when med stable.     Anticipated Discharge Services:  [facility], ACT Team, Commitment CM, [facility]    ACT SW  Constantino Joseph  PH: [phone]  Fax: [phone]  [email or web address]     Civil Commitment Information:  Type of Commitment: [facility]: [facility] File Number: [id number]  Date of Commitment: September 1, 2023  Date Commitment Ends: March 1, 2023  [facility] MANDEEP Plata  PH: [phone]  Provisional Discharge needed at discharge.    [facility]  PH: [phone]    Anticipated Discharge DME:  None    Patient/family educated on Medicare website which has current facility and service quality ratings:  N/A  Education Provided on the Discharge Plan: Yes   Patient/Family in Agreement with the Plan:  Daughter is agreeable    Referrals Placed by CM/SW:  Financial Counselor  Private pay costs discussed: Not applicable    Additional Information:    SW received call from pt's Nathalie Han, who reported that pt's MA is due for renewal on 11/01/23.    [facility]  PH: [phone]    SW reached out to , Roldan Trujillo, provided pt's daughter and [facility] contact info to assist with MA renewal.    1045: [facility] (PH: [phone]), was transferred to  and then provided Commitment CM info (documented below)    MI MANDEEP Plata  PH: [phone]    1150: SW left  for MI CM, provided update of pt's status, discharge plan, and provided SW's contact info.        RAMANDEEP SniderW, LSW  [facility]   PHONE: [phone]  Pager: [phone]

## 2023-10-09 NOTE — TELEPHONE ENCOUNTER
R: [place]  Access Inpatient Bed Call Log 10/9/23 @7:13 am:   Intake has called facilities that have not updated the bed status within the last 12 hours.     ([facility] Only)               [facility] is at capacity.                Pt remains on work list until appropriate placement is available.

## 2023-10-09 NOTE — PLAN OF CARE
Goal Outcome Evaluation:      Plan of Care Reviewed With: patient      Outcome Evaluation: Pt is somnolent and withdrawn. Contact precautions discontinued today. Family came to visit and brought food from home.       VS: BP (!) 149/57 (BP Location: Right arm)   Pulse 85   Temp 97.9  F (36.6  C) (Oral)   Resp 16   Wt 97.1 kg (214 lb)   SpO2 98%   BMI 31.60 kg/m       O2: Stable on room air   Output: Voids without difficulty in bathroom   Last BM: 10/9; imodium given for diarrhea    Activity: SBA; generalized weakness   Skin: Bruise to RUE   Pain: denies   CMS: intact   Dressing: N/a   Diet: Regular diet, thin liquids, takes pills whole  - encourage pt to eat   LDA: L PIV infusing   Equipment: IV pole, personal belongings   Plan: Back to inpatient when medically stable   Additional Info: Alert only to self; somnolent and withdrawn. Whispers and speech is garbled. MIGUEL ÁNGEL numbness or tingling- pt did not answer. Sitter at bedside. Anticipate needs and round frequently.

## 2023-10-09 NOTE — TELEPHONE ENCOUNTER
R: [place]  Access Inpatient Bed Call Log 10/9/23 @4PM:   Intake has called facilities that have not updated the bed status within the last 12 hours.  ([facility] Only)                [facility] is at capacity.       Patient remains on the work list pending appropriate bed availability.

## 2023-10-09 NOTE — TELEPHONE ENCOUNTER
Bed search ([facility] only) @ 3:35AM:     [facility]: No appropriate beds available     Pt remains on work list until appropriate placement is available

## 2023-10-09 NOTE — PLAN OF CARE
Goal Outcome Evaluation:      Plan of Care Reviewed With: child          Outcome Evaluation: Pt will transfer back to [facility] when med stable. Alternative d/c plan would need to be approved by MI CM d/t pt's commitment status.

## 2023-10-10 ENCOUNTER — TELEPHONE (OUTPATIENT)
Dept: BEHAVIORAL HEALTH | Facility: CLINIC | Age: 58
End: 2023-10-10
Payer: COMMERCIAL

## 2023-10-10 PROBLEM — N17.9 AKI (ACUTE KIDNEY INJURY) (H): Status: ACTIVE | Noted: 2023-10-10

## 2023-10-10 LAB
ANION GAP SERPL CALCULATED.3IONS-SCNC: 10 MMOL/L (ref 7–15)
BACTERIA BLD CULT: ABNORMAL
BACTERIA BLD CULT: NO GROWTH
BASO+EOS+MONOS # BLD AUTO: ABNORMAL 10*3/UL
BASO+EOS+MONOS NFR BLD AUTO: ABNORMAL %
BASOPHILS # BLD AUTO: ABNORMAL 10*3/UL
BASOPHILS # BLD MANUAL: 0 10E3/UL (ref 0–0.2)
BASOPHILS NFR BLD AUTO: ABNORMAL %
BASOPHILS NFR BLD MANUAL: 0 %
BUN SERPL-MCNC: 10.7 MG/DL (ref 6–20)
BURR CELLS BLD QL SMEAR: SLIGHT
CALCIUM SERPL-MCNC: 8.4 MG/DL (ref 8.6–10)
CHLORIDE SERPL-SCNC: 117 MMOL/L (ref 98–107)
CREAT SERPL-MCNC: 1.54 MG/DL (ref 0.51–0.95)
CRP SERPL-MCNC: 59.94 MG/L
DEPRECATED HCO3 PLAS-SCNC: 18 MMOL/L (ref 22–29)
EGFRCR SERPLBLD CKD-EPI 2021: 39 ML/MIN/1.73M2
EOSINOPHIL # BLD AUTO: ABNORMAL 10*3/UL
EOSINOPHIL # BLD MANUAL: 0 10E3/UL (ref 0–0.7)
EOSINOPHIL NFR BLD AUTO: ABNORMAL %
EOSINOPHIL NFR BLD MANUAL: 0 %
ERYTHROCYTE [DISTWIDTH] IN BLOOD BY AUTOMATED COUNT: 14.6 % (ref 10–15)
GLUCOSE BLDC GLUCOMTR-MCNC: 114 MG/DL (ref 70–99)
GLUCOSE BLDC GLUCOMTR-MCNC: 126 MG/DL (ref 70–99)
GLUCOSE BLDC GLUCOMTR-MCNC: 127 MG/DL (ref 70–99)
GLUCOSE BLDC GLUCOMTR-MCNC: 128 MG/DL (ref 70–99)
GLUCOSE BLDC GLUCOMTR-MCNC: 149 MG/DL (ref 70–99)
GLUCOSE SERPL-MCNC: 133 MG/DL (ref 70–99)
HCT VFR BLD AUTO: 29.8 % (ref 35–47)
HGB BLD-MCNC: 9.3 G/DL (ref 11.7–15.7)
IMM GRANULOCYTES # BLD: ABNORMAL 10*3/UL
IMM GRANULOCYTES NFR BLD: ABNORMAL %
LYMPHOCYTES # BLD AUTO: ABNORMAL 10*3/UL
LYMPHOCYTES # BLD MANUAL: 2.4 10E3/UL (ref 0.8–5.3)
LYMPHOCYTES NFR BLD AUTO: ABNORMAL %
LYMPHOCYTES NFR BLD MANUAL: 17 %
MAGNESIUM SERPL-MCNC: 1.3 MG/DL (ref 1.7–2.3)
MCH RBC QN AUTO: 30.1 PG (ref 26.5–33)
MCHC RBC AUTO-ENTMCNC: 31.2 G/DL (ref 31.5–36.5)
MCV RBC AUTO: 96 FL (ref 78–100)
METAMYELOCYTES # BLD MANUAL: 0.1 10E3/UL
METAMYELOCYTES NFR BLD MANUAL: 1 %
MONOCYTES # BLD AUTO: ABNORMAL 10*3/UL
MONOCYTES # BLD MANUAL: 0.9 10E3/UL (ref 0–1.3)
MONOCYTES NFR BLD AUTO: ABNORMAL %
MONOCYTES NFR BLD MANUAL: 6 %
NEUTROPHILS # BLD AUTO: ABNORMAL 10*3/UL
NEUTROPHILS # BLD MANUAL: 10.9 10E3/UL (ref 1.6–8.3)
NEUTROPHILS NFR BLD AUTO: ABNORMAL %
NEUTROPHILS NFR BLD MANUAL: 76 %
NRBC # BLD AUTO: 0 10E3/UL
NRBC BLD AUTO-RTO: 0 /100
PHOSPHATE SERPL-MCNC: 3.3 MG/DL (ref 2.5–4.5)
PLAT MORPH BLD: ABNORMAL
PLATELET # BLD AUTO: 306 10E3/UL (ref 150–450)
POLYCHROMASIA BLD QL SMEAR: SLIGHT
POTASSIUM SERPL-SCNC: 3.1 MMOL/L (ref 3.4–5.3)
RBC # BLD AUTO: 3.09 10E6/UL (ref 3.8–5.2)
RBC MORPH BLD: ABNORMAL
SODIUM SERPL-SCNC: 145 MMOL/L (ref 135–145)
WBC # BLD AUTO: 14.4 10E3/UL (ref 4–11)

## 2023-10-10 PROCEDURE — 258N000003 HC RX IP 258 OP 636

## 2023-10-10 PROCEDURE — 85007 BL SMEAR W/DIFF WBC COUNT: CPT | Performed by: FAMILY MEDICINE

## 2023-10-10 PROCEDURE — 84100 ASSAY OF PHOSPHORUS: CPT | Performed by: FAMILY MEDICINE

## 2023-10-10 PROCEDURE — 250N000011 HC RX IP 250 OP 636: Performed by: STUDENT IN AN ORGANIZED HEALTH CARE EDUCATION/TRAINING PROGRAM

## 2023-10-10 PROCEDURE — 250N000013 HC RX MED GY IP 250 OP 250 PS 637

## 2023-10-10 PROCEDURE — 85027 COMPLETE CBC AUTOMATED: CPT | Performed by: FAMILY MEDICINE

## 2023-10-10 PROCEDURE — 87040 BLOOD CULTURE FOR BACTERIA: CPT | Performed by: FAMILY MEDICINE

## 2023-10-10 PROCEDURE — 83735 ASSAY OF MAGNESIUM: CPT | Performed by: FAMILY MEDICINE

## 2023-10-10 PROCEDURE — 86140 C-REACTIVE PROTEIN: CPT | Performed by: FAMILY MEDICINE

## 2023-10-10 PROCEDURE — 36415 COLL VENOUS BLD VENIPUNCTURE: CPT | Performed by: FAMILY MEDICINE

## 2023-10-10 PROCEDURE — 80048 BASIC METABOLIC PNL TOTAL CA: CPT | Performed by: FAMILY MEDICINE

## 2023-10-10 PROCEDURE — 250N000013 HC RX MED GY IP 250 OP 250 PS 637: Performed by: STUDENT IN AN ORGANIZED HEALTH CARE EDUCATION/TRAINING PROGRAM

## 2023-10-10 PROCEDURE — 120N000002 HC R&B MED SURG/OB UMMC

## 2023-10-10 PROCEDURE — 99232 SBSQ HOSP IP/OBS MODERATE 35: CPT | Mod: GC

## 2023-10-10 RX ORDER — POTASSIUM CHLORIDE 750 MG/1
40 TABLET, EXTENDED RELEASE ORAL ONCE
Status: COMPLETED | OUTPATIENT
Start: 2023-10-10 | End: 2023-10-10

## 2023-10-10 RX ADMIN — LOPERAMIDE HYDROCHLORIDE 2 MG: 2 CAPSULE ORAL at 19:39

## 2023-10-10 RX ADMIN — SODIUM CHLORIDE, POTASSIUM CHLORIDE, SODIUM LACTATE AND CALCIUM CHLORIDE: 600; 310; 30; 20 INJECTION, SOLUTION INTRAVENOUS at 11:53

## 2023-10-10 RX ADMIN — METHYLCELLULOSE 500 MG: 500 TABLET ORAL at 16:03

## 2023-10-10 RX ADMIN — POTASSIUM CHLORIDE 40 MEQ: 750 TABLET, EXTENDED RELEASE ORAL at 17:47

## 2023-10-10 RX ADMIN — PANTOPRAZOLE SODIUM 40 MG: 40 TABLET, DELAYED RELEASE ORAL at 11:05

## 2023-10-10 RX ADMIN — DIVALPROEX SODIUM 375 MG: 125 CAPSULE, COATED PELLETS ORAL at 22:39

## 2023-10-10 RX ADMIN — CEFDINIR 300 MG: 300 CAPSULE ORAL at 11:05

## 2023-10-10 RX ADMIN — ONDANSETRON 4 MG: 4 TABLET, FILM COATED ORAL at 15:59

## 2023-10-10 RX ADMIN — SODIUM CHLORIDE, POTASSIUM CHLORIDE, SODIUM LACTATE AND CALCIUM CHLORIDE: 600; 310; 30; 20 INJECTION, SOLUTION INTRAVENOUS at 04:48

## 2023-10-10 RX ADMIN — CEFDINIR 300 MG: 300 CAPSULE ORAL at 19:38

## 2023-10-10 RX ADMIN — LOPERAMIDE HYDROCHLORIDE 2 MG: 2 CAPSULE ORAL at 11:05

## 2023-10-10 RX ADMIN — DIVALPROEX SODIUM 125 MG: 125 CAPSULE, COATED PELLETS ORAL at 22:15

## 2023-10-10 RX ADMIN — SODIUM CHLORIDE, POTASSIUM CHLORIDE, SODIUM LACTATE AND CALCIUM CHLORIDE: 600; 310; 30; 20 INJECTION, SOLUTION INTRAVENOUS at 00:57

## 2023-10-10 RX ADMIN — ONDANSETRON 4 MG: 4 TABLET, FILM COATED ORAL at 11:05

## 2023-10-10 ASSESSMENT — ACTIVITIES OF DAILY LIVING (ADL)
ADLS_ACUITY_SCORE: 21
ADLS_ACUITY_SCORE: 27
ADLS_ACUITY_SCORE: 21
ADLS_ACUITY_SCORE: 27

## 2023-10-10 NOTE — TELEPHONE ENCOUNTER
R: [place]  Access Inpatient Bed Call Log 10/10/23 8:15 AM    Intake has called facilities that have not updated the bed status within the last 12 hours.  ([facility] Only)                 [facility] is at capacity.               Patient remains on the work list pending appropriate bed availability.

## 2023-10-10 NOTE — PROGRESS NOTES
[facility]    Progress Note - [facility] Service       Date of Admission:  9/20/2023    Main plans for today:   - Follow up AM labs   - If Cr improving, plan to discontinue IVF; please encourage PO fluids   - Continue Cefdinir for UTI   - Start fiber for diarrhea; continue Imodium   - Per Psych, continue Depakote; monitor for symptoms of elvis and restart Clozaril 50 mg daily if needed     Assessment & Plan   Angela Basurto is a 58-year-old female PMH bipolar 1 disorder admitted to [facility] 8/16 for elvis and psychosis in setting of medication non-adherence. Transferred to [facility] 9/18/23 for sepsis 2/2 HAP, now resolved. Monitoring UTI and ETHAN. Patient is on Mata with full commitment, current 1:1.     # ETHAN, worsening, suspect pre-renal vs. ATN  Baseline creatinine 0.5. Initially had ETHAN that peaked at 1.99 9/22. Now with worsening creatinine again, though down-trending slightly on last check. Most suspicious for ATN related to pre-renal etiology given known poor PO intake and low urine sodium. If creatinine continues to down-trend, will discontinue fluids and trial PO challenge. Less likely nephrotic syndrome with only mild proteinuria; glomerulonephritis without consistent UA; post-renal with reassuring US or fluid overload with normal BNP.   - Continue  mL/hr; may discontinue depending on Cr 10/10   - Patient care order to encourage fluids as able; goal 1 cup of water per hour    - Strict intake/output   - BMP daily   - Avoid nephrotoxic medications   - HOLD Hydrochlorothiazide     # Diarrhea, suspected antibiotic related vs. low fiber  Patient with consistent loose stools. Negative enteric panels (10/5) and C diff (10/5, 10/8). Less concern for infectious etiology. Suspect related to antibiotics and low fiber intake.  - Schedule Imodium 2 mg BID   - Continue Imodium 2 mg BID PRN   - Start fiber supplement   - Encourage PO intake     #  Moderate protein-calorie malnutrition   Patient with poor PO intake. Per family, this is chronic and occurs even with traditional foods. Patient reports frequent nausea with food intake. Could consider Phenergan for nausea control as well as appetite stimulation. Will avoid introducing new medications at this time with current ETHAN, but will consider for future.   - Nutrition IP consult, appreciate recommendations   - Needs encouragement to eat, safe to swallow. Order food and encourage food and fluids   - Medical food supplement therapy  - Consider Phenergan for nausea/appetite stimulation     # Bipolar I disorder  # Hx of possible seizure (1983)  # Mata: Haldol, clozapine, risperidone, olanzapine  # Full commitment   # Somnolence   Presented with elvis and psychosis in the context of medication nonadherence. Was admitted with Psychiatry from 8/16 until transfer to this service 9/20. Has required a 1:1 to maintain her safety. Psychiatry has been following and managing medications. Clozapine initiated during current admission and suspected built up to supratherapeutic levels in the setting of inflammation/ETHAN. Somnolence has improved since holding Clozapine. Will need to monitor closely for elvis.   - See psychiatry note for details of medication titrations  - Nursing care: 1:1 sitter, encouraging sitting in chair or up ~6 hours a day, walk with nurse 1:1 4x daily  - Legal status: MATA and full commitment   - Medications: Depakote DT sprinkles 500 mg daily, HOLD Clozapine 50 mg daily  - If signs of elvis, restart Clozapine 50 mg daily and contact Psych    - Agitation plan:    - Non-emergency: PRN Haldol 2 mg PO  - Emergency: Haldol 5mg + diphenhydramine 50mg q8h PRN, PO or IM  - STOP Hydroxyzine per psych (see 9/29 note)    # UTI  # Blood culture positive for Strep sanguinis, 1/2 bottles 10/5   # Leukocytosis, stable  # Recent HAP, resolved   # Aspiration risk   # Diarrhea   # Somnolence   Patient with persistent  leukocytosis, prompting infectious workup. Given urinary frequency and suprapubic tenderness on exam, initiated treatment of UTI. Urine culture not consistent with infection. However, per ID, given symptom improvement with antibiotic- reasonable to complete treatment for acute cystitis. Infectious workup otherwise reassuring. Blood culture returned positive for strep (10/5, 1/2 bottles), suspected contaminant per ID. Diarrhea suspected to be related to antibiotics with reassuring enteric panel. Somnolence likely related to psychotropic medications given improvement with holding Clozaril and reduced renal clearance. Stable leukocytosis likely also related to psychotropic medication. No indication for ECHO or head imaging per Psych.   Consults:  - ID consult; appreciate recommendations   Antimicrobials  Ceftriaxone (19/7-10/8)  Cefdinir (10/8- anticipate 10/13 to 10/16)  Relevant cultures:  10/5 - COVID/RSV/Influ - negative  10/5 - Ucx - <10K Ecoli  10/5 - Bld Cx - positive 1/2 - Strept Sanguinis and Granulicatella Adiacens (presumed contaminant)   10/5 - Cdiff - negative  10/5 - Enteric - negative  10/6 - MRSA Nares - negative  10/6 - Bld Cx - NGTD   10/7 - BldCx - NGTD  10/8 - Bld Cx - NGTD    # Hyperglycemia   # Pre-diabetes (A1c 09/27/23, 6.2)  A1c 6.2% 9/27/23. Previously hyperglycemic in the setting of Clozaril. Blood sugars have improved since holding Clozaril.   - Continue Lantus 14U daily   - MDSSI   - Metformin discontinued due to diarrhea   - Hypoglycemia protocol     # HTN  Has been intermittently hypertensive. Holding medications in setting of ETHAN and psychotropic interactions.   - HOLD Hydrochlorothiazide 25 mg daily  - HOLD PTA clonidine per Psych    Chronic/resolved/stable:    # Orthostatic hypotension, resolved  Symptomatic 9/18 with blood pressure drop from 123/72 while laying to 96/55 with standing. Patient reported this was new since admission and starting new medications, and has since improved.  Patient initiated on clozapine on 8/31 with many dose changes. Somnolence and poor PO intake have been challenges during this admission, both likely contributing. Workups for endocrine and cardiac causes were negative.   - Encourage oral hydration   - Compression stockings  - Likely to continue to be tricky with clozapine dose changes    # Possible hospital-acquired pneumonia, resolved/treated  # Positive blood culture x1 - consistent with contaminant  # Severe sepsis, resolved  # Acute hypoxic respiratory failure, resolved  # Leukocytosis, ongoing  Initially presented 9/19 from inpatient psych with temperature 101.3F, lactic acid 4.3 in the setting of new cough and widespread body aches. , WBC 9.8. 9/18 CXR with streaky right greater than left perihilar and basilar opacities, likely atelectasis and/or edema without focal consolidation. Respiratory viral panels (9/19, 9/22) have been negative. Echocardiogram not consistent with myocarditis. Briefly treated with vancomycin 9/19-9/23 due to a blood culture growing gram positive cocci; this was later determined to be Staph Hominis, suspected contaminant. Pulmonary exam without focal findings consistent with bacterial pneumonia, though patient had an ongoing cough and appears to have a significant burden of respiratory secretions seen on 9/24 (resolved). RT evaluated and recommended use of aerobika flutter valve. White count have been elevated although stable with still no clear focus of infection, could be clozapine side effect. Patient is medically stable to return to psychiatry unit.   - Infectious disease consulted, now off  - S/p piperacillin/tazobactam (9/20- 9/26)  - Respiratory therapist saw Angela and she tolerated vest therapy x1  - Discontinue vest therapy with improved breathing and reassuring physical examination   - Aerobika flutter valve   - CBC q72h    # Hypokalemia, resolved  # Abdominal tenderness, resolved    Patient report intermittent abdominal  tenderness, unlikely pancreatitis with location abdominal tenderness and lipase x2 < 3x normal upper limits. Low concern for bowel obstruction with abdominal xray revealing nonobstructive bowel gas pattern and no significant fecal retention.   - Loperamide capsule 2 mg PRN  - RN-driven potassium replacement protocol - High     # Hypocalcemia  # Hypoalbuminemia  Suspect possible malnutrition due to poor PO intake.   - Encourage good PO intake   - Pantoprazole EC 40 mg tablets  - DEBORAH  - Zofran PRN, 30 mins before meals  - Nutrition IP consult, appreciate recommendations               - Medical food supplement therapy  - Patient to consume % of nutritionally adequate meal trays TID, or the equivalent with supplements/snacks.     # Hindu  Important to her value system.   - Spiritual care consult         Diet: Combination Diet Regular Diet; Safe Tray - with utensils  Snacks/Supplements Adult: Other; Ensure Enlive mixed with ice cream; With Meals    DVT Prophylaxis: Low Risk/Ambulatory with no VTE prophylaxis indicated  Wilson Catheter: Not present  Fluids: PO  Lines: None     Cardiac Monitoring: None  Code Status: Full Code      Clinically Significant Risk Factors        # Hypokalemia: Lowest K = 3.3 mmol/L in last 2 days, will replace as needed  # Hypernatremia: Highest Na = 146 mmol/L in last 2 days, will monitor as appropriate    # Hypomagnesemia: Lowest Mg = 1.6 mg/dL in last 2 days, will replace as needed   # Hypoalbuminemia: Lowest albumin = 2 g/dL at 9/24/2023  8:46 AM, will monitor as appropriate    # Acute Kidney Injury, unspecified: based on a >150% or 0.3 mg/dL increase in last creatinine compared to past 90 day average, will monitor renal function            # Moderate Malnutrition: based on nutrition assessment             Disposition Plan      Expected Discharge Date: 10/16/2023        Discharge Comments: Pending medical stability and psych placement.      The patient's care was discussed with the  Attending Physician, Dr. Noe .    Malka Nguyen MD  [facility]  [facility]  Securely message with [employer] (more info)  Text page via [employer] Paging/Directory   See signed in provider for up to date coverage information  ______________________________________________________________________    Interval History     Overnight:  NAEO.    Subjective:   Asleep on initial exam. Awake on repeat exam. Denies pain. States she does not feel hungry. Declines use of . Per attendant, has mostly been sleeping still. Continues to have watery, green loose stool.     Physical Exam   Vital Signs: Temp: 98.9  F (37.2  C) Temp src: Oral BP: 130/51 Pulse: 78   Resp: 18 SpO2: 98 % O2 Device: None (Room air)    Weight: 206 lbs 9.6 oz    Constitutional: Somnolent, no apparent distress  Respiratory: No increased work of breathing, good air exchange, clear to auscultation bilaterally, no crackles or wheezing  Cardiovascular: Normal apical impulse, regular rate and rhythm, normal S1 and S2. Extremities warm.   Abdomen: Soft, NTND.   MSK: No edema noted.   Neurologic: Intermittently somnolent. Responds appropriately in few words to questions- improved from previous exams.     Medical Decision Making   Please see A&P for additional details of medical decision making.      Data   ------------------------- PAST 24 HR DATA REVIEWED -----------------------------------------------    I have personally reviewed the following data over the past 24 hrs:    N/A  \   N/A   / N/A     N/A N/A N/A /  127 (H)   3.5 N/A N/A \     Imaging results reviewed over the past 24 hrs:   No results found for this or any previous visit (from the past 24 hour(s)).

## 2023-10-10 NOTE — PROGRESS NOTES
CLINICAL NUTRITION SERVICES - REASSESSMENT NOTE     Nutrition Prescription    RECOMMENDATIONS FOR MDs/PROVIDERS TO ORDER:  Encourage oral intakes, offer/provide snacks/supplements PRN between meals    Malnutrition Status:    Severe malnutrition in the context of acute illness    Recommendations already ordered by Registered Dietitian (RD):  Encouraged oral intakes  Continue current supplements    Future/Additional Recommendations:  Monitor labs, intakes, and weight trends.     EVALUATION OF THE PROGRESS TOWARD GOALS   Diet: Regular  Intake/[place]: mostly 25% of documented meals. Some 0%, one 50 and one 75%  Snacks/supplements: Ensure Enlive with ice cream BID     NEW FINDINGS/REVIEW OF SYSTEMS    Nutrition/GI: RD met with pt and RN in room. Explained importance of intakes for health and concern that patient has lost some weight.. Pt woke up but did not respond to writer. Per RN, pt has been drinking Ensure shakes but only had a few bites of breakfast. Only two slips were kept from calorie counts last week. One that patient ate 0% and 60% of an Ensure shake.     Weights: Pt with limited weight history prior to admission, with 19 lb (8%) weight loss over 3 months, now with 7.5 lb (3%) weight loss in 1 week during admission.    Wt Readings from Last Encounters:   10/10/23 93.7 kg (206 lb 9.6 oz)      10/03/23 97.1 kg (214 lb)   09/10/23 101.9 kg (224 lb 9.6 oz)   07/04/23 105.8 kg (233 lb 4.8 oz)      Labs reviewed   10/07/23 06:47 10/08/23 05:42 10/08/23 19:47 10/09/23 07:06 10/09/23 15:35 10/10/23 16:36   Sodium 140 141 145 146 (H)  145   Potassium 3.1 (L) 3.0 (L) 3.9 3.3 (L) 3.4 3.1 (L)   Urea Nitrogen 23.7 (H) 20.9 (H) 19.7 17.0  10.7   Creatinine 1.87 (H) 1.91 (H) 1.86 (H) 1.83 (H)  1.54 (H)   Magnesium 1.8 1.8  1.6 (L)  1.3 (L)   Phosphorus 4.7 (H) 4.2  3.5  3.3   CRP Inflammation 119.08 (H) 116.94 (H)  108.79 (H)  59.94 (H)   Glucose 133 (H) 95 140 (H) 145 (H)  133 (H)      Medications reviewed: cefdinir,  insulin (novolog, lantus), imodium, Citrucel, zofran, protonix, potassium chloride, LR gtt    MALNUTRITION  % Intake: </= 50% for >/= 5 days (severe)  % Weight Loss: >2% in 1 week (severe)  and >7.5% in 3 months (severe)   Subcutaneous Fat Loss: None observed  Muscle Loss:  Difficult to assess  Fluid Accumulation/Edema: Mild-moderate  Malnutrition Diagnosis: Severe malnutrition in the context of acute illness    Previous Goals   Patient to consume % of nutritionally adequate meal trays TID, or the equivalent with supplements/snacks.  Evaluation: Not met    Previous Nutrition Diagnosis  Inadequate nutrient intake related to lethargy, mental health status, poor appetite as evidenced by documented intakes, significant weight loss over last two weeks.   Evaluation: No change, updated    CURRENT NUTRITION DIAGNOSIS  Inadequate nutrient intake related to lethargy, mental health status, poor appetite as evidenced by documented intakes, significant weight loss over last two weeks.     INTERVENTIONS  Implementation  Nutrition education for nutrition relationship to health/disease   Medical food supplement therapy - continue as ordered    Goals  Patient to consume % of nutritionally adequate meal trays TID, or the equivalent with supplements/snacks.    Monitoring/Evaluation  Progress toward goals will be monitored and evaluated per protocol.    Crystal Dawson MS, RDN, LD  RD pager: [phone]  WB Weekend/Holiday Pager: [phone]

## 2023-10-10 NOTE — TELEPHONE ENCOUNTER
R: [place]  Access Inpatient Bed Call Log 10/10/23 @ 2:30am   Intake has called facilities that have not updated their bed status within the last 12 hours.??      ADULTS:       *[facility] Only:  Prole -- [facility]: @ cap per website.     Pt remains on waitlist pending appropriate placement availability

## 2023-10-10 NOTE — PLAN OF CARE
Goal Outcome Evaluation:      Plan of Care Reviewed With: patient    Overall Patient Progress: no change    Outcome Evaluation: Pt A & O to self only. Pt remains on 1:1. Pt is somnolent and withdrawn & slept throughout the shifts woke up to use the bathroom or take meds. Pt was encouraged to eat & had couple spoons of yogurt this shift. Pt had multiple episodes of diarrhea this shift. Scheduled Imodium was given. Pt on high RN managed K replacement; K : 3.5 replaced this shift, redraw AM. Pt lost IV access, tube got contaminated. New IV access was placed to the R forearm infusing LR @ 150mL/hr . Plan to transfer back to [facility] when medically stable.

## 2023-10-10 NOTE — PROGRESS NOTES
Brief ID note -     This patient with multiple behavioral health comorbidities was recently diagnosed with UTI based on lower abdominal pain and pain with urination. Based on chart review, she is improved. She had positive bl cx obtained for lethargy that appear to be consistent with contaminants.    Suggest continuing cares as you are with oral agent for UTI and supporting her intake. Typical course of treatment for UTI (this seems most consistent with lower tract cystitis) would be 7-10 days, until ~10/13.    ID will sign off, but please page with additional questions!    Brooke Mo MD   of Medicine, Division of Infectious Diseases  Contact me on the [employer] arielle or console  pgr [phone]

## 2023-10-10 NOTE — PLAN OF CARE
Goal Outcome Evaluation:      Plan of Care Reviewed With: patient    Overall Patient Progress: no changeOverall Patient Progress: no change    Outcome Evaluation: Alert to self only, withdrawan and somolent. Remains on 1:1. Up with SBA to BR only. Generalized weakness. Pt reporting poor appetite, only wanted to eat dinner but ordered fruit and is picking at tray. Weight obtained today. Episode of diarrhea, given scheduled imodium. K+ replacement protocol but pt refused labs this AM- requested recheck around 1600 and if pt refuses again can cancel until tomorrow AM. PIV infusing to RUE LR at 150ml/hr. Plan to transfer back to IP psych once off IVF and medically stable.      VS: VSS  Temp: 98.9  F (37.2  C) Temp src: Oral BP: 130/51 Pulse: 78   Resp: 18 SpO2: 98 % O2 Device: None (Room air)     O2: >90% on RA, denies SOB or CP. LSCTA.   Output: Voids in BR   Last BM: Diarrhea, LBM 10/10. Continent this shift. Scheduled imodium given, has PRN available if needed.   Activity: Pt up with SBA to BR. Generalized weakness. Not ambulating much in room or hallways. Encouraged ambulation, made it to doorway and then ambulated to BR due to gas pain.   Skin: Skin intact, warm and dry. No skin issues noted ex for bruising to RUE.   Pain: Denies pain.   CMS: Intact with trace dependent swelling. DP +2 bilaterally.   Dressing: None   Diet: Regular diet, reports poor appetite. Denies N/V. Encouraged PO intake. Weight obtained today. , 127.   LDA: PIV infusing LR at 150mL/hr, needs to be off IVF for transfer back to psych   Equipment: IV pole, 1:1, personal belongings   Plan: Discharge back to IP [facility] once medically stable and off IVF   Additional Info: Minimal interaction during assessment, pulled blanket over head when attempted AM medications- so given late.   Needs labs drawn this afternoon-instructed lab to draw even if pt is sleeping.

## 2023-10-11 ENCOUNTER — TELEPHONE (OUTPATIENT)
Dept: BEHAVIORAL HEALTH | Facility: CLINIC | Age: 58
End: 2023-10-11
Payer: COMMERCIAL

## 2023-10-11 LAB
ANION GAP SERPL CALCULATED.3IONS-SCNC: 13 MMOL/L (ref 7–15)
BACTERIA BLD CULT: NO GROWTH
BACTERIA BLD CULT: NO GROWTH
BASO+EOS+MONOS # BLD AUTO: ABNORMAL 10*3/UL
BASO+EOS+MONOS NFR BLD AUTO: ABNORMAL %
BASOPHILS # BLD AUTO: 0.1 10E3/UL (ref 0–0.2)
BASOPHILS NFR BLD AUTO: 0 %
BUN SERPL-MCNC: 9.7 MG/DL (ref 6–20)
CALCIUM SERPL-MCNC: 8.1 MG/DL (ref 8.6–10)
CHLORIDE SERPL-SCNC: 118 MMOL/L (ref 98–107)
CREAT SERPL-MCNC: 1.6 MG/DL (ref 0.51–0.95)
CRP SERPL-MCNC: 47.73 MG/L
DEPRECATED HCO3 PLAS-SCNC: 18 MMOL/L (ref 22–29)
EGFRCR SERPLBLD CKD-EPI 2021: 37 ML/MIN/1.73M2
EOSINOPHIL # BLD AUTO: 0.1 10E3/UL (ref 0–0.7)
EOSINOPHIL NFR BLD AUTO: 1 %
ERYTHROCYTE [DISTWIDTH] IN BLOOD BY AUTOMATED COUNT: 14.5 % (ref 10–15)
GLUCOSE BLDC GLUCOMTR-MCNC: 107 MG/DL (ref 70–99)
GLUCOSE BLDC GLUCOMTR-MCNC: 111 MG/DL (ref 70–99)
GLUCOSE BLDC GLUCOMTR-MCNC: 115 MG/DL (ref 70–99)
GLUCOSE BLDC GLUCOMTR-MCNC: 119 MG/DL (ref 70–99)
GLUCOSE BLDC GLUCOMTR-MCNC: 121 MG/DL (ref 70–99)
GLUCOSE SERPL-MCNC: 121 MG/DL (ref 70–99)
HCT VFR BLD AUTO: 30 % (ref 35–47)
HGB BLD-MCNC: 9.5 G/DL (ref 11.7–15.7)
IMM GRANULOCYTES # BLD: 0.6 10E3/UL
IMM GRANULOCYTES NFR BLD: 4 %
LYMPHOCYTES # BLD AUTO: 2.4 10E3/UL (ref 0.8–5.3)
LYMPHOCYTES NFR BLD AUTO: 18 %
Lab: NORMAL
MAGNESIUM SERPL-MCNC: 1.3 MG/DL (ref 1.7–2.3)
MAGNESIUM SERPL-MCNC: 1.8 MG/DL (ref 1.7–2.3)
MCH RBC QN AUTO: 30.4 PG (ref 26.5–33)
MCHC RBC AUTO-ENTMCNC: 31.7 G/DL (ref 31.5–36.5)
MCV RBC AUTO: 96 FL (ref 78–100)
MONOCYTES # BLD AUTO: 1.4 10E3/UL (ref 0–1.3)
MONOCYTES NFR BLD AUTO: 11 %
NEUTROPHILS # BLD AUTO: 8.8 10E3/UL (ref 1.6–8.3)
NEUTROPHILS NFR BLD AUTO: 66 %
NRBC # BLD AUTO: 0 10E3/UL
NRBC BLD AUTO-RTO: 0 /100
PERFORMING LABORATORY: NORMAL
PHOSPHATE SERPL-MCNC: 4.3 MG/DL (ref 2.5–4.5)
PLATELET # BLD AUTO: 290 10E3/UL (ref 150–450)
POTASSIUM SERPL-SCNC: 3.3 MMOL/L (ref 3.4–5.3)
POTASSIUM SERPL-SCNC: 3.3 MMOL/L (ref 3.4–5.3)
POTASSIUM SERPL-SCNC: 3.8 MMOL/L (ref 3.4–5.3)
RBC # BLD AUTO: 3.13 10E6/UL (ref 3.8–5.2)
SCANNED LAB RESULT: NORMAL
SODIUM SERPL-SCNC: 149 MMOL/L (ref 135–145)
TEST NAME: NORMAL
WBC # BLD AUTO: 13.4 10E3/UL (ref 4–11)

## 2023-10-11 PROCEDURE — 83735 ASSAY OF MAGNESIUM: CPT | Performed by: FAMILY MEDICINE

## 2023-10-11 PROCEDURE — 84132 ASSAY OF SERUM POTASSIUM: CPT

## 2023-10-11 PROCEDURE — 99232 SBSQ HOSP IP/OBS MODERATE 35: CPT | Mod: GC

## 2023-10-11 PROCEDURE — 36415 COLL VENOUS BLD VENIPUNCTURE: CPT

## 2023-10-11 PROCEDURE — 99233 SBSQ HOSP IP/OBS HIGH 50: CPT

## 2023-10-11 PROCEDURE — 87040 BLOOD CULTURE FOR BACTERIA: CPT | Performed by: FAMILY MEDICINE

## 2023-10-11 PROCEDURE — 250N000013 HC RX MED GY IP 250 OP 250 PS 637

## 2023-10-11 PROCEDURE — 85025 COMPLETE CBC W/AUTO DIFF WBC: CPT | Performed by: FAMILY MEDICINE

## 2023-10-11 PROCEDURE — 36415 COLL VENOUS BLD VENIPUNCTURE: CPT | Performed by: FAMILY MEDICINE

## 2023-10-11 PROCEDURE — 250N000013 HC RX MED GY IP 250 OP 250 PS 637: Performed by: STUDENT IN AN ORGANIZED HEALTH CARE EDUCATION/TRAINING PROGRAM

## 2023-10-11 PROCEDURE — 86140 C-REACTIVE PROTEIN: CPT | Performed by: FAMILY MEDICINE

## 2023-10-11 PROCEDURE — 80048 BASIC METABOLIC PNL TOTAL CA: CPT | Performed by: FAMILY MEDICINE

## 2023-10-11 PROCEDURE — 250N000011 HC RX IP 250 OP 636: Mod: JZ

## 2023-10-11 PROCEDURE — 250N000011 HC RX IP 250 OP 636: Mod: JZ | Performed by: FAMILY MEDICINE

## 2023-10-11 PROCEDURE — 258N000003 HC RX IP 258 OP 636: Performed by: STUDENT IN AN ORGANIZED HEALTH CARE EDUCATION/TRAINING PROGRAM

## 2023-10-11 PROCEDURE — 120N000002 HC R&B MED SURG/OB UMMC

## 2023-10-11 PROCEDURE — 84100 ASSAY OF PHOSPHORUS: CPT | Performed by: FAMILY MEDICINE

## 2023-10-11 RX ORDER — CLOZAPINE 50 MG/1
50 TABLET ORAL ONCE
Status: COMPLETED | OUTPATIENT
Start: 2023-10-11 | End: 2023-10-11

## 2023-10-11 RX ORDER — POTASSIUM CHLORIDE 750 MG/1
40 TABLET, EXTENDED RELEASE ORAL ONCE
Status: COMPLETED | OUTPATIENT
Start: 2023-10-11 | End: 2023-10-11

## 2023-10-11 RX ORDER — SODIUM CHLORIDE, SODIUM LACTATE, POTASSIUM CHLORIDE, CALCIUM CHLORIDE 600; 310; 30; 20 MG/100ML; MG/100ML; MG/100ML; MG/100ML
INJECTION, SOLUTION INTRAVENOUS CONTINUOUS
Status: DISCONTINUED | OUTPATIENT
Start: 2023-10-11 | End: 2023-10-12

## 2023-10-11 RX ORDER — POTASSIUM CHLORIDE 7.45 MG/ML
10 INJECTION INTRAVENOUS
Status: DISCONTINUED | OUTPATIENT
Start: 2023-10-11 | End: 2023-10-11 | Stop reason: SINTOL

## 2023-10-11 RX ORDER — MAGNESIUM SULFATE HEPTAHYDRATE 40 MG/ML
4 INJECTION, SOLUTION INTRAVENOUS ONCE
Qty: 100 ML | Refills: 0 | Status: COMPLETED | OUTPATIENT
Start: 2023-10-11 | End: 2023-10-11

## 2023-10-11 RX ORDER — POTASSIUM CHLORIDE 7.45 MG/ML
10 INJECTION INTRAVENOUS
Status: COMPLETED | OUTPATIENT
Start: 2023-10-12 | End: 2023-10-12

## 2023-10-11 RX ORDER — CLOZAPINE 25 MG/1
25 TABLET ORAL AT BEDTIME
Status: DISCONTINUED | OUTPATIENT
Start: 2023-10-11 | End: 2023-10-16

## 2023-10-11 RX ADMIN — POTASSIUM CHLORIDE 10 MEQ: 10 INJECTION, SOLUTION INTRAVENOUS at 14:07

## 2023-10-11 RX ADMIN — POTASSIUM CHLORIDE 40 MEQ: 750 TABLET, EXTENDED RELEASE ORAL at 15:02

## 2023-10-11 RX ADMIN — SODIUM CHLORIDE, POTASSIUM CHLORIDE, SODIUM LACTATE AND CALCIUM CHLORIDE: 600; 310; 30; 20 INJECTION, SOLUTION INTRAVENOUS at 21:20

## 2023-10-11 RX ADMIN — CLOZAPINE 25 MG: 25 TABLET ORAL at 21:18

## 2023-10-11 RX ADMIN — MAGNESIUM SULFATE HEPTAHYDRATE 4 G: 40 INJECTION, SOLUTION INTRAVENOUS at 11:56

## 2023-10-11 RX ADMIN — SODIUM CHLORIDE, POTASSIUM CHLORIDE, SODIUM LACTATE AND CALCIUM CHLORIDE: 600; 310; 30; 20 INJECTION, SOLUTION INTRAVENOUS at 14:08

## 2023-10-11 RX ADMIN — CLOZAPINE 50 MG: 50 TABLET ORAL at 03:58

## 2023-10-11 ASSESSMENT — ACTIVITIES OF DAILY LIVING (ADL)
ADLS_ACUITY_SCORE: 27

## 2023-10-11 NOTE — PLAN OF CARE
Goal Outcome Evaluation:      Plan of Care Reviewed With: patient, other (see comments) (RN)    Overall Patient Progress: no change    Outcome Evaluation: Patient continues with poor intakes however drinking Ensure shakes per RN. Continue to encouraged intakes and use of supplements

## 2023-10-11 NOTE — PLAN OF CARE
"Goal Outcome Evaluation:      Plan of Care Reviewed With: patient    Overall Patient Progress: no change    Outcome Evaluation: Pt A & O to self only. Pt remains on 1:1. Pt is restless this shift, sleeping on & off. Pt has been refusing taking her meds & needed a lot of encouragement to take them. Pt was encouraged to eat & drink fluid. She had one spoon of Ensure milk with ice cream & she refused to eat more. remy reported that \"pt started to go to the door to get out of room naked : appeared to be sleeping : writer told pt not to leave because she is naked but pt was confused and continued to try to leave : pt back in bed. Aiyana's team was paged & STAT Clozaril was ordered & given. Pt continues to have episodes of diarrhea this shift. Scheduled Imodium was given. Pt on high RN managed K replacement;pt refused lab draw this shift, redraw AM. FARRAH WEST. B; 107. Plan to transfer back to [facility] when medically stable      "

## 2023-10-11 NOTE — PLAN OF CARE
Goal Outcome Evaluation:      Plan of Care Reviewed With: patient    Overall Patient Progress: no change    Outcome Evaluation: Pt slept most of shift. Unable to give morning medications. Magnesium and potassium replaced; redraw at 1920.       VS: /52 (BP Location: Right arm)   Pulse 67   Temp 98.2  F (36.8  C) (Oral)   Resp 15   Wt 93.7 kg (206 lb 9.6 oz)   SpO2 97%   BMI 30.51 kg/m       O2: Stable on room air   Output: Voids without difficulty in bathroom   Last BM: 10/11; continues to have diarrhea    Activity: SBA; generalized weakness   Skin: Bruise to RUE   Pain: denies   CMS: MIGUEL ÁNGEL   Dressing: N/a   Diet: Regular diet, thin liquids, takes pills whole   LDA: R hand PIV infusing LR at 150 mL/jr   Equipment: IV pole, personal belongings    Plan: Back to inpatient psych once medically stable   Additional Info: Alert to self. Family came to visit and brought food from home which pt ate.

## 2023-10-11 NOTE — CONSULTS
Psychiatry Consultation; Follow up              Reason for Consult, requesting source:    Back on clozaril after behavior overnight   Requesting source: Saurabh Albarado    Labs and imaging reviewed, discussed with [facility] residents, Dr. Champagne and Dr. Faith     Total time spent in chart review, patient interview and coordination of care; 60 minutes - all time was spent on the date of the encounter that I saw patient                Interim history:    This is a 58 year old  [place] mother of 5 with a long history of anxiety, schizoaffective disorder, bipolar type a history of poor medication compliance, with multiple hospitalizations since 2008 mainly at [facility] who was initially brought to our ED via ambulance from home because of auditory hallucinations telling her to harm her aunt and uncle and threatened to kill family with a knife and was admitted to inpatient psychiatry and Clozaril was initiated. Patient was transferred to medical related to [facility] and Clozaril was held related to concerns for orthostatic hypotension. Her Clozaril has been increased to 200mg by consult liaison psychiatry team. She continues to be very somnolent despite a reduction in clozapine to 25 mg am and 150 mg HS now. She had been off it for about 72 hours, then restarted at 100, and titrated to 200 mg. I drew a level which came back 846 with her on 200mg of Clozaril daily.     10/3 I decreased Clozaril to 100mg daily and 10/4 she was sitting up, talking, and smiled at me.      10/5 she was somnolent again, not arousable. Bedside sitters report she did not eat breakfast, has only been up to go to the bathroom and of note her bowel movements were quite pungent. An ACT team psychiatrist and  came to assess Angela to see if she was appropriate for their services but she was not interviewable.     After my consult 10/5, ammonia came back slightly elevated so Depakote was decreased to 500mg once daily.     Clozaril was  "started back at 50mg overnight last night due to behaviors such as taking clothes off, sleepwalking, refusing cares in the middle of the night. Today on assessment she was again somnolent 10/11.           Current Medications:      cefdinir  300 mg Oral Q12H ESTHER ()    [Held by provider] cloZAPine  50 mg Oral At Bedtime    divalproex sodium delayed-release  500 mg Oral At Bedtime    [Held by provider] hydrochlorothiazide  25 mg Oral Daily    insulin aspart  1-7 Units Subcutaneous TID AC    insulin aspart  1-5 Units Subcutaneous At Bedtime    insulin glargine  14 Units Subcutaneous QAM AC    loperamide  2 mg Oral BID    methylcellulose  500 mg Oral Daily    ondansetron  4 mg Oral BID AC    pantoprazole  40 mg Oral QAM AC              MSE:   Appearance:  somnolent  Attitude:  somewhat cooperative  Eye Contact:  poor   Mood:  \"fair\"  Affect:  slightly restricted  Speech:  mumbling  Psychomotor Behavior:  no evidence of tardive dyskinesia, dystonia, or tics  Muscle strength and tone: baseline  Thought Process:  disorganized  Associations:  no loose associations  Thought Content:  no evidence of suicidal ideation or homicidal ideation  Insight:  partial  Judgement:  limited  Oriented to:   person  Attention Span and Concentration:  limited  Recent and Remote Memory:  limited    Vital signs:  Temp: 98.2  F (36.8  C) Temp src: Oral BP: 104/52 Pulse: 67   Resp: 15 SpO2: 97 % O2 Device: None (Room air) Oxygen Delivery: 2 LPM   Weight: 93.7 kg (206 lb 9.6 oz)  Estimated body mass index is 30.51 kg/m  as calculated from the following:    Height as of 23: 1.753 m (5' 9\").    Weight as of this encounter: 93.7 kg (206 lb 9.6 oz).    Qtc: 447 on            DSM-5 Diagnosis:   History of bipolar vs. Schizoaffective  Delirium          Assessment:   Patient continued to be somnolent without an identifiable medical cause, possibly UTI delirium? It has been found that inflammatory cytokines decrease metabolism of CY " pathway which is responsible for metabolizing Clozapine.      I would drop Clozapine to 25mg at nighttime and discontinue the Depakote. We will watch for emerging psychotic symptoms and continue to look for medical cause of continued somnolence, decreased appetite, diarrhea.

## 2023-10-11 NOTE — PROVIDER NOTIFICATION
Leola's @3:15 AM    FYI: Pt ( M,H) [facility], Room# 513, has been restless throughout the night & didn't sleep well. Pt took off her gown, was trying to leave the room, sleepwalking per sitter.   Thanks,  MARÍA ELENA mcgill, #[id number]    Respond: Clozaril STAT ordered.

## 2023-10-11 NOTE — PROGRESS NOTES
Carlyn @11:06 PM    Pt (M,H) [facility], Room#513. Lab called & said that pt refused lab draw for blood culture, BMP & K. Please advise.  Thanks,  MARÍA ELENA Sellers, #[id number]    Respond: reschedule to the morning

## 2023-10-11 NOTE — PROGRESS NOTES
[facility]    Progress Note - [facility]       Date of Admission:  9/20/2023    Main plans for today:   - Follow up AM labs   - If Cr bump, plan to restart mIVF   - Follow up Psych recommendations regarding Clozaril vs long-acting agents     Assessment & Plan   Angela Basurto is a 58-year-old female PMH bipolar 1 disorder admitted to Psych 8/16 for elvis and psychosis in setting of medication non-adherence. Transferred to [facility] 9/18/23 for sepsis 2/2 HAP, now resolved. Monitoring UTI and ETHAN. Patient is on Mata with full commitment, current 1:1.     # ETHAN, improving, suspect ATN 2/2 pre-renal insult and antibiotics   Baseline creatinine 0.5. Initially had ETHAN that peaked at 1.99 9/22. Developed worsening creatinine again with peak 1.91 10/8. Improved with IVF. Suspect ATEM triggered by pre-renal etiology. Currently testing with PO fluids, though PO intake has been consistently poor. Suspect uncontrolled Psychiatric conditions contributing. Will continue to monitor- may need to restart fluids until less somnolent and able to intake PO reliably.   - Follow up AM BMP; if creatinine bump, restart mIVF   - Patient care order to encourage fluids as able; goal 1 cup of water per hour    - Strict intake/output   - BMP daily   - Avoid nephrotoxic medications   - HOLD Hydrochlorothiazide     # Bipolar I disorder  # Hx of possible seizure (1983)  # Mata: Haldol, clozapine, risperidone, olanzapine  # Full commitment   # Somnolence   Presented with elvis and psychosis in the context of medication nonadherence. Was admitted with Psychiatry from 8/16 until transfer to this service 9/20. Has required a 1:1 to maintain her safety. Psychiatry has been following and managing medications. Clozapine initiated during admission, but held due to somnolence and concern for build-up due to elevated CRP. Patient may benefit from long-acting agent rather than  Clozapine given multiple side effects to Clozapine as well as history of non-adherence for MEDINA medication. Will discuss with Psychiatry.   - Consults: Psych following; appreciate recommendations on possibility of long active psychotropic   - Nursing care: 1:1 sitter, encouraging sitting in chair or up ~6 hours a day, walk with nurse 1:1 4x daily  - Legal status: MEDINA and full commitment   - Medications: Depakote DT sprinkles 500 mg daily, HOLD Clozapine 50 mg daily  - Agitation plan:    - Non-emergency: PRN Haldol 2 mg PO  - Emergency: Haldol 5mg + diphenhydramine 50mg q8h PRN, PO or IM  - STOP Hydroxyzine per psych (see 9/29 note)    # Diarrhea, suspected antibiotic related vs. low fiber  Patient with consistent loose stools. Negative enteric panels (10/5) and C diff (10/5, 10/8). Less concern for infectious etiology. Suspect related to antibiotics and low fiber intake. Improved with scheduled Imodium.   - Schedule Imodium 2 mg BID   - Continue Imodium 2 mg BID PRN   - Start fiber supplement   - Encourage PO intake     # Moderate protein-calorie malnutrition   Patient with poor PO intake. Per family, this is chronic and occurs even with traditional foods. Patient reports frequent nausea with food intake. Could consider Phenergan for nausea control as well as appetite stimulation. Will avoid introducing new medications at this time with current ETHAN, but will consider for future.   - Nutrition IP consult, appreciate recommendations   - Needs encouragement to eat, safe to swallow. Order food and encourage food and fluids   - Medical food supplement therapy  - Consider Phenergan for nausea/appetite stimulation     # UTI  # Blood culture positive for Strep sanguinis, 1/2 bottles 10/5   # Leukocytosis, stable  # Recent HAP, resolved   # Aspiration risk   # Diarrhea   # Somnolence   Patient with persistent leukocytosis, prompting infectious workup. Given urinary frequency and suprapubic tenderness on exam, initiated  treatment of UTI. Urine culture not consistent with infection. However, per ID, given symptom improvement with antibiotic- reasonable to complete treatment for acute cystitis. Infectious workup otherwise reassuring. Blood culture returned positive for strep (10/5, 1/2 bottles), suspected contaminant per ID. Diarrhea suspected to be related to antibiotics with reassuring enteric panel. Somnolence likely related to psychotropic medications given improvement with holding Clozaril and reduced renal clearance. Stable leukocytosis likely also related to psychotropic medication. No indication for ECHO or head imaging per Psych.   Consults:  - ID consult; appreciate recommendations   Antimicrobials  Ceftriaxone (19/7-10/8)  Cefdinir (10/8- anticipate 10/13 to 10/16)  Relevant cultures:  10/5 - COVID/RSV/Influ - negative  10/5 - Ucx - <10K Ecoli  10/5 - Bld Cx - positive 1/2 - Strept Sanguinis and Granulicatella Adiacens (presumed contaminant)   10/5 - Cdiff - negative  10/5 - Enteric - negative  10/6 - MRSA Nares - negative  10/6 - Bld Cx - NGTD   10/7 - BldCx - NGTD  10/8 - Bld Cx - NGTD    # Hyperglycemia   # Pre-diabetes (A1c 09/27/23, 6.2)  A1c 6.2% 9/27/23. Previously hyperglycemic in the setting of Clozaril. Blood sugars have improved since holding Clozaril.   - Continue Lantus 14U daily   - MDSSI   - Metformin discontinued due to diarrhea   - Hypoglycemia protocol     # HTN  Has been intermittently hypertensive. Holding medications in setting of ETHAN and psychotropic interactions.   - HOLD Hydrochlorothiazide 25 mg daily  - HOLD PTA clonidine per Psych    Chronic/resolved/stable:    # Orthostatic hypotension, resolved  Symptomatic 9/18 with blood pressure drop from 123/72 while laying to 96/55 with standing. Patient reported this was new since admission and starting new medications, and has since improved. Patient initiated on clozapine on 8/31 with many dose changes. Somnolence and poor PO intake have been challenges  during this admission, both likely contributing. Workups for endocrine and cardiac causes were negative.   - Encourage oral hydration   - Compression stockings  - Likely to continue to be tricky with clozapine dose changes    # Possible hospital-acquired pneumonia, resolved/treated  # Positive blood culture x1 - consistent with contaminant  # Severe sepsis, resolved  # Acute hypoxic respiratory failure, resolved  # Leukocytosis, ongoing  Initially presented 9/19 from inpatient psych with temperature 101.3F, lactic acid 4.3 in the setting of new cough and widespread body aches. , WBC 9.8. 9/18 CXR with streaky right greater than left perihilar and basilar opacities, likely atelectasis and/or edema without focal consolidation. Respiratory viral panels (9/19, 9/22) have been negative. Echocardiogram not consistent with myocarditis. Briefly treated with vancomycin 9/19-9/23 due to a blood culture growing gram positive cocci; this was later determined to be Staph Hominis, suspected contaminant. Pulmonary exam without focal findings consistent with bacterial pneumonia, though patient had an ongoing cough and appears to have a significant burden of respiratory secretions seen on 9/24 (resolved). RT evaluated and recommended use of aerobika flutter valve. White count have been elevated although stable with still no clear focus of infection, could be clozapine side effect. Patient is medically stable to return to psychiatry unit.   - Infectious disease consulted, now off  - S/p piperacillin/tazobactam (9/20- 9/26)  - Respiratory therapist saw Angela and she tolerated vest therapy x1  - Discontinue vest therapy with improved breathing and reassuring physical examination   - Aerobika flutter valve   - CBC q72h    # Hypokalemia, resolved  # Abdominal tenderness, resolved    Patient report intermittent abdominal tenderness, unlikely pancreatitis with location abdominal tenderness and lipase x2 < 3x normal upper limits. Low  concern for bowel obstruction with abdominal xray revealing nonobstructive bowel gas pattern and no significant fecal retention.   - Loperamide capsule 2 mg PRN  - RN-driven potassium replacement protocol - High     # Hypocalcemia  # Hypoalbuminemia  Suspect possible malnutrition due to poor PO intake.   - Encourage good PO intake   - Pantoprazole EC 40 mg tablets  - DEBORAH  - Zofran PRN, 30 mins before meals  - Nutrition IP consult, appreciate recommendations               - Medical food supplement therapy  - Patient to consume % of nutritionally adequate meal trays TID, or the equivalent with supplements/snacks.     # Zoroastrian  Important to her value system.   - Spiritual care consult         Diet: Combination Diet Regular Diet; Safe Tray - with utensils  Snacks/Supplements Adult: Other; Ensure Enlive mixed with ice cream; With Meals    DVT Prophylaxis: Low Risk/Ambulatory with no VTE prophylaxis indicated  Wilson Catheter: Not present  Fluids: PO  Lines: None     Cardiac Monitoring: None  Code Status: Full Code      Clinically Significant Risk Factors        # Hypokalemia: Lowest K = 3.1 mmol/L in last 2 days, will replace as needed     # Hypomagnesemia: Lowest Mg = 1.3 mg/dL in last 2 days, will replace as needed   # Hypoalbuminemia: Lowest albumin = 2 g/dL at 9/24/2023  8:46 AM, will monitor as appropriate               # Severe Malnutrition: based on nutrition assessment             Disposition Plan     Expected Discharge Date: 10/16/2023        Discharge Comments: Pending medical stability and psych placement.      The patient's care was discussed with the Attending Physician, Dr. Albarado.    Malka Nguyen MD  [facility]  [facility]  Securely message with Vocera (more info)  Text page via [employer] Paging/Directory   See signed in provider for up to date coverage  information  ______________________________________________________________________    Interval History     Overnight:  Was more restless overnight. Tried to take off clothes and run down chaparro. Overnight providers ordered Clozaril.     Subjective:   Patient asleep on two exams. Not able to be woken up. Per RN and attendant, has been difficult to get her to take orals in. Took a sip of Ensure yesterday. Having diarrhea, but less episodes compared to previous.     Physical Exam   Vital Signs: Temp: 98.2  F (36.8  C) Temp src: Oral BP: 104/52 Pulse: 67   Resp: 15 SpO2: 97 % O2 Device: None (Room air)    Weight: 206 lbs 9.6 oz    Constitutional: Somnolent, no apparent distress  Respiratory: No increased work of breathing, good air exchange, clear to auscultation bilaterally, no crackles or wheezing  Cardiovascular: Normal apical impulse, regular rate and rhythm, normal S1 and S2. Extremities warm.   Abdomen: Soft, NTND.   MSK: No edema noted.   Neurologic: Somnolent.     Medical Decision Making   Please see A&P for additional details of medical decision making.      Data   ------------------------- PAST 24 HR DATA REVIEWED -----------------------------------------------    I have personally reviewed the following data over the past 24 hrs:    14.4 (H)  \   9.3 (L)   / 306     145 117 (H) 10.7 /  111 (H)   3.1 (L) 18 (L) 1.54 (H) \     Procal: N/A CRP: 59.94 (H) Lactic Acid: N/A       Imaging results reviewed over the past 24 hrs:   No results found for this or any previous visit (from the past 24 hour(s)).

## 2023-10-11 NOTE — PROGRESS NOTES
Care Management Follow Up    Length of Stay (days): 21    Expected Discharge Date: 10/16/2023     Concerns to be Addressed: Discharge planning      Patient plan of care discussed at interdisciplinary rounds: Yes     Anticipated Discharge Disposition:  [facility] when med stable.     Anticipated Discharge Services:  [facility], ACT Team, Commitment CM, [facility]     ACT MISHEL  Constantino Joseph  PH: [phone]  Fax: [phone]  [email or web address]     Civil Commitment Information:  Type of Commitment: [facility]: [facility] File Number: [id number]  Date of Commitment: September 1, 2023  Date Commitment Ends: March 1, 2023  MI MANDEEP Cervantes  PH: [phone]  Provisional Discharge needed at discharge.     [facility]  Nathalie  PH: [phone]     Anticipated Discharge DME:  None     Patient/family educated on Medicare website which has current facility and service quality ratings:  N/A  Education Provided on the Discharge Plan: Yes   Patient/Family in Agreement with the Plan:  Daughter is agreeable     Referrals Placed by CM/SW:  Financial Counselor  Private pay costs discussed: Not applicable     Additional Information:  Yesterday, MISHEL received a VM from Helen (PH: [phone]) at [facility], who stated that previous CM is no longer assigned to pt, but rather Helen is pt's Commitment CM.  MISHEL left return VM for Helen, thanked her for the update and requested a call back for spelling of her 1st and last name, and fax number, so that SW can accurately update information.    MI CM  Helen  PH: [phone]    MISHEL followed-up with pt's daughter, Mya, re: MA renewal yesterday. Mya reported that she has been checking pt's mail, but has not seen anything from [facility] about MA renewal. MISHEL provided Mya with [facility] contact (PH: [phone]) for her to request an MA renewal form and/or inquire if she can renew pt's MA online. MISHEL requested Mya keep  SW update on status and/or notify SW if she's having any issues, as FC may be of assistance then. Mya reported that she will work on renewing pt's MA.  SW updated FC, Roldan Trujillo, and [facility], Nathalie, of this information as well.        RAMANDEEP SniderW, LSW  [facility]   PHONE: [phone]  Pager: [phone]

## 2023-10-11 NOTE — TELEPHONE ENCOUNTER
R: [place]  Access Inpatient Bed Call Log 10/10/23 @3:26 PM      Intake has called facilities that have not updated the bed status within the last 12 hours.   ([facility] Only)                 [facility] is at capacity.                Patient remains on waitlist pending appropriate availability.

## 2023-10-12 ENCOUNTER — TELEPHONE (OUTPATIENT)
Dept: BEHAVIORAL HEALTH | Facility: CLINIC | Age: 58
End: 2023-10-12
Payer: COMMERCIAL

## 2023-10-12 LAB
ANION GAP SERPL CALCULATED.3IONS-SCNC: 13 MMOL/L (ref 7–15)
BACTERIA BLD CULT: NO GROWTH
BACTERIA BLD CULT: NO GROWTH
BASO+EOS+MONOS # BLD AUTO: ABNORMAL 10*3/UL
BASO+EOS+MONOS NFR BLD AUTO: ABNORMAL %
BASOPHILS # BLD AUTO: 0 10E3/UL (ref 0–0.2)
BASOPHILS NFR BLD AUTO: 0 %
BUN SERPL-MCNC: 10 MG/DL (ref 6–20)
CALCIUM SERPL-MCNC: 8.1 MG/DL (ref 8.6–10)
CHLORIDE SERPL-SCNC: 115 MMOL/L (ref 98–107)
CREAT SERPL-MCNC: 1.45 MG/DL (ref 0.51–0.95)
CRP SERPL-MCNC: 50.5 MG/L
DEPRECATED HCO3 PLAS-SCNC: 19 MMOL/L (ref 22–29)
EGFRCR SERPLBLD CKD-EPI 2021: 42 ML/MIN/1.73M2
EOSINOPHIL # BLD AUTO: 0.1 10E3/UL (ref 0–0.7)
EOSINOPHIL NFR BLD AUTO: 1 %
ERYTHROCYTE [DISTWIDTH] IN BLOOD BY AUTOMATED COUNT: 14.6 % (ref 10–15)
GLUCOSE BLDC GLUCOMTR-MCNC: 101 MG/DL (ref 70–99)
GLUCOSE BLDC GLUCOMTR-MCNC: 108 MG/DL (ref 70–99)
GLUCOSE BLDC GLUCOMTR-MCNC: 125 MG/DL (ref 70–99)
GLUCOSE BLDC GLUCOMTR-MCNC: 125 MG/DL (ref 70–99)
GLUCOSE SERPL-MCNC: 121 MG/DL (ref 70–99)
HCT VFR BLD AUTO: 29.2 % (ref 35–47)
HGB BLD-MCNC: 9.2 G/DL (ref 11.7–15.7)
HOLD SPECIMEN: NORMAL
HOLD SPECIMEN: NORMAL
IMM GRANULOCYTES # BLD: 0.4 10E3/UL
IMM GRANULOCYTES NFR BLD: 3 %
LYMPHOCYTES # BLD AUTO: 2.1 10E3/UL (ref 0.8–5.3)
LYMPHOCYTES NFR BLD AUTO: 14 %
MAGNESIUM SERPL-MCNC: 1.5 MG/DL (ref 1.7–2.3)
MAGNESIUM SERPL-MCNC: 1.8 MG/DL (ref 1.7–2.3)
MCH RBC QN AUTO: 30.4 PG (ref 26.5–33)
MCHC RBC AUTO-ENTMCNC: 31.5 G/DL (ref 31.5–36.5)
MCV RBC AUTO: 96 FL (ref 78–100)
MONOCYTES # BLD AUTO: 1.2 10E3/UL (ref 0–1.3)
MONOCYTES NFR BLD AUTO: 8 %
NEUTROPHILS # BLD AUTO: 10.6 10E3/UL (ref 1.6–8.3)
NEUTROPHILS NFR BLD AUTO: 74 %
NRBC # BLD AUTO: 0 10E3/UL
NRBC BLD AUTO-RTO: 0 /100
PHOSPHATE SERPL-MCNC: 3.7 MG/DL (ref 2.5–4.5)
PLATELET # BLD AUTO: 292 10E3/UL (ref 150–450)
POTASSIUM SERPL-SCNC: 3.5 MMOL/L (ref 3.4–5.3)
POTASSIUM SERPL-SCNC: 3.5 MMOL/L (ref 3.4–5.3)
RBC # BLD AUTO: 3.03 10E6/UL (ref 3.8–5.2)
SODIUM SERPL-SCNC: 147 MMOL/L (ref 135–145)
WBC # BLD AUTO: 14.4 10E3/UL (ref 4–11)

## 2023-10-12 PROCEDURE — 250N000011 HC RX IP 250 OP 636: Mod: JZ | Performed by: FAMILY MEDICINE

## 2023-10-12 PROCEDURE — 99232 SBSQ HOSP IP/OBS MODERATE 35: CPT | Mod: GC

## 2023-10-12 PROCEDURE — 86140 C-REACTIVE PROTEIN: CPT | Performed by: FAMILY MEDICINE

## 2023-10-12 PROCEDURE — 250N000011 HC RX IP 250 OP 636: Mod: JZ | Performed by: STUDENT IN AN ORGANIZED HEALTH CARE EDUCATION/TRAINING PROGRAM

## 2023-10-12 PROCEDURE — 87040 BLOOD CULTURE FOR BACTERIA: CPT | Performed by: FAMILY MEDICINE

## 2023-10-12 PROCEDURE — 84100 ASSAY OF PHOSPHORUS: CPT | Performed by: FAMILY MEDICINE

## 2023-10-12 PROCEDURE — 36415 COLL VENOUS BLD VENIPUNCTURE: CPT | Performed by: FAMILY MEDICINE

## 2023-10-12 PROCEDURE — 120N000002 HC R&B MED SURG/OB UMMC

## 2023-10-12 PROCEDURE — 80048 BASIC METABOLIC PNL TOTAL CA: CPT | Performed by: FAMILY MEDICINE

## 2023-10-12 PROCEDURE — 85025 COMPLETE CBC W/AUTO DIFF WBC: CPT | Performed by: FAMILY MEDICINE

## 2023-10-12 PROCEDURE — 250N000013 HC RX MED GY IP 250 OP 250 PS 637

## 2023-10-12 PROCEDURE — 250N000013 HC RX MED GY IP 250 OP 250 PS 637: Performed by: STUDENT IN AN ORGANIZED HEALTH CARE EDUCATION/TRAINING PROGRAM

## 2023-10-12 PROCEDURE — 83735 ASSAY OF MAGNESIUM: CPT | Performed by: FAMILY MEDICINE

## 2023-10-12 RX ORDER — CEFTRIAXONE 1 G/1
1 INJECTION, POWDER, FOR SOLUTION INTRAMUSCULAR; INTRAVENOUS ONCE
Status: COMPLETED | OUTPATIENT
Start: 2023-10-12 | End: 2023-10-12

## 2023-10-12 RX ADMIN — CEFDINIR 300 MG: 300 CAPSULE ORAL at 08:09

## 2023-10-12 RX ADMIN — CLOZAPINE 25 MG: 25 TABLET ORAL at 21:21

## 2023-10-12 RX ADMIN — LOPERAMIDE HYDROCHLORIDE 2 MG: 2 CAPSULE ORAL at 21:21

## 2023-10-12 RX ADMIN — CEFTRIAXONE SODIUM 1 G: 1 INJECTION, POWDER, FOR SOLUTION INTRAMUSCULAR; INTRAVENOUS at 14:16

## 2023-10-12 ASSESSMENT — ACTIVITIES OF DAILY LIVING (ADL)
ADLS_ACUITY_SCORE: 27

## 2023-10-12 NOTE — PROGRESS NOTES
Care Management Follow Up    Length of Stay (days): 22    Expected Discharge Date: 10/16/2023    - Provisional Discharge needed at discharge.     Concerns to be Addressed: Discharge planning      Patient plan of care discussed at interdisciplinary rounds: Yes     Anticipated Discharge Disposition:  Transfer back to [facility] when med stable.     Anticipated Discharge Services:  IP MH, ACT Team, Commitment CM, [facility]     ACT MISHEL Pizarrojuju Montoyar  PH: [phone]  Fax: [phone]  [email or web address]     Civil Commitment Information:  Type of Commitment: [facility]: [facility] File Number: [id number]  Date of Commitment: September 1, 2023  Date Commitment Ends: March 1, 2023    Interim MI CM  Helen  PH: [phone]    MI CM  Susie Mejias  PH: [phone]  [email or web address]  - Will be pt's assigned CM after Intake       Traci Zelaya  PH: [phone]     Anticipated Discharge DME:  None     Patient/family educated on Medicare website which has current facility and service quality ratings:  N/A  Education Provided on the Discharge Plan: Yes   Patient/Family in Agreement with the Plan:  Daughter is agreeable     Referrals Placed by CM/SW:  Financial Counselor  Private pay costs discussed: Not applicable     Additional Information:    1610:  faxed Petition for Shun Domínguez to [facility].    [facility] Pre-Petition Screening  PH: [phone]  Fax: [phone]    1615: MISHEL left another VM for MI CM, provided update on the above information and requested a call back for further discussion.     MI CM  Helen  PH: [phone]      1630 ADDENDUM: Helen left return VM for MISHEL, clarified that pt's CM will be Susie Mejias after Intake, coordination prior to that will continue to be Helen. Helen requested a day or time that works for CM to do in-person Intake.    1640: MISHEL left return VM for Helen, provided update on pt's orientation status; also indicated that  there is no specific date or time that is preferred for Intake (except late morning vs early), requested notice of date/time that CM will be at hospital to assess pt, so that SW can notify staff.    MI MANDEEP Mejias  PH: [phone]  [email or web address]  - Will be pt's assigned CM after Intake        INGRID Snider, LSW  [facility]   PHONE: [phone]  Pager: [phone]

## 2023-10-12 NOTE — PLAN OF CARE
Goal Outcome Evaluation:      Plan of Care Reviewed With: patient    Overall Patient Progress: no changeOverall Patient Progress: no change    Outcome Evaluation: pt is only AXO to self. patient on to 1:1 sitter. Pt become restless couple times and pulled IV out. Refused medication.  Both Magnesium and potassium  was replaced. lab draw recheck done and MG is WDL. Potassium needs to be replaced again but patient refused. left PIV saline locked. count with POC.   PLAN--Transfer back to [facility] when med stable     Recent vitals-/44 (BP Location: Left arm)   Pulse 71   Temp (!) 96.5  F (35.8  C) (Axillary)   Resp 18   Wt 93.7 kg (206 lb 9.6 oz)   SpO2 98%   BMI 30.51 kg/m       Pt in [facility] has been refusing meds. she has not taken her antibiotic Cefdinir. Also tonight she refused  IV Potassium replacement. PT was pulling IV line out. currently not running any fluids.    MARÍA ELENA GALLEGOS [id number]

## 2023-10-12 NOTE — TELEPHONE ENCOUNTER
[facility] ONLY:  R: [place] MH Access Inpatient Bed Call Log 10/12/23 @ 2:30am    [facility]: @ capacity for MH beds.     Pt remains on waitlist pending appropriate placement availability

## 2023-10-12 NOTE — PROGRESS NOTES
Pt refused her medications except Clozapine .Patient on high K replacement. Tried to replace Potasium through IV but Patient reported the IV site was burning and she pulled IV out. Called RN flyer to replace IV, slowed rate for IV potasium Infusion but still burning at site. Patient refused IV Potasium  and continuous LR fluids.  Left PIV still in place, saline locked. Will Re-approach In the morning.

## 2023-10-12 NOTE — TELEPHONE ENCOUNTER
R: [place] MH Access Inpatient Bed Call Log 10/12/23 @ 7:04 am???   Intake has called facilities that have not updated the bed status within the last 12 hours.????([facility] Only)?????????????   ?   [facility] is at capacity.?????????????     Patient remains on the work list pending appropriate bed availability.??????

## 2023-10-12 NOTE — PROGRESS NOTES
[facility]    Progress Note - [facility] Service       Date of Admission:  9/20/2023    Main plans for today:   - Please group cares as able and offer with family present when appropriate as patient typically more amendable with this method   - Please push fluids while awake; goal to drink 1 cup water per hour or with each awakening   - Stop IVF  - Ceftriaxone 1g x1 dose   - Stop Cefnidir   - Follow up Psych recommendations     Assessment & Plan   Angela Basurto is a 58-year-old female PMH bipolar 1 disorder admitted to [facility] 8/16 for elvis and psychosis in setting of medication non-adherence. Transferred to [facility] 9/18/23 for sepsis 2/2 HAP, now resolved. Monitoring UTI and ETHAN. Patient is on Mata with full commitment, current 1:1.     # ETHAN, improving, suspect ATN 2/2 pre-renal insult and antibiotics   Baseline creatinine 0.5. Initially had ETHAN that peaked at 1.99 9/22. Developed worsening creatinine again with peak 1.91 10/8. Improved with IVF. Suspect ATN triggered by pre-renal etiology. Currently testing with PO fluids. Suspect uncontrolled Psychiatric conditions contributing to low PO intake and needs would be best served on IP Psych. If maintaining stable creatinine on PO fluids, plan to discontinue IV and transfer to Psych.   - Patient care order to encourage fluids as able; goal 1 cup of water per hour    - Strict intake/output   - BMP daily   - Avoid nephrotoxic medications   - HOLD Hydrochlorothiazide     # Bipolar I disorder  # Hx of possible seizure (1983)  # Mata: Haldol, clozapine, risperidone, olanzapine  # Full commitment   # Somnolence   Presented with elvis and psychosis in the context of medication nonadherence. Was admitted with Psychiatry from 8/16 until transfer to this service 9/20. Has required a 1:1 to maintain her safety. Psychiatry has been following and managing medications. Clozapine initiated during admission, but  held temporarily due to somnolence and concern for build-up due to elevated CRP. Patient may benefit from long-acting agent rather than Clozapine given multiple side effects to Clozapine as well as history of non-adherence for MEDINA medication.   - Consults: Psych following; appreciate recommendations on possibility of long active psychotropic   - Nursing care: 1:1 sitter, encouraging sitting in chair or up ~6 hours a day, walk with nurse 1:1 4x daily  - Legal status: MEDINA and full commitment   - Medications: Clozapine 25 mg daily  - Agitation plan:    - Non-emergency: PRN Haldol 2 mg PO  - Emergency: Haldol 5mg + diphenhydramine 50mg q8h PRN, PO or IM  - STOP Hydroxyzine per psych (see 9/29 note)    # UTI  # Blood culture positive for Strep sanguinis, 1/2 bottles 10/5   # Leukocytosis, stable  # Recent HAP, resolved   # Aspiration risk   # Diarrhea   # Somnolence   Patient with persistent leukocytosis, prompting infectious workup. Given urinary frequency and suprapubic tenderness on exam, initiated treatment of UTI. Urine culture not consistent with infection. However, per ID, given symptom improvement with antibiotic- reasonable to complete treatment for acute cystitis. Patient declining oral medications- will complete final day with IV medication. Infectious workup otherwise reassuring. Blood culture returned positive for strep (10/5, 1/2 bottles), suspected contaminant per ID. Diarrhea suspected to be related to antibiotics with reassuring enteric panel. Somnolence likely related to psychotropic medications given improvement with holding Clozaril and reduced renal clearance. Stable leukocytosis likely also related to psychotropic medication. No indication for ECHO or head imaging per Psych.   Consults:  - ID consult; appreciate recommendations   Antimicrobials  - Ceftriaxone (10/7-10/8)  - Cefdinir (10/8- 10/10)  - Ceftriaxone (10/12)  Relevant cultures:  - 10/5 - COVID/RSV/Influ - negative  - 10/5 - Ucx -  <10K Ecoli  - 10/5 - Bld Cx - positive 1/2 - Strept Sanguinis and Granulicatella Adiacens (presumed contaminant)   - 10/5 - Cdiff - negative  - 10/5 - Enteric - negative  - 10/6 - MRSA Nares - negative  - 10/6 - Bld Cx - NGTD   - 10/7 - BldCx - NGTD  - 10/8 - Bld Cx - NGTD    # Diarrhea, improved, suspected antibiotic related vs. low fiber  Patient with consistent loose stools. Negative enteric panels (10/5) and C diff (10/5, 10/8). Less concern for infectious etiology. Suspect related to antibiotics and low fiber intake. Improved with scheduled Imodium.   - Schedule Imodium 2 mg BID   - Continue Imodium 2 mg BID PRN   - Start fiber supplement   - Encourage PO intake     # Moderate protein-calorie malnutrition   Patient with poor PO intake. Per family, this is chronic and occurs even with traditional foods. Patient reports frequent nausea with food intake. Could consider Phenergan for nausea control as well as appetite stimulation. Will avoid introducing new medications at this time with current ETHAN, but will consider for future.   - Nutrition IP consult, appreciate recommendations   - Needs encouragement to eat, safe to swallow. Order food and encourage food and fluids   - Medical food supplement therapy  - Consider Phenergan for nausea/appetite stimulation     # Hyperglycemia   # Pre-diabetes (A1c 09/27/23, 6.2)  A1c 6.2% 9/27/23. Previously hyperglycemic in the setting of Clozaril. Blood sugars have improved since holding Clozaril.   - Continue Lantus 14U daily   - MDSSI   - Metformin discontinued due to diarrhea   - Hypoglycemia protocol     # HTN  Has been intermittently hypertensive. Holding medications in setting of ETHAN and psychotropic interactions.   - HOLD Hydrochlorothiazide 25 mg daily  - HOLD PTA clonidine per Psych    Chronic/resolved/stable:    # Orthostatic hypotension, resolved  Symptomatic 9/18 with blood pressure drop from 123/72 while laying to 96/55 with standing. Patient reported this was new  since admission and starting new medications, and has since improved. Patient initiated on clozapine on 8/31 with many dose changes. Somnolence and poor PO intake have been challenges during this admission, both likely contributing. Workups for endocrine and cardiac causes were negative.   - Encourage oral hydration   - Compression stockings  - Likely to continue to be tricky with clozapine dose changes    # Possible hospital-acquired pneumonia, resolved/treated  # Positive blood culture x1 - consistent with contaminant  # Severe sepsis, resolved  # Acute hypoxic respiratory failure, resolved  # Leukocytosis, ongoing  Initially presented 9/19 from inpatient psych with temperature 101.3F, lactic acid 4.3 in the setting of new cough and widespread body aches. , WBC 9.8. 9/18 CXR with streaky right greater than left perihilar and basilar opacities, likely atelectasis and/or edema without focal consolidation. Respiratory viral panels (9/19, 9/22) have been negative. Echocardiogram not consistent with myocarditis. Briefly treated with vancomycin 9/19-9/23 due to a blood culture growing gram positive cocci; this was later determined to be Staph Hominis, suspected contaminant. Pulmonary exam without focal findings consistent with bacterial pneumonia, though patient had an ongoing cough and appears to have a significant burden of respiratory secretions seen on 9/24 (resolved). RT evaluated and recommended use of aerobika flutter valve. White count have been elevated although stable with still no clear focus of infection, could be clozapine side effect. Patient is medically stable to return to psychiatry unit.   - Infectious disease consulted, now off  - S/p piperacillin/tazobactam (9/20- 9/26)  - Respiratory therapist saw Angela and she tolerated vest therapy x1  - Discontinue vest therapy with improved breathing and reassuring physical examination   - Aerobika flutter valve   - CBC q72h    # Hypokalemia, resolved  #  Abdominal tenderness, resolved    Patient report intermittent abdominal tenderness, unlikely pancreatitis with location abdominal tenderness and lipase x2 < 3x normal upper limits. Low concern for bowel obstruction with abdominal xray revealing nonobstructive bowel gas pattern and no significant fecal retention.   - Loperamide capsule 2 mg PRN  - RN-driven potassium replacement protocol - High     # Hypocalcemia  # Hypoalbuminemia  Suspect possible malnutrition due to poor PO intake.   - Encourage good PO intake   - Pantoprazole EC 40 mg tablets  - DEBORAH  - Zofran PRN, 30 mins before meals  - Nutrition IP consult, appreciate recommendations               - Medical food supplement therapy  - Patient to consume % of nutritionally adequate meal trays TID, or the equivalent with supplements/snacks.     # Zoroastrian  Important to her value system.   - Spiritual care consult         Diet: Combination Diet Regular Diet; Safe Tray - with utensils  Snacks/Supplements Adult: Other; Ensure Enlive mixed with ice cream; With Meals    DVT Prophylaxis: Low Risk/Ambulatory with no VTE prophylaxis indicated  Wilson Catheter: Not present  Fluids: PO  Lines: None     Cardiac Monitoring: None  Code Status: Full Code      Clinically Significant Risk Factors        # Hypokalemia: Lowest K = 3.1 mmol/L in last 2 days, will replace as needed  # Hypernatremia: Highest Na = 149 mmol/L in last 2 days, will monitor as appropriate    # Hypomagnesemia: Lowest Mg = 1.3 mg/dL in last 2 days, will replace as needed   # Hypoalbuminemia: Lowest albumin = 2 g/dL at 9/24/2023  8:46 AM, will monitor as appropriate               # Severe Malnutrition: based on nutrition assessment             Disposition Plan     Expected Discharge Date: 10/16/2023        Discharge Comments: Pending medical stability and psych placement.      The patient's care was discussed with the Attending Physician, Dr. Albarado.    Malka Nguyen MD  [facility]  [facility]  Securely message with [employer] (more info)  Text page via [facility] Paging/Directory   See signed in provider for up to date coverage information  ______________________________________________________________________    Interval History     Overnight:  Pulled out IV overnight, was replaced. Refusing medications including Lantus, Imodium, potassium. Received MEDINA medication.     Subjective:   Patient asleep on two exams. Not able to be woken up. Per RN, wakes up about once an hour to use the bathroom. She has been able to get her to drink water when awake. More amendable to cares when family is around.     Physical Exam   Vital Signs: Temp: 98.1  F (36.7  C) Temp src: Axillary BP: 133/49 Pulse: 85   Resp: 18 SpO2: 98 % O2 Device: None (Room air)    Weight: 206 lbs 9.6 oz    Constitutional: Somnolent, no apparent distress  Respiratory: No increased work of breathing, good air exchange, clear to auscultation bilaterally, no crackles or wheezing  Cardiovascular: Normal apical impulse, regular rate and rhythm, normal S1 and S2. Extremities warm.   Abdomen: Soft, NTND.   MSK: No edema noted.   Neurologic: Somnolent.     Medical Decision Making   Please see A&P for additional details of medical decision making.      Data   ------------------------- PAST 24 HR DATA REVIEWED -----------------------------------------------    I have personally reviewed the following data over the past 24 hrs:    14.4 (H)  \   9.2 (L)   / 292     147 (H) 115 (H) 10.0 /  125 (H)   3.5; 3.5 19 (L) 1.45 (H) \     Procal: N/A CRP: 50.50 (H) Lactic Acid: N/A       Imaging results reviewed over the past 24 hrs:   No results found for this or any previous visit (from the past 24 hour(s)).

## 2023-10-12 NOTE — TELEPHONE ENCOUNTER
R: [place] [facility] Access Inpatient Bed Call Log 10/11/23 @ 10:08 PM  ? ???????   Bed search: [facility] Only                 [facility] is at capacity.????????????       Patient remains on the work list pending appropriate bed availability.?????

## 2023-10-12 NOTE — PLAN OF CARE
Goal Outcome Evaluation:      Plan of Care Reviewed With: patient    Overall Patient Progress: no change    Outcome Evaluation: Pt lethargic and slept most of shift. Encouraged to drink fluids everytime she awakes; tolerating oral fluids well. Potassium 3.5 and per Chester's do not need to replace. Magnesium WNL.      VS: /49 (BP Location: Left arm)   Pulse 85   Temp 98.1  F (36.7  C) (Axillary)   Resp 18   Wt 93.7 kg (206 lb 9.6 oz)   SpO2 98%   BMI 30.51 kg/m       O2: Stable on room air   Output: Voids without difficulty in bathroom   Last BM: 10/12; continues to have diarrhea   Activity: SBA   Skin: Bruise to R forearm   Pain: denies   CMS: intact   Dressing: N/a   Diet: Regular diet, thin liquids, takes pills whole   LDA: L PIV saline locked   Equipment: IV pole, personal belongings   Plan: Transfer back to inpatient psych once medically stable   Additional Info: Alert to self. Encourage oral intake.

## 2023-10-12 NOTE — CONSULTS
[facility]- PROBATE/MENTAL HEALTH DIVISION  FOURTH ([place])  ________________________________________________________________________________________________________________________________________________________    In the Matter of the Civil Commitment of:Angela Basurto  : 1965      Respondent. Angela Basurto    PETITION FOR AUTHORIZATION TO IMPOSE TREATMENT ELECTROCONVULSIVE THERAPY  D.C. File No. [id number]__________ C.A. File No. __________     ____________________________________________________________________________________________________________________________________________________________    FOR EMERGENCY USE ONLY:  If a medical emergency has been declared previous to this petition, please describe: she is committed due to psychosis with threatening behavior     The basis for the emergency: aggression     The number and frequency of ECT treatments administered as of today s date: none   ____________________________________________________________________________________________________________________________________________________________    Nickolas ARCHULETA MD with an office at [facility] ([address] [place] [address]), to the best of my knowledge, information, and belief, respectfully represent:    1.I am the psychiatric provider at [facility]  located in  [place], [place].     2.Respondent was born on 1965 and has settlement in  [facility] for the purpose of judicial commitment.       3.Respondent was committed to  [facility]  as Mentally Ill  by the  [facility], Probate/ Mental Health Division, on 2023.    4.Respondent is presently receiving care and treatment at the [facility]  [facility]  under Dr Nickolas Faith medical provider s name).       5. Diagnostic studies performed by Dr Denia Champagne on Respondent indicates that Respondent suffers from:    295.70  (F25) Schizoaffective Disorder Bipolar Type  296.44 Bipolar I Disorder Current or Most Recent Episode Manic, with psycholtic features,      6. Based upon my review of Respondent's condition, I have determined electroconvulsive therapy to be presently medically necessary, with a proposed course of: (Number of treatments and period of time for both acute and maintenance treatments.)  16 total acutely, 3 times per week, maintenance of 1-2 times per week for duration of commitment    7. The administration of the above procedure(s) is reasonable despite the risks and side-effects associated with the procedure.  The risks and side-effects are: those of a general anesthetic; namely low fatality risk - 1 in 20,000; and accidental injury may occur but careful techniques can reduce the risk to practically zero; the chances of falling while drowsy; inhaling saliva, etc.  Dentures should be removed before to ECT.  Atropine is injected under the skin before treatment to dry the mouth, reduce nasal and bronchial secretions, and to prevent irregular heartbeats.  No food or drink is permitted for eight hours beforehand to insure an empty stomach.   Respondents are kept in bed after treatment until steady on their feet.    8. The objective of the above procedure is: Control the psychosis which is refractory to medications     9. Respondent's written consent to the administration of the above procedure has not been obtained because: she is not decisional     10. Judicial authorization is a prerequisite to the administration of the above procedure in the absence of Respondent's consent under the requirements of Shun Domínguez, 239 [address].2[address] 905 (1976), and In bert Henning, 375 [address].2[address] 526 ([place]. 1985).     Based upon the foregoing,  Kate prays that a  be appointed to represent Respondent and that authorization to administer the above prescribed therapy be granted according to law.I declare under penalty of perjury under the laws of the [facility] that the foregoing is true and correct.    Executed on this October 12, 2023 in the [facility]  in the [facility].       Signature: _______________________________________  Nickolas Faith MD, Psychiatrist    [facility] MED SURG ORTHOPEDIC  [facility] [id number] [phone]

## 2023-10-13 ENCOUNTER — TELEPHONE (OUTPATIENT)
Dept: BEHAVIORAL HEALTH | Facility: CLINIC | Age: 58
End: 2023-10-13
Payer: COMMERCIAL

## 2023-10-13 LAB
ANION GAP SERPL CALCULATED.3IONS-SCNC: 15 MMOL/L (ref 7–15)
BACTERIA BLD CULT: NO GROWTH
BACTERIA BLD CULT: NO GROWTH
BASO+EOS+MONOS # BLD AUTO: ABNORMAL 10*3/UL
BASO+EOS+MONOS NFR BLD AUTO: ABNORMAL %
BASOPHILS # BLD AUTO: 0 10E3/UL (ref 0–0.2)
BASOPHILS NFR BLD AUTO: 0 %
BUN SERPL-MCNC: 8.7 MG/DL (ref 6–20)
CALCIUM SERPL-MCNC: 7.7 MG/DL (ref 8.6–10)
CHLORIDE SERPL-SCNC: 109 MMOL/L (ref 98–107)
CREAT SERPL-MCNC: 1.33 MG/DL (ref 0.51–0.95)
CRP SERPL-MCNC: 46.64 MG/L
DEPRECATED HCO3 PLAS-SCNC: 19 MMOL/L (ref 22–29)
EGFRCR SERPLBLD CKD-EPI 2021: 47 ML/MIN/1.73M2
EOSINOPHIL # BLD AUTO: 0.1 10E3/UL (ref 0–0.7)
EOSINOPHIL NFR BLD AUTO: 1 %
ERYTHROCYTE [DISTWIDTH] IN BLOOD BY AUTOMATED COUNT: 14.4 % (ref 10–15)
GLUCOSE BLDC GLUCOMTR-MCNC: 119 MG/DL (ref 70–99)
GLUCOSE BLDC GLUCOMTR-MCNC: 130 MG/DL (ref 70–99)
GLUCOSE BLDC GLUCOMTR-MCNC: 75 MG/DL (ref 70–99)
GLUCOSE BLDC GLUCOMTR-MCNC: 92 MG/DL (ref 70–99)
GLUCOSE SERPL-MCNC: 101 MG/DL (ref 70–99)
HCT VFR BLD AUTO: 29.3 % (ref 35–47)
HGB BLD-MCNC: 9.3 G/DL (ref 11.7–15.7)
IMM GRANULOCYTES # BLD: 0.2 10E3/UL
IMM GRANULOCYTES NFR BLD: 2 %
LYMPHOCYTES # BLD AUTO: 2.1 10E3/UL (ref 0.8–5.3)
LYMPHOCYTES NFR BLD AUTO: 16 %
MAGNESIUM SERPL-MCNC: 1.5 MG/DL (ref 1.7–2.3)
MCH RBC QN AUTO: 29.9 PG (ref 26.5–33)
MCHC RBC AUTO-ENTMCNC: 31.7 G/DL (ref 31.5–36.5)
MCV RBC AUTO: 94 FL (ref 78–100)
MONOCYTES # BLD AUTO: 1 10E3/UL (ref 0–1.3)
MONOCYTES NFR BLD AUTO: 8 %
NEUTROPHILS # BLD AUTO: 9.4 10E3/UL (ref 1.6–8.3)
NEUTROPHILS NFR BLD AUTO: 73 %
NRBC # BLD AUTO: 0 10E3/UL
NRBC BLD AUTO-RTO: 0 /100
PHOSPHATE SERPL-MCNC: 4 MG/DL (ref 2.5–4.5)
PLATELET # BLD AUTO: 219 10E3/UL (ref 150–450)
POTASSIUM SERPL-SCNC: 3.5 MMOL/L (ref 3.4–5.3)
RBC # BLD AUTO: 3.11 10E6/UL (ref 3.8–5.2)
SODIUM SERPL-SCNC: 143 MMOL/L (ref 135–145)
WBC # BLD AUTO: 12.9 10E3/UL (ref 4–11)

## 2023-10-13 PROCEDURE — 99232 SBSQ HOSP IP/OBS MODERATE 35: CPT | Performed by: PSYCHIATRY & NEUROLOGY

## 2023-10-13 PROCEDURE — 36415 COLL VENOUS BLD VENIPUNCTURE: CPT | Performed by: FAMILY MEDICINE

## 2023-10-13 PROCEDURE — 250N000013 HC RX MED GY IP 250 OP 250 PS 637

## 2023-10-13 PROCEDURE — 250N000011 HC RX IP 250 OP 636: Performed by: STUDENT IN AN ORGANIZED HEALTH CARE EDUCATION/TRAINING PROGRAM

## 2023-10-13 PROCEDURE — 99232 SBSQ HOSP IP/OBS MODERATE 35: CPT | Mod: GC

## 2023-10-13 PROCEDURE — 80048 BASIC METABOLIC PNL TOTAL CA: CPT | Performed by: FAMILY MEDICINE

## 2023-10-13 PROCEDURE — 250N000013 HC RX MED GY IP 250 OP 250 PS 637: Performed by: STUDENT IN AN ORGANIZED HEALTH CARE EDUCATION/TRAINING PROGRAM

## 2023-10-13 PROCEDURE — 83735 ASSAY OF MAGNESIUM: CPT | Performed by: FAMILY MEDICINE

## 2023-10-13 PROCEDURE — 85025 COMPLETE CBC W/AUTO DIFF WBC: CPT | Performed by: FAMILY MEDICINE

## 2023-10-13 PROCEDURE — 120N000002 HC R&B MED SURG/OB UMMC

## 2023-10-13 PROCEDURE — 86140 C-REACTIVE PROTEIN: CPT | Performed by: FAMILY MEDICINE

## 2023-10-13 PROCEDURE — 84100 ASSAY OF PHOSPHORUS: CPT | Performed by: FAMILY MEDICINE

## 2023-10-13 RX ADMIN — ONDANSETRON 4 MG: 4 TABLET, FILM COATED ORAL at 08:00

## 2023-10-13 RX ADMIN — ONDANSETRON 4 MG: 4 TABLET, FILM COATED ORAL at 16:10

## 2023-10-13 RX ADMIN — PANTOPRAZOLE SODIUM 40 MG: 40 TABLET, DELAYED RELEASE ORAL at 08:00

## 2023-10-13 RX ADMIN — LOPERAMIDE HYDROCHLORIDE 2 MG: 2 CAPSULE ORAL at 07:59

## 2023-10-13 RX ADMIN — CLOZAPINE 25 MG: 25 TABLET ORAL at 21:05

## 2023-10-13 RX ADMIN — METHYLCELLULOSE 500 MG: 500 TABLET ORAL at 07:59

## 2023-10-13 ASSESSMENT — ACTIVITIES OF DAILY LIVING (ADL)
ADLS_ACUITY_SCORE: 27

## 2023-10-13 NOTE — PROGRESS NOTES
[facility]   BRIEF PROGRESS NOTE    Patient continues to have down-trending creatinine. PO intake and diarrhea improved. Completed course of antibiotics for UTI. Patient is medically cleared for transfer to IP Psych.   - Can remove IV  - Transfer to IP Psych when bed available     Please see daily rounding note for full A/P.  Malka Nguyen MD  1:52 PM

## 2023-10-13 NOTE — TELEPHONE ENCOUNTER
R: [place]  Access Inpatient Bed Call Log 10/13/23 @ 12:30am   Intake has called facilities that have not updated their bed status within the last 12 hours.??      *[facility] Only:  [place] -- [facility]: @ cap per website.      Pt remains on waitlist pending appropriate placement availability     No

## 2023-10-13 NOTE — PROGRESS NOTES
Care Management Follow Up    Length of Stay (days): 23    Expected Discharge Date: 10/16/2023     - Provisional Discharge needed at discharge.    - In-person Intake with Commitment CM scheduled for 10/24/23 at 11:30 a.m.     Concerns to be Addressed: Discharge planning      Patient plan of care discussed at interdisciplinary rounds: Yes     Anticipated Discharge Disposition:  Transfer back to IP Psych when bed becomes available.     Anticipated Discharge Services:  IP MH, ACT Team, Commitment CM, [facility] [facility]     ACT MISHEL Joseph  PH: [phone]  Fax: [phone]  [email or web address]     Civil Commitment Information:  Type of Commitment: [facility]: [facility] File Number: [id number]  Date of Commitment: September 1, 2023  Date Commitment Ends: March 1, 2024     Interim MI MANDEEP Cervantes  PH: [phone]     MI MANDEEP Mejias  PH: [phone]  [email or web address]  - Will be pt's assigned CM after Intake      Traci Zelaya  PH: [phone]     Anticipated Discharge DME:  None     Patient/family educated on Medicare website which has current facility and service quality ratings:  N/A  Education Provided on the Discharge Plan: Yes   Patient/Family in Agreement with the Plan:  Daughter is agreeable     Referrals Placed by CM/SW:  Financial Counselor  Private pay costs discussed: Not applicable     Additional Information:  0910: Interim MI CM called back SW. In-person Intake is scheduled for Tuesday 10/24/23 at 11:30 a.m.  Intake will still occur if pt is on IP Psych Unit at that time.    Interim MI MANDEEP Cervantes  PH: [phone]      0945: SW left  for [facility] PPS, requested a call back to confirm that Petition for Shun Domínguez was received.    [facility] Pre-Petition Screening  PH: [phone]  Fax: [phone]    0955: Pat from [facility] called SW, stated that document was received and passed along to [facility] Attorney's Office and provided contact  for them (documented below).    1000: MISHEL spoke to Jada at [facility] Attorney's Office, who provided fax # for MISHEL to send Petition for Hoffmann Sang to ensure they receive it. Jada agreeable to notifying MISHEL if fax not received in the next 20 mins.    [facility] Attorney's Office  PH: [phone]  Fax: [phone]  (ATTN: [employer])    1005: MISHEL faxed Petition for Hoffmann Domínguez to [facility] Attorney's Office.    1025: Jada called back MISHEL, stated that document isn't coming through clear; provided email.     1030: MISHEL sent Petition for Hoffmann Domínguez to [email or web address]    1045: MISHEL paged Dr. Faith, requested a call back to discuss the edit that is needed on Petition for Hoffmann Domínguez, per [facility] Attorney's Office.    1150: Updated Petition for Hoffmann Domínguez emailed to Angelica [facility] Edwina ([email or web address].).        INGRID Snider, LSW  [facility]   PHONE: [phone]  Pager: [phone]

## 2023-10-13 NOTE — CONSULTS
Psychiatry Consultation; Follow up              Reason for Consult, requesting source:    AMS, recent psychosis with agitation   Requesting source: Saurabh Albarado    Labs and imaging reviewed, discussed with nursing assistant and Suzie Kohler.     Total time spent in chart review, patient interview and coordination of care; 50 minutes       This interview was conducted by telephone. Permission from the patient to conduct the exam by phone was obtained prior to proceeding.  She was also informed that insurance will be billed for this contact. Length of consultation was with patient was 10 min.            Interim history:    This 58 year old woman was transferred from inpatient psychiatry due to chloé pneumonia. She had been admitted with marked psychosis with agitation, threatening people. She is now under civil commitment with [employer].   Since admission to medicine she was noted to be very somnolent, so the clozapine dose was reduced since elevated CRP from infection can decrease the metabolism of clozapine.   For one week we held it without her becoming less groggy, then resumed at 50 mg for one dose, then to 25 mg HS several days ago. It has been held, then un-held today.  Per the sitter she has been sleeping much of the day, but will get up on her own to go to the bathroom. No incontinence.  She did agree to talk to me on the phone. She reluctantly responded to questions, with some latency. Denies being depressed, not having any hallucinations or paranoia. No SI or HI, thinks that her thinking is quite clear.         Current Medications:      cloZAPine  25 mg Oral At Bedtime    [Held by provider] hydrochlorothiazide  25 mg Oral Daily    insulin aspart  1-7 Units Subcutaneous TID AC    insulin aspart  1-5 Units Subcutaneous At Bedtime    [START ON 10/14/2023] insulin glargine  10 Units Subcutaneous QAM AC    methylcellulose  500 mg Oral Daily    ondansetron  4 mg Oral BID AC    pantoprazole  40 mg  "Oral QAM AC              MSE:   The patient is pleasant, mostly cooperative. Speech slowed but fluent. Associations tight.  Mood is \"OK\".Thought process logical, linear. Thought content negative for suicidal thoughts or delusions.  Orientation not checked. Recent and remote memory, attention span and concentration are difficult to assess (she is not very engaged). Fund of knowledge.Insight and judgment limited .       Vital signs:  Temp: 98.9  F (37.2  C) Temp src: Oral BP: 116/47 Pulse: 76   Resp: 18 SpO2: 95 % O2 Device: None (Room air) Oxygen Delivery: 2 LPM   Weight: 93.7 kg (206 lb 9.6 oz)  Estimated body mass index is 30.51 kg/m  as calculated from the following:    Height as of 8/17/23: 1.753 m (5' 9\").    Weight as of this encounter: 93.7 kg (206 lb 9.6 oz).           DSM-5 Diagnosis:   Schizoaffective, bipolar type, with psychosis   Delirium           Assessment:   Due to her presentation to inpatient psychiatry with such disorganization and aggression I do have concerns about her quickly decompensating. Therefore I think we should keep her on some clozapine, but continue with a low dose.   She is on the admission list for IP psych.           Summary of Recommendations:   Would continue on 25 mg HS of clozapine. Quickly escalate if her psychosis recurs.  We will plan for follow her, put contact us as needed     Nickolas Faith M.D.   Consult liaison psychiatry \  [facility]   Contact information available via [facility] Paging/Directory     \"Much or all of the text in this note was generated through the use of Dragon Dictate voice to text software. Errors in spelling or words which appear to be out of contact are unintentional, may be present due having escaped editing\"           "

## 2023-10-13 NOTE — PLAN OF CARE
Goal Outcome Evaluation:      Plan of Care Reviewed With: patient    Overall Patient Progress: improving   No concerns tonight.AXO to Self.patient more alert. Able to have conversation during assessment.1:1 sitter at bedside.drinks plenty of water while up. Left PIV-saline locked. Independent. BG checks. Reg diet. Takes pills whole with apple sauce.Count with POC    Recent vitals-BP (!) 146/67 (BP Location: Right arm, Patient Position: Left side, Cuff Size: Adult Small)   Pulse 77   Temp 99.1  F (37.3  C) (Oral)   Resp 18   Wt 93.7 kg (206 lb 9.6 oz)   SpO2 96%   BMI 30.51 kg/m      Plan- Inpatient psych unit once medically steady

## 2023-10-13 NOTE — PROGRESS NOTES
[facility]    Progress Note - [facility]       Date of Admission:  9/20/2023    Main plans for today:   - Please group cares as able and offer with family present when appropriate as patient typically more amendable with this method   - Please push fluids while awake; goal to drink 1 cup water per hour or with each awakening   - Decrease Lantus to 10U   - Discontinue scheduled Imodium; PRN available   - Follow up AM labs   - Follow up Psych recommendations     Assessment & Plan   Angela Basurto is a 58-year-old female PMH bipolar 1 disorder admitted to [facility] 8/16 for elvis and psychosis in setting of medication non-adherence. Transferred to [facility] 9/18/23 for sepsis 2/2 HAP, now resolved. Monitoring ETHAN- likely to be medically cleared soon and stable for transfer to [facility]. Patient is on Maat with full commitment, current 1:1.     # Bipolar I disorder  # MATA for Haldol, Clozapine, Risperidone, Olanzapine  # Full commitment   # Somnolence   # Hx of possible seizure (1983)  Presented with elvis and psychosis in the context of medication nonadherence. Was admitted with Psychiatry from 8/16 until transfer to this service 9/20. Has required a 1:1 to maintain her safety. Psychiatry has been following and managing medications. Clozapine initiated during admission without significant improvement and side effects including somnolence. Hoffmann-Plascencia for ECT placed 10/12 by Psych. Family aware of pursuit of ECT. Plan to transfer to [facility] with bed availability.   - Consults: Psych following; appreciate recommendations   - Nursing care: 1:1 sitter, encouraging sitting in chair or up ~6 hours a day, walk with nurse 1:1 4x daily  - Legal status: MATA and full commitment   - Medications: Clozapine 25 mg daily  - Agitation plan:    - Non-emergency: PRN Haldol 2 mg PO  - Emergency: Haldol 5mg + diphenhydramine 50mg q8h PRN, PO or IM  - STOP  Hydroxyzine per psych (see 9/29 note)    # ETHAN, improving, suspect ATN 2/2 pre-renal insult and antibiotics   Baseline creatinine 0.5. Initially had ETHAN that peaked at 1.99 9/22. Developed worsening creatinine again with peak 1.91 10/8. Improved with IVF. Suspect ATN triggered by pre-renal etiology. Currently testing with PO fluids. Suspect uncontrolled Psychiatric conditions contributing to low PO intake and needs would be best served on IP Psych. If maintaining stable creatinine on PO fluids, plan to discontinue IV and transfer to Psych.   - Patient care order to encourage fluids as able; goal 1 cup of water per hour    - Strict intake/output   - BMP daily   - Avoid nephrotoxic medications   - HOLD Hydrochlorothiazide     # Moderate protein-calorie malnutrition   Patient with poor PO intake. Per family, this is chronic and occurs even with traditional foods. Patient reports frequent nausea with food intake. Could consider Phenergan for nausea control as well as appetite stimulation. Will avoid introducing new medications at this time with current ETHAN, but will consider for future.   - Nutrition IP consult, appreciate recommendations   - Needs encouragement to eat, safe to swallow. Order food and encourage food and fluids   - Medical food supplement therapy  - Consider Phenergan for nausea/appetite stimulation     # Hyperglycemia   # Pre-diabetes (A1c 09/27/23, 6.2)  A1c 6.2% 9/27/23. Previously hyperglycemic in the setting of Clozaril. Blood sugars have improved since holding Clozaril.   - Decrease Lantus to 10U daily   - MDSSI   - Metformin discontinued due to diarrhea   - Hypoglycemia protocol     # HTN  Has been intermittently hypertensive. Holding medications in setting of ETHAN and psychotropic interactions.   - HOLD Hydrochlorothiazide 25 mg daily  - HOLD PTA clonidine per Psych    Chronic/resolved/stable:    # Diarrhea, resolved, suspected antibiotic related vs. low fiber  Patient with consistent loose  stools. Negative enteric panels (10/5) and C diff (10/5, 10/8). Less concern for infectious etiology. Suspect related to antibiotics and low fiber intake. Improved with Imodium.   - Continue Imodium 2 mg BID PRN   - Start fiber supplement   - Encourage PO intake     # UTI, resolved   # Blood culture positive for Strep sanguinis, 1/2 bottles 10/5   # Leukocytosis, stable  # Recent HAP, resolved   # Aspiration risk   # Diarrhea   # Somnolence   Patient with persistent leukocytosis, prompting infectious workup. Given urinary frequency and suprapubic tenderness on exam, initiated treatment of UTI. Urine culture not consistent with infection. However, per ID, given symptom improvement with antibiotic- reasonable to complete treatment for acute cystitis. Completed treatment for UTI 10/13. Infectious workup otherwise reassuring. Blood culture returned positive for strep (10/5, 1/2 bottles), suspected contaminant per ID. Diarrhea suspected to be related to antibiotics with reassuring enteric panel. Somnolence likely related to psychotropic medications given improvement with holding Clozaril and reduced renal clearance. Stable leukocytosis likely also related to psychotropic medication. No indication for ECHO or head imaging per Psych.   Consults:  - ID consult; appreciate recommendations   Antimicrobials  - Ceftriaxone (10/7-10/8)  - Cefdinir (10/8- 10/10)  - Ceftriaxone (10/12)  Relevant cultures:  - 10/5 - COVID/RSV/Influ - negative  - 10/5 - Ucx - <10K Ecoli  - 10/5 - Bld Cx - positive 1/2 - Strept Sanguinis and Granulicatella Adiacens (presumed contaminant)   - 10/5 - Cdiff - negative  - 10/5 - Enteric - negative  - 10/6 - MRSA Nares - negative  - 10/6 - Bld Cx - NGTD   - 10/7 - BldCx - NGTD  - 10/8 - Bld Cx - NGTD    # Orthostatic hypotension, resolved  Symptomatic 9/18 with blood pressure drop from 123/72 while laying to 96/55 with standing. Patient reported this was new since admission and starting new medications,  and has since improved. Patient initiated on clozapine on 8/31 with many dose changes. Somnolence and poor PO intake have been challenges during this admission, both likely contributing. Workups for endocrine and cardiac causes were negative.   - Encourage oral hydration   - Compression stockings  - Likely to continue to be tricky with clozapine dose changes    # Possible hospital-acquired pneumonia, resolved/treated  # Positive blood culture x1 - consistent with contaminant  # Severe sepsis, resolved  # Acute hypoxic respiratory failure, resolved  # Leukocytosis, ongoing  Initially presented 9/19 from inpatient psych with temperature 101.3F, lactic acid 4.3 in the setting of new cough and widespread body aches. , WBC 9.8. 9/18 CXR with streaky right greater than left perihilar and basilar opacities, likely atelectasis and/or edema without focal consolidation. Respiratory viral panels (9/19, 9/22) have been negative. Echocardiogram not consistent with myocarditis. Briefly treated with vancomycin 9/19-9/23 due to a blood culture growing gram positive cocci; this was later determined to be Staph Hominis, suspected contaminant. Pulmonary exam without focal findings consistent with bacterial pneumonia, though patient had an ongoing cough and appears to have a significant burden of respiratory secretions seen on 9/24 (resolved). RT evaluated and recommended use of aerobika flutter valve. White count have been elevated although stable with still no clear focus of infection, could be clozapine side effect. Patient is medically stable to return to psychiatry unit.   - Infectious disease consulted, now off  - S/p piperacillin/tazobactam (9/20- 9/26)  - Respiratory therapist saw Angela and she tolerated vest therapy x1  - Discontinue vest therapy with improved breathing and reassuring physical examination   - Aerobika flutter valve   - CBC q72h    # Hypokalemia, resolved  # Abdominal tenderness, resolved    Patient  report intermittent abdominal tenderness, unlikely pancreatitis with location abdominal tenderness and lipase x2 < 3x normal upper limits. Low concern for bowel obstruction with abdominal xray revealing nonobstructive bowel gas pattern and no significant fecal retention.   - Loperamide capsule 2 mg PRN  - RN-driven potassium replacement protocol - High     # Hypocalcemia  # Hypoalbuminemia  Suspect possible malnutrition due to poor PO intake.   - Encourage good PO intake   - Pantoprazole EC 40 mg tablets  - DEBORAH  - Zofran PRN, 30 mins before meals  - Nutrition IP consult, appreciate recommendations               - Medical food supplement therapy  - Patient to consume % of nutritionally adequate meal trays TID, or the equivalent with supplements/snacks.     # Holiness  Important to her value system.   - Spiritual care consult         Diet: Combination Diet Regular Diet; Safe Tray - with utensils  Snacks/Supplements Adult: Other; Ensure Enlive mixed with ice cream; With Meals    DVT Prophylaxis: Low Risk/Ambulatory with no VTE prophylaxis indicated  Wilson Catheter: Not present  Fluids: PO  Lines: None     Cardiac Monitoring: None  Code Status: Full Code      Clinically Significant Risk Factors        # Hypokalemia: Lowest K = 3.3 mmol/L in last 2 days, will replace as needed  # Hypernatremia: Highest Na = 149 mmol/L in last 2 days, will monitor as appropriate    # Hypomagnesemia: Lowest Mg = 1.3 mg/dL in last 2 days, will replace as needed   # Hypoalbuminemia: Lowest albumin = 2 g/dL at 9/24/2023  8:46 AM, will monitor as appropriate               # Severe Malnutrition: based on nutrition assessment             Disposition Plan     Expected Discharge Date: 10/16/2023        Discharge Comments: Pending medical stability and psych placement.      The patient's care was discussed with the Attending Physician, Dr. Albarado.    Malka Nguyen MD  [facility]  [facility]  [facility]  Securely message with [employer] (more info)  Text page via [facility] Paging/Directory   See signed in provider for up to date coverage information  ______________________________________________________________________    Interval History     Overnight:  NAOE.      Subjective:   Patient somnolent but wakes up to voice and light touch. Engages in appropriate conversation. Reports that she feels okay. Denies pain. States that family may come later. Per RN, has mostly been sleeping. Does drink water when awake. Diarrhea has improved- only one stool in last day.     Physical Exam   Vital Signs: Temp: 99.1  F (37.3  C) Temp src: Oral BP: (!) 146/67 Pulse: 77   Resp: 18 SpO2: 96 % O2 Device: None (Room air)    Weight: 206 lbs 9.6 oz    Constitutional: Somnolent, no apparent distress  Respiratory: No increased work of breathing, good air exchange, clear to auscultation bilaterally, no crackles or wheezing  Cardiovascular: Normal apical impulse, regular rate and rhythm, normal S1 and S2. Extremities warm.   Abdomen: Soft, NTND.   MSK: No edema noted.   Neurologic: Somnolent.     Medical Decision Making   Please see A&P for additional details of medical decision making.      Data   ------------------------- PAST 24 HR DATA REVIEWED -----------------------------------------------    I have personally reviewed the following data over the past 24 hrs:    14.4 (H)  \   9.2 (L)   / 292     147 (H) 115 (H) 10.0 /  125 (H)   3.5; 3.5 19 (L) 1.45 (H) \     Procal: N/A CRP: 50.50 (H) Lactic Acid: N/A       Imaging results reviewed over the past 24 hrs:   No results found for this or any previous visit (from the past 24 hour(s)).

## 2023-10-13 NOTE — CONSULTS
Due to failure of medication trials I completed a Hoffmann Domínguez for [facility] (where she is already committed).   30 minutes spent in preparation, printing and scanning to social work

## 2023-10-13 NOTE — PLAN OF CARE
Goal Outcome Evaluation:        VSS /50   Pulse 75   Temp 97.8  F (36.6  C) (Oral)   Resp 18   Wt 93.7 kg (206 lb 9.6 oz)   SpO2 96%   BMI 30.51 kg/m       O2 RA denies SOB, CP   Output Voiding in bathroom   LBM 10/13/2023   Activity SBA   Up for meal Yes, ate with family   Skin Visible skin intact   Pain No pain   Neuro/CMS AX1   Dressing N/a   Diet R-thin-whole   LDA L-piv   Plan Cont POC

## 2023-10-13 NOTE — TELEPHONE ENCOUNTER
R: [facility] Call Log 10/13/23 @ 8:11 am: ??    Intake has called facilities that have not updated the bed status within the last 12 hours.?????????????????    ?    [facility] is at capacity.?????????????        Patient remains on the work list pending appropriate bed availability.??????

## 2023-10-14 ENCOUNTER — TELEPHONE (OUTPATIENT)
Dept: BEHAVIORAL HEALTH | Facility: CLINIC | Age: 58
End: 2023-10-14
Payer: COMMERCIAL

## 2023-10-14 LAB
ANION GAP SERPL CALCULATED.3IONS-SCNC: 9 MMOL/L (ref 7–15)
BACTERIA BLD CULT: NO GROWTH
BACTERIA BLD CULT: NO GROWTH
BASO+EOS+MONOS # BLD AUTO: ABNORMAL 10*3/UL
BASO+EOS+MONOS NFR BLD AUTO: ABNORMAL %
BASOPHILS # BLD AUTO: 0 10E3/UL (ref 0–0.2)
BASOPHILS NFR BLD AUTO: 0 %
BUN SERPL-MCNC: 7.9 MG/DL (ref 6–20)
CALCIUM SERPL-MCNC: 7.8 MG/DL (ref 8.6–10)
CHLORIDE SERPL-SCNC: 108 MMOL/L (ref 98–107)
CREAT SERPL-MCNC: 1.51 MG/DL (ref 0.51–0.95)
CRP SERPL-MCNC: 51.58 MG/L
DEPRECATED HCO3 PLAS-SCNC: 26 MMOL/L (ref 22–29)
EGFRCR SERPLBLD CKD-EPI 2021: 40 ML/MIN/1.73M2
EOSINOPHIL # BLD AUTO: 0.1 10E3/UL (ref 0–0.7)
EOSINOPHIL NFR BLD AUTO: 1 %
ERYTHROCYTE [DISTWIDTH] IN BLOOD BY AUTOMATED COUNT: 14.2 % (ref 10–15)
GLUCOSE BLDC GLUCOMTR-MCNC: 107 MG/DL (ref 70–99)
GLUCOSE BLDC GLUCOMTR-MCNC: 117 MG/DL (ref 70–99)
GLUCOSE BLDC GLUCOMTR-MCNC: 125 MG/DL (ref 70–99)
GLUCOSE BLDC GLUCOMTR-MCNC: 139 MG/DL (ref 70–99)
GLUCOSE SERPL-MCNC: 128 MG/DL (ref 70–99)
HCT VFR BLD AUTO: 27.5 % (ref 35–47)
HGB BLD-MCNC: 8.8 G/DL (ref 11.7–15.7)
IMM GRANULOCYTES # BLD: 0.1 10E3/UL
IMM GRANULOCYTES NFR BLD: 1 %
LYMPHOCYTES # BLD AUTO: 2.1 10E3/UL (ref 0.8–5.3)
LYMPHOCYTES NFR BLD AUTO: 15 %
MAGNESIUM SERPL-MCNC: 1.3 MG/DL (ref 1.7–2.3)
MCH RBC QN AUTO: 30.3 PG (ref 26.5–33)
MCHC RBC AUTO-ENTMCNC: 32 G/DL (ref 31.5–36.5)
MCV RBC AUTO: 95 FL (ref 78–100)
MONOCYTES # BLD AUTO: 1 10E3/UL (ref 0–1.3)
MONOCYTES NFR BLD AUTO: 7 %
NEUTROPHILS # BLD AUTO: 10.7 10E3/UL (ref 1.6–8.3)
NEUTROPHILS NFR BLD AUTO: 76 %
NRBC # BLD AUTO: 0 10E3/UL
NRBC BLD AUTO-RTO: 0 /100
PHOSPHATE SERPL-MCNC: 4.4 MG/DL (ref 2.5–4.5)
PLATELET # BLD AUTO: 233 10E3/UL (ref 150–450)
POTASSIUM SERPL-SCNC: 3 MMOL/L (ref 3.4–5.3)
RBC # BLD AUTO: 2.9 10E6/UL (ref 3.8–5.2)
SODIUM SERPL-SCNC: 143 MMOL/L (ref 135–145)
WBC # BLD AUTO: 14 10E3/UL (ref 4–11)

## 2023-10-14 PROCEDURE — 99232 SBSQ HOSP IP/OBS MODERATE 35: CPT | Mod: GC | Performed by: STUDENT IN AN ORGANIZED HEALTH CARE EDUCATION/TRAINING PROGRAM

## 2023-10-14 PROCEDURE — 85025 COMPLETE CBC W/AUTO DIFF WBC: CPT | Performed by: FAMILY MEDICINE

## 2023-10-14 PROCEDURE — 80048 BASIC METABOLIC PNL TOTAL CA: CPT | Performed by: FAMILY MEDICINE

## 2023-10-14 PROCEDURE — 86140 C-REACTIVE PROTEIN: CPT | Performed by: FAMILY MEDICINE

## 2023-10-14 PROCEDURE — 250N000013 HC RX MED GY IP 250 OP 250 PS 637: Performed by: STUDENT IN AN ORGANIZED HEALTH CARE EDUCATION/TRAINING PROGRAM

## 2023-10-14 PROCEDURE — 36415 COLL VENOUS BLD VENIPUNCTURE: CPT | Performed by: FAMILY MEDICINE

## 2023-10-14 PROCEDURE — 84100 ASSAY OF PHOSPHORUS: CPT | Performed by: FAMILY MEDICINE

## 2023-10-14 PROCEDURE — 120N000002 HC R&B MED SURG/OB UMMC

## 2023-10-14 PROCEDURE — 250N000013 HC RX MED GY IP 250 OP 250 PS 637

## 2023-10-14 PROCEDURE — 83735 ASSAY OF MAGNESIUM: CPT | Performed by: FAMILY MEDICINE

## 2023-10-14 RX ORDER — PANTOPRAZOLE SODIUM 40 MG/1
40 TABLET, DELAYED RELEASE ORAL DAILY PRN
Status: DISCONTINUED | OUTPATIENT
Start: 2023-10-14 | End: 2023-10-19 | Stop reason: HOSPADM

## 2023-10-14 RX ORDER — POTASSIUM CHLORIDE 1.5 G/1.58G
40 POWDER, FOR SOLUTION ORAL ONCE
Status: COMPLETED | OUTPATIENT
Start: 2023-10-14 | End: 2023-10-14

## 2023-10-14 RX ORDER — POTASSIUM CHLORIDE 1.5 G/1.58G
20 POWDER, FOR SOLUTION ORAL ONCE
Status: COMPLETED | OUTPATIENT
Start: 2023-10-14 | End: 2023-10-14

## 2023-10-14 RX ORDER — ONDANSETRON 4 MG/1
4 TABLET, FILM COATED ORAL 2 TIMES DAILY PRN
Status: DISCONTINUED | OUTPATIENT
Start: 2023-10-14 | End: 2023-10-19 | Stop reason: HOSPADM

## 2023-10-14 RX ADMIN — CLOZAPINE 25 MG: 25 TABLET ORAL at 22:20

## 2023-10-14 RX ADMIN — POTASSIUM CHLORIDE 20 MEQ: 1.5 POWDER, FOR SOLUTION ORAL at 22:20

## 2023-10-14 RX ADMIN — POTASSIUM CHLORIDE 40 MEQ: 1.5 POWDER, FOR SOLUTION ORAL at 20:02

## 2023-10-14 ASSESSMENT — ACTIVITIES OF DAILY LIVING (ADL)
ADLS_ACUITY_SCORE: 23
ADLS_ACUITY_SCORE: 27
ADLS_ACUITY_SCORE: 27
ADLS_ACUITY_SCORE: 23
ADLS_ACUITY_SCORE: 23
ADLS_ACUITY_SCORE: 27

## 2023-10-14 NOTE — PROGRESS NOTES
[facility]    Progress Note - [facility] Medicine Service       Date of Admission:  9/20/2023    Main plans for today:   Medically ready for inpatient psych - pending placement   HELD Lantus today - RN concerned of lower sugars during day/mid-day   Group cares as much as possible, and around family visits if possible  Will make the following daily meds PRN since she is refusing them and they can be taken as needed without concern: Pantoprazole, Methycellulose  Encourage PO fluid and nutrition as able     Assessment & Plan   Angela Basurto is a 58-year-old female PMH bipolar 1 disorder admitted to [facility] 8/16 for levis and psychosis in setting of medication non-adherence. Transferred to [facility] 9/18/23 for sepsis 2/2 HAP, now resolved. Monitoring ETHAN- likely to be medically cleared soon and stable for transfer to [facility]. Patient is on Mata with full commitment, current 1:1.     # Bipolar I disorder  # MATA for Haldol, Clozapine, Risperidone, Olanzapine  # Full commitment   # Somnolence   # Hx of possible seizure (1983)  Presented with elvis and psychosis in the context of medication nonadherence. Was admitted with Psychiatry from 8/16 until transfer to this service 9/20. Has required a 1:1 to maintain her safety. Psychiatry has been following and managing medications. Clozapine initiated during admission without significant improvement and side effects including somnolence. Price-Plascencia for ECT placed 10/12 by Psych. Family aware of pursuit of ECT. Plan to transfer to [facility] with bed availability.   - Consults: Psych following; appreciate recommendations   - Nursing care: 1:1 sitter, encouraging sitting in chair or up ~6 hours a day, walk with nurse 1:1 4x daily  - Legal status: MATA and full commitment   - Medications: Clozapine 25 mg daily  - Agitation plan:    - Non-emergency: PRN Haldol 2 mg PO  - Emergency: Haldol 5mg + diphenhydramine 50mg q8h  PRN, PO or IM  - STOP Hydroxyzine per psych (see 9/29 note)    # ETHAN, improving, suspect ATN 2/2 pre-renal insult and antibiotics   Baseline creatinine 0.5. Initially had ETHAN that peaked at 1.99 9/22. Developed worsening creatinine again with peak 1.91 10/8. Improved with IVF. Suspect ATN triggered by pre-renal etiology. Currently testing with PO fluids. Suspect uncontrolled Psychiatric conditions contributing to low PO intake and needs would be best served on IP Psych. If maintaining stable creatinine on PO fluids, plan to discontinue IV and transfer to Psych.   - Patient care order to encourage fluids as able; goal 1 cup of water per hour    - Strict intake/output   - BMP daily   - Avoid nephrotoxic medications   - HOLD Hydrochlorothiazide     # Moderate protein-calorie malnutrition   Patient with poor PO intake. Per family, this is chronic and occurs even with traditional foods. Patient reports frequent nausea with food intake. Could consider Phenergan for nausea control as well as appetite stimulation. Will avoid introducing new medications at this time with current ETHAN, but will consider for future.   - Nutrition IP consult, appreciate recommendations   - Needs encouragement to eat, safe to swallow. Order food and encourage food and fluids   - Medical food supplement therapy  - Consider Phenergan for nausea/appetite stimulation     # Hyperglycemia   # Pre-diabetes (A1c 09/27/23, 6.2)  A1c 6.2% 9/27/23. Previously hyperglycemic in the setting of Clozaril. Blood sugars have improved since holding Clozaril.   - HELD Lantus 10U daily   - MDSSI   - Metformin discontinued due to diarrhea   - Hypoglycemia protocol     # HTN  Has been intermittently hypertensive. Holding medications in setting of ETHAN and psychotropic interactions.   - HOLD Hydrochlorothiazide 25 mg daily  - HOLD PTA clonidine per Psych    Chronic/resolved/stable:    # Diarrhea, resolved, suspected antibiotic related vs. low fiber  Patient with  consistent loose stools. Negative enteric panels (10/5) and C diff (10/5, 10/8). Less concern for infectious etiology. Suspect related to antibiotics and low fiber intake. Improved with Imodium.   - Continue Imodium 2 mg BID PRN   - Enciurage fiber supplement   - Encourage PO intake     # UTI, resolved   # Blood culture positive for Strep sanguinis, 1/2 bottles 10/5   # Leukocytosis, stable  # Recent HAP, resolved   # Aspiration risk   # Diarrhea   # Somnolence   Patient with persistent leukocytosis, prompting infectious workup. Given urinary frequency and suprapubic tenderness on exam, initiated treatment of UTI. Urine culture not consistent with infection. However, per ID, given symptom improvement with antibiotic- reasonable to complete treatment for acute cystitis. Completed treatment for UTI 10/13. Infectious workup otherwise reassuring. Blood culture returned positive for strep (10/5, 1/2 bottles), suspected contaminant per ID. Diarrhea suspected to be related to antibiotics with reassuring enteric panel. Somnolence likely related to psychotropic medications given improvement with holding Clozaril and reduced renal clearance. Stable leukocytosis likely also related to psychotropic medication. No indication for ECHO or head imaging per Psych.   Consults:  - ID consult; appreciate recommendations   Antimicrobials  - Ceftriaxone (10/7-10/8)  - Cefdinir (10/8- 10/10)  - Ceftriaxone (10/12)  Relevant cultures:  - 10/5 - COVID/RSV/Influ - negative  - 10/5 - Ucx - <10K Ecoli  - 10/5 - Bld Cx - positive 1/2 - Strept Sanguinis and Granulicatella Adiacens (presumed contaminant)   - 10/5 - Cdiff - negative  - 10/5 - Enteric - negative  - 10/6 - MRSA Nares - negative  - 10/6 - Bld Cx - NGTD   - 10/7 - BldCx - NGTD  - 10/8 - Bld Cx - NGTD    # Orthostatic hypotension, resolved  Symptomatic 9/18 with blood pressure drop from 123/72 while laying to 96/55 with standing. Patient reported this was new since admission and  starting new medications, and has since improved. Patient initiated on clozapine on 8/31 with many dose changes. Somnolence and poor PO intake have been challenges during this admission, both likely contributing. Workups for endocrine and cardiac causes were negative.   - Encourage oral hydration   - Compression stockings  - Likely to continue to be tricky with clozapine dose changes    # Possible hospital-acquired pneumonia, resolved/treated  # Positive blood culture x1 - consistent with contaminant  # Severe sepsis, resolved  # Acute hypoxic respiratory failure, resolved  # Leukocytosis, ongoing  Initially presented 9/19 from inpatient psych with temperature 101.3F, lactic acid 4.3 in the setting of new cough and widespread body aches. , WBC 9.8. 9/18 CXR with streaky right greater than left perihilar and basilar opacities, likely atelectasis and/or edema without focal consolidation. Respiratory viral panels (9/19, 9/22) have been negative. Echocardiogram not consistent with myocarditis. Briefly treated with vancomycin 9/19-9/23 due to a blood culture growing gram positive cocci; this was later determined to be Staph Hominis, suspected contaminant. Pulmonary exam without focal findings consistent with bacterial pneumonia, though patient had an ongoing cough and appears to have a significant burden of respiratory secretions seen on 9/24 (resolved). RT evaluated and recommended use of aerobika flutter valve. White count have been elevated although stable with still no clear focus of infection, could be clozapine side effect. Patient is medically stable to return to psychiatry unit.   - Infectious disease consulted, now off  - S/p piperacillin/tazobactam (9/20- 9/26)  - Respiratory therapist saw Angela and she tolerated vest therapy x1  - Discontinue vest therapy with improved breathing and reassuring physical examination   - Aerobika flutter valve   - CBC q72h    # Hypokalemia, resolved  # Abdominal tenderness,  resolved    Patient report intermittent abdominal tenderness, unlikely pancreatitis with location abdominal tenderness and lipase x2 < 3x normal upper limits. Low concern for bowel obstruction with abdominal xray revealing nonobstructive bowel gas pattern and no significant fecal retention.   - Loperamide capsule 2 mg PRN  - RN-driven potassium replacement protocol - High     # Hypocalcemia  # Hypoalbuminemia  Suspect possible malnutrition due to poor PO intake.   - Encourage good PO intake   - PRN Pantoprazole EC 40 mg tablets  - DEBORAH  - Zofran PRN, 30 mins before meals  - Nutrition IP consult, appreciate recommendations               - Medical food supplement therapy  - Patient to consume % of nutritionally adequate meal trays TID, or the equivalent with supplements/snacks.     # Holiness  Important to her value system.   - Spiritual care consult         Diet: Combination Diet Regular Diet; Safe Tray - with utensils  Snacks/Supplements Adult: Other; Ensure Enlive mixed with ice cream; With Meals    DVT Prophylaxis: Low Risk/Ambulatory with no VTE prophylaxis indicated  Wilson Catheter: Not present  Fluids: PO  Lines: None     Cardiac Monitoring: None  Code Status: Full Code      Clinically Significant Risk Factors            # Hypomagnesemia: Lowest Mg = 1.5 mg/dL in last 2 days, will replace as needed   # Hypoalbuminemia: Lowest albumin = 2 g/dL at 9/24/2023  8:46 AM, will monitor as appropriate               # Severe Malnutrition: based on nutrition assessment             Disposition Plan         The patient's care was discussed with the Attending Physician, Dr. Albarado.    DO [facility]  [facility]  Securely message with [employer] (more info)  Text page via [employer] Paging/Directory   See signed in provider for up to date coverage information  ______________________________________________________________________    Interval  History     Overnight:  NAOE.      Subjective:   Resting comfortably this AM. Per bed-side attendant, has been refusing most AM meds.     Physical Exam   Vital Signs: Temp: 99.1  F (37.3  C) Temp src: Oral BP: 137/66 Pulse: 74   Resp: 18 SpO2: 92 % O2 Device: None (Room air)    Weight: 206 lbs 9.6 oz    Constitutional: Somnolent, no apparent distress  Respiratory: Breathing comfortably at rest   Cardiovascular: RRR  Abdomen: Soft, NTND.   MSK: No edema noted.   Neurologic: Somnolent.

## 2023-10-14 NOTE — PLAN OF CARE
VS: /44 (BP Location: Right arm)   Pulse 73   Temp 98.1  F (36.7  C) (Oral)   Resp 16   Wt 93.7 kg (206 lb 9.6 oz)   SpO2 96%   BMI 30.51 kg/m      O2: Sating >90% on RA. Pt declined to answer any questions or allow an assessment.    Output: Voids spontaneously and adequately in bathroom.    Last BM: Pt refused to answer. Bowel sounds active x4. Passing flatus.    Activity: Independent. 1:1 sitter for safety.    Skin: Visible skin intact.    Pain: Pt declined to answer.    CMS: Pt declined to answer.    Dressing: No dressings.    Diet: Regular. Pt refused to eat. , 139, 107. No N/V noted.     LDA: PIV to L forearm is S/L. Pt refused to let staff assess.    Equipment: Personal belongings.    Plan: Continue to monitor. Call light within reach and utilized appropriately.    Additional Info:

## 2023-10-14 NOTE — PLAN OF CARE
Goal Outcome Evaluation:        VSS VSS. Diastolic low, ref recheck    O2 RA denies SOB, CP   Output Voiding in bathroom   LBM 10/13/2023   Activity SBA   Up for meal    Skin Visible skin intact   Pain No pain   Neuro/CMS AX1   Dressing N/a   Diet Reg   LDA L-PIV SL   Plan Cont POC. 1:1 attendant for safety

## 2023-10-14 NOTE — TELEPHONE ENCOUNTER
[facility] ONLY:  R: [place] [facility] Access [facility]patient Bed Call Log 10/14/23 @ 2:30am    [facility]: @ capacity for MH beds.     Pt remains on waitlist pending appropriate placement availability

## 2023-10-14 NOTE — PLAN OF CARE
Goal Outcome Evaluation:      Plan of Care Reviewed With: patient    Overall Patient Progress: improvingOverall Patient Progress: improving       Patient is AXO to self. 1:1 Sitter at bedside. No concerns. Takes pills whole. Encourage fluids intake while up. Patient is independent in room. Medically stable  to go back to inpatient pschy unit once bed is available.

## 2023-10-14 NOTE — TELEPHONE ENCOUNTER
9:55 AM: Intake received a call Med [facility] inquiring where Pt was on the list. Writer informed and asked if Pt is wanting to or able to change location preference. Intake will receive update if location preference changes.

## 2023-10-14 NOTE — TELEPHONE ENCOUNTER
R: [place]  Access Inpatient Bed Call Log 10/14/23 8:45 AM    Intake has called facilities that have not updated the bed status within the last 12 hours.  ([facility] Only)                 [facility] is at capacity.               Patient remains on the work list pending appropriate bed availability.

## 2023-10-15 ENCOUNTER — TELEPHONE (OUTPATIENT)
Dept: BEHAVIORAL HEALTH | Facility: CLINIC | Age: 58
End: 2023-10-15
Payer: COMMERCIAL

## 2023-10-15 LAB
ANION GAP SERPL CALCULATED.3IONS-SCNC: 11 MMOL/L (ref 7–15)
ANION GAP SERPL CALCULATED.3IONS-SCNC: 15 MMOL/L (ref 7–15)
BACTERIA BLD CULT: NO GROWTH
BASO+EOS+MONOS # BLD AUTO: ABNORMAL 10*3/UL
BASO+EOS+MONOS NFR BLD AUTO: ABNORMAL %
BASOPHILS # BLD AUTO: 0 10E3/UL (ref 0–0.2)
BASOPHILS NFR BLD AUTO: 0 %
BUN SERPL-MCNC: 7.6 MG/DL (ref 6–20)
BUN SERPL-MCNC: 8.1 MG/DL (ref 6–20)
CALCIUM SERPL-MCNC: 7.7 MG/DL (ref 8.6–10)
CALCIUM SERPL-MCNC: 7.7 MG/DL (ref 8.6–10)
CHLORIDE SERPL-SCNC: 106 MMOL/L (ref 98–107)
CHLORIDE SERPL-SCNC: 108 MMOL/L (ref 98–107)
CREAT SERPL-MCNC: 1.46 MG/DL (ref 0.51–0.95)
CREAT SERPL-MCNC: 1.53 MG/DL (ref 0.51–0.95)
CRP SERPL-MCNC: 58.47 MG/L
DEPRECATED HCO3 PLAS-SCNC: 19 MMOL/L (ref 22–29)
DEPRECATED HCO3 PLAS-SCNC: 23 MMOL/L (ref 22–29)
EGFRCR SERPLBLD CKD-EPI 2021: 39 ML/MIN/1.73M2
EGFRCR SERPLBLD CKD-EPI 2021: 42 ML/MIN/1.73M2
EOSINOPHIL # BLD AUTO: 0.1 10E3/UL (ref 0–0.7)
EOSINOPHIL NFR BLD AUTO: 1 %
ERYTHROCYTE [DISTWIDTH] IN BLOOD BY AUTOMATED COUNT: 13.9 % (ref 10–15)
ERYTHROCYTE [DISTWIDTH] IN BLOOD BY AUTOMATED COUNT: 14 % (ref 10–15)
GLUCOSE BLDC GLUCOMTR-MCNC: 116 MG/DL (ref 70–99)
GLUCOSE BLDC GLUCOMTR-MCNC: 126 MG/DL (ref 70–99)
GLUCOSE BLDC GLUCOMTR-MCNC: 135 MG/DL (ref 70–99)
GLUCOSE BLDC GLUCOMTR-MCNC: 139 MG/DL (ref 70–99)
GLUCOSE SERPL-MCNC: 115 MG/DL (ref 70–99)
GLUCOSE SERPL-MCNC: 124 MG/DL (ref 70–99)
HCT VFR BLD AUTO: 27.6 % (ref 35–47)
HCT VFR BLD AUTO: 28.2 % (ref 35–47)
HGB BLD-MCNC: 8.8 G/DL (ref 11.7–15.7)
HGB BLD-MCNC: 9 G/DL (ref 11.7–15.7)
HOLD SPECIMEN: NORMAL
IMM GRANULOCYTES # BLD: 0.1 10E3/UL
IMM GRANULOCYTES NFR BLD: 1 %
LYMPHOCYTES # BLD AUTO: 2.2 10E3/UL (ref 0.8–5.3)
LYMPHOCYTES NFR BLD AUTO: 16 %
MAGNESIUM SERPL-MCNC: 1.3 MG/DL (ref 1.7–2.3)
MCH RBC QN AUTO: 30.1 PG (ref 26.5–33)
MCH RBC QN AUTO: 30.1 PG (ref 26.5–33)
MCHC RBC AUTO-ENTMCNC: 31.9 G/DL (ref 31.5–36.5)
MCHC RBC AUTO-ENTMCNC: 31.9 G/DL (ref 31.5–36.5)
MCV RBC AUTO: 94 FL (ref 78–100)
MCV RBC AUTO: 95 FL (ref 78–100)
MONOCYTES # BLD AUTO: 1 10E3/UL (ref 0–1.3)
MONOCYTES NFR BLD AUTO: 7 %
NEUTROPHILS # BLD AUTO: 10.6 10E3/UL (ref 1.6–8.3)
NEUTROPHILS NFR BLD AUTO: 75 %
NRBC # BLD AUTO: 0 10E3/UL
NRBC BLD AUTO-RTO: 0 /100
PHOSPHATE SERPL-MCNC: 4.4 MG/DL (ref 2.5–4.5)
PLATELET # BLD AUTO: 222 10E3/UL (ref 150–450)
PLATELET # BLD AUTO: 228 10E3/UL (ref 150–450)
POTASSIUM SERPL-SCNC: 3.2 MMOL/L (ref 3.4–5.3)
POTASSIUM SERPL-SCNC: 3.2 MMOL/L (ref 3.4–5.3)
POTASSIUM SERPL-SCNC: 3.9 MMOL/L (ref 3.4–5.3)
RBC # BLD AUTO: 2.92 10E6/UL (ref 3.8–5.2)
RBC # BLD AUTO: 2.99 10E6/UL (ref 3.8–5.2)
SODIUM SERPL-SCNC: 140 MMOL/L (ref 135–145)
SODIUM SERPL-SCNC: 142 MMOL/L (ref 135–145)
WBC # BLD AUTO: 13.9 10E3/UL (ref 4–11)
WBC # BLD AUTO: 14.8 10E3/UL (ref 4–11)

## 2023-10-15 PROCEDURE — 85027 COMPLETE CBC AUTOMATED: CPT

## 2023-10-15 PROCEDURE — 250N000013 HC RX MED GY IP 250 OP 250 PS 637

## 2023-10-15 PROCEDURE — 83735 ASSAY OF MAGNESIUM: CPT | Performed by: FAMILY MEDICINE

## 2023-10-15 PROCEDURE — 250N000013 HC RX MED GY IP 250 OP 250 PS 637: Performed by: FAMILY MEDICINE

## 2023-10-15 PROCEDURE — 99232 SBSQ HOSP IP/OBS MODERATE 35: CPT | Mod: GC

## 2023-10-15 PROCEDURE — 36415 COLL VENOUS BLD VENIPUNCTURE: CPT | Performed by: FAMILY MEDICINE

## 2023-10-15 PROCEDURE — 120N000002 HC R&B MED SURG/OB UMMC

## 2023-10-15 PROCEDURE — 250N000013 HC RX MED GY IP 250 OP 250 PS 637: Performed by: STUDENT IN AN ORGANIZED HEALTH CARE EDUCATION/TRAINING PROGRAM

## 2023-10-15 PROCEDURE — 80048 BASIC METABOLIC PNL TOTAL CA: CPT | Performed by: FAMILY MEDICINE

## 2023-10-15 PROCEDURE — 250N000011 HC RX IP 250 OP 636: Performed by: STUDENT IN AN ORGANIZED HEALTH CARE EDUCATION/TRAINING PROGRAM

## 2023-10-15 PROCEDURE — 36415 COLL VENOUS BLD VENIPUNCTURE: CPT

## 2023-10-15 PROCEDURE — 85025 COMPLETE CBC W/AUTO DIFF WBC: CPT | Performed by: FAMILY MEDICINE

## 2023-10-15 PROCEDURE — 80048 BASIC METABOLIC PNL TOTAL CA: CPT

## 2023-10-15 PROCEDURE — 36416 COLLJ CAPILLARY BLOOD SPEC: CPT | Performed by: FAMILY MEDICINE

## 2023-10-15 PROCEDURE — 86140 C-REACTIVE PROTEIN: CPT | Performed by: FAMILY MEDICINE

## 2023-10-15 PROCEDURE — 84100 ASSAY OF PHOSPHORUS: CPT | Performed by: FAMILY MEDICINE

## 2023-10-15 RX ORDER — POTASSIUM CHLORIDE 750 MG/1
40 TABLET, EXTENDED RELEASE ORAL ONCE
Status: COMPLETED | OUTPATIENT
Start: 2023-10-15 | End: 2023-10-15

## 2023-10-15 RX ORDER — MAGNESIUM OXIDE 400 MG/1
400 TABLET ORAL EVERY 4 HOURS
Status: COMPLETED | OUTPATIENT
Start: 2023-10-15 | End: 2023-10-15

## 2023-10-15 RX ADMIN — POTASSIUM CHLORIDE 40 MEQ: 750 TABLET, EXTENDED RELEASE ORAL at 03:19

## 2023-10-15 RX ADMIN — MAGNESIUM OXIDE TAB 400 MG (241.3 MG ELEMENTAL MG) 400 MG: 400 (241.3 MG) TAB at 09:31

## 2023-10-15 RX ADMIN — CALCIUM CARBONATE (ANTACID) CHEW TAB 500 MG 500 MG: 500 CHEW TAB at 16:03

## 2023-10-15 RX ADMIN — PANTOPRAZOLE SODIUM 40 MG: 40 TABLET, DELAYED RELEASE ORAL at 19:54

## 2023-10-15 RX ADMIN — ONDANSETRON 4 MG: 4 TABLET ORAL at 09:31

## 2023-10-15 RX ADMIN — MAGNESIUM OXIDE TAB 400 MG (241.3 MG ELEMENTAL MG) 400 MG: 400 (241.3 MG) TAB at 16:03

## 2023-10-15 RX ADMIN — CLOZAPINE 25 MG: 25 TABLET ORAL at 22:10

## 2023-10-15 ASSESSMENT — ACTIVITIES OF DAILY LIVING (ADL)
ADLS_ACUITY_SCORE: 24
ADLS_ACUITY_SCORE: 23
ADLS_ACUITY_SCORE: 24
ADLS_ACUITY_SCORE: 23

## 2023-10-15 NOTE — TELEPHONE ENCOUNTER
R: [facility] ONLY    [facility] Access Inpatient Bed Call Log 10/15/23 7:30 AM    Intake has called facilities that have not updated the bed status within the last 12 hours.                                             [facility] is at capacity.                    Pt remains on the work list pending appropriate bed availability.

## 2023-10-15 NOTE — PLAN OF CARE
Goal Outcome Evaluation:                 VS: Temp: 98.2  F (36.8  C) Temp src: Oral BP: 123/71 Pulse: 69   Resp: 16 SpO2: 96 % O2 Device: None (Room air)      O2: Stable on RA   Output: Voids independently    Last BM: 10/13 per report    Activity: Up independently    Skin: Decline full assessment.  Visible skin: CDI    Pain: Denies when asked.  No non verbal signs of pain.    Neuro: Alert, has been sleeping the entire shift.  Decline most assessment   Dressing: None    Diet: Regular    LDA: None    Equipment: Personal belongings at bedside    Plan: Cont POC   Additional Info: Sitter at bedside.    1930: Potassium replaced 2x. Rechecked at 0200: 3.2  0300: Potassium replaced 1x. Rechecked at 0745

## 2023-10-15 NOTE — PROGRESS NOTES
[facility]    Progress Note - [facility]       Date of Admission:  9/20/2023    Main Plans for Today   Medically ready for inpatient psych - pending placement   Discontinue Lantus today   Group cares as much as possible, and around family visits if possible  Encourage PO fluid and nutrition as able     Assessment & Plan   Angela Basurto is a 58-year-old female PMH bipolar 1 disorder admitted to [facility] 8/16 for elvis and psychosis in setting of medication non-adherence. Transferred to [facility] 9/18/23 for sepsis 2/2 HAP, now resolved. Subsequently with ETHAN suspected 2/2 ATN, stable to improving. Medically cleared for transfer to IP [facility] with bed availability. Patient is on Mata with full commitment, current 1:1.     # Bipolar I disorder  # MATA for Haldol, Clozapine, Risperidone, Olanzapine  # Full commitment   # Somnolence   # Hx of possible seizure (1983)  Presented with elvis and psychosis in the context of medication nonadherence. Was admitted with Psychiatry from 8/16 until transfer to this service 9/20. Has required a 1:1 to maintain her safety. Psychiatry has been following and managing medications. Clozapine initiated during admission without significant improvement and side effects including somnolence. Hoffmann-Plascencia for ECT placed 10/12 by Psych. Family aware of pursuit of ECT. Plan to transfer to IP [facility] with bed availability.   - Consults: Psych following; appreciate recommendations   - Nursing care: 1:1 sitter, encouraging sitting in chair or up ~6 hours a day, walk with nurse 1:1 4x daily  - Legal status: MATA and full commitment   - Medications: Clozapine 25 mg daily  - Agitation plan:    - Non-emergency: PRN Haldol 2 mg PO  - Emergency: Haldol 5mg + diphenhydramine 50mg q8h PRN, PO or IM  - STOP Hydroxyzine per psych (see 9/29 note)    # ETHAN, stable, suspect ATN 2/2 pre-renal insult and antibiotics   Baseline creatinine  0.5. Initially had ETHAN that peaked at 1.99 9/22. Developed worsening creatinine again with peak 1.91 10/8. Improved with IVF. Suspect ATN triggered by pre-renal etiology. Has been stable on PO fluids. Suspect that it may take weeks for creatinine to return to baseline. Patient may also have irreversible damage and may have a new creatinine baseline. Do not feel IVF indicated at this time with normal BUN.   - Patient care order to encourage fluids as able; goal 1 cup of water per hour    - Strict intake/output   - BMP daily   - Avoid nephrotoxic medications   - HOLD Hydrochlorothiazide     # Elevated CRP, improved  Patient has had persistent elevated CRP. Improved with treating UTI, but remains elevated. Unclear etiology for elevated CRP at this time. Patient has had extensive infectious workup as below. Malignancy remains on differential, though patient is not at the age where that would be expected. Per Psych, Clozaril levels may rise due to elevated CRP. Patient may benefit from alternative agent if CRP does not improve without identifiable cause.   - Encourage cancer screening as outpatient     # Moderate protein-calorie malnutrition   Patient with poor PO intake. Per family, this is chronic and occurs even with traditional foods. Patient reports frequent nausea with food intake. Could consider Phenergan for nausea control as well as appetite stimulation. Will avoid introducing new medications at this time with current ETHAN, but will consider for future.   - Nutrition IP consult, appreciate recommendations   - Needs encouragement to eat, safe to swallow. Order food and encourage food and fluids   - Medical food supplement therapy  - Consider Phenergan for nausea/appetite stimulation     # Hyperglycemia   # Pre-diabetes (A1c 09/27/23, 6.2)  A1c 6.2% 9/27/23. Previously hyperglycemic in the setting of Clozaril. Blood sugars have improved since holding Clozaril.   - Discontinue Lantus 10U daily   - MDSSI   -  Metformin discontinued due to diarrhea   - Hypoglycemia protocol     # HTN  Has been intermittently hypertensive. Holding medications in setting of ETHAN and psychotropic interactions.   - HOLD Hydrochlorothiazide 25 mg daily  - HOLD PTA clonidine per Psych    Chronic/resolved/stable:    # Diarrhea, resolved, suspected antibiotic related vs. low fiber  Patient with consistent loose stools. Negative enteric panels (10/5) and C diff (10/5, 10/8). Less concern for infectious etiology. Suspect related to antibiotics and low fiber intake. Improved with Imodium.   - Continue Imodium 2 mg BID PRN   - Enciurage fiber supplement   - Encourage PO intake     # UTI, resolved   # Blood culture positive for Strep sanguinis, 1/2 bottles 10/5   # Leukocytosis, stable  # Recent HAP, resolved   # Aspiration risk   # Diarrhea   # Somnolence   Patient with persistent leukocytosis, prompting infectious workup. Given urinary frequency and suprapubic tenderness on exam, initiated treatment of UTI. Urine culture not consistent with infection. However, per ID, given symptom improvement with antibiotic- reasonable to complete treatment for acute cystitis. Completed treatment for UTI 10/13. Infectious workup otherwise reassuring. Blood culture returned positive for strep (10/5, 1/2 bottles), suspected contaminant per ID. Diarrhea suspected to be related to antibiotics with reassuring enteric panel. Somnolence likely related to psychotropic medications given improvement with holding Clozaril and reduced renal clearance. Stable leukocytosis likely also related to psychotropic medication. No indication for ECHO or head imaging per Psych.   Consults:  - ID consult; appreciate recommendations   Antimicrobials  - Ceftriaxone (10/7-10/8)  - Cefdinir (10/8- 10/10)  - Ceftriaxone (10/12)  Relevant cultures:  - 10/5 - COVID/RSV/Influ - negative  - 10/5 - Ucx - <10K Ecoli  - 10/5 - Bld Cx - positive 1/2 - Strept Sanguinis and Granulicatella Adiacens  (presumed contaminant)   - 10/5 - Cdiff - negative  - 10/5 - Enteric - negative  - 10/6 - MRSA Nares - negative  - 10/6 - Bld Cx - NGTD   - 10/7 - BldCx - NGTD  - 10/8 - Bld Cx - NGTD    # Orthostatic hypotension, resolved  Symptomatic 9/18 with blood pressure drop from 123/72 while laying to 96/55 with standing. Patient reported this was new since admission and starting new medications, and has since improved. Patient initiated on clozapine on 8/31 with many dose changes. Somnolence and poor PO intake have been challenges during this admission, both likely contributing. Workups for endocrine and cardiac causes were negative.   - Encourage oral hydration   - Compression stockings  - Likely to continue to be tricky with clozapine dose changes    # Possible hospital-acquired pneumonia, resolved/treated  # Positive blood culture x1 - consistent with contaminant  # Severe sepsis, resolved  # Acute hypoxic respiratory failure, resolved  # Leukocytosis, ongoing  Initially presented 9/19 from inpatient psych with temperature 101.3F, lactic acid 4.3 in the setting of new cough and widespread body aches. , WBC 9.8. 9/18 CXR with streaky right greater than left perihilar and basilar opacities, likely atelectasis and/or edema without focal consolidation. Respiratory viral panels (9/19, 9/22) have been negative. Echocardiogram not consistent with myocarditis. Briefly treated with vancomycin 9/19-9/23 due to a blood culture growing gram positive cocci; this was later determined to be Staph Hominis, suspected contaminant. Pulmonary exam without focal findings consistent with bacterial pneumonia, though patient had an ongoing cough and appears to have a significant burden of respiratory secretions seen on 9/24 (resolved). RT evaluated and recommended use of aerobika flutter valve. White count have been elevated although stable with still no clear focus of infection, could be clozapine side effect. Patient is medically  stable to return to psychiatry unit.   - Infectious disease consulted, now off  - S/p piperacillin/tazobactam (9/20- 9/26)  - Respiratory therapist saw Angela and she tolerated vest therapy x1  - Discontinue vest therapy with improved breathing and reassuring physical examination   - Aerobika flutter valve   - CBC q72h    # Hypokalemia, resolved  # Abdominal tenderness, resolved    Patient report intermittent abdominal tenderness, unlikely pancreatitis with location abdominal tenderness and lipase x2 < 3x normal upper limits. Low concern for bowel obstruction with abdominal xray revealing nonobstructive bowel gas pattern and no significant fecal retention.   - Loperamide capsule 2 mg PRN  - RN-driven potassium replacement protocol - High     # Hypocalcemia  # Hypoalbuminemia  Suspect possible malnutrition due to poor PO intake.   - Encourage good PO intake   - PRN Pantoprazole EC 40 mg tablets  - DEBORAH  - Zofran PRN, 30 mins before meals  - Nutrition IP consult, appreciate recommendations               - Medical food supplement therapy  - Patient to consume % of nutritionally adequate meal trays TID, or the equivalent with supplements/snacks.     # Advent  Important to her value system.   - Spiritual care consult         Diet: Combination Diet Regular Diet; Safe Tray - with utensils  Snacks/Supplements Adult: Other; Ensure Enlive mixed with ice cream; With Meals    DVT Prophylaxis: Low Risk/Ambulatory with no VTE prophylaxis indicated  Wilson Catheter: Not present  Fluids: PO  Lines: None     Cardiac Monitoring: None  Code Status: Full Code      Clinically Significant Risk Factors        # Hypokalemia: Lowest K = 3 mmol/L in last 2 days, will replace as needed     # Hypomagnesemia: Lowest Mg = 1.3 mg/dL in last 2 days, will replace as needed   # Hypoalbuminemia: Lowest albumin = 2 g/dL at 9/24/2023  8:46 AM, will monitor as appropriate               # Severe Malnutrition: based on nutrition assessment              Disposition Plan         The patient's care was discussed with the Attending Physician, Dr. Albarado.    Malka Nguyen MD  [facility]  [facility]  Securely message with [employer] (more info)  Text page via [employer] Paging/Directory   See signed in provider for up to date coverage information  ______________________________________________________________________    Interval History     Overnight:  NAOE.      Subjective:   Awake for lab draw on evaluation. Reports not having pain. States she is not hungry and does not want breakfast. No acute concerns per RN. Has been somnolent aside from using the bathroom. Is open to drinking water if encouraged.     Physical Exam   Vital Signs: Temp: 98.2  F (36.8  C) Temp src: Oral BP: 123/71 Pulse: 69   Resp: 16 SpO2: 96 % O2 Device: None (Room air)    Weight: 206 lbs 9.6 oz    Constitutional: Somnolent, no apparent distress  Respiratory: Breathing comfortably at rest. Lungs CTAB  Cardiovascular: RRR  Abdomen: Soft, NTND.   MSK: No edema noted.   Neurologic: Somnolent.

## 2023-10-15 NOTE — TELEPHONE ENCOUNTER
R: [place]  Access Inpatient Bed Call Log  10/15/2023 3:27 PM  Intake has called facilities that have not updated their bed status within the last 12 hours.??      ADULTS:     *METRO  [place] [facility]: @ cap per website.     Pt remains on waitlist pending appropriate placement availability.

## 2023-10-16 ENCOUNTER — TELEPHONE (OUTPATIENT)
Dept: BEHAVIORAL HEALTH | Facility: CLINIC | Age: 58
End: 2023-10-16
Payer: COMMERCIAL

## 2023-10-16 LAB
ANION GAP SERPL CALCULATED.3IONS-SCNC: 13 MMOL/L (ref 7–15)
BACTERIA BLD CULT: NO GROWTH
BACTERIA BLD CULT: NO GROWTH
BASO+EOS+MONOS # BLD AUTO: ABNORMAL 10*3/UL
BASO+EOS+MONOS NFR BLD AUTO: ABNORMAL %
BASOPHILS # BLD AUTO: 0 10E3/UL (ref 0–0.2)
BASOPHILS NFR BLD AUTO: 0 %
BUN SERPL-MCNC: 7.6 MG/DL (ref 6–20)
CALCIUM SERPL-MCNC: 7.8 MG/DL (ref 8.6–10)
CHLORIDE SERPL-SCNC: 105 MMOL/L (ref 98–107)
CREAT SERPL-MCNC: 1.5 MG/DL (ref 0.51–0.95)
CRP SERPL-MCNC: 59.87 MG/L
DEPRECATED HCO3 PLAS-SCNC: 22 MMOL/L (ref 22–29)
EGFRCR SERPLBLD CKD-EPI 2021: 40 ML/MIN/1.73M2
EOSINOPHIL # BLD AUTO: 0 10E3/UL (ref 0–0.7)
EOSINOPHIL NFR BLD AUTO: 0 %
ERYTHROCYTE [DISTWIDTH] IN BLOOD BY AUTOMATED COUNT: 13.9 % (ref 10–15)
GLUCOSE BLDC GLUCOMTR-MCNC: 122 MG/DL (ref 70–99)
GLUCOSE BLDC GLUCOMTR-MCNC: 133 MG/DL (ref 70–99)
GLUCOSE BLDC GLUCOMTR-MCNC: 144 MG/DL (ref 70–99)
GLUCOSE SERPL-MCNC: 158 MG/DL (ref 70–99)
HCT VFR BLD AUTO: 29.3 % (ref 35–47)
HGB BLD-MCNC: 9.2 G/DL (ref 11.7–15.7)
IMM GRANULOCYTES # BLD: 0.1 10E3/UL
IMM GRANULOCYTES NFR BLD: 1 %
LYMPHOCYTES # BLD AUTO: 1.6 10E3/UL (ref 0.8–5.3)
LYMPHOCYTES NFR BLD AUTO: 10 %
MAGNESIUM SERPL-MCNC: 1.4 MG/DL (ref 1.7–2.3)
MAGNESIUM SERPL-MCNC: 2.9 MG/DL (ref 1.7–2.3)
MCH RBC QN AUTO: 29.7 PG (ref 26.5–33)
MCHC RBC AUTO-ENTMCNC: 31.4 G/DL (ref 31.5–36.5)
MCV RBC AUTO: 95 FL (ref 78–100)
MONOCYTES # BLD AUTO: 1 10E3/UL (ref 0–1.3)
MONOCYTES NFR BLD AUTO: 7 %
NEUTROPHILS # BLD AUTO: 12.9 10E3/UL (ref 1.6–8.3)
NEUTROPHILS NFR BLD AUTO: 82 %
NRBC # BLD AUTO: 0 10E3/UL
NRBC BLD AUTO-RTO: 0 /100
PHOSPHATE SERPL-MCNC: 3.8 MG/DL (ref 2.5–4.5)
PLATELET # BLD AUTO: 253 10E3/UL (ref 150–450)
POTASSIUM SERPL-SCNC: 2.9 MMOL/L (ref 3.4–5.3)
POTASSIUM SERPL-SCNC: 3.1 MMOL/L (ref 3.4–5.3)
RBC # BLD AUTO: 3.1 10E6/UL (ref 3.8–5.2)
SODIUM SERPL-SCNC: 140 MMOL/L (ref 135–145)
WBC # BLD AUTO: 15.7 10E3/UL (ref 4–11)

## 2023-10-16 PROCEDURE — 999N000040 HC STATISTIC CONSULT NO CHARGE VASC ACCESS

## 2023-10-16 PROCEDURE — 86140 C-REACTIVE PROTEIN: CPT | Performed by: FAMILY MEDICINE

## 2023-10-16 PROCEDURE — 250N000013 HC RX MED GY IP 250 OP 250 PS 637: Performed by: INTERNAL MEDICINE

## 2023-10-16 PROCEDURE — 80048 BASIC METABOLIC PNL TOTAL CA: CPT | Performed by: FAMILY MEDICINE

## 2023-10-16 PROCEDURE — 84132 ASSAY OF SERUM POTASSIUM: CPT | Performed by: INTERNAL MEDICINE

## 2023-10-16 PROCEDURE — 999N000127 HC STATISTIC PERIPHERAL IV START W US GUIDANCE

## 2023-10-16 PROCEDURE — 36415 COLL VENOUS BLD VENIPUNCTURE: CPT | Performed by: FAMILY MEDICINE

## 2023-10-16 PROCEDURE — 250N000013 HC RX MED GY IP 250 OP 250 PS 637

## 2023-10-16 PROCEDURE — 84100 ASSAY OF PHOSPHORUS: CPT | Performed by: FAMILY MEDICINE

## 2023-10-16 PROCEDURE — 250N000011 HC RX IP 250 OP 636: Mod: JZ | Performed by: FAMILY MEDICINE

## 2023-10-16 PROCEDURE — 99232 SBSQ HOSP IP/OBS MODERATE 35: CPT | Mod: GC

## 2023-10-16 PROCEDURE — 99233 SBSQ HOSP IP/OBS HIGH 50: CPT

## 2023-10-16 PROCEDURE — 83735 ASSAY OF MAGNESIUM: CPT | Performed by: FAMILY MEDICINE

## 2023-10-16 PROCEDURE — 120N000002 HC R&B MED SURG/OB UMMC

## 2023-10-16 PROCEDURE — 85025 COMPLETE CBC W/AUTO DIFF WBC: CPT | Performed by: FAMILY MEDICINE

## 2023-10-16 PROCEDURE — 250N000013 HC RX MED GY IP 250 OP 250 PS 637: Performed by: FAMILY MEDICINE

## 2023-10-16 RX ORDER — POTASSIUM CHLORIDE 1.5 G/1.58G
20 POWDER, FOR SOLUTION ORAL ONCE
Status: COMPLETED | OUTPATIENT
Start: 2023-10-16 | End: 2023-10-16

## 2023-10-16 RX ORDER — RISPERIDONE 0.25 MG/1
0.5 TABLET ORAL AT BEDTIME
Status: DISCONTINUED | OUTPATIENT
Start: 2023-10-16 | End: 2023-10-19 | Stop reason: HOSPADM

## 2023-10-16 RX ORDER — POTASSIUM CHLORIDE 1.5 G/1.58G
40 POWDER, FOR SOLUTION ORAL ONCE
Status: COMPLETED | OUTPATIENT
Start: 2023-10-16 | End: 2023-10-16

## 2023-10-16 RX ORDER — LOPERAMIDE HCL 2 MG
2 CAPSULE ORAL ONCE
Status: COMPLETED | OUTPATIENT
Start: 2023-10-16 | End: 2023-10-16

## 2023-10-16 RX ORDER — LIDOCAINE 40 MG/G
CREAM TOPICAL
Status: DISCONTINUED | OUTPATIENT
Start: 2023-10-16 | End: 2023-10-19 | Stop reason: HOSPADM

## 2023-10-16 RX ORDER — POTASSIUM CHLORIDE 750 MG/1
40 TABLET, EXTENDED RELEASE ORAL ONCE
Status: COMPLETED | OUTPATIENT
Start: 2023-10-16 | End: 2023-10-16

## 2023-10-16 RX ORDER — MAGNESIUM SULFATE HEPTAHYDRATE 40 MG/ML
4 INJECTION, SOLUTION INTRAVENOUS ONCE
Qty: 100 ML | Refills: 0 | Status: COMPLETED | OUTPATIENT
Start: 2023-10-16 | End: 2023-10-16

## 2023-10-16 RX ADMIN — POTASSIUM CHLORIDE 40 MEQ: 750 TABLET, EXTENDED RELEASE ORAL at 22:53

## 2023-10-16 RX ADMIN — LOPERAMIDE HYDROCHLORIDE 2 MG: 2 CAPSULE ORAL at 12:38

## 2023-10-16 RX ADMIN — MAGNESIUM SULFATE HEPTAHYDRATE 4 G: 40 INJECTION, SOLUTION INTRAVENOUS at 14:25

## 2023-10-16 RX ADMIN — RISPERIDONE 0.5 MG: 0.25 TABLET, FILM COATED ORAL at 22:53

## 2023-10-16 RX ADMIN — POTASSIUM CHLORIDE 40 MEQ: 1.5 POWDER, FOR SOLUTION ORAL at 10:28

## 2023-10-16 RX ADMIN — MAGNESIUM 64 MG (MAGNESIUM CHLORIDE) TABLET,DELAYED RELEASE 1070 MG: at 12:38

## 2023-10-16 ASSESSMENT — ACTIVITIES OF DAILY LIVING (ADL)
ADLS_ACUITY_SCORE: 23

## 2023-10-16 NOTE — PLAN OF CARE
Goal Outcome Evaluation: Angela not participating in conversation currently to partner with us on her care plan goal. Reattempt as able.

## 2023-10-16 NOTE — PLAN OF CARE
VS: Temp: 98.8  F (37.1  C) Temp src: Oral BP: 119/48 Pulse: 78   Resp: 16 SpO2: 99 % O2 Device: None (Room air)       O2: SpO2>90% on room air, denies shortness of breath and chest pain   Output: Pt voiding without difficulty in bathroom   Last BM: 10/16/2023   Activity: Up with standby assist, 1:1 in room for safety   Skin: R ankle scab/abrasion, otherwise skin intact   Pain: denies   CMS: Disoriented to time and situation. Pt. Is very sleepy but will answer questions and cooperate with assessments if sitting on edge of bed. Denies numbness and tingling   Dressing: none   Diet: Regular, poor appetite. Pt refuses food but will eat white rice and other things family brings for her   LDA: R arm PIV SL   Equipment: IV pole, sitter, personal belongings   Plan: Psych re-consulted, plan TBD   Additional Info:

## 2023-10-16 NOTE — PROGRESS NOTES
Gave Angela oral immodium, mag, and powder mixed potassium to drink for meds ordered. Immediately threw them up in the garbage with mutliple times of repeated wretching. Will page Carlyn to get an IV for RN managed lab replacement electrolytes.    Sent page to [facility]:  [phone] Angela [address]. Threw up multiple times after giving mag, K and immodium. Can I get an order to start an IV for electrolyte replacement? Yaya [id number]

## 2023-10-16 NOTE — PLAN OF CARE
VS: /52 (BP Location: Left arm)   Pulse 90   Temp 98.4  F (36.9  C) (Oral)   Resp 16   Wt 93.7 kg (206 lb 9.6 oz)   SpO2 98%   BMI 30.51 kg/m      O2: Sating >90% on RA. Pt declined to answer any questions or allow an assessment.    Output: Voids spontaneously and adequately in bathroom.    Last BM: Pt refused to answer. Bowel sounds active x4. Passing flatus.    Activity: Independent. 1:1 sitter for safety.    Skin: Visible skin intact.    Pain: Denied pain.    CMS: Pt declined to answer.    Dressing: No dressings.    Diet: Regular. Pt refused to eat. , 126, 135. Pt complained of nausea, Zofran and tums given.     LDA: No PIV access.    Equipment: Personal belongings.    Plan: Continue to monitor. Call light within reach and utilized appropriately.    Additional Info: Potassium 3.9, no replacement needed.   1:1 sitter for safety.

## 2023-10-16 NOTE — PLAN OF CARE
Goal Outcome Evaluation:      Plan of Care Reviewed With: patient    Overall Patient Progress: improvingOverall Patient Progress: improving    Outcome Evaluation: Pt was observed in good mood ( smiling and lauighing ) when family came and visited pt.Up independently to BR. Given PRN Protonix 1x, effective. Denies pain. Sitter at bedside.

## 2023-10-16 NOTE — CONSULTS
"      Psychiatry Consultation; Follow up              Reason for Consult, requesting source:    AMS, recent psychosis with agitation   Requesting source: Saurabh Albarado     Labs and imaging reviewed, discussed with nursing assistant and Dr. Faith      Total time spent in chart review, patient interview and coordination of care; 60 minutes - all time was spent on the date of the encounter that I saw patient                Interim history:    This 58 year old woman was transferred from inpatient psychiatry due to chloé pneumonia. She had been admitted with marked psychosis with agitation, threatening people. She is now under civil commitment with [employer].   Since admission to medicine she was noted to be very somnolent, so the clozapine dose was reduced since elevated CRP from infection can decrease the metabolism of clozapine.   For one week we held it without her becoming less groggy, then resumed at 50 mg for one dose, then to 25 mg HS several days ago.  Per the sitter she has been sleeping much of the day, but will get up on her own to go to the bathroom. No incontinence. Denies being depressed, not having any hallucinations or paranoia. No SI or HI, thinks that her thinking is quite clear.          Current Medications:      cloZAPine  25 mg Oral At Bedtime    [Held by provider] hydrochlorothiazide  25 mg Oral Daily    insulin aspart  1-7 Units Subcutaneous TID AC    insulin aspart  1-5 Units Subcutaneous At Bedtime    magnesium chloride  1,070 mg Oral BID              MSE:   Appearance:  tired  Attitude:  cooperative  Eye Contact:  good  Mood:  \"fair\"  Affect:  slightly restricted  Speech:  mumbling  Psychomotor Behavior:  no evidence of tardive dyskinesia, dystonia, or tics  Muscle strength and tone: baseline   Thought Process:  linear  Associations:  no loose associations  Thought Content:  no evidence of suicidal ideation or homicidal ideation and no evidence of psychotic thought  Insight:  " "partial  Judgement:  fair  Oriented to:  time, person, and place  Attention Span and Concentration:  fair  Recent and Remote Memory:  fair    Vital signs:  Temp: 98.4  F (36.9  C) Temp src: Oral BP: 116/57 Pulse: 91   Resp: 16 SpO2: 98 % O2 Device: None (Room air) Oxygen Delivery: 2 LPM   Weight: 93.7 kg (206 lb 9.6 oz)  Estimated body mass index is 30.51 kg/m  as calculated from the following:    Height as of 8/17/23: 1.753 m (5' 9\").    Weight as of this encounter: 93.7 kg (206 lb 9.6 oz).           DSM-5 Diagnosis:   Schizoaffective, bipolar type, with psychosis   Delirium          Assessment/Plan:   As Angela has had difficulty with medication side effects and continues to be lethargic without levis or psychosis, I do think it is reasonable to discontinue Clozaril all together and trial Risperdal PO at bedtime. It is interesting that the medical team has noticed her increase in white count corresponds to unholding clozaril. Important to note, Risperdal clearance is decreased by ~60% in patients with CrCl <60 so I would start at 0.5mg at bedtime for now. Risperdal is one of her Mata medications.       Suzie Kohler, PMHNP-BC  Consult/Liaison Psychiatry   [facility]           "

## 2023-10-16 NOTE — TELEPHONE ENCOUNTER
[facility] Access Inpatient Bed Call Log 10/16/2023 8:03:52 AM     Intake has called facilities that have not updated their bed status within the last 12 hours. ([facility] Only)    [facility] is posting 0 beds.                 Patient remains on work list pending appropriate bed availability.

## 2023-10-16 NOTE — PROGRESS NOTES
[facility]    Progress Note - [facility]       Date of Admission:  9/20/2023    Main Plans for Today   Medically ready for IP Psych - pending placement   Discontinue Clozaril   Start Risperidone 0.5 mg at bedtime   Mg/K replacement via IV   Schedule Imodium once   Group cares as much as possible, and around family visits if possible  Encourage PO fluid and nutrition as able     Assessment & Plan   Angela Basurto is a 58-year-old female PMH bipolar 1 disorder admitted to [facility] 8/16 for elvis and psychosis in setting of medication non-adherence. Transferred to [facility] 9/18/23 for sepsis 2/2 HAP, now resolved. Subsequently with ETHAN suspected 2/2 ATN, stable. Medically cleared for transfer to IP [facility] with bed availability. Patient is on Mata with full commitment, current 1:1.     # Bipolar I disorder  # MATA for Haldol, Clozapine, Risperidone, Olanzapine  # Full commitment   # Somnolence   # Hx of possible seizure (1983)  Presented with elvis and psychosis in the context of medication nonadherence. Was admitted with Psychiatry from 8/16 until transfer to this service 9/20. Has required a 1:1 to maintain her safety. Psychiatry has been following and managing medications. Clozapine initiated during admission without significant improvement and side effects including somnolence. Per Psych, patient will have to tolerate PO agent prior to being candidate for VELIZ in order to know proper dosing. Hoffmann-Plascencia for ECT placed 10/12 by Psych. Family aware of pursuit of ECT. Plan to transfer to IP Psych with bed availability.   - Consults: Psych following; appreciate recommendations   - Nursing care: 1:1 sitter, encouraging sitting in chair or up ~6 hours a day, walk with nurse 1:1 4x daily  - Legal status: MATA and full commitment   - Medications: Risperidone 0.5 mg bedtime  - Agitation plan:    - Non-emergency: PRN Haldol 2 mg PO  - Emergency:  Haldol 5mg + diphenhydramine 50mg q8h PRN, PO or IM  - STOP Hydroxyzine per psych (see 9/29 note)    # Elevated CRP, improved  # Leukocytosis, stable   Patient has had persistent elevated CRP and leukocytosis. Timing of increases has coincided with Clorazil, and per literature Clozaril can cause leukocytosis as well as leukopenia. Currently with clinical evidence of infection. Patient has had extensive infectious workup with recent treatment for UTI. Low concern for infection at this time. Malignancy remains on differential, though patient is not at the age where that would be expected.   - Encourage cancer screening as outpatient   - Consider CT C/A/P if not improving     # ETHAN vs. CKD, stable, suspect ATN 2/2 pre-renal insult and antibiotics   Baseline creatinine 0.5. Initially had ETHAN that peaked at 1.99 9/22. Developed worsening creatinine again with peak 1.91 10/8. Improved with IVF. Suspect ATN triggered by pre-renal etiology. Has been stable on PO fluids. Suspect that it may take weeks for creatinine to return to baseline. Patient may also have irreversible damage and may have a new creatinine baseline. Do not feel IVF indicated at this time with normal BUN.   - Patient care order to encourage fluids as able; goal 1 cup of water per hour    - Strict intake/output   - BMP daily   - Avoid nephrotoxic medications   - HOLD Hydrochlorothiazide     # Diarrhea, resolved, suspected antibiotic related vs. low fiber  Patient with consistent loose stools. Negative enteric panels (10/5) and C diff (10/5, 10/8). Less concern for infectious etiology. Suspect related low fiber intake and intermittent antibiotics. Improved with Imodium.   - Imodium 2 mg once 10/16   - Continue Imodium 2 mg BID PRN   - Encourage fiber supplement and PO intake     # Moderate protein-calorie malnutrition   Patient with poor PO intake. Per family, this is chronic and occurs even with traditional foods. Patient reports frequent nausea with food  intake. Could consider Phenergan for nausea control as well as appetite stimulation. Will avoid introducing new medications at this time with current ETHAN, but will consider for future.   - Nutrition IP consult, appreciate recommendations   - Needs encouragement to eat, safe to swallow. Order food and encourage food and fluids   - Medical food supplement therapy  - Consider Phenergan for nausea/appetite stimulation     # Hyperglycemia   # Pre-diabetes (A1c 09/27/23, 6.2)  A1c 6.2% 9/27/23. Previously hyperglycemic in the setting of Clozaril. Blood sugars have improved since holding Clozaril.   - MDSSI   - Metformin discontinued due to diarrhea   - Hypoglycemia protocol     # HTN  Has been intermittently hypertensive. Holding medications in setting of ETHAN and psychotropic interactions.   - HOLD Hydrochlorothiazide 25 mg daily  - HOLD PTA clonidine per Psych    Chronic/resolved/stable:    # UTI, resolved   # Blood culture positive for Strep sanguinis, 1/2 bottles 10/5   # Leukocytosis, stable  # Recent HAP, resolved   # Aspiration risk   # Diarrhea   # Somnolence   Patient with persistent leukocytosis, prompting infectious workup. Given urinary frequency and suprapubic tenderness on exam, initiated treatment of UTI. Urine culture not consistent with infection. However, per ID, given symptom improvement with antibiotic- reasonable to complete treatment for acute cystitis. Completed treatment for UTI 10/13. Infectious workup otherwise reassuring. Blood culture returned positive for strep (10/5, 1/2 bottles), suspected contaminant per ID. Diarrhea suspected to be related to antibiotics with reassuring enteric panel. Somnolence likely related to psychotropic medications given improvement with holding Clozaril and reduced renal clearance. Stable leukocytosis likely also related to psychotropic medication. No indication for ECHO or head imaging per Psych.   Consults:  - ID consult; appreciate recommendations    Antimicrobials  - Ceftriaxone (10/7-10/8)  - Cefdinir (10/8- 10/10)  - Ceftriaxone (10/12)  Relevant cultures:  - 10/5 - COVID/RSV/Influ - negative  - 10/5 - Ucx - <10K Ecoli  - 10/5 - Bld Cx - positive 1/2 - Strept Sanguinis and Granulicatella Adiacens (presumed contaminant)   - 10/5 - Cdiff - negative  - 10/5 - Enteric - negative  - 10/6 - MRSA Nares - negative  - 10/6 - Bld Cx - NGTD   - 10/7 - BldCx - NGTD  - 10/8 - Bld Cx - NGTD    # Orthostatic hypotension, resolved  Symptomatic 9/18 with blood pressure drop from 123/72 while laying to 96/55 with standing. Patient reported this was new since admission and starting new medications, and has since improved. Patient initiated on clozapine on 8/31 with many dose changes. Somnolence and poor PO intake have been challenges during this admission, both likely contributing. Workups for endocrine and cardiac causes were negative.   - Encourage oral hydration   - Compression stockings  - Likely to continue to be tricky with clozapine dose changes    # Possible hospital-acquired pneumonia, resolved/treated  # Positive blood culture x1 - consistent with contaminant  # Severe sepsis, resolved  # Acute hypoxic respiratory failure, resolved  # Leukocytosis, ongoing  Initially presented 9/19 from inpatient psych with temperature 101.3F, lactic acid 4.3 in the setting of new cough and widespread body aches. , WBC 9.8. 9/18 CXR with streaky right greater than left perihilar and basilar opacities, likely atelectasis and/or edema without focal consolidation. Respiratory viral panels (9/19, 9/22) have been negative. Echocardiogram not consistent with myocarditis. Briefly treated with vancomycin 9/19-9/23 due to a blood culture growing gram positive cocci; this was later determined to be Staph Hominis, suspected contaminant. Pulmonary exam without focal findings consistent with bacterial pneumonia, though patient had an ongoing cough and appears to have a significant burden  of respiratory secretions seen on 9/24 (resolved). RT evaluated and recommended use of aerobika flutter valve. White count have been elevated although stable with still no clear focus of infection, could be clozapine side effect. Patient is medically stable to return to psychiatry unit.   - Infectious disease consulted, now off  - S/p piperacillin/tazobactam (9/20- 9/26)  - Respiratory therapist saw Angela and she tolerated vest therapy x1  - Discontinue vest therapy with improved breathing and reassuring physical examination   - Aerobika flutter valve   - CBC q72h    # Hypokalemia, resolved  # Abdominal tenderness, resolved    Patient report intermittent abdominal tenderness, unlikely pancreatitis with location abdominal tenderness and lipase x2 < 3x normal upper limits. Low concern for bowel obstruction with abdominal xray revealing nonobstructive bowel gas pattern and no significant fecal retention.   - Loperamide capsule 2 mg PRN  - RN-driven potassium replacement protocol - High     # Hypocalcemia  # Hypoalbuminemia  Suspect possible malnutrition due to poor PO intake.   - Encourage good PO intake   - PRN Pantoprazole EC 40 mg tablets  - DEBORAH  - Zofran PRN, 30 mins before meals  - Nutrition IP consult, appreciate recommendations               - Medical food supplement therapy  - Patient to consume % of nutritionally adequate meal trays TID, or the equivalent with supplements/snacks.     # Yazidi  Important to her value system.   - Spiritual care consult         Diet: Combination Diet Regular Diet; Safe Tray - with utensils  Snacks/Supplements Adult: Other; Ensure Enlive mixed with ice cream; With Meals    DVT Prophylaxis: Low Risk/Ambulatory with no VTE prophylaxis indicated  Wilson Catheter: Not present  Fluids: PO  Lines: None     Cardiac Monitoring: None  Code Status: Full Code      Clinically Significant Risk Factors        # Hypokalemia: Lowest K = 2.9 mmol/L in last 2 days, will replace as needed      # Hypomagnesemia: Lowest Mg = 1.3 mg/dL in last 2 days, will replace as needed   # Hypoalbuminemia: Lowest albumin = 2 g/dL at 9/24/2023  8:46 AM, will monitor as appropriate               # Severe Malnutrition: based on nutrition assessment             Disposition Plan         The patient's care was discussed with the Attending Physician, Dr. Albarado.    Malka Nguyen MD  [facility]  [facility]  Securely message with Vocera (more info)  Text page via [employer] Paging/Directory   See signed in provider for up to date coverage information  ______________________________________________________________________    Interval History     Overnight:  NAOE.      Subjective:   Patient sleeping on exam. Wakes up briefly. Denies pain. Does not engage in conversation- just nods head and returns to sleep. Per attendant, has been good about drinking when she is awake. Family brought in snacks and sweets yesterday. On repeat exam, patient is awake. Denies pain. Reports that she is having diarrhea again. Open to taking medication to help.     Physical Exam   Vital Signs: Temp: 99.1  F (37.3  C) Temp src: Oral BP: 126/63 Pulse: 95   Resp: 15 SpO2: 98 % O2 Device: None (Room air)    Weight: 206 lbs 9.6 oz    Constitutional: Somnolent, no apparent distress  Respiratory: Breathing comfortably at rest. Lungs CTAB  Cardiovascular: RRR  Abdomen: Soft, NTND.   MSK: No edema noted.   Neurologic: Somnolent.

## 2023-10-16 NOTE — TELEPHONE ENCOUNTER
[facility] ONLY:  R: [place] MH Access Inpatient Bed Call Log 10/16/23 @ 4:00am    [facility]: @ capacity for MH beds.     Pt remains on waitlist pending appropriate placement availability

## 2023-10-17 ENCOUNTER — TELEPHONE (OUTPATIENT)
Dept: BEHAVIORAL HEALTH | Facility: CLINIC | Age: 58
End: 2023-10-17
Payer: COMMERCIAL

## 2023-10-17 LAB
ANION GAP SERPL CALCULATED.3IONS-SCNC: 12 MMOL/L (ref 7–15)
BACTERIA BLD CULT: NO GROWTH
BACTERIA BLD CULT: NO GROWTH
BASO+EOS+MONOS # BLD AUTO: ABNORMAL 10*3/UL
BASO+EOS+MONOS NFR BLD AUTO: ABNORMAL %
BASOPHILS # BLD AUTO: 0 10E3/UL (ref 0–0.2)
BASOPHILS NFR BLD AUTO: 0 %
BUN SERPL-MCNC: 5.3 MG/DL (ref 6–20)
CALCIUM SERPL-MCNC: 7.6 MG/DL (ref 8.6–10)
CHLORIDE SERPL-SCNC: 102 MMOL/L (ref 98–107)
CREAT SERPL-MCNC: 1.43 MG/DL (ref 0.51–0.95)
CRP SERPL-MCNC: 40.86 MG/L
DEPRECATED HCO3 PLAS-SCNC: 21 MMOL/L (ref 22–29)
EGFRCR SERPLBLD CKD-EPI 2021: 43 ML/MIN/1.73M2
EOSINOPHIL # BLD AUTO: 0.1 10E3/UL (ref 0–0.7)
EOSINOPHIL NFR BLD AUTO: 1 %
ERYTHROCYTE [DISTWIDTH] IN BLOOD BY AUTOMATED COUNT: 13.9 % (ref 10–15)
GLUCOSE BLDC GLUCOMTR-MCNC: 126 MG/DL (ref 70–99)
GLUCOSE BLDC GLUCOMTR-MCNC: 131 MG/DL (ref 70–99)
GLUCOSE BLDC GLUCOMTR-MCNC: 138 MG/DL (ref 70–99)
GLUCOSE BLDC GLUCOMTR-MCNC: 140 MG/DL (ref 70–99)
GLUCOSE SERPL-MCNC: 151 MG/DL (ref 70–99)
HCT VFR BLD AUTO: 26.8 % (ref 35–47)
HGB BLD-MCNC: 8.6 G/DL (ref 11.7–15.7)
IMM GRANULOCYTES # BLD: 0.1 10E3/UL
IMM GRANULOCYTES NFR BLD: 1 %
LYMPHOCYTES # BLD AUTO: 1.9 10E3/UL (ref 0.8–5.3)
LYMPHOCYTES NFR BLD AUTO: 14 %
MAGNESIUM SERPL-MCNC: 2.2 MG/DL (ref 1.7–2.3)
MCH RBC QN AUTO: 30.2 PG (ref 26.5–33)
MCHC RBC AUTO-ENTMCNC: 32.1 G/DL (ref 31.5–36.5)
MCV RBC AUTO: 94 FL (ref 78–100)
MONOCYTES # BLD AUTO: 1 10E3/UL (ref 0–1.3)
MONOCYTES NFR BLD AUTO: 7 %
NEUTROPHILS # BLD AUTO: 10.6 10E3/UL (ref 1.6–8.3)
NEUTROPHILS NFR BLD AUTO: 77 %
NRBC # BLD AUTO: 0 10E3/UL
NRBC BLD AUTO-RTO: 0 /100
PHOSPHATE SERPL-MCNC: 3.3 MG/DL (ref 2.5–4.5)
PLATELET # BLD AUTO: 231 10E3/UL (ref 150–450)
POTASSIUM SERPL-SCNC: 3 MMOL/L (ref 3.4–5.3)
POTASSIUM SERPL-SCNC: 3 MMOL/L (ref 3.4–5.3)
POTASSIUM SERPL-SCNC: 3.5 MMOL/L (ref 3.4–5.3)
RBC # BLD AUTO: 2.85 10E6/UL (ref 3.8–5.2)
SODIUM SERPL-SCNC: 135 MMOL/L (ref 135–145)
VIT B12 SERPL-MCNC: 408 PG/ML (ref 232–1245)
WBC # BLD AUTO: 13.7 10E3/UL (ref 4–11)

## 2023-10-17 PROCEDURE — 84132 ASSAY OF SERUM POTASSIUM: CPT | Performed by: FAMILY MEDICINE

## 2023-10-17 PROCEDURE — 36415 COLL VENOUS BLD VENIPUNCTURE: CPT | Performed by: FAMILY MEDICINE

## 2023-10-17 PROCEDURE — 120N000002 HC R&B MED SURG/OB UMMC

## 2023-10-17 PROCEDURE — 86140 C-REACTIVE PROTEIN: CPT | Performed by: FAMILY MEDICINE

## 2023-10-17 PROCEDURE — 250N000011 HC RX IP 250 OP 636: Performed by: STUDENT IN AN ORGANIZED HEALTH CARE EDUCATION/TRAINING PROGRAM

## 2023-10-17 PROCEDURE — 99232 SBSQ HOSP IP/OBS MODERATE 35: CPT | Mod: GC

## 2023-10-17 PROCEDURE — 83735 ASSAY OF MAGNESIUM: CPT | Performed by: FAMILY MEDICINE

## 2023-10-17 PROCEDURE — 250N000013 HC RX MED GY IP 250 OP 250 PS 637

## 2023-10-17 PROCEDURE — 85025 COMPLETE CBC W/AUTO DIFF WBC: CPT | Performed by: FAMILY MEDICINE

## 2023-10-17 PROCEDURE — 80048 BASIC METABOLIC PNL TOTAL CA: CPT | Performed by: FAMILY MEDICINE

## 2023-10-17 PROCEDURE — 84100 ASSAY OF PHOSPHORUS: CPT | Performed by: FAMILY MEDICINE

## 2023-10-17 PROCEDURE — 250N000013 HC RX MED GY IP 250 OP 250 PS 637: Performed by: FAMILY MEDICINE

## 2023-10-17 PROCEDURE — 82607 VITAMIN B-12: CPT

## 2023-10-17 RX ORDER — POTASSIUM CHLORIDE 750 MG/1
20 TABLET, EXTENDED RELEASE ORAL ONCE
Status: COMPLETED | OUTPATIENT
Start: 2023-10-17 | End: 2023-10-17

## 2023-10-17 RX ORDER — LOPERAMIDE HCL 2 MG
2 CAPSULE ORAL DAILY
Status: DISCONTINUED | OUTPATIENT
Start: 2023-10-17 | End: 2023-10-19 | Stop reason: HOSPADM

## 2023-10-17 RX ORDER — POTASSIUM CHLORIDE 750 MG/1
40 TABLET, EXTENDED RELEASE ORAL ONCE
Status: COMPLETED | OUTPATIENT
Start: 2023-10-17 | End: 2023-10-17

## 2023-10-17 RX ADMIN — POTASSIUM CHLORIDE 40 MEQ: 750 TABLET, EXTENDED RELEASE ORAL at 05:11

## 2023-10-17 RX ADMIN — POTASSIUM CHLORIDE 20 MEQ: 750 TABLET, EXTENDED RELEASE ORAL at 17:49

## 2023-10-17 RX ADMIN — ONDANSETRON 4 MG: 4 TABLET ORAL at 12:28

## 2023-10-17 RX ADMIN — LOPERAMIDE HYDROCHLORIDE 2 MG: 2 CAPSULE ORAL at 12:26

## 2023-10-17 RX ADMIN — POTASSIUM CHLORIDE 20 MEQ: 750 TABLET, EXTENDED RELEASE ORAL at 07:43

## 2023-10-17 RX ADMIN — RISPERIDONE 0.5 MG: 0.25 TABLET, FILM COATED ORAL at 22:17

## 2023-10-17 ASSESSMENT — ACTIVITIES OF DAILY LIVING (ADL)
ADLS_ACUITY_SCORE: 23

## 2023-10-17 NOTE — TELEPHONE ENCOUNTER
R: [place]  Access Inpatient Bed Call Log 10/17/23 7:30 AM   Intake has called facilities that have not updated the bed status within the last 12 hours.    ([facility] Only)                           [facility] is at capacity.                  Pt remains on the work list pending appropriate bed availability.

## 2023-10-17 NOTE — TELEPHONE ENCOUNTER
R: [place]  Access Inpatient Bed Call Log 10/16/23 6PM   Intake has called facilities that have not updated the bed status within the last 12 hours.     [facility] Only:                              [facility] is posting 0 beds.                      Pt remains on the work list pending appropriate bed availability.

## 2023-10-17 NOTE — PLAN OF CARE
VS: VSS, pt denied CP or SOB.   O2: Room air sat. > 90%   Output: Voiding adequate amount in bathroom.    Last BM: 10/17/23   Activity: Up to bathroom with SBA.    Skin: Visible skin intact pt declined full skin assessment.    Pain: Complaining of some abdominal pain in the morning but resolved this afternoon.    Neuro: CMS and neuro intact to baseline.    Dressing: None.    Diet: Regular tolerating okay eating small amount.    LDA: IV SL.    Equipment: IV pole and personal belongings.    Plan: TBD.   Additional Info: Bedside attendant in the room for safety.

## 2023-10-17 NOTE — PROGRESS NOTES
CLINICAL NUTRITION SERVICES - REASSESSMENT NOTE     Nutrition Prescription    RECOMMENDATIONS FOR MDs/PROVIDERS TO ORDER:  Recommend multivitamin given pt not eating well. If TF within GOC/POC, recommend as below.     Malnutrition Status:    Severe malnutrition in the context of acute illness    Recommendations already ordered by Registered Dietitian (RD):  Increasing Ensure shake to TID    Future/Additional Recommendations:  Monitor labs, intakes, and weight trends.    --If EN becomes POC:  --Enteral access: NGT  --Pending AXR confirmation of feeding tube position  --GOAL: Osmolite 1.5 Reji (or equivalent) @ goal of  50ml/hr (1200ml/day) provides: 1800 kcals, 75 g PRO, 914 ml free H20, 244 g CHO, and 0 g fiber daily   --Start TF @ 10 ml/hr and advance by 10 ml q 8 hrs until goal rate.  --Do not start or advance TF rate unless K+ >3.0, Mg++ > 1.5,  and Phos > 1.9.  --Minimum 30 ml q 4 hrs water flushes for tube patency.  --If gastric enteral access: HOB > 30 degrees or Reverse Trendelenburg >10-25 degrees  --MVI/minerals supplement if not already ordered (Thera-[id number])     EVALUATION OF THE PROGRESS TOWARD GOALS   Diet: Regular  Intake/Tolernace: 0 - 50% of documented meals. Poorly documented  Snacks/supplements: Ensure Enlive mixed with ice cream BID    Pt ordering (on average) 1066 kcal and 41 g protein per day per [employer] over last three days. Pt did not order meals from 10/. Supplements add 973 kcal and 45 g protein daily. With documented intakes, pt is likely meeting < 50% minimum energy and protein needs.     NEW FINDINGS/REVIEW OF SYSTEMS    General: Pt awaiting placement to inpatient mental health.     Nutrition/GI: RD met with pt and sitter at bedside. Per sitter, pt has been dealing with intermittent nausea and it has been leading her to not want to eat anything except white rice with buttermilk Pt has had sips of the nutrition shakes. RD encouraged pt to drink the shakes throughout the day as  much as possible. RD expressed concerns about pt not eating well and pt's weight loss. Pt states that family brings food once a day. Pt thus then likely eating slightly more than above. Encouraged pt to continue having family bring food for pt.     RD spoke with provider over the phone, discussed pt reports of nausea and plan from a medical standpoint to optimize medication to help alleviate nausea and increase appetite.     Weights: Pt with limited weight history prior to admission, with 38 lb (16%) weight loss over 1 month since admission.  10/17/23 92 kg (202 lb 12.8 oz)     10/10/23 1237 93.7 kg (206 lb 9.6 oz) --   10/03/23 1218 97.1 kg (214 lb) Standing scale   10/03/23 0957 97.9 kg (215 lb 13.3 oz) Bed scale   09/23/23 2220 105.8 kg (233 lb 4 oz) Bed scale   09/20/23 0856 109.1 kg (240 lb 8.4 oz) --     09/10/23 101.9 kg (224 lb 9.6 oz)   07/04/23 105.8 kg (233 lb 4.8 oz)      Labs reviewed   10/13/23 11:49 10/14/23 12:01 10/15/23 02:13 10/15/23 08:40 10/16/23 05:20 10/16/23 17:54 10/17/23 03:03   Sodium 143 143 140 142 140  135   Potassium 3.5 3.0 (L) 3.2 (L) 3.9 2.9 (L) 3.1 (L) 3.0 (L)   Urea Nitrogen 8.7 7.9 7.6 8.1 7.6  5.3 (L)   Creatinine 1.33 (H) 1.51 (H) 1.46 (H) 1.53 (H) 1.50 (H)  1.43 (H)   Magnesium 1.5 (L) 1.3 (L) 1.3 (L)  1.4 (L) 2.9 (H) 2.2   Phosphorus 4.0 4.4 4.4  3.8  3.3   Glucose 101 (H) 128 (H) 115 (H) 124 (H) 158 (H)  151 (H)     Medications reviewed: imodium, mag sulfate, potassium chloride, risperidone, TUMS and zofran PRN     Updated Dosing Weight: 73 kg ABW using most recent weight and IBW of 66 kg.     ASSESSED NUTRITION NEEDS  Estimated Energy Needs: 1738-9903 kcals/day (25 - 30 kcals/kg)  Justification: Maintenance  Estimated Protein Needs: 73-88+ grams protein/day (1 - 1.2 grams of pro/kg)  Justification: Increased needs  Estimated Fluid Needs: 1 mL/kcal or per provider pending fluid status    MALNUTRITION  % Intake: </=75% for >/= 1 month (severe)  % Weight Loss: >5% in 1 month  (severe)   Subcutaneous Fat Loss: Unable to assess  Muscle Loss: Temporal mild  Fluid Accumulation/Edema: Trace-mild  Malnutrition Diagnosis: Severe malnutrition in the context of acute illness    Previous Goals   Patient to consume % of nutritionally adequate meal trays TID, or the equivalent with supplements/snacks.  Evaluation: Not met    Previous Nutrition Diagnosis  Inadequate nutrient intake related to lethargy, mental health status, poor appetite as evidenced by documented intakes, significant weight loss over last two weeks.   Evaluation: No change    CURRENT NUTRITION DIAGNOSIS  Inadequate nutrient intake related to lethargy, mental health status, poor appetite as evidenced by documented intakes, significant 16% weight loss over 1 month.       INTERVENTIONS  Implementation  Collaboration with other providers - Resident  Medical food supplement therapy - Increased to all meals    Goals  Patient to consume % of nutritionally adequate meal trays TID, or the equivalent with supplements/snacks.    Monitoring/Evaluation  Progress toward goals will be monitored and evaluated per protocol.    Crystal Dawson MS, RDN, LD  RD pager: [phone]  [facility] Weekend/Holiday Pager: [phone]

## 2023-10-17 NOTE — PROGRESS NOTES
[facility]    Progress Note - [facility] Service       Date of Admission:  9/20/2023    Main Plans for Today   Medically ready for IP Psych - pending placement   Schedule Imodium daily for diarrhea   K replacement per RN protocol   Group cares as much as possible, and around family visits if possible  Encourage PO fluid and nutrition as able     Assessment & Plan   Angela Basurto is a 58-year-old female PMH bipolar 1 on Mata with full commitment admitted to Psych 8/16 for elvis and psychosis in setting of medication non-adherence. Transferred to [facility] 9/18/23 for sepsis 2/2 HAP, now resolved. Subsequently with ETHAN suspected 2/2 ATN triggered by poor PO intake, stable. Medically cleared for transfer to IP [facility] with bed availability. Continues to have intermittent diarrhea and poor PO intake with mild electrolyte abnormalities.     # Bipolar I disorder  # MATA for Haldol, Clozapine, Risperidone, Olanzapine  # Full commitment   # Somnolence   # Hx of possible seizure (1983)  Presented with elvis and psychosis in the context of medication nonadherence. Was admitted with Psychiatry from 8/16 until transfer to this service 9/20. Psychiatry initially initiated Clozaril with minimal improvement and side effects including worsening somnolence and leukocytosis. Switched to Risperidone 10/16 with less somnolence noted 10/17. Per Psych, patient has long history of medication resistance. Hoffmann-Plascencia was placed 10/12 to pursue ECT- family aware. Not a candidate for long-acting injectable until she tolerates oral agent to guide proper dosing of injectable. Remains on 1:1, though need may be reconsidered if she continues to improve. Plan to transfer to IP Psych with bed availability.   - Consults: Psych following; appreciate recommendations   - Nursing care: 1:1 sitter, encouraging sitting in chair or up ~6 hours a day, walk with nurse 1:1 4x daily  -  Legal status: [place] and full commitment   - Medications: Risperidone 0.5 mg bedtime  - Agitation plan:    - Non-emergency: PRN Haldol 2 mg PO  - Emergency: Haldol 5mg + diphenhydramine 50mg q8h PRN, PO or IM  - STOP Hydroxyzine per psych (see 9/29 note)    # ETHAN vs. CKD, stable, suspect ATN 2/2 pre-renal insult and antibiotics   Baseline creatinine 0.5. Initially had ETHAN that peaked at 1.99 9/22. Developed worsening creatinine again with peak 1.91 10/8. Improved with IVF. Suspect ATN triggered by pre-renal etiology. Has been stable on PO fluids. Suspect that it may take weeks for creatinine to return to baseline. Patient may also have irreversible damage and may have a new creatinine baseline. Do not feel IVF indicated at this time with normal BUN.   - Patient care order to encourage fluids as able; goal 1 cup of water per hour    - Strict intake/output   - BMP daily   - Avoid nephrotoxic medications   - HOLD Hydrochlorothiazide     # Diarrhea, suspected antibiotic related vs. low fiber  Patient with intermittent loose stools. Negative enteric panels (10/5) and C diff (10/5, 10/8). Less concern for infectious etiology. Suspect related low fiber intake and intermittent antibiotics. Improved with Imodium.   - Imodium 2 mg scheduled daily   - Continue Imodium 2 mg BID PRN   - Encourage fiber supplement and PO intake     # Moderate protein-calorie malnutrition   Patient with persistent poor PO intake. Per family, this is chronic and occurs even with traditional foods. Patient reports frequent nausea with food intake. Previously trial of scheduled Zofran prior to meals without improvement- patient often declined medications. Hopeful that better control of mood and less somnolence will increase PO intake. Could consider Phenergan for nausea control as well as appetite stimulation. Will avoid introducing new medications at this time with current ETHAN, but will consider for future.   - Nutrition IP consult, appreciate  recommendations   - Needs encouragement to eat, safe to swallow. Order food and encourage food and fluids   - Medical food supplement therapy  - Consider Phenergan for nausea/appetite stimulation     # Elevated CRP, improved  # Leukocytosis, stable   Patient has had persistent elevated CRP and leukocytosis. Timing of increases has coincided with Clorazil, and per literature Clozaril can cause leukocytosis as well as leukopenia. Currently without clinical evidence of infection. Patient has had extensive infectious workup with recent treatment for UTI. Low concern for infection at this time. Malignancy remains on differential, though patient is not at the age where that would be expected.   - Encourage cancer screening as outpatient   - Consider CT C/A/P if not improving     # Hyperglycemia   # Pre-diabetes (A1c 09/27/23, 6.2)  A1c 6.2% 9/27/23. Previously hyperglycemic in the setting of Clozaril. Blood sugars have improved since holding Clozaril.   - MDSSI   - Discontinued Lantus 10/15   - Metformin discontinued due to diarrhea   - Hypoglycemia protocol     # HTN  Has been intermittently hypertensive. Holding medications in setting of ETHAN and psychotropic interactions.   - HOLD Hydrochlorothiazide 25 mg daily  - HOLD PTA clonidine per Psych    # Anemia, chronic, normocytic   Baseline HGB 8-9. Remains at baseline currently. Suspect may be related to ongoing poor nutrition and elevated CRP resulting in inflammation.   - B12 and folate   - Daily CBC     Chronic/resolved/stable:    # UTI, resolved   # Blood culture positive for Strep sanguinis, 1/2 bottles 10/5   # Leukocytosis, stable  # Recent HAP, resolved   # Aspiration risk   # Diarrhea   # Somnolence   Patient with persistent leukocytosis, prompting infectious workup. Given urinary frequency and suprapubic tenderness on exam, completed treatment for UTI. Infectious workup otherwise reassuring. Blood culture returned positive for strep (10/5, 1/2 bottles), suspected  contaminant per ID. Diarrhea suspected to be related to antibiotics with reassuring enteric panel. Somnolence and leukocytosis likely related to psychotropic medications given improvement with holding Clozaril.   Consults:  - ID signed off   Antimicrobials  - Ceftriaxone (10/7-10/8)  - Cefdinir (10/8- 10/10)  - Ceftriaxone (10/12)  Relevant cultures:  - 10/5 - COVID/RSV/Influ - negative  - 10/5 - Ucx - <10K Ecoli  - 10/5 - Bld Cx - positive 1/2 - Strept Sanguinis and Granulicatella Adiacens (presumed contaminant)   - 10/5 - Cdiff - negative  - 10/5 - Enteric - negative  - 10/6 - MRSA Nares - negative  - 10/6 - Bld Cx - NGTD   - 10/7 - BldCx - NGTD  - 10/8 - Bld Cx - NGTD    # Orthostatic hypotension, resolved  Symptomatic 9/18 with blood pressure drop from 123/72 while laying to 96/55 with standing. Has resolved since that time.   - Encourage oral hydration   - Compression stockings    # Possible hospital-acquired pneumonia, resolved/treated  # Positive blood culture x1 - consistent with contaminant  # Severe sepsis, resolved  # Acute hypoxic respiratory failure, resolved  # Leukocytosis, ongoing  Initially presented 9/19 from inpatient psych with temperature 101.3F, lactic acid 4.3 in the setting of new cough and widespread body aches. , WBC 9.8. 9/18 CXR with streaky right greater than left perihilar and basilar opacities, likely atelectasis and/or edema without focal consolidation. Respiratory viral panels (9/19, 9/22) have been negative. Echocardiogram not consistent with myocarditis. Briefly treated with vancomycin 9/19-9/23 due to a blood culture growing gram positive cocci; this was later determined to be Staph Hominis, suspected contaminant. Pulmonary exam without focal findings consistent with bacterial pneumonia, though patient had an ongoing cough and appears to have a significant burden of respiratory secretions seen on 9/24 (resolved). RT evaluated and recommended use of aerobika flutter valve.  White count have been elevated although stable with still no clear focus of infection, could be clozapine side effect. Patient is medically stable to return to psychiatry unit.   - Infectious disease consulted, now off  - S/p piperacillin/tazobactam (9/20- 9/26)  - Respiratory therapist saw Angela and she tolerated vest therapy x1  - Discontinue vest therapy with improved breathing and reassuring physical examination   - Aerobika flutter valve   - CBC q72h     # Hypocalcemia  # Hypoalbuminemia  Suspect possible malnutrition due to poor PO intake.   - Encourage good PO intake   - PRN Pantoprazole EC 40 mg tablets  - DEBORAH  - Zofran PRN, 30 mins before meals  - Nutrition IP consult, appreciate recommendations               - Medical food supplement therapy  - Patient to consume % of nutritionally adequate meal trays TID, or the equivalent with supplements/snacks.     # Gnosticism  Important to her value system.   - Spiritual care consult         Diet: Combination Diet Regular Diet; Safe Tray - with utensils  Snacks/Supplements Adult: Other; Ensure Enlive mixed with ice cream; With Meals    DVT Prophylaxis: Low Risk/Ambulatory with no VTE prophylaxis indicated  Wilson Catheter: Not present  Fluids: PO  Lines: None     Cardiac Monitoring: None  Code Status: Full Code      Clinically Significant Risk Factors        # Hypokalemia: Lowest K = 2.9 mmol/L in last 2 days, will replace as needed     # Hypomagnesemia: Lowest Mg = 1.4 mg/dL in last 2 days, will replace as needed   # Hypoalbuminemia: Lowest albumin = 2 g/dL at 9/24/2023  8:46 AM, will monitor as appropriate               # Severe Malnutrition: based on nutrition assessment             Disposition Plan         The patient's care was discussed with the Attending Physician, Dr. Riddle.    Malka Nguyen MD  [facility]  [facility]  Securely message with [employer] (more info)  Text page via [employer]  Paging/Directory   See signed in provider for up to date coverage information  ______________________________________________________________________    Interval History     Overnight:  NAOE.      Subjective:   Patient awake and conversant on two exams. Reports that she feels okay. Denies pain. States that she had some diarrhea last night. Open to taking medication to help with this. Has been drinking water. Does not feel hungry and has nausea with food intake. Thinks her Mom may visit later. Her daughter is traveling currently.     Physical Exam   Vital Signs: Temp: 98.6  F (37  C) Temp src: Tympanic BP: 113/50 Pulse: 83   Resp: 14 SpO2: 98 % O2 Device: None (Room air)    Weight: 202 lbs 12.8 oz    Constitutional: Somnolent, no apparent distress  Respiratory: Breathing comfortably at rest. Lungs CTAB  Cardiovascular: RRR  Abdomen: Soft, NTND.   MSK: No edema noted.   Neurologic: Awake, engages in appropriate conversation.

## 2023-10-17 NOTE — PLAN OF CARE
Problem: Plan of Care - These are the overarching goals to be used throughout the patient stay.    Goal: Patient-Specific Goal (Individualized)  Outcome: Progressing  Flowsheets (Taken 10/17/2023 5331)  Individualized Care Needs: Pt withdrawn, sleeping most of shift. Up independently to bathroom. Potassium 3.0 and potassium replacement ordered.

## 2023-10-17 NOTE — TELEPHONE ENCOUNTER
[facility] ONLY:  R: [place] MH Access Inpatient Bed Call Log 10/17/23 @ 4:00am    [facility]: @ capacity for MH beds.     Pt remains on waitlist pending appropriate placement availability

## 2023-10-17 NOTE — PLAN OF CARE
Goal Outcome Evaluation:      Plan of Care Reviewed With: patient    Overall Patient Progress: improving    Outcome Evaluation: Patient with continued poor intake and weight loss. Will increase supplements and continue to monitor intakes.

## 2023-10-18 ENCOUNTER — TELEPHONE (OUTPATIENT)
Dept: BEHAVIORAL HEALTH | Facility: CLINIC | Age: 58
End: 2023-10-18
Payer: COMMERCIAL

## 2023-10-18 LAB
ANION GAP SERPL CALCULATED.3IONS-SCNC: 12 MMOL/L (ref 7–15)
BASO+EOS+MONOS # BLD AUTO: ABNORMAL 10*3/UL
BASO+EOS+MONOS NFR BLD AUTO: ABNORMAL %
BASOPHILS # BLD AUTO: 0 10E3/UL (ref 0–0.2)
BASOPHILS NFR BLD AUTO: 0 %
BUN SERPL-MCNC: 5 MG/DL (ref 6–20)
CALCIUM SERPL-MCNC: 7.8 MG/DL (ref 8.6–10)
CHLORIDE SERPL-SCNC: 106 MMOL/L (ref 98–107)
CREAT SERPL-MCNC: 1.36 MG/DL (ref 0.51–0.95)
CRP SERPL-MCNC: 27.37 MG/L
DEPRECATED HCO3 PLAS-SCNC: 21 MMOL/L (ref 22–29)
EGFRCR SERPLBLD CKD-EPI 2021: 45 ML/MIN/1.73M2
EOSINOPHIL # BLD AUTO: 0.1 10E3/UL (ref 0–0.7)
EOSINOPHIL NFR BLD AUTO: 1 %
ERYTHROCYTE [DISTWIDTH] IN BLOOD BY AUTOMATED COUNT: 14.1 % (ref 10–15)
FOLATE SERPL-MCNC: 7.7 NG/ML (ref 4.6–34.8)
GLUCOSE BLDC GLUCOMTR-MCNC: 127 MG/DL (ref 70–99)
GLUCOSE BLDC GLUCOMTR-MCNC: 134 MG/DL (ref 70–99)
GLUCOSE BLDC GLUCOMTR-MCNC: 147 MG/DL (ref 70–99)
GLUCOSE SERPL-MCNC: 143 MG/DL (ref 70–99)
HCT VFR BLD AUTO: 26.4 % (ref 35–47)
HGB BLD-MCNC: 8.2 G/DL (ref 11.7–15.7)
IMM GRANULOCYTES # BLD: 0.1 10E3/UL
IMM GRANULOCYTES NFR BLD: 1 %
LYMPHOCYTES # BLD AUTO: 2 10E3/UL (ref 0.8–5.3)
LYMPHOCYTES NFR BLD AUTO: 19 %
MAGNESIUM SERPL-MCNC: 1.7 MG/DL (ref 1.7–2.3)
MCH RBC QN AUTO: 30.1 PG (ref 26.5–33)
MCHC RBC AUTO-ENTMCNC: 31.1 G/DL (ref 31.5–36.5)
MCV RBC AUTO: 97 FL (ref 78–100)
MONOCYTES # BLD AUTO: 0.9 10E3/UL (ref 0–1.3)
MONOCYTES NFR BLD AUTO: 9 %
NEUTROPHILS # BLD AUTO: 7.6 10E3/UL (ref 1.6–8.3)
NEUTROPHILS NFR BLD AUTO: 70 %
NRBC # BLD AUTO: 0 10E3/UL
NRBC BLD AUTO-RTO: 0 /100
PHOSPHATE SERPL-MCNC: 2.9 MG/DL (ref 2.5–4.5)
PLATELET # BLD AUTO: 239 10E3/UL (ref 150–450)
POTASSIUM SERPL-SCNC: 3.6 MMOL/L (ref 3.4–5.3)
RBC # BLD AUTO: 2.72 10E6/UL (ref 3.8–5.2)
SODIUM SERPL-SCNC: 139 MMOL/L (ref 135–145)
WBC # BLD AUTO: 10.7 10E3/UL (ref 4–11)

## 2023-10-18 PROCEDURE — 85025 COMPLETE CBC W/AUTO DIFF WBC: CPT | Performed by: FAMILY MEDICINE

## 2023-10-18 PROCEDURE — 99232 SBSQ HOSP IP/OBS MODERATE 35: CPT | Mod: GC

## 2023-10-18 PROCEDURE — 36415 COLL VENOUS BLD VENIPUNCTURE: CPT | Performed by: FAMILY MEDICINE

## 2023-10-18 PROCEDURE — 120N000002 HC R&B MED SURG/OB UMMC

## 2023-10-18 PROCEDURE — 82746 ASSAY OF FOLIC ACID SERUM: CPT

## 2023-10-18 PROCEDURE — 86140 C-REACTIVE PROTEIN: CPT | Performed by: FAMILY MEDICINE

## 2023-10-18 PROCEDURE — 250N000013 HC RX MED GY IP 250 OP 250 PS 637

## 2023-10-18 PROCEDURE — 80048 BASIC METABOLIC PNL TOTAL CA: CPT | Performed by: FAMILY MEDICINE

## 2023-10-18 PROCEDURE — 99233 SBSQ HOSP IP/OBS HIGH 50: CPT

## 2023-10-18 PROCEDURE — 83735 ASSAY OF MAGNESIUM: CPT | Performed by: FAMILY MEDICINE

## 2023-10-18 PROCEDURE — 84100 ASSAY OF PHOSPHORUS: CPT | Performed by: FAMILY MEDICINE

## 2023-10-18 RX ORDER — POTASSIUM CHLORIDE 750 MG/1
20 TABLET, EXTENDED RELEASE ORAL ONCE
Status: COMPLETED | OUTPATIENT
Start: 2023-10-18 | End: 2023-10-18

## 2023-10-18 RX ADMIN — RISPERIDONE 0.5 MG: 0.25 TABLET, FILM COATED ORAL at 21:22

## 2023-10-18 RX ADMIN — LOPERAMIDE HYDROCHLORIDE 2 MG: 2 CAPSULE ORAL at 08:45

## 2023-10-18 RX ADMIN — POTASSIUM CHLORIDE 20 MEQ: 750 TABLET, EXTENDED RELEASE ORAL at 08:45

## 2023-10-18 ASSESSMENT — ACTIVITIES OF DAILY LIVING (ADL)
ADLS_ACUITY_SCORE: 23
ADLS_ACUITY_SCORE: 23
ADLS_ACUITY_SCORE: 25
ADLS_ACUITY_SCORE: 23
ADLS_ACUITY_SCORE: 22
ADLS_ACUITY_SCORE: 23
ADLS_ACUITY_SCORE: 25
ADLS_ACUITY_SCORE: 23
ADLS_ACUITY_SCORE: 23
ADLS_ACUITY_SCORE: 25

## 2023-10-18 NOTE — PROGRESS NOTES
[facility]    Progress Note - [facility] Service       Date of Admission:  9/20/2023    Main Plans for Today   Medically ready for IP Psych - pending placement   Discontinued hydrochlorothiazide  Spaced labs to q48h    Assessment & Plan   Angela Basurto is a 58-year-old female PMH bipolar 1 on Mata with full commitment admitted to Psych 8/16 for elvis and psychosis in setting of medication non-adherence. Transferred to [facility] 9/18/23 for sepsis 2/2 HAP, now resolved. Subsequently with ETHAN suspected 2/2 ATN triggered by poor PO intake, stable. Medically cleared for transfer to IP Psych with bed availability.    # Bipolar I disorder  # MATA for Haldol, Clozapine, Risperidone, Olanzapine  # Full commitment   # Somnolence, improving  # Hx of possible seizure (1983)  Presented with elvis and psychosis in the context of medication nonadherence. Was admitted with Psychiatry from 8/16 until transfer to this service 9/20. Psychiatry initially initiated Clozaril with minimal improvement and side effects including worsening somnolence and leukocytosis. Switched to Risperidone 10/16 with less somnolence noted 10/17 and improving thereafter. Per Psych, patient has long history of medication resistance. Hoffmann-Plascencia was placed 10/12 to pursue ECT- family aware. Not a candidate for long-acting injectable until she tolerates oral agent to guide proper dosing of injectable. Remains on 1:1, though need may be reconsidered if she continues to improve. Plan to transfer to IP Psych with bed availability.   - Consults: Psych following; appreciate recommendations   - Nursing care: 1:1 sitter, encouraging sitting in chair or up ~6 hours a day, walk with nurse 1:1 4x daily  - Legal status: MATA and full commitment   - Medications: Risperidone 0.5 mg bedtime  - Agitation plan:    - Non-emergency: PRN Haldol 2 mg PO  - Emergency: Haldol 5mg + diphenhydramine 50mg q8h  PRN, PO or IM    # ETHAN vs. CKD, stable, suspect ATN 2/2 pre-renal insult and antibiotics   Baseline creatinine 0.5. Initially had ETHAN that peaked at 1.99 9/22. Developed worsening creatinine again with peak 1.91 10/8. Improved with IVF. Suspect ATN triggered by pre-renal etiology. Has been stable on PO fluids. Suspect that it may take weeks for creatinine to return to baseline. Patient may also have irreversible damage and may have a new creatinine baseline. Do not feel IVF indicated at this time with normal BUN.   - Patient care order to encourage fluids as able; goal 1 cup of water per hour    - Strict intake/output   - BMP daily   - Avoid nephrotoxic medications   - Discontinued Hydrochlorothiazide     # Diarrhea, suspected antibiotic related vs. low fiber  # Hypokalemia, improving  Patient with intermittent loose stools. Negative enteric panels (10/5) and C diff (10/5, 10/8). Less concern for infectious etiology. Suspect related low fiber intake and intermittent antibiotics. Improved with Imodium.   - Imodium 2 mg scheduled daily   - Continue Imodium 2 mg BID PRN   - Encourage fiber supplement and PO intake   - K RN replacement protocol    # Moderate protein-calorie malnutrition   Patient with persistent poor PO intake. Per family, this is chronic and occurs even with traditional foods. Patient reports frequent nausea with food intake. Previously trial of scheduled Zofran prior to meals without improvement- patient often declined medications. Hopeful that better control of mood and less somnolence will increase PO intake. Could consider Phenergan for nausea control as well as appetite stimulation. Will avoid introducing new medications at this time with current ETHAN, but will consider for future.   - Nutrition IP consult, appreciate recommendations              - Medical food supplement therapy  - Patient to consume % of nutritionally adequate meal trays TID, or the equivalent with supplements/snacks.  -  Needs encouragement to eat, safe to swallow. Order food and encourage food and fluids   - Consider Phenergan for nausea/appetite stimulation     # Elevated CRP, improved  # Leukocytosis, stable  Patient has had persistent elevated CRP and leukocytosis. Timing of increases has coincided with Clorazil, and per literature Clozaril can cause leukocytosis as well as leukopenia. Currently without clinical evidence of infection. Patient has had extensive infectious workup with recent treatment for UTI. Low concern for infection at this time. Malignancy remains on differential, though patient is not at the age where that would be expected. CRP has been downtrending, overall reassuring.  - Encourage cancer screening as outpatient   - Consider CT C/A/P if CRP acutely worsens    # Hyperglycemia   # Pre-diabetes (A1c 09/27/23, 6.2)  A1c 6.2% 9/27/23. Previously hyperglycemic in the setting of Clozaril. Blood sugars have improved since holding Clozaril.   - MDSSI  - Discontinued Lantus 10/15   - Metformin discontinued due to diarrhea   - Hypoglycemia protocol     # HTN  Has been intermittently hypertensive. Holding medications in setting of ETHAN and psychotropic interactions.   - discontinued Hydrochlorothiazide  - HOLD PTA clonidine per Psych    # Anemia, chronic, normocytic   Baseline HGB 8-9. Remains at baseline currently. Suspect may be related to ongoing poor nutrition and elevated CRP resulting in inflammation.   - B12 and folate   - Daily CBC     Chronic/resolved/stable:    # UTI, resolved   # Blood culture positive for Strep sanguinis, 1/2 bottles 10/5   # Leukocytosis, stable  # Recent HAP, resolved   # Aspiration risk   # Diarrhea   # Somnolence   Patient with persistent leukocytosis, prompting infectious workup. Given urinary frequency and suprapubic tenderness on exam, completed treatment for UTI. Infectious workup otherwise reassuring. Blood culture returned positive for strep (10/5, 1/2 bottles), suspected  contaminant per ID. Diarrhea suspected to be related to antibiotics with reassuring enteric panel. Somnolence and leukocytosis likely related to psychotropic medications given improvement with holding Clozaril.   Consults:  - ID signed off   Antimicrobials  - Ceftriaxone (10/7-10/8)  - Cefdinir (10/8- 10/10)  - Ceftriaxone (10/12)  Relevant cultures:  - 10/5 - COVID/RSV/Influ - negative  - 10/5 - Ucx - <10K Ecoli  - 10/5 - Bld Cx - positive 1/2 - Strept Sanguinis and Granulicatella Adiacens (presumed contaminant)   - 10/5 - Cdiff - negative  - 10/5 - Enteric - negative  - 10/6 - MRSA Nares - negative  - 10/6 - Bld Cx - NGTD   - 10/7 - BldCx - NGTD  - 10/8 - Bld Cx - NGTD    # Orthostatic hypotension, resolved  Symptomatic 9/18 with blood pressure drop from 123/72 while laying to 96/55 with standing. Has resolved since that time.   - Encourage oral hydration   - Compression stockings    # Possible hospital-acquired pneumonia, resolved/treated  # Positive blood culture x1 - consistent with contaminant  # Severe sepsis, resolved  # Acute hypoxic respiratory failure, resolved  # Leukocytosis, ongoing  Initially presented 9/19 from inpatient psych with temperature 101.3F, lactic acid 4.3 in the setting of new cough and widespread body aches. , WBC 9.8. 9/18 CXR with streaky right greater than left perihilar and basilar opacities, likely atelectasis and/or edema without focal consolidation. Respiratory viral panels (9/19, 9/22) have been negative. Echocardiogram not consistent with myocarditis. Briefly treated with vancomycin 9/19-9/23 due to a blood culture growing gram positive cocci; this was later determined to be Staph Hominis, suspected contaminant. Pulmonary exam without focal findings consistent with bacterial pneumonia, though patient had an ongoing cough and appears to have a significant burden of respiratory secretions seen on 9/24 (resolved). RT evaluated and recommended use of aerobika flutter valve.  White count have been elevated although stable with still no clear focus of infection, could be clozapine side effect. Patient is medically stable to return to psychiatry unit.   - Infectious disease consulted, now off  - S/p piperacillin/tazobactam (9/20- 9/26)  - Respiratory therapist saw Angela and she tolerated vest therapy x1  - Discontinue vest therapy with improved breathing and reassuring physical examination   - Aerobika flutter valve   - CBC q72h     # Hypocalcemia  # Hypoalbuminemia  Suspect possible malnutrition due to poor PO intake.   - Encourage good PO intake   - PRN Pantoprazole EC 40 mg tablets  - DEBORAH  - Zofran PRN, 30 mins before meals     # Islam  Important to her value system.   - Spiritual care consult         Diet: Combination Diet Regular Diet; Safe Tray - with utensils  Snacks/Supplements Adult: Other; Ensure Enlive mixed with ice cream; With Meals    DVT Prophylaxis: Low Risk/Ambulatory with no VTE prophylaxis indicated  Wilson Catheter: Not present  Fluids: PO  Lines: None     Cardiac Monitoring: None  Code Status: Full Code      Clinically Significant Risk Factors        # Hypokalemia: Lowest K = 3 mmol/L in last 2 days, will replace as needed       # Hypoalbuminemia: Lowest albumin = 2 g/dL at 9/24/2023  8:46 AM, will monitor as appropriate               # Severe Malnutrition: based on nutrition assessment             Disposition Plan         The patient's care was discussed with the Attending Physician, Dr. Riddle.    DO [facility]  [facility]  Securely message with [employer] (more info)  Text page via [facility] Paging/Directory   See signed in provider for up to date coverage information  ______________________________________________________________________    Interval History     Overnight:  NAEON.    Subjective:   Alert and interactive during my exam this morning without somnolence. Appropriately  answers questions. Has no acute questions or concerns as of this morning. Reports that she has had regular bowel movements daily on the imodium, most recently last night.    Physical Exam   Vital Signs: Temp: 98.1  F (36.7  C) Temp src: Oral BP: 110/52 Pulse: 73   Resp: 20 SpO2: 94 % O2 Device: None (Room air)    Weight: 202 lbs 12.8 oz    Constitutional: Somnolent, no apparent distress  Respiratory: Breathing comfortably at rest. Lungs CTAB  Cardiovascular: RRR without murmur  Abdomen: Soft, NTND.   MSK: No edema noted.   Neurologic: Awake, engages in appropriate conversation.

## 2023-10-18 NOTE — PLAN OF CARE
Goal Outcome Evaluation:      Plan of Care Reviewed With: patient    Overall Patient Progress: improvingOverall Patient Progress: improving    Outcome Evaluation: Resting in bed for most of shift. Complained of stomach ache, writer offered medication but pt declined. Hot pack given with some relief. 1:1 present in room.

## 2023-10-18 NOTE — PLAN OF CARE
VS: VSS, pt denied CP or SOB.   O2: Room air sat. > 90%   Output: Voiding adequate amount in bathroom.    Last BM: 10/17/23   Activity: Up to bathroom with SBA, walked on the [place] and sat in family lounge visited with her mom this evening.    Skin: Visible skin intact pt declined full skin assessment.    Pain: Complaining of some abdominal pain in the morning but resolved this afternoon.    Neuro: CMS and neuro intact to baseline.    Dressing: None.    Diet: Regular tolerating okay eating small amount.    LDA: IV SL.    Equipment: IV pole and personal belongings.    Plan: TBD.   Additional Info: Bedside attendant in the room for safety.

## 2023-10-18 NOTE — TELEPHONE ENCOUNTER
R: [place]  Access Inpatient Bed Call Log 10/18/23 @ 12:30am   Intake has called facilities that have not updated their bed status within the last 12 hours.??      *[facility] Only:  [facility]: @ cap per website.      Pt remains on waitlist pending appropriate placement availability

## 2023-10-18 NOTE — CONSULTS
Psychiatry Consultation; Follow up              Reason for Consult, requesting source:    Follow up   Requesting source: Jose     Labs and imaging reviewed, seen by ROSITA Cronin CNP    Total time spent in chart review, patient interview and coordination of care; 60 minutes - all time was spent on the date of the encounter that I saw patient                Interim history:    This 58 year old woman was transferred from inpatient psychiatry due to chloé pneumonia. She had been admitted with marked psychosis with agitation, threatening people. She is now under civil commitment with zlien.   Since admission to medicine she was noted to be very somnolent, so the clozapine dose was reduced since elevated CRP from infection can decrease the metabolism of clozapine.   For one week we held it without her becoming less groggy, then resumed but ultimately discontinued all together given correlation with abnormal white count and lethargy. Depakote was also discontinued given somnolence and increased ammonia.     She was started on risperdal 0.5mg (another henry medication) 10/16 and today looks bright, smiley, and denies SI, HI, AVH. She is pleasant and not agitated nor aggressive. She is still not eating much solid food, talks about being thirsty a lot.     I spoke on the phone with Angela's daughter, Mya. She agreed that her mother is doing better. She had concerns about pursuing ECT as her mother isn't demonstrating aggressive or disorganized behavior. Angela's daughter is planning on visiting pt today or tomorrow and is open to the idea of having patient return home with the family.         Current Medications:      insulin aspart  1-7 Units Subcutaneous TID AC    insulin aspart  1-5 Units Subcutaneous At Bedtime    loperamide  2 mg Oral Daily    risperiDONE  0.5 mg Oral At Bedtime    sodium chloride (PF)  3 mL Intracatheter Q8H              MSE:   Appearance: awake, alert and adequately  "groomed  Attitude:  cooperative  Eye Contact:  good  Mood:  better  Affect:  slightly restricted  Speech:  clear, coherent  Psychomotor Behavior:  no evidence of tardive dyskinesia, dystonia, or tics  Muscle strength and tone: decreased  Thought Process:  logical and linear  Associations:  no loose associations  Thought Content:  no evidence of suicidal ideation or homicidal ideation and no evidence of psychotic thought  Insight:  partial  Judgement:  limited  Oriented to:  time, person, and place  Attention Span and Concentration:  fair  Recent and Remote Memory:  fair    Vital signs:  Temp: 98.7  F (37.1  C) Temp src: Oral BP: 121/49 Pulse: 74   Resp: 18 SpO2: 99 % O2 Device: None (Room air) Oxygen Delivery: 2 LPM   Weight: 92 kg (202 lb 12.8 oz)  Estimated body mass index is 29.95 kg/m  as calculated from the following:    Height as of 8/17/23: 1.753 m (5' 9\").    Weight as of this encounter: 92 kg (202 lb 12.8 oz).    Qtc: 447 on 9/29         DSM-5 Diagnosis:   Schizoaffective, bipolar type, with psychosis   Delirium, resolved          Assessment:   Patient has been doing well off of Clozaril and Depakote without aggression/agitation, elvis or psychosis. I started her on Risperdal 0.5mg at bedtime and seems to be tolerating well. At this time, given the lack of acute psychiatric symptoms, Angela does not meet criteria for inpatient psychiatry. If her family is comfortable taking her home, we can provisionally discharge Angela to their care on Risperdal 0.5mg daily. It will be important to discuss rules of provisional discharge with Mya as well.     I am working with Social Work on dropping ECT price-ag petition as it is not needed at this time. Discussed with Dr Faith who is in agreement. ECT may always be considered in the future if needed.     Based on today's assessment and lack of psychosis/elvis/SI/HI while on the medical unit, patient is not assessed to be an imminent danger to self or others.     Suzie " Kimberly Kohler, PMHNP-BC  Consult/Liaison Psychiatry   [facility]

## 2023-10-18 NOTE — TELEPHONE ENCOUNTER
R:  No beds within [facility].  Pt is on a commitment, but no support for transport -  and would like [facility] ONLY    [facility] is at capacity.                Pt to remain on adult worklist pending bed availability.

## 2023-10-19 ENCOUNTER — TELEPHONE (OUTPATIENT)
Dept: BEHAVIORAL HEALTH | Facility: CLINIC | Age: 58
End: 2023-10-19
Payer: COMMERCIAL

## 2023-10-19 VITALS
SYSTOLIC BLOOD PRESSURE: 107 MMHG | WEIGHT: 202.8 LBS | HEART RATE: 80 BPM | OXYGEN SATURATION: 97 % | TEMPERATURE: 98.3 F | DIASTOLIC BLOOD PRESSURE: 50 MMHG | RESPIRATION RATE: 18 BRPM | BODY MASS INDEX: 29.95 KG/M2

## 2023-10-19 LAB
GLUCOSE BLDC GLUCOMTR-MCNC: 119 MG/DL (ref 70–99)
GLUCOSE BLDC GLUCOMTR-MCNC: 126 MG/DL (ref 70–99)
GLUCOSE BLDC GLUCOMTR-MCNC: 126 MG/DL (ref 70–99)
GLUCOSE BLDC GLUCOMTR-MCNC: 135 MG/DL (ref 70–99)
MAGNESIUM SERPL-MCNC: 1.9 MG/DL (ref 1.7–2.3)
POTASSIUM SERPL-SCNC: 3.4 MMOL/L (ref 3.4–5.3)

## 2023-10-19 PROCEDURE — 36415 COLL VENOUS BLD VENIPUNCTURE: CPT

## 2023-10-19 PROCEDURE — 99239 HOSP IP/OBS DSCHRG MGMT >30: CPT | Mod: GC

## 2023-10-19 PROCEDURE — 250N000013 HC RX MED GY IP 250 OP 250 PS 637: Performed by: FAMILY MEDICINE

## 2023-10-19 PROCEDURE — 84132 ASSAY OF SERUM POTASSIUM: CPT

## 2023-10-19 PROCEDURE — 83735 ASSAY OF MAGNESIUM: CPT

## 2023-10-19 RX ORDER — SENNOSIDES 8.6 MG
2 TABLET ORAL 2 TIMES DAILY PRN
Qty: 60 TABLET | Refills: 0 | Status: SHIPPED | OUTPATIENT
Start: 2023-10-19

## 2023-10-19 RX ORDER — RISPERIDONE 0.5 MG/1
0.5 TABLET ORAL AT BEDTIME
Qty: 30 TABLET | Refills: 0 | Status: SHIPPED | OUTPATIENT
Start: 2023-10-19

## 2023-10-19 RX ORDER — POTASSIUM CHLORIDE 750 MG/1
40 TABLET, EXTENDED RELEASE ORAL ONCE
Status: COMPLETED | OUTPATIENT
Start: 2023-10-19 | End: 2023-10-19

## 2023-10-19 RX ORDER — HALOPERIDOL 2 MG/1
2 TABLET ORAL EVERY 6 HOURS PRN
Qty: 30 TABLET | Refills: 0 | Status: SHIPPED | OUTPATIENT
Start: 2023-10-19

## 2023-10-19 RX ADMIN — POTASSIUM CHLORIDE 40 MEQ: 750 TABLET, EXTENDED RELEASE ORAL at 10:19

## 2023-10-19 ASSESSMENT — ACTIVITIES OF DAILY LIVING (ADL)
ADLS_ACUITY_SCORE: 25

## 2023-10-19 NOTE — PROGRESS NOTES
SPIRITUAL HEALTH SERVICES  SPIRITUAL ASSESSMENT Progress Note  [facility] ([facility]) UR ORTHO     REFERRAL SOURCE: Self  initiated visited    I visited the pt in her room accompanied by her sitter. Pt welcomed me and appreciated the SHS's visit. She shared she is blessed with grown children. Pt shared that before she got sick she was taking care of her father. I prayed for her and provided her with islamic prayer mat.    PLAN: [facility] will remain the same    [place] Resident  Phone: [phone]

## 2023-10-19 NOTE — TELEPHONE ENCOUNTER
R: [place]  Access [place]patient [place] Call Log 10/19/23 12:32 AM     Intake has called facilities that have not updated the bed status within the last 12 hours.    ([facility] Only)                [facility] is at capacity.                Patient remains on the work list pending appropriate bed availability.

## 2023-10-19 NOTE — TELEPHONE ENCOUNTER
R: [facility] ONLY    [place]  Access Inpatient Bed Call Log 10/19/23 @ 7:07 am     Intake has called facilities that have not updated the bed status within the last 12 hours.                                             [facility] is at capacity.                  Pt remains on the work list pending appropriate bed availability.

## 2023-10-19 NOTE — PLAN OF CARE
VS: /50 (BP Location: Right arm)   Pulse 62   Temp 99.3  F (37.4  C) (Oral)   Resp 18   Wt 92 kg (202 lb 12.8 oz)   SpO2 97%   BMI 29.95 kg/m       O2: Sats > 90% at room air     Output: Voids spontaneously without difficulty in the bathroom    Last BM: 10/18/23   Activity: Up SBA to the bathroom. Walks in the hallway in daytime.    Skin: Visible skin intact. Decline full skin assessment.    Pain: Abdominal discomfort/pain from loose stool. Tums and imodium offered but refused    CMS: CMS and nuero intact to baseline.    Dressing: None    Diet: Regular    LDA: PIV to right arm, saline locked    Equipment: IV pole and personal belongings.    Plan: TBD   Additional Info: 1:1 sitter for safety. Pt is calm and pleasant. Denies SI and Hallucination.

## 2023-10-19 NOTE — PROGRESS NOTES
[facility]    Progress Note - [facility] Service       Date of Admission:  9/20/2023    Main Plans for Today   Per Psych eval, cleared to go discharge home with daughter. Initial plan was to discharge home today, but due to car troubles and securing a ride, discharge delayed to 10/20.  No medical changes today    Assessment & Plan   Angela Basurto is a 58-year-old female PMH bipolar 1 on Mata with full commitment admitted to [facility] 8/16 for elvis and psychosis in setting of medication non-adherence. Transferred to [facility] 9/18/23 for sepsis 2/2 HAP, now resolved. Subsequently with ETHAN suspected 2/2 ATN triggered by poor PO intake, stable. Medically cleared for transfer to [facility] with bed availability.    # Bipolar I disorder  # MATA for Haldol, Clozapine, Risperidone, Olanzapine  # Full commitment   # Somnolence, improving  # Hx of possible seizure (1983)  Presented with elvis and psychosis in the context of medication nonadherence. Was admitted with Psychiatry from 8/16 until transfer to this service 9/20. Psychiatry initially initiated Clozaril with minimal improvement and side effects including worsening somnolence and leukocytosis. Switched to Risperidone 10/16 with less somnolence noted 10/17 and improving thereafter. Per Psych, patient has long history of medication resistance. Hoffmann-Plascencia was placed 10/12 to pursue ECT- family aware. Not a candidate for long-acting injectable until she tolerates oral agent to guide proper dosing of injectable. Remains on 1:1, though need may be reconsidered if she continues to improve. Plan to transfer to [facility] with bed availability.   - Consults: Psych following; appreciate recommendations   - Nursing care: 1:1 sitter, encouraging sitting in chair or up ~6 hours a day, walk with nurse 1:1 4x daily  - Legal status: MATA and full commitment   - Medications: Risperidone 0.5 mg bedtime  - Agitation  plan:    - Non-emergency: PRN Haldol 2 mg PO  - Emergency: Haldol 5mg + diphenhydramine 50mg q8h PRN, PO or IM    # ETHAN vs. CKD, stable, suspect ATN 2/2 pre-renal insult and antibiotics   Baseline creatinine 0.5. Initially had ETHAN that peaked at 1.99 9/22. Developed worsening creatinine again with peak 1.91 10/8. Improved with IVF. Suspect ATN triggered by pre-renal etiology. Has been stable on PO fluids. Suspect that it may take weeks for creatinine to return to baseline. Patient may also have irreversible damage and may have a new creatinine baseline. Do not feel IVF indicated at this time with normal BUN.  Cr has continued to slowly improve.  - Patient care order to encourage fluids as able; goal 1 cup of water per hour    - BMP daily   - Avoid nephrotoxic medications   - Discontinued Hydrochlorothiazide     # Diarrhea, suspected antibiotic related vs. low fiber  # Hypokalemia, improving  Patient with intermittent loose stools. Negative enteric panels (10/5) and C diff (10/5, 10/8). Less concern for infectious etiology. Suspect related low fiber intake and intermittent antibiotics. Improved with Imodium.   - Imodium 2 mg scheduled daily   - Continue Imodium 2 mg BID PRN   - Encourage fiber supplement and PO intake   - K RN replacement protocol    # Moderate protein-calorie malnutrition   Patient with persistent poor PO intake. Per family, this is chronic and occurs even with traditional foods. Patient reports frequent nausea with food intake. Previously trial of scheduled Zofran prior to meals without improvement- patient often declined medications. Hopeful that better control of mood and less somnolence will increase PO intake. Could consider Phenergan for nausea control as well as appetite stimulation. Will avoid introducing new medications at this time with current ETHAN, but will consider for future.   - Nutrition IP consult, appreciate recommendations              - Medical food supplement therapy  - Patient  to consume % of nutritionally adequate meal trays TID, or the equivalent with supplements/snacks.  - Needs encouragement to eat, safe to swallow. Order food and encourage food and fluids   - Consider Phenergan for nausea/appetite stimulation     # Elevated CRP, improved  # Leukocytosis, stable  Patient has had persistent elevated CRP and leukocytosis. Timing of increases has coincided with Clorazil, and per literature Clozaril can cause leukocytosis as well as leukopenia. Currently without clinical evidence of infection. Patient has had extensive infectious workup with recent treatment for UTI. Low concern for infection at this time. Malignancy remains on differential, though patient is not at the age where that would be expected. CRP has been downtrending, overall reassuring.  - Encourage cancer screening as outpatient   - Consider CT C/A/P if CRP acutely worsens    # Hyperglycemia   # Pre-diabetes (A1c 09/27/23, 6.2)  A1c 6.2% 9/27/23. Previously hyperglycemic in the setting of Clozaril. Blood sugars have improved since holding Clozaril.   - MDSSI  - Discontinued Lantus 10/15   - Metformin discontinued due to diarrhea   - Hypoglycemia protocol     # HTN  Has been intermittently hypertensive. Holding medications in setting of ETHAN and psychotropic interactions.   - discontinued Hydrochlorothiazide  - HOLD PTA clonidine per Psych    # Anemia, chronic, normocytic   Baseline HGB 8-9. Remains at baseline currently. Suspect may be related to ongoing poor nutrition and elevated CRP resulting in inflammation.   - B12 and folate   - Daily CBC     Chronic/resolved/stable:    # UTI, resolved   # Blood culture positive for Strep sanguinis, 1/2 bottles 10/5   # Leukocytosis, stable  # Recent HAP, resolved   # Aspiration risk   # Diarrhea   # Somnolence   Patient with persistent leukocytosis, prompting infectious workup. Given urinary frequency and suprapubic tenderness on exam, completed treatment for UTI. Infectious  workup otherwise reassuring. Blood culture returned positive for strep (10/5, 1/2 bottles), suspected contaminant per ID. Diarrhea suspected to be related to antibiotics with reassuring enteric panel. Somnolence and leukocytosis likely related to psychotropic medications given improvement with holding Clozaril.   Consults:  - ID signed off   Antimicrobials  - Ceftriaxone (10/7-10/8)  - Cefdinir (10/8- 10/10)  - Ceftriaxone (10/12)  Relevant cultures:  - 10/5 - COVID/RSV/Influ - negative  - 10/5 - Ucx - <10K Ecoli  - 10/5 - Bld Cx - positive 1/2 - Strept Sanguinis and Granulicatella Adiacens (presumed contaminant)   - 10/5 - Cdiff - negative  - 10/5 - Enteric - negative  - 10/6 - MRSA Nares - negative  - 10/6 - Bld Cx - NGTD   - 10/7 - BldCx - NGTD  - 10/8 - Bld Cx - NGTD    # Orthostatic hypotension, resolved  Symptomatic 9/18 with blood pressure drop from 123/72 while laying to 96/55 with standing. Has resolved since that time.   - Encourage oral hydration   - Compression stockings    # Possible hospital-acquired pneumonia, resolved/treated  # Positive blood culture x1 - consistent with contaminant  # Severe sepsis, resolved  # Acute hypoxic respiratory failure, resolved  # Leukocytosis, ongoing  Initially presented 9/19 from inpatient psych with temperature 101.3F, lactic acid 4.3 in the setting of new cough and widespread body aches. , WBC 9.8. 9/18 CXR with streaky right greater than left perihilar and basilar opacities, likely atelectasis and/or edema without focal consolidation. Respiratory viral panels (9/19, 9/22) have been negative. Echocardiogram not consistent with myocarditis. Briefly treated with vancomycin 9/19-9/23 due to a blood culture growing gram positive cocci; this was later determined to be Staph Hominis, suspected contaminant. Pulmonary exam without focal findings consistent with bacterial pneumonia, though patient had an ongoing cough and appears to have a significant burden of  respiratory secretions seen on 9/24 (resolved). RT evaluated and recommended use of aerobika flutter valve. White count have been elevated although stable with still no clear focus of infection, could be clozapine side effect. Patient is medically stable to return to psychiatry unit.   - Infectious disease consulted, now off  - S/p piperacillin/tazobactam (9/20- 9/26)  - Respiratory therapist saw Angela and she tolerated vest therapy x1  - Discontinue vest therapy with improved breathing and reassuring physical examination   - Aerobika flutter valve   - CBC q72h     # Hypocalcemia  # Hypoalbuminemia  Suspect possible malnutrition due to poor PO intake.   - Encourage good PO intake   - PRN Pantoprazole EC 40 mg tablets  - DEBORAH  - Zofran PRN, 30 mins before meals     # Hoahaoism  Important to her value system.   - Spiritual care consult         Diet: Combination Diet Regular Diet; Safe Tray - with utensils  Snacks/Supplements Adult: Other; Ensure Enlive mixed with ice cream; With Meals  Diet    DVT Prophylaxis: Low Risk/Ambulatory with no VTE prophylaxis indicated  Wilson Catheter: Not present  Fluids: PO  Lines: None     Cardiac Monitoring: None  Code Status: Full Code      Clinically Significant Risk Factors              # Hypoalbuminemia: Lowest albumin = 2 g/dL at 9/24/2023  8:46 AM, will monitor as appropriate               # Severe Malnutrition: based on nutrition assessment             Disposition Plan      Expected Discharge Date: 10/19/2023        Discharge Comments: Medically cleared for transfer to IP Psych with bed availibility.    10/19: Home v. In pt psych      The patient's care was discussed with the Attending Physician, Dr. Riddle.    DO [facility]  [facility]  Securely message with [employer] (more info)  Text page via [facility] Paging/Directory   See signed in provider for up to date coverage  information  ______________________________________________________________________    Interval History     Overnight:  NAEON.    Subjective:   Bright and interactive on exam this morning. Has no acute concerns and is very happy about the thought of going home with her daughter.    Physical Exam   Vital Signs: Temp: 98.3  F (36.8  C) Temp src: Oral BP: 107/50 Pulse: 80   Resp: 18 SpO2: 97 % O2 Device: None (Room air)    Weight: 202 lbs 12.8 oz    Constitutional: Alert and conversational, no apparent distress  Respiratory: Breathing comfortably at rest. Lungs CTAB  Cardiovascular: RRR without murmur  Abdomen: Soft, NTND.   MSK: No edema noted.   Neurologic: Awake, engages in appropriate conversation. Bright affect.

## 2023-10-19 NOTE — PLAN OF CARE
DISCHARGE SUMMARY    Pt discharging to: Home  Transportation: Family.   AVS given and discussed: yes.   Stoplight Tool given and discussed: NA.  Medications given: yes.  Belongings returned: yes.   Comments: pt understand discharge plan.

## 2023-10-19 NOTE — PLAN OF CARE
VS: VSS   O2: Stable on RA.   Output: Void on toilet.   Last BM: Today   Activity: Up to BR ad ascencion. Walked hallway with gait belt and SBA. Stable on feet.   Skin: Small scab on right ankle. Other skin C/D/I.   Pain: Denied.   Neuro: Intact. Denied any hallucinations today. A & O times 4. Calm/cooperative.   Dressing: None.   Diet: Regular. Needs encouragement to eat.   LDA: PIV right arm. Removed before discharge home.   Equipment: I.S., personal belongings.   Plan: Home this afternoon when MISHEL and MD complete plan. Daughter will  patient.   Additional Info: K+ = 3.4 today. Oral replacement given. Dr. SUAD Miranda gave verbal discontinue order of lab recheck this afternoon. 1:1 sitter until end of shift. Patient stable and eager to go home.

## 2023-10-19 NOTE — DISCHARGE INSTRUCTIONS
Medical Assistance Renewals CAN ONLY BE DONE WITH COUNTY MAIL-IN FORMS  - The state will reach out to those currently enrolled when it is time for them to reapply. Please make sure the state (for [place]) or your county or [place] agency (for Medical Assistance) has you current contact information so you will receive the application details. You can update contact information and learn more about this situation at the [place] Department of Human Services website ([email or web address].gov/dhs/[email or web address]).   **Call [facility] (PH: [phone]) to request another MA renewal form.     [email or web address]hs[place][email or web address]/[email or web address]  You can now submit your Medical Assistance (MA) renewal by uploading a scanned file or photos of your completed renewal using our health care renewal document upload tool. Get free help, including help to upload your renewal form, from a navigator. Go to [email or web address] and click  Assister Directory  under Find Free Help. Search the directory to find a navigator near you and one that speaks your language.    Insurance Information:  [employer]ICA ACCESS ABILITY [place]   Group # [id number]  Subscriber # [id number]  Policy # [id number]    PH: [phone]    [employer] Care Coordinator  Nathalie  PH: [phone]      Interim Commitment   Helen  PH: [phone]     Commitment   Susie Mejias  PH: [phone]  [email or web address]  - Will be pt's assigned CM after Intake    ACT   Constantino Joseph  PH: [phone]  Fax: [phone]  [email or web address]

## 2023-10-19 NOTE — PROGRESS NOTES
"Care Management Discharge Note    Discharge Date: 10/19/23     Discharge Disposition: Return to home with daughterMya    Discharge Services:  ACT Team, Commitment CM, [facility] [facility]    ACT SW  Constantino Joseph  PH: [phone]  Fax: [phone]  [email or web address]     Civil Commitment Information:  Type of Commitment: [facility]: [facility] File Number: [id number]  Date of Commitment: September 1, 2023  Date Commitment Ends: March 1, 2024    Interim MI MANDEEP Cervantes  PH: [phone]     MI CM  Susie Mejias  PH: [phone]  [email or web address]  - Will be pt's assigned CM after Intake      Traci Zelaya  PH: [phone]    Discharge DME: None    Discharge Transportation:  Mya    Private pay costs discussed: Not applicable    Does the patient's insurance plan have a 3 day qualifying hospital stay waiver?  Yes     Which insurance plan 3 day waiver is available? Alternative insurance waiver    Will the waiver be used for post-acute placement? No    PAS Confirmation Code:  (N/A)  Patient/family educated on Medicare website which has current facility and service quality ratings: no    Education Provided on the Discharge Plan: Yes  Persons Notified of Discharge Plans: Yefri Early's team, pt's daughter, patient, bedside nurse, charge nurse  Patient/Family in Agreement with the Plan: yes    Handoff Referral Completed: Yes    Additional Information:  SW received update from Suzie Kohler, [facility], yesterday re: pt's updated recs. Petition for Shun Domínguez has been rescinded with [facility] Attorney's Office. Pt can return home with daughter, Mya, if she is comfortable taking pt home.    1045: SW spoke to Mya via phone re: her conversation with [facility] liaison, Suzie Kohler, yesterday. Mya reported that she can to hospital last evening and thought pt \"looked much better.\" Mya stated she could take pt back home today, agreeable to SW speaking to IDT to determine time of " discharge and call back with plan.    1110: MISHEL spoke to [facility] (Constantino was in a meeting), who confirmed that ACT Team has a Psychiatrist who will continue to follow pt and be responsible for ordering psych meds. [facility] will update pt's ACT team of pt's discharge plan to return home this after noon.    1115: MISHEL left  for Interim OBED KAUFMAN, provided update on all of the above information, requested a call back for her fax # or email so SW can send PD.    OBED Cervantes  PH: [phone]    1120: MISHEL spoke to Yefri Bronson's team, who reported that discharge orders will be completed by noon.    1125: MISHEL called back Mya, provided update on discharge plan; stated that Mya should be at hospital at 3:30 p.m. today, which should allow enough time for pharmacy to fill medications. Mya indicated that pt's MA application had not arrived in the mail yet and has not call [facility] to request another MA renewal form (MISHEL provided contact info to Mya again yesterday). MISHEL informed Mya that website for on-line MA renewal will be added to pt's Discharge Instructions, along with pt's identifying information, so that Mya can renew pt's MA on-line.     1135: MISHEL updated bedside nurse of discharge plan.    1140: MISHEL inquired with pt who her medical doctor in the community is. Pt could not recall name of provider, but stated she goes to [facility] on [address]    1145: MISHEL updated PCP information on pt's face sheet.    1205: MISHEL added Commitment , ACT MISHEL, [employer] [facility], pt's insurance information, and instructions for renewing MA application in pt's Discharge Instructions.    1330: MISHEL met with pt at bedside, reviewed Provisional Discharge Agreement. Pt signed PD and was provided a copy. Pt had no further questions or concerns re: discharge plan.    1400: MISHEL called [facility]'s 's Office to ask if PC gets filed with them, was transferred to pt's , who provided MISHEL with  Clinical Supervisor's contact info to connect with for PD.    1410: MISHEL called Clinical Supervisor at [facility], Elizabeth Whitehead (PH: [phone]), whose VM stated she's out of the office the rest of the week, will return Oct. 23rd, provided Melissa's contact info interim.    1411: MISHEL called Melissa,  at [facility] for [facility] (PH: [phone]), left VM requesting a call back with fax # or email for SW to send PD to.    1445: Helen left return VM, requested that SW send PD and Discharge Summary via email; left email address in message, but SW had great difficulty hearing message.    1500: Mya left MISHEL a VM, stating that she is having car issues, and so would like pt to discharge tomorrow.    1540: MISHEL met with pt at bedside, provided update on Mya's car issues and inquired if pt felt comfortable going by taxi cab. MISHEL called Mya from pt's room. Mya and pt discussed discharge transportation. Mya reported to MISHEL that she does not feel comfortable with pt leaving the hospital in a taxi cab, and will attempt to get another family member to come to hospital to ensure pt has her discharge paperwork and medications. MISHEL provided Mya with RN station phone number, requested that she call to update nurse whether or not a family member will be here this evening for pt to discharge, or if discharge is postponed until tomorrow.    1550: MISHEL updated charge nurse delay in discharge.    1555: MISHEL left return VM for Helen, provided MISHEL's email address, requested Helen to send an email in order for MISHEL to have Helen's email address.        INGRID Snider, LSW  [facility]   PHONE: [phone]  Pager: [phone]

## 2023-10-19 NOTE — DISCHARGE SUMMARY
[facility]  Discharge Summary - Medicine & Pediatrics       Date of Admission:  9/20/2023  Date of Discharge:  10/19/2023  6:00 PM  Discharging Provider: Mahesh Miranda DO  Discharge Service: [facility]    Discharge Diagnoses   Patient Active Problem List   Diagnosis Code    Elvis (H) F30.9    Schizoaffective disorder, bipolar type (H) F25.0    Pneumonia due to infectious organism J18.9    Positive blood culture R78.81    Orthostatic hypotension I95.1    Other acute kidney failure (H) N17.8    ETHAN (acute kidney injury) (H24) N17.9       Clinically Significant Risk Factors     # Severe Malnutrition: based on nutrition assessment      Follow-ups Needed After Discharge   PCP: Please ensure patient has good follow-up with Psychiatry provider. Please check BMP, CBC.    Psychiatry: Please follow-up on risperidone dose, any plans for ECT, and eligibility for long-acting injectable    Unresulted Labs Ordered in the Past 30 Days of this Admission       No orders found from 8/21/2023 to 9/21/2023.        These results will be followed up by PCP    Discharge Disposition   Discharged to home with Daughter Mya  Condition at discharge: Stable    Hospital Course   Angela Basurto is a 58-year-old female PMH bipolar 1 on Mata with full commitment admitted to Psych 8/16 for elvis and psychosis in setting of medication non-adherence. Transferred to [facility] 9/18/23 for sepsis 2/2 HAP, with a subsequent ETHAN suspected 2/2 ATN triggered by poor PO intake, both of which were treated and resolved during her hospitalization. She had her psychiatric medications adjusted and optimized, leading to a significant overall improvement in her mental status.     # Bipolar I disorder  # MATA for Haldol, Clozapine, Risperidone, Olanzapine  # Full commitment   Presented with elvis and psychosis in the context of medication nonadherence. Was admitted with Psychiatry from  8/16 until transfer to [facility] service 9/20. Psychiatry initially initiated Clozaril with minimal improvement and side effects including worsening somnolence and leukocytosis. Switched to Risperidone 10/16 with less somnolence noted 10/17 and improving thereafter. Shun-Plascencia was placed 10/12 to pursue ECT, however with improvement on risperidone it was not ultimately pursued, though it remains an option in the future. Discussion of whether or not pt would be a candidate for long-acting injectable should be considered on an outpatient basis. Ultimately, with patient's mental status improvement without somnolence on risperidone, was determined that inpatient psychiatric care was not necessary and she was able to safely discharge home with her daughter, with whom she lives.  Daily:  - Risperidone 0.5 mg bedtime  PRN's:  - PRN Haldol 2 mg PO    # Hospital-acquired pneumonia  # Acute hypoxic respiratory failure   # UTI, resolved   # Severe sepsis  Initially presented 9/19 from inpatient psych with fever, lactic acidosis, elevated CRP, negative viral panels, cough, widespread body aches and a CXR without clear evidence of consolidation consistent with bacterial PNA. Patient was treated empirically, secretions managed, and had improvement in respiratory status. Subsequently, she had increased urinary frequency and suprapubic tenderness without convincing evidence (<10K CFUs Ecoli). Similarly, treated empirically with improvement. There were x2 Bclxs that returned positive 1/2 bottles presumed to be contaminant.   Antimicrobials  - Vancomycin (9/19-9/23)  - Piperacillin/tazobactam (9/20- 9/26)  - Ceftriaxone (10/7-10/8)  - Cefdinir (10/8- 10/10)  - Ceftriaxone (10/12)    # ETHAN vs. CKD, stable, suspect ATN 2/2 pre-renal insult and antibiotics   Baseline creatinine 0.5. Initially had ETHAN that peaked at 1.99 9/22. Developed worsening creatinine again with peak 1.91 10/8. Improved with IVF. Suspect ATN triggered by  pre-renal etiology. Has been stable on PO fluids, may require several weeks for return to baseline vs new diagnosis of CKD.  - follow-up BMP outpatient for CKD assessment  - Discontinued Hydrochlorothiazide      # Diarrhea, suspected antibiotic related vs. low fiber  # Hypokalemia, improving  Patient with intermittent loose stools. Negative enteric panels (10/5) and C diff (10/5, 10/8). Less concern for infectious etiology. Suspect related low fiber intake and intermittent antibiotics. Improved with Imodium.   - Continue Imodium 2 mg BID PRN   - Encourage fiber supplement and PO intake      # Moderate protein-calorie malnutrition   # Hypocalcemia  # Hypoalbuminemia  Patient with persistent poor PO intake, correlating with electrolyte and protein abnormalities. Per family, this is chronic and occurs even with traditional foods. Patient reported frequent nausea with food intake. Improved somnolence and mood did correlate with improved PO intake. Nutrition was consulted and participated in the improvement in patient's nutritive intake. Encourage food intake with family when possible.     # Elevated CRP, improved  # Leukocytosis, stable  Patient had persistently elevated CRP and leukocytosis throughout hospitalization, though timing of increases correlated with Clorazil use (which has some reports of leukocytosis as well as leukopenia per lit review). Patient had a thorough work-up for infection, all of which was reassuring, with a subsequent downtrend in CRP and leukocytosis near the end of her hospital stay. Malignancy initially considered, though much less likely given findings at end of hospitalization.  - Encourage cancer screening as outpatient      # Hyperglycemia   # Pre-diabetes (A1c 09/27/23, 6.2)  A1c 6.2% 9/27/23. Previously hyperglycemic in the setting of Clozaril. Blood sugars improved after holding Clozaril.   - Discontinued Lantus 10/15   - Metformin discontinued due to diarrhea      # HTN  -  discontinued Hydrochlorothiazide     # Anemia, chronic, normocytic   Baseline HGB 8-9. Remained at baseline throughout hospitalization.    Consultations This Hospital Stay   NUTRITION SERVICES ADULT IP CONSULT  PSYCHIATRY IP CONSULT  SPIRITUAL HEALTH SERVICES IP CONSULT  PHARMACY TO DOSE VANCO  PSYCHIATRY IP CONSULT  INFECTIOUS DISEASE [facility] ADULT IP CONSULT  CARE MANAGEMENT / SOCIAL WORK IP CONSULT  PSYCHIATRY IP CONSULT  PSYCHIATRY IP CONSULT  PSYCHIATRY IP CONSULT  PSYCHIATRY IP CONSULT  PSYCHIATRY IP CONSULT  PSYCHIATRY IP CONSULT  PSYCHIATRY IP CONSULT  PHARMACY TO DOSE VANCO  INFECTIOUS DISEASE [facility] ADULT IP CONSULT  PSYCHIATRY IP CONSULT  PSYCHIATRY IP CONSULT  NURSING TO CONSULT FOR VASCULAR ACCESS CARE IP CONSULT  PSYCHIATRY IP CONSULT    Code Status   Prior       The patient was discussed with our attending physician, MD Mahesh Perry DO [facility]  [facility]  [address] [place] [id number]  Phone: [phone]  Fax: [phone]  ______________________________________________________________________    Physical Exam   Vital Signs:                    Weight: 202 lbs 12.8 oz  Constitutional: awake, alert, cooperative, in NAD  Eyes: Sclera without jaundice or injection, EOM intact   HENT: NCAT without lesions or rashes  Respiratory: Lungs CTAB without crackles, wheezing, rales, or increased work of breathing  Cardiovascular: RRR without murmurs or extra sounds  Skin: No visible lesions, erythema, rashes, or ecchymosis on exposed skin  Neurologic: A&Ox4, without focal deficit, cranial nerves II-XII grossly intact  Psychiatric: Alert & calm with appropriate affect, mood, insight, and thought processes        Primary Care Physician   [facility]    Discharge Orders      Reason for your hospital stay    You were initially hospitalized for your mental health: your mood was dangerously high with elvis and  psychosis. During your hospitalization, you were also treated for kidney injury and pneumonia, both of which was better. We also worked with psychiatry to find a medication that works for you. You ultimately are on risperidone for your mood, which you are legally obligated to take.     Activity    Your activity upon discharge: activity as tolerated     Adult [facility] Follow-up and recommended labs and tests    Follow up with psychiatrist within 7 days for hospital follow- up.  No follow up labs or test are needed.      Appointments on [place] and/or [facility] (with [facility] or [facility] provider or service). Call [phone] if you haven't heard regarding these appointments within 7 days of discharge.     When to contact your care team    Call your primary doctor if you have any of the following: temperature greater than 100.4, lethargy, or concerns regarding mood.     Diet    Follow this diet upon discharge: Orders Placed This Encounter      Snacks/Supplements Adult: Other; Ensure Enlive mixed with ice cream; With Meals      Calorie Counts      Combination Diet Regular Diet; Safe Tray - with utensils       Significant Results and Procedures   Results for orders placed or performed during the hospital encounter of 09/20/23   XR Chest 2 Views    Narrative    EXAM: XR CHEST 2 VIEWS  9/22/2023 4:05 PM     HISTORY:  worsening O2 needs not improving on antibiotics       COMPARISON:  Chest x-ray 9/20/2023    FINDINGS:     AP and lateral chest radiographs. Rotated film. Trachea is midline.  Cardiomediastinal silhouette and pulmonary vasculature are within  normal limits. Right hemidiaphragm elevation with lower lung volumes.  Progression of right lower lobe opacities with interval development of  right upper and left sided pulmonary hazy and streaky opacities.  Retrocardiac consolidation. Stable bilateral costophrenic angle  blunting. No pneumothorax.    No acute osseous abnormality. Visualized upper abdomen  "is  unremarkable.        Impression    IMPRESSION:   1. Low lung volumes with increased atelectasis. Superimposed pneumonia  is not excluded  3. Persistent small bilateral pleural effusions.    I have personally reviewed the examination and initial interpretation  and I agree with the findings.    RICHARD VILLEGAS DO         SYSTEM ID:  [id number]   US Lower Extremity Venous Duplex Bilateral    Narrative    ULTRASOUND LOWER EXTREMITY VENOUS DUPLEX BILATERAL 9/22/2023 4:22 PM    CLINICAL HISTORY: B/L leg swelling,tachycardic, SpO2 of 89 % this  morning, concerns for DVT      COMPARISONS: None available.    REFERRING PROVIDER: ARCENIO TOUSSAINT    TECHNIQUE: Grayscale, color Doppler, Doppler waveform ultrasound  evaluation was performed through the bilateral common femoral,  femoral, and popliteal veins. Bilateral posterior tibial and peroneal  veins were evaluated with grayscale imaging and compression.    FINDINGS: Right common femoral, femoral, and popliteal veins are fully  compressible, patent, and demonstrate normal phasic Doppler waveforms  and/or augment normally.    Right posterior tibial veins are fully compressible to the ankle.    Right peroneal veins are fully compressible to the distal calf.    Left common femoral, femoral, and popliteal veins are fully  compressible, patent, and demonstrate normal phasic Doppler waveforms  and/or augment normally.    Left posterior tibial veins are fully compressible to the ankle.    Left peroneal veins are fully compressible to the distal calf.      Impression    IMPRESSION: No deep venous thrombosis demonstrated in either leg.    Reference: \"Duplex Ultrasound in the Diagnosis of Lower-Extremity Deep  Venous Thrombosis\"- Dhara Wilks MD, S; Aditya Merino MD  (Circulation. 2014;129:917-921. http://circ.ahajournals.org)    PINKY CRAWFORD MD         SYSTEM ID:  [id number]   XR Chest Port 1 View    Narrative    Exam: XR CHEST PORT 1 VIEW, 9/29/2023 4:10 PM    Comparison: " 9/22/2023    History: chest pain, crackles at right base, off antibiotics for  several days s/p treatment for possible HAP    Findings:  Portable AP view of the chest. Cardiac silhouette within normal  limits. Small right and trace left pleural effusions. No pneumothorax.  Mild bibasilar opacities. Low lung volumes.      Impression    Impression:   1. Similar low lung volumes with decreased pulmonary opacities, likely  atelectasis and/or mild edema.  2. Persistent small right and trace left pleural effusions, possibly  slightly decreased in size compared to 9/22/2023.    I have personally reviewed the examination and initial interpretation  and I agree with the findings.    DANIEL REYES MD         SYSTEM ID:  [id number]   XR Abdomen Port 1 View    Narrative    Exam: XR ABDOMEN PORT 1 VIEW, 9/29/2023 4:11 PM    Indication: decreased appetite, LUQ abdominal pain, assess for  constipation/obstruction    Comparison: None    Findings:   Supine portable abdominal radiographs. No air-filled distended loops  of bowel. Cholecystectomy clips. No acute osseous abnormalities.  Epigastric region not fully included.      Impression    Impression: Nonobstructive bowel gas pattern. No significant fecal  retention.    DANIEL REYES MD         SYSTEM ID:  [id number]   US Renal Complete Non-Vascular    Narrative    EXAMINATION: US RENAL COMPLETE NON-VASCULAR, 10/5/2023 3:59 PM     COMPARISON: None.    HISTORY: Worsening renal function, concern for obstruction    TECHNIQUE: The kidneys and bladder were scanned in the standard  fashion with specialized ultrasound transducer(s) using both gray  scale and limited color/spectral Doppler techniques.    FINDINGS:    Right kidney: Measures 12.8 cm in length. Normal sonographic  appearance of the renal parenchyma. No focal mass. Normal blood flow  on Doppler evaluation. No masses, cysts, or shadowing stones. No  hydronephrosis.    Left kidney: Measures 14.1 cm in length. Parenchyma is  of normal  thickness and echogenicity. No focal mass. Intact blood flow. No  masses, cysts, or shadowing stones. Mildly prominent appearance of the  left renal pelvis.    Bladder: Incompletely distended but grossly unremarkable.      Impression    IMPRESSION: Normal kidneys.    I have personally reviewed the examination and initial interpretation  and I agree with the findings.    RICHA ROSARIO MD         SYSTEM ID:  [id number]   XR Chest Port 1 View    Narrative    Exam: XR CHEST PORT 1 VIEW, 10/5/2023 2:41 PM    Comparison: Chest radiograph 9/29/2023    History: Recent PNA, poor historian, worsening leukocytosis    Findings:  Portable AP view of the chest at 20 degrees. Patient is rotated  slightly Citizen of [place].     Slightly decreased lung volumes with bilateral perihilar and bibasilar  streaky opacities. No focal acute airspace opacities. No pneumothorax.  Left costophrenic angle blunting. Clear right costophrenic angle.  Improved aeration of the left lower lobe. Cardiomediastinal silhouette  is within normal limits. Normal pulmonary vasculature. No acute or  suspicious osseous abnormalities. Partially visualized upper abdomen  is unremarkable.       Impression    Impression:   1. Similar-appearing decreased lung volumes with bilateral perihilar  and bibasilar streaky opacities, likely atelectasis. No focal acute  airspace opacity.  2. Stable small left-sided pleural effusion with resolution of  previous trace right-sided pleural effusion.   3. Improved aeration of the left lower lobe.    I have personally reviewed the examination and initial interpretation  and I agree with the findings.    AFSHIN PULIDO MD         SYSTEM ID:  [id number]       Discharge Medications   Discharge Medication List as of 10/19/2023  5:25 PM        START taking these medications    Details   risperiDONE (RISPERDAL) 0.5 MG tablet Take 1 tablet (0.5 mg) by mouth at bedtime, Disp-30 tablet, R-0, E-Prescribe           CONTINUE these medications which  have CHANGED    Details   haloperidol (HALDOL) 2 MG tablet Take 1 tablet (2 mg) by mouth every 6 hours as needed for other (anxiety), Disp-30 tablet, R-0, E-Prescribe      sennosides (SENOKOT) 8.6 MG tablet Take 2 tablets by mouth 2 times daily as needed for constipation, Disp-60 tablet, R-0, E-Prescribe           STOP taking these medications       divalproex sodium delayed-release (DEPAKOTE SPRINKLE) 125 MG DR capsule Comments:   Reason for Stopping:         OLANZapine (ZYPREXA) 10 MG tablet Comments:   Reason for Stopping:         OLANZapine (ZYPREXA) 20 MG tablet Comments:   Reason for Stopping:             Allergies   Allergies   Allergen Reactions    Pork-Derived Products Unknown     [facility] does not eat pork

## 2023-10-20 NOTE — TELEPHONE ENCOUNTER
R: No appropriate beds at [facility].  Pt remains on the work list awaiting placement.

## 2023-10-20 NOTE — PROGRESS NOTES
Brief SW Note:    10/20/23      MISHEL received requested email from Interim MANDEEP, so that SW could send PD and Discharge Summary.    1540: MISHEL emailed PD and Discharge Summary to Helen.      Interim MI MANDEEP Cervantes  PH: [phone]  [email or web address]          INGRID Snider, LSW  [facility]   PHONE: [phone]  Pager: [phone]

## 2023-10-20 NOTE — TELEPHONE ENCOUNTER
Per chart review for [facility] placement, Pt discharged from medical unit on 10/19/23 @ 6PM    Pt removed from [facility] and PPS is no longer following

## 2023-10-30 DIAGNOSIS — F25.0 SCHIZOAFFECTIVE DISORDER, BIPOLAR TYPE (H): ICD-10-CM

## 2023-10-30 RX ORDER — HALOPERIDOL 2 MG/1
2 TABLET ORAL EVERY 6 HOURS PRN
Qty: 30 TABLET | Refills: 0 | OUTPATIENT
Start: 2023-10-30

## 2023-10-30 RX ORDER — RISPERIDONE 0.5 MG/1
0.5 TABLET ORAL AT BEDTIME
Qty: 30 TABLET | Refills: 0 | OUTPATIENT
Start: 2023-10-30

## 2023-12-04 NOTE — PROGRESS NOTES
Case: [id number] Date/Time: 12/04/23 1445    Procedure: LAPAROSCOPIC SLEEVE GASTRECTOMY POSSIBLE HIATAL HERNIA REPAIR (Abdomen)    Anesthesia type: General    Pre-op diagnosis: MORBID OBESITY    Location: [place] [address] / SURGERY [facility]    Surgeons: John H Ganser, M.D.            Relevant Problems   ENDO   (positive) Hypothyroidism       Physical Exam    Airway   Mallampati: II  TM distance: >3 FB  Neck ROM: full       Cardiovascular - normal exam  Rhythm: regular  Rate: normal  (-) murmur     Dental - normal exam           Pulmonary - normal exam  Breath sounds clear to auscultation     Abdominal    Neurological - normal exam                   Anesthesia Plan    ASA 3   ASA physical status 3 criteria: morbid obesity - BMI greater than or equal to 40    Plan - general       Airway plan will be ETT          Induction: intravenous    Postoperative Plan: Postoperative administration of opioids is intended.    Pertinent diagnostic labs and testing reviewed    Informed Consent:    Anesthetic plan and risks discussed with patient.    Use of blood products discussed with: patient whom consented to blood products.            SPIRITUAL HEALTH SERVICES  SPIRITUAL ASSESSMENT Progress Note  [facility] ([facility] [place]) UR 20NB     REFERRAL SOURCE: Admission    I met with the pt in her unit. I introduced self and SHS's  support. Pt stated that she is struggling with BI- Polar. Pt shared that she is a single mother of a grown children. Pt stated that her father and her brother are her support system. Pt requested a prayer of healing. I prayed for her may Allah/God jaden her complete healing.    PLAN: SHS will remain the same.    Mine Khanlain Resident  Phone: [phone]

## 2024-01-08 NOTE — TELEPHONE ENCOUNTER
FUTURE VISIT INFORMATION      SURGERY INFORMATION:  Date: 1/23/24  Location: [facility] OR  Surgeon:  John Smith MD   Anesthesia Type:  General  Procedure: GASTRECTOMY, SLEEVE, LAPAROSCOPIC possible HERNIORRHAPHY, HIATAL, LAPAROSCOPIC     RECORDS REQUESTED FROM:       Primary Care Provider: Rashmi       R: [place]  Access Inpatient Bed Call Log 10/17/23 6:47 PM  PM   Intake has called facilities that have not updated the bed status within the last 12 hours.                           [facility] ONLY   [facility] is at capacity.             Pt remains on the work list pending appropriate bed availability.

## 2024-02-26 ENCOUNTER — LAB REQUISITION (OUTPATIENT)
Dept: LAB | Facility: CLINIC | Age: 59
End: 2024-02-26

## 2024-02-26 DIAGNOSIS — R30.0 DYSURIA: ICD-10-CM

## 2024-02-26 PROCEDURE — 87086 URINE CULTURE/COLONY COUNT: CPT | Performed by: INTERNAL MEDICINE

## 2024-02-28 LAB — BACTERIA UR CULT: NORMAL

## 2024-04-08 ENCOUNTER — LAB REQUISITION (OUTPATIENT)
Dept: LAB | Facility: CLINIC | Age: 59
End: 2024-04-08

## 2024-04-08 DIAGNOSIS — Z11.3 ENCOUNTER FOR SCREENING FOR INFECTIONS WITH A PREDOMINANTLY SEXUAL MODE OF TRANSMISSION: ICD-10-CM

## 2024-04-08 PROCEDURE — 87591 N.GONORRHOEAE DNA AMP PROB: CPT | Performed by: MIDWIFE

## 2024-04-08 PROCEDURE — 87491 CHLMYD TRACH DNA AMP PROBE: CPT | Performed by: MIDWIFE

## 2024-04-09 LAB
C TRACH DNA SPEC QL NAA+PROBE: NEGATIVE
N GONORRHOEA DNA SPEC QL NAA+PROBE: NEGATIVE

## 2024-11-25 ENCOUNTER — HOSPITAL ENCOUNTER (EMERGENCY)
Facility: CLINIC | Age: 59
Discharge: STILL ADMITTED OR ADMINISTRATIVE | End: 2024-11-26
Attending: STUDENT IN AN ORGANIZED HEALTH CARE EDUCATION/TRAINING PROGRAM | Admitting: STUDENT IN AN ORGANIZED HEALTH CARE EDUCATION/TRAINING PROGRAM

## 2024-11-25 ENCOUNTER — TELEPHONE (OUTPATIENT)
Dept: BEHAVIORAL HEALTH | Facility: CLINIC | Age: 59
End: 2024-11-25

## 2024-11-25 DIAGNOSIS — F23 ACUTE PSYCHOSIS (H): ICD-10-CM

## 2024-11-25 PROBLEM — F31.81 BIPOLAR 2 DISORDER (H): Status: ACTIVE | Noted: 2024-11-25

## 2024-11-25 LAB — SARS-COV-2 RNA RESP QL NAA+PROBE: NEGATIVE

## 2024-11-25 PROCEDURE — 87635 SARS-COV-2 COVID-19 AMP PRB: CPT | Performed by: STUDENT IN AN ORGANIZED HEALTH CARE EDUCATION/TRAINING PROGRAM

## 2024-11-25 PROCEDURE — 250N000013 HC RX MED GY IP 250 OP 250 PS 637: Performed by: STUDENT IN AN ORGANIZED HEALTH CARE EDUCATION/TRAINING PROGRAM

## 2024-11-25 PROCEDURE — 99285 EMERGENCY DEPT VISIT HI MDM: CPT | Performed by: STUDENT IN AN ORGANIZED HEALTH CARE EDUCATION/TRAINING PROGRAM

## 2024-11-25 RX ORDER — OLANZAPINE 10 MG/1
10 TABLET ORAL 2 TIMES DAILY
Status: DISCONTINUED | OUTPATIENT
Start: 2024-11-25 | End: 2024-11-27 | Stop reason: HOSPADM

## 2024-11-25 RX ORDER — HYDROXYZINE HYDROCHLORIDE 25 MG/1
25 TABLET, FILM COATED ORAL
Status: DISCONTINUED | OUTPATIENT
Start: 2024-11-25 | End: 2024-11-27 | Stop reason: HOSPADM

## 2024-11-25 RX ORDER — OLANZAPINE 5 MG/1
10 TABLET, ORALLY DISINTEGRATING ORAL 2 TIMES DAILY PRN
Status: DISCONTINUED | OUTPATIENT
Start: 2024-11-25 | End: 2024-11-27 | Stop reason: HOSPADM

## 2024-11-25 RX ORDER — ACETAMINOPHEN 325 MG/1
650 TABLET ORAL EVERY 4 HOURS PRN
Status: DISCONTINUED | OUTPATIENT
Start: 2024-11-25 | End: 2024-11-27 | Stop reason: HOSPADM

## 2024-11-25 RX ORDER — IBUPROFEN 600 MG/1
600 TABLET, FILM COATED ORAL EVERY 6 HOURS PRN
Status: DISCONTINUED | OUTPATIENT
Start: 2024-11-25 | End: 2024-11-27 | Stop reason: HOSPADM

## 2024-11-25 RX ADMIN — OLANZAPINE 10 MG: 10 TABLET, FILM COATED ORAL at 22:27

## 2024-11-25 ASSESSMENT — ACTIVITIES OF DAILY LIVING (ADL)
ADLS_ACUITY_SCORE: 41

## 2024-11-26 ENCOUNTER — TELEPHONE (OUTPATIENT)
Dept: BEHAVIORAL HEALTH | Facility: CLINIC | Age: 59
End: 2024-11-26
Payer: COMMERCIAL

## 2024-11-26 ENCOUNTER — HOSPITAL ENCOUNTER (EMERGENCY)
Facility: CLINIC | Age: 59
Discharge: HOME | End: 2024-11-27
Payer: COMMERCIAL

## 2024-11-26 ENCOUNTER — TELEPHONE (OUTPATIENT)
Dept: BEHAVIORAL HEALTH | Facility: CLINIC | Age: 59
End: 2024-11-26

## 2024-11-26 VITALS
RESPIRATION RATE: 16 BRPM | HEART RATE: 104 BPM | SYSTOLIC BLOOD PRESSURE: 153 MMHG | DIASTOLIC BLOOD PRESSURE: 79 MMHG | OXYGEN SATURATION: 98 % | TEMPERATURE: 98 F

## 2024-11-26 LAB
ALBUMIN UR-MCNC: NEGATIVE MG/DL
AMPHETAMINES UR QL SCN: NORMAL
APPEARANCE UR: CLEAR
BARBITURATES UR QL SCN: NORMAL
BENZODIAZ UR QL SCN: NORMAL
BILIRUB UR QL STRIP: NEGATIVE
BZE UR QL SCN: NORMAL
CANNABINOIDS UR QL SCN: NORMAL
COLOR UR AUTO: ABNORMAL
FENTANYL UR QL: NORMAL
GLUCOSE UR STRIP-MCNC: NEGATIVE MG/DL
HGB UR QL STRIP: NEGATIVE
KETONES UR STRIP-MCNC: NEGATIVE MG/DL
LEUKOCYTE ESTERASE UR QL STRIP: NEGATIVE
NITRATE UR QL: NEGATIVE
OPIATES UR QL SCN: NORMAL
PCP QUAL URINE (ROCHE): NORMAL
PH UR STRIP: 6 [PH] (ref 5–7)
RBC URINE: <1 /HPF
SP GR UR STRIP: 1.01 (ref 1–1.03)
SQUAMOUS EPITHELIAL: 2 /HPF
TRANSITIONAL EPI: <1 /HPF
UROBILINOGEN UR STRIP-MCNC: NORMAL MG/DL
WBC URINE: 2 /HPF

## 2024-11-26 PROCEDURE — 99255 IP/OBS CONSLTJ NEW/EST HI 80: CPT | Performed by: PSYCHIATRY & NEUROLOGY

## 2024-11-26 PROCEDURE — 81003 URINALYSIS AUTO W/O SCOPE: CPT | Performed by: STUDENT IN AN ORGANIZED HEALTH CARE EDUCATION/TRAINING PROGRAM

## 2024-11-26 PROCEDURE — 250N000011 HC RX IP 250 OP 636: Mod: JZ | Performed by: EMERGENCY MEDICINE

## 2024-11-26 PROCEDURE — 80307 DRUG TEST PRSMV CHEM ANLYZR: CPT | Performed by: STUDENT IN AN ORGANIZED HEALTH CARE EDUCATION/TRAINING PROGRAM

## 2024-11-26 PROCEDURE — 96372 THER/PROPH/DIAG INJ SC/IM: CPT | Performed by: EMERGENCY MEDICINE

## 2024-11-26 PROCEDURE — 250N000013 HC RX MED GY IP 250 OP 250 PS 637: Performed by: STUDENT IN AN ORGANIZED HEALTH CARE EDUCATION/TRAINING PROGRAM

## 2024-11-26 RX ORDER — OLANZAPINE 10 MG/2ML
10 INJECTION, POWDER, FOR SOLUTION INTRAMUSCULAR ONCE
Status: COMPLETED | OUTPATIENT
Start: 2024-11-26 | End: 2024-11-26

## 2024-11-26 RX ORDER — OLANZAPINE 5 MG/1
5 TABLET, ORALLY DISINTEGRATING ORAL ONCE
Status: DISCONTINUED | OUTPATIENT
Start: 2024-11-26 | End: 2024-11-26

## 2024-11-26 RX ORDER — ROPINIROLE 0.5 MG/1
0.5 TABLET, FILM COATED ORAL 3 TIMES DAILY
Status: ON HOLD | COMMUNITY
End: 2024-11-27

## 2024-11-26 RX ORDER — RISPERIDONE 0.5 MG/1
0.5 TABLET ORAL AT BEDTIME
Status: DISCONTINUED | OUTPATIENT
Start: 2024-11-26 | End: 2024-11-27 | Stop reason: HOSPADM

## 2024-11-26 RX ADMIN — OLANZAPINE 10 MG: 10 TABLET, FILM COATED ORAL at 20:10

## 2024-11-26 RX ADMIN — OLANZAPINE 10 MG: 10 TABLET, FILM COATED ORAL at 13:03

## 2024-11-26 RX ADMIN — OLANZAPINE 10 MG: 10 INJECTION, POWDER, FOR SOLUTION INTRAMUSCULAR at 04:58

## 2024-11-26 ASSESSMENT — ACTIVITIES OF DAILY LIVING (ADL)
ADLS_ACUITY_SCORE: 41
ADLS_ACUITY_SCORE: 63
ADLS_ACUITY_SCORE: 41
ADLS_ACUITY_SCORE: 63
ADLS_ACUITY_SCORE: 41
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
## 2024-11-26 NOTE — ED NOTES
IP MH Referral Acuity Rating Score (RARS)    LMHP complete at referral to IP MH, with DEC; and, daily while awaiting IP MH placement. Call score to PPS.  CRITERIA SCORING   New 72 HH and Involuntary for IP MH (not adolescent) 1/1   Boarding over 24 hours 0/1   Vulnerable adult at least 55+ with multiple co morbidities; or, Patient age 11 or under 0/1   Suicide ideation without relief of precipitating factors 0/1   Current plan for suicide 0/1   Current plan for homicide 0/1   Imminent risk or actual attempt to seriously harm another without relief of factors precipitating the attempt 1/1   Severe dysfunction in daily living (ex: complete neglect for self care, extreme disruption in vegetative function, extreme deterioration in social interactions) 0/1   Recent (last 2 weeks) or current physical aggression in the ED 0/1   Restraints or seclusion episode in ED 0/1   Verbal aggression, agitation, yelling, etc., while in the ED 0/1   Active psychosis with psychomotor agitation or catatonia 0/1   Need for constant or near constant redirection (from leaving, from others, etc).  0/1   Intrusive or disruptive behaviors 1/1   TOTAL Acuity Total Score: 3
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
## 2024-11-26 NOTE — PROGRESS NOTES
Report given to VANIA Herrera at [facility].   [facility] called for transport set up. ETA will be updated once insurance authorization processes.   Pt updated and agreeable.

## 2024-11-26 NOTE — ED NOTES
Pt suddenly got up from bed and peed on the blanket. Pt refused to go to the bathroom, urine sample was not obtained. Bedside commode on standby.

## 2024-11-26 NOTE — TELEPHONE ENCOUNTER
R;  Intake received  11/26/2024 12:02:34 PM EXHIBIT A. Filed in Court/Commitment/Santos/Hold folder.    R;  11/26/2024 12:02:36 PM EXAMINER'S STATEMENT IN SUPPORT OF PETITION FOR COMMITMENT. Filed in Court/Commitment/Santos/Hold folder.    R;  11/26/2024 12:03:32 PM EXAMINER'S STATEMENT IN SUPPORT OF PETITION FOR COMMITMENT. Filed in Court/Commitment/Santos/Hold folder.  R;  11/26/2024 12:03:57 PM PETITION FOR JUDICAL COMMITMENT.  Filed in Court/Commitment/Santos/Hold folder.  R;  11/26/2024 12:05:12 PM PETITION FOR JUDICAL COMMITMENT.  Filed in Court/Commitment/Santos/Hold folder.  Coordinator following notified.

## 2024-11-26 NOTE — TELEPHONE ENCOUNTER
R: [place]  Access Inpatient Bed Call Log 11/25/2024 12:16AM   Intake has called facilities that have not updated the bed status within the last 12 hours.       Pt is on 72HH.  Statewide only.          (Adults)         STATEWIDE:      [facility] is posting 0 beds.   [facility] is posting 0 beds. [phone]  [facility] is posting 0 beds. Negative covid required.    [facility] is posting 0 beds. Neg covid. No high school/Shadia-psych. [phone]. - Per Meaghan @ 11:35PM, they have SD/low acuity beds available  [facility] is posting 0 beds. [phone]    [facility] is posting 0 bed. [phone]     [facility] is posting 5 beds. (Ages 18-35) Negative covid. [phone]. -Per Diana @ 11:38PM, YA beds available  [facility] is posting 0 beds.     [facility] is posting 1 bed [phone]. -Per Jenise @ 11:36PM, they are at capacity for adults until 9AM   [facility] is posting 2 beds. LOW acuity ONLY. Mixed unit 12+. Negative covid- [phone]    [facility] has 0 beds posted. No aggression. Negative Covid. Low acuity.    [facility] ([place]) is posting 3 beds. Low acuity only. Neg covid.  [phone]     [facility] is posting 3 beds. Low acuity. No current aggression. -Per Jenise @ 11:36PM, they are at capacity for adults until 9AM   [facility] is posting 0 beds. Negative covid. [phone].     [facility] ([place]) is posting 0 beds available. Negative covid.  [phone].        [facility] is posting 0 bed. Low acuity. 72 HH hold preferred. Negative covid required. [phone].  *Does not review after 10PM*  [facility] ([place]) is posting 4 beds. Low acuity only. Neg covid.  [phone].    [facility] [place] is posting 2 beds.  Negative covid required.  Vol only, No history of aggression, violence, or assault. No sexual offenders. No 72 HH holds.  [phone]        [facility] is posting 0 beds. Negative covid required.  (Must have the cognitive ability to do programming. No aggressive or violent behavior or recent HX in the last 2 yrs. MH must be primary.) Always low acuity. [phone]     [facility] has 3 beds posted. Negative covid required.  Low acuity only. Violence and aggression capped.  [phone].  [facility] is posting 2 beds. Low acuity, Negative covid required. [phone]. -Per Mehrdad @ 11:45PM, they may have 1 or 2 beds  [facility] posting 1 bed. Negative covid required.  [phone].    [facility]. Negative covid. LOW acuity. (No lines, drains, or tubes, oxygen, CPAP, IV, etc.) Must Have a Ride Home. [phone]. - Per Maureen @ 11:46PM, they do not take referrals overnight  [facility] is posting 2 beds. Negative covid. (No. lines, drains, or tubes, oxygen, CPAP, IV, etc.) [phone]. -Per RN @ 11:49PM, beds available    Pt remains on the work list pending appropriate bed availability.

## 2024-11-26 NOTE — PHARMACY-ADMISSION MEDICATION HISTORY
Pharmacist Admission Medication History    Admission medication history is complete. The information provided in this note is only as accurate as the sources available at the time of the update.    Information Source(s): Clinic records and [facility]    Pertinent Information:   Unable to interview patient at this time to confirm medications, medication history based on fill history alone.     Changes made to PTA medication list:  Added: ropinirole 0.5mg daily  Deleted: None  Changed: None    Allergies reviewed with patient and updates made in EHR: unable to assess    Medication History Completed By: Magda Abad RPH 11/26/2024 10:17 AM    PTA Med List   Medication Sig Last Dose/Taking    rOPINIRole (REQUIP) 0.5 MG tablet Take 0.5 mg by mouth 3 times daily. Unknown

## 2024-11-26 NOTE — PLAN OF CARE
Tevin George  November 25, 2024  Plan of Care Hand-off Note     Patient Care Path: inpatient mental health    Plan for Care:   Upon completion of assessment and in consultation with attending provider, the pt s circumstances and mental state pt appears to require inpatient care. It is the recommendation of this clinician that pt be admitted to an inpatient hospital for stabilization. Pt poses acute risk themselves as they cannot appropriately manage their mental health symptoms safely in the community.  Patient is unable to engage in meaningful conversation or able to give appropriate information regarding her identity.  Patient appears to be acutely psychotic and will require hospitalization in order to stabilize the symptoms.    Identified Goals and Safety Issues: (P) Stablized psychotic symptoms and is able to appropriately engage in conversation.    Overview:               Legal Status: Legal Status at Admission: 72 Hour Hold  72 Hour Hold - Date/Time Initiated: 11/25/24 2030  72 Hour Hold - Date/Time Ends: 11/29/24 2030    Psychiatry Consult:       Updated   regarding plan of care.           Georgia Conti, ZUHAIRSW

## 2024-11-26 NOTE — CONSULTS
[facility] COURT- PROBATE/MENTAL HEALTH DIVISION  FOURTH ([place])  _____________________________________________________________________________________________________________________________________    WRITTEN REQUEST FOR AUTHORIZATION TO IMPOSE TREATMENT AND REQUEST FOR HEARING    In the Matter of the Civil Commitment of:   Becky Deandre Decker    :1965       Respondent: Becky Decker   DC File No.  [id number]   [place]  File No. __________     I, Baljit Tang MD to the best of my knowledge, information, and belief respectfully represent:    I am a member of the treatment team for the respondent.    2. Respondent was born on 1965.     3. Respondent is presently receiving care and treatment at: [facility]/ [facility] to [facility]     4. Diagnostic studies performed by Baljit Tang MD on respondent appear to indicate that respondent suffers from:    295.90  (F20.9) Schizophrenia, present     5.   Baljit Tagn MD  has determined neuroleptic medication(s) to be medically necessary.    6.   I have determined that the benefits of administration of the proposed treatment to the respondent outweigh the risks and therefore this procedure is reasonable.    7.   Respondent s written informed consent to the administration of the above procedure has notbeen obtained because of incompetency to make a rational decision regarding the proposedtreatment.    8.The objective of the proposed treatment is to treat the symptoms of the mental illness that interfere with the respondent s ability to function.    9.Petitioner requests that a hearing be scheduled, and that authorization to administer the proposed treatment be granted according to law.     Dated: 2024    Provider s Signature: _________________________  Print Name: Baljit Tang MD, Psychiatrist     [facility]  [facility] EMERGENCY DEPARTMENT  [address] [place] [id number]  [phone]  [phone]

## 2024-11-26 NOTE — TELEPHONE ENCOUNTER
Pt chart marked for merge. Per ED/ExtCare, continue to chart under this MRN for current encounter.    R:    9:37 AM Per call from John @ [facility], Pt has been accepted to [facility]. Accepting provider is [facility]. RN can call report at anytime to [phone].   10:15 PM Provided ED with placement.     Final Disposition: [facility]

## 2024-11-27 ENCOUNTER — HOSPITAL ENCOUNTER (INPATIENT)
Facility: HOSPITAL | Age: 59
End: 2024-11-27
Attending: STUDENT IN AN ORGANIZED HEALTH CARE EDUCATION/TRAINING PROGRAM | Admitting: STUDENT IN AN ORGANIZED HEALTH CARE EDUCATION/TRAINING PROGRAM

## 2024-11-27 DIAGNOSIS — F30.9 MANIA (H): Primary | ICD-10-CM

## 2024-11-27 DIAGNOSIS — I10 HYPERTENSION, UNSPECIFIED TYPE: ICD-10-CM

## 2024-11-27 DIAGNOSIS — R52 PAIN: ICD-10-CM

## 2024-11-27 DIAGNOSIS — Z00.00 HEALTH CARE MAINTENANCE: ICD-10-CM

## 2024-11-27 DIAGNOSIS — F41.9 ANXIETY: ICD-10-CM

## 2024-11-27 DIAGNOSIS — F06.1 CATATONIA: ICD-10-CM

## 2024-11-27 DIAGNOSIS — F31.81 BIPOLAR 2 DISORDER (H): ICD-10-CM

## 2024-11-27 DIAGNOSIS — K59.00 CONSTIPATION, UNSPECIFIED CONSTIPATION TYPE: ICD-10-CM

## 2024-11-27 DIAGNOSIS — R05.9 COUGH, UNSPECIFIED TYPE: ICD-10-CM

## 2024-11-27 LAB
ALBUMIN SERPL BCG-MCNC: 4.4 G/DL (ref 3.5–5.2)
ALBUMIN SERPL BCG-MCNC: 4.4 G/DL (ref 3.5–5.2)
ALP SERPL-CCNC: 68 U/L (ref 40–150)
ALP SERPL-CCNC: 68 U/L (ref 40–150)
ALT SERPL W P-5'-P-CCNC: 22 U/L (ref 0–50)
ALT SERPL W P-5'-P-CCNC: 22 U/L (ref 0–50)
ANION GAP SERPL CALCULATED.3IONS-SCNC: 14 MMOL/L (ref 7–15)
ANION GAP SERPL CALCULATED.3IONS-SCNC: 14 MMOL/L (ref 7–15)
AST SERPL W P-5'-P-CCNC: 19 U/L (ref 0–45)
AST SERPL W P-5'-P-CCNC: 19 U/L (ref 0–45)
BASOPHILS # BLD AUTO: 0 10E3/UL (ref 0–0.2)
BASOPHILS # BLD AUTO: 0 10E3/UL (ref 0–0.2)
BASOPHILS NFR BLD AUTO: 0 %
BASOPHILS NFR BLD AUTO: 0 %
BILIRUB SERPL-MCNC: 0.3 MG/DL
BILIRUB SERPL-MCNC: 0.3 MG/DL
BUN SERPL-MCNC: 10.8 MG/DL (ref 6–20)
BUN SERPL-MCNC: 10.8 MG/DL (ref 6–20)
CALCIUM SERPL-MCNC: 9.5 MG/DL (ref 8.8–10.4)
CALCIUM SERPL-MCNC: 9.5 MG/DL (ref 8.8–10.4)
CHLORIDE SERPL-SCNC: 100 MMOL/L (ref 98–107)
CHLORIDE SERPL-SCNC: 100 MMOL/L (ref 98–107)
CK SERPL-CCNC: 165 U/L (ref 26–192)
CK SERPL-CCNC: 165 U/L (ref 26–192)
CREAT SERPL-MCNC: 0.76 MG/DL (ref 0.51–0.95)
CREAT SERPL-MCNC: 0.76 MG/DL (ref 0.51–0.95)
EGFRCR SERPLBLD CKD-EPI 2021: >90 ML/MIN/1.73M2
EGFRCR SERPLBLD CKD-EPI 2021: >90 ML/MIN/1.73M2
EOSINOPHIL # BLD AUTO: 0 10E3/UL (ref 0–0.7)
EOSINOPHIL # BLD AUTO: 0 10E3/UL (ref 0–0.7)
EOSINOPHIL NFR BLD AUTO: 0 %
EOSINOPHIL NFR BLD AUTO: 0 %
ERYTHROCYTE [DISTWIDTH] IN BLOOD BY AUTOMATED COUNT: 12.4 % (ref 10–15)
ERYTHROCYTE [DISTWIDTH] IN BLOOD BY AUTOMATED COUNT: 12.4 % (ref 10–15)
EST. AVERAGE GLUCOSE BLD GHB EST-MCNC: 114 MG/DL
EST. AVERAGE GLUCOSE BLD GHB EST-MCNC: 114 MG/DL
GLUCOSE SERPL-MCNC: 120 MG/DL (ref 70–99)
GLUCOSE SERPL-MCNC: 120 MG/DL (ref 70–99)
HBA1C MFR BLD: 5.6 %
HBA1C MFR BLD: 5.6 %
HCO3 SERPL-SCNC: 24 MMOL/L (ref 22–29)
HCO3 SERPL-SCNC: 24 MMOL/L (ref 22–29)
HCT VFR BLD AUTO: 38.8 % (ref 35–47)
HCT VFR BLD AUTO: 38.8 % (ref 35–47)
HGB BLD-MCNC: 12.6 G/DL (ref 11.7–15.7)
HGB BLD-MCNC: 12.6 G/DL (ref 11.7–15.7)
HOLD SPECIMEN: NORMAL
HOLD SPECIMEN: NORMAL
IMM GRANULOCYTES # BLD: 0 10E3/UL
IMM GRANULOCYTES # BLD: 0 10E3/UL
IMM GRANULOCYTES NFR BLD: 0 %
IMM GRANULOCYTES NFR BLD: 0 %
LYMPHOCYTES # BLD AUTO: 2.3 10E3/UL (ref 0.8–5.3)
LYMPHOCYTES # BLD AUTO: 2.3 10E3/UL (ref 0.8–5.3)
LYMPHOCYTES NFR BLD AUTO: 34 %
LYMPHOCYTES NFR BLD AUTO: 34 %
MCH RBC QN AUTO: 31.1 PG (ref 26.5–33)
MCH RBC QN AUTO: 31.1 PG (ref 26.5–33)
MCHC RBC AUTO-ENTMCNC: 32.5 G/DL (ref 31.5–36.5)
MCHC RBC AUTO-ENTMCNC: 32.5 G/DL (ref 31.5–36.5)
MCV RBC AUTO: 96 FL (ref 78–100)
MCV RBC AUTO: 96 FL (ref 78–100)
MONOCYTES # BLD AUTO: 0.4 10E3/UL (ref 0–1.3)
MONOCYTES # BLD AUTO: 0.4 10E3/UL (ref 0–1.3)
MONOCYTES NFR BLD AUTO: 6 %
MONOCYTES NFR BLD AUTO: 6 %
NEUTROPHILS # BLD AUTO: 4.1 10E3/UL (ref 1.6–8.3)
NEUTROPHILS # BLD AUTO: 4.1 10E3/UL (ref 1.6–8.3)
NEUTROPHILS NFR BLD AUTO: 60 %
NEUTROPHILS NFR BLD AUTO: 60 %
NRBC # BLD AUTO: 0 10E3/UL
NRBC # BLD AUTO: 0 10E3/UL
NRBC BLD AUTO-RTO: 0 /100
NRBC BLD AUTO-RTO: 0 /100
PLATELET # BLD AUTO: 279 10E3/UL (ref 150–450)
PLATELET # BLD AUTO: 279 10E3/UL (ref 150–450)
POTASSIUM SERPL-SCNC: 3.7 MMOL/L (ref 3.4–5.3)
POTASSIUM SERPL-SCNC: 3.7 MMOL/L (ref 3.4–5.3)
PROT SERPL-MCNC: 7.5 G/DL (ref 6.4–8.3)
PROT SERPL-MCNC: 7.5 G/DL (ref 6.4–8.3)
RBC # BLD AUTO: 4.05 10E6/UL (ref 3.8–5.2)
RBC # BLD AUTO: 4.05 10E6/UL (ref 3.8–5.2)
SODIUM SERPL-SCNC: 138 MMOL/L (ref 135–145)
SODIUM SERPL-SCNC: 138 MMOL/L (ref 135–145)
TSH SERPL DL<=0.005 MIU/L-ACNC: 1.65 UIU/ML (ref 0.3–4.2)
TSH SERPL DL<=0.005 MIU/L-ACNC: 1.65 UIU/ML (ref 0.3–4.2)
WBC # BLD AUTO: 6.8 10E3/UL (ref 4–11)
WBC # BLD AUTO: 6.8 10E3/UL (ref 4–11)

## 2024-11-27 PROCEDURE — 99222 1ST HOSP IP/OBS MODERATE 55: CPT | Performed by: NURSE PRACTITIONER

## 2024-11-27 PROCEDURE — 85004 AUTOMATED DIFF WBC COUNT: CPT | Performed by: NURSE PRACTITIONER

## 2024-11-27 PROCEDURE — 83036 HEMOGLOBIN GLYCOSYLATED A1C: CPT | Performed by: NURSE PRACTITIONER

## 2024-11-27 PROCEDURE — 36415 COLL VENOUS BLD VENIPUNCTURE: CPT | Performed by: NURSE PRACTITIONER

## 2024-11-27 PROCEDURE — 84132 ASSAY OF SERUM POTASSIUM: CPT | Performed by: NURSE PRACTITIONER

## 2024-11-27 PROCEDURE — 124N000001 HC R&B MH

## 2024-11-27 PROCEDURE — 250N000013 HC RX MED GY IP 250 OP 250 PS 637: Performed by: NURSE PRACTITIONER

## 2024-11-27 PROCEDURE — 84155 ASSAY OF PROTEIN SERUM: CPT | Performed by: NURSE PRACTITIONER

## 2024-11-27 PROCEDURE — 84443 ASSAY THYROID STIM HORMONE: CPT | Performed by: NURSE PRACTITIONER

## 2024-11-27 PROCEDURE — 82550 ASSAY OF CK (CPK): CPT | Performed by: NURSE PRACTITIONER

## 2024-11-27 PROCEDURE — 99223 1ST HOSP IP/OBS HIGH 75: CPT | Mod: AI | Performed by: NURSE PRACTITIONER

## 2024-11-27 PROCEDURE — 250N000013 HC RX MED GY IP 250 OP 250 PS 637: Performed by: STUDENT IN AN ORGANIZED HEALTH CARE EDUCATION/TRAINING PROGRAM

## 2024-11-27 PROCEDURE — 85014 HEMATOCRIT: CPT | Performed by: NURSE PRACTITIONER

## 2024-11-27 PROCEDURE — 85025 COMPLETE CBC W/AUTO DIFF WBC: CPT | Performed by: NURSE PRACTITIONER

## 2024-11-27 PROCEDURE — 82565 ASSAY OF CREATININE: CPT | Performed by: NURSE PRACTITIONER

## 2024-11-27 RX ORDER — DIPHENHYDRAMINE HCL 50 MG
50 CAPSULE ORAL EVERY 8 HOURS PRN
Status: DISPENSED | OUTPATIENT
Start: 2024-11-27

## 2024-11-27 RX ORDER — HALOPERIDOL 5 MG/ML
5 INJECTION INTRAMUSCULAR EVERY 8 HOURS PRN
Status: ACTIVE | OUTPATIENT
Start: 2024-11-27

## 2024-11-27 RX ORDER — MAGNESIUM HYDROXIDE/ALUMINUM HYDROXICE/SIMETHICONE 120; 1200; 1200 MG/30ML; MG/30ML; MG/30ML
30 SUSPENSION ORAL EVERY 4 HOURS PRN
Status: ACTIVE | OUTPATIENT
Start: 2024-11-27

## 2024-11-27 RX ORDER — POLYETHYLENE GLYCOL 3350 17 G/17G
17 POWDER, FOR SOLUTION ORAL DAILY PRN
Status: ACTIVE | OUTPATIENT
Start: 2024-11-27

## 2024-11-27 RX ORDER — RISPERIDONE 1 MG/1
2 TABLET ORAL AT BEDTIME
Status: DISPENSED | OUTPATIENT
Start: 2024-11-27

## 2024-11-27 RX ORDER — ACETAMINOPHEN 325 MG/1
325-650 TABLET ORAL EVERY 4 HOURS PRN
Status: ON HOLD | COMMUNITY

## 2024-11-27 RX ORDER — HALOPERIDOL 5 MG/1
5 TABLET ORAL EVERY 8 HOURS PRN
Status: DISPENSED | OUTPATIENT
Start: 2024-11-27

## 2024-11-27 RX ORDER — HYDROXYZINE HYDROCHLORIDE 25 MG/1
25 TABLET, FILM COATED ORAL EVERY 6 HOURS PRN
Status: ACTIVE | OUTPATIENT
Start: 2024-11-27

## 2024-11-27 RX ORDER — OLANZAPINE 10 MG/1
10 TABLET ORAL EVERY 6 HOURS PRN
Status: DISPENSED | OUTPATIENT
Start: 2024-11-27

## 2024-11-27 RX ORDER — ACETAMINOPHEN 325 MG/1
650 TABLET ORAL EVERY 4 HOURS PRN
Status: ACTIVE | OUTPATIENT
Start: 2024-11-27

## 2024-11-27 RX ORDER — DOCUSATE SODIUM 100 MG/1
100 CAPSULE, LIQUID FILLED ORAL DAILY
Status: DISPENSED | OUTPATIENT
Start: 2024-11-27

## 2024-11-27 RX ORDER — AMOXICILLIN 250 MG
1 CAPSULE ORAL 2 TIMES DAILY PRN
Status: DISPENSED | OUTPATIENT
Start: 2024-11-27

## 2024-11-27 RX ORDER — RISPERIDONE 0.5 MG/1
0.5 TABLET ORAL AT BEDTIME
Status: ON HOLD | COMMUNITY
Start: 2024-11-18

## 2024-11-27 RX ORDER — LORAZEPAM 1 MG/1
2 TABLET ORAL EVERY 8 HOURS PRN
Status: DISPENSED | OUTPATIENT
Start: 2024-11-27

## 2024-11-27 RX ORDER — LORAZEPAM 1 MG/1
1 TABLET ORAL EVERY 4 HOURS PRN
Status: DISPENSED | OUTPATIENT
Start: 2024-11-27

## 2024-11-27 RX ORDER — OLANZAPINE 10 MG/2ML
10 INJECTION, POWDER, FOR SOLUTION INTRAMUSCULAR EVERY 6 HOURS PRN
Status: ACTIVE | OUTPATIENT
Start: 2024-11-27

## 2024-11-27 RX ORDER — DIPHENHYDRAMINE HYDROCHLORIDE 50 MG/ML
50 INJECTION INTRAMUSCULAR; INTRAVENOUS EVERY 8 HOURS PRN
Status: ACTIVE | OUTPATIENT
Start: 2024-11-27

## 2024-11-27 RX ORDER — LORAZEPAM 2 MG/ML
2 INJECTION INTRAMUSCULAR EVERY 8 HOURS PRN
Status: ACTIVE | OUTPATIENT
Start: 2024-11-27

## 2024-11-27 RX ADMIN — RISPERIDONE 2 MG: 1 TABLET, FILM COATED ORAL at 19:16

## 2024-11-27 RX ADMIN — LORAZEPAM 1 MG: 1 TABLET ORAL at 19:16

## 2024-11-27 RX ADMIN — LORAZEPAM 2 MG: 1 TABLET ORAL at 01:41

## 2024-11-27 RX ADMIN — HALOPERIDOL 5 MG: 5 TABLET ORAL at 01:41

## 2024-11-27 RX ADMIN — HALOPERIDOL 5 MG: 5 TABLET ORAL at 13:43

## 2024-11-27 RX ADMIN — DIPHENHYDRAMINE HYDROCHLORIDE 50 MG: 50 CAPSULE ORAL at 13:43

## 2024-11-27 RX ADMIN — SENNOSIDES AND DOCUSATE SODIUM 1 TABLET: 8.6; 5 TABLET ORAL at 13:43

## 2024-11-27 RX ADMIN — OLANZAPINE 10 MG: 10 TABLET, FILM COATED ORAL at 07:13

## 2024-11-27 RX ADMIN — LORAZEPAM 2 MG: 1 TABLET ORAL at 13:43

## 2024-11-27 RX ADMIN — DIPHENHYDRAMINE HYDROCHLORIDE 50 MG: 50 CAPSULE ORAL at 01:41

## 2024-11-27 ASSESSMENT — ACTIVITIES OF DAILY LIVING (ADL)
ADLS_ACUITY_SCORE: 18
HYGIENE/GROOMING: INDEPENDENT
ADLS_ACUITY_SCORE: 18
ADLS_ACUITY_SCORE: 38
ADLS_ACUITY_SCORE: 18
ORAL_HYGIENE: INDEPENDENT
LAUNDRY: UNABLE TO COMPLETE
ORAL_HYGIENE: PROMPTS
DRESS: SCRUBS (BEHAVIORAL HEALTH);INDEPENDENT
ADLS_ACUITY_SCORE: 38
HYGIENE/GROOMING: PROMPTS
ADLS_ACUITY_SCORE: 38
ADLS_ACUITY_SCORE: 18
ADLS_ACUITY_SCORE: 38
ADLS_ACUITY_SCORE: 38
ADLS_ACUITY_SCORE: 18
ADLS_ACUITY_SCORE: 38
ADLS_ACUITY_SCORE: 18
ADLS_ACUITY_SCORE: 18
DRESS: PROMPTS
ADLS_ACUITY_SCORE: 18

## 2024-11-27 NOTE — PLAN OF CARE
"Face to face shift report received from Whitney BERNABE RN. Rounding completed, pt observed.    Problem: Adult Behavioral Health Plan of Care  Goal: Patient-Specific Goal (Individualization)  Description: Pt will follow recommendations of treatment team.  Pt will be compliant with medications.  Pt will attend 50 % of groups.  Pt will sleep 6-8 hours each night.    11/27/24- no wean at this time, treatment team and provider to assess daily.   Outcome: Not Progressing  Note: Shift Summary:  Patient in a room in the MHICU r/t disorganized behavior and the need for decreased stimulation.  Patient is up by window with pants off and incontinent of urine.  Floor cleaned and wet clothing removed from room.  Patient assisted back to bed. Rested in bed until 1830 then up and at sink in room.  Back of sweatshirt wet and bedding wet.  No pants on so assisted with redressing in clean, dry clothing.  Linens changed on bed and patient attempting to assist with making bed.  Following direction.  Evening meal provided and prompted to try to eat. Patient ate only mashed potatoes.  Accepting of scheduled Risperdal 2mg and Ativan 1 mg po at 1916.as becoming restless/irritable.  Patient informed that son had called and wanting to visit.  Reoriented to tomorrow being Thanksgiving.  Nurse asked her if she celebrated Thanksgiving and she nodded in affirmation and said \"yes,my sister do\". Patient remaking her bed.at this time.  Responses are becoming more appropriate and clearer. Patient became more restless and wandering around the MHICU.  At times disrobes and requires redirection to redress.  Patient observed in another patient's room standing over him.  Directed out of room and patient making comments that the peer was related to her but she did leave the room.  Patient then directed into her room where she began digging in her garbage bag.  She removed old food out of bag.  Nurse had to take the old food and garbage bag out of her room as " she would not leave it alone.  Patient restless and keeps stripping the linens off her bed.  Encouraged to rest a it is nighttime.     Problem: Thought Process Alteration  Goal: Optimal Thought Clarity  Description: Pt will have a logical and linear conversation.  Pt will verbalize 2-3 coping skills.   Outcome: Not Progressing  Face to face report will be communicated to oncoming RN.    Lela Burger RN  11/27/2024
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
## 2024-11-27 NOTE — MEDICATION SCRIBE - ADMISSION MEDICATION HISTORY
Medication Scribe Admission Medication History    Admission medication history is complete. The information provided in this note is only as accurate as the sources available at the time of the update.    Information Source(s): Patient and CareEverywhere/SureScripts via in-person    Pertinent Information:   Patient confused but was able to confirm PTA meds when looking at the medication list. Confirmed she was on risperidone and apap PRN but this writer was unable to get specifics about medications. Input as prescribed. Last dose unknown.     Changes made to PTA medication list:  Added: risperdal, apap  Deleted: requip- pt did not recognize- no recent fill hx  Changed: None    Allergies reviewed with patient and updates made in EHR: no    Medication History Completed By: Bibiana Uribe 11/27/2024 2:10 PM    PTA Med List   Medication Sig Last Dose/Taking    acetaminophen (TYLENOL) 325 MG tablet Take 325-650 mg by mouth every 4 hours as needed for headaches. Unknown    risperiDONE (RISPERDAL) 0.5 MG tablet Take 0.5 mg by mouth at bedtime. Unknown
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
## 2024-11-27 NOTE — CONSULTS
Care Management Discharge Note    Discharge Date:         Discharge Disposition:  IP MH      [facility]  [address] [address]   [phone]     Discharge Services:      Discharge DME:      Discharge Transportation:  [facility]    Private pay costs discussed: Not applicable    Does the patient's insurance plan have a 3 day qualifying hospital stay waiver?  No    PAS Confirmation Code:    Patient/family educated on Medicare website which has current facility and service quality ratings:      Education Provided on the Discharge Plan:    Persons Notified of Discharge Plans: pt, family  Patient/Family in Agreement with the Plan:      Handoff Referral Completed: No, handoff not indicated or clinically appropriate    Additional Information:    Bedside RN Marc asked SW to meet with pt's family as they had expressed concerns over pt's IP MH placement in [place]. SW agreed to meet with the family but explained that it would be several minutes before SW was able to come to the ED    0820 SW met with family, pt's uncle, aunt, and mother to discuss their concerns. SW introduced self and reason for the visit. The family agreed to speak with KAMLESH    Pt's uncle, Pedro asked if there was any way that a closer placement could be found. KAMLESH explained that due to the acuity of pt's psychosis, it was imperative to get her into a facility that could address her illness as soon as possible; that there was no guarantee that an appropriate bed could be located quickly, and that pt would need to remain in the ED isolation room until a different bed was secured; that pt would not receive appropriate care until a MH bed was secured as the ED did not have the needed resources to address her mental health in a meaningful way. Pedro voiced understanding and disappointment, and also interpreted for his sister and mother.    Pedro asked if the family could take pt home instead. KAMLESH explained that due to pt's  acute psychosis, the ED MD's determined that she was a danger to herself and others and put her on a 72-Hour Hold. SW explained the hold and commitment process which has already begun, and therefore pt would not be able to discharge home at this time.    SW acknowledged that a placement in [place] was not ideal as having family close by could benefit pt's mental health. SW suggested that pt's family connect with the MH unit at the hospital and ask them to look for a MH bed closer to pt's home. KAMLESH and VANIA Tran provided hospital address, phone, MH Unit and Unit phone number    Pedro asked to be added to pt's contacts and provided full name and phone number. SW agreed to add information.    SW provided emotional support via active and reflective listening and responding to feelings; normalized and validated feelings and experiences; and provided a supportive non-anxious presence. No additional questions or concerns were reported. SW provided availability and contact information and excused self from pt's bedside.    SHUBHAM Cross, LGSW  ED/OBS   [facility]  Phone: [phone]  Pager: [phone]  Fax: [phone]     After hours Dep-[employer] and After Hours Vocera [place]     On-call pager, [phone], 4:00 pm to 8:30 pm
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
## 2024-11-27 NOTE — PROGRESS NOTES
24 0110   Patient Belongings   Did you bring any home meds/supplements to the hospital?  No   Patient Belongings remains with patient  (jacket,coat,skirt,shirt,leggings,panties,hat,shoes,scarf,slip)   Patient Belongings Remaining with Patient clothing   Belongings Search Yes   Clothing Search Yes   Second Staff tanvi raya     List items sent to [email or web address]  All other belongings put in assigned cubby in belongings room.     24 2  [place] drivers license, business cards, 3 perfumes, green purse, hair oil, toiletries, red bag, grey cardigan, 2 silky skirts, lotion, maroon sweatpants, shorts, bandana, bra, underwear,  7 head scarves      I have reviewed my belongings list on admission and verify that it is correct.     Patient signature_______________________________    Second staff witness (if patient unable to sign) ______________________________       I have received all my belongings at discharge.    Patient signature________________________________    Oksana Diego CNA  2024  1:15 AM
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
## 2024-11-27 NOTE — PLAN OF CARE
Problem: Adult Behavioral Health Plan of Care  Goal: Patient-Specific Goal (Individualization)  Description: Pt will follow recommendations of treatment team.  Pt will be compliant with medications.  Pt will attend 50 % of groups.  Pt will sleep 6-8 hours each night.    11/27/24- no wean at this time, treatment team and provider to assess daily.   Outcome: Not Progressing  Note: 07;30: Received end of shift report from VANIA Delarosa; Pt received prn olanzapine  @ 07:13 r/t disorganizaed behavior et little relief with prn diphenhydramine, PRN lorazepam et prn haldol. Pt amb back et fourth in MH-ICU  11:30: Visit with CNP, med CNP, SW et financial-- pt observed dozing off while standing  11:40: Lab into draw labs; tolerated well--     13:45: PRN lorazepam 2 mg, PRN diphenhydramine, PRN haldol 5 mg et PRN senna/colace given for endorsing constipation--     Multiple family members called via out the shift-- inquiring about  facility transfer et early visit tomorrow post lunch-- will call to confirm visit approval--     No sleep this AM shift, restless disorganized activity, absent verbal/physical outbursts-- easily redirected. Rambling speech between simolian et english language.     14:45: Pt observed resting in bed---   15:25: Pt up out of bed et staring out window--   No food intake this AM shift-- did consumedan adequate amount of fluids    Face to face end of shift report communicated to on-coming PM staff.     Whitney Catalan RN  11/27/2024  3:33 PM               Goal Outcome Evaluation:                      :

## 2024-11-27 NOTE — DISCHARGE INSTRUCTIONS
Behavioral Discharge Planning and Instructions    Summary: You were admitted on 11/27/2024  due to {Mental Health 1:465635}.  You were treated by *** and discharged on ***/***/*** from *** to {AVS D/C Locations:368192}    Main Diagnosis: ***    Health Care Follow-up:     [facility]  [address]  [facility] [address]   [phone]    Attend all scheduled appointments with your outpatient providers. Call at least 24 hours in advance if you need to reschedule an appointment to ensure continued access to your outpatient providers.     Major Treatments, Procedures and Findings:  You were provided with: {Major Treatments:507896}    Symptoms to Report: {symptoms:288918}    Early warning signs can include: { Warning Signs:119688}    Safety and Wellness:  {Age Group Safety Wellness:452429}    Resources:   { RESOURCES MB:381844}    General Medication Instructions:   See your medication sheet(s) for instructions.   Take all medicines as directed.  Make no changes unless your doctor suggests them.   Go to all your doctor visits.  Be sure to have all your required lab tests. This way, your medicines can be refilled on time.  Do not use any drugs not prescribed by your doctor.  Avoid alcohol.    Advance Directives:   Scanned document on file with [employer]? No scanned doc  Is document scanned? Pt unable to confirm  Honoring Choices Your Rights Handout: Informed and given  Was more information offered? Pt declined    The Treatment team has appreciated the opportunity to work with you. If you have any questions or concerns about your recent admission, you can contact the unit which can receive your call 24 hours a day, 7 days a week. They will be able to get in touch with a Provider if needed. The unit number is *** .   "(www.[place].birdie.org): 335.750.8064 or 302-754-5696.  [place] Association for Children's Mental Health (www.mac.org): 796.235.9845.  Alcoholics Anonymous (www.alcoholics-anonymous.org): Check your phone book for your local chapter.  Suicide Awareness Voices of Education (SAVE) (www.save.org): 944-644-QRTX (5619)  National Suicide Prevention Line (www.mentalhealthmn.org): 360-205-XITA (9535)  Mental Health Consumer/Survivor Network of [place] (www.mhcsn.net): 574.656.5530 or 143-627-6998  Mental Health Association of [place] (www.mentalhealth.org): 244.434.8381 or 304-354-9712  Self- Management and Recovery Training., SMART-- Toll free: 414.585.1279  www.[employer]  Text 4 Life: txt \"LIFE\" to 53498 for immediate support and crisis intervention  Crisis text line: Text \"[place]\" to 926748. Free, confidential, 24/7.  Crisis Intervention: 839.611.5503 or 788-964-6202. Call anytime for help.     General Medication Instructions:   See your medication sheet(s) for instructions.   Take all medicines as directed.  Make no changes unless your doctor suggests them.   Go to all your doctor visits.  Be sure to have all your required lab tests. This way, your medicines can be refilled on time.  Do not use any drugs not prescribed by your doctor.  Avoid alcohol.    Advance Directives:   Scanned document on file with [place]? No scanned doc  Is document scanned? Pt unable to confirm  Honoring Choices Your Rights Handout: Informed and given  Was more information offered? Pt declined    The Treatment team has appreciated the opportunity to work with you. If you have any questions or concerns about your recent admission, you can contact the unit which can receive your call 24 hours a day, 7 days a week. They will be able to get in touch with a Provider if needed. The unit number is [phone] .  "

## 2024-11-28 VITALS
HEIGHT: 69 IN | DIASTOLIC BLOOD PRESSURE: 86 MMHG | BODY MASS INDEX: 31.4 KG/M2 | RESPIRATION RATE: 16 BRPM | OXYGEN SATURATION: 99 % | TEMPERATURE: 97.7 F | SYSTOLIC BLOOD PRESSURE: 160 MMHG | HEART RATE: 89 BPM | WEIGHT: 212 LBS

## 2024-11-28 PROCEDURE — 250N000013 HC RX MED GY IP 250 OP 250 PS 637: Performed by: STUDENT IN AN ORGANIZED HEALTH CARE EDUCATION/TRAINING PROGRAM

## 2024-11-28 PROCEDURE — 124N000001 HC R&B MH

## 2024-11-28 PROCEDURE — 99233 SBSQ HOSP IP/OBS HIGH 50: CPT | Performed by: NURSE PRACTITIONER

## 2024-11-28 PROCEDURE — 250N000013 HC RX MED GY IP 250 OP 250 PS 637: Performed by: NURSE PRACTITIONER

## 2024-11-28 RX ADMIN — HALOPERIDOL 5 MG: 5 TABLET ORAL at 00:15

## 2024-11-28 RX ADMIN — LORAZEPAM 2 MG: 1 TABLET ORAL at 12:55

## 2024-11-28 RX ADMIN — DOCUSATE SODIUM 100 MG: 100 CAPSULE, LIQUID FILLED ORAL at 12:56

## 2024-11-28 RX ADMIN — DIPHENHYDRAMINE HYDROCHLORIDE 50 MG: 50 CAPSULE ORAL at 00:15

## 2024-11-28 RX ADMIN — DIPHENHYDRAMINE HYDROCHLORIDE 50 MG: 50 CAPSULE ORAL at 12:55

## 2024-11-28 RX ADMIN — HALOPERIDOL 5 MG: 5 TABLET ORAL at 12:55

## 2024-11-28 RX ADMIN — LORAZEPAM 2 MG: 1 TABLET ORAL at 00:15

## 2024-11-28 RX ADMIN — OLANZAPINE 10 MG: 10 TABLET, FILM COATED ORAL at 17:24

## 2024-11-28 ASSESSMENT — ACTIVITIES OF DAILY LIVING (ADL)
ADLS_ACUITY_SCORE: 35
ADLS_ACUITY_SCORE: 38
ORAL_HYGIENE: PROMPTS
HYGIENE/GROOMING: PROMPTS;INDEPENDENT
LAUNDRY: UNABLE TO COMPLETE
DRESS: INDEPENDENT;SCRUBS (BEHAVIORAL HEALTH)
ADLS_ACUITY_SCORE: 38
ADLS_ACUITY_SCORE: 28
ADLS_ACUITY_SCORE: 38
ADLS_ACUITY_SCORE: 28
ADLS_ACUITY_SCORE: 38
ADLS_ACUITY_SCORE: 28
ADLS_ACUITY_SCORE: 28
LAUNDRY: UNABLE TO COMPLETE
HYGIENE/GROOMING: PROMPTS
ADLS_ACUITY_SCORE: 38
ADLS_ACUITY_SCORE: 38
ADLS_ACUITY_SCORE: 35
ADLS_ACUITY_SCORE: 35
ADLS_ACUITY_SCORE: 28
ORAL_HYGIENE: INDEPENDENT
ADLS_ACUITY_SCORE: 28
DRESS: INDEPENDENT;SCRUBS (BEHAVIORAL HEALTH)
ADLS_ACUITY_SCORE: 38
ADLS_ACUITY_SCORE: 35
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
## 2024-11-28 NOTE — PLAN OF CARE
Problem: Adult Behavioral Health Plan of Care  Goal: Patient-Specific Goal (Individualization)  Description: Pt will follow recommendations of treatment team.  Pt will be compliant with medications.  Pt will attend 50 % of groups.  Pt will sleep 6-8 hours each night.    11/28/24- MH-ICU r/t disorganization- Pt may wean out onto open unit with supervision during low stimulus.  treatment team and provider to assess daily.   Outcome: Progressing  Note: 07:35: Received end of shift report from VNAIA Delarosa. Pt wandering around MH-ICU-- disorganized behavior-- rearranging bedding--     13:00: PRN lorazepam, PRN diphenhydramine, PRN haldol given for increasing restlessness, disorganization--- easily redirectable, polite-- soft spoken-- speech somewhat improving-- appears preoccupied by internal stimuli-- intermittent bouts of disrobing; easily redirectable   13:30: First time out on open unit with supervision, eating snack-- sons will be into visit; early visit approved   14:00: Pt returned to MH-ICU without prompts     Problem: Thought Process Alteration  Goal: Optimal Thought Clarity  Description: Pt will have a logical and linear conversation.  Pt will verbalize 2-3 coping skills.   Outcome: Progressing   Goal Outcome Evaluation:
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
## 2024-11-29 PROCEDURE — 250N000013 HC RX MED GY IP 250 OP 250 PS 637: Performed by: NURSE PRACTITIONER

## 2024-11-29 PROCEDURE — 124N000001 HC R&B MH

## 2024-11-29 PROCEDURE — 99232 SBSQ HOSP IP/OBS MODERATE 35: CPT | Performed by: NURSE PRACTITIONER

## 2024-11-29 PROCEDURE — 250N000013 HC RX MED GY IP 250 OP 250 PS 637: Performed by: STUDENT IN AN ORGANIZED HEALTH CARE EDUCATION/TRAINING PROGRAM

## 2024-11-29 RX ORDER — MULTIPLE VITAMINS W/ MINERALS TAB 9MG-400MCG
1 TAB ORAL DAILY
Status: DISCONTINUED | OUTPATIENT
Start: 2024-11-29 | End: 2025-01-03 | Stop reason: HOSPADM

## 2024-11-29 RX ADMIN — LORAZEPAM 2 MG: 1 TABLET ORAL at 00:38

## 2024-11-29 RX ADMIN — DOCUSATE SODIUM 100 MG: 100 CAPSULE, LIQUID FILLED ORAL at 10:08

## 2024-11-29 RX ADMIN — RISPERIDONE 2 MG: 1 TABLET, FILM COATED ORAL at 10:09

## 2024-11-29 RX ADMIN — OLANZAPINE 10 MG: 10 TABLET, FILM COATED ORAL at 19:05

## 2024-11-29 RX ADMIN — HALOPERIDOL 5 MG: 5 TABLET ORAL at 00:38

## 2024-11-29 RX ADMIN — DIPHENHYDRAMINE HYDROCHLORIDE 50 MG: 50 CAPSULE ORAL at 00:38

## 2024-11-29 RX ADMIN — Medication 1 TABLET: at 10:08

## 2024-11-29 ASSESSMENT — ACTIVITIES OF DAILY LIVING (ADL)
ADLS_ACUITY_SCORE: 35
DRESS: SCRUBS (BEHAVIORAL HEALTH);INDEPENDENT
ADLS_ACUITY_SCORE: 35
HYGIENE/GROOMING: INDEPENDENT
ADLS_ACUITY_SCORE: 35
ADLS_ACUITY_SCORE: 35
ADLS_ACUITY_SCORE: 25
ADLS_ACUITY_SCORE: 35
ADLS_ACUITY_SCORE: 25
ADLS_ACUITY_SCORE: 35
ORAL_HYGIENE: INDEPENDENT
ADLS_ACUITY_SCORE: 25
ADLS_ACUITY_SCORE: 35
LAUNDRY: UNABLE TO COMPLETE
ADLS_ACUITY_SCORE: 35
ADLS_ACUITY_SCORE: 25
ADLS_ACUITY_SCORE: 35

## 2024-11-29 NOTE — PLAN OF CARE
Problem: Thought Process Alteration  Goal: Optimal Thought Clarity  Description: Pt will have a logical and linear conversation.  Pt will verbalize 2-3 coping skills.   Outcome: No change    Pt was very confused and disorganized.  Her speech was mostly non-sense or garbled     Problem: Adult Behavioral Health Plan of Care  Goal: Patient-Specific Goal (Individualization)  Description: Pt will follow recommendations of treatment team.  Pt will be compliant with medications.  Pt will attend 50 % of groups.  Pt will sleep 6-8 hours each night.    11/28/24- MH-ICU r/t disorganization- Pt may wean out onto open unit with supervision during low stimulus.  treatment team and provider to assess daily.   Outcome: Progressing   Goal Outcome Evaluation:    Plan of Care Reviewed With: patient      1530 Face to Face rounding complete.  PT introduced to nursing for the shift.    Pt was very restless and disorganized after the visit with her son. She told me that the visit went fine but then switched to garbled word salad. She tried doors in the MHICU and took her shirt off a few times.  PT was given PRN Zyprexa at 1742 and wandered around the MHICU disrobing and trying doors until about 1900. She lay in bed for a bit and fell asleep about 1930    Pt slept the rest of the shift.  I could not wake her for her HS Risperdal    2300 Face to face end of shift report communicated to Night shift RN's along with Pt's fall risk.     Lorenzo Ledezma RN  11/28/2024  10:06 PM

## 2024-11-29 NOTE — PLAN OF CARE
Problem: Adult Behavioral Health Plan of Care  Goal: Patient-Specific Goal (Individualization)  Description: Pt will follow recommendations of treatment team.  Pt will be compliant with medications.  Pt will attend 50 % of groups.  Pt will sleep 6-8 hours each night.    11/28/24- MH-ICU r/t disorganization- Pt may wean out onto open unit with supervision during low stimulus.  treatment team and provider to assess daily.   Outcome: Progressing     Problem: Thought Process Alteration  Goal: Optimal Thought Clarity  Description: Pt will have a logical and linear conversation.  Pt will verbalize 2-3 coping skills.   Outcome: Progressing     Face to face shift report received from Lorenzo CARO. Rounding completed, pt observed.
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
## 2024-11-30 PROCEDURE — 250N000013 HC RX MED GY IP 250 OP 250 PS 637: Performed by: NURSE PRACTITIONER

## 2024-11-30 PROCEDURE — 124N000001 HC R&B MH

## 2024-11-30 PROCEDURE — 250N000013 HC RX MED GY IP 250 OP 250 PS 637: Performed by: STUDENT IN AN ORGANIZED HEALTH CARE EDUCATION/TRAINING PROGRAM

## 2024-11-30 PROCEDURE — 99232 SBSQ HOSP IP/OBS MODERATE 35: CPT | Performed by: NURSE PRACTITIONER

## 2024-11-30 RX ORDER — RISPERIDONE 1 MG/1
1 TABLET ORAL EVERY MORNING
Status: DISCONTINUED | OUTPATIENT
Start: 2024-11-30 | End: 2024-12-02

## 2024-11-30 RX ADMIN — RISPERIDONE 2 MG: 1 TABLET, FILM COATED ORAL at 20:00

## 2024-11-30 RX ADMIN — LORAZEPAM 1 MG: 1 TABLET ORAL at 11:36

## 2024-11-30 RX ADMIN — OLANZAPINE 10 MG: 10 TABLET, FILM COATED ORAL at 08:54

## 2024-11-30 RX ADMIN — Medication 1 TABLET: at 08:54

## 2024-11-30 RX ADMIN — HYDROXYZINE HYDROCHLORIDE 25 MG: 25 TABLET ORAL at 20:11

## 2024-11-30 RX ADMIN — RISPERIDONE 1 MG: 1 TABLET, FILM COATED ORAL at 13:15

## 2024-11-30 RX ADMIN — DOCUSATE SODIUM 100 MG: 100 CAPSULE, LIQUID FILLED ORAL at 08:54

## 2024-11-30 RX ADMIN — LORAZEPAM 1 MG: 1 TABLET ORAL at 20:11

## 2024-11-30 ASSESSMENT — ACTIVITIES OF DAILY LIVING (ADL)
ADLS_ACUITY_SCORE: 25
DRESS: SCRUBS (BEHAVIORAL HEALTH);INDEPENDENT
ADLS_ACUITY_SCORE: 25
HYGIENE/GROOMING: INDEPENDENT
ADLS_ACUITY_SCORE: 25
ORAL_HYGIENE: INDEPENDENT
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
## 2024-11-30 NOTE — PLAN OF CARE
Problem: Adult Behavioral Health Plan of Care  Goal: Patient-Specific Goal (Individualization)  Description: Pt will follow recommendations of treatment team.  Pt will be compliant with medications.  Pt will attend 50 % of groups.  Pt will sleep 6-8 hours each night.    11/28/24- MH-ICU r/t disorganization- Pt may wean out onto open unit with supervision during low stimulus.  treatment team and provider to assess daily.   Outcome: Progressing  Note:     0800 Attempted to wean patient for breakfast meal this morning. Patient returned after only 15 minutes. Patient steady on her feet. Pleasant but conversation is not linear. Patient starts speaking Somalian. Patient admits to always hearing voices and states they sometimes do tell her to hurt herself but she is able to not respond to that command. Patient ate only a few bites of her breakfast meal. Patient Offered and given Zyprexa 10 mg po for voices. Sitting in the lounge of ICU or is resting on her bed.    1425 Patient had been resting after eating only a small amount of lunch meal. Patient behavior has been labile and walked quickly toward a staff member and needed redirection. Patient now has family visiting in the day room.     Face to face end of shift report communicated to 3-11 shift RN.     Lina Suarez RN  11/30/2024  2:27 PM         Problem: Thought Process Alteration  Goal: Optimal Thought Clarity  Description: Pt will have a logical and linear conversation.  Pt will verbalize 2-3 coping skills.   Outcome: Progressing      Goal Outcome Evaluation:    Plan of Care Reviewed With: patient

## 2024-11-30 NOTE — PLAN OF CARE
Problem: Adult Behavioral Health Plan of Care  Goal: Patient-Specific Goal (Individualization)  Description: Pt will follow recommendations of treatment team.  Pt will be compliant with medications.  Pt will attend 50 % of groups.  Pt will sleep 6-8 hours each night.    11/28/24- -ICU r/t disorganization- Pt may wean out onto open unit with supervision during low stimulus.  treatment team and provider to assess daily.   Outcome: Progressing     Problem: Thought Process Alteration  Goal: Optimal Thought Clarity  Description: Pt will have a logical and linear conversation.  Pt will verbalize 2-3 coping skills.   Outcome: Progressing     Face to face shift report received from Lorenzo CARO. Rounding completed, pt observed.     Pt did not sleep this shift. Pt did disrobe in her room this shift. Pt does walk in ICU lobby at times. Pt did not attempt to enter peer rooms this shift.    Face to face report will be communicated to oncberto CARO.    Isaura Burks RN  11/30/2024  6:29 AM

## 2024-11-30 NOTE — PLAN OF CARE
Problem: Thought Process Alteration  Goal: Optimal Thought Clarity  Description: Pt will have a logical and linear conversation.  Pt will verbalize 2-3 coping skills.   Outcome: No change    Pt struggled to have logical conversations     Problem: Adult Behavioral Health Plan of Care  Goal: Patient-Specific Goal (Individualization)  Description: Pt will follow recommendations of treatment team.  Pt will be compliant with medications.  Pt will attend 50 % of groups.  Pt will sleep 6-8 hours each night.  11/28/24- MH-ICU r/t disorganization- Pt may wean out onto open unit with supervision during low stimulus.  treatment team and provider to assess daily.   Outcome: Progressing   Goal Outcome Evaluation:    Plan of Care Reviewed With: patient      1530 Face to face rounding complete.  Pt introduced to nursing for the shift.    Pt was very confused and disorganized all shift. She is able to have some brief sentences that are clear but most of her conversation is tangential and fragmented. She did wean to the open unit and was watched closely. She wandered the halls looking at pictures and touching door frames. She briefly entered another PT's room that was unoccupied and was re-directed to the MHICU.  Pt follows directions and is cooperative. She ws given PRN Zyprexa at 1900 and she did lay down at about 2130. Pt spoke to her son on the phone and laugh a great deal during the conversation.      2300 Face to face end of shift report communicated to Night shift RN's along with Pt's fall risk.     Lorenzo Ledezma RN  11/29/2024  9:16 PM

## 2024-12-01 PROCEDURE — 99232 SBSQ HOSP IP/OBS MODERATE 35: CPT | Performed by: NURSE PRACTITIONER

## 2024-12-01 PROCEDURE — 250N000013 HC RX MED GY IP 250 OP 250 PS 637: Performed by: NURSE PRACTITIONER

## 2024-12-01 PROCEDURE — 124N000001 HC R&B MH

## 2024-12-01 PROCEDURE — 250N000013 HC RX MED GY IP 250 OP 250 PS 637: Performed by: STUDENT IN AN ORGANIZED HEALTH CARE EDUCATION/TRAINING PROGRAM

## 2024-12-01 RX ADMIN — OLANZAPINE 10 MG: 10 TABLET, FILM COATED ORAL at 16:35

## 2024-12-01 RX ADMIN — RISPERIDONE 2 MG: 1 TABLET, FILM COATED ORAL at 20:24

## 2024-12-01 RX ADMIN — OLANZAPINE 10 MG: 10 TABLET, FILM COATED ORAL at 08:20

## 2024-12-01 RX ADMIN — DOCUSATE SODIUM 100 MG: 100 CAPSULE, LIQUID FILLED ORAL at 08:20

## 2024-12-01 RX ADMIN — Medication 1 TABLET: at 08:20

## 2024-12-01 RX ADMIN — RISPERIDONE 1 MG: 1 TABLET, FILM COATED ORAL at 08:20

## 2024-12-01 ASSESSMENT — ACTIVITIES OF DAILY LIVING (ADL)
ADLS_ACUITY_SCORE: 25
HYGIENE/GROOMING: INDEPENDENT
ADLS_ACUITY_SCORE: 25
ADLS_ACUITY_SCORE: 25
ORAL_HYGIENE: INDEPENDENT
ADLS_ACUITY_SCORE: 25
HYGIENE/GROOMING: INDEPENDENT
DRESS: SCRUBS (BEHAVIORAL HEALTH);INDEPENDENT
ADLS_ACUITY_SCORE: 25
DRESS: SCRUBS (BEHAVIORAL HEALTH)
ADLS_ACUITY_SCORE: 25
ORAL_HYGIENE: INDEPENDENT
ADLS_ACUITY_SCORE: 25

## 2024-12-01 NOTE — PLAN OF CARE
Problem: Adult Behavioral Health Plan of Care  Goal: Patient-Specific Goal (Individualization)  Description: Pt will follow recommendations of treatment team.  Pt will be compliant with medications.  Pt will attend 50 % of groups.  Pt will sleep 6-8 hours each night.    11/28/24- MH-ICU r/t disorganization- Pt may wean out onto open unit with supervision during low stimulus.  treatment team and provider to assess daily.     11/30/2024 Patient may wear head scarf.  Outcome: Progressing  Note:     1200 Patient was stripping clothing and came out to the lounge area with only a blanket this morning. Patient redirected to get dressed and patient complied. Patient showered with no issue. Zyprexa 10 mg po given at 0830. Patient seen by NP and patient cooperative and linear in speech. Patient then resting this morning for a few hours. Patient now given lunch meal and patient calm and appropriate. Speech linear.    1400 Patient is alert and up on the unit. In patient room 6 partially dissolved tablets found. Patient behavior has been calm and appropriate. Patient has been clothed throughout the day.    Face to face end of shift report communicated to 3-11 shift RN.     Lina Suarez RN  12/1/2024  2:20 PM          Problem: Thought Process Alteration  Goal: Optimal Thought Clarity  Description: Pt will have a logical and linear conversation.  Pt will verbalize 2-3 coping skills.   Outcome: Progressing   Goal Outcome Evaluation:    Plan of Care Reviewed With: patient

## 2024-12-01 NOTE — PLAN OF CARE
Problem: Adult Behavioral Health Plan of Care  Goal: Patient-Specific Goal (Individualization)  Description: Pt will follow recommendations of treatment team.  Pt will be compliant with medications.  Pt will attend 50 % of groups.  Pt will sleep 6-8 hours each night.    11/28/24- MH-ICU r/t disorganization- Pt may wean out onto open unit with supervision during low stimulus.  treatment team and provider to assess daily.     11/30/2024 Patient may wear head scarf.  Outcome: Progressing     Face to face shift report received from Lorenzo CARO. Rounding completed, pt observed.     Pt appeared to be sleeping at the beginning of this shift. Pt awake after 0215 rounds. Pt did not have any noted episodes of disrobing or attempting to get into peers rooms this shift.    Face to face report will be communicated to oncoming RN.    Isaura Burks RN  12/1/2024  6:48 AM

## 2024-12-01 NOTE — PLAN OF CARE
Problem: Thought Process Alteration  Goal: Optimal Thought Clarity  Description: Pt will have a logical and linear conversation.  Pt will verbalize 2-3 coping skills.   Outcome: No change    Pt continues to be very confused and disorganized     Problem: Adult Behavioral Health Plan of Care  Goal: Patient-Specific Goal (Individualization)  Description: Pt will follow recommendations of treatment team.  Pt will be compliant with medications.  Pt will attend 50 % of groups.  Pt will sleep 6-8 hours each night.    11/28/24- -ICU r/t disorganization- Pt may wean out onto open unit with supervision during low stimulus.  treatment team and provider to assess daily.     11/30/2024 Patient may wear head scarf.  Outcome: Progressing   Goal Outcome Evaluation:    1530  Face to face rounding complete. Pt introduced to nursing for the shift.    Pt was up visiting with her family at the beginning of the shift. She was very confused following the visit and appeared very tired. Pt's family and myself encouraged her to lay down for a nap.  Pt did lay down for about a half hour just before meal time.  Pt began taking her clothes off in her room and walked out into the ICU dayroom with just a blanket.  Pt was directed back to her room and put clothes back on with direction.  Pt did this a few times during the rest of the shift. She was given PRN Atarax and Ativan with her HS medications and did sleep for about 3 hours.  Pt's speech is very garbled with few words being understandable.    2300 Face to face end of shift report communicated to Night shift RN's along with Pt's fall risk.     Lorenzo Ledezma RN  11/30/2024
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
## 2024-12-01 NOTE — PLAN OF CARE
Problem: Adult Behavioral Health Plan of Care  Goal: Patient-Specific Goal (Individualization)  Description: Pt will follow recommendations of treatment team.  Pt will be compliant with medications.  Pt will attend 50 % of groups.  Pt will sleep 6-8 hours each night.    11/28/24- MH-ICU r/t disorganization- Pt may wean out onto open unit with supervision during low stimulus.  treatment team and provider to assess daily.     11/30/2024 Patient may wear head scarf.  Outcome: Progressing     Problem: Thought Process Alteration  Goal: Optimal Thought Clarity  Description: Pt will have a logical and linear conversation.  Pt will verbalize 2-3 coping skills.   Outcome: Progressing   Goal Outcome Evaluation:       Pt. Has been in her room so far this shift, spending time ambulating in her room, Pt. Reported poor sleep last night, is able to make needs be known, appetite is good, eating at least 50% of meals, requested/received zyprexa 10 mg po at 1635 for increasing auditory hallucinations, has not requested to wean out to open unit yet this shift, will continue to monitor progress.    Face to face end of shift report will be communicated to oncoming night shift RN.     Morena Randolph RN  12/1/2024  6:26 PM

## 2024-12-02 PROCEDURE — 250N000013 HC RX MED GY IP 250 OP 250 PS 637: Performed by: NURSE PRACTITIONER

## 2024-12-02 PROCEDURE — 250N000013 HC RX MED GY IP 250 OP 250 PS 637: Performed by: STUDENT IN AN ORGANIZED HEALTH CARE EDUCATION/TRAINING PROGRAM

## 2024-12-02 PROCEDURE — 99233 SBSQ HOSP IP/OBS HIGH 50: CPT

## 2024-12-02 PROCEDURE — 124N000001 HC R&B MH

## 2024-12-02 PROCEDURE — 250N000013 HC RX MED GY IP 250 OP 250 PS 637

## 2024-12-02 RX ORDER — RISPERIDONE 0.5 MG/1
1 TABLET, ORALLY DISINTEGRATING ORAL DAILY
Status: DISCONTINUED | OUTPATIENT
Start: 2024-12-02 | End: 2024-12-05

## 2024-12-02 RX ORDER — RISPERIDONE 2 MG/1
2 TABLET, ORALLY DISINTEGRATING ORAL AT BEDTIME
Status: DISCONTINUED | OUTPATIENT
Start: 2024-12-02 | End: 2024-12-05

## 2024-12-02 RX ADMIN — DIPHENHYDRAMINE HYDROCHLORIDE 50 MG: 50 CAPSULE ORAL at 16:19

## 2024-12-02 RX ADMIN — HALOPERIDOL 5 MG: 5 TABLET ORAL at 16:19

## 2024-12-02 RX ADMIN — Medication 1 TABLET: at 08:24

## 2024-12-02 RX ADMIN — LORAZEPAM 1 MG: 1 TABLET ORAL at 13:02

## 2024-12-02 RX ADMIN — DOCUSATE SODIUM 100 MG: 100 CAPSULE, LIQUID FILLED ORAL at 08:24

## 2024-12-02 RX ADMIN — RISPERIDONE 2 MG: 2 TABLET, ORALLY DISINTEGRATING ORAL at 20:02

## 2024-12-02 RX ADMIN — RISPERIDONE 1 MG: 0.5 TABLET, ORALLY DISINTEGRATING ORAL at 08:25

## 2024-12-02 RX ADMIN — LORAZEPAM 2 MG: 1 TABLET ORAL at 16:19

## 2024-12-02 ASSESSMENT — ACTIVITIES OF DAILY LIVING (ADL)
ADLS_ACUITY_SCORE: 25
ADLS_ACUITY_SCORE: 25
DRESS: INDEPENDENT
ADLS_ACUITY_SCORE: 25
ADLS_ACUITY_SCORE: 25
ORAL_HYGIENE: INDEPENDENT
ADLS_ACUITY_SCORE: 25
ORAL_HYGIENE: INDEPENDENT
ADLS_ACUITY_SCORE: 25
DRESS: SCRUBS (BEHAVIORAL HEALTH);INDEPENDENT
ADLS_ACUITY_SCORE: 25
HYGIENE/GROOMING: INDEPENDENT
ADLS_ACUITY_SCORE: 25
HYGIENE/GROOMING: INDEPENDENT
ADLS_ACUITY_SCORE: 25

## 2024-12-02 NOTE — PLAN OF CARE
Face to face shift report received from Isaura ZENG RN. Rounding completed, pt observed.    Problem: Adult Behavioral Health Plan of Care  Goal: Patient-Specific Goal (Individualization)  Description: Pt will follow recommendations of treatment team.  Pt will be compliant with medications.  Pt will attend 50 % of groups.  Pt will sleep 6-8 hours each night.    11/28/24- MH-ICU r/t disorganization- Pt may wean out onto open unit with supervision during low stimulus.  treatment team and provider to assess daily.     11/30/2024 Patient may wear head scarf.  Outcome: Progressing  Note: Shift Summary: Patient awake in her room at the start of this shift.  Patient took breakfast food but did not eat any of the food.  Taking in some fluids.  Naked with blanket wrapped around her when this nurse and provider in to see patient.  Patient admits to auditory hallucinations telling her to commit suicide with multiple plans.  Patient whispers to self during our interaction and looks to the side. Gait is steady.  Patient observed again naked in ICU lounge.  Had to be physically assisted  with redressing.  Offered and accepted Ativan 1 mg po at 1302 as admitted to increased anxiety.  Currently sitting on floor in MHICU lounge.     Problem: Thought Process Alteration  Goal: Optimal Thought Clarity  Description: Pt will have a logical and linear conversation.  Pt will verbalize 2-3 coping skills.   Outcome: Progressing  Face to face report will be communicated to oncoming RN.    Lela Burger RN  12/2/2024
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
## 2024-12-02 NOTE — PROGRESS NOTES
Pt's court hold was faxed. Copy put in chart and copy given to pt.     Sw received voicemail from pt's son. Sw attempted to called back but there was no answer.     Son called back. He requested again for a transfer order be put in to get pt closer to family. Sw suggested virtual visits also on Ipad so they did not have to travel so much. Sw emailed him the log in information. Son was updated on pt's court hold and that court hearings will be happening soon.

## 2024-12-02 NOTE — PLAN OF CARE
Problem: Adult Behavioral Health Plan of Care  Goal: Patient-Specific Goal (Individualization)  Description: Pt will follow recommendations of treatment team.  Pt will be compliant with medications.  Pt will attend 50 % of groups.  Pt will sleep 6-8 hours each night.    11/28/24- MH-ICU r/t disorganization- Pt may wean out onto open unit with supervision during low stimulus.  treatment team and provider to assess daily.     11/30/2024 Patient may wear head scarf.  Outcome: Progressing     Face to face shift report received from Morena CARO. Rounding completed, pt observed.     Pt awake this whole shift. Pt did require direction multiple times for going into the MHICU without any clothes on. Pt also needed redirected from attempting to enter peer rooms. Peer room were locked however one pt kept opening his door and leaving it open.     Face to face report will be communicated to oncberto CARO.    Isaura Burks RN  12/2/2024  6:39 AM

## 2024-12-03 LAB
ALBUMIN UR-MCNC: NEGATIVE MG/DL
ALBUMIN UR-MCNC: NEGATIVE MG/DL
APPEARANCE UR: CLEAR
APPEARANCE UR: CLEAR
BACTERIA #/AREA URNS HPF: ABNORMAL /HPF
BACTERIA #/AREA URNS HPF: ABNORMAL /HPF
BILIRUB UR QL STRIP: NEGATIVE
BILIRUB UR QL STRIP: NEGATIVE
COLOR UR AUTO: ABNORMAL
COLOR UR AUTO: ABNORMAL
GLUCOSE UR STRIP-MCNC: NEGATIVE MG/DL
GLUCOSE UR STRIP-MCNC: NEGATIVE MG/DL
HGB UR QL STRIP: ABNORMAL
HGB UR QL STRIP: ABNORMAL
KETONES UR STRIP-MCNC: NEGATIVE MG/DL
KETONES UR STRIP-MCNC: NEGATIVE MG/DL
LEUKOCYTE ESTERASE UR QL STRIP: ABNORMAL
LEUKOCYTE ESTERASE UR QL STRIP: ABNORMAL
NITRATE UR QL: NEGATIVE
NITRATE UR QL: NEGATIVE
PH UR STRIP: 6 [PH] (ref 4.7–8)
PH UR STRIP: 6 [PH] (ref 4.7–8)
RBC URINE: 0 /HPF
RBC URINE: 0 /HPF
SP GR UR STRIP: 1.01 (ref 1–1.03)
SP GR UR STRIP: 1.01 (ref 1–1.03)
SQUAMOUS EPITHELIAL: 4 /HPF
SQUAMOUS EPITHELIAL: 4 /HPF
UROBILINOGEN UR STRIP-MCNC: NORMAL MG/DL
UROBILINOGEN UR STRIP-MCNC: NORMAL MG/DL
WBC URINE: 5 /HPF
WBC URINE: 5 /HPF

## 2024-12-03 PROCEDURE — 81001 URINALYSIS AUTO W/SCOPE: CPT

## 2024-12-03 PROCEDURE — 250N000013 HC RX MED GY IP 250 OP 250 PS 637: Performed by: NURSE PRACTITIONER

## 2024-12-03 PROCEDURE — 250N000013 HC RX MED GY IP 250 OP 250 PS 637: Performed by: STUDENT IN AN ORGANIZED HEALTH CARE EDUCATION/TRAINING PROGRAM

## 2024-12-03 PROCEDURE — 124N000001 HC R&B MH

## 2024-12-03 PROCEDURE — 250N000013 HC RX MED GY IP 250 OP 250 PS 637

## 2024-12-03 PROCEDURE — 99232 SBSQ HOSP IP/OBS MODERATE 35: CPT

## 2024-12-03 RX ADMIN — Medication 1 TABLET: at 08:40

## 2024-12-03 RX ADMIN — DOCUSATE SODIUM 100 MG: 100 CAPSULE, LIQUID FILLED ORAL at 08:40

## 2024-12-03 RX ADMIN — LORAZEPAM 1 MG: 1 TABLET ORAL at 08:39

## 2024-12-03 RX ADMIN — RISPERIDONE 2 MG: 2 TABLET, ORALLY DISINTEGRATING ORAL at 20:41

## 2024-12-03 RX ADMIN — RISPERIDONE 1 MG: 0.5 TABLET, ORALLY DISINTEGRATING ORAL at 08:40

## 2024-12-03 RX ADMIN — LORAZEPAM 1 MG: 1 TABLET ORAL at 20:41

## 2024-12-03 ASSESSMENT — ACTIVITIES OF DAILY LIVING (ADL)
HYGIENE/GROOMING: INDEPENDENT
ADLS_ACUITY_SCORE: 25
ORAL_HYGIENE: INDEPENDENT
ADLS_ACUITY_SCORE: 25
ADLS_ACUITY_SCORE: 25
DRESS: INDEPENDENT
ADLS_ACUITY_SCORE: 25
ORAL_HYGIENE: INDEPENDENT
ADLS_ACUITY_SCORE: 25
ADLS_ACUITY_SCORE: 25
DRESS: SCRUBS (BEHAVIORAL HEALTH);INDEPENDENT
ADLS_ACUITY_SCORE: 25
HYGIENE/GROOMING: INDEPENDENT
ADLS_ACUITY_SCORE: 25
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
## 2024-12-03 NOTE — PLAN OF CARE
Problem: Adult Behavioral Health Plan of Care  Goal: Patient-Specific Goal (Individualization)  Description: Pt will follow recommendations of treatment team.  Pt will be compliant with medications.  Pt will attend 50 % of groups.  Pt will sleep 6-8 hours each night.    MOUTH CHECKS    12/3/24- MH-ICU r/t disorganization- Pt may wean out onto open unit with supervision during low stimulus.  treatment team and provider to assess daily.     11/30/2024 Patient may wear head scarf.  Outcome: Progressing  Note:     1730 Patient given bedding at her request and she makes her bed. Received a phone call from her son and allowed to wean for call. Patient then eating supper meal in the lounge area with peers. Appropriate behavior. Patient watching TV in the lounge, sitting calmly. Patient able to answer questions from writer for assessment and has no problem. Admits to hearing some voices. Patient states she has no pain or physical problems. Patient has no dizziness or falls.    2040 Patient behavior was becoming somewhat disruptive while in the lounge area. After eating, patient was laughing almost continually and was then directed back to her room. Patient showered this evening. Given HS risperdal and allowed her mouth check also given Lorazepam 1 mg po for sleep at this time.    Face to face end of shift report communicated to [id number] shift RN.     Lina Suarez RN  12/3/2024  9:06 PM         Problem: Thought Process Alteration  Goal: Optimal Thought Clarity  Description: Pt will have a logical and linear conversation.  Pt will verbalize 2-3 coping skills.   Outcome: Progressing      Goal Outcome Evaluation:    Plan of Care Reviewed With: patient
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
## 2024-12-03 NOTE — PROGRESS NOTES
Pt was served her commitment paperwork for court dates. Nurse put copy in chart. Exam is 12/5 @ 10:30a. Prelim hearing is 11:30a the same day.     Security updated.

## 2024-12-03 NOTE — PLAN OF CARE
Problem: Thought Process Alteration  Goal: Optimal Thought Clarity  Description: Pt will have a logical and linear conversation.  Pt will verbalize 2-3 coping skills.   Outcome: Progressing     Problem: Adult Behavioral Health Plan of Care  Goal: Patient-Specific Goal (Individualization)  Description: Pt will follow recommendations of treatment team.  Pt will be compliant with medications.  Pt will attend 50 % of groups.  Pt will sleep 6-8 hours each night.    MOUTH CHECKS    12/2/24- MH-ICU r/t disorganization- Pt may wean out onto open unit with supervision during low stimulus.  treatment team and provider to assess daily.     11/30/2024 Patient may wear head scarf.  Outcome: Progressing     Face to Face report received from VANIA Mills. Rounding completed. Pt has been bed with eyes closed and regular respirations noted. 15 minute and PRN checks all night. No reports of falls or self-harm.      Patient continues to be disorganized & appears lost or confused.  She came out of her room without pants several times but put pants on when asked.  Patient also walked into a peers room but left when directed to do so (doors to peers' rooms were locked to prevent future incidents).     Face to face end of shift report communicated to day shift RN.     Brittnee Willard RN  12/3/2024  5:38 AM

## 2024-12-03 NOTE — PLAN OF CARE
"  Problem: Adult Behavioral Health Plan of Care  Goal: Patient-Specific Goal (Individualization)  Description: Pt will follow recommendations of treatment team.  Pt will be compliant with medications.  Pt will attend 50 % of groups.  Pt will sleep 6-8 hours each night.    MOUTH CHECKS    12/2/24- MH-ICU r/t disorganization- Pt may wean out onto open unit with supervision during low stimulus.  treatment team and provider to assess daily.     11/30/2024 Patient may wear head scarf.  Outcome: Progressing  Note:     1619 Patient is in bed in her room. Patient tells writer that she is \"nervous\" and appears frightened. Patient is reassured by writer that she can trust us and asked patient is she wanted a medicine for her nervous feelings or just to rest and she states, \"I need some medicine.\" Patient given Haldol 5 mg po with Ativan 2 mg po and Benadryl 50 mg po. Patient continues resting in bed. Denies pain.    1800 Patient refused supper meal. Patient currently resting quietly in her room, appears to sleep.    1900 Patient incontinent of a large amount of urine in bed. Assisted with clean up by staff. Patient scrubs changed also. Patient slept through cares. Patient continues to sleep.    2200 Patient remains in bed with eyes closed and regular resps.    Face to face end of shift report communicated to 11-7 shift RN.     Lina Suarez RN  12/2/2024  10:12 PM          Problem: Thought Process Alteration  Goal: Optimal Thought Clarity  Description: Pt will have a logical and linear conversation.  Pt will verbalize 2-3 coping skills.   Outcome: Progressing   Goal Outcome Evaluation:    Plan of Care Reviewed With: patient                   "

## 2024-12-04 PROCEDURE — 250N000013 HC RX MED GY IP 250 OP 250 PS 637: Performed by: STUDENT IN AN ORGANIZED HEALTH CARE EDUCATION/TRAINING PROGRAM

## 2024-12-04 PROCEDURE — 124N000001 HC R&B MH

## 2024-12-04 PROCEDURE — 250N000013 HC RX MED GY IP 250 OP 250 PS 637: Performed by: NURSE PRACTITIONER

## 2024-12-04 PROCEDURE — 250N000013 HC RX MED GY IP 250 OP 250 PS 637

## 2024-12-04 PROCEDURE — 99232 SBSQ HOSP IP/OBS MODERATE 35: CPT

## 2024-12-04 RX ORDER — OLANZAPINE 10 MG/1
10 TABLET, ORALLY DISINTEGRATING ORAL EVERY 6 HOURS PRN
Status: DISCONTINUED | OUTPATIENT
Start: 2024-12-04 | End: 2024-12-10

## 2024-12-04 RX ORDER — OLANZAPINE 10 MG/2ML
10 INJECTION, POWDER, FOR SOLUTION INTRAMUSCULAR EVERY 6 HOURS PRN
Status: DISCONTINUED | OUTPATIENT
Start: 2024-12-04 | End: 2024-12-10

## 2024-12-04 RX ADMIN — Medication 1 TABLET: at 08:00

## 2024-12-04 RX ADMIN — RISPERIDONE 2 MG: 2 TABLET, ORALLY DISINTEGRATING ORAL at 20:32

## 2024-12-04 RX ADMIN — HALOPERIDOL 5 MG: 5 TABLET ORAL at 16:42

## 2024-12-04 RX ADMIN — LORAZEPAM 1 MG: 1 TABLET ORAL at 16:42

## 2024-12-04 RX ADMIN — DOCUSATE SODIUM 100 MG: 100 CAPSULE, LIQUID FILLED ORAL at 08:00

## 2024-12-04 RX ADMIN — RISPERIDONE 1 MG: 0.5 TABLET, ORALLY DISINTEGRATING ORAL at 08:00

## 2024-12-04 RX ADMIN — OLANZAPINE 10 MG: 10 TABLET, ORALLY DISINTEGRATING ORAL at 20:50

## 2024-12-04 RX ADMIN — DIPHENHYDRAMINE HYDROCHLORIDE 50 MG: 50 CAPSULE ORAL at 16:42

## 2024-12-04 ASSESSMENT — ACTIVITIES OF DAILY LIVING (ADL)
ADLS_ACUITY_SCORE: 25
LAUNDRY: UNABLE TO COMPLETE
ADLS_ACUITY_SCORE: 25
ORAL_HYGIENE: INDEPENDENT
ADLS_ACUITY_SCORE: 25
ADLS_ACUITY_SCORE: 25
HYGIENE/GROOMING: INDEPENDENT
ADLS_ACUITY_SCORE: 25
DRESS: INDEPENDENT;SCRUBS (BEHAVIORAL HEALTH)
ADLS_ACUITY_SCORE: 25

## 2024-12-04 NOTE — PLAN OF CARE
Problem: Adult Behavioral Health Plan of Care  Goal: Patient-Specific Goal (Individualization)  Description: Pt will follow recommendations of treatment team.  Pt will be compliant with medications.  Pt will attend 50 % of groups.  Pt will sleep 6-8 hours each night.    MOUTH CHECKS    12/3/24- MH-ICU r/t disorganization- Pt may wean out onto open unit with supervision during low stimulus.  treatment team and provider to assess daily.     11/30/2024 Patient may wear head scarf.  Outcome: Progressing     Face to face shift report received from Lina CARO. Rounding completed, pt observed.     Pt appeared to sleep a total of 2.5 hours on and off this shift. Pt did not have any noted episodes of leaving room naked or attempting to enter peers rooms this shift.    Face to face report will be communicated to oncoming RN.    Isaura Burks RN  12/4/2024  6:26 AM

## 2024-12-04 NOTE — PROGRESS NOTES
[facility] attorney's office called. They will be sending over zoom link for exam tomorrow and asked for update that [facility] was still wanting to move forward with commitment.
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
## 2024-12-05 PROCEDURE — 250N000013 HC RX MED GY IP 250 OP 250 PS 637

## 2024-12-05 PROCEDURE — 250N000013 HC RX MED GY IP 250 OP 250 PS 637: Performed by: NURSE PRACTITIONER

## 2024-12-05 PROCEDURE — 250N000013 HC RX MED GY IP 250 OP 250 PS 637: Performed by: STUDENT IN AN ORGANIZED HEALTH CARE EDUCATION/TRAINING PROGRAM

## 2024-12-05 PROCEDURE — 124N000001 HC R&B MH

## 2024-12-05 PROCEDURE — 99233 SBSQ HOSP IP/OBS HIGH 50: CPT

## 2024-12-05 RX ORDER — RISPERIDONE 2 MG/1
2 TABLET, ORALLY DISINTEGRATING ORAL 2 TIMES DAILY
Status: DISCONTINUED | OUTPATIENT
Start: 2024-12-05 | End: 2024-12-06

## 2024-12-05 RX ORDER — RISPERIDONE 0.5 MG/1
1 TABLET, ORALLY DISINTEGRATING ORAL ONCE
Status: COMPLETED | OUTPATIENT
Start: 2024-12-05 | End: 2024-12-05

## 2024-12-05 RX ADMIN — LORAZEPAM 1 MG: 1 TABLET ORAL at 19:51

## 2024-12-05 RX ADMIN — RISPERIDONE 1 MG: 0.5 TABLET, ORALLY DISINTEGRATING ORAL at 08:12

## 2024-12-05 RX ADMIN — Medication 1 TABLET: at 08:13

## 2024-12-05 RX ADMIN — LORAZEPAM 1 MG: 1 TABLET ORAL at 08:13

## 2024-12-05 RX ADMIN — RISPERIDONE 1 MG: 0.5 TABLET, ORALLY DISINTEGRATING ORAL at 09:13

## 2024-12-05 RX ADMIN — RISPERIDONE 2 MG: 2 TABLET, ORALLY DISINTEGRATING ORAL at 20:23

## 2024-12-05 RX ADMIN — DOCUSATE SODIUM 100 MG: 100 CAPSULE, LIQUID FILLED ORAL at 08:12

## 2024-12-05 ASSESSMENT — ACTIVITIES OF DAILY LIVING (ADL)
ADLS_ACUITY_SCORE: 25
ORAL_HYGIENE: INDEPENDENT
ADLS_ACUITY_SCORE: 25
DRESS: INDEPENDENT
ADLS_ACUITY_SCORE: 25
HYGIENE/GROOMING: INDEPENDENT
DRESS: SCRUBS (BEHAVIORAL HEALTH);INDEPENDENT
ADLS_ACUITY_SCORE: 25
HYGIENE/GROOMING: INDEPENDENT
ORAL_HYGIENE: INDEPENDENT
LAUNDRY: UNABLE TO COMPLETE
ADLS_ACUITY_SCORE: 25
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
## 2024-12-05 NOTE — PLAN OF CARE
Problem: Adult Behavioral Health Plan of Care  Goal: Patient-Specific Goal (Individualization)  Description: Pt will follow recommendations of treatment team.  Pt will be compliant with medications.  Pt will attend 50 % of groups.  Pt will sleep 6-8 hours each night.    MOUTH CHECKS    12/4/24- -ICU r/t disorganization  No weaning due to disorganization and Elopement risk.    11/30/2024 Patient may wear head scarf.  Outcome: Progressing       Face to face shift report received from Shama CARO. Rounding completed, pt observed.     Pt appeared to sleep a total of 1.25 hours total on and off this shift. Pt did not have any noted episodes of disrobing, attempting to get into peer rooms or getting out of the MHICU.    Face to face report will be communicated to oncoming RN.    Isaura Burks RN  12/5/2024  6:21 AM
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
## 2024-12-05 NOTE — PLAN OF CARE
"Face to face shift report received from Isaura ZENG RN. Rounding completed, pt observed.    Problem: Adult Behavioral Health Plan of Care  Goal: Patient-Specific Goal (Individualization)  Description: Pt will follow recommendations of treatment team.  Pt will be compliant with medications.  Pt will attend 50 % of groups.  Pt will sleep 6-8 hours each night.    MOUTH CHECKS    12/5/24- MH-ICU r/t disorganization  No weaning due to disorganization and Elopement risk.    11/30/2024 Patient may wear head scarf.  Outcome: Progressing  Note: Shift Summary: Patient remains in a room in the MHICU r/t unpredictable behavior and the need for decreased stimulation. Patient was cooperative with scheduled AM meds.  Anxious and up most of night.  Wandering nto a peers room when he left door open and she stated \"I wilberto to clean that room\".  Reminded patient that it was a man's room and she could not clean it or go into his room.  He then stated \"I will clean my room\". Patient ate well. Ativan 1 mg po with AM scheduled medications for anxiety.  Patient observed whispering to self and laughing to self at times.  Gait is steady.     Problem: Thought Process Alteration  Goal: Optimal Thought Clarity  Description: Pt will have a logical and linear conversation.  Pt will verbalize 2-3 coping skills.   Outcome: Progressing  Face to face report will be communicated to oncoming RN.    Lela Burger RN  12/5/2024                            "

## 2024-12-06 PROCEDURE — 250N000013 HC RX MED GY IP 250 OP 250 PS 637

## 2024-12-06 PROCEDURE — 250N000013 HC RX MED GY IP 250 OP 250 PS 637: Performed by: STUDENT IN AN ORGANIZED HEALTH CARE EDUCATION/TRAINING PROGRAM

## 2024-12-06 PROCEDURE — 99233 SBSQ HOSP IP/OBS HIGH 50: CPT

## 2024-12-06 PROCEDURE — 250N000013 HC RX MED GY IP 250 OP 250 PS 637: Performed by: NURSE PRACTITIONER

## 2024-12-06 PROCEDURE — 124N000001 HC R&B MH

## 2024-12-06 RX ORDER — RISPERIDONE 2 MG/1
2 TABLET, ORALLY DISINTEGRATING ORAL DAILY
Status: DISCONTINUED | OUTPATIENT
Start: 2024-12-07 | End: 2024-12-13

## 2024-12-06 RX ORDER — CHLORTHALIDONE 25 MG/1
25 TABLET ORAL DAILY
Status: DISCONTINUED | OUTPATIENT
Start: 2024-12-06 | End: 2024-12-12

## 2024-12-06 RX ORDER — RISPERIDONE 3 MG/1
3 TABLET, ORALLY DISINTEGRATING ORAL AT BEDTIME
Status: DISCONTINUED | OUTPATIENT
Start: 2024-12-06 | End: 2024-12-08

## 2024-12-06 RX ADMIN — RISPERIDONE 2 MG: 2 TABLET, ORALLY DISINTEGRATING ORAL at 08:37

## 2024-12-06 RX ADMIN — RISPERIDONE 3 MG: 3 TABLET, ORALLY DISINTEGRATING ORAL at 19:46

## 2024-12-06 RX ADMIN — LORAZEPAM 1 MG: 1 TABLET ORAL at 19:46

## 2024-12-06 RX ADMIN — CHLORTHALIDONE 25 MG: 25 TABLET ORAL at 08:37

## 2024-12-06 RX ADMIN — DOCUSATE SODIUM 100 MG: 100 CAPSULE, LIQUID FILLED ORAL at 08:37

## 2024-12-06 RX ADMIN — Medication 1 TABLET: at 08:37

## 2024-12-06 RX ADMIN — OLANZAPINE 10 MG: 10 TABLET, ORALLY DISINTEGRATING ORAL at 12:29

## 2024-12-06 ASSESSMENT — ACTIVITIES OF DAILY LIVING (ADL)
DRESS: INDEPENDENT
ADLS_ACUITY_SCORE: 25
DRESS: SCRUBS (BEHAVIORAL HEALTH);INDEPENDENT
ADLS_ACUITY_SCORE: 25
LAUNDRY: UNABLE TO COMPLETE
ADLS_ACUITY_SCORE: 25
ORAL_HYGIENE: INDEPENDENT
HYGIENE/GROOMING: INDEPENDENT
LAUNDRY: UNABLE TO COMPLETE
ADLS_ACUITY_SCORE: 25
HYGIENE/GROOMING: INDEPENDENT
ADLS_ACUITY_SCORE: 25
ORAL_HYGIENE: INDEPENDENT
ADLS_ACUITY_SCORE: 25
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
## 2024-12-06 NOTE — PLAN OF CARE
Problem: Adult Behavioral Health Plan of Care  Goal: Patient-Specific Goal (Individualization)  Description: Pt will follow recommendations of treatment team.  Pt will be compliant with medications.  Pt will attend 50 % of groups.  Pt will sleep 6-8 hours each night.    MOUTH CHECKS    12/5/24- -ICU r/t disorganization  No weaning due to disorganization and Elopement risk.    11/30/2024 Patient may wear head scarf.  Outcome: Progressing     Face to face shift report received from Debbi CARO. Rounding completed, pt observed.    Pt awake at the beginning of this shift. Pt appeared to be sleeping after 0230 rounds.    Face to face report will be communicated to oncoming RN.    Isaura Burks RN  12/6/2024  6:05 AM

## 2024-12-06 NOTE — PLAN OF CARE
Face to face report received from Isaura CARO. Pt. Observed.    Problem: Adult Behavioral Health Plan of Care  Goal: Patient-Specific Goal (Individualization)  Description: Pt will follow recommendations of treatment team.  Pt will be compliant with medications.  Pt will attend 50 % of groups.  Pt will sleep 6-8 hours each night.    MOUTH CHECKS    12/6/24- -ICU r/t disorganization  No weaning due to disorganization and Elopement risk.    11/30/2024 Patient may wear head scarf.  Outcome: Progressing  Pt. Denies HI, and pain. Reports AH, AV, Thoughts of SI with plan to jump out window. Pt. Contracts for safety while at facility. Pt. Is cooperative with medications and nursing assessment. Pt. In agreement to update staff to thoughts feelings of wanting to harm self. Pt. Entering peers room, redirectable to her own room. Talking and laughing at inappropriate times. PRN Zyprexa 10 mg ODT administered @ 1229, with effective results. Peers doors locked from outside. Pt. Eating WDL, lunch x 2. Showering. Unable to wean this a.m. r/t increased disorganization and responding to internal stimuli.  Pt. Denies any issues with bowel or bladder.      Face to face end of shift report communicated to oncoming RN.     Jackie Hannah RN  12/6/2024

## 2024-12-06 NOTE — PLAN OF CARE
PT talking to herself laughing to herself. Son attempted to speak with patient and she hung up the phone on him.   PT restless. Did not sleep last night. Reporting AH that are bothering her. Began taking her sheets off her bed, disrobing, then went into shower in the dark. Sitting on the bed disrobed laughing, laughing incongruently when talking with writer.   Writer administer PRN Ativan and Risperdal.   Risperdal given 4mg instead of 2mg. Provider notified at 2031, no new orders. Monitor for akathisia.     2216. PT awake in lounge continues to laugh and respond to AH.         Face to face end of shift report communicated to oncoming RN     Debbi Jay RN  12/5/2024  9:21 PM         Problem: Adult Behavioral Health Plan of Care  Goal: Patient-Specific Goal (Individualization)  Description: Pt will follow recommendations of treatment team.  Pt will be compliant with medications.  Pt will attend 50 % of groups.  Pt will sleep 6-8 hours each night.    MOUTH CHECKS    12/5/24- -ICU r/t disorganization  No weaning due to disorganization and Elopement risk.    11/30/2024 Patient may wear head scarf.  Outcome: Progressing     Problem: Thought Process Alteration  Goal: Optimal Thought Clarity  Description: Pt will have a logical and linear conversation.  Pt will verbalize 2-3 coping skills.   Outcome: Progressing   Goal Outcome Evaluation:    Plan of Care Reviewed With: patient

## 2024-12-07 PROCEDURE — 250N000013 HC RX MED GY IP 250 OP 250 PS 637: Performed by: NURSE PRACTITIONER

## 2024-12-07 PROCEDURE — 250N000013 HC RX MED GY IP 250 OP 250 PS 637

## 2024-12-07 PROCEDURE — 250N000013 HC RX MED GY IP 250 OP 250 PS 637: Performed by: STUDENT IN AN ORGANIZED HEALTH CARE EDUCATION/TRAINING PROGRAM

## 2024-12-07 PROCEDURE — 99232 SBSQ HOSP IP/OBS MODERATE 35: CPT | Performed by: NURSE PRACTITIONER

## 2024-12-07 PROCEDURE — 124N000001 HC R&B MH

## 2024-12-07 RX ADMIN — HALOPERIDOL 5 MG: 5 TABLET ORAL at 10:10

## 2024-12-07 RX ADMIN — DIPHENHYDRAMINE HYDROCHLORIDE 50 MG: 50 CAPSULE ORAL at 10:10

## 2024-12-07 RX ADMIN — LORAZEPAM 1 MG: 1 TABLET ORAL at 08:52

## 2024-12-07 RX ADMIN — LORAZEPAM 1 MG: 1 TABLET ORAL at 20:03

## 2024-12-07 RX ADMIN — LORAZEPAM 2 MG: 1 TABLET ORAL at 10:10

## 2024-12-07 RX ADMIN — Medication 1 TABLET: at 08:53

## 2024-12-07 RX ADMIN — DOCUSATE SODIUM 100 MG: 100 CAPSULE, LIQUID FILLED ORAL at 08:55

## 2024-12-07 RX ADMIN — RISPERIDONE 3 MG: 3 TABLET, ORALLY DISINTEGRATING ORAL at 20:03

## 2024-12-07 RX ADMIN — RISPERIDONE 2 MG: 2 TABLET, ORALLY DISINTEGRATING ORAL at 08:53

## 2024-12-07 ASSESSMENT — ACTIVITIES OF DAILY LIVING (ADL)
ADLS_ACUITY_SCORE: 25
DRESS: INDEPENDENT;SCRUBS (BEHAVIORAL HEALTH)
LAUNDRY: UNABLE TO COMPLETE
LAUNDRY: UNABLE TO COMPLETE
ADLS_ACUITY_SCORE: 25
HYGIENE/GROOMING: INDEPENDENT
ADLS_ACUITY_SCORE: 25
DRESS: SCRUBS (BEHAVIORAL HEALTH);INDEPENDENT
ADLS_ACUITY_SCORE: 25
HYGIENE/GROOMING: INDEPENDENT
ADLS_ACUITY_SCORE: 25
ORAL_HYGIENE: INDEPENDENT
ADLS_ACUITY_SCORE: 25
ORAL_HYGIENE: INDEPENDENT
ADLS_ACUITY_SCORE: 25

## 2024-12-07 NOTE — PLAN OF CARE
1600- PT in bed incontinent of urine. Linens and scrubs changed.   PT continues to try and wander into other patients rooms, trying door handles. Responding to internal stimulus.   Labile in affect. Guarded, minimal conversation with staff, or incongruently laughing.  Tore off blood pressure cuff. Pacing, loudly talking to herself.      PT took HS medications but was initially dismissive of taking them. Appears to be trying to cheek them but gives up and swallows them with water. PRN Ativan given.  Keeping clothes on this shift.   Appears a little less restless. PT encouraged to try and sleep.     Face to face end of shift report communicated to oncoming RN     Debbi Jay RN  12/7/2024  9:45 PM           Problem: Adult Behavioral Health Plan of Care  Goal: Patient-Specific Goal (Individualization)  Description: Pt will follow recommendations of treatment team.  Pt will be compliant with medications.  Pt will attend 50 % of groups.  Pt will sleep 6-8 hours each night.    MOUTH CHECKS    12/7/24- -ICU r/t disorganization  No weaning due to disorganization and Elopement risk.    11/30/2024 Patient may wear head scarf.  Outcome: Progressing     Problem: Thought Process Alteration  Goal: Optimal Thought Clarity  Description: Pt will have a logical and linear conversation.  Pt will verbalize 2-3 coping skills.   Outcome: Progressing   Goal Outcome Evaluation:    Plan of Care Reviewed With: patient
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
## 2024-12-07 NOTE — PLAN OF CARE
"Face to face report received from Matt CARO. Pt. Observed.    Problem: Adult Behavioral Health Plan of Care  Goal: Patient-Specific Goal (Individualization)  Description: Pt will follow recommendations of treatment team.  Pt will be compliant with medications.  Pt will attend 50 % of groups.  Pt will sleep 6-8 hours each night.    MOUTH CHECKS    12/7/24- -ICU r/t disorganization  No weaning due to disorganization and Elopement risk.    11/30/2024 Patient may wear head scarf.  Outcome: Not Progressing  Pt. Not cooperative with nursing assessment this a.m. Pt. Responding to internal stimuli. Laughing hysterically, rambling in a language unknown to this writer \"possibly semolian\" Pt. Refuses linen for bedding, her bonnet found in peers room on floor in water at start of day shift.   Pt. Requesting new breakfast pointing to turkey banerjee on her tray. Pt. Does not eat pork. New breakfast administered. Pt. Eating half of new breakfast then placing food on floor. Pt. Declining Scheduled risperidone 2 mg ODT, x 2 then grabbed medication and rubbed it into her gums. Pt. Requiring redirection from going into other pt. Rooms. Peers doors locked from outside. Pt. Administered PRN ativan 1 mg po @ 0852 with not effective results. Pt. Refusing scheduled Chlorthalidone 25 mg po r/t her current B/P Pt. Continues with rambling, hysterical laughter, standing near exits, clenching jaw when staff talk to her, Clenching fists, poor boundaries with peers and staff. PRN Benadryl 50 mg po, Ativan 2 mg po, and haldol 5 mg po administered @ 1010. At approximately 1130 pt appeared to be resting in bed.   Pt. Eating WDL. Pt. Offers no c/o issues with bowel and bladder.      Face to face end of shift report communicated to oncoming RN.     Jackie Hannah RN  12/7/2024  11:08 AM      "

## 2024-12-07 NOTE — PLAN OF CARE
Problem: Adult Behavioral Health Plan of Care  Goal: Patient-Specific Goal (Individualization)  Description: Pt will follow recommendations of treatment team.  Pt will be compliant with medications.  Pt will attend 50 % of groups.  Pt will sleep 6-8 hours each night.    MOUTH CHECKS    12/5/24- -ICU r/t disorganization  No weaning due to disorganization and Elopement risk.    11/30/2024 Patient may wear head scarf.  Outcome: Progressing   Goal Outcome Evaluation:         Face to face shift report received from RN. Rounding completed, pt observed.Client rested in room for 4.5 hours with eyes closed and respirations noted. Client had no falls or instances of instability this shift.Face to face report will be communicated to oncoming RN.    Matt Segal RN  12/7/2024  6:25 AM

## 2024-12-07 NOTE — PLAN OF CARE
PT continues to be disorganized. Taking clothes off, wandering uncovered into the MHICU, needing redirection from staff. Trying doors, trying to enter other patients rooms.   She endorses AH, she appears to be consistently responding to AH.   Rubbed her disintegrating medication into her teeth. During medication administration patient having intense eye contact reporting I was making her angry. Labile behavior, laughing with write shortly after.   PRN Lorazepam given with some effect.   Spoke with her son for a few minutes this shift before hanging up on him again.   No sleep this shift.   Continues to take sheets off bed and remove bedding and then asking for new bedding.     Entering shower this shift.             Problem: Adult Behavioral Health Plan of Care  Goal: Patient-Specific Goal (Individualization)  Description: Pt will follow recommendations of treatment team.  Pt will be compliant with medications.  Pt will attend 50 % of groups.  Pt will sleep 6-8 hours each night.    MOUTH CHECKS    12/5/24- -ICU r/t disorganization  No weaning due to disorganization and Elopement risk.    11/30/2024 Patient may wear head scarf.  Outcome: Progressing     Problem: Thought Process Alteration  Goal: Optimal Thought Clarity  Description: Pt will have a logical and linear conversation.  Pt will verbalize 2-3 coping skills.   Outcome: Progressing

## 2024-12-08 PROCEDURE — 250N000013 HC RX MED GY IP 250 OP 250 PS 637: Performed by: NURSE PRACTITIONER

## 2024-12-08 PROCEDURE — 124N000001 HC R&B MH

## 2024-12-08 PROCEDURE — 250N000013 HC RX MED GY IP 250 OP 250 PS 637

## 2024-12-08 PROCEDURE — 250N000013 HC RX MED GY IP 250 OP 250 PS 637: Performed by: STUDENT IN AN ORGANIZED HEALTH CARE EDUCATION/TRAINING PROGRAM

## 2024-12-08 PROCEDURE — 99232 SBSQ HOSP IP/OBS MODERATE 35: CPT | Performed by: NURSE PRACTITIONER

## 2024-12-08 RX ORDER — RISPERIDONE 2 MG/1
4 TABLET, ORALLY DISINTEGRATING ORAL AT BEDTIME
Status: DISCONTINUED | OUTPATIENT
Start: 2024-12-08 | End: 2024-12-16

## 2024-12-08 RX ADMIN — DIPHENHYDRAMINE HYDROCHLORIDE 50 MG: 50 CAPSULE ORAL at 10:17

## 2024-12-08 RX ADMIN — DOCUSATE SODIUM 100 MG: 100 CAPSULE, LIQUID FILLED ORAL at 08:16

## 2024-12-08 RX ADMIN — CHLORTHALIDONE 25 MG: 25 TABLET ORAL at 08:17

## 2024-12-08 RX ADMIN — RISPERIDONE 4 MG: 2 TABLET, ORALLY DISINTEGRATING ORAL at 20:03

## 2024-12-08 RX ADMIN — Medication 1 TABLET: at 08:17

## 2024-12-08 RX ADMIN — LORAZEPAM 1 MG: 1 TABLET ORAL at 08:17

## 2024-12-08 RX ADMIN — HALOPERIDOL 5 MG: 5 TABLET ORAL at 10:17

## 2024-12-08 RX ADMIN — RISPERIDONE 2 MG: 2 TABLET, ORALLY DISINTEGRATING ORAL at 08:16

## 2024-12-08 ASSESSMENT — ACTIVITIES OF DAILY LIVING (ADL)
ADLS_ACUITY_SCORE: 25

## 2024-12-08 NOTE — PLAN OF CARE
Problem: Adult Behavioral Health Plan of Care  Goal: Patient-Specific Goal (Individualization)  Description: Pt will follow recommendations of treatment team.  Pt will be compliant with medications.  Pt will attend 50 % of groups.  Pt will sleep 6-8 hours each night.    MOUTH CHECKS    12/7/24- -ICU r/t disorganization  No weaning due to disorganization and Elopement risk.    11/30/2024 Patient may wear head scarf.  Outcome: Progressing   Goal Outcome Evaluation:          Face to face shift report received from RN. Rounding completed, pt observed.Client rested 1.5 hours with eyes closed and respirations noted. Client had no falls or instances of instability this shift.Face to face report will be communicated to oncoming RN.    Matt Segal RN  12/8/2024  6:21 AM

## 2024-12-08 NOTE — PLAN OF CARE
Face to face report received from Matt CARO. Pt. Observed.    Problem: Adult Behavioral Health Plan of Care  Goal: Patient-Specific Goal (Individualization)  Description: Pt will follow recommendations of treatment team.  Pt will be compliant with medications.  Pt will attend 50 % of groups.  Pt will sleep 6-8 hours each night.    MOUTH CHECKS    12/8/24- -ICU r/t disorganization  No weaning due to disorganization and Elopement risk.    11/30/2024 Patient may wear head scarf.  Outcome: Progressing  Pt. Reporting to   urging her to harm. Pt. Responding to internal stimuli. Laughing hysterically, rambling in a language unknown to this writer. Pt. Continues to refuses linen for bedding.   Pt. Requiring redirection from going into other pt. Rooms. Peers doors locked from outside. Pt. Administered PRN ativan 1 mg po @ 0817 with not effective results. Pt. Continues with rambling, hysterical laughter, clenching jaw when staff talk to her, Clenching fists, poor boundaries with peers and staff. PRN Benadryl 50 mg po, and haldol 5 mg po administered @ 1017 with somewhat effective results. Pt. Eating WDL. Pt. Offers no c/o issues with bowel and bladder.    Pt. Family called to request visit today. Pt. Son Tevin on CLYDE. Pt. Son update she is not able to wean for visit but I-Pad visit can be arranged. Pt. Son given ID and pin for meeting visit @ 1830.      Face to face end of shift report communicated to oncberto RN.     Jackie Hannah RN  12/8/2024  12:50 PM

## 2024-12-09 PROCEDURE — 250N000013 HC RX MED GY IP 250 OP 250 PS 637: Performed by: NURSE PRACTITIONER

## 2024-12-09 PROCEDURE — 99233 SBSQ HOSP IP/OBS HIGH 50: CPT | Performed by: NURSE PRACTITIONER

## 2024-12-09 PROCEDURE — 250N000013 HC RX MED GY IP 250 OP 250 PS 637

## 2024-12-09 PROCEDURE — 250N000013 HC RX MED GY IP 250 OP 250 PS 637: Performed by: STUDENT IN AN ORGANIZED HEALTH CARE EDUCATION/TRAINING PROGRAM

## 2024-12-09 PROCEDURE — 124N000001 HC R&B MH

## 2024-12-09 RX ADMIN — Medication 1 TABLET: at 08:36

## 2024-12-09 RX ADMIN — RISPERIDONE 4 MG: 2 TABLET, ORALLY DISINTEGRATING ORAL at 20:06

## 2024-12-09 RX ADMIN — DOCUSATE SODIUM 100 MG: 100 CAPSULE, LIQUID FILLED ORAL at 08:36

## 2024-12-09 RX ADMIN — LORAZEPAM 1 MG: 1 TABLET ORAL at 01:34

## 2024-12-09 RX ADMIN — OLANZAPINE 10 MG: 10 TABLET, ORALLY DISINTEGRATING ORAL at 05:07

## 2024-12-09 RX ADMIN — RISPERIDONE 2 MG: 2 TABLET, ORALLY DISINTEGRATING ORAL at 08:36

## 2024-12-09 RX ADMIN — CHLORTHALIDONE 25 MG: 25 TABLET ORAL at 08:36

## 2024-12-09 ASSESSMENT — ACTIVITIES OF DAILY LIVING (ADL)
ADLS_ACUITY_SCORE: 25
ADLS_ACUITY_SCORE: 25
HYGIENE/GROOMING: INDEPENDENT
ADLS_ACUITY_SCORE: 25
DRESS: SCRUBS (BEHAVIORAL HEALTH);INDEPENDENT
HYGIENE/GROOMING: INDEPENDENT
ADLS_ACUITY_SCORE: 25
DRESS: INDEPENDENT
LAUNDRY: UNABLE TO COMPLETE
ADLS_ACUITY_SCORE: 25
ORAL_HYGIENE: INDEPENDENT
ADLS_ACUITY_SCORE: 25
LAUNDRY: UNABLE TO COMPLETE
ORAL_HYGIENE: INDEPENDENT

## 2024-12-09 NOTE — PLAN OF CARE
Problem: Adult Behavioral Health Plan of Care  Goal: Patient-Specific Goal (Individualization)  Description: Pt will follow recommendations of treatment team.  Pt will be compliant with medications.  Pt will attend 50 % of groups.  Pt will sleep 6-8 hours each night.    MOUTH CHECKS    12/9/24- -ICU r/t disorganization  No weaning due to disorganization and Elopement risk.    12/9/2024 Patient may wear head scarf.  Outcome: Not Progressing     Problem: Thought Process Alteration  Goal: Optimal Thought Clarity  Description: Pt will have a logical and linear conversation.  Pt will verbalize 2-3 coping skills.   Outcome: Not Progressing     Patient remains disoriented and confused.  Wondering into other patients room and laughing inappropriately much of the day. Unable to complete nursing assessment with patient as she can not answers questions.  Conversation is illogical and she sometimes will only speak in Somalin.   Does take all medications as prescribed.  No complaints of pain.  Took a shower this shift.  Ate 100% of meals.  VS WNL.  Face to face end of shift report communicated to evening shift RN.     Adriana Dubois RN  12/9/2024  1:24 PM
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
## 2024-12-09 NOTE — PROGRESS NOTES
"Patient received from [facility]. Patient speaking in english and unknown language, presumably Somalian. Patient offered ice water, she accepted.     Patient had 6 family members up visiting from the cities and mother flew in from [place], that were evidently suppose to do Ipad visit. It was allowed this one time, but was reiterated that this will not be allowed again without pre approval. Patient states \"yes, good visit\" when asked how it went but then became disorganized and speaking presumably Somalian. Patient appears to respond to internal stimuli, often laughing hysterically to herself.   December 8, 2024 7:27 PM    Patient remains disorganized and confused. Patient was standing in the shower with and without water running for approximately 2 hours, took several attempts to have patient follow directions to get out.  December 8, 2024 9:43 PM                       "

## 2024-12-09 NOTE — PROGRESS NOTES
1:1 time with the patient.  No changes made to the discharge plan at this time. Commitment court tomorrow 12/10 @ 9:30am.

## 2024-12-09 NOTE — PLAN OF CARE
"  Problem: Adult Behavioral Health Plan of Care  Goal: Patient-Specific Goal (Individualization)  Description: Pt will follow recommendations of treatment team.  Pt will be compliant with medications.  Pt will attend 50 % of groups.  Pt will sleep 6-8 hours each night.    MOUTH CHECKS    12/8/24- -ICU r/t disorganization  No weaning due to disorganization and Elopement risk.    12/8/2024 Patient may wear head scarf.  Outcome: Progressing  Note: Report received from Moisés Brown complete. Pt observed sleeping in right side lying position with regular and unlabored respirations.    0115- Pt awake  0134- Pt offered and accepted Ativan 1 mg  PO for sleep and appearing restless. Pt took med without issue. Pt is pleasant and cooperative but appears distracted, possibly by internal stimuli. Pt was also provided a snack of armando crackers and ice water per her request.   0500- Pt back out to the [facility] lounge and heard repeatedly laughing loudly and carrying a full conversation to self alternating between what sounds to be Senegalese and English, pacing, and trying doors in the [place]. She appears to be responding to internal stimuli . When asked about potential AVH pt denies this and stated she is not seeing or hearing anything. When asked what she is laughing so loudly at pt stated again she isn't seeing or hearing anything and just said, \"it's funny because you know, wait it's still night. I should go to sleep, then proceeded to walk away and start jerking loudly on door handles. She was redirectable to not do this as others are trying to sleep. Pt was asked if she would like to take a medication for everything listed above and she stated yes.   0507- Pt offered and accepted Zyprexa 10 mg ODT for psychomotor agitation, what appears to be psychosis, and appearing moderately anxious.      15 minute and PRN checks all night. No complaints offered.     Pt slept approx  2  hours this NOC shift.    Face to face end of shift " report communicated to oncoming RN.    Morena MOBLEY RN  December 9, 2024  4:10 AM          Problem: Thought Process Alteration  Goal: Optimal Thought Clarity  Description: Pt will have a logical and linear conversation.  Pt will verbalize 2-3 coping skills.   Outcome: Progressing  Note: Pt appears to be responding to internal stimuli. She is able to make her needs known when asked questions regarding needs.   0400- Pt still awake but does not appear to be responding to internal stimuli at this time. Pt advised she should try to get some more sleep as there is still 4 hours til breakfast. Pt stated she was going to try to get some more sleep because she is tired. Pt continued to sit in the MHICU lounge for a few minutes before returning to her room. Pt seen on monitors as going from her bed to the restroom a few times.    Goal Outcome Evaluation:

## 2024-12-09 NOTE — PLAN OF CARE
Problem: Adult Behavioral Health Plan of Care  Goal: Patient-Specific Goal (Individualization)  Description: Pt will follow recommendations of treatment team.  Pt will be compliant with medications.  Pt will attend 50 % of groups.  Pt will sleep 6-8 hours each night.    MOUTH CHECKS    12/9/24- -ICU r/t disorganization  No weaning due to disorganization and Elopement risk.    12/9/2024 Patient may wear head scarf.  Outcome: Progressing     Problem: Thought Process Alteration  Goal: Optimal Thought Clarity  Description: Pt will have a logical and linear conversation.  Pt will verbalize 2-3 coping skills.   Outcome: Progressing   Goal Outcome Evaluation:       Pt. In MHICU, not able to wean due to unpredictable behaviors including taking clothes off intermittently, appears to be responding to internal stimuli, talking to self frequently, taking prescribed medications, wearing head scarf, will continue to monitor progress.    Face to face end of shift report will be communicated to oncoming night shift RN.     Morena Randolph RN  12/9/2024  5:36 PM

## 2024-12-10 LAB
ANION GAP SERPL CALCULATED.3IONS-SCNC: 14 MMOL/L (ref 7–15)
ANION GAP SERPL CALCULATED.3IONS-SCNC: 14 MMOL/L (ref 7–15)
BUN SERPL-MCNC: 14.6 MG/DL (ref 6–20)
BUN SERPL-MCNC: 14.6 MG/DL (ref 6–20)
CALCIUM SERPL-MCNC: 9.9 MG/DL (ref 8.8–10.4)
CALCIUM SERPL-MCNC: 9.9 MG/DL (ref 8.8–10.4)
CHLORIDE SERPL-SCNC: 100 MMOL/L (ref 98–107)
CHLORIDE SERPL-SCNC: 100 MMOL/L (ref 98–107)
CREAT SERPL-MCNC: 0.65 MG/DL (ref 0.51–0.95)
CREAT SERPL-MCNC: 0.65 MG/DL (ref 0.51–0.95)
EGFRCR SERPLBLD CKD-EPI 2021: >90 ML/MIN/1.73M2
EGFRCR SERPLBLD CKD-EPI 2021: >90 ML/MIN/1.73M2
GLUCOSE SERPL-MCNC: 114 MG/DL (ref 70–99)
GLUCOSE SERPL-MCNC: 114 MG/DL (ref 70–99)
HCO3 SERPL-SCNC: 24 MMOL/L (ref 22–29)
HCO3 SERPL-SCNC: 24 MMOL/L (ref 22–29)
HOLD SPECIMEN: NORMAL
POTASSIUM SERPL-SCNC: 3.3 MMOL/L (ref 3.4–5.3)
POTASSIUM SERPL-SCNC: 3.3 MMOL/L (ref 3.4–5.3)
SODIUM SERPL-SCNC: 138 MMOL/L (ref 135–145)
SODIUM SERPL-SCNC: 138 MMOL/L (ref 135–145)

## 2024-12-10 PROCEDURE — 124N000001 HC R&B MH

## 2024-12-10 PROCEDURE — 82247 BILIRUBIN TOTAL: CPT | Performed by: NURSE PRACTITIONER

## 2024-12-10 PROCEDURE — 36415 COLL VENOUS BLD VENIPUNCTURE: CPT

## 2024-12-10 PROCEDURE — 250N000013 HC RX MED GY IP 250 OP 250 PS 637: Performed by: NURSE PRACTITIONER

## 2024-12-10 PROCEDURE — 82550 ASSAY OF CK (CPK): CPT | Performed by: NURSE PRACTITIONER

## 2024-12-10 PROCEDURE — 80048 BASIC METABOLIC PNL TOTAL CA: CPT

## 2024-12-10 PROCEDURE — 250N000013 HC RX MED GY IP 250 OP 250 PS 637

## 2024-12-10 PROCEDURE — 80053 COMPREHEN METABOLIC PANEL: CPT

## 2024-12-10 PROCEDURE — 99233 SBSQ HOSP IP/OBS HIGH 50: CPT | Performed by: NURSE PRACTITIONER

## 2024-12-10 PROCEDURE — 82248 BILIRUBIN DIRECT: CPT | Performed by: NURSE PRACTITIONER

## 2024-12-10 RX ORDER — CHLORPROMAZINE HYDROCHLORIDE 25 MG/1
50 TABLET, FILM COATED ORAL ONCE
Status: COMPLETED | OUTPATIENT
Start: 2024-12-10 | End: 2024-12-10

## 2024-12-10 RX ORDER — CHLORPROMAZINE HYDROCHLORIDE 25 MG/1
25 TABLET, FILM COATED ORAL 3 TIMES DAILY PRN
Status: DISCONTINUED | OUTPATIENT
Start: 2024-12-10 | End: 2024-12-11

## 2024-12-10 RX ORDER — CHLORPROMAZINE HCI 25 MG/ML
25 INJECTION INTRAMUSCULAR 3 TIMES DAILY PRN
Status: DISCONTINUED | OUTPATIENT
Start: 2024-12-10 | End: 2024-12-11

## 2024-12-10 RX ORDER — CHLORPROMAZINE HYDROCHLORIDE 25 MG/1
25 TABLET, FILM COATED ORAL 3 TIMES DAILY PRN
Status: DISCONTINUED | OUTPATIENT
Start: 2024-12-10 | End: 2024-12-10

## 2024-12-10 RX ORDER — CHLORPROMAZINE HCI 25 MG/ML
25 INJECTION INTRAMUSCULAR 3 TIMES DAILY PRN
Status: DISCONTINUED | OUTPATIENT
Start: 2024-12-10 | End: 2024-12-10

## 2024-12-10 RX ORDER — DIVALPROEX SODIUM 500 MG/1
1000 TABLET, FILM COATED, EXTENDED RELEASE ORAL AT BEDTIME
Status: DISCONTINUED | OUTPATIENT
Start: 2024-12-10 | End: 2024-12-11

## 2024-12-10 RX ADMIN — Medication 1 TABLET: at 07:44

## 2024-12-10 RX ADMIN — DOCUSATE SODIUM 100 MG: 100 CAPSULE, LIQUID FILLED ORAL at 07:44

## 2024-12-10 RX ADMIN — DIVALPROEX SODIUM 1000 MG: 500 TABLET, EXTENDED RELEASE ORAL at 13:49

## 2024-12-10 RX ADMIN — CHLORPROMAZINE HYDROCHLORIDE 50 MG: 25 TABLET, FILM COATED ORAL at 12:27

## 2024-12-10 RX ADMIN — RISPERIDONE 2 MG: 2 TABLET, ORALLY DISINTEGRATING ORAL at 07:44

## 2024-12-10 RX ADMIN — RISPERIDONE 4 MG: 2 TABLET, ORALLY DISINTEGRATING ORAL at 20:04

## 2024-12-10 RX ADMIN — CHLORTHALIDONE 25 MG: 25 TABLET ORAL at 07:43

## 2024-12-10 ASSESSMENT — ACTIVITIES OF DAILY LIVING (ADL)
ADLS_ACUITY_SCORE: 45
ADLS_ACUITY_SCORE: 45
ADLS_ACUITY_SCORE: 25
HYGIENE/GROOMING: HANDWASHING;PROMPTS
ORAL_HYGIENE: INDEPENDENT;PROMPTS
ORAL_HYGIENE: INDEPENDENT
ADLS_ACUITY_SCORE: 45
HYGIENE/GROOMING: INDEPENDENT
ADLS_ACUITY_SCORE: 25
ADLS_ACUITY_SCORE: 45
ADLS_ACUITY_SCORE: 25
LAUNDRY: UNABLE TO COMPLETE
ADLS_ACUITY_SCORE: 25
DRESS: SCRUBS (BEHAVIORAL HEALTH);INDEPENDENT
ADLS_ACUITY_SCORE: 25
ADLS_ACUITY_SCORE: 25
ADLS_ACUITY_SCORE: 45
ADLS_ACUITY_SCORE: 25
ADLS_ACUITY_SCORE: 45
DRESS: INDEPENDENT;PROMPTS

## 2024-12-10 NOTE — PLAN OF CARE
Problem: Adult Behavioral Health Plan of Care  Goal: Patient-Specific Goal (Individualization)  Description: Pt will follow recommendations of treatment team.  Pt will be compliant with medications.  Pt will attend 50 % of groups.  Pt will sleep 6-8 hours each night.    MOUTH CHECKS    12/9/24- -ICU r/t disorganization  No weaning due to disorganization and Elopement risk.    12/9/2024 Patient may wear head scarf.  Outcome: Progressing     Face to face shift report received from Morena CARO. Rounding completed, pt observed.     Pt did not sleep this shift.    Face to face report will be communicated to oncoming RN.    Isaura Burks RN  12/10/2024  6:14 AM
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
## 2024-12-10 NOTE — PROGRESS NOTES
Pt had commitment hearing today and they placed it under advisement. In court it was stated that they needed updated notes from the hospital from the last 5 days. Emailed contact at [facility] and stated that we do not Efile but we can send the updated notes via email. Waiting for a response back.     Updated notes sent to [facility].

## 2024-12-11 LAB
ALBUMIN SERPL BCG-MCNC: 4.4 G/DL (ref 3.5–5.2)
ALBUMIN SERPL BCG-MCNC: 4.4 G/DL (ref 3.5–5.2)
ALP SERPL-CCNC: 70 U/L (ref 40–150)
ALP SERPL-CCNC: 70 U/L (ref 40–150)
ALT SERPL W P-5'-P-CCNC: 29 U/L (ref 0–50)
ALT SERPL W P-5'-P-CCNC: 29 U/L (ref 0–50)
AST SERPL W P-5'-P-CCNC: 25 U/L (ref 0–45)
AST SERPL W P-5'-P-CCNC: 25 U/L (ref 0–45)
BILIRUB DIRECT SERPL-MCNC: <0.2 MG/DL (ref 0–0.3)
BILIRUB DIRECT SERPL-MCNC: <0.2 MG/DL (ref 0–0.3)
BILIRUB SERPL-MCNC: 0.2 MG/DL
BILIRUB SERPL-MCNC: 0.2 MG/DL
CK SERPL-CCNC: 121 U/L (ref 26–192)
CK SERPL-CCNC: 121 U/L (ref 26–192)
PROT SERPL-MCNC: 7.6 G/DL (ref 6.4–8.3)
PROT SERPL-MCNC: 7.6 G/DL (ref 6.4–8.3)

## 2024-12-11 PROCEDURE — 250N000013 HC RX MED GY IP 250 OP 250 PS 637: Performed by: STUDENT IN AN ORGANIZED HEALTH CARE EDUCATION/TRAINING PROGRAM

## 2024-12-11 PROCEDURE — 250N000013 HC RX MED GY IP 250 OP 250 PS 637: Performed by: NURSE PRACTITIONER

## 2024-12-11 PROCEDURE — 99233 SBSQ HOSP IP/OBS HIGH 50: CPT | Performed by: NURSE PRACTITIONER

## 2024-12-11 PROCEDURE — 250N000013 HC RX MED GY IP 250 OP 250 PS 637

## 2024-12-11 PROCEDURE — 124N000001 HC R&B MH

## 2024-12-11 RX ORDER — CHLORPROMAZINE HCI 25 MG/ML
25 INJECTION INTRAMUSCULAR 3 TIMES DAILY PRN
Status: DISCONTINUED | OUTPATIENT
Start: 2024-12-11 | End: 2024-12-17

## 2024-12-11 RX ORDER — CHLORPROMAZINE HYDROCHLORIDE 25 MG/1
25 TABLET, FILM COATED ORAL 3 TIMES DAILY PRN
Status: DISCONTINUED | OUTPATIENT
Start: 2024-12-11 | End: 2024-12-17

## 2024-12-11 RX ORDER — DIVALPROEX SODIUM 500 MG/1
1500 TABLET, FILM COATED, EXTENDED RELEASE ORAL AT BEDTIME
Status: DISCONTINUED | OUTPATIENT
Start: 2024-12-11 | End: 2024-12-21

## 2024-12-11 RX ADMIN — LORAZEPAM 1 MG: 1 TABLET ORAL at 08:25

## 2024-12-11 RX ADMIN — DOCUSATE SODIUM 100 MG: 100 CAPSULE, LIQUID FILLED ORAL at 08:25

## 2024-12-11 RX ADMIN — RISPERIDONE 4 MG: 2 TABLET, ORALLY DISINTEGRATING ORAL at 20:30

## 2024-12-11 RX ADMIN — CHLORPROMAZINE HYDROCHLORIDE 25 MG: 25 TABLET, FILM COATED ORAL at 12:32

## 2024-12-11 RX ADMIN — Medication 1 TABLET: at 08:25

## 2024-12-11 RX ADMIN — RISPERIDONE 2 MG: 2 TABLET, ORALLY DISINTEGRATING ORAL at 08:25

## 2024-12-11 RX ADMIN — DIVALPROEX SODIUM 1500 MG: 500 TABLET, EXTENDED RELEASE ORAL at 20:30

## 2024-12-11 RX ADMIN — LORAZEPAM 2 MG: 1 TABLET ORAL at 20:30

## 2024-12-11 RX ADMIN — CHLORTHALIDONE 25 MG: 25 TABLET ORAL at 08:25

## 2024-12-11 ASSESSMENT — ACTIVITIES OF DAILY LIVING (ADL)
ADLS_ACUITY_SCORE: 25
ADLS_ACUITY_SCORE: 25
ORAL_HYGIENE: INDEPENDENT
HYGIENE/GROOMING: INDEPENDENT
ADLS_ACUITY_SCORE: 25
LAUNDRY: UNABLE TO COMPLETE
ADLS_ACUITY_SCORE: 25
DRESS: SCRUBS (BEHAVIORAL HEALTH);INDEPENDENT
ADLS_ACUITY_SCORE: 25

## 2024-12-11 NOTE — PLAN OF CARE
Problem: Adult Behavioral Health Plan of Care  Goal: Patient-Specific Goal (Individualization)  Description: Pt will follow recommendations of treatment team.  Pt will be compliant with medications.  Pt will attend 50 % of groups.  Pt will sleep 6-8 hours each night.    MOUTH CHECKS    12/10/24- -ICU r/t disorganization  No weaning due to disorganization and Elopement risk.    12/10/2024 Patient may wear head scarf.  Outcome: Progressing     Face to face shift report received from Lina CARO. Rounding completed, pt observed.     Pt awake in bed most of this shift. Pt did not sleep this shift. Pt did not have any noted behaviors this shift.    Face to face report will be communicated to oncoming RN.    Isaura Burks RN  12/11/2024  6:07 AM

## 2024-12-11 NOTE — PLAN OF CARE
Problem: Adult Behavioral Health Plan of Care  Goal: Patient-Specific Goal (Individualization)  Description: Pt will follow recommendations of treatment team.  Pt will be compliant with medications.  Pt will attend 50 % of groups.  Pt will sleep 6-8 hours each night.    MOUTH CHECKS    12/10/24- -ICU r/t disorganization  No weaning due to disorganization and Elopement risk.    12/10/2024 Patient may wear head scarf.  12/10/2024 2304 by Lina San RN  Outcome: Progressing  Note: Pt remains a no wean d/t disorganization and elopement risk.     Pt was calm and cooperative throughout the shift. She endorsed anxiety, depression, and suicidal thoughts. She denied a plan or intent. She endorsed AH telling her to walk to different areas of her room. She ate 100% of supper tonight. She was compliant with her scheduled medications. Mouth check completed.    Face to face end of shift report will be communicated to oncoming RN.      Problem: Thought Process Alteration  Goal: Optimal Thought Clarity  Description: Pt will have a logical and linear conversation.  Pt will verbalize 2-3 coping skills.   Outcome: Progressing     Goal Outcome Evaluation:    Plan of Care Reviewed With: patient
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
## 2024-12-11 NOTE — PLAN OF CARE
Problem: Thought Process Alteration  Goal: Optimal Thought Clarity  Description: Pt will have a logical and linear conversation.  Pt will verbalize 2-3 coping skills.   12/11/2024 1455 by Lorenzo Ledezma, RN  Outcome: Progressing    Pt was able to have brief conversations that were logical and organized     Problem: Adult Behavioral Health Plan of Care  Goal: Patient-Specific Goal (Individualization)  Description: Pt will follow recommendations of treatment team.  Pt will be compliant with medications.  Pt will attend 50 % of groups.  Pt will sleep 6-8 hours each night.    MOUTH CHECKS    12/12/24- MH-ICU r/t disorganization  Pt may wean from the MHICU during periods of low stimulation and close observation    12/12/2024 Patient may wear head scarf.  12/11/2024 1455 by Lorenzo Ledezma, RN  Outcome: Progressing   Goal Outcome Evaluation:    Plan of Care Reviewed With: patient      0730 Face to face rounding complete.  Pt introduced to nursing for the shift.    Pt began the shift wandering around her room confused cleaning and reorganizing and then changing things around again. She spent a great deal of time in the bathroom. When I talked with her she mixed in disorganized fragments into topics that did not fit.  Pt was given 1 mg of Ativan with her morning medications to help with her anxiety and restlessness.  Pt did appear a bit calmer and actually fell asleep for a half hour just before lunch.  Pt woke much more clear and spoke in complete sentences that were logical. She was still very restless and slightly agitated.  She was given the 25 mg of Thorazine and had significant benefit from the medication.  She asked for her head scarf and asked for reading glasses.  Pt was given choices of reading glasses and found that a +2 lens worked well. She was also given a romance book which she read for about two hours of the shift. She was also given her head scarf to wear.      1500 Face to face end of shift report  communicated to Evening Shift RN's along with Pt's fall risk.      Lorenzo Ledezma RN  12/11/2024

## 2024-12-12 LAB
ALBUMIN SERPL BCG-MCNC: 4.1 G/DL (ref 3.5–5.2)
ALBUMIN SERPL BCG-MCNC: 4.1 G/DL (ref 3.5–5.2)
ALP SERPL-CCNC: 65 U/L (ref 40–150)
ALP SERPL-CCNC: 65 U/L (ref 40–150)
ALT SERPL W P-5'-P-CCNC: 26 U/L (ref 0–50)
ALT SERPL W P-5'-P-CCNC: 26 U/L (ref 0–50)
ANION GAP SERPL CALCULATED.3IONS-SCNC: 12 MMOL/L (ref 7–15)
ANION GAP SERPL CALCULATED.3IONS-SCNC: 12 MMOL/L (ref 7–15)
AST SERPL W P-5'-P-CCNC: 19 U/L (ref 0–45)
AST SERPL W P-5'-P-CCNC: 19 U/L (ref 0–45)
BILIRUB SERPL-MCNC: 0.2 MG/DL
BILIRUB SERPL-MCNC: 0.2 MG/DL
BUN SERPL-MCNC: 19.3 MG/DL (ref 6–20)
BUN SERPL-MCNC: 19.3 MG/DL (ref 6–20)
CALCIUM SERPL-MCNC: 9.8 MG/DL (ref 8.8–10.4)
CALCIUM SERPL-MCNC: 9.8 MG/DL (ref 8.8–10.4)
CHLORIDE SERPL-SCNC: 92 MMOL/L (ref 98–107)
CHLORIDE SERPL-SCNC: 92 MMOL/L (ref 98–107)
CK SERPL-CCNC: 107 U/L (ref 26–192)
CK SERPL-CCNC: 107 U/L (ref 26–192)
CREAT SERPL-MCNC: 0.81 MG/DL (ref 0.51–0.95)
CREAT SERPL-MCNC: 0.81 MG/DL (ref 0.51–0.95)
EGFRCR SERPLBLD CKD-EPI 2021: 84 ML/MIN/1.73M2
EGFRCR SERPLBLD CKD-EPI 2021: 84 ML/MIN/1.73M2
GLUCOSE SERPL-MCNC: 110 MG/DL (ref 70–99)
GLUCOSE SERPL-MCNC: 110 MG/DL (ref 70–99)
HCO3 SERPL-SCNC: 28 MMOL/L (ref 22–29)
HCO3 SERPL-SCNC: 28 MMOL/L (ref 22–29)
HOLD SPECIMEN: NORMAL
POTASSIUM SERPL-SCNC: 3.2 MMOL/L (ref 3.4–5.3)
POTASSIUM SERPL-SCNC: 3.2 MMOL/L (ref 3.4–5.3)
PROT SERPL-MCNC: 7.5 G/DL (ref 6.4–8.3)
PROT SERPL-MCNC: 7.5 G/DL (ref 6.4–8.3)
SODIUM SERPL-SCNC: 132 MMOL/L (ref 135–145)
SODIUM SERPL-SCNC: 132 MMOL/L (ref 135–145)

## 2024-12-12 PROCEDURE — 36415 COLL VENOUS BLD VENIPUNCTURE: CPT | Performed by: NURSE PRACTITIONER

## 2024-12-12 PROCEDURE — 99233 SBSQ HOSP IP/OBS HIGH 50: CPT | Performed by: NURSE PRACTITIONER

## 2024-12-12 PROCEDURE — 124N000001 HC R&B MH

## 2024-12-12 PROCEDURE — 250N000013 HC RX MED GY IP 250 OP 250 PS 637: Performed by: NURSE PRACTITIONER

## 2024-12-12 PROCEDURE — 82550 ASSAY OF CK (CPK): CPT | Performed by: NURSE PRACTITIONER

## 2024-12-12 PROCEDURE — 82435 ASSAY OF BLOOD CHLORIDE: CPT | Performed by: NURSE PRACTITIONER

## 2024-12-12 PROCEDURE — 250N000013 HC RX MED GY IP 250 OP 250 PS 637

## 2024-12-12 RX ORDER — LISINOPRIL 2.5 MG/1
10 TABLET ORAL DAILY
Status: DISCONTINUED | OUTPATIENT
Start: 2024-12-13 | End: 2024-12-30

## 2024-12-12 RX ORDER — CHLORPROMAZINE HYDROCHLORIDE 25 MG/1
50 TABLET, FILM COATED ORAL ONCE
Status: COMPLETED | OUTPATIENT
Start: 2024-12-12 | End: 2024-12-12

## 2024-12-12 RX ORDER — POTASSIUM CHLORIDE 1500 MG/1
20 TABLET, EXTENDED RELEASE ORAL DAILY
Status: DISCONTINUED | OUTPATIENT
Start: 2024-12-12 | End: 2024-12-13

## 2024-12-12 RX ADMIN — CHLORPROMAZINE HYDROCHLORIDE 50 MG: 25 TABLET, FILM COATED ORAL at 20:23

## 2024-12-12 RX ADMIN — CHLORTHALIDONE 25 MG: 25 TABLET ORAL at 08:26

## 2024-12-12 RX ADMIN — POTASSIUM CHLORIDE 20 MEQ: 1500 TABLET, EXTENDED RELEASE ORAL at 15:35

## 2024-12-12 RX ADMIN — Medication 1 TABLET: at 08:26

## 2024-12-12 RX ADMIN — DOCUSATE SODIUM 100 MG: 100 CAPSULE, LIQUID FILLED ORAL at 08:26

## 2024-12-12 RX ADMIN — RISPERIDONE 2 MG: 2 TABLET, ORALLY DISINTEGRATING ORAL at 08:26

## 2024-12-12 RX ADMIN — RISPERIDONE 4 MG: 2 TABLET, ORALLY DISINTEGRATING ORAL at 20:23

## 2024-12-12 RX ADMIN — CHLORPROMAZINE HYDROCHLORIDE 25 MG: 25 TABLET, FILM COATED ORAL at 16:05

## 2024-12-12 RX ADMIN — DIVALPROEX SODIUM 1500 MG: 500 TABLET, EXTENDED RELEASE ORAL at 20:23

## 2024-12-12 ASSESSMENT — ACTIVITIES OF DAILY LIVING (ADL)
ADLS_ACUITY_SCORE: 25
LAUNDRY: UNABLE TO COMPLETE
ADLS_ACUITY_SCORE: 25
DRESS: INDEPENDENT
ADLS_ACUITY_SCORE: 25
ADLS_ACUITY_SCORE: 25
HYGIENE/GROOMING: INDEPENDENT
ADLS_ACUITY_SCORE: 25
ADLS_ACUITY_SCORE: 25
ORAL_HYGIENE: INDEPENDENT
ADLS_ACUITY_SCORE: 25
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
## 2024-12-12 NOTE — PLAN OF CARE
Problem: Adult Behavioral Health Plan of Care  Goal: Patient-Specific Goal (Individualization)  Description: Pt will follow recommendations of treatment team.  Pt will be compliant with medications.  Pt will attend 50 % of groups.  Pt will sleep 6-8 hours each night.    MOUTH CHECKS    12/12/24- MH-ICU r/t disorganization  Pt may wean from the MHICU during periods of low stimulation and close observation    12/12/2024 Patient may wear head scarf.  Outcome: Progressing     Face to face shift report received from Lizzeth RN. Rounding completed, pt observed.     Pt appeared to sleep a total of 3 hours off and on this shift. Pt did not have any noted behaviors this shift. Pt was able to hold a short logical and linear conversation.    Face to face report will be communicated to oncoming RN.    Isaura Burks RN  12/12/2024  6:36 AM

## 2024-12-12 NOTE — PLAN OF CARE
Face to face end of shift report received from Isaura ZENG RN. Rounding completed. Patient observed in bed, awake.     Pt remains in the MHICU due to unpredictable behaviors. Pt weaned throughout the day with appropriate behaviors. She participated in the unit's milieu. She attended groups. Pt able to make needs known. Pt more cooperative with nursing assessment today. Pt laughs while sitting alone when in the lounge. She took all medications as prescribed. She is able to make her needs known. Steady gait- no falls. Frequent rounding.       Problem: Adult Behavioral Health Plan of Care  Goal: Patient-Specific Goal (Individualization)  Description: Pt will follow recommendations of treatment team.  Pt will be compliant with medications.  Pt will attend 50 % of groups.  Pt will sleep 6-8 hours each night.    MOUTH CHECKS    12/12/24- MH-ICU r/t disorganization  Pt may wean from the MHICU during periods of low stimulation and close observation    12/12/2024 Patient may wear head scarf.  Outcome: Progressing     Problem: Thought Process Alteration  Goal: Optimal Thought Clarity  Description: Pt will have a logical and linear conversation.  Pt will verbalize 2-3 coping skills.   Outcome: Progressing       Deb Obregon RN  12/12/2024  11:13 AM

## 2024-12-12 NOTE — PROGRESS NOTES
1:1 time with the patient.  No changes made to the discharge plan at this time. Pt was heading to group and stated she was enjoying them.

## 2024-12-12 NOTE — PLAN OF CARE
"  Problem: Adult Behavioral Health Plan of Care  Goal: Patient-Specific Goal (Individualization)  Description: Pt will follow recommendations of treatment team.  Pt will be compliant with medications.  Pt will attend 50 % of groups.  Pt will sleep 6-8 hours each night.    MOUTH CHECKS    12/12/24- MH-ICU r/t disorganization  Pt may wean from the MHICU during periods of low stimulation and close observation    12/12/2024 Patient may wear head scarf.  Outcome: Progressing   Goal Outcome Evaluation:    Patient is Alert, able to make needs known at times. VSS, afebrile. Assessment and vitals as charted. Denies pain.  Patient sleeping at the beginning of the shift. Slept about 1.75 hours.     Patient states she is doing \"okay.\" Endorses AH but states they have been getting better. Patient did wean out to open unit and talked with another peer briefly. She had some coffee, watched tv, and looked out the window. Hesitant to take scheduled medications but did with encouragement. She received PRN ativan 2 mg PO for anxiety, sleep.     Cooperative with mouth checks. Steady gait, no falls.   "

## 2024-12-13 LAB
ANION GAP SERPL CALCULATED.3IONS-SCNC: 13 MMOL/L (ref 7–15)
ANION GAP SERPL CALCULATED.3IONS-SCNC: 13 MMOL/L (ref 7–15)
BUN SERPL-MCNC: 16.6 MG/DL (ref 6–20)
BUN SERPL-MCNC: 16.6 MG/DL (ref 6–20)
CALCIUM SERPL-MCNC: 9.5 MG/DL (ref 8.8–10.4)
CALCIUM SERPL-MCNC: 9.5 MG/DL (ref 8.8–10.4)
CHLORIDE SERPL-SCNC: 95 MMOL/L (ref 98–107)
CHLORIDE SERPL-SCNC: 95 MMOL/L (ref 98–107)
CREAT SERPL-MCNC: 0.8 MG/DL (ref 0.51–0.95)
CREAT SERPL-MCNC: 0.8 MG/DL (ref 0.51–0.95)
EGFRCR SERPLBLD CKD-EPI 2021: 85 ML/MIN/1.73M2
EGFRCR SERPLBLD CKD-EPI 2021: 85 ML/MIN/1.73M2
GLUCOSE SERPL-MCNC: 128 MG/DL (ref 70–99)
GLUCOSE SERPL-MCNC: 128 MG/DL (ref 70–99)
HCO3 SERPL-SCNC: 27 MMOL/L (ref 22–29)
HCO3 SERPL-SCNC: 27 MMOL/L (ref 22–29)
HOLD SPECIMEN: NORMAL
POTASSIUM SERPL-SCNC: 3.2 MMOL/L (ref 3.4–5.3)
POTASSIUM SERPL-SCNC: 3.2 MMOL/L (ref 3.4–5.3)
SODIUM SERPL-SCNC: 135 MMOL/L (ref 135–145)
SODIUM SERPL-SCNC: 135 MMOL/L (ref 135–145)

## 2024-12-13 PROCEDURE — 80048 BASIC METABOLIC PNL TOTAL CA: CPT | Performed by: NURSE PRACTITIONER

## 2024-12-13 PROCEDURE — 99232 SBSQ HOSP IP/OBS MODERATE 35: CPT | Mod: 95

## 2024-12-13 PROCEDURE — 250N000013 HC RX MED GY IP 250 OP 250 PS 637: Performed by: NURSE PRACTITIONER

## 2024-12-13 PROCEDURE — 250N000013 HC RX MED GY IP 250 OP 250 PS 637

## 2024-12-13 PROCEDURE — 99232 SBSQ HOSP IP/OBS MODERATE 35: CPT | Performed by: NURSE PRACTITIONER

## 2024-12-13 PROCEDURE — 36415 COLL VENOUS BLD VENIPUNCTURE: CPT | Performed by: NURSE PRACTITIONER

## 2024-12-13 PROCEDURE — 124N000001 HC R&B MH

## 2024-12-13 RX ORDER — POTASSIUM CHLORIDE 1500 MG/1
20 TABLET, EXTENDED RELEASE ORAL ONCE
Status: COMPLETED | OUTPATIENT
Start: 2024-12-13 | End: 2024-12-13

## 2024-12-13 RX ORDER — CHLORPROMAZINE HYDROCHLORIDE 25 MG/1
50 TABLET, FILM COATED ORAL AT BEDTIME
Status: DISCONTINUED | OUTPATIENT
Start: 2024-12-13 | End: 2024-12-14

## 2024-12-13 RX ORDER — POTASSIUM CHLORIDE 1500 MG/1
40 TABLET, EXTENDED RELEASE ORAL ONCE
Status: DISCONTINUED | OUTPATIENT
Start: 2024-12-13 | End: 2024-12-13

## 2024-12-13 RX ADMIN — RISPERIDONE 4 MG: 2 TABLET, ORALLY DISINTEGRATING ORAL at 21:04

## 2024-12-13 RX ADMIN — POTASSIUM CHLORIDE 20 MEQ: 1500 TABLET, EXTENDED RELEASE ORAL at 10:39

## 2024-12-13 RX ADMIN — CHLORPROMAZINE HYDROCHLORIDE 50 MG: 25 TABLET, FILM COATED ORAL at 21:04

## 2024-12-13 RX ADMIN — CHLORPROMAZINE HYDROCHLORIDE 25 MG: 25 TABLET, FILM COATED ORAL at 12:42

## 2024-12-13 RX ADMIN — Medication 1 TABLET: at 09:32

## 2024-12-13 RX ADMIN — POTASSIUM CHLORIDE 20 MEQ: 1500 TABLET, EXTENDED RELEASE ORAL at 09:32

## 2024-12-13 RX ADMIN — CHLORPROMAZINE HYDROCHLORIDE 25 MG: 25 TABLET, FILM COATED ORAL at 04:32

## 2024-12-13 RX ADMIN — LISINOPRIL 10 MG: 2.5 TABLET ORAL at 09:32

## 2024-12-13 RX ADMIN — DOCUSATE SODIUM 100 MG: 100 CAPSULE, LIQUID FILLED ORAL at 09:32

## 2024-12-13 RX ADMIN — RISPERIDONE 3 MG: 2 TABLET, ORALLY DISINTEGRATING ORAL at 09:32

## 2024-12-13 RX ADMIN — DIVALPROEX SODIUM 1500 MG: 500 TABLET, EXTENDED RELEASE ORAL at 21:04

## 2024-12-13 ASSESSMENT — ACTIVITIES OF DAILY LIVING (ADL)
ADLS_ACUITY_SCORE: 25
DRESS: SCRUBS (BEHAVIORAL HEALTH)
ADLS_ACUITY_SCORE: 25
HYGIENE/GROOMING: SHOWER
ADLS_ACUITY_SCORE: 25

## 2024-12-13 NOTE — PLAN OF CARE
Face to face shift report received from previous shift RN.       Problem: Adult Behavioral Health Plan of Care  Goal: Patient-Specific Goal (Individualization)  Description: Pt will follow recommendations of treatment team.  Pt will be compliant with medications.  Pt will attend 50 % of groups.  Pt will sleep 6-8 hours each night.    MOUTH CHECKS    12/12/24- MH-ICU r/t disorganization  Pt may wean from the MHICU during periods of low stimulation and close observation    12/12/2024 Patient may wear head scarf.  Outcome: Not Progressing   Room in MHICU due to need for decreased stimulation and possibility of unpredictable behaviors. Weaned to open unit for short periods, without issue. Cooperative. Responding to internal stimuli, laughing frequently at inappropriate times. Disorganized. Medication compliant, after requiring much encouragement. Pt would take one tablet, then had rest back to writer.   1242: Thorazine 25 mg PO for psychosis. Pt laughing loudly to self.   1400: Pt followed staff into nurse's station. Pt redirectable, left nurse's station without issue. Pt returned to MHICU.     Problem: Thought Process Alteration  Goal: Optimal Thought Clarity  Description: Pt will have a logical and linear conversation.  Pt will verbalize 2-3 coping skills.   Outcome: Not Progressing       Face to face end of shift report to be communicated to oncoming RN.

## 2024-12-13 NOTE — PLAN OF CARE
Problem: Adult Behavioral Health Plan of Care  Goal: Patient-Specific Goal (Individualization)  Description: Pt will follow recommendations of treatment team.  Pt will be compliant with medications.  Pt will attend 50 % of groups.  Pt will sleep 6-8 hours each night.    MOUTH CHECKS    12/12/24- MH-ICU r/t disorganization  Pt may wean from the MHICU during periods of low stimulation and close observation    12/12/2024 Patient may wear head scarf.  Outcome: Progressing     Face to face shift report received from Lizzeth RN. Rounding completed, pt observed.     Pt appeared to be sleeping at the beginning of this shift. Pt awake after 0130 rounds. Pt began laughing hysterically and was walking MHICU koehler. Pt given Thorazine 25 mg at 0432, pt attempt to cheek it, provided pt with more water and encouraged pt to take it so she can continue to get better, pt did swallow the pill. Pt appeared to be sleeping again after 0530 rounds giving her a total of 4 hours of sleep this shift.    Face to face report will be communicated to oncoming RN.    Isaura Burks RN  12/13/2024  6:14 AM

## 2024-12-13 NOTE — PROGRESS NOTES
[facility]    Medical Services Progress Note    Date of Service (when I saw the patient): 12/13/2024    Assessment & Plan     Principal Problem:    Ruby (H)       Active Medical Problems:  Prediabetes- A1c in December 2023 was elevated at 6.0. Glucose-120.  A1c stable today at 5.6.      Hypothyroidism- on problem list. She is quite disorganized on assessment. Thyroid wnl, no enlargement or nodules palpated. TSH wnl.      Constipation- pt is disorganized and does not remember when she last had a bowel movement. Bowel sounds wnl. No tenderness or guarding. Will schedule colace. Prns available. Nursing to monitor or bowel movement.   12/13- resolved. Denies constipation     Hypertension- vitals stable. Denies chest pain, sob. Pt was started on hydrochlorothiazide on 12/6. Changed this to lisinopril 10 mg daily. Pt received first dose this morning. Pt agreeable to changing bp medication. Tolerating well. Will continue to monitor.     Hyponatremia- Na level yesterday mildly low at 132. Asymptomatic. Wnl today at 135.     Hypokalemia- likely from hydrochlorothiazide that was started on 12/6 due to elevated bp. Potassium level 3.2. replenished with 20 meq yesterday. Rechecked potassium this morning and still 3.2. Pt had 40 meq today. Denies chest pain, sob. Will check potassium level in the morning and replace if needed.        Code Status: Full Code.    Jossie Holder CNP        -Data reviewed today: I reviewed all new labs and imaging results over the last 24 hours.     Physical Exam   Temp: 97.1  F (36.2  C) Temp src: Temporal BP: 131/63 Pulse: 107   Resp: 16 SpO2: 99 % O2 Device: None (Room air)    Vitals:    11/27/24 0110   Weight: 96.2 kg (212 lb)     Vital Signs with Ranges  Temp:  [97.1  F (36.2  C)-98.1  F (36.7  C)] 97.1  F (36.2  C)  Pulse:  [104-107] 107  Resp:  [14-16] 16  BP: (131-136)/(63-67) 131/63  SpO2:  [99 %-100 %] 99 %  No intake/output data recorded.    Constitutional: awake, alert,  cooperative, no apparent distress, and appears stated age, vitals stable   Respiratory: No increased work of breathing, good air exchange, clear to auscultation bilaterally, no crackles or wheezing  Cardiovascular: Normal apical impulse, regular rate and rhythm, normal S1 and S2, no S3 or S4, and no murmur noted  Neuropsychiatric: General: laughing randomly,  somewhat disorganized, calm, and fair eye contact    Medications   Current Facility-Administered Medications   Medication Dose Route Frequency Provider Last Rate Last Admin    divalproex sodium extended-release (DEPAKOTE ER) 24 hr tablet 1,500 mg  1,500 mg Oral At Bedtime Maggie Goodrich APRN CNP   1,500 mg at 12/12/24 2023    docusate sodium (COLACE) capsule 100 mg  100 mg Oral Daily Jossie Holder CNP   100 mg at 12/13/24 0932    lisinopril (ZESTRIL) tablet 10 mg  10 mg Oral Daily Jossie Holder CNP   10 mg at 12/13/24 0932    multivitamin w/minerals (THERA-VIT-M) tablet 1 tablet  1 tablet Oral Daily Maggie Goodrich APRN CNP   1 tablet at 12/13/24 0932    risperiDONE (risperDAL M-TABS) ODT tab 3 mg  3 mg Oral Daily Josias Nogueira APRN CNP   3 mg at 12/13/24 0932    risperiDONE (risperDAL M-TABS) ODT tab 4 mg  4 mg Oral At Bedtime Beata Rand NP   4 mg at 12/12/24 2023       Data   Recent Labs   Lab 12/13/24  0848 12/12/24  1124 12/10/24  0829    132* 138   POTASSIUM 3.2* 3.2* 3.3*   CHLORIDE 95* 92* 100   CO2 27 28 24   BUN 16.6 19.3 14.6   CR 0.80 0.81 0.65   ANIONGAP 13 12 14   AMADOR 9.5 9.8 9.9   * 110* 114*   ALBUMIN  --  4.1 4.4   PROTTOTAL  --  7.5 7.6   BILITOTAL  --  0.2 0.2   ALKPHOS  --  65 70   ALT  --  26 29   AST  --  19 25       No results found for this or any previous visit (from the past 24 hours).

## 2024-12-13 NOTE — PROGRESS NOTES
Pt's  called and left writer voicemail. Sw called back but there was no answer. Email address and number left for them.
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
## 2024-12-13 NOTE — PLAN OF CARE
"  Problem: Adult Behavioral Health Plan of Care  Goal: Patient-Specific Goal (Individualization)  Description: Pt will follow recommendations of treatment team.  Pt will be compliant with medications.  Pt will attend 50 % of groups.  Pt will sleep 6-8 hours each night.    MOUTH CHECKS    12/12/24- MH-ICU r/t disorganization  Pt may wean from the MHICU during periods of low stimulation and close observation    12/12/2024 Patient may wear head scarf.  Outcome: Progressing   Goal Outcome Evaluation:    Plan of Care Reviewed With: patient      Patient is Alert, able to make needs known but does not always initiate conversation with staff or writer. VSS, afebrile. Assessment and vitals as charted. Denies pain.     Patient weaned at the beginning of the shift to open unit and tolerated well. Very brief and short with writer, but answers questions appropriately. She endorses AH. Appears responding to internal stimulus. Observed laughing and talking with self in room. Patient did disrobe partially in her room but did not exit her room. She also filled several cups of water and observed dumping them in the toilet. Received PRN thorazine x 1 this shift for slight agitation and restlessness. Appeared less restless after receiving thorazine. Denies SI, SIB, HI.     Appetite good. Steady gait, no falls.  Took scheduled medications although needed encouragement to take medications. Patient stated there is \"too many pills.\"     2310: Patient has appeared to have been sleeping since about 2215. (About 2 hours) Audible snoring noted. Remains asleep at this time.     End of shift report given to VANIA CABEZAS.         "

## 2024-12-14 LAB
ALBUMIN SERPL BCG-MCNC: 3.7 G/DL (ref 3.5–5.2)
ALBUMIN SERPL BCG-MCNC: 3.7 G/DL (ref 3.5–5.2)
ALP SERPL-CCNC: 64 U/L (ref 40–150)
ALP SERPL-CCNC: 64 U/L (ref 40–150)
ALT SERPL W P-5'-P-CCNC: 22 U/L (ref 0–50)
ALT SERPL W P-5'-P-CCNC: 22 U/L (ref 0–50)
AMMONIA PLAS-SCNC: 24 UMOL/L (ref 11–51)
AMMONIA PLAS-SCNC: 24 UMOL/L (ref 11–51)
ANION GAP SERPL CALCULATED.3IONS-SCNC: 14 MMOL/L (ref 7–15)
ANION GAP SERPL CALCULATED.3IONS-SCNC: 14 MMOL/L (ref 7–15)
AST SERPL W P-5'-P-CCNC: 27 U/L (ref 0–45)
AST SERPL W P-5'-P-CCNC: 27 U/L (ref 0–45)
BILIRUB SERPL-MCNC: 0.3 MG/DL
BILIRUB SERPL-MCNC: 0.3 MG/DL
BUN SERPL-MCNC: 18.8 MG/DL (ref 6–20)
BUN SERPL-MCNC: 18.8 MG/DL (ref 6–20)
CALCIUM SERPL-MCNC: 9.3 MG/DL (ref 8.8–10.4)
CALCIUM SERPL-MCNC: 9.3 MG/DL (ref 8.8–10.4)
CHLORIDE SERPL-SCNC: 96 MMOL/L (ref 98–107)
CHLORIDE SERPL-SCNC: 96 MMOL/L (ref 98–107)
CREAT SERPL-MCNC: 0.79 MG/DL (ref 0.51–0.95)
CREAT SERPL-MCNC: 0.79 MG/DL (ref 0.51–0.95)
EGFRCR SERPLBLD CKD-EPI 2021: 87 ML/MIN/1.73M2
EGFRCR SERPLBLD CKD-EPI 2021: 87 ML/MIN/1.73M2
GLUCOSE SERPL-MCNC: 123 MG/DL (ref 70–99)
GLUCOSE SERPL-MCNC: 123 MG/DL (ref 70–99)
HCO3 SERPL-SCNC: 23 MMOL/L (ref 22–29)
HCO3 SERPL-SCNC: 23 MMOL/L (ref 22–29)
HOLD SPECIMEN: NORMAL
HOLD SPECIMEN: NORMAL
POTASSIUM SERPL-SCNC: 3.2 MMOL/L (ref 3.4–5.3)
POTASSIUM SERPL-SCNC: 3.2 MMOL/L (ref 3.4–5.3)
POTASSIUM SERPL-SCNC: 3.3 MMOL/L (ref 3.4–5.3)
POTASSIUM SERPL-SCNC: 3.3 MMOL/L (ref 3.4–5.3)
PROT SERPL-MCNC: 6.7 G/DL (ref 6.4–8.3)
PROT SERPL-MCNC: 6.7 G/DL (ref 6.4–8.3)
SODIUM SERPL-SCNC: 133 MMOL/L (ref 135–145)
SODIUM SERPL-SCNC: 133 MMOL/L (ref 135–145)
VALPROATE SERPL-MCNC: 95 UG/ML
VALPROATE SERPL-MCNC: 95 UG/ML

## 2024-12-14 PROCEDURE — 250N000013 HC RX MED GY IP 250 OP 250 PS 637: Performed by: NURSE PRACTITIONER

## 2024-12-14 PROCEDURE — 84132 ASSAY OF SERUM POTASSIUM: CPT | Performed by: NURSE PRACTITIONER

## 2024-12-14 PROCEDURE — 250N000013 HC RX MED GY IP 250 OP 250 PS 637: Performed by: STUDENT IN AN ORGANIZED HEALTH CARE EDUCATION/TRAINING PROGRAM

## 2024-12-14 PROCEDURE — 124N000001 HC R&B MH

## 2024-12-14 PROCEDURE — 80051 ELECTROLYTE PANEL: CPT

## 2024-12-14 PROCEDURE — 82140 ASSAY OF AMMONIA: CPT

## 2024-12-14 PROCEDURE — 250N000013 HC RX MED GY IP 250 OP 250 PS 637

## 2024-12-14 PROCEDURE — 80164 ASSAY DIPROPYLACETIC ACD TOT: CPT

## 2024-12-14 PROCEDURE — 99233 SBSQ HOSP IP/OBS HIGH 50: CPT | Mod: 95

## 2024-12-14 PROCEDURE — 36415 COLL VENOUS BLD VENIPUNCTURE: CPT

## 2024-12-14 PROCEDURE — 84450 TRANSFERASE (AST) (SGOT): CPT

## 2024-12-14 RX ORDER — POTASSIUM CHLORIDE 20MEQ/15ML
40 LIQUID (ML) ORAL ONCE
Status: COMPLETED | OUTPATIENT
Start: 2024-12-14 | End: 2024-12-14

## 2024-12-14 RX ORDER — POTASSIUM CHLORIDE 1500 MG/1
40 TABLET, EXTENDED RELEASE ORAL ONCE
Status: COMPLETED | OUTPATIENT
Start: 2024-12-14 | End: 2024-12-14

## 2024-12-14 RX ORDER — POTASSIUM CHLORIDE 1500 MG/1
40 TABLET, EXTENDED RELEASE ORAL ONCE
Status: DISCONTINUED | OUTPATIENT
Start: 2024-12-14 | End: 2024-12-14

## 2024-12-14 RX ORDER — CHLORPROMAZINE HYDROCHLORIDE 100 MG/1
100 TABLET, FILM COATED ORAL AT BEDTIME
Status: DISCONTINUED | OUTPATIENT
Start: 2024-12-14 | End: 2024-12-16

## 2024-12-14 RX ORDER — CHLORPROMAZINE HYDROCHLORIDE 100 MG/1
100 TABLET, FILM COATED ORAL ONCE
Status: COMPLETED | OUTPATIENT
Start: 2024-12-14 | End: 2024-12-14

## 2024-12-14 RX ADMIN — DIPHENHYDRAMINE HYDROCHLORIDE 50 MG: 50 CAPSULE ORAL at 17:44

## 2024-12-14 RX ADMIN — RISPERIDONE 4 MG: 2 TABLET, ORALLY DISINTEGRATING ORAL at 22:13

## 2024-12-14 RX ADMIN — CHLORPROMAZINE HYDROCHLORIDE 25 MG: 25 TABLET, FILM COATED ORAL at 13:44

## 2024-12-14 RX ADMIN — LORAZEPAM 2 MG: 1 TABLET ORAL at 17:44

## 2024-12-14 RX ADMIN — DOCUSATE SODIUM 100 MG: 100 CAPSULE, LIQUID FILLED ORAL at 08:56

## 2024-12-14 RX ADMIN — CHLORPROMAZINE HYDROCHLORIDE 100 MG: 100 TABLET, FILM COATED ORAL at 02:01

## 2024-12-14 RX ADMIN — POTASSIUM CHLORIDE 40 MEQ: 20 SOLUTION ORAL at 10:41

## 2024-12-14 RX ADMIN — CHLORPROMAZINE HYDROCHLORIDE 100 MG: 100 TABLET, FILM COATED ORAL at 22:13

## 2024-12-14 RX ADMIN — DIVALPROEX SODIUM 1500 MG: 500 TABLET, EXTENDED RELEASE ORAL at 22:13

## 2024-12-14 RX ADMIN — RISPERIDONE 3 MG: 2 TABLET, ORALLY DISINTEGRATING ORAL at 08:56

## 2024-12-14 RX ADMIN — Medication 1 TABLET: at 08:56

## 2024-12-14 RX ADMIN — HALOPERIDOL 5 MG: 5 TABLET ORAL at 17:44

## 2024-12-14 RX ADMIN — LISINOPRIL 10 MG: 2.5 TABLET ORAL at 08:56

## 2024-12-14 ASSESSMENT — ACTIVITIES OF DAILY LIVING (ADL)
ADLS_ACUITY_SCORE: 25

## 2024-12-14 NOTE — PLAN OF CARE
Problem: Adult Behavioral Health Plan of Care  Goal: Patient-Specific Goal (Individualization)  Description: Pt will follow recommendations of treatment team.  Pt will be compliant with medications.  Pt will attend 50 % of groups.  Pt will sleep 6-8 hours each night.    MOUTH CHECKS    12/12/24- MH-ICU r/t disorganization  Pt may wean from the MHICU during periods of low stimulation and close observation    12/12/2024 Patient may wear head scarf.  Outcome: Not Progressing  Note: Pt did not wean tonight. When writer tried to ask her nursing assessment questions, she would only speak in a foreign language and then laugh or whisper to herself. Pt unable to participate d/t disorganization. Pt had an I-pad visit with her son. She showered tonight and ended up flooding her room d/t not having the doors properly placed. Pt initially refused her Depakote, but took it with encouragement. Mouth check completed.     Face to face end of shift report will be communicated to oncoming RN.      Problem: Thought Process Alteration  Goal: Optimal Thought Clarity  Description: Pt will have a logical and linear conversation.  Pt will verbalize 2-3 coping skills.   Outcome: Not Progressing  Note: Pt continues to have disorganization. She appeared to be responding to internal stimuli throughout the shift.      Goal Outcome Evaluation:    Plan of Care Reviewed With: patient

## 2024-12-14 NOTE — PLAN OF CARE
"Face to face shift report received from previous shift RN.       Problem: Adult Behavioral Health Plan of Care  Goal: Patient-Specific Goal (Individualization)  Description: Pt will follow recommendations of treatment team.  Pt will be compliant with medications.  Pt will attend 50 % of groups.  Pt will sleep 6-8 hours each night.    MOUTH CHECKS    12/12/24- MH-ICU r/t disorganization  Pt may wean from the MHICU during periods of low stimulation and close observation    12/12/2024 Patient may wear head scarf.  Outcome: Not Progressing  Room in MHICU due to possibility of unpredictable behaviors. Pt did not request to wean to open unit this shift. Medication compliant this AM. Compliant with mouth check, and ate breakfast items following medication administration. Pt responding to internal stimuli, sometimes in another language. Laying in bed majority of shift, laughing loudly at times.   Pt ordered to receive Klor-con 40 meq. Attempted to administered this medication at 1019. Pt placed tablets in mouth, sat with them in mouth for a short time then spit tablets back into medication cup. Pt stated \"that's depakote. No depakote.\" Writer attempted to educate pt on type of medication and reasoning for it. Pt kept stating \"that's depakote. No depakote.\" MARLON Sethi updated. Ordered potassium chloride solution 40 meq. Pt accepted this medication, and took all of it at 1041.   1344 Thorazine 25 mg for psychosis. Pt laughing loudly to self in room. Pt informed it would help with voices, pt accepted quickly and compliant with mouth check.     Problem: Thought Process Alteration  Goal: Optimal Thought Clarity  Description: Pt will have a logical and linear conversation.  Pt will verbalize 2-3 coping skills.   Outcome: Not Progressing       Face to face end of shift report to be communicated to oncoming RN.       "
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
## 2024-12-14 NOTE — PLAN OF CARE
Problem: Adult Behavioral Health Plan of Care  Goal: Patient-Specific Goal (Individualization)  Description: Pt will follow recommendations of treatment team.  Pt will be compliant with medications.  Pt will attend 50 % of groups.  Pt will sleep 6-8 hours each night.    MOUTH CHECKS    12/12/24- MH-ICU r/t disorganization  Pt may wean from the MHICU during periods of low stimulation and close observation    12/12/2024 Patient may wear head scarf.  Outcome: Progressing     Face to face shift report received from Lina CARO. Rounding completed, pt observed.     Pt awake at the beginning of this shift. Pt flooded her bathroom multiple times, has BM smeared all over her bedding and is speaking illogical and laughing maniacally. Review of PRN's show order for Benadryl, Ativan, Haldol combination or Thorazine 25 mg. This writer has had pt multiple shifts and has previously utilized the combination multiple times without success and the pt had received scheduled Thorazine at HS without improvement. Josias DONNELLY called, order obtained for a one time dose of Thorazine 100 mg. Thorazine 100 mg given at 0201 and pt moved to room 568 as it doesn't have a shower. Pt given food to eat at the same time as the medication in order to assure compliance with taking medication and pt not cheeking. Pt appeared to be sleeping after 0315 rounds, with audible snoring.    Face to face report will be communicated to oncberto CARO.    Isaura Burks RN  12/14/2024  6:23 AM

## 2024-12-15 LAB
HOLD SPECIMEN: NORMAL
POTASSIUM SERPL-SCNC: 3.8 MMOL/L (ref 3.4–5.3)
POTASSIUM SERPL-SCNC: 3.8 MMOL/L (ref 3.4–5.3)
SODIUM SERPL-SCNC: 135 MMOL/L (ref 135–145)
SODIUM SERPL-SCNC: 135 MMOL/L (ref 135–145)

## 2024-12-15 PROCEDURE — 99233 SBSQ HOSP IP/OBS HIGH 50: CPT | Mod: 95

## 2024-12-15 PROCEDURE — 84295 ASSAY OF SERUM SODIUM: CPT | Performed by: NURSE PRACTITIONER

## 2024-12-15 PROCEDURE — 250N000013 HC RX MED GY IP 250 OP 250 PS 637: Performed by: STUDENT IN AN ORGANIZED HEALTH CARE EDUCATION/TRAINING PROGRAM

## 2024-12-15 PROCEDURE — 250N000013 HC RX MED GY IP 250 OP 250 PS 637: Performed by: NURSE PRACTITIONER

## 2024-12-15 PROCEDURE — 84132 ASSAY OF SERUM POTASSIUM: CPT | Performed by: NURSE PRACTITIONER

## 2024-12-15 PROCEDURE — 250N000013 HC RX MED GY IP 250 OP 250 PS 637

## 2024-12-15 PROCEDURE — 124N000001 HC R&B MH

## 2024-12-15 PROCEDURE — 36415 COLL VENOUS BLD VENIPUNCTURE: CPT | Performed by: NURSE PRACTITIONER

## 2024-12-15 RX ADMIN — Medication 1 TABLET: at 09:42

## 2024-12-15 RX ADMIN — CHLORPROMAZINE HYDROCHLORIDE 100 MG: 100 TABLET, FILM COATED ORAL at 20:09

## 2024-12-15 RX ADMIN — DOCUSATE SODIUM 100 MG: 100 CAPSULE, LIQUID FILLED ORAL at 09:42

## 2024-12-15 RX ADMIN — DIVALPROEX SODIUM 1500 MG: 500 TABLET, EXTENDED RELEASE ORAL at 20:09

## 2024-12-15 RX ADMIN — RISPERIDONE 3 MG: 2 TABLET, ORALLY DISINTEGRATING ORAL at 09:42

## 2024-12-15 RX ADMIN — LISINOPRIL 10 MG: 2.5 TABLET ORAL at 09:42

## 2024-12-15 RX ADMIN — LORAZEPAM 1 MG: 1 TABLET ORAL at 18:02

## 2024-12-15 RX ADMIN — RISPERIDONE 4 MG: 2 TABLET, ORALLY DISINTEGRATING ORAL at 20:09

## 2024-12-15 RX ADMIN — CHLORPROMAZINE HYDROCHLORIDE 25 MG: 25 TABLET, FILM COATED ORAL at 15:56

## 2024-12-15 ASSESSMENT — ACTIVITIES OF DAILY LIVING (ADL)
ADLS_ACUITY_SCORE: 25
DRESS: SCRUBS (BEHAVIORAL HEALTH)
ADLS_ACUITY_SCORE: 25
ORAL_HYGIENE: INDEPENDENT
ADLS_ACUITY_SCORE: 25
HYGIENE/GROOMING: INDEPENDENT
ADLS_ACUITY_SCORE: 25

## 2024-12-15 NOTE — PLAN OF CARE
Problem: Adult Behavioral Health Plan of Care  Goal: Patient-Specific Goal (Individualization)  Description: Pt will follow recommendations of treatment team.  Pt will be compliant with medications.  Pt will attend 50 % of groups.  Pt will sleep 6-8 hours each night.    MOUTH CHECKS    12/12/24- MH-ICU r/t disorganization  Pt may wean from the MHICU during periods of low stimulation and close observation    12/12/2024 Patient may wear head scarf.  Outcome: Progressing     Face to face shift report received from Lina CARO. Rounding completed, pt observed.     Pt appeared to sleep a total of 5 hours on and  off this shift. Pt only had one brief period of laughing manically this shift and no reported behaviors.    Face to face report will be communicated to oncoming RN.    Isaura uBrks RN  12/15/2024  6:10 AM

## 2024-12-15 NOTE — PROGRESS NOTES
Chart reviewed. Vitals stable. Labs reviewed. Continue current dose of lisinopril. Na wnl-135, Potassium now wnl at 3.8 after 40 meq kayciel solution was given.      Pt medically stable, no acute medical concerns. Chronic medical problems stable. Will sign off. Please consult for any new medical issues or concerns.

## 2024-12-15 NOTE — PLAN OF CARE
Face to face shift report received from nurse. Rounding completed, pt observed.    Problem: Adult Behavioral Health Plan of Care  Goal: Patient-Specific Goal (Individualization)  Description: Pt will follow recommendations of treatment team.  Pt will be compliant with medications.  Pt will attend 50 % of groups.  Pt will sleep 6-8 hours each night.    MOUTH CHECKS    12/12/24- MH-ICU r/t disorganization  Pt may wean from the MHICU during periods of low stimulation and close observation    12/12/2024 Patient may wear head scarf.  Outcome: Not Progressing     Patient in the MHICU throughout the shift. Patient took her morning medication but staff had to tell her what everything was medication wise and had to talk her into taking her medications. Patient did have a couple of episodes of being loud. No other issues noted.     Problem: Thought Process Alteration  Goal: Optimal Thought Clarity  Description: Pt will have a logical and linear conversation.  Pt will verbalize 2-3 coping skills.   Outcome: Not Progressing     Face to face report will be communicated to oncoming RN.    Donald Lebron RN  12/15/2024
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
## 2024-12-15 NOTE — PLAN OF CARE
Problem: Adult Behavioral Health Plan of Care  Goal: Patient-Specific Goal (Individualization)  Description: Pt will follow recommendations of treatment team.  Pt will be compliant with medications.  Pt will attend 50 % of groups.  Pt will sleep 6-8 hours each night.    MOUTH CHECKS    12/12/24- MH-ICU r/t disorganization  Pt may wean from the MHICU during periods of low stimulation and close observation    12/12/2024 Patient may wear head scarf.  Outcome: Not Progressing  Note: 1730 - Provider Jared approved for 4 family members to visit tomorrow 12/15 between 1-2 PM.     Pt's room remains in the MH-ICU d/t disorganization. She did not want to wean tonight.     1744 - Pt received PRN Haldol 5 mg, Ativan 2 mg, and Benadryl 50 mg for agitation and disorganized behavior. She was preoccupied and responding to internal stimuli and whispering to herself in a foreign language. Pt unable to participate in nursing assessment.    Pt was still preoccupied and responding to internal stimuli, but she was more pleasant the remainder of the shift. She spoke in English. She took her scheduled medications without hesitation. Mouth check completed.     Face to face end of shift report will be communicated to oncoming RN.     Problem: Thought Process Alteration  Goal: Optimal Thought Clarity  Description: Pt will have a logical and linear conversation.  Pt will verbalize 2-3 coping skills.   Outcome: Not Progressing  Note: Pt was unable to have a logical and linear conversation tonight.      Goal Outcome Evaluation:    Plan of Care Reviewed With: patient

## 2024-12-15 NOTE — PLAN OF CARE
"Problem: Adult Behavioral Health Plan of Care  Goal: Patient-Specific Goal (Individualization)  Description: Pt will follow recommendations of treatment team.  Pt will be compliant with medications.  Pt will attend 50 % of groups.  Pt will sleep 6-8 hours each night.    MOUTH CHECKS    12/12/24- MH-ICU r/t disorganization  Pt may wean from the MHICU during periods of low stimulation and close observation    12/12/2024 Patient may wear head scarf.  Outcome: Not Progressing  Note: 1556 - Pt received 25 mg of PRN Thorazine for psychosis symptoms. Pt was preoccupied and responding to internal stimuli, maniacally laughing and talking to herself in another language.     1756 - Pt had an I-pad visit with her family. As conversation progressed, pt got more agitated to the point of yelling. Conversation was ended.    1802 - Pt was given 1 mg of PRN Ativan for agitation.     1804 - Pt encouraged to shower d/t unkempt appearance and feces noted on her blanket.     1810 - Pt showered. Bed linens changed. XL formed BM noted on the shower floor.    2009 - Pt reported that she was feeling \"better\". She endorsed depression and AH. She denied SI. Pt still responding to internal stimuli, maniacally laughing to herself. She was compliant with her scheduled medications.     Face to face end of shift report will be communicated to oncoming RN.      Problem: Thought Process Alteration  Goal: Optimal Thought Clarity  Description: Pt will have a logical and linear conversation.  Pt will verbalize 2-3 coping skills.   Outcome: Not Progressing  Note: Pt unable to participate in a logical, linear conversation at this time.       "

## 2024-12-16 PROCEDURE — 250N000011 HC RX IP 250 OP 636: Performed by: STUDENT IN AN ORGANIZED HEALTH CARE EDUCATION/TRAINING PROGRAM

## 2024-12-16 PROCEDURE — 250N000013 HC RX MED GY IP 250 OP 250 PS 637: Performed by: NURSE PRACTITIONER

## 2024-12-16 PROCEDURE — 124N000001 HC R&B MH

## 2024-12-16 PROCEDURE — 250N000013 HC RX MED GY IP 250 OP 250 PS 637

## 2024-12-16 PROCEDURE — 99233 SBSQ HOSP IP/OBS HIGH 50: CPT | Mod: 95

## 2024-12-16 RX ORDER — CHLORPROMAZINE HCI 25 MG/ML
50 INJECTION INTRAMUSCULAR ONCE
Status: DISCONTINUED | OUTPATIENT
Start: 2024-12-16 | End: 2024-12-16

## 2024-12-16 RX ORDER — CHLORPROMAZINE HYDROCHLORIDE 100 MG/1
200 TABLET, FILM COATED ORAL AT BEDTIME
Status: DISCONTINUED | OUTPATIENT
Start: 2024-12-16 | End: 2024-12-17

## 2024-12-16 RX ORDER — RISPERIDONE 2 MG/1
4 TABLET, ORALLY DISINTEGRATING ORAL 2 TIMES DAILY
Status: DISCONTINUED | OUTPATIENT
Start: 2024-12-16 | End: 2024-12-23

## 2024-12-16 RX ORDER — CHLORPROMAZINE HCI 25 MG/ML
75 INJECTION INTRAMUSCULAR ONCE
Status: COMPLETED | OUTPATIENT
Start: 2024-12-16 | End: 2024-12-16

## 2024-12-16 RX ORDER — CHLORPROMAZINE HCI 25 MG/ML
100 INJECTION INTRAMUSCULAR AT BEDTIME
Status: DISCONTINUED | OUTPATIENT
Start: 2024-12-16 | End: 2024-12-17

## 2024-12-16 RX ORDER — HALOPERIDOL 5 MG/ML
10 INJECTION INTRAMUSCULAR 2 TIMES DAILY
Status: DISCONTINUED | OUTPATIENT
Start: 2024-12-16 | End: 2024-12-23

## 2024-12-16 RX ADMIN — RISPERIDONE 4 MG: 2 TABLET, ORALLY DISINTEGRATING ORAL at 20:41

## 2024-12-16 RX ADMIN — DIVALPROEX SODIUM 1500 MG: 500 TABLET, EXTENDED RELEASE ORAL at 20:41

## 2024-12-16 RX ADMIN — CHLORPROMAZINE HYDROCHLORIDE 75 MG: 25 INJECTION INTRAMUSCULAR at 10:28

## 2024-12-16 RX ADMIN — CHLORPROMAZINE HYDROCHLORIDE 200 MG: 100 TABLET, FILM COATED ORAL at 20:41

## 2024-12-16 ASSESSMENT — ACTIVITIES OF DAILY LIVING (ADL)
ADLS_ACUITY_SCORE: 25
ORAL_HYGIENE: INDEPENDENT
ADLS_ACUITY_SCORE: 25
DRESS: INDEPENDENT
ADLS_ACUITY_SCORE: 25
ADLS_ACUITY_SCORE: 25
LAUNDRY: UNABLE TO COMPLETE
ADLS_ACUITY_SCORE: 25
HYGIENE/GROOMING: INDEPENDENT
ADLS_ACUITY_SCORE: 25
ADLS_ACUITY_SCORE: 25

## 2024-12-16 NOTE — PLAN OF CARE
Problem: Adult Behavioral Health Plan of Care  Goal: Patient-Specific Goal (Individualization)  Description: Pt will follow recommendations of treatment team.  Pt will be compliant with medications.  Pt will attend 50 % of groups.  Pt will sleep 6-8 hours each night.    MOUTH CHECKS    12/12/24- MH-ICU r/t disorganization  Pt may wean from the MHICU during periods of low stimulation and close observation    12/12/2024 Patient may wear head scarf.  Outcome: Progressing     Face to face shift report received from Lina CARO. Rounding completed, pt observed.     Pt appeared to sleep a total of 4.5 hours on and off this shift.    Face to face report will be communicated to oncberto CARO.    Isaura Burks RN  12/16/2024  6:25 AM

## 2024-12-16 NOTE — PLAN OF CARE
Face to face end of shift report received from Isaura ZENG RN. Rounding completed. Patient observed in bed, awake.     Pt remains in the MHICU due to disorganized, unpredictable behavior. Upon introduction, pt would not make eye contact with this writer. Pt speaking Somolian this morning- mumbling with a soft spoken voice. Pt offered her medications this morning, but would not take them. Pt did take med cup from writer and placed it on her bed. Pt then got up very quickly and stared at writer intensly. This writer and UA exited MHICU due to pt paranoia and suspiciousness. Pt not to wean at this time- will update treatment team regarding pt behaviors. Administered into L gluteal without any issues. Pt lucid at this time and was agreeable to the injection. Pt speaking in English and did not appear as paranoid as this morning. Pt showered this afternoon. Multiple redirections to turn the water off after she was out, but she would not follow direction. Pt doesn't cooperate with nursing assessment. Steady gait- no falls. Frequent rounding.    Problem: Adult Behavioral Health Plan of Care  Goal: Patient-Specific Goal (Individualization)  Description: Pt will follow recommendations of treatment team.  Pt will be compliant with medications.  Pt will attend 50 % of groups.  Pt will sleep 6-8 hours each night.    MOUTH CHECKS    12/16/24- -ICU r/t disorganization  Pt not able to wean at this time due to unpredictable behaviors and paranoia.     12/12/2024 Patient may wear head scarf.  Outcome: Progressing     Problem: Thought Process Alteration  Goal: Optimal Thought Clarity  Description: Pt will have a logical and linear conversation.  Pt will verbalize 2-3 coping skills.   Outcome: Progressing       Deb Obregon RN  12/16/2024  7:48 AM
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
## 2024-12-17 PROCEDURE — 99233 SBSQ HOSP IP/OBS HIGH 50: CPT | Mod: 95 | Performed by: STUDENT IN AN ORGANIZED HEALTH CARE EDUCATION/TRAINING PROGRAM

## 2024-12-17 PROCEDURE — 124N000001 HC R&B MH

## 2024-12-17 PROCEDURE — 250N000013 HC RX MED GY IP 250 OP 250 PS 637: Performed by: STUDENT IN AN ORGANIZED HEALTH CARE EDUCATION/TRAINING PROGRAM

## 2024-12-17 PROCEDURE — 250N000013 HC RX MED GY IP 250 OP 250 PS 637: Performed by: NURSE PRACTITIONER

## 2024-12-17 PROCEDURE — 250N000013 HC RX MED GY IP 250 OP 250 PS 637

## 2024-12-17 RX ORDER — CHLORPROMAZINE HCI 25 MG/ML
50 INJECTION INTRAMUSCULAR EVERY 8 HOURS PRN
Status: DISCONTINUED | OUTPATIENT
Start: 2024-12-17 | End: 2025-01-03 | Stop reason: HOSPADM

## 2024-12-17 RX ORDER — CHLORPROMAZINE HYDROCHLORIDE 25 MG/1
50 TABLET, FILM COATED ORAL EVERY 8 HOURS PRN
Status: DISCONTINUED | OUTPATIENT
Start: 2024-12-17 | End: 2025-01-03 | Stop reason: HOSPADM

## 2024-12-17 RX ORDER — CHLORPROMAZINE HYDROCHLORIDE 100 MG/1
300 TABLET, FILM COATED ORAL AT BEDTIME
Status: DISCONTINUED | OUTPATIENT
Start: 2024-12-17 | End: 2024-12-20

## 2024-12-17 RX ORDER — CHLORPROMAZINE HCI 25 MG/ML
100 INJECTION INTRAMUSCULAR AT BEDTIME
Status: DISCONTINUED | OUTPATIENT
Start: 2024-12-17 | End: 2024-12-20

## 2024-12-17 RX ORDER — CHLORPROMAZINE HYDROCHLORIDE 25 MG/1
50 TABLET, FILM COATED ORAL 3 TIMES DAILY PRN
Status: DISCONTINUED | OUTPATIENT
Start: 2024-12-17 | End: 2024-12-17

## 2024-12-17 RX ORDER — CHLORPROMAZINE HCI 25 MG/ML
50 INJECTION INTRAMUSCULAR 3 TIMES DAILY PRN
Status: DISCONTINUED | OUTPATIENT
Start: 2024-12-17 | End: 2024-12-17

## 2024-12-17 RX ADMIN — CHLORPROMAZINE HYDROCHLORIDE 50 MG: 25 TABLET, FILM COATED ORAL at 16:07

## 2024-12-17 RX ADMIN — CHLORPROMAZINE HYDROCHLORIDE 25 MG: 25 TABLET, FILM COATED ORAL at 09:00

## 2024-12-17 RX ADMIN — LORAZEPAM 1 MG: 1 TABLET ORAL at 13:02

## 2024-12-17 RX ADMIN — Medication 1 TABLET: at 08:00

## 2024-12-17 RX ADMIN — DOCUSATE SODIUM 100 MG: 100 CAPSULE, LIQUID FILLED ORAL at 08:00

## 2024-12-17 RX ADMIN — CHLORPROMAZINE HYDROCHLORIDE 300 MG: 100 TABLET, FILM COATED ORAL at 20:39

## 2024-12-17 RX ADMIN — RISPERIDONE 4 MG: 2 TABLET, ORALLY DISINTEGRATING ORAL at 20:39

## 2024-12-17 RX ADMIN — RISPERIDONE 4 MG: 2 TABLET, ORALLY DISINTEGRATING ORAL at 08:00

## 2024-12-17 RX ADMIN — LISINOPRIL 10 MG: 2.5 TABLET ORAL at 08:00

## 2024-12-17 ASSESSMENT — ACTIVITIES OF DAILY LIVING (ADL)
ADLS_ACUITY_SCORE: 25

## 2024-12-17 NOTE — PROGRESS NOTES
Problem: Adult Behavioral Health Plan of Care  Goal: Patient-Specific Goal (Individualization)  Description: Pt will follow recommendations of treatment team.  Pt will be compliant with medications.  Pt will attend 50 % of groups when able  Pt will sleep 6-8 hours each night.    *MOUTH CHECKS*    12/17/24- MH-ICU r/t disorganization  Pt not able to wean at this time due to unpredictable behaviors and paranoia. Treatment team to reassess daily     12/17/2024- Patient may wear head scarf.  Outcome: Not Progressing     Problem: Thought Process Alteration  Goal: Optimal Thought Clarity  Description: Pt will have a logical and linear conversation.  Pt will verbalize 2-3 coping skills.   Outcome: Not Progressing   Goal Outcome Evaluation:    Patient denies pain. Patient does not answer majority of assessment questions in English. However patient began speaking English when asking about her medications. Patient compliant with medication and mouth check. Patient responding to internal stimuli, often observed laughing and speaking to self in english and presumably Somalian as this is her first language. Patient does not report any concerns or complaints at this time. Patient has spent majority of the morning in the ICU lounge.   December 17, 2024 8:57 AM    Patient agitated in ICU, randomly laughing/crying loudly and knocking loudly on window and rambling in language unknown to this writer, prn oral thorazine given. Patient hesitant and initially tucked pill under her tongue, but then did appear to take it entirely and allowed for another mouth check.  December 17, 2024 9:05 AM    Family set up ipad visit for 1800 today, 12/17/24 after being told patient is unable to safely wean today to have an in person visit at 1500 that they were initially attempting to do, family stating this was set up over the weekend with a staff member, but this could not be verified nor would it be appropriate at this time anyway given  "patient's current state and inability to wean safely and during shift change. Son, Patrick verified he had tablet device ID and pin already for the ipad visit.  December 17, 2024 11:39 AM    Patient reports mood as \"good.\" This writer asked if she was able to nap, she replies \"no but I need to.\" Patient asked if she wanted medication to help with her anxiety and restlessness, she states \"yes.\" Patient agreeable to prn ativan, see MAR for times. Patient compliant with mouth check as well.  December 17, 2024 1:06 PM    Patient spoke on the phone with her son. Patient currently sitting on her bed, remains disorganized.  December 17, 2024 1:38 PM                                        "
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
## 2024-12-17 NOTE — PLAN OF CARE
"Problem: Adult Behavioral Health Plan of Care  Goal: Patient-Specific Goal (Individualization)  Description: Pt will follow recommendations of treatment team.  Pt will be compliant with medications.  Pt will attend 50 % of groups when able  Pt will sleep 6-8 hours each night.    *MOUTH CHECKS*    12/17/24- -ICU r/t disorganization  Pt not able to wean at this time due to unpredictable behaviors and paranoia. Treatment team to reassess daily     12/17/2024- Patient may wear head scarf.  Outcome: Progressing  Note: Pt is a no wean d/t disorganization and unpredictable behavior. Treatment team to reassess daily.     1607 - Pt received 50 mg of PRN Thorazine. Pt's bed was stripped and her bottom half was unclothed. Pt was brought underwear, pants, and new socks. She appeared to be responding to internal stimuli. She was observed laughing and talking to herself.     1730 - Pt ate 100% of supper.    1800 - Pt had a brief phone call with family.    2039 - Pt was compliant with her thorazine and risperidone. She threw her depakote in a cup of water and stated \"let it steep. No depakote.\"     Face to face end of shift report will be communicated to oncoming RN.      Problem: Thought Process Alteration  Goal: Optimal Thought Clarity  Description: Pt will have a logical and linear conversation.  Pt will verbalize 2-3 coping skills.   Outcome: Not Progressing    "

## 2024-12-17 NOTE — PLAN OF CARE
Problem: Thought Process Alteration  Goal: Optimal Thought Clarity  Description: Pt will have a logical and linear conversation.  Pt will verbalize 2-3 coping skills.   Outcome: Progressing     Problem: Adult Behavioral Health Plan of Care  Goal: Patient-Specific Goal (Individualization)  Description: Pt will follow recommendations of treatment team.  Pt will be compliant with medications.  Pt will attend 50 % of groups.  Pt will sleep 6-8 hours each night.    MOUTH CHECKS    12/16/24- -ICU r/t disorganization  Pt not able to wean at this time due to unpredictable behaviors and paranoia.     12/12/2024 Patient may wear head scarf.  Outcome: Progressing     Face to Face report received from Yelena RN. Rounding completed. Pt has been bed with eyes closed and regular respirations noted. 15 minute and PRN checks all night. No reports of falls or self-harm.     Face to face end of shift report communicated to day shift RN.     Brittnee Willard RN  12/17/2024  5:33 AM

## 2024-12-17 NOTE — PLAN OF CARE
Problem: Adult Behavioral Health Plan of Care  Goal: Patient-Specific Goal (Individualization)  Description: Pt will follow recommendations of treatment team.  Pt will be compliant with medications.  Pt will attend 50 % of groups.  Pt will sleep 6-8 hours each night.    MOUTH CHECKS    12/16/24- -ICU r/t disorganization  Pt not able to wean at this time due to unpredictable behaviors and paranoia.     12/12/2024 Patient may wear head scarf.  Outcome: Progressing     Problem: Thought Process Alteration  Goal: Optimal Thought Clarity  Description: Pt will have a logical and linear conversation.  Pt will verbalize 2-3 coping skills.   Outcome: Progressing   Goal Outcome Evaluation:       Pt in MHICU at the start of the shift. Pt did not speak to nursing and what she did say was not in English. Pt paced in the halls of the MHICU, pt could be heard laughing loudly in the halls. When asked what she was thinking when she was laughing or if she was hearing voices, pt did not answer. Pt looked directly at nursing for some time but appeared to be responding to internal stimuli.     Pt ate dinner and rested in her bed. Pt's daughter called and was connected to the IPAD for a call. Nursing held the IPAD for pt but pt reached over and hit the stop button. Pt states she is resting.     Pt walking in the lounge without her scrubs top on, nursing brought pt's night time meds to her and helped her put on a new scrub top. Pt took all night time medications. Pt states she has no depression, pain, SI HI or anxiety. Pt does admit to hearing voices but states they do not bother her.

## 2024-12-18 PROCEDURE — 250N000013 HC RX MED GY IP 250 OP 250 PS 637

## 2024-12-18 PROCEDURE — 250N000013 HC RX MED GY IP 250 OP 250 PS 637: Performed by: STUDENT IN AN ORGANIZED HEALTH CARE EDUCATION/TRAINING PROGRAM

## 2024-12-18 PROCEDURE — 250N000013 HC RX MED GY IP 250 OP 250 PS 637: Performed by: NURSE PRACTITIONER

## 2024-12-18 PROCEDURE — 124N000004

## 2024-12-18 PROCEDURE — 99233 SBSQ HOSP IP/OBS HIGH 50: CPT | Performed by: STUDENT IN AN ORGANIZED HEALTH CARE EDUCATION/TRAINING PROGRAM

## 2024-12-18 RX ORDER — LITHIUM CARBONATE 300 MG/1
600 TABLET, FILM COATED, EXTENDED RELEASE ORAL AT BEDTIME
Status: DISCONTINUED | OUTPATIENT
Start: 2024-12-18 | End: 2024-12-19

## 2024-12-18 RX ADMIN — RISPERIDONE 4 MG: 2 TABLET, ORALLY DISINTEGRATING ORAL at 21:29

## 2024-12-18 RX ADMIN — LISINOPRIL 10 MG: 2.5 TABLET ORAL at 08:34

## 2024-12-18 RX ADMIN — Medication 1 TABLET: at 08:34

## 2024-12-18 RX ADMIN — CHLORPROMAZINE HYDROCHLORIDE 50 MG: 25 TABLET, FILM COATED ORAL at 08:34

## 2024-12-18 RX ADMIN — DOCUSATE SODIUM 100 MG: 100 CAPSULE, LIQUID FILLED ORAL at 08:34

## 2024-12-18 RX ADMIN — RISPERIDONE 4 MG: 2 TABLET, ORALLY DISINTEGRATING ORAL at 08:34

## 2024-12-18 RX ADMIN — LITHIUM CARBONATE 600 MG: 300 TABLET, EXTENDED RELEASE ORAL at 21:29

## 2024-12-18 RX ADMIN — CHLORPROMAZINE HYDROCHLORIDE 300 MG: 100 TABLET, FILM COATED ORAL at 21:29

## 2024-12-18 ASSESSMENT — ACTIVITIES OF DAILY LIVING (ADL)
ADLS_ACUITY_SCORE: 25
HYGIENE/GROOMING: INDEPENDENT
ADLS_ACUITY_SCORE: 25

## 2024-12-18 NOTE — PLAN OF CARE
"Face to face shift report received from previous shift RN.       Problem: Adult Behavioral Health Plan of Care  Goal: Patient-Specific Goal (Individualization)  Description: Pt will follow recommendations of treatment team.  Pt will be compliant with medications.  Pt will attend 50 % of groups when able  Pt will sleep 6-8 hours each night.    *MOUTH CHECKS*    12/17/24- MH-ICU r/t disorganization  Pt not able to wean at this time due to unpredictable behaviors and paranoia. Treatment team to reassess daily     12/17/2024- Patient may wear head scarf.  Outcome: Not Progressing   Pt responding to internal stimuli. Speaks in another language at times, and when asked for clarification continues to do so. Pt spent morning naked in room. After multiple attempts to encourage her to clothe herself pt did so. Received Thorazine 50 mg with AM medications due to nakedness and psychosis. Compliant with scheduled medications. Pt spent majority of shift on open unit with 1:1 staff due to maintenance work in MHICU. Requires redirection, but mostly appropriate. No reports of pain.      Writer received call from woman identifying herself as pt's daughter. Caller questioning when unit visiting hours are and if she could schedule visit for herself and pt's family. Caller not on pt's CLYDE. Writer informed caller that a visit tomorrow could be discussed in treatment team, and requested caller call back tomorrow around 1100. Caller informed writer \"I will not be around tomorrow at 11\" caller also requested an answer now. Writer informed caller this is not possible at this time. Writer attempted to educate caller on visitor policy but caller hung up on writer.     Problem: Thought Process Alteration  Goal: Optimal Thought Clarity  Description: Pt will have a logical and linear conversation.  Pt will verbalize 2-3 coping skills.   Outcome: Not Progressing         Face to face end of shift report to be communicated to oncoming RN.       "

## 2024-12-18 NOTE — PLAN OF CARE
Problem: Thought Process Alteration  Goal: Optimal Thought Clarity  Description: Pt will have a logical and linear conversation.  Pt will verbalize 2-3 coping skills.   Outcome: Progressing     Problem: Adult Behavioral Health Plan of Care  Goal: Patient-Specific Goal (Individualization)  Description: Pt will follow recommendations of treatment team.  Pt will be compliant with medications.  Pt will attend 50 % of groups when able  Pt will sleep 6-8 hours each night.    *MOUTH CHECKS*    12/17/24- MH-ICU r/t disorganization  Pt not able to wean at this time due to unpredictable behaviors and paranoia. Treatment team to reassess daily     12/17/2024- Patient may wear head scarf.  Outcome: Progressing     Face to Face report received from VANIA Mills. Rounding completed. Pt had 4 hours of interrupted sleep throughout the night. 15 minute and PRN checks all night. No reports of falls or self-harm.     Face to face end of shift report communicated to day shift RN.     Brittnee Willard RN  12/18/2024  6:12 AM

## 2024-12-19 LAB
ATRIAL RATE - MUSE: 102 BPM
DIASTOLIC BLOOD PRESSURE - MUSE: NORMAL MMHG
HOLD SPECIMEN: NORMAL
INTERPRETATION ECG - MUSE: NORMAL
P AXIS - MUSE: 71 DEGREES
PR INTERVAL - MUSE: 154 MS
QRS DURATION - MUSE: 68 MS
QT - MUSE: 342 MS
QTC - MUSE: 445 MS
R AXIS - MUSE: 51 DEGREES
SYSTOLIC BLOOD PRESSURE - MUSE: NORMAL MMHG
T AXIS - MUSE: 33 DEGREES
VENTRICULAR RATE- MUSE: 102 BPM

## 2024-12-19 PROCEDURE — 250N000013 HC RX MED GY IP 250 OP 250 PS 637

## 2024-12-19 PROCEDURE — 124N000004

## 2024-12-19 PROCEDURE — 99233 SBSQ HOSP IP/OBS HIGH 50: CPT | Performed by: STUDENT IN AN ORGANIZED HEALTH CARE EDUCATION/TRAINING PROGRAM

## 2024-12-19 PROCEDURE — 250N000013 HC RX MED GY IP 250 OP 250 PS 637: Performed by: STUDENT IN AN ORGANIZED HEALTH CARE EDUCATION/TRAINING PROGRAM

## 2024-12-19 PROCEDURE — 36415 COLL VENOUS BLD VENIPUNCTURE: CPT | Performed by: STUDENT IN AN ORGANIZED HEALTH CARE EDUCATION/TRAINING PROGRAM

## 2024-12-19 PROCEDURE — 93010 ELECTROCARDIOGRAM REPORT: CPT | Performed by: INTERNAL MEDICINE

## 2024-12-19 PROCEDURE — 93005 ELECTROCARDIOGRAM TRACING: CPT

## 2024-12-19 PROCEDURE — 80342 ANTIPSYCHOTICS NOS 1-3: CPT | Performed by: STUDENT IN AN ORGANIZED HEALTH CARE EDUCATION/TRAINING PROGRAM

## 2024-12-19 PROCEDURE — 250N000013 HC RX MED GY IP 250 OP 250 PS 637: Performed by: NURSE PRACTITIONER

## 2024-12-19 RX ORDER — LITHIUM CARBONATE 450 MG
900 TABLET, EXTENDED RELEASE ORAL AT BEDTIME
Status: DISCONTINUED | OUTPATIENT
Start: 2024-12-19 | End: 2024-12-24

## 2024-12-19 RX ADMIN — RISPERIDONE 4 MG: 2 TABLET, ORALLY DISINTEGRATING ORAL at 09:36

## 2024-12-19 RX ADMIN — LISINOPRIL 10 MG: 2.5 TABLET ORAL at 09:36

## 2024-12-19 RX ADMIN — CHLORPROMAZINE HYDROCHLORIDE 300 MG: 100 TABLET, FILM COATED ORAL at 20:12

## 2024-12-19 RX ADMIN — HYDROXYZINE HYDROCHLORIDE 25 MG: 25 TABLET ORAL at 15:43

## 2024-12-19 RX ADMIN — CHLORPROMAZINE HYDROCHLORIDE 50 MG: 25 TABLET, FILM COATED ORAL at 09:36

## 2024-12-19 RX ADMIN — RISPERIDONE 4 MG: 2 TABLET, ORALLY DISINTEGRATING ORAL at 20:13

## 2024-12-19 RX ADMIN — LORAZEPAM 1 MG: 1 TABLET ORAL at 02:26

## 2024-12-19 RX ADMIN — LITHIUM CARBONATE 900 MG: 450 TABLET, EXTENDED RELEASE ORAL at 20:13

## 2024-12-19 ASSESSMENT — ACTIVITIES OF DAILY LIVING (ADL)
ADLS_ACUITY_SCORE: 25
LAUNDRY: UNABLE TO COMPLETE
ADLS_ACUITY_SCORE: 25
ORAL_HYGIENE: INDEPENDENT
ADLS_ACUITY_SCORE: 25
DRESS: SCRUBS (BEHAVIORAL HEALTH);INDEPENDENT
ADLS_ACUITY_SCORE: 25
HYGIENE/GROOMING: INDEPENDENT
ADLS_ACUITY_SCORE: 25

## 2024-12-19 NOTE — PLAN OF CARE
Problem: Thought Process Alteration  Goal: Optimal Thought Clarity  Description: Pt will have a logical and linear conversation.  Pt will verbalize 2-3 coping skills.   Outcome: Progressing     Problem: Adult Behavioral Health Plan of Care  Goal: Patient-Specific Goal (Individualization)  Description: Pt will follow recommendations of treatment team.  Pt will be compliant with medications.  Pt will attend 50 % of groups when able  Pt will sleep 6-8 hours each night.    *MOUTH CHECKS*    12/17/24- MH-ICU r/t disorganization  Pt not able to wean at this time due to unpredictable behaviors and paranoia. Treatment team to reassess daily     12/17/2024- Patient may wear head scarf.  Outcome: Progressing     Face to Face report received from VANIA Payne. Rounding completed. Pt appeared to sleep from 0400 - 0700. 15 minute and PRN checks all night. No reports of falls or self-harm.     PRN:  0226 LORazepam (ATIVAN) tablet 1 mg to aid in sleep.    Patient needed to be redirected 3 times to put pants on when out of her room.     Face to face end of shift report communicated to day shift RN.     Brittnee Willard RN  12/19/2024  6:31 AM
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
## 2024-12-19 NOTE — PLAN OF CARE
Face to face shift report received from previous shift RN.       Problem: Adult Behavioral Health Plan of Care  Goal: Patient-Specific Goal (Individualization)  Description: Pt will follow recommendations of treatment team.  Pt will be compliant with medications.  Pt will attend 50 % of groups when able  Pt will sleep 6-8 hours each night.    *MOUTH CHECKS*    12/17/24- MH-ICU r/t disorganization  Pt not able to wean at this time due to unpredictable behaviors and paranoia. Treatment team to reassess daily     12/17/2024- Patient may wear head scarf.  Outcome: Not Progressing     Pt removing pants and walking in MHICU lounge multiple times, even after multiple attempts at staff intervention. Pt responding to internal stimuli, laughing loudly and talking to self in another language. Offered and accepted Thorazine 50 mg with AM medications at 0936 due to behaviors listed above. Treatment team approved pt to wear own dress/skirt to help pt to remain clothed.  Pt given flowing dress. Initially pt had dress over scrub top and pants. Short time later it was observed that pt had removed pants, but was covered by flowing dress. Medication compliant, except for colace and multivitamin which pt picked from medication cup.       Problem: Thought Process Alteration  Goal: Optimal Thought Clarity  Description: Pt will have a logical and linear conversation.  Pt will verbalize 2-3 coping skills.   Outcome: Not Progressing       Face to face end of shift report to be communicated to oncoming RN.

## 2024-12-19 NOTE — PLAN OF CARE
Face to face shift report received from VANIA Chandler. Rounding completed, pt observed standing in their room at start of shift.    Problem: Adult Behavioral Health Plan of Care  Goal: Patient-Specific Goal (Individualization)  Description: Pt will follow recommendations of treatment team.  Pt will be compliant with medications.  Pt will attend 50 % of groups when able  Pt will sleep 6-8 hours each night.  MOUTH CHECKS    12/19/24  One family member of the patient is designated as the  for incoming and outgoing calls to and from staff for this patient during her hospital stay.  This family member is Mohamed per family/patient request.    All other family members that call wanting to speak with staff regarding the patient will be directed to call the designated family member.   The patient is able to continue to make and receive calls as appropriate.   Designated family member will be asked to call only once daily for an update on the patient condition as to not disrupt patient care.  All visitation will adhere to current unit policy.    12/17/24- -ICU r/t disorganization  Pt not able to wean at this time due to unpredictable behaviors and paranoia. Treatment team to reassess daily   12/17/2024- Patient may wear head scarf.  Outcome: Progressing     Problem: Thought Process Alteration  Goal: Optimal Thought Clarity  Description: Pt will have a logical and linear conversation.  Pt will verbalize 2-3 coping skills.   Outcome: Not Progressing   Goal Outcome Evaluation:        Pt. Is a no wean due to disorganized behavior. Pt. Observed standing in their room at start of shift on the camera. At next safety check ( about 1534) Pt. Was found sitting on the floor of their peer's room eating a brownie and looking at the bed. Pt. Told that this wasn't their room and that they couldn't be in here. Pt. Declined to move. After a few more verbal redirections, Pt. Walked back to their room. Room of their peer's was  locked. Pt. Given PRN hydroxyzine 25 mg at 1543 for this behavior and anxiety.    Pt. Received a phone call from a family member. Phone was brought to Pt. And they declined to take it. Pt. Wanted to watch television instead of talking on the phone. Pt. Has been noticed by staff to be spitting on the floor of the [facility] lounge.    100% was eaten at dinner. HS medications were all taken without any issues except for their Depakote. When asked why, Pt. Would only say side effects. [employer] video  tried with Pt. Pt. Was able to talk with . They couldn't remember last time that they took it. Pt. Was slightly disorganized and thought maybe at 3:00 PM. They were able to tel the  that they normally get confusion and tiredness from it.           Face to face report will be communicated to oncoming RN.    Carla Duong RN  12/19/2024  10:00 PM

## 2024-12-19 NOTE — PROGRESS NOTES
Chandrika pt's new  through [facility] called and asked for update and timeline of pt's discharge. Sw updated her on pt's care and will keep her updated as discharge becomes closer.     [phone]  [email or web address]    Sw talked with pt's son. He is upset about visitations and not being able to come to see his mother. He also asked for pt to be moved to a new hospital closer to family. Provider notified as they were going to set care plan for family visits and calls.
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
## 2024-12-19 NOTE — PLAN OF CARE
Problem: Adult Behavioral Health Plan of Care  Goal: Patient-Specific Goal (Individualization)  Description: Pt will follow recommendations of treatment team.  Pt will be compliant with medications.  Pt will attend 50 % of groups when able  Pt will sleep 6-8 hours each night.    *MOUTH CHECKS*    12/17/24- -ICU r/t disorganization  Pt not able to wean at this time due to unpredictable behaviors and paranoia. Treatment team to reassess daily     12/17/2024- Patient may wear head scarf.  Outcome: Progressing     Problem: Thought Process Alteration  Goal: Optimal Thought Clarity  Description: Pt will have a logical and linear conversation.  Pt will verbalize 2-3 coping skills.   Outcome: Not Progressing     Pt denies SI/HI and pain. Pt responding to internal stimuli. Pt VSS. Pt ate 50% of dinner in her room. Pt has a flat affect. Pt is using appropriate behavior with staff and peers. 1800 Pt walked out of room with no pants on. Staff redirected Pt back to room. Pt put her pants back on and watched tv in ICU. 1840 Pt had an iPad visit with family. 1930 Staff found Pt in room naked. Pt given new scrubs and bedding. 2129 Pt spit out Depakote and refused to take it. Pt compliant with all other scheduled medications.          Face to face report will be communicated to oncoming RN.    Kerry Wild RN  12/18/2024

## 2024-12-20 LAB
ATRIAL RATE - MUSE: 102 BPM
ATRIAL RATE - MUSE: 102 BPM
DIASTOLIC BLOOD PRESSURE - MUSE: NORMAL MMHG
DIASTOLIC BLOOD PRESSURE - MUSE: NORMAL MMHG
INTERPRETATION ECG - MUSE: NORMAL
INTERPRETATION ECG - MUSE: NORMAL
P AXIS - MUSE: 71 DEGREES
P AXIS - MUSE: 71 DEGREES
PR INTERVAL - MUSE: 154 MS
PR INTERVAL - MUSE: 154 MS
QRS DURATION - MUSE: 68 MS
QRS DURATION - MUSE: 68 MS
QT - MUSE: 342 MS
QT - MUSE: 342 MS
QTC - MUSE: 445 MS
QTC - MUSE: 445 MS
R AXIS - MUSE: 51 DEGREES
R AXIS - MUSE: 51 DEGREES
SYSTOLIC BLOOD PRESSURE - MUSE: NORMAL MMHG
SYSTOLIC BLOOD PRESSURE - MUSE: NORMAL MMHG
T AXIS - MUSE: 33 DEGREES
T AXIS - MUSE: 33 DEGREES
VENTRICULAR RATE- MUSE: 102 BPM
VENTRICULAR RATE- MUSE: 102 BPM

## 2024-12-20 PROCEDURE — 99233 SBSQ HOSP IP/OBS HIGH 50: CPT | Mod: 95 | Performed by: STUDENT IN AN ORGANIZED HEALTH CARE EDUCATION/TRAINING PROGRAM

## 2024-12-20 PROCEDURE — 250N000013 HC RX MED GY IP 250 OP 250 PS 637: Performed by: STUDENT IN AN ORGANIZED HEALTH CARE EDUCATION/TRAINING PROGRAM

## 2024-12-20 PROCEDURE — 250N000013 HC RX MED GY IP 250 OP 250 PS 637: Performed by: NURSE PRACTITIONER

## 2024-12-20 PROCEDURE — 250N000013 HC RX MED GY IP 250 OP 250 PS 637

## 2024-12-20 PROCEDURE — 124N000004

## 2024-12-20 RX ORDER — CHLORPROMAZINE HCI 25 MG/ML
100 INJECTION INTRAMUSCULAR AT BEDTIME
Status: DISCONTINUED | OUTPATIENT
Start: 2024-12-20 | End: 2024-12-21

## 2024-12-20 RX ADMIN — LITHIUM CARBONATE 900 MG: 450 TABLET, EXTENDED RELEASE ORAL at 20:04

## 2024-12-20 RX ADMIN — RISPERIDONE 4 MG: 2 TABLET, ORALLY DISINTEGRATING ORAL at 09:44

## 2024-12-20 RX ADMIN — Medication 1 TABLET: at 09:44

## 2024-12-20 RX ADMIN — RISPERIDONE 4 MG: 2 TABLET, ORALLY DISINTEGRATING ORAL at 20:04

## 2024-12-20 RX ADMIN — DOCUSATE SODIUM 100 MG: 100 CAPSULE, LIQUID FILLED ORAL at 09:45

## 2024-12-20 RX ADMIN — CHLORPROMAZINE HYDROCHLORIDE 50 MG: 25 TABLET, FILM COATED ORAL at 09:44

## 2024-12-20 RX ADMIN — CHLORPROMAZINE HYDROCHLORIDE 350 MG: 100 TABLET, FILM COATED ORAL at 20:04

## 2024-12-20 RX ADMIN — LISINOPRIL 10 MG: 2.5 TABLET ORAL at 09:44

## 2024-12-20 ASSESSMENT — ACTIVITIES OF DAILY LIVING (ADL)
ADLS_ACUITY_SCORE: 25
LAUNDRY: UNABLE TO COMPLETE
ADLS_ACUITY_SCORE: 25
DRESS: INDEPENDENT;SCRUBS (BEHAVIORAL HEALTH)
HYGIENE/GROOMING: INDEPENDENT
ORAL_HYGIENE: INDEPENDENT
ADLS_ACUITY_SCORE: 25

## 2024-12-20 NOTE — PLAN OF CARE
Problem: Thought Process Alteration  Goal: Optimal Thought Clarity  Description: Pt will have a logical and linear conversation.  Pt will verbalize 2-3 coping skills.   Outcome: Progressing     Problem: Adult Behavioral Health Plan of Care  Goal: Patient-Specific Goal (Individualization)  Description: Pt will follow recommendations of treatment team.  Pt will be compliant with medications.  Pt will attend 50 % of groups when able  Pt will sleep 6-8 hours each night.  MOUTH CHECKS    12/19/24  One family member of the patient is designated as the  for incoming and outgoing calls to and from staff for this patient during her hospital stay.  This family member is Mohamed per family/patient request.    All other family members that call wanting to speak with staff regarding the patient will be directed to call the designated family member.   The patient is able to continue to make and receive calls as appropriate.   Designated family member will be asked to call only once daily for an update on the patient condition as to not disrupt patient care.  All visitation will adhere to current unit policy.    12/17/24- -ICU r/t disorganization  Pt not able to wean at this time due to unpredictable behaviors and paranoia. Treatment team to reassess daily   12/17/2024- Patient may wear head scarf.  Outcome: Progressing       Face to Face report received from VANIA Aguirre. Rounding completed. Pt did not sleep during night shift and refused pharmaceutical interventions to aid in sleep. 15 minute and PRN checks all night. No reports of falls or self-harm.  Patient had no issues with undressing.    Face to face end of shift report communicated to oncoming day shift RN.     Brittnee Willard RN  12/20/2024  6:27 AM

## 2024-12-20 NOTE — PLAN OF CARE
Face to face shift report received from VANIA Olson. Rounding completed, pt observed in their bathroom at start of shift.    Problem: Adult Behavioral Health Plan of Care  Goal: Patient-Specific Goal (Individualization)  Description: Pt will follow recommendations of treatment team.  Pt will be compliant with medications.  Pt will attend 50 % of groups when able  Pt will sleep 6-8 hours each night.  MOUTH CHECKS    12/19/24  One family member of the patient is designated as the  for incoming and outgoing calls to and from staff for this patient during her hospital stay.  This family member is Mohamed per family/patient request.    All other family members that call wanting to speak with staff regarding the patient will be directed to call the designated family member.   The patient is able to continue to make and receive calls as appropriate.   Designated family member will be asked to call only once daily for an update on the patient condition as to not disrupt patient care.  All visitation will adhere to current unit policy.  12/17/2024- Patient may wear head scarf.  12/20/24- MH-ICU r/t disorganization  Pt not able to wean at this time due to unpredictable behaviors and paranoia. Treatment team to reassess daily     Outcome: Progressing     Problem: Thought Process Alteration  Goal: Optimal Thought Clarity  Description: Pt will have a logical and linear conversation.  Pt will verbalize 2-3 coping skills.   Outcome: Progressing   Goal Outcome Evaluation:    Plan of Care Reviewed With: patient        Pt. Denied having any physical pain this shift. They are still seen to have some disorganization. At the start of the shift, Pt. Was seen by staff to be turning light switches on and off repeatly in the [place].     1621, Pt. Noted to be coughing a lot. They have been coughing the past few evenings. Pt. Denied having any sore throat. Vital signs were WNL. Provider notified. Orders to monitor for now. PRN  lozenge order put in as well.    At 1720, they quickly ran and grabbed items off of the meal tray as staff was bringing dinner back. 100% was eaten at dinner.    1744, Pt. Walked into a peer's room and was redirected back to their own.     1832, Pt. Had an ipad visit with their son and parents. Pt. Tried to hang up on family a couple times. Son stated that it seemed like she was on autopilot. He is planning on calling the unit in the morning to see how she slept tonight. He has also signed up for ipad visits for 1830 for the next week.      Pt. Took all of their scheduled HS medications except for their Depakote. They took these 3 pills and put them in their cup of water and swirled it around before putting the cup on the floor for staff.     Face to face report will be communicated to oncoming RN.    Carla Duong RN  12/20/2024  8:59 PM

## 2024-12-20 NOTE — PLAN OF CARE
Problem: Adult Behavioral Health Plan of Care  Goal: Patient-Specific Goal (Individualization)  Description: Pt will follow recommendations of treatment team.  Pt will be compliant with medications.  Pt will attend 50 % of groups when able  Pt will sleep 6-8 hours each night.  MOUTH CHECKS    12/19/24  One family member of the patient is designated as the  for incoming and outgoing calls to and from staff for this patient during her hospital stay.  This family member is Mohamed per family/patient request.    All other family members that call wanting to speak with staff regarding the patient will be directed to call the designated family member.   The patient is able to continue to make and receive calls as appropriate.   Designated family member will be asked to call only once daily for an update on the patient condition as to not disrupt patient care.  All visitation will adhere to current unit policy.    12/20/24- MH-ICU r/t disorganization  Pt not able to wean at this time due to unpredictable behaviors and paranoia. Treatment team to reassess daily   12/17/2024- Patient may wear head scarf.  Outcome: Not Progressing    Note: 07:30: Received end of shift report from VANIA Beaulieu. Pt lying in bed awake---    08:35: Pt displaying s/s of quality sleep since approximately 08:00--per report pt has not slept since yesterday NOC; slept a total of approximately  3 hours    09:45: PRN thorazine given for s/s psychosis, disorganization, appears responding to internal stimuli-- illogical, soft, rambling speech-- interchanges between english/simolian lang--- slept approximately 1 hour;     14:40: Pt up et about post awakening; currently displaying s/s of sleep. Disrobed x1; in peer's room x1; easily redirected.      Face to face end of shift report communicated to on-coming PM staff.     Whitney Catalan RN  12/20/2024  3:50 PM        Goal: Adheres to Safety Considerations for Self and Others  Outcome: Not  Progressing     Problem: Thought Process Alteration  Goal: Optimal Thought Clarity  Description: Pt will have a logical and linear conversation.  Pt will verbalize 2-3 coping skills.   Outcome: Not Progressing   Goal Outcome Evaluation:
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
## 2024-12-21 PROCEDURE — 250N000013 HC RX MED GY IP 250 OP 250 PS 637

## 2024-12-21 PROCEDURE — 250N000013 HC RX MED GY IP 250 OP 250 PS 637: Performed by: NURSE PRACTITIONER

## 2024-12-21 PROCEDURE — 124N000004

## 2024-12-21 PROCEDURE — 250N000013 HC RX MED GY IP 250 OP 250 PS 637: Performed by: STUDENT IN AN ORGANIZED HEALTH CARE EDUCATION/TRAINING PROGRAM

## 2024-12-21 PROCEDURE — 99233 SBSQ HOSP IP/OBS HIGH 50: CPT | Mod: 95 | Performed by: STUDENT IN AN ORGANIZED HEALTH CARE EDUCATION/TRAINING PROGRAM

## 2024-12-21 RX ORDER — CHLORPROMAZINE HCI 25 MG/ML
100 INJECTION INTRAMUSCULAR AT BEDTIME
Status: DISCONTINUED | OUTPATIENT
Start: 2024-12-21 | End: 2024-12-31

## 2024-12-21 RX ORDER — CHLORPROMAZINE HYDROCHLORIDE 100 MG/1
400 TABLET, FILM COATED ORAL AT BEDTIME
Status: DISCONTINUED | OUTPATIENT
Start: 2024-12-21 | End: 2025-01-03 | Stop reason: HOSPADM

## 2024-12-21 RX ADMIN — LISINOPRIL 10 MG: 2.5 TABLET ORAL at 09:14

## 2024-12-21 RX ADMIN — DOCUSATE SODIUM 100 MG: 100 CAPSULE, LIQUID FILLED ORAL at 09:13

## 2024-12-21 RX ADMIN — Medication 1 TABLET: at 09:13

## 2024-12-21 RX ADMIN — LITHIUM CARBONATE 900 MG: 450 TABLET, EXTENDED RELEASE ORAL at 20:17

## 2024-12-21 RX ADMIN — CHLORPROMAZINE HYDROCHLORIDE 400 MG: 100 TABLET, FILM COATED ORAL at 20:16

## 2024-12-21 RX ADMIN — RISPERIDONE 4 MG: 2 TABLET, ORALLY DISINTEGRATING ORAL at 09:13

## 2024-12-21 RX ADMIN — RISPERIDONE 4 MG: 2 TABLET, ORALLY DISINTEGRATING ORAL at 20:17

## 2024-12-21 ASSESSMENT — ACTIVITIES OF DAILY LIVING (ADL)
ADLS_ACUITY_SCORE: 25
ADLS_ACUITY_SCORE: 25
LAUNDRY: UNABLE TO COMPLETE
ADLS_ACUITY_SCORE: 25
DRESS: INDEPENDENT;SCRUBS (BEHAVIORAL HEALTH)
ADLS_ACUITY_SCORE: 25
ORAL_HYGIENE: INDEPENDENT
ADLS_ACUITY_SCORE: 25
HYGIENE/GROOMING: INDEPENDENT
ADLS_ACUITY_SCORE: 25

## 2024-12-21 NOTE — PLAN OF CARE
Face to face shift report received from nurse. Rounding completed, pt observed.    Problem: Adult Behavioral Health Plan of Care  Goal: Patient-Specific Goal (Individualization)  Description: Pt will follow recommendations of treatment team.  Pt will be compliant with medications.  Pt will attend 50 % of groups when able  Pt will sleep 6-8 hours each night.  MOUTH CHECKS    12/19/24  One family member of the patient is designated as the  for incoming and outgoing calls to and from staff for this patient during her hospital stay.  This family member is Mohamed per family/patient request.    All other family members that call wanting to speak with staff regarding the patient will be directed to call the designated family member.   The patient is able to continue to make and receive calls as appropriate.   Designated family member will be asked to call only once daily for an update on the patient condition as to not disrupt patient care.  All visitation will adhere to current unit policy.  12/17/2024- Patient may wear head scarf.  12/20/24- MH-ICU r/t disorganization  Pt not able to wean at this time due to unpredictable behaviors and paranoia. Treatment team to reassess daily   Outcome: Not Progressing     Patient calm throughout the shift.  Patient took a nap for an hour during the shift. Patient did try and follow staff out of the MHICU one time today after lunch. Patient has kept clothes on throughout the shift. Night shift reported that she did do a sponge bath this morning right before day shift.     Problem: Thought Process Alteration  Goal: Optimal Thought Clarity  Description: Pt will have a logical and linear conversation.  Pt will verbalize 2-3 coping skills.   Outcome: Not Progressing     Face to face report will be communicated to oncoming RN.    Donald Lebron RN  12/21/2024

## 2024-12-21 NOTE — PLAN OF CARE
Face to face end of shift report received from Donald MARAVILLA RN.  Pt observed in ICU ebony.      Problem: Adult Behavioral Health Plan of Care  Goal: Patient-Specific Goal (Individualization)  Description: Pt will follow recommendations of treatment team.  Pt will be compliant with medications.  Pt will attend 50 % of groups when able  Pt will sleep 6-8 hours each night.  MOUTH CHECKS    12/19/24  One family member of the patient is designated as the  for incoming and outgoing calls to and from staff for this patient during her hospital stay.  This family member is Mohamed per family/patient request.    All other family members that call wanting to speak with staff regarding the patient will be directed to call the designated family member.   The patient is able to continue to make and receive calls as appropriate.   Designated family member will be asked to call only once daily for an update on the patient condition as to not disrupt patient care.  All visitation will adhere to current unit policy.  12/17/2024- Patient may wear head scarf.  12/20/24- -ICU r/t disorganization  Pt not able to wean at this time due to unpredictable behaviors and paranoia. Treatment team to reassess daily     Outcome: Progressing     Problem: Thought Process Alteration  Goal: Optimal Thought Clarity  Description: Pt will have a logical and linear conversation.  Pt will verbalize 2-3 coping skills.   Outcome: Progressing   Goal Outcome Evaluation:       Pt remains in the MHICU.  Pt continues to be disorganized.  She appears to be responding to internal stimuli. Pt has been taken clothing off multiple times this shift.  She does take redirection to be put clothing back on. Pt denies pain. Pt was incontinent x2 this shift.  Staff assisted with changing her clothing.  Pt has needed redirection to not attempt to run through open door.  Pt is accepting of redirection.  Pt has poor boundaries with peer.  Writer is continuing to  reinforce appropriate boundaries.  Pt did have an I pad visit with her son and made a few phone calls this evening.  Pt cooperative with scheduled medications.     Face to face end of shift report communicated to oncoming RN.     Magi Lebron RN  12/21/2024
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
## 2024-12-21 NOTE — PLAN OF CARE
Problem: Thought Process Alteration  Goal: Optimal Thought Clarity  Description: Pt will have a logical and linear conversation.  Pt will verbalize 2-3 coping skills.   Outcome: Progressing     Problem: Adult Behavioral Health Plan of Care  Goal: Patient-Specific Goal (Individualization)  Description: Pt will follow recommendations of treatment team.  Pt will be compliant with medications.  Pt will attend 50 % of groups when able  Pt will sleep 6-8 hours each night.  MOUTH CHECKS    12/19/24  One family member of the patient is designated as the  for incoming and outgoing calls to and from staff for this patient during her hospital stay.  This family member is Mohamed per family/patient request.    All other family members that call wanting to speak with staff regarding the patient will be directed to call the designated family member.   The patient is able to continue to make and receive calls as appropriate.   Designated family member will be asked to call only once daily for an update on the patient condition as to not disrupt patient care.  All visitation will adhere to current unit policy.  12/17/2024- Patient may wear head scarf.  12/20/24- MH-ICU r/t disorganization  Pt not able to wean at this time due to unpredictable behaviors and paranoia. Treatment team to reassess daily     Outcome: Progressing     Face to Face report received from VANIA Aguirre. Rounding completed. Patient appeared to sleep 2 hours uninterrupted during NOC.  15 minute and PRN checks all night. No reports of falls or self-harm.  Patient had no issues with undressing or entering peers rooms.     Face to face end of shift report communicated to day shift RN.     Brittnee Willard RN  12/21/2024  6:15 AM

## 2024-12-22 PROCEDURE — 250N000013 HC RX MED GY IP 250 OP 250 PS 637: Performed by: NURSE PRACTITIONER

## 2024-12-22 PROCEDURE — 124N000004

## 2024-12-22 PROCEDURE — 250N000013 HC RX MED GY IP 250 OP 250 PS 637: Performed by: STUDENT IN AN ORGANIZED HEALTH CARE EDUCATION/TRAINING PROGRAM

## 2024-12-22 PROCEDURE — 250N000013 HC RX MED GY IP 250 OP 250 PS 637

## 2024-12-22 PROCEDURE — 99232 SBSQ HOSP IP/OBS MODERATE 35: CPT | Performed by: NURSE PRACTITIONER

## 2024-12-22 RX ADMIN — CHLORPROMAZINE HYDROCHLORIDE 400 MG: 100 TABLET, FILM COATED ORAL at 19:44

## 2024-12-22 RX ADMIN — LITHIUM CARBONATE 900 MG: 450 TABLET, EXTENDED RELEASE ORAL at 19:44

## 2024-12-22 RX ADMIN — LORAZEPAM 1 MG: 1 TABLET ORAL at 01:55

## 2024-12-22 RX ADMIN — RISPERIDONE 4 MG: 2 TABLET, ORALLY DISINTEGRATING ORAL at 19:44

## 2024-12-22 RX ADMIN — RISPERIDONE 4 MG: 2 TABLET, ORALLY DISINTEGRATING ORAL at 08:54

## 2024-12-22 RX ADMIN — HYDROXYZINE HYDROCHLORIDE 25 MG: 25 TABLET ORAL at 09:01

## 2024-12-22 RX ADMIN — CHLORPROMAZINE HYDROCHLORIDE 50 MG: 25 TABLET, FILM COATED ORAL at 18:04

## 2024-12-22 RX ADMIN — Medication 1 TABLET: at 08:54

## 2024-12-22 RX ADMIN — DOCUSATE SODIUM 100 MG: 100 CAPSULE, LIQUID FILLED ORAL at 08:54

## 2024-12-22 RX ADMIN — LISINOPRIL 10 MG: 2.5 TABLET ORAL at 08:54

## 2024-12-22 ASSESSMENT — ACTIVITIES OF DAILY LIVING (ADL)
ADLS_ACUITY_SCORE: 25
ORAL_HYGIENE: INDEPENDENT
ADLS_ACUITY_SCORE: 25
HYGIENE/GROOMING: INDEPENDENT
ADLS_ACUITY_SCORE: 25
DRESS: INDEPENDENT
ADLS_ACUITY_SCORE: 25
ADLS_ACUITY_SCORE: 25
HYGIENE/GROOMING: INDEPENDENT
ORAL_HYGIENE: INDEPENDENT
ADLS_ACUITY_SCORE: 25
LAUNDRY: UNABLE TO COMPLETE
ADLS_ACUITY_SCORE: 25
DRESS: SCRUBS (BEHAVIORAL HEALTH);INDEPENDENT
LAUNDRY: UNABLE TO COMPLETE
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
## 2024-12-22 NOTE — PLAN OF CARE
Face to face report received from Monica CARO. Pt. Observed.    Problem: Adult Behavioral Health Plan of Care  Goal: Patient-Specific Goal (Individualization)  Description: Pt will follow recommendations of treatment team.  Pt will be compliant with medications.  Pt will attend 50 % of groups when able  Pt will sleep 6-8 hours each night.  MOUTH CHECKS    12/19/24  One family member of the patient is designated as the  for incoming and outgoing calls to and from staff for this patient during her hospital stay.  This family member is Mohamed per family/patient request.    All other family members that call wanting to speak with staff regarding the patient will be directed to call the designated family member.   The patient is able to continue to make and receive calls as appropriate.   Designated family member will be asked to call only once daily for an update on the patient condition as to not disrupt patient care.  All visitation will adhere to current unit policy.  12/17/2024- Patient may wear head scarf.  12/22/24- MH-ICU r/t disorganization  Pt not able to wean at this time due to unpredictable behaviors and paranoia. Treatment team to reassess daily     Outcome: Progressing  Pt. Denies Hi, SI, and pain at this time. Pt. Is cooperative with medications, mouth check, and nursing assessment. Pt. In agreement to update staff to thoughts feelings of wanting to harm self or others. Pt. Showering this a.m. Her dress and bonnet were washed new bedding applied. Pt. Wandering MH-ICU with no clothing, attempting to get into peers rooms.  Pt. Redirectable. Eating WDL. Offers no c/o issues with bowel and bladder.     Pt. Family called to request visit today. Pt. Son Tevin on CLYDE. Pt. Son update she is not able to wean for visit but I-Pad visit can be arranged. They will call back.     Face to face end of shift report communicated to oncoming RN.     Jackie Hannah RN  12/22/2024  9:39 AM
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
## 2024-12-22 NOTE — PLAN OF CARE
Face to face end of shift report will be communicated to oncoming RN.    Problem: Adult Behavioral Health Plan of Care  Goal: Patient-Specific Goal (Individualization)  Description: Pt will follow recommendations of treatment team.  Pt will be compliant with medications.  Pt will attend 50 % of groups when able  Pt will sleep 6-8 hours each night.  MOUTH CHECKS    12/19/24  One family member of the patient is designated as the  for incoming and outgoing calls to and from staff for this patient during her hospital stay.  This family member is Mohamed per family/patient request.    All other family members that call wanting to speak with staff regarding the patient will be directed to call the designated family member.   The patient is able to continue to make and receive calls as appropriate.   Designated family member will be asked to call only once daily for an update on the patient condition as to not disrupt patient care.  All visitation will adhere to current unit policy.  12/17/2024- Patient may wear head scarf.  12/20/24- MH-ICU r/t disorganization  Pt not able to wean at this time due to unpredictable behaviors and paranoia. Treatment team to reassess daily     Outcome: Progressing     Problem: Thought Process Alteration  Goal: Optimal Thought Clarity  Description: Pt will have a logical and linear conversation.  Pt will verbalize 2-3 coping skills.   Outcome: Progressing  Face to face end of shift report obtained from VANIA Sow. Pt observed in room.  0155-Pt has been walking in ICU, changing her scrubs a few times, knocking and trying to open doors most of shift. Pt calm but restless. Ativan 1 mg PO offered and accepted.  0230-Pt appears to be resting in bed.   0645-Pt appeared to had slept 4 hours.Pt woke up incontinent of bladder. Change of bedding and hygiene assistance performed.

## 2024-12-23 PROCEDURE — 250N000013 HC RX MED GY IP 250 OP 250 PS 637: Performed by: STUDENT IN AN ORGANIZED HEALTH CARE EDUCATION/TRAINING PROGRAM

## 2024-12-23 PROCEDURE — 99233 SBSQ HOSP IP/OBS HIGH 50: CPT | Mod: 95 | Performed by: STUDENT IN AN ORGANIZED HEALTH CARE EDUCATION/TRAINING PROGRAM

## 2024-12-23 PROCEDURE — 250N000013 HC RX MED GY IP 250 OP 250 PS 637: Performed by: NURSE PRACTITIONER

## 2024-12-23 PROCEDURE — 124N000004

## 2024-12-23 PROCEDURE — 250N000013 HC RX MED GY IP 250 OP 250 PS 637

## 2024-12-23 RX ORDER — POLYETHYLENE GLYCOL 3350 17 G/17G
17 POWDER, FOR SOLUTION ORAL DAILY
Status: DISCONTINUED | OUTPATIENT
Start: 2024-12-23 | End: 2025-01-03 | Stop reason: HOSPADM

## 2024-12-23 RX ORDER — HALOPERIDOL 5 MG/ML
7.5 INJECTION INTRAMUSCULAR 2 TIMES DAILY
Status: DISCONTINUED | OUTPATIENT
Start: 2024-12-23 | End: 2024-12-25

## 2024-12-23 RX ADMIN — LITHIUM CARBONATE 900 MG: 450 TABLET, EXTENDED RELEASE ORAL at 21:32

## 2024-12-23 RX ADMIN — POLYETHYLENE GLYCOL 3350 17 G: 17 POWDER, FOR SOLUTION ORAL at 11:54

## 2024-12-23 RX ADMIN — HALOPERIDOL 7.5 MG: 5 TABLET ORAL at 21:32

## 2024-12-23 RX ADMIN — LISINOPRIL 10 MG: 2.5 TABLET ORAL at 04:04

## 2024-12-23 RX ADMIN — DOCUSATE SODIUM 100 MG: 100 CAPSULE, LIQUID FILLED ORAL at 09:44

## 2024-12-23 RX ADMIN — RISPERIDONE 4 MG: 2 TABLET, ORALLY DISINTEGRATING ORAL at 09:44

## 2024-12-23 RX ADMIN — CHLORPROMAZINE HYDROCHLORIDE 400 MG: 100 TABLET, FILM COATED ORAL at 21:31

## 2024-12-23 RX ADMIN — ACETAMINOPHEN 650 MG: 325 TABLET, FILM COATED ORAL at 11:42

## 2024-12-23 RX ADMIN — Medication 1 TABLET: at 12:02

## 2024-12-23 RX ADMIN — LORAZEPAM 1 MG: 1 TABLET ORAL at 00:57

## 2024-12-23 RX ADMIN — CHLORPROMAZINE HYDROCHLORIDE 50 MG: 25 TABLET, FILM COATED ORAL at 16:51

## 2024-12-23 ASSESSMENT — ACTIVITIES OF DAILY LIVING (ADL)
ADLS_ACUITY_SCORE: 25

## 2024-12-23 NOTE — PROGRESS NOTES
Family member, aunt Nisa set up ipad visit for today at 1730. Aunt already has instructions for set up from patient's son Nghia.  December 23, 2024 12:27 PM
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
## 2024-12-23 NOTE — PLAN OF CARE
"  Problem: Adult Behavioral Health Plan of Care  Goal: Patient-Specific Goal (Individualization)  Description: Pt will follow recommendations of treatment team.  Pt will be compliant with medications.  Pt will attend 50 % of groups when able  Pt will sleep 6-8 hours each night.  MOUTH CHECKS    12/19/24  One family member of the patient is designated as the  for incoming and outgoing calls to and from staff for this patient during her hospital stay.  This family member is Mohamed per family/patient request.    All other family members that call wanting to speak with staff regarding the patient will be directed to call the designated family member.   The patient is able to continue to make and receive calls as appropriate.   Designated family member will be asked to call only once daily for an update on the patient condition as to not disrupt patient care.  All visitation will adhere to current unit policy.  12/17/2024- Patient may wear head scarf.  12/20/24- MH-ICU r/t disorganization  Pt not able to wean at this time due to unpredictable behaviors and paranoia. Treatment team to reassess daily     Outcome: Progressing     Problem: Thought Process Alteration  Goal: Optimal Thought Clarity  Description: Pt will have a logical and linear conversation.  Pt will verbalize 2-3 coping skills.   Outcome: Progressing   Goal Outcome Evaluation:    Pt urinated on the floor next to the toilet when staff walked in for checks. Pt took a shower and room was cleaned and mopped. Pt in the shower for about 1/2 hour, needed prompting to get out. Pt took medications this morning, talked to her provider and nursing with . Pt does report some constipation, denies pain, anxiety, depression, SI, HI and hallucinations. Pt denies any pain with urination.     Pt took nap after nursing assessment.     Pt in lounge yelling \"call 911, call 911 call 911\" when asked if she needed police or ambulance, pt stated she needed " ambulance, states she has pain and pointed to her head and abdomen. Pt did complain of constipation. Tylenol given at 1142 and Miralax given at 1154. Provider notified and will order healthy fruits and vegetables which she states she eats more of at home that she is eating here.          Face to face end of shift report communicated to evening shift RN.     Trice Pina RN  12/23/2024  7:55 AM

## 2024-12-24 LAB
ANION GAP SERPL CALCULATED.3IONS-SCNC: 9 MMOL/L (ref 7–15)
ANION GAP SERPL CALCULATED.3IONS-SCNC: 9 MMOL/L (ref 7–15)
BUN SERPL-MCNC: 14.8 MG/DL (ref 6–20)
BUN SERPL-MCNC: 14.8 MG/DL (ref 6–20)
CALCIUM SERPL-MCNC: 10.1 MG/DL (ref 8.8–10.4)
CALCIUM SERPL-MCNC: 10.1 MG/DL (ref 8.8–10.4)
CHLORIDE SERPL-SCNC: 103 MMOL/L (ref 98–107)
CHLORIDE SERPL-SCNC: 103 MMOL/L (ref 98–107)
CREAT SERPL-MCNC: 0.85 MG/DL (ref 0.51–0.95)
CREAT SERPL-MCNC: 0.85 MG/DL (ref 0.51–0.95)
EGFRCR SERPLBLD CKD-EPI 2021: 79 ML/MIN/1.73M2
EGFRCR SERPLBLD CKD-EPI 2021: 79 ML/MIN/1.73M2
GLUCOSE SERPL-MCNC: 105 MG/DL (ref 70–99)
GLUCOSE SERPL-MCNC: 105 MG/DL (ref 70–99)
HCO3 SERPL-SCNC: 25 MMOL/L (ref 22–29)
HCO3 SERPL-SCNC: 25 MMOL/L (ref 22–29)
HOLD SPECIMEN: NORMAL
LITHIUM SERPL-SCNC: 1.6 MMOL/L (ref 0.6–1.2)
LITHIUM SERPL-SCNC: 1.6 MMOL/L (ref 0.6–1.2)
POTASSIUM SERPL-SCNC: 3.9 MMOL/L (ref 3.4–5.3)
POTASSIUM SERPL-SCNC: 3.9 MMOL/L (ref 3.4–5.3)
SODIUM SERPL-SCNC: 137 MMOL/L (ref 135–145)
SODIUM SERPL-SCNC: 137 MMOL/L (ref 135–145)

## 2024-12-24 PROCEDURE — 80178 ASSAY OF LITHIUM: CPT | Performed by: STUDENT IN AN ORGANIZED HEALTH CARE EDUCATION/TRAINING PROGRAM

## 2024-12-24 PROCEDURE — 124N000004

## 2024-12-24 PROCEDURE — 250N000013 HC RX MED GY IP 250 OP 250 PS 637: Performed by: NURSE PRACTITIONER

## 2024-12-24 PROCEDURE — 36415 COLL VENOUS BLD VENIPUNCTURE: CPT | Performed by: STUDENT IN AN ORGANIZED HEALTH CARE EDUCATION/TRAINING PROGRAM

## 2024-12-24 PROCEDURE — 250N000013 HC RX MED GY IP 250 OP 250 PS 637: Performed by: STUDENT IN AN ORGANIZED HEALTH CARE EDUCATION/TRAINING PROGRAM

## 2024-12-24 PROCEDURE — 99233 SBSQ HOSP IP/OBS HIGH 50: CPT | Mod: 95 | Performed by: STUDENT IN AN ORGANIZED HEALTH CARE EDUCATION/TRAINING PROGRAM

## 2024-12-24 PROCEDURE — 84443 ASSAY THYROID STIM HORMONE: CPT | Performed by: STUDENT IN AN ORGANIZED HEALTH CARE EDUCATION/TRAINING PROGRAM

## 2024-12-24 PROCEDURE — 80048 BASIC METABOLIC PNL TOTAL CA: CPT | Performed by: STUDENT IN AN ORGANIZED HEALTH CARE EDUCATION/TRAINING PROGRAM

## 2024-12-24 RX ORDER — AMOXICILLIN 250 MG
1 CAPSULE ORAL 2 TIMES DAILY PRN
Status: DISCONTINUED | OUTPATIENT
Start: 2024-12-24 | End: 2025-01-03 | Stop reason: HOSPADM

## 2024-12-24 RX ORDER — LORAZEPAM 0.5 MG/1
0.5 TABLET ORAL 3 TIMES DAILY
Status: DISCONTINUED | OUTPATIENT
Start: 2024-12-24 | End: 2024-12-27

## 2024-12-24 RX ORDER — GUAIFENESIN/DEXTROMETHORPHAN 100-10MG/5
10 SYRUP ORAL EVERY 4 HOURS PRN
Status: DISCONTINUED | OUTPATIENT
Start: 2024-12-24 | End: 2025-01-03 | Stop reason: HOSPADM

## 2024-12-24 RX ORDER — LITHIUM CARBONATE 450 MG
450 TABLET, EXTENDED RELEASE ORAL AT BEDTIME
Status: DISCONTINUED | OUTPATIENT
Start: 2024-12-24 | End: 2024-12-29

## 2024-12-24 RX ADMIN — LORAZEPAM 0.5 MG: 0.5 TABLET ORAL at 14:04

## 2024-12-24 RX ADMIN — GUAIFENESIN AND DEXTROMETHORPHAN 10 ML: 100; 10 SYRUP ORAL at 20:18

## 2024-12-24 RX ADMIN — LITHIUM CARBONATE 450 MG: 450 TABLET, EXTENDED RELEASE ORAL at 20:04

## 2024-12-24 RX ADMIN — Medication 1 TABLET: at 08:48

## 2024-12-24 RX ADMIN — LORAZEPAM 0.5 MG: 0.5 TABLET ORAL at 09:32

## 2024-12-24 RX ADMIN — LISINOPRIL 10 MG: 2.5 TABLET ORAL at 08:47

## 2024-12-24 RX ADMIN — CHLORPROMAZINE HYDROCHLORIDE 400 MG: 100 TABLET, FILM COATED ORAL at 20:04

## 2024-12-24 RX ADMIN — DOCUSATE SODIUM 100 MG: 100 CAPSULE, LIQUID FILLED ORAL at 08:48

## 2024-12-24 RX ADMIN — POLYETHYLENE GLYCOL 3350 17 G: 17 POWDER, FOR SOLUTION ORAL at 08:48

## 2024-12-24 RX ADMIN — LORAZEPAM 0.5 MG: 0.5 TABLET ORAL at 20:05

## 2024-12-24 RX ADMIN — HALOPERIDOL 7.5 MG: 5 TABLET ORAL at 20:04

## 2024-12-24 RX ADMIN — HALOPERIDOL 7.5 MG: 5 TABLET ORAL at 08:48

## 2024-12-24 ASSESSMENT — ACTIVITIES OF DAILY LIVING (ADL)
ADLS_ACUITY_SCORE: 25
DRESS: SCRUBS (BEHAVIORAL HEALTH)
ADLS_ACUITY_SCORE: 25
HYGIENE/GROOMING: INDEPENDENT
ADLS_ACUITY_SCORE: 25
ORAL_HYGIENE: INDEPENDENT
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
## 2024-12-24 NOTE — PLAN OF CARE
Problem: Adult Behavioral Health Plan of Care  Goal: Patient-Specific Goal (Individualization)  Description: Pt will follow recommendations of treatment team.  Pt will be compliant with medications.  Pt will attend 50 % of groups when able  Pt will sleep 6-8 hours each night.  MOUTH CHECKS    12/19/24  One family member of the patient is designated as the  for incoming and outgoing calls to and from staff for this patient during her hospital stay.  This family member is Mohamed per family/patient request.    All other family members that call wanting to speak with staff regarding the patient will be directed to call the designated family member.   The patient is able to continue to make and receive calls as appropriate.   Designated family member will be asked to call only once daily for an update on the patient condition as to not disrupt patient care.  All visitation will adhere to current unit policy.  12/17/2024- Patient may wear head scarf.  12/20/24- MH-ICU r/t disorganization  Pt not able to wean at this time due to unpredictable behaviors and paranoia. Treatment team to reassess daily   Outcome: Progressing     Problem: Thought Process Alteration  Goal: Optimal Thought Clarity  Description: Pt will have a logical and linear conversation.  Pt will verbalize 2-3 coping skills.   Outcome: Progressing     Face to face shift report received from Geraldine.     Patient sat on the edge of the bed at times heard talking to self on rounds, until she fell asleep about 0245. Patient appeared to be sleeping for approximately 3.25 hours.    Face to face report will be communicated to oncoming RN.    Micheline Sanchez RN  12/24/2024  6:18 AM

## 2024-12-24 NOTE — PLAN OF CARE
Problem: Adult Behavioral Health Plan of Care  Goal: Patient-Specific Goal (Individualization)  Description: Pt will follow recommendations of treatment team.  Pt will be compliant with medications.  Pt will attend 50 % of groups when able  Pt will sleep 6-8 hours each night.  MOUTH CHECKS    12/19/24  One family member of the patient is designated as the  for incoming and outgoing calls to and from staff for this patient during her hospital stay.  This family member is Mohamed per family/patient request.    All other family members that call wanting to speak with staff regarding the patient will be directed to call the designated family member.   The patient is able to continue to make and receive calls as appropriate.   Designated family member will be asked to call only once daily for an update on the patient condition as to not disrupt patient care.  All visitation will adhere to current unit policy.  12/17/2024- Patient may wear head scarf.  12/20/24- MH-ICU r/t disorganization  Pt not able to wean at this time due to unpredictable behaviors and paranoia. Treatment team to reassess daily     Outcome: Progressing     Problem: Thought Process Alteration  Goal: Optimal Thought Clarity  Description: Pt will have a logical and linear conversation.  Pt will verbalize 2-3 coping skills.   Outcome: Progressing   Goal Outcome Evaluation:      Pt in the MHICU in her bedroom at the start of the shift. Pt was found sitting on the toilet without clothes on, her dress was wet beside the toilet. Pt brought a new dress and her bonnet. Pt didn't dress herself when clothes brought to her but allowed nursing to get her dressed and put her bonnet on. Pt motioned with her hand for nursing to put her hair into her bonnet.     Pt was given her morning medications but did not take them. Pt asked why she doesn't want to take them but could not tell nursing. Pt pointed to the ones she doesn't want and nursing took out  her multivitamin and colace. Pt dd not take the other medications when asked to.     Pt was again offered her morning medications with the  in her room. Pt was asked about taking her meds by the , pt reached for her medication and took it right away. Pt closed her eyes as if she was tired and didn't want to talk anymore. Pt was only able to sleep about 1/2 hour this shift.       Pt's family called and asked to schedule an inperson visit. Staff wrote their number down and called pt's son who is on the release of information. Pt's son states that he has tried to explain to family that pt is sick and not able to have visitors. Pt did ask to talk to the doctor to reconsider the decision and allow the family to visit in person if only for a short time. Pt believes it would be helpful to the family as well as the patient to have a visit. Pt also states that they have seen pt very sick in the past and it would not be upset if she was undressing while they visited, they have seen her do this type of thing before.     Pt's family showed up at the unit door at 2:45. Supervisor was called and family was allowed to visit for 1/2 hour, family was told that it was change of shift. Family stated they understand and want only to see that she is ok.     Nursing put socks on pt's feet, noticed that they are swollen, provider notified.     Pt family requested pt have some oil for her hair which is very dry, pt will be given hair oil this evening.        Face to face end of shift report communicated to evening shift RN.     Trice Pina RN  12/24/2024  7:37 AM

## 2024-12-24 NOTE — PLAN OF CARE
Face to face shift report received from previous shift RN.       Problem: Adult Behavioral Health Plan of Care  Goal: Patient-Specific Goal (Individualization)  Description: Pt will follow recommendations of treatment team.  Pt will be compliant with medications.  Pt will attend 50 % of groups when able  Pt will sleep 6-8 hours each night.  MOUTH CHECKS    12/19/24  One family member of the patient is designated as the  for incoming and outgoing calls to and from staff for this patient during her hospital stay.  This family member is Mohamed per family/patient request.    All other family members that call wanting to speak with staff regarding the patient will be directed to call the designated family member.   The patient is able to continue to make and receive calls as appropriate.   Designated family member will be asked to call only once daily for an update on the patient condition as to not disrupt patient care.  All visitation will adhere to current unit policy.  12/17/2024- Patient may wear head scarf.  12/20/24- MH-ICU r/t disorganization  Pt not able to wean at this time due to unpredictable behaviors and paranoia. Treatment team to reassess daily   Outcome: Progressing   Room remains in MHICU due to possibility of unpredictable behaviors. No wean at this time. Disorganized. Responding to internal stimuli. Medication compliant. Restless. No reports of pain.     1645: removed dress while in MHICU lounge, wearing only pull-up underwear underneath. Pt helped to dress self again, offered and accepted Thorazine 50 mg PO at 1651.      Problem: Thought Process Alteration  Goal: Optimal Thought Clarity  Description: Pt will have a logical and linear conversation.  Pt will verbalize 2-3 coping skills.   Outcome: Progressing       Face to face end of shift report to be communicated to oncoming RN.

## 2024-12-25 LAB
FLUAV RNA SPEC QL NAA+PROBE: NEGATIVE
FLUAV RNA SPEC QL NAA+PROBE: NEGATIVE
FLUBV RNA RESP QL NAA+PROBE: NEGATIVE
FLUBV RNA RESP QL NAA+PROBE: NEGATIVE
RSV RNA SPEC NAA+PROBE: NEGATIVE
RSV RNA SPEC NAA+PROBE: NEGATIVE
S PYO DNA THROAT QL NAA+PROBE: NOT DETECTED
S PYO DNA THROAT QL NAA+PROBE: NOT DETECTED
SARS-COV-2 RNA RESP QL NAA+PROBE: NEGATIVE
SARS-COV-2 RNA RESP QL NAA+PROBE: NEGATIVE

## 2024-12-25 PROCEDURE — 93010 ELECTROCARDIOGRAM REPORT: CPT | Performed by: INTERNAL MEDICINE

## 2024-12-25 PROCEDURE — 99233 SBSQ HOSP IP/OBS HIGH 50: CPT | Mod: 95 | Performed by: STUDENT IN AN ORGANIZED HEALTH CARE EDUCATION/TRAINING PROGRAM

## 2024-12-25 PROCEDURE — 124N000004

## 2024-12-25 PROCEDURE — 250N000013 HC RX MED GY IP 250 OP 250 PS 637: Performed by: NURSE PRACTITIONER

## 2024-12-25 PROCEDURE — 87651 STREP A DNA AMP PROBE: CPT | Performed by: STUDENT IN AN ORGANIZED HEALTH CARE EDUCATION/TRAINING PROGRAM

## 2024-12-25 PROCEDURE — 87637 SARSCOV2&INF A&B&RSV AMP PRB: CPT | Performed by: STUDENT IN AN ORGANIZED HEALTH CARE EDUCATION/TRAINING PROGRAM

## 2024-12-25 PROCEDURE — 93005 ELECTROCARDIOGRAM TRACING: CPT

## 2024-12-25 PROCEDURE — 250N000013 HC RX MED GY IP 250 OP 250 PS 637: Performed by: STUDENT IN AN ORGANIZED HEALTH CARE EDUCATION/TRAINING PROGRAM

## 2024-12-25 RX ORDER — HALOPERIDOL 5 MG/1
5 TABLET ORAL 2 TIMES DAILY
Status: DISCONTINUED | OUTPATIENT
Start: 2024-12-25 | End: 2024-12-30

## 2024-12-25 RX ORDER — HALOPERIDOL 5 MG/ML
5 INJECTION INTRAMUSCULAR 2 TIMES DAILY
Status: DISCONTINUED | OUTPATIENT
Start: 2024-12-25 | End: 2024-12-30

## 2024-12-25 RX ADMIN — Medication 1 TABLET: at 08:25

## 2024-12-25 RX ADMIN — LISINOPRIL 10 MG: 2.5 TABLET ORAL at 08:24

## 2024-12-25 RX ADMIN — CHLORPROMAZINE HYDROCHLORIDE 400 MG: 100 TABLET, FILM COATED ORAL at 20:28

## 2024-12-25 RX ADMIN — LORAZEPAM 0.5 MG: 0.5 TABLET ORAL at 13:11

## 2024-12-25 RX ADMIN — HALOPERIDOL 7.5 MG: 5 TABLET ORAL at 08:25

## 2024-12-25 RX ADMIN — BENZOCAINE 6 MG-MENTHOL 10 MG LOZENGES 1 LOZENGE: at 18:36

## 2024-12-25 RX ADMIN — LORAZEPAM 0.5 MG: 0.5 TABLET ORAL at 08:25

## 2024-12-25 RX ADMIN — POLYETHYLENE GLYCOL 3350 17 G: 17 POWDER, FOR SOLUTION ORAL at 08:23

## 2024-12-25 RX ADMIN — LORAZEPAM 0.5 MG: 0.5 TABLET ORAL at 20:28

## 2024-12-25 RX ADMIN — MAGNESIUM HYDROXIDE 30 ML: 400 SUSPENSION ORAL at 13:42

## 2024-12-25 RX ADMIN — LITHIUM CARBONATE 450 MG: 450 TABLET, EXTENDED RELEASE ORAL at 20:28

## 2024-12-25 RX ADMIN — HALOPERIDOL 5 MG: 5 TABLET ORAL at 20:44

## 2024-12-25 RX ADMIN — DOCUSATE SODIUM 100 MG: 100 CAPSULE, LIQUID FILLED ORAL at 08:24

## 2024-12-25 ASSESSMENT — ACTIVITIES OF DAILY LIVING (ADL)
HYGIENE/GROOMING: SHOWER;WITH ASSISTANCE
ADLS_ACUITY_SCORE: 51
DRESS: STREET CLOTHES;WITH ASSISTANCE
ADLS_ACUITY_SCORE: 25
HYGIENE/GROOMING: INDEPENDENT
ADLS_ACUITY_SCORE: 25
ORAL_HYGIENE: INDEPENDENT
DRESS: SCRUBS (BEHAVIORAL HEALTH);INDEPENDENT
ADLS_ACUITY_SCORE: 25

## 2024-12-25 NOTE — PLAN OF CARE
"Problem: Adult Behavioral Health Plan of Care  Goal: Patient-Specific Goal (Individualization)  Description: Pt will follow recommendations of treatment team.  Pt will be compliant with medications.  Pt will attend 50 % of groups when able  Pt will sleep 6-8 hours each night.  MOUTH CHECKS    12/19/24  One family member of the patient is designated as the  for incoming and outgoing calls to and from staff for this patient during her hospital stay.  This family member is Mohamed per family/patient request.    All other family members that call wanting to speak with staff regarding the patient will be directed to call the designated family member.   The patient is able to continue to make and receive calls as appropriate.   Designated family member will be asked to call only once daily for an update on the patient condition as to not disrupt patient care.  All visitation will adhere to current unit policy.  12/17/2024- Patient may wear head scarf.  12/24/24- MH-ICU r/t disorganization  Pt not able to wean at this time due to unpredictable behaviors and paranoia. Treatment team to reassess daily   Outcome: Not Progressing  Note: 1815 - Pt observed in the MH-ICU lounge watching TV. She was soft spoken and mumbling in Welsh. Pt denied hallucinations and SI tonight. Pt noted to be coughing intermittently. Pt's hands, fingers, and legs noted to be swollen. Bilateral hand tremor also noted. Provider Jared aware of pt's lithium toxicity with level of 1.60. Bedtime dose was cut in half and writer was instructed to administer.     1943 - Pt was noted to be tearful. She told [facility] staff that she is \"sad.\"     2004 - Pt was compliant with her scheduled medications. Mouth check completed. Movements were slower than normal. Writer got Welsh  on video to better understand pt's needs. Due to pt's soft spoken speech, the  was unable to understand pt.  Staff assisted pt to bed. When asked if she was " "in pain, she pointed to her chest and throat. When writer asked if we could obtain a strep, COVID, or flu swab, she stated \"no.\" She declined pain medication and cepacol lozenge.     2018 - Pt was given robitussin DM for her cough.     Face to face end of shift report will be communicated to oncoming RN.      Problem: Thought Process Alteration  Goal: Optimal Thought Clarity  Description: Pt will have a logical and linear conversation.  Pt will verbalize 2-3 coping skills.   Outcome: Not Progressing  Note: Pt unable to have a logical, linear conversation at this time.     "

## 2024-12-25 NOTE — PLAN OF CARE
Problem: Adult Behavioral Health Plan of Care  Goal: Patient-Specific Goal (Individualization)  Description: Pt will follow recommendations of treatment team.  Pt will be compliant with medications.  Pt will attend 50 % of groups when able  Pt will sleep 6-8 hours each night.  MOUTH CHECKS    12/19/24  One family member of the patient is designated as the  for incoming and outgoing calls to and from staff for this patient during her hospital stay.  This family member is Mohamed per family/patient request.    All other family members that call wanting to speak with staff regarding the patient will be directed to call the designated family member.   The patient is able to continue to make and receive calls as appropriate.   Designated family member will be asked to call only once daily for an update on the patient condition as to not disrupt patient care.  All visitation will adhere to current unit policy.  12/17/2024- Patient may wear head scarf.  12/20/24- MH-ICU r/t disorganization  Pt not able to wean at this time due to unpredictable behaviors and paranoia. Treatment team to reassess daily   Outcome: Progressing     Problem: Thought Process Alteration  Goal: Optimal Thought Clarity  Description: Pt will have a logical and linear conversation.  Pt will verbalize 2-3 coping skills.   Outcome: Not Progressing     Face to face shift report received from Lina MOBLEY.     Patient appeared to be sleeping for approximately 5.25 hours since 2145 last shift.    Patient overheard talking to self in room, patient was naked in her room for some time this shift after waking up at 0300, pt was provided with new set of scrubs.     Face to face report will be communicated to oncoming RN.    Micheline Sanchez RN  12/25/2024  6:20 AM
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
## 2024-12-25 NOTE — PLAN OF CARE
"Problem: Adult Behavioral Health Plan of Care  Goal: Patient-Specific Goal (Individualization)  Description: Pt will follow recommendations of treatment team.  Pt will be compliant with medications.  Pt will attend 50 % of groups when able  Pt will sleep 6-8 hours each night.  MOUTH CHECKS    12/19/24  One family member of the patient is designated as the  for incoming and outgoing calls to and from staff for this patient during her hospital stay.  This family member is Mohamed per family/patient request.    All other family members that call wanting to speak with staff regarding the patient will be directed to call the designated family member.   The patient is able to continue to make and receive calls as appropriate.   Designated family member will be asked to call only once daily for an update on the patient condition as to not disrupt patient care.  All visitation will adhere to current unit policy.  12/17/2024- Patient may wear head scarf.  12/20/24- MH-ICU r/t disorganization  Pt not able to wean at this time due to unpredictable behaviors and paranoia. Treatment team to reassess daily   Outcome: Progressing  Note: Face to face end of shift report received from Lina MARQUEZ RN. Pt was observed sitting on her bed. She reported that she is \"okay\". Pt was more alert than she was yesterday. Swelling in her hands and fingers has lessened. She continues to have 2+ pitting edema in her LE's. Pt reported that she is still having a sore throat. Pt softly speaking in Swazi at times and appears to be responding to internal stimuli.     1652 - pt was tested for Influenza A, B, RSV, COVID, and Group A strep. Pt tested negative for all.     1730 - Pt ate 100% of supper.    1815 - Pt had a visit with her daughter Gifty over the I-pad. She added her to the CLYDE.     1850 - Pt had a BM. She was noted to have stool on her dress. Pt showered with the assist of one. She required assist of one to dress and comb her " hair.    2028 - Pt was compliant with her scheduled medications.     Face to face end of shift report will be communicated to oncoming RN.      Problem: Thought Process Alteration  Goal: Optimal Thought Clarity  Description: Pt will have a logical and linear conversation.  Pt will verbalize 2-3 coping skills.   Outcome: Progressing     Goal Outcome Evaluation:    Plan of Care Reviewed With: patient

## 2024-12-26 LAB
9OH-RISPERIDONE SERPL-MCNC: 41.1 NG/ML
9OH-RISPERIDONE SERPL-MCNC: 41.1 NG/ML
ATRIAL RATE - MUSE: 80 BPM
ATRIAL RATE - MUSE: 80 BPM
DIASTOLIC BLOOD PRESSURE - MUSE: NORMAL MMHG
DIASTOLIC BLOOD PRESSURE - MUSE: NORMAL MMHG
INTERPRETATION ECG - MUSE: NORMAL
INTERPRETATION ECG - MUSE: NORMAL
P AXIS - MUSE: 72 DEGREES
P AXIS - MUSE: 72 DEGREES
PR INTERVAL - MUSE: 188 MS
PR INTERVAL - MUSE: 188 MS
QRS DURATION - MUSE: 74 MS
QRS DURATION - MUSE: 74 MS
QT - MUSE: 384 MS
QT - MUSE: 384 MS
QTC - MUSE: 442 MS
QTC - MUSE: 442 MS
R AXIS - MUSE: 29 DEGREES
R AXIS - MUSE: 29 DEGREES
RISPERIDONE SERPL-MCNC: 34.9 NG/ML
RISPERIDONE SERPL-MCNC: 34.9 NG/ML
RISPERIDONE+9OH-RIS SERPL-MCNC: 76 NG/ML
RISPERIDONE+9OH-RIS SERPL-MCNC: 76 NG/ML
SYSTOLIC BLOOD PRESSURE - MUSE: NORMAL MMHG
SYSTOLIC BLOOD PRESSURE - MUSE: NORMAL MMHG
T AXIS - MUSE: 42 DEGREES
T AXIS - MUSE: 42 DEGREES
VENTRICULAR RATE- MUSE: 80 BPM
VENTRICULAR RATE- MUSE: 80 BPM

## 2024-12-26 PROCEDURE — 250N000013 HC RX MED GY IP 250 OP 250 PS 637: Performed by: NURSE PRACTITIONER

## 2024-12-26 PROCEDURE — 124N000004

## 2024-12-26 PROCEDURE — 250N000013 HC RX MED GY IP 250 OP 250 PS 637: Performed by: STUDENT IN AN ORGANIZED HEALTH CARE EDUCATION/TRAINING PROGRAM

## 2024-12-26 PROCEDURE — 99233 SBSQ HOSP IP/OBS HIGH 50: CPT | Mod: 95 | Performed by: STUDENT IN AN ORGANIZED HEALTH CARE EDUCATION/TRAINING PROGRAM

## 2024-12-26 RX ADMIN — LITHIUM CARBONATE 450 MG: 450 TABLET, EXTENDED RELEASE ORAL at 20:43

## 2024-12-26 RX ADMIN — LORAZEPAM 0.5 MG: 0.5 TABLET ORAL at 13:56

## 2024-12-26 RX ADMIN — LISINOPRIL 10 MG: 2.5 TABLET ORAL at 08:42

## 2024-12-26 RX ADMIN — POLYETHYLENE GLYCOL 3350 17 G: 17 POWDER, FOR SOLUTION ORAL at 08:42

## 2024-12-26 RX ADMIN — HALOPERIDOL 5 MG: 5 TABLET ORAL at 20:43

## 2024-12-26 RX ADMIN — DOCUSATE SODIUM 100 MG: 100 CAPSULE, LIQUID FILLED ORAL at 08:42

## 2024-12-26 RX ADMIN — HALOPERIDOL 5 MG: 5 TABLET ORAL at 08:42

## 2024-12-26 RX ADMIN — Medication 1 TABLET: at 08:42

## 2024-12-26 RX ADMIN — LORAZEPAM 0.5 MG: 0.5 TABLET ORAL at 08:42

## 2024-12-26 RX ADMIN — LORAZEPAM 0.5 MG: 0.5 TABLET ORAL at 20:43

## 2024-12-26 RX ADMIN — CHLORPROMAZINE HYDROCHLORIDE 400 MG: 100 TABLET, FILM COATED ORAL at 20:43

## 2024-12-26 ASSESSMENT — ACTIVITIES OF DAILY LIVING (ADL)
ADLS_ACUITY_SCORE: 51
ADLS_ACUITY_SCORE: 25
ADLS_ACUITY_SCORE: 51
ADLS_ACUITY_SCORE: 25
ADLS_ACUITY_SCORE: 25
ADLS_ACUITY_SCORE: 51
ADLS_ACUITY_SCORE: 51
ORAL_HYGIENE: INDEPENDENT
ADLS_ACUITY_SCORE: 51
ADLS_ACUITY_SCORE: 25
ADLS_ACUITY_SCORE: 51
ADLS_ACUITY_SCORE: 25
ADLS_ACUITY_SCORE: 25
HYGIENE/GROOMING: INDEPENDENT
ADLS_ACUITY_SCORE: 25
DRESS: SCRUBS (BEHAVIORAL HEALTH);INDEPENDENT
ADLS_ACUITY_SCORE: 51
LAUNDRY: UNABLE TO COMPLETE
ADLS_ACUITY_SCORE: 51
DRESS: SCRUBS (BEHAVIORAL HEALTH);INDEPENDENT
HYGIENE/GROOMING: INDEPENDENT
ADLS_ACUITY_SCORE: 25
ADLS_ACUITY_SCORE: 51
ADLS_ACUITY_SCORE: 51
ADLS_ACUITY_SCORE: 25
ORAL_HYGIENE: INDEPENDENT
ADLS_ACUITY_SCORE: 25
ADLS_ACUITY_SCORE: 51

## 2024-12-26 NOTE — PLAN OF CARE
Problem: Adult Behavioral Health Plan of Care  Goal: Patient-Specific Goal (Individualization)  Description: Pt will follow recommendations of treatment team.  Pt will be compliant with medications.  Pt will attend 50 % of groups when able  Pt will sleep 6-8 hours each night.  MOUTH CHECKS    12/19/24  One family member of the patient is designated as the  for incoming and outgoing calls to and from staff for this patient during her hospital stay.  This family member is Mohamed per family/patient request.    All other family members that call wanting to speak with staff regarding the patient will be directed to call the designated family member.   The patient is able to continue to make and receive calls as appropriate.   Designated family member will be asked to call only once daily for an update on the patient condition as to not disrupt patient care.  All visitation will adhere to current unit policy.  12/17/2024- Patient may wear head scarf.  12/20/24- MH-ICU r/t disorganization  Pt not able to wean at this time due to unpredictable behaviors and paranoia. Treatment team to reassess daily     Outcome: Progressing  Note:     1000 Patient is slow to respond and answers are generally difficult to understand. Patient ate well for breakfast and took AM medications with no difficulty. Patient steady on her feet. At one point climbed into the bed of another patient. Patient denies pain and physical problems. Seen giggling to self and responding to internal stimuli. Patient had no complaints or physical problems complained of.    1415 Patient has slept since mid morning and is now awake. Laid self on the floor and has urinated on the floor also. Patient is given a shower at this time. Groggy and sedated. Assist of two required for shower.    Face to face end of shift report communicated to 3-1130 shift RN.     Lina Suarez RN  12/26/2024  2:30 PM         Problem: Thought Process Alteration  Goal:  Optimal Thought Clarity  Description: Pt will have a logical and linear conversation.  Pt will verbalize 2-3 coping skills.   Outcome: Progressing

## 2024-12-26 NOTE — PLAN OF CARE
Problem: Adult Behavioral Health Plan of Care  Goal: Patient-Specific Goal (Individualization)  Description: Pt will follow recommendations of treatment team.  Pt will be compliant with medications.  Pt will attend 50 % of groups when able  Pt will sleep 6-8 hours each night.  MOUTH CHECKS    12/19/24  One family member of the patient is designated as the  for incoming and outgoing calls to and from staff for this patient during her hospital stay.  This family member is Mohamed per family/patient request.    All other family members that call wanting to speak with staff regarding the patient will be directed to call the designated family member.   The patient is able to continue to make and receive calls as appropriate.   Designated family member will be asked to call only once daily for an update on the patient condition as to not disrupt patient care.  All visitation will adhere to current unit policy.  12/17/2024- Patient may wear head scarf.  12/20/24- MH-ICU r/t disorganization  Pt not able to wean at this time due to unpredictable behaviors and paranoia. Treatment team to reassess daily   Outcome: Progressing     Problem: Thought Process Alteration  Goal: Optimal Thought Clarity  Description: Pt will have a logical and linear conversation.  Pt will verbalize 2-3 coping skills.   Outcome: Not Progressing     Face to face shift report received from Lina MOBLEY.     Patient appeared to be sleeping for approximately 5 hours since 0045.    When patient was awake patient was overheard talking to self in room.     Face to face report will be communicated to oncoming RN.    Micheline Sanchez RN  12/26/2024  6:21 AM
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
## 2024-12-26 NOTE — PROGRESS NOTES
Andrew emailed  update about Tom and Alejandre Tineo being sent to the [facility].     CM may be coming to see pt in person soon. She needs to check her schedule.

## 2024-12-27 LAB
TSH SERPL DL<=0.005 MIU/L-ACNC: 4.09 UIU/ML (ref 0.3–4.2)
TSH SERPL DL<=0.005 MIU/L-ACNC: 4.09 UIU/ML (ref 0.3–4.2)

## 2024-12-27 PROCEDURE — 250N000013 HC RX MED GY IP 250 OP 250 PS 637: Performed by: NURSE PRACTITIONER

## 2024-12-27 PROCEDURE — 124N000004

## 2024-12-27 PROCEDURE — 99233 SBSQ HOSP IP/OBS HIGH 50: CPT | Mod: 95 | Performed by: STUDENT IN AN ORGANIZED HEALTH CARE EDUCATION/TRAINING PROGRAM

## 2024-12-27 PROCEDURE — 250N000013 HC RX MED GY IP 250 OP 250 PS 637: Performed by: STUDENT IN AN ORGANIZED HEALTH CARE EDUCATION/TRAINING PROGRAM

## 2024-12-27 RX ORDER — LORAZEPAM 1 MG/1
1 TABLET ORAL 3 TIMES DAILY
Status: DISCONTINUED | OUTPATIENT
Start: 2024-12-27 | End: 2024-12-31

## 2024-12-27 RX ADMIN — LORAZEPAM 1 MG: 1 TABLET ORAL at 17:12

## 2024-12-27 RX ADMIN — CHLORPROMAZINE HYDROCHLORIDE 400 MG: 100 TABLET, FILM COATED ORAL at 20:12

## 2024-12-27 RX ADMIN — HALOPERIDOL 5 MG: 5 TABLET ORAL at 08:42

## 2024-12-27 RX ADMIN — LISINOPRIL 10 MG: 2.5 TABLET ORAL at 08:42

## 2024-12-27 RX ADMIN — DOCUSATE SODIUM 100 MG: 100 CAPSULE, LIQUID FILLED ORAL at 08:42

## 2024-12-27 RX ADMIN — Medication 1 TABLET: at 08:42

## 2024-12-27 RX ADMIN — HALOPERIDOL 5 MG: 5 TABLET ORAL at 20:13

## 2024-12-27 RX ADMIN — POLYETHYLENE GLYCOL 3350 17 G: 17 POWDER, FOR SOLUTION ORAL at 08:42

## 2024-12-27 RX ADMIN — LORAZEPAM 1 MG: 1 TABLET ORAL at 20:13

## 2024-12-27 RX ADMIN — LITHIUM CARBONATE 450 MG: 450 TABLET, EXTENDED RELEASE ORAL at 20:13

## 2024-12-27 RX ADMIN — LORAZEPAM 1 MG: 1 TABLET ORAL at 08:44

## 2024-12-27 RX ADMIN — LORAZEPAM 1 MG: 1 TABLET ORAL at 13:42

## 2024-12-27 ASSESSMENT — ACTIVITIES OF DAILY LIVING (ADL)
ADLS_ACUITY_SCORE: 25
DRESS: SCRUBS (BEHAVIORAL HEALTH);INDEPENDENT
ADLS_ACUITY_SCORE: 25
LAUNDRY: UNABLE TO COMPLETE
ADLS_ACUITY_SCORE: 25
DRESS: SCRUBS (BEHAVIORAL HEALTH);INDEPENDENT
ADLS_ACUITY_SCORE: 25
ORAL_HYGIENE: INDEPENDENT
ADLS_ACUITY_SCORE: 25
HYGIENE/GROOMING: INDEPENDENT
ADLS_ACUITY_SCORE: 25
ADLS_ACUITY_SCORE: 25
HYGIENE/GROOMING: INDEPENDENT
ADLS_ACUITY_SCORE: 25
ORAL_HYGIENE: INDEPENDENT
ADLS_ACUITY_SCORE: 25
ADLS_ACUITY_SCORE: 25

## 2024-12-27 NOTE — PLAN OF CARE
Problem: Adult Behavioral Health Plan of Care  Goal: Patient-Specific Goal (Individualization)  Description: Pt will follow recommendations of treatment team.  Pt will be compliant with medications.  Pt will attend 50 % of groups when able  Pt will sleep 6-8 hours each night.  MOUTH CHECKS    12/19/24  One family member of the patient is designated as the  for incoming and outgoing calls to and from staff for this patient during her hospital stay.  This family member is Mohamed per family/patient request.    All other family members that call wanting to speak with staff regarding the patient will be directed to call the designated family member.   The patient is able to continue to make and receive calls as appropriate.   Designated family member will be asked to call only once daily for an update on the patient condition as to not disrupt patient care.  All visitation will adhere to current unit policy.  12/17/2024- Patient may wear head scarf.  12/20/24- MH-ICU r/t disorganization  Pt not able to wean at this time due to unpredictable behaviors and paranoia. Treatment team to reassess daily     Outcome: Progressing  Note:     1100 Patient in her room and given breakfast food but did not eat. Patient drank two apple juices and some water. Patient talking and laughing to self. Patient ambulated with assist of staff and also on her own. Patient disoriented and confused. Hands and feet puffy. Skin is WNL. Patient did not take all of medications and spit them into her juice. Attempted to give medications with a spoon but patient is resistive. Tired and patient went to sleep.    1430 Patient remains in MHICU. Patient has stripped her clothing off x2 this afternoon but has not been incontinent of urine. Patient took pm medication without issue. Ate well for lunch meal about 50% of her meal. Continues to answer questions softly and sometimes in a non linear manner.    Face to face end of shift report  communicated to 3-1130 shift RN.     Lina Suarez RN  12/27/2024  2:33 PM          Problem: Thought Process Alteration  Goal: Optimal Thought Clarity  Description: Pt will have a logical and linear conversation.  Pt will verbalize 2-3 coping skills.   Outcome: Progressing     Problem: Fall Injury Risk  Goal: Absence of Fall and Fall-Related Injury  Description: Pt. Has order for fall precautions.   Outcome: Progressing   Goal Outcome Evaluation:    Plan of Care Reviewed With: patient

## 2024-12-27 NOTE — PROGRESS NOTES
Andrew called [facility] and they stated [place] will still have to do the hearing as case management is only being done in [place]. [facility] received the petitions and will be sending [place] court dates.

## 2024-12-27 NOTE — PLAN OF CARE
Problem: Thought Process Alteration  Goal: Optimal Thought Clarity  Description: Pt will have a logical and linear conversation.  Pt will verbalize 2-3 coping skills.   Outcome: Declining    Pt is not oriented at all, is staring blankly, mumbling     Problem: Adult Behavioral Health Plan of Care  Goal: Patient-Specific Goal (Individualization)  Description: Pt will follow recommendations of treatment team.  Pt will be compliant with medications.  Pt will attend 50 % of groups when able  Pt will sleep 6-8 hours each night.  MOUTH CHECKS    12/19/24  One family member of the patient is designated as the  for incoming and outgoing calls to and from staff for this patient during her hospital stay.  This family member is Mohamed per family/patient request.    All other family members that call wanting to speak with staff regarding the patient will be directed to call the designated family member.   The patient is able to continue to make and receive calls as appropriate.   Designated family member will be asked to call only once daily for an update on the patient condition as to not disrupt patient care.  All visitation will adhere to current unit policy.  12/17/2024- Patient may wear head scarf.  12/20/24- MH-ICU r/t disorganization  Pt not able to wean at this time due to unpredictable behaviors and paranoia. Treatment team to reassess daily     Outcome: Progressing   Goal Outcome Evaluation:    1530 Face to face rounding complete.  Pt introduced to nursing for the shift.    Pt would not answer any assessment questions and can barely follow verbal commands. She is not oriented at all.  Pt stares blankly ahead mumbling with her eyes partly open.  She sat on the edge of her bed most of the shift needing staff to help her lay down lifting her calves and lowering her back to the bed.  Pt napped about a half hour early in the shift and did fall asleep at 2200.  She drank only an Ensure tonight from a straw  with staff putting the straw up to her mouth.  She took her medications with staff putting them in her mouth and giving her prompts to drink from the straw.  Pt has bilateral upper arm weakness with no evidence of dystonia. She went into her bathroom a few times and took her clothes off in the bathroom once and needed staff to re-dress her. She did stand from the sitting position with staff hold ing both hands and pulling her up some. She took her brief off near the end of the shift.  When Staff asked her if she had went to the bathroom she walked into her room and urinated on the floor of her bathroom standing in the urine.  She needed to be directed from the bathroom to shower herself off with two staff helping and directing her.  Pt's legs and feet are slightly edematous with no pitting edema.       2300 Face to face end of shift report communicated to night shift RN's along with Pt's fall risk.     Lorenzo Ledezma RN  12/26/2024
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
## 2024-12-27 NOTE — PLAN OF CARE
Face to face report received from Lorenzo CAOR. Pt. Observed.    Problem: Adult Behavioral Health Plan of Care  Goal: Patient-Specific Goal (Individualization)  Description: Pt will follow recommendations of treatment team.  Pt will be compliant with medications.  Pt will attend 50 % of groups when able  Pt will sleep 6-8 hours each night.  MOUTH CHECKS    12/19/24  One family member of the patient is designated as the  for incoming and outgoing calls to and from staff for this patient during her hospital stay.  This family member is Mohamed per family/patient request.    All other family members that call wanting to speak with staff regarding the patient will be directed to call the designated family member.   The patient is able to continue to make and receive calls as appropriate.   Designated family member will be asked to call only once daily for an update on the patient condition as to not disrupt patient care.  All visitation will adhere to current unit policy.  12/17/2024- Patient may wear head scarf.  12/20/24- -ICU r/t disorganization  Pt not able to wean at this time due to unpredictable behaviors and paranoia. Treatment team to reassess daily     Outcome: Progressing   Pt has been in bed regular respirations all noc shift and for a total of 1.5 hours with eyes closed this noc shift. 15 minute and PRN checks all night. No complaints offered. Pt. Room smells of urine. Room tidied up, garbage's removed. Pt. Bedding and clothing dry. Pt. Refusing shower and vitals this a.m.       [facility]. Pt. Redirectable back to room. Fresh blankets and scrubs provided while her dress is being washed.     Problem: Fall Injury Risk  Goal: Absence of Fall and Fall-Related Injury  Description: Pt. Has order for fall precautions.   Outcome: Progressing   Goal Outcome Evaluation:      Face to face end of shift report communicated to oncoming RN.     Jackie Hannah RN  12/27/2024

## 2024-12-28 LAB
ANION GAP SERPL CALCULATED.3IONS-SCNC: 10 MMOL/L (ref 7–15)
ANION GAP SERPL CALCULATED.3IONS-SCNC: 10 MMOL/L (ref 7–15)
BUN SERPL-MCNC: 21.1 MG/DL (ref 6–20)
BUN SERPL-MCNC: 21.1 MG/DL (ref 6–20)
CALCIUM SERPL-MCNC: 10.1 MG/DL (ref 8.8–10.4)
CALCIUM SERPL-MCNC: 10.1 MG/DL (ref 8.8–10.4)
CHLORIDE SERPL-SCNC: 101 MMOL/L (ref 98–107)
CHLORIDE SERPL-SCNC: 101 MMOL/L (ref 98–107)
CREAT SERPL-MCNC: 0.96 MG/DL (ref 0.51–0.95)
CREAT SERPL-MCNC: 0.96 MG/DL (ref 0.51–0.95)
EGFRCR SERPLBLD CKD-EPI 2021: 69 ML/MIN/1.73M2
EGFRCR SERPLBLD CKD-EPI 2021: 69 ML/MIN/1.73M2
GLUCOSE SERPL-MCNC: 120 MG/DL (ref 70–99)
GLUCOSE SERPL-MCNC: 120 MG/DL (ref 70–99)
HCO3 SERPL-SCNC: 24 MMOL/L (ref 22–29)
HCO3 SERPL-SCNC: 24 MMOL/L (ref 22–29)
HOLD SPECIMEN: NORMAL
HOLD SPECIMEN: NORMAL
LITHIUM SERPL-SCNC: 1.2 MMOL/L (ref 0.6–1.2)
LITHIUM SERPL-SCNC: 1.2 MMOL/L (ref 0.6–1.2)
POTASSIUM SERPL-SCNC: 4.5 MMOL/L (ref 3.4–5.3)
POTASSIUM SERPL-SCNC: 4.5 MMOL/L (ref 3.4–5.3)
SODIUM SERPL-SCNC: 135 MMOL/L (ref 135–145)
SODIUM SERPL-SCNC: 135 MMOL/L (ref 135–145)

## 2024-12-28 PROCEDURE — 250N000013 HC RX MED GY IP 250 OP 250 PS 637: Performed by: NURSE PRACTITIONER

## 2024-12-28 PROCEDURE — 80048 BASIC METABOLIC PNL TOTAL CA: CPT | Performed by: STUDENT IN AN ORGANIZED HEALTH CARE EDUCATION/TRAINING PROGRAM

## 2024-12-28 PROCEDURE — 82310 ASSAY OF CALCIUM: CPT | Performed by: STUDENT IN AN ORGANIZED HEALTH CARE EDUCATION/TRAINING PROGRAM

## 2024-12-28 PROCEDURE — 36415 COLL VENOUS BLD VENIPUNCTURE: CPT | Performed by: STUDENT IN AN ORGANIZED HEALTH CARE EDUCATION/TRAINING PROGRAM

## 2024-12-28 PROCEDURE — 80178 ASSAY OF LITHIUM: CPT | Performed by: STUDENT IN AN ORGANIZED HEALTH CARE EDUCATION/TRAINING PROGRAM

## 2024-12-28 PROCEDURE — 99233 SBSQ HOSP IP/OBS HIGH 50: CPT | Mod: 95 | Performed by: STUDENT IN AN ORGANIZED HEALTH CARE EDUCATION/TRAINING PROGRAM

## 2024-12-28 PROCEDURE — 250N000013 HC RX MED GY IP 250 OP 250 PS 637: Performed by: STUDENT IN AN ORGANIZED HEALTH CARE EDUCATION/TRAINING PROGRAM

## 2024-12-28 PROCEDURE — 124N000004

## 2024-12-28 PROCEDURE — 82565 ASSAY OF CREATININE: CPT | Performed by: STUDENT IN AN ORGANIZED HEALTH CARE EDUCATION/TRAINING PROGRAM

## 2024-12-28 RX ADMIN — LORAZEPAM 1 MG: 1 TABLET ORAL at 13:49

## 2024-12-28 RX ADMIN — LITHIUM CARBONATE 450 MG: 450 TABLET, EXTENDED RELEASE ORAL at 20:29

## 2024-12-28 RX ADMIN — Medication 1 TABLET: at 09:31

## 2024-12-28 RX ADMIN — DOCUSATE SODIUM 100 MG: 100 CAPSULE, LIQUID FILLED ORAL at 09:31

## 2024-12-28 RX ADMIN — HALOPERIDOL 5 MG: 5 TABLET ORAL at 09:31

## 2024-12-28 RX ADMIN — HALOPERIDOL 5 MG: 5 TABLET ORAL at 20:28

## 2024-12-28 RX ADMIN — POLYETHYLENE GLYCOL 3350 17 G: 17 POWDER, FOR SOLUTION ORAL at 09:31

## 2024-12-28 RX ADMIN — LORAZEPAM 1 MG: 1 TABLET ORAL at 09:31

## 2024-12-28 RX ADMIN — LORAZEPAM 1 MG: 1 TABLET ORAL at 20:29

## 2024-12-28 RX ADMIN — LORAZEPAM 1 MG: 1 TABLET ORAL at 17:00

## 2024-12-28 RX ADMIN — LISINOPRIL 10 MG: 2.5 TABLET ORAL at 09:31

## 2024-12-28 RX ADMIN — CHLORPROMAZINE HYDROCHLORIDE 400 MG: 100 TABLET, FILM COATED ORAL at 20:28

## 2024-12-28 ASSESSMENT — ACTIVITIES OF DAILY LIVING (ADL)
ADLS_ACUITY_SCORE: 25
HYGIENE/GROOMING: INDEPENDENT
ADLS_ACUITY_SCORE: 25
DRESS: SCRUBS (BEHAVIORAL HEALTH);INDEPENDENT
LAUNDRY: UNABLE TO COMPLETE
ADLS_ACUITY_SCORE: 25
ADLS_ACUITY_SCORE: 25
DRESS: SCRUBS (BEHAVIORAL HEALTH);PROMPTS;WITH ASSISTANCE
ADLS_ACUITY_SCORE: 25
ORAL_HYGIENE: INDEPENDENT
ADLS_ACUITY_SCORE: 25
HYGIENE/GROOMING: SHOWER;PROMPTS;WITH ASSISTANCE
ORAL_HYGIENE: PROMPTS
ADLS_ACUITY_SCORE: 25
LAUNDRY: UNABLE TO COMPLETE
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
## 2024-12-28 NOTE — PLAN OF CARE
Problem: Fall Injury Risk  Goal: Absence of Fall and Fall-Related Injury  Description: Pt. Has order for fall precautions.   Outcome: Progressing    Pt was steady when ambulating.     Problem: Thought Process Alteration  Goal: Optimal Thought Clarity  Description: Pt will have a logical and linear conversation.  Pt will verbalize 2-3 coping skills.   Outcome: No change    Pt continues to be not oriented     Problem: Adult Behavioral Health Plan of Care  Goal: Patient-Specific Goal (Individualization)  Description: Pt will follow recommendations of treatment team.  Pt will be compliant with medications.  Pt will attend 50 % of groups when able  Pt will sleep 6-8 hours each night.  MOUTH CHECKS    12/19/24  One family member of the patient is designated as the  for incoming and outgoing calls to and from staff for this patient during her hospital stay.  This family member is Mohamed per family/patient request.    All other family members that call wanting to speak with staff regarding the patient will be directed to call the designated family member.   The patient is able to continue to make and receive calls as appropriate.   Designated family member will be asked to call only once daily for an update on the patient condition as to not disrupt patient care.  All visitation will adhere to current unit policy.  12/17/2024- Patient may wear head scarf.  12/27/24- MH-ICU r/t disorganization  Pt not able to wean at this time due to unpredictable behaviors and paranoia. Treatment team to reassess daily     12/27/24 Pt on Intake and Output monitoring    12/27/24 Pt to have her legs elevated under a pillow when she is in bed due to bilateral leg edema    Outcome: Progressing   Goal Outcome Evaluation:    Plan of Care Reviewed With: patient      1530 Face to face rounding complete.  Pt introduced to nursing for the shift.    Pt was still exhibiting long periods of standing with her eyes partly open and mumbling  incoherently.  She did at times respond to voice but most of the time only responded to hand over hand repositioning.  She stood in one position in the MHICU while mumbling for over two until guided by two staff and placed into her bed with staff manipulation of her body for each step.   Pt lay prone for about an hour then sat at the edge of her bed mumbling with her eyes partly open for about three hours.  Staff had to manually lift her legs and lay her prone.  Pt did eat all of her snack and supper this evening and drank independently.  With good intake.  Pt was continent of urine and BM this evening. Pt's son called and he was given some teaching on HERIBERTO resources, gaurdianship, disease process, and ECT.  Pt's Aunt also called and tried to talk with Becky but she did not respond. Pt had pillows placed under her legs when she is in bed prone to help with her increasing leg edema which is now about +2 non pitting.  Pt was given a PRN dose of Ativan at 1712 due to her anxiety and obvious catatonia standing in the same position for two hours.  The Ativan did not have much of an effect on her catatonia though she did rest for about an hour with her legs elevated.  Pt took her HS medications with me placing the med cup up to her mouth and tipping the cup.  She did drink water with less encouragement and swallowed the pills.    2300 Face to face end of shift report communicated to Night shift RN's along with Pt's fall risk.     Lorenzo Ledezma RN  12/27/2024

## 2024-12-28 NOTE — PLAN OF CARE
Face to face shift report received from nurse. Rounding completed, pt observed.    Problem: Adult Behavioral Health Plan of Care  Goal: Patient-Specific Goal (Individualization)  Description: Pt will follow recommendations of treatment team.  Pt will be compliant with medications.  Pt will attend 50 % of groups when able  Pt will sleep 6-8 hours each night.  MOUTH CHECKS    12/19/24  One family member of the patient is designated as the  for incoming and outgoing calls to and from staff for this patient during her hospital stay.  This family member is Mohamed per family/patient request.    All other family members that call wanting to speak with staff regarding the patient will be directed to call the designated family member.   The patient is able to continue to make and receive calls as appropriate.   Designated family member will be asked to call only once daily for an update on the patient condition as to not disrupt patient care.  All visitation will adhere to current unit policy.  12/17/2024- Patient may wear head scarf.  12/27/24- -ICU r/t disorganization  Pt not able to wean at this time due to unpredictable behaviors and paranoia. Treatment team to reassess daily     12/27/24 Pt on Intake and Output monitoring    Outcome: Unable to Meet     Problem: Thought Process Alteration  Goal: Optimal Thought Clarity  Description: Pt will have a logical and linear conversation.  Pt will verbalize 2-3 coping skills.   Outcome: Unable to Meet     After talking with provider the patient came out of her room in the [facility] lounge and took off her brief and dress and proceed to urinate on the floor. Patient then put her dress back on and wiped herself with her dress, then she took it back off. Staff had her go back to her room got her cleaned up and dressed. Washed patients dress and returned to patient. Patient took medications this shift but took a very long time to get her to actually take the medications.  Patient drank 240ml throughout the day shift. Patient didn't eat anything throughout the day shift.     Face to face report will be communicated to oncoming RN.    Donald Lebron RN  12/28/2024

## 2024-12-28 NOTE — PLAN OF CARE
Problem: Fall Injury Risk  Goal: Absence of Fall and Fall-Related Injury  Description: Pt. Has order for fall precautions.   Outcome: Progressing     Problem: Thought Process Alteration  Goal: Optimal Thought Clarity  Description: Pt will have a logical and linear conversation.  Pt will verbalize 2-3 coping skills.   Outcome: Progressing     Problem: Adult Behavioral Health Plan of Care  Goal: Patient-Specific Goal (Individualization)  Description: Pt will follow recommendations of treatment team.  Pt will be compliant with medications.  Pt will attend 50 % of groups when able  Pt will sleep 6-8 hours each night.  MOUTH CHECKS    12/19/24  One family member of the patient is designated as the  for incoming and outgoing calls to and from staff for this patient during her hospital stay.  This family member is Mohamed per family/patient request.    All other family members that call wanting to speak with staff regarding the patient will be directed to call the designated family member.   The patient is able to continue to make and receive calls as appropriate.   Designated family member will be asked to call only once daily for an update on the patient condition as to not disrupt patient care.  All visitation will adhere to current unit policy.  12/17/2024- Patient may wear head scarf.  12/27/24- MH-ICU r/t disorganization  Pt not able to wean at this time due to unpredictable behaviors and paranoia. Treatment team to reassess daily     12/27/24 Pt on Intake and Output monitoring    Outcome: Progressing     Face to Face report received from VANIA Ventura. Rounding completed. Patient appeared to sleep from 2245 through 0700.  15 minute and PRN checks all night. No reports of falls or self-harm.  Patient had no issues with undressing or entering peers rooms during NOC.    Face to face end of shift report communicated to day shift RN.     Brittnee Willard RN  12/28/2024  6:01 AM

## 2024-12-29 PROCEDURE — 250N000013 HC RX MED GY IP 250 OP 250 PS 637: Performed by: NURSE PRACTITIONER

## 2024-12-29 PROCEDURE — 99233 SBSQ HOSP IP/OBS HIGH 50: CPT | Mod: 95 | Performed by: STUDENT IN AN ORGANIZED HEALTH CARE EDUCATION/TRAINING PROGRAM

## 2024-12-29 PROCEDURE — 124N000004

## 2024-12-29 PROCEDURE — 250N000013 HC RX MED GY IP 250 OP 250 PS 637: Performed by: STUDENT IN AN ORGANIZED HEALTH CARE EDUCATION/TRAINING PROGRAM

## 2024-12-29 RX ORDER — LITHIUM CARBONATE 300 MG/1
300 TABLET, FILM COATED, EXTENDED RELEASE ORAL AT BEDTIME
Status: DISCONTINUED | OUTPATIENT
Start: 2024-12-29 | End: 2025-01-03 | Stop reason: HOSPADM

## 2024-12-29 RX ADMIN — Medication 1 TABLET: at 08:19

## 2024-12-29 RX ADMIN — LITHIUM CARBONATE 300 MG: 300 TABLET, EXTENDED RELEASE ORAL at 20:18

## 2024-12-29 RX ADMIN — POLYETHYLENE GLYCOL 3350 17 G: 17 POWDER, FOR SOLUTION ORAL at 08:18

## 2024-12-29 RX ADMIN — HALOPERIDOL 5 MG: 5 TABLET ORAL at 20:18

## 2024-12-29 RX ADMIN — LORAZEPAM 1 MG: 1 TABLET ORAL at 00:25

## 2024-12-29 RX ADMIN — LORAZEPAM 1 MG: 1 TABLET ORAL at 20:18

## 2024-12-29 RX ADMIN — LISINOPRIL 2.5 MG: 2.5 TABLET ORAL at 08:19

## 2024-12-29 RX ADMIN — CHLORPROMAZINE HYDROCHLORIDE 400 MG: 100 TABLET, FILM COATED ORAL at 20:18

## 2024-12-29 RX ADMIN — DOCUSATE SODIUM 100 MG: 100 CAPSULE, LIQUID FILLED ORAL at 08:19

## 2024-12-29 RX ADMIN — LORAZEPAM 1 MG: 1 TABLET ORAL at 16:08

## 2024-12-29 ASSESSMENT — ACTIVITIES OF DAILY LIVING (ADL)
ADLS_ACUITY_SCORE: 25
HYGIENE/GROOMING: INDEPENDENT
ADLS_ACUITY_SCORE: 25
DRESS: SCRUBS (BEHAVIORAL HEALTH);INDEPENDENT
ADLS_ACUITY_SCORE: 25
LAUNDRY: UNABLE TO COMPLETE
ADLS_ACUITY_SCORE: 25
ORAL_HYGIENE: INDEPENDENT
ADLS_ACUITY_SCORE: 25

## 2024-12-29 NOTE — PROGRESS NOTES
Writer attempted to waste an opened ativan being patient refused to take it. Writer went into the [facility] cell and attempted to waste the medication with other staff nurse. Writer made a mistake and put waste amount 0mg instead of 1mg. [facility] didn't ask for a second staff. Staff tried to get back to the normal waste screen however [facility] wouldn't let staff redo the waste process. Staff contacted pharmacy to make them aware. They suggested to type up what happened and have cosigner to cover incident.    Donald Lebron  12/29/2024 11:12
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
## 2024-12-29 NOTE — PLAN OF CARE
Problem: Fall Injury Risk  Goal: Absence of Fall and Fall-Related Injury  Description: Pt. Has order for fall precautions.   Outcome: Progressing     Pt was steady when ambulating.        Problem: Thought Process Alteration  Goal: Optimal Thought Clarity  Description: Pt will have a reduction in hallucinations, disorganization, and catatonia symptoms by discharge  Outcome: Progressing    Pt was able to have brief sentences and had less catatonia symptoms     Problem: Adult Behavioral Health Plan of Care  Goal: Patient-Specific Goal (Individualization)  Description: Description: Pt will follow recommendations of treatment team.  Pt will be compliant with medications.  Pt will attend 50 % of groups when able  Pt will sleep 6-8 hours each night.  MOUTH CHECKS  12/19/24  One family member of the patient is designated as the  for incoming and outgoing calls to and from staff for this patient during her hospital stay.  This family member is Mohamed per family/patient request.    All other family members that call wanting to speak with staff regarding the patient will be directed to call the designated family member.   The patient is able to continue to make and receive calls as appropriate.   Designated family member will be asked to call only once daily for an update on the patient condition as to not disrupt patient care.  All visitation will adhere to current unit policy.  12/17/2024- Patient may wear head scarf.  12/27/24- MH-ICU r/t disorganization  12/28/24 Pt may wean to the dayroom with close staff observation. Treatment team to reassess daily     12/27/24 Pt on Intake and Output monitoring     12/28/24 Pt to have her legs elevated on a bed wedge when she is in bed due to bilateral leg edema     12/28/24 Pt is to be ambulated with staff once a shift.      Outcome: Progressing  Flowsheets (Taken 12/29/2024 1701)  Patient Vulnerabilities: history of unsuccessful treatment  Patient Personal  Strengths:   spiritual/Baptism support   community support   family/social support     Problem: Fall Injury Risk  Goal: Absence of Fall and Fall-Related Injury  Description: Pt. Has order for fall precautions.   Outcome: Progressing   Goal Outcome Evaluation:    Plan of Care Reviewed With: patient      1530 Face to face rounding complete.  Pt introduced to nursing for the shift.    PT was up at the beginning of the shift through supper visiting with her family and spent time in the dayroom in a rocking chair. She is more aware today with good eye contact and responded to questions about her care. She was given PRN Ativan when her family visited for some catatonia symptoms and anxiety.  She needed encouragement from her family to take the medication.  Her father had questions about ECT and her medications and I answered the best I could and his grand dano Nguyen interpreted some.  Patrick told me that Becky's father, uncle, and aunt would like to have a meeting with Dr. Coleman and an interpretor to receive some teaching about ECT treatment.  He explained that elders are not open to teaching from young people culturally.    Becky was communicative with her family and the visit went very well.  She napped for about 2 and 1/2 hours in her bed and then took her HS medications and came out to the open unit and sat in the rocking chair and eating snack and watching TV.  She interacted some and her eyes were fully open and she appeared aware during the entire time she was in the dayroom. She went to bed about 2200 with her feet elevated on the bed wedge.  Pt's feet currently only have minor swelling.  Pt took her HS medications without issue.    2300 Face to face end of shift report communicated to Night shift RN's along with Pt's fall risk.     Lorenzo Ledezma RN  12/29/2024

## 2024-12-29 NOTE — PLAN OF CARE
Face to face shift report received from nurse. Rounding completed, pt observed.    Problem: Adult Behavioral Health Plan of Care  Goal: Patient-Specific Goal (Individualization)  Description: Description: Pt will follow recommendations of treatment team.  Pt will be compliant with medications.  Pt will attend 50 % of groups when able  Pt will sleep 6-8 hours each night.  MOUTH CHECKS    12/19/24  One family member of the patient is designated as the  for incoming and outgoing calls to and from staff for this patient during her hospital stay.  This family member is Mohamed per family/patient request.    All other family members that call wanting to speak with staff regarding the patient will be directed to call the designated family member.   The patient is able to continue to make and receive calls as appropriate.   Designated family member will be asked to call only once daily for an update on the patient condition as to not disrupt patient care.  All visitation will adhere to current unit policy.  12/17/2024- Patient may wear head scarf.  12/27/24- MH-ICU r/t disorganization  Pt not able to wean at this time due to unpredictable behaviors and paranoia. Treatment team to reassess daily     12/27/24 Pt on Intake and Output monitoring   Outcome: Not Progressing     Patient has been up and down throughout the shift. Patient refused some of her morning medications, patient refused to take haldol 5mg, ativan 1mg and 7.5 lisinopril. Patient has been eating some food at both meals. And drinking some. Patient free from falls.     Patients uncle called at 1150 telling staff that they are on there way for a visit with lory. Staff at the time stated to them that they would have to have the primary nurse call them back. Writer called the uncle back at 1210 and told him that lory is not appropriate to have a visit today. Her uncle refused to accept that there was no visit today and stated that he is still coming.  "Writer then contacted provider, provider said no face to face visit, can do an ipad visit. Both security and house supervisor notified.      Patient refused 1400 ativan 1mg. Writer asked patient I have a ativan 1mg for you. Patient states \"no thank you\".     Problem: Fall Injury Risk  Goal: Absence of Fall and Fall-Related Injury  Description: Pt. Has order for fall precautions.   12/29/2024 1347 by Donald Lebron RN  Outcome: Progressing     Face to face report will be communicated to oncoming RN.    Donald URENA. VANIA Lebron  12/29/2024  "

## 2024-12-29 NOTE — PLAN OF CARE
roblem: Fall Injury Risk  Goal: Absence of Fall and Fall-Related Injury  Description: Pt. Has order for fall precautions.   Outcome: Progressing     Problem: Thought Process Alteration  Goal: Optimal Thought Clarity  Description: Pt will have a logical and linear conversation.  Pt will verbalize 2-3 coping skills.   Outcome: Progressing     Problem: Adult Behavioral Health Plan of Care  Goal: Patient-Specific Goal (Individualization)  Description: Pt will follow recommendations of treatment team.  Pt will be compliant with medications.  Pt will attend 50 % of groups when able  Pt will sleep 6-8 hours each night.  MOUTH CHECKS    12/19/24  One family member of the patient is designated as the  for incoming and outgoing calls to and from staff for this patient during her hospital stay.  This family member is Mohamed per family/patient request.    All other family members that call wanting to speak with staff regarding the patient will be directed to call the designated family member.   The patient is able to continue to make and receive calls as appropriate.   Designated family member will be asked to call only once daily for an update on the patient condition as to not disrupt patient care.  All visitation will adhere to current unit policy.  12/17/2024- Patient may wear head scarf.  12/27/24- MH-ICU r/t disorganization  Pt not able to wean at this time due to unpredictable behaviors and paranoia. Treatment team to reassess daily     12/27/24 Pt on Intake and Output monitoring    Outcome: Progressing    Face to Face report received from VANIA Ventura. Rounding completed. Patient appeared to sleep for 4 hours.  15 minute and PRN checks all night. No reports of falls or self-harm.  Patient had no issues with undressing or entering peers rooms during NOC.    Face to face end of shift report communicated to day shift RN.     Brittnee Willard RN  12/29/2024  6:08 AM

## 2024-12-29 NOTE — PLAN OF CARE
Problem: Fall Injury Risk  Goal: Absence of Fall and Fall-Related Injury  Description: Pt. Has order for fall precautions.   Outcome: Progressing    Pt was steady when ambulating.     Problem: Thought Process Alteration  Goal: Optimal Thought Clarity  Description: Pt will have a logical and linear conversation.  Pt will verbalize 2-3 coping skills.   Outcome: No change    Pt continues to be not oriented     Problem: Adult Behavioral Health Plan of Care  Goal: Patient-Specific Goal (Individualization)  Description: Pt will follow recommendations of treatment team.  Pt will be compliant with medications.  Pt will attend 50 % of groups when able  Pt will sleep 6-8 hours each night.  MOUTH CHECKS    12/19/24  One family member of the patient is designated as the  for incoming and outgoing calls to and from staff for this patient during her hospital stay.  This family member is Mohamed per family/patient request.    All other family members that call wanting to speak with staff regarding the patient will be directed to call the designated family member.   The patient is able to continue to make and receive calls as appropriate.   Designated family member will be asked to call only once daily for an update on the patient condition as to not disrupt patient care.  All visitation will adhere to current unit policy.  12/17/2024- Patient may wear head scarf.  12/27/24- MH-ICU r/t disorganization    12/28/24 Pt may wean to the dayroom with close staff observation. Treatment team to reassess daily     12/27/24 Pt on Intake and Output monitoring    12/28/24 Pt to have her legs elevated on a bed wedge when she is in bed due to bilateral leg edema    12/28/24 Pt is to be ambulated with staff once a shift.    Outcome: Progressing   Goal Outcome Evaluation:    Plan of Care Reviewed With: patient      1530 Face to face rounding complete.  Pt introduced to nursing for the shift.    Pt was mostly disorganized this shift  and was not oriented. She sat on the side of her bed mumbling to herself with her eyes partly open. She did not respond to me when I spoke to her.  Pt was repositioned to the supine position and a pillow put under her legs. She lay in bed for a half hour and then sat up on the side of the bed again mumbling.  She was sitting for two and a half hours like this until supper.  Pt was walked into the ICU and ate her supper over a 2 hour period sitting with her eyes partially open mumbling in between eating. She was showered with two staff and her groin area cleaned well due to her incontinence of urine at times. She needed hand over hand staff assistance in the shower and staff had to shampoo her hair and rinse it.  Staff had to dry her off and dress her as well. She needed her hair brushed to get the tangles and beginning of matting out.  Pt was walked to the dayroom and spent and hour and a half in the rocking chair in front of the TV. She smiled some and said a few sentences to peers that were not organized. She had 600 ML fluid intake this shift and ate 100% of her dinner. She had one continent urine output this shift.     Due to Pt's long periods of sitting on the edge of her bed over the last four days I am concerned about the potential of a DVT.  I called Pt's provider regarding possible DVT prophylaxis.  He added a Pt care order for Pt to be ambulated once per shift.  I also put in a Pt care order for Pt to have a bed wedge to have her legs elevated when in.  I added both of these interventions to her care plan as well.    Pt was given PRN Ativan 1 mg for her periods of anxiety and catatonia at around supper.  She had some improvement of catatonia symptoms after the Ativan.    Pt is having low BP's and I did check her manually before HS medications.  Her BP manually was 100/45      2300 Face to face end of shift report communicated to Night shift RN's along with Pt's fall risk.     Lorenzo Ledezma,  VANIA  12/27/2024

## 2024-12-30 ENCOUNTER — APPOINTMENT (OUTPATIENT)
Dept: GENERAL RADIOLOGY | Facility: HOSPITAL | Age: 59
DRG: 885 | End: 2024-12-30
Attending: STUDENT IN AN ORGANIZED HEALTH CARE EDUCATION/TRAINING PROGRAM

## 2024-12-30 ENCOUNTER — VIRTUAL VISIT (OUTPATIENT)
Dept: INTERPRETER SERVICES | Facility: CLINIC | Age: 59
End: 2024-12-30

## 2024-12-30 ENCOUNTER — TELEPHONE (OUTPATIENT)
Dept: BEHAVIORAL HEALTH | Facility: CLINIC | Age: 59
End: 2024-12-30

## 2024-12-30 PROCEDURE — 124N000004

## 2024-12-30 PROCEDURE — 71046 X-RAY EXAM CHEST 2 VIEWS: CPT

## 2024-12-30 PROCEDURE — 250N000013 HC RX MED GY IP 250 OP 250 PS 637: Performed by: STUDENT IN AN ORGANIZED HEALTH CARE EDUCATION/TRAINING PROGRAM

## 2024-12-30 PROCEDURE — 99233 SBSQ HOSP IP/OBS HIGH 50: CPT | Mod: 95 | Performed by: STUDENT IN AN ORGANIZED HEALTH CARE EDUCATION/TRAINING PROGRAM

## 2024-12-30 PROCEDURE — 250N000013 HC RX MED GY IP 250 OP 250 PS 637: Performed by: NURSE PRACTITIONER

## 2024-12-30 PROCEDURE — 250N000011 HC RX IP 250 OP 636: Performed by: STUDENT IN AN ORGANIZED HEALTH CARE EDUCATION/TRAINING PROGRAM

## 2024-12-30 RX ORDER — LORAZEPAM 2 MG/ML
2 INJECTION INTRAMUSCULAR ONCE
Status: COMPLETED | OUTPATIENT
Start: 2024-12-30 | End: 2024-12-30

## 2024-12-30 RX ORDER — HALOPERIDOL 5 MG/ML
7.5 INJECTION INTRAMUSCULAR AT BEDTIME
Status: DISCONTINUED | OUTPATIENT
Start: 2024-12-30 | End: 2024-12-31

## 2024-12-30 RX ORDER — LISINOPRIL 2.5 MG/1
5 TABLET ORAL DAILY
Status: DISCONTINUED | OUTPATIENT
Start: 2024-12-31 | End: 2025-01-03 | Stop reason: HOSPADM

## 2024-12-30 RX ADMIN — CHLORPROMAZINE HYDROCHLORIDE 400 MG: 100 TABLET, FILM COATED ORAL at 21:18

## 2024-12-30 RX ADMIN — LORAZEPAM 1 MG: 1 TABLET ORAL at 21:18

## 2024-12-30 RX ADMIN — LITHIUM CARBONATE 300 MG: 300 TABLET, EXTENDED RELEASE ORAL at 21:18

## 2024-12-30 RX ADMIN — LORAZEPAM 1 MG: 1 TABLET ORAL at 13:33

## 2024-12-30 RX ADMIN — DOCUSATE SODIUM 100 MG: 100 CAPSULE, LIQUID FILLED ORAL at 10:23

## 2024-12-30 RX ADMIN — Medication 1 TABLET: at 10:22

## 2024-12-30 RX ADMIN — LORAZEPAM 2 MG: 2 INJECTION INTRAMUSCULAR; INTRAVENOUS at 11:12

## 2024-12-30 RX ADMIN — POLYETHYLENE GLYCOL 3350 17 G: 17 POWDER, FOR SOLUTION ORAL at 10:22

## 2024-12-30 RX ADMIN — HALOPERIDOL 7.5 MG: 5 TABLET ORAL at 21:19

## 2024-12-30 RX ADMIN — LORAZEPAM 1 MG: 1 TABLET ORAL at 18:02

## 2024-12-30 ASSESSMENT — ACTIVITIES OF DAILY LIVING (ADL)
ADLS_ACUITY_SCORE: 25
ADLS_ACUITY_SCORE: 45
ADLS_ACUITY_SCORE: 25
LAUNDRY: UNABLE TO COMPLETE
DRESS: WITH ASSISTANCE
ADLS_ACUITY_SCORE: 45
ADLS_ACUITY_SCORE: 25
HYGIENE/GROOMING: WITH ASSISTANCE
ADLS_ACUITY_SCORE: 25
ADLS_ACUITY_SCORE: 45
ADLS_ACUITY_SCORE: 25
ADLS_ACUITY_SCORE: 25

## 2024-12-30 NOTE — PLAN OF CARE
"  Problem: Adult Behavioral Health Plan of Care  Goal: Patient-Specific Goal (Individualization)  Description: Description: Pt will follow recommendations of treatment team.  Pt will be compliant with medications.  Pt will attend 50 % of groups when able  Pt will sleep 6-8 hours each night.  MOUTH CHECKS    12/19/24  One family member of the patient is designated as the  for incoming and outgoing calls to and from staff for this patient during her hospital stay.  This family member is Mohamed per family/patient request.    All other family members that call wanting to speak with staff regarding the patient will be directed to call the designated family member.   The patient is able to continue to make and receive calls as appropriate.   Designated family member will be asked to call only once daily for an update on the patient condition as to not disrupt patient care.  All visitation will adhere to current unit policy.  12/17/2024- Patient may wear head scarf.  12/27/24- MH-ICU r/t disorganization  12/28/24 Pt may wean to the dayroom with close staff observation. Treatment team to reassess daily     12/27/24 Pt on Intake and Output monitoring     12/28/24 Pt to have her legs elevated on a bed wedge when she is in bed due to bilateral leg edema     12/28/24 Pt is to be ambulated with staff once a shift.      Outcome: Not Progressing     Problem: Thought Process Alteration  Goal: Optimal Thought Clarity  Description: Pt will have a reduction in hallucinations, disorganization, and catatonia symptoms by discharge  Outcome: Not Progressing     Problem: Fall Injury Risk  Goal: Absence of Fall and Fall-Related Injury  Description: Pt. Has order for fall precautions.   Outcome: Progressing   Goal Outcome Evaluation:       Pt. In room in MHICU, sitting on toilet, staff helped clean up pt. After having a BM, assisted to bed with 2 staff, resting in bed, asked Pt. How she is doing, Pt. Quietly stated \"I'm okay\", " " feet have 3 plus edema, elevating feet with a bed wedge, will continue to monitor progress.  1730-  Asked Pt. If she wants to eat any supper, Pt. Stated \"No\", did drink 120 ml water.  1802-  Pt. In bed, medicated with ativan 1 mg po for catatonic symptoms, not answering some questions and staring into space.  2115-  Pt. Ate a snack, drank water, took prescribed hs medications, sitting in MHICU.  2130-  Pt. Ambulating up and down hallway in MHICU.  2210-  Found Pt. In another female pts. Room, sitting on bed, escorted her back to her own room without incident.  2225-  Sitting on her bed.      Face to face end of shift report will be communicated to oncoming night shift RN.     Morena Randolph RN  12/30/2024  7:05 PM                      "

## 2024-12-30 NOTE — TELEPHONE ENCOUNTER
"R: Dr Jesse Coleman called at 10:07 am saying pt is currently on m[facility] unit and [facility] and needs to transfer/admit to [facility] for emergency ECT. He said pt has been catatonic and manic and is not responding to med changes. Severe ramona w/ psychosis and confusion (\"Bell's Ramona\"). He said the [facility] and [facility] have been submitted and are pending. Pt is currently under commitment through [facility]. Author added pt's name to the [facility] in intake and added pt to the Transfer Center at 10:25 am.     [facility] is posting 0 beds.            Pt remains on the work list pending appropriate bed availability.           "

## 2024-12-30 NOTE — PROGRESS NOTES
Andrew emailed pt's  regarding update on [facility] and [facility] and the court supporting them. Time frame is 1-2 weeks for [facility] and 5-6 weeks [facility]. Andrew stated to cm that emergency ECT was being pursued and will give updates if pt is moved.

## 2024-12-30 NOTE — PLAN OF CARE
Face to face end of shift report received from Chemo PALACIOS RN. Rounding completed and patient observed in the lounge.      Goal Outcome Evaluation: Patient unable to make needs known. She was mumbling and not speaking English. Ipad  brought to patient. She rarely responded to questions and if she did, the  could barely hear her. Patient did eat 75% of her breakfast and drank 160mL. Patient did not make eye contact. She was offered more fluids but did not drink them. Patient did not take her scheduled AM meds. She did not refuse but did not respond to prompting. Patient's body language is tense and she was shaking.     10:30 AM Update: Patient was observed kneeling on the floor. She was prompted to reposition but did not. Staff offered to help her to her feet and but she did not respond. Staff assisted her to her feet and she was able to follow simple prompts but once standing, staff helped her into a wheelchair so they could help her to the bathroom and then to lay down. She resisted assistance so staff retreated.     11:15 Update: Provider updated that patient had not taken scheduled meds and that appeared more catatonic than earlier. Provider ordered 2mg IM Ativan and gave verbal orders to hold 0900 scheduled Haldol and Ativan. Staff approached patient who was crawling on the floor and appeared to be picking at something that wasn't observable to this writer. IM Ativan administered in left hip. Patient appeared to need to use the bathroom and when asked about using the bathroom she attempted to walk to the bathroom but appeared as if she was having difficulty picking up her feet and then putting them in front of her. After a few minutes, staff were able to get her to the toilet and she sat herself down. Her brief was dry but she was heard urinating in the toilet a little. After this, patient was helped to bed and a clean brief put in place.     15:30 Update: Patient went down to DI for chest  x-ray. Staff reported patient was able to follow prompts and stood up for xray. Upon returning to the unit, Patient took her scheduled PO 1mg Ativan with prompting. She appears more calm and her body language is relaxed.     Face to face end of shift report communicated to oncoming RN.             Problem: Adult Behavioral Health Plan of Care  Goal: Patient-Specific Goal (Individualization)  Description: Description: Pt will follow recommendations of treatment team.  Pt will be compliant with medications.  Pt will attend 50 % of groups when able  Pt will sleep 6-8 hours each night.  MOUTH CHECKS    12/19/24  One family member of the patient is designated as the  for incoming and outgoing calls to and from staff for this patient during her hospital stay.  This family member is Mohamed per family/patient request.    All other family members that call wanting to speak with staff regarding the patient will be directed to call the designated family member.   The patient is able to continue to make and receive calls as appropriate.   Designated family member will be asked to call only once daily for an update on the patient condition as to not disrupt patient care.  All visitation will adhere to current unit policy.  12/17/2024- Patient may wear head scarf.  12/27/24- MH-ICU r/t disorganization  12/28/24 Pt may wean to the dayroom with close staff observation. Treatment team to reassess daily     12/27/24 Pt on Intake and Output monitoring     12/28/24 Pt to have her legs elevated on a bed wedge when she is in bed due to bilateral leg edema     12/28/24 Pt is to be ambulated with staff once a shift.      Outcome: Not Progressing     Problem: Thought Process Alteration  Goal: Optimal Thought Clarity  Description: Pt will have a reduction in hallucinations, disorganization, and catatonia symptoms by discharge  Outcome: Not Progressing     Problem: Fall Injury Risk  Goal: Absence of Fall and Fall-Related  Injury  Description: Pt. Has order for fall precautions.   Outcome: Progressing
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
## 2024-12-30 NOTE — PLAN OF CARE
Observed resting with eyes closed, respirations even, unlabored and WNL. Able to reposition self and make needs known. 15-minute checks in place. No falls observed or reported.     At 02:05, observed leaning forward on the edge of the bed; Repositioned supinely and wedge placed under legs. At 02:30, up to the bathroom; Incontinent of a large amount of urine in her brief and also urinated an unmeasured amount when using the toilet.      PO PRNs administered:    Report given to oncoming day-shift nurse.      Problem: Adult Behavioral Health Plan of Care  Goal: Adheres to Safety Considerations for Self and Others  Outcome: Progressing   Goal Outcome Evaluation:

## 2024-12-31 ENCOUNTER — TELEPHONE (OUTPATIENT)
Dept: BEHAVIORAL HEALTH | Facility: CLINIC | Age: 59
End: 2024-12-31

## 2024-12-31 LAB
ANION GAP SERPL CALCULATED.3IONS-SCNC: 12 MMOL/L (ref 7–15)
ANION GAP SERPL CALCULATED.3IONS-SCNC: 12 MMOL/L (ref 7–15)
BUN SERPL-MCNC: 18.3 MG/DL (ref 6–20)
BUN SERPL-MCNC: 18.3 MG/DL (ref 6–20)
CALCIUM SERPL-MCNC: 10 MG/DL (ref 8.8–10.4)
CALCIUM SERPL-MCNC: 10 MG/DL (ref 8.8–10.4)
CHLORIDE SERPL-SCNC: 105 MMOL/L (ref 98–107)
CHLORIDE SERPL-SCNC: 105 MMOL/L (ref 98–107)
CREAT SERPL-MCNC: 0.91 MG/DL (ref 0.51–0.95)
CREAT SERPL-MCNC: 0.91 MG/DL (ref 0.51–0.95)
EGFRCR SERPLBLD CKD-EPI 2021: 73 ML/MIN/1.73M2
EGFRCR SERPLBLD CKD-EPI 2021: 73 ML/MIN/1.73M2
GLUCOSE SERPL-MCNC: 91 MG/DL (ref 70–99)
GLUCOSE SERPL-MCNC: 91 MG/DL (ref 70–99)
HCO3 SERPL-SCNC: 20 MMOL/L (ref 22–29)
HCO3 SERPL-SCNC: 20 MMOL/L (ref 22–29)
HOLD SPECIMEN: NORMAL
POTASSIUM SERPL-SCNC: 4.4 MMOL/L (ref 3.4–5.3)
POTASSIUM SERPL-SCNC: 4.4 MMOL/L (ref 3.4–5.3)
SODIUM SERPL-SCNC: 137 MMOL/L (ref 135–145)
SODIUM SERPL-SCNC: 137 MMOL/L (ref 135–145)

## 2024-12-31 PROCEDURE — 250N000013 HC RX MED GY IP 250 OP 250 PS 637: Performed by: NURSE PRACTITIONER

## 2024-12-31 PROCEDURE — 250N000013 HC RX MED GY IP 250 OP 250 PS 637: Performed by: STUDENT IN AN ORGANIZED HEALTH CARE EDUCATION/TRAINING PROGRAM

## 2024-12-31 PROCEDURE — 99233 SBSQ HOSP IP/OBS HIGH 50: CPT | Mod: 95 | Performed by: STUDENT IN AN ORGANIZED HEALTH CARE EDUCATION/TRAINING PROGRAM

## 2024-12-31 PROCEDURE — 124N000004

## 2024-12-31 PROCEDURE — 82310 ASSAY OF CALCIUM: CPT | Performed by: STUDENT IN AN ORGANIZED HEALTH CARE EDUCATION/TRAINING PROGRAM

## 2024-12-31 PROCEDURE — 82947 ASSAY GLUCOSE BLOOD QUANT: CPT | Performed by: STUDENT IN AN ORGANIZED HEALTH CARE EDUCATION/TRAINING PROGRAM

## 2024-12-31 PROCEDURE — 36415 COLL VENOUS BLD VENIPUNCTURE: CPT | Performed by: STUDENT IN AN ORGANIZED HEALTH CARE EDUCATION/TRAINING PROGRAM

## 2024-12-31 PROCEDURE — 80048 BASIC METABOLIC PNL TOTAL CA: CPT | Performed by: STUDENT IN AN ORGANIZED HEALTH CARE EDUCATION/TRAINING PROGRAM

## 2024-12-31 RX ADMIN — LORAZEPAM 1 MG: 1 TABLET ORAL at 17:52

## 2024-12-31 RX ADMIN — CHLORPROMAZINE HYDROCHLORIDE 400 MG: 100 TABLET, FILM COATED ORAL at 20:05

## 2024-12-31 RX ADMIN — DOCUSATE SODIUM 100 MG: 100 CAPSULE, LIQUID FILLED ORAL at 08:22

## 2024-12-31 RX ADMIN — LISINOPRIL 5 MG: 2.5 TABLET ORAL at 08:23

## 2024-12-31 RX ADMIN — LORAZEPAM 1.5 MG: 0.5 TABLET ORAL at 14:27

## 2024-12-31 RX ADMIN — LORAZEPAM 1.5 MG: 0.5 TABLET ORAL at 20:06

## 2024-12-31 RX ADMIN — Medication 1 TABLET: at 08:23

## 2024-12-31 RX ADMIN — LORAZEPAM 1 MG: 1 TABLET ORAL at 04:24

## 2024-12-31 RX ADMIN — HALOPERIDOL 7.5 MG: 5 TABLET ORAL at 20:06

## 2024-12-31 RX ADMIN — LORAZEPAM 1 MG: 1 TABLET ORAL at 08:23

## 2024-12-31 RX ADMIN — POLYETHYLENE GLYCOL 3350 17 G: 17 POWDER, FOR SOLUTION ORAL at 08:22

## 2024-12-31 RX ADMIN — LITHIUM CARBONATE 300 MG: 300 TABLET, EXTENDED RELEASE ORAL at 20:06

## 2024-12-31 ASSESSMENT — ACTIVITIES OF DAILY LIVING (ADL)
ADLS_ACUITY_SCORE: 45
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
## 2024-12-31 NOTE — PLAN OF CARE
Face to face end of shift report received from Morena CARVER RN. Rounding completed and patient observed lying in bed with eyes closed. She was easily awakened and allowed lab to draw her blood.      Goal Outcome Evaluation: Patient woke up and asked to use the bathroom before her blood draw. She walked to the bathroom with a stand by assist and was able to follow prompts. She allowed this writer to help her with her dress and brief. She sat on the toilet for about 15 minutes and had a small BM. She urinated a large amount and her brief was wet. She allowed this writer to wash her cameron area and then she also asked if she could take a shower. She agreed to wait till after breakfast. She was prompted to wash her hands and she did this thoroughly with soap. She was very slow at her ADLs but she was able to make her needs known and requested a toothbrush. She took her scheduled meds and prior to lisinopril administration, her blood pressure was taken because it had been somewhat low in the morning. He BP at this time was 118/64 and HR was 88. She requested fluids and received 2 orange juices and a large cup of water.          Problem: Fall Injury Risk  Goal: Absence of Fall and Fall-Related Injury  Description: Pt. Has order for fall precautions.   Outcome: Progressing     Problem: Adult Behavioral Health Plan of Care  Goal: Patient-Specific Goal (Individualization)  Description: Description: Pt will follow recommendations of treatment team.  Pt will be compliant with medications.  Pt will attend 50 % of groups when able  Pt will sleep 6-8 hours each night.  MOUTH CHECKS    12/19/24  One family member of the patient is designated as the  for incoming and outgoing calls to and from staff for this patient during her hospital stay.  This family member is Mohamed per family/patient request.    All other family members that call wanting to speak with staff regarding the patient will be directed to call the  designated family member.   The patient is able to continue to make and receive calls as appropriate.   Designated family member will be asked to call only once daily for an update on the patient condition as to not disrupt patient care.  All visitation will adhere to current unit policy.  12/17/2024- Patient may wear head scarf.  12/27/24- -ICU r/t disorganization  12/28/24 Pt may wean to the dayroom with close staff observation. Treatment team to reassess daily     12/27/24 Pt on Intake and Output monitoring     12/28/24 Pt to have her legs elevated on a bed wedge when she is in bed due to bilateral leg edema     12/28/24 Pt is to be ambulated with staff once a shift.      Outcome: Progressing     Problem: Thought Process Alteration  Goal: Optimal Thought Clarity  Description: Pt will have a reduction in hallucinations, disorganization, and catatonia symptoms by discharge  Outcome: Progressing

## 2024-12-31 NOTE — TELEPHONE ENCOUNTER
1:04 PM Pt MRN provided to unit 3B, CRN unavailable.    2:44 PM Per 3B CRN is currently still under review.     3:28 PM 3B CRN declined d/t pt overall acuity/mileau and needing SIO unable to accommodate. WL and admit board updated.      R: [place] [facility] [place] Call Log 12/31/24 8:15 AM: Intake has called facilities that have not updated the bed status within the last 12 hours.                 TRANSFER              [facility] is at capacity.             Pt remains on the work list pending appropriate bed availability.

## 2024-12-31 NOTE — PLAN OF CARE
Problem: Adult Behavioral Health Plan of Care  Goal: Patient-Specific Goal (Individualization)  Description: Description: Pt will follow recommendations of treatment team.  Pt will be compliant with medications.  Pt will attend 50 % of groups when able  Pt will sleep 6-8 hours each night.  MOUTH CHECKS    12/19/24  One family member of the patient is designated as the  for incoming and outgoing calls to and from staff for this patient during her hospital stay.  This family member is Mohamed per family/patient request.    All other family members that call wanting to speak with staff regarding the patient will be directed to call the designated family member.   The patient is able to continue to make and receive calls as appropriate.   Designated family member will be asked to call only once daily for an update on the patient condition as to not disrupt patient care.  All visitation will adhere to current unit policy.  12/17/2024- Patient may wear head scarf.  12/27/24- MH-ICU r/t disorganization  12/28/24 Pt may wean to the dayroom with close staff observation. Treatment team to reassess daily     12/27/24 Pt on Intake and Output monitoring     12/28/24 Pt to have her legs elevated on a bed wedge when she is in bed due to bilateral leg edema     12/28/24 Pt is to be ambulated with staff once a shift.      Outcome: Progressing  Note: Report received from Morena. Rounding complete. Pt observed sleeping in left side lying position with regular and unlabored respirations.    Pt has been in bed with eyes closed and regular respirations. 15 minute and PRN checks all night. No complaints offered.     0424- Pt observed to be awake pt having difficulty communicating her needs and appears somewhat catatonic. Pt also has been awake for a few checks now. Pt was offered and accepted ativan 1 mg PO for both catatonic like symptoms and sleep. Pt offered assistance to the restroom but declined and only mumbled  something neither the writer or other RN could understand. Pt only seemed to be able to repeat some words when asked any questions by this writer. Pt was able to self admin the med with some delays.    Pt slept approx 6.5  hours from end of evening shift thru this NOC shift.    Face to face end of shift report communicated to oncoming RN.    Morena MOBLEY RN  December 31, 2024  12:20 AM          Problem: Thought Process Alteration  Goal: Optimal Thought Clarity  Description: Pt will have a reduction in hallucinations, disorganization, and catatonia symptoms by discharge  Outcome: Progressing  Note: Unable to assess due to pt sleeping. No issues or concerns noted at this time.       Problem: Fall Injury Risk  Goal: Absence of Fall and Fall-Related Injury  Description: Pt. Has order for fall precautions.   Outcome: Progressing  Note: Unable to assess due to pt sleeping. Pt has remained free of falls and injury this NOC shift.     Goal Outcome Evaluation:

## 2024-12-31 NOTE — TELEPHONE ENCOUNTER
[facility] ONLY:  R: [place] [facility] [place] Call Log  12/31/24 @ 1:00am    [facility]: @ capacity for Adult MH beds.     Pt remains on waitlist pending appropriate placement availability.

## 2025-01-01 ENCOUNTER — TELEPHONE (OUTPATIENT)
Dept: BEHAVIORAL HEALTH | Facility: CLINIC | Age: 60
End: 2025-01-01

## 2025-01-01 PROCEDURE — 250N000013 HC RX MED GY IP 250 OP 250 PS 637: Performed by: NURSE PRACTITIONER

## 2025-01-01 PROCEDURE — 124N000004

## 2025-01-01 PROCEDURE — 250N000013 HC RX MED GY IP 250 OP 250 PS 637: Performed by: STUDENT IN AN ORGANIZED HEALTH CARE EDUCATION/TRAINING PROGRAM

## 2025-01-01 PROCEDURE — 99232 SBSQ HOSP IP/OBS MODERATE 35: CPT

## 2025-01-01 RX ADMIN — LORAZEPAM 1.5 MG: 0.5 TABLET ORAL at 09:44

## 2025-01-01 RX ADMIN — LORAZEPAM 1.5 MG: 0.5 TABLET ORAL at 20:43

## 2025-01-01 RX ADMIN — DOCUSATE SODIUM 100 MG: 100 CAPSULE, LIQUID FILLED ORAL at 09:44

## 2025-01-01 RX ADMIN — POLYETHYLENE GLYCOL 3350 17 G: 17 POWDER, FOR SOLUTION ORAL at 09:44

## 2025-01-01 RX ADMIN — LISINOPRIL 5 MG: 2.5 TABLET ORAL at 09:44

## 2025-01-01 RX ADMIN — HALOPERIDOL 7.5 MG: 5 TABLET ORAL at 20:42

## 2025-01-01 RX ADMIN — CHLORPROMAZINE HYDROCHLORIDE 400 MG: 100 TABLET, FILM COATED ORAL at 20:42

## 2025-01-01 RX ADMIN — LORAZEPAM 1.5 MG: 0.5 TABLET ORAL at 13:24

## 2025-01-01 RX ADMIN — LITHIUM CARBONATE 300 MG: 300 TABLET, EXTENDED RELEASE ORAL at 20:43

## 2025-01-01 RX ADMIN — Medication 1 TABLET: at 09:44

## 2025-01-01 RX ADMIN — LORAZEPAM 1 MG: 1 TABLET ORAL at 03:46

## 2025-01-01 ASSESSMENT — ACTIVITIES OF DAILY LIVING (ADL)
ADLS_ACUITY_SCORE: 57
HYGIENE/GROOMING: SHOWER;WITH ASSISTANCE
ADLS_ACUITY_SCORE: 57
ORAL_HYGIENE: INDEPENDENT
DRESS: STREET CLOTHES;WITH ASSISTANCE
ADLS_ACUITY_SCORE: 57

## 2025-01-01 NOTE — TELEPHONE ENCOUNTER
S:  Pt needs transfer to [facility] for ECT.  No beds currently avlb.  Continue to seek placement..

## 2025-01-01 NOTE — TELEPHONE ENCOUNTER
R: [place]  [facility] Inpatient Bed Call Log 12/31/24 @7:00 PM:   Intake has called facilities that have not updated the bed status within the last 12 hours.          Pt wants [facility] only.                          [facility] is at capacity.               Pt remains on the work list pending appropriate bed availability.

## 2025-01-01 NOTE — PROGRESS NOTES
Problem: Adult Behavioral Health Plan of Care  Goal: Patient-Specific Goal (Individualization)  Description: Description: Pt will follow recommendations of treatment team.  Pt will be compliant with medications.  Pt will attend 50 % of groups when able  Pt will sleep 6-8 hours each night.  MOUTH CHECKS    12/19/24  One family member of the patient is designated as the  for incoming and outgoing calls to and from staff for this patient during her hospital stay.  This family member is Mohamed per family/patient request.    All other family members that call wanting to speak with staff regarding the patient will be directed to call the designated family member.   The patient is able to continue to make and receive calls as appropriate.   Designated family member will be asked to call only once daily for an update on the patient condition as to not disrupt patient care.  All visitation will adhere to current unit policy.    12/17/2024- Patient may wear head scarf.    12/27/24- MH-ICU r/t disorganization.    12/31/24: Pt may wean to the dayroom with close staff observation. Treatment team to reassess daily.    12/27/24 Pt on Intake and Output monitoring     12/28/24 Pt to have her legs elevated on a bed wedge when she is in bed due to bilateral leg edema     12/28/24 Pt is to be ambulated with staff once a shift.    1/1/2025 Pt has shea butter lotion in her belongings to use. Patient also to use purple bottle detangling shampoo and apply leave in hair lotion after shower    Outcome: Progressing  Goal: Adheres to Safety Considerations for Self and Others  Outcome: Progressing     Problem: Thought Process Alteration  Goal: Optimal Thought Clarity  Description: Pt will have a reduction in hallucinations, disorganization, and catatonia symptoms by discharge  Outcome: Not Progressing     Problem: Fall Injury Risk  Goal: Absence of Fall and Fall-Related Injury  Description: Pt. Has order for fall precautions.    Patient will remain free of harm r/t falls  Outcome: Progressing   Goal Outcome Evaluation:    Patient denies pain and does not appear to be in any physical distress. Patient sitting on the floor eating when this writer entered her room. Patient medication compliant, performed mouth check without issue. Patient assisted off the floor and patient chose to lay in bed, a bit unsteady initially, but became more steady with shuffle gait. Patient has trace edema to BLE, bed wedge in room for elevation as tolerated, patient politely declines at this time. Patient a bit rigid with slow motor movements. Patient speaking quiet, but using English at this time. Patient agreeable to shower today. Patient's sacrum and buttocks assessed for skin breakdown, none noted at this time.   January 1, 2025 9:59 AM    Patient has spent time rocking in her recliner and in the [facility] lounge watching tv. Patient medication compliant and performed mouth check without issue. Patient motor movement more fluid with less rigidity this afternoon. Patient was observed walking in other patient's room again, redirected out without issue. Patient had decent appetite, 25% of breakfast and 75% of lunch.   January 1, 2025 1:55 PM    Patient currently in open lounge, visiting with 2 family members who arrived early, staff member with patient for close observation. Patient's family helped patient apply shea butter lotion.  January 1, 2025 2:08 PM

## 2025-01-01 NOTE — PLAN OF CARE
Face to face end of shift report received from day RN. Rounding completed. Patient observed in the [facility] sitting in a chair.     James Astorga, VANIA  12/31/2024  1530    Patient spent evening shift in [facility] and her room. Didn't wean to general unit. Pt catatonic during dinner. Wanted to sit on floor and lay on floor. Pillow given for comfort and PRN Ativan 1 mg oral given @1752 with good effect. Pt then ate 1/3 of dinner and drank approximately 400 mL. After 45 minutes pt able to stand up with 2 staff assist and walk to her room. Toileted, incontinent of stool. Staff walked to bed and pt laid down with feet up for lower extremity swelling. Pt able to respond to questions with short answers.     Pt able to sit up in bed and take HS medications @2006. Pt speaking in short sentences, able to make needs known. Pt up to bathroom and voided, brief dry. Pt resting/napping in bed for remainder of shift.    Report will be given to night RN at shift change.         Problem: Fall Injury Risk  Goal: Absence of Fall and Fall-Related Injury  Description: Pt. Has order for fall precautions.   Outcome: Progressing     Problem: Adult Behavioral Health Plan of Care  Goal: Patient-Specific Goal (Individualization)  Description: Description: Pt will follow recommendations of treatment team.  Pt will be compliant with medications.  Pt will attend 50 % of groups when able  Pt will sleep 6-8 hours each night.  MOUTH CHECKS    12/19/24  One family member of the patient is designated as the  for incoming and outgoing calls to and from staff for this patient during her hospital stay.  This family member is Mohamed per family/patient request.    All other family members that call wanting to speak with staff regarding the patient will be directed to call the designated family member.   The patient is able to continue to make and receive calls as appropriate.   Designated family member will be asked to call only  once daily for an update on the patient condition as to not disrupt patient care.  All visitation will adhere to current unit policy.    12/17/2024- Patient may wear head scarf.    12/27/24- -ICU r/t disorganization.    12/27/24 Pt on Intake and Output monitoring     12/28/24 Pt to have her legs elevated on a bed wedge when she is in bed due to bilateral leg edema     12/28/24 Pt is to be ambulated with staff once a shift.    12/31/24: Patient may wean to the dayroom with close staff observation. Treatment team to reassess daily.      Outcome: Progressing     Problem: Thought Process Alteration  Goal: Optimal Thought Clarity  Description: Pt will have a reduction in hallucinations, disorganization, and catatonia symptoms by discharge  Outcome: Progressing
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
## 2025-01-01 NOTE — PLAN OF CARE
Problem: Adult Behavioral Health Plan of Care  Goal: Patient-Specific Goal (Individualization)  Description: Description: Pt will follow recommendations of treatment team.  Pt will be compliant with medications.  Pt will attend 50 % of groups when able  Pt will sleep 6-8 hours each night.  MOUTH CHECKS    12/19/24  One family member of the patient is designated as the  for incoming and outgoing calls to and from staff for this patient during her hospital stay.  This family member is Mohamed per family/patient request.    All other family members that call wanting to speak with staff regarding the patient will be directed to call the designated family member.   The patient is able to continue to make and receive calls as appropriate.   Designated family member will be asked to call only once daily for an update on the patient condition as to not disrupt patient care.  All visitation will adhere to current unit policy.    12/17/2024- Patient may wear head scarf.    12/27/24- MH-ICU r/t disorganization.    12/31/24: Pt may wean to the dayroom with close staff observation. Treatment team to reassess daily.    12/27/24 Pt on Intake and Output monitoring     12/28/24 Pt to have her legs elevated on a bed wedge when she is in bed due to bilateral leg edema     12/28/24 Pt is to be ambulated with staff once a shift.      Outcome: Progressing  Note: Report received from James. Rounding complete. Pt observed sleeping in right side lying position with regular and unlabored respirations.    0346- Pt offered and accepted ativan 1 mg PO for catatonic like symptoms again. Her s/s do appear to be improving slightly every day. Pt was able to get up and toilet herself tonight and was observed to be able to clean herself afterwards as well. Pt was asked if she was hungry or thirsty as it was reported that she had not eaten much on days or evenings. Pt was able to tell this writer that she was hungry and spoke in a  clearer and more audible tone.     Pt has been in bed with eyes closed and regular respirations. 15 minute and PRN checks all night. No complaints offered.     Pt slept approx  6  hours this NOC shift.    Face to face end of shift report communicated to oncoming RN.    Morena MOBLEY RN  January 1, 2025  3:57 AM          Problem: Thought Process Alteration  Goal: Optimal Thought Clarity  Description: Pt will have a reduction in hallucinations, disorganization, and catatonia symptoms by discharge  Outcome: Progressing  Note: Pt still struggles to make her needs known without any prompting. She is able to answer questions more clearly and in a normal tone tonight compared to last night where she was only able to parrot some words writer would say and was otherwise only able to mumble in a very low tone, and these did not appear to be actual words of any language. So this is a noted improvement tonight.     Problem: Fall Injury Risk  Goal: Absence of Fall and Fall-Related Injury  Description: Pt. Has order for fall precautions.   Outcome: Progressing  Note: Pt has been balanced and steady on her feet when she has been observed moving about her room this NOC shift.   Goal Outcome Evaluation:

## 2025-01-01 NOTE — PLAN OF CARE
"  Problem: Adult Behavioral Health Plan of Care  Goal: Patient-Specific Goal (Individualization)  Description: Description: Pt will follow recommendations of treatment team.  Pt will be compliant with medications.  Pt will attend 50 % of groups when able  Pt will sleep 6-8 hours each night.  MOUTH CHECKS    12/19/24  One family member of the patient is designated as the  for incoming and outgoing calls to and from staff for this patient during her hospital stay.  This family member is Mohamed per family/patient request.    All other family members that call wanting to speak with staff regarding the patient will be directed to call the designated family member.   The patient is able to continue to make and receive calls as appropriate.   Designated family member will be asked to call only once daily for an update on the patient condition as to not disrupt patient care.  All visitation will adhere to current unit policy.    12/17/2024- Patient may wear head scarf.    12/27/24- MH-ICU r/t disorganization.    12/31/24: Pt may wean to the dayroom with close staff observation. Treatment team to reassess daily.    12/27/24 Pt on Intake and Output monitoring     12/28/24 Pt to have her legs elevated on a bed wedge when she is in bed due to bilateral leg edema     12/28/24 Pt is to be ambulated with staff once a shift.    1/1/2025 Pt has shea butter lotion in her belongings to use. Patient also to use purple bottle detangling shampoo and apply leave in hair lotion after shower  Outcome: Progressing  Note: 1615 - Pt had three family members visit.     Psychomotor slowing present. Pt was very soft spoken. She verbalized that she is doing \"okay\". She denied anxiety, depression, hallucinations, and suicidal ideation. She appeared to be responding to internal stimuli at times. She ate in the lounge and had an intake of 75% with adequate fluid intake. See flowsheet. She was compliant with her scheduled medications. " Pt voided on the toilet x1. Brief was soiled x1, as well. Pt showered and dressed with assist of 1-2.     Face to face end of shift report will be communicated to oncoming RN.      Problem: Thought Process Alteration  Goal: Optimal Thought Clarity  Description: Pt will have a reduction in hallucinations, disorganization, and catatonia symptoms by discharge  Outcome: Progressing     Problem: Fall Injury Risk  Goal: Absence of Fall and Fall-Related Injury  Description: Pt. Has order for fall precautions.   Patient will remain free of harm r/t falls  Outcome: Progressing  Note: Pt remained free from falls. Psychomotor slowing present. Gait was slow and shuffling.

## 2025-01-01 NOTE — TELEPHONE ENCOUNTER
[facility] ONLY:  R: [place] [facility] Access Inpatient Bed Call Log  1/1/25 @ 1:00am    [facility]: @ capacity for Adult MH beds.     Pt remains on waitlist pending appropriate placement availability.

## 2025-01-02 ENCOUNTER — TELEPHONE (OUTPATIENT)
Dept: BEHAVIORAL HEALTH | Facility: CLINIC | Age: 60
End: 2025-01-02

## 2025-01-02 ENCOUNTER — APPOINTMENT (OUTPATIENT)
Dept: INTERPRETER SERVICES | Facility: CLINIC | Age: 60
DRG: 885 | End: 2025-01-02
Attending: PSYCHIATRY & NEUROLOGY

## 2025-01-02 VITALS
SYSTOLIC BLOOD PRESSURE: 141 MMHG | WEIGHT: 212 LBS | HEIGHT: 69 IN | OXYGEN SATURATION: 98 % | DIASTOLIC BLOOD PRESSURE: 59 MMHG | RESPIRATION RATE: 16 BRPM | BODY MASS INDEX: 31.4 KG/M2 | HEART RATE: 93 BPM | TEMPERATURE: 97.4 F

## 2025-01-02 LAB
HOLD SPECIMEN: NORMAL
LITHIUM SERPL-SCNC: 0.9 MMOL/L (ref 0.6–1.2)
LITHIUM SERPL-SCNC: 0.9 MMOL/L (ref 0.6–1.2)

## 2025-01-02 PROCEDURE — 250N000013 HC RX MED GY IP 250 OP 250 PS 637: Performed by: STUDENT IN AN ORGANIZED HEALTH CARE EDUCATION/TRAINING PROGRAM

## 2025-01-02 PROCEDURE — 124N000004

## 2025-01-02 PROCEDURE — 36415 COLL VENOUS BLD VENIPUNCTURE: CPT | Performed by: STUDENT IN AN ORGANIZED HEALTH CARE EDUCATION/TRAINING PROGRAM

## 2025-01-02 PROCEDURE — 99233 SBSQ HOSP IP/OBS HIGH 50: CPT

## 2025-01-02 PROCEDURE — 80178 ASSAY OF LITHIUM: CPT | Performed by: STUDENT IN AN ORGANIZED HEALTH CARE EDUCATION/TRAINING PROGRAM

## 2025-01-02 PROCEDURE — 250N000013 HC RX MED GY IP 250 OP 250 PS 637: Performed by: NURSE PRACTITIONER

## 2025-01-02 RX ADMIN — POLYETHYLENE GLYCOL 3350 17 G: 17 POWDER, FOR SOLUTION ORAL at 08:05

## 2025-01-02 RX ADMIN — DOCUSATE SODIUM 100 MG: 100 CAPSULE, LIQUID FILLED ORAL at 08:06

## 2025-01-02 RX ADMIN — LISINOPRIL 5 MG: 2.5 TABLET ORAL at 08:06

## 2025-01-02 RX ADMIN — LORAZEPAM 1.5 MG: 0.5 TABLET ORAL at 21:13

## 2025-01-02 RX ADMIN — HALOPERIDOL 7.5 MG: 5 TABLET ORAL at 21:13

## 2025-01-02 RX ADMIN — LITHIUM CARBONATE 300 MG: 300 TABLET, EXTENDED RELEASE ORAL at 21:13

## 2025-01-02 RX ADMIN — LORAZEPAM 1 MG: 1 TABLET ORAL at 05:37

## 2025-01-02 RX ADMIN — Medication 1 TABLET: at 08:07

## 2025-01-02 RX ADMIN — LORAZEPAM 1.5 MG: 0.5 TABLET ORAL at 13:33

## 2025-01-02 RX ADMIN — LORAZEPAM 1.5 MG: 0.5 TABLET ORAL at 08:06

## 2025-01-02 RX ADMIN — CHLORPROMAZINE HYDROCHLORIDE 400 MG: 100 TABLET, FILM COATED ORAL at 21:12

## 2025-01-02 ASSESSMENT — ACTIVITIES OF DAILY LIVING (ADL)
ADLS_ACUITY_SCORE: 57
ORAL_HYGIENE: INDEPENDENT
ADLS_ACUITY_SCORE: 57
DRESS: SCRUBS (BEHAVIORAL HEALTH);STREET CLOTHES;WITH ASSISTANCE
ADLS_ACUITY_SCORE: 57
HYGIENE/GROOMING: INDEPENDENT;WITH ASSISTANCE
LAUNDRY: UNABLE TO COMPLETE
ADLS_ACUITY_SCORE: 57

## 2025-01-02 NOTE — PROGRESS NOTES
VS: /57 (BP Location: Right arm)   Pulse 91   Temp 98.4  F (36.9  C) (Oral)   Resp 16   Wt 93.7 kg (206 lb 9.6 oz)   SpO2 98%   BMI 30.51 kg/m        O2: Sating >90% on RA. Pt declined/ignored to answer any questions or allow an assessment except for lung and bowel sounds.   Output: Voids spontaneously and adequately in bathroom.    Last BM: 10/15. Bowel sounds active x4. Passing flatus.    Activity: Independent. 1:1 sitter for safety.    Skin: Visible skin intact.    Pain: Denied pain.    CMS: Pt declined to answer.    Dressing: No dressings.    Diet: Regular. Pt refused to eat. .     LDA: PIV access just ordered to be placed as she is throwing up her electrolyte replacement.   Equipment: Personal belongings.    Plan: Continue to monitor. Call light within reach and utilized appropriately.    Additional Info: Potassium 2.9, replacement needed after PIV placed.   1:1 sitter for safety.       Shower only

## 2025-01-02 NOTE — PLAN OF CARE
Face to face shift report received from VANIA Berg. Rounding completed, pt observed resting in bed at start of shift.    Problem: Adult Behavioral Health Plan of Care  Goal: Patient-Specific Goal (Individualization)  Description: Description: Pt will follow recommendations of treatment team.  Pt will be compliant with medications.  Pt will attend 50 % of groups when able  Pt will sleep 6-8 hours each night.  MOUTH CHECKS    12/19/24  One family member of the patient is designated as the  for incoming and outgoing calls to and from staff for this patient during her hospital stay.  This family member is Mohamed per family/patient request.    All other family members that call wanting to speak with staff regarding the patient will be directed to call the designated family member.   The patient is able to continue to make and receive calls as appropriate.   Designated family member will be asked to call only once daily for an update on the patient condition as to not disrupt patient care.  All visitation will adhere to current unit policy.    12/17/2024- Patient may wear head scarf.    12/27/24- -ICU r/t disorganization.    12/31/24: Pt may wean to the dayroom with close staff observation. Treatment team to reassess daily.    12/27/24 Pt on Intake and Output monitoring     12/28/24 Pt to have her legs elevated on a bed wedge when she is in bed due to bilateral leg edema     12/28/24 Pt is to be ambulated with staff once a shift.    1/1/2025 Pt has shea butter lotion in her belongings to use. Patient also to use purple bottle detangling shampoo and apply leave in hair lotion after shower  Outcome: Progressing     Problem: Thought Process Alteration  Goal: Optimal Thought Clarity  Description: Pt will have a reduction in hallucinations, disorganization, and catatonia symptoms by discharge  Outcome: Progressing     Problem: Fall Injury Risk  Goal: Absence of Fall and Fall-Related Injury  Description: Pt.  "Has order for fall precautions.   Patient will remain free of harm r/t falls  Outcome: Progressing   Goal Outcome Evaluation:        Pt. Denied having any physical pain this shift. When asked how they were feeling today, they said \"okay\". Pt. Does seem very tired today and has spent much of the morning resting in bed. Morning medications were taken without any issues.  Pt. Then spent most of the shift in bed. They did wean out to the open unit with staff supervision for a little bit. During this time, Pt. Sat in the lounge and made a phone call to their family member Pedro. Pt. Talked with them briefly and then hung up on them. They also received a phone call from another friend, Gemma. She called twice. Both of these calls were brief.    Pt. Is on I and O. They ate 100% at breakfast and 0% at lunch. They had a total shift intake of 720 ml and an output of 1 void occurrence on the floor in front of the toilet.       Face to face report will be communicated to oncoming RN.    Carla Duong RN  1/2/2025  2:08 PM           "

## 2025-01-02 NOTE — PLAN OF CARE
Face to face shift report received from Tia RN. Rounding completed, pt observed in MHICU at the start of the shift.    Patient withdrawn to MHICU throughout the shift. Ate 50% of dinner. 300 mL intake. Observed using another peer's bathroom at the start of the shift. Redirected back to her room. Incontinent x2 while wearing briefs. Minimal response during conversation. Denies pain and SI/HI. Spent majority of the shift laying in her bed or up in MHICU. Compliant with scheduled medication.    Problem: Fall Injury Risk  Goal: Absence of Fall and Fall-Related Injury  Description: Pt. Has order for fall precautions.   Patient will remain free of harm r/t falls  Outcome: Progressing     Problem: Adult Behavioral Health Plan of Care  Goal: Patient-Specific Goal (Individualization)  Description: Description: Pt will follow recommendations of treatment team.  Pt will be compliant with medications.  Pt will attend 50 % of groups when able  Pt will sleep 6-8 hours each night.  MOUTH CHECKS    12/19/24  One family member of the patient is designated as the  for incoming and outgoing calls to and from staff for this patient during her hospital stay.  This family member is Mohamed per family/patient request.    All other family members that call wanting to speak with staff regarding the patient will be directed to call the designated family member.   The patient is able to continue to make and receive calls as appropriate.   Designated family member will be asked to call only once daily for an update on the patient condition as to not disrupt patient care.  All visitation will adhere to current unit policy.    12/17/2024- Patient may wear head scarf.    12/27/24- -ICU r/t disorganization.    12/31/24: Pt may wean to the dayroom with close staff observation. Treatment team to reassess daily.    12/27/24 Pt on Intake and Output monitoring     12/28/24 Pt to have her legs elevated on a bed wedge when she is in bed  due to bilateral leg edema     12/28/24 Pt is to be ambulated with staff once a shift.    1/1/2025 Pt has shea butter lotion in her belongings to use. Patient also to use purple bottle detangling shampoo and apply leave in hair lotion after shower  Outcome: Progressing     Problem: Thought Process Alteration  Goal: Optimal Thought Clarity  Description: Pt will have a reduction in hallucinations, disorganization, and catatonia symptoms by discharge  Outcome: Progressing    Face to face report will be communicated to oncoming RN.    Denise Brown RN  1/2/2025

## 2025-01-02 NOTE — TELEPHONE ENCOUNTER
R: [place] [facility] Call Log 1/2/25 @8:18 AM:  Intake has called facilities that have not updated the bed status within the last 12 hours.          (Adults):     2:26 PM 3B CRN IS REVIEWING     3:32 PM CRN is still reviewing - WCB      [facility] is at capacity.             Pt remains on the work list pending appropriate bed availability.

## 2025-01-02 NOTE — TELEPHONE ENCOUNTER
[facility] ONLY:  R: [place] MH Access Inpatient Bed Call Log  1/2/25 @ 1:00am    [facility]: @ capacity for Adult MH beds.     Pt remains on waitlist pending appropriate placement availability.

## 2025-01-02 NOTE — PLAN OF CARE
Problem: Adult Behavioral Health Plan of Care  Goal: Patient-Specific Goal (Individualization)  Description: Description: Pt will follow recommendations of treatment team.  Pt will be compliant with medications.  Pt will attend 50 % of groups when able  Pt will sleep 6-8 hours each night.  MOUTH CHECKS    12/19/24  One family member of the patient is designated as the  for incoming and outgoing calls to and from staff for this patient during her hospital stay.  This family member is Mohamed per family/patient request.    All other family members that call wanting to speak with staff regarding the patient will be directed to call the designated family member.   The patient is able to continue to make and receive calls as appropriate.   Designated family member will be asked to call only once daily for an update on the patient condition as to not disrupt patient care.  All visitation will adhere to current unit policy.    12/17/2024- Patient may wear head scarf.    12/27/24- MH-ICU r/t disorganization.    12/31/24: Pt may wean to the dayroom with close staff observation. Treatment team to reassess daily.    12/27/24 Pt on Intake and Output monitoring     12/28/24 Pt to have her legs elevated on a bed wedge when she is in bed due to bilateral leg edema     12/28/24 Pt is to be ambulated with staff once a shift.    1/1/2025 Pt has shea butter lotion in her belongings to use. Patient also to use purple bottle detangling shampoo and apply leave in hair lotion after shower  Outcome: Progressing  Note: Report received from Lina. Rounding complete. Pt observed sleeping in right side lying position with regular and unlabored respirations.    0537- Pt offered and accepted 1 mg Ativan PO for catatonic like s/s and appearing stuck and unable to speak clearly or in a normal tone. Writer has passed along daily that this seems to improve her state of speech and movement significantly. At this time writer also  provided pt with 2 cups of OJ and a large cup of ice water.     0645- Pt observed to be praying in her room before getting up and crawling into bed for a nap.     Pt has been in bed with eyes closed and regular respirations. 15 minute and PRN checks all night. No complaints offered.     Pt slept approx  6  hours this NOC shift.    Face to face end of shift report communicated to oncoming RN.    Morena MOBLEY RN  January 2, 2025  1:23 AM          Problem: Thought Process Alteration  Goal: Optimal Thought Clarity  Description: Pt will have a reduction in hallucinations, disorganization, and catatonia symptoms by discharge  Outcome: Progressing  Note: pt is again struggling with extremely slow movement and mumbling speech that is difficult to understand. Most words are only her repeating the last word the writer asked in a question. Pt is able to take her meds herself and drink fluids without assistance but does require prompts to do so. Pt did not receive any of her PRN ativan on PM shift as she had on prior dates.        Problem: Fall Injury Risk  Goal: Absence of Fall and Fall-Related Injury  Description: Pt. Has order for fall precautions.   Patient will remain free of harm r/t falls  Outcome: Progressing  Note:  Pt has remained free of falls and injury this NOC shift.     Goal Outcome Evaluation:
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
## 2025-01-02 NOTE — TELEPHONE ENCOUNTER
R: 9:25 PM Joanna Bed Search Update [facility] Only    No appropriate beds available within [facility].  Pt remains on PPS worklist awaiting transfer to appropriate bed.

## 2025-01-03 ENCOUNTER — HOSPITAL ENCOUNTER (INPATIENT)
Facility: CLINIC | Age: 60
End: 2025-01-03
Attending: PSYCHIATRY & NEUROLOGY | Admitting: PSYCHIATRY & NEUROLOGY

## 2025-01-03 VITALS
OXYGEN SATURATION: 100 % | HEART RATE: 88 BPM | BODY MASS INDEX: 31.4 KG/M2 | HEIGHT: 69 IN | SYSTOLIC BLOOD PRESSURE: 113 MMHG | TEMPERATURE: 97.3 F | HEIGHT: 69 IN | HEART RATE: 88 BPM | RESPIRATION RATE: 16 BRPM | TEMPERATURE: 97.3 F | DIASTOLIC BLOOD PRESSURE: 62 MMHG | RESPIRATION RATE: 16 BRPM | DIASTOLIC BLOOD PRESSURE: 62 MMHG | BODY MASS INDEX: 31.4 KG/M2 | SYSTOLIC BLOOD PRESSURE: 113 MMHG | WEIGHT: 212 LBS | OXYGEN SATURATION: 100 % | WEIGHT: 212 LBS

## 2025-01-03 DIAGNOSIS — E03.9 HYPOTHYROIDISM, UNSPECIFIED TYPE: ICD-10-CM

## 2025-01-03 DIAGNOSIS — I95.1 ORTHOSTATIC HYPOTENSION: ICD-10-CM

## 2025-01-03 DIAGNOSIS — K59.00 CONSTIPATION, UNSPECIFIED CONSTIPATION TYPE: ICD-10-CM

## 2025-01-03 DIAGNOSIS — F25.0 SCHIZOAFFECTIVE DISORDER, BIPOLAR TYPE (H): ICD-10-CM

## 2025-01-03 DIAGNOSIS — E83.40 DISORDER OF MAGNESIUM METABOLISM: ICD-10-CM

## 2025-01-03 DIAGNOSIS — Z00.00 HEALTH CARE MAINTENANCE: ICD-10-CM

## 2025-01-03 DIAGNOSIS — F06.1 CATATONIA: Primary | ICD-10-CM

## 2025-01-03 DIAGNOSIS — I95.9 HYPOTENSION, UNSPECIFIED HYPOTENSION TYPE: ICD-10-CM

## 2025-01-03 DIAGNOSIS — E87.1 HYPONATREMIA: ICD-10-CM

## 2025-01-03 PROBLEM — F29 PSYCHOSIS (H): Status: ACTIVE | Noted: 2025-01-03

## 2025-01-03 PROCEDURE — 99239 HOSP IP/OBS DSCHRG MGMT >30: CPT

## 2025-01-03 PROCEDURE — 250N000013 HC RX MED GY IP 250 OP 250 PS 637: Performed by: NURSE PRACTITIONER

## 2025-01-03 PROCEDURE — 250N000013 HC RX MED GY IP 250 OP 250 PS 637: Performed by: STUDENT IN AN ORGANIZED HEALTH CARE EDUCATION/TRAINING PROGRAM

## 2025-01-03 PROCEDURE — 124N000002 HC R&B MH UMMC

## 2025-01-03 RX ORDER — DOCUSATE SODIUM 100 MG/1
100 CAPSULE, LIQUID FILLED ORAL DAILY
Status: ON HOLD | DISCHARGE
Start: 2025-01-04

## 2025-01-03 RX ORDER — ACETAMINOPHEN 325 MG/1
650 TABLET ORAL EVERY 4 HOURS PRN
Status: DISCONTINUED | OUTPATIENT
Start: 2025-01-03 | End: 2025-01-03

## 2025-01-03 RX ORDER — MAGNESIUM HYDROXIDE/ALUMINUM HYDROXICE/SIMETHICONE 120; 1200; 1200 MG/30ML; MG/30ML; MG/30ML
30 SUSPENSION ORAL EVERY 4 HOURS PRN
Status: ON HOLD | COMMUNITY
End: 2025-01-04

## 2025-01-03 RX ORDER — AMOXICILLIN 250 MG
1 CAPSULE ORAL 2 TIMES DAILY PRN
Status: DISCONTINUED | OUTPATIENT
Start: 2025-01-03 | End: 2025-03-19 | Stop reason: HOSPADM

## 2025-01-03 RX ORDER — OLANZAPINE 10 MG/1
10 TABLET, FILM COATED ORAL 3 TIMES DAILY PRN
Status: DISCONTINUED | OUTPATIENT
Start: 2025-01-03 | End: 2025-03-19 | Stop reason: HOSPADM

## 2025-01-03 RX ORDER — CHLORPROMAZINE HYDROCHLORIDE 100 MG/1
400 TABLET, FILM COATED ORAL AT BEDTIME
Status: DISCONTINUED | OUTPATIENT
Start: 2025-01-04 | End: 2025-01-04

## 2025-01-03 RX ORDER — HYDROXYZINE HYDROCHLORIDE 25 MG/1
25 TABLET, FILM COATED ORAL EVERY 6 HOURS PRN
Status: DISCONTINUED | OUTPATIENT
Start: 2025-01-03 | End: 2025-03-19 | Stop reason: HOSPADM

## 2025-01-03 RX ORDER — ACETAMINOPHEN 325 MG/1
650 TABLET ORAL EVERY 4 HOURS PRN
Status: DISCONTINUED | OUTPATIENT
Start: 2025-01-03 | End: 2025-03-19 | Stop reason: HOSPADM

## 2025-01-03 RX ORDER — OLANZAPINE 10 MG/2ML
10 INJECTION, POWDER, FOR SOLUTION INTRAMUSCULAR 3 TIMES DAILY PRN
Status: DISCONTINUED | OUTPATIENT
Start: 2025-01-03 | End: 2025-03-19 | Stop reason: HOSPADM

## 2025-01-03 RX ORDER — LORAZEPAM 1 MG/1
1 TABLET ORAL EVERY 4 HOURS PRN
Status: DISCONTINUED | OUTPATIENT
Start: 2025-01-03 | End: 2025-03-18

## 2025-01-03 RX ORDER — CHLORPROMAZINE HYDROCHLORIDE 50 MG/1
50 TABLET, FILM COATED ORAL EVERY 8 HOURS PRN
Status: DISCONTINUED | OUTPATIENT
Start: 2025-01-03 | End: 2025-03-18

## 2025-01-03 RX ORDER — GUAIFENESIN/DEXTROMETHORPHAN 100-10MG/5
10 SYRUP ORAL EVERY 4 HOURS PRN
Status: ON HOLD | DISCHARGE
Start: 2025-01-03 | End: 2025-01-04

## 2025-01-03 RX ORDER — HALOPERIDOL 0.5 MG/1
7.5 TABLET ORAL AT BEDTIME
Status: ON HOLD | DISCHARGE
Start: 2025-01-03

## 2025-01-03 RX ORDER — LORAZEPAM 1 MG/1
1 TABLET ORAL EVERY 4 HOURS PRN
Status: ON HOLD | DISCHARGE
Start: 2025-01-03

## 2025-01-03 RX ORDER — CHLORPROMAZINE HYDROCHLORIDE 200 MG/1
400 TABLET, FILM COATED ORAL AT BEDTIME
Status: ON HOLD | DISCHARGE
Start: 2025-01-03

## 2025-01-03 RX ORDER — HYDROXYZINE HYDROCHLORIDE 25 MG/1
25 TABLET, FILM COATED ORAL EVERY 4 HOURS PRN
Status: DISCONTINUED | OUTPATIENT
Start: 2025-01-03 | End: 2025-01-03 | Stop reason: ALTCHOICE

## 2025-01-03 RX ORDER — LISINOPRIL 5 MG/1
5 TABLET ORAL DAILY
Status: DISCONTINUED | OUTPATIENT
Start: 2025-01-04 | End: 2025-02-06

## 2025-01-03 RX ORDER — MAGNESIUM HYDROXIDE/ALUMINUM HYDROXICE/SIMETHICONE 120; 1200; 1200 MG/30ML; MG/30ML; MG/30ML
30 SUSPENSION ORAL EVERY 4 HOURS PRN
Status: DISCONTINUED | OUTPATIENT
Start: 2025-01-03 | End: 2025-03-19 | Stop reason: HOSPADM

## 2025-01-03 RX ORDER — CHLORPROMAZINE HYDROCHLORIDE 50 MG/1
50 TABLET, FILM COATED ORAL EVERY 8 HOURS PRN
Status: ON HOLD | COMMUNITY
End: 2025-03-12

## 2025-01-03 RX ORDER — POLYETHYLENE GLYCOL 3350 17 G/17G
17 POWDER, FOR SOLUTION ORAL DAILY PRN
Status: DISCONTINUED | OUTPATIENT
Start: 2025-01-03 | End: 2025-03-19 | Stop reason: HOSPADM

## 2025-01-03 RX ORDER — LORAZEPAM 0.5 MG/1
1.5 TABLET ORAL 3 TIMES DAILY
Status: ON HOLD | DISCHARGE
Start: 2025-01-03

## 2025-01-03 RX ORDER — GUAIFENESIN/DEXTROMETHORPHAN 100-10MG/5
10 SYRUP ORAL EVERY 4 HOURS PRN
Status: DISCONTINUED | OUTPATIENT
Start: 2025-01-03 | End: 2025-03-19 | Stop reason: HOSPADM

## 2025-01-03 RX ORDER — POLYETHYLENE GLYCOL 3350 17 G/17G
17 POWDER, FOR SOLUTION ORAL DAILY
Status: DISCONTINUED | OUTPATIENT
Start: 2025-01-04 | End: 2025-03-19 | Stop reason: HOSPADM

## 2025-01-03 RX ORDER — LITHIUM CARBONATE 300 MG/1
300 TABLET, FILM COATED, EXTENDED RELEASE ORAL AT BEDTIME
Status: DISCONTINUED | OUTPATIENT
Start: 2025-01-04 | End: 2025-01-11

## 2025-01-03 RX ORDER — LISINOPRIL 5 MG/1
5 TABLET ORAL DAILY
Status: ON HOLD | DISCHARGE
Start: 2025-01-04

## 2025-01-03 RX ORDER — AMOXICILLIN 250 MG
1 CAPSULE ORAL 2 TIMES DAILY PRN
Status: ON HOLD | DISCHARGE
Start: 2025-01-03

## 2025-01-03 RX ORDER — LITHIUM CARBONATE 300 MG/1
300 TABLET, FILM COATED, EXTENDED RELEASE ORAL AT BEDTIME
Status: ON HOLD | DISCHARGE
Start: 2025-01-03

## 2025-01-03 RX ORDER — MULTIPLE VITAMINS W/ MINERALS TAB 9MG-400MCG
1 TAB ORAL DAILY
Status: DISCONTINUED | OUTPATIENT
Start: 2025-01-04 | End: 2025-03-19 | Stop reason: HOSPADM

## 2025-01-03 RX ORDER — HYDROXYZINE HYDROCHLORIDE 25 MG/1
25 TABLET, FILM COATED ORAL EVERY 6 HOURS PRN
Status: ON HOLD | COMMUNITY
End: 2025-03-12

## 2025-01-03 RX ORDER — POLYETHYLENE GLYCOL 3350 17 G/17G
17 POWDER, FOR SOLUTION ORAL DAILY
Status: ON HOLD | DISCHARGE
Start: 2025-01-03

## 2025-01-03 RX ORDER — MULTIPLE VITAMINS W/ MINERALS TAB 9MG-400MCG
1 TAB ORAL DAILY
Status: ON HOLD | DISCHARGE
Start: 2025-01-04

## 2025-01-03 RX ORDER — MAGNESIUM HYDROXIDE/ALUMINUM HYDROXICE/SIMETHICONE 120; 1200; 1200 MG/30ML; MG/30ML; MG/30ML
30 SUSPENSION ORAL EVERY 4 HOURS PRN
Status: DISCONTINUED | OUTPATIENT
Start: 2025-01-03 | End: 2025-01-03

## 2025-01-03 RX ORDER — ACETAMINOPHEN 325 MG/1
650 TABLET ORAL EVERY 4 HOURS PRN
Status: ON HOLD | COMMUNITY
Start: 2025-01-03

## 2025-01-03 RX ORDER — DOCUSATE SODIUM 100 MG/1
100 CAPSULE, LIQUID FILLED ORAL DAILY
Status: DISCONTINUED | OUTPATIENT
Start: 2025-01-04 | End: 2025-03-19 | Stop reason: HOSPADM

## 2025-01-03 RX ADMIN — LISINOPRIL 5 MG: 2.5 TABLET ORAL at 10:30

## 2025-01-03 RX ADMIN — Medication 1 TABLET: at 09:25

## 2025-01-03 RX ADMIN — ACETAMINOPHEN 650 MG: 325 TABLET, FILM COATED ORAL at 09:25

## 2025-01-03 RX ADMIN — LORAZEPAM 1.5 MG: 0.5 TABLET ORAL at 18:34

## 2025-01-03 RX ADMIN — LORAZEPAM 1.5 MG: 0.5 TABLET ORAL at 15:15

## 2025-01-03 RX ADMIN — LORAZEPAM 1.5 MG: 0.5 TABLET ORAL at 10:30

## 2025-01-03 RX ADMIN — POLYETHYLENE GLYCOL 3350 17 G: 17 POWDER, FOR SOLUTION ORAL at 09:25

## 2025-01-03 RX ADMIN — CHLORPROMAZINE HYDROCHLORIDE 400 MG: 100 TABLET, FILM COATED ORAL at 18:33

## 2025-01-03 RX ADMIN — HALOPERIDOL 7.5 MG: 5 TABLET ORAL at 18:33

## 2025-01-03 RX ADMIN — LITHIUM CARBONATE 300 MG: 300 TABLET, EXTENDED RELEASE ORAL at 18:34

## 2025-01-03 RX ADMIN — LORAZEPAM 1 MG: 1 TABLET ORAL at 00:00

## 2025-01-03 RX ADMIN — DOCUSATE SODIUM 100 MG: 100 CAPSULE, LIQUID FILLED ORAL at 09:26

## 2025-01-03 ASSESSMENT — ACTIVITIES OF DAILY LIVING (ADL)
ADLS_ACUITY_SCORE: 57
DRESS: STREET CLOTHES
LAUNDRY: UNABLE TO COMPLETE
ORAL_HYGIENE: INDEPENDENT
ADLS_ACUITY_SCORE: 57
DRESS: INDEPENDENT;SCRUBS (BEHAVIORAL HEALTH)
ADLS_ACUITY_SCORE: 37
ADLS_ACUITY_SCORE: 37
ADLS_ACUITY_SCORE: 57
HYGIENE/GROOMING: INDEPENDENT
ADLS_ACUITY_SCORE: 37
ADLS_ACUITY_SCORE: 48
ADLS_ACUITY_SCORE: 37
ADLS_ACUITY_SCORE: 57
ADLS_ACUITY_SCORE: 37
ADLS_ACUITY_SCORE: 57
ADLS_ACUITY_SCORE: 57

## 2025-01-03 NOTE — PLAN OF CARE
Problem: Fall Injury Risk  Goal: Absence of Fall and Fall-Related Injury  Description: Pt. Has order for fall precautions.   Patient will remain free of harm r/t falls  Outcome: Progressing     Problem: Adult Behavioral Health Plan of Care  Goal: Patient-Specific Goal (Individualization)  Description: Description: Pt will follow recommendations of treatment team.  Pt will be compliant with medications.  Pt will attend 50 % of groups when able  Pt will sleep 6-8 hours each night.  MOUTH CHECKS    12/19/24  One family member of the patient is designated as the  for incoming and outgoing calls to and from staff for this patient during her hospital stay.  This family member is Mohamed per family/patient request.    All other family members that call wanting to speak with staff regarding the patient will be directed to call the designated family member.   The patient is able to continue to make and receive calls as appropriate.   Designated family member will be asked to call only once daily for an update on the patient condition as to not disrupt patient care.  All visitation will adhere to current unit policy.    12/17/2024- Patient may wear head scarf.    12/27/24- MH-ICU r/t disorganization.    12/31/24: Pt may wean to the dayroom with close staff observation. Treatment team to reassess daily.    12/27/24 Pt on Intake and Output monitoring     12/28/24 Pt to have her legs elevated on a bed wedge when she is in bed due to bilateral leg edema     12/28/24 Pt is to be ambulated with staff once a shift.    1/1/2025 Pt has shea butter lotion in her belongings to use. Patient also to use purple bottle detangling shampoo and apply leave in hair lotion after shower  Outcome: Progressing     Problem: Thought Process Alteration  Goal: Optimal Thought Clarity  Description: Pt will have a reduction in hallucinations, disorganization, and catatonia symptoms by discharge  Outcome: Progressing   Goal Outcome  Evaluation:  Pt in room in the MHICU at the start of the shift. Pt reported pain and given tylenol 650mg at 0925. Pt refused her medications from nursing this morning but was willing to take them from a different nurse a few minutes later. Pt was accepted to [facility] for this afternoon.            Face to face end of shift report communicated to evening shift RN. Reported that pt is a risk for falls.     Trice Pina, RN  1/3/2025  8:12 AM

## 2025-01-03 NOTE — DISCHARGE SUMMARY
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 metabolic panel     Status: Abnormal   Result Value Ref Range    Sodium 137 135 - 145 mmol/L    Potassium 4.4 3.4 - 5.3 mmol/L    Chloride 105 98 - 107 mmol/L    Carbon Dioxide (CO2) 20 (L) 22 - 29 mmol/L    Anion Gap 12 7 - 15 mmol/L    Urea Nitrogen 18.3 6.0 - 20.0 mg/dL    Creatinine 0.91 0.51 - 0.95 mg/dL    GFR Estimate 73 >60 mL/min/1.73m2    Calcium 10.0 8.8 - 10.4 mg/dL    Glucose 91 70 - 99 mg/dL   Extra Tube     Status: None    Narrative    The following orders were created for panel order Extra Tube.  Procedure                               Abnormality         Status                     ---------                               -----------         ------                     Extra Red Top Tube[[id number]]                               Final result               Extra Purple Top Tube[[id number]]                            Final result                 Please view results for these tests on the individual orders.   Extra Red Top Tube     Status: None   Result Value Ref Range    Hold Specimen [facility]    Extra Purple Top Tube     Status: None   Result Value Ref Range    Hold Specimen [facility]    Lithium level     Status: Normal   Result Value Ref Range    Lithium 0.90 0.60 - 1.20 mmol/L   Extra Tube     Status: None    Narrative    The following orders were created for panel order Extra Tube.  Procedure                                Abnormality         Status                     ---------                               -----------         ------                     Extra Green Top (Lithium...[[id number]]                      Final result               Extra Purple Top Tube[[id number]]                            Final result                 Please view results for these tests on the individual orders.   Extra Green Top (Lithium Heparin) Tube     Status: None   Result Value Ref Range    Hold Specimen [facility]    Extra Purple Top Tube     Status: None   Result Value Ref Range    Hold Specimen [facility]    EKG 12-Lead, Tracing Only     Status: None   Result Value Ref Range    Systolic Blood Pressure  mmHg    Diastolic Blood Pressure  mmHg    Ventricular Rate 102 BPM    Atrial Rate 102 BPM    NC Interval 154 ms    QRS Duration 68 ms     ms    QTc 445 ms    P Axis 71 degrees    R AXIS 51 degrees    T Axis 33 degrees    Interpretation ECG       Sinus tachycardia  Otherwise normal ECG  No previous ECGs available  Confirmed by MD Bernal Anthony ([id number]) on 12/20/2024 12:15:15 PM     EKG 12-Lead, Tracing Only     Status: None   Result Value Ref Range    Systolic Blood Pressure  mmHg    Diastolic Blood Pressure  mmHg    Ventricular Rate 80 BPM    Atrial Rate 80 BPM    NC Interval 188 ms    QRS Duration 74 ms     ms    QTc 442 ms    P Axis 72 degrees    R AXIS 29 degrees    T Axis 42 degrees    Interpretation ECG       Sinus rhythm  Normal ECG  When compared with ECG of 19-Dec-2024 09:03,  No significant change was found  Confirmed by MD Bernal Anthony ([id number]) on 12/26/2024 10:07:07 AM     Group A Streptococcus PCR Throat Swab     Status: Normal    Specimen: Throat; Swab   Result Value Ref Range    Group A strep by PCR Not Detected Not Detected    Narrative    The Xpert Xpress Strep A test, performed on the [employer], is a rapid, qualitative in vitro diagnostic test for the detection of Streptococcus pyogenes (Group A  ß-hemolytic Streptococcus, Strep A) in throat swab specimens from patients with signs and symptoms of pharyngitis. The Xpert Xpress Strep A test can be used as an aid in the diagnosis of Group A Streptococcal pharyngitis. The assay is not intended to monitor treatment for Group A Streptococcus infections. The Xpert Xpress Strep A test utilizes an automated real-time polymerase chain reaction (PCR) to detect Streptococcus pyogenes DNA.   CBC with Platelets & Differential     Status: None    Narrative    The following orders were created for panel order CBC with Platelets & Differential.  Procedure                               Abnormality         Status                     ---------                               -----------         ------                     CBC with platelets and d...[[id number]]                      Final result                 Please view results for these tests on the individual orders.

## 2025-01-03 NOTE — TELEPHONE ENCOUNTER
R: Bed Search Update [facility] Only 7:10 PM.  Pt requiring transfer from [facility] to [facility] for ECT treatment.  [facility] is currently at capacity.  Pt remains on PPS worklist awaiting appropriate placement.

## 2025-01-03 NOTE — PROGRESS NOTES
Spoke to son, Nghia, gave update on transfer to [facility] once transport has been obtained. Answered general questions. Nghia was appreciative.

## 2025-01-03 NOTE — PLAN OF CARE
Problem: Adult Behavioral Health Plan of Care  Goal: Patient-Specific Goal (Individualization)  Description: Description: Pt will follow recommendations of treatment team.  Pt will be compliant with medications.  Pt will attend 50 % of groups when able  Pt will sleep 6-8 hours each night.  MOUTH CHECKS    12/19/24  One family member of the patient is designated as the  for incoming and outgoing calls to and from staff for this patient during her hospital stay.  This family member is Mohamed per family/patient request.    All other family members that call wanting to speak with staff regarding the patient will be directed to call the designated family member.   The patient is able to continue to make and receive calls as appropriate.   Designated family member will be asked to call only once daily for an update on the patient condition as to not disrupt patient care.  All visitation will adhere to current unit policy.    12/17/2024- Patient may wear head scarf.    12/27/24- MH-ICU r/t disorganization.    12/31/24: Pt may wean to the dayroom with close staff observation. Treatment team to reassess daily.    12/27/24 Pt on Intake and Output monitoring     12/28/24 Pt to have her legs elevated on a bed wedge when she is in bed due to bilateral leg edema     12/28/24 Pt is to be ambulated with staff once a shift.    1/1/2025 Pt has shea butter lotion in her belongings to use. Patient also to use purple bottle detangling shampoo and apply leave in hair lotion after shower  Outcome: Progressing  Note: Report received from Denise. Rounding complete. Pt observed sitting on the edge of her bed just staring at the wall. 0001- Pt offered and accepted Ativan 1 mg PO. Writer and UA toileted pt and then brought her snacks of a turkey sandwich, banana, armando crackers, saltine crackers, and a large cup of ice water and a large cup of OJ (held 3 of the small individual cups of oj). Pt was noted to shuffle  "significantly when staff walked her to the restroom to toilet. Her brief was dry and clean and writer assisted pt in putting on a new one. Pt speech was very soft and unable to be understand what she was saying although it did not sound like another language and was definitely not Nigerien or English that she was speaking. She again was only audibly repeating the last word this writer would say in a question or only answering in simple \"yes\" or \"No\" when asked questions.     0100-  Pt observed sleeping in left side lying position with regular and unlabored respirations.    0325- Received call from [facility] regarding potential pt transfer and provided requested updates for them to pass along to day shift and she stated this all sounds good and she will pass the info along to day shift and we should be receiving a call today on days with an update regarding potential transfer.     Pt has been in bed with eyes closed and regular respirations. 15 minute and PRN checks all night. No complaints offered.     Pt slept approx 4.25   hours this NOC shift.    Face to face end of shift report communicated to oncoming RN.    Morena MOBLEY RN  January 3, 2025  2:02 AM          Problem: Thought Process Alteration  Goal: Optimal Thought Clarity  Description: Pt will have a reduction in hallucinations, disorganization, and catatonia symptoms by discharge  Outcome: Progressing  Note: Unable to assess due to pt current mental health status. She is not heard speaking to herself alone in the room. She again appears to have catatonic like s/s.      Problem: Fall Injury Risk  Goal: Absence of Fall and Fall-Related Injury  Description: Pt. Has order for fall precautions.   Patient will remain free of harm r/t falls  Outcome: Progressing  Note: Pt has a severe shuffling in her gait at the first time of toileting tonight. This and pt speech do tend to clear up significantly with admin of ativan.    Goal Outcome Evaluation:                        "

## 2025-01-04 ENCOUNTER — OFFICE VISIT (OUTPATIENT)
Dept: INTERPRETER SERVICES | Facility: CLINIC | Age: 60
End: 2025-01-04
Attending: PSYCHIATRY & NEUROLOGY

## 2025-01-04 ENCOUNTER — APPOINTMENT (OUTPATIENT)
Dept: INTERPRETER SERVICES | Facility: CLINIC | Age: 60
DRG: 885 | End: 2025-01-04
Attending: PSYCHIATRY & NEUROLOGY

## 2025-01-04 ENCOUNTER — APPOINTMENT (OUTPATIENT)
Dept: GENERAL RADIOLOGY | Facility: CLINIC | Age: 60
End: 2025-01-04

## 2025-01-04 PROBLEM — F25.0 SCHIZOAFFECTIVE DISORDER, BIPOLAR TYPE (H): Status: ACTIVE | Noted: 2025-01-04

## 2025-01-04 LAB
TROPONIN T SERPL HS-MCNC: 10 NG/L
TROPONIN T SERPL HS-MCNC: 10 NG/L
TROPONIN T SERPL HS-MCNC: 9 NG/L
TROPONIN T SERPL HS-MCNC: 9 NG/L

## 2025-01-04 PROCEDURE — 93010 ELECTROCARDIOGRAM REPORT: CPT | Performed by: INTERNAL MEDICINE

## 2025-01-04 PROCEDURE — 250N000013 HC RX MED GY IP 250 OP 250 PS 637

## 2025-01-04 PROCEDURE — 36415 COLL VENOUS BLD VENIPUNCTURE: CPT

## 2025-01-04 PROCEDURE — 71046 X-RAY EXAM CHEST 2 VIEWS: CPT

## 2025-01-04 PROCEDURE — 99254 IP/OBS CNSLTJ NEW/EST MOD 60: CPT

## 2025-01-04 PROCEDURE — 71046 X-RAY EXAM CHEST 2 VIEWS: CPT | Mod: 26 | Performed by: RADIOLOGY

## 2025-01-04 PROCEDURE — 124N000002 HC R&B MH UMMC

## 2025-01-04 PROCEDURE — 84484 ASSAY OF TROPONIN QUANT: CPT

## 2025-01-04 PROCEDURE — 99221 1ST HOSP IP/OBS SF/LOW 40: CPT | Performed by: PSYCHIATRY & NEUROLOGY

## 2025-01-04 PROCEDURE — 250N000013 HC RX MED GY IP 250 OP 250 PS 637: Performed by: PSYCHIATRY & NEUROLOGY

## 2025-01-04 PROCEDURE — T1013 SIGN LANG/ORAL INTERPRETER: HCPCS

## 2025-01-04 RX ORDER — CHLORPROMAZINE HYDROCHLORIDE 100 MG/1
400 TABLET, FILM COATED ORAL AT BEDTIME
Status: DISCONTINUED | OUTPATIENT
Start: 2025-01-04 | End: 2025-01-29

## 2025-01-04 RX ORDER — LEVOTHYROXINE SODIUM 50 UG/1
100 TABLET ORAL
Status: DISCONTINUED | OUTPATIENT
Start: 2025-01-04 | End: 2025-03-14

## 2025-01-04 RX ADMIN — Medication 1 TABLET: at 09:16

## 2025-01-04 RX ADMIN — LORAZEPAM 1.5 MG: 1 TABLET ORAL at 09:20

## 2025-01-04 RX ADMIN — LEVOTHYROXINE SODIUM 100 MCG: 0.05 TABLET ORAL at 09:21

## 2025-01-04 RX ADMIN — LITHIUM CARBONATE 300 MG: 300 TABLET, EXTENDED RELEASE ORAL at 21:16

## 2025-01-04 RX ADMIN — POLYETHYLENE GLYCOL 3350 17 G: 17 POWDER, FOR SOLUTION ORAL at 09:18

## 2025-01-04 RX ADMIN — CHLORPROMAZINE HYDROCHLORIDE 400 MG: 100 TABLET, FILM COATED ORAL at 21:17

## 2025-01-04 RX ADMIN — LORAZEPAM 1.5 MG: 1 TABLET ORAL at 14:07

## 2025-01-04 RX ADMIN — LORAZEPAM 1.5 MG: 1 TABLET ORAL at 21:16

## 2025-01-04 RX ADMIN — DOCUSATE SODIUM 100 MG: 100 CAPSULE, LIQUID FILLED ORAL at 09:17

## 2025-01-04 RX ADMIN — Medication 7.5 MG: at 21:17

## 2025-01-04 ASSESSMENT — ACTIVITIES OF DAILY LIVING (ADL)
ORAL_HYGIENE: INDEPENDENT
ADLS_ACUITY_SCORE: 36
LAUNDRY: UNABLE TO COMPLETE
ADLS_ACUITY_SCORE: 36
ADLS_ACUITY_SCORE: 36
DRESS: INDEPENDENT
ADLS_ACUITY_SCORE: 36
ADLS_ACUITY_SCORE: 48
ADLS_ACUITY_SCORE: 36
ADLS_ACUITY_SCORE: 36
ADLS_ACUITY_SCORE: 48
HYGIENE/GROOMING: INDEPENDENT
DRESS: INDEPENDENT
ADLS_ACUITY_SCORE: 48
ORAL_HYGIENE: PROMPTS
ADLS_ACUITY_SCORE: 36
ADLS_ACUITY_SCORE: 36
HYGIENE/GROOMING: INDEPENDENT
ADLS_ACUITY_SCORE: 36
ADLS_ACUITY_SCORE: 48
ADLS_ACUITY_SCORE: 48
ADLS_ACUITY_SCORE: 36
LAUNDRY: UNABLE TO COMPLETE
ADLS_ACUITY_SCORE: 36
ADLS_ACUITY_SCORE: 48

## 2025-01-04 NOTE — PHARMACY-ADMISSION MEDICATION HISTORY
Pharmacist Admission Medication History    Admission medication history is complete. The information provided in this note is only as accurate as the sources available at the time of the update.    Medication reconciliation/reorder completed by provider prior to medication history? yes    Information Source(s): 1/3 [facility] discharge summary     Pertinent Information:   Pharmacist reviewed 1/3 [facility] discharge summary to ensure medications the patient was receiving during that hospitalization are consistent with PTA medications.      Changes made to PTA medication list:  No changes     Allergies reviewed with patient and updates made in EHR: no    Prior to Admission medications    Medication Sig Last Dose       acetaminophen (TYLENOL) 325 MG tablet Take 2 tablets (650 mg) by mouth every 4 hours as needed for mild pain (to moderate pain).     1/3/2025 Morning        chlorproMAZINE (THORAZINE) 50 MG tablet Take 50 mg by mouth every 8 hours as needed for other (agitation). First choice     as needed        chlorproMAZINE 200 MG TABS Take 2 tablets (400 mg) by mouth at bedtime. 1/3/2025 Evening           docusate sodium (COLACE) 100 MG capsule     Take 1 capsule (100 mg) by mouth daily. 1/3/2025 Morning       haloperidol (HALDOL) 0.5 MG tablet     Take 15 tablets (7.5 mg) by mouth at bedtime. 1/3/2025 Evening       hydrOXYzine HCl (ATARAX) 25 MG tablet     Take 25 mg by mouth every 6 hours as needed for anxiety. Past Month       lisinopril (ZESTRIL) 5 MG tablet Take 1 tablet (5 mg) by mouth daily.     1/3/2025 Morning       lithium ER (LITHOBID) 300 MG CR tablet Take 1 tablet (300 mg) by mouth at bedtime. 1/3/2025 Evening           LORazepam (ATIVAN) 0.5 MG tablet Take 3 tablets (1.5 mg) by mouth 3 times daily. 1/3/2025 Evening           LORazepam (ATIVAN) 1 MG tablet Take 1 tablet (1 mg) by mouth every 4 hours as needed for anxiety (2nd choice. Sleep 1st choice. May repeat x 1 for sleep after 30 minutes.).      1/3/2025 at 12:00 AM       multivitamin w/minerals (THERA-VIT-M) tablet     Take 1 tablet by mouth daily. 1/3/2025 Morning       polyethylene glycol (MIRALAX) 17 GM/Dose powder     Take 17 g by mouth daily. 1/3/2025 Morning       senna-docusate (SENOKOT-S/PERICOLACE) 8.6-50 MG tablet     Take 1 tablet by mouth 2 times daily as needed for constipation (1st choice.). as needed           Isaura Swan, Pharm.D., [facility]  Behavioral Health Inpatient Pharmacist  [facility] Emergency Department  Contact via [employer] or Epic Messaging
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
## 2025-01-04 NOTE — PLAN OF CARE
Problem: Depressive Signs/Symptoms  Goal: Improved Sleep (Depressive Signs/Symptoms)  Outcome: Progressing   Goal Outcome Evaluation:    Patient was asleep at the start of the shift and slept throughout the night.     Slept for 7.75 hours. No behavioral issues noted this shift. She is scheduled for IM Consult today.
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
## 2025-01-04 NOTE — PLAN OF CARE
A               Admission:  Clothing   01/03/25 2345   Patient Belongings   Patient Belongings locker   Patient Belongings Put in Hospital Secure Location (Security or Locker, etc.) clothing;shoes;purse/wallet   Belongings Search Yes   Clothing Search Yes     Purse    Clothing: ( 2 Sweaters,Sweatpants, Rope,4 socks,2 lotion, Mitten, Hairlotion,Hat,Jacket, 5 underwear,  Bra, 3 traditional dresses, 3 Hijabs, 3 undergarments, Hair comb, Hair Oil and Bonnet.     Hygiene Products: (Shampoo, Handsoap, Deodorant,Toothpaste  Games: Cards, Puzzle pieces, Drawing book, Notebook            I am responsible for any personal items that are not sent to the safe or pharmacy.  [facility] is not responsible for loss, theft or damage of any property in my possession.    Signature:  _________________________________ Date: _______  Time: _____                                              Staff Signature:  ____________________________ Date: ________  Time: _____      2nd Staff person, if patient is unable/unwilling to sign:    Signature: ________________________________ Date: ________  Time: _____     Discharge:  [facility] has returned all of my personal belongings:    Signature: _________________________________ Date: ________  Time: _____                                          Staff Signature:  ____________________________ Date: ________  Time: _____
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
## 2025-01-04 NOTE — PLAN OF CARE
Problem: Depressive Signs/Symptoms  Goal: Optimized Cognitive Function (Depressive Signs/Symptoms)  Outcome: Progressing     Problem: Psychotic Signs/Symptoms  Goal: Increased Participation and Engagement (Psychotic Signs/Symptoms)  Outcome: Progressing  Intervention: Facilitate Participation and Engagement    Patient out in the milieu most part of the shift.  Presented with flat and blunted affect.  Mood is calm.   Endorsed anxiety and depression at 8.  Patient denies SI/SIB/hallucinations.  Denies pain.  Patient ate 25% of dinner and drank 120 ml  Medication compliant.   Family were here to visit.Visit went well.  Will monitor as needed.

## 2025-01-04 NOTE — PROGRESS NOTES
Brief Medicine Follow Up Note    Following up regarding chest pain workup.     A/P:  Chest Pain, Possibly Chronic  Cough, Possibly Chronic  Intermittent Dyspnea, Possibly Chronic  See my consult note from today for details.   - CXR, Trop, and EKG all unremarkable for acute findings to explain symptoms; low likelihood of MI, PNA, or other airspace disease  - Notify Medicine if viral workup is positive for any URI  - Continue to monitor VS    Medicine will sign off. No further recommendations at this time.  Please feel free to reconsult if any new medical issues or concerns.  Thank you for the opportunity to care for this patient.     Juan Samano PA-C  [facility]  Contact information available via [facility] Paging/Directory
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
## 2025-01-05 ENCOUNTER — OFFICE VISIT (OUTPATIENT)
Dept: INTERPRETER SERVICES | Facility: CLINIC | Age: 60
End: 2025-01-05
Attending: PSYCHIATRY & NEUROLOGY

## 2025-01-05 LAB
C PNEUM DNA SPEC QL NAA+PROBE: NOT DETECTED
C PNEUM DNA SPEC QL NAA+PROBE: NOT DETECTED
FLUAV H1 2009 PAND RNA SPEC QL NAA+PROBE: NOT DETECTED
FLUAV H1 2009 PAND RNA SPEC QL NAA+PROBE: NOT DETECTED
FLUAV H1 RNA SPEC QL NAA+PROBE: NOT DETECTED
FLUAV H1 RNA SPEC QL NAA+PROBE: NOT DETECTED
FLUAV H3 RNA SPEC QL NAA+PROBE: NOT DETECTED
FLUAV H3 RNA SPEC QL NAA+PROBE: NOT DETECTED
FLUAV RNA SPEC QL NAA+PROBE: NEGATIVE
FLUAV RNA SPEC QL NAA+PROBE: NEGATIVE
FLUAV RNA SPEC QL NAA+PROBE: NOT DETECTED
FLUAV RNA SPEC QL NAA+PROBE: NOT DETECTED
FLUBV RNA RESP QL NAA+PROBE: NEGATIVE
FLUBV RNA RESP QL NAA+PROBE: NEGATIVE
FLUBV RNA SPEC QL NAA+PROBE: NOT DETECTED
FLUBV RNA SPEC QL NAA+PROBE: NOT DETECTED
HADV DNA SPEC QL NAA+PROBE: NOT DETECTED
HADV DNA SPEC QL NAA+PROBE: NOT DETECTED
HCOV PNL SPEC NAA+PROBE: NOT DETECTED
HCOV PNL SPEC NAA+PROBE: NOT DETECTED
HMPV RNA SPEC QL NAA+PROBE: NOT DETECTED
HMPV RNA SPEC QL NAA+PROBE: NOT DETECTED
HPIV1 RNA SPEC QL NAA+PROBE: NOT DETECTED
HPIV1 RNA SPEC QL NAA+PROBE: NOT DETECTED
HPIV2 RNA SPEC QL NAA+PROBE: NOT DETECTED
HPIV2 RNA SPEC QL NAA+PROBE: NOT DETECTED
HPIV3 RNA SPEC QL NAA+PROBE: NOT DETECTED
HPIV3 RNA SPEC QL NAA+PROBE: NOT DETECTED
HPIV4 RNA SPEC QL NAA+PROBE: NOT DETECTED
HPIV4 RNA SPEC QL NAA+PROBE: NOT DETECTED
M PNEUMO DNA SPEC QL NAA+PROBE: NOT DETECTED
M PNEUMO DNA SPEC QL NAA+PROBE: NOT DETECTED
RSV RNA SPEC NAA+PROBE: NEGATIVE
RSV RNA SPEC NAA+PROBE: NEGATIVE
RSV RNA SPEC QL NAA+PROBE: NOT DETECTED
RV+EV RNA SPEC QL NAA+PROBE: NOT DETECTED
RV+EV RNA SPEC QL NAA+PROBE: NOT DETECTED
SARS-COV-2 RNA RESP QL NAA+PROBE: NEGATIVE
SARS-COV-2 RNA RESP QL NAA+PROBE: NEGATIVE

## 2025-01-05 PROCEDURE — 250N000013 HC RX MED GY IP 250 OP 250 PS 637: Performed by: PSYCHIATRY & NEUROLOGY

## 2025-01-05 PROCEDURE — 250N000013 HC RX MED GY IP 250 OP 250 PS 637

## 2025-01-05 PROCEDURE — 87637 SARSCOV2&INF A&B&RSV AMP PRB: CPT

## 2025-01-05 PROCEDURE — 87633 RESP VIRUS 12-25 TARGETS: CPT

## 2025-01-05 PROCEDURE — T1013 SIGN LANG/ORAL INTERPRETER: HCPCS

## 2025-01-05 PROCEDURE — 124N000002 HC R&B MH UMMC

## 2025-01-05 RX ADMIN — Medication 7.5 MG: at 21:30

## 2025-01-05 RX ADMIN — LEVOTHYROXINE SODIUM 100 MCG: 0.05 TABLET ORAL at 08:51

## 2025-01-05 RX ADMIN — Medication 1 TABLET: at 08:51

## 2025-01-05 RX ADMIN — DOCUSATE SODIUM 100 MG: 100 CAPSULE, LIQUID FILLED ORAL at 08:51

## 2025-01-05 RX ADMIN — POLYETHYLENE GLYCOL 3350 17 G: 17 POWDER, FOR SOLUTION ORAL at 08:51

## 2025-01-05 RX ADMIN — LORAZEPAM 1.5 MG: 1 TABLET ORAL at 08:51

## 2025-01-05 RX ADMIN — LITHIUM CARBONATE 300 MG: 300 TABLET, EXTENDED RELEASE ORAL at 21:31

## 2025-01-05 RX ADMIN — Medication 3 MG: at 00:01

## 2025-01-05 RX ADMIN — CHLORPROMAZINE HYDROCHLORIDE 400 MG: 100 TABLET, FILM COATED ORAL at 21:30

## 2025-01-05 RX ADMIN — LORAZEPAM 1.5 MG: 1 TABLET ORAL at 20:14

## 2025-01-05 ASSESSMENT — ACTIVITIES OF DAILY LIVING (ADL)
ADLS_ACUITY_SCORE: 36
LAUNDRY: UNABLE TO COMPLETE
ADLS_ACUITY_SCORE: 36
HYGIENE/GROOMING: INDEPENDENT
ADLS_ACUITY_SCORE: 36
DRESS: INDEPENDENT
ADLS_ACUITY_SCORE: 36
ORAL_HYGIENE: INDEPENDENT

## 2025-01-05 NOTE — PLAN OF CARE
"  Problem: Depressive Signs/Symptoms  Goal: Improved Sleep (Depressive Signs/Symptoms)  Outcome: Progressing   Goal Outcome Evaluation:    Patient was seating on bed at the start of the shift. When asked what was bothering her, she said, \" I could not sleep.\" Melatonin 3 mg. Given @ 0001 PRN for sleep. Patient was assisted to the bathroom and voided to a large amount of yellow/straw colored urine. She was also incontinent of urine as her pull-up was fully soaked with urine. Fell asleep @ 0030 and slept throughout the  night . Slept for 8.25 hours. No psychotic behavior noted this shift.                       "

## 2025-01-05 NOTE — PLAN OF CARE
Problem: Depressive Signs/Symptoms  Goal: Improved Mood Symptoms (Depressive Signs/Symptoms)  Outcome: Progressing  Intervention: Promote Mood Improvement  Goal Outcome Evaluation:  Pt interviewed with Maco interpretor present.   Pt's mood is labile. She appeared depressed during assessment. Intermittent eye contact, mumbling, eyes cast down, endorses feeling depressed. She says she misses her family. Another time she was laughing out loud with bright full smile. Her smile returned when her laughing was mentioned.   She does not ask for things to do or needs. Offer her activities. She likes to color.   She is disoriented to date, time and situation.   Gait shuffling, but steady for short distances with WW. She prefers WC for longer distances.   Medication compliant. PRNs: NA  Cooperative.   No complaints.   Respiratory nasal swabs sent to lab

## 2025-01-05 NOTE — PLAN OF CARE
Problem: Depressive Signs/Symptoms  Goal: Optimized Cognitive Function (Depressive Signs/Symptoms)  Outcome: Progressing     Problem: Psychotic Signs/Symptoms  Goal: Improved Mood Symptoms (Psychotic Signs/Symptoms)  Intervention: Optimize Emotion and Mood      Patient up and visible on the unit.  Patient presented with flat affect.  Mood is calm   Patient denies all psych symptoms.  Denies pain or discomfort.  Eating and drinking adequately.  Patient is kenyetta for safety.   Medication compliant. No PRN requested.  Family was here to visit,visit went well.  Will monitor as needed.

## 2025-01-06 ENCOUNTER — OFFICE VISIT (OUTPATIENT)
Dept: INTERPRETER SERVICES | Facility: CLINIC | Age: 60
End: 2025-01-06

## 2025-01-06 LAB
ATRIAL RATE - MUSE: 87 BPM
ATRIAL RATE - MUSE: 87 BPM
DIASTOLIC BLOOD PRESSURE - MUSE: NORMAL MMHG
DIASTOLIC BLOOD PRESSURE - MUSE: NORMAL MMHG
INTERPRETATION ECG - MUSE: NORMAL
INTERPRETATION ECG - MUSE: NORMAL
P AXIS - MUSE: 63 DEGREES
P AXIS - MUSE: 63 DEGREES
PR INTERVAL - MUSE: 150 MS
PR INTERVAL - MUSE: 150 MS
QRS DURATION - MUSE: 68 MS
QRS DURATION - MUSE: 68 MS
QT - MUSE: 338 MS
QT - MUSE: 338 MS
QTC - MUSE: 406 MS
QTC - MUSE: 406 MS
R AXIS - MUSE: 25 DEGREES
R AXIS - MUSE: 25 DEGREES
SYSTOLIC BLOOD PRESSURE - MUSE: NORMAL MMHG
SYSTOLIC BLOOD PRESSURE - MUSE: NORMAL MMHG
T AXIS - MUSE: 42 DEGREES
T AXIS - MUSE: 42 DEGREES
VENTRICULAR RATE- MUSE: 87 BPM
VENTRICULAR RATE- MUSE: 87 BPM

## 2025-01-06 PROCEDURE — 250N000013 HC RX MED GY IP 250 OP 250 PS 637

## 2025-01-06 PROCEDURE — 99233 SBSQ HOSP IP/OBS HIGH 50: CPT | Performed by: PSYCHIATRY & NEUROLOGY

## 2025-01-06 PROCEDURE — 97150 GROUP THERAPEUTIC PROCEDURES: CPT | Mod: GO

## 2025-01-06 PROCEDURE — T1013 SIGN LANG/ORAL INTERPRETER: HCPCS

## 2025-01-06 PROCEDURE — 93005 ELECTROCARDIOGRAM TRACING: CPT

## 2025-01-06 PROCEDURE — 124N000002 HC R&B MH UMMC

## 2025-01-06 PROCEDURE — 250N000013 HC RX MED GY IP 250 OP 250 PS 637: Performed by: PSYCHIATRY & NEUROLOGY

## 2025-01-06 PROCEDURE — 93010 ELECTROCARDIOGRAM REPORT: CPT | Performed by: INTERNAL MEDICINE

## 2025-01-06 PROCEDURE — 90853 GROUP PSYCHOTHERAPY: CPT | Performed by: COUNSELOR

## 2025-01-06 RX ADMIN — LORAZEPAM 1.5 MG: 1 TABLET ORAL at 13:42

## 2025-01-06 RX ADMIN — Medication 7.5 MG: at 22:32

## 2025-01-06 RX ADMIN — LEVOTHYROXINE SODIUM 100 MCG: 0.05 TABLET ORAL at 08:38

## 2025-01-06 RX ADMIN — CHLORPROMAZINE HYDROCHLORIDE 400 MG: 100 TABLET, FILM COATED ORAL at 22:31

## 2025-01-06 RX ADMIN — DOCUSATE SODIUM 100 MG: 100 CAPSULE, LIQUID FILLED ORAL at 08:38

## 2025-01-06 RX ADMIN — LORAZEPAM 1.5 MG: 1 TABLET ORAL at 08:38

## 2025-01-06 RX ADMIN — LISINOPRIL 5 MG: 5 TABLET ORAL at 08:38

## 2025-01-06 RX ADMIN — POLYETHYLENE GLYCOL 3350 17 G: 17 POWDER, FOR SOLUTION ORAL at 08:38

## 2025-01-06 RX ADMIN — LORAZEPAM 1.5 MG: 1 TABLET ORAL at 22:32

## 2025-01-06 RX ADMIN — Medication 1 TABLET: at 08:38

## 2025-01-06 RX ADMIN — LITHIUM CARBONATE 300 MG: 300 TABLET, EXTENDED RELEASE ORAL at 22:32

## 2025-01-06 ASSESSMENT — ACTIVITIES OF DAILY LIVING (ADL)
HYGIENE/GROOMING: INDEPENDENT
ADLS_ACUITY_SCORE: 36
ADLS_ACUITY_SCORE: 39
ADLS_ACUITY_SCORE: 36
ADLS_ACUITY_SCORE: 39
ADLS_ACUITY_SCORE: 36
ORAL_HYGIENE: INDEPENDENT
ADLS_ACUITY_SCORE: 39
ADLS_ACUITY_SCORE: 36
DRESS: STREET CLOTHES;INDEPENDENT
ADLS_ACUITY_SCORE: 36
ADLS_ACUITY_SCORE: 39
ADLS_ACUITY_SCORE: 39
ADLS_ACUITY_SCORE: 36
ADLS_ACUITY_SCORE: 36

## 2025-01-06 NOTE — PLAN OF CARE
Problem: Depressive Signs/Symptoms  Goal: Improved Sleep (Depressive Signs/Symptoms)  Outcome: Progressing   Goal Outcome Evaluation:    Patient was asleep at the start of the shift. She is using a medical bed to assist with mobility.     Patient walked to the bathroom with standby assist and was continent of large amount of light yellow urine.     No behavioral issues noted this shift. Slept for 8.5 hours.

## 2025-01-06 NOTE — PLAN OF CARE
Rehab Group    Start time: 10:15  End time: 11:15  Patient time total: 35 minutes    attended partial group    #7 attended   Group Type: OT Clinic   Group Topic Covered: balanced lifestyle, cognitive activities, coping skills, emotional regulation, healthy leisure time, mindfulness, relaxation , and social skills     Group Session Detail:  Pt actively participated in occupational therapy clinic to facilitate coping skill exploration, creative expression within personally meaningful activities, and clinical observation of social, cognitive, and kinesthetic performance skills.     Patient Response/Contribution:  cooperative with task and worked intermittently     Patient Detail:  Pt response: Dar to initiate, gather materials, sequence, and adjust to workspace demands as needed. Patient was able to verbalize potential interests and select a creative task with prompting. Demonstrated intermittent focus, fair planning, and good problem solving for selected 1-step creative task. Patient appeared inattentive to the task at hand occasionally; requiring gentle redirection back to the task. No social interaction with others observed. Patient remained calm and cooperative throughout group session.      [id number] OT Group (2 or more in attendance)      Patient Active Problem List   Diagnosis    Bipolar 2 disorder (H)    Ruby (H)    Psychosis (H)    Schizoaffective disorder, bipolar type (H)

## 2025-01-06 NOTE — PLAN OF CARE
BEH IP Unit Acuity Rating Score (UARS)  Patient is given one point for every criteria they meet.    CRITERIA SCORING   On a 72 hour hold, court hold, committed, stay of commitment, or revocation. 1    Patient LOS on BEH unit exceeds 20 days. 0  LOS: 3   Patient under guardianship, 55+, otherwise medically complex, or under age 11. 1   Suicide ideation without relief of precipitating factors. 0   Current plan for suicide. 0   Current plan for homicide. 0   Imminent risk or actual attempt to seriously harm another without relief of factors precipitating the attempt. 0   Severe dysfunction in daily living (ex: complete neglect for self care, extreme disruption in vegetative function, extreme deterioration in social interactions). 1   Recent (last 7 days) or current physical aggression in the ED or on unit. 0   Restraints or seclusion episode in past 72 hours. 0   Recent (last 7 days) or current verbal aggression, agitation, yelling, etc., while in the ED or unit. 0   Active psychosis. 1   Need for constant or near constant redirection (from leaving, from others, etc).  0   Intrusive or disruptive behaviors. 0   Patient requires 3 or more hours of individualized nursing care per 8-hour shift (i.e. for ADLs, meds, therapeutic interventions). 0   TOTAL 4

## 2025-01-06 NOTE — PLAN OF CARE
01/06/25 1258   Individualization/Patient Specific Goals   Patient Personal Strengths spiritual/Holiness support;community support;family/social support   Patient Vulnerabilities history of unsuccessful treatment   Anxieties, Fears or Concerns Pt appears to respond to internal stimuli   Patient/Family-Specific Goals (Include Timeframe) Pt's family is involved   Interprofessional Rounds   Summary Pt transferred from [facility]. Pt was initially admitted for behavioral concerns and psychotic symptoms. Pt is now committed. Santos and kezia porras petition were recently sent in. Pt has price porras hearing on 1/17 with the hopes of treating pt with ECT   Participants CTC;nursing;OT;psychiatrist   Behavioral Team Discussion   Participants Dr. Dona Trotter MD; Bbiiana LAMAS, FELECIA; Diane Harvey RN; Jess Rush OT   Progress New admit. Pt reuires Maco . Pt appears to be responding to internal stimuli   Anticipated length of stay 4+ weeks   Continued Stay Criteria/Rationale Medication management per psychiatry, stabilization of symptoms, aftercare planning, coordination with outside supports, court process with possible ECT   Medical/Physical See H&P and provider notes   Precautions See below   Plan Medication management per psychiatry, stabilization of symptoms, aftercare planning, coordination with outside supports, court process with possible ECT   Rationale for change in precautions or plan New admit   Safety Plan Per unit protocol   Anticipated Discharge Disposition home with family  (TBD)     PRECAUTIONS AND SAFETY    Behavioral Orders   Procedures    Code 1 - Restrict to Unit    Fall precautions    Routine Programming     As clinically indicated    Status 15     Every 15 minutes.       Safety  Safety WDL: .WDL except  Patient Location: patient room, own  Observed Behavior: sleeping  Safety Measures: clinical history reviewed, environmental rounds completed, safety plan reviewed, safety  rounds completed, suicide check-in completed

## 2025-01-06 NOTE — PROGRESS NOTES
PSYCHIATRY PROGRESS NOTE         DATE OF SERVICE:   1/6/2025         CHIEF COMPLAINT:             OBJECTIVE:     Nursing reports : Patient is going to groups, taking medications, visible on the unit     reports working on outpatient referrals         SUBJECTIVE:                 MEDICATIONS:     Current Facility-Administered Medications   Medication Dose Route Frequency Provider Last Rate Last Admin    chlorproMAZINE (THORAZINE) tablet 400 mg  400 mg Oral At Bedtime Rob Loaiza MD   400 mg at 01/05/25 2130    docusate sodium (COLACE) capsule 100 mg  100 mg Oral Daily Rob Loaiza MD   100 mg at 01/06/25 0838    haloperidol (HALDOL) half-tab 7.5 mg  7.5 mg Oral At Bedtime Rob Loaiza MD   7.5 mg at 01/05/25 2130    levothyroxine (SYNTHROID/LEVOTHROID) tablet 100 mcg  100 mcg Oral QAM AC Juan Samano PA-C   100 mcg at 01/06/25 0838    lisinopril (ZESTRIL) tablet 5 mg  5 mg Oral Daily Juan Samano PA-C   5 mg at 01/06/25 0838    lithium ER (LITHOBID) CR tablet 300 mg  300 mg Oral At Bedtime Rob Loaiza MD   300 mg at 01/05/25 2131    LORazepam (ATIVAN) tablet 1.5 mg  1.5 mg Oral TID Rob Loaiza MD   1.5 mg at 01/06/25 0838    multivitamin w/minerals (THERA-VIT-M) tablet 1 tablet  1 tablet Oral Daily Rob Loaiza MD   1 tablet at 01/06/25 0838    polyethylene glycol (MIRALAX) Packet 17 g  17 g Oral Daily Rob Loaiza MD   17 g at 01/06/25 0838     Current Facility-Administered Medications   Medication Dose Route Frequency Provider Last Rate Last Admin    acetaminophen (TYLENOL) tablet 650 mg  650 mg Oral Q4H PRN Rob Loaiza MD        alum & mag hydroxide-simethicone (MAALOX) suspension 30 mL  30 mL Oral Q4H PRN Rob Loaiza MD        benzocaine-menthol (CHLORASEPTIC) 6-10 MG lozenge 1 lozenge  1 lozenge Buccal Q1H PRN Rob Loaiza MD        chlorproMAZINE (THORAZINE) tablet 50 mg  50 mg Oral Q8H PRN Fadia  Rob HSIEH MD        guaiFENesin-dextromethorphan (ROBITUSSIN DM) 100-10 MG/5ML syrup 10 mL  10 mL Oral Q4H PRN Rob Loaiza MD        hydrOXYzine HCl (ATARAX) tablet 25 mg  25 mg Oral Q6H PRN Rob Loaiza MD        LORazepam (ATIVAN) tablet 1 mg  1 mg Oral Q4H PRN Rob Loaiza MD        magnesium hydroxide (MILK OF MAGNESIA) suspension 30 mL  30 mL Oral Daily PRN Rob Loaiza MD        melatonin tablet 3 mg  3 mg Oral At Bedtime PRN Rob Loaiza MD   3 mg at 01/05/25 0001    OLANZapine (zyPREXA) tablet 10 mg  10 mg Oral TID PRN Rob Loaiza MD        Or    OLANZapine (zyPREXA) injection 10 mg  10 mg Intramuscular TID PRN Rob Loaiza MD        polyethylene glycol (MIRALAX) Packet 17 g  17 g Oral Daily PRN Rob Loaiza MD        senna-docusate (SENOKOT-S/PERICOLACE) 8.6-50 MG per tablet 1 tablet  1 tablet Oral BID PRN Rob Loaiza MD           Medication adherence: Yes  Medication side effects: No  Benefit: Symptom reduction         ROS:   As per history of present illness, otherwise reminder of review of systems is negative for: General, eyes, ears, nose, throat, neck, respiratory, cardiovascular, gastrointestinal, genitourinary, meniscal skeletal, neurological, hematological, dermatological and endocrine system.         MENTAL STATUS EXAM:   /73 (BP Location: Left arm, Patient Position: Sitting, Cuff Size: Adult Large)   Pulse 91   Temp 97.2  F (36.2  C) (Oral)   Resp 16   Wt 99 kg (218 lb 4.1 oz)   SpO2 100%   BMI 32.23 kg/m      Appearance:fair hygiene  Orientation:x3  Speech:fluent  Language ability: intact  Thought process: concrete  Thought content: devoid of delusions and hallucinations  Associations: Connected  Suicidal Ideation: absent  Homicidal Ideation: absent  Mood:  depressed  Affect: neutral  Intellectual functioning:average  Fund of Knowledge: average  Attention/Concentration: decreased  Memory: intact  Psychomotor  "Behavior: calm  Muscle Strength and Tone: no atrophy or involuntary movement  Gait and Station: steady  Insight and judgement:fair         LABS:   personally reviewed.   Lab Results   Component Value Date     12/31/2024     12/28/2024     12/24/2024    CO2 20 12/31/2024    CO2 24 12/28/2024    CO2 25 12/24/2024    BUN 18.3 12/31/2024    BUN 21.1 12/28/2024    BUN 14.8 12/24/2024     No results found for: \"CKTOTAL\", \"CKMB\", \"TROPONINI\"  Lab Results   Component Value Date    WBC 6.8 11/27/2024    HGB 12.6 11/27/2024    HCT 38.8 11/27/2024    MCV 96 11/27/2024     11/27/2024     No results found for: \"CHOL\", \"TRIG\", \"HDL\", \"LDLDIRECT\"    Recent Results (from the past 24 hours)   Respiratory Panel PCR    Collection Time: 01/05/25 12:50 PM    Specimen: Nasopharyngeal; Swab   Result Value Ref Range    Adenovirus Not Detected Not Detected    Coronavirus Not Detected Not Detected    Human Metapneumovirus Not Detected Not Detected    Human Rhin/Enterovirus Not Detected Not Detected    Influenza A Not Detected Not Detected    Influenza A, H1 Not Detected Not Detected    Influenza A 2009 H1N1 Not Detected Not Detected    Influenza A, H3 Not Detected Not Detected    Influenza B Not Detected Not Detected    Parainfluenza Virus 1 Not Detected Not Detected    Parainfluenza Virus 2 Not Detected Not Detected    Parainfluenza Virus 3 Not Detected Not Detected    Parainfluenza Virus 4 Not Detected Not Detected    Respiratory Syncytial Virus A Not Detected Not Detected    Respiratory Syncytial Virus B Not Detected Not Detected    Chlamydia Pneumoniae Not Detected Not Detected    Mycoplasma Pneumoniae Not Detected Not Detected   Influenza A/B, RSV and SARS-CoV2 PCR (COVID-19) Nose    Collection Time: 01/05/25 12:52 PM    Specimen: Nose; Swab   Result Value Ref Range    Influenza A PCR Negative Negative    Influenza B PCR Negative Negative    RSV PCR Negative Negative    SARS CoV2 PCR Negative Negative            " DIAGNOSIS:     Patient Active Problem List   Diagnosis    Bipolar 2 disorder (H)    Ruby (H)    Psychosis (H)    Schizoaffective disorder, bipolar type (H)          PLAN:   Patient and I reviewed diagnosis and treatment plan and patient agrees with the following recommendations:    Medications:    We discussed side effects, benefits and alternative treatments and patient agrees with capacity to do so.  Rule 25 for chemical dependency treatment   will collect collateral information and make outpatient referrals  Staff to provide emotional support and redirect as needed  Patient encouraged to attend groups  Lab results: Reviewed personally  Consultation: According to patient symptom report        Risk Assessment:     Coordination of Care:   Patient seen, medical record reviewed, care coordinated with the team.    Total time:  More than 35 minutes spent on this visit with more than 50% time  spent on coordination of care with staff, reviewing medical record, educating patient about treatment options, side effects and benefits and alternative treatments for medications, providing supportive therapy regarding above issues,entering orders and preparing documentation for the visit.    This document is created with the help of Dragon dictation system.  All grammatical/typing errors or context distortion are unintentional and inherent to software.    Dona Trotter MD       Re-Certification I certify that the inpatient psychiatric facility services furnished since the previous certification were, and continue to be, medically necessary for, either, treatment which could reasonably be expected to improve the patient s condition or diagnostic study and that the hospital records indicate that the services furnished were, either, intensive treatment services, admission and related services necessary for diagnostic study, or equivalent services.     I certify that the patient continues to need, on a daily basis,  active treatment furnished directly by or requiring the supervision of inpatient psychiatric facility personnel.   I estimate TBD days of hospitalization is necessary for proper treatment of the patient. My plans for post-hospital care for this patient are : Medications,appointments  Dona Trotter MD   at 01/05/25 2130    levothyroxine (SYNTHROID/LEVOTHROID) tablet 100 mcg  100 mcg Oral QAM AC Juan Samano PA-C   100 mcg at 01/06/25 0838    lisinopril (ZESTRIL) tablet 5 mg  5 mg Oral Daily Juan Samano PA-C   5 mg at 01/06/25 0838    lithium ER (LITHOBID) CR tablet 300 mg  300 mg Oral At Bedtime Rob Loaiza MD   300 mg at 01/05/25 2131    LORazepam (ATIVAN) tablet 1.5 mg  1.5 mg Oral TID Rob Loaiza MD   1.5 mg at 01/06/25 0838    multivitamin w/minerals (THERA-VIT-M) tablet 1 tablet  1 tablet Oral Daily Rob Loaiza MD   1 tablet at 01/06/25 0838    polyethylene glycol (MIRALAX) Packet 17 g  17 g Oral Daily Rob Loaiza MD   17 g at 01/06/25 0838     Current Facility-Administered Medications   Medication Dose Route Frequency Provider Last Rate Last Admin    acetaminophen (TYLENOL) tablet 650 mg  650 mg Oral Q4H PRN Rob Loaiza MD        alum & mag hydroxide-simethicone (MAALOX) suspension 30 mL  30 mL Oral Q4H PRN Rob Loaiza MD        benzocaine-menthol (CHLORASEPTIC) 6-10 MG lozenge 1 lozenge  1 lozenge Buccal Q1H PRN Rob Loaiza MD        chlorproMAZINE (THORAZINE) tablet 50 mg  50 mg Oral Q8H PRN Rob Loaiza MD        guaiFENesin-dextromethorphan (ROBITUSSIN DM) 100-10 MG/5ML syrup 10 mL  10 mL Oral Q4H PRN Rob Loaiza MD        hydrOXYzine HCl (ATARAX) tablet 25 mg  25 mg Oral Q6H PRN Rob Loaiza MD        LORazepam (ATIVAN) tablet 1 mg  1 mg Oral Q4H PRN Rob Loaiza MD        magnesium hydroxide (MILK OF MAGNESIA) suspension 30 mL  30 mL Oral Daily PRN Rob Loaiza MD        melatonin tablet 3 mg  3 mg Oral At Bedtime PRN Rob Loaiza MD   3 mg at 01/05/25 0001    OLANZapine (zyPREXA) tablet 10 mg  10 mg Oral TID PRN Rob Loaiza MD        Or    OLANZapine (zyPREXA) injection 10 mg  10 mg Intramuscular TID PRN Rob Loaiza MD        polyethylene glycol (MIRALAX) Packet 17  g  17 g Oral Daily PRN Rob Loaiza MD        senna-docusate (SENOKOT-S/PERICOLACE) 8.6-50 MG per tablet 1 tablet  1 tablet Oral BID PRN Rob Loaiza MD           Medication adherence: Yes, but she she does not think that she has mental illness  Medication side effects: high lithium level for dose of 300 mg, motor slowing on higher dose of Haldol  Benefit: limited symptom reduction on 2 neuroleptics          ROS:   As per history of present illness, otherwise reminder of review of systems is negative for: General, eyes, ears, nose, throat, neck, respiratory, cardiovascular, gastrointestinal, genitourinary, meniscal skeletal, neurological, hematological, dermatological and endocrine system.         MENTAL STATUS EXAM:   /73 (BP Location: Left arm, Patient Position: Sitting, Cuff Size: Adult Large)   Pulse 91   Temp 97.2  F (36.2  C) (Oral)   Resp 16   Wt 99 kg (218 lb 4.1 oz)   SpO2 100%   BMI 32.23 kg/m      Appearance:fair hygiene, dressed in ethnic attire  Orientation:to self, place, partially in time   Speech:says few sentences in normal tone of voice in English, then switches to [place]i soft mumbling  Language ability: intact  Thought process: slow short responses   Thought content: hears voices saying funny things  Associations: Connected when she says few sentences in English   Suicidal Ideation: denies   Homicidal Ideation: denies   Mood: says fine   Affect: irritable   Intellectual functioning:average  Fund of Knowledge: consistent with education and experience   Attention/Concentration: decreased  Memory: affected by psychosis   Psychomotor Behavior: mild agitation   Muscle Strength and Tone: no atrophy or involuntary movement  Gait and Station: steady  Insight and judgement:impaired, under commitment          LABS:   personally reviewed.   Lab Results   Component Value Date     12/31/2024     12/28/2024     12/24/2024    CO2 20 12/31/2024    CO2 24 12/28/2024     "CO2 25 12/24/2024    BUN 18.3 12/31/2024    BUN 21.1 12/28/2024    BUN 14.8 12/24/2024     No results found for: \"CKTOTAL\", \"CKMB\", \"TROPONINI\"  Lab Results   Component Value Date    WBC 6.8 11/27/2024    HGB 12.6 11/27/2024    HCT 38.8 11/27/2024    MCV 96 11/27/2024     11/27/2024       Recent Results (from the past 24 hours)   Respiratory Panel PCR    Collection Time: 01/05/25 12:50 PM    Specimen: Nasopharyngeal; Swab   Result Value Ref Range    Adenovirus Not Detected Not Detected    Coronavirus Not Detected Not Detected    Human Metapneumovirus Not Detected Not Detected    Human Rhin/Enterovirus Not Detected Not Detected    Influenza A Not Detected Not Detected    Influenza A, H1 Not Detected Not Detected    Influenza A 2009 H1N1 Not Detected Not Detected    Influenza A, H3 Not Detected Not Detected    Influenza B Not Detected Not Detected    Parainfluenza Virus 1 Not Detected Not Detected    Parainfluenza Virus 2 Not Detected Not Detected    Parainfluenza Virus 3 Not Detected Not Detected    Parainfluenza Virus 4 Not Detected Not Detected    Respiratory Syncytial Virus A Not Detected Not Detected    Respiratory Syncytial Virus B Not Detected Not Detected    Chlamydia Pneumoniae Not Detected Not Detected    Mycoplasma Pneumoniae Not Detected Not Detected   Influenza A/B, RSV and SARS-CoV2 PCR (COVID-19) Nose    Collection Time: 01/05/25 12:52 PM    Specimen: Nose; Swab   Result Value Ref Range    Influenza A PCR Negative Negative    Influenza B PCR Negative Negative    RSV PCR Negative Negative    SARS CoV2 PCR Negative Negative       Latest Reference Range & Units 12/31/24 08:06   Sodium 135 - 145 mmol/L 137   Potassium 3.4 - 5.3 mmol/L 4.4   Chloride 98 - 107 mmol/L 105   Carbon Dioxide (CO2) 22 - 29 mmol/L 20 (L)   Urea Nitrogen 6.0 - 20.0 mg/dL 18.3   Creatinine 0.51 - 0.95 mg/dL 0.91   GFR Estimate >60 mL/min/1.73m2 73   Calcium 8.8 - 10.4 mg/dL 10.0   Anion Gap 7 - 15 mmol/L 12   Glucose 70 - 99 " mg/dL 91     EKG 12-lead, complete 1/6/2025          Component  Ref Range & Units 1/6/25 12:26 PM 1/4/25 10:22 AM    Systolic Blood Pressure  mmHg  VC    Diastolic Blood Pressure  mmHg  VC    Ventricular Rate  BPM 87 83 VC    Atrial Rate  BPM 87 83 VC    FL Interval  ms 150 172 VC    QRS Duration  ms 68 72 VC    QT  ms 338 376 VC    QTc  ms 406 441 VC    P Axis  degrees 63 71 VC    R AXIS  degrees 25 10 VC    T Axis  degrees 42 44 VC    Interpretation ECG Sinus rhythm  Cannot rule out Anterior infarct , age undetermined  Abnormal ECG  When compared with ECG of 04-Jan-2025 10:22, (unconfirmed)  No significant change was found  Confirmed by MD CHLOE, BORIS ([id number]) on 1/6/2025 11:23:36 PM                 DIAGNOSIS:     Schizoaffective disorder bipolar type   Agitation     Patient Active Problem List   Diagnosis    Bipolar 2 disorder (H)    Ruby (H)    Psychosis (H)    Schizoaffective disorder, bipolar type (H)          PLAN:   Patient was transferred from [facility] under commitment, waiting for Alejandre Tineo ECT and Santos hearing on 1/17/2025.She was admitted to [facility] on 11/27/24 for manic phase, noncompliance with medications and per chart aggression toward family. She has limited response to medications.     Medications:  Chlorpromazine Thorazine 400 mg at bedtime for psychosis   Chlorpromazine Thorazine 50 mg tid prn agitation   Haldol 7.5 mg at bedtime for psychosis   Lithium 300 mg at bedtime for mood  Ativan 1.5 mg tid for anxiety and mood   Hydroxyzine 25 mg q 6 hours prn anxiety  Melatonin 3 mg at bedtime prn sleep  Zyprexa 10 mg tid prn agitation   Miralax 17 g daily for constipation   Miralax 17 g daily prn constipation   Senna docusate 1 tablet bid prn constipation  Docusate 100 mg daily for constipation    Lisinopril 5 mg daily for HTN  Synthroid 100 mcg qam for hypothyroidism   We discussed side effects, benefits and alternative treatments and patient agrees .  Fall precautions  Full  code  Legal:Commitment   will collect collateral information and make outpatient referrals  Staff to provide emotional support and redirect as needed  Patient encouraged to attend groups  Lab results: Reviewed personally  Consultation: medicine consult for nonpsychiatric disorders     Risk Assessment: unable to function in the community due to psychosis and mood lability    Coordination of Care:   Patient seen, medical record reviewed, care coordinated with the team.    Total time:  More than 50 minutes  spent on this visit with more than 50% time  spent on coordination of care with staff, team meeting, reviewing medical record, educating patient about treatment options, side effects and benefits and alternative treatments for medications, providing supportive therapy regarding above issues,entering orders and preparing documentation for the visit.    This document is created with the help of Dragon dictation system.  All grammatical/typing errors or context distortion are unintentional and inherent to software.    Dona Trotter MD       Re-Certification I certify that the inpatient psychiatric facility services furnished since the previous certification were, and continue to be, medically necessary for, either, treatment which could reasonably be expected to improve the patient s condition or diagnostic study and that the hospital records indicate that the services furnished were, either, intensive treatment services, admission and related services necessary for diagnostic study, or equivalent services.     I certify that the patient continues to need, on a daily basis, active treatment furnished directly by or requiring the supervision of inpatient psychiatric facility personnel.   I estimate TBD days of hospitalization is necessary for proper treatment of the patient. My plans for post-hospital care for this patient are : Medications,appointments  Dona Trotter MD

## 2025-01-06 NOTE — PLAN OF CARE
Problem: Adult Behavioral Health Plan of Care  Goal: Optimized Coping Skills in Response to Life Stressors  Outcome: Not Progressing  Intervention: Promote Effective Coping Strategies  Recent Flowsheet Documentation  Taken 1/6/2025 5922 by Diane Harvey RN  Supportive Measures:   active listening utilized   decision-making supported   verbalization of feelings encouraged   self-responsibility promoted   self-reflection promoted   self-care encouraged   relaxation techniques promoted   problem-solving facilitated   goal-setting facilitated   Goal Outcome Evaluation:    Plan of Care Reviewed With: patient    Pt slept in this morning finally got up after multiple encourage from writer . Pt refused her walker and her wheelchair when it was offered to her . Pt up and steadily wolff to the Din[place] room . While eating pt began to laugh out with her head down to her plate . Pt denied all MH SX.   Pt ate 50% of both meal and was medication complaint

## 2025-01-06 NOTE — PLAN OF CARE
Problem: Adult Behavioral Health Plan of Care  Goal: Optimized Coping Skills in Response to Life Stressors  1/6/2025 1436 by Diane Harvey, RN  Outcome: Not Progressing  1/6/2025 1238 by Diane Harvey, RN  Outcome: Not Progressing  Intervention: Promote Effective Coping Strategies  Recent Flowsheet Documentation  Taken 1/6/2025 0950 by Diane Harvey, RN  Supportive Measures:   active listening utilized   decision-making supported   verbalization of feelings encouraged   self-responsibility promoted   self-reflection promoted   self-care encouraged   relaxation techniques promoted   problem-solving facilitated   goal-setting facilitated   Goal Outcome Evaluation:    Plan of Care Reviewed With: patient    AM shift   Pt slept in this morning finally got up after multiple encourage from writer . Pt refused her walker and her wheelchair when it was offered to her . Pt up and steadily wolff to the [place] room . While eating pt  was  noted to be  to laughing to her self  with her head down in  her plate . Pt denied all MH SX.   Pt ate 50% of both meal and was medication complaint . Pt is up and sitting in the milieu but isolative and withdrawn will continue POC

## 2025-01-06 NOTE — PLAN OF CARE
Team Note Due:  Monday    Assessment/Intervention/Current Symtoms and Care Coordination:  Chart review and met with team, discussed pt progress, symptomology, and response to treatment.  Discussed the discharge plan and any potential impediments to discharge. Pt transferred from [facility] in [place] over the weekend. Pt under commitment, edu and kezia porras was petitioned for on 12/27/2024. Per RN, pt appears to respond to internal stimuli and laughs to herself. Pt requires a [place] .     Writer spoke with Isaura gonzalez CM through [employer] ([phone]) who confirmed she is working with pt. Isaura reports that pt's CFR is actually [place] and the court is in the process of switching this over. Isaura reports that she was assigned after this discovery was made, so pt will continue to work with Isaura. Writer relayed plan to keep Isaura involved in discharge planning and to update Isaura if/when pt starts ECT.     Writer put out call to [facility] ([phone]) and requested commitment order be sent to unit fax. Writer never received via fax so put out another call and requested it be sent via email.      Discharge Plan or Goal:  Pending stabilization and safe disposition planning; considerations include:  TBD. Due to pt's current symptomology, it is unclear what pt's living situation was PTA     Barriers to Discharge:  Patient requires further psychiatric stabilization due to current symptomology, medication management with changes subject to provider, coordination with outside supports, and aftercare planning. Pt awaiting price porras hearing and will hopefully start ECT after that      Referral Status:  None at this time - CTC will continue to assess as pt stabilizes and discharge plan develops     Legal Status:  Committed MI without ITP   [facility]  [id number]   Started: 12/12/2024  Santos petition and Alejandre Porras petitions were submitted on  12/27/2024  Exam Hearing on 01/17/2025 at 1:00 PM  ECT Hearing on 01/17/2025 at 2:00 PM    Contacts (include CLYDE status):  Gifty - daughter (CLYDE)  P: [phone]    Tevin - son (CLYDE)  P: [phone]     Isaura - commitment CM through [facility] (CLYDE per commitment)  P: [phone]     Upcoming Meetings and Dates/Important Information and next steps:  CTC to coordinate with pt, outside supports, and treatment team regarding discharge planning   Pt has kezia porras exam and hearing on 1/17

## 2025-01-07 ENCOUNTER — OFFICE VISIT (OUTPATIENT)
Dept: INTERPRETER SERVICES | Facility: CLINIC | Age: 60
End: 2025-01-07
Attending: PSYCHIATRY & NEUROLOGY

## 2025-01-07 LAB
ALBUMIN SERPL BCG-MCNC: 3.4 G/DL (ref 3.5–5.2)
ALBUMIN SERPL BCG-MCNC: 3.4 G/DL (ref 3.5–5.2)
ANION GAP SERPL CALCULATED.3IONS-SCNC: 11 MMOL/L (ref 7–15)
ANION GAP SERPL CALCULATED.3IONS-SCNC: 11 MMOL/L (ref 7–15)
ATRIAL RATE - MUSE: 83 BPM
ATRIAL RATE - MUSE: 83 BPM
BUN SERPL-MCNC: 19.4 MG/DL (ref 6–20)
BUN SERPL-MCNC: 19.4 MG/DL (ref 6–20)
CALCIUM SERPL-MCNC: 8.8 MG/DL (ref 8.8–10.4)
CALCIUM SERPL-MCNC: 8.8 MG/DL (ref 8.8–10.4)
CHLORIDE SERPL-SCNC: 101 MMOL/L (ref 98–107)
CHLORIDE SERPL-SCNC: 101 MMOL/L (ref 98–107)
CREAT SERPL-MCNC: 0.93 MG/DL (ref 0.51–0.95)
CREAT SERPL-MCNC: 0.93 MG/DL (ref 0.51–0.95)
DIASTOLIC BLOOD PRESSURE - MUSE: NORMAL MMHG
DIASTOLIC BLOOD PRESSURE - MUSE: NORMAL MMHG
EGFRCR SERPLBLD CKD-EPI 2021: 71 ML/MIN/1.73M2
EGFRCR SERPLBLD CKD-EPI 2021: 71 ML/MIN/1.73M2
GLUCOSE SERPL-MCNC: 100 MG/DL (ref 70–99)
GLUCOSE SERPL-MCNC: 100 MG/DL (ref 70–99)
HCO3 SERPL-SCNC: 24 MMOL/L (ref 22–29)
HCO3 SERPL-SCNC: 24 MMOL/L (ref 22–29)
INTERPRETATION ECG - MUSE: NORMAL
INTERPRETATION ECG - MUSE: NORMAL
P AXIS - MUSE: 71 DEGREES
P AXIS - MUSE: 71 DEGREES
PHOSPHATE SERPL-MCNC: 3.8 MG/DL (ref 2.5–4.5)
PHOSPHATE SERPL-MCNC: 3.8 MG/DL (ref 2.5–4.5)
POTASSIUM SERPL-SCNC: 4.1 MMOL/L (ref 3.4–5.3)
POTASSIUM SERPL-SCNC: 4.1 MMOL/L (ref 3.4–5.3)
PR INTERVAL - MUSE: 172 MS
PR INTERVAL - MUSE: 172 MS
QRS DURATION - MUSE: 72 MS
QRS DURATION - MUSE: 72 MS
QT - MUSE: 376 MS
QT - MUSE: 376 MS
QTC - MUSE: 441 MS
QTC - MUSE: 441 MS
R AXIS - MUSE: 10 DEGREES
R AXIS - MUSE: 10 DEGREES
SODIUM SERPL-SCNC: 136 MMOL/L (ref 135–145)
SODIUM SERPL-SCNC: 136 MMOL/L (ref 135–145)
SYSTOLIC BLOOD PRESSURE - MUSE: NORMAL MMHG
SYSTOLIC BLOOD PRESSURE - MUSE: NORMAL MMHG
T AXIS - MUSE: 44 DEGREES
T AXIS - MUSE: 44 DEGREES
VENTRICULAR RATE- MUSE: 83 BPM
VENTRICULAR RATE- MUSE: 83 BPM

## 2025-01-07 PROCEDURE — 250N000013 HC RX MED GY IP 250 OP 250 PS 637

## 2025-01-07 PROCEDURE — 82310 ASSAY OF CALCIUM: CPT | Performed by: PSYCHIATRY & NEUROLOGY

## 2025-01-07 PROCEDURE — T1013 SIGN LANG/ORAL INTERPRETER: HCPCS

## 2025-01-07 PROCEDURE — 250N000013 HC RX MED GY IP 250 OP 250 PS 637: Performed by: PSYCHIATRY & NEUROLOGY

## 2025-01-07 PROCEDURE — 36415 COLL VENOUS BLD VENIPUNCTURE: CPT | Performed by: PSYCHIATRY & NEUROLOGY

## 2025-01-07 PROCEDURE — 99233 SBSQ HOSP IP/OBS HIGH 50: CPT | Performed by: PSYCHIATRY & NEUROLOGY

## 2025-01-07 PROCEDURE — 84132 ASSAY OF SERUM POTASSIUM: CPT | Performed by: PSYCHIATRY & NEUROLOGY

## 2025-01-07 PROCEDURE — 124N000002 HC R&B MH UMMC

## 2025-01-07 RX ADMIN — Medication 7.5 MG: at 21:16

## 2025-01-07 RX ADMIN — LISINOPRIL 5 MG: 5 TABLET ORAL at 08:09

## 2025-01-07 RX ADMIN — DOCUSATE SODIUM 100 MG: 100 CAPSULE, LIQUID FILLED ORAL at 08:10

## 2025-01-07 RX ADMIN — LEVOTHYROXINE SODIUM 100 MCG: 0.05 TABLET ORAL at 08:09

## 2025-01-07 RX ADMIN — LORAZEPAM 1.5 MG: 1 TABLET ORAL at 08:09

## 2025-01-07 RX ADMIN — LORAZEPAM 1.5 MG: 1 TABLET ORAL at 21:17

## 2025-01-07 RX ADMIN — CHLORPROMAZINE HYDROCHLORIDE 400 MG: 100 TABLET, FILM COATED ORAL at 21:17

## 2025-01-07 RX ADMIN — LITHIUM CARBONATE 300 MG: 300 TABLET, EXTENDED RELEASE ORAL at 21:17

## 2025-01-07 RX ADMIN — Medication 1 TABLET: at 08:09

## 2025-01-07 RX ADMIN — POLYETHYLENE GLYCOL 3350 17 G: 17 POWDER, FOR SOLUTION ORAL at 08:09

## 2025-01-07 ASSESSMENT — ACTIVITIES OF DAILY LIVING (ADL)
ADLS_ACUITY_SCORE: 39
HYGIENE/GROOMING: INDEPENDENT
ADLS_ACUITY_SCORE: 39
ADLS_ACUITY_SCORE: 39
ADLS_ACUITY_SCORE: 36
ADLS_ACUITY_SCORE: 39
ADLS_ACUITY_SCORE: 36
DRESS: STREET CLOTHES;INDEPENDENT
ADLS_ACUITY_SCORE: 36
ADLS_ACUITY_SCORE: 39
LAUNDRY: UNABLE TO COMPLETE
ADLS_ACUITY_SCORE: 36
ADLS_ACUITY_SCORE: 39
HYGIENE/GROOMING: INDEPENDENT
ORAL_HYGIENE: INDEPENDENT
ADLS_ACUITY_SCORE: 36
ORAL_HYGIENE: INDEPENDENT
ADLS_ACUITY_SCORE: 36
ADLS_ACUITY_SCORE: 36
LAUNDRY: UNABLE TO COMPLETE
ADLS_ACUITY_SCORE: 36
DRESS: INDEPENDENT
ADLS_ACUITY_SCORE: 39
ADLS_ACUITY_SCORE: 36
ADLS_ACUITY_SCORE: 36

## 2025-01-07 NOTE — PLAN OF CARE
"Team Note Due:  Monday    Assessment/Intervention/Current Symtoms and Care Coordination:  Chart review and met with team, discussed pt progress, symptomology, and response to treatment.  Discussed the discharge plan and any potential impediments to discharge. Pt transferred from [facility] in [place] over the weekend. Pt under commitment, edu and kezia porras was petitioned for on 12/27/2024. Pt requires a [facility] . Per RN, pt's family visited last evening and they assisted pt with a shower. Per staff, pt's gait appears steadier than on admission.     Writer received copy of commitment order via email. Writer placed copy in chart, copy for medical records.     Writer put out call to pt's daughter, Gifty ([phone]) to introduce self and role. Writer LVM and requested CB.    Writer put out call to pt's son, Tevin ([phone] ) to introduce self and role. Writer LVM and requested CB.    Writer spoke with Gifty and introduced self and role. Writer provided contact information for the unit and writer. Gifty confirmed pt was living at [facility] prior to admission. Writer relayed plan to keep Gifty updated regarding discharge plan and court proceedings as appropriate.     Writer met with pt and  and introduced self and role. Writer relayed plan to discuss discharge planning closer to discharge. Writer also reminded pt of court hearing on 1/17. Pt denied any questions and would only state \"ok.\" Pt appeared flat.      Discharge Plan or Goal:  Pending stabilization and safe disposition planning; considerations include:  TBD. Due to pt's current symptomology, it is unclear what pt's living situation was PTA     Barriers to Discharge:  Patient requires further psychiatric stabilization due to current symptomology, medication management with changes subject to provider, coordination with outside supports, and aftercare planning. Pt awaiting price porras hearing and will " hopefully start ECT after that      Referral Status:  None at this time - CTC will continue to assess as pt stabilizes and discharge plan develops     Legal Status:  Committed MI without ITP   [facility]  [id number]   Started: 12/12/2024  Santos petition and Kezia Porras petitions were submitted on 12/27/2024  Exam Hearing on 01/17/2025 at 1:00 PM  ECT Hearing on 01/17/2025 at 2:00 PM    Contacts (include CLYDE status):  Gifty - daughter (CLYDE)  P: [phone]    Tevin - son (CLYDE)  P: [phone]     Isaura - commitment CM through [facility] (CLYDE per commitment)  P: [phone]     Upcoming Meetings and Dates/Important Information and next steps:  CTC to coordinate with pt, outside supports, and treatment team regarding discharge planning   Pt has kezia porras exam and hearing on 1/17

## 2025-01-07 NOTE — PLAN OF CARE
Problem: Depressive Signs/Symptoms  Goal: Improved Mood Symptoms (Depressive Signs/Symptoms)  Outcome: Progressing   Goal Outcome Evaluation:  Pt interviewed with the [place] interpretor. Becky says she likes the people on the unit. Her behavior suggests she feels comfortable and relaxed. Speech is clear, eye contact direct, responds appropriately. She declined 2 pm ativan. She asked what it was for and declined after learning its purpose. Pt remained in the lounge this shift. She needs assistance with finding activities to keep her busy.

## 2025-01-07 NOTE — PLAN OF CARE
BEH IP Unit Acuity Rating Score (UARS)  Patient is given one point for every criteria they meet.    CRITERIA SCORING   On a 72 hour hold, court hold, committed, stay of commitment, or revocation. 1    Patient LOS on BEH unit exceeds 20 days. 0  LOS: 4   Patient under guardianship, 55+, otherwise medically complex, or under age 11. 1   Suicide ideation without relief of precipitating factors. 0   Current plan for suicide. 0   Current plan for homicide. 0   Imminent risk or actual attempt to seriously harm another without relief of factors precipitating the attempt. 0   Severe dysfunction in daily living (ex: complete neglect for self care, extreme disruption in vegetative function, extreme deterioration in social interactions). 1   Recent (last 7 days) or current physical aggression in the ED or on unit. 0   Restraints or seclusion episode in past 72 hours. 0   Recent (last 7 days) or current verbal aggression, agitation, yelling, etc., while in the ED or unit. 0   Active psychosis. 1   Need for constant or near constant redirection (from leaving, from others, etc).  0   Intrusive or disruptive behaviors. 0   Patient requires 3 or more hours of individualized nursing care per 8-hour shift (i.e. for ADLs, meds, therapeutic interventions). 0   TOTAL 4

## 2025-01-07 NOTE — PLAN OF CARE
Goal Outcome Evaluation:    Plan of Care Reviewed With: patient      Problem: Adult Behavioral Health Plan of Care  Goal: Plan of Care Review  Outcome: Progressing  Flowsheets (Taken 1/6/2025 1710)  Patient Agreement with Plan of Care: agrees    Pt presented with a flat affect, which brightens with interaction, calm mood.   Family visited, assisted pt to take a shower. Visible in milieu, not social or engaged with peers/staff. Used exercise bike for some time. Denied pain and all mental health symptoms. Contracted for safety. Compliant with all scheduled medications, no PRN needed. Pt is hopeful, desiring to go back and see her children. Pt encouraged to cooperate with treatment, get better, then can go home to her children, agreeable. Pt is able to make needs known.

## 2025-01-07 NOTE — PLAN OF CARE
Problem: Depressive Signs/Symptoms  Goal: Improved Sleep (Depressive Signs/Symptoms)  Outcome: Progressing   Goal Outcome Evaluation:    Patient was asleep at the start of the shift. She is using a medical bed to assist with mobility.     Patient was incontinent of urine in bed. She got up, went to the bathroom with standby assist and was not brandi to reach the bathroom and voided on the floor. She walked with a steady gait but very slowly.  No depressive and psychotic  behavior and other behavioral issues noted this shift. Slept for 7.75 hours.     She is scheduled for a lab draw to check on her Renal Panel today.

## 2025-01-07 NOTE — PLAN OF CARE
Goal Outcome Evaluation:      Group Attendance:  attended full group    Time session began: 7:10 pm  Time session ended: 8:00 pm  Patient's total time in group: 50    Total # Attendees   8   Group Type psychotherapeutic     Group Topic Covered mindfulness and positive psychology self compassion     Group Session Detail process     Patient's response to the group topic/interactions:  cooperative with task, listened actively, and attentive     Patient Details: Pt said she felt ok. 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 Writer shared with her information about after age 64, the ability to take [employer] classes for free, if she likes to learn. She was interested. After group , she stayed for a few minutes to talk to writer. She said she didn't believe she had mental health issues, so lacks insight about this, but otherwise engaged in group.           [id number] - Group psychotherapy - 1 Session  Patient Active Problem List   Diagnosis    Bipolar 2 disorder (H)    Ruby (H)    Psychosis (H)    Schizoaffective disorder, bipolar type (H)

## 2025-01-08 ENCOUNTER — OFFICE VISIT (OUTPATIENT)
Dept: INTERPRETER SERVICES | Facility: CLINIC | Age: 60
End: 2025-01-08

## 2025-01-08 PROBLEM — F43.10 PTSD (POST-TRAUMATIC STRESS DISORDER): Status: ACTIVE | Noted: 2025-01-08

## 2025-01-08 PROCEDURE — 250N000013 HC RX MED GY IP 250 OP 250 PS 637: Performed by: PSYCHIATRY & NEUROLOGY

## 2025-01-08 PROCEDURE — 124N000002 HC R&B MH UMMC

## 2025-01-08 PROCEDURE — 99232 SBSQ HOSP IP/OBS MODERATE 35: CPT | Performed by: PSYCHIATRY & NEUROLOGY

## 2025-01-08 PROCEDURE — T1013 SIGN LANG/ORAL INTERPRETER: HCPCS

## 2025-01-08 PROCEDURE — 250N000013 HC RX MED GY IP 250 OP 250 PS 637

## 2025-01-08 RX ADMIN — CHLORPROMAZINE HYDROCHLORIDE 400 MG: 100 TABLET, FILM COATED ORAL at 21:03

## 2025-01-08 RX ADMIN — DOCUSATE SODIUM 100 MG: 100 CAPSULE, LIQUID FILLED ORAL at 08:43

## 2025-01-08 RX ADMIN — Medication 7.5 MG: at 21:02

## 2025-01-08 RX ADMIN — LORAZEPAM 1.5 MG: 1 TABLET ORAL at 20:59

## 2025-01-08 RX ADMIN — LORAZEPAM 1.5 MG: 1 TABLET ORAL at 08:44

## 2025-01-08 RX ADMIN — Medication 1 TABLET: at 08:43

## 2025-01-08 RX ADMIN — LORAZEPAM 1.5 MG: 1 TABLET ORAL at 13:44

## 2025-01-08 RX ADMIN — LEVOTHYROXINE SODIUM 100 MCG: 0.05 TABLET ORAL at 08:44

## 2025-01-08 RX ADMIN — LITHIUM CARBONATE 300 MG: 300 TABLET, EXTENDED RELEASE ORAL at 21:02

## 2025-01-08 RX ADMIN — POLYETHYLENE GLYCOL 3350 17 G: 17 POWDER, FOR SOLUTION ORAL at 08:50

## 2025-01-08 ASSESSMENT — ACTIVITIES OF DAILY LIVING (ADL)
ADLS_ACUITY_SCORE: 36

## 2025-01-08 NOTE — PROGRESS NOTES
PSYCHIATRY PROGRESS NOTE         DATE OF SERVICE:   1/7/2025         CHIEF COMPLAINT:             OBJECTIVE:     Nursing reports : Patient is going to groups, taking medications, visible on the unit     reports working on outpatient referrals         SUBJECTIVE:                 MEDICATIONS:     Current Facility-Administered Medications   Medication Dose Route Frequency Provider Last Rate Last Admin    chlorproMAZINE (THORAZINE) tablet 400 mg  400 mg Oral At Bedtime Rob Loaiza MD   400 mg at 01/07/25 2117    docusate sodium (COLACE) capsule 100 mg  100 mg Oral Daily Rob Loaiza MD   100 mg at 01/07/25 0810    haloperidol (HALDOL) half-tab 7.5 mg  7.5 mg Oral At Bedtime Rob Loaiza MD   7.5 mg at 01/07/25 2116    levothyroxine (SYNTHROID/LEVOTHROID) tablet 100 mcg  100 mcg Oral QAM AC Juan Samano PA-C   100 mcg at 01/07/25 0809    lisinopril (ZESTRIL) tablet 5 mg  5 mg Oral Daily Juan Samano PA-C   5 mg at 01/07/25 0809    lithium ER (LITHOBID) CR tablet 300 mg  300 mg Oral At Bedtime Rob Loaiza MD   300 mg at 01/07/25 2117    LORazepam (ATIVAN) tablet 1.5 mg  1.5 mg Oral TID Rob Loaiza MD   1.5 mg at 01/07/25 2117    multivitamin w/minerals (THERA-VIT-M) tablet 1 tablet  1 tablet Oral Daily Rob Loaiza MD   1 tablet at 01/07/25 0809    polyethylene glycol (MIRALAX) Packet 17 g  17 g Oral Daily Rob Loaiza MD   17 g at 01/07/25 0809     Current Facility-Administered Medications   Medication Dose Route Frequency Provider Last Rate Last Admin    acetaminophen (TYLENOL) tablet 650 mg  650 mg Oral Q4H PRN Rob Loaiza MD        alum & mag hydroxide-simethicone (MAALOX) suspension 30 mL  30 mL Oral Q4H PRN Rob Loaiza MD        benzocaine-menthol (CHLORASEPTIC) 6-10 MG lozenge 1 lozenge  1 lozenge Buccal Q1H PRN Rob Loaiza MD        chlorproMAZINE (THORAZINE) tablet 50 mg  50 mg Oral Q8H PRN Fadia  Rob HSIEH MD        guaiFENesin-dextromethorphan (ROBITUSSIN DM) 100-10 MG/5ML syrup 10 mL  10 mL Oral Q4H PRN Rob Loaiza MD        hydrOXYzine HCl (ATARAX) tablet 25 mg  25 mg Oral Q6H PRN Rob Loaiza MD        LORazepam (ATIVAN) tablet 1 mg  1 mg Oral Q4H PRN Rob Loaiza MD        magnesium hydroxide (MILK OF MAGNESIA) suspension 30 mL  30 mL Oral Daily PRN Rob Loaiza MD        melatonin tablet 3 mg  3 mg Oral At Bedtime PRN Rob Loaiza MD   3 mg at 01/05/25 0001    OLANZapine (zyPREXA) tablet 10 mg  10 mg Oral TID PRN Rob Loaiza MD        Or    OLANZapine (zyPREXA) injection 10 mg  10 mg Intramuscular TID PRN Rob Loaiza MD        polyethylene glycol (MIRALAX) Packet 17 g  17 g Oral Daily PRN Rob Loaiza MD        senna-docusate (SENOKOT-S/PERICOLACE) 8.6-50 MG per tablet 1 tablet  1 tablet Oral BID PRN Rob Loaiza MD           Medication adherence: Yes  Medication side effects: No  Benefit: Symptom reduction         ROS:   As per history of present illness, otherwise reminder of review of systems is negative for: General, eyes, ears, nose, throat, neck, respiratory, cardiovascular, gastrointestinal, genitourinary, meniscal skeletal, neurological, hematological, dermatological and endocrine system.         MENTAL STATUS EXAM:   /63 (BP Location: Right arm, Patient Position: Chair, Cuff Size: Adult Large)   Pulse 89   Temp 97.6  F (36.4  C) (Temporal)   Resp 18   Wt 100.4 kg (221 lb 5.5 oz)   SpO2 100%   BMI 32.69 kg/m      Appearance:fair hygiene  Orientation:x3  Speech:fluent  Language ability: intact  Thought process: concrete  Thought content: devoid of delusions and hallucinations  Associations: Connected  Suicidal Ideation: absent  Homicidal Ideation: absent  Mood:  depressed  Affect: neutral  Intellectual functioning:average  Fund of Knowledge: average  Attention/Concentration: decreased  Memory:  "intact  Psychomotor Behavior: calm  Muscle Strength and Tone: no atrophy or involuntary movement  Gait and Station: steady  Insight and judgement:fair         LABS:   personally reviewed.   Lab Results   Component Value Date     01/07/2025     12/31/2024     12/28/2024    CO2 24 01/07/2025    CO2 20 12/31/2024    CO2 24 12/28/2024    BUN 19.4 01/07/2025    BUN 18.3 12/31/2024    BUN 21.1 12/28/2024     No results found for: \"CKTOTAL\", \"CKMB\", \"TROPONINI\"  Lab Results   Component Value Date    WBC 6.8 11/27/2024    HGB 12.6 11/27/2024    HCT 38.8 11/27/2024    MCV 96 11/27/2024     11/27/2024     No results found for: \"CHOL\", \"TRIG\", \"HDL\", \"LDLDIRECT\"    Recent Results (from the past 24 hours)   Renal panel    Collection Time: 01/07/25  7:45 AM   Result Value Ref Range    Sodium 136 135 - 145 mmol/L    Potassium 4.1 3.4 - 5.3 mmol/L    Chloride 101 98 - 107 mmol/L    Carbon Dioxide (CO2) 24 22 - 29 mmol/L    Anion Gap 11 7 - 15 mmol/L    Glucose 100 (H) 70 - 99 mg/dL    Urea Nitrogen 19.4 6.0 - 20.0 mg/dL    Creatinine 0.93 0.51 - 0.95 mg/dL    GFR Estimate 71 >60 mL/min/1.73m2    Calcium 8.8 8.8 - 10.4 mg/dL    Albumin 3.4 (L) 3.5 - 5.2 g/dL    Phosphorus 3.8 2.5 - 4.5 mg/dL            DIAGNOSIS:     Patient Active Problem List   Diagnosis    Bipolar 2 disorder (H)    Ruby (H)    Psychosis (H)    Schizoaffective disorder, bipolar type (H)          PLAN:   Patient and I reviewed diagnosis and treatment plan and patient agrees with the following recommendations:    Medications:    We discussed side effects, benefits and alternative treatments and patient agrees with capacity to do so.  Rule 25 for chemical dependency treatment   will collect collateral information and make outpatient referrals  Staff to provide emotional support and redirect as needed  Patient encouraged to attend groups  Lab results: Reviewed personally  Consultation: According to patient symptom " report        Risk Assessment:     Coordination of Care:   Patient seen, medical record reviewed, care coordinated with the team.    Total time:  More than 35 minutes spent on this visit with more than 50% time  spent on coordination of care with staff, reviewing medical record, educating patient about treatment options, side effects and benefits and alternative treatments for medications, providing supportive therapy regarding above issues,entering orders and preparing documentation for the visit.    This document is created with the help of Dragon dictation system.  All grammatical/typing errors or context distortion are unintentional and inherent to software.    Dona Trotter MD       Re-Certification I certify that the inpatient psychiatric facility services furnished since the previous certification were, and continue to be, medically necessary for, either, treatment which could reasonably be expected to improve the patient s condition or diagnostic study and that the hospital records indicate that the services furnished were, either, intensive treatment services, admission and related services necessary for diagnostic study, or equivalent services.     I certify that the patient continues to need, on a daily basis, active treatment furnished directly by or requiring the supervision of inpatient psychiatric facility personnel.   I estimate TBD days of hospitalization is necessary for proper treatment of the patient. My plans for post-hospital care for this patient are : Medications, appointments     Doan Trotter MD   at 01/07/25 0809    lisinopril (ZESTRIL) tablet 5 mg  5 mg Oral Daily Juan Samano PA-C   5 mg at 01/07/25 0809    lithium ER (LITHOBID) CR tablet 300 mg  300 mg Oral At Bedtime Rob Loaiza MD   300 mg at 01/07/25 2117    LORazepam (ATIVAN) tablet 1.5 mg  1.5 mg Oral TID Rob Loaiza MD   1.5 mg at 01/07/25 2117    multivitamin w/minerals (THERA-VIT-M) tablet 1 tablet  1 tablet Oral Daily Rob Loaiza MD   1 tablet at 01/07/25 0809    polyethylene glycol (MIRALAX) Packet 17 g  17 g Oral Daily Rob Loaiza MD   17 g at 01/07/25 0809     Current Facility-Administered Medications   Medication Dose Route Frequency Provider Last Rate Last Admin    acetaminophen (TYLENOL) tablet 650 mg  650 mg Oral Q4H PRN Rob Loaiza MD        alum & mag hydroxide-simethicone (MAALOX) suspension 30 mL  30 mL Oral Q4H PRN Rob Loaiza MD        benzocaine-menthol (CHLORASEPTIC) 6-10 MG lozenge 1 lozenge  1 lozenge Buccal Q1H PRN Rob Loaiza MD        chlorproMAZINE (THORAZINE) tablet 50 mg  50 mg Oral Q8H PRN Rob Loaiza MD        guaiFENesin-dextromethorphan (ROBITUSSIN DM) 100-10 MG/5ML syrup 10 mL  10 mL Oral Q4H PRN Rob Loaiza MD        hydrOXYzine HCl (ATARAX) tablet 25 mg  25 mg Oral Q6H PRN Rob Loaiza MD        LORazepam (ATIVAN) tablet 1 mg  1 mg Oral Q4H PRN Rob Loaiza MD        magnesium hydroxide (MILK OF MAGNESIA) suspension 30 mL  30 mL Oral Daily PRN Rob Loaiza MD        melatonin tablet 3 mg  3 mg Oral At Bedtime PRN Rob Loaiza MD   3 mg at 01/05/25 0001    OLANZapine (zyPREXA) tablet 10 mg  10 mg Oral TID PRN Rob Loaiza MD        Or    OLANZapine (zyPREXA) injection 10 mg  10 mg Intramuscular TID PRN Rob Loaiza MD        polyethylene glycol (MIRALAX) Packet 17 g  17 g Oral Daily PRN Rob Loaiza MD        senna-docusate (SENOKOT-S/PERICOLACE) 8.6-50 MG per tablet 1 tablet  1  "tablet Oral BID PRN Rob Loaiza MD           Medication adherence: Yes  Medication side effects: No  Benefit: Symptom reduction         ROS:   As per history of present illness, otherwise reminder of review of systems is negative for: General, eyes, ears, nose, throat, neck, respiratory, cardiovascular, gastrointestinal, genitourinary, meniscal skeletal, neurological, hematological, dermatological and endocrine system.         MENTAL STATUS EXAM:   /63 (BP Location: Right arm, Patient Position: Chair, Cuff Size: Adult Large)   Pulse 89   Temp 97.6  F (36.4  C) (Temporal)   Resp 18   Wt 100.4 kg (221 lb 5.5 oz)   SpO2 100%   BMI 32.69 kg/m      Appearance:fair hygiene, dressed in ethnic attire   Orientation:to self, place, partially in time   Speech:soft, mumbling   Language ability: intact  Thought process: slow, short responses  Thought content: denies hallucinations, but likely responding to it  Associations: Connected  Suicidal Ideation: denies   Homicidal Ideation: denies   Mood: irritable   Affect: incongruent laughing   Intellectual functioning:average  Fund of Knowledge: consistent with education and experience   Attention/Concentration: decreased  Memory: intact  Psychomotor Behavior: mild agitation   Muscle Strength and Tone: no atrophy or involuntary movement  Gait and Station: steady  Insight and judgement:impaired          LABS:   personally reviewed.   Lab Results   Component Value Date     01/07/2025     12/31/2024     12/28/2024    CO2 24 01/07/2025    CO2 20 12/31/2024    CO2 24 12/28/2024    BUN 19.4 01/07/2025    BUN 18.3 12/31/2024    BUN 21.1 12/28/2024     No results found for: \"CKTOTAL\", \"CKMB\", \"TROPONINI\"  Lab Results   Component Value Date    WBC 6.8 11/27/2024    HGB 12.6 11/27/2024    HCT 38.8 11/27/2024    MCV 96 11/27/2024     11/27/2024     Recent Results (from the past 24 hours)   Renal panel    Collection Time: 01/07/25  7:45 AM   Result " Value Ref Range    Sodium 136 135 - 145 mmol/L    Potassium 4.1 3.4 - 5.3 mmol/L    Chloride 101 98 - 107 mmol/L    Carbon Dioxide (CO2) 24 22 - 29 mmol/L    Anion Gap 11 7 - 15 mmol/L    Glucose 100 (H) 70 - 99 mg/dL    Urea Nitrogen 19.4 6.0 - 20.0 mg/dL    Creatinine 0.93 0.51 - 0.95 mg/dL    GFR Estimate 71 >60 mL/min/1.73m2    Calcium 8.8 8.8 - 10.4 mg/dL    Albumin 3.4 (L) 3.5 - 5.2 g/dL    Phosphorus 3.8 2.5 - 4.5 mg/dL       [facility] Reference Range & Units 12/31/24 08:06 01/02/25 08:49   Sodium 135 - 145 mmol/L 137    Potassium 3.4 - 5.3 mmol/L 4.4    Chloride 98 - 107 mmol/L 105    Carbon Dioxide (CO2) 22 - 29 mmol/L 20 (L)    Urea Nitrogen 6.0 - 20.0 mg/dL 18.3    Creatinine 0.51 - 0.95 mg/dL 0.91    GFR Estimate >60 mL/min/1.73m2 73    Calcium 8.8 - 10.4 mg/dL 10.0    Anion Gap 7 - 15 mmol/L 12    Glucose 70 - 99 mg/dL 91    Lithium Level 0.60 - 1.20 mmol/L  0.90     EKG 12-lead, complete 1/6/2025          Component  Ref Range & Units 1/6/25 12:26 PM 1/4/25 10:22 AM    Systolic Blood Pressure  mmHg  VC    Diastolic Blood Pressure  mmHg  VC    Ventricular Rate  BPM 87 83 VC    Atrial Rate  BPM 87 83 VC    IA Interval  ms 150 172 VC    QRS Duration  ms 68 72 VC    QT  ms 338 376 VC    QTc  ms 406 441 VC    P Axis  degrees 63 71 VC    R AXIS  degrees 25 10 VC    T Axis  degrees 42 44 VC    Interpretation ECG Sinus rhythm  Cannot rule out Anterior infarct , age undetermined  Abnormal ECG  When compared with ECG of 04-Jan-2025 10:22, (unconfirmed)  No significant change was found  Confirmed by MD CHLOE, BORIS ([id number]) on 1/6/2025 11:23:36 PM       Qtqtc 338/406        DIAGNOSIS:     Schizoaffective disorder bipolar type      Patient Active Problem List   Diagnosis    Bipolar 2 disorder (H)    Ruby (H)    Psychosis (H)    Schizoaffective disorder, bipolar type (H)          PLAN:   Becky was admitted to [facility] from November 27, 2024 to January 3, 2025 for manic behavior and physical aggression toward  family, and noncompliance with medications for 3 weeks prior to admission.  Commitment was initiated and granted.  Request for Santos neuroleptic court order) Ivanna ECT scheduled for January 17, 2025.She agrees to take medications.    Medications:  Chlorpromazine Thorazine 400 mg nightly for psychosis  Chlorpromazine Thorazine 50 mg 3 times daily as needed for agitation  Haldol 7.5 mg nightly for psychosis  Lithium 300 mg at bedtime for mood stabilization  Ativan 0.5 mg 3 times daily for anxiety and mood  Hydroxyzine 25 mg every 6 hours as needed for anxiety  Melatonin 3 mg nightly as needed for sleep  Zyprexa 10 mg 3 times daily as needed for agitation  MiraLAX 17 g daily for constipation  Senna docusate 1 tablet twice daily as needed for constipation  Docusate 100 mg daily for constipation  Lisinopril 5 mg daily for hypertension  Synthroid 100 mcg every morning for hypothyroidism  We discussed side effects, benefits and alternative treatments and patient agrees .  Fall precautions  Full code  Legal: Commitment   will collect collateral information and make outpatient referrals  Staff to provide emotional support and redirect as needed  Patient encouraged to attend groups  Lab results: Reviewed personally  Consultation: Medicine consult completed    Risk Assessment: Commitment for safety, stabilization and medication management due to noncompliance with treatment    Coordination of Care:   Patient seen, medical record reviewed, care coordinated with the team.    Total time:  More than 50 minutes  spent on this visit with more than 50% time  spent on coordination of care with staff, team meeting, reviewing medical record, educating patient about treatment options, side effects and benefits and alternative treatments for medications, providing supportive therapy regarding above issues,entering orders and preparing documentation for the visit.    This document is created with the help of Harvey  dictation system.  All grammatical/typing errors or context distortion are unintentional and inherent to software.    Dona Trotter MD       Re-Certification I certify that the inpatient psychiatric facility services furnished since the previous certification were, and continue to be, medically necessary for, either, treatment which could reasonably be expected to improve the patient s condition or diagnostic study and that the hospital records indicate that the services furnished were, either, intensive treatment services, admission and related services necessary for diagnostic study, or equivalent services.     I certify that the patient continues to need, on a daily basis, active treatment furnished directly by or requiring the supervision of inpatient psychiatric facility personnel.   I estimate TBD days of hospitalization is necessary for proper treatment of the patient. My plans for post-hospital care for this patient are : Medications, appointments     Dona Trotter MD

## 2025-01-08 NOTE — PLAN OF CARE
Problem: Depressive Signs/Symptoms  Goal: Optimized Cognitive Function (Depressive Signs/Symptoms)  Outcome: Progressing     Problem: Psychotic Signs/Symptoms  Goal: Increased Participation and Engagement (Psychotic Signs/Symptoms)  Outcome: Progressing  Intervention: Facilitate Participation and Engagement    Patient up and visible on the unit   Presented with flat and blunted affect.  Denies SI/SIB/hallucination  Denies Anxiety and depression  Denies pain or discomfort.   Medication compliant.   Patient is eating and drinking adequately.  Will monitor as needed.

## 2025-01-08 NOTE — PLAN OF CARE
Goal Outcome Evaluation:    Plan of Care Reviewed With: patient      Problem: Adult Behavioral Health Plan of Care  Goal: Plan of Care Review  Outcome: Progressing  Flowsheets (Taken 1/7/2025 1938)  Patient Agreement with Plan of Care: agrees   Pt presented with a blunted affect, sad mood. Pt denied pain, all mental health symptoms and contracted for safety. Family visited and brought cultural food, went well. Compliant with all scheduled medications, no PRN requested or needed. Pt visible in the milieu, withdrawn to self, but was observed watching movie with peers. Later pt was observed laughing by herself, possible responding to internal stimuli.

## 2025-01-08 NOTE — PLAN OF CARE
Team Note Due:  Monday    Assessment/Intervention/Current Symtoms and Care Coordination:  Chart review and met with team, discussed pt progress, symptomology, and response to treatment.  Discussed the discharge plan and any potential impediments to discharge. Pt transferred from [facility] over the weekend. Pt under commitment, santos and mast porras was petitioned for on 12/27/2024. Pt requires a Montserratian . Per RN, pt appears more independent. Pt continues to laugh to herself at times but denies AH.     Discharge Plan or Goal:  Pending stabilization and safe disposition planning; considerations include:  TBD. Due to pt's current symptomology, it is unclear what pt's living situation was PTA     Barriers to Discharge:  Patient requires further psychiatric stabilization due to current symptomology, medication management with changes subject to provider, coordination with outside supports, and aftercare planning. Pt awaiting price porras hearing and will hopefully start ECT after that      Referral Status:  None at this time - CTC will continue to assess as pt stabilizes and discharge plan develops     Legal Status:  Committed MI without ITP   [facility]  [id number]-[id number]-   Started: 12/12/2024  Santos petition and Mast Porras petitions were submitted on 12/27/2024  Exam Hearing on 01/17/2025 at 1:00 PM  ECT Hearing on 01/17/2025 at 2:00 PM    Contacts (include CLYDE status):  Gifty - daughter (CLYDE)  P: [phone]    Tevin - son (CLYDE)  P: [phone]     Isaura - commitment CM through [facility] (CLYDE per commitment)  P: [phone]     Upcoming Meetings and Dates/Important Information and next steps:  CTC to coordinate with pt, outside supports, and treatment team regarding discharge planning   Pt has kezia porras exam and hearing on 1/17   Impaired gait/Weakness

## 2025-01-08 NOTE — PLAN OF CARE
Problem: Depressive Signs/Symptoms  Goal: Improved Sleep (Depressive Signs/Symptoms)  Outcome: Progressing   Goal Outcome Evaluation:    Patient was asleep  at the start of the shift. She is using a medical bed to assist with mobility. She woke up @ 0330, came out to the lounge and paced around. She did not report that she was incontinent of urine. When staff went to do the rounds, her bed linens were noted to be wet. Bathroom floor was also wet with urine. Linens were changed and bathroom floor was cleaned. Patient refused to wear a pull-up. Went back to her room @ 0400 and slept @ 0430.    No anxiety and depression noted.     Slept for 7.5 hours.      Dr. Ilda Kamara per [address] they do not have Tramadol with acetaminophen and is asking if they can dispense just Tramadol with no acetaminophen. pls advise and call [facility] back. Dr. Monalisa Taylor: patient is asking if you can prescribe plain Tramadol? Pharmacy does not have tramadol/acet; she has tried other pharmacies, and they do not have this combination. No-Patient/Caregiver offered and refused free interpretation services.

## 2025-01-08 NOTE — PLAN OF CARE
BEH IP Unit Acuity Rating Score (UARS)  Patient is given one point for every criteria they meet.    CRITERIA SCORING   On a 72 hour hold, court hold, committed, stay of commitment, or revocation. 1    Patient LOS on BEH unit exceeds 20 days. 0  LOS: 5   Patient under guardianship, 55+, otherwise medically complex, or under age 11. 1   Suicide ideation without relief of precipitating factors. 0   Current plan for suicide. 0   Current plan for homicide. 0   Imminent risk or actual attempt to seriously harm another without relief of factors precipitating the attempt. 0   Severe dysfunction in daily living (ex: complete neglect for self care, extreme disruption in vegetative function, extreme deterioration in social interactions). 1   Recent (last 7 days) or current physical aggression in the ED or on unit. 0   Restraints or seclusion episode in past 72 hours. 0   Recent (last 7 days) or current verbal aggression, agitation, yelling, etc., while in the ED or unit. 0   Active psychosis. 1   Need for constant or near constant redirection (from leaving, from others, etc).  0   Intrusive or disruptive behaviors. 0   Patient requires 3 or more hours of individualized nursing care per 8-hour shift (i.e. for ADLs, meds, therapeutic interventions). 0   TOTAL 4

## 2025-01-08 NOTE — PLAN OF CARE
Problem: Psychotic Signs/Symptoms  Goal: Increased Participation and Engagement (Psychotic Signs/Symptoms)  Outcome: Progressing  Intervention: Facilitate Participation and Engagement   Goal Outcome Evaluation:  Becky has been out in [facility]. Mood is sad, affect blunted. She denies SI, HI, AVH. She was observed to laughing loudly to herself, RTIS. No pain. Independent with toileting. She eats well at meals.   No complaints

## 2025-01-09 VITALS
OXYGEN SATURATION: 98 % | SYSTOLIC BLOOD PRESSURE: 91 MMHG | HEART RATE: 86 BPM | DIASTOLIC BLOOD PRESSURE: 58 MMHG | BODY MASS INDEX: 32.69 KG/M2 | WEIGHT: 221.34 LBS | RESPIRATION RATE: 16 BRPM | TEMPERATURE: 97.4 F

## 2025-01-09 LAB
HOLD SPECIMEN: NORMAL
LITHIUM SERPL-SCNC: 0.51 MMOL/L (ref 0.6–1.2)
LITHIUM SERPL-SCNC: 0.51 MMOL/L (ref 0.6–1.2)

## 2025-01-09 PROCEDURE — 250N000013 HC RX MED GY IP 250 OP 250 PS 637

## 2025-01-09 PROCEDURE — 99231 SBSQ HOSP IP/OBS SF/LOW 25: CPT | Performed by: PSYCHIATRY & NEUROLOGY

## 2025-01-09 PROCEDURE — 80178 ASSAY OF LITHIUM: CPT | Performed by: PSYCHIATRY & NEUROLOGY

## 2025-01-09 PROCEDURE — 124N000002 HC R&B MH UMMC

## 2025-01-09 PROCEDURE — 36415 COLL VENOUS BLD VENIPUNCTURE: CPT | Performed by: PSYCHIATRY & NEUROLOGY

## 2025-01-09 PROCEDURE — 250N000013 HC RX MED GY IP 250 OP 250 PS 637: Performed by: PSYCHIATRY & NEUROLOGY

## 2025-01-09 RX ADMIN — LITHIUM CARBONATE 300 MG: 300 TABLET, EXTENDED RELEASE ORAL at 21:10

## 2025-01-09 RX ADMIN — LEVOTHYROXINE SODIUM 100 MCG: 0.05 TABLET ORAL at 07:55

## 2025-01-09 RX ADMIN — Medication 7.5 MG: at 21:09

## 2025-01-09 RX ADMIN — ACETAMINOPHEN 650 MG: 325 TABLET, FILM COATED ORAL at 23:23

## 2025-01-09 RX ADMIN — LORAZEPAM 1.5 MG: 1 TABLET ORAL at 13:15

## 2025-01-09 RX ADMIN — DOCUSATE SODIUM 100 MG: 100 CAPSULE, LIQUID FILLED ORAL at 07:55

## 2025-01-09 RX ADMIN — Medication 1 TABLET: at 07:55

## 2025-01-09 RX ADMIN — CHLORPROMAZINE HYDROCHLORIDE 400 MG: 100 TABLET, FILM COATED ORAL at 21:09

## 2025-01-09 RX ADMIN — LORAZEPAM 1.5 MG: 1 TABLET ORAL at 21:09

## 2025-01-09 RX ADMIN — LISINOPRIL 5 MG: 5 TABLET ORAL at 07:55

## 2025-01-09 RX ADMIN — LORAZEPAM 1.5 MG: 1 TABLET ORAL at 07:55

## 2025-01-09 RX ADMIN — POLYETHYLENE GLYCOL 3350 17 G: 17 POWDER, FOR SOLUTION ORAL at 07:55

## 2025-01-09 ASSESSMENT — ACTIVITIES OF DAILY LIVING (ADL)
ADLS_ACUITY_SCORE: 36
ADLS_ACUITY_SCORE: 36
LAUNDRY: UNABLE TO COMPLETE
ADLS_ACUITY_SCORE: 36
HYGIENE/GROOMING: INDEPENDENT
ADLS_ACUITY_SCORE: 36
ORAL_HYGIENE: INDEPENDENT
DRESS: STREET CLOTHES;INDEPENDENT
ADLS_ACUITY_SCORE: 36
ADLS_ACUITY_SCORE: 36
DRESS: INDEPENDENT;STREET CLOTHES
ADLS_ACUITY_SCORE: 36
HYGIENE/GROOMING: INDEPENDENT
ORAL_HYGIENE: INDEPENDENT
ADLS_ACUITY_SCORE: 36
LAUNDRY: UNABLE TO COMPLETE

## 2025-01-09 NOTE — PLAN OF CARE
Problem: Psychotic Signs/Symptoms  Goal: Improved Behavioral Control (Psychotic Signs/Symptoms)  Outcome: Progressing  Goal Outcome Evaluation:    Patient has been up visible at the unit withdrawn to self, flat blunted affect with a calm and sad mood. Does not socialize with peers, denied anxiety, depression, SI/HI/SIB and all other MH symptoms. Patient denied pain, is eating and drinking adequately, poor hygiene offered to take a shower but said later and was medication compliant.   Patient has BLE edema that's she  reports that it's not new, IM notified.

## 2025-01-09 NOTE — PLAN OF CARE
"Team Note Due:  Monday    Assessment/Intervention/Current Symtoms and Care Coordination:  Chart review and met with team, discussed pt progress, symptomology, and response to treatment.  Discussed the discharge plan and any potential impediments to discharge. Pt transferred from [facility] over the weekend. Pt under commitment, santos and mast porras was petitioned for on 12/27/2024. Pt requires a Solomon Islander . Per RN, pt presents as flat. Pt reported mood as \"sad.\" Pt continues to laugh to herself at times but denies AH.     Discharge Plan or Goal:  Pending stabilization and safe disposition planning; considerations include:  TBD. Due to pt's current symptomology, it is unclear what pt's living situation was PTA     Barriers to Discharge:  Patient requires further psychiatric stabilization due to current symptomology, medication management with changes subject to provider, coordination with outside supports, and aftercare planning. Pt awaiting price porras hearing and will hopefully start ECT after that      Referral Status:  None at this time - CTC will continue to assess as pt stabilizes and discharge plan develops     Legal Status:  Committed MI without ITP   [facility]  22-WK-NC-   Started: 12/12/2024  Santos petition and Mast Porras petitions were submitted on 12/27/2024  Exam Hearing on 01/17/2025 at 1:00 PM  ECT Hearing on 01/17/2025 at 2:00 PM    Contacts (include CLYDE status):  Gifty - daughter (CLYDE)  P: [phone]    Tevin - son (CLYDE)  P: [phone]     Isaura - commitment CM through [facility] (CLYDE per commitment)  P: [phone]     Upcoming Meetings and Dates/Important Information and next steps:  CTC to coordinate with pt, outside supports, and treatment team regarding discharge planning   Pt has kezia porras exam and hearing on 1/17  "

## 2025-01-09 NOTE — PLAN OF CARE

## 2025-01-09 NOTE — PLAN OF CARE
Problem: Anxiety Signs/Symptoms  Goal: Improved Sleep (Anxiety Signs/Symptoms)  Outcome: Progressing   Goal Outcome Evaluation:    Patient was asleep at the start of the shift. Woke up at past midnight, walked to the bathroom by herself, voided,  and went back to sleep a few minutes after. Bathroom floor was noted to be wet with urine. Patient must have not reached the toilet seat on time and voided accidentally on the floor. Floor was cleaned thoroughly as it was smelling bad. No signs of anxiety noted. Slept for 7.5 hours.     She is scheduled for Lithium level check today.
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
## 2025-01-10 PROCEDURE — 99232 SBSQ HOSP IP/OBS MODERATE 35: CPT | Performed by: PHYSICIAN ASSISTANT

## 2025-01-10 PROCEDURE — 99232 SBSQ HOSP IP/OBS MODERATE 35: CPT | Performed by: PSYCHIATRY & NEUROLOGY

## 2025-01-10 PROCEDURE — 250N000013 HC RX MED GY IP 250 OP 250 PS 637: Performed by: PSYCHIATRY & NEUROLOGY

## 2025-01-10 PROCEDURE — 90853 GROUP PSYCHOTHERAPY: CPT

## 2025-01-10 PROCEDURE — 250N000013 HC RX MED GY IP 250 OP 250 PS 637

## 2025-01-10 PROCEDURE — 124N000002 HC R&B MH UMMC

## 2025-01-10 RX ADMIN — LEVOTHYROXINE SODIUM 100 MCG: 0.05 TABLET ORAL at 08:21

## 2025-01-10 RX ADMIN — CHLORPROMAZINE HYDROCHLORIDE 400 MG: 100 TABLET, FILM COATED ORAL at 20:29

## 2025-01-10 RX ADMIN — LORAZEPAM 1.5 MG: 1 TABLET ORAL at 14:30

## 2025-01-10 RX ADMIN — Medication 1 TABLET: at 08:21

## 2025-01-10 RX ADMIN — LISINOPRIL 5 MG: 5 TABLET ORAL at 08:20

## 2025-01-10 RX ADMIN — POLYETHYLENE GLYCOL 3350 17 G: 17 POWDER, FOR SOLUTION ORAL at 08:20

## 2025-01-10 RX ADMIN — LORAZEPAM 1.5 MG: 1 TABLET ORAL at 20:29

## 2025-01-10 RX ADMIN — DOCUSATE SODIUM 100 MG: 100 CAPSULE, LIQUID FILLED ORAL at 08:21

## 2025-01-10 RX ADMIN — LITHIUM CARBONATE 300 MG: 300 TABLET, EXTENDED RELEASE ORAL at 20:29

## 2025-01-10 RX ADMIN — LORAZEPAM 1.5 MG: 1 TABLET ORAL at 08:21

## 2025-01-10 RX ADMIN — Medication 7.5 MG: at 20:29

## 2025-01-10 ASSESSMENT — ACTIVITIES OF DAILY LIVING (ADL)
ADLS_ACUITY_SCORE: 36
DRESS: INDEPENDENT;STREET CLOTHES
ADLS_ACUITY_SCORE: 36
ADLS_ACUITY_SCORE: 36
ORAL_HYGIENE: INDEPENDENT
ADLS_ACUITY_SCORE: 36
DRESS: STREET CLOTHES;INDEPENDENT
HYGIENE/GROOMING: INDEPENDENT
ADLS_ACUITY_SCORE: 36
ORAL_HYGIENE: INDEPENDENT
ADLS_ACUITY_SCORE: 36
LAUNDRY: UNABLE TO COMPLETE
ADLS_ACUITY_SCORE: 36
HYGIENE/GROOMING: INDEPENDENT

## 2025-01-10 NOTE — PROGRESS NOTES
Internal Medicine Progress Note      Assessment and plan:  Becky Decker is a 59 year old female with a history of pertinent for schizoaffective disorder, prediabetes, HTN, hypothyroidism. She is admitted to [facility] station 3B for psychosis. Internal medicine was notified overnight 1/9/25 of acute on chronic bilateral lower extremity edema and asked to evaluate.     Medication review with side effects of edema include: haloperidol, chlorpromazine, lithium, levothyroxine. Per chart review, patient has been on and off diuretics (hydrochlorothiazide and chlorithadone) for HTN, most recently stopped due to electrolyte concerns.     Patient reports edema has been present X several months and is not worsening. Chart review back to 9/2023 notes lower extremity edema. US lower extremities 9/2023 without evidence of DVT. Patient is unsure if she has had a DVT in the past, upon chart review, can not identify evidence of prior DVT. Patient denies trial of compression stockings. Echo 9/2023 with EF 60-65% with normal LV and RV function.     Plan:   -US bilateral lower extremity duplex to assess for DVT   -If US negative for DVT trial of bilateral lower extremity compression stockings   -Could consider gentle spot diuresis; cautions with recent electrolyte derangements with diuretics     Internal medicine will follow US bilateral lower extremities. Please notify on call provider with any additional questions or concerns.      Objective:  /79   Pulse 87   Temp 97.4  F (36.3  C) (Oral)   Resp 16   Wt 100.4 kg (221 lb 5.5 oz)   SpO2 100%   BMI 32.69 kg/m      Vitals signs reviewed and noted    GENERAL: Alert, NAD, laying in bed.   HEENT: Anicteric sclera. Mucous membranes moist.   CV: RRR. S1, S2. No murmurs appreciated.   RESPIRATORY: Effort normal on RA. Lungs CTAB with no wheezing, rales, rhonchi.   NEUROLOGICAL: No focal deficits. Moves all extremities.    EXTREMITIES:1+ pitting edema of bilateral lower  extremities R slightly greater than L. Intact bilateral pedal pulses.   SKIN: No jaundice. No rashes of exposed skin.     Pertinent labs and procedures were reviewed.     Echo 9/19/23  Interpretation Summary  Global and regional left ventricular function is normal with an EF of 60-65%.  Global right ventricular function is normal.  No significant valvular abnormalities present.  Estimated mean right atrial pressure is normal.  Trivial pericardial effusion is present.    Subjective:   Nursing reported bilateral lower extremity edema. Per patient this has been present X several months. Denies any associated chest pain, dyspnea, SOB. Notes some pain in bilateral legs while walking. Patient is unsure if she has had a DVT in the past. She denies any fever or chills. Denies any other medical concerns at this time.       Astrid BONILLA [facility]  Securely message with the Vocera Web Console (learn more here)  Text page via [facility] Paging/Directory

## 2025-01-10 NOTE — PLAN OF CARE
Problem: Psychotic Signs/Symptoms  Goal: Improved Mood Symptoms (Psychotic Signs/Symptoms)  Outcome: Progressing   Goal Outcome Evaluation:             Received sitting on the chair by the koehler way, appeared to be responding to internal stimuli, pt was laughing. Approached if she needed anything, pt replied she was having a headache.  2323 Tylenol 650 mg given, helpful.   Calm , polite and soft spoken.  Encouraged to alert staff if she accidentally wets the bed, pt agreed.Walked the koehler lifting her long skirt to avoid tripping on it. Stayed in the lounge for a few minutes to pray.Went to bed at midnight and fell asleep at 0030.Woke up at 0300 and was incontinent of urine, toilet floor also had a pool or urine. BLE swollen, Encouraged to lay in bed with feet elevated.     Only slept for 4.5 hours

## 2025-01-10 NOTE — PLAN OF CARE
BEH IP Unit Acuity Rating Score (UARS)  Patient is given one point for every criteria they meet.    CRITERIA SCORING   On a 72 hour hold, court hold, committed, stay of commitment, or revocation. 1    Patient LOS on BEH unit exceeds 20 days. 0  LOS: 7   Patient under guardianship, 55+, otherwise medically complex, or under age 11. 1   Suicide ideation without relief of precipitating factors. 0   Current plan for suicide. 0   Current plan for homicide. 0   Imminent risk or actual attempt to seriously harm another without relief of factors precipitating the attempt. 0   Severe dysfunction in daily living (ex: complete neglect for self care, extreme disruption in vegetative function, extreme deterioration in social interactions). 1   Recent (last 7 days) or current physical aggression in the ED or on unit. 0   Restraints or seclusion episode in past 72 hours. 0   Recent (last 7 days) or current verbal aggression, agitation, yelling, etc., while in the ED or unit. 0   Active psychosis. 1   Need for constant or near constant redirection (from leaving, from others, etc).  0   Intrusive or disruptive behaviors. 0   Patient requires 3 or more hours of individualized nursing care per 8-hour shift (i.e. for ADLs, meds, therapeutic interventions). 0   TOTAL 4

## 2025-01-10 NOTE — PLAN OF CARE
Group Attendance:  attended full group    Time session began: 1415  Time session ended: 1500  Patient's total time in group: 45    Total # Attendees   4   Group Type psychotherapeutic     Group Topic Covered emotional regulation, symptom management, healthy coping skills, and relaxation and grounding techniques/coping skills     Group Session Detail Group members checked in with how they are feeling. The group discussed utilizing music as a therapeutic tool to explore emotions, expression, and promote relaxation. Writer discussed the significance of music in emotional regulation and coping strategies. Group members shared personal experiences and associations with songs they chose.       Patient's response to the group topic/interactions:  positive affect, cooperative with task, organized, supportive of peers, socially appropriate, listened actively, expressed understanding of topic, and attentive     Patient Details: Pt attended group, listened to music, and engaged with peers.            [id number] - Group psychotherapy - 1 Session  Patient Active Problem List   Diagnosis    Bipolar 2 disorder (H)    Ruby (H)    Psychosis (H)    Schizoaffective disorder, bipolar type (H)    PTSD (post-traumatic stress disorder)

## 2025-01-10 NOTE — PLAN OF CARE
Goal Outcome Evaluation:    Plan of Care Reviewed With: patient      Problem: Adult Behavioral Health Plan of Care  Goal: Plan of Care Review  Outcome: Progressing  Flowsheets (Taken 1/9/2025 1800)  Patient Agreement with Plan of Care: agrees     Pt denied pain, admitted to sometimes hearing voices & sees stuff other people don't see, not at the moment, denied all mental health symptoms and contracted for for safety. Pt reported missing her children. Compliant with all medications. Pt reported that feet are swollen, on assessment, has +2 edema on bilateral feet. Pt was encouraged to walk the hallway a couple times and the foot of the bed was raised and a pillow given to put under feet. IM was text paged but they deferred to morning team because of the load they had for this evening. AM RN please follow up with AM IM team with a text page. No safety concerns noted.

## 2025-01-10 NOTE — PROGRESS NOTES
Brief medicine progress note     Follow up on overnight call for pedal edema. Discussed with nursing, patient at ECT and is unable to be seen at this time. Nursing notes she got in report of edema overnight and is unsure if edema was present this AM prior to ECT. Patient did not express any concerns. Unable to see patient at present as off the floor.   -Will assess patient 1/11/25 for pedal edema  -Please notify internal medicine on call if acute concerns     Astrid Gregorio PA-C  [facility]  Securely message with the Vocera Web Console (learn more here)  Text page via [facility] Paging/Directory

## 2025-01-10 NOTE — PLAN OF CARE
Goal Outcome Evaluation:    Plan of Care Reviewed With: patient              Nursing Assessment    ramona  Psychosis (H)    Admit Date: 1/3/2025    Length of Stay: 7    Patient evaluation continues. Assessed mood,anxiety,thoughts and behavior. Patient is progressing towards goals, out in the milieu more but remains quiet. Appetite better. Occasionally laughing out loud. Patient is encouraged to participate in groups and assisted to develop healthy coping skills.  Patient denies to this writer auditory or visual hallucinations. /79   Pulse 87   Temp 97.4  F (36.3  C) (Oral)   Resp 16   Wt 100.4 kg (221 lb 5.5 oz)   SpO2 100%   BMI 32.69 kg/m    Writer was going to call family to bring in a change of clothes due to incontinence at night, there are clothes in her locker.  Mood: good     Patient reports depression yes per pt  and reports anxiety yes    Affect:flat    Sleep: only slept 4.5 hours last night    Appetite: fair 50%    SI: denies    HI: denies    SIB: denies      Medication Compliance yes    Group participation:no    ADL's: independent, needs change of clothes will try and notify family.    Fall risk interventions: proper foot wear, orthostatic B/P    Discharge planning in process    Refer to daily team meeting notes for individualized plan of care. Nursing will continue to assess.    *Scale is 1-10 and 10 is the worst.

## 2025-01-10 NOTE — PLAN OF CARE
Team Note Due:  Monday    Assessment/Intervention/Current Symtoms and Care Coordination:  Chart review and met with team, discussed pt progress, symptomology, and response to treatment.  Discussed the discharge plan and any potential impediments to discharge. Pt transferred from [facility] over the weekend. Pt under commitment, santos and kezia porras was petitioned for on 12/27/2024. Pt requires a Monegasque .     Per Team Meeting:  Pt looks better, denies paranoia, AH/VH.  Pt is out in the milieu a lot more but not socializing with other patients.          Discharge Plan or Goal:  Pending stabilization and safe disposition planning; considerations include:  TBD. Due to pt's current symptomology, it is unclear what pt's living situation was PTA     Barriers to Discharge:  Patient requires further psychiatric stabilization due to current symptomology, medication management with changes subject to provider, coordination with outside supports, and aftercare planning. Pt awaiting price porras hearing and will hopefully start ECT after that      Referral Status:  None at this time - CTC will continue to assess as pt stabilizes and discharge plan develops     Legal Status:  Committed MI without ITP   [facility]  30-RX-CI-   Started: 12/12/2024  Santos petition and Kezia Matthewppard petitions were submitted on 12/27/2024  Exam Hearing on 01/17/2025 at 1:00 PM  ECT Hearing on 01/17/2025 at 2:00 PM    Contacts (include CLYDE status):  Gifty - daughter (CLYDE)  P: [phone]    Tevin - son (CLYDE)  P: [phone]     Isaura - commitment CM through [facility] (CLYDE per commitment)  P: [phone]     Upcoming Meetings and Dates/Important Information and next steps:  CTC to coordinate with pt, outside supports, and treatment team regarding discharge planning   Pt has kezia porras exam and hearing on 1/17

## 2025-01-11 ENCOUNTER — APPOINTMENT (OUTPATIENT)
Dept: ULTRASOUND IMAGING | Facility: CLINIC | Age: 60
End: 2025-01-11
Attending: PHYSICIAN ASSISTANT

## 2025-01-11 PROCEDURE — 93970 EXTREMITY STUDY: CPT

## 2025-01-11 PROCEDURE — 124N000002 HC R&B MH UMMC

## 2025-01-11 PROCEDURE — 250N000013 HC RX MED GY IP 250 OP 250 PS 637: Performed by: PSYCHIATRY & NEUROLOGY

## 2025-01-11 PROCEDURE — 250N000013 HC RX MED GY IP 250 OP 250 PS 637

## 2025-01-11 PROCEDURE — 93970 EXTREMITY STUDY: CPT | Mod: 26 | Performed by: STUDENT IN AN ORGANIZED HEALTH CARE EDUCATION/TRAINING PROGRAM

## 2025-01-11 RX ADMIN — CHLORPROMAZINE HYDROCHLORIDE 400 MG: 100 TABLET, FILM COATED ORAL at 21:10

## 2025-01-11 RX ADMIN — Medication 7.5 MG: at 21:10

## 2025-01-11 RX ADMIN — Medication 3 MG: at 00:56

## 2025-01-11 RX ADMIN — LORAZEPAM 1.5 MG: 1 TABLET ORAL at 14:21

## 2025-01-11 RX ADMIN — LORAZEPAM 1.5 MG: 1 TABLET ORAL at 21:09

## 2025-01-11 RX ADMIN — LORAZEPAM 1.5 MG: 1 TABLET ORAL at 08:09

## 2025-01-11 RX ADMIN — LISINOPRIL 5 MG: 5 TABLET ORAL at 08:10

## 2025-01-11 RX ADMIN — LEVOTHYROXINE SODIUM 100 MCG: 0.05 TABLET ORAL at 08:10

## 2025-01-11 RX ADMIN — POLYETHYLENE GLYCOL 3350 17 G: 17 POWDER, FOR SOLUTION ORAL at 08:10

## 2025-01-11 RX ADMIN — Medication 1 TABLET: at 08:10

## 2025-01-11 RX ADMIN — DOCUSATE SODIUM 100 MG: 100 CAPSULE, LIQUID FILLED ORAL at 08:10

## 2025-01-11 RX ADMIN — Medication 5 MG: at 21:11

## 2025-01-11 RX ADMIN — ACETAMINOPHEN 650 MG: 325 TABLET, FILM COATED ORAL at 21:09

## 2025-01-11 RX ADMIN — ACETAMINOPHEN 650 MG: 325 TABLET, FILM COATED ORAL at 00:56

## 2025-01-11 ASSESSMENT — ACTIVITIES OF DAILY LIVING (ADL)
ADLS_ACUITY_SCORE: 36
DRESS: STREET CLOTHES
ADLS_ACUITY_SCORE: 36
ADLS_ACUITY_SCORE: 36
DRESS: STREET CLOTHES;INDEPENDENT
LAUNDRY: UNABLE TO COMPLETE
ORAL_HYGIENE: INDEPENDENT
ADLS_ACUITY_SCORE: 36
LAUNDRY: UNABLE TO COMPLETE
HYGIENE/GROOMING: INDEPENDENT
ADLS_ACUITY_SCORE: 36
HYGIENE/GROOMING: INDEPENDENT
ADLS_ACUITY_SCORE: 36
ORAL_HYGIENE: INDEPENDENT
ADLS_ACUITY_SCORE: 36

## 2025-01-11 NOTE — PROGRESS NOTES
Patient is up and visible in the milieu. Patient is ambulating independently with no problem. Patient is alert and oriented. Patient is attending/participating in unit programming.  Affect is flat, mood is anxious. Patient denies SI/SIB/HI. Pt denies auditory/visual hallucinations. Patient verbally contracts for safety on the unit.     This RN administered No PRN medications this shift.  Patient reports a good appetite, and good sleep.  Rest, fluids, and food encouraged. Status 15 checks remain. Patient is able to make needs known appropriately. Patient denies any unmet needs at this time.  Patient has order for Ultrasound on bilateral extremities.      The X-ray people told staff would call when they are ready for the patient.  Thicket to ride completed.  Code 2 order received from on-call provider.  Awaiting call from ultrasound.    Blood pressure 127/75, pulse 87, temperature 97.5  F (36.4  C), temperature source Oral, resp. rate 16, weight 100.4 kg (221 lb 5.5 oz), SpO2 100%.

## 2025-01-11 NOTE — PLAN OF CARE
Problem: Psychotic Signs/Symptoms  Goal: Improved Behavioral Control (Psychotic Signs/Symptoms)  1/11/2025 0144 by Enedelia Harris RN  Outcome: Progressing  1/11/2025 0144 by Enedelia Harris RN  Outcome: Progressing       Problem: Sleep Disturbance  Goal: Adequate Sleep/Rest  Outcome: Progressing     Goal Outcome Evaluation:    Pt was in bed sleeping at report. She slept for 6 hours. [facility]. Pt complained of headache, but unable to rate. PRN Tylenol 650 mg  po for pain and melatonin 3 mg for sleep was administered. Pt was encouraged back to the room to sleep. Writer went with patient to the room. She voided and went back to sleep. Safety checks maintained. Will continue to monitor per POC.
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
## 2025-01-11 NOTE — PLAN OF CARE
Patient presents with flat affect. She denied pain, anxiety, depression, SI/SIB, and contracted for safety. Patient endorsed auditory hallucination, saying the voices were talking about her family. Patient's family came to visit and brought food for her. She had a good appetite with adequate fluid intake. She was compliant with medications.     Problem: Anxiety Signs/Symptoms  Goal: Optimized Energy Level (Anxiety Signs/Symptoms)  Outcome: Progressing  Intervention: Optimize Energy Level  Recent Flowsheet Documentation  Taken 1/10/2025 1823 by Radha Elliott RN  Diversional Activity: (exercise bike and playing card) other (see comments)       Problem: Depressive Signs/Symptoms  Goal: Improved Psychomotor Symptoms (Depressive Signs/Symptoms)  Intervention: Manage Psychomotor Movement  Recent Flowsheet Documentation  Taken 1/10/2025 1823 by Radha Elliott RN  Diversional Activity: (exercise bike and playing card) other (see comments)   Goal Outcome Evaluation:    Plan of Care Reviewed With: patient

## 2025-01-12 PROCEDURE — 97150 GROUP THERAPEUTIC PROCEDURES: CPT | Mod: GO

## 2025-01-12 PROCEDURE — 250N000013 HC RX MED GY IP 250 OP 250 PS 637: Performed by: PSYCHIATRY & NEUROLOGY

## 2025-01-12 PROCEDURE — 250N000013 HC RX MED GY IP 250 OP 250 PS 637

## 2025-01-12 PROCEDURE — 124N000002 HC R&B MH UMMC

## 2025-01-12 RX ADMIN — DOCUSATE SODIUM 100 MG: 100 CAPSULE, LIQUID FILLED ORAL at 08:30

## 2025-01-12 RX ADMIN — LEVOTHYROXINE SODIUM 100 MCG: 0.05 TABLET ORAL at 08:31

## 2025-01-12 RX ADMIN — LORAZEPAM 1.5 MG: 1 TABLET ORAL at 22:06

## 2025-01-12 RX ADMIN — Medication 7.5 MG: at 22:06

## 2025-01-12 RX ADMIN — Medication 5 MG: at 22:07

## 2025-01-12 RX ADMIN — CHLORPROMAZINE HYDROCHLORIDE 400 MG: 100 TABLET, FILM COATED ORAL at 22:06

## 2025-01-12 RX ADMIN — LORAZEPAM 1.5 MG: 1 TABLET ORAL at 08:30

## 2025-01-12 RX ADMIN — LISINOPRIL 5 MG: 5 TABLET ORAL at 08:31

## 2025-01-12 RX ADMIN — LORAZEPAM 1.5 MG: 1 TABLET ORAL at 14:15

## 2025-01-12 RX ADMIN — Medication 1 TABLET: at 08:31

## 2025-01-12 ASSESSMENT — ACTIVITIES OF DAILY LIVING (ADL)
ADLS_ACUITY_SCORE: 36
LAUNDRY: UNABLE TO COMPLETE
ADLS_ACUITY_SCORE: 36
ADLS_ACUITY_SCORE: 36
ORAL_HYGIENE: INDEPENDENT
ADLS_ACUITY_SCORE: 36
HYGIENE/GROOMING: INDEPENDENT
ADLS_ACUITY_SCORE: 36
DRESS: STREET CLOTHES
ADLS_ACUITY_SCORE: 36
ADLS_ACUITY_SCORE: 36
ORAL_HYGIENE: INDEPENDENT
DRESS: STREET CLOTHES;INDEPENDENT
HYGIENE/GROOMING: INDEPENDENT
ADLS_ACUITY_SCORE: 36

## 2025-01-12 NOTE — PLAN OF CARE
"  Rehab Group    Start time: 10:15  End time: 11:05  Patient time total: 45 minutes    attended partial group    #5 attended   Group Type: occupational therapy   Group Topic Covered: cognitive activities, coping skills, healthy leisure time, and social skills     Group Session Detail:  Patient engaged in a leisure activity with a visuospatial and cognitive component in order to promote: problem solving skills, improve attention, emphasize new learning, exercise cognitive skills, and foster leisure and relaxation.       Patient Response/Contribution:  worked intermittently, required repetition of directions, required prompts or assistance to participate, distracted , and unable to sequence the task     Patient Detail:  Writer observed patient laughing to self as she was ambulating into the group space; seemingly appearing to be responding to internal stimuli. During check-in, patient reported feeling grateful for \"family.\" Patient required consistent 1:1 verbal cueing during a novel leisure activity. Patient required demonstration and simplified cueing in order to promote successful participation. No new learning was observed despite the repetitiveness of the activity. Patient did appear fatigued as patient was observed with her eyes closed and head drooped intermittently throughout group. Patient left group slightly early without return.       [id number] OT Group (2 or more in attendance)      Patient Active Problem List   Diagnosis    Bipolar 2 disorder (H)    Ruby (H)    Psychosis (H)    Schizoaffective disorder, bipolar type (H)    PTSD (post-traumatic stress disorder)        "

## 2025-01-12 NOTE — PLAN OF CARE
Goal Outcome Evaluation:    Plan of Care Reviewed With: patient      Problem: Adult Behavioral Health Plan of Care  Goal: Plan of Care Review  Outcome: Progressing  Flowsheets (Taken 1/11/2025 2100)  Patient Agreement with Plan of Care: agrees     Pt endorsed bilateral feet pain 5/10, PRN Tylenol provided relief, anxiety 5/10, scheduled Ativan given. Pt reports hearing voices sometimes. Pt denied all other mental health symptoms and contracted for safety. Compliant with all scheduled medications. Visible in the milieu, socializes some and watches TV with peers. Family visited, went well. Pt went for ultrasound of bilateral lower extremity, no DVT found. No safety concerns noted/reported.

## 2025-01-12 NOTE — PLAN OF CARE
Goal Outcome Evaluation:                 Problem: Sleep Disturbance  Goal: Adequate Sleep/Rest  Outcome: Progressing     At beginning of shift patient was noted to be in the koehler's corner sleeping on the chair. Patient was helped back to her room, where she then went back to bed. Patient appeared to be asleep for  6 hours during safety checks this shift. No other concerns or complaints voiced by patient or noted by staff.

## 2025-01-12 NOTE — PLAN OF CARE
Goal Outcome Evaluation:    Plan of Care Reviewed With: patient Plan of Care Reviewed With: patient    Overall Patient Progress: improvingOverall Patient Progress: improving      Pt is calm and cooperative today. Quiet but present in Milieu much of the day. Intake and output is recorded in chart. Morning BP soft (95/70) but after drinking 450 mL water her BP came up to  126/77 Reports an overall good appetite and denies hallucinations.      Pt states pain 2/10 bilat lower legs; refused tylenol but stated she wanted to ambulate in hallway. When asked if she would rather rest, she still wanted to walk it off.     Pt reports anxiety rated 6 of 10 in severity. Pt denies any suicidal ideation plans or intent. Endorses willingness to alert staff if she develops any SI/SIB while hospitalized (contracts for safety).        Pt reports no further concerns at this time.

## 2025-01-12 NOTE — PROGRESS NOTES
Pt.'s family brought in two wrap around gowns that will be added to her clothing that she may have in her room.

## 2025-01-13 ENCOUNTER — OFFICE VISIT (OUTPATIENT)
Dept: INTERPRETER SERVICES | Facility: CLINIC | Age: 60
End: 2025-01-13
Attending: PSYCHIATRY & NEUROLOGY

## 2025-01-13 PROCEDURE — 250N000013 HC RX MED GY IP 250 OP 250 PS 637: Performed by: PSYCHIATRY & NEUROLOGY

## 2025-01-13 PROCEDURE — 36415 COLL VENOUS BLD VENIPUNCTURE: CPT | Performed by: PSYCHIATRY & NEUROLOGY

## 2025-01-13 PROCEDURE — 250N000013 HC RX MED GY IP 250 OP 250 PS 637

## 2025-01-13 PROCEDURE — 124N000002 HC R&B MH UMMC

## 2025-01-13 PROCEDURE — 80173 ASSAY OF HALOPERIDOL: CPT | Performed by: PSYCHIATRY & NEUROLOGY

## 2025-01-13 PROCEDURE — T1013 SIGN LANG/ORAL INTERPRETER: HCPCS

## 2025-01-13 RX ADMIN — Medication 7.5 MG: at 20:20

## 2025-01-13 RX ADMIN — LORAZEPAM 1.5 MG: 1 TABLET ORAL at 08:07

## 2025-01-13 RX ADMIN — ACETAMINOPHEN 650 MG: 325 TABLET, FILM COATED ORAL at 11:05

## 2025-01-13 RX ADMIN — Medication 1 TABLET: at 08:08

## 2025-01-13 RX ADMIN — Medication 1 LOZENGE: at 20:22

## 2025-01-13 RX ADMIN — CHLORPROMAZINE HYDROCHLORIDE 400 MG: 100 TABLET, FILM COATED ORAL at 20:21

## 2025-01-13 RX ADMIN — LEVOTHYROXINE SODIUM 100 MCG: 0.05 TABLET ORAL at 08:07

## 2025-01-13 RX ADMIN — POLYETHYLENE GLYCOL 3350 17 G: 17 POWDER, FOR SOLUTION ORAL at 08:08

## 2025-01-13 RX ADMIN — DOCUSATE SODIUM 100 MG: 100 CAPSULE, LIQUID FILLED ORAL at 08:07

## 2025-01-13 RX ADMIN — Medication 5 MG: at 22:26

## 2025-01-13 RX ADMIN — LORAZEPAM 1.5 MG: 1 TABLET ORAL at 14:49

## 2025-01-13 RX ADMIN — Medication 1 LOZENGE: at 22:26

## 2025-01-13 RX ADMIN — LORAZEPAM 1.5 MG: 1 TABLET ORAL at 20:21

## 2025-01-13 RX ADMIN — Medication 1 LOZENGE: at 17:22

## 2025-01-13 RX ADMIN — LISINOPRIL 5 MG: 5 TABLET ORAL at 08:08

## 2025-01-13 ASSESSMENT — ACTIVITIES OF DAILY LIVING (ADL)
ADLS_ACUITY_SCORE: 36
ADLS_ACUITY_SCORE: 36
DRESS: INDEPENDENT;STREET CLOTHES
DRESS: INDEPENDENT;SCRUBS (BEHAVIORAL HEALTH)
ADLS_ACUITY_SCORE: 36
ORAL_HYGIENE: INDEPENDENT
ADLS_ACUITY_SCORE: 36
ADLS_ACUITY_SCORE: 36
LAUNDRY: UNABLE TO COMPLETE
ADLS_ACUITY_SCORE: 36
HYGIENE/GROOMING: INDEPENDENT
ADLS_ACUITY_SCORE: 36
HYGIENE/GROOMING: INDEPENDENT
ADLS_ACUITY_SCORE: 36
ADLS_ACUITY_SCORE: 36
ORAL_HYGIENE: INDEPENDENT
ADLS_ACUITY_SCORE: 36

## 2025-01-13 NOTE — PROGRESS NOTES
Brief Medicine Follow Up Note    Following up regarding BLE US.     A/P:  Bilateral Lower Extremity Edema  Please see prior Medicine notes for details. BLE US negative for DVT in either extremity.  - Per prior Medicine recommendations, since BLE US negative, will start w/ compression stockings  - If no relief w/ compression therapy, could consider gently spot-diuresis vs lymphedema consult  - Continue to monitor    Medicine will sign off. No further recommendations at this time.  Please feel free to reconsult if any new medical issues or concerns.  Thank you for the opportunity to care for this patient.     Juan Samano PA-C  [facility]  Contact information available via [facility] Paging/Directory

## 2025-01-13 NOTE — PLAN OF CARE
Problem: Psychotic Signs/Symptoms  Goal: Improved Behavioral Control (Psychotic Signs/Symptoms)  Outcome: Not Progressing   Goal Outcome Evaluation:       Met with patient with  present. Pt denies depression but admits to feeling anxious due to being scared. Pt admits to having auditory hallucinations at times and admits at times the the voices tell her to suicide. Pt reports that she is not having thoughts or urges to harm herself this AM. Pt noted to be laughing in the lounge to herself. Pt has been visible for about 50 % of the shift.   Pt declined lab draw and this was rescheduled for tomorrow. -Dr Salgado notified.  Pt reported pain in her lower legs and stated that she was having stabbing like pains when asked. Pt noted to have bilateral lower leg edema. Pt reports that she has had this for about 3 months. Compression socks ordered by medical team.   Pt offered/accepted prn tylenol 650 mg at 1105 for  bilateral lower leg pain.  Pt reports that she wants to go home. Pt reminded of court hearing on Friday.

## 2025-01-13 NOTE — PLAN OF CARE
Goal Outcome Evaluation:    Plan of Care Reviewed With: patient      Problem: Adult Behavioral Health Plan of Care  Goal: Plan of Care Review  Outcome: Progressing  Flowsheets (Taken 1/12/2025 1914)  Patient Agreement with Plan of Care: agrees    Pt presented with a flat affect, which brightens some with interaction,calm mood during check in. Pt endorsed depression 8/10 due to been worried about her children. Pt denied pain, anxiety, SI, HI,hallucinations, and contracted for safety. Pt was visible in the milieu, social and engaged some with peers/staff. Sons visited, went well. Compliant with all scheduled medications. No side effects reported. Pt makes some needs known appropriately. No safety concerns observed this shift.

## 2025-01-13 NOTE — PROGRESS NOTES
Patient seen, chart reviewed, care discussed with staff.  Blood pressure 128/76, pulse 89, temperature 98.2  F (36.8  C), temperature source Temporal, resp. rate 18, weight 103.3 kg (227 lb 11.8 oz), SpO2 99%.  Seen with .  Slept 8.5 hours.  ECT and Santos hearings set for 1/17.      General appearance: slow motorialy,   Alert.   Affect: sad  Mood: depressed  Speech:  soft voiced, mumbles, decreased production  Eye contact:  low    Psychomotor behavior: decreased  Gait: steady but slow  Abnormal movements:  none  Delusions:  none noted  Hallucinations:  denied  Thoughts: linear, concrete  Associations: intact  Judgement: poor  Insight: low  Cognitions: not able to assess  Memory:  intact in conversation  Orientation: normal    Not suicidal.      Bipolar 2 disorder (H)     Ruby (H)    Psychosis (H)    Schizoaffective disorder, bipolar type (H)      And:   Patient Active Problem List   Diagnosis    Bipolar 2 disorder (H)    Ruby (H)    Psychosis (H)    Schizoaffective disorder, bipolar type (H)    PTSD (post-traumatic stress disorder)     Plan:  check haldol level    Current Facility-Administered Medications   Medication Dose Route Frequency Provider Last Rate Last Admin    - Psychiatric Emergency -   Other See Admin Instructions Dona Trotter MD        acetaminophen (TYLENOL) tablet 650 mg  650 mg Oral Q4H PRN Rob Loaiza MD   650 mg at 01/13/25 1105    alum & mag hydroxide-simethicone (MAALOX) suspension 30 mL  30 mL Oral Q4H PRN Rob Loaiza MD        benzocaine-menthol (CHLORASEPTIC) 6-10 MG lozenge 1 lozenge  1 lozenge Buccal Q1H PRN Rob Loaiza MD        chlorproMAZINE (THORAZINE) tablet 400 mg  400 mg Oral At Bedtime Rob Loaiza MD   400 mg at 01/12/25 2206    chlorproMAZINE (THORAZINE) tablet 50 mg  50 mg Oral Q8H PRN Rob Loaiza MD        docusate sodium (COLACE) capsule 100 mg  100 mg Oral Daily Rob Loaiza MD   100 mg at 01/13/25 0807     guaiFENesin-dextromethorphan (ROBITUSSIN DM) 100-10 MG/5ML syrup 10 mL  10 mL Oral Q4H PRN Rob Loaiza MD        haloperidol (HALDOL) half-tab 7.5 mg  7.5 mg Oral At Bedtime Rob Loaiza MD   7.5 mg at 01/12/25 2206    hydrOXYzine HCl (ATARAX) tablet 25 mg  25 mg Oral Q6H PRN Rob Loaiza MD        levothyroxine (SYNTHROID/LEVOTHROID) tablet 100 mcg  100 mcg Oral QAM AC Juan Samano PA-C   100 mcg at 01/13/25 0807    lisinopril (ZESTRIL) tablet 5 mg  5 mg Oral Daily Juan Samano PA-C   5 mg at 01/13/25 0808    LORazepam (ATIVAN) tablet 1 mg  1 mg Oral Q4H PRN Rob Loaiza MD        LORazepam (ATIVAN) tablet 1.5 mg  1.5 mg Oral TID Rob Loaiza MD   1.5 mg at 01/13/25 1449    magnesium hydroxide (MILK OF MAGNESIA) suspension 30 mL  30 mL Oral Daily PRN Rob Loaiza MD        melatonin tablet 3 mg  3 mg Oral At Bedtime PRN Rob Loaiza MD   3 mg at 01/11/25 0056    melatonin tablet 5 mg  5 mg Oral At Bedtime Dona Trotter MD   5 mg at 01/12/25 2207    multivitamin w/minerals (THERA-VIT-M) tablet 1 tablet  1 tablet Oral Daily Rob Loaiza MD   1 tablet at 01/13/25 0808    OLANZapine (zyPREXA) tablet 10 mg  10 mg Oral TID PRN Rob Loaiza MD        Or    OLANZapine (zyPREXA) injection 10 mg  10 mg Intramuscular TID PRN Rob Loaiza MD        polyethylene glycol (MIRALAX) Packet 17 g  17 g Oral Daily Rob Loaiza MD   17 g at 01/13/25 0808    polyethylene glycol (MIRALAX) Packet 17 g  17 g Oral Daily PRN Rob Loaiza MD        senna-docusate (SENOKOT-S/PERICOLACE) 8.6-50 MG per tablet 1 tablet  1 tablet Oral BID PRN Rob Loaiza MD         Recent Results (from the past week)   Renal panel    Collection Time: 01/07/25  7:45 AM   Result Value Ref Range    Sodium 136 135 - 145 mmol/L    Potassium 4.1 3.4 - 5.3 mmol/L    Chloride 101 98 - 107 mmol/L    Carbon Dioxide (CO2) 24 22 - 29 mmol/L    Anion Gap 11 7 -  15 mmol/L    Glucose 100 (H) 70 - 99 mg/dL    Urea Nitrogen 19.4 6.0 - 20.0 mg/dL    Creatinine 0.93 0.51 - 0.95 mg/dL    GFR Estimate 71 >60 mL/min/1.73m2    Calcium 8.8 8.8 - 10.4 mg/dL    Albumin 3.4 (L) 3.5 - 5.2 g/dL    Phosphorus 3.8 2.5 - 4.5 mg/dL   Lithium level    Collection Time: 01/09/25  7:28 AM   Result Value Ref Range    Lithium 0.51 (L) 0.60 - 1.20 mmol/L   Extra Green Top (Lithium Heparin) Tube    Collection Time: 01/09/25  7:28 AM   Result Value Ref Range    Hold Specimen JIC    Extra Purple Top Tube    Collection Time: 01/09/25  7:28 AM   Result Value Ref Range    Hold Specimen JIC

## 2025-01-13 NOTE — PLAN OF CARE
BEH IP Unit Acuity Rating Score (UARS)  Patient is given one point for every criteria they meet.    CRITERIA SCORING   On a 72 hour hold, court hold, committed, stay of commitment, or revocation. 1    Patient LOS on BEH unit exceeds 20 days. 0  LOS: 10   Patient under guardianship, 55+, otherwise medically complex, or under age 11. 1   Suicide ideation without relief of precipitating factors. 0   Current plan for suicide. 0   Current plan for homicide. 0   Imminent risk or actual attempt to seriously harm another without relief of factors precipitating the attempt. 0   Severe dysfunction in daily living (ex: complete neglect for self care, extreme disruption in vegetative function, extreme deterioration in social interactions). 1   Recent (last 7 days) or current physical aggression in the ED or on unit. 0   Restraints or seclusion episode in past 72 hours. 0   Recent (last 7 days) or current verbal aggression, agitation, yelling, etc., while in the ED or unit. 0   Active psychosis. 1   Need for constant or near constant redirection (from leaving, from others, etc).  0   Intrusive or disruptive behaviors. 0   Patient requires 3 or more hours of individualized nursing care per 8-hour shift (i.e. for ADLs, meds, therapeutic interventions). 0   TOTAL 4

## 2025-01-13 NOTE — PLAN OF CARE
Team Note Due:  Monday    Assessment/Intervention/Current Symtoms and Care Coordination:  Chart review and met with team, discussed pt progress, symptomology, and response to treatment.  Discussed the discharge plan and any potential impediments to discharge. Pt under commitment, sorensen and kezia porras was petitioned for on 12/27/2024. Pt requires a Japanese . Pt continues to laugh to herself at times but denies AH. Pt was up during the night and appeared confused, stating that she does not have a room or a bed. Staff were able to redirect her.     Writer put out call to [facility] ([phone]) and confirmed both the price porras and sorensen will be addressed during the hearings on 1/17.     Discharge Plan or Goal:  Pending stabilization and safe disposition planning; considerations include:  TBD. Due to pt's current symptomology, it is unclear what pt's living situation was PTA     Barriers to Discharge:  Patient requires further psychiatric stabilization due to current symptomology, medication management with changes subject to provider, coordination with outside supports, and aftercare planning. Pt awaiting price porras hearing and will hopefully start ECT after that      Referral Status:  None at this time - CTC will continue to assess as pt stabilizes and discharge plan develops     Legal Status:  Committed MI without ITP   [facility]  [id number]   Started: 12/12/2024  Sorensen petition and Kezia Porras petitions were submitted on 12/27/2024  Exam Hearing on 01/17/2025 at 1:00 PM  ECT Hearing on 01/17/2025 at 2:00 PM    Contacts (include CLYDE status):  Gifty - daughter (CLYDE)  P: [phone]    Tevin - son (CLYDE)  P: [phone]     Isaura - commitment CM through [facility] (CLYDE per commitment)  P: [phone]     Upcoming Meetings and Dates/Important Information and next steps:  CTC to coordinate with pt, outside supports, and treatment team  regarding discharge planning   Pt has kezia porras/edu exam and hearing on 1/17

## 2025-01-13 NOTE — PLAN OF CARE
01/13/25 1108   Individualization/Patient Specific Goals   Patient Personal Strengths spiritual/Islam support;community support;family/social support   Patient Vulnerabilities history of unsuccessful treatment   Anxieties, Fears or Concerns Pt appears to respond to internal stimuli   Individualized Care Needs Pt requires Chinese    Patient/Family-Specific Goals (Include Timeframe) Pt's family is involved   Interprofessional Rounds   Summary Pt transferred from [facility]. Pt was initially admitted for behavioral concerns and psychotic symptoms. Pt is now committed. Santos and mast porras petition were recently sent in. Pt has santos/price porras hearing on 1/17 with the hopes of treating pt with ECT   Participants CTC;nursing;OT;psychiatrist   Behavioral Team Discussion   Participants Dr. Chester Salgado MD; Bibiana Vega [facility], [facility]; Dana Lutz RN; Jess Rush OT   Progress Pt requires Chinese . Pt appears to be responding to internal stimuli and can present as confused at times. Pt continues to await santos/price porras hearing   Anticipated length of stay 4+ weeks   Continued Stay Criteria/Rationale Medication management per psychiatry, stabilization of symptoms, aftercare planning, coordination with outside supports, court process with possible ECT   Medical/Physical See H&P and provider notes   Precautions See below   Plan Medication management per psychiatry, stabilization of symptoms, aftercare planning, coordination with outside supports, court process with possible ECT   Rationale for change in precautions or plan No change   Safety Plan Per unit protocol   Anticipated Discharge Disposition home with family;assisted living  (TBD)     PRECAUTIONS AND SAFETY    Behavioral Orders   Procedures    Code 1 - Restrict to Unit    Code 2     Ultrasound    Fall precautions    Routine Programming     As clinically indicated    Status 15     Every 15 minutes.       Safety  Safety WDL:  WDL  Patient Location: [facility], patient room, own  Observed Behavior: calm, sitting, walking  Observed Behavior (Comment): none  Safety Measures: safety rounds completed, suicide check-in completed  Diversional Activity: television  De-Escalation Techniques: stimulation decreased, medication administered, quiet time facilitated, reoriented, appropriate behavior reinforced  Suicidality: Status 15, Minimal furniture in room, Minimal personal belongings in room, Unpredictable frequency of checking on patient

## 2025-01-13 NOTE — PLAN OF CARE
"  Problem: Psychotic Signs/Symptoms  Goal: Improved Behavioral Control (Psychotic Signs/Symptoms)  Outcome: Progressing     Problem: Sleep Disturbance  Goal: Adequate Sleep/Rest  Outcome: Progressing     Goal Outcome Evaluation:    Pt was in room sleeping with eyes closed at the start of the shift. Slept for a total of 8.5 hours. Came to the lounge and sat. Denied pain. Pt was encouraged to go back to the room to sleep, Pt stated \"I don't have a bed and room\" Writer took patient to the room and encouraged her to void. No other concern reported this shift. Will continue to monitor per plan of care.      "

## 2025-01-14 ENCOUNTER — OFFICE VISIT (OUTPATIENT)
Dept: INTERPRETER SERVICES | Facility: CLINIC | Age: 60
End: 2025-01-14

## 2025-01-14 PROCEDURE — 124N000002 HC R&B MH UMMC

## 2025-01-14 PROCEDURE — 250N000013 HC RX MED GY IP 250 OP 250 PS 637: Performed by: PSYCHIATRY & NEUROLOGY

## 2025-01-14 PROCEDURE — 250N000013 HC RX MED GY IP 250 OP 250 PS 637

## 2025-01-14 PROCEDURE — 97150 GROUP THERAPEUTIC PROCEDURES: CPT | Mod: GO

## 2025-01-14 PROCEDURE — T1013 SIGN LANG/ORAL INTERPRETER: HCPCS | Mod: U3

## 2025-01-14 RX ADMIN — Medication 1 TABLET: at 08:28

## 2025-01-14 RX ADMIN — LEVOTHYROXINE SODIUM 100 MCG: 0.05 TABLET ORAL at 08:28

## 2025-01-14 RX ADMIN — CHLORPROMAZINE HYDROCHLORIDE 400 MG: 100 TABLET, FILM COATED ORAL at 20:03

## 2025-01-14 RX ADMIN — Medication 7.5 MG: at 20:03

## 2025-01-14 RX ADMIN — DOCUSATE SODIUM 100 MG: 100 CAPSULE, LIQUID FILLED ORAL at 08:28

## 2025-01-14 RX ADMIN — LORAZEPAM 1.5 MG: 1 TABLET ORAL at 20:02

## 2025-01-14 RX ADMIN — POLYETHYLENE GLYCOL 3350 17 G: 17 POWDER, FOR SOLUTION ORAL at 08:28

## 2025-01-14 RX ADMIN — Medication 5 MG: at 20:03

## 2025-01-14 RX ADMIN — LORAZEPAM 1.5 MG: 1 TABLET ORAL at 13:56

## 2025-01-14 RX ADMIN — LORAZEPAM 1.5 MG: 1 TABLET ORAL at 08:28

## 2025-01-14 ASSESSMENT — ACTIVITIES OF DAILY LIVING (ADL)
ADLS_ACUITY_SCORE: 36
DRESS: INDEPENDENT
ADLS_ACUITY_SCORE: 36
LAUNDRY: UNABLE TO COMPLETE
ORAL_HYGIENE: INDEPENDENT
ADLS_ACUITY_SCORE: 36
ADLS_ACUITY_SCORE: 36
HYGIENE/GROOMING: INDEPENDENT
ADLS_ACUITY_SCORE: 36
DRESS: INDEPENDENT
ADLS_ACUITY_SCORE: 36
HYGIENE/GROOMING: INDEPENDENT
ADLS_ACUITY_SCORE: 36
ADLS_ACUITY_SCORE: 36
ORAL_HYGIENE: INDEPENDENT
ADLS_ACUITY_SCORE: 36

## 2025-01-14 NOTE — PLAN OF CARE
"  Problem: Depressive Signs/Symptoms  Goal: Optimized Energy Level (Depressive Signs/Symptoms)  Outcome: Progressing  Intervention: Optimize Energy Level  Recent Flowsheet Documentation  Taken 1/14/2025 1144 by Dana Lutz RN  Patient Performed Hygiene: dressed  Activity (Behavioral Health): up ad shawn  Goal: Improved Psychomotor Symptoms (Depressive Signs/Symptoms)  Intervention: Manage Psychomotor Movement  Recent Flowsheet Documentation  Taken 1/14/2025 1144 by Dana Lutz RN  Patient Performed Hygiene: dressed  Activity (Behavioral Health): up ad shawn     Problem: Psychotic Signs/Symptoms  Goal: Improved Behavioral Control (Psychotic Signs/Symptoms)  Outcome: Progressing  Goal: Improved Psychomotor Symptoms (Psychotic Signs/Symptoms)  Intervention: Manage Psychomotor Movement  Recent Flowsheet Documentation  Taken 1/14/2025 1144 by Dana Lutz RN  Patient Performed Hygiene: dressed  Activity (Behavioral Health): up ad shawn   Goal Outcome Evaluation:    Plan of Care Reviewed With: patient      Pt has been visible in common areas. Writer met with patient and interpretor. Pt was able to answer most questions without interpretor this AM. Pt reports feeling \"ok\". Pt's affect is flat with fair eye contact. Pt denies feeling sad or anxious this AM. Pt does report \"I want to go home\". Pt reminded of court on Friday. Pt denies thoughts of self harm and denied having auditory hallucinations this AM.   Pt ate well for breakfast.   Pt has been independent with cares.   Pt denies pain.    Pt was assisted with putting on compression socks this AM. Pt reminded that they need to stay pulled up. At one point patient had them rolled penitentiary down her calf. Pt also reminded to keep non slid gripper socks on over socks to prevent falling.     Pt sitting in the lounge this afternoon. Pt accepted scheduled ativan. Pt declined to do coloring or work on a puzzle. Pt laughted and said \"they are for children\".         "

## 2025-01-14 NOTE — PLAN OF CARE
Rehab Group    Start time: 1015  End time: 1200  Patient time total: 50 minutes    attended partial group    #8 attended   Group Type: OT Clinic   Group Topic Covered: cognitive activities, coping skills, healthy leisure time, problem solving, and social skills   Group Session Detail:OT: Education on healthy activity engagement and creative hands-on endeavor (OT clinic) to increase concentration, focus, attention to task/detail, decision making, problem solving, frustration tolerance, task follow through, coping with stress, healthy leisure engagement, creative expression, and social engagement    Patient Response/Contribution:  worked intermittently, Limited eye contact, required prompts or assistance to participate, and inattentive   Patient Detail:pt arrived with . Pt chose to engage in familiar previous creative task. Pt required encouragement to continue with coloring task. Pt worked in a disorganized manner. Pt was observed on several occasions to have eyes closed. Pt did not following instructions or feedback from therapist or  for creative task. Pt was observed laughing to self then abruptly left group space while still laughing. Pt did not return      [id number] OT Group (2 or more in attendance)      Patient Active Problem List   Diagnosis    Bipolar 2 disorder (H)    Ruby (H)    Psychosis (H)    Schizoaffective disorder, bipolar type (H)    PTSD (post-traumatic stress disorder)

## 2025-01-14 NOTE — PROGRESS NOTES
Patient seen, chart reviewed, care discussed with staff.  Seen with .  Blood pressure 109/72, pulse 87, temperature 97.8  F (36.6  C), temperature source Temporal, resp. rate 16, weight 101.2 kg (223 lb 1.7 oz), SpO2 99%.  Haldol level pending.    Her son visited and he feels she is worse.   Slept 6 hours.  Observed laughing to herself.    General appearance: fair, slow  Alert.   Affect: sad  Mood: depressed  Speech:  production decreased, mumbles   Eye contact:  good.    Psychomotor behavior: normal  Gait: steady   Abnormal movements:  none  Delusions:  unable to assess  Hallucinations:  denied but laughs to self  Thoughts: linear, concrete  Associations: intact  Judgement: poor  Insight: poor  Cognitions: intact in conversation  Memory:  intact in conversation  Orientation: normal    Not suicidal.     Bipolar 2 disorder (H)      Ruby (H)    Psychosis (H)    Schizoaffective disorder, bipolar type (H)      And:       Patient Active Problem List   Diagnosis    Bipolar 2 disorder (H)    Ruby (H)    Psychosis (H)    Schizoaffective disorder, bipolar type (H)    PTSD (post-traumatic stress disorder)      Plan:  Consider antidepressant    Current Facility-Administered Medications   Medication Dose Route Frequency Provider Last Rate Last Admin    - Psychiatric Emergency -   Other See Admin Instructions Dona Trotter MD        acetaminophen (TYLENOL) tablet 650 mg  650 mg Oral Q4H PRN Rob Loaiza MD   650 mg at 01/13/25 1105    alum & mag hydroxide-simethicone (MAALOX) suspension 30 mL  30 mL Oral Q4H PRN Rob Loaiza MD        benzocaine-menthol (CHLORASEPTIC) 6-10 MG lozenge 1 lozenge  1 lozenge Buccal Q1H PRN Rob Loaiza MD   1 lozenge at 01/13/25 2226    chlorproMAZINE (THORAZINE) tablet 400 mg  400 mg Oral At Bedtime Rob Loaiza MD   400 mg at 01/13/25 2021    chlorproMAZINE (THORAZINE) tablet 50 mg  50 mg Oral Q8H PRN Rob Loaiza MD        docusate sodium  (COLACE) capsule 100 mg  100 mg Oral Daily Rob Loaiza MD   100 mg at 01/14/25 0828    guaiFENesin-dextromethorphan (ROBITUSSIN DM) 100-10 MG/5ML syrup 10 mL  10 mL Oral Q4H PRN Rob Loaiza MD        haloperidol (HALDOL) half-tab 7.5 mg  7.5 mg Oral At Bedtime Rob Loaiza MD   7.5 mg at 01/13/25 2020    hydrOXYzine HCl (ATARAX) tablet 25 mg  25 mg Oral Q6H PRN Rob Loaiza MD        levothyroxine (SYNTHROID/LEVOTHROID) tablet 100 mcg  100 mcg Oral QAM AC Juan Samano PA-C   100 mcg at 01/14/25 0828    lisinopril (ZESTRIL) tablet 5 mg  5 mg Oral Daily Juan Samano PA-C   5 mg at 01/13/25 0808    LORazepam (ATIVAN) tablet 1 mg  1 mg Oral Q4H PRN Rob Loaiza MD        LORazepam (ATIVAN) tablet 1.5 mg  1.5 mg Oral TID Rob Loaiza MD   1.5 mg at 01/14/25 1356    magnesium hydroxide (MILK OF MAGNESIA) suspension 30 mL  30 mL Oral Daily PRN Rob Loaiza MD        melatonin tablet 3 mg  3 mg Oral At Bedtime PRN Rob Loaiza MD   3 mg at 01/11/25 0056    melatonin tablet 5 mg  5 mg Oral At Bedtime Dona Trotter MD   5 mg at 01/13/25 2226    multivitamin w/minerals (THERA-VIT-M) tablet 1 tablet  1 tablet Oral Daily Rob Loaiza MD   1 tablet at 01/14/25 0828    OLANZapine (zyPREXA) tablet 10 mg  10 mg Oral TID PRN Rob Loaiza MD        Or    OLANZapine (zyPREXA) injection 10 mg  10 mg Intramuscular TID PRN Rob Loaiza MD        polyethylene glycol (MIRALAX) Packet 17 g  17 g Oral Daily Rob Loaiza MD   17 g at 01/14/25 0828    polyethylene glycol (MIRALAX) Packet 17 g  17 g Oral Daily PRN Rob Loaiza MD        senna-docusate (SENOKOT-S/PERICOLACE) 8.6-50 MG per tablet 1 tablet  1 tablet Oral BID PRN Rob Loaiza MD         Recent Results (from the past week)   Lithium level    Collection Time: 01/09/25  7:28 AM   Result Value Ref Range    Lithium 0.51 (L) 0.60 - 1.20 mmol/L   Extra Green Top  (Lithium Heparin) Tube    Collection Time: 01/09/25  7:28 AM   Result Value Ref Range    Hold Specimen JIC    Extra Purple Top Tube    Collection Time: 01/09/25  7:28 AM   Result Value Ref Range    Hold Specimen JIC

## 2025-01-14 NOTE — PLAN OF CARE
BEH IP Unit Acuity Rating Score (UARS)  Patient is given one point for every criteria they meet.    CRITERIA SCORING   On a 72 hour hold, court hold, committed, stay of commitment, or revocation. 1    Patient LOS on BEH unit exceeds 20 days. 0  LOS: 11   Patient under guardianship, 55+, otherwise medically complex, or under age 11. 1   Suicide ideation without relief of precipitating factors. 0   Current plan for suicide. 0   Current plan for homicide. 0   Imminent risk or actual attempt to seriously harm another without relief of factors precipitating the attempt. 0   Severe dysfunction in daily living (ex: complete neglect for self care, extreme disruption in vegetative function, extreme deterioration in social interactions). 1   Recent (last 7 days) or current physical aggression in the ED or on unit. 0   Restraints or seclusion episode in past 72 hours. 0   Recent (last 7 days) or current verbal aggression, agitation, yelling, etc., while in the ED or unit. 0   Active psychosis. 1   Need for constant or near constant redirection (from leaving, from others, etc).  0   Intrusive or disruptive behaviors. 0   Patient requires 3 or more hours of individualized nursing care per 8-hour shift (i.e. for ADLs, meds, therapeutic interventions). 0   TOTAL 4

## 2025-01-14 NOTE — PLAN OF CARE
Pt had a sad/ flat affect with a depressed mood.  At times she was irritable and dismissive with questions regarding her care. At around 4pm her son Laly (who she signed an CLYDE for) and mom visited and it went well. They ate together then later we all met with the interpretor and were able to do a mental health assessment, discuss medications and some general info about court proceedings/Santos hearing coming up.She endorsed AH saying she hears voices when she's alone and it's not telling her anything specific. Denies SI/HI/SIB. She committed to safe behavior. Pt spoke in a low toned voice and her thinking is linear/concrete. Her judgement and insight is still not appropriate. Kept asking to go home. Her family asked if she could redo her blood draw. Once they left lab came and she refused. Son laly was able to convince her via phone and her blood draw was done. Pt was med complaint and reported no side affects. Her son did voice some concern saying that she seems to be mildly decompensating and more disorganized compared to a few days ago. She spent most of the watching TV and sitting in her room alone in the dark. Hygiene is adequate she showered. No medical concerns. No major behavior concerns. Denies pain. At the end of the shift pt was heard coughing. PRN robitussin has been requested from pharmacy and writer is passing that off to night shift.  PRN lozenges given x 2 with relief.     /70 (BP Location: Right arm, Patient Position: Chair, Cuff Size: Adult Large)   Pulse 85   Temp 97.6  F (36.4  C) (Temporal)   Resp 18   Wt 103.3 kg (227 lb 11.8 oz)   SpO2 95%   BMI 33.63 kg/m         Last 24H PRN:     acetaminophen (TYLENOL) tablet 650 mg, 650 mg at 01/13/25 1105    benzocaine-menthol (CHLORASEPTIC) 6-10 MG lozenge 1 lozenge, 1 lozenge at 01/13/25 2022

## 2025-01-14 NOTE — PLAN OF CARE
Team Note Due:  Monday    Assessment/Intervention/Current Symtoms and Care Coordination:  Chart review and met with team, discussed pt progress, symptomology, and response to treatment.  Discussed the discharge plan and any potential impediments to discharge. Pt under commitment, santos and kezia porras was petitioned for on 12/27/2024. Pt requires a Lebanese . Pt continues to laugh to herself at times but denies AH. Pt was also observed to be walking barefoot on the unit due to leg and foot pain and has been encouraged to use john socks. Per RN, pt's son stated pt doesn't seem to be doing as well.     - Writer sent email to Kodi Samano, [facility] attorney, to let him know that pt had transferred back to [facility] from [place] and that her assigned CTC was out of the office this week. Provided contact information for follow up.      Discharge Plan or Goal:  Pending stabilization and safe disposition planning; considerations include:  TBD. Due to pt's current symptomology, it is unclear what pt's living situation was PTA     Barriers to Discharge:  Patient requires further psychiatric stabilization due to current symptomology, medication management with changes subject to provider, coordination with outside supports, and aftercare planning. Pt awaiting price porras hearing and will hopefully start ECT after that      Referral Status:  None at this time - CTC will continue to assess as pt stabilizes and discharge plan develops     Legal Status:  Committed [place] without ITP   [facility]  [id number]   Started: 12/12/2024  Santos petition and Kezia Porras petitions were submitted on 12/27/2024  Exam Hearing on 01/17/2025 at 1:00 PM  ECT Hearing on 01/17/2025 at 2:00 PM    Contacts (include CLYDE status):  Gifty - daughter (CLYDE)  P: [phone]    Tevin - son (CLYDE)  P: [phone]     Isaura - commitment CM through [facility] (CLYDE per commitment)  P: [phone]    Kodi Samano-  [facility] Attorney (No CLYDE per commitment)  [email or web address]     Upcoming Meetings and Dates/Important Information and next steps:  CTC to coordinate with pt, outside supports, and treatment team regarding discharge planning   Pt has kezia porras/edu exam and hearing on 1/17

## 2025-01-14 NOTE — PLAN OF CARE
Problem: Sleep Disturbance  Goal: Adequate Sleep/Rest  Outcome: Progressing  Intervention: Promote Sleep/Rest    Patient in bed sleeping when received.  Patient slept approximately 6 hours.  No PRN given or requested.  Will monitor as needed

## 2025-01-15 PROCEDURE — 250N000013 HC RX MED GY IP 250 OP 250 PS 637

## 2025-01-15 PROCEDURE — 250N000013 HC RX MED GY IP 250 OP 250 PS 637: Performed by: PSYCHIATRY & NEUROLOGY

## 2025-01-15 PROCEDURE — 99232 SBSQ HOSP IP/OBS MODERATE 35: CPT | Performed by: PSYCHIATRY & NEUROLOGY

## 2025-01-15 PROCEDURE — 124N000002 HC R&B MH UMMC

## 2025-01-15 RX ADMIN — CHLORPROMAZINE HYDROCHLORIDE 400 MG: 100 TABLET, FILM COATED ORAL at 20:11

## 2025-01-15 RX ADMIN — LORAZEPAM 1.5 MG: 1 TABLET ORAL at 07:59

## 2025-01-15 RX ADMIN — Medication 7.5 MG: at 20:12

## 2025-01-15 RX ADMIN — LORAZEPAM 1.5 MG: 1 TABLET ORAL at 20:11

## 2025-01-15 RX ADMIN — ACETAMINOPHEN 650 MG: 325 TABLET, FILM COATED ORAL at 07:58

## 2025-01-15 RX ADMIN — POLYETHYLENE GLYCOL 3350 17 G: 17 POWDER, FOR SOLUTION ORAL at 07:57

## 2025-01-15 RX ADMIN — Medication 1 TABLET: at 07:58

## 2025-01-15 RX ADMIN — DOCUSATE SODIUM 100 MG: 100 CAPSULE, LIQUID FILLED ORAL at 07:59

## 2025-01-15 RX ADMIN — LEVOTHYROXINE SODIUM 100 MCG: 0.05 TABLET ORAL at 07:58

## 2025-01-15 RX ADMIN — Medication 5 MG: at 20:12

## 2025-01-15 ASSESSMENT — ACTIVITIES OF DAILY LIVING (ADL)
ADLS_ACUITY_SCORE: 36
LAUNDRY: UNABLE TO COMPLETE
ADLS_ACUITY_SCORE: 36
HYGIENE/GROOMING: INDEPENDENT
ORAL_HYGIENE: INDEPENDENT
LAUNDRY: UNABLE TO COMPLETE
ADLS_ACUITY_SCORE: 36
DRESS: INDEPENDENT
ADLS_ACUITY_SCORE: 36
HYGIENE/GROOMING: INDEPENDENT
ADLS_ACUITY_SCORE: 36
DRESS: INDEPENDENT
ADLS_ACUITY_SCORE: 36
ADLS_ACUITY_SCORE: 36
ORAL_HYGIENE: INDEPENDENT
ADLS_ACUITY_SCORE: 36
ADLS_ACUITY_SCORE: 36

## 2025-01-15 NOTE — PLAN OF CARE
Problem: Depressive Signs/Symptoms  Goal: Optimized Energy Level (Depressive Signs/Symptoms)  Outcome: Progressing     Problem: Psychotic Signs/Symptoms  Goal: Improved Behavioral Control (Psychotic Signs/Symptoms)  Outcome: Not Progressing   Goal Outcome Evaluation:    Plan of Care Reviewed With: patient        Pt pleasant during interactions this AM. Pt reported leg pain 5/10. Pt also reports that she feels that the compression socks have been helpful  with decreasing her leg pain. Pt was offered/accepted prn tylenol 650 mg with AM medications.   Pt affect flat with fair eye contact. Pt noted to be laughing loudly to herself while sitting in the lounge.     AM dose of lisinopril held as her blood pressure did not meet parameter 104/71 with a pulse of 81.

## 2025-01-15 NOTE — PLAN OF CARE
Rehab Group    Start time: 1300  End time: 1400  Patient time total: 15 minutes    came in and out of group session    #5 attended   Group Type: occupational therapy   Group Topic Covered: balanced lifestyle, cognitive activities, coping skills, mindfulness, relaxation , and self-care   Group Session Detail:OT Wellness Group:Patient actively participated in a meditation/guided imagery/affirmation group in order to promote: positive relaxation and coping skills, encourage insight and self-reflection, promote a positive change, manage behaviors, and improve focus. In addition, patient was guided through imagery and affirmations to combat depression   Patient Response/Contribution:  withdrawn   Patient Detail:pt arrived late for group. Pt flat, sad.  not present to assist with group topic, guided imagery or affirmations. Pt sat by window briefly. Therapist attempted introduction of activity. Pt sat quietly by window without response. Pt left group prior to end and did not return      No Charge      Patient Active Problem List   Diagnosis    Bipolar 2 disorder (H)    Ruby (H)    Psychosis (H)    Schizoaffective disorder, bipolar type (H)    PTSD (post-traumatic stress disorder)

## 2025-01-15 NOTE — PLAN OF CARE
Problem: Sleep Disturbance  Goal: Adequate Sleep/Rest  Outcome: Progressing  Intervention: Promote Sleep/Rest      Patient in room sleeping when received.  Patient slept approximately 7.25 hours.   No PRN given or requested.   Will monitor as needed

## 2025-01-15 NOTE — PLAN OF CARE
Problem: Depressive Signs/Symptoms  Goal: Improved Mood Symptoms (Depressive Signs/Symptoms)  Outcome: Progressing  Intervention: Promote Mood Improvement  Recent Flowsheet Documentation  Taken 1/14/2025 2000 by Ibrahima Siu RN  Trust Relationship/Rapport:   care explained   choices provided   emotional support provided   empathic listening provided   questions answered   questions encouraged   reassurance provided   thoughts/feelings acknowledged   Goal Outcome Evaluation:    Becky spent most of the evening in the common area but kept to herself. She appeared well-groomed, had a flat affect, and, through an , denied experiencing any mental health symptoms. She seemed to have limited insight, poor concentration, and a disorganized thought process, with intermittent confusion. Becky reported having a good appetite but mentioned to the  that she wasn't sleeping well due to noise in the hallway during the night. She ate dinner and snacks, took her medications, and received a visit from her family, which appeared to go well.

## 2025-01-15 NOTE — PLAN OF CARE
Team Note Due:  Monday    Assessment/Intervention/Current Symtoms and Care Coordination:  Chart review and met with team, discussed pt progress, symptomology, and response to treatment.  Discussed the discharge plan and any potential impediments to discharge. Pt under commitment, edu and mast porras was petitioned for on 12/27/2024. Pt requires a Romanian . Pt continues to laugh to herself at times but denies AH. Pt was also observed to be walking barefoot on the unit due to leg and foot pain and has been encouraged to use john socks. Per RN, pt's son stated pt doesn't seem to be doing as well.     - Writer spoke to GOLDIE Lozano regarding pt's insurance. Inquired about what county pt lived in and if she would be able to complete a phone interview. Writer stated she lives in [facility] [facility] and that she is not likely able to participate due to current symptomology. Provided contact information for son and told her a release had been signed by pt upon admission.     Discharge Plan or Goal:  Pending stabilization and safe disposition planning; considerations include:  TBD. Due to pt's current symptomology, it is unclear what pt's living situation was PTA     Barriers to Discharge:  Patient requires further psychiatric stabilization due to current symptomology, medication management with changes subject to provider, coordination with outside supports, and aftercare planning. Pt awaiting price porras hearing and will hopefully start ECT after that      Referral Status:  None at this time - CTC will continue to assess as pt stabilizes and discharge plan develops     Legal Status:  Committed [place] without ITP   [facility]  [id number]-   Started: 12/12/2024  Santos petition and [place] Porras petitions were submitted on 12/27/2024  Exam Hearing on 01/17/2025 at 1:00 PM  ECT Hearing on 01/17/2025 at 2:00 PM    Contacts (include CLYDE status):  Gifty - daughter (CLYDE)  P: [phone]    Tevin - son (CLYDE)  P:  [phone]     Isaura - commitment CM through [facility] (CLYDE per commitment)  P: [phone]    Kodi Samano- [facility] Attorney (No CLYDE per commitment)  [email or web address]     Upcoming Meetings and Dates/Important Information and next steps:  CTC to coordinate with pt, outside supports, and treatment team regarding discharge planning   Pt has kezia porras/edu exam and hearing on 1/17

## 2025-01-15 NOTE — PLAN OF CARE
BEH IP Unit Acuity Rating Score (UARS)  Patient is given one point for every criteria they meet.    CRITERIA SCORING   On a 72 hour hold, court hold, committed, stay of commitment, or revocation. 1    Patient LOS on BEH unit exceeds 20 days. 0  LOS: 12   Patient under guardianship, 55+, otherwise medically complex, or under age 11. 1   Suicide ideation without relief of precipitating factors. 0   Current plan for suicide. 0   Current plan for homicide. 0   Imminent risk or actual attempt to seriously harm another without relief of factors precipitating the attempt. 0   Severe dysfunction in daily living (ex: complete neglect for self care, extreme disruption in vegetative function, extreme deterioration in social interactions). 1   Recent (last 7 days) or current physical aggression in the ED or on unit. 0   Restraints or seclusion episode in past 72 hours. 0   Recent (last 7 days) or current verbal aggression, agitation, yelling, etc., while in the ED or unit. 0   Active psychosis. 1   Need for constant or near constant redirection (from leaving, from others, etc).  0   Intrusive or disruptive behaviors. 0   Patient requires 3 or more hours of individualized nursing care per 8-hour shift (i.e. for ADLs, meds, therapeutic interventions). 0   TOTAL 4

## 2025-01-16 PROCEDURE — 250N000013 HC RX MED GY IP 250 OP 250 PS 637: Performed by: PSYCHIATRY & NEUROLOGY

## 2025-01-16 PROCEDURE — 99232 SBSQ HOSP IP/OBS MODERATE 35: CPT | Performed by: PSYCHIATRY & NEUROLOGY

## 2025-01-16 PROCEDURE — 124N000002 HC R&B MH UMMC

## 2025-01-16 PROCEDURE — 250N000013 HC RX MED GY IP 250 OP 250 PS 637

## 2025-01-16 RX ADMIN — LORAZEPAM 1.5 MG: 1 TABLET ORAL at 07:51

## 2025-01-16 RX ADMIN — POLYETHYLENE GLYCOL 3350 17 G: 17 POWDER, FOR SOLUTION ORAL at 07:51

## 2025-01-16 RX ADMIN — CHLORPROMAZINE HYDROCHLORIDE 400 MG: 100 TABLET, FILM COATED ORAL at 20:11

## 2025-01-16 RX ADMIN — Medication 7.5 MG: at 20:10

## 2025-01-16 RX ADMIN — LEVOTHYROXINE SODIUM 100 MCG: 0.05 TABLET ORAL at 07:51

## 2025-01-16 RX ADMIN — Medication 1 TABLET: at 07:51

## 2025-01-16 RX ADMIN — LORAZEPAM 1.5 MG: 1 TABLET ORAL at 14:25

## 2025-01-16 RX ADMIN — DOCUSATE SODIUM 100 MG: 100 CAPSULE, LIQUID FILLED ORAL at 07:51

## 2025-01-16 RX ADMIN — Medication 5 MG: at 20:11

## 2025-01-16 RX ADMIN — LISINOPRIL 5 MG: 5 TABLET ORAL at 07:51

## 2025-01-16 RX ADMIN — HYDROXYZINE HYDROCHLORIDE 25 MG: 25 TABLET ORAL at 12:15

## 2025-01-16 RX ADMIN — SENNOSIDES AND DOCUSATE SODIUM 1 TABLET: 50; 8.6 TABLET ORAL at 12:15

## 2025-01-16 RX ADMIN — LORAZEPAM 1.5 MG: 1 TABLET ORAL at 20:10

## 2025-01-16 ASSESSMENT — ACTIVITIES OF DAILY LIVING (ADL)
ORAL_HYGIENE: INDEPENDENT
ADLS_ACUITY_SCORE: 48
ADLS_ACUITY_SCORE: 36
LAUNDRY: UNABLE TO COMPLETE
ADLS_ACUITY_SCORE: 48
ADLS_ACUITY_SCORE: 36
ADLS_ACUITY_SCORE: 36
DRESS: INDEPENDENT
ADLS_ACUITY_SCORE: 36
ADLS_ACUITY_SCORE: 36
ADLS_ACUITY_SCORE: 48
ADLS_ACUITY_SCORE: 36
HYGIENE/GROOMING: INDEPENDENT
ADLS_ACUITY_SCORE: 36
ADLS_ACUITY_SCORE: 48
ADLS_ACUITY_SCORE: 36
ADLS_ACUITY_SCORE: 36
ADLS_ACUITY_SCORE: 48
ADLS_ACUITY_SCORE: 36

## 2025-01-16 NOTE — PLAN OF CARE
Team Note Due:  Monday    Assessment/Intervention/Current Symtoms and Care Coordination:  Chart review and met with team, discussed pt progress, symptomology, and response to treatment.  Discussed the discharge plan and any potential impediments to discharge. Pt under commitment, sorensen and kezia wellsppard was petitioned for on 12/27/2024. Pt requires a Cypriot . Pt continues to laugh to herself at times but denies AH. Pt was also observed to be walking barefoot on the unit due to leg and foot pain and has been encouraged to use john socks. Per RN, pt's son stated pt doesn't seem to be doing as well.     - Writer left message for pt's , Isaura, with [facility]. Responded to her VM from the other day. Provided brief update, confirmed we were prepared for court, and let her know that pt is not able to complete an intake at this time due to symptomology.     - Writer received VM from Douglas Chong with [facility] attorneys office. He requested update on pt's status. Writer sent update via email. He followed up and asked if provider wanted to pursue both sorensen and mast porras and asked for provider's contact information. Writer responded and stated yes that we wanted both orders and gave provider contact information.    Discharge Plan or Goal:  Pending stabilization and safe disposition planning; considerations include:  TBD. Due to pt's current symptomology, it is unclear what pt's living situation was PTA     Barriers to Discharge:  Patient requires further psychiatric stabilization due to current symptomology, medication management with changes subject to provider, coordination with outside supports, and aftercare planning. Pt awaiting price porras hearing and will hopefully start ECT after that      Referral Status:  None at this time - [facility] will continue to assess as pt stabilizes and discharge plan develops     Legal Status:  Committed [place] without ITP   [facility]  [id number]    Started: 12/12/2024  Santos petition and Kezia Porras petitions were submitted on 12/27/2024  Exam Hearing on 01/17/2025 at 1:00 PM  ECT Hearing on 01/17/2025 at 2:00 PM    Contacts (include CLYDE status):  Gifty - daughter (CLYDE)  P: [phone]    Tevin - son (CLYDE)  P: [phone]     Isaura - commitment CM through [facility] (CLYDE per commitment)  P: [phone]    [facility] (No [facility]YDE per commitment)  [email or web address]     Upcoming Meetings and Dates/Important Information and next steps:  CTC to coordinate with pt, outside supports, and treatment team regarding discharge planning   Pt has kezia porras/edu exam and hearing on 1/17
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
## 2025-01-16 NOTE — PLAN OF CARE
Problem: Sleep Disturbance  Goal: Adequate Sleep/Rest  Outcome: Progressing     Pt was awake at start of the night shift. Pt went to sleep around midnight. Pt have slept 5.5 hours based on every 15 min rounding. No medication was requested or given in this shift. Pt is on fall precaution. Pt woke up around 0500 and had an apple and stayed in a lounge area.

## 2025-01-16 NOTE — PLAN OF CARE
Problem: Depressive Signs/Symptoms  Goal: Optimized Energy Level (Depressive Signs/Symptoms)  Outcome: Progressing     Problem: Anxiety Signs/Symptoms  Goal: Optimized Energy Level (Anxiety Signs/Symptoms)  Outcome: Progressing   Goal Outcome Evaluation:    The Patient was alert and visible throughout the day shift but occasionally seemed confused. She exhibited labile emotions and irritability, refusing assistance to put on her Parag stockings, which she threw on the floor. After considerable encouragement, she eventually took her medication. During a routine assessment with an , she denied any suicidal ideation and all mental health-related symptoms.     The patient moved in and out of her room into the common area but did not interact with her peers. She's disorganized and restless, responding to internal stimuli by talking and laughing. She complained of constipation and received prn Senokot and Vistaril. Her intake of meals was good, consuming approximately 95% of her food.    She repeatedly goes into other people's rooms despite being redirected easily. She is placed on SIO 1:1 for intrusiveness by Dr. Trotter. She has court hearing tomorrow between 1 and 2 PM.

## 2025-01-16 NOTE — PLAN OF CARE
BEH IP Unit Acuity Rating Score (UARS)  Patient is given one point for every criteria they meet.    CRITERIA SCORING   On a 72 hour hold, court hold, committed, stay of commitment, or revocation. 1    Patient LOS on BEH unit exceeds 20 days. 0  LOS: 13   Patient under guardianship, 55+, otherwise medically complex, or under age 11. 1   Suicide ideation without relief of precipitating factors. 0   Current plan for suicide. 0   Current plan for homicide. 0   Imminent risk or actual attempt to seriously harm another without relief of factors precipitating the attempt. 0   Severe dysfunction in daily living (ex: complete neglect for self care, extreme disruption in vegetative function, extreme deterioration in social interactions). 1   Recent (last 7 days) or current physical aggression in the ED or on unit. 0   Restraints or seclusion episode in past 72 hours. 0   Recent (last 7 days) or current verbal aggression, agitation, yelling, etc., while in the ED or unit. 0   Active psychosis. 1   Need for constant or near constant redirection (from leaving, from others, etc).  0   Intrusive or disruptive behaviors. 0   Patient requires 3 or more hours of individualized nursing care per 8-hour shift (i.e. for ADLs, meds, therapeutic interventions). 0   TOTAL 4

## 2025-01-16 NOTE — PLAN OF CARE
Problem: Depressive Signs/Symptoms  Goal: Optimized Energy Level (Depressive Signs/Symptoms)  Outcome: Progressing  Intervention: Optimize Energy Level  Recent Flowsheet Documentation  Taken 1/15/2025 1742 by Armando Jolly RN  Activity (Behavioral Health):   up ad shawn   activity adjusted per tolerance  Goal: Increased Participation and Engagement (Depressive Signs/Symptoms)  Intervention: Facilitate Participation and Engagement  Recent Flowsheet Documentation  Taken 1/15/2025 1742 by Armando Jolly RN  Supportive Measures: self-care encouraged  Goal: Enhanced Self-Esteem and Confidence (Depressive Signs/Symptoms)  Intervention: Promote Confidence and Self-Esteem  Recent Flowsheet Documentation  Taken 1/15/2025 1742 by Armando Jolly RN  Supportive Measures: self-care encouraged  Goal: Improved Mood Symptoms (Depressive Signs/Symptoms)  Intervention: Promote Mood Improvement  Recent Flowsheet Documentation  Taken 1/15/2025 1742 by Armando Jolly RN  Supportive Measures: self-care encouraged  Trust Relationship/Rapport:   care explained   choices provided   emotional support provided  Goal: Improved Psychomotor Symptoms (Depressive Signs/Symptoms)  Intervention: Manage Psychomotor Movement  Recent Flowsheet Documentation  Taken 1/15/2025 1742 by Armando Jolly RN  Activity (Behavioral Health):   up ad shawn   activity adjusted per tolerance  Goal: Enhanced Social, Occupational or Functional Skills (Depressive Signs/Symptoms)  Intervention: Promote Social, Occupational and Functional Ability  Recent Flowsheet Documentation  Taken 1/15/2025 1742 by Armando Jolly RN  Social Functional Ability Promotion: autonomy promoted      Patient visible in the milieu watching television with peers, continues to avoid social interaction with peers due to language barrier. Patient evening assessment completed with  present. Patient denies pain and discomfort, reported anxiety 4/10 and depression 1/10  "which she said was tolerable. Patient disorganized to situation, attempted to enter dirty utility room when staff was exiting and patient tried to used the computer on wheels few times stating\"I want to log off\", staff redirected patient. Patient family visited and son Patrick ([phone]) want us to contact him before scheduling labs so he can help. Patient denies SI/SIB/HI and hallucination, contract for safety and medication compliant.                    "
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
## 2025-01-17 ENCOUNTER — OFFICE VISIT (OUTPATIENT)
Dept: INTERPRETER SERVICES | Facility: CLINIC | Age: 60
End: 2025-01-17

## 2025-01-17 ENCOUNTER — MEDICAL CORRESPONDENCE (OUTPATIENT)
Dept: HEALTH INFORMATION MANAGEMENT | Facility: CLINIC | Age: 60
End: 2025-01-17

## 2025-01-17 PROCEDURE — 99233 SBSQ HOSP IP/OBS HIGH 50: CPT | Performed by: PSYCHIATRY & NEUROLOGY

## 2025-01-17 PROCEDURE — 250N000013 HC RX MED GY IP 250 OP 250 PS 637

## 2025-01-17 PROCEDURE — 97150 GROUP THERAPEUTIC PROCEDURES: CPT | Mod: GO

## 2025-01-17 PROCEDURE — 250N000013 HC RX MED GY IP 250 OP 250 PS 637: Performed by: PSYCHIATRY & NEUROLOGY

## 2025-01-17 PROCEDURE — 124N000002 HC R&B MH UMMC

## 2025-01-17 PROCEDURE — T1013 SIGN LANG/ORAL INTERPRETER: HCPCS

## 2025-01-17 RX ADMIN — Medication 7.5 MG: at 20:02

## 2025-01-17 RX ADMIN — DOCUSATE SODIUM 100 MG: 100 CAPSULE, LIQUID FILLED ORAL at 08:38

## 2025-01-17 RX ADMIN — Medication 1 LOZENGE: at 20:02

## 2025-01-17 RX ADMIN — LORAZEPAM 1.5 MG: 1 TABLET ORAL at 14:40

## 2025-01-17 RX ADMIN — LORAZEPAM 1.5 MG: 1 TABLET ORAL at 08:38

## 2025-01-17 RX ADMIN — CHLORPROMAZINE HYDROCHLORIDE 400 MG: 100 TABLET, FILM COATED ORAL at 20:01

## 2025-01-17 RX ADMIN — LEVOTHYROXINE SODIUM 100 MCG: 0.05 TABLET ORAL at 08:38

## 2025-01-17 RX ADMIN — LORAZEPAM 1.5 MG: 1 TABLET ORAL at 20:02

## 2025-01-17 RX ADMIN — POLYETHYLENE GLYCOL 3350 17 G: 17 POWDER, FOR SOLUTION ORAL at 08:37

## 2025-01-17 RX ADMIN — Medication 1 TABLET: at 08:38

## 2025-01-17 RX ADMIN — Medication 5 MG: at 20:02

## 2025-01-17 ASSESSMENT — ACTIVITIES OF DAILY LIVING (ADL)
ADLS_ACUITY_SCORE: 48
HYGIENE/GROOMING: INDEPENDENT
ADLS_ACUITY_SCORE: 48

## 2025-01-17 NOTE — PLAN OF CARE
"  Rehab Group    Start time: 1015  End time: 1200  Patient time total: 15 minutes    attended partial group    #9 attended   Group Type: OT Clinic   Group Topic Covered: cognitive activities, coping skills, healthy leisure time, problem solving, and social skills   Group Session Detail:OT: Education on healthy activity engagement and creative hands-on endeavor (OT clinic) to increase concentration, focus, attention to task/detail, decision making, problem solving, frustration tolerance, task follow through, coping with stress, healthy leisure engagement, creative expression, and social engagement    Patient Response/Contribution:  Limited eye contact and required prompts or assistance to participate   Patient Detail:pt flat. No  present. Pt provided with set up of previous creative task. Pt worked intermittently on project otherwise pt sat quietly and either looked out the window or down to the ground. Pt stated \"I'm done\" and left the group space.      [id number] OT Group (2 or more in attendance)      Patient Active Problem List   Diagnosis    Bipolar 2 disorder (H)    Ruby (H)    Psychosis (H)    Schizoaffective disorder, bipolar type (H)    PTSD (post-traumatic stress disorder)       "

## 2025-01-17 NOTE — PLAN OF CARE
BEH IP Unit Acuity Rating Score (UARS)  Patient is given one point for every criteria they meet.    CRITERIA SCORING   On a 72 hour hold, court hold, committed, stay of commitment, or revocation. 1    Patient LOS on BEH unit exceeds 20 days. 0  LOS: 14   Patient under guardianship, 55+, otherwise medically complex, or under age 11. 1   Suicide ideation without relief of precipitating factors. 0   Current plan for suicide. 0   Current plan for homicide. 0   Imminent risk or actual attempt to seriously harm another without relief of factors precipitating the attempt. 0   Severe dysfunction in daily living (ex: complete neglect for self care, extreme disruption in vegetative function, extreme deterioration in social interactions). 1   Recent (last 7 days) or current physical aggression in the ED or on unit. 0   Restraints or seclusion episode in past 72 hours. 0   Recent (last 7 days) or current verbal aggression, agitation, yelling, etc., while in the ED or unit. 0   Active psychosis. 1   Need for constant or near constant redirection (from leaving, from others, etc).  0   Intrusive or disruptive behaviors. 0   Patient requires 3 or more hours of individualized nursing care per 8-hour shift (i.e. for ADLs, meds, therapeutic interventions). 0   TOTAL 4

## 2025-01-17 NOTE — PLAN OF CARE
Goal Outcome Evaluation:    Plan of Care Reviewed With: patient        Problem: Depressive Signs/Symptoms  Goal: Improved Sleep (Depressive Signs/Symptoms)  Outcome: Progressing     Pt was up early, active and social in the milieu.  Pt was alert and oriented x 4, pleasant and cooperative with staff. VS stable. Affect is flat and blunted. Pt reports okay appetite, ate about 100 % of breakfast and 100% of lunch.     Pt checked in as doing okay, rated anxiety at 4 and depression at 4 with 10 being worst. Pt rated overall mood is calm.  Pt denies SI/SIB/HI. Denies visual and auditory hallucinations.  Pt had a court meeting at 1300 hr. Medical consult regarding low BP in the morning done , No New orders given.      Pt was medication compliant, denied pain, medication side effects and medical concerns.    /78   Pulse 86   Temp 98.2  F (36.8  C) (Temporal)   Resp 20   Wt 101.2 kg (223 lb 1.7 oz)   SpO2 100%   BMI 32.95 kg/m

## 2025-01-17 NOTE — PLAN OF CARE
Problem: Psychotic Signs/Symptoms  Goal: Improved Behavioral Control (Psychotic Signs/Symptoms)  Outcome: Progressing   Goal Outcome Evaluation:    Pt seen sitting in the lounge area by herself. Did not wish to talk at the beginning of the shift. PA reported that patient was trying to exit the unit by pushing on the door when two staff intervene. Pt went and sat on the door by the hallway. Staff closely monitored pt. Approached pt X 2 for assessment, pt stated that she does not need a nurse nor the . She was able to answer question on re approach. Denies all psych symptoms, laughs inappropriately in between. Family in for a visit( son and mom)and visit went well. Seen eating supper, reported good appetite. She took all HS medication. Requested and received PRN cough drops. No cough noted.  No aggressive behavior noted.

## 2025-01-17 NOTE — PLAN OF CARE
Patient presents with a flat affect. She was isolative and withdrawn to self. She was denied pain and all mental health symptoms and contracted for safety. Patient had a shower during the shift. Vital signs were stable. Patient's family visited during the shift. She ate 100% of Supper with adequate fluid intake. She was compliant with medications.     Problem: Psychotic Signs/Symptoms  Goal: Improved Mood Symptoms (Psychotic Signs/Symptoms)  Outcome: Progressing  Intervention: Optimize Emotion and Mood  Recent Flowsheet Documentation  Taken 1/16/2025 1842 by Radha Elliott, RN  Supportive Measures:   active listening utilized   verbalization of feelings encouraged  Diversional Activity: television       Goal Outcome Evaluation:    Plan of Care Reviewed With: patient

## 2025-01-17 NOTE — PLAN OF CARE
Problem: Adult Behavioral Health Plan of Care  Goal: Plan of Care Review  Outcome: Progressing     Problem: Sleep Disturbance  Goal: Adequate Sleep/Rest  Outcome: Progressing   Goal Outcome Evaluation:       Pt in bed sleeping when this shift started. Respirations noted to be even, non labored. Pt slept for 6.25 hours. No safety or behavioral concerns noted or verbalized.

## 2025-01-17 NOTE — PLAN OF CARE
Team Note Due:  Monday    Assessment/Intervention/Current Symtoms and Care Coordination:  Chart review and met with team, discussed pt progress, symptomology, and response to treatment.  Discussed the discharge plan and any potential impediments to discharge. Pt under commitment, edu and kezia porras was petitioned for on 12/27/2024. Pt requires a Gabonese . Pt continues to laugh to herself at times but denies AH. Pt was also observed to be walking barefoot on the unit due to leg and foot pain and has been encouraged to use john socks. Per RN, pt's son stated pt doesn't seem to be doing as well.     - Writer left message for pt's , Isaura, with [facility]. Responded to her VM from the other day. Provided brief update, confirmed we were prepared for court, and let her know that pt is not able to complete an intake at this time due to symptomology.     - Writer confirmed with unit HUC she had links for pt to attend court on this afternoon. She reported she did not and writer sent via email.     - Writer received calls from Douglas Chong when writer was giving a presentation and he did not leave a VM. He contacted the unit and stated he needed to speak to writer, but staff told him writer was unavailable due to meeting. When writer was available, writer called Douglas and left VM stating that we need more advanced notice when he needs our support as we are not able to accommodate last minute requests. Writer then called [facility] attorney's office line to try and get in contact with him. She placed writer on hold and during that time he emailed writer stating he needed provider to testify. Writer replied that provider contact information was already given to him and she was informed of when she needed to be available to testify. He replied and stated that writer needed to find provider so that she can attend via teams. Writer replied that provider might have left for the day and reiterated that he has  already been given her contact information the day prior. Writer contacted unit coordinator who stated provider was still on the unit. Writer assisted provider with getting connected via teams to testify. When writer followed up with unit staff, they confirmed that Douglas stated he needed to talk to writer; however, it was later determined that Douglas was attempting to locate the provider to testify. See note dated 1/16/2025 for writer's documentation of conversation with Douglas, where writer confirmed we wanted to move forward with hearings for Alejandre Porras and Santos. At that time, writer also provided contact information for provider to testify to Douglas.         Discharge Plan or Goal:  Pending stabilization and safe disposition planning; considerations include:  TBD. Due to pt's current symptomology, it is unclear what pt's living situation was PTA     Barriers to Discharge:  Patient requires further psychiatric stabilization due to current symptomology, medication management with changes subject to provider, coordination with outside supports, and aftercare planning. Pt awaiting price porras hearing and will hopefully start ECT after that      Referral Status:  None at this time - CTC will continue to assess as pt stabilizes and discharge plan develops     Legal Status:  Committed MI without ITP   [facility]  [id number]   Started: 12/12/2024  Santos petition and Alejandre Porras petitions were submitted on 12/27/2024  Exam Hearing on 01/17/2025 at 1:00 PM  ECT Hearing on 01/17/2025 at 2:00 PM    Contacts (include CLYDE status):  Gifty - daughter (CLYDE)  P: [phone]    Tevin - son (CLYDE)  P: [phone]     Isaura - commitment CM through [facility] (CLYDE per commitment)  P: [phone]    Douglas Chong- [facility] Attorney (No CLYDE per commitment)  [email or web address]     Upcoming Meetings and Dates/Important Information and next steps:  CTC to coordinate with pt, outside supports, and  treatment team regarding discharge planning   Pt has kezia porras/edu exam and hearing on 1/17-- follow up on the order   Address issue with court organization ([facility] and [facility])

## 2025-01-18 LAB
HALOPERIDOL SERPL-MCNC: 3.9 NG/ML
HALOPERIDOL SERPL-MCNC: 3.9 NG/ML

## 2025-01-18 PROCEDURE — 250N000013 HC RX MED GY IP 250 OP 250 PS 637: Performed by: PSYCHIATRY & NEUROLOGY

## 2025-01-18 PROCEDURE — 124N000002 HC R&B MH UMMC

## 2025-01-18 PROCEDURE — 250N000013 HC RX MED GY IP 250 OP 250 PS 637

## 2025-01-18 RX ADMIN — Medication 1 TABLET: at 08:19

## 2025-01-18 RX ADMIN — LORAZEPAM 1.5 MG: 1 TABLET ORAL at 13:26

## 2025-01-18 RX ADMIN — LEVOTHYROXINE SODIUM 100 MCG: 0.05 TABLET ORAL at 08:18

## 2025-01-18 RX ADMIN — Medication 5 MG: at 20:23

## 2025-01-18 RX ADMIN — LORAZEPAM 1.5 MG: 1 TABLET ORAL at 08:19

## 2025-01-18 RX ADMIN — LISINOPRIL 5 MG: 5 TABLET ORAL at 08:19

## 2025-01-18 RX ADMIN — Medication 7.5 MG: at 20:23

## 2025-01-18 RX ADMIN — DOCUSATE SODIUM 100 MG: 100 CAPSULE, LIQUID FILLED ORAL at 08:19

## 2025-01-18 RX ADMIN — ACETAMINOPHEN 650 MG: 325 TABLET, FILM COATED ORAL at 09:19

## 2025-01-18 RX ADMIN — CHLORPROMAZINE HYDROCHLORIDE 400 MG: 100 TABLET, FILM COATED ORAL at 20:22

## 2025-01-18 RX ADMIN — LORAZEPAM 1.5 MG: 1 TABLET ORAL at 20:22

## 2025-01-18 RX ADMIN — POLYETHYLENE GLYCOL 3350 17 G: 17 POWDER, FOR SOLUTION ORAL at 08:18

## 2025-01-18 ASSESSMENT — ACTIVITIES OF DAILY LIVING (ADL)
ADLS_ACUITY_SCORE: 48
ADLS_ACUITY_SCORE: 48
LAUNDRY: WITH SUPERVISION
DRESS: INDEPENDENT
ADLS_ACUITY_SCORE: 48
LAUNDRY: WITH SUPERVISION
ADLS_ACUITY_SCORE: 48
ADLS_ACUITY_SCORE: 48
HYGIENE/GROOMING: INDEPENDENT
ADLS_ACUITY_SCORE: 48
ORAL_HYGIENE: INDEPENDENT
HYGIENE/GROOMING: INDEPENDENT
ADLS_ACUITY_SCORE: 48
ORAL_HYGIENE: INDEPENDENT
ADLS_ACUITY_SCORE: 48
DRESS: INDEPENDENT
ADLS_ACUITY_SCORE: 48
ADLS_ACUITY_SCORE: 48

## 2025-01-18 NOTE — PLAN OF CARE
Problem: Depressive Signs/Symptoms  Goal: Optimized Nutrition Intake (Depressive Signs/Symptoms)  Outcome: Progressing   Goal Outcome Evaluation:    Plan of Care Reviewed With: patient        Pt was visible on the unit. Quite, withdrawn, and guarded. Attended and participated in community meeting with the help of th . Ate 100% meals and snacks. Was med compliant. PRN Tylenol 650 mg was effective for 6/10 headache. Observed at the locker room and exit doors x 2. Easily redirected. Denied wanting to elope. Stated that she was looking for her belongings. Was dismissive with psych assessment questions. Reported that she showered  and had a BM yesterday. No verbal outbursts noted. Contracted for safety. Will continue to monitor.     /60 (BP Location: Right arm, Patient Position: Sitting, Cuff Size: Adult Regular)   Pulse 78   Temp 97.7  F (36.5  C) (Temporal)   Resp 18   Wt 101.2 kg (223 lb 1.7 oz)   SpO2 99%   BMI 32.95 kg/m

## 2025-01-18 NOTE — PLAN OF CARE
Goal Outcome Evaluation:       Pt appeared sleeping most of the night shift with even breathing pattern and no behavior observed or reported. No prn administered during the night shift. Safety checks completed per protocol. Pt slept for 7.5 hours.
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
## 2025-01-19 ENCOUNTER — OFFICE VISIT (OUTPATIENT)
Dept: INTERPRETER SERVICES | Facility: CLINIC | Age: 60
End: 2025-01-19

## 2025-01-19 PROCEDURE — 250N000013 HC RX MED GY IP 250 OP 250 PS 637: Performed by: PSYCHIATRY & NEUROLOGY

## 2025-01-19 PROCEDURE — 250N000013 HC RX MED GY IP 250 OP 250 PS 637

## 2025-01-19 PROCEDURE — 124N000002 HC R&B MH UMMC

## 2025-01-19 PROCEDURE — 97150 GROUP THERAPEUTIC PROCEDURES: CPT | Mod: GO

## 2025-01-19 PROCEDURE — T1013 SIGN LANG/ORAL INTERPRETER: HCPCS

## 2025-01-19 RX ADMIN — Medication 5 MG: at 20:53

## 2025-01-19 RX ADMIN — CHLORPROMAZINE HYDROCHLORIDE 400 MG: 100 TABLET, FILM COATED ORAL at 20:52

## 2025-01-19 RX ADMIN — LORAZEPAM 1.5 MG: 1 TABLET ORAL at 08:06

## 2025-01-19 RX ADMIN — Medication 7.5 MG: at 20:54

## 2025-01-19 RX ADMIN — LORAZEPAM 1.5 MG: 1 TABLET ORAL at 14:14

## 2025-01-19 RX ADMIN — Medication 1 TABLET: at 08:06

## 2025-01-19 RX ADMIN — POLYETHYLENE GLYCOL 3350 17 G: 17 POWDER, FOR SOLUTION ORAL at 08:11

## 2025-01-19 RX ADMIN — LEVOTHYROXINE SODIUM 100 MCG: 0.05 TABLET ORAL at 08:06

## 2025-01-19 RX ADMIN — DOCUSATE SODIUM 100 MG: 100 CAPSULE, LIQUID FILLED ORAL at 08:06

## 2025-01-19 RX ADMIN — LORAZEPAM 1.5 MG: 1 TABLET ORAL at 20:53

## 2025-01-19 ASSESSMENT — ACTIVITIES OF DAILY LIVING (ADL)
ADLS_ACUITY_SCORE: 48
HYGIENE/GROOMING: INDEPENDENT
ADLS_ACUITY_SCORE: 48

## 2025-01-19 NOTE — PLAN OF CARE
Rehab Group    Start time: 1015  End time: 1115  Patient time total: 35 minutes    attended partial group    #9 attended   Group Type: occupational therapy   Group Topic Covered: balanced lifestyle, cognitive activities, coping skills, healthy leisure time, and Physical activity   Group Session Detail:OT Coping Skill/Healthy Leisure group: A to Z coping skills and [place]. Pt engaged in group coping skill discussion. Patient engaged in a healthy leisure activity with a visuospatial and cognitive component in order to promote: problem solving skills, improve attention, emphasize new learning, exercise cognitive skills, and foster leisure and relaxation.    Patient Response/Contribution:  worked intermittently, required repetition of directions, Limited eye contact, and required prompts or assistance to participate   Patient Detail:No  present for group today. Pt quiet. Pt observed laughing to self periodically throughout group. Pt was able to follow flow of activity with verbal cues and therapist assist. Pt engaged in approx 50% of gentle seated movements following verbal instructions and demonstrations. Pt engagement increased with individual assist.      [address] OT Group (2 or more in attendance)      Patient Active Problem List   Diagnosis    Bipolar 2 disorder (H)    Ruby (H)    Psychosis (H)    Schizoaffective disorder, bipolar type (H)    PTSD (post-traumatic stress disorder)

## 2025-01-19 NOTE — PLAN OF CARE
Goal Outcome Evaluation:      Problem: Anxiety Signs/Symptoms  Goal: Improved Sleep (Anxiety Signs/Symptoms)  Outcome: Progressing     Pt was up early, active and social in the milieu.  Pt was alert and oriented x 4, pleasant and cooperative with staff. VS stable. Affect is neutral, Pt reports okay appetite, ate about 100% of breakfast and 50% of lunch.     Pt checked in as doing okay, rated anxiety at 7 and depression at 5 with 10 being worst. Pt rated overall mood as depressed.  Pt denies SI/SIB/HI. Denies visual and auditory hallucinations.    Pt was medication compliant, denied pain, medication side effects and medical concerns.    /62 (BP Location: Right arm)   Pulse 74   Temp 97.6  F (36.4  C) (Temporal)   Resp 16   Wt 101.2 kg (223 lb 1.7 oz)   SpO2 100%   BMI 32.95 kg/m       .

## 2025-01-19 NOTE — PLAN OF CARE
Problem: Anxiety Signs/Symptoms  Goal: Optimized Energy Level (Anxiety Signs/Symptoms)  Outcome: Progressing  Intervention: Optimize Energy Level  Recent Flowsheet Documentation  Taken 1/18/2025 1658 by Armando Jolly RN  Activity (Behavioral Health):   up ad shawn   activity adjusted per tolerance  Goal: Improved Mood Symptoms (Anxiety Signs/Symptoms)  Intervention: Optimize Emotion and Mood  Recent Flowsheet Documentation  Taken 1/18/2025 1658 by Armando Jolly RN  Supportive Measures: active listening utilized  Goal: Enhanced Social, Occupational or Functional Skills (Anxiety Signs/Symptoms)  Intervention: Promote Social, Occupational and Functional Ability  Recent Flowsheet Documentation  Taken 1/18/2025 1658 by Armando Jolly RN  Social Functional Ability Promotion: autonomy promoted  Trust Relationship/Rapport:   care explained   choices provided   emotional support provided      Patient was visible in the milieu at the beginning of the shift, continues to avoid social interaction with peers due to language barrier. Shift assessment completed with help from an , patient denied pain and discomfort. Patient endorses anxiety 5/10 and depression 1/10, no verbal outburst and no aggressive or assaultive behavior. Patient ate about 100% of meal with no nausea or stomach discomfort, family visited around 1755 and let at 1840. Patient denies SI/SIB/HI and hallucination, contract for safety and medication compliant. Later during the evening staff found patient resting in an empty bed in another patient room, when ask to leave patient insisted it was her room and bed but when staff call for help patient got up and left the room. Continues to monitor patient closely.

## 2025-01-19 NOTE — PLAN OF CARE
Problem: Sleep Disturbance  Goal: Adequate Sleep/Rest  Outcome: Progressing  Intervention: Promote Sleep/Rest      Patient in bed sleeping when received this shift.  Patient slept approximately 6.25 hours.  No Prn given or requested.  Will continue to monitor as needed.

## 2025-01-20 PROCEDURE — 250N000013 HC RX MED GY IP 250 OP 250 PS 637: Performed by: PSYCHIATRY & NEUROLOGY

## 2025-01-20 PROCEDURE — 99232 SBSQ HOSP IP/OBS MODERATE 35: CPT | Performed by: PSYCHIATRY & NEUROLOGY

## 2025-01-20 PROCEDURE — 250N000013 HC RX MED GY IP 250 OP 250 PS 637

## 2025-01-20 PROCEDURE — 124N000002 HC R&B MH UMMC

## 2025-01-20 RX ORDER — DIVALPROEX SODIUM 500 MG/1
500 TABLET, DELAYED RELEASE ORAL AT BEDTIME
Status: DISCONTINUED | OUTPATIENT
Start: 2025-01-20 | End: 2025-01-27

## 2025-01-20 RX ORDER — HALOPERIDOL 5 MG/1
10 TABLET ORAL AT BEDTIME
Status: DISCONTINUED | OUTPATIENT
Start: 2025-01-21 | End: 2025-02-04

## 2025-01-20 RX ORDER — DIVALPROEX SODIUM 250 MG/1
250 TABLET, DELAYED RELEASE ORAL EVERY MORNING
Status: DISCONTINUED | OUTPATIENT
Start: 2025-01-21 | End: 2025-01-28

## 2025-01-20 RX ADMIN — Medication 7.5 MG: at 20:13

## 2025-01-20 RX ADMIN — ACETAMINOPHEN 650 MG: 325 TABLET, FILM COATED ORAL at 16:19

## 2025-01-20 RX ADMIN — LORAZEPAM 1.5 MG: 1 TABLET ORAL at 20:12

## 2025-01-20 RX ADMIN — CHLORPROMAZINE HYDROCHLORIDE 400 MG: 100 TABLET, FILM COATED ORAL at 20:13

## 2025-01-20 RX ADMIN — DIVALPROEX SODIUM 500 MG: 500 TABLET, DELAYED RELEASE ORAL at 21:41

## 2025-01-20 RX ADMIN — Medication 5 MG: at 20:12

## 2025-01-20 RX ADMIN — Medication 1 TABLET: at 09:25

## 2025-01-20 RX ADMIN — LORAZEPAM 1.5 MG: 1 TABLET ORAL at 09:25

## 2025-01-20 RX ADMIN — LISINOPRIL 5 MG: 5 TABLET ORAL at 09:25

## 2025-01-20 RX ADMIN — LEVOTHYROXINE SODIUM 100 MCG: 0.05 TABLET ORAL at 09:25

## 2025-01-20 RX ADMIN — POLYETHYLENE GLYCOL 3350 17 G: 17 POWDER, FOR SOLUTION ORAL at 09:25

## 2025-01-20 RX ADMIN — DOCUSATE SODIUM 100 MG: 100 CAPSULE, LIQUID FILLED ORAL at 09:25

## 2025-01-20 RX ADMIN — LORAZEPAM 1.5 MG: 1 TABLET ORAL at 14:34

## 2025-01-20 ASSESSMENT — ACTIVITIES OF DAILY LIVING (ADL)
ADLS_ACUITY_SCORE: 48
HYGIENE/GROOMING: INDEPENDENT
ADLS_ACUITY_SCORE: 48

## 2025-01-20 NOTE — PLAN OF CARE
BEH IP Unit Acuity Rating Score (UARS)  Patient is given one point for every criteria they meet.    CRITERIA SCORING   On a 72 hour hold, court hold, committed, stay of commitment, or revocation. 1    Patient LOS on BEH unit exceeds 20 days. 0  LOS: 17   Patient under guardianship, 55+, otherwise medically complex, or under age 11. 1   Suicide ideation without relief of precipitating factors. 0   Current plan for suicide. 0   Current plan for homicide. 0   Imminent risk or actual attempt to seriously harm another without relief of factors precipitating the attempt. 0   Severe dysfunction in daily living (ex: complete neglect for self care, extreme disruption in vegetative function, extreme deterioration in social interactions). 1   Recent (last 7 days) or current physical aggression in the ED or on unit. 0   Restraints or seclusion episode in past 72 hours. 0   Recent (last 7 days) or current verbal aggression, agitation, yelling, etc., while in the ED or unit. 0   Active psychosis. 1   Need for constant or near constant redirection (from leaving, from others, etc).  0   Intrusive or disruptive behaviors. 0   Patient requires 3 or more hours of individualized nursing care per 8-hour shift (i.e. for ADLs, meds, therapeutic interventions). 0   TOTAL 4

## 2025-01-20 NOTE — PLAN OF CARE
Problem: Anxiety Signs/Symptoms  Goal: Optimized Energy Level (Anxiety Signs/Symptoms)  Outcome: Progressing  Intervention: Optimize Energy Level  Recent Flowsheet Documentation  Taken 1/19/2025 3776 by Geeta Martinez RN  Patient Performed Hygiene: dressed  Diversional Activity: television  Activity (Behavioral Health): up ad shawn   Goal Outcome Evaluation:    Plan of Care Reviewed With: patient      Pt assessed with  present. Denies pain, anxiety, depression, SI/sib/ hallucination and contracted for safety. During assessment,  indicates that patient is hallucinating, talking to people that are not there. She also laugh to her self. Pt mentioned that she has lost contact with her family. Mildly confused and was placed on a elopement precaution due constant standing by door wanting to leave the unit. Family came in for a visit ( mum and brother) and brought in food. Pt ate 90 % of food brought. She was med complaint and took all schedule medication. She went to bed afterwards. Staff will continue to monitor and report.

## 2025-01-20 NOTE — PLAN OF CARE
01/20/25 1123   Individualization/Patient Specific Goals   Patient Personal Strengths spiritual/Church support;community support;family/social support   Patient Vulnerabilities history of unsuccessful treatment   Anxieties, Fears or Concerns Pt appears to respond to internal stimuli   Individualized Care Needs Pt requires [place]    Patient/Family-Specific Goals (Include Timeframe) Pt's family is involved   Interprofessional Rounds   Summary Pt transferred from [facility]. Pt was initially admitted for behavioral concerns and psychotic symptoms. Pt is now committed. Santos and mast porras petition were recently sent in. Pt had santos/price porras hearing on 1/17 with the hopes of treating pt with ECT. The matter has been taken under advisement   Participants CTC;nursing;OT;psychiatrist   Behavioral Team Discussion   Participants Dr. Dona Trotter MD; Bibiana Vega [facility], [facility]; Maritza Castro RN; Bhavana Tobin OT   Progress Pt requires [place] . Pt appears to be responding to internal stimuli and can present as confused at times. Pt continues to await santos/price porras hearing outcome   Anticipated length of stay 4+ weeks   Continued Stay Criteria/Rationale Medication management per psychiatry, stabilization of symptoms, aftercare planning, coordination with outside supports, court process with possible ECT   Medical/Physical See H&P and provider notes   Precautions See below   Plan Medication management per psychiatry, stabilization of symptoms, aftercare planning, coordination with outside supports, court process with possible ECT   Rationale for change in precautions or plan No change   Safety Plan Per unit protocol   Anticipated Discharge Disposition home with family;assisted living  (TBD)     PRECAUTIONS AND SAFETY    Behavioral Orders   Procedures    Code 1 - Restrict to Unit    Code 2     Ultrasound    Elopement precautions    Fall precautions    Routine Programming     As  clinically indicated    Status 15     Every 15 minutes.    Status Individual Observation     Patient SIO status reviewed with team/RN.  Please also refer to RN/team documentation for add'l detail.    -SIO staff to monitor following which have contributed to patient being on SIO:  Elopement  Severe intrusiveness  -Possible interventions SIO staff could use to support patient's treatment progress:  Redirect as needed  -When following observed, team will review discontinuation of SIO:     Order Specific Question:   CONTINUOUS 24 hours / day     Answer:   5 feet     Order Specific Question:   Indications for SIO     Answer:   Severe intrusiveness       Safety  Safety WDL: WDL  Patient Location: patient room, own  Observed Behavior: calm  Observed Behavior (Comment): attemted to leave x2  Safety Measures: 1:1 observation maintained, safety rounds completed  Diversional Activity: television  De-Escalation Techniques: stimulation decreased, medication administered, quiet time facilitated, reoriented, appropriate behavior reinforced  Suicidality: Status 15  Additional Documentation:  (Fall)

## 2025-01-20 NOTE — PLAN OF CARE
Problem: Sleep Disturbance  Goal: Adequate Sleep/Rest  Outcome: Progressing   Goal Outcome Evaluation:       Patient is sleeping well in a side-lying position left, in the beginning of the shift. No complaints made. Nothing unusual noted. She is on SIO r/t intrusiveness. She is using a medical bed to facilitate her mobility. She slept the whole night for 6.75 hours with few interruptions.

## 2025-01-20 NOTE — PLAN OF CARE
"Goal Outcome Evaluation:    Problem: Psychotic Signs/Symptoms  Goal: Improved Behavioral Control (Psychotic Signs/Symptoms)  Outcome: Progressing     Pt presents calm, cooperative, and pleasant. Pt denies SI/HI/SIB, hallucinations, depression, and anxiety, however is seen responding to internal stimuli throughout shift and has been visualized talking or laughing to self. Pt is labile at times during shift. Pt appears disoriented to situation and place, and with the use of an interpretor Pt reports believing she is in \"[place]\". Pt was isolative and withdrawn throughout shift and split time between room and milieu, however was not social with others.     Pt did not report concerns with bowel/bladder and reports her last bowel movement was yesterday and \"normal\". Pt denies pain. Pt denies need for PRN medications.  VSS, medication compliant, behaviorally in control throughout shift.       "

## 2025-01-20 NOTE — PLAN OF CARE
Team Note Due:  Monday    Assessment/Intervention/Current Symtoms and Care Coordination:  Chart review and met with team, discussed pt progress, symptomology, and response to treatment.  Discussed the discharge plan and any potential impediments to discharge. Pt under commitment, vernon porras was petitioned for on 12/27/2024. Pt requires a Polish . Pt continues to laugh to herself at times but denies AH. Per provider's discussion with pt's family over the weekend, the family is against ECT due to it not aligning with their culture and [employer].     Per [facility], the matter has been taken under advisement.     Writer spoke with financial counselor, Marta, who confirms pt is currently uninsured and is missing information for the MA application, such as income and bank statements. Marta confirms she spoke with Nghia who also did not know this information. Marta left forms for pt and Nghia to complete when [facility] visits.     Writer spoke with Nghia ([phone]) and confirmed plan for him to visit this evening. Writer confirmed form is at the  and he can ask staff for it when he arrives. 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 [facility] reports that pt stopped working about mid-November of 2024 and since then has had no income. Nghia reports that he does not know if anyone in their family would know the details of pt's income or have access to her bank accounts. Nghia reports that since pt stopped working, he is now the PCA for pt's brother.     Discharge Plan or Goal:  Pending stabilization and safe disposition planning; considerations include:  TBD. Due to pt's current symptomology, it is unclear what pt's living situation was PTA     Barriers to Discharge:  Patient requires further psychiatric stabilization due to current symptomology, medication management with changes subject to provider, coordination with outside supports, and aftercare  planning. Pt awaiting price porras hearing and will hopefully start ECT after that      Referral Status:  None at this time - CTC will continue to assess as pt stabilizes and discharge plan develops     Legal Status:  Committed MI without ITP   [facility]  [id number]   Started: 12/12/2024  Santos petition and Alejandre Porras petitions were submitted on 12/27/2024  Exam Hearing on 01/17/2025 at 1:00 PM  ECT Hearing on 01/17/2025 at 2:00 PM    Contacts (include CLYDE status):  Gifty - daughter (CLYDE)  P: [phone]    Tevin - son (CLYDE)  P: [phone]     Isaura - commitment CM through [facility] (CLYDE per commitment)  P: [phone]    Kodi Samano- [facility] Attorney (No CLYDE per commitment)  [email or web address]     Upcoming Meetings and Dates/Important Information and next steps:  CTC to coordinate with pt, outside supports, and treatment team regarding discharge planning   CTC to follow up with [facility] regarding court paperwork  CTC to send price porras order to ECT   CTC to complete MA forms and send back to Marta

## 2025-01-21 ENCOUNTER — OFFICE VISIT (OUTPATIENT)
Dept: INTERPRETER SERVICES | Facility: CLINIC | Age: 60
End: 2025-01-21

## 2025-01-21 PROBLEM — F31.81 BIPOLAR 2 DISORDER (H): Status: RESOLVED | Noted: 2024-11-25 | Resolved: 2025-01-21

## 2025-01-21 PROCEDURE — 250N000013 HC RX MED GY IP 250 OP 250 PS 637: Performed by: PSYCHIATRY & NEUROLOGY

## 2025-01-21 PROCEDURE — 97150 GROUP THERAPEUTIC PROCEDURES: CPT | Mod: GO

## 2025-01-21 PROCEDURE — 99232 SBSQ HOSP IP/OBS MODERATE 35: CPT | Performed by: PSYCHIATRY & NEUROLOGY

## 2025-01-21 PROCEDURE — 250N000013 HC RX MED GY IP 250 OP 250 PS 637

## 2025-01-21 PROCEDURE — 124N000002 HC R&B MH UMMC

## 2025-01-21 PROCEDURE — T1013 SIGN LANG/ORAL INTERPRETER: HCPCS | Mod: GT

## 2025-01-21 RX ADMIN — Medication 5 MG: at 20:12

## 2025-01-21 RX ADMIN — LORAZEPAM 1.5 MG: 1 TABLET ORAL at 14:01

## 2025-01-21 RX ADMIN — DIVALPROEX SODIUM 250 MG: 250 TABLET, DELAYED RELEASE ORAL at 09:44

## 2025-01-21 RX ADMIN — CHLORPROMAZINE HYDROCHLORIDE 400 MG: 100 TABLET, FILM COATED ORAL at 20:12

## 2025-01-21 RX ADMIN — HALOPERIDOL 10 MG: 5 TABLET ORAL at 20:12

## 2025-01-21 RX ADMIN — LORAZEPAM 1.5 MG: 1 TABLET ORAL at 09:46

## 2025-01-21 RX ADMIN — DIVALPROEX SODIUM 500 MG: 500 TABLET, DELAYED RELEASE ORAL at 21:09

## 2025-01-21 RX ADMIN — LISINOPRIL 5 MG: 5 TABLET ORAL at 09:44

## 2025-01-21 RX ADMIN — DOCUSATE SODIUM 100 MG: 100 CAPSULE, LIQUID FILLED ORAL at 09:45

## 2025-01-21 RX ADMIN — LORAZEPAM 1.5 MG: 1 TABLET ORAL at 20:12

## 2025-01-21 RX ADMIN — Medication 1 LOZENGE: at 21:39

## 2025-01-21 RX ADMIN — POLYETHYLENE GLYCOL 3350 17 G: 17 POWDER, FOR SOLUTION ORAL at 09:43

## 2025-01-21 RX ADMIN — LEVOTHYROXINE SODIUM 100 MCG: 0.05 TABLET ORAL at 09:45

## 2025-01-21 RX ADMIN — Medication 1 TABLET: at 09:46

## 2025-01-21 ASSESSMENT — ACTIVITIES OF DAILY LIVING (ADL)
DRESS: INDEPENDENT
ADLS_ACUITY_SCORE: 48
ORAL_HYGIENE: INDEPENDENT
ADLS_ACUITY_SCORE: 59
ADLS_ACUITY_SCORE: 48
HYGIENE/GROOMING: INDEPENDENT
ADLS_ACUITY_SCORE: 48
ADLS_ACUITY_SCORE: 59
ADLS_ACUITY_SCORE: 48
ADLS_ACUITY_SCORE: 59
ADLS_ACUITY_SCORE: 48
ADLS_ACUITY_SCORE: 59
ADLS_ACUITY_SCORE: 48
ORAL_HYGIENE: INDEPENDENT
ADLS_ACUITY_SCORE: 48
ADLS_ACUITY_SCORE: 48
HYGIENE/GROOMING: INDEPENDENT
DRESS: INDEPENDENT;STREET CLOTHES

## 2025-01-21 NOTE — PLAN OF CARE
Problem: Sleep Disturbance  Goal: Adequate Sleep/Rest  Outcome: Progressing   Goal Outcome Evaluation:       Patient is sleeping soundly in a side-lying position in the beginning of the shift. Nothing unusual noted. No behavioral issues observed. She is on SIO r/t intrusiveness. Patient slept the whole night with few interruptions for 7 hours.
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
## 2025-01-21 NOTE — PLAN OF CARE
Problem: Depressive Signs/Symptoms  Goal: Optimized Energy Level (Depressive Signs/Symptoms)  Outcome: Not Progressing     Problem: Psychotic Signs/Symptoms  Goal: Improved Behavioral Control (Psychotic Signs/Symptoms)  Outcome: Not Progressing   Goal Outcome Evaluation:    Writer met with patient and interpretor. Pt denied depression, anxiety, hallucinations or self harm thoughts.  Pt's affect is flat with poor eye contact.   Pt denied pain.  Pt was asked if she needed anything and she asked for a nail clipper, toothbrush and paste. Pt provided with an abbi board and toothpaste/toothbrush.   Pt ambulating independently and performing own cares.  Pt remains on SIO for recent poor boundaries/going into others rooms and being by exit doors/elopement risk.

## 2025-01-21 NOTE — PLAN OF CARE
"Team Note Due:  Monday    Assessment/Intervention/Current Symtoms and Care Coordination:  Chart review and met with team, discussed pt progress, symptomology, and response to treatment.  Discussed the discharge plan and any potential impediments to discharge. Pt under commitment, vernon porras was petitioned for on 12/27/2024. Pt requires a Cypriot . Pt continues to laugh to herself at times but denies AH. Per provider's discussion with pt's family over the weekend, the family is against ECT due to it not aligning with their culture and [place].     Writer received completed and signed Authorized Representative form from [facility].     Writer met with pt with  and discussed MA forms and application needs. Pt held the forms the attempted to sign and then stated \"I want to speak to a different person.\" Pt was unable to elaborate. Writer inquired if pt would feel more comfortable discussing the paperwork and process in the presence of family. Pt was laughing and started to walk away. Pt stated that she had trouble understanding. During the interaction, the  mentioned that pt's speech was unintelligible at times. It is unclear what concerns/questions pt has with the MA forms/process. Writer made sure to inform pt multiple times that pt currently has no insurance coverage for this hospital stay.     Writer inquired if provider would place an order for family to come outside of visiting hours for a meeting to discuss the MA form/process with pt in the presence of her family.      Writer put out call to financial counselor, Marta, and left detailed voicemail regarding conversation with pt.     Writer spoke with Nghia ([phone]) and relayed conversation with pt earlier today.  and Nghia scheduled meeting for 1:00pm tomorrow to discuss MA application and process with pt.     Discharge Plan or Goal:  Pending stabilization and safe disposition planning; considerations " include:  TBD. Due to pt's current symptomology, it is unclear what pt's living situation was PTA     Barriers to Discharge:  Patient requires further psychiatric stabilization due to current symptomology, medication management with changes subject to provider, coordination with outside supports, and aftercare planning. Pt awaiting price porras hearing and will hopefully start ECT after that      Referral Status:  None at this time - CTC will continue to assess as pt stabilizes and discharge plan develops     Legal Status:  Committed MI without ITP   [facility]  [id number]   Started: 12/12/2024  Santos petition and Hill [place]ianard petitions were submitted on 12/27/2024  Exam Hearing on 01/17/2025 at 1:00 PM  ECT Hearing on 01/17/2025 at 2:00 PM    Contacts (include CLYDE status):  Gifty - daughter (CLYDE)  P: [phone]    Tevin - son (CLYDE)  P: [phone]     Nghia - son (CLYDE)  P: [phone]     Isaura - commitment CM through [facility] (CLYDE per commitment)  P: [phone]    Kodi Samano- [facility] Attorney (No CLYDE per commitment)  [email or web address]     Upcoming Meetings and Dates/Important Information and next steps:  CTC to coordinate with pt, outside supports, and treatment team regarding discharge planning   CTC to follow up with [facility] regarding court paperwork  CTC to send price porras order to ECT   CTC to update Marta if MA forms are completed   CTC to meet with Nghia and pt tomorrow at 1:00pm to discuss MA
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
## 2025-01-21 NOTE — CARE PLAN
Rehab Group    Start time: 1015  End time: 1200  Patient time total: 15 minutes (No charge as pt was unable to actively participate.)    attended partial group    Number in attendance:  #11 attended   Group Type: OT Clinic   Group Topic Covered: balanced lifestyle, cognitive activities, coping skills, emotional regulation, healthy leisure time, problem solving, self-esteem, and social skills       Group Session Detail:   OT Clinic Group for concentration, creative expression, organization/planning, decision making, attention to detail, follow through, autonomy, coping, mood stabilization, reality based activity, follow through, motivation, fostering hope for a sustainable recovery and socialization.        Patient Response/Contribution:  inattentive, withdrawn, and guarded       Patient Detail:  Pt arrived late to group with her SIO staff.  Pt was flat in affect, withdrawn and spoke very little.  Pt accepted her bin, though did not interact with the supplies.  This continued, even with set up from therapist.  The only interacting with her materials that pt did was to return her project to the bin.  While present, pt mainly just starred at the floor.         No charge      Patient Active Problem List   Diagnosis    Bipolar 2 disorder (H)    Ruby (H)    Psychosis (H)    Schizoaffective disorder, bipolar type (H)    PTSD (post-traumatic stress disorder)

## 2025-01-21 NOTE — PLAN OF CARE
BEH IP Unit Acuity Rating Score (UARS)  Patient is given one point for every criteria they meet.    CRITERIA SCORING   On a 72 hour hold, court hold, committed, stay of commitment, or revocation. 1    Patient LOS on BEH unit exceeds 20 days. 0  LOS: 18   Patient under guardianship, 55+, otherwise medically complex, or under age 11. 1   Suicide ideation without relief of precipitating factors. 0   Current plan for suicide. 0   Current plan for homicide. 0   Imminent risk or actual attempt to seriously harm another without relief of factors precipitating the attempt. 0   Severe dysfunction in daily living (ex: complete neglect for self care, extreme disruption in vegetative function, extreme deterioration in social interactions). 1   Recent (last 7 days) or current physical aggression in the ED or on unit. 0   Restraints or seclusion episode in past 72 hours. 0   Recent (last 7 days) or current verbal aggression, agitation, yelling, etc., while in the ED or unit. 0   Active psychosis. 1   Need for constant or near constant redirection (from leaving, from others, etc).  0   Intrusive or disruptive behaviors. 0   Patient requires 3 or more hours of individualized nursing care per 8-hour shift (i.e. for ADLs, meds, therapeutic interventions). 0   TOTAL 4

## 2025-01-21 NOTE — PLAN OF CARE
Problem: Psychotic Signs/Symptoms  Goal: Improved Behavioral Control (Psychotic Signs/Symptoms)  Outcome: Progressing   Goal Outcome Evaluation:    Plan of Care Reviewed With: patient             Patient continues on SIO 1:1 for Severe intrusiveness. Patient was visible in the lounge but kept to self. Check-in completed with  present. Denied any anxiety, endorsed depression but did not rate. Patient denied any SI,SIB,HI or hallucinations. However pt was noted talking to self and one point per SIO staff  Endorsed headache of 6/10. Patient was given tylenol 650 mg with relief.  Tae about 50 % of dinner. Ate 100% of snacks. Adequate fluid intake  Family visited, visit went well. Son will will like updates from provider, message left.  Patient and son signed authorization form, placed in chart.  Patient was compliant with taking scheduled medications.  Will continue to monitor and provide support and redirection as needed.    Vitals: declined

## 2025-01-22 PROCEDURE — 250N000013 HC RX MED GY IP 250 OP 250 PS 637

## 2025-01-22 PROCEDURE — 250N000013 HC RX MED GY IP 250 OP 250 PS 637: Performed by: PSYCHIATRY & NEUROLOGY

## 2025-01-22 PROCEDURE — 93010 ELECTROCARDIOGRAM REPORT: CPT | Performed by: INTERNAL MEDICINE

## 2025-01-22 PROCEDURE — 124N000002 HC R&B MH UMMC

## 2025-01-22 PROCEDURE — 97150 GROUP THERAPEUTIC PROCEDURES: CPT | Mod: GO

## 2025-01-22 PROCEDURE — 99232 SBSQ HOSP IP/OBS MODERATE 35: CPT | Performed by: PSYCHIATRY & NEUROLOGY

## 2025-01-22 RX ADMIN — LEVOTHYROXINE SODIUM 100 MCG: 0.05 TABLET ORAL at 07:52

## 2025-01-22 RX ADMIN — LORAZEPAM 1.5 MG: 1 TABLET ORAL at 20:19

## 2025-01-22 RX ADMIN — DOCUSATE SODIUM 100 MG: 100 CAPSULE, LIQUID FILLED ORAL at 07:52

## 2025-01-22 RX ADMIN — DIVALPROEX SODIUM 250 MG: 250 TABLET, DELAYED RELEASE ORAL at 07:52

## 2025-01-22 RX ADMIN — DIVALPROEX SODIUM 500 MG: 500 TABLET, DELAYED RELEASE ORAL at 21:23

## 2025-01-22 RX ADMIN — POLYETHYLENE GLYCOL 3350 17 G: 17 POWDER, FOR SOLUTION ORAL at 07:52

## 2025-01-22 RX ADMIN — Medication 1 TABLET: at 07:52

## 2025-01-22 RX ADMIN — CHLORPROMAZINE HYDROCHLORIDE 400 MG: 100 TABLET, FILM COATED ORAL at 20:19

## 2025-01-22 RX ADMIN — HALOPERIDOL 10 MG: 5 TABLET ORAL at 20:20

## 2025-01-22 RX ADMIN — Medication 5 MG: at 20:20

## 2025-01-22 RX ADMIN — LORAZEPAM 1.5 MG: 1 TABLET ORAL at 13:45

## 2025-01-22 ASSESSMENT — ACTIVITIES OF DAILY LIVING (ADL)
ADLS_ACUITY_SCORE: 59
DRESS: INDEPENDENT;STREET CLOTHES
ADLS_ACUITY_SCORE: 59
ADLS_ACUITY_SCORE: 59
DRESS: INDEPENDENT
ADLS_ACUITY_SCORE: 59
ADLS_ACUITY_SCORE: 48
HYGIENE/GROOMING: INDEPENDENT
ADLS_ACUITY_SCORE: 59
HYGIENE/GROOMING: INDEPENDENT
LAUNDRY: UNABLE TO COMPLETE
ADLS_ACUITY_SCORE: 59
ORAL_HYGIENE: INDEPENDENT
ADLS_ACUITY_SCORE: 59
ORAL_HYGIENE: INDEPENDENT
ADLS_ACUITY_SCORE: 59

## 2025-01-22 NOTE — PLAN OF CARE
Rehab Group    Start time: 1055  End time: 1200  Patient time total: 30 minutes    attended partial group    #9 attended   Group Type: OT Clinic   Group Topic Covered: cognitive activities, coping skills, healthy leisure time, problem solving, and social skills   Group Session Detail:OT: Education on healthy activity engagement and creative hands-on endeavor (OT clinic) to increase concentration, focus, attention to task/detail, decision making, problem solving, frustration tolerance, task follow through, coping with stress, healthy leisure engagement, creative expression, and social engagement    Patient Response/Contribution:  required repetition of directions, Limited eye contact, required prompts or assistance to participate, and distracted    Patient Detail:pt arrived with SIO staff. No  was present during group. Pt shared she was enjoying watching the snowflakes outside the windows. Pt was provided with set up of supplies for previous one step coloring task. Pt did not engage with staff or peers throughout. Pt was observed sitting and look around the room periodically. Pt made minimal progress on creative task. Pt was provided with encouragement and redirection back to task throughout group time. Pt would sometimes  task and start coloring and at other times tell this writer that she was done with her project. Pt was disorganized throughout      [id number] OT Group (2 or more in attendance)      Patient Active Problem List   Diagnosis    Ruby (H)    Psychosis (H)    Schizoaffective disorder, bipolar type (H)    PTSD (post-traumatic stress disorder)

## 2025-01-22 NOTE — PLAN OF CARE
"Problem: Depressive Signs/Symptoms  Goal: Increased Participation and Engagement (Depressive Signs/Symptoms)  Outcome: Not Progressing     Problem: Psychotic Signs/Symptoms  Goal: Improved Behavioral Control (Psychotic Signs/Symptoms)  Outcome: Not Progressing   Goal Outcome Evaluation:  Patient observed out in the milieu, walking the hallway or watching television. She keeps to herself. She appeared unkempt. Encourage to shower, pt declined. Presents with flat affect, guarded. Mood was labile. She was cooperative with nursing assessment with Maco  present. She denies SI/SIB/HI. She commits to safety. She denies experiencing auditory hallucinations. She denies having anxiety and depression. She claimed \"I'm okay.\". Patient claimed her goal is to discharge soon and that she would follow her treatment plan to get better. She also asked regarding changes of her medications but unable to elaborate. Encouraged to discuss her concerns with provider in the morning. Patient was accepting. Patient expressed that reading the Quran, listening to music and sometimes going to group have helped her cope while in the unit.     Patient was observed responding to internal stimuli. She was talking to self in her room or while walking the hallway. Family visited. Patient was medication compliant. Denies experiencing side effects. Patient continues to be on SIO for severe intrusiveness and elopement. On fall and elopement precautions.     Medical Concerns: patient denies pain. Order for EKG tomorrow in place.   Appetite: Consumed 75% of share of dinner and took approximately 500 ml of fluids.   Sleep: pt denies concerns.   LBM: patient claimed last bowel movement was 3 days ago. She denies feeling constipated. Offered PRN medication which she declined. Encouraged her to increase fluid intake. She verbalized understanding.  ADLs: Independent.   PRNs given: No PRN medications given this shift.     Needs attended to. No " further concerns noted as of this time.

## 2025-01-22 NOTE — PLAN OF CARE
BEH IP Unit Acuity Rating Score (UARS)  Patient is given one point for every criteria they meet.    CRITERIA SCORING   On a 72 hour hold, court hold, committed, stay of commitment, or revocation. 1    Patient LOS on BEH unit exceeds 20 days. 0  LOS: 19   Patient under guardianship, 55+, otherwise medically complex, or under age 11. 1   Suicide ideation without relief of precipitating factors. 0   Current plan for suicide. 0   Current plan for homicide. 0   Imminent risk or actual attempt to seriously harm another without relief of factors precipitating the attempt. 0   Severe dysfunction in daily living (ex: complete neglect for self care, extreme disruption in vegetative function, extreme deterioration in social interactions). 1   Recent (last 7 days) or current physical aggression in the ED or on unit. 0   Restraints or seclusion episode in past 72 hours. 0   Recent (last 7 days) or current verbal aggression, agitation, yelling, etc., while in the ED or unit. 0   Active psychosis. 1   Need for constant or near constant redirection (from leaving, from others, etc).  0   Intrusive or disruptive behaviors. 0   Patient requires 3 or more hours of individualized nursing care per 8-hour shift (i.e. for ADLs, meds, therapeutic interventions). 0   TOTAL 4

## 2025-01-22 NOTE — PLAN OF CARE
Problem: Sleep Disturbance  Goal: Adequate Sleep/Rest  Outcome: Progressing   Goal Outcome Evaluation:       Patient is sleeping soundly at the start of the shift. She is in a side-lying position. She is using a medical bed to facilitate her mobility She remains on SIO r/t intrusiveness. No behavioral issues observed. She went to the bathroom x 1 and voided freely. She slet the whole night for 8 hours.

## 2025-01-22 NOTE — PLAN OF CARE
Rehab Group    Start time: 1300  End time: 1400  Patient time total: 30 minutes    attended partial group    #9 attended   Group Type: occupational therapy   Group Topic Covered: balanced lifestyle, cognitive activities, coping skills, healthy leisure time, mindfulness, relaxation , and self-care   Group Session Detail:OT Wellness group: Patient actively participated in a relaxation group in order to promote: positive relaxation and coping skills, encourage insight and self-reflection, promote a positive change, manage behaviors, and improve focus. In addition, patient was guided through proper deep breathing techniques, body scan, guided imagery, progressive muscle relaxation and body calming movements to further promote relaxation, healthy distraction, stress relief, symptom management, and healthy coping skill building   Patient Response/Contribution:  Limited eye contact and withdrawn   Patient Detail:pt arrived with SIO staff. Pt make minimal eye contact though pt did respond when therapist initiated. Pt minimally engaged in relaxation strategies throughout group. Pt was quiet. Pt was observed with eyes closed and following some of the deep breathing exercises. Pt quietly got up from her seat and left group space. Pt did not return      [id number] OT Group (2 or more in attendance)      Patient Active Problem List   Diagnosis    Ruby (H)    Psychosis (H)    Schizoaffective disorder, bipolar type (H)    PTSD (post-traumatic stress disorder)

## 2025-01-22 NOTE — PLAN OF CARE
Problem: Psychotic Signs/Symptoms  Goal: Improved Behavioral Control (Psychotic Signs/Symptoms)  Outcome: Not Progressing  Intervention: Manage Behavior  Recent Flowsheet Documentation  Taken 1/22/2025 8113 by Dana Lutz RN  De-Escalation Techniques: (SIO) diversional activity encouraged   Goal Outcome Evaluation:    Plan of Care Reviewed With: patient        Pt denied anxiety/depression and hallucinations this AM. Pt was noted to laughing to herself this AM. Pt also noted to be talking in English and then will begin talking softly in Cypriot.   Pt has poor boundaries. Pt noted to walk behind the desk this AM.   Pt remains on SIO for elopement risk and poor boundaries.   Pt verbalized that she wanted to take a shower this AM. Pt noted to be in multiple layers including her winter coat. When asked what clothing she wanted to put on after her shower she walked away from staff. Writer brought out her clothes from locked area. Pt was informed that she could only have the clothes that she wanted to put on after the shower. Pt then put all the clothes back in the bag and put it on the floor.  Pt has been eating well.

## 2025-01-22 NOTE — PLAN OF CARE
Team Note Due:  Monday    Assessment/Intervention/Current Symtoms and Care Coordination:  Chart review and met with team, discussed pt progress, symptomology, and response to treatment.  Discussed the discharge plan and any potential impediments to discharge. Pt under commitment, tom and kezia porras was petitioned for on 12/27/2024. Pt requires a German . Pt continues to respond to internal stimuli and intermittently denies/endorses AH. Per RN, pt has been trying to eat other pt's food, including pork products. Provider relayed plan for medication adjustments.     Writer spoke with Patrick ([phone]) who rescheduled meeting to discuss MA for tomorrow at 2:00pm due to a prior engagement schedule conflict for the meeting today.     Writer spoke with Gifty ([phone]) and relayed current issues with medication and applying for MA. Gifty confirms that she doesn't believe anyone in the family would have access to her bank statements. Gifty relayed plan to discuss this with pt's father and obtain pt's paystubs from pt's son. Writer confirmed any information that can attest to pt's assets/income/financial information is helpful.     Per [employer], no kezia porrsa/tom order has been signed.     Discharge Plan or Goal:  Pending stabilization and safe disposition planning; considerations include:  TBD. Due to pt's current symptomology, it is unclear what pt's living situation was PTA     Barriers to Discharge:  Patient requires further psychiatric stabilization due to current symptomology, medication management with changes subject to provider, coordination with outside supports, and aftercare planning. Pt awaiting price porras hearing and will hopefully start ECT after that      Referral Status:  None at this time - CTC will continue to assess as pt stabilizes and discharge plan develops     Legal Status:  Committed MI without ITP   [facility]  [id number]   Started: 12/12/2024  Tom  petition and Alejandre Porras petitions were submitted on 12/27/2024  Exam Hearing on 01/17/2025 at 1:00 PM  ECT Hearing on 01/17/2025 at 2:00 PM    Contacts (include CLYDE status):  Gifty - daughter (CLYDE)  P: [phone]    Tevin - son (CLYDE)  P: [phone]     Patrick - son (CLYDE)  P: [phone]     Isaura - commitment CM through [facility] (CLYDE per commitment)  P: [phone]    Kodi Samano- [facility] Attorney (No CLYDE per commitment)  [email or web address]     Upcoming Meetings and Dates/Important Information and next steps:  CTC to coordinate with pt, outside supports, and treatment team regarding discharge planning   CTC to follow up with [facility] regarding court paperwork  CTC to send price porras order to ECT   CTC to update Marta if MA forms are completed   CTC to meet with Nghia and pt tomorrow at 2:00pm to discuss MA

## 2025-01-23 ENCOUNTER — APPOINTMENT (OUTPATIENT)
Dept: INTERPRETER SERVICES | Facility: CLINIC | Age: 60
DRG: 885 | End: 2025-01-23
Attending: PSYCHIATRY & NEUROLOGY

## 2025-01-23 ENCOUNTER — OFFICE VISIT (OUTPATIENT)
Dept: INTERPRETER SERVICES | Facility: CLINIC | Age: 60
End: 2025-01-23

## 2025-01-23 LAB
ATRIAL RATE - MUSE: 75 BPM
ATRIAL RATE - MUSE: 75 BPM
DIASTOLIC BLOOD PRESSURE - MUSE: NORMAL MMHG
DIASTOLIC BLOOD PRESSURE - MUSE: NORMAL MMHG
INTERPRETATION ECG - MUSE: NORMAL
INTERPRETATION ECG - MUSE: NORMAL
P AXIS - MUSE: 64 DEGREES
P AXIS - MUSE: 64 DEGREES
PR INTERVAL - MUSE: 162 MS
PR INTERVAL - MUSE: 162 MS
QRS DURATION - MUSE: 78 MS
QRS DURATION - MUSE: 78 MS
QT - MUSE: 384 MS
QT - MUSE: 384 MS
QTC - MUSE: 428 MS
QTC - MUSE: 428 MS
R AXIS - MUSE: 20 DEGREES
R AXIS - MUSE: 20 DEGREES
SYSTOLIC BLOOD PRESSURE - MUSE: NORMAL MMHG
SYSTOLIC BLOOD PRESSURE - MUSE: NORMAL MMHG
T AXIS - MUSE: 38 DEGREES
T AXIS - MUSE: 38 DEGREES
VENTRICULAR RATE- MUSE: 75 BPM
VENTRICULAR RATE- MUSE: 75 BPM

## 2025-01-23 PROCEDURE — 250N000013 HC RX MED GY IP 250 OP 250 PS 637: Performed by: PSYCHIATRY & NEUROLOGY

## 2025-01-23 PROCEDURE — 99232 SBSQ HOSP IP/OBS MODERATE 35: CPT | Performed by: PSYCHIATRY & NEUROLOGY

## 2025-01-23 PROCEDURE — T1013 SIGN LANG/ORAL INTERPRETER: HCPCS

## 2025-01-23 PROCEDURE — 250N000013 HC RX MED GY IP 250 OP 250 PS 637

## 2025-01-23 PROCEDURE — 124N000002 HC R&B MH UMMC

## 2025-01-23 RX ADMIN — LORAZEPAM 1.5 MG: 1 TABLET ORAL at 13:55

## 2025-01-23 RX ADMIN — LORAZEPAM 1.5 MG: 1 TABLET ORAL at 20:17

## 2025-01-23 RX ADMIN — Medication 5 MG: at 20:17

## 2025-01-23 RX ADMIN — CHLORPROMAZINE HYDROCHLORIDE 400 MG: 100 TABLET, FILM COATED ORAL at 20:16

## 2025-01-23 RX ADMIN — Medication 1 TABLET: at 07:52

## 2025-01-23 RX ADMIN — DOCUSATE SODIUM 100 MG: 100 CAPSULE, LIQUID FILLED ORAL at 07:53

## 2025-01-23 RX ADMIN — DIVALPROEX SODIUM 250 MG: 250 TABLET, DELAYED RELEASE ORAL at 07:52

## 2025-01-23 RX ADMIN — HALOPERIDOL 10 MG: 5 TABLET ORAL at 20:18

## 2025-01-23 RX ADMIN — ACETAMINOPHEN 650 MG: 325 TABLET, FILM COATED ORAL at 06:52

## 2025-01-23 RX ADMIN — POLYETHYLENE GLYCOL 3350 17 G: 17 POWDER, FOR SOLUTION ORAL at 07:53

## 2025-01-23 RX ADMIN — LEVOTHYROXINE SODIUM 100 MCG: 0.05 TABLET ORAL at 07:53

## 2025-01-23 RX ADMIN — DIVALPROEX SODIUM 500 MG: 500 TABLET, DELAYED RELEASE ORAL at 20:16

## 2025-01-23 ASSESSMENT — ACTIVITIES OF DAILY LIVING (ADL)
ADLS_ACUITY_SCORE: 48
ORAL_HYGIENE: INDEPENDENT
ADLS_ACUITY_SCORE: 48
HYGIENE/GROOMING: INDEPENDENT
ADLS_ACUITY_SCORE: 48
DRESS: INDEPENDENT;STREET CLOTHES
ADLS_ACUITY_SCORE: 48
LAUNDRY: UNABLE TO COMPLETE
ADLS_ACUITY_SCORE: 48
ADLS_ACUITY_SCORE: 48

## 2025-01-23 NOTE — PLAN OF CARE
"  Problem: Psychotic Signs/Symptoms  Goal: Improved Behavioral Control (Psychotic Signs/Symptoms)  Outcome: Not Progressing  Intervention: Manage Behavior  Recent Flowsheet Documentation  Taken 1/23/2025 0905 by Dana Lutz RN  De-Escalation Techniques: (SIO) diversional activity encouraged   Goal Outcome Evaluation:    Plan of Care Reviewed With: patient      Writer met with patient and interpretor this AM. Pt described her mood as \"normal\". Pt denied anxiety/depression/self harm/hallucinations. Pt did admit that she had voices yesterday around lunch time per interpretor patient stated that there were a lot of people talking and that they were loud.  Pt independent with ADL's.   Pt has been eating well.    Pt's blood pressure was soft this AM. 97/64. Staff was unable to obtain a standing reading with machine. Writer approached to attempt to do a manual pressure and patient declined and stated that she was dizzy. Dr Trotter notified and AM scheduled ativan held. AM dose of lisinopril was also held as her blood pressure did not meet parameter.                  "

## 2025-01-23 NOTE — PLAN OF CARE
BEH IP Unit Acuity Rating Score (UARS)  Patient is given one point for every criteria they meet.    CRITERIA SCORING   On a 72 hour hold, court hold, committed, stay of commitment, or revocation. 1    Patient LOS on BEH unit exceeds 20 days. 1  LOS: 20   Patient under guardianship, 55+, otherwise medically complex, or under age 11. 1   Suicide ideation without relief of precipitating factors. 0   Current plan for suicide. 0   Current plan for homicide. 0   Imminent risk or actual attempt to seriously harm another without relief of factors precipitating the attempt. 0   Severe dysfunction in daily living (ex: complete neglect for self care, extreme disruption in vegetative function, extreme deterioration in social interactions). 1   Recent (last 7 days) or current physical aggression in the ED or on unit. 0   Restraints or seclusion episode in past 72 hours. 0   Recent (last 7 days) or current verbal aggression, agitation, yelling, etc., while in the ED or unit. 0   Active psychosis. 1   Need for constant or near constant redirection (from leaving, from others, etc).  0   Intrusive or disruptive behaviors. 0   Patient requires 3 or more hours of individualized nursing care per 8-hour shift (i.e. for ADLs, meds, therapeutic interventions). 0   TOTAL 5

## 2025-01-23 NOTE — PLAN OF CARE
Team Note Due:  Monday    Assessment/Intervention/Current Symtoms and Care Coordination:  Chart review and met with team, discussed pt progress, symptomology, and response to treatment.  Discussed the discharge plan and any potential impediments to discharge. Pt under commitment, sorensen and kezia porras was petitioned for on 12/27/2024. Pt requires a Jamaican . Pt continues to respond to internal stimuli and intermittently denies/endorses AH. Per RN, pt has been trying to eat other pt's food, including pork products.    Writer put out email to financial counselor, Marta, relaying update with MA forms and plan for meeting today.     Writer met with pt, Patrick, and  via phone. Writer explained pt's current lack of coverage and MA forms. Pt signed all MA forms. Writer relayed other needs for application, including bank statements. Pt confirms that she uses online banking on her phone and Patrick confirms they have her phone right now. Patrick relayed plan to bring pt's phone in tomorrow to obtain bank statements. Pt agreeable to plan.     Writer spoke with Marta and provided update. Writer relayed plan to update Marta once statements are obtained.     Discharge Plan or Goal:  Pending stabilization and safe disposition planning; considerations include:  TBD. Due to pt's current symptomology, it is unclear what pt's living situation was PTA     Barriers to Discharge:  Patient requires further psychiatric stabilization due to current symptomology, medication management with changes subject to provider, coordination with outside supports, and aftercare planning. Pt awaiting price porras hearing and will hopefully start ECT after that      Referral Status:  None at this time - CTC will continue to assess as pt stabilizes and discharge plan develops     Legal Status:  Committed MI without ITP   [facility]  [id number]-   Started: 12/12/2024  Sorensen petition and Kezia Porras petitions were  submitted on 12/27/2024  Exam Hearing on 01/17/2025 at 1:00 PM  ECT Hearing on 01/17/2025 at 2:00 PM    Contacts (include CLYDE status):  Gifty - daughter (CLYDE)  P: [phone]    Tevin - son (CLYDE)  P: [phone]     Patrick - son (CLYDE)  P: [phone]     Isaura - commitment CM through [facility] (CLYDE per commitment)  P: [phone]    Kodi Samano- [facility] Attorney (No CLYDE per commitment)  [email or web address]     Upcoming Meetings and Dates/Important Information and next steps:  CTC to coordinate with pt, outside supports, and treatment team regarding discharge planning   CTC to follow up with [facility] regarding court paperwork  CTC to send price porras order to ECT   CTC to update Marta when MA needs are completed  CTC to meet with pt and Patrick tomorrow to obtain bank statements from pt's phone

## 2025-01-23 NOTE — PLAN OF CARE
"  Problem: Depressive Signs/Symptoms  Goal: Optimized Energy Level (Depressive Signs/Symptoms)  Outcome: Progressing  Intervention: Optimize Energy Level  Recent Flowsheet Documentation  Taken 1/22/2025 1800 by Francis Douglas RN  Patient Performed Hygiene: dressed  Diversional Activity: television  Activity (Behavioral Health): up ad shawn     Problem: Psychotic Signs/Symptoms  Goal: Improved Behavioral Control (Psychotic Signs/Symptoms)  Outcome: Progressing  Intervention: Manage Behavior  Recent Flowsheet Documentation  Taken 1/22/2025 1800 by Francis Douglas RN  De-Escalation Techniques:   diversional activity encouraged   verbally redirected   Goal Outcome Evaluation:    Plan of Care Reviewed With: patient      Patient denied feeling anxious anxious or depressed. Told writer through an  that she was feeling good and nothing was wrong with her. When asked about  hallucinations, patient stated \" I use to have them, but not today.\" Towards the end of shift, patient was observed (i) laughing and talking to self. Putting on multiple layers of clothes (walking in to peers room and (iv) sitting on the floor in the [address]. When writer asked patient  to standup, she stood up and walked away, appearing to be mildly confused and disoriented. Upon assessment, patient told writer she was \" In [place], and will be flying home soon.\" Patient uncle visited and patient was receptive, and signed an CLYDE form so writer could discuss her healthcare plan with him. She was medications complaint and contracted for safety. Vitals were normal.      "

## 2025-01-23 NOTE — PLAN OF CARE
Problem: Sleep Disturbance  Goal: Adequate Sleep/Rest  Outcome: Progressing   Goal Outcome Evaluation:       Patient is sleeping soundly in a side-lying position in the beginning of the shift. She is on SIO r/t intrusiveness. No behavioral issues observed. She uses a medical bed to facilitate her mobility. She slept for 7.25 hours the whole shift without interruptions.

## 2025-01-23 NOTE — PROGRESS NOTES
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 inpatient psychiatric facility personnel.   I estimate TBD days of hospitalization is necessary for proper treatment of the patient. My plans for post-hospital care for this patient are : Medications, appointments     Dona Trotter MD
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
## 2025-01-24 ENCOUNTER — OFFICE VISIT (OUTPATIENT)
Dept: INTERPRETER SERVICES | Facility: CLINIC | Age: 60
End: 2025-01-24

## 2025-01-24 PROCEDURE — 250N000013 HC RX MED GY IP 250 OP 250 PS 637

## 2025-01-24 PROCEDURE — 124N000002 HC R&B MH UMMC

## 2025-01-24 PROCEDURE — 250N000013 HC RX MED GY IP 250 OP 250 PS 637: Performed by: PSYCHIATRY & NEUROLOGY

## 2025-01-24 PROCEDURE — 99232 SBSQ HOSP IP/OBS MODERATE 35: CPT | Performed by: PSYCHIATRY & NEUROLOGY

## 2025-01-24 RX ADMIN — LORAZEPAM 1.5 MG: 1 TABLET ORAL at 07:45

## 2025-01-24 RX ADMIN — ACETAMINOPHEN 650 MG: 325 TABLET, FILM COATED ORAL at 16:43

## 2025-01-24 RX ADMIN — DOCUSATE SODIUM 100 MG: 100 CAPSULE, LIQUID FILLED ORAL at 07:45

## 2025-01-24 RX ADMIN — LORAZEPAM 1.5 MG: 1 TABLET ORAL at 13:35

## 2025-01-24 RX ADMIN — LEVOTHYROXINE SODIUM 100 MCG: 0.05 TABLET ORAL at 07:45

## 2025-01-24 RX ADMIN — POLYETHYLENE GLYCOL 3350 17 G: 17 POWDER, FOR SOLUTION ORAL at 07:45

## 2025-01-24 RX ADMIN — DIVALPROEX SODIUM 250 MG: 250 TABLET, DELAYED RELEASE ORAL at 07:45

## 2025-01-24 RX ADMIN — HALOPERIDOL 10 MG: 5 TABLET ORAL at 20:35

## 2025-01-24 RX ADMIN — CHLORPROMAZINE HYDROCHLORIDE 400 MG: 100 TABLET, FILM COATED ORAL at 20:34

## 2025-01-24 RX ADMIN — Medication 1 TABLET: at 07:45

## 2025-01-24 RX ADMIN — LORAZEPAM 1.5 MG: 1 TABLET ORAL at 20:35

## 2025-01-24 RX ADMIN — Medication 5 MG: at 20:34

## 2025-01-24 RX ADMIN — LISINOPRIL 5 MG: 5 TABLET ORAL at 07:45

## 2025-01-24 RX ADMIN — DIVALPROEX SODIUM 500 MG: 500 TABLET, DELAYED RELEASE ORAL at 20:35

## 2025-01-24 ASSESSMENT — ACTIVITIES OF DAILY LIVING (ADL)
ADLS_ACUITY_SCORE: 48
DRESS: INDEPENDENT;STREET CLOTHES
ADLS_ACUITY_SCORE: 48
LAUNDRY: UNABLE TO COMPLETE
ADLS_ACUITY_SCORE: 48
ADLS_ACUITY_SCORE: 48
HYGIENE/GROOMING: INDEPENDENT
ADLS_ACUITY_SCORE: 48
ADLS_ACUITY_SCORE: 48
ORAL_HYGIENE: INDEPENDENT
ADLS_ACUITY_SCORE: 48

## 2025-01-24 NOTE — PLAN OF CARE
Problem: Sleep Disturbance  Goal: Adequate Sleep/Rest  Outcome: Progressing   Goal Outcome Evaluation:       Patient is sleeping soundly in a side-lying position in the beginning of the shift. She is on SIO r/t intrusiveness. No complaints made. She slept the whole night for 7.5 hours, uninterrupted.

## 2025-01-24 NOTE — PLAN OF CARE
Group Attendance:  attended full group    Time session began: 1415  Time session ended: 1500  Patient's total time in group: 45    Total # Attendees   6   Group Type psychotherapeutic     Group Topic Covered emotional regulation, symptom management, healthy coping skills, and relaxation and grounding techniques/coping skills     Group Session Detail Group members checked in with how they are feeling. The group discussed utilizing music as a therapeutic tool to explore emotions, expression, and promote relaxation. Writer discussed the significance of music in emotional regulation and coping strategies. Group members shared personal experiences and associations with songs they chose.      Patient's response to the group topic/interactions:  cooperative with task, organized, socially appropriate, listened actively, expressed understanding of topic, attentive, and actively engaged     Patient Details: Pt attended group, chose a couple of songs. One of the songs later in the group had her tearful for a minute. When asked what the song was about pt explained it was about a woman and her life and her relationship with her  and her mom.           09371 - Group psychotherapy - 1 Session  Patient Active Problem List   Diagnosis    Ruby (H)    Psychosis (H)    Schizoaffective disorder, bipolar type (H)    PTSD (post-traumatic stress disorder)

## 2025-01-24 NOTE — PLAN OF CARE
Goal Outcome Evaluation:    Plan of Care Reviewed With: patient      Problem: Adult Behavioral Health Plan of Care  Goal: Plan of Care Review  Outcome: Progressing  Flowsheets (Taken 1/23/2025 1611)  Patient Agreement with Plan of Care: agrees    Pt presented with a flat affect, sad mood during check in. Pt endorsed depression/sad, denied anxiety, pain, SI, HI,hallucinations, and contracted for safety. Pt was visible in the milieu, social and engaged minimally with peers/staff. Family visit, went well but son reported mom was more agitated than before. Compliant with all scheduled medications. No side effects reported. Pt makes needs known appropriately. No safety concerns observed this shift.

## 2025-01-24 NOTE — PLAN OF CARE
Problem: Depressive Signs/Symptoms  Goal: Improved Mood Symptoms (Depressive Signs/Symptoms)  Outcome: Not Progressing   Goal Outcome Evaluation:    Affect is flat, patient is calm and medication compliant. Disoriented to situation and place. Denies depression endorses anxiety 10/10 prior to morning medications, reassessed patient after medications. Patient reported anxiety 0/10. Patient visible in milieu but not social with peers, appears to be responding to internal stimuli, observed giggling with self. Patients appetite is good, denies pain. No behavioral concerns this shift.    /75 (BP Location: Right arm)   Pulse 91   Temp (!) 96.4  F (35.8  C) (Temporal)   Resp 22   Wt 100.5 kg (221 lb 9 oz)   SpO2 99%   BMI 32.72 kg/m       Continue SIO 1:1 for intrusiveness

## 2025-01-24 NOTE — PLAN OF CARE
BEH IP Unit Acuity Rating Score (UARS)  Patient is given one point for every criteria they meet.    CRITERIA SCORING   On a 72 hour hold, court hold, committed, stay of commitment, or revocation. 1    Patient LOS on BEH unit exceeds 20 days. 1  LOS: 21   Patient under guardianship, 55+, otherwise medically complex, or under age 11. 1   Suicide ideation without relief of precipitating factors. 0   Current plan for suicide. 0   Current plan for homicide. 0   Imminent risk or actual attempt to seriously harm another without relief of factors precipitating the attempt. 0   Severe dysfunction in daily living (ex: complete neglect for self care, extreme disruption in vegetative function, extreme deterioration in social interactions). 1   Recent (last 7 days) or current physical aggression in the ED or on unit. 0   Restraints or seclusion episode in past 72 hours. 0   Recent (last 7 days) or current verbal aggression, agitation, yelling, etc., while in the ED or unit. 0   Active psychosis. 1   Need for constant or near constant redirection (from leaving, from others, etc).  0   Intrusive or disruptive behaviors. 0   Patient requires 3 or more hours of individualized nursing care per 8-hour shift (i.e. for ADLs, meds, therapeutic interventions). 0   TOTAL 5

## 2025-01-24 NOTE — PROGRESS NOTES
"[facility], [place]   Psychiatric Progress Note        Interim History:   The patient's care was discussed with the treatment team during the daily team meeting and/or staff's chart notes were reviewed.  Staff report patient endorses anxiety at 10/10 but denies depression or SI. Have not heard back from [facility] about Santos or Alejandre-Machado after hearing on 1/17.     The patient reports that she is \"good.\" Denies SI. Mumbles a response when asked about AH or VH. Denies any paranoia. Says that she \"sometimes\" has problems with memory. Is oriented to place and person but not time (thought the month was May). When asked if she had been in contact with her family, she reported that she had spoken to her mother yesterday and that this conversation \"went okay.\" (Unclear if her mother is in [place] or still alive, per team.)         Medications:     Current Facility-Administered Medications   Medication Dose Route Frequency Provider Last Rate Last Admin    chlorproMAZINE (THORAZINE) tablet 400 mg  400 mg Oral At Bedtime Rob Loaiza MD   400 mg at 01/23/25 2016    divalproex sodium delayed-release (DEPAKOTE) DR tablet 250 mg  250 mg Oral QAM Dona Trotter MD   250 mg at 01/24/25 0745    divalproex sodium delayed-release (DEPAKOTE) DR tablet 500 mg  500 mg Oral At Bedtime Dona Trotter MD   500 mg at 01/23/25 2016    docusate sodium (COLACE) capsule 100 mg  100 mg Oral Daily Rob Loaiza MD   100 mg at 01/24/25 0745    haloperidol (HALDOL) tablet 10 mg  10 mg Oral At Bedtime Dona Trotter MD   10 mg at 01/23/25 2018    levothyroxine (SYNTHROID/LEVOTHROID) tablet 100 mcg  100 mcg Oral QAM AC Juan Samano PA-C   100 mcg at 01/24/25 0745    lisinopril (ZESTRIL) tablet 5 mg  5 mg Oral Daily Juan Samano PA-C   5 mg at 01/24/25 0745    LORazepam (ATIVAN) tablet 1.5 mg  1.5 mg Oral TID Dona Trotter MD   1.5 mg at 01/24/25 0745    melatonin tablet 5 mg  5 mg Oral At " Bedtime Dona Trotter MD   5 mg at 01/23/25 2017    multivitamin w/minerals (THERA-VIT-M) tablet 1 tablet  1 tablet Oral Daily Rob Loaiza MD   1 tablet at 01/24/25 0745    polyethylene glycol (MIRALAX) Packet 17 g  17 g Oral Daily Rob Loaiza MD   17 g at 01/24/25 0745          Allergies:     Allergies   Allergen Reactions    Pork-Derived Products           Labs:   No results found for this or any previous visit (from the past 24 hours).       Psychiatric Examination:     /75 (BP Location: Right arm)   Pulse 91   Temp (!) 96.4  F (35.8  C) (Temporal)   Resp 22   Wt 100.5 kg (221 lb 9 oz)   SpO2 99%   BMI 32.72 kg/m    Weight is 221 lbs 9 oz  Body mass index is 32.72 kg/m .  Orthostatic Vitals         Most Recent      Sitting Orthostatic BP 97/64 01/23 0741    Sitting Orthostatic Pulse (bpm) 104 01/23 0741              Appearance: awake, alert  Attitude:  somewhat cooperative  Eye Contact:  poor   Mood:  anxious and good  Affect:  intensity is flat  Speech:  mumbling  Psychomotor Behavior:  no evidence of tardive dyskinesia, dystonia, or tics  Thought Process:  illogical  Associations:  no loose associations  Thought Content:  no evidence of suicidal ideation or homicidal ideation  Insight:  limited  Judgement:  limited  Oriented to:   person and place but not time  Attention Span and Concentration:  fair  Recent and Remote Memory:  poor           Precautions:     Behavioral Orders   Procedures    Code 1 - Restrict to Unit    Code 2     Ultrasound    Elopement precautions    Fall precautions    Routine Programming     As clinically indicated    Status 15     Every 15 minutes.    Status Individual Observation     Patient SIO status reviewed with team/RN.  Please also refer to RN/team documentation for add'l detail.    -SIO staff to monitor following which have contributed to patient being on SIO:  Elopement  Severe intrusiveness  -Possible interventions SIO staff could use to support  patient's treatment progress:  Redirect as needed  -When following observed, team will review discontinuation of SIO:     Order Specific Question:   CONTINUOUS 24 hours / day     Answer:   5 feet     Order Specific Question:   Indications for SIO     Answer:   Severe intrusiveness          DIagnoses:     Schizoaffective disorder, bipolar type  R/O catatonia    Clinically Significant Risk Factors               # Hypoalbuminemia: Lowest albumin = 3.4 g/dL at 1/7/2025  7:45 AM, will monitor as appropriate                          # Financial/Environmental Concerns:                       Plan:     1) Stop psychiatric emergency. It has been 14 days. Awaiting results of Santos (and Price-Machado) hearing from court. Patient has been medication adherent.   2) Continue psychotropic regimen. VPA level will be checked on 1/26.   3) Continue SIO.   4) Disposition to be determined.       Disposition Plan   Reason for ongoing admission: is unable to care for self due to severe psychosis or ramona  Discharge location:  TBD  Discharge Medications: not ordered  Follow-up Appointments: not scheduled  Legal Status: full commitment    Entered by: Brett Dotson MD on January 24, 2025 at 11:42 AM

## 2025-01-24 NOTE — PLAN OF CARE
Team Note Due:  Monday    Assessment/Intervention/Current Symtoms and Care Coordination:  Chart review and met with team, discussed pt progress, symptomology, and response to treatment.  Discussed the discharge plan and any potential impediments to discharge. Pt under commitment, edu and kezia porras was petitioned for on 12/27/2024. Pt requires a Malawian . Pt continues to respond to internal stimuli and intermittently denies/endorses AH.    Writer put out call to Patrick ([phone]) to inquire if [facility] was able to meet with pt today to obtain bank statements. Writer LVM reporting that to writer's knowledge, [facility] had not visited but if he has to provide writer with the bank statements. Writer also confirmed that pt needs to send statements for 11/2024, 12/2024, and 1/2025 if available. Writer inquired if [facility] has time to complete this over the weekend with pt so writer can send the documents to the financial counselor, if he has not done so already.     Writer informed financial counselor of update.     Discharge Plan or Goal:  Pending stabilization and safe disposition planning; considerations include:  TBD. Due to pt's current symptomology, it is unclear what pt's living situation was PTA     Barriers to Discharge:  Patient requires further psychiatric stabilization due to current symptomology, medication management with changes subject to provider, coordination with outside supports, and aftercare planning. Pt awaiting price porras hearing and will hopefully start ECT after that      Referral Status:  None at this time - CTC will continue to assess as pt stabilizes and discharge plan develops     Legal Status:  Committed MI without ITP   [facility]  [id number]   Started: 12/12/2024  Santos petition and Kezia Matthewppard petitions were submitted on 12/27/2024  Exam Hearing on 01/17/2025 at 1:00 PM  ECT Hearing on 01/17/2025 at 2:00 PM    Contacts (include CLYDE status):  Gifty Machado  daughter (CLYDE)  P: [phone]    Tevin - son (CLYDE)  P: [phone]     Mohamed - son (CLYDE)  P: [phone]     Isaura - commitment CM through [facility] (CLYDE per commitment)  P: [phone]    Kodi Samano- [facility] Attorney (No CLYDE per commitment)  [email or web address]     Upcoming Meetings and Dates/Important Information and next steps:  CTC to coordinate with pt, outside supports, and treatment team regarding discharge planning   CTC to follow up with [facility] regarding court paperwork  CTC to send price porras order to ECT   CTC to update Marta when MA needs are completed  CTC to follow up with Patrick regarding bank statements

## 2025-01-25 PROCEDURE — 124N000002 HC R&B MH UMMC

## 2025-01-25 PROCEDURE — 250N000013 HC RX MED GY IP 250 OP 250 PS 637: Performed by: PSYCHIATRY & NEUROLOGY

## 2025-01-25 PROCEDURE — 250N000013 HC RX MED GY IP 250 OP 250 PS 637

## 2025-01-25 RX ADMIN — LORAZEPAM 1.5 MG: 1 TABLET ORAL at 21:27

## 2025-01-25 RX ADMIN — DIVALPROEX SODIUM 250 MG: 250 TABLET, DELAYED RELEASE ORAL at 08:08

## 2025-01-25 RX ADMIN — LORAZEPAM 1.5 MG: 1 TABLET ORAL at 13:01

## 2025-01-25 RX ADMIN — CHLORPROMAZINE HYDROCHLORIDE 400 MG: 100 TABLET, FILM COATED ORAL at 21:27

## 2025-01-25 RX ADMIN — POLYETHYLENE GLYCOL 3350 17 G: 17 POWDER, FOR SOLUTION ORAL at 08:07

## 2025-01-25 RX ADMIN — DIVALPROEX SODIUM 500 MG: 500 TABLET, DELAYED RELEASE ORAL at 21:28

## 2025-01-25 RX ADMIN — Medication 5 MG: at 21:28

## 2025-01-25 RX ADMIN — Medication 1 TABLET: at 08:08

## 2025-01-25 RX ADMIN — DOCUSATE SODIUM 100 MG: 100 CAPSULE, LIQUID FILLED ORAL at 08:07

## 2025-01-25 RX ADMIN — LORAZEPAM 1.5 MG: 1 TABLET ORAL at 08:07

## 2025-01-25 RX ADMIN — LEVOTHYROXINE SODIUM 100 MCG: 0.05 TABLET ORAL at 08:08

## 2025-01-25 RX ADMIN — HALOPERIDOL 10 MG: 5 TABLET ORAL at 21:28

## 2025-01-25 ASSESSMENT — ACTIVITIES OF DAILY LIVING (ADL)
ADLS_ACUITY_SCORE: 48
ORAL_HYGIENE: INDEPENDENT
ADLS_ACUITY_SCORE: 48
HYGIENE/GROOMING: INDEPENDENT
LAUNDRY: UNABLE TO COMPLETE
ADLS_ACUITY_SCORE: 48
DRESS: INDEPENDENT
ADLS_ACUITY_SCORE: 48
ORAL_HYGIENE: INDEPENDENT
ADLS_ACUITY_SCORE: 48
HYGIENE/GROOMING: INDEPENDENT
ADLS_ACUITY_SCORE: 48
DRESS: INDEPENDENT
ADLS_ACUITY_SCORE: 48

## 2025-01-25 NOTE — PLAN OF CARE
Patient presents with a flat affect and withdrawn to self. She reports leg pain at a 5/10 level, for which prn Tylenol was given at 1643 hrs, and she confirmed its effectiveness. The assessment was completed with the  present. Patient denied anxiety, depression, SI/SIB, auditory/visual hallucination, and kenyetta for safety. However, she did make a paranoid statement, expressing a fear that someone was after her to hurt her. She ate 100 % of Supper with adequate fluid intake. Vital signs were stable.    Problem: Psychotic Signs/Symptoms  Goal: Improved Mood Symptoms (Psychotic Signs/Symptoms)  Outcome: Progressing  Intervention: Optimize Emotion and Mood  Recent Flowsheet Documentation  Taken 1/24/2025 1800 by Radha Elliott, RN  Supportive Measures:   active listening utilized   positive reinforcement provided   relaxation techniques promoted     Goal Outcome Evaluation:    Plan of Care Reviewed With: patient

## 2025-01-25 NOTE — PLAN OF CARE
Problem: Sleep Disturbance  Goal: Adequate Sleep/Rest  Outcome: Progressing   Goal Outcome Evaluation:       Patient is sleeping soundly in a side-lying position at the start of the shift. No complaints made. She remains on SIO r/t intrusiveness. She slept the whole night without interruptions for 7.25 hours.

## 2025-01-26 LAB
VALPROATE SERPL-MCNC: 56.6 UG/ML
VALPROATE SERPL-MCNC: 56.6 UG/ML

## 2025-01-26 PROCEDURE — 97150 GROUP THERAPEUTIC PROCEDURES: CPT | Mod: GO

## 2025-01-26 PROCEDURE — 36415 COLL VENOUS BLD VENIPUNCTURE: CPT | Performed by: PSYCHIATRY & NEUROLOGY

## 2025-01-26 PROCEDURE — 250N000013 HC RX MED GY IP 250 OP 250 PS 637: Performed by: PSYCHIATRY & NEUROLOGY

## 2025-01-26 PROCEDURE — 124N000002 HC R&B MH UMMC

## 2025-01-26 PROCEDURE — 250N000013 HC RX MED GY IP 250 OP 250 PS 637

## 2025-01-26 PROCEDURE — 80164 ASSAY DIPROPYLACETIC ACD TOT: CPT | Performed by: PSYCHIATRY & NEUROLOGY

## 2025-01-26 RX ADMIN — HALOPERIDOL 10 MG: 5 TABLET ORAL at 20:50

## 2025-01-26 RX ADMIN — DIVALPROEX SODIUM 500 MG: 500 TABLET, DELAYED RELEASE ORAL at 20:51

## 2025-01-26 RX ADMIN — LISINOPRIL 5 MG: 5 TABLET ORAL at 08:10

## 2025-01-26 RX ADMIN — LEVOTHYROXINE SODIUM 100 MCG: 0.05 TABLET ORAL at 08:10

## 2025-01-26 RX ADMIN — LORAZEPAM 1.5 MG: 1 TABLET ORAL at 13:26

## 2025-01-26 RX ADMIN — CHLORPROMAZINE HYDROCHLORIDE 400 MG: 100 TABLET, FILM COATED ORAL at 20:50

## 2025-01-26 RX ADMIN — Medication 1 TABLET: at 08:10

## 2025-01-26 RX ADMIN — POLYETHYLENE GLYCOL 3350 17 G: 17 POWDER, FOR SOLUTION ORAL at 08:10

## 2025-01-26 RX ADMIN — Medication 5 MG: at 20:50

## 2025-01-26 RX ADMIN — DOCUSATE SODIUM 100 MG: 100 CAPSULE, LIQUID FILLED ORAL at 08:10

## 2025-01-26 RX ADMIN — LORAZEPAM 1.5 MG: 1 TABLET ORAL at 08:10

## 2025-01-26 RX ADMIN — DIVALPROEX SODIUM 250 MG: 250 TABLET, DELAYED RELEASE ORAL at 08:10

## 2025-01-26 RX ADMIN — ACETAMINOPHEN 650 MG: 325 TABLET, FILM COATED ORAL at 19:41

## 2025-01-26 RX ADMIN — LORAZEPAM 1.5 MG: 1 TABLET ORAL at 20:50

## 2025-01-26 ASSESSMENT — ACTIVITIES OF DAILY LIVING (ADL)
ADLS_ACUITY_SCORE: 48
DRESS: INDEPENDENT
ADLS_ACUITY_SCORE: 48
LAUNDRY: UNABLE TO COMPLETE
ADLS_ACUITY_SCORE: 48
HYGIENE/GROOMING: INDEPENDENT
DRESS: INDEPENDENT
ADLS_ACUITY_SCORE: 48
HYGIENE/GROOMING: INDEPENDENT
ORAL_HYGIENE: INDEPENDENT
ADLS_ACUITY_SCORE: 48
ORAL_HYGIENE: INDEPENDENT
ADLS_ACUITY_SCORE: 48

## 2025-01-26 NOTE — PLAN OF CARE
Goal Outcome Evaluation:    Plan of Care Reviewed With: patient        Problem: Adult Behavioral Health Plan of Care  Goal: Plan of Care Review  Outcome: Progressing  Flowsheets (Taken 1/25/2025 1900)  Patient Agreement with Plan of Care: agrees     Pt presented with a flat affect, calm mood during check in. Pt endorsed depression/sadness 5/10, denied pain, anxiety, SI, HI,hallucinations, and contracted for safety. Pt was visible in the milieu, social and engaged minimally with staff, none with peers. Compliant with all scheduled medications, no PRN needed or requested. No side effects reported. Pt makes some needs known appropriately. No safety concerns observed this shift.      Pt is requesting a prayer mat.

## 2025-01-26 NOTE — PLAN OF CARE
Rehab Group    Start time: 10:15  End time: 11:00  Patient time total: 45 minutes    attended full group    #7 attended   Group Type: occupational therapy   Group Topic Covered: cognitive activities, coping skills, healthy leisure time, and social skills     Group Session Detail:  Occupational therapy group was facilitated this date with leisure and recreation being the focus of group. Education was provided regarding the reasoning for utilizing games as therapeutic modalities and the benefits of engagement. Patient actively engaged in therapeutic game ([employer]) in order to: improve social skills, encourage new learning, leisure exploration, exercise cognitive skills, improve concentration, and encourage healthy leisure engagements.      Patient Response/Contribution:  worked intermittently, distracted , inattentive, and needed prompts to redirect     Patient Detail:  Patient appeared distracted throughout group; potentially internally pre-occupied as evidenced by un-prompted laughter intermittently throughout group. Patient appeared inattentive to the activity at times; requiring redirection and simplified cueing. Patient appeared to have difficulty with task; potentially due to language barriers. Patient remained in group for the full duration; although, active participation fluctuated.        [id number] OT Group (2 or more in attendance)      Patient Active Problem List   Diagnosis    Ruby (H)    Psychosis (H)    Schizoaffective disorder, bipolar type (H)    PTSD (post-traumatic stress disorder)

## 2025-01-26 NOTE — PLAN OF CARE
Goal Outcome Evaluation:      Problem: Sleep Disturbance  Goal: Adequate Sleep/Rest  Outcome: Progressing     NOC Shift Report    Pt in bed at beginning of shift, breathing quiet and unlabored. Pt slept through shift. Pt slept 6 hours.     No pt complaints or concerns at this time.     No PRNs given. Will continue to monitor.     Precautions:

## 2025-01-26 NOTE — PLAN OF CARE
Problem: Depressive Signs/Symptoms  Goal: Increased Participation and Engagement (Depressive Signs/Symptoms)  Outcome: Not Progressing  Intervention: Facilitate Participation and Engagement  Recent Flowsheet Documentation  Taken 1/26/2025 0914 by Sancho Douglas RN  Supportive Measures:   active listening utilized   positive reinforcement provided   relaxation techniques promoted  Diversional Activity: television   Goal Outcome Evaluation:    Plan of Care Reviewed With: patient      Affect is flat, patient is calm and medication compliant. Denies MH symptoms, withdrawn to self  this shift, visible in milieu but not social with peers. Appetite is good. Denies pain. Patient is disoriented to situation.  /72   Pulse 93   Temp 96.9  F (36.1  C) (Temporal)   Resp 16   Wt 100.5 kg (221 lb 9 oz)   SpO2 100%   BMI 32.72 kg/m

## 2025-01-26 NOTE — PLAN OF CARE
Goal Outcome Evaluation:    Plan of Care Reviewed With: patient          Problem: Adult Behavioral Health Plan of Care  Goal: Plan of Care Review  Outcome: Progressing  Flowsheets (Taken 1/26/2025 1708)  Patient Agreement with Plan of Care: agrees     Pt presented with a flat affect, calm mood during check in. Pt endorsed depression/sadness 8/10, bilateral leg pain 8/10, PRN Tylenol provide relief. Pt denied anxiety, SI, HI,hallucinations, and contracted for safety. Pt was visible in the milieu, withdrawn, not social/engaged with peers/staff. Compliant with all scheduled medications. Pt makes some needs known appropriately. No safety concerns observed this shift.

## 2025-01-27 LAB
ALBUMIN SERPL BCG-MCNC: 3.7 G/DL (ref 3.5–5.2)
ALBUMIN SERPL BCG-MCNC: 3.7 G/DL (ref 3.5–5.2)
ALP SERPL-CCNC: 69 U/L (ref 40–150)
ALP SERPL-CCNC: 69 U/L (ref 40–150)
ALT SERPL W P-5'-P-CCNC: 15 U/L (ref 0–50)
ALT SERPL W P-5'-P-CCNC: 15 U/L (ref 0–50)
AMMONIA PLAS-SCNC: 55 UMOL/L (ref 11–51)
AMMONIA PLAS-SCNC: 55 UMOL/L (ref 11–51)
ANION GAP SERPL CALCULATED.3IONS-SCNC: 10 MMOL/L (ref 7–15)
ANION GAP SERPL CALCULATED.3IONS-SCNC: 10 MMOL/L (ref 7–15)
AST SERPL W P-5'-P-CCNC: 15 U/L (ref 0–45)
AST SERPL W P-5'-P-CCNC: 15 U/L (ref 0–45)
BASOPHILS # BLD AUTO: 0 10E3/UL (ref 0–0.2)
BASOPHILS # BLD AUTO: 0 10E3/UL (ref 0–0.2)
BASOPHILS NFR BLD AUTO: 0 %
BASOPHILS NFR BLD AUTO: 0 %
BILIRUB SERPL-MCNC: 0.2 MG/DL
BILIRUB SERPL-MCNC: 0.2 MG/DL
BUN SERPL-MCNC: 15.8 MG/DL (ref 6–20)
BUN SERPL-MCNC: 15.8 MG/DL (ref 6–20)
CALCIUM SERPL-MCNC: 9.2 MG/DL (ref 8.8–10.4)
CALCIUM SERPL-MCNC: 9.2 MG/DL (ref 8.8–10.4)
CHLORIDE SERPL-SCNC: 102 MMOL/L (ref 98–107)
CHLORIDE SERPL-SCNC: 102 MMOL/L (ref 98–107)
CREAT SERPL-MCNC: 0.75 MG/DL (ref 0.51–0.95)
CREAT SERPL-MCNC: 0.75 MG/DL (ref 0.51–0.95)
EGFRCR SERPLBLD CKD-EPI 2021: >90 ML/MIN/1.73M2
EGFRCR SERPLBLD CKD-EPI 2021: >90 ML/MIN/1.73M2
EOSINOPHIL # BLD AUTO: 0 10E3/UL (ref 0–0.7)
EOSINOPHIL # BLD AUTO: 0 10E3/UL (ref 0–0.7)
EOSINOPHIL NFR BLD AUTO: 1 %
EOSINOPHIL NFR BLD AUTO: 1 %
ERYTHROCYTE [DISTWIDTH] IN BLOOD BY AUTOMATED COUNT: 12.6 % (ref 10–15)
ERYTHROCYTE [DISTWIDTH] IN BLOOD BY AUTOMATED COUNT: 12.6 % (ref 10–15)
GLUCOSE SERPL-MCNC: 94 MG/DL (ref 70–99)
GLUCOSE SERPL-MCNC: 94 MG/DL (ref 70–99)
HCO3 SERPL-SCNC: 23 MMOL/L (ref 22–29)
HCO3 SERPL-SCNC: 23 MMOL/L (ref 22–29)
HCT VFR BLD AUTO: 31.8 % (ref 35–47)
HCT VFR BLD AUTO: 31.8 % (ref 35–47)
HGB BLD-MCNC: 10.2 G/DL (ref 11.7–15.7)
HGB BLD-MCNC: 10.2 G/DL (ref 11.7–15.7)
IMM GRANULOCYTES # BLD: 0 10E3/UL
IMM GRANULOCYTES # BLD: 0 10E3/UL
IMM GRANULOCYTES NFR BLD: 0 %
IMM GRANULOCYTES NFR BLD: 0 %
LYMPHOCYTES # BLD AUTO: 2.4 10E3/UL (ref 0.8–5.3)
LYMPHOCYTES # BLD AUTO: 2.4 10E3/UL (ref 0.8–5.3)
LYMPHOCYTES NFR BLD AUTO: 51 %
LYMPHOCYTES NFR BLD AUTO: 51 %
MCH RBC QN AUTO: 31.3 PG (ref 26.5–33)
MCH RBC QN AUTO: 31.3 PG (ref 26.5–33)
MCHC RBC AUTO-ENTMCNC: 32.1 G/DL (ref 31.5–36.5)
MCHC RBC AUTO-ENTMCNC: 32.1 G/DL (ref 31.5–36.5)
MCV RBC AUTO: 98 FL (ref 78–100)
MCV RBC AUTO: 98 FL (ref 78–100)
MONOCYTES # BLD AUTO: 0.4 10E3/UL (ref 0–1.3)
MONOCYTES # BLD AUTO: 0.4 10E3/UL (ref 0–1.3)
MONOCYTES NFR BLD AUTO: 9 %
MONOCYTES NFR BLD AUTO: 9 %
NEUTROPHILS # BLD AUTO: 1.8 10E3/UL (ref 1.6–8.3)
NEUTROPHILS # BLD AUTO: 1.8 10E3/UL (ref 1.6–8.3)
NEUTROPHILS NFR BLD AUTO: 40 %
NEUTROPHILS NFR BLD AUTO: 40 %
NRBC # BLD AUTO: 0 10E3/UL
NRBC # BLD AUTO: 0 10E3/UL
NRBC BLD AUTO-RTO: 0 /100
NRBC BLD AUTO-RTO: 0 /100
PLATELET # BLD AUTO: ABNORMAL 10*3/UL
PLATELET # BLD AUTO: ABNORMAL 10*3/UL
POTASSIUM SERPL-SCNC: 4.6 MMOL/L (ref 3.4–5.3)
POTASSIUM SERPL-SCNC: 4.6 MMOL/L (ref 3.4–5.3)
PROT SERPL-MCNC: 6.9 G/DL (ref 6.4–8.3)
PROT SERPL-MCNC: 6.9 G/DL (ref 6.4–8.3)
RBC # BLD AUTO: 3.26 10E6/UL (ref 3.8–5.2)
RBC # BLD AUTO: 3.26 10E6/UL (ref 3.8–5.2)
SODIUM SERPL-SCNC: 135 MMOL/L (ref 135–145)
SODIUM SERPL-SCNC: 135 MMOL/L (ref 135–145)
WBC # BLD AUTO: 4.6 10E3/UL (ref 4–11)
WBC # BLD AUTO: 4.6 10E3/UL (ref 4–11)

## 2025-01-27 PROCEDURE — 250N000013 HC RX MED GY IP 250 OP 250 PS 637

## 2025-01-27 PROCEDURE — 85041 AUTOMATED RBC COUNT: CPT | Performed by: PSYCHIATRY & NEUROLOGY

## 2025-01-27 PROCEDURE — 250N000013 HC RX MED GY IP 250 OP 250 PS 637: Performed by: PSYCHIATRY & NEUROLOGY

## 2025-01-27 PROCEDURE — 124N000002 HC R&B MH UMMC

## 2025-01-27 PROCEDURE — 97150 GROUP THERAPEUTIC PROCEDURES: CPT | Mod: GO

## 2025-01-27 PROCEDURE — 84132 ASSAY OF SERUM POTASSIUM: CPT | Performed by: PSYCHIATRY & NEUROLOGY

## 2025-01-27 PROCEDURE — 36415 COLL VENOUS BLD VENIPUNCTURE: CPT | Performed by: PSYCHIATRY & NEUROLOGY

## 2025-01-27 PROCEDURE — 82247 BILIRUBIN TOTAL: CPT | Performed by: PSYCHIATRY & NEUROLOGY

## 2025-01-27 PROCEDURE — 85004 AUTOMATED DIFF WBC COUNT: CPT | Performed by: PSYCHIATRY & NEUROLOGY

## 2025-01-27 PROCEDURE — 82140 ASSAY OF AMMONIA: CPT | Performed by: PSYCHIATRY & NEUROLOGY

## 2025-01-27 PROCEDURE — 80173 ASSAY OF HALOPERIDOL: CPT | Performed by: PSYCHIATRY & NEUROLOGY

## 2025-01-27 RX ORDER — HALOPERIDOL 5 MG/1
5 TABLET ORAL DAILY
Status: DISCONTINUED | OUTPATIENT
Start: 2025-01-27 | End: 2025-01-31

## 2025-01-27 RX ADMIN — HALOPERIDOL 10 MG: 5 TABLET ORAL at 20:24

## 2025-01-27 RX ADMIN — HALOPERIDOL 5 MG: 5 TABLET ORAL at 11:10

## 2025-01-27 RX ADMIN — DIVALPROEX SODIUM 250 MG: 250 TABLET, DELAYED RELEASE ORAL at 07:52

## 2025-01-27 RX ADMIN — Medication 5 MG: at 20:24

## 2025-01-27 RX ADMIN — Medication 1 TABLET: at 07:52

## 2025-01-27 RX ADMIN — ACETAMINOPHEN 650 MG: 325 TABLET, FILM COATED ORAL at 16:19

## 2025-01-27 RX ADMIN — DOCUSATE SODIUM 100 MG: 100 CAPSULE, LIQUID FILLED ORAL at 07:56

## 2025-01-27 RX ADMIN — DIVALPROEX SODIUM 750 MG: 250 TABLET, DELAYED RELEASE ORAL at 20:23

## 2025-01-27 RX ADMIN — CHLORPROMAZINE HYDROCHLORIDE 400 MG: 100 TABLET, FILM COATED ORAL at 20:24

## 2025-01-27 RX ADMIN — LORAZEPAM 1.5 MG: 1 TABLET ORAL at 13:50

## 2025-01-27 RX ADMIN — LORAZEPAM 1.5 MG: 1 TABLET ORAL at 20:24

## 2025-01-27 RX ADMIN — POLYETHYLENE GLYCOL 3350 17 G: 17 POWDER, FOR SOLUTION ORAL at 07:51

## 2025-01-27 RX ADMIN — LEVOTHYROXINE SODIUM 100 MCG: 0.05 TABLET ORAL at 07:52

## 2025-01-27 ASSESSMENT — ACTIVITIES OF DAILY LIVING (ADL)
ADLS_ACUITY_SCORE: 48
ORAL_HYGIENE: INDEPENDENT
ADLS_ACUITY_SCORE: 48
ADLS_ACUITY_SCORE: 48
ORAL_HYGIENE: INDEPENDENT
HYGIENE/GROOMING: INDEPENDENT
ADLS_ACUITY_SCORE: 48
ADLS_ACUITY_SCORE: 48
HYGIENE/GROOMING: INDEPENDENT
ADLS_ACUITY_SCORE: 48
ADLS_ACUITY_SCORE: 48
DRESS: INDEPENDENT;STREET CLOTHES
ADLS_ACUITY_SCORE: 48
ADLS_ACUITY_SCORE: 48
DRESS: INDEPENDENT;STREET CLOTHES
ADLS_ACUITY_SCORE: 48

## 2025-01-27 NOTE — PLAN OF CARE
Rehab Group    Start time: 1015  End time: 1200  Patient time total: 5 minutes    came in and out of group session    #9 attended   Group Type: OT Clinic   Group Topic Covered: cognitive activities, coping skills, healthy leisure time, and problem solving   Group Session Detail:OT: Education on healthy activity engagement and creative hands-on endeavor (OT clinic) to increase concentration, focus, attention to task/detail, decision making, problem solving, frustration tolerance, task follow through, coping with stress, healthy leisure engagement, creative expression, and social engagement    Patient Response/Contribution:  Limited eye contact and disorganized   Patient Detail:pt arrived with SIO staff. Pt flat, blunted. Pt provided with set up of supplies for previous creative task. Pt picked up project then placed it back in her bin. Pt was given a couple of verbal cues. Pt engaged minimally in task prior to being pulled from group. No  present      No Charge      Patient Active Problem List   Diagnosis    Ruby (H)    Psychosis (H)    Schizoaffective disorder, bipolar type (H)    PTSD (post-traumatic stress disorder)

## 2025-01-27 NOTE — PLAN OF CARE
Goal Outcome Evaluation:    Plan of Care Reviewed With: patient          Problem: Sleep Disturbance  Goal: Adequate Sleep/Rest  Outcome: Progressing     Pt slept well, for a total of 9 hours, no safety concerns.

## 2025-01-27 NOTE — PLAN OF CARE
01/27/25 1052   Individualization/Patient Specific Goals   Patient Personal Strengths spiritual/Mandaeism support;community support;family/social support   Patient Vulnerabilities history of unsuccessful treatment   Anxieties, Fears or Concerns Pt appears to respond to internal stimuli   Individualized Care Needs Pt requires Montenegrin    Patient/Family-Specific Goals (Include Timeframe) Pt's family is involved   Interprofessional Rounds   Summary Pt transferred from [facility]. Pt was initially admitted for behavioral concerns and psychotic symptoms. Pt is now committed. Santos and mast porras petition were recently sent in. Pt had santos/price porras hearing on 1/17 with the hopes of treating pt with ECT. The matter has been taken under advisement   Participants CTC;nursing;OT;psychiatrist   Behavioral Team Discussion   Participants Dr. Chester Salgado MD; Bibiana Vega [facility], [facility]; Dana Lutz RN; Jess Rush OT   Progress Pt requires Montenegrin . Pt appears to be responding to internal stimuli and can present as confused at times. Pt continues to await santos/price porras hearing outcome   Anticipated length of stay 4+ weeks   Continued Stay Criteria/Rationale Medication management per psychiatry, stabilization of symptoms, aftercare planning, coordination with outside supports, court process with possible ECT   Medical/Physical See H&P and provider notes   Precautions See below   Plan Medication management per psychiatry, stabilization of symptoms, aftercare planning, coordination with outside supports, court process with possible ECT   Rationale for change in precautions or plan No change   Safety Plan Per unit protocol   Anticipated Discharge Disposition home with family;assisted living  (TBD)     PRECAUTIONS AND SAFETY    Behavioral Orders   Procedures    Code 1 - Restrict to Unit    Code 2     Ultrasound    Elopement precautions    Fall precautions    Routine Programming     As  clinically indicated    Status 15     Every 15 minutes.    Status Individual Observation     Patient SIO status reviewed with team/RN.  Please also refer to RN/team documentation for add'l detail.    -SIO staff to monitor following which have contributed to patient being on SIO:  Elopement  Severe intrusiveness  -Possible interventions SIO staff could use to support patient's treatment progress:  Redirect as needed  -When following observed, team will review discontinuation of SIO:     Order Specific Question:   CONTINUOUS 24 hours / day     Answer:   5 feet     Order Specific Question:   Indications for SIO     Answer:   Severe intrusiveness       Safety  Safety WDL: WDL  Patient Location: patient room, own  Observed Behavior: sleeping  Observed Behavior (Comment): watching TV  Safety Measures: safety rounds completed, suicide check-in completed  Diversional Activity: television  De-Escalation Techniques: diversional activity encouraged (SIO)  Suicidality: Status 15, SIO (Status Individual Observation)  (NOTE - order will specify distance, Minimal furniture in room, Minimal personal belongings in room  Elopement Interventions: no shoes  Additional Documentation:  (Fall)

## 2025-01-27 NOTE — PROGRESS NOTES
Patient seen, chart reviewed, care discussed with staff.  Blood pressure 90/68, pulse 85, temperature 97.1  F (36.2  C), temperature source Oral, resp. rate 16, weight 100.5 kg (221 lb 9 oz), SpO2 98%.  [facility] hearing completed, results pending.  Last Haldol level was low, Depakote was in the low 50s.  Observed to laugh to herself, seems confused to staff.  BP low this am.    General appearance: good  Alert.   Affect: fair  Mood: fair    Speech:  spontaneous production low   Eye contact:  good.    Psychomotor behavior: normal, no cogwheel  Gait: steady   Abnormal movements:  none  Delusions:  likely  Hallucinations:  likely  Thoughts: unable to assess, denies all problems  Associations: unable to assess  Judgement: poor  Insight: poor  Cognitions: not able to assess  Not suicidal.    Schizoaffective disorder, bipolar type     Patient Active Problem List   Diagnosis    Ruby (H)    Psychosis (H)    Schizoaffective disorder, bipolar type (H)    PTSD (post-traumatic stress disorder)     Plan:  Increase Haldol; it was decreased earlier due to motr slowness  2.  Increase Depakote, check screening labs.    Current Facility-Administered Medications   Medication Dose Route Frequency Provider Last Rate Last Admin    acetaminophen (TYLENOL) tablet 650 mg  650 mg Oral Q4H PRN Rob Loaiza MD   650 mg at 01/26/25 1941    alum & mag hydroxide-simethicone (MAALOX) suspension 30 mL  30 mL Oral Q4H PRN Rob Loaiza MD        benzocaine-menthol (CHLORASEPTIC) 6-10 MG lozenge 1 lozenge  1 lozenge Buccal Q1H PRN Rob Loaiza MD   1 lozenge at 01/21/25 2139    chlorproMAZINE (THORAZINE) tablet 400 mg  400 mg Oral At Bedtime Rob Loaiza MD   400 mg at 01/26/25 2050    chlorproMAZINE (THORAZINE) tablet 50 mg  50 mg Oral Q8H PRN Rob Loaiza MD        divalproex sodium delayed-release (DEPAKOTE) DR tablet 250 mg  250 mg Oral Dona Malone MD   250 mg at 01/27/25 0752    divalproex  sodium delayed-release (DEPAKOTE) DR tablet 750 mg  750 mg Oral At Bedtime Chester Salgado MD        docusate sodium (COLACE) capsule 100 mg  100 mg Oral Daily Rob Loaiza MD   100 mg at 01/27/25 0756    guaiFENesin-dextromethorphan (ROBITUSSIN DM) 100-10 MG/5ML syrup 10 mL  10 mL Oral Q4H PRN Rob Loaiza MD        haloperidol (HALDOL) tablet 10 mg  10 mg Oral At Bedtime Dona Trotter MD   10 mg at 01/26/25 2050    haloperidol (HALDOL) tablet 5 mg  5 mg Oral Daily Chester Salgado MD   5 mg at 01/27/25 1110    hydrOXYzine HCl (ATARAX) tablet 25 mg  25 mg Oral Q6H PRN Rob Loaiza MD   25 mg at 01/16/25 1215    levothyroxine (SYNTHROID/LEVOTHROID) tablet 100 mcg  100 mcg Oral QAM AC Juan Samano PA-C   100 mcg at 01/27/25 0752    lisinopril (ZESTRIL) tablet 5 mg  5 mg Oral Daily Juan Samano PA-C   5 mg at 01/26/25 0810    LORazepam (ATIVAN) tablet 1 mg  1 mg Oral Q4H PRN Rob Loaiza MD        LORazepam (ATIVAN) tablet 1.5 mg  1.5 mg Oral TID Dona Trotter MD   1.5 mg at 01/26/25 2050    magnesium hydroxide (MILK OF MAGNESIA) suspension 30 mL  30 mL Oral Daily PRN Rob Loaiza MD        melatonin tablet 3 mg  3 mg Oral At Bedtime PRN Rob Loaiza MD   3 mg at 01/11/25 0056    melatonin tablet 5 mg  5 mg Oral At Bedtime Dona Trotter MD   5 mg at 01/26/25 2050    multivitamin w/minerals (THERA-VIT-M) tablet 1 tablet  1 tablet Oral Daily Rob Loaiza MD   1 tablet at 01/27/25 0752    OLANZapine (zyPREXA) tablet 10 mg  10 mg Oral TID PRN Rob Loaiza MD        Or    OLANZapine (zyPREXA) injection 10 mg  10 mg Intramuscular TID PRN Rob Loaiza MD        polyethylene glycol (MIRALAX) Packet 17 g  17 g Oral Daily Rob Loaiza MD   17 g at 01/27/25 0751    polyethylene glycol (MIRALAX) Packet 17 g  17 g Oral Daily PRN Rob Loaiza MD        senna-docusate (SENOKOT-S/PERICOLACE) 8.6-50 MG per tablet 1 tablet  1 tablet Oral  BID Rob Lieberman MD   1 tablet at 01/16/25 1215     Recent Results (from the past week)   EKG 12-lead, complete    Collection Time: 01/22/25  8:22 AM   Result Value Ref Range    Systolic Blood Pressure  mmHg    Diastolic Blood Pressure  mmHg    Ventricular Rate 75 BPM    Atrial Rate 75 BPM    AZ Interval 162 ms    QRS Duration 78 ms     ms    QTc 428 ms    P Axis 64 degrees    R AXIS 20 degrees    T Axis 38 degrees    Interpretation ECG       Sinus rhythm  Possible Left atrial enlargement  Borderline ECG  When compared with ECG of 06-Jan-2025 12:26,  No significant change was found  Confirmed by MD CHLOE, BORIS ([id number]) on 1/23/2025 8:36:21 PM     Valproic acid    Collection Time: 01/26/25  6:44 AM   Result Value Ref Range    Valproic acid 56.6   ug/mL   Comprehensive metabolic panel    Collection Time: 01/27/25 11:37 AM   Result Value Ref Range    Sodium 135 135 - 145 mmol/L    Potassium 4.6 3.4 - 5.3 mmol/L    Carbon Dioxide (CO2) 23 22 - 29 mmol/L    Anion Gap 10 7 - 15 mmol/L    Urea Nitrogen 15.8 6.0 - 20.0 mg/dL    Creatinine 0.75 0.51 - 0.95 mg/dL    GFR Estimate >90 >60 mL/min/1.73m2    Calcium 9.2 8.8 - 10.4 mg/dL    Chloride 102 98 - 107 mmol/L    Glucose 94 70 - 99 mg/dL    Alkaline Phosphatase 69 40 - 150 U/L    AST 15 0 - 45 U/L    ALT 15 0 - 50 U/L    Protein Total 6.9 6.4 - 8.3 g/dL    Albumin 3.7 3.5 - 5.2 g/dL    Bilirubin Total 0.2 <=1.2 mg/dL   Ammonia    Collection Time: 01/27/25 11:37 AM   Result Value Ref Range    Ammonia 55 (H) 11 - 51 umol/L   CBC with platelets and differential    Collection Time: 01/27/25 11:37 AM   Result Value Ref Range    WBC Count 4.6 4.0 - 11.0 10e3/uL    RBC Count 3.26 (L) 3.80 - 5.20 10e6/uL    Hemoglobin 10.2 (L) 11.7 - 15.7 g/dL    Hematocrit 31.8 (L) 35.0 - 47.0 %    MCV 98 78 - 100 fL    MCH 31.3 26.5 - 33.0 pg    MCHC 32.1 31.5 - 36.5 g/dL    RDW 12.6 10.0 - 15.0 %    Platelet Count

## 2025-01-27 NOTE — PLAN OF CARE
Rehab Group    Start time: 1300  End time: 1345  Patient time total: 15 minutes    attended partial group    #6 attended   Group Type: occupational therapy   Group Topic Covered: balanced lifestyle, cognitive activities, coping skills, healthy leisure time, mindfulness, and relaxation    Group Session Detail:OT Wellness group: Patient actively participated in a body scan activity in order to promote: positive relaxation and coping skills, encourage insight and self-reflection, promote a positive change, manage behaviors, and improve focus. In addition, patient was guided through proper deep breathing techniques and full body scan meditation to further promote relaxation and mindfulness   Patient Response/Contribution:  worked intermittently and distracted    Patient Detail:pt arrived with [facility] staff. Pt sat by window. Pt nodded head and made eye contact with this therapist at start of group when I asked if she could understand and hear the body scan cd. Pt appeared to intermittently engage in group mindfulness activity. Pt had her eyes closed and appeared to engage in deep breathing exercise briefly. Pt got up and left group space and did not return      [id number] OT Group (2 or more in attendance)      Patient Active Problem List   Diagnosis    Ruby (H)    Psychosis (H)    Schizoaffective disorder, bipolar type (H)    PTSD (post-traumatic stress disorder)

## 2025-01-27 NOTE — PLAN OF CARE
"Team Note Due:  Monday    Assessment/Intervention/Current Symtoms and Care Coordination:  Chart review and met with team, discussed pt progress, symptomology, and response to treatment.  Discussed the discharge plan and any potential impediments to discharge. Pt under commitment, edu and kezia porras was petitioned for on 12/27/2024. Pt requires a Peruvian . Pt continues to respond to internal stimuli and intermittently denies/endorses AH.    Per MCRO, the matter was taken under advisement again. Writer inquired with  regarding clarification for the matter being taken under advisement again.     Writer met with pt with . Writer inquired if [facility] visited on Friday of last week. Pt confirmed he did. Writer inquired if they obtained pt's bank statements. Pt stated \"why would he do that is he stupid?\" Writer reiterated need for bank statements for MA application as pt has no active insurance. Pt appears to not have recollection of past conversations with writer. Pt agreed to writer contacting [facility] to schedule another time to obtain pt's bank statements.     Writer put out call to [facility] ([phone]) and inquired about decision from the court. Writer was assured that the matter is being worked on. Writer reiterated long wait time for a decision. Writer was again reassured that the matter is being worked on.     Writer spoke with financial counselor, Marta, and relayed update regarding bank statements. Marta reports that if the application with the bank statements is not sent in by the end of the month, then pt will not get back-dated coverage for 11/2024. Writer relayed plan to reach out to [facility] again to stress the urgency of this.     Writer put out call to  and [facility] inquiring about the reason for the matter being taken under advisement again.     Writer spoke with  who reports that since the  report was not submitted by " the time of the hearing, it had to be prepared and sent in afterwards. This is why the case is still under review.     Writer spoke with Patrick ([phone]) who confirms he did not visit pt to obtain her bank statements. Patrick relayed plan to come in tomorrow at 11:00am to complete this with pt and writer.     Writer updated Marta regarding plan for tomorrow.     Discharge Plan or Goal:  Pending stabilization and safe disposition planning; considerations include:  TBD. Due to pt's current symptomology, it is unclear what pt's living situation was PTA     Barriers to Discharge:  Patient requires further psychiatric stabilization due to current symptomology, medication management with changes subject to provider, coordination with outside supports, and aftercare planning. Pt awaiting price porras hearing and will hopefully start ECT after that      Referral Status:  None at this time - CTC will continue to assess as pt stabilizes and discharge plan develops     Legal Status:  Committed MI without ITP   [facility]  [id number]   Started: 12/12/2024  [place] petition and [place] Porras petitions were submitted on 12/27/2024  Exam Hearing on 01/17/2025 at 1:00 PM  ECT Hearing on 01/17/2025 at 2:00 PM    Contacts (include CLYDE status):  Gifty - daughter (CLYDE)  P: [phone]    Tevin - son (CLYDE)  P: [phone]     Patrick - son (CLYDE)  P: [phone]     Isaura - commitment CM through [facility] (CLYDE per commitment)  P: [phone]    oKdi Samano- [facility] Attorney (No CLYDE per commitment)  [email or web address]     Upcoming Meetings and Dates/Important Information and next steps:  CTC to coordinate with pt, outside supports, and treatment team regarding discharge planning   CTC to follow up with [facility] regarding court paperwork  CTC to send price porras order to ECT   CTC to update Marta when MA needs are completed  CTC to work with pt and Patrick tomorrow at 11:00am to  obtain bank statements

## 2025-01-27 NOTE — PLAN OF CARE
Goal Outcome Evaluation:    Plan of Care Reviewed With: patient                   Problem: Psychotic Signs/Symptoms  Goal: Improved Behavioral Control (Psychotic Signs/Symptoms)  Outcome: Not Progressing  Intervention: Manage Behavior  Recent Flowsheet Documentation  Taken 1/27/2025 1401 by Dana Lutz RN  De-Escalation Techniques: (SIO) diversional activity encouraged     Pt denies anxiety/depression/SI/SIB/wishes to be dead. Pt does identify feeling frustrated with being in the hospital. Pt reports that she wants to leave so she can go back to work.  Pt's affect is flat. Pt speaks a combination of English and Jordanian. Pt speaks very softly at times and a interpretor was used this AM.   Pt oriented to a hospital but reports that she is at [facility]. Pt was able to state the correct date and day of the week. Pt reminded that she has to wait for the court to give there decision before discharge planning can begin.   Pt has been calm. Pt denies hallucinations. Pt was noted to be smiling at times, however she was not laughing as she had in the past when responding to internal stimuli.    Pt has been eating well.     AM dose of lisinopril and ativan were held this AM due to low orthostatic blood pressure (90/68) and patient verbalized feeling dizzy. Dr Salgado notified.   Pt medication compliant with remaining medications. Pt was started on a AM dose of haldol 5 mg.

## 2025-01-27 NOTE — PLAN OF CARE
BEH IP Unit Acuity Rating Score (UARS)  Patient is given one point for every criteria they meet.    CRITERIA SCORING   On a 72 hour hold, court hold, committed, stay of commitment, or revocation. 1    Patient LOS on BEH unit exceeds 20 days. 1  LOS: 24   Patient under guardianship, 55+, otherwise medically complex, or under age 11. 1   Suicide ideation without relief of precipitating factors. 0   Current plan for suicide. 0   Current plan for homicide. 0   Imminent risk or actual attempt to seriously harm another without relief of factors precipitating the attempt. 0   Severe dysfunction in daily living (ex: complete neglect for self care, extreme disruption in vegetative function, extreme deterioration in social interactions). 1   Recent (last 7 days) or current physical aggression in the ED or on unit. 0   Restraints or seclusion episode in past 72 hours. 0   Recent (last 7 days) or current verbal aggression, agitation, yelling, etc., while in the ED or unit. 0   Active psychosis. 1   Need for constant or near constant redirection (from leaving, from others, etc).  0   Intrusive or disruptive behaviors. 0   Patient requires 3 or more hours of individualized nursing care per 8-hour shift (i.e. for ADLs, meds, therapeutic interventions). 0   TOTAL 5

## 2025-01-28 ENCOUNTER — MEDICAL CORRESPONDENCE (OUTPATIENT)
Dept: HEALTH INFORMATION MANAGEMENT | Facility: CLINIC | Age: 60
End: 2025-01-28

## 2025-01-28 PROCEDURE — 250N000013 HC RX MED GY IP 250 OP 250 PS 637

## 2025-01-28 PROCEDURE — 250N000013 HC RX MED GY IP 250 OP 250 PS 637: Performed by: PSYCHIATRY & NEUROLOGY

## 2025-01-28 PROCEDURE — 124N000002 HC R&B MH UMMC

## 2025-01-28 RX ORDER — OXCARBAZEPINE 150 MG/1
150 TABLET, FILM COATED ORAL 2 TIMES DAILY
Status: DISCONTINUED | OUTPATIENT
Start: 2025-01-28 | End: 2025-03-19 | Stop reason: HOSPADM

## 2025-01-28 RX ADMIN — Medication 1 TABLET: at 09:02

## 2025-01-28 RX ADMIN — LORAZEPAM 1.5 MG: 1 TABLET ORAL at 09:02

## 2025-01-28 RX ADMIN — LORAZEPAM 1.5 MG: 1 TABLET ORAL at 20:18

## 2025-01-28 RX ADMIN — HALOPERIDOL 5 MG: 5 TABLET ORAL at 09:03

## 2025-01-28 RX ADMIN — HALOPERIDOL 10 MG: 5 TABLET ORAL at 20:18

## 2025-01-28 RX ADMIN — LORAZEPAM 1.5 MG: 1 TABLET ORAL at 14:27

## 2025-01-28 RX ADMIN — LEVOTHYROXINE SODIUM 100 MCG: 0.05 TABLET ORAL at 09:01

## 2025-01-28 RX ADMIN — LISINOPRIL 5 MG: 5 TABLET ORAL at 09:03

## 2025-01-28 RX ADMIN — DIVALPROEX SODIUM 250 MG: 250 TABLET, DELAYED RELEASE ORAL at 09:01

## 2025-01-28 RX ADMIN — DOCUSATE SODIUM 100 MG: 100 CAPSULE, LIQUID FILLED ORAL at 09:02

## 2025-01-28 RX ADMIN — CHLORPROMAZINE HYDROCHLORIDE 400 MG: 100 TABLET, FILM COATED ORAL at 20:18

## 2025-01-28 RX ADMIN — POLYETHYLENE GLYCOL 3350 17 G: 17 POWDER, FOR SOLUTION ORAL at 09:01

## 2025-01-28 RX ADMIN — Medication 5 MG: at 20:18

## 2025-01-28 RX ADMIN — OXCARBAZEPINE 150 MG: 150 TABLET, FILM COATED ORAL at 20:18

## 2025-01-28 ASSESSMENT — ACTIVITIES OF DAILY LIVING (ADL)
ORAL_HYGIENE: INDEPENDENT
ADLS_ACUITY_SCORE: 48
ADLS_ACUITY_SCORE: 61
LAUNDRY: UNABLE TO COMPLETE
ADLS_ACUITY_SCORE: 61
ADLS_ACUITY_SCORE: 48
ADLS_ACUITY_SCORE: 61
ADLS_ACUITY_SCORE: 48
HYGIENE/GROOMING: PROMPTS;WITH ASSISTANCE
ADLS_ACUITY_SCORE: 61
ADLS_ACUITY_SCORE: 48
LAUNDRY: UNABLE TO COMPLETE
DRESS: INDEPENDENT;STREET CLOTHES
HYGIENE/GROOMING: INDEPENDENT
ORAL_HYGIENE: PROMPTS;INDEPENDENT
ADLS_ACUITY_SCORE: 48
ADLS_ACUITY_SCORE: 61
ADLS_ACUITY_SCORE: 48
DRESS: INDEPENDENT;STREET CLOTHES

## 2025-01-28 NOTE — PROGRESS NOTES
Patient seen, chart reviewed, care discussed with staff.  As with all of my previous visits, she is seen with an .      Blood pressure 121/80, pulse 80, temperature 98  F (36.7  C), resp. rate 16, weight 100.5 kg (221 lb 9 oz), SpO2 96%.  She denies all symptoms, but on questioning notes hearing a voice at times.  Ammonia returned high.      General appearance: good, seems confused to staff  Alert.   Affect: fair  Mood: fair    Speech:  production decreased..   Eye contact:  good.    Psychomotor behavior: normal  Gait: steady   Abnormal movements:  none  Delusions: not certain  Hallucinations:  she notes some auditory  Thoughts: linear  Associations: intact  Judgement:  poor  Insight: poor  Cognitions:not able to assess, seems confused at times  Orientation: not to day or date   Not suicidal.    Imp:  Schizoaffective disorder, bipolar type          Patient Active Problem List   Diagnosis    Ruby (H)    Psychosis (H)    Schizoaffective disorder, bipolar type (H)    PTSD (post-traumatic stress disorder)     Plan:  Stop depakote due to ammonia  2.  Add Trileptal, check sodium in 6 days.  3.  Try stopping 1:!.    Current Facility-Administered Medications   Medication Dose Route Frequency Provider Last Rate Last Admin    acetaminophen (TYLENOL) tablet 650 mg  650 mg Oral Q4H PRN Rob Loaiza MD   650 mg at 01/27/25 1619    alum & mag hydroxide-simethicone (MAALOX) suspension 30 mL  30 mL Oral Q4H PRN Rob Loaiza MD        benzocaine-menthol (CHLORASEPTIC) 6-10 MG lozenge 1 lozenge  1 lozenge Buccal Q1H PRN Rob Loaiza MD   1 lozenge at 01/21/25 2139    chlorproMAZINE (THORAZINE) tablet 400 mg  400 mg Oral At Bedtime Rob Loaiza MD   400 mg at 01/27/25 2024    chlorproMAZINE (THORAZINE) tablet 50 mg  50 mg Oral Q8H PRN Rob Loaiza MD        docusate sodium (COLACE) capsule 100 mg  100 mg Oral Daily Rob Loaiza MD   100 mg at 01/28/25 0902     guaiFENesin-dextromethorphan (ROBITUSSIN DM) 100-10 MG/5ML syrup 10 mL  10 mL Oral Q4H PRN Rob Loaiza MD        haloperidol (HALDOL) tablet 10 mg  10 mg Oral At Bedtime Dona Trotter MD   10 mg at 01/27/25 2024    haloperidol (HALDOL) tablet 5 mg  5 mg Oral Daily Chester Salgado MD   5 mg at 01/28/25 0903    hydrOXYzine HCl (ATARAX) tablet 25 mg  25 mg Oral Q6H PRN Rob Loaiza MD   25 mg at 01/16/25 1215    levothyroxine (SYNTHROID/LEVOTHROID) tablet 100 mcg  100 mcg Oral QAM AC Juan Samano PA-C   100 mcg at 01/28/25 0901    lisinopril (ZESTRIL) tablet 5 mg  5 mg Oral Daily Juan Samano PA-C   5 mg at 01/28/25 0903    LORazepam (ATIVAN) tablet 1 mg  1 mg Oral Q4H PRN Rob Loaiza MD        LORazepam (ATIVAN) tablet 1.5 mg  1.5 mg Oral TID Dona Trotter MD   1.5 mg at 01/28/25 0902    magnesium hydroxide (MILK OF MAGNESIA) suspension 30 mL  30 mL Oral Daily PRN Rob Loaiza MD        melatonin tablet 3 mg  3 mg Oral At Bedtime PRN Rob Loaiza MD   3 mg at 01/11/25 0056    melatonin tablet 5 mg  5 mg Oral At Bedtime Dona Trotter MD   5 mg at 01/27/25 2024    multivitamin w/minerals (THERA-VIT-M) tablet 1 tablet  1 tablet Oral Daily Rob Loaiza MD   1 tablet at 01/28/25 0902    OLANZapine (zyPREXA) tablet 10 mg  10 mg Oral TID PRN Rob Loaiza MD        Or    OLANZapine (zyPREXA) injection 10 mg  10 mg Intramuscular TID PRN Rob Loaiza MD        OXcarbazepine (TRILEPTAL) tablet 150 mg  150 mg Oral BID Chester Salgado MD   150 mg at 01/28/25 1114    polyethylene glycol (MIRALAX) Packet 17 g  17 g Oral Daily Rob Loaiza MD   17 g at 01/28/25 0901    polyethylene glycol (MIRALAX) Packet 17 g  17 g Oral Daily PRN Rob Loaiza MD        senna-docusate (SENOKOT-S/PERICOLACE) 8.6-50 MG per tablet 1 tablet  1 tablet Oral BID PRN Rob Loaiza MD   1 tablet at 01/16/25 1215     Recent Results (from the past week)   EKG  12-lead, complete    Collection Time: 01/22/25  8:22 AM   Result Value Ref Range    Systolic Blood Pressure  mmHg    Diastolic Blood Pressure  mmHg    Ventricular Rate 75 BPM    Atrial Rate 75 BPM    VA Interval 162 ms    QRS Duration 78 ms     ms    QTc 428 ms    P Axis 64 degrees    R AXIS 20 degrees    T Axis 38 degrees    Interpretation ECG       Sinus rhythm  Possible Left atrial enlargement  Borderline ECG  When compared with ECG of 06-Jan-2025 12:26,  No significant change was found  Confirmed by MD CHLOE, BORIS ([id number]) on 1/23/2025 8:36:21 PM     Valproic acid    Collection Time: 01/26/25  6:44 AM   Result Value Ref Range    Valproic acid 56.6   ug/mL   Comprehensive metabolic panel    Collection Time: 01/27/25 11:37 AM   Result Value Ref Range    Sodium 135 135 - 145 mmol/L    Potassium 4.6 3.4 - 5.3 mmol/L    Carbon Dioxide (CO2) 23 22 - 29 mmol/L    Anion Gap 10 7 - 15 mmol/L    Urea Nitrogen 15.8 6.0 - 20.0 mg/dL    Creatinine 0.75 0.51 - 0.95 mg/dL    GFR Estimate >90 >60 mL/min/1.73m2    Calcium 9.2 8.8 - 10.4 mg/dL    Chloride 102 98 - 107 mmol/L    Glucose 94 70 - 99 mg/dL    Alkaline Phosphatase 69 40 - 150 U/L    AST 15 0 - 45 U/L    ALT 15 0 - 50 U/L    Protein Total 6.9 6.4 - 8.3 g/dL    Albumin 3.7 3.5 - 5.2 g/dL    Bilirubin Total 0.2 <=1.2 mg/dL   Ammonia    Collection Time: 01/27/25 11:37 AM   Result Value Ref Range    Ammonia 55 (H) 11 - 51 umol/L   CBC with platelets and differential    Collection Time: 01/27/25 11:37 AM   Result Value Ref Range    WBC Count 4.6 4.0 - 11.0 10e3/uL    RBC Count 3.26 (L) 3.80 - 5.20 10e6/uL    Hemoglobin 10.2 (L) 11.7 - 15.7 g/dL    Hematocrit 31.8 (L) 35.0 - 47.0 %    MCV 98 78 - 100 fL    MCH 31.3 26.5 - 33.0 pg    MCHC 32.1 31.5 - 36.5 g/dL    RDW 12.6 10.0 - 15.0 %    Platelet Count      % Neutrophils 40 %    % Lymphocytes 51 %    % Monocytes 9 %    % Eosinophils 1 %    % Basophils 0 %    % Immature Granulocytes 0 %    NRBCs per 100 WBC 0 <1 /100     Absolute Neutrophils 1.8 1.6 - 8.3 10e3/uL    Absolute Lymphocytes 2.4 0.8 - 5.3 10e3/uL    Absolute Monocytes 0.4 0.0 - 1.3 10e3/uL    Absolute Eosinophils 0.0 0.0 - 0.7 10e3/uL    Absolute Basophils 0.0 0.0 - 0.2 10e3/uL    Absolute Immature Granulocytes 0.0 <=0.4 10e3/uL    Absolute NRBCs 0.0 10e3/uL

## 2025-01-28 NOTE — CARE PLAN
Rehab Group    Start time: 1015  End time: 1200  Patient time total: 7 minutes    attended partial group    Number in Attendance:  #11   Group Type: OT Clinic   Group Topic Covered: balanced lifestyle, coping skills, emotional regulation, healthy leisure time, self-esteem, and social skills       Group Session Detail:  OT Clinic Group for concentration, creative expression, organization/planning, decision making, attention to detail, follow through, autonomy, coping, mood stabilization, reality based activity, follow through, motivation, fostering hope for a sustainable recovery and socialization.        Patient Response/Contribution:  pt worked intermittently       Patient Detail:  Pt entered the group room and sat at an open spot.  She accepted her box of supplies when handed to her and engaged briefly in a one step project, though for less than a couple of minutes.  Mostly pt observed others.  She soon left the group room for unknown reason.  Pt was flat in affect, calm and quiet.          No Charge      Patient Active Problem List   Diagnosis    Ruby (H)    Psychosis (H)    Schizoaffective disorder, bipolar type (H)    PTSD (post-traumatic stress disorder)

## 2025-01-28 NOTE — PLAN OF CARE
Team Note Due:  Monday    Assessment/Intervention/Current Symtoms and Care Coordination:  Chart review and met with team, discussed pt progress, symptomology, and response to treatment.  Discussed the discharge plan and any potential impediments to discharge. Pt under commitment, edu and kezia porras was petitioned for on 12/27/2024. Pt requires a Cape Verdean . Pt continues to respond to internal stimuli and intermittently denies/endorses AH.    Per MCRO, there are no updates.     Writer received VM from Isaura BURROUGHS ([phone]) requesting update since court hearing on 1/17. Writer put out return call and left detailed voicemail with update regarding court and insurance.     Writer spoke with Gifty ([phone]) as Patrick did not show up to meeting to obtain the bank statements. Gifty reports that she is out[place]of-state and Patrick is the only family member who currently has pt's phone. Gifty stated she understands the urgency of completing this and will contact Patrick to ask he works with  to obtain the bank statements needed for the MA application. Corinner and Gifty also discussed pt's presentation and possible treatments. Writer confirmed the court has still not made a decision regarding the ECT.    Corinner put out email to financial counselor, Marta, relaying update and conversation with Gifty.     Corinner spoke with Patrick ([phone]) who expressed apology for missing the meeting. Patrick relayed plan to come to the hospital shortly to meet with writer and pt.      met with pt, Patrick, her mother, and  via phone. Corinner reiterated need and reasoning for obtaining bank statements. Pt tried multiple attempts to log in but ultimately needed to reset her password. Patrick wrote down the log-in information and relayed plan to log in on a computer at home and then save the documents as a PDF and then send them to corinner via email later today. Writer agreed to plan.     Discharge  Plan or Goal:  Pending stabilization and safe disposition planning; considerations include:  TBD. Due to pt's current symptomology, it is unclear what pt's living situation was PTA     Barriers to Discharge:  Patient requires further psychiatric stabilization due to current symptomology, medication management with changes subject to provider, coordination with outside supports, and aftercare planning. Pt awaiting price porras hearing and will hopefully start ECT after that      Referral Status:  None at this time - CTC will continue to assess as pt stabilizes and discharge plan develops     Legal Status:  Committed MI without ITP   [facility]  97-BS-EP-   Started: 12/12/2024  Santos petition and Alejandre Porras petitions were submitted on 12/27/2024  Exam Hearing on 01/17/2025 at 1:00 PM  ECT Hearing on 01/17/2025 at 2:00 PM    Contacts (include CLYDE status):  Gifty - daughter (CLYDE)  P: [phone]    Tevin - son (CLYDE)  P: [phone]     Patrick - son (CLYDE)  P: [phone]     Isaura - commitment CM through [facility] (CLYDE per commitment)  P: [phone]     Kodi Samano- [facility] Attorney (No CLYDE per commitment)  [email or web address]     Upcoming Meetings and Dates/Important Information and next steps:  CTC to coordinate with pt, outside supports, and treatment team regarding discharge planning   CTC to follow up with [facility] regarding court paperwork  CTC to send price porras order to ECT   CTC to update Marta when MA needs are completed  CTC to obtain bank statements from Patrick

## 2025-01-28 NOTE — PLAN OF CARE
Problem: Sleep Disturbance  Goal: Adequate Sleep/Rest  Outcome: Progressing   Goal Outcome Evaluation:       Patient is sleeping soundly in the beginning of the shift. She remains on SIO r/t intrusiveness. Nothing unusual noted. No complaints made. She slept the whole night without interruptions for 8.25 hours.

## 2025-01-28 NOTE — PLAN OF CARE
BEH IP Unit Acuity Rating Score (UARS)  Patient is given one point for every criteria they meet.    CRITERIA SCORING   On a 72 hour hold, court hold, committed, stay of commitment, or revocation. 1    Patient LOS on BEH unit exceeds 20 days. 1  LOS: 25   Patient under guardianship, 55+, otherwise medically complex, or under age 11. 1   Suicide ideation without relief of precipitating factors. 0   Current plan for suicide. 0   Current plan for homicide. 0   Imminent risk or actual attempt to seriously harm another without relief of factors precipitating the attempt. 0   Severe dysfunction in daily living (ex: complete neglect for self care, extreme disruption in vegetative function, extreme deterioration in social interactions). 1   Recent (last 7 days) or current physical aggression in the ED or on unit. 0   Restraints or seclusion episode in past 72 hours. 0   Recent (last 7 days) or current verbal aggression, agitation, yelling, etc., while in the ED or unit. 0   Active psychosis. 1   Need for constant or near constant redirection (from leaving, from others, etc).  0   Intrusive or disruptive behaviors. 0   Patient requires 3 or more hours of individualized nursing care per 8-hour shift (i.e. for ADLs, meds, therapeutic interventions). 0   TOTAL 5

## 2025-01-28 NOTE — PLAN OF CARE
"  Problem: Psychotic Signs/Symptoms  Goal: Improved Behavioral Control (Psychotic Signs/Symptoms)  Outcome: Not Progressing  Flowsheets (Taken 1/28/2025 1049)  Mutually Determined Action Steps (Improved Behavioral Control): identifies future-oriented goal  Intervention: Manage Behavior  Recent Flowsheet Documentation  Taken 1/28/2025 1042 by Dana Lutz RN  De-Escalation Techniques: (SIO) diversional activity encouraged   Goal Outcome Evaluation:    Plan of Care Reviewed With: patient      Met with patient and interpretor this AM.   Pt reports that she is sad today. Pt denies anxiety/SI/SIB/wishes to be dead.Pt admits to hearing voices that are laughing at her. Pt also reported that she is being followed by them as well. AT patient will stop talking and appears to be listening to someone.   Pt's affect is flat with inconsistent eye contact. Pt has not been trying doors and has not been going into others rooms. SIO was discontinued this AM.   Pt has been eating well.  Pt is dressed in layers and has been independent with ADL's.  Pt denied pain.  Pt declined to put on compression socks this AM.     Pt was placed on cheeking precautions. Writer observed patient taking something out of her mouth after drinking water. Pt noted to have trileptal tab in her hand. Pt reported that she \"cannot swallow it\".    Pt son and mother were on the unit this afternoon. Pt requested to have her mother assist her with the shower.Writer present during the shower. Pt independent. Pt's mother did hand patient her clothing. Pt's mother also assisted patient with fixing her hair after the shower.   Pt  Pt's son/Graciela reported that he felt his mom was more tired today and confused than she was a few days ago when he visited. He asked if patient has been taking her medications and if she has been sleeping at night. Up date given-CLYDE in chart.   After family left patient was sitting in the lounge. Pt watching TV. Pt reported anxiety " when asked. Pt initially declined 1400 dose of ativan but when reminded that this was for anxiety and that she has been taking it she did accept it without any issue. Pt does not appear overly tired at this time.

## 2025-01-28 NOTE — PLAN OF CARE
Goal Outcome Evaluation:    Plan of Care Reviewed With: patient Plan of Care Reviewed With: patient    Overall Patient Progress: improvingOverall Patient Progress: improving     Patient presented with a flat affect, calm mood throughout shift. Family (Mother) and another family member visited this evening for approx 1 hr.     Pt endorsed depression/sadness 710, bilateral leg pain 6/10, PRN Tylenol provided relief. Pt denied anxiety, SI, HI,hallucinations, and contracted for safety.     Pt was visible in the milieu, withdrawn, not social/engaged with peers/staff. Compliant with all scheduled medications. Pt makes some needs known appropriately. SIO in place; No safety concerns observed this shift.

## 2025-01-29 ENCOUNTER — OFFICE VISIT (OUTPATIENT)
Dept: INTERPRETER SERVICES | Facility: CLINIC | Age: 60
End: 2025-01-29

## 2025-01-29 PROCEDURE — 250N000013 HC RX MED GY IP 250 OP 250 PS 637

## 2025-01-29 PROCEDURE — 124N000002 HC R&B MH UMMC

## 2025-01-29 PROCEDURE — 250N000013 HC RX MED GY IP 250 OP 250 PS 637: Performed by: PSYCHIATRY & NEUROLOGY

## 2025-01-29 PROCEDURE — 97150 GROUP THERAPEUTIC PROCEDURES: CPT | Mod: GO

## 2025-01-29 PROCEDURE — T1013 SIGN LANG/ORAL INTERPRETER: HCPCS | Mod: U3

## 2025-01-29 RX ORDER — CHLORPROMAZINE HYDROCHLORIDE 100 MG/1
300 TABLET, FILM COATED ORAL AT BEDTIME
Status: DISCONTINUED | OUTPATIENT
Start: 2025-01-29 | End: 2025-03-07

## 2025-01-29 RX ADMIN — DOCUSATE SODIUM 100 MG: 100 CAPSULE, LIQUID FILLED ORAL at 07:43

## 2025-01-29 RX ADMIN — OXCARBAZEPINE 150 MG: 150 TABLET, FILM COATED ORAL at 21:03

## 2025-01-29 RX ADMIN — HALOPERIDOL 5 MG: 5 TABLET ORAL at 10:03

## 2025-01-29 RX ADMIN — Medication 1 TABLET: at 07:43

## 2025-01-29 RX ADMIN — Medication 5 MG: at 21:03

## 2025-01-29 RX ADMIN — POLYETHYLENE GLYCOL 3350 17 G: 17 POWDER, FOR SOLUTION ORAL at 07:42

## 2025-01-29 RX ADMIN — LEVOTHYROXINE SODIUM 100 MCG: 0.05 TABLET ORAL at 07:43

## 2025-01-29 RX ADMIN — LORAZEPAM 1.5 MG: 1 TABLET ORAL at 21:03

## 2025-01-29 RX ADMIN — OXCARBAZEPINE 150 MG: 150 TABLET, FILM COATED ORAL at 10:02

## 2025-01-29 RX ADMIN — CHLORPROMAZINE HYDROCHLORIDE 300 MG: 100 TABLET, FILM COATED ORAL at 21:03

## 2025-01-29 RX ADMIN — HALOPERIDOL 10 MG: 5 TABLET ORAL at 21:03

## 2025-01-29 ASSESSMENT — ACTIVITIES OF DAILY LIVING (ADL)
ADLS_ACUITY_SCORE: 61
HYGIENE/GROOMING: WITH ASSISTANCE
ADLS_ACUITY_SCORE: 74
ADLS_ACUITY_SCORE: 61
LAUNDRY: UNABLE TO COMPLETE
ORAL_HYGIENE: INDEPENDENT
ADLS_ACUITY_SCORE: 74
ADLS_ACUITY_SCORE: 74
ADLS_ACUITY_SCORE: 61
ADLS_ACUITY_SCORE: 61
ADLS_ACUITY_SCORE: 74
ADLS_ACUITY_SCORE: 74
ADLS_ACUITY_SCORE: 61
ADLS_ACUITY_SCORE: 74
LAUNDRY: UNABLE TO COMPLETE
ADLS_ACUITY_SCORE: 74
ADLS_ACUITY_SCORE: 74
DRESS: STREET CLOTHES;INDEPENDENT;WITH ASSISTANCE
ADLS_ACUITY_SCORE: 74
ADLS_ACUITY_SCORE: 61
HYGIENE/GROOMING: PROMPTS;WITH ASSISTANCE
ORAL_HYGIENE: INDEPENDENT
ADLS_ACUITY_SCORE: 74

## 2025-01-29 NOTE — PLAN OF CARE
"Goal Outcome Evaluation:    Plan of Care Reviewed With: patient          Problem: Adult Behavioral Health Plan of Care  Goal: Plan of Care Review  Outcome: Progressing  Flowsheets (Taken 1/29/2025 1702)  Patient Agreement with Plan of Care: agrees     Pt presented with a blunted affect, which brightens with interaction, calm mood during check in. Pt endorsed seeing \" someone chasing me, wants a relationship with me, wants to  me\", this put a smile on her face. Pt denied  anxiety, depression, pain, SI, HI, auditory hallucinations, and contracted for safety. Pt was visible in the milieu, not social or engaged with peers/staff. Pt received a lot of visitors and cultural food. The visitors were redirected to visit 2 at a time, not too impressed but cooperated. Compliant with all scheduled medications and cooperated with cheeking precautions, drank more water and opened mouth and moved tongue a round. Pt makes some needs known appropriately. No safety concerns observed this shift.             "

## 2025-01-29 NOTE — PLAN OF CARE
Team Note Due:  Monday    Assessment/Intervention/Current Symtoms and Care Coordination:  Chart review and met with team, discussed pt progress, symptomology, and response to treatment.  Discussed the discharge plan and any potential impediments to discharge. Pt under commitment, santos and kezia porras was petitioned for on 12/27/2024. Pt requires a Bangladeshi . Pt continues to respond to internal stimuli and intermittently denies/endorses AH.    Per MCRO, there are no updates.     Writer spoke with Patrick ([phone]) who inquired if writer received the email he sent with the bank statements attached. Writer confirmed it has not been received due to incorrect email. Writer gave email and Patrick relayed plan to resend it shortly.     Writer received email from Patrick with bank statements attached. Writer informed financial counselor, Marta. Writer provided Marta with bank statements and signed MA forms. Marta relayed plan to send in the full application today.     Writer put out return email to Patrick confirming the financial counselor was given all MA forms and documents and plans to send in the application today.     Discharge Plan or Goal:  Pending stabilization and safe disposition planning; considerations include:  TBD. Due to pt's current symptomology, it is unclear what pt's living situation was PTA     Barriers to Discharge:  Patient requires further psychiatric stabilization due to current symptomology, medication management with changes subject to provider, coordination with outside supports, and aftercare planning. Pt awaiting price porras hearing and will hopefully start ECT after that      Referral Status:  MA application was submitted on 1/29     Legal Status:  Committed MI without ITP   [facility]  10-DH-TB-   Started: 12/12/2024  Santos petition and Alejandre Porras petitions were submitted on 12/27/2024  Exam Hearing on 01/17/2025 at 1:00 PM  ECT Hearing on 01/17/2025 at 2:00  PM    Contacts (include CLYDE status):  Gifty - daughter (CLYDE)  P: [phone]    Tevin - son (CLYDE)  P: [phone]     Tayamed - son (CLYDE)  P: [phone]     Isaura - commitment CM through [facility] (CLYDE per commitment)  P: [phone]     Kodi Samano- [facility] Attorney (No CLYDE per commitment)  [email or web address]     Upcoming Meetings and Dates/Important Information and next steps:  CTC to coordinate with pt, outside supports, and treatment team regarding discharge planning   CTC to follow up with [facility] regarding court paperwork  CTC to send price porras order to ECT

## 2025-01-29 NOTE — PLAN OF CARE
BEH IP Unit Acuity Rating Score (UARS)  Patient is given one point for every criteria they meet.    CRITERIA SCORING   On a 72 hour hold, court hold, committed, stay of commitment, or revocation. 1    Patient LOS on BEH unit exceeds 20 days. 1  LOS: 26   Patient under guardianship, 55+, otherwise medically complex, or under age 11. 1   Suicide ideation without relief of precipitating factors. 0   Current plan for suicide. 0   Current plan for homicide. 0   Imminent risk or actual attempt to seriously harm another without relief of factors precipitating the attempt. 0   Severe dysfunction in daily living (ex: complete neglect for self care, extreme disruption in vegetative function, extreme deterioration in social interactions). 1   Recent (last 7 days) or current physical aggression in the ED or on unit. 0   Restraints or seclusion episode in past 72 hours. 0   Recent (last 7 days) or current verbal aggression, agitation, yelling, etc., while in the ED or unit. 0   Active psychosis. 1   Need for constant or near constant redirection (from leaving, from others, etc).  0   Intrusive or disruptive behaviors. 0   Patient requires 3 or more hours of individualized nursing care per 8-hour shift (i.e. for ADLs, meds, therapeutic interventions). 0   TOTAL 5

## 2025-01-29 NOTE — PLAN OF CARE
"Goal Outcome Evaluation:    Plan of Care Reviewed With: patient      Problem: Adult Behavioral Health Plan of Care  Goal: Plan of Care Review  Outcome: Progressing  Flowsheets (Taken 1/28/2025 1805)  Patient Agreement with Plan of Care: agrees     Pt presented with a blunted affect, calm mood during check in. Pt endorsed anxiety/worry 10/10 reports \"worried about kids, broken life\",did not elaborate further what she means about broken life. Pt rated depression/sadness 9/10 and stated hearing voices sometimes telling her to harm self, pt agreed to let staff know when the voices become overwhelming. Pt  denied pain, SI, HI,hallucinations, and contracted for safety. Pt was visible in the milieu, not social or engaged with peers/staff.  Compliant with all scheduled medications. Pt makes some needs known appropriately. No safety concerns observed this shift.                   "

## 2025-01-29 NOTE — PLAN OF CARE
Problem: Anxiety Signs/Symptoms  Goal: Improved Sleep (Anxiety Signs/Symptoms)  Outcome: Progressing   Goal Outcome Evaluation:    Patient was asleep at the start of the shift. Woke up at  12mn and went back to sleep @ 0030. She is using a medical bed to assist with mobility. No anxiety and other behavioral issues noted this shift. Slept for 10 hours.

## 2025-01-29 NOTE — PLAN OF CARE
Rehab Group    Start time: 1030  End time: 1200  Patient time total: 20 minutes    attended partial group    #8 attended   Group Type: OT Clinic   Group Topic Covered: cognitive activities, coping skills, healthy leisure time, problem solving, and social skills   Group Session Detail:OT: Education on healthy activity engagement and creative hands-on endeavor (OT clinic) to increase concentration, focus, attention to task/detail, decision making, problem solving, frustration tolerance, task follow through, coping with stress, healthy leisure engagement, creative expression, and social engagement    Patient Response/Contribution:  worked intermittently, required repetition of directions, Limited eye contact, required prompts or assistance to participate, distracted , and withdrawn   Patient Detail:pt arrived in rolling chair with SIO staff. Pt was observed laughing to self on arrival. Pt flat, quiet for remainder of group. Pt was provided with set up of supplies for previous familiar coloring task. Pt required cues for engagement throughout group. Pt would color briefly then would be observed looking down or out the window. Pt got up a few times from chair to walk out of the room, pt was provided with gentle reminders to remain seated. Pt chose to leave group space early.      [id number] OT Group (2 or more in attendance)      Patient Active Problem List   Diagnosis    Ruby (H)    Psychosis (H)    Schizoaffective disorder, bipolar type (H)    PTSD (post-traumatic stress disorder)

## 2025-01-29 NOTE — PLAN OF CARE
"  Problem: Depressive Signs/Symptoms  Goal: Optimized Energy Level (Depressive Signs/Symptoms)  Outcome: Progressing  Intervention: Optimize Energy Level  Recent Flowsheet Documentation  Taken 1/29/2025 1057 by Dana Lutz RN  Patient Performed Hygiene: dressed  Diversional Activity: (group) --  Activity (Behavioral Health): up ad shawn     Problem: Psychotic Signs/Symptoms  Goal: Improved Behavioral Control (Psychotic Signs/Symptoms)  Intervention: Manage Behavior  Recent Flowsheet Documentation  Taken 1/29/2025 1057 by Dana Lutz RN  De-Escalation Techniques: (SIO) diversional activity encouraged   Goal Outcome Evaluation:    Plan of Care Reviewed With: patient        Pt noted to have orthostatic hypotension this AM and verbalized feeling dizzy. Pt's blood pressure (left arm large cuff sitting/per machine =123/72, standing/large cuff manual =78/40). Medical team/Dolores ELMORE and Dr Salgado notified. Repeat blood pressure approx 1 hour later 82/48. Pt continues to verbalize feeling dizzy.   AM doses of lisinopril and ativan held per parameters. Haldol and Trileptal held until after talking with Dr Salgado who ok'd to give.   Pt placed on SIO for risk for self injury due to orthostatic hypotension and possibility that patient would not ask for assistance. Fluids being encouraged. Pt encouraged to use a wheel chair or hay chair for distances.    Writer met with interpretor and patient. Pt admit to feeling anxious. Pt denies depression or SI/SIB/wishes to be dead. Pt denied auditory hallucinations this AM. Pt was asked if she had had voices telling her to harm herself and she reported no. Pt also per interpretor stated \"I hope they never say that to me\". Pt denied physical pain. Pt's affect is flat with inconsistent eye contact. Pt speech is a combination of English and Sudanese. Pt does talk softly at times.    1130- Pt's orthostatic blood pressure remains low ( sitting 126/70 and standing 70/40 both were taken " manually with adult large cuff).  HS medications decreased were decreased by Dr Salgado.     Pt antsy and not wanting to sit in a hay chair. Pt cooperative with using a gait belt for ambulation. Pt on fall precautions/wrist band placed.   Pt cooperative with having compression socks placed and she was encouraged to leave these on.     1400 dose of ativan held due to blood pressure parameter ( sitting 120/64 and standing 88/60).

## 2025-01-29 NOTE — PROGRESS NOTES
Patient seen, chart reviewed, care discussed with staff. Seen with .    Blood pressure (!) 82/48, pulse 81, temperature 97  F (36.1  C), resp. rate 16, weight 100.5 kg (221 lb 9 oz), SpO2 99%.  Significant postural BP drop continues.  She is dizzy with this.  Smiles to self.    General appearance: good  Alert.   Affect: fair, looks more sad  Mood: fair    Speech:  production low   Eye contact:  good.    Psychomotor behavior: normal  Gait: steady   Abnormal movements:  none  Delusions: not certain, none voiced  Hallucinations:  denied but smiles to self, likely  Thoughts: llinear  Associations: intact  Judgement: poor  Insight: poor  Cognitions: appears confused  Orientation: not oriented  Not suicidal.    Imp:  Schizoaffective disorder, bipolar type            Patient Active Problem List   Diagnosis    Ruby (H)    Psychosis (H)    Schizoaffective disorder, bipolar type (H)    PTSD (post-traumatic stress disorder)   Plan:  Decrease Thorazine to 300mg.    Current Facility-Administered Medications   Medication Dose Route Frequency Provider Last Rate Last Admin    acetaminophen (TYLENOL) tablet 650 mg  650 mg Oral Q4H PRN Rob Loaiza MD   650 mg at 01/27/25 1619    alum & mag hydroxide-simethicone (MAALOX) suspension 30 mL  30 mL Oral Q4H PRN Rob Loaiza MD        benzocaine-menthol (CHLORASEPTIC) 6-10 MG lozenge 1 lozenge  1 lozenge Buccal Q1H PRN Rob Loaiza MD   1 lozenge at 01/21/25 2139    chlorproMAZINE (THORAZINE) tablet 300 mg  300 mg Oral At Bedtime Chester Salgado MD        chlorproMAZINE (THORAZINE) tablet 50 mg  50 mg Oral Q8H PRN Rob Loaiza MD        docusate sodium (COLACE) capsule 100 mg  100 mg Oral Daily Rob Loaiza MD   100 mg at 01/29/25 0743    guaiFENesin-dextromethorphan (ROBITUSSIN DM) 100-10 MG/5ML syrup 10 mL  10 mL Oral Q4H PRN Rob Loaiza MD        haloperidol (HALDOL) tablet 10 mg  10 mg Oral At Bedtime Dona Trotter MD   10  mg at 01/28/25 2018    haloperidol (HALDOL) tablet 5 mg  5 mg Oral Daily Chester Salgado MD   5 mg at 01/29/25 1003    hydrOXYzine HCl (ATARAX) tablet 25 mg  25 mg Oral Q6H PRN Rob Loaiza MD   25 mg at 01/16/25 1215    levothyroxine (SYNTHROID/LEVOTHROID) tablet 100 mcg  100 mcg Oral QAM AC Juan Samano PA-C   100 mcg at 01/29/25 0743    lisinopril (ZESTRIL) tablet 5 mg  5 mg Oral Daily Juan Samano PA-C   5 mg at 01/28/25 0903    LORazepam (ATIVAN) tablet 1 mg  1 mg Oral Q4H PRN Rob Loaiza MD        LORazepam (ATIVAN) tablet 1.5 mg  1.5 mg Oral TID Dona Trotter MD   1.5 mg at 01/28/25 2018    magnesium hydroxide (MILK OF MAGNESIA) suspension 30 mL  30 mL Oral Daily PRN Rob Loaiza MD        melatonin tablet 3 mg  3 mg Oral At Bedtime PRN Rob Loaiza MD   3 mg at 01/11/25 0056    melatonin tablet 5 mg  5 mg Oral At Bedtime Dona Trotter MD   5 mg at 01/28/25 2018    multivitamin w/minerals (THERA-VIT-M) tablet 1 tablet  1 tablet Oral Daily Rob Loaiza MD   1 tablet at 01/29/25 0743    OLANZapine (zyPREXA) tablet 10 mg  10 mg Oral TID PRN Rob Loaiza MD        Or    OLANZapine (zyPREXA) injection 10 mg  10 mg Intramuscular TID PRN Rob Loaiza MD        OXcarbazepine (TRILEPTAL) tablet 150 mg  150 mg Oral BID Chester Salgado MD   150 mg at 01/29/25 1002    polyethylene glycol (MIRALAX) Packet 17 g  17 g Oral Daily Rob Loaiza MD   17 g at 01/29/25 0742    polyethylene glycol (MIRALAX) Packet 17 g  17 g Oral Daily PRN Rob Loaiza MD        senna-docusate (SENOKOT-S/PERICOLACE) 8.6-50 MG per tablet 1 tablet  1 tablet Oral BID PRN Rob Loaiza MD   1 tablet at 01/16/25 1215     Recent Results (from the past week)   Valproic acid    Collection Time: 01/26/25  6:44 AM   Result Value Ref Range    Valproic acid 56.6   ug/mL   Comprehensive metabolic panel    Collection Time: 01/27/25 11:37 AM   Result Value Ref Range     Sodium 135 135 - 145 mmol/L    Potassium 4.6 3.4 - 5.3 mmol/L    Carbon Dioxide (CO2) 23 22 - 29 mmol/L    Anion Gap 10 7 - 15 mmol/L    Urea Nitrogen 15.8 6.0 - 20.0 mg/dL    Creatinine 0.75 0.51 - 0.95 mg/dL    GFR Estimate >90 >60 mL/min/1.73m2    Calcium 9.2 8.8 - 10.4 mg/dL    Chloride 102 98 - 107 mmol/L    Glucose 94 70 - 99 mg/dL    Alkaline Phosphatase 69 40 - 150 U/L    AST 15 0 - 45 U/L    ALT 15 0 - 50 U/L    Protein Total 6.9 6.4 - 8.3 g/dL    Albumin 3.7 3.5 - 5.2 g/dL    Bilirubin Total 0.2 <=1.2 mg/dL   Ammonia    Collection Time: 01/27/25 11:37 AM   Result Value Ref Range    Ammonia 55 (H) 11 - 51 umol/L   CBC with platelets and differential    Collection Time: 01/27/25 11:37 AM   Result Value Ref Range    WBC Count 4.6 4.0 - 11.0 10e3/uL    RBC Count 3.26 (L) 3.80 - 5.20 10e6/uL    Hemoglobin 10.2 (L) 11.7 - 15.7 g/dL    Hematocrit 31.8 (L) 35.0 - 47.0 %    MCV 98 78 - 100 fL    MCH 31.3 26.5 - 33.0 pg    MCHC 32.1 31.5 - 36.5 g/dL    RDW 12.6 10.0 - 15.0 %    Platelet Count      % Neutrophils 40 %    % Lymphocytes 51 %    % Monocytes 9 %    % Eosinophils 1 %    % Basophils 0 %    % Immature Granulocytes 0 %    NRBCs per 100 WBC 0 <1 /100    Absolute Neutrophils 1.8 1.6 - 8.3 10e3/uL    Absolute Lymphocytes 2.4 0.8 - 5.3 10e3/uL    Absolute Monocytes 0.4 0.0 - 1.3 10e3/uL    Absolute Eosinophils 0.0 0.0 - 0.7 10e3/uL    Absolute Basophils 0.0 0.0 - 0.2 10e3/uL    Absolute Immature Granulocytes 0.0 <=0.4 10e3/uL    Absolute NRBCs 0.0 10e3/uL

## 2025-01-30 ENCOUNTER — OFFICE VISIT (OUTPATIENT)
Dept: INTERPRETER SERVICES | Facility: CLINIC | Age: 60
End: 2025-01-30

## 2025-01-30 VITALS
DIASTOLIC BLOOD PRESSURE: 75 MMHG | BODY MASS INDEX: 33.08 KG/M2 | WEIGHT: 223.99 LBS | SYSTOLIC BLOOD PRESSURE: 122 MMHG | RESPIRATION RATE: 16 BRPM | TEMPERATURE: 97.7 F | HEART RATE: 94 BPM | OXYGEN SATURATION: 99 %

## 2025-01-30 PROCEDURE — 250N000013 HC RX MED GY IP 250 OP 250 PS 637: Performed by: PSYCHIATRY & NEUROLOGY

## 2025-01-30 PROCEDURE — 250N000013 HC RX MED GY IP 250 OP 250 PS 637

## 2025-01-30 PROCEDURE — 124N000002 HC R&B MH UMMC

## 2025-01-30 PROCEDURE — T1013 SIGN LANG/ORAL INTERPRETER: HCPCS

## 2025-01-30 PROCEDURE — 97150 GROUP THERAPEUTIC PROCEDURES: CPT | Mod: GO

## 2025-01-30 RX ADMIN — POLYETHYLENE GLYCOL 3350 17 G: 17 POWDER, FOR SOLUTION ORAL at 08:20

## 2025-01-30 RX ADMIN — LORAZEPAM 1.5 MG: 1 TABLET ORAL at 08:19

## 2025-01-30 RX ADMIN — Medication 5 MG: at 20:36

## 2025-01-30 RX ADMIN — OXCARBAZEPINE 150 MG: 150 TABLET, FILM COATED ORAL at 20:36

## 2025-01-30 RX ADMIN — LORAZEPAM 1.5 MG: 1 TABLET ORAL at 13:45

## 2025-01-30 RX ADMIN — LEVOTHYROXINE SODIUM 100 MCG: 0.05 TABLET ORAL at 08:19

## 2025-01-30 RX ADMIN — DOCUSATE SODIUM 100 MG: 100 CAPSULE, LIQUID FILLED ORAL at 08:21

## 2025-01-30 RX ADMIN — HALOPERIDOL 10 MG: 5 TABLET ORAL at 20:35

## 2025-01-30 RX ADMIN — Medication 1 TABLET: at 08:19

## 2025-01-30 RX ADMIN — LORAZEPAM 1.5 MG: 1 TABLET ORAL at 20:35

## 2025-01-30 RX ADMIN — CHLORPROMAZINE HYDROCHLORIDE 300 MG: 100 TABLET, FILM COATED ORAL at 20:35

## 2025-01-30 RX ADMIN — OXCARBAZEPINE 150 MG: 150 TABLET, FILM COATED ORAL at 08:19

## 2025-01-30 RX ADMIN — HALOPERIDOL 5 MG: 5 TABLET ORAL at 08:19

## 2025-01-30 ASSESSMENT — ACTIVITIES OF DAILY LIVING (ADL)
ADLS_ACUITY_SCORE: 58
ADLS_ACUITY_SCORE: 58
ORAL_HYGIENE: INDEPENDENT
ORAL_HYGIENE: INDEPENDENT
LAUNDRY: UNABLE TO COMPLETE
ADLS_ACUITY_SCORE: 64
ADLS_ACUITY_SCORE: 58
DRESS: INDEPENDENT;STREET CLOTHES
ADLS_ACUITY_SCORE: 64
ADLS_ACUITY_SCORE: 74
ADLS_ACUITY_SCORE: 58
ADLS_ACUITY_SCORE: 64
ADLS_ACUITY_SCORE: 74
ADLS_ACUITY_SCORE: 58
ADLS_ACUITY_SCORE: 64
DRESS: INDEPENDENT
ADLS_ACUITY_SCORE: 74
LAUNDRY: UNABLE TO COMPLETE
ADLS_ACUITY_SCORE: 74
HYGIENE/GROOMING: PROMPTS
ADLS_ACUITY_SCORE: 74
ADLS_ACUITY_SCORE: 64
ADLS_ACUITY_SCORE: 58
ADLS_ACUITY_SCORE: 74
ADLS_ACUITY_SCORE: 64
HYGIENE/GROOMING: PROMPTS;INDEPENDENT

## 2025-01-30 NOTE — PLAN OF CARE
BEH IP Unit Acuity Rating Score (UARS)  Patient is given one point for every criteria they meet.    CRITERIA SCORING   On a 72 hour hold, court hold, committed, stay of commitment, or revocation. 1    Patient LOS on BEH unit exceeds 20 days. 1  LOS: 27   Patient under guardianship, 55+, otherwise medically complex, or under age 11. 1   Suicide ideation without relief of precipitating factors. 0   Current plan for suicide. 0   Current plan for homicide. 0   Imminent risk or actual attempt to seriously harm another without relief of factors precipitating the attempt. 0   Severe dysfunction in daily living (ex: complete neglect for self care, extreme disruption in vegetative function, extreme deterioration in social interactions). 1   Recent (last 7 days) or current physical aggression in the ED or on unit. 0   Restraints or seclusion episode in past 72 hours. 0   Recent (last 7 days) or current verbal aggression, agitation, yelling, etc., while in the ED or unit. 0   Active psychosis. 1   Need for constant or near constant redirection (from leaving, from others, etc).  0   Intrusive or disruptive behaviors. 0   Patient requires 3 or more hours of individualized nursing care per 8-hour shift (i.e. for ADLs, meds, therapeutic interventions). 0   TOTAL 5

## 2025-01-30 NOTE — PLAN OF CARE
Rehab Group    Start time: 1300  End time: 1345  Patient time total: 35 minutes    attended full group    #9 attended   Group Type: occupational therapy and general health and coping   Group Topic Covered: balanced lifestyle, cognitive activities, coping skills, healthy leisure time, and Physical activity   Group Session Detail:OT Wellness Group: Movement Bingo   Patient Response/Contribution:  worked intermittently, Limited eye contact, and distracted    Patient Detail:pt arrived with SIO staff. Pt made minimal eye contact though pt did verbalize understanding of bingo card maintenance. Pt was observed laughing to self intermittently during group. Pt initially marked off the first row of her bingo card prior to start of activity. With some assist from SIO pt was able to somewhat manage own bingo card. Pt engaged in approx 50% of gentle seated movements at start of group. Pt then sat quietly toward end of group with no engagement.      [id number] OT Group (2 or more in attendance)      Patient Active Problem List   Diagnosis    Ruby (H)    Psychosis (H)    Schizoaffective disorder, bipolar type (H)    PTSD (post-traumatic stress disorder)

## 2025-01-30 NOTE — PLAN OF CARE
Problem: Psychotic Signs/Symptoms  Goal: Improved Behavioral Control (Psychotic Signs/Symptoms)  Outcome: Progressing  Goal Outcome Evaluation:    Plan of Care Reviewed With: patient      Patient was visible on the unit, flat blunted affect, reports mood as sad and depressed due to being away and lonely from her family for a long time.  Ate 100% meals and is drinking enough fluids. Was pleasant, cooperative, and medication compliant. Denied all psych symptoms, was tearful at times during MH assessment and said it's because of the court orders received today and missing her family. Patient had orthostatic BP drop,  scheduled lisinopril was held, is on SIO 1:1 for self injury risk, took the compression stockings off before the end of the shift, will continue with the plan of care.

## 2025-01-30 NOTE — PLAN OF CARE
"Team Note Due:  Monday    Assessment/Intervention/Current Symtoms and Care Coordination:  Chart review and met with team, discussed pt progress, symptomology, and response to treatment.  Discussed the discharge plan and any potential impediments to discharge. Pt under commitment, sorensen and mast porras was petitioned for on 12/27/2024. Pt requires a Finnish . Pt continues to respond to internal stimuli and intermittently denies/endorses AH.    Writer put out call to [facility] ([phone]) and requested sorensen and price porras order be sent to writer.     Writer received sorensen and mast porras orders via email. Writer sent to ECT team. Writer informed provider. Writer placed copy in the chart. Writer sent copy to medical records via fax.     Writer met with pt, RN, and  and informed pt that the MA application was sent in yesterday. Writer also informed pt regarding the court's decision for the sorensen and mast porras orders. Writer gave an in-depth description of the paperwork's contents. Pt asked appropriate questions. Pt stated \"I will not do ECT.\" Pt also relayed request to visit her family's home. Writer provided emotional support and validation regarding pt's LOS and concerns regarding ECT. Pt declined any further questions and accepted court paperwork. Writer inquired if pt wanted a translated copy but pt reported explanation by writer was sufficient.     Writer put out call to Patrick ([phone]) and left detailed VM regarding sorensen and price porras orders.     Discharge Plan or Goal:  Pending stabilization and safe disposition planning; considerations include:  TBD. Per different reports from different family members, pt has an assisted living apartment but was staying in the family home PTA. It is unclear at this time where pt will return at discharge      Barriers to Discharge:  Patient requires further psychiatric stabilization due to current " symptomology, medication management with changes subject to provider, coordination with outside supports, and aftercare planning. Pt awaiting to start ECT if the provider still recommends this treatment      Referral Status:  MA application was submitted on 1/29     Legal Status:  Committed MI with ITP and Hill Machado  [facility]  [id number]   ITP includes: Risperdal, Thorazine, Haldol, and Zyprexa  Expires: 06/10/2025    Contacts (include CLYDE status):  Gifty - daughter (CLYDE)  P: [phone]    Tevin - son (CLYDE)  P: [phone]     Ptarick - son (CLYDE)  P: [phone]     Isaura - commitment CM through [facility] (CLYDE per commitment)  P: [phone]     [facility] (No CLYDE per commitment)  [email or web address].     Upcoming Meetings and Dates/Important Information and next steps:  CTC to coordinate with pt, outside supports, and treatment team regarding discharge planning   CTC to update the family if pt is placed on the ECT schedule

## 2025-01-30 NOTE — PLAN OF CARE
Problem: Anxiety Signs/Symptoms  Goal: Improved Sleep (Anxiety Signs/Symptoms)  Outcome: Progressing   Goal Outcome Evaluation:    Patient was awake and walking around in the lounge at the start of the shift. No requests made. Went back to her room after a few minutes and slept shortly before midnight. She is on SIO (1:1) 1ft. when standing and ambulating and 5 ft. When sleeping due to risk for self-injury. No anxiety and depressive behavior  noted this shift. Slept for 8 hours.

## 2025-01-31 PROCEDURE — 97150 GROUP THERAPEUTIC PROCEDURES: CPT | Mod: GO

## 2025-01-31 PROCEDURE — 250N000013 HC RX MED GY IP 250 OP 250 PS 637

## 2025-01-31 PROCEDURE — 250N000013 HC RX MED GY IP 250 OP 250 PS 637: Performed by: PSYCHIATRY & NEUROLOGY

## 2025-01-31 PROCEDURE — 90853 GROUP PSYCHOTHERAPY: CPT

## 2025-01-31 PROCEDURE — 124N000002 HC R&B MH UMMC

## 2025-01-31 RX ADMIN — DOCUSATE SODIUM 100 MG: 100 CAPSULE, LIQUID FILLED ORAL at 08:34

## 2025-01-31 RX ADMIN — LEVOTHYROXINE SODIUM 100 MCG: 0.05 TABLET ORAL at 08:34

## 2025-01-31 RX ADMIN — OXCARBAZEPINE 150 MG: 150 TABLET, FILM COATED ORAL at 20:11

## 2025-01-31 RX ADMIN — CHLORPROMAZINE HYDROCHLORIDE 300 MG: 100 TABLET, FILM COATED ORAL at 20:11

## 2025-01-31 RX ADMIN — POLYETHYLENE GLYCOL 3350 17 G: 17 POWDER, FOR SOLUTION ORAL at 08:34

## 2025-01-31 RX ADMIN — LORAZEPAM 1.5 MG: 1 TABLET ORAL at 08:34

## 2025-01-31 RX ADMIN — HALOPERIDOL 10 MG: 5 TABLET ORAL at 20:11

## 2025-01-31 RX ADMIN — LORAZEPAM 1.5 MG: 1 TABLET ORAL at 13:25

## 2025-01-31 RX ADMIN — Medication 1 LOZENGE: at 13:24

## 2025-01-31 RX ADMIN — LORAZEPAM 1.5 MG: 1 TABLET ORAL at 20:10

## 2025-01-31 RX ADMIN — OXCARBAZEPINE 150 MG: 150 TABLET, FILM COATED ORAL at 08:34

## 2025-01-31 RX ADMIN — Medication 5 MG: at 20:11

## 2025-01-31 RX ADMIN — Medication 1 TABLET: at 08:34

## 2025-01-31 RX ADMIN — HALOPERIDOL 5 MG: 5 TABLET ORAL at 08:34

## 2025-01-31 ASSESSMENT — ACTIVITIES OF DAILY LIVING (ADL)
ADLS_ACUITY_SCORE: 58
ADLS_ACUITY_SCORE: 58
ADLS_ACUITY_SCORE: 48
ADLS_ACUITY_SCORE: 58
ADLS_ACUITY_SCORE: 58
HYGIENE/GROOMING: INDEPENDENT
ADLS_ACUITY_SCORE: 58
ADLS_ACUITY_SCORE: 48
ADLS_ACUITY_SCORE: 48
ORAL_HYGIENE: INDEPENDENT
ADLS_ACUITY_SCORE: 48
LAUNDRY: UNABLE TO COMPLETE
ADLS_ACUITY_SCORE: 58
ADLS_ACUITY_SCORE: 58
DRESS: INDEPENDENT;STREET CLOTHES
ADLS_ACUITY_SCORE: 48
ADLS_ACUITY_SCORE: 48
ADLS_ACUITY_SCORE: 58
ADLS_ACUITY_SCORE: 48
ADLS_ACUITY_SCORE: 58
ORAL_HYGIENE: INDEPENDENT
LAUNDRY: UNABLE TO COMPLETE
ADLS_ACUITY_SCORE: 58
HYGIENE/GROOMING: INDEPENDENT
ADLS_ACUITY_SCORE: 48
DRESS: INDEPENDENT;STREET CLOTHES
ADLS_ACUITY_SCORE: 48

## 2025-01-31 NOTE — PLAN OF CARE
Problem: Anxiety Signs/Symptoms  Goal: Improved Sleep (Anxiety Signs/Symptoms)  Outcome: Progressing   Goal Outcome Evaluation:    Patient was asleep at the start of the shift. She remains on SIO (1:1) 1 ft. when standing and ambulating and 5 ft. when resting/sleeping due to risk for self-injury. She is using a medical bed to assist with mobility.     No behavioral issus noted this shift. Slept for 7.5 hours.

## 2025-01-31 NOTE — PLAN OF CARE
Problem: Adult Behavioral Health Plan of Care  Goal: Plan of Care Review  Outcome: Progressing  Flowsheets (Taken 1/30/2025 1700)  Patient Agreement with Plan of Care: agrees     Problem: Psychotic Signs/Symptoms  Goal: Improved Behavioral Control (Psychotic Signs/Symptoms)  Outcome: Progressing   Goal Outcome Evaluation:    Plan of Care Reviewed With: patient        Via an , the patient denied pain, anxiety/depression/SI/SIB/HI/AVH, and contracted for safety. Up and visible in the milieu the majority of the shift, but remained socially isolative. Pt received visitors who brought in home made food. Pt stated that the visit went well. She continued to be pleasant, calm, cooperative, and medication compliant. No medication adverse effects noted or verbalized. No concerns noted or verbalized.

## 2025-01-31 NOTE — PLAN OF CARE
Rehab Group    Start time: 1015  End time: 1200  Patient time total: 35 minutes    attended partial group    #7 attended   Group Type: OT Clinic   Group Topic Covered: cognitive activities, coping skills, healthy leisure time, problem solving, and social skills   Group Session Detail:OT: Education on healthy activity engagement and creative hands-on endeavor (OT clinic) to increase concentration, focus, attention to task/detail, decision making, problem solving, frustration tolerance, task follow through, coping with stress, healthy leisure engagement, creative expression, and social engagement    Patient Response/Contribution:  safe use of materials/supplies, worked intermittently, required repetition of directions, Limited eye contact, required prompts or assistance to participate, and inattentive   Patient Detail:pt arrived with O staff. Pt calm, flat. Pt was provided with set up of supplies for previous creative task. Pt demonstrated deficits with initiation of task. Therapist provided verbal cues for initiation of coloring task. Pt initially responded then was observed sitting and staring off into space. Therapist handed pt her project and a market in order for patient to engage in coloring task. Therapist did this on four occasions to get pt to use different colored markers to complete coloring task. Pt put own supplies in her bin. Pt reported being done and left group space. Pt appropriate during interactions with staff-no engagement observed with peers      [id number] OT Group (2 or more in attendance)      Patient Active Problem List   Diagnosis    Ruby (H)    Psychosis (H)    Schizoaffective disorder, bipolar type (H)    PTSD (post-traumatic stress disorder)

## 2025-01-31 NOTE — PLAN OF CARE
Problem: Psychotic Signs/Symptoms  Goal: Improved Behavioral Control (Psychotic Signs/Symptoms)  Outcome: Progressing  Intervention: Manage Behavior  Recent Flowsheet Documentation  De-Escalation Techniques: (SIO) diversional activity encouraged   Goal Outcome Evaluation:    Plan of Care Reviewed With: patient      Patient presents with a flat affect, mood is calm, pleasant and cooperative, been visible at the unit, socially withdrawn to self,  reports feeling happy and in a great mood today. With the help of the interpretor, patient denied anxiety, depression, SI/HI/SIB and all other MH symptoms. No complains of pain this shift, refused to wear the compression stockings, ate 100% of her breakfast and lunch with  enough fluid intake and was medication compliant.   SIO was discontinued please monitor for any Dizziness due to orthostatic BP

## 2025-01-31 NOTE — PLAN OF CARE
Group Attendance:  attended partial group    Time session began: 1400  Time session ended: 1445  Patient's total time in group: 20    Total # Attendees   5   Group Type psychotherapeutic and psychoeducational     Group Topic Covered insight improvement, relationships, and relationships and boundaries     Group Session Detail Group members checked in with how they are feeling and processed as needed. The group discussed trust, what it means and how it shows up in their lives. Members discussed the Acronym BRAVING by Priti Peters and discussed the parts of trust show up for them, what they could work on, how to incorporate self-trust, and what they could incorporate in their lives around trust.      Patient's response to the group topic/interactions:  cooperative with task, organized, socially appropriate, listened actively, expressed understanding of topic, and attentive     Patient Details: Pt attended group, listened to discussion, and asked questions..            [id number] - Group psychotherapy - 1 Session  Patient Active Problem List   Diagnosis    Ruby (H)    Psychosis (H)    Schizoaffective disorder, bipolar type (H)    PTSD (post-traumatic stress disorder)

## 2025-01-31 NOTE — PLAN OF CARE
BEH IP Unit Acuity Rating Score (UARS)  Patient is given one point for every criteria they meet.    CRITERIA SCORING   On a 72 hour hold, court hold, committed, stay of commitment, or revocation. 1    Patient LOS on BEH unit exceeds 20 days. 1  LOS: 28   Patient under guardianship, 55+, otherwise medically complex, or under age 11. 1   Suicide ideation without relief of precipitating factors. 0   Current plan for suicide. 0   Current plan for homicide. 0   Imminent risk or actual attempt to seriously harm another without relief of factors precipitating the attempt. 0   Severe dysfunction in daily living (ex: complete neglect for self care, extreme disruption in vegetative function, extreme deterioration in social interactions). 1   Recent (last 7 days) or current physical aggression in the ED or on unit. 0   Restraints or seclusion episode in past 72 hours. 0   Recent (last 7 days) or current verbal aggression, agitation, yelling, etc., while in the ED or unit. 0   Active psychosis. 1   Need for constant or near constant redirection (from leaving, from others, etc).  0   Intrusive or disruptive behaviors. 0   Patient requires 3 or more hours of individualized nursing care per 8-hour shift (i.e. for ADLs, meds, therapeutic interventions). 0   TOTAL 5

## 2025-01-31 NOTE — PLAN OF CARE
Team Note Due:  Monday    Assessment/Intervention/Current Symtoms and Care Coordination:  Chart review and met with team, discussed pt progress, symptomology, and response to treatment.  Discussed the discharge plan and any potential impediments to discharge. Pt under commitment, sorensen and mast vern was petitioned for on 12/27/2024. Pt requires a Bruneian . Pt continues to respond to internal stimuli and intermittently denies/endorses AH. No decision regarding pt's need for ECT has been made at this time.     Discharge Plan or Goal:  Pending stabilization and safe disposition planning; considerations include:  TBD. Per different reports from different family members, pt has an assisted living apartment but was staying in the family home PTA. It is unclear at this time where pt will return at discharge      Barriers to Discharge:  Patient requires further psychiatric stabilization due to current symptomology, medication management with changes subject to provider, coordination with outside supports, and aftercare planning. Pt awaiting to start ECT if the provider still recommends this treatment      Referral Status:  MA application was submitted on 1/29     Legal Status:  Committed MI with ITP and Hill Machado  [facility]  [id number]   ITP includes: Risperdal, Thorazine, Haldol, and Zyprexa  Expires: 06/10/2025    Contacts (include CLYDE status):  Gifty - daughter (CLYDE)  P: [phone]    Tevin - son (CLYDE)  P: [phone]     Mohamed - son (CLYDE)  P: [phone]     Isaura - commitment CM through [facility] (CLYDE per commitment)  P: [phone]     [facility] (No CLYDE per commitment)  [email or web address]     Upcoming Meetings and Dates/Important Information and next steps:  CTC to coordinate with pt, outside supports, and treatment team regarding discharge planning   CTC to update the family if pt is placed on the ECT schedule

## 2025-01-31 NOTE — PLAN OF CARE
Problem: Depressive Signs/Symptoms  Goal: Optimized Cognitive Function (Depressive Signs/Symptoms)  Outcome: Progressing     Problem: Psychotic Signs/Symptoms  Goal: Increased Participation and Engagement (Psychotic Signs/Symptoms)  Outcome: Progressing    Problem: Anxiety Signs/Symptoms  Goal: Optimized Cognitive Function (Anxiety Signs/Symptoms)  Outcome: Progressing     Patient up and visible on the unit for the most part. Presented with full range affect, mood is calm. Patient denies anxiety/depression/SI/SIB. Denies pain.  Patient is kenyetta for safety.   Medication compliant. No PRN given.  Patient is eating and drinking adequately.  No other known concerns.Will monitor as needed.

## 2025-01-31 NOTE — PROGRESS NOTES
Postural BP drop continues, but she is looking better.    Blood pressure 113/76, pulse 94, temperature 97.6  F (36.4  C), temperature source Temporal, resp. rate 16, weight 101.6 kg (223 lb 15.8 oz), SpO2 99%.    General appearance: good  Alert.   Affect: good  Mood: fair    Speech:  production increased more toward normal, less mumbling  Eye contact:  good.    Psychomotor behavior: normal, no EPSE  Gait: steady   Abnormal movements:  none  Delusions: none evident  Hallucinations:  Voices continue, days, voices of family members  Thoughts: linear  Associations: intact  Judgement: good  Insight: good  Cognitions: intact in conversation  Memory:  intact in conversation  Orientation: not oriented  Not suicidal.    Imp:  Schizoaffective disorder, bipolar type            Patient Active Problem List   Diagnosis    Ruby (H)    Psychosis (H)    Schizoaffective disorder, bipolar type (H)    PTSD (post-traumatic stress disorder)     Ploan:  Increase haldol.  Stop SIO.  ECT unless continued improvement continues.    Current Facility-Administered Medications   Medication Dose Route Frequency Provider Last Rate Last Admin    acetaminophen (TYLENOL) tablet 650 mg  650 mg Oral Q4H PRN Rob Loaiza MD   650 mg at 01/27/25 1619    alum & mag hydroxide-simethicone (MAALOX) suspension 30 mL  30 mL Oral Q4H PRN Rob Loaiza MD        benzocaine-menthol (CHLORASEPTIC) 6-10 MG lozenge 1 lozenge  1 lozenge Buccal Q1H PRN Rob Loaiza MD   1 lozenge at 01/31/25 1324    chlorproMAZINE (THORAZINE) tablet 300 mg  300 mg Oral At Bedtime Chester Salgado MD   300 mg at 01/30/25 2035    chlorproMAZINE (THORAZINE) tablet 50 mg  50 mg Oral Q8H PRN Rob Loaiza MD        docusate sodium (COLACE) capsule 100 mg  100 mg Oral Daily Rob Loaiza MD   100 mg at 01/31/25 0834    guaiFENesin-dextromethorphan (ROBITUSSIN DM) 100-10 MG/5ML syrup 10 mL  10 mL Oral Q4H PRN Rob Loaiza MD        haloperidol  (HALDOL) tablet 10 mg  10 mg Oral At Bedtime Dona Trotter MD   10 mg at 01/30/25 2035    [START ON 2/1/2025] haloperidol (HALDOL) tablet 7 mg  7 mg Oral Daily Chester Salgado MD        hydrOXYzine HCl (ATARAX) tablet 25 mg  25 mg Oral Q6H PRN Rob Loaiza MD   25 mg at 01/16/25 1215    levothyroxine (SYNTHROID/LEVOTHROID) tablet 100 mcg  100 mcg Oral QAM AC Juan Samano PA-C   100 mcg at 01/31/25 0834    lisinopril (ZESTRIL) tablet 5 mg  5 mg Oral Daily Juan Samano PA-C   5 mg at 01/28/25 0903    LORazepam (ATIVAN) tablet 1 mg  1 mg Oral Q4H PRN Rob Loaiza MD        LORazepam (ATIVAN) tablet 1.5 mg  1.5 mg Oral TID Dona Trotter MD   1.5 mg at 01/31/25 1325    magnesium hydroxide (MILK OF MAGNESIA) suspension 30 mL  30 mL Oral Daily PRN Rob Loaiza MD        melatonin tablet 3 mg  3 mg Oral At Bedtime PRN Rob Loaiza MD   3 mg at 01/11/25 0056    melatonin tablet 5 mg  5 mg Oral At Bedtime Dona Trotter MD   5 mg at 01/30/25 2036    multivitamin w/minerals (THERA-VIT-M) tablet 1 tablet  1 tablet Oral Daily Rob Loaiza MD   1 tablet at 01/31/25 0834    OLANZapine (zyPREXA) tablet 10 mg  10 mg Oral TID PRN Rob Loaiza MD        Or    OLANZapine (zyPREXA) injection 10 mg  10 mg Intramuscular TID PRN Rob Loaiza MD        OXcarbazepine (TRILEPTAL) tablet 150 mg  150 mg Oral BID Chester Salgado MD   150 mg at 01/31/25 0834    polyethylene glycol (MIRALAX) Packet 17 g  17 g Oral Daily Rob Loaiza MD   17 g at 01/31/25 0834    polyethylene glycol (MIRALAX) Packet 17 g  17 g Oral Daily PRN Rob Loaiza MD        senna-docusate (SENOKOT-S/PERICOLACE) 8.6-50 MG per tablet 1 tablet  1 tablet Oral BID PRN Rob Loaiza MD   1 tablet at 01/16/25 1215     Recent Results (from the past week)   Valproic acid    Collection Time: 01/26/25  6:44 AM   Result Value Ref Range    Valproic acid 56.6   ug/mL   Comprehensive metabolic panel     Collection Time: 01/27/25 11:37 AM   Result Value Ref Range    Sodium 135 135 - 145 mmol/L    Potassium 4.6 3.4 - 5.3 mmol/L    Carbon Dioxide (CO2) 23 22 - 29 mmol/L    Anion Gap 10 7 - 15 mmol/L    Urea Nitrogen 15.8 6.0 - 20.0 mg/dL    Creatinine 0.75 0.51 - 0.95 mg/dL    GFR Estimate >90 >60 mL/min/1.73m2    Calcium 9.2 8.8 - 10.4 mg/dL    Chloride 102 98 - 107 mmol/L    Glucose 94 70 - 99 mg/dL    Alkaline Phosphatase 69 40 - 150 U/L    AST 15 0 - 45 U/L    ALT 15 0 - 50 U/L    Protein Total 6.9 6.4 - 8.3 g/dL    Albumin 3.7 3.5 - 5.2 g/dL    Bilirubin Total 0.2 <=1.2 mg/dL   Ammonia    Collection Time: 01/27/25 11:37 AM   Result Value Ref Range    Ammonia 55 (H) 11 - 51 umol/L   CBC with platelets and differential    Collection Time: 01/27/25 11:37 AM   Result Value Ref Range    WBC Count 4.6 4.0 - 11.0 10e3/uL    RBC Count 3.26 (L) 3.80 - 5.20 10e6/uL    Hemoglobin 10.2 (L) 11.7 - 15.7 g/dL    Hematocrit 31.8 (L) 35.0 - 47.0 %    MCV 98 78 - 100 fL    MCH 31.3 26.5 - 33.0 pg    MCHC 32.1 31.5 - 36.5 g/dL    RDW 12.6 10.0 - 15.0 %    Platelet Count      % Neutrophils 40 %    % Lymphocytes 51 %    % Monocytes 9 %    % Eosinophils 1 %    % Basophils 0 %    % Immature Granulocytes 0 %    NRBCs per 100 WBC 0 <1 /100    Absolute Neutrophils 1.8 1.6 - 8.3 10e3/uL    Absolute Lymphocytes 2.4 0.8 - 5.3 10e3/uL    Absolute Monocytes 0.4 0.0 - 1.3 10e3/uL    Absolute Eosinophils 0.0 0.0 - 0.7 10e3/uL    Absolute Basophils 0.0 0.0 - 0.2 10e3/uL    Absolute Immature Granulocytes 0.0 <=0.4 10e3/uL    Absolute NRBCs 0.0 10e3/uL

## 2025-02-01 PROCEDURE — 250N000013 HC RX MED GY IP 250 OP 250 PS 637: Performed by: PSYCHIATRY & NEUROLOGY

## 2025-02-01 PROCEDURE — 124N000002 HC R&B MH UMMC

## 2025-02-01 PROCEDURE — 250N000013 HC RX MED GY IP 250 OP 250 PS 637

## 2025-02-01 RX ADMIN — HALOPERIDOL 10 MG: 5 TABLET ORAL at 20:22

## 2025-02-01 RX ADMIN — LEVOTHYROXINE SODIUM 100 MCG: 0.05 TABLET ORAL at 08:06

## 2025-02-01 RX ADMIN — Medication 5 MG: at 20:22

## 2025-02-01 RX ADMIN — POLYETHYLENE GLYCOL 3350 17 G: 17 POWDER, FOR SOLUTION ORAL at 08:07

## 2025-02-01 RX ADMIN — LORAZEPAM 1.5 MG: 1 TABLET ORAL at 20:22

## 2025-02-01 RX ADMIN — HALOPERIDOL 7 MG: 5 TABLET ORAL at 08:06

## 2025-02-01 RX ADMIN — CHLORPROMAZINE HYDROCHLORIDE 300 MG: 100 TABLET, FILM COATED ORAL at 20:22

## 2025-02-01 RX ADMIN — OXCARBAZEPINE 150 MG: 150 TABLET, FILM COATED ORAL at 08:06

## 2025-02-01 RX ADMIN — LORAZEPAM 1.5 MG: 1 TABLET ORAL at 08:06

## 2025-02-01 RX ADMIN — Medication 1 TABLET: at 08:06

## 2025-02-01 RX ADMIN — DOCUSATE SODIUM 100 MG: 100 CAPSULE, LIQUID FILLED ORAL at 08:06

## 2025-02-01 RX ADMIN — OXCARBAZEPINE 150 MG: 150 TABLET, FILM COATED ORAL at 20:22

## 2025-02-01 RX ADMIN — LORAZEPAM 1.5 MG: 1 TABLET ORAL at 14:14

## 2025-02-01 ASSESSMENT — ACTIVITIES OF DAILY LIVING (ADL)
ADLS_ACUITY_SCORE: 48
LAUNDRY: UNABLE TO COMPLETE
ADLS_ACUITY_SCORE: 48
ORAL_HYGIENE: INDEPENDENT
HYGIENE/GROOMING: INDEPENDENT
ADLS_ACUITY_SCORE: 48
DRESS: INDEPENDENT;STREET CLOTHES
ADLS_ACUITY_SCORE: 48
ORAL_HYGIENE: INDEPENDENT
ADLS_ACUITY_SCORE: 48
LAUNDRY: UNABLE TO COMPLETE
ADLS_ACUITY_SCORE: 48
DRESS: INDEPENDENT;STREET CLOTHES
HYGIENE/GROOMING: INDEPENDENT
ADLS_ACUITY_SCORE: 48

## 2025-02-01 NOTE — PLAN OF CARE
Problem: Psychotic Signs/Symptoms  Goal: Improved Behavioral Control (Psychotic Signs/Symptoms)  Outcome: Progressing  Intervention: Manage Behavior     Goal Outcome Evaluation:    Plan of Care Reviewed With: patient        Patient was up visible at the milieu, socially withdrawn to self and watching tv sometimes, affect is bright and reports mood as happy. Denies anxiety, depression, SI/HI/SIB and hallucinations, no complains of pain. Encouraged to use the compression stockings, no PRN's requested/given, was medication compliant, ate 100% of her meals with enough fluid intake, denies any signs of dizziness, hygiene looks ok, had a shower yesterday, will continue with the plan of care.

## 2025-02-01 NOTE — PLAN OF CARE
Problem: Anxiety Signs/Symptoms  Goal: Improved Sleep (Anxiety Signs/Symptoms)  Outcome: Progressing   Goal Outcome Evaluation:    Patient was asleep at the start of the shift. No behavioral issues noted this shift. Slept for 8.75 hours. Woke up @ 0545, requested for an apple and a cup of chamomile tea and stayed in the lounge the rest of the shift .

## 2025-02-01 NOTE — PLAN OF CARE
Problem: Depressive Signs/Symptoms  Goal: Optimized Cognitive Function (Depressive Signs/Symptoms)  Outcome: Progressing     Problem: Psychotic Signs/Symptoms  Goal: Increased Participation and Engagement (Psychotic Signs/Symptoms)  Outcome: Progressing  Intervention: Facilitate Participation and Engagement    Problem: Anxiety Signs/Symptoms  Goal: Optimized Cognitive Function (Anxiety Signs/Symptoms)  Outcome: Progressing     Patient up and visible on the unit most part of the shift. Patient presented with full range affect on approach. Mood is calm. Patient denies all psych symptoms. Denies pain. Patient is eating and drinking adequately. Patient had visitors, visit went well. Medication complaint. No known side effects of medications. Will continue to monitor patient as needed per plan of care.

## 2025-02-01 NOTE — PLAN OF CARE
"  Rehab Group    Start time: 1800  End time: 1845  Patient time total: 5 minutes    came in and out of group session    #8 attended   Group Type: occupational therapy   Group Topic Covered: balanced lifestyle, coping skills, healthy leisure time, mindfulness, and relaxation    Group Session Detail:OT Wellness Group:Patient actively participated in a seated meditation group in order to promote: positive relaxation and coping skills, encourage insight and self-reflection, promote a positive change, manage behaviors, and improve focus. In addition, patient was guided through proper deep breathing techniques and self guided meditation to further promote relaxation.   Patient Response/Contribution:  socially appropriate   Patient Detail:pt arrived with [facility] staff. Pt quiet, pleasant. When asked how dinner was pt stated \"delicious\". Pt left group space due to family arriving for visit.      No charge    Patient Active Problem List   Diagnosis    Ruby (H)    Psychosis (H)    Schizoaffective disorder, bipolar type (H)    PTSD (post-traumatic stress disorder)                           "

## 2025-02-02 PROCEDURE — 250N000013 HC RX MED GY IP 250 OP 250 PS 637: Performed by: PSYCHIATRY & NEUROLOGY

## 2025-02-02 PROCEDURE — 124N000002 HC R&B MH UMMC

## 2025-02-02 PROCEDURE — 250N000013 HC RX MED GY IP 250 OP 250 PS 637

## 2025-02-02 RX ADMIN — HALOPERIDOL 7 MG: 5 TABLET ORAL at 07:49

## 2025-02-02 RX ADMIN — LORAZEPAM 1.5 MG: 1 TABLET ORAL at 20:12

## 2025-02-02 RX ADMIN — CHLORPROMAZINE HYDROCHLORIDE 300 MG: 100 TABLET, FILM COATED ORAL at 20:12

## 2025-02-02 RX ADMIN — HALOPERIDOL 10 MG: 5 TABLET ORAL at 20:12

## 2025-02-02 RX ADMIN — LORAZEPAM 1.5 MG: 1 TABLET ORAL at 07:48

## 2025-02-02 RX ADMIN — LISINOPRIL 5 MG: 5 TABLET ORAL at 07:48

## 2025-02-02 RX ADMIN — LORAZEPAM 1.5 MG: 1 TABLET ORAL at 13:11

## 2025-02-02 RX ADMIN — OXCARBAZEPINE 150 MG: 150 TABLET, FILM COATED ORAL at 07:49

## 2025-02-02 RX ADMIN — Medication 1 TABLET: at 07:49

## 2025-02-02 RX ADMIN — LEVOTHYROXINE SODIUM 100 MCG: 0.05 TABLET ORAL at 07:48

## 2025-02-02 RX ADMIN — DOCUSATE SODIUM 100 MG: 100 CAPSULE, LIQUID FILLED ORAL at 07:49

## 2025-02-02 RX ADMIN — Medication 5 MG: at 20:12

## 2025-02-02 RX ADMIN — OXCARBAZEPINE 150 MG: 150 TABLET, FILM COATED ORAL at 20:13

## 2025-02-02 RX ADMIN — POLYETHYLENE GLYCOL 3350 17 G: 17 POWDER, FOR SOLUTION ORAL at 07:49

## 2025-02-02 ASSESSMENT — ACTIVITIES OF DAILY LIVING (ADL)
ADLS_ACUITY_SCORE: 48
DRESS: STREET CLOTHES;INDEPENDENT
ADLS_ACUITY_SCORE: 48
DRESS: STREET CLOTHES;INDEPENDENT
ADLS_ACUITY_SCORE: 48
HYGIENE/GROOMING: INDEPENDENT
ADLS_ACUITY_SCORE: 48
HYGIENE/GROOMING: INDEPENDENT
ADLS_ACUITY_SCORE: 48
ORAL_HYGIENE: INDEPENDENT
ADLS_ACUITY_SCORE: 48
ORAL_HYGIENE: INDEPENDENT
ADLS_ACUITY_SCORE: 48

## 2025-02-02 NOTE — PLAN OF CARE
Problem: Depressive Signs/Symptoms  Goal: Increased Participation and Engagement (Depressive Signs/Symptoms)  Outcome: Not Progressing  Intervention: Facilitate Participation and Engagement  Recent Flowsheet Documentation  Taken 2/2/2025 0977 by Sancho Douglas, RN  Diversional Activity: television   Goal Outcome Evaluation:    Plan of Care Reviewed With: patient      Affect is flat. Patient is medication compliant. Denies MH symptoms, disoriented to situation. Withdrawn to self, watches TV in milieu. Appetite is good. Denies pain. No behavioral escalations this shift.   /77 (Patient Position: Sitting, Cuff Size: Adult Regular)   Pulse 72   Temp 97.3  F (36.3  C) (Oral)   Resp 16   Wt 99.3 kg (218 lb 14.7 oz)   SpO2 100%   BMI 32.33 kg/m

## 2025-02-02 NOTE — PLAN OF CARE
Problem: Anxiety Signs/Symptoms  Goal: Improved Sleep (Anxiety Signs/Symptoms)  Outcome: Progressing   Goal Outcome Evaluation:    Patient was asleep at the start of the shift. Woke up and voided once during the night. Went back to sleep right after voiding. No behavioral issues noted this shift. Slept for 8.75 hours.

## 2025-02-02 NOTE — PLAN OF CARE
Problem: Depressive Signs/Symptoms  Goal: Optimized Cognitive Function (Depressive Signs/Symptoms)  Outcome: Progressing     Problem: Psychotic Signs/Symptoms  Goal: Increased Participation and Engagement (Psychotic Signs/Symptoms)  Outcome: Progressing    Problem: Anxiety Signs/Symptoms  Goal: Optimized Cognitive Function (Anxiety Signs/Symptoms)  Outcome: Progressing     Patient in room resting when received this shift. Presented with flat and blunted affect. Mood is calm. Patient denies all psych symptoms. Denies pain or discomfort. Patient is eating and drinking adequately. Family was here to visit this shift. Visit went well. Medication compliant. No known concern. Will monitor as needed.

## 2025-02-03 ENCOUNTER — OFFICE VISIT (OUTPATIENT)
Dept: INTERPRETER SERVICES | Facility: CLINIC | Age: 60
End: 2025-02-03

## 2025-02-03 ENCOUNTER — OFFICE VISIT (OUTPATIENT)
Dept: INTERPRETER SERVICES | Facility: CLINIC | Age: 60
DRG: 885 | End: 2025-02-03

## 2025-02-03 LAB
ANION GAP SERPL CALCULATED.3IONS-SCNC: 13 MMOL/L (ref 7–15)
ANION GAP SERPL CALCULATED.3IONS-SCNC: 13 MMOL/L (ref 7–15)
BUN SERPL-MCNC: 16.3 MG/DL (ref 6–20)
BUN SERPL-MCNC: 16.3 MG/DL (ref 6–20)
CALCIUM SERPL-MCNC: 9.4 MG/DL (ref 8.8–10.4)
CALCIUM SERPL-MCNC: 9.4 MG/DL (ref 8.8–10.4)
CHLORIDE SERPL-SCNC: 100 MMOL/L (ref 98–107)
CHLORIDE SERPL-SCNC: 100 MMOL/L (ref 98–107)
CREAT SERPL-MCNC: 0.82 MG/DL (ref 0.51–0.95)
CREAT SERPL-MCNC: 0.82 MG/DL (ref 0.51–0.95)
EGFRCR SERPLBLD CKD-EPI 2021: 83 ML/MIN/1.73M2
EGFRCR SERPLBLD CKD-EPI 2021: 83 ML/MIN/1.73M2
GLUCOSE SERPL-MCNC: 98 MG/DL (ref 70–99)
GLUCOSE SERPL-MCNC: 98 MG/DL (ref 70–99)
HCO3 SERPL-SCNC: 23 MMOL/L (ref 22–29)
HCO3 SERPL-SCNC: 23 MMOL/L (ref 22–29)
HOLD SPECIMEN: NORMAL
HOLD SPECIMEN: NORMAL
POTASSIUM SERPL-SCNC: 4.3 MMOL/L (ref 3.4–5.3)
POTASSIUM SERPL-SCNC: 4.3 MMOL/L (ref 3.4–5.3)
SODIUM SERPL-SCNC: 136 MMOL/L (ref 135–145)
SODIUM SERPL-SCNC: 136 MMOL/L (ref 135–145)

## 2025-02-03 PROCEDURE — 84443 ASSAY THYROID STIM HORMONE: CPT

## 2025-02-03 PROCEDURE — 250N000013 HC RX MED GY IP 250 OP 250 PS 637: Performed by: PSYCHIATRY & NEUROLOGY

## 2025-02-03 PROCEDURE — 124N000002 HC R&B MH UMMC

## 2025-02-03 PROCEDURE — 82728 ASSAY OF FERRITIN: CPT

## 2025-02-03 PROCEDURE — 250N000013 HC RX MED GY IP 250 OP 250 PS 637

## 2025-02-03 PROCEDURE — 84439 ASSAY OF FREE THYROXINE: CPT

## 2025-02-03 PROCEDURE — 83540 ASSAY OF IRON: CPT

## 2025-02-03 PROCEDURE — 97150 GROUP THERAPEUTIC PROCEDURES: CPT | Mod: GO

## 2025-02-03 PROCEDURE — 36415 COLL VENOUS BLD VENIPUNCTURE: CPT | Performed by: PSYCHIATRY & NEUROLOGY

## 2025-02-03 PROCEDURE — 84466 ASSAY OF TRANSFERRIN: CPT

## 2025-02-03 PROCEDURE — 83550 IRON BINDING TEST: CPT

## 2025-02-03 PROCEDURE — 82565 ASSAY OF CREATININE: CPT | Performed by: PSYCHIATRY & NEUROLOGY

## 2025-02-03 PROCEDURE — T1013 SIGN LANG/ORAL INTERPRETER: HCPCS

## 2025-02-03 PROCEDURE — 80048 BASIC METABOLIC PNL TOTAL CA: CPT | Performed by: PSYCHIATRY & NEUROLOGY

## 2025-02-03 RX ADMIN — ACETAMINOPHEN 650 MG: 325 TABLET, FILM COATED ORAL at 16:42

## 2025-02-03 RX ADMIN — HALOPERIDOL 7 MG: 5 TABLET ORAL at 09:36

## 2025-02-03 RX ADMIN — LORAZEPAM 1.5 MG: 1 TABLET ORAL at 13:15

## 2025-02-03 RX ADMIN — LORAZEPAM 1.5 MG: 1 TABLET ORAL at 21:24

## 2025-02-03 RX ADMIN — Medication 1 TABLET: at 09:36

## 2025-02-03 RX ADMIN — HALOPERIDOL 10 MG: 5 TABLET ORAL at 21:24

## 2025-02-03 RX ADMIN — LISINOPRIL 5 MG: 5 TABLET ORAL at 09:36

## 2025-02-03 RX ADMIN — OXCARBAZEPINE 150 MG: 150 TABLET, FILM COATED ORAL at 09:36

## 2025-02-03 RX ADMIN — CHLORPROMAZINE HYDROCHLORIDE 300 MG: 100 TABLET, FILM COATED ORAL at 21:24

## 2025-02-03 RX ADMIN — LEVOTHYROXINE SODIUM 100 MCG: 0.05 TABLET ORAL at 09:35

## 2025-02-03 RX ADMIN — Medication 5 MG: at 21:25

## 2025-02-03 RX ADMIN — POLYETHYLENE GLYCOL 3350 17 G: 17 POWDER, FOR SOLUTION ORAL at 09:35

## 2025-02-03 RX ADMIN — OXCARBAZEPINE 150 MG: 150 TABLET, FILM COATED ORAL at 21:24

## 2025-02-03 RX ADMIN — DOCUSATE SODIUM 100 MG: 100 CAPSULE, LIQUID FILLED ORAL at 09:36

## 2025-02-03 RX ADMIN — LORAZEPAM 1.5 MG: 1 TABLET ORAL at 09:35

## 2025-02-03 ASSESSMENT — ACTIVITIES OF DAILY LIVING (ADL)
ADLS_ACUITY_SCORE: 48
DRESS: INDEPENDENT
ADLS_ACUITY_SCORE: 48
HYGIENE/GROOMING: INDEPENDENT
ADLS_ACUITY_SCORE: 48
ADLS_ACUITY_SCORE: 48
DRESS: INDEPENDENT
ADLS_ACUITY_SCORE: 48
ORAL_HYGIENE: INDEPENDENT
LAUNDRY: UNABLE TO COMPLETE
ADLS_ACUITY_SCORE: 48
HYGIENE/GROOMING: INDEPENDENT
ADLS_ACUITY_SCORE: 48
ORAL_HYGIENE: INDEPENDENT
ADLS_ACUITY_SCORE: 48
ADLS_ACUITY_SCORE: 48

## 2025-02-03 NOTE — PLAN OF CARE
Problem: Depressive Signs/Symptoms  Goal: Increased Participation and Engagement (Depressive Signs/Symptoms)  Outcome: Not Progressing  Intervention: Facilitate Participation and Engagement  Recent Flowsheet Documentation  Taken 2/3/2025 1037 by Sancho Douglas RN  Diversional Activity: television     Problem: Psychotic Signs/Symptoms  Goal: Improved Mood Symptoms (Psychotic Signs/Symptoms)  Outcome: Progressing  Intervention: Optimize Emotion and Mood  Recent Flowsheet Documentation  Taken 2/3/2025 1037 by Sancho Douglas RN  Diversional Activity: television   Goal Outcome Evaluation:    Plan of Care Reviewed With: patient      Patient affect is flat, mood is calm. Patient is visible in milieu, withdrawn to self. Appetite is good. Denies MH concerns, disoriented to situation. Writer did not observe patient overtly responding to internal stimuli, no laughing by themself observed this shift. Patient spoke with psychotherapist and reported swollen feet. Writer assessed lower extremity, no pitting edema observed, ankles appears mildly swollen, patient given 2 extra pillows to elevate feet in bed, unsure if learning occurred, will need to reinforce behavior with patient. /65 (BP Location: Left arm, Cuff Size: Adult Large)   Pulse 104   Temp 97.6  F (36.4  C) (Temporal)   Resp 16   Wt 99.3 kg (218 lb 14.7 oz)   SpO2 98%   BMI 32.33 kg/m      Patient orthostatic BP sitting 102/65 P 104 & standing 121/79 P 120. During BP check patient need instruction to keep arm still, and to keep standing for adequate duration. Writer recommends reassessing orthostatics again this evening.    No prns given  Denies pain

## 2025-02-03 NOTE — PLAN OF CARE
"  Rehab Group    Start time: 10:15  End time: 11:50  Patient time total: 35 minutes    attended partial group    #9 attended   Group Type: OT Clinic x2   Group Topic Covered: balanced lifestyle, cognitive activities, coping skills, emotional regulation, healthy leisure time, mindfulness, relaxation , and social skills     Group Session Detail:  Pt actively participated in occupational therapy clinic to facilitate coping skill exploration, creative expression within personally meaningful activities, and clinical observation of social, cognitive, and kinesthetic performance skills.      Patient Response/Contribution:  cooperative with task, worked intermittently, and inattentive     Patient Detail: Patient reported one thing she is grateful for today is: \"being healthy.\"  Pt response: Dar to initiate, gather materials, sequence, and adjust to workspace demands as needed. Patient opted to continue work on a familiar 1-step creative coloring task. Patient initiated engagement without prompting. Patient worked intermittently; appearing minimally invested by task with fluctuating levels of attention and engagement. Patient was observed staring off into space or resting her head on the table. Writer encouraged patient to take rest breaks as needed as she appeared fatigued; patient was agreeable to leave group as she reported she felt \"tired.\" No return. Affect: flat. Mood: calm, cooperative.          [id number] OT Group (2 or more in attendance)      Patient Active Problem List   Diagnosis    Ruby (H)    Psychosis (H)    Schizoaffective disorder, bipolar type (H)    PTSD (post-traumatic stress disorder)        "

## 2025-02-03 NOTE — PLAN OF CARE
Initial meeting note:    Therapist introduced self to patient and discussed psychotherapy service available to patient.     Pt response: Pt expressed interest in meeting with therapist    Plan: Made plan to meet with pt again; began identifying goals     Briefly met with pt who was open to talking but didn't have much to say other than her legs were bothering  her. This was passed along to RN. Pt was encouraged to go to a group, which she did.

## 2025-02-03 NOTE — PLAN OF CARE
BEH IP Unit Acuity Rating Score (UARS)  Patient is given one point for every criteria they meet.    CRITERIA SCORING   On a 72 hour hold, court hold, committed, stay of commitment, or revocation. 1    Patient LOS on BEH unit exceeds 20 days. 1  LOS: 31   Patient under guardianship, 55+, otherwise medically complex, or under age 11. 1   Suicide ideation without relief of precipitating factors. 0   Current plan for suicide. 0   Current plan for homicide. 0   Imminent risk or actual attempt to seriously harm another without relief of factors precipitating the attempt. 0   Severe dysfunction in daily living (ex: complete neglect for self care, extreme disruption in vegetative function, extreme deterioration in social interactions). 1   Recent (last 7 days) or current physical aggression in the ED or on unit. 0   Restraints or seclusion episode in past 72 hours. 0   Recent (last 7 days) or current verbal aggression, agitation, yelling, etc., while in the ED or unit. 0   Active psychosis. 1   Need for constant or near constant redirection (from leaving, from others, etc).  0   Intrusive or disruptive behaviors. 0   Patient requires 3 or more hours of individualized nursing care per 8-hour shift (i.e. for ADLs, meds, therapeutic interventions). 0   TOTAL 5

## 2025-02-03 NOTE — PROGRESS NOTES
Patient seen, chart reviewed, care discussed with staff.  Seen with     Blood pressure 123/73, pulse 93, temperature 97.6  F (36.4  C), temperature source Temporal, resp. rate 16, weight 99.3 kg (218 lb 14.7 oz), SpO2 98%.    She states she is no longer hearing voices.  Still isolative and reserved with low spontaneity. She sits in the lounge and does not interact, some of this may be language.    General appearance: fair, a bit less forthcoming than on Friday  Alert.   Affect: fair  Mood: fair    Speech:  spontaneous speech decreased   Eye contact:  good.    Psychomotor behavior: normal  Gait: steady   Abnormal movements:  none  Delusions: none noted  Hallucinations:  denied today  Thoughts: linear  Associations: intact  Judgement: unable to assess  Insight: poor  Cognitions: intact in conversation  Memory:  intact in conversation  Orientation: to situation, not day or date   Not suicidal.    Expand All Collapse All    Postural BP drop continues, but she is looking better.     Blood pressure 113/76, pulse 94, temperature 97.6  F (36.4  C), temperature source Temporal, resp. rate 16, weight 101.6 kg (223 lb 15.8 oz), SpO2 99%.     General appearance: good  Alert.   Affect: good  Mood: fair    Speech:  production increased more toward normal, less mumbling  Eye contact:  good.    Psychomotor behavior: normal, no EPSE  Gait: steady   Abnormal movements:  none  Delusions: none evident  Hallucinations:  Voices continue, days, voices of family members  Thoughts: linear  Associations: intact  Judgement: good  Insight: good  Cognitions: intact in conversation  Memory:  intact in conversation  Orientation: not oriented  Not suicidal.     Imp:  Schizoaffective disorder, bipolar type            Patient Active Problem List   Diagnosis    Ruby (H)    Psychosis (H)    Schizoaffective disorder, bipolar type (H)    PTSD (post-traumatic stress disorder)        Plan:  Same meds today  Court has approved ECT, will assess  if it is still needed.    Current Facility-Administered Medications   Medication Dose Route Frequency Provider Last Rate Last Admin    acetaminophen (TYLENOL) tablet 650 mg  650 mg Oral Q4H PRN Rob Loaiza MD   650 mg at 01/27/25 1619    alum & mag hydroxide-simethicone (MAALOX) suspension 30 mL  30 mL Oral Q4H PRN Rob Loaiza MD        benzocaine-menthol (CHLORASEPTIC) 6-10 MG lozenge 1 lozenge  1 lozenge Buccal Q1H PRN Rob Loaiza MD   1 lozenge at 01/31/25 1324    chlorproMAZINE (THORAZINE) tablet 300 mg  300 mg Oral At Bedtime Chester Salgado MD   300 mg at 02/02/25 2012    chlorproMAZINE (THORAZINE) tablet 50 mg  50 mg Oral Q8H PRN Rob Loaiza MD        docusate sodium (COLACE) capsule 100 mg  100 mg Oral Daily Rob Loaiza MD   100 mg at 02/03/25 0936    guaiFENesin-dextromethorphan (ROBITUSSIN DM) 100-10 MG/5ML syrup 10 mL  10 mL Oral Q4H PRN Rob Loaiza MD        haloperidol (HALDOL) tablet 10 mg  10 mg Oral At Bedtime Dona Trotter MD   10 mg at 02/02/25 2012    haloperidol (HALDOL) tablet 7 mg  7 mg Oral Daily Chester Salgado MD   7 mg at 02/03/25 0936    hydrOXYzine HCl (ATARAX) tablet 25 mg  25 mg Oral Q6H PRN Rob Loaiza MD   25 mg at 01/16/25 1215    levothyroxine (SYNTHROID/LEVOTHROID) tablet 100 mcg  100 mcg Oral QAM AC Juan Samano PA-C   100 mcg at 02/03/25 0935    lisinopril (ZESTRIL) tablet 5 mg  5 mg Oral Daily Juan Samano PA-C   5 mg at 02/03/25 0936    LORazepam (ATIVAN) tablet 1 mg  1 mg Oral Q4H PRN Rob Loaiza MD        LORazepam (ATIVAN) tablet 1.5 mg  1.5 mg Oral TID Dona Trotter MD   1.5 mg at 02/03/25 0935    magnesium hydroxide (MILK OF MAGNESIA) suspension 30 mL  30 mL Oral Daily PRN Rob Loaiza MD        melatonin tablet 3 mg  3 mg Oral At Bedtime PRN Rob Loaiza MD   3 mg at 01/11/25 0056    melatonin tablet 5 mg  5 mg Oral At Bedtime Dona Trotter MD   5 mg at 02/02/25 2012     multivitamin w/minerals (THERA-VIT-M) tablet 1 tablet  1 tablet Oral Daily Rob Loaiza MD   1 tablet at 02/03/25 0936    OLANZapine (zyPREXA) tablet 10 mg  10 mg Oral TID PRN Rob Loaiza MD        Or    OLANZapine (zyPREXA) injection 10 mg  10 mg Intramuscular TID PRN Rob Loaiza MD        OXcarbazepine (TRILEPTAL) tablet 150 mg  150 mg Oral BID Chester Salgado MD   150 mg at 02/03/25 0936    polyethylene glycol (MIRALAX) Packet 17 g  17 g Oral Daily Rob Loaiza MD   17 g at 02/03/25 0935    polyethylene glycol (MIRALAX) Packet 17 g  17 g Oral Daily PRN Rob Loaiza MD        senna-docusate (SENOKOT-S/PERICOLACE) 8.6-50 MG per tablet 1 tablet  1 tablet Oral BID PRN Rob Loaiza MD   1 tablet at 01/16/25 1215     Recent Results (from the past week)   Comprehensive metabolic panel    Collection Time: 01/27/25 11:37 AM   Result Value Ref Range    Sodium 135 135 - 145 mmol/L    Potassium 4.6 3.4 - 5.3 mmol/L    Carbon Dioxide (CO2) 23 22 - 29 mmol/L    Anion Gap 10 7 - 15 mmol/L    Urea Nitrogen 15.8 6.0 - 20.0 mg/dL    Creatinine 0.75 0.51 - 0.95 mg/dL    GFR Estimate >90 >60 mL/min/1.73m2    Calcium 9.2 8.8 - 10.4 mg/dL    Chloride 102 98 - 107 mmol/L    Glucose 94 70 - 99 mg/dL    Alkaline Phosphatase 69 40 - 150 U/L    AST 15 0 - 45 U/L    ALT 15 0 - 50 U/L    Protein Total 6.9 6.4 - 8.3 g/dL    Albumin 3.7 3.5 - 5.2 g/dL    Bilirubin Total 0.2 <=1.2 mg/dL   Ammonia    Collection Time: 01/27/25 11:37 AM   Result Value Ref Range    Ammonia 55 (H) 11 - 51 umol/L   CBC with platelets and differential    Collection Time: 01/27/25 11:37 AM   Result Value Ref Range    WBC Count 4.6 4.0 - 11.0 10e3/uL    RBC Count 3.26 (L) 3.80 - 5.20 10e6/uL    Hemoglobin 10.2 (L) 11.7 - 15.7 g/dL    Hematocrit 31.8 (L) 35.0 - 47.0 %    MCV 98 78 - 100 fL    MCH 31.3 26.5 - 33.0 pg    MCHC 32.1 31.5 - 36.5 g/dL    RDW 12.6 10.0 - 15.0 %    Platelet Count      % Neutrophils 40 %    %  Lymphocytes 51 %    % Monocytes 9 %    % Eosinophils 1 %    % Basophils 0 %    % Immature Granulocytes 0 %    NRBCs per 100 WBC 0 <1 /100    Absolute Neutrophils 1.8 1.6 - 8.3 10e3/uL    Absolute Lymphocytes 2.4 0.8 - 5.3 10e3/uL    Absolute Monocytes 0.4 0.0 - 1.3 10e3/uL    Absolute Eosinophils 0.0 0.0 - 0.7 10e3/uL    Absolute Basophils 0.0 0.0 - 0.2 10e3/uL    Absolute Immature Granulocytes 0.0 <=0.4 10e3/uL    Absolute NRBCs 0.0 10e3/uL   Basic metabolic panel    Collection Time: 02/03/25  9:14 AM   Result Value Ref Range    Sodium 136 135 - 145 mmol/L    Potassium 4.3 3.4 - 5.3 mmol/L    Chloride 100 98 - 107 mmol/L    Carbon Dioxide (CO2) 23 22 - 29 mmol/L    Anion Gap 13 7 - 15 mmol/L    Urea Nitrogen 16.3 6.0 - 20.0 mg/dL    Creatinine 0.82 0.51 - 0.95 mg/dL    GFR Estimate 83 >60 mL/min/1.73m2    Calcium 9.4 8.8 - 10.4 mg/dL    Glucose 98 70 - 99 mg/dL

## 2025-02-03 NOTE — PLAN OF CARE
Problem: Anxiety Signs/Symptoms  Goal: Improved Sleep (Anxiety Signs/Symptoms)  Outcome: Progressing   Goal Outcome Evaluation:    Patient was asleep at the start of the shift. She is using a medical bed to assist with mobility. Voided once and went back to sleep right after voiding. Slept for 8 hours.    She is scheduled to have a lab draw today to check on her BMP.

## 2025-02-03 NOTE — PLAN OF CARE
Team Note Due:  Monday    Assessment/Intervention/Current Symtoms and Care Coordination:  Chart review and met with team, discussed pt progress, symptomology, and response to treatment.  Discussed the discharge plan and any potential impediments to discharge. Pt under commitment, sorensen and kezia porras was petitioned for on 12/27/2024. Pt requires a [place] . Pt continues to respond to internal stimuli and intermittently denies/endorses AH.     Pt has not started ECT and it is unclear when she will.    Pt remains isolative and withdrawn, but is often out of her room. Eats and sleeps well.     Discharge Plan or Goal:  Pending stabilization and safe disposition planning; considerations include:  TBD. Per different reports from different family members, pt has an assisted living apartment but was staying in the family home PTA. It is unclear at this time where pt will return at discharge      Barriers to Discharge:  Patient requires further psychiatric stabilization due to current symptomology, medication management with changes subject to provider, coordination with outside supports, and aftercare planning. Pt awaiting to start ECT if the provider still recommends this treatment      Referral Status:  MA application was submitted on 1/29     Legal Status:  Committed MI with ITP and Kezia Porras  [facility]  [id number]   ITP includes: Risperdal, Thorazine, Haldol, and Zyprexa  Expires: 06/10/2025    Contacts (include CLYDE status):  Gifty - daughter (CLYDE)  P: [phone]    Tevin - son (CLYDE)  P: [phone]     Tayamed - son (CLYDE)  P: [phone]     Isaura - commitment CM through [facility] (CLYDE per commitment)  P: [phone]     [facility] (No CLYDE per commitment)  [email or web address]     Upcoming Meetings and Dates/Important Information and next steps:  CTC to coordinate with pt, outside supports, and treatment team regarding discharge  planning   CTC to update the family if pt is placed on the ECT schedule

## 2025-02-04 ENCOUNTER — OFFICE VISIT (OUTPATIENT)
Dept: INTERPRETER SERVICES | Facility: CLINIC | Age: 60
DRG: 885 | End: 2025-02-04

## 2025-02-04 PROCEDURE — 250N000013 HC RX MED GY IP 250 OP 250 PS 637

## 2025-02-04 PROCEDURE — 250N000013 HC RX MED GY IP 250 OP 250 PS 637: Performed by: PSYCHIATRY & NEUROLOGY

## 2025-02-04 PROCEDURE — T1013 SIGN LANG/ORAL INTERPRETER: HCPCS | Mod: U4

## 2025-02-04 PROCEDURE — 124N000002 HC R&B MH UMMC

## 2025-02-04 PROCEDURE — T1013 SIGN LANG/ORAL INTERPRETER: HCPCS

## 2025-02-04 PROCEDURE — 258N000001 HC RX 258

## 2025-02-04 RX ORDER — HALOPERIDOL 5 MG/1
10 TABLET ORAL 2 TIMES DAILY
Status: DISCONTINUED | OUTPATIENT
Start: 2025-02-04 | End: 2025-02-06

## 2025-02-04 RX ORDER — WADDING
500 EACH MISCELLANEOUS ONCE
Status: COMPLETED | OUTPATIENT
Start: 2025-02-04 | End: 2025-02-04

## 2025-02-04 RX ORDER — DULOXETIN HYDROCHLORIDE 30 MG/1
30 CAPSULE, DELAYED RELEASE ORAL DAILY
Status: DISCONTINUED | OUTPATIENT
Start: 2025-02-04 | End: 2025-02-07

## 2025-02-04 RX ADMIN — Medication 5 MG: at 21:26

## 2025-02-04 RX ADMIN — DULOXETINE HYDROCHLORIDE 30 MG: 30 CAPSULE, DELAYED RELEASE ORAL at 13:39

## 2025-02-04 RX ADMIN — LEVOTHYROXINE SODIUM 100 MCG: 0.05 TABLET ORAL at 08:32

## 2025-02-04 RX ADMIN — OXCARBAZEPINE 150 MG: 150 TABLET, FILM COATED ORAL at 08:25

## 2025-02-04 RX ADMIN — HALOPERIDOL 7 MG: 5 TABLET ORAL at 08:24

## 2025-02-04 RX ADMIN — LORAZEPAM 1.5 MG: 1 TABLET ORAL at 21:26

## 2025-02-04 RX ADMIN — Medication 1 TABLET: at 08:24

## 2025-02-04 RX ADMIN — POLYETHYLENE GLYCOL 3350 17 G: 17 POWDER, FOR SOLUTION ORAL at 08:24

## 2025-02-04 RX ADMIN — OXCARBAZEPINE 150 MG: 150 TABLET, FILM COATED ORAL at 21:26

## 2025-02-04 RX ADMIN — LORAZEPAM 1.5 MG: 1 TABLET ORAL at 13:39

## 2025-02-04 RX ADMIN — CHLORPROMAZINE HYDROCHLORIDE 300 MG: 100 TABLET, FILM COATED ORAL at 21:26

## 2025-02-04 RX ADMIN — Medication 500 ML: at 21:28

## 2025-02-04 RX ADMIN — HALOPERIDOL 10 MG: 5 TABLET ORAL at 21:26

## 2025-02-04 RX ADMIN — DOCUSATE SODIUM 100 MG: 100 CAPSULE, LIQUID FILLED ORAL at 08:33

## 2025-02-04 ASSESSMENT — ACTIVITIES OF DAILY LIVING (ADL)
ADLS_ACUITY_SCORE: 48
ADLS_ACUITY_SCORE: 48
HYGIENE/GROOMING: INDEPENDENT
ADLS_ACUITY_SCORE: 48
ORAL_HYGIENE: INDEPENDENT
ADLS_ACUITY_SCORE: 48
DRESS: STREET CLOTHES;INDEPENDENT
ADLS_ACUITY_SCORE: 48
LAUNDRY: UNABLE TO COMPLETE

## 2025-02-04 NOTE — PROGRESS NOTES
"Patient seen, chart reviewed, care discussed with staff.  Blood pressure 124/77, pulse 93, temperature 98.8  F (37.1  C), temperature source Oral, resp. rate 16, weight 99.5 kg (219 lb 5.7 oz), SpO2 98%.  More resistant to meds this am, \"I don't need them\".  Looks sadder, more withdrawn.  Seen with .  Sodium is good.    General appearance: sad, withdrawan  Alert.   Affect: sad  Mood: sad  Speech:  production decreased   Eye contact:  low    Psychomotor behavior: decreased, in bed  Gait: steady by re[port  Abnormal movements:  none.  No cogwheeling or tremor  Delusions: none evident  Hallucinations:  intermittantly  Thoughts: linear  Associations: intact  Judgement: poor  Insight: poor  Cognitions: intact in conversation  Memory:  intact in conversation  Orientation: not oriented  Not suicidal.    Imp:  Schizoaffective disorder, bipolar type            Patient Active Problem List   Diagnosis    Ruby (H)    Psychosis (H)    Schizoaffective disorder, bipolar type (H)    PTSD (post-traumatic stress disorder)         Plan:  1.  Add Cymbalta 30 mg daily.  I would increase Trileptal but ECT is pending and this might interfere with ECT  2.  Increase Haldol to 10mg twice  daily.    Current Facility-Administered Medications   Medication Dose Route Frequency Provider Last Rate Last Admin    acetaminophen (TYLENOL) tablet 650 mg  650 mg Oral Q4H PRN Rob Loaiza MD   650 mg at 02/03/25 1642    alum & mag hydroxide-simethicone (MAALOX) suspension 30 mL  30 mL Oral Q4H PRN Rob Loaiza MD        benzocaine-menthol (CHLORASEPTIC) 6-10 MG lozenge 1 lozenge  1 lozenge Buccal Q1H PRN Rob Loaiza MD   1 lozenge at 01/31/25 1324    chlorproMAZINE (THORAZINE) tablet 300 mg  300 mg Oral At Bedtime Chester Salgado MD   300 mg at 02/03/25 2124    chlorproMAZINE (THORAZINE) tablet 50 mg  50 mg Oral Q8H PRN Rob Loaiza MD        docusate sodium (COLACE) capsule 100 mg  100 mg Oral Daily Parkinson, " Rob HSIEH MD   100 mg at 02/04/25 0833    DULoxetine (CYMBALTA) DR capsule 30 mg  30 mg Oral Daily Chester Salgado MD        guaiFENesin-dextromethorphan (ROBITUSSIN DM) 100-10 MG/5ML syrup 10 mL  10 mL Oral Q4H PRN Rob Loaiza MD        haloperidol (HALDOL) tablet 10 mg  10 mg Oral BID Chester Salgado MD        hydrOXYzine HCl (ATARAX) tablet 25 mg  25 mg Oral Q6H PRN Rob Loaiza MD   25 mg at 01/16/25 1215    levothyroxine (SYNTHROID/LEVOTHROID) tablet 100 mcg  100 mcg Oral QAM AC Juan Samano PA-C   100 mcg at 02/04/25 0832    lisinopril (ZESTRIL) tablet 5 mg  5 mg Oral Daily Juan Samano PA-C   5 mg at 02/03/25 0936    LORazepam (ATIVAN) tablet 1 mg  1 mg Oral Q4H PRN Rob Loaiza MD        LORazepam (ATIVAN) tablet 1.5 mg  1.5 mg Oral TID Dona Trotter MD   1.5 mg at 02/03/25 2124    magnesium hydroxide (MILK OF MAGNESIA) suspension 30 mL  30 mL Oral Daily PRN Rob Loaiza MD        melatonin tablet 3 mg  3 mg Oral At Bedtime PRN Rob Loaiza MD   3 mg at 01/11/25 0056    melatonin tablet 5 mg  5 mg Oral At Bedtime Dona Trotter MD   5 mg at 02/03/25 2125    multivitamin w/minerals (THERA-VIT-M) tablet 1 tablet  1 tablet Oral Daily Rob Loaiza MD   1 tablet at 02/04/25 0824    OLANZapine (zyPREXA) tablet 10 mg  10 mg Oral TID PRN Rob Loaiza MD        Or    OLANZapine (zyPREXA) injection 10 mg  10 mg Intramuscular TID PRN Rob Loaiza MD        OXcarbazepine (TRILEPTAL) tablet 150 mg  150 mg Oral BID Chester Salgado MD   150 mg at 02/04/25 0825    polyethylene glycol (MIRALAX) Packet 17 g  17 g Oral Daily Rob Loaiza MD   17 g at 02/04/25 0824    polyethylene glycol (MIRALAX) Packet 17 g  17 g Oral Daily PRN Rob Loaiza MD        senna-docusate (SENOKOT-S/PERICOLACE) 8.6-50 MG per tablet 1 tablet  1 tablet Oral BID PRN Rob Loaiza MD   1 tablet at 01/16/25 1213     Recent Results (from the past week)   Basic  metabolic panel    Collection Time: 02/03/25  9:14 AM   Result Value Ref Range    Sodium 136 135 - 145 mmol/L    Potassium 4.3 3.4 - 5.3 mmol/L    Chloride 100 98 - 107 mmol/L    Carbon Dioxide (CO2) 23 22 - 29 mmol/L    Anion Gap 13 7 - 15 mmol/L    Urea Nitrogen 16.3 6.0 - 20.0 mg/dL    Creatinine 0.82 0.51 - 0.95 mg/dL    GFR Estimate 83 >60 mL/min/1.73m2    Calcium 9.4 8.8 - 10.4 mg/dL    Glucose 98 70 - 99 mg/dL   Extra Purple Top Tube    Collection Time: 02/03/25  9:14 AM   Result Value Ref Range    Hold Specimen JIC

## 2025-02-04 NOTE — PLAN OF CARE
Problem: Adult Behavioral Health Plan of Care  Goal: Adheres to Safety Considerations for Self and Others  Outcome: Progressing  Intervention: Develop and Maintain Individualized Safety Plan  Recent Flowsheet Documentation  Taken 2/4/2025 7029 by Diane Harvey RN  Safety Measures: safety rounds completed   Goal Outcome Evaluation:    Plan of Care Reviewed With: patient    Plan of Care Reviewed With: patient is flat and blunted with a calm mood. Pt ate well for both meals but was hesitant to take her medication . Pt told writer she does not  need her medication but took them with lots of encouragement . Pt was not noted to be responding to internal stimuli. Did refused to participate in MH Assessment questioner .   Pt orthostatic B/P sitting 123/82 pulse 93 standing 73/55 pulse 109 . Pt did c/o dizziness .pt encourage to drink and  Medicine updated   .

## 2025-02-04 NOTE — PLAN OF CARE
Team Note Due:  Monday    Assessment/Intervention/Current Symtoms and Care Coordination:  Chart review and met with team, discussed pt progress, symptomology, and response to treatment.  Discussed the discharge plan and any potential impediments to discharge. Pt under commitment, edu and kezia porras was petitioned for on 12/27/2024. Pt requires a [place] . Pt continues to respond to internal stimuli and intermittently denies/endorses AH. Slept for 8.75 hrs. Off 1:1.    Per Team: Pt reported to be doing better, experiencing less hallucinations. Pt is still flat. Pt had family visit last night. This morning she reported she doesn't need medication and was a little resistant but was able to take them. P may start ECT if the provider deems it necessary.       Discharge Plan or Goal:  Pending stabilization and safe disposition planning; considerations include:  TBD. Per different reports from different family members, pt has an assisted living apartment but was staying in the family home PTA. It is unclear at this time where pt will return at discharge      Barriers to Discharge:  Patient requires further psychiatric stabilization due to current symptomology, medication management with changes subject to provider, coordination with outside supports, and aftercare planning. Pt awaiting to start ECT if the provider still recommends this treatment      Referral Status:  MA application was submitted on 1/29     Legal Status:  Committed [place] with ITP and Kezia Porras  [facility]  06-YK-[id number]-   ITP includes: Risperdal, Thorazine, Haldol, and Zyprexa  Expires: 06/10/2025    Contacts (include CLYDE status):  Gifty - daughter (CLYDE)  P: [phone]    Tevin - son (CLYDE)  P: [phone]     Patrick - son (CLYDE)  P: [phone]     Isaura - commitment CM through [facility] (CLYDE per commitment)  P: [phone]     [facility] Attorney (No CLYDE per  commitment)  [email or web address]     Upcoming Meetings and Dates/Important Information and next steps:  CTC to coordinate with pt, outside supports, and treatment team regarding discharge planning   CTC to update the family if pt is placed on the ECT schedule

## 2025-02-04 NOTE — PLAN OF CARE
Problem: Depressive Signs/Symptoms  Goal: Optimized Energy Level (Depressive Signs/Symptoms)  Outcome: Progressing  Intervention: Optimize Energy Level  Recent Flowsheet Documentation  Taken 2/3/2025 1811 by Lisa Callahan RN  Patient Performed Hygiene: dressed  Diversional Activity: television  Activity (Behavioral Health):   up ad shawn   activity encouraged   Goal Outcome Evaluation:    Plan of Care Reviewed With: patient        Patient was napping in the beginning of the shift. She came out right before dinner. During the 1:1  assessment, per , patient denied wishing herself to be dead. She denied any s/s of anxiety and depression. Denied suicidal thoughts; SIb/Hallucinations. Her appetite is good and sleeping well at night. Patient only provided very minimal answers to questions asked. She is very quiet. Her mood is calm with a flat affect. She complained of pain on her knees. Tylenol 650 mgs given as prn for pain which provided her relief after an hour. She ate 100% of her dinner. Visited by her family members and it went well. She is med compliant. No side effects of med reports nor observed. /77; P-101.

## 2025-02-04 NOTE — PLAN OF CARE
Problem: Anxiety Signs/Symptoms  Goal: Improved Sleep (Anxiety Signs/Symptoms)  Outcome: Progressing   Goal Outcome Evaluation:    Patient was asleep at the start of the shift. She is using a medical bed to assist with mobility.     Patient voided once during the night. Came out to the lounge and went back to her room after a few minutes. No anxiety and other behavioral issues noted this shift. Slept for 8.75 hours.

## 2025-02-04 NOTE — PROVIDER NOTIFICATION
02/04/25 1700   Individualization/Patient Specific Goals   Patient Personal Strengths spiritual/Jainism support;community support;family/social support   Patient Vulnerabilities history of unsuccessful treatment   Patient/Family-Specific Goals (Include Timeframe) Pt's family is involved   Interprofessional Rounds   Summary Pt transferred from [facility]. Pt was initially admitted for behavioral concerns and psychotic symptoms. Pt is now committed. Santos and mast porras petition were recently sent in. Pt had santos/price porras hearing on 1/17 with the hopes of treating pt with ECT. Santos and Mast Porras orders were granted on 1/31. Pt has not started ECT at this time.   Participants CTC;nursing;psychiatrist   Behavioral Team Discussion   Participants Dr. Chester Salgado MD; Hina Choe [facility], [facility]; Diane Harvey RN   Progress Pt requires Czech . Pt appears to be responding to internal stimuli and can present as confused at times. Pt is now off an SIO (was for intrusivess). Pt is medication compliant and eats well. Comes out of her room often but does not engage with other patients, but will engage with staff when they initiate. Santos and Mast Porras orders were granted on 1/31, but pt has not started ECT.   Anticipated length of stay 4+ weeks   Continued Stay Criteria/Rationale Medication management per psychiatry, stabilization of symptoms, aftercare planning, coordination with outside supports, potential for ECT   Medical/Physical See H&P and provider notes   Precautions See below   Plan Medication management per psychiatry, stabilization of symptoms, aftercare planning, coordination with outside supports, possible ECT   Rationale for change in precautions or plan No change   Safety Plan Per unit protocol   Anticipated Discharge Disposition home with family;assisted living     PRECAUTIONS AND SAFETY    Behavioral Orders   Procedures    Cheeking Precautions (behavioral units)      Patient Observed swallowing PO medications; Patient asked to drink water after swallowing medication; Patient in Staff line of sight for 15 minutes after medication given; Mouth checks after PO administration (patient asked to open mouth and stick out their tongue).    Code 1 - Restrict to Unit    Code 2     Ultrasound    Elopement precautions    Fall precautions    Routine Programming     As clinically indicated    Status 15     Every 15 minutes.       Safety  Safety WDL: WDL  [place] [facility] [place]ed Behavior: calm, sitting  Observed Behavior (Comment): She is independent  Safety Measures: safety rounds completed  Diversional Activity: television  De-Escalation Techniques:  (SIO)  Suicidality: Status 15  Assault: status 15  Elopement Interventions: status 15, status continuous sight  Additional Documentation:  (Fall)

## 2025-02-04 NOTE — PLAN OF CARE
BEH IP Unit Acuity Rating Score (UARS)  Patient is given one point for every criteria they meet.    CRITERIA SCORING   On a 72 hour hold, court hold, committed, stay of commitment, or revocation. 1    Patient LOS on BEH unit exceeds 20 days. 1  LOS: 32   Patient under guardianship, 55+, otherwise medically complex, or under age 11. 1   Suicide ideation without relief of precipitating factors. 0   Current plan for suicide. 0   Current plan for homicide. 0   Imminent risk or actual attempt to seriously harm another without relief of factors precipitating the attempt. 0   Severe dysfunction in daily living (ex: complete neglect for self care, extreme disruption in vegetative function, extreme deterioration in social interactions). 1   Recent (last 7 days) or current physical aggression in the ED or on unit. 0   Restraints or seclusion episode in past 72 hours. 0   Recent (last 7 days) or current verbal aggression, agitation, yelling, etc., while in the ED or unit. 0   Active psychosis. 1   Need for constant or near constant redirection (from leaving, from others, etc).  0   Intrusive or disruptive behaviors. 0   Patient requires 3 or more hours of individualized nursing care per 8-hour shift (i.e. for ADLs, meds, therapeutic interventions). 0   TOTAL 5

## 2025-02-04 NOTE — PLAN OF CARE
Problem: Adult Behavioral Health Plan of Care  Goal: Adheres to Safety Considerations for Self and Others  Outcome: Progressing  Intervention: Develop and Maintain Individualized Safety Plan  Recent Flowsheet Documentation  Taken 2/4/2025 2543 by Diane Harvey RN  Safety Measures: safety rounds completed   Goal Outcome Evaluation:    Plan of Care Reviewed With: patient    patient is flat and blunted with a calm mood. Pt ate well for both meals but was hesitant to take her medication . Pt told writer she does not  need her medication but took them with lots of encouragement . Pt was not noted to be responding to internal stimuli. Did refused to participate in MH Assessment questioner .   Pt orthostatic B/P sitting 123/82 pulse 93 standing 73/55 pulse 109 . Pt did c/o dizziness .pt encourage to drink and  Medicine updated   .

## 2025-02-05 ENCOUNTER — OFFICE VISIT (OUTPATIENT)
Dept: INTERPRETER SERVICES | Facility: CLINIC | Age: 60
End: 2025-02-05

## 2025-02-05 ENCOUNTER — OFFICE VISIT (OUTPATIENT)
Dept: INTERPRETER SERVICES | Facility: CLINIC | Age: 60
DRG: 885 | End: 2025-02-05

## 2025-02-05 PROCEDURE — 250N000013 HC RX MED GY IP 250 OP 250 PS 637: Performed by: PSYCHIATRY & NEUROLOGY

## 2025-02-05 PROCEDURE — 124N000002 HC R&B MH UMMC

## 2025-02-05 PROCEDURE — 90853 GROUP PSYCHOTHERAPY: CPT

## 2025-02-05 PROCEDURE — 250N000013 HC RX MED GY IP 250 OP 250 PS 637

## 2025-02-05 PROCEDURE — T1013 SIGN LANG/ORAL INTERPRETER: HCPCS

## 2025-02-05 PROCEDURE — 99232 SBSQ HOSP IP/OBS MODERATE 35: CPT | Performed by: PSYCHIATRY & NEUROLOGY

## 2025-02-05 RX ORDER — WADDING
500 EACH MISCELLANEOUS DAILY
Status: DISCONTINUED | OUTPATIENT
Start: 2025-02-05 | End: 2025-03-01

## 2025-02-05 RX ADMIN — Medication 1 TABLET: at 08:28

## 2025-02-05 RX ADMIN — LEVOTHYROXINE SODIUM 100 MCG: 0.05 TABLET ORAL at 08:28

## 2025-02-05 RX ADMIN — DOCUSATE SODIUM 100 MG: 100 CAPSULE, LIQUID FILLED ORAL at 08:27

## 2025-02-05 RX ADMIN — OXCARBAZEPINE 150 MG: 150 TABLET, FILM COATED ORAL at 20:16

## 2025-02-05 RX ADMIN — CHLORPROMAZINE HYDROCHLORIDE 300 MG: 100 TABLET, FILM COATED ORAL at 20:16

## 2025-02-05 RX ADMIN — Medication 500 ML: at 12:26

## 2025-02-05 RX ADMIN — DULOXETINE HYDROCHLORIDE 30 MG: 30 CAPSULE, DELAYED RELEASE ORAL at 08:28

## 2025-02-05 RX ADMIN — LORAZEPAM 1.5 MG: 1 TABLET ORAL at 20:17

## 2025-02-05 RX ADMIN — OXCARBAZEPINE 150 MG: 150 TABLET, FILM COATED ORAL at 08:29

## 2025-02-05 RX ADMIN — POLYETHYLENE GLYCOL 3350 17 G: 17 POWDER, FOR SOLUTION ORAL at 08:29

## 2025-02-05 RX ADMIN — Medication 5 MG: at 20:16

## 2025-02-05 RX ADMIN — HALOPERIDOL 10 MG: 5 TABLET ORAL at 08:28

## 2025-02-05 RX ADMIN — HALOPERIDOL 10 MG: 5 TABLET ORAL at 20:16

## 2025-02-05 ASSESSMENT — ACTIVITIES OF DAILY LIVING (ADL)
ORAL_HYGIENE: INDEPENDENT
ADLS_ACUITY_SCORE: 48
HYGIENE/GROOMING: INDEPENDENT
ADLS_ACUITY_SCORE: 48
ADLS_ACUITY_SCORE: 48
DRESS: STREET CLOTHES
ADLS_ACUITY_SCORE: 48
LAUNDRY: UNABLE TO COMPLETE
ADLS_ACUITY_SCORE: 48

## 2025-02-05 NOTE — PLAN OF CARE
BEH IP Unit Acuity Rating Score (UARS)  Patient is given one point for every criteria they meet.    CRITERIA SCORING   On a 72 hour hold, court hold, committed, stay of commitment, or revocation. 1    Patient LOS on BEH unit exceeds 20 days. 1  LOS: 33   Patient under guardianship, 55+, otherwise medically complex, or under age 11. 1   Suicide ideation without relief of precipitating factors. 0   Current plan for suicide. 0   Current plan for homicide. 0   Imminent risk or actual attempt to seriously harm another without relief of factors precipitating the attempt. 0   Severe dysfunction in daily living (ex: complete neglect for self care, extreme disruption in vegetative function, extreme deterioration in social interactions). 1   Recent (last 7 days) or current physical aggression in the ED or on unit. 0   Restraints or seclusion episode in past 72 hours. 0   Recent (last 7 days) or current verbal aggression, agitation, yelling, etc., while in the ED or unit. 0   Active psychosis. 1   Need for constant or near constant redirection (from leaving, from others, etc).  0   Intrusive or disruptive behaviors. 0   Patient requires 3 or more hours of individualized nursing care per 8-hour shift (i.e. for ADLs, meds, therapeutic interventions). 0   TOTAL 5

## 2025-02-05 NOTE — PLAN OF CARE
Problem: Anxiety Signs/Symptoms  Goal: Improved Sleep (Anxiety Signs/Symptoms)  Outcome: Progressing   Goal Outcome Evaluation:    Patient was asleep at the start of the shift. Voided once during the night. No anxiety and other behavioral issues noted this shift. Slept for 9 hours.
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
## 2025-02-05 NOTE — PLAN OF CARE
Problem: Adult Behavioral Health Plan of Care  Goal: Adheres to Safety Considerations for Self and Others  Outcome: Adequate for Care Transition  Intervention: Develop and Maintain Individualized Safety Plan  Recent Flowsheet Documentation  Taken 2/5/2025 1015 by Daine Harvey RN  Safety Measures:   safety rounds completed   suicide check-in completed   Goal Outcome Evaluation:    Plan of Care Reviewed With: patient    Day Shift 02/5/25  Pt presenting flat and blunted isolative and withdrawn . Pt comes out for meals with much encouragement .   Pt Ativan has been on hold due to soft B/P . MD started pt on Pedialyte 500ml daily . Pt drank 300ml of the Pedialyte > B/P improved but did not meet the criteria to be given her Ativan .   Pt is hesitant to take her medication but took them after much encouragement. Pt denied Anxiety and depression , SI/SIB and hallucination . She was not seen responding to internal stimuli  . Will continue POC

## 2025-02-05 NOTE — PLAN OF CARE
Team Note Due:  Monday    Assessment/Intervention/Current Symtoms and Care Coordination:  Chart review and met with team, discussed pt progress, symptomology, and response to treatment.  Discussed the discharge plan and any potential impediments to discharge. Pt under commitment, sorensen and mast porras. Pt requires a Hungarian . Per RN, pt appears to experience less AH. Pt may start ECT if the provider deems it necessary, no decision has been made as of yet.     Discharge Plan or Goal:  Pending stabilization and safe disposition planning; considerations include:  TBD. Per different reports from different family members, pt has an assisted living apartment but was staying in the family home PTA. It is unclear at this time where pt will return at discharge      Barriers to Discharge:  Patient requires further psychiatric stabilization due to current symptomology, medication management with changes subject to provider, coordination with outside supports, and aftercare planning. Pt awaiting to start ECT if the provider still recommends this treatment      Referral Status:  MA application was submitted on 1/29     Legal Status:  Committed MI with ITP and Hill Porras  [facility]  [id number]   ITP includes: Risperdal, Thorazine, Haldol, and Zyprexa  Expires: 06/10/2025    Contacts (include CLYDE status):  Gifty - daughter (CLYDE)  P: [phone]    Tevin - son (CLYDE)  P: [phone]     Mohamed - son (CLYDE)  P: [phone]     Isaura - commitment CM through [facility] (CLYDE per commitment)  P: [phone]     [facility] (No CLYDE per commitment)  [email or web address].     Upcoming Meetings and Dates/Important Information and next steps:  CTC to coordinate with pt, outside supports, and treatment team regarding discharge planning   CTC to update the family if pt is placed on the ECT schedule

## 2025-02-05 NOTE — PLAN OF CARE
Goal Outcome Evaluation:    Plan of Care Reviewed With: patient      Problem: Adult Behavioral Health Plan of Care  Goal: Plan of Care Review  Outcome: Progressing  Flowsheets (Taken 2/4/2025 8100)  Patient Agreement with Plan of Care: agrees     Pt presented with a flat/blunted affect, sad/tearful mood during check in. Pt endorsed anxiety/worrying, depression/sadness but could not rate. Pt denied pain, SI, HI,hallucinations, and contracted for safety. Pt was observed later laughing by self in room, appeared to be responding to internal stimuli.Pt was visible in the milieu, withdrawn, not social or engaged with peers/staff. Pt did not participate in assessment voluntarily like before,  and writer put more effort to encourage pt to respond to assessment questions. Family has noted pt to be more sad lately too. Compliant with all scheduled medications with a lot of encouragement, writer noted that pt's right hand shakes while holding water to drink. Pt's son Patrick Sparrow will like to get update from provider, sticky note left for provider. No safety concerns observed this shift.

## 2025-02-05 NOTE — PLAN OF CARE
"  Group Attendance:  attended full group    Time session began: 1315  Time session ended: 1415  Patient's total time in group: 60    Total # Attendees   6   Group Type psychotherapeutic and psychoeducational     Group Topic Covered relationships and healthy coping skills     Group Session Detail Patient attended a psycho-education group on building healthy self esteem. Participants identified characteristics of both healthy and poor self esteem. Participants discussed obstacles to healthy self esteem and avenues to build it. Participants identified their personal strengths that can contribute to healthy self esteem, healthy relationships and personal fulfillment      Patient's response to the group topic/interactions:  listened actively and attentive     Patient Details: Patient was fairly quiet throughout group and sat on the outskirts of the group. She answered a few questions and said \"I don't know\" to others.           32719 - Group psychotherapy - 1 Session  Patient Active Problem List   Diagnosis    Ruby (H)    Psychosis (H)    Schizoaffective disorder, bipolar type (H)    PTSD (post-traumatic stress disorder)                            "

## 2025-02-06 ENCOUNTER — OFFICE VISIT (OUTPATIENT)
Dept: INTERPRETER SERVICES | Facility: CLINIC | Age: 60
DRG: 885 | End: 2025-02-06

## 2025-02-06 VITALS
SYSTOLIC BLOOD PRESSURE: 135 MMHG | WEIGHT: 219.36 LBS | RESPIRATION RATE: 16 BRPM | HEART RATE: 90 BPM | TEMPERATURE: 97.6 F | DIASTOLIC BLOOD PRESSURE: 76 MMHG | BODY MASS INDEX: 32.39 KG/M2 | OXYGEN SATURATION: 98 %

## 2025-02-06 LAB
HALOPERIDOL SERPL-MCNC: 7.5 NG/ML
HALOPERIDOL SERPL-MCNC: 7.5 NG/ML

## 2025-02-06 PROCEDURE — 124N000002 HC R&B MH UMMC

## 2025-02-06 PROCEDURE — 250N000013 HC RX MED GY IP 250 OP 250 PS 637: Performed by: PSYCHIATRY & NEUROLOGY

## 2025-02-06 PROCEDURE — 99232 SBSQ HOSP IP/OBS MODERATE 35: CPT

## 2025-02-06 PROCEDURE — 99232 SBSQ HOSP IP/OBS MODERATE 35: CPT | Performed by: PSYCHIATRY & NEUROLOGY

## 2025-02-06 PROCEDURE — 250N000013 HC RX MED GY IP 250 OP 250 PS 637

## 2025-02-06 PROCEDURE — T1013 SIGN LANG/ORAL INTERPRETER: HCPCS | Mod: U3

## 2025-02-06 RX ADMIN — DOCUSATE SODIUM 100 MG: 100 CAPSULE, LIQUID FILLED ORAL at 08:30

## 2025-02-06 RX ADMIN — LEVOTHYROXINE SODIUM 100 MCG: 0.05 TABLET ORAL at 08:30

## 2025-02-06 RX ADMIN — Medication 5 MG: at 21:28

## 2025-02-06 RX ADMIN — POLYETHYLENE GLYCOL 3350 17 G: 17 POWDER, FOR SOLUTION ORAL at 08:29

## 2025-02-06 RX ADMIN — CHLORPROMAZINE HYDROCHLORIDE 300 MG: 100 TABLET, FILM COATED ORAL at 21:28

## 2025-02-06 RX ADMIN — DULOXETINE HYDROCHLORIDE 30 MG: 30 CAPSULE, DELAYED RELEASE ORAL at 08:30

## 2025-02-06 RX ADMIN — OXCARBAZEPINE 150 MG: 150 TABLET, FILM COATED ORAL at 08:29

## 2025-02-06 RX ADMIN — Medication 7.5 MG: at 21:28

## 2025-02-06 RX ADMIN — LORAZEPAM 1.5 MG: 1 TABLET ORAL at 21:28

## 2025-02-06 RX ADMIN — Medication 1 TABLET: at 08:30

## 2025-02-06 RX ADMIN — HALOPERIDOL 10 MG: 5 TABLET ORAL at 08:29

## 2025-02-06 RX ADMIN — Medication 500 ML: at 08:26

## 2025-02-06 RX ADMIN — OXCARBAZEPINE 150 MG: 150 TABLET, FILM COATED ORAL at 21:28

## 2025-02-06 ASSESSMENT — ACTIVITIES OF DAILY LIVING (ADL)
ADLS_ACUITY_SCORE: 48
ORAL_HYGIENE: INDEPENDENT
ADLS_ACUITY_SCORE: 48
HYGIENE/GROOMING: INDEPENDENT
ADLS_ACUITY_SCORE: 48
LAUNDRY: UNABLE TO COMPLETE
ADLS_ACUITY_SCORE: 48
DRESS: STREET CLOTHES;INDEPENDENT
ADLS_ACUITY_SCORE: 48

## 2025-02-06 NOTE — PLAN OF CARE
" services used during nursing assessment.    Mood and Affect: Patient describes mood as \"Ok.\" Affect is restricted, flat  and blunted.    LOC and Orientation: Patient is oriented to person and place. Disoriented to time and situation.    Behavior and Interaction: Patient is withdrawn and isolative to self. Visible in the milieu, attended group. Not engaged with peers. Patient sat quietly in the dining area for the most part in the evening watching TV.    Patient's speech is guarded; with thought blocking.    Patient is cooperative with nursing assessment.    Mental Health Symptoms: Patient denies all mental health symptoms.    Medical Concerns:No new concerns.    Other Concerns:Patient family is requesting information on discharge planning.    Medication Compliant: Patient is compliant with all scheduled medication.    PRN: None given this shift.    Medication Side Effects:Not observed.    Fluid & Food Intake: Adequate food intake. Ate 75% of home food. Encouraged to drink more fluids.    Bowel & Bladder/Elimination: Patient does not remember the last bowel movement, denies constipation or diarrhea.    Self Care:Independent, well groomed.    Vital Signs: Denies pain.  /81   Pulse 92   Temp 97.9  F (36.6  C) (Oral)   Resp 16   Wt 99.5 kg (219 lb 5.7 oz)   SpO2 98%   BMI 32.39 kg/m        Problem: Depressive Signs/Symptoms  Goal: Optimized Energy Level (Depressive Signs/Symptoms)  Outcome: Progressing  Intervention: Optimize Energy Level  Recent Flowsheet Documentation  Taken 2/5/2025 1928 by eLty Cosme, RN  Diversional Activity: television  Activity (Behavioral Health):   up ad shawn   activity encouraged     Problem: Psychotic Signs/Symptoms  Goal: Improved Behavioral Control (Psychotic Signs/Symptoms)  Outcome: Progressing  Intervention: Manage Behavior  Recent Flowsheet Documentation  Taken 2/5/2025 1928 by Lety Cosme, RN  De-Escalation Techniques: reoriented     Problem: Anxiety " Signs/Symptoms  Goal: Optimized Energy Level (Anxiety Signs/Symptoms)  Outcome: Progressing  Intervention: Optimize Energy Level  Recent Flowsheet Documentation  Taken 2/5/2025 1928 by Lety Cosme, RN  Diversional Activity: television  Activity (Behavioral Health):   up ad shawn   activity encouraged   Goal Outcome Evaluation:    Plan of Care Reviewed With: patient

## 2025-02-06 NOTE — PLAN OF CARE
Team Note Due:  Monday    Assessment/Intervention/Current Symtoms and Care Coordination:  Chart review and met with team, discussed pt progress, symptomology, and response to treatment.  Discussed the discharge plan and any potential impediments to discharge. Pt under commitment, sorensen and mast porras. Pt requires a Zimbabwean . Per provider's conversation with pt yesterday, pt did endorse AH. Pt may start ECT if the provider deems it necessary, no decision has been made as of yet. Per RN, pt continues to struggle with blood pressure drops.     Discharge Plan or Goal:  Pending stabilization and safe disposition planning; considerations include:  TBD. Per different reports from different family members, pt has an assisted living apartment but was staying in the family home PTA. It is unclear at this time where pt will return at discharge      Barriers to Discharge:  Patient requires further psychiatric stabilization due to current symptomology, medication management with changes subject to provider, coordination with outside supports, and aftercare planning. Pt awaiting to start ECT if the provider still recommends this treatment      Referral Status:  MA application was submitted on 1/29     Legal Status:  Committed MI with ITP and Hill Porras  [facility]  [id number]-[id number]-   ITP includes: Risperdal, Thorazine, Haldol, and Zyprexa  Expires: 06/10/2025    Contacts (include CLYDE status):  Gifty - daughter (CLYDE)  P: [phone]    Tevin - son (CLYDE)  P: [phone]     Mohamed - son (CLYDE)  P: [phone]     Isaura - commitment CM through [facility] (CLYDE per commitment)  P: [phone]     [facility] (No CLYDE per commitment)  [email or web address].     Upcoming Meetings and Dates/Important Information and next steps:  CTC to coordinate with pt, outside supports, and treatment team regarding discharge planning   CTC to update the family if pt is placed on the  ECT schedule

## 2025-02-06 NOTE — PLAN OF CARE
Problem: Adult Behavioral Health Plan of Care  Goal: Optimized Coping Skills in Response to Life Stressors  Outcome: Progressing  Intervention: Promote Effective Coping Strategies  Recent Flowsheet Documentation  Taken 2/6/2025 0936 by Diane Harvey RN  Supportive Measures:   positive reinforcement provided   relaxation techniques promoted   self-care encouraged   verbalization of feelings encouraged   Goal Outcome Evaluation:    Plan of Care Reviewed With: patient    Pt presenting flat and blunted, but came out of her room with lots of encouragement . Pt out but did  not interact with peers . Pt isolates and is withdrawn didn't attended groups but is calm and corporative . Pt did have a hard time taking her medication this morning,  but took them after much encouragement . Pt ate well for both meals .  present during  MH assessment  questioner at which time pt denied all MH Sx .   Pt B/P continue to be an issues . Pt sitting this morning was 138/78 sitting with standing 66/49 . Medicine team visited with pt today and did some medication adjustment . Will continue POC

## 2025-02-06 NOTE — PLAN OF CARE
Problem: Anxiety Signs/Symptoms  Goal: Improved Sleep (Anxiety Signs/Symptoms)  Outcome: Progressing   Goal Outcome Evaluation:    Patient was asleep at the start of the shift. Got up once, voided and went back to sleep right after voiding. Slept throughout the shift and slept for 7.25 hours.     No behavioral issues noted.

## 2025-02-06 NOTE — PHARMACY-CONSULT NOTE
Pharmacy was consulted by Margo Henning PA-C, to review patient's medications that could potentially cause orthostatic hypotension.     Upon review of patient's medications, the following may cause orthostatic hypotension:     - Chlorpromazine: Incidence of 8%  - Duloxetine: Incidence of less than 1%. Usually occurs within the first week of starting therapy or dose changes.   - Haloperidol: Incidence of less than 1%.   - Lisinopril: Incidence of 9%. Currently held.   - Olanzapine: Incidence of 2%. Currently only ordered as PRN and patient has not required any doses.     Case discussed with: Margo Henning PA-C     Thank you for the consult. Pharmacy will continue to follow.     Analisa Mooney, PharmD  *[id number]   Also available on [employer]

## 2025-02-06 NOTE — PROGRESS NOTES
Brief Medicine Note:  Internal medicine consulted on 01/04 for medical co-management. Please see consult note for full details.    # Orthostatic hypotension, recurrent  Per pt's other chart (w/ which her current one is going to be merged), orthostatic hypotension first noted during admission in Sep 2023. Noted to have > 20 pt drop in systolic blood pressure between sitting and standing at that admission. Attributed to initiating clozapine during that admission. After developing somnolence and leukocytosis on clozapine, she was switched to and discharged on risperidone. Previous workups for endocrine and cardiac causes were negative. Orthostatic blood pressures during this admission have been SBP 120s-130s sitting, 60s-70s standing. HR range largely < 100. Patient denies chest pain, shortness of breath, palpitations, headache, syncope. Patient notes dizziness when going from seated to standing but this resolves when sitting again. No significant HTN. Hgb baseline per other chart appears to be 8-9, currently higher at 10.2. Labs otherwise unremarkable. EKG sinus rhythm. Medications on patient's list that can contribute to tachycardia and/or orthostatic hypotension include chlorpromazine (highest likelihood), duloxetine, haloperidol, olanzapine PRN, and oxcarbazepine. Previously on lisinopril but often not given due to not meeting parameters, none administered since 02/03. Differential diagnosis includes dehydration due to poor intake, but more likely due to medication side effect.   - Discontinued lisinopril  - Daily Pedialyte 500 mL  - Pharmacy consult to review contributing medications. Strongly recommend adjusting psych medications if possible  - Recommend compression stockings if deemed safe by psychiatry  - Recommend careful, slow transitions from sitting to standing  - Informed patient/nursing to notify provider if they develop new chest pain, shortness of breath, palpitations, headache, or dizziness.    Temp:  98.2  F (36.8  C) Temp src: Temporal BP: 134/78 Pulse: 84     SpO2: 98 %   Weight: 99.5 kg (219 lb 5.7 oz)    Medicine will continue to follow for orthostatic hypotension. Recommendations relayed to primary team via this progress note. Thank you for the opportunity to be involved in this patient's care. Please notify on call [facility] if any intercurrent medical issues arise.      Margo Henning PA-C  [facility]   [facility]  Securely message with [employer] (more info)  Text page via [facility] Paging/Directory

## 2025-02-06 NOTE — PLAN OF CARE
BEH IP Unit Acuity Rating Score (UARS)  Patient is given one point for every criteria they meet.    CRITERIA SCORING   On a 72 hour hold, court hold, committed, stay of commitment, or revocation. 1    Patient LOS on BEH unit exceeds 20 days. 1  LOS: 34   Patient under guardianship, 55+, otherwise medically complex, or under age 11. 1   Suicide ideation without relief of precipitating factors. 0   Current plan for suicide. 0   Current plan for homicide. 0   Imminent risk or actual attempt to seriously harm another without relief of factors precipitating the attempt. 0   Severe dysfunction in daily living (ex: complete neglect for self care, extreme disruption in vegetative function, extreme deterioration in social interactions). 1   Recent (last 7 days) or current physical aggression in the ED or on unit. 0   Restraints or seclusion episode in past 72 hours. 0   Recent (last 7 days) or current verbal aggression, agitation, yelling, etc., while in the ED or unit. 0   Active psychosis. 1   Need for constant or near constant redirection (from leaving, from others, etc).  0   Intrusive or disruptive behaviors. 0   Patient requires 3 or more hours of individualized nursing care per 8-hour shift (i.e. for ADLs, meds, therapeutic interventions). 0   TOTAL 5

## 2025-02-07 LAB
T4 FREE SERPL-MCNC: 1.12 NG/DL (ref 0.9–1.7)
T4 FREE SERPL-MCNC: 1.12 NG/DL (ref 0.9–1.7)
TSH SERPL DL<=0.005 MIU/L-ACNC: 0.05 UIU/ML (ref 0.3–4.2)
TSH SERPL DL<=0.005 MIU/L-ACNC: 0.05 UIU/ML (ref 0.3–4.2)

## 2025-02-07 PROCEDURE — 250N000013 HC RX MED GY IP 250 OP 250 PS 637: Performed by: PSYCHIATRY & NEUROLOGY

## 2025-02-07 PROCEDURE — 99232 SBSQ HOSP IP/OBS MODERATE 35: CPT | Performed by: PSYCHIATRY & NEUROLOGY

## 2025-02-07 PROCEDURE — 124N000002 HC R&B MH UMMC

## 2025-02-07 PROCEDURE — 250N000013 HC RX MED GY IP 250 OP 250 PS 637

## 2025-02-07 RX ORDER — SERTRALINE HYDROCHLORIDE 25 MG/1
25 TABLET, FILM COATED ORAL DAILY
Status: DISCONTINUED | OUTPATIENT
Start: 2025-02-08 | End: 2025-03-12

## 2025-02-07 RX ADMIN — DOCUSATE SODIUM 100 MG: 100 CAPSULE, LIQUID FILLED ORAL at 08:37

## 2025-02-07 RX ADMIN — Medication 1 TABLET: at 08:36

## 2025-02-07 RX ADMIN — Medication 7.5 MG: at 21:08

## 2025-02-07 RX ADMIN — POLYETHYLENE GLYCOL 3350 17 G: 17 POWDER, FOR SOLUTION ORAL at 08:36

## 2025-02-07 RX ADMIN — OXCARBAZEPINE 150 MG: 150 TABLET, FILM COATED ORAL at 08:37

## 2025-02-07 RX ADMIN — CHLORPROMAZINE HYDROCHLORIDE 300 MG: 100 TABLET, FILM COATED ORAL at 21:09

## 2025-02-07 RX ADMIN — Medication 7.5 MG: at 08:36

## 2025-02-07 RX ADMIN — LORAZEPAM 1.5 MG: 1 TABLET ORAL at 21:09

## 2025-02-07 RX ADMIN — LEVOTHYROXINE SODIUM 100 MCG: 0.05 TABLET ORAL at 08:36

## 2025-02-07 RX ADMIN — Medication 500 ML: at 08:38

## 2025-02-07 RX ADMIN — ACETAMINOPHEN 650 MG: 325 TABLET, FILM COATED ORAL at 10:18

## 2025-02-07 RX ADMIN — OXCARBAZEPINE 150 MG: 150 TABLET, FILM COATED ORAL at 21:09

## 2025-02-07 RX ADMIN — DULOXETINE HYDROCHLORIDE 30 MG: 30 CAPSULE, DELAYED RELEASE ORAL at 08:37

## 2025-02-07 RX ADMIN — Medication 5 MG: at 21:09

## 2025-02-07 ASSESSMENT — ACTIVITIES OF DAILY LIVING (ADL)
ADLS_ACUITY_SCORE: 48
DRESS: STREET CLOTHES;INDEPENDENT
ADLS_ACUITY_SCORE: 48
ORAL_HYGIENE: INDEPENDENT
ADLS_ACUITY_SCORE: 48
HYGIENE/GROOMING: INDEPENDENT
ADLS_ACUITY_SCORE: 48
LAUNDRY: UNABLE TO COMPLETE

## 2025-02-07 NOTE — PLAN OF CARE
BEH IP Unit Acuity Rating Score (UARS)  Patient is given one point for every criteria they meet.    CRITERIA SCORING   On a 72 hour hold, court hold, committed, stay of commitment, or revocation. 1    Patient LOS on BEH unit exceeds 20 days. 1  LOS: 35   Patient under guardianship, 55+, otherwise medically complex, or under age 11. 1   Suicide ideation without relief of precipitating factors. 0   Current plan for suicide. 0   Current plan for homicide. 0   Imminent risk or actual attempt to seriously harm another without relief of factors precipitating the attempt. 0   Severe dysfunction in daily living (ex: complete neglect for self care, extreme disruption in vegetative function, extreme deterioration in social interactions). 1   Recent (last 7 days) or current physical aggression in the ED or on unit. 0   Restraints or seclusion episode in past 72 hours. 0   Recent (last 7 days) or current verbal aggression, agitation, yelling, etc., while in the ED or unit. 0   Active psychosis. 1   Need for constant or near constant redirection (from leaving, from others, etc).  0   Intrusive or disruptive behaviors. 0   Patient requires 3 or more hours of individualized nursing care per 8-hour shift (i.e. for ADLs, meds, therapeutic interventions). 0   TOTAL 5

## 2025-02-07 NOTE — PLAN OF CARE
Problem: Adult Behavioral Health Plan of Care  Goal: Optimized Coping Skills in Response to Life Stressors  Outcome: Not Progressing  Intervention: Promote Effective Coping Strategies  Recent Flowsheet Documentation  Taken 2/7/2025 1043 by Diane Harvey RN  Supportive Measures:   positive reinforcement provided   relaxation techniques promoted   self-care encouraged   verbalization of feelings encouraged   Goal Outcome Evaluation:  /56   Pulse 101   Temp 97.7  F (36.5  C) (Temporal)   Resp 16   Wt 99.5 kg (219 lb 5.7 oz)   SpO2 98%   BMI 32.39 kg/m        Pt presenting flat and blunted out of her room with lots of encouragement . Pt out but isolative . Pt refuses to attend groups .  She did have  a hard time taking her medication but took them after much encouragement . Pt ate well for both meals .  present during  MH assessment  questioner at which time pt denied all MH Sx .   Pt B/P continue to be an issues . Pt sitting this morning was 109/56 sitting but refused to have B/P taken standing Medicine updated . Pt was encouraged to drink fluid . Pt did take in a total of 700 for breakfast and 500 for lunch but refused a B/p retake .   Pt refused to take shower .

## 2025-02-07 NOTE — PLAN OF CARE
Team Note Due:  Monday    Assessment/Intervention/Current Symtoms and Care Coordination:  Chart review and met with team, discussed pt progress, symptomology, and response to treatment.  Discussed the discharge plan and any potential impediments to discharge. Pt under commitment, edu and kezia porras. Pt requires a Congolese . Pt may start ECT if the provider deems it necessary, no decision has been made as of yet. Provider relayed plan to try to connect with pt's family to discuss ECT further.     Writer put out call to commitment Isaura BURROUGHS ([phone]) and left detailed voicemail relaying pt's presentation and updates regarding price porras order. Writer also put out email ([email or web address]) to establish further communication and updates.     Discharge Plan or Goal:  Pending stabilization and safe disposition planning; considerations include:  TBD. Per different reports from different family members, pt has an assisted living apartment but was staying in the family home PTA. It is unclear at this time where pt will return at discharge      Barriers to Discharge:  Patient requires further psychiatric stabilization due to current symptomology, medication management with changes subject to provider, coordination with outside supports, and aftercare planning. Pt awaiting to start ECT if the provider still recommends this treatment      Referral Status:  MA application was submitted on 1/29     Legal Status:  Committed MI with ITP and Kezia Porras  [facility]  70-KG-MC-   ITP includes: Risperdal, Thorazine, Haldol, and Zyprexa  Expires: 06/10/2025    Contacts (include CLYDE status):  Gifty - daughter (CLYDE)  P: [phone]    Tevin - son (CLYDE)  P: [phone]     Patrick - son (CLYDE)  P: [phone]     Isaura - commitment CM through [facility] (CLYDE per commitment)  P: [phone]     [facility] Attorney (No CLYDE per  commitment)  [email or web address].     Upcoming Meetings and Dates/Important Information and next steps:  CTC to coordinate with pt, outside supports, and treatment team regarding discharge planning   CTC to update the family if pt is placed on the ECT schedule

## 2025-02-07 NOTE — PROGRESS NOTES
Brief Medicine Note    Medicine following for orthostatic hypotension, multiple interventions ordered 2/6. Discussed with bedside RN, pt unfortunately refused orthostatic vitals this AM so unable to see if current interventions helping. Nurse reports no symptoms of orthostasis, pt drank all of Pedialyte and has had additional fluids this AM. RN without acute concerns.  Did review history and noted pt has a history of hypothyroidism so will also add on TSH. Medicine will continue to follow results of this and for orthostatic hypotension, please don't hesitate to reach out should any questions or concerns arise.       Deisi Kendrick PA-C

## 2025-02-07 NOTE — PROVIDER NOTIFICATION
02/07/25 0700   Vital Signs   Temp 97.7  F (36.5  C)   Temp src Temporal   Pulse 101   Pulse Rate Source Monitor   /56     Pt refused standing B/P . Ativan held . Pt drank 400ml of her Pedialyte . Pt encouraged to drink

## 2025-02-07 NOTE — PROGRESS NOTES
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 necessary for proper treatment of the patient. My plans for post-hospital care for this patient are : Medications, appointments     Dona Trotter MD

## 2025-02-07 NOTE — PLAN OF CARE
Goal Outcome Evaluation:    Plan of Care Reviewed With: patient      Problem: Adult Behavioral Health Plan of Care  Goal: Plan of Care Review  Outcome: Progressing  Flowsheets (Taken 2/6/2025 5846)  Patient Agreement with Plan of Care: agrees       Pt presented with flat/blunted affect, calm/sad mood during check in. Pt endorsed mild sadness 1/10, denied anxiety, pain, SI, HI,hallucinations (sometimes hears voices but not today), and contracted for safety. Pt was visible in the milieu, not social or engaged with peers/staff. Pt had episodes of staring at the ceiling, had to be reminded to go to bed, was sitting a lone in the lounge. Attend with a lot of encouragement, but pt did participate in committee meeting. Compliant with all scheduled medications. No safety concerns observed this shift.

## 2025-02-07 NOTE — PLAN OF CARE
Assessment/Intervention/Current Symtoms and Care Coordination:  Chart review and met with team, discussed pt progress, symptomology, and response to treatment. Discussed the discharge plan and any potential impediments to discharge.    Met with Angela to check in. She was pleasant throughout. She was engaging with internal stimuli as evidenced by talking and laughing to herself.     Treatment recommendation was sent to the [facility].     Discharge Plan or Goal:  Patient presents with symptoms of psychosis that put her at risk of harming herself and others. When patient stabilizes considerations include home with family or IRTS.     Barriers to Discharge:  Patient presents with symptoms of psychosis that put her at risk of harming herself and others.      Referral Status:  Referral needs being assessed as patient stabilizes      Legal Status:  Court Hold    County: [place]  File: [id number]  Final: 8/30 at 9am    Contacts:  Daughter: Mya, [phone] (Consent)  Aunt: Vickie, [phone] (Consent)  Son: Golden, [phone]   Cousin: Lashawn [phone] (Consent)  Prepetition screener: Carlos Rushing, [phone]  Her : John Maynard, [phone]     Upcoming Meetings and Dates/Important Information and next steps:  Final hearing 8/30 at 9am    Team Note Due:  Friday   lives at home with son and daughter in law

## 2025-02-08 LAB
FERRITIN SERPL-MCNC: 139 NG/ML (ref 11–328)
FERRITIN SERPL-MCNC: 139 NG/ML (ref 11–328)
FOLATE SERPL-MCNC: 32.4 NG/ML (ref 4.6–34.8)
FOLATE SERPL-MCNC: 32.4 NG/ML (ref 4.6–34.8)
HOLD SPECIMEN: NORMAL
HOLD SPECIMEN: NORMAL
IRON BINDING CAPACITY (ROCHE): 246 UG/DL (ref 240–430)
IRON BINDING CAPACITY (ROCHE): 246 UG/DL (ref 240–430)
IRON SATN MFR SERPL: 24 % (ref 15–46)
IRON SATN MFR SERPL: 24 % (ref 15–46)
IRON SERPL-MCNC: 58 UG/DL (ref 37–145)
IRON SERPL-MCNC: 58 UG/DL (ref 37–145)
TRANSFERRIN SERPL-MCNC: 209 MG/DL (ref 200–360)
TRANSFERRIN SERPL-MCNC: 209 MG/DL (ref 200–360)
VIT B12 SERPL-MCNC: 409 PG/ML (ref 232–1245)
VIT B12 SERPL-MCNC: 409 PG/ML (ref 232–1245)
VIT D+METAB SERPL-MCNC: 25 NG/ML (ref 20–50)
VIT D+METAB SERPL-MCNC: 25 NG/ML (ref 20–50)

## 2025-02-08 PROCEDURE — 36415 COLL VENOUS BLD VENIPUNCTURE: CPT

## 2025-02-08 PROCEDURE — 250N000013 HC RX MED GY IP 250 OP 250 PS 637: Performed by: NURSE PRACTITIONER

## 2025-02-08 PROCEDURE — 250N000013 HC RX MED GY IP 250 OP 250 PS 637: Performed by: PSYCHIATRY & NEUROLOGY

## 2025-02-08 PROCEDURE — 250N000013 HC RX MED GY IP 250 OP 250 PS 637

## 2025-02-08 PROCEDURE — 124N000002 HC R&B MH UMMC

## 2025-02-08 PROCEDURE — 99207 PR NO BILLABLE SERVICE THIS VISIT: CPT

## 2025-02-08 PROCEDURE — 82306 VITAMIN D 25 HYDROXY: CPT | Performed by: NURSE PRACTITIONER

## 2025-02-08 PROCEDURE — 99233 SBSQ HOSP IP/OBS HIGH 50: CPT | Performed by: NURSE PRACTITIONER

## 2025-02-08 PROCEDURE — 82746 ASSAY OF FOLIC ACID SERUM: CPT

## 2025-02-08 PROCEDURE — 82607 VITAMIN B-12: CPT

## 2025-02-08 RX ORDER — HALOPERIDOL 5 MG/1
5 TABLET ORAL 2 TIMES DAILY
Status: DISCONTINUED | OUTPATIENT
Start: 2025-02-08 | End: 2025-02-15

## 2025-02-08 RX ADMIN — CHLORPROMAZINE HYDROCHLORIDE 300 MG: 100 TABLET, FILM COATED ORAL at 20:12

## 2025-02-08 RX ADMIN — SERTRALINE HYDROCHLORIDE 25 MG: 25 TABLET ORAL at 10:22

## 2025-02-08 RX ADMIN — Medication 1 TABLET: at 10:22

## 2025-02-08 RX ADMIN — Medication 500 ML: at 10:23

## 2025-02-08 RX ADMIN — POLYETHYLENE GLYCOL 3350 17 G: 17 POWDER, FOR SOLUTION ORAL at 10:22

## 2025-02-08 RX ADMIN — OXCARBAZEPINE 150 MG: 150 TABLET, FILM COATED ORAL at 10:22

## 2025-02-08 RX ADMIN — DOCUSATE SODIUM 100 MG: 100 CAPSULE, LIQUID FILLED ORAL at 10:22

## 2025-02-08 RX ADMIN — LEVOTHYROXINE SODIUM 100 MCG: 0.05 TABLET ORAL at 10:22

## 2025-02-08 RX ADMIN — LORAZEPAM 1.5 MG: 1 TABLET ORAL at 11:30

## 2025-02-08 RX ADMIN — Medication 5 MG: at 20:13

## 2025-02-08 RX ADMIN — HALOPERIDOL 5 MG: 5 TABLET ORAL at 20:12

## 2025-02-08 RX ADMIN — LORAZEPAM 1.5 MG: 1 TABLET ORAL at 20:13

## 2025-02-08 RX ADMIN — OXCARBAZEPINE 150 MG: 150 TABLET, FILM COATED ORAL at 20:13

## 2025-02-08 ASSESSMENT — ACTIVITIES OF DAILY LIVING (ADL)
ADLS_ACUITY_SCORE: 48
ADLS_ACUITY_SCORE: 48
DRESS: STREET CLOTHES;INDEPENDENT
ADLS_ACUITY_SCORE: 48
ORAL_HYGIENE: INDEPENDENT
ADLS_ACUITY_SCORE: 48
LAUNDRY: UNABLE TO COMPLETE
ADLS_ACUITY_SCORE: 48
HYGIENE/GROOMING: INDEPENDENT
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
## 2025-02-08 NOTE — PLAN OF CARE
Problem: Adult Behavioral Health Plan of Care  Goal: Optimized Coping Skills in Response to Life Stressors  Outcome: Not Progressing   Goal Outcome Evaluation:  Pt was noted to be sitting on her toilet for an hour  and half . Writer want in and with encouragement pt got off the toilet but went back to bed stating she was too tired to come for breakfast . Pt B/p sitting at the time was 126/78 but pt refused to have B/P taken while standing.  when it came time for her to stand and have her B/P taken , pt refused . Pt also refused to come out for breakfast . Pt slept in for a while and later came out and drink 600ml of fluid and ate a muffin .   Later writer , along with pt NP was able to get pt to do her orthostatic B/P  sitting 120/60 standing 102/60 . Pt ate 75% of her lunch and drank 500 ml   Total fluid intake for this shift  1100 ml   Pt voided but no BM . Pt refused to participate in MH assessment . Pt refused to be weigh . Will continue POC

## 2025-02-08 NOTE — PLAN OF CARE
Problem: Anxiety Signs/Symptoms  Goal: Optimized Energy Level (Anxiety Signs/Symptoms)  Outcome: Progressing     Goal Outcome Evaluation:    Pt was in bed sleeping at the start of the shift. Slept for 8 hours. No concern reported, nor noted this shift. Safety checks in place and  maintained. Will continue to monitor per plan of care.

## 2025-02-08 NOTE — PLAN OF CARE
Problem: Adult Behavioral Health Plan of Care  Goal: Plan of Care Review  Outcome: Not Progressing  Flowsheets  Taken 2/7/2025 2212  Plan of Care Reviewed With: patient  Patient Agreement with Plan of Care: agrees with comment (describe)  Taken 2/7/2025 2002  Patient Agreement with Plan of Care: agrees    Patient was flat and blunted. Quiet and withdrawn. Appeared paranoid. She was sitting at the lounge at times. And was also observed resting in bed. Checked-in with her with an  present. She denied all mental health symptoms. She also denied pain and side effects of meds. She's on a cheeking precaution. Writer prompted and encouraged her to take her medications. It took at least 20 minutes before she swallowed all her meds.

## 2025-02-08 NOTE — PROGRESS NOTES
Brief Medicine Note:  Internal medicine consulted on 01/04 for medical co-management. Please see consult note and follow up on 02/06 for full details.    Medicine has continued to follow for orthostatic hypotension. Patient has been drinking Pedialyte daily for the past few days. She is largely asymptomatic except for mild dizziness when transitioning from sitting to standing. Two orthostatic BPs obtained today with significant improvement, < 20 pt drop in systolic blood pressure. Checked iron studies, folate and B12 to see if mild anemia contributing to dizziness. Iron studies normal, folate and B12 pending.   - If folate/B12 levels return low, recommend starting supplementation  - Recommend continuing daily Pedialyte  - Recommend checking orthostatic vitals PRN if patient is symptomatic  - Recommend compression stockings if deemed safe by psychiatry  - Recommend careful, slow transitions from sitting to standing  - Nursing to notify provider if patient develops new chest pain, shortness of breath, palpitations, headache, or dizziness.    Temp: 98  F (36.7  C) Temp src: Temporal BP: 120/60 Pulse: 80   Resp: 18 SpO2: 98 %   Weight: 99.5 kg (219 lb 5.7 oz)    Currently the patient is medically stable and Medicine will sign off at this time. Recommendations relayed to primary team via this progress note. Please notify on-call [facility] if new questions or concerns arise.      Margo Henning PA-C  Hospital Medicine [facility]    [employer]  Securely message with Fetch It (more info)  Text page via [facility] Paging/Directory

## 2025-02-08 NOTE — PLAN OF CARE
Problem: Adult Behavioral Health Plan of Care  Goal: Plan of Care Review  Flowsheets  Taken 2/8/2025 1710  Plan of Care Reviewed With: patient  Overall Patient Progress: no change  Patient Agreement with Plan of Care: agrees    Patient was visible in the milieu this shift. Flat and blunted affect. Guarded and appeared paranoid. MH check-in completed with the presence of an . She denied being anxious and depressed. Denied SI, SIB, AVH and HI. She denied pain and side effects of meds. She attended and participated in the community meeting before supper. She ate 25% with 240 ml of bottled water. She has been watching TV this evening. Hesitant to take medications but writer encouraged and prompted her several times before she took them all with a cup of water. Patient on cheeking precautions. Prompted her to open her mouth after taking her meds, appeared she swallowed them all. Will continue to monitor and assess pt.
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
## 2025-02-09 PROCEDURE — 250N000013 HC RX MED GY IP 250 OP 250 PS 637: Performed by: PSYCHIATRY & NEUROLOGY

## 2025-02-09 PROCEDURE — 250N000013 HC RX MED GY IP 250 OP 250 PS 637: Performed by: NURSE PRACTITIONER

## 2025-02-09 PROCEDURE — 250N000013 HC RX MED GY IP 250 OP 250 PS 637

## 2025-02-09 PROCEDURE — 124N000002 HC R&B MH UMMC

## 2025-02-09 RX ADMIN — LEVOTHYROXINE SODIUM 100 MCG: 0.05 TABLET ORAL at 08:59

## 2025-02-09 RX ADMIN — OXCARBAZEPINE 150 MG: 150 TABLET, FILM COATED ORAL at 19:55

## 2025-02-09 RX ADMIN — CHLORPROMAZINE HYDROCHLORIDE 300 MG: 100 TABLET, FILM COATED ORAL at 19:54

## 2025-02-09 RX ADMIN — OXCARBAZEPINE 150 MG: 150 TABLET, FILM COATED ORAL at 08:59

## 2025-02-09 RX ADMIN — Medication 500 ML: at 08:57

## 2025-02-09 RX ADMIN — HALOPERIDOL 5 MG: 5 TABLET ORAL at 19:55

## 2025-02-09 RX ADMIN — SERTRALINE HYDROCHLORIDE 25 MG: 25 TABLET ORAL at 08:59

## 2025-02-09 RX ADMIN — LORAZEPAM 1.5 MG: 1 TABLET ORAL at 19:54

## 2025-02-09 RX ADMIN — DOCUSATE SODIUM 100 MG: 100 CAPSULE, LIQUID FILLED ORAL at 08:59

## 2025-02-09 RX ADMIN — POLYETHYLENE GLYCOL 3350 17 G: 17 POWDER, FOR SOLUTION ORAL at 08:59

## 2025-02-09 RX ADMIN — Medication 1 TABLET: at 08:59

## 2025-02-09 RX ADMIN — LORAZEPAM 1.5 MG: 1 TABLET ORAL at 11:15

## 2025-02-09 RX ADMIN — HALOPERIDOL 5 MG: 5 TABLET ORAL at 11:15

## 2025-02-09 RX ADMIN — Medication 5 MG: at 19:55

## 2025-02-09 ASSESSMENT — ACTIVITIES OF DAILY LIVING (ADL)
ADLS_ACUITY_SCORE: 48
ORAL_HYGIENE: INDEPENDENT
ADLS_ACUITY_SCORE: 48
HYGIENE/GROOMING: INDEPENDENT
ADLS_ACUITY_SCORE: 48
DRESS: INDEPENDENT;STREET CLOTHES
ADLS_ACUITY_SCORE: 48
LAUNDRY: UNABLE TO COMPLETE

## 2025-02-09 NOTE — PROGRESS NOTES
Brief Medicine Note    RN reached out because she is concerned about patients history of ortho stasis, pt unfortunately refusing orthostatic Bps this morning, yesterday orthostatic Bps negative for orthostatic hypotension. RN reports current BP is 109/74 and nurse reports she is asymptomatic. They have been encouraging Pedialyte but pt is less interested in that today, I recommended trying other fluids and recommended continuing to encourage fluids. Unfortunately it does not appear they have tried compression stockings yet, nursing is going to discuss with primary team. Nursing was concerned about giving patient some of her psych medications I recommended she reach out to psychiatry to further discuss this, I will defer to them. Nursing also requesting salt tabs for patient but I do not see indication for this at this time, did place RD consult. Medicine signed off yesterday, will stay signed off unless patient develops new symptoms, orthostasis returns or other new concerns arise. Please notify medicine if patient is amenable to orthostatic Bps and they are positive, she develops hypotension, she becomes symptomatic with fluctuations in BP, she is not tolerating PO or develops other new symptoms. Thank you for allowing us to be involved in this patients care.     Deisi Kendrick PA-C

## 2025-02-09 NOTE — PLAN OF CARE
Problem: Adult Behavioral Health Plan of Care  Goal: Optimized Coping Skills in Response to Life Stressors  Outcome: Not Progressing  Intervention: Promote Effective Coping Strategies  Recent Flowsheet Documentation  Taken 2/9/2025 0911 by Diane Harvey RN  Supportive Measures:   goal-setting facilitated   positive reinforcement provided   verbalization of feelings encouraged   Goal Outcome Evaluation:    Plan of Care Reviewed With: patient    /74 (BP Location: Left arm, Patient Position: Sitting)   Pulse 98   Temp 98.4  F (36.9  C)   Resp 14   Wt 99.5 kg (219 lb 5.7 oz)   SpO2 98%   BMI 32.39 kg/m    Pt unable to tolerate standing to do a orthostatic B/P but was able to drink a total of 1400 of fluid today with both meals . Pt did not complained of dizziness or weakness this shift . Pt have been medication complaint  and ate well for both meals . Pt interpretor but pt refused to participate in MH assessment questioner.

## 2025-02-09 NOTE — PLAN OF CARE
Problem: Anxiety Signs/Symptoms  Goal: Optimized Energy Level (Anxiety Signs/Symptoms)  Outcome: Progressing     Goal Outcome Evaluation:    Pt was in bed sleeping at the start of the shift. Slept for 8.25 hours. No concern reported, nor noted this shift. Safety checks in place and  maintained. Will continue to monitor per plan of care.

## 2025-02-09 NOTE — PROVIDER NOTIFICATION
02/09/25 0900   Vital Signs   /74   BP Location Left arm   Patient Position Sitting     Writer attempted to take to pt  B/P standing but pt stood up sat  back down,  refusing to  stand back up to have Orthostatic B/P taken  . Today pt is refusing to drink her pedialyte . Medicine updated

## 2025-02-10 ENCOUNTER — OFFICE VISIT (OUTPATIENT)
Dept: INTERPRETER SERVICES | Facility: CLINIC | Age: 60
DRG: 885 | End: 2025-02-10

## 2025-02-10 PROCEDURE — 250N000013 HC RX MED GY IP 250 OP 250 PS 637: Performed by: PSYCHIATRY & NEUROLOGY

## 2025-02-10 PROCEDURE — 124N000002 HC R&B MH UMMC

## 2025-02-10 PROCEDURE — 250N000013 HC RX MED GY IP 250 OP 250 PS 637: Performed by: NURSE PRACTITIONER

## 2025-02-10 PROCEDURE — 99232 SBSQ HOSP IP/OBS MODERATE 35: CPT | Performed by: PSYCHIATRY & NEUROLOGY

## 2025-02-10 PROCEDURE — 250N000013 HC RX MED GY IP 250 OP 250 PS 637

## 2025-02-10 PROCEDURE — 99254 IP/OBS CNSLTJ NEW/EST MOD 60: CPT | Performed by: PHYSICIAN ASSISTANT

## 2025-02-10 PROCEDURE — T1013 SIGN LANG/ORAL INTERPRETER: HCPCS

## 2025-02-10 RX ADMIN — CHLORPROMAZINE HYDROCHLORIDE 300 MG: 100 TABLET, FILM COATED ORAL at 20:28

## 2025-02-10 RX ADMIN — DOCUSATE SODIUM 100 MG: 100 CAPSULE, LIQUID FILLED ORAL at 08:29

## 2025-02-10 RX ADMIN — HALOPERIDOL 5 MG: 5 TABLET ORAL at 08:28

## 2025-02-10 RX ADMIN — OXCARBAZEPINE 150 MG: 150 TABLET, FILM COATED ORAL at 20:29

## 2025-02-10 RX ADMIN — LEVOTHYROXINE SODIUM 100 MCG: 0.05 TABLET ORAL at 08:28

## 2025-02-10 RX ADMIN — HALOPERIDOL 5 MG: 5 TABLET ORAL at 20:29

## 2025-02-10 RX ADMIN — SERTRALINE HYDROCHLORIDE 25 MG: 25 TABLET ORAL at 08:29

## 2025-02-10 RX ADMIN — Medication 5 MG: at 20:28

## 2025-02-10 RX ADMIN — Medication 1 TABLET: at 08:28

## 2025-02-10 RX ADMIN — LORAZEPAM 1.5 MG: 1 TABLET ORAL at 08:28

## 2025-02-10 RX ADMIN — Medication 500 ML: at 08:33

## 2025-02-10 RX ADMIN — POLYETHYLENE GLYCOL 3350 17 G: 17 POWDER, FOR SOLUTION ORAL at 08:29

## 2025-02-10 RX ADMIN — LORAZEPAM 1.5 MG: 1 TABLET ORAL at 20:28

## 2025-02-10 RX ADMIN — OXCARBAZEPINE 150 MG: 150 TABLET, FILM COATED ORAL at 08:29

## 2025-02-10 ASSESSMENT — ACTIVITIES OF DAILY LIVING (ADL)
ADLS_ACUITY_SCORE: 48
ADLS_ACUITY_SCORE: 48
LAUNDRY: UNABLE TO COMPLETE
ADLS_ACUITY_SCORE: 48
HYGIENE/GROOMING: INDEPENDENT
ADLS_ACUITY_SCORE: 48
ORAL_HYGIENE: INDEPENDENT
ADLS_ACUITY_SCORE: 48
ADLS_ACUITY_SCORE: 48
DRESS: INDEPENDENT
ADLS_ACUITY_SCORE: 48

## 2025-02-10 NOTE — PLAN OF CARE
"  Problem: Depressive Signs/Symptoms  Goal: Optimized Energy Level (Depressive Signs/Symptoms)  Outcome: Not Progressing   Goal Outcome Evaluation:    Patient has been hypoactive, isolative and withdrawn. Patient gives minimal answers to questions and needs a lot of encouragement to come out of room for meals. She took all of her scheduled medications, denies pain pain. Pt ate 75% of breakfast and 50% of lunch with total fluid intake of 1500 ml including Pedialyte. Pt refused to wear compression stockings stating \"I don't needed it\".                       "

## 2025-02-10 NOTE — PLAN OF CARE
BEH IP Unit Acuity Rating Score (UARS)  Patient is given one point for every criteria they meet.    CRITERIA SCORING   On a 72 hour hold, court hold, committed, stay of commitment, or revocation. 1    Patient LOS on BEH unit exceeds 20 days. 1  LOS: 38   Patient under guardianship, 55+, otherwise medically complex, or under age 11. 1   Suicide ideation without relief of precipitating factors. 0   Current plan for suicide. 0   Current plan for homicide. 0   Imminent risk or actual attempt to seriously harm another without relief of factors precipitating the attempt. 0   Severe dysfunction in daily living (ex: complete neglect for self care, extreme disruption in vegetative function, extreme deterioration in social interactions). 1   Recent (last 7 days) or current physical aggression in the ED or on unit. 0   Restraints or seclusion episode in past 72 hours. 0   Recent (last 7 days) or current verbal aggression, agitation, yelling, etc., while in the ED or unit. 0   Active psychosis. 1   Need for constant or near constant redirection (from leaving, from others, etc).  0   Intrusive or disruptive behaviors. 0   Patient requires 3 or more hours of individualized nursing care per 8-hour shift (i.e. for ADLs, meds, therapeutic interventions). 0   TOTAL 5

## 2025-02-10 NOTE — PLAN OF CARE
02/10/25 1242   Individualization/Patient Specific Goals   Patient Personal Strengths spiritual/Evangelical support;community support;family/social support   Patient Vulnerabilities history of unsuccessful treatment   Anxieties, Fears or Concerns Pt appears to respond to internal stimuli and intermittently experiences AH   Individualized Care Needs Pt requires Nigerian    Patient/Family-Specific Goals (Include Timeframe) Pt's family is involved   Interprofessional Rounds   Summary Pt transferred from [facility]. Pt was initially admitted for behavioral concerns and psychotic symptoms. Pt is now committed. Santos and mast porras petition were recently sent in. Pt had santos/price porras hearing on 1/17 with the hopes of treating pt with ECT. Santos and Mast Porras orders were granted on 1/31. Pt waiting to start ECT   Participants CTC;nursing;psychiatrist;OT   Behavioral Team Discussion   Participants Dr. Dona Trotter MD; Bibiana Vega [facility], CTC; Cecil Chicas RN; Jess Rush OT   Progress Pt requires Nigerian . Pt appears to be responding to internal stimuli and can present as confused at times. Pt is now off an SIO (was for intrusivess). Pt is medication compliant and eats well. Comes out of her room often but does not engage with other patients, but will engage with staff when they initiate. Santos and Mast Porras orders were granted on 1/31, pt waiting to start ECT   Anticipated length of stay 4+ weeks   Continued Stay Criteria/Rationale Medication management per psychiatry, stabilization of symptoms, aftercare planning, coordination with outside supports, ECT   Medical/Physical See H&P and provider notes   Precautions See below   Plan Medication management per psychiatry, stabilization of symptoms, aftercare planning, coordination with outside supports, ECT   Rationale for change in precautions or plan Provider confirms plan for pt to have ECT   Safety Plan Per unit protocol    Anticipated Discharge Disposition home with family;assisted living     PRECAUTIONS AND SAFETY    Behavioral Orders   Procedures    Cheeking Precautions (behavioral units)     Patient Observed swallowing PO medications; Patient asked to drink water after swallowing medication; Patient in Staff line of sight for 15 minutes after medication given; Mouth checks after PO administration (patient asked to open mouth and stick out their tongue).    Code 1 - Restrict to Unit    Code 2     Ultrasound    Elopement precautions    Fall precautions    Routine Programming     As clinically indicated    Status 15     Every 15 minutes.       Safety  Safety WDL: WDL  [place] [facility] [place] Behavior: calm  Observed Behavior (Comment): eating  Safety Measures: safety rounds completed  Diversional Activity: television  De-Escalation Techniques: reoriented  Suicidality: Status 15  Assault: status 15  Elopement Interventions: status 15, signs posted on unit entrance / exit doors  Additional Documentation:  (Fall)

## 2025-02-10 NOTE — PLAN OF CARE
Team Note Due:  Monday    Assessment/Intervention/Current Symtoms and Care Coordination:  Chart review and met with team, discussed pt progress, symptomology, and response to treatment.  Discussed the discharge plan and any potential impediments to discharge. Pt under commitment, edu and kezia porras. Pt requires a Senegalese . Provide relayed conversation with Patrick regarding ECT and confirmed pt will be receiving ECT.    Writer put out call to ECT and informed them that the provider requests pt be prepared to start ECT.     Writer received VM from financial counselor, Marta, who confirms that the MA application with [facility] was denied due to pt's income being over the limit. Marta reports that [facility] confirmed they received the application sent by Marta on 1/29/25. Marta requests that CTC obtain a note from pt's employer confirming that she is no longer working to send with application to show that pt is no longer receiving the high income.     Writer put out call to Patrick ([phone]) relaying report from Marta and inquired if he is able to obtain a letter from pt's employer confirming that she is no longer working. Writer LVM and requested CB.    Writer put out email to Marta relaying call out to Patrick.     Discharge Plan or Goal:  Pending stabilization and safe disposition planning; considerations include:  TBD. Per different reports from different family members, pt has an assisted living apartment but was staying in the family home PTA. It is unclear at this time where pt will return at discharge      Barriers to Discharge:  Patient requires further psychiatric stabilization due to current symptomology, medication management with changes subject to provider, coordination with outside supports, and aftercare planning. Pt awaiting to start ECT     Referral Status:  MA application was submitted on 1/29. Received by [facility]. Financial counselor is requesting letter from pt's employer  confirming pt is no longer working, CTC following up with family regarding this     Legal Status:  Committed MI with ITP and Alejandre Jeannette  [facility]  [id number]   ITP includes: Risperdal, Thorazine, Haldol, and Zyprexa  Expires: 06/10/2025    Contacts (include CLYDE status):  Gifty - daughter (CLYDE)  P: [phone]    Tevin - son (CLYDE)  P: [phone]     Tayamed - son (CLYDE)  P: [phone]     Isaura - commitment CM through [employer] (CLYDE per commitment)  P: [phone]     [facility] (No CLYDE per commitment)  [email or web address].     Upcoming Meetings and Dates/Important Information and next steps:  CTC to coordinate with pt, outside supports, and treatment team regarding discharge planning   CTC to update the family if pt is placed on the ECT schedule  CTC to obtain letter from pt's employer confirming she is no longer working (if family can obtain) and send to Marta

## 2025-02-10 NOTE — PLAN OF CARE
Problem: Adult Behavioral Health Plan of Care  Goal: Plan of Care Review  Outcome: Not Progressing  Flowsheets  Taken 2/9/2025 1935  Plan of Care Reviewed With: patient  Overall Patient Progress: no change  Patient Agreement with Plan of Care: agrees    Patient was sleeping in bed at the start of the shift. She was awake when her family came to visit with her. Family brought food from home and pt was able to eat 100%. She did not eat her supper tray at 1700. She took a shower with staff's assistance. Per staff, pt appeared like she's confused as she voided on the bathroom floor. Staff had to prompt and assist her while on the shower. Mental health assessment completed with an . For the last 2 days, she denied having MH symptoms. However, this shift, she admitted to having auditory hallucinations. She did not elaborate what the voices were telling her. She also endorsed mild anxiety and depression. Denied SI, SIB. Pt took her medications with encouragement. Mild tremors noted to bilateral hands but pt denied pain and side effects of meds. Nursing to continue to monitor pt for cheeking precaution.

## 2025-02-10 NOTE — PLAN OF CARE
Plan of Care Reviewed With: patient Plan of Care Reviewed With: patient    Overall Patient Progress: no changeOverall Patient Progress: no change    Pt's BP was WDL. Pls see flowsheet. No signs of orthostatic hypotension this shift.

## 2025-02-10 NOTE — CONSULTS
[facility]  Internal Medicine Consult    Becky Decker MRN# [id number]   Age: 59 year old YOB: 1965     Date of Admission: 1/3/2025  Date of Consult:  2/10/2025    Requesting Service: Behavioral Health - Rob Loaiza MD  Reason for Consult: Pre ECT Medicine Evaluation   Indication for ECT: Psychosis     Chief Complaint: Psychosis   HPI:   Becky Decker is a 59 year old female with PMH significant for schizoaffective disorder, prediabetes, HTN, hypothyroidism who was admitted on 1/3/2025 to inpatient Psychiatry for acute psychosis.     Medicine was consulted for ECT clearance.     Patient was seen with phone Dominican . Patient was very challenging to obtain hx from. Denies having ECT prior or having anesthesia in the past. Unable to provide further responses to questions including history or review of systems. Asked RN if patient would be more ept to obtaining history with in person Dominican , but notes that she has not been responsive to questions.     Review of Systems:   Cardiovascular: unable to obtain as patient does not participate in questions.   Pulmonary: unable to obtain as patient does not participate in questions  Neurological: unable to obtain as patient does not participate in questions     Past Medical History:   Prior Anesthesia: No  If yes, any complications: NA    Prior ECT: No  If yes,   NA  Response: NA  Side Effects: NA    This history was obtained by chart review by assessing patient's alternative chart   Cardiovascular: CAD No, MI No, HTN Yes, CHF No, pacemaker or ICD No  Pulmonary: Asthma/COPD No, Prior respiratory failure or need for emergent intubation No, On theophylline No (note that theophylline use is a contraindication to proceeding with ECT, needs to be tapered off prior)  Neurological: Brain tumor No, TIA/CVA No, Dementia No, Neuromuscular Disease (including post polio syndrome) No, Seizures and/or Epilepsy Yes, has  possibly hx of seizure episode in , Head Injury No, Intracranial Hemorrhage No    Past Surgical History:   No past surgical history on file.    Allergies:      Allergies   Allergen Reactions    Pork-Derived Products        Medication list reviewed.    Physical Exam:  BP (!) 141/78 (BP Location: Right arm, Patient Position: Sitting, Cuff Size: Adult Large)   Pulse 93   Temp 97.1  F (36.2  C) (Temporal)   Resp 16   Wt 99.5 kg (219 lb 5.7 oz)   SpO2 97%   BMI 32.39 kg/m     Cardiovascular: RRR. S1, S2. No murmurs, rubs, gallops.   Pulmonary: Effort normal. Lungs CTAB with no wheezing, rales, rhonchi.   Neurological: does not engage to assess orientation questions, but patient is alert. No focal deficits. PERRLA.    Data:  EKG:Normal, Normal sinus rhythm   normal EKG, normal sinus rhythm  Head Imagin2023 negative for acute pathology   Labs:   CBC:  Recent Labs   Lab Test 25  1137 24  1139   WBC 4.6 6.8   RBC 3.26* 4.05   HGB 10.2* 12.6   HCT 31.8* 38.8   MCV 98 96   MCH 31.3 31.1   MCHC 32.1 32.5   RDW 12.6 12.4   PLT  --  279     CMP:  Recent Labs   Lab Test 25  0914 25  1137    135   POTASSIUM 4.3 4.6   CHLORIDE 100 102   AMADOR 9.4 9.2   CO2 23 23   BUN 16.3 15.8   CR 0.82 0.75   GLC 98 94   AST  --  15   ALT  --  15   BILITOTAL  --  0.2   ALBUMIN  --  3.7   PROTTOTAL  --  6.9   ALKPHOS  --  69     HCG:   Not indicated       ASSESSMENT & PLAN    # ECT Risk Assessment     Cardiac Risk:   - Revised Cardiac Index Score is 0 based upon negative hx of CVA, CHF, diabetes, ischemic heart disease. This indicates an approximate  3.9% 30 day risk of post-anesthesia cardiac death including MI or cardiac arrest. Given patient does not participate in exam to further assess, unable to determine if patient is medically optimized for procedure. Would defer to anesthesia and Psychiatry team.     Schizoaffective disorder, bipolar type   Psychosis   Catatonia   Ruby   -Management per  Psychiatry     Hx of prediabetes: A1c 5.6 11/2024. No PTA meds     HTN: patient previously on hydrochlorothiazide, but no longer taking, blood pressure current stable.     Hx of hypothyroidism: continue PTA levothyroxine     Normocytic anemia  No sx or sxs of acute bleeding. Recent iron, B12, and folate levels unremarkable.     Recommendations:  - Cardiac risks as above.   - Ok to continue PTA levothyroxine on dates of ECT.     - Given unable to obtain history or assess current symptoms from patient, unable to determine if there is an absolute medical contraindications to proceeding with ECT at this time. Treatment plan per psychiatry.     Medicine will sign off at this time. Thank you for the consult, no further recommendations at this time       Ana Singh PA-C  Internal Medicine [facility]  [facility]  Pager [phone]

## 2025-02-10 NOTE — PROGRESS NOTES
"CLINICAL NUTRITION SERVICES - ASSESSMENT NOTE    RECOMMENDATIONS FOR MDs/PROVIDERS TO ORDER:  None at this time    Malnutrition Status:    Patient does not meet two of the established criteria necessary for diagnosing malnutrition    Registered Dietitian Interventions:  -Education on recommendations for management of orthostatic hypotension    Future/Additional Recommendations:  RD to sign off at this time, but will remain available by consult if new nutrition problem arises.       REASON FOR ASSESSMENT  Provider order - patient with history of orthostatic hypotension, recommendations on any nutrition supplements/ etc that may help?    SUBJECTIVE INFORMATION  Assessed patient in room.    NUTRITION HISTORY  Pt appeared withdrawn and provided minimal responses/acknowledgement to writer's questions and education. Provided recommendations for management of orthostatic hypotension including small, frequent meals, avoidance of caffeine and alcohol, and adequate hydration. Pt endorses drinking Pedialyte daily. She denied any questions or concerns at this time.     CURRENT NUTRITION ORDERS  Diet: Regular - no pork  Supplement:  none  Allergies: Pork-derived products    CURRENT INTAKE/TOLERANCE  Eating 50-75% of meals recorded per RN notes    LABS  Nutrition-relevant labs: Reviewed    MEDICATIONS  Nutrition-relevant medications: Reviewed    ANTHROPOMETRICS  Height: 175.3 cm (5' 9\")  Most Recent Weight: 99.5 kg (219 lb 5.7 oz)  IBW: 65.9 kg   % IBW: 150%  BMI (kg/m ): Obesity Class I BMI 30-34.9  Weight History:   Date/Time Weight Weight Method   02/04/25 0824 99.5 kg (219 lb 5.7 oz) --   02/02/25 0800 99.3 kg (218 lb 14.7 oz) --   01/30/25 0813 101.6 kg (223 lb 15.8 oz) --   01/28/25 0759 100.5 kg (221 lb 9 oz) --   01/23/25 0741 100.5 kg (221 lb 9 oz) Standing scale   01/14/25 0751 101.2 kg (223 lb 1.7 oz) --   01/12/25 0800 103.3 kg (227 lb 11.8 oz) --   01/07/25 0907 100.4 kg (221 lb 5.5 oz) --   01/05/25 0900 99 kg (218 " lb 4.1 oz) --   01/03/25 2227 97.3 kg (214 lb 8.1 oz) --   No weight loss noted    Dosing Weight: 74 kg, based on adjusted wt    ASSESSED NUTRITION NEEDS  Estimated Energy Needs: 1480 - 1850 kcals/day (20 - 25 kcals/kg)  Justification: Obese  Estimated Protein Needs: 79 - 99 grams protein/day (0.8 - 1 grams of pro/kg)  Justification: Obesity guidelines  Estimated Fluid Needs: 1 mL/kcal  Justification: Maintenance    SYSTEM FINDINGS    Skin/wounds: none  GI symptoms: Pt denied N/V/D/C     MALNUTRITION  % Intake: No decreased intake noted  % Weight Loss: None noted  Subcutaneous Fat Loss: None observed  Muscle Loss: None observed  Fluid Accumulation/Edema: None noted  Malnutrition Diagnosis: Patient does not meet two of the established criteria necessary for diagnosing malnutrition  Malnutrition Present on Admission: No    NUTRITION DIAGNOSIS  No nutrition diagnosis at this time     INTERVENTIONS  Nutrition education application    Goals  Patient to consume % of nutritionally adequate meal trays TID, or the equivalent with supplements/snacks.     Monitoring/Evaluation  RD to sign off at this time, but will remain available by consult if new nutrition problem arises.      Argelia Choi MPH, RDN, LD  Behavioral Health Adult & Pediatric Dietitian  BEH Clinical Dietitian [place], Teams, or Desk: [phone]  Weekend/Holiday Vocera: Weekend Holiday Clinical Dietitian [Multi Site Groups]
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
## 2025-02-10 NOTE — PLAN OF CARE
Problem: Anxiety Signs/Symptoms  Goal: Optimized Energy Level (Anxiety Signs/Symptoms)  Outcome: Progressing     Goal Outcome Evaluation:    Pt was in bed sleeping at the start of the shift. Slept for 8.75 hours. No concern reported, nor noted this shift. Safety checks in place and  maintained. Up once to use the bathroom. Will continue to monitor per plan of care.

## 2025-02-11 DIAGNOSIS — F06.1 CATATONIA: Primary | ICD-10-CM

## 2025-02-11 PROCEDURE — 250N000013 HC RX MED GY IP 250 OP 250 PS 637: Performed by: PSYCHIATRY & NEUROLOGY

## 2025-02-11 PROCEDURE — 124N000002 HC R&B MH UMMC

## 2025-02-11 PROCEDURE — 250N000013 HC RX MED GY IP 250 OP 250 PS 637: Performed by: NURSE PRACTITIONER

## 2025-02-11 PROCEDURE — 99255 IP/OBS CONSLTJ NEW/EST HI 80: CPT | Performed by: PSYCHIATRY & NEUROLOGY

## 2025-02-11 PROCEDURE — 250N000013 HC RX MED GY IP 250 OP 250 PS 637

## 2025-02-11 PROCEDURE — 99232 SBSQ HOSP IP/OBS MODERATE 35: CPT | Performed by: PSYCHIATRY & NEUROLOGY

## 2025-02-11 RX ADMIN — HALOPERIDOL 5 MG: 5 TABLET ORAL at 20:45

## 2025-02-11 RX ADMIN — OXCARBAZEPINE 150 MG: 150 TABLET, FILM COATED ORAL at 08:01

## 2025-02-11 RX ADMIN — HALOPERIDOL 5 MG: 5 TABLET ORAL at 08:02

## 2025-02-11 RX ADMIN — DOCUSATE SODIUM 100 MG: 100 CAPSULE, LIQUID FILLED ORAL at 08:01

## 2025-02-11 RX ADMIN — Medication 1 TABLET: at 08:04

## 2025-02-11 RX ADMIN — LORAZEPAM 1.5 MG: 1 TABLET ORAL at 20:44

## 2025-02-11 RX ADMIN — OXCARBAZEPINE 150 MG: 150 TABLET, FILM COATED ORAL at 17:56

## 2025-02-11 RX ADMIN — LORAZEPAM 1.5 MG: 1 TABLET ORAL at 08:01

## 2025-02-11 RX ADMIN — POLYETHYLENE GLYCOL 3350 17 G: 17 POWDER, FOR SOLUTION ORAL at 08:01

## 2025-02-11 RX ADMIN — LEVOTHYROXINE SODIUM 100 MCG: 0.05 TABLET ORAL at 08:01

## 2025-02-11 RX ADMIN — CHLORPROMAZINE HYDROCHLORIDE 300 MG: 100 TABLET, FILM COATED ORAL at 20:43

## 2025-02-11 RX ADMIN — SERTRALINE HYDROCHLORIDE 25 MG: 25 TABLET ORAL at 08:02

## 2025-02-11 RX ADMIN — Medication 5 MG: at 20:44

## 2025-02-11 ASSESSMENT — ACTIVITIES OF DAILY LIVING (ADL)
ORAL_HYGIENE: INDEPENDENT
DRESS: STREET CLOTHES;INDEPENDENT
ADLS_ACUITY_SCORE: 48
LAUNDRY: UNABLE TO COMPLETE
ADLS_ACUITY_SCORE: 48
LAUNDRY: UNABLE TO COMPLETE
ADLS_ACUITY_SCORE: 48
ORAL_HYGIENE: INDEPENDENT
ADLS_ACUITY_SCORE: 48
HYGIENE/GROOMING: INDEPENDENT
ADLS_ACUITY_SCORE: 48
HYGIENE/GROOMING: INDEPENDENT
ADLS_ACUITY_SCORE: 48
DRESS: INDEPENDENT
ADLS_ACUITY_SCORE: 48

## 2025-02-11 NOTE — PLAN OF CARE
"Team Note Due:  Monday    Assessment/Intervention/Current Symtoms and Care Coordination:  Chart review and met with team, discussed pt progress, symptomology, and response to treatment.  Discussed the discharge plan and any potential impediments to discharge. Pt under commitment, edu and kezia porras. Pt requires a [place] . Pt waiting to be assessed by ECT prior to starting.     Pt unable to receive ECT at this time as the mast porras order states that \"[facility]\" can administer ECT. Writer put out call and email to [facility] attorney requesting this be changed to [facility].     Writer spoke with Patrick ([phone]) who confirms he already reached out to pt's last employer and they agreed to draft the letter stating pt is no longer working. Patrick relayed plan to send it to writer once he receives it.     Writer put out email to financial counselor, Marta, relaying update from Patrick.     Corinner put out another call to [facility] attorney requesting CB.    Writer spoke with [facility] attorney and inquired about changing price porras order. The  relayed plan to send an email to the referee to request the change be made, but was unsure if a formal request/petition or any other paperwork would be needed from treatment team. CTC was added to email and once the referee responds, next steps/needs should be explained.     Discharge Plan or Goal:  Pending stabilization and safe disposition planning; considerations include:  TBD. Per different reports from different family members, pt has an assisted living apartment but was staying in the family home PTA. It is unclear at this time where pt will return at discharge      Barriers to Discharge:  Patient requires further psychiatric stabilization due to current symptomology, medication management with changes subject to provider, coordination with outside supports, and aftercare planning. Pt awaiting to start " ECT     Referral Status:  MA application was submitted on 1/29. Received by [facility]. Financial counselor is requesting letter from pt's employer confirming pt is no longer working, CTC following up with family regarding this     Legal Status:  Committed MI with ITP and [place]  [facility]  12-GL-AS-   ITP includes: Risperdal, Thorazine, Haldol, and Zyprexa  Expires: 06/10/2025    Contacts (include CLYDE status):  Gifty - daughter (CLYDE)  P: [phone]    Tevin - son (CLYDE)  P: [phone]     Mohamed - son (CLYDE)  P: [phone]     Isaura - commitment CM through [facility] (CLYDE per commitment)  P: [phone]     [facility] (No CLYDE per commitment)  [email or web address]     Upcoming Meetings and Dates/Important Information and next steps:  CTC to coordinate with pt, outside supports, and treatment team regarding discharge planning   CTC to update the family if pt is placed on the ECT schedule  CTC to obtain letter from pt's employer confirming she is no longer working and send to [place]  CTC to continue to work towards obtaining amended price vern turcios

## 2025-02-11 NOTE — PLAN OF CARE
Problem: Psychotic Signs/Symptoms  Goal: Improved Behavioral Control (Psychotic Signs/Symptoms)  Outcome: Progressing   Goal Outcome Evaluation:    Plan of Care Reviewed With: patient      Patient presents in a calm mood with blunted flat affect. Pt is withdrawn and does not interact with others. Pt denies anxiety,depression and SI/SIB.  Pt gives minimal responses and needs encouragement to come out of room for meals and medications. Pt is medication compliant, denies pain. Pt ate 75% of meals with total fluid intake of 1250 ml.
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
## 2025-02-11 NOTE — PLAN OF CARE
"  Rehab Group    Start time: 10:15  End time: 11:10  Patient time total: 50 minutes (no charge due to passive participation)    attended partial group    #8 attended   Group Type: OT Clinic   Group Topic Covered: balanced lifestyle, cognitive activities, coping skills, emotional regulation, healthy leisure time, mindfulness, relaxation , and social skills     Group Session Detail:  Pt passively participated in occupational therapy clinic to facilitate coping skill exploration, creative expression within personally meaningful activities, and clinical observation of social, cognitive, and kinesthetic performance skills.      Patient Response/Contribution:  inattentive, withdrawn, and passive participation only     Patient Detail:  During check-in, patient reported feelings of love for her \"family\" specifically \"my mother.\" Creative project was set-up for patient; however, patient did not initiate any aspect of task. Patient sat quietly; intermittently closed eyes as group progressed. No social interaction with peers. No charge due to passive participation in group.         No Charge      Patient Active Problem List   Diagnosis    Ruby (H)    Psychosis (H)    Schizoaffective disorder, bipolar type (H)    PTSD (post-traumatic stress disorder)        "
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
## 2025-02-11 NOTE — PLAN OF CARE
BEH IP Unit Acuity Rating Score (UARS)  Patient is given one point for every criteria they meet.    CRITERIA SCORING   On a 72 hour hold, court hold, committed, stay of commitment, or revocation. 1    Patient LOS on BEH unit exceeds 20 days. 1  LOS: 39   Patient under guardianship, 55+, otherwise medically complex, or under age 11. 1   Suicide ideation without relief of precipitating factors. 0   Current plan for suicide. 0   Current plan for homicide. 0   Imminent risk or actual attempt to seriously harm another without relief of factors precipitating the attempt. 0   Severe dysfunction in daily living (ex: complete neglect for self care, extreme disruption in vegetative function, extreme deterioration in social interactions). 1   Recent (last 7 days) or current physical aggression in the ED or on unit. 0   Restraints or seclusion episode in past 72 hours. 0   Recent (last 7 days) or current verbal aggression, agitation, yelling, etc., while in the ED or unit. 0   Active psychosis. 1   Need for constant or near constant redirection (from leaving, from others, etc).  0   Intrusive or disruptive behaviors. 0   Patient requires 3 or more hours of individualized nursing care per 8-hour shift (i.e. for ADLs, meds, therapeutic interventions). 0   TOTAL 5

## 2025-02-11 NOTE — PLAN OF CARE
Problem: Adult Behavioral Health Plan of Care  Goal: Plan of Care Review  Outcome: Not Progressing  Flowsheets  Taken 2/10/2025 1943  Plan of Care Reviewed With: patient  Overall Patient Progress: no change  Patient Agreement with Plan of Care: agrees    Patient was isolative and withdrawn.Guarded. Appeared paranoid. Staying in her bedroom at early part of the shift. Needed a lot of prompting from staff. Encouraged her to get out for supper and she did, but she was not able to eat her food. She only drank one bottled water. At HS snack, she was able to consume 2 bottles of Ensure. Checked-in with pt with an  and she endorsed mild anxiety and depression. Denied SI, SIB, AVH and HI. Denied pain and side effects of medications. She attended the evening group therapy.  meds were given with lots of encouragement. Pt still on cheeking precaution.

## 2025-02-11 NOTE — PLAN OF CARE
Problem: Psychotic Signs/Symptoms  Goal: Increased Participation and Engagement (Psychotic Signs/Symptoms)  Outcome: Not Progressing   Goal Outcome Evaluation:         Received sitting quietly in the corner of the hallway, guarded and declined to answer questions. Pt independent with her ADL's.    All precautions in place  No intake  Visible in the lounge at 0530, quiet and with a blunted affect.  Only slept for 5 hours

## 2025-02-11 NOTE — PROGRESS NOTES
Rehab Group    Start time: 1900  End time: 1945  Patient time total: 45 minutes    attended full group    #9 attended   Group Type: recreation   Group Topic Covered: activity therapy, healthy leisure time, and social skills       Group Session Detail:  TR leisure group     Patient Response/Contribution: withdrawn       Patient Detail:    Pt attended the structured Therapeutic Recreation group, participating in a group activity. Pt did not participate in the leisure activity with a focus on social engagement to gain self-esteem, manage behaviors, improve social skills, decrease isolation, and reduce anxiety/depression.   Pt did not participate in the group activity and did not interact with anyone else in group. Pt seemed to only stay for group, as she was already in the group room with other patients and staff. At the end of group, pt was not receptive to any  prompts that group was over and that she could go to the lounge or her room. Pt did not respond to this writer saying her name or nonverbal cues as well. Pt ultimately left with the help from other unit staff.       No Charge      Patient Active Problem List   Diagnosis    Ruby (H)    Psychosis (H)    Schizoaffective disorder, bipolar type (H)    PTSD (post-traumatic stress disorder)

## 2025-02-12 ENCOUNTER — OFFICE VISIT (OUTPATIENT)
Dept: INTERPRETER SERVICES | Facility: CLINIC | Age: 60
DRG: 885 | End: 2025-02-12

## 2025-02-12 PROCEDURE — T1013 SIGN LANG/ORAL INTERPRETER: HCPCS | Mod: U3

## 2025-02-12 PROCEDURE — 250N000013 HC RX MED GY IP 250 OP 250 PS 637: Performed by: NURSE PRACTITIONER

## 2025-02-12 PROCEDURE — 90853 GROUP PSYCHOTHERAPY: CPT

## 2025-02-12 PROCEDURE — 250N000013 HC RX MED GY IP 250 OP 250 PS 637

## 2025-02-12 PROCEDURE — 99232 SBSQ HOSP IP/OBS MODERATE 35: CPT | Performed by: PSYCHIATRY & NEUROLOGY

## 2025-02-12 PROCEDURE — 250N000013 HC RX MED GY IP 250 OP 250 PS 637: Performed by: PSYCHIATRY & NEUROLOGY

## 2025-02-12 PROCEDURE — 124N000002 HC R&B MH UMMC

## 2025-02-12 RX ADMIN — Medication 500 ML: at 08:39

## 2025-02-12 RX ADMIN — OXCARBAZEPINE 150 MG: 150 TABLET, FILM COATED ORAL at 16:54

## 2025-02-12 RX ADMIN — HALOPERIDOL 5 MG: 5 TABLET ORAL at 20:26

## 2025-02-12 RX ADMIN — ACETAMINOPHEN 650 MG: 325 TABLET, FILM COATED ORAL at 16:54

## 2025-02-12 RX ADMIN — HALOPERIDOL 5 MG: 5 TABLET ORAL at 08:37

## 2025-02-12 RX ADMIN — CHLORPROMAZINE HYDROCHLORIDE 300 MG: 100 TABLET, FILM COATED ORAL at 20:26

## 2025-02-12 RX ADMIN — SERTRALINE HYDROCHLORIDE 25 MG: 25 TABLET ORAL at 08:37

## 2025-02-12 RX ADMIN — Medication 5 MG: at 20:26

## 2025-02-12 RX ADMIN — OXCARBAZEPINE 150 MG: 150 TABLET, FILM COATED ORAL at 08:37

## 2025-02-12 RX ADMIN — Medication 1 LOZENGE: at 21:35

## 2025-02-12 RX ADMIN — POLYETHYLENE GLYCOL 3350 17 G: 17 POWDER, FOR SOLUTION ORAL at 08:37

## 2025-02-12 RX ADMIN — LORAZEPAM 1.5 MG: 1 TABLET ORAL at 20:26

## 2025-02-12 RX ADMIN — LEVOTHYROXINE SODIUM 100 MCG: 0.05 TABLET ORAL at 09:35

## 2025-02-12 RX ADMIN — Medication 1 TABLET: at 08:37

## 2025-02-12 RX ADMIN — DOCUSATE SODIUM 100 MG: 100 CAPSULE, LIQUID FILLED ORAL at 08:37

## 2025-02-12 RX ADMIN — LORAZEPAM 1.5 MG: 1 TABLET ORAL at 08:37

## 2025-02-12 ASSESSMENT — ACTIVITIES OF DAILY LIVING (ADL)
ADLS_ACUITY_SCORE: 52
ADLS_ACUITY_SCORE: 48
HYGIENE/GROOMING: INDEPENDENT
ADLS_ACUITY_SCORE: 48
ADLS_ACUITY_SCORE: 52
ORAL_HYGIENE: INDEPENDENT
ADLS_ACUITY_SCORE: 48
ADLS_ACUITY_SCORE: 48
ADLS_ACUITY_SCORE: 52
ADLS_ACUITY_SCORE: 48
ADLS_ACUITY_SCORE: 48
ORAL_HYGIENE: INDEPENDENT
ADLS_ACUITY_SCORE: 48
ADLS_ACUITY_SCORE: 48
LAUNDRY: UNABLE TO COMPLETE
ADLS_ACUITY_SCORE: 48
DRESS: INDEPENDENT
ADLS_ACUITY_SCORE: 48
HYGIENE/GROOMING: INDEPENDENT
ADLS_ACUITY_SCORE: 52
DRESS: STREET CLOTHES;INDEPENDENT
ADLS_ACUITY_SCORE: 48
LAUNDRY: UNABLE TO COMPLETE
ADLS_ACUITY_SCORE: 48
ADLS_ACUITY_SCORE: 52
ADLS_ACUITY_SCORE: 52
ADLS_ACUITY_SCORE: 48

## 2025-02-12 NOTE — PLAN OF CARE
Team Note Due:  Monday    Assessment/Intervention/Current Symtoms and Care Coordination:  Chart review and met with team, discussed pt progress, symptomology, and response to treatment.  Discussed the discharge plan and any potential impediments to discharge. Pt under commitment, edu and kezia porras. Pt requires a Polish . Per staff, pt appears to have decompensated. Pt appears to respond to internal stimuli more often and is going in to other pt's rooms. Pt also appears catatonic at times.     Writer received email from [facility] with attached letter from pt's employer confirming she is no longer working. Writer sent letter to financial counselor, Marta, via email. Writer also put out return email to [facility] confirming that letter was received and sent to Marta.     Writer received amended price porras order via unit fax. Writer placed copy in the chart, sent copy to medical records. Writer sent order to ECT team. Per ECT team, due to their availability pt will most likely start ECT on 2/17.     Discharge Plan or Goal:  Pending stabilization and safe disposition planning; considerations include:  TBD. Per different reports from different family members, pt has an assisted living apartment but was staying in the family home PTA. It is unclear at this time where pt will return at discharge      Barriers to Discharge:  Patient requires further psychiatric stabilization due to current symptomology, medication management with changes subject to provider, coordination with outside supports, and aftercare planning. Pt awaiting to start ECT, most likely starting on 2/17     Referral Status:  MA application was submitted on 1/29. Received by [facility]. Financial counselor is requesting letter from pt's employer confirming pt is no longer working, this was sent to financial counselor on 2/12     Legal Status:  Committed MI with ITP and Kezia Porras  [facility]  [place]-   ITP includes:  Risperdal, Thorazine, Haldol, and Zyprexa  Expires: 06/10/2025    Contacts (include CLYDE status):  Gifty - daughter (CLYDE)  P: [phone]    Tevin - son (CLYDE)  P: [phone]     Patrick - son (CLYDE)  P: [phone]     Isaura - commitment CM through [facility] (CLYDE per commitment)  P: [phone]     [facility] (No CLYDE per commitment)  [email or web address]     Upcoming Meetings and Dates/Important Information and next steps:  CTC to coordinate with pt, outside supports, and treatment team regarding discharge planning   CTC to update the family if pt is placed on the ECT schedule

## 2025-02-12 NOTE — PLAN OF CARE
Problem: Psychotic Signs/Symptoms  Goal: Improved Behavioral Control (Psychotic Signs/Symptoms)  Outcome: Not Progressing   Goal Outcome Evaluation:    Plan of Care Reviewed With: patient      Patient is hypoactive, flat, withdrawn and slow to respond. Pt has not slept last night, was seen sitting in the area sleeping. She was encouraged to go to her room to sleep but she declined. Pt is preoccupied, appears to be responding to internal stimuli, laughing and talking to self. Pt responds slowly to question and gives minimal answers. She ate 75% of breakfast and 50% of lunch with total fluid intake of 1025 ml. Pt has bilateral lower extremity edema, declined compression stockings ordered by provider.

## 2025-02-12 NOTE — PROGRESS NOTES
This writer was doing the 15 minute check rounds at 1915 this evening and found the pt in another patent's restroom instead. Environmental service were called to clean for room 365.

## 2025-02-12 NOTE — PLAN OF CARE
Goal Outcome Evaluation:    Plan of Care Reviewed With: patient      Problem: Adult Behavioral Health Plan of Care  Goal: Plan of Care Review  Outcome: Progressing  Flowsheets (Taken 2/11/2025 1730)  Patient Agreement with Plan of Care: agrees     Pt presented with a flat/blunted affect, smiles some, calm mood during check in. Pt denied anxiety, depression, pain, SI, HI,hallucinations, and contracted for safety. Pt was visible in the milieu, not social or engaged with peers/staff. Attended briefly and did not participate in evening group. Compliant with all scheduled medications. Pt has bilateral feet edema, was encouraged and walked with staff a couple times in the hallway and lower part of bed was raised to help elevate legs in bed. Pt was found using a different pt's bathroom, writer reminded pt by pointing to big name label by her room. No other safety concerns observed this shift. Pt has ECT order set placed, no schedule. Pt was informed to be NPO from midnight, and was passed on in report to incoming RN.    Family visit, son ( Patrick Sparrow )asked and was given update including medication report for the last 10 days.

## 2025-02-12 NOTE — PLAN OF CARE
Group Attendance:  attended full group    Time session began: 1400  Time session ended: 1450  Patient's total time in group: 50    Total # Attendees   7   Group Type psychotherapeutic     Group Topic Covered insight improvement and relationships     Group Session Detail Patient participated in a process group on the topic of a Life Tree wherein patients laura a tree and filled in various elements that bring meaning to their life. Participants share about their past roots, values, goals and aspirations, positive influences in their lives and things they no longer want to have inhabit their lives. Participants processed afterwards how the exercise was for them.      Patient's response to the group topic/interactions:  attentive, Limited eye contact, required prompts or assistance to participate, and withdrawn     Patient Details: Patient was quiet and withdrawn during the group. The only time she responded was to share who the important people are in her life. She was very flat and sad.           [id number] - Group psychotherapy - 1 Session  Patient Active Problem List   Diagnosis    Ruby (H)    Psychosis (H)    Schizoaffective disorder, bipolar type (H)    PTSD (post-traumatic stress disorder)    Catatonia

## 2025-02-12 NOTE — PLAN OF CARE
Rehab Group    Start time: 1800  End time: 1900  Patient time total: 5 minutes    came in and out of group session    #7 attended   Group Type: occupational therapy   Group Topic Covered: cognitive activities, coping skills, healthy leisure time, and problem solving   Group Session Detail:OT Cognitive Wellness group: Patient engaged in a leisure activity with a visuospatial and cognitive component in order to promote: problem solving skills, improve attention, emphasize new learning, exercise cognitive skills, scanning, recall, healthy distraction, and foster leisure and relaxation.    Patient Response/Contribution:  Limited eye contact, inattentive, and withdrawn   Patient Detail:pt arrived to group after family visit. Pt was provided with set up of supplies and instructions for task. Pt did not make eye contact or verbally respond to therapist. Pt was observed scribbling on her provided paper. Pt then quietly stood up and walked toward door. Therapist assist pt with door and attempted further verbal engagement. Pt did not respond      No charge    Patient Active Problem List   Diagnosis    Ruby (H)    Psychosis (H)    Schizoaffective disorder, bipolar type (H)    PTSD (post-traumatic stress disorder)    Catatonia

## 2025-02-12 NOTE — PLAN OF CARE
Problem: Adult Behavioral Health Plan of Care  Goal: Plan of Care Review  Flowsheets  Taken 2/12/2025 1716  Plan of Care Reviewed With: patient  Overall Patient Progress: no change  Patient Agreement with Plan of Care: agrees    Patient was present in the milieu but withdrawn. Flat and blunted affect. Appeared sad. MH assessment completed with an . She endorsed mild anxiety and depression. She denied SI, SIB, AVH and HI. She complained of bilateral leg pain, rated 10/10. PRN Tylenol 650 mg was given at 1654. She ate 25% at supper. She was given her HS meds with lots of encouragement. No further complaints of pain noted.

## 2025-02-12 NOTE — PLAN OF CARE
BEH IP Unit Acuity Rating Score (UARS)  Patient is given one point for every criteria they meet.    CRITERIA SCORING   On a 72 hour hold, court hold, committed, stay of commitment, or revocation. 1    Patient LOS on BEH unit exceeds 20 days. 1  LOS: 40   Patient under guardianship, 55+, otherwise medically complex, or under age 11. 1   Suicide ideation without relief of precipitating factors. 0   Current plan for suicide. 0   Current plan for homicide. 0   Imminent risk or actual attempt to seriously harm another without relief of factors precipitating the attempt. 0   Severe dysfunction in daily living (ex: complete neglect for self care, extreme disruption in vegetative function, extreme deterioration in social interactions). 1   Recent (last 7 days) or current physical aggression in the ED or on unit. 0   Restraints or seclusion episode in past 72 hours. 0   Recent (last 7 days) or current verbal aggression, agitation, yelling, etc., while in the ED or unit. 0   Active psychosis. 1   Need for constant or near constant redirection (from leaving, from others, etc).  0   Intrusive or disruptive behaviors. 0   Patient requires 3 or more hours of individualized nursing care per 8-hour shift (i.e. for ADLs, meds, therapeutic interventions). 0   TOTAL 5

## 2025-02-12 NOTE — PLAN OF CARE
Problem: Depressive Signs/Symptoms  Goal: Improved Mood Symptoms (Depressive Signs/Symptoms)  Outcome: Not Progressing   Goal Outcome Evaluation:         Received sitting in the lounge, pt was verbally responsive to staff. Pt was made aware to be on NPO for possible ECT today. Pt stated she does not want to have ECT and stated she has not had ECT before.  Affect is flat and blunted, mood is calm.  Pt  encouraged to lay in bed with legs elevated. Declined to take sleep medication.  Admits to feeling anxious but also declined medication.  0500 responding to internal stimuli, laughing while seated in the koehler.  Pt has a medical bed to aid with mobility,   Pt did not sleep tonight, appeared tired but refused to lay down in bed.

## 2025-02-13 VITALS
WEIGHT: 219.36 LBS | TEMPERATURE: 97.7 F | OXYGEN SATURATION: 99 % | DIASTOLIC BLOOD PRESSURE: 74 MMHG | SYSTOLIC BLOOD PRESSURE: 146 MMHG | HEART RATE: 93 BPM | BODY MASS INDEX: 32.39 KG/M2 | RESPIRATION RATE: 18 BRPM

## 2025-02-13 PROCEDURE — 250N000013 HC RX MED GY IP 250 OP 250 PS 637: Performed by: PSYCHIATRY & NEUROLOGY

## 2025-02-13 PROCEDURE — 99232 SBSQ HOSP IP/OBS MODERATE 35: CPT | Performed by: PSYCHIATRY & NEUROLOGY

## 2025-02-13 PROCEDURE — 250N000013 HC RX MED GY IP 250 OP 250 PS 637

## 2025-02-13 PROCEDURE — 250N000013 HC RX MED GY IP 250 OP 250 PS 637: Performed by: NURSE PRACTITIONER

## 2025-02-13 PROCEDURE — 124N000002 HC R&B MH UMMC

## 2025-02-13 RX ADMIN — LORAZEPAM 1.5 MG: 1 TABLET ORAL at 20:15

## 2025-02-13 RX ADMIN — HALOPERIDOL 5 MG: 5 TABLET ORAL at 20:15

## 2025-02-13 RX ADMIN — OXCARBAZEPINE 150 MG: 150 TABLET, FILM COATED ORAL at 17:06

## 2025-02-13 RX ADMIN — DOCUSATE SODIUM 100 MG: 100 CAPSULE, LIQUID FILLED ORAL at 08:38

## 2025-02-13 RX ADMIN — HALOPERIDOL 5 MG: 5 TABLET ORAL at 08:37

## 2025-02-13 RX ADMIN — LEVOTHYROXINE SODIUM 100 MCG: 0.05 TABLET ORAL at 08:38

## 2025-02-13 RX ADMIN — Medication 500 ML: at 08:43

## 2025-02-13 RX ADMIN — OXCARBAZEPINE 150 MG: 150 TABLET, FILM COATED ORAL at 08:38

## 2025-02-13 RX ADMIN — POLYETHYLENE GLYCOL 3350 17 G: 17 POWDER, FOR SOLUTION ORAL at 08:37

## 2025-02-13 RX ADMIN — CHLORPROMAZINE HYDROCHLORIDE 300 MG: 100 TABLET, FILM COATED ORAL at 20:15

## 2025-02-13 RX ADMIN — SERTRALINE HYDROCHLORIDE 25 MG: 25 TABLET ORAL at 08:38

## 2025-02-13 RX ADMIN — LORAZEPAM 1.5 MG: 1 TABLET ORAL at 08:38

## 2025-02-13 RX ADMIN — Medication 5 MG: at 20:15

## 2025-02-13 RX ADMIN — Medication 1 TABLET: at 08:38

## 2025-02-13 ASSESSMENT — ACTIVITIES OF DAILY LIVING (ADL)
ADLS_ACUITY_SCORE: 52
ORAL_HYGIENE: INDEPENDENT
LAUNDRY: UNABLE TO COMPLETE
ADLS_ACUITY_SCORE: 52
HYGIENE/GROOMING: INDEPENDENT
ADLS_ACUITY_SCORE: 52
DRESS: INDEPENDENT
ADLS_ACUITY_SCORE: 52
LAUNDRY: UNABLE TO COMPLETE
DRESS: INDEPENDENT;STREET CLOTHES
ADLS_ACUITY_SCORE: 52
ORAL_HYGIENE: INDEPENDENT
ADLS_ACUITY_SCORE: 52
HYGIENE/GROOMING: INDEPENDENT

## 2025-02-13 NOTE — PLAN OF CARE
Team Note Due:  Monday    Assessment/Intervention/Current Symtoms and Care Coordination:  Chart review and met with team, discussed pt progress, symptomology, and response to treatment.  Discussed the discharge plan and any potential impediments to discharge. Pt under commitment, edu and kezia porras. Pt requires a [place] . Per staff, pt appears to have decompensated. Pt appears to respond to internal stimuli more often and is going in to other pt's rooms. Pt also appears catatonic at times.     Writer met with pt and  and provided pt with their copy of amended price porras order. Writer provided explanation of paperwork and need for amendment. Pt denied having any questions. Writer placed copy in pt's room per pt's request.     Discharge Plan or Goal:  Pending stabilization and safe disposition planning; considerations include:  TBD. Per different reports from different family members, pt has an assisted living apartment but was staying in the family home PTA. It is unclear at this time where pt will return at discharge      Barriers to Discharge:  Patient requires further psychiatric stabilization due to current symptomology, medication management with changes subject to provider, coordination with outside supports, and aftercare planning. Pt awaiting to start ECT, most likely starting on 2/17     Referral Status:  MA application was submitted on 1/29. Received by [facility]. Financial counselor is requesting letter from pt's employer confirming pt is no longer working, this was sent to financial counselor on 2/12     Legal Status:  Committed MI with ITP and Kezia Porras  [facility]  [id number]   ITP includes: Risperdal, Thorazine, Haldol, and Zyprexa  Expires: 06/10/2025    Contacts (include CLYDE status):  Gifty - daughter (CLYDE)  P: [phone]    Tevin - son (CLYDE)  P: [phone]     Mohamed - son (CLYDE)  P: [phone]     Isaura - commitment CM through [facility] (CLYDE per commitment)  P: [phone]     Kodi Samano[facility] Attorney (No CLYDE per commitment)  [email or web address]     Upcoming Meetings and Dates/Important Information and next steps:  CTC to coordinate with pt, outside supports, and treatment team regarding discharge planning   CTC to update the family if pt is placed on the ECT schedule
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
## 2025-02-13 NOTE — PLAN OF CARE
Problem: Psychotic Signs/Symptoms  Goal: Improved Behavioral Control (Psychotic Signs/Symptoms)  Outcome: Progressing  Goal Outcome Evaluation:    Plan of Care Reviewed With: patient      Patient is up visible at the unit, flat blunted and withdrawn self, calm and quiet most of the time. Patient is minimal with her responses, denies anxiety, depression and all other MH symptoms. Ate 100% of her meals with enough fluid intake, was medication compliant, pt has BLE edema but refused to put on her compression stockings, requested for new ones but still did not want to wear them for now, will like to watch the ECT video when the private assessment room is free to be used.

## 2025-02-13 NOTE — PLAN OF CARE
Problem: Depressive Signs/Symptoms  Goal: Improved Sleep (Depressive Signs/Symptoms)  Outcome: Progressing   Goal Outcome Evaluation:         Received asleep, appeared comfortable.Pt is independent with her ADL's .  Pt has a medical bed to aid with mobility    Slept for 7.75 hours

## 2025-02-13 NOTE — PLAN OF CARE
BEH IP Unit Acuity Rating Score (UARS)  Patient is given one point for every criteria they meet.    CRITERIA SCORING   On a 72 hour hold, court hold, committed, stay of commitment, or revocation. 1    Patient LOS on BEH unit exceeds 20 days. 1  LOS: 41   Patient under guardianship, 55+, otherwise medically complex, or under age 11. 1   Suicide ideation without relief of precipitating factors. 0   Current plan for suicide. 0   Current plan for homicide. 0   Imminent risk or actual attempt to seriously harm another without relief of factors precipitating the attempt. 0   Severe dysfunction in daily living (ex: complete neglect for self care, extreme disruption in vegetative function, extreme deterioration in social interactions). 1   Recent (last 7 days) or current physical aggression in the ED or on unit. 0   Restraints or seclusion episode in past 72 hours. 0   Recent (last 7 days) or current verbal aggression, agitation, yelling, etc., while in the ED or unit. 0   Active psychosis. 1   Need for constant or near constant redirection (from leaving, from others, etc).  0   Intrusive or disruptive behaviors. 0   Patient requires 3 or more hours of individualized nursing care per 8-hour shift (i.e. for ADLs, meds, therapeutic interventions). 0   TOTAL 5

## 2025-02-14 ENCOUNTER — APPOINTMENT (OUTPATIENT)
Dept: BEHAVIORAL HEALTH | Facility: CLINIC | Age: 60
DRG: 885 | End: 2025-02-14
Attending: PSYCHIATRY & NEUROLOGY

## 2025-02-14 PROCEDURE — 250N000013 HC RX MED GY IP 250 OP 250 PS 637

## 2025-02-14 PROCEDURE — 99232 SBSQ HOSP IP/OBS MODERATE 35: CPT | Performed by: PSYCHIATRY & NEUROLOGY

## 2025-02-14 PROCEDURE — 250N000013 HC RX MED GY IP 250 OP 250 PS 637: Performed by: PSYCHIATRY & NEUROLOGY

## 2025-02-14 PROCEDURE — 90870 ELECTROCONVULSIVE THERAPY: CPT

## 2025-02-14 PROCEDURE — T1013 SIGN LANG/ORAL INTERPRETER: HCPCS

## 2025-02-14 PROCEDURE — 250N000009 HC RX 250: Performed by: PSYCHIATRY & NEUROLOGY

## 2025-02-14 PROCEDURE — 124N000002 HC R&B MH UMMC

## 2025-02-14 PROCEDURE — 90870 ELECTROCONVULSIVE THERAPY: CPT | Performed by: PSYCHIATRY & NEUROLOGY

## 2025-02-14 PROCEDURE — 250N000013 HC RX MED GY IP 250 OP 250 PS 637: Performed by: NURSE PRACTITIONER

## 2025-02-14 PROCEDURE — 370N000017 HC ANESTHESIA TECHNICAL FEE, PER MIN: Performed by: ANESTHESIOLOGY

## 2025-02-14 RX ORDER — FLUMAZENIL 0.1 MG/ML
0.3 INJECTION, SOLUTION INTRAVENOUS ONCE
Status: COMPLETED | OUTPATIENT
Start: 2025-02-14 | End: 2025-02-14

## 2025-02-14 RX ADMIN — DOCUSATE SODIUM 100 MG: 100 CAPSULE, LIQUID FILLED ORAL at 16:08

## 2025-02-14 RX ADMIN — Medication 1 TABLET: at 16:09

## 2025-02-14 RX ADMIN — OXCARBAZEPINE 150 MG: 150 TABLET, FILM COATED ORAL at 17:24

## 2025-02-14 RX ADMIN — LORAZEPAM 1.5 MG: 1 TABLET ORAL at 20:01

## 2025-02-14 RX ADMIN — SERTRALINE HYDROCHLORIDE 25 MG: 25 TABLET ORAL at 16:08

## 2025-02-14 RX ADMIN — CHLORPROMAZINE HYDROCHLORIDE 300 MG: 100 TABLET, FILM COATED ORAL at 20:01

## 2025-02-14 RX ADMIN — Medication 3 MG: at 23:47

## 2025-02-14 RX ADMIN — HALOPERIDOL 5 MG: 5 TABLET ORAL at 20:04

## 2025-02-14 RX ADMIN — FLUMAZENIL 0.3 MG: 0.1 INJECTION, SOLUTION INTRAVENOUS at 14:23

## 2025-02-14 RX ADMIN — Medication 5 MG: at 20:01

## 2025-02-14 RX ADMIN — LEVOTHYROXINE SODIUM 100 MCG: 0.05 TABLET ORAL at 16:07

## 2025-02-14 RX ADMIN — POLYETHYLENE GLYCOL 3350 17 G: 17 POWDER, FOR SOLUTION ORAL at 16:09

## 2025-02-14 ASSESSMENT — ACTIVITIES OF DAILY LIVING (ADL)
ADLS_ACUITY_SCORE: 52
DRESS: INDEPENDENT
ADLS_ACUITY_SCORE: 52
HYGIENE/GROOMING: INDEPENDENT
ADLS_ACUITY_SCORE: 52
ADLS_ACUITY_SCORE: 52
HYGIENE/GROOMING: INDEPENDENT
ADLS_ACUITY_SCORE: 52
LAUNDRY: UNABLE TO COMPLETE
LAUNDRY: UNABLE TO COMPLETE
ADLS_ACUITY_SCORE: 52
DRESS: INDEPENDENT
ADLS_ACUITY_SCORE: 52
ORAL_HYGIENE: INDEPENDENT
ORAL_HYGIENE: INDEPENDENT
ADLS_ACUITY_SCORE: 52

## 2025-02-14 NOTE — PLAN OF CARE
Problem: Psychotic Signs/Symptoms  Goal: Improved Behavioral Control (Psychotic Signs/Symptoms)  Outcome: Progressing  Flowsheets (Taken 2/14/2025 1412)  Mutually Determined Action Steps (Improved Behavioral Control):   identifies symptoms triggers   verbalizes gratifying activity   verbalizes personal treatment goal   Goal Outcome Evaluation:    Plan of Care Reviewed With: patient      Patient is hypoactive, quiet and withdrawn. She spent most of the day in her room and in bed. Pt did not answer assessment questions. Pt was maintained NPO for ECT Procedure. Pt currently at ECT for her first treatment.

## 2025-02-14 NOTE — PLAN OF CARE
Problem: Adult Behavioral Health Plan of Care  Goal: Plan of Care Review  Flowsheets  Taken 2/13/2025 8887  Plan of Care Reviewed With: patient  Overall Patient Progress: no change  Patient Agreement with Plan of Care: agrees with comment (describe)     Patient was present in the milieu but withdrawn. Flat and blunted affect. She watched the video 2 times with the presence of an . She said she did not understand what she watched. Family visited with pt this evening and was requesting a conference with the doctor before pt's ECT will start. She ate 50% food from home at supper. Checked-in with her and she endorsed moderate anxiety and depression. Denied SI, SIB, AVH and HI. Pt was med compliant. She denied pain and side effects of meds.
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
## 2025-02-14 NOTE — PLAN OF CARE
BEH IP Unit Acuity Rating Score (UARS)  Patient is given one point for every criteria they meet.    CRITERIA SCORING   On a 72 hour hold, court hold, committed, stay of commitment, or revocation. 1    Patient LOS on BEH unit exceeds 20 days. 1  LOS: 42   Patient under guardianship, 55+, otherwise medically complex, or under age 11. 1   Suicide ideation without relief of precipitating factors. 0   Current plan for suicide. 0   Current plan for homicide. 0   Imminent risk or actual attempt to seriously harm another without relief of factors precipitating the attempt. 0   Severe dysfunction in daily living (ex: complete neglect for self care, extreme disruption in vegetative function, extreme deterioration in social interactions). 1   Recent (last 7 days) or current physical aggression in the ED or on unit. 0   Restraints or seclusion episode in past 72 hours. 0   Recent (last 7 days) or current verbal aggression, agitation, yelling, etc., while in the ED or unit. 0   Active psychosis. 1   Need for constant or near constant redirection (from leaving, from others, etc).  0   Intrusive or disruptive behaviors. 0   Patient requires 3 or more hours of individualized nursing care per 8-hour shift (i.e. for ADLs, meds, therapeutic interventions). 0   TOTAL 5

## 2025-02-14 NOTE — PLAN OF CARE
Problem: Depressive Signs/Symptoms  Goal: Improved Sleep (Depressive Signs/Symptoms)  Outcome: Progressing   Goal Outcome Evaluation:         Received asleep, appeared comfortable. Pt is independent with her ADL's.  No behavioral or safety concerns tonight.  Pt has a medical bed to aid with mobility.  Slept for 8.5 hours

## 2025-02-14 NOTE — PROGRESS NOTES
Pt has met discharge criteria. Vital signs stable. PIV removed without issue. Pt moved to discharge area via wheelchair.  Report given to Joselyn CARO, with emphasis to monitor continued tachycardia.
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
## 2025-02-14 NOTE — PROGRESS NOTES
Dr. Susan ANNE reviewed patient pre-op assessment and approved patient for treatment in ECT suite to start new ECT series today.

## 2025-02-14 NOTE — PLAN OF CARE
Problem: Psychotic Signs/Symptoms  Goal: Increased Participation and Engagement (Psychotic Signs/Symptoms)  Outcome: Progressing  Intervention: Facilitate Participation and Engagement    Problem: Anxiety Signs/Symptoms  Goal: Optimized Cognitive Function (Anxiety Signs/Symptoms)  Outcome: Progressing     Problem: Suicidal Behavior  Goal: Suicidal Behavior is Absent or Managed  Outcome: Progressing              Patient came back from ECT at about 15:30  VSS after ECT was within define limits.  Patient presented with flat and blunted affect.  Mood is calm. Catatonic, unable to assess patient as patient is catatonic, patient standing by her room door starring. When asked if patient needed something patient continued to fix her on her door and won't answer. Patient was unsteady on her feet. Needed help to the bathroom, staff was in patient's room for 30 minutes. It took patient about 30 minutes to takes HS medications. Family was here to visit, visit went well. Will continue to monitor as needed.

## 2025-02-14 NOTE — PLAN OF CARE
Team Note Due:  Monday    Assessment/Intervention/Current Symtoms and Care Coordination:  Chart review and met with team, discussed pt progress, symptomology, and response to treatment.  Discussed the discharge plan and any potential impediments to discharge. Pt under commitment, edu and kezia porras. Pt requires a Mal[place] . Per staff, pt appears to have decompensated. Pt appears to respond to internal stimuli more often and is going in to other pt's rooms. Per RN, pt to start ECT this afternoon. Evening staff report that the family was requesting a care conference prior to the start of ECT. Per provider, a care conference is not appropriate at this time as the provider has discussed ECT with many of the family members numerous times.    Writer put out call to Patrick ([phone]) and PONCE reporting that pt will be starting ECT today and not on Monday.     Discharge Plan or Goal:  Pending stabilization and safe disposition planning; considerations include:  TBD. Per different reports from different family members, pt has an assisted living apartment but was staying in the family home PTA. It is unclear at this time where pt will return at discharge      Barriers to Discharge:  Patient requires further psychiatric stabilization due to current symptomology, medication management with changes subject to provider, coordination with outside supports, and aftercare planning. Pt started ECT on 2/14     Referral Status:  MA application was submitted on 1/29. Received by [facility]. Financial counselor is requesting letter from pt's employer confirming pt is no longer working, this was sent to financial counselor on 2/12     Legal Status:  Committed MI with ITP and Kezia Porras  [facility]  [id number]-   ITP includes: Risperdal, Thorazine, Haldol, and Zyprexa  Expires: 06/10/2025    Contacts (include CLYDE status):  Gifty - daughter (CLYDE)  P: [phone]    Tevin - son (CLYDE)  P: [phone]      Patrick - son (CLYDE)  P: [phone]     Isaura - commitment CM through [facility] (CLYDE per commitment)  P: [phone]      Upcoming Meetings and Dates/Important Information and next steps:  CTC to coordinate with pt, outside supports, and treatment team regarding discharge planning

## 2025-02-15 PROCEDURE — 250N000013 HC RX MED GY IP 250 OP 250 PS 637

## 2025-02-15 PROCEDURE — 250N000013 HC RX MED GY IP 250 OP 250 PS 637: Performed by: NURSE PRACTITIONER

## 2025-02-15 PROCEDURE — 250N000013 HC RX MED GY IP 250 OP 250 PS 637: Performed by: CLINICAL NURSE SPECIALIST

## 2025-02-15 PROCEDURE — 250N000013 HC RX MED GY IP 250 OP 250 PS 637: Performed by: PSYCHIATRY & NEUROLOGY

## 2025-02-15 PROCEDURE — 250N000013 HC RX MED GY IP 250 OP 250 PS 637: Mod: JW | Performed by: PSYCHIATRY & NEUROLOGY

## 2025-02-15 PROCEDURE — H2032 ACTIVITY THERAPY, PER 15 MIN: HCPCS

## 2025-02-15 PROCEDURE — 124N000002 HC R&B MH UMMC

## 2025-02-15 RX ORDER — HALOPERIDOL 5 MG/ML
5 INJECTION INTRAMUSCULAR 2 TIMES DAILY
Status: DISCONTINUED | OUTPATIENT
Start: 2025-02-15 | End: 2025-03-04

## 2025-02-15 RX ORDER — HALOPERIDOL 5 MG/1
5 TABLET ORAL 2 TIMES DAILY
Status: DISCONTINUED | OUTPATIENT
Start: 2025-02-15 | End: 2025-03-04

## 2025-02-15 RX ADMIN — HALOPERIDOL 5 MG: 5 TABLET ORAL at 10:48

## 2025-02-15 RX ADMIN — HYDROXYZINE HYDROCHLORIDE 25 MG: 25 TABLET ORAL at 00:46

## 2025-02-15 RX ADMIN — ACETAMINOPHEN 650 MG: 325 TABLET, FILM COATED ORAL at 16:05

## 2025-02-15 RX ADMIN — HALOPERIDOL 5 MG: 5 TABLET ORAL at 20:58

## 2025-02-15 RX ADMIN — OXCARBAZEPINE 150 MG: 150 TABLET, FILM COATED ORAL at 17:09

## 2025-02-15 RX ADMIN — Medication 5 MG: at 20:58

## 2025-02-15 RX ADMIN — CHLORPROMAZINE HYDROCHLORIDE 300 MG: 100 TABLET, FILM COATED ORAL at 20:58

## 2025-02-15 RX ADMIN — LORAZEPAM 1.5 MG: 1 TABLET ORAL at 20:58

## 2025-02-15 ASSESSMENT — ACTIVITIES OF DAILY LIVING (ADL)
LAUNDRY: UNABLE TO COMPLETE
ADLS_ACUITY_SCORE: 52
HYGIENE/GROOMING: INDEPENDENT
DRESS: INDEPENDENT
LAUNDRY: UNABLE TO COMPLETE
ADLS_ACUITY_SCORE: 52
ADLS_ACUITY_SCORE: 52
HYGIENE/GROOMING: INDEPENDENT
ADLS_ACUITY_SCORE: 52
ORAL_HYGIENE: INDEPENDENT
ADLS_ACUITY_SCORE: 52
DRESS: INDEPENDENT
ADLS_ACUITY_SCORE: 52
ORAL_HYGIENE: INDEPENDENT
ADLS_ACUITY_SCORE: 52
ADLS_ACUITY_SCORE: 52
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
## 2025-02-15 NOTE — PLAN OF CARE
Problem: Depressive Signs/Symptoms  Goal: Optimized Cognitive Function (Depressive Signs/Symptoms)  Outcome: Progressing     Problem: Anxiety Signs/Symptoms  Goal: Optimized Cognitive Function (Anxiety Signs/Symptoms)  Outcome: Progressing       Patient up and visible on the unit most part of the shift. Presented with flat and blunted affect. Patient denies anxiety and depression. Denied SI/SIB/hallucinations. Patient complained of lower back pain, rated pain at 6. PRN tylenol given with effects. Patient continue to be difficult with medications. Took medication after multiple trails. Patient's mom was here to visit, visit went well. Continues on SIO for intrusiveness. Patient took a shower this shift. Will continue to monitor as needed for the rest of the shift.

## 2025-02-15 NOTE — PLAN OF CARE
"  Problem: Depressive Signs/Symptoms  Goal: Improved Psychomotor Symptoms (Depressive Signs/Symptoms)  Outcome: Not Progressing   Goal Outcome Evaluation:       Received in her room , awake and staring blankly into space. Walked the koehler and sat in the chair by the koehler, appeared tired but declined to lay in bed. Offered prn Melatonin 3 mg, initially declined but after some prompting, pt took it at 2347. Encouraged to lay in bed  and elevate her legs.  Noted to have a bad smell, refused to have staff clean her up in the toilet or have her clothes washed.Bed linens stained with smears of stool, changed.  Staff was already with the pt in the toilet but she refused to change or be cleaned.  Appeared anxious  Hydroxyzine 25 mg given at 0046  Entered 2 other rooms, pt was redirected back to her room.  0400 Agreed to  change clothing. Cameron care done, Stool wiped from buttocks and cameron area, pt was cooperative and allowed staff to wash her clothes.  Prn medications ineffective, pt was awake all night, but slept for 1.25 hours the previous shift.  Observed spitting on the floor, staff asked pt to stop the behavior.  Overall quiet but with a flat , blunted and tensed affect.No catatonia tonight. Spoke in a very soft tone.But screamed once at the SIO \" turn off the light!\"  Pt has a medical bed to aid with mobility and on SIO 5 feet for self injury risk and intrusiveness.               "

## 2025-02-16 ENCOUNTER — ANESTHESIA EVENT (OUTPATIENT)
Dept: BEHAVIORAL HEALTH | Facility: CLINIC | Age: 60
DRG: 885 | End: 2025-02-16

## 2025-02-16 PROCEDURE — 124N000002 HC R&B MH UMMC

## 2025-02-16 PROCEDURE — 250N000013 HC RX MED GY IP 250 OP 250 PS 637: Performed by: CLINICAL NURSE SPECIALIST

## 2025-02-16 PROCEDURE — 250N000013 HC RX MED GY IP 250 OP 250 PS 637: Performed by: PSYCHIATRY & NEUROLOGY

## 2025-02-16 PROCEDURE — 250N000013 HC RX MED GY IP 250 OP 250 PS 637

## 2025-02-16 RX ORDER — ONDANSETRON 4 MG/1
4 TABLET, ORALLY DISINTEGRATING ORAL EVERY 30 MIN PRN
Status: CANCELLED | OUTPATIENT
Start: 2025-02-16

## 2025-02-16 RX ORDER — NALOXONE HYDROCHLORIDE 0.4 MG/ML
0.1 INJECTION, SOLUTION INTRAMUSCULAR; INTRAVENOUS; SUBCUTANEOUS
Status: CANCELLED | OUTPATIENT
Start: 2025-02-16

## 2025-02-16 RX ORDER — ONDANSETRON 2 MG/ML
4 INJECTION INTRAMUSCULAR; INTRAVENOUS EVERY 30 MIN PRN
Status: CANCELLED | OUTPATIENT
Start: 2025-02-16

## 2025-02-16 RX ORDER — DEXAMETHASONE SODIUM PHOSPHATE 4 MG/ML
4 INJECTION, SOLUTION INTRA-ARTICULAR; INTRALESIONAL; INTRAMUSCULAR; INTRAVENOUS; SOFT TISSUE
Status: CANCELLED | OUTPATIENT
Start: 2025-02-16

## 2025-02-16 RX ORDER — METOPROLOL TARTRATE 1 MG/ML
1-2 INJECTION, SOLUTION INTRAVENOUS EVERY 5 MIN PRN
Status: CANCELLED | OUTPATIENT
Start: 2025-02-16

## 2025-02-16 RX ORDER — HYDRALAZINE HYDROCHLORIDE 20 MG/ML
2.5-5 INJECTION INTRAMUSCULAR; INTRAVENOUS EVERY 10 MIN PRN
Status: CANCELLED | OUTPATIENT
Start: 2025-02-16

## 2025-02-16 RX ADMIN — OXCARBAZEPINE 150 MG: 150 TABLET, FILM COATED ORAL at 08:42

## 2025-02-16 RX ADMIN — HALOPERIDOL 5 MG: 5 TABLET ORAL at 20:19

## 2025-02-16 RX ADMIN — POLYETHYLENE GLYCOL 3350 17 G: 17 POWDER, FOR SOLUTION ORAL at 08:41

## 2025-02-16 RX ADMIN — HALOPERIDOL 5 MG: 5 TABLET ORAL at 08:42

## 2025-02-16 RX ADMIN — SERTRALINE HYDROCHLORIDE 25 MG: 25 TABLET ORAL at 08:42

## 2025-02-16 RX ADMIN — Medication 5 MG: at 20:19

## 2025-02-16 RX ADMIN — DOCUSATE SODIUM 100 MG: 100 CAPSULE, LIQUID FILLED ORAL at 08:42

## 2025-02-16 RX ADMIN — LORAZEPAM 1.5 MG: 1 TABLET ORAL at 08:42

## 2025-02-16 RX ADMIN — Medication 1 TABLET: at 08:42

## 2025-02-16 RX ADMIN — LEVOTHYROXINE SODIUM 100 MCG: 0.05 TABLET ORAL at 08:41

## 2025-02-16 RX ADMIN — Medication 500 ML: at 08:46

## 2025-02-16 RX ADMIN — CHLORPROMAZINE HYDROCHLORIDE 300 MG: 100 TABLET, FILM COATED ORAL at 20:18

## 2025-02-16 RX ADMIN — Medication 1 LOZENGE: at 11:39

## 2025-02-16 ASSESSMENT — ACTIVITIES OF DAILY LIVING (ADL)
ADLS_ACUITY_SCORE: 52
DRESS: INDEPENDENT
ADLS_ACUITY_SCORE: 52
ADLS_ACUITY_SCORE: 52
LAUNDRY: UNABLE TO COMPLETE
ADLS_ACUITY_SCORE: 52
ADLS_ACUITY_SCORE: 52
HYGIENE/GROOMING: INDEPENDENT
ADLS_ACUITY_SCORE: 52
LAUNDRY: UNABLE TO COMPLETE
HYGIENE/GROOMING: INDEPENDENT
ADLS_ACUITY_SCORE: 52
ADLS_ACUITY_SCORE: 52
DRESS: INDEPENDENT
ADLS_ACUITY_SCORE: 52
ORAL_HYGIENE: INDEPENDENT
ADLS_ACUITY_SCORE: 52
ORAL_HYGIENE: INDEPENDENT
ADLS_ACUITY_SCORE: 52

## 2025-02-16 NOTE — PLAN OF CARE
Problem: Depressive Signs/Symptoms  Goal: Improved Sleep (Depressive Signs/Symptoms)  Outcome: Progressing   Goal Outcome Evaluation:                  Received awake at the start of the shift,fell asleep after 15 minutes.Ppt has a medical bed to aid with mobility , foot of the bd elevated to help with BLE swelling.  Remains on SIO 1:1 5 feet for self injury risk, intrusiveness and agitation.  Slept well 6.75 hours  No behavioral concerns tonight.

## 2025-02-16 NOTE — PLAN OF CARE
Problem: Depressive Signs/Symptoms  Goal: Optimized Cognitive Function (Depressive Signs/Symptoms)  Outcome: Progressing     Problem: Psychotic Signs/Symptoms  Goal: Increased Participation and Engagement (Psychotic Signs/Symptoms)  Outcome: Progressing     Problem: Anxiety Signs/Symptoms  Goal: Optimized Cognitive Function (Anxiety Signs/Symptoms)  Outcome: Progressing          Patient up in the longue watching TV.  Presented with flat and blunted affect.   Patient brightens up on approach.  Patient denies all psych symptoms and pain.  Patient is kenyetta for safety.   Medication compliant. No PRN given .  Patient is eating and drinking okay.   Will monitor to monitor as needed.

## 2025-02-16 NOTE — PLAN OF CARE
Problem: Psychotic Signs/Symptoms  Goal: Improved Behavioral Control (Psychotic Signs/Symptoms)  Outcome: Progressing   Goal Outcome Evaluation:    Plan of Care Reviewed With: patient      Patient presented in a much calmer mood  with bright affect. She was smiling and talkative with staff. She reported improved mood, denies anxiety, depression and SI/SIB. Pt took all of her scheduled medications and thanked staff for the care. Pt denied being in pain, no paranoia or behavioral disturbances noted this shift.

## 2025-02-16 NOTE — PROGRESS NOTES
Rehab Group    Start time: 1900  End time: 2000  Patient time total: 30 minutes    attended partial group    #4 attended   Group Type: art   Group Topic Covered: activity therapy       Group Session Detail:  Art Therapy directive was to create art in response to a chosen mindfulness prompt using pts choice of art media.  Goals of directive: emotional expression, mindfulness, emotional regulation, media exploration        Patient Response/Contribution:  distracted        Patient Detail:    Pt was minimally engaged in art this evening. Pt appeared confused about drawing directive and was distractible. Pt appeared to be responding to internal stimuli.  Pts mood was calm, further assessment is needed.      Activity Therapy Per 15 min ()      Patient Active Problem List   Diagnosis    Ruby (H)    Psychosis (H)    Schizoaffective disorder, bipolar type (H)    PTSD (post-traumatic stress disorder)    Catatonia

## 2025-02-17 ENCOUNTER — ANESTHESIA (OUTPATIENT)
Dept: BEHAVIORAL HEALTH | Facility: CLINIC | Age: 60
DRG: 885 | End: 2025-02-17

## 2025-02-17 ENCOUNTER — APPOINTMENT (OUTPATIENT)
Dept: BEHAVIORAL HEALTH | Facility: CLINIC | Age: 60
DRG: 885 | End: 2025-02-17
Attending: PSYCHIATRY & NEUROLOGY

## 2025-02-17 PROCEDURE — 124N000002 HC R&B MH UMMC

## 2025-02-17 PROCEDURE — 250N000013 HC RX MED GY IP 250 OP 250 PS 637: Performed by: PSYCHIATRY & NEUROLOGY

## 2025-02-17 PROCEDURE — 90870 ELECTROCONVULSIVE THERAPY: CPT | Performed by: PSYCHIATRY & NEUROLOGY

## 2025-02-17 PROCEDURE — 250N000013 HC RX MED GY IP 250 OP 250 PS 637

## 2025-02-17 PROCEDURE — 250N000011 HC RX IP 250 OP 636: Performed by: ANESTHESIOLOGY

## 2025-02-17 PROCEDURE — 90853 GROUP PSYCHOTHERAPY: CPT | Performed by: COUNSELOR

## 2025-02-17 PROCEDURE — 370N000017 HC ANESTHESIA TECHNICAL FEE, PER MIN: Performed by: ANESTHESIOLOGY

## 2025-02-17 PROCEDURE — 250N000013 HC RX MED GY IP 250 OP 250 PS 637: Performed by: CLINICAL NURSE SPECIALIST

## 2025-02-17 PROCEDURE — 90870 ELECTROCONVULSIVE THERAPY: CPT

## 2025-02-17 PROCEDURE — 99232 SBSQ HOSP IP/OBS MODERATE 35: CPT | Performed by: NURSE PRACTITIONER

## 2025-02-17 RX ORDER — FLUMAZENIL 0.1 MG/ML
0.3 INJECTION, SOLUTION INTRAVENOUS ONCE
Status: COMPLETED | OUTPATIENT
Start: 2025-02-17 | End: 2025-02-19

## 2025-02-17 RX ADMIN — OXCARBAZEPINE 150 MG: 150 TABLET, FILM COATED ORAL at 08:47

## 2025-02-17 RX ADMIN — LORAZEPAM 1.5 MG: 1 TABLET ORAL at 20:57

## 2025-02-17 RX ADMIN — HALOPERIDOL 5 MG: 5 TABLET ORAL at 08:47

## 2025-02-17 RX ADMIN — HALOPERIDOL 5 MG: 5 TABLET ORAL at 20:57

## 2025-02-17 RX ADMIN — DOCUSATE SODIUM 100 MG: 100 CAPSULE, LIQUID FILLED ORAL at 08:47

## 2025-02-17 RX ADMIN — LEVOTHYROXINE SODIUM 100 MCG: 0.05 TABLET ORAL at 08:46

## 2025-02-17 RX ADMIN — CHLORPROMAZINE HYDROCHLORIDE 300 MG: 100 TABLET, FILM COATED ORAL at 20:57

## 2025-02-17 RX ADMIN — SERTRALINE HYDROCHLORIDE 25 MG: 25 TABLET ORAL at 08:46

## 2025-02-17 RX ADMIN — Medication 1 TABLET: at 08:48

## 2025-02-17 RX ADMIN — LORAZEPAM 1.5 MG: 1 TABLET ORAL at 08:46

## 2025-02-17 RX ADMIN — SUCCINYLCHOLINE CHLORIDE 70 MG: 20 INJECTION, SOLUTION INTRAMUSCULAR; INTRAVENOUS; PARENTERAL at 07:46

## 2025-02-17 RX ADMIN — POLYETHYLENE GLYCOL 3350 17 G: 17 POWDER, FOR SOLUTION ORAL at 08:46

## 2025-02-17 RX ADMIN — Medication 5 MG: at 20:57

## 2025-02-17 RX ADMIN — OXCARBAZEPINE 150 MG: 150 TABLET, FILM COATED ORAL at 18:38

## 2025-02-17 RX ADMIN — ACETAMINOPHEN 650 MG: 325 TABLET, FILM COATED ORAL at 10:09

## 2025-02-17 ASSESSMENT — ACTIVITIES OF DAILY LIVING (ADL)
ADLS_ACUITY_SCORE: 52
HYGIENE/GROOMING: INDEPENDENT
ADLS_ACUITY_SCORE: 52
ADLS_ACUITY_SCORE: 52
DRESS: INDEPENDENT
ADLS_ACUITY_SCORE: 52
ORAL_HYGIENE: INDEPENDENT
ADLS_ACUITY_SCORE: 52
ADLS_ACUITY_SCORE: 52
LAUNDRY: UNABLE TO COMPLETE
ADLS_ACUITY_SCORE: 52
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
## 2025-02-17 NOTE — ANESTHESIA PREPROCEDURE EVALUATION
"Anesthesia Pre-Procedure Evaluation    Patient: Becky Decker   MRN: [id number] : 1965        Procedure : * No procedures listed *          No past medical history on file.   No past surgical history on file.   Allergies   Allergen Reactions    Pork-Derived Products       Social History     Tobacco Use    Smoking status: Not on file    Smokeless tobacco: Not on file   Substance Use Topics    Alcohol use: Not on file      Wt Readings from Last 1 Encounters:   25 100 kg (220 lb 7.4 oz)        Anesthesia Evaluation   Pt has had prior anesthetic.         ROS/MED HX  ENT/Pulmonary:       Neurologic:       Cardiovascular:       METS/Exercise Tolerance:     Hematologic:       Musculoskeletal:       GI/Hepatic:       Renal/Genitourinary:       Endo:     (+)               Obesity,       Psychiatric/Substance Use:     (+) psychiatric history anxiety, depression, schizophrenia and other (comment) (Catatonia, psychosis, SI)       Infectious Disease:       Malignancy:       Other:            Physical Exam    Airway   unable to assess          Respiratory Devices and Support         Dental    unable to assess        Cardiovascular    unable to assess         Pulmonary    Unable to assess               OUTSIDE LABS:  CBC:   Lab Results   Component Value Date    WBC 4.6 2025    WBC 6.8 2024    HGB 10.2 (L) 2025    HGB 12.6 2024    HCT 31.8 (L) 2025    HCT 38.8 2024    PLT  2025      Comment:      Platelet count unavailable - platelets are clumped.     2024     BMP:   Lab Results   Component Value Date     2025     2025    POTASSIUM 4.3 2025    POTASSIUM 4.6 2025    CHLORIDE 100 2025    CHLORIDE 102 2025    CO2 23 2025    CO2 23 2025    BUN 16.3 2025    BUN 15.8 2025    CR 0.82 2025    CR 0.75 2025    GLC 98 2025    GLC 94 2025     COAGS: No results found for: \"PTT\", " "\"INR\", \"FIBR\"  POC: No results found for: \"BGM\", \"HCG\", \"HCGS\"  HEPATIC:   Lab Results   Component Value Date    ALBUMIN 3.7 01/27/2025    PROTTOTAL 6.9 01/27/2025    ALT 15 01/27/2025    AST 15 01/27/2025    ALKPHOS 69 01/27/2025    BILITOTAL 0.2 01/27/2025    TARIQ 55 (H) 01/27/2025     OTHER:   Lab Results   Component Value Date    A1C 5.6 11/27/2024    AMADOR 9.4 02/03/2025    PHOS 3.8 01/07/2025    TSH 0.05 (L) 02/03/2025    T4 1.12 02/03/2025       Anesthesia Plan    ASA Status:  2    NPO Status:  NPO Appropriate    Anesthesia Type: General.     - Airway: Mask Only   Induction: Intravenous.           Consents            Postoperative Care            Comments:    Other Comments: Plan: IV induction, hyperventilation with mask and recovery.            Gerard Brown MD    I have reviewed the pertinent notes and labs in the chart from the past 30 days and (re)examined the patient.  Any updates or changes from those notes are reflected in this note.    Clinically Significant Risk Factors               # Hypoalbuminemia: Lowest albumin = 3.4 g/dL at 1/7/2025  7:45 AM, will monitor as appropriate                    # Financial/Environmental Concerns:             "

## 2025-02-17 NOTE — PLAN OF CARE
Team Note Due:  Monday    Assessment/Intervention/Current Symtoms and Care Coordination:  Chart review and met with team, discussed pt progress, symptomology, and response to treatment.  Discussed the discharge plan and any potential impediments to discharge. Pt under commitment, edu and kezia porras. Pt requires a British . Per staff, pt appears to have decompensated. Pt appears to respond to internal stimuli more often and is going in to other pt's rooms. Pt had ECT #2 today.     Writer received email from pt's Isaura BURROUGHS ([email or web address]) requesting update. Writer put out return email detailing update regarding pt's presentation and progress with ECT. Writer confirmed that pt can either return to [facility] or family's home. Writer confirmed no recommendation has been made by the treatment team as of yet as pt continues to receive ECT and stabilize.     Discharge Plan or Goal:  Pending stabilization and safe disposition planning; considerations include:  TBD. Per different reports from different family members, pt has an assisted living apartment but was staying in the family home PTA. It is unclear at this time where pt will return at discharge      Barriers to Discharge:  Patient requires further psychiatric stabilization due to current symptomology, medication management with changes subject to provider, coordination with outside supports, and aftercare planning. Pt started ECT on 2/14     Referral Status:  MA application was submitted on 1/29. Received by [facility]. Financial counselor is requesting letter from pt's employer confirming pt is no longer working, this was sent to financial counselor on 2/12     Legal Status:  Committed MI with ITP and Kezia Porras  [facility]  [id number]   ITP includes: Risperdal, Thorazine, Haldol, and Zyprexa  Expires: 06/10/2025    Contacts (include CLYDE status):  Gifty - daughter (CLYDE)  P: [phone]    Tevin - son (CLYDE)  P: [phone]      Patrick - son (CLYDE)  P: [phone]     Isaura - commitment CM through [facility] (CLYDE per commitment)  P: [phone]  E: [email or web address]     Upcoming Meetings and Dates/Important Information and next steps:  CTC to coordinate with pt, outside supports, and treatment team regarding discharge planning

## 2025-02-17 NOTE — PLAN OF CARE
BEH IP Unit Acuity Rating Score (UARS)  Patient is given one point for every criteria they meet.    CRITERIA SCORING   On a 72 hour hold, court hold, committed, stay of commitment, or revocation. 1    Patient LOS on BEH unit exceeds 20 days. 1  LOS: 45   Patient under guardianship, 55+, otherwise medically complex, or under age 11. 1   Suicide ideation without relief of precipitating factors. 0   Current plan for suicide. 0   Current plan for homicide. 0   Imminent risk or actual attempt to seriously harm another without relief of factors precipitating the attempt. 0   Severe dysfunction in daily living (ex: complete neglect for self care, extreme disruption in vegetative function, extreme deterioration in social interactions). 1   Recent (last 7 days) or current physical aggression in the ED or on unit. 0   Restraints or seclusion episode in past 72 hours. 0   Recent (last 7 days) or current verbal aggression, agitation, yelling, etc., while in the ED or unit. 0   Active psychosis. 1   Need for constant or near constant redirection (from leaving, from others, etc).  0   Intrusive or disruptive behaviors. 0   Patient requires 3 or more hours of individualized nursing care per 8-hour shift (i.e. for ADLs, meds, therapeutic interventions). 0   TOTAL 5

## 2025-02-17 NOTE — ANESTHESIA POSTPROCEDURE EVALUATION
Patient: Becky Decker    Procedure: * No procedures listed *       Anesthesia Type:  General    Note:  Disposition: Outpatient   Postop Pain Control: Uneventful            Sign Out: Well controlled pain   PONV: No   Neuro/Psych: Uneventful            Sign Out: Acceptable/Baseline neuro status   Airway/Respiratory: Uneventful            Sign Out: Acceptable/Baseline resp. status   CV/Hemodynamics: Uneventful            Sign Out: Acceptable CV status; No obvious hypovolemia; No obvious fluid overload   Other NRE:    DID A NON-ROUTINE EVENT OCCUR? No           Last vitals:  Vitals:    02/17/25 0806 02/17/25 0816 02/17/25 0850   BP: 138/70 129/62 130/79   Pulse: 84 105 87   Resp: 16 12 16   Temp: (!) 35.9  C (96.6  F) (!) 35.9  C (96.6  F) 36.3  C (97.4  F)   SpO2: 93% 96% 99%       Electronically Signed By: Gerard Brown MD  February 17, 2025  8:57 AM

## 2025-02-17 NOTE — PLAN OF CARE
Problem: Depressive Signs/Symptoms  Goal: Improved Sleep (Depressive Signs/Symptoms)  Outcome: Progressing   Goal Outcome Evaluation:                  Received in bed asleep, pt has a medical bed to aid with mobility and to keep foot of the bed elevated due to BLE swelling.  Pt kept on NPO post midnight in preparation for her 2nd ECT that is scheduled for 1200.  No oral pre ECT medications administered.  No behavioral concerns tonight. Remains on SIO 1:1, 5 feet for self injury risk and intrusiveness.  Slept for 9 hours

## 2025-02-17 NOTE — PLAN OF CARE
02/17/25 1209   Individualization/Patient Specific Goals   Patient Personal Strengths spiritual/Taoism support;community support;family/social support   Patient Vulnerabilities history of unsuccessful treatment   Anxieties, Fears or Concerns Pt appears to respond to internal stimuli and intermittently experiences AH   Individualized Care Needs Pt requires Bel[place]an    Patient/Family-Specific Goals (Include Timeframe) Pt's family is involved   Interprofessional Rounds   Summary Pt transferred from [facility]. Pt was initially admitted for behavioral concerns and psychotic symptoms. Pt is committed. Pt had santos/price porras hearing on 1/17 with the hopes of treating pt with ECT. Santos and Alejandre Porras orders were granted on 1/31. Pt started ECT on 2/14   Participants CTC;nursing;OT;advanced practice nurse   Behavioral Team Discussion   Participants Sammy Patel DNP; Bibiana Vega, [facility], CTC; Kelvin Echols RN; Bhavana Tobin OT   Progress Pt requires [place]an . Pt appears to be responding to internal stimuli and can present as confused at times. Pt is now off an SIO (was for intrusivess). Pt is medication compliant and eats well. Comes out of her room often but does not engage with other patients, but will engage with staff when they initiate. Santos and Alejandre Porras orders were granted on 1/31, pt started ECT on 2/14   Anticipated length of stay 4+ weeks   Continued Stay Criteria/Rationale Medication management per psychiatry, stabilization of symptoms, aftercare planning, coordination with outside supports, ECT   Medical/Physical See H&P and provider notes   Precautions See below   Plan Medication management per psychiatry, stabilization of symptoms, aftercare planning, coordination with outside supports, ECT   Rationale for change in precautions or plan No change   Safety Plan Per unit protocol   Anticipated Discharge Disposition home with family;assisted living      PRECAUTIONS AND SAFETY    Behavioral Orders   Procedures    Cheeking Precautions (behavioral units)     Patient Observed swallowing PO medications; Patient asked to drink water after swallowing medication; Patient in Staff line of sight for 15 minutes after medication given; Mouth checks after PO administration (patient asked to open mouth and stick out their tongue).    Code 1 - Restrict to Unit    Code 2     Ultrasound    Code 2 - 1:1 Staff Supervision     For ECT only    Electroconvulsive therapy     Series of up to 12 treatments. Begin Date: tbd     Treating Psychiatrist providing ECT:  Layla     Notified on:  2/11    Electroconvulsive therapy     Series of up to 12 treatments. Begin Date: TBD - when Alejandre petition amended      Treating Psychiatrist providing ECT:  Layla      Notified on:  2/11    Elopement precautions    Fall precautions    Fall precautions    Routine Programming     As clinically indicated    Status 15     Every 15 minutes.    Status Individual Observation     Fall risk due to catatonia    Patient SIO status reviewed with team/RN.  Please also refer to RN/team documentation for add'l detail     Order Specific Question:   CONTINUOUS 24 hours / day     Answer:   5 feet     Order Specific Question:   Indications for SIO     Answer:   Self-injury risk       Safety  Safety WDL: WDL  Patient Location: patient room, own, dining room, lounge  Observed Behavior: calm, sitting  Observed Behavior (Comment): visiting with family  Airway Safety Measures: all equipment/monitors on and audible  Safety Measures: safety rounds completed, environmental rounds completed  Diversional Activity: television  De-Escalation Techniques: 1:1 observation initiated  Suicidality: Status 15  Assault: status 15  Elopement Interventions: status 15, status continuous sight, no shoes, room away from unit doors, signs posted on unit entrance / exit doors  Additional Documentation:  (Fall)

## 2025-02-17 NOTE — PROGRESS NOTES
Patient meets phase 2 criteria. Vital signs stable. Alert and fully oriented to baseline. PIV removed. Report called to unit Sancho CARO Patient transported by wheelchair with staff and security back to 3B.

## 2025-02-17 NOTE — ANESTHESIA CARE TRANSFER NOTE
Patient: Becky Decker    Procedure: * No procedures listed *       Diagnosis: * No pre-op diagnosis entered *  Diagnosis Additional Information: No value filed.    Anesthesia Type:   General     Note:    Oropharynx: oropharynx clear of all foreign objects  Level of Consciousness: drowsy  Oxygen Supplementation: room air    Independent Airway: airway patency satisfactory and stable  Dentition: dentition unchanged  Vital Signs Stable: post-procedure vital signs reviewed and stable  Report to RN Given: handoff report given  Patient transferred to: PACU    Handoff Report: Identifed the Patient, Identified the Reponsible Provider, Reviewed the pertinent medical history, Discussed the surgical course, Reviewed Intra-OP anesthesia mangement and issues during anesthesia, Set expectations for post-procedure period and Allowed opportunity for questions and acknowledgement of understanding      Vitals:  Vitals Value Taken Time   BP     Temp     Pulse     Resp     SpO2         Electronically Signed By: Gerard Brown MD  February 17, 2025  7:54 AM

## 2025-02-17 NOTE — PLAN OF CARE
Problem: Psychotic Signs/Symptoms  Goal: Improved Behavioral Control (Psychotic Signs/Symptoms)  Outcome: Progressing   Goal Outcome Evaluation:    Plan of Care Reviewed With: patient      /79 (BP Location: Right arm)   Pulse 87   Temp 97.4  F (36.3  C) (Oral)   Resp 16   Wt 100 kg (220 lb 7.4 oz)   SpO2 99%   BMI 32.56 kg/m      Pt had her second ECT this morning which went well per ECT. She however, complained of headache later which was otherwise treated with prn Tylenol 650 mg with partial relief. Pt reported that she's feeling much better ....... clarity of mind and mood improvement. She's however, still exhibiting some confusion. Her appetite is good...... ate 100% off her lunch tray. She medication compliant, denies any medication adverse reactions. No behavior concerns during the shift.
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
## 2025-02-18 LAB
ALBUMIN UR-MCNC: 10 MG/DL
ALBUMIN UR-MCNC: 10 MG/DL
APPEARANCE UR: ABNORMAL
APPEARANCE UR: ABNORMAL
BACTERIA #/AREA URNS HPF: ABNORMAL /HPF
BACTERIA #/AREA URNS HPF: ABNORMAL /HPF
BILIRUB UR QL STRIP: NEGATIVE
BILIRUB UR QL STRIP: NEGATIVE
COLOR UR AUTO: YELLOW
COLOR UR AUTO: YELLOW
GLUCOSE UR STRIP-MCNC: NEGATIVE MG/DL
GLUCOSE UR STRIP-MCNC: NEGATIVE MG/DL
HGB UR QL STRIP: ABNORMAL
HGB UR QL STRIP: ABNORMAL
KETONES UR STRIP-MCNC: NEGATIVE MG/DL
KETONES UR STRIP-MCNC: NEGATIVE MG/DL
LEUKOCYTE ESTERASE UR QL STRIP: ABNORMAL
LEUKOCYTE ESTERASE UR QL STRIP: ABNORMAL
MUCOUS THREADS #/AREA URNS LPF: PRESENT /LPF
MUCOUS THREADS #/AREA URNS LPF: PRESENT /LPF
NITRATE UR QL: NEGATIVE
NITRATE UR QL: NEGATIVE
PH UR STRIP: 6 [PH] (ref 5–7)
PH UR STRIP: 6 [PH] (ref 5–7)
RBC URINE: 2 /HPF
RBC URINE: 2 /HPF
SP GR UR STRIP: 1.02 (ref 1–1.03)
SP GR UR STRIP: 1.02 (ref 1–1.03)
SQUAMOUS EPITHELIAL: 33 /HPF
SQUAMOUS EPITHELIAL: 33 /HPF
TRANSITIONAL EPI: <1 /HPF
TRANSITIONAL EPI: <1 /HPF
UROBILINOGEN UR STRIP-MCNC: NORMAL MG/DL
UROBILINOGEN UR STRIP-MCNC: NORMAL MG/DL
WBC URINE: 18 /HPF
WBC URINE: 18 /HPF

## 2025-02-18 PROCEDURE — 99232 SBSQ HOSP IP/OBS MODERATE 35: CPT | Performed by: NURSE PRACTITIONER

## 2025-02-18 PROCEDURE — 250N000013 HC RX MED GY IP 250 OP 250 PS 637: Performed by: CLINICAL NURSE SPECIALIST

## 2025-02-18 PROCEDURE — 250N000013 HC RX MED GY IP 250 OP 250 PS 637: Performed by: PSYCHIATRY & NEUROLOGY

## 2025-02-18 PROCEDURE — 97150 GROUP THERAPEUTIC PROCEDURES: CPT | Mod: GO

## 2025-02-18 PROCEDURE — 87086 URINE CULTURE/COLONY COUNT: CPT | Performed by: NURSE PRACTITIONER

## 2025-02-18 PROCEDURE — 124N000002 HC R&B MH UMMC

## 2025-02-18 PROCEDURE — 81001 URINALYSIS AUTO W/SCOPE: CPT | Performed by: NURSE PRACTITIONER

## 2025-02-18 PROCEDURE — 250N000013 HC RX MED GY IP 250 OP 250 PS 637

## 2025-02-18 PROCEDURE — H2032 ACTIVITY THERAPY, PER 15 MIN: HCPCS

## 2025-02-18 RX ADMIN — LEVOTHYROXINE SODIUM 100 MCG: 0.05 TABLET ORAL at 08:56

## 2025-02-18 RX ADMIN — LORAZEPAM 1.5 MG: 1 TABLET ORAL at 08:56

## 2025-02-18 RX ADMIN — POLYETHYLENE GLYCOL 3350 17 G: 17 POWDER, FOR SOLUTION ORAL at 08:57

## 2025-02-18 RX ADMIN — OXCARBAZEPINE 150 MG: 150 TABLET, FILM COATED ORAL at 17:32

## 2025-02-18 RX ADMIN — SERTRALINE HYDROCHLORIDE 25 MG: 25 TABLET ORAL at 08:56

## 2025-02-18 RX ADMIN — OXCARBAZEPINE 150 MG: 150 TABLET, FILM COATED ORAL at 08:56

## 2025-02-18 RX ADMIN — Medication 500 ML: at 09:03

## 2025-02-18 RX ADMIN — CHLORPROMAZINE HYDROCHLORIDE 300 MG: 100 TABLET, FILM COATED ORAL at 20:24

## 2025-02-18 RX ADMIN — Medication 1 TABLET: at 08:56

## 2025-02-18 RX ADMIN — HALOPERIDOL 5 MG: 5 TABLET ORAL at 20:24

## 2025-02-18 RX ADMIN — DOCUSATE SODIUM 100 MG: 100 CAPSULE, LIQUID FILLED ORAL at 08:56

## 2025-02-18 RX ADMIN — HALOPERIDOL 5 MG: 5 TABLET ORAL at 08:56

## 2025-02-18 RX ADMIN — Medication 5 MG: at 20:24

## 2025-02-18 ASSESSMENT — ACTIVITIES OF DAILY LIVING (ADL)
ADLS_ACUITY_SCORE: 52
HYGIENE/GROOMING: INDEPENDENT
ADLS_ACUITY_SCORE: 52
ORAL_HYGIENE: INDEPENDENT
ADLS_ACUITY_SCORE: 52
LAUNDRY: UNABLE TO COMPLETE
ADLS_ACUITY_SCORE: 52
DRESS: INDEPENDENT
ADLS_ACUITY_SCORE: 52

## 2025-02-18 NOTE — PLAN OF CARE
Problem: Depressive Signs/Symptoms  Goal: Improved Sleep (Depressive Signs/Symptoms)  Outcome: Progressing   Goal Outcome Evaluation:         Received asleep, breathing unlabored but snoring at times.Pt is independent with her ADL's.  No behavioral or safety concerns encountered tonight.   Remains on SIO 1:1, 5 feet for self injury risk and intrusiveness.  Slept for 8.5 hours

## 2025-02-18 NOTE — PLAN OF CARE
BEH IP Unit Acuity Rating Score (UARS)  Patient is given one point for every criteria they meet.    CRITERIA SCORING   On a 72 hour hold, court hold, committed, stay of commitment, or revocation. 1    Patient LOS on BEH unit exceeds 20 days. 1  LOS: 46   Patient under guardianship, 55+, otherwise medically complex, or under age 11. 1   Suicide ideation without relief of precipitating factors. 0   Current plan for suicide. 0   Current plan for homicide. 0   Imminent risk or actual attempt to seriously harm another without relief of factors precipitating the attempt. 0   Severe dysfunction in daily living (ex: complete neglect for self care, extreme disruption in vegetative function, extreme deterioration in social interactions). 1   Recent (last 7 days) or current physical aggression in the ED or on unit. 0   Restraints or seclusion episode in past 72 hours. 0   Recent (last 7 days) or current verbal aggression, agitation, yelling, etc., while in the ED or unit. 0   Active psychosis. 1   Need for constant or near constant redirection (from leaving, from others, etc).  0   Intrusive or disruptive behaviors. 0   Patient requires 3 or more hours of individualized nursing care per 8-hour shift (i.e. for ADLs, meds, therapeutic interventions). 0   TOTAL 5          Yes...

## 2025-02-18 NOTE — PLAN OF CARE
Goal Outcome Evaluation:    Plan of Care Reviewed With: patient      Problem: Adult Behavioral Health Plan of Care  Goal: Plan of Care Review  Outcome: Progressing  Flowsheets (Taken 2/17/2025 5519)  Patient Agreement with Plan of Care: agrees    Pt presented with a flat affect, calm mood during check in. Pt endorsed anxiety 5/10, declined intervention. Pt denied depression, pain, SI, HI,hallucinations, and contracted for safety. Pt was visible in the milieu, social and engaged minimally with peers/staff. Compliant with all scheduled medications. Pt makes some needs known appropriately. No safety concerns observed this shift.
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
## 2025-02-18 NOTE — PLAN OF CARE
Goal Outcome Evaluation:        Group Attendance:  attended full group     Time session began: 7:30 pm  Time session ended: 8:00 pm  Patient's total time in group: 30     Total # Attendees    6   Group Type psychotherapeutic      Group Topic Covered positive psychology [facility]      Group Session Detail process      Patient's response to the group topic/interactions:  cooperative with task, supportive of peers, and actively engaged      Patient Details: Mood alright, she mostly listened but at times added short answers to the conversation.                 87211 - Group psychotherapy - 1 Session  Patient Active Problem List   Diagnosis    Ruby (H)    Psychosis (H)    Schizoaffective disorder, bipolar type (H)    PTSD (post-traumatic stress disorder)    Catatonia

## 2025-02-18 NOTE — PLAN OF CARE
"Becky was out of her room a good portion of the shift. With  present, she stated her mood was \"good\" and neither denied nor endorsed anxiety or depression.  She feels \"better \" than when she first got to the hospital, but could not articulate in what way.    She ate 100% of meals, took medications without issue. She did not need redirection for going into other patients room.  She was encoraged to take a shower, she said she would when it got a little warmer, she agreed after lunch might be a good time.     She has tollerated her first two ECT treatments well, with only a slight HA after her last treatment that was improved by tylenol.     Problem: Depressive Signs/Symptoms  Goal: Improved Mood Symptoms (Depressive Signs/Symptoms)  Outcome: Progressing  Intervention: Promote Mood Improvement  Recent Flowsheet Documentation  Taken 2/18/2025 4583 by Pedro Ma RN  Supportive Measures: active listening utilized  Trust Relationship/Rapport:   care explained   choices provided   emotional support provided   questions encouraged   questions answered   empathic listening provided   reassurance provided   thoughts/feelings acknowledged   Goal Outcome Evaluation:                        "

## 2025-02-18 NOTE — PLAN OF CARE
Rehab Group    Start time: 10:20  End time: 11:05  Patient time total: 22 minutes    attended partial group    #8 attended   Group Type: OT Clinic   Group Topic Covered: balanced lifestyle, cognitive activities, coping skills, emotional regulation, healthy leisure time, mindfulness, relaxation , and social skills     Group Session Detail:  Pt actively participated in occupational therapy clinic to facilitate coping skill exploration, creative expression within personally meaningful activities, and clinical observation of social, cognitive, and kinesthetic performance skills.      Patient Response/Contribution:  worked intermittently, required prompts or assistance to participate, and distracted      Patient Detail:  Patient was accompanied to group by SIO staff. Patient demonstrates poor initiation of engagement; requiring verbal prompting and encouragement to attend to creative tasks. Patient appeared lethargic in group with observed inattentiveness to the task at hand. Minimal engagement observed throughout time spent in group. No social interaction with peers. Affect: flat.      [address] OT Group (2 or more in attendance)      Patient Active Problem List   Diagnosis    Ruby (H)    Psychosis (H)    Schizoaffective disorder, bipolar type (H)    PTSD (post-traumatic stress disorder)    Catatonia

## 2025-02-18 NOTE — PLAN OF CARE
"Goal Outcome Evaluation:    Plan of Care Reviewed With: patient      Problem: Adult Behavioral Health Plan of Care  Goal: Plan of Care Review  Outcome: Progressing  Flowsheets (Taken 2/18/2025 1630)  Patient Agreement with Plan of Care: agrees     Pt presented with a flat affect, calm mood during check in. Pt endorsed anxiety 3/10, stating \" I am worried about something, could not elaborate\", depression/sadness, could not rate. Pt denied  pain, SI, HI,hallucinations, and contracted for safety. Pt was visible in the milieu, social and engaged minimally with peers/staff. Attend committee meeting. Family visited, went well, pt ate home brought food. Compliant with all scheduled medications, Ativan was held for ECT tomorrow. Pt's hand shakes when holding a cup of water. Pt makes some needs known appropriately. No safety concerns observed this shift.      UA send, see Results Review for results, UC ordered.               "

## 2025-02-18 NOTE — PLAN OF CARE
Team Note Due:  Monday    Assessment/Intervention/Current Symtoms and Care Coordination:  Chart review and met with team, discussed pt progress, symptomology, and response to treatment.  Discussed the discharge plan and any potential impediments to discharge. Pt under commitment, edu and kezia porras. Pt requires a [facility] . Per RN, pt reports improvement in symptoms. Pt had ECT #2 yesterday.     Discharge Plan or Goal:  Pending stabilization and safe disposition planning; considerations include:  TBD. Per different reports from different family members, pt has an assisted living apartment but was staying in the family home PTA. It is unclear at this time where pt will return at discharge      Barriers to Discharge:  Patient requires further psychiatric stabilization due to current symptomology, medication management with changes subject to provider, coordination with outside supports, and aftercare planning. Pt started ECT on 2/14     Referral Status:  MA application was submitted on 1/29. Received by [facility]. Financial counselor is requesting letter from pt's employer confirming pt is no longer working, this was sent to financial counselor on 2/12     Legal Status:  Committed MI with ITP and Kezia Porras  [facility]  14-VI-DT-   ITP includes: Risperdal, Thorazine, Haldol, and Zyprexa  Expires: 06/10/2025    Contacts (include CLYDE status):  Gifty - daughter (CLYDE)  P: [phone]    Tevin - son (CLYDE)  P: [phone]     Patrick - son (CLYDE)  P: [phone]     Isaura - commitment CM through [facility] (CLYDE per commitment)  P: [phone]  E: [email or web address]     Upcoming Meetings and Dates/Important Information and next steps:  CTC to coordinate with pt, outside supports, and treatment team regarding discharge planning

## 2025-02-19 ENCOUNTER — APPOINTMENT (OUTPATIENT)
Dept: BEHAVIORAL HEALTH | Facility: CLINIC | Age: 60
DRG: 885 | End: 2025-02-19
Attending: PSYCHIATRY & NEUROLOGY

## 2025-02-19 ENCOUNTER — ANESTHESIA (OUTPATIENT)
Dept: BEHAVIORAL HEALTH | Facility: CLINIC | Age: 60
DRG: 885 | End: 2025-02-19

## 2025-02-19 ENCOUNTER — ANESTHESIA EVENT (OUTPATIENT)
Dept: BEHAVIORAL HEALTH | Facility: CLINIC | Age: 60
DRG: 885 | End: 2025-02-19

## 2025-02-19 PROCEDURE — 250N000013 HC RX MED GY IP 250 OP 250 PS 637: Performed by: PSYCHIATRY & NEUROLOGY

## 2025-02-19 PROCEDURE — 250N000009 HC RX 250: Performed by: PSYCHIATRY & NEUROLOGY

## 2025-02-19 PROCEDURE — 99232 SBSQ HOSP IP/OBS MODERATE 35: CPT | Performed by: PSYCHIATRY & NEUROLOGY

## 2025-02-19 PROCEDURE — 124N000002 HC R&B MH UMMC

## 2025-02-19 PROCEDURE — 90870 ELECTROCONVULSIVE THERAPY: CPT | Performed by: PSYCHIATRY & NEUROLOGY

## 2025-02-19 PROCEDURE — 250N000011 HC RX IP 250 OP 636: Performed by: ANESTHESIOLOGY

## 2025-02-19 PROCEDURE — 90870 ELECTROCONVULSIVE THERAPY: CPT

## 2025-02-19 PROCEDURE — 370N000017 HC ANESTHESIA TECHNICAL FEE, PER MIN

## 2025-02-19 PROCEDURE — 250N000013 HC RX MED GY IP 250 OP 250 PS 637: Performed by: CLINICAL NURSE SPECIALIST

## 2025-02-19 PROCEDURE — 250N000013 HC RX MED GY IP 250 OP 250 PS 637

## 2025-02-19 PROCEDURE — 250N000009 HC RX 250: Performed by: ANESTHESIOLOGY

## 2025-02-19 RX ORDER — FLUMAZENIL 0.1 MG/ML
0.3 INJECTION, SOLUTION INTRAVENOUS ONCE
Status: DISCONTINUED | OUTPATIENT
Start: 2025-02-19 | End: 2025-02-19

## 2025-02-19 RX ORDER — ONDANSETRON 2 MG/ML
4 INJECTION INTRAMUSCULAR; INTRAVENOUS EVERY 30 MIN PRN
Status: CANCELLED | OUTPATIENT
Start: 2025-02-19

## 2025-02-19 RX ORDER — ONDANSETRON 4 MG/1
4 TABLET, ORALLY DISINTEGRATING ORAL EVERY 30 MIN PRN
Status: CANCELLED | OUTPATIENT
Start: 2025-02-19

## 2025-02-19 RX ORDER — DEXAMETHASONE SODIUM PHOSPHATE 4 MG/ML
4 INJECTION, SOLUTION INTRA-ARTICULAR; INTRALESIONAL; INTRAMUSCULAR; INTRAVENOUS; SOFT TISSUE
Status: CANCELLED | OUTPATIENT
Start: 2025-02-19

## 2025-02-19 RX ORDER — METHOHEXITAL IN WATER/PF 100MG/10ML
SYRINGE (ML) INTRAVENOUS PRN
Status: DISCONTINUED | OUTPATIENT
Start: 2025-02-19 | End: 2025-02-19

## 2025-02-19 RX ORDER — SODIUM CHLORIDE, SODIUM LACTATE, POTASSIUM CHLORIDE, CALCIUM CHLORIDE 600; 310; 30; 20 MG/100ML; MG/100ML; MG/100ML; MG/100ML
INJECTION, SOLUTION INTRAVENOUS CONTINUOUS
Status: CANCELLED | OUTPATIENT
Start: 2025-02-19

## 2025-02-19 RX ORDER — NALOXONE HYDROCHLORIDE 0.4 MG/ML
0.1 INJECTION, SOLUTION INTRAMUSCULAR; INTRAVENOUS; SUBCUTANEOUS
Status: CANCELLED | OUTPATIENT
Start: 2025-02-19

## 2025-02-19 RX ADMIN — FLUMAZENIL 0.3 MG: 0.1 INJECTION, SOLUTION INTRAVENOUS at 10:22

## 2025-02-19 RX ADMIN — SERTRALINE HYDROCHLORIDE 25 MG: 25 TABLET ORAL at 11:31

## 2025-02-19 RX ADMIN — CHLORPROMAZINE HYDROCHLORIDE 300 MG: 100 TABLET, FILM COATED ORAL at 20:20

## 2025-02-19 RX ADMIN — DOCUSATE SODIUM 100 MG: 100 CAPSULE, LIQUID FILLED ORAL at 11:30

## 2025-02-19 RX ADMIN — OXCARBAZEPINE 150 MG: 150 TABLET, FILM COATED ORAL at 17:16

## 2025-02-19 RX ADMIN — HALOPERIDOL 5 MG: 5 TABLET ORAL at 20:21

## 2025-02-19 RX ADMIN — SUCCINYLCHOLINE CHLORIDE 70 MG: 20 INJECTION, SOLUTION INTRAMUSCULAR; INTRAVENOUS; PARENTERAL at 10:33

## 2025-02-19 RX ADMIN — POLYETHYLENE GLYCOL 3350 17 G: 17 POWDER, FOR SOLUTION ORAL at 11:32

## 2025-02-19 RX ADMIN — LORAZEPAM 1.5 MG: 1 TABLET ORAL at 20:21

## 2025-02-19 RX ADMIN — ACETAMINOPHEN 650 MG: 325 TABLET, FILM COATED ORAL at 13:41

## 2025-02-19 RX ADMIN — Medication 5 MG: at 20:22

## 2025-02-19 RX ADMIN — Medication 100 MG: at 10:33

## 2025-02-19 RX ADMIN — LEVOTHYROXINE SODIUM 100 MCG: 0.05 TABLET ORAL at 11:30

## 2025-02-19 RX ADMIN — LORAZEPAM 1.5 MG: 1 TABLET ORAL at 11:31

## 2025-02-19 RX ADMIN — Medication 1 TABLET: at 11:31

## 2025-02-19 RX ADMIN — HALOPERIDOL 5 MG: 5 TABLET ORAL at 11:31

## 2025-02-19 RX ADMIN — OXCARBAZEPINE 150 MG: 150 TABLET, FILM COATED ORAL at 11:31

## 2025-02-19 ASSESSMENT — ACTIVITIES OF DAILY LIVING (ADL)
ADLS_ACUITY_SCORE: 52
DRESS: INDEPENDENT
ADLS_ACUITY_SCORE: 52
HYGIENE/GROOMING: INDEPENDENT
ADLS_ACUITY_SCORE: 52
ADLS_ACUITY_SCORE: 52
ORAL_HYGIENE: INDEPENDENT
ADLS_ACUITY_SCORE: 52
ADLS_ACUITY_SCORE: 52
HYGIENE/GROOMING: INDEPENDENT
ADLS_ACUITY_SCORE: 52
LAUNDRY: UNABLE TO COMPLETE
ADLS_ACUITY_SCORE: 52
ORAL_HYGIENE: INDEPENDENT
ADLS_ACUITY_SCORE: 52
LAUNDRY: UNABLE TO COMPLETE
ADLS_ACUITY_SCORE: 52
DRESS: INDEPENDENT
ADLS_ACUITY_SCORE: 52
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
## 2025-02-19 NOTE — ANESTHESIA CARE TRANSFER NOTE
Patient: Becky Decker    Procedure: * No procedures listed *       Diagnosis: * No pre-op diagnosis entered *  Diagnosis Additional Information: No value filed.    Anesthesia Type:   General     Note:    Oropharynx: oropharynx clear of all foreign objects  Level of Consciousness: awake  Oxygen Supplementation: room air    Independent Airway: airway patency satisfactory and stable  Dentition: dentition unchanged  Vital Signs Stable: post-procedure vital signs reviewed and stable  Report to RN Given: handoff report given  Patient transferred to: PACU    Handoff Report: Identifed the Patient, Identified the Reponsible Provider, Reviewed the pertinent medical history, Discussed the surgical course, Reviewed Intra-OP anesthesia mangement and issues during anesthesia, Set expectations for post-procedure period and Allowed opportunity for questions and acknowledgement of understanding      Vitals:  Vitals Value Taken Time   BP     Temp     Pulse     Resp     SpO2         Electronically Signed By: Gilbert Reyes MD  February 19, 2025  10:41 AM

## 2025-02-19 NOTE — PROGRESS NOTES
Rehab Group    Start time: 1900  End time: 1945  Patient time total: 45 minutes    attended full group    #8 attended   Group Type: recreation   Group Topic Covered: activity therapy, cognitive activities, and healthy leisure time       Group Session Detail:  TR leisure group     Patient Response/Contribution:  Limited eye contact, distracted , inattentive, and stared out windows and did not engage       Patient Detail:    Pt attended the Therapeutic Recreation group this evening. Pt chose to participate in the group activity, but remained alert for the duration of group. Pt did not engage with anyone and appeared to not be attentive to the activity. At one point, pt started to try taking the game cards and put them in front of her, instead of leaving in the middle needed for the others turns in the game. Pt acknowledged this writer when returning the cards to the center of the table.       Activity Therapy Per 15 min ()      Patient Active Problem List   Diagnosis    Ruby (H)    Psychosis (H)    Schizoaffective disorder, bipolar type (H)    PTSD (post-traumatic stress disorder)    Catatonia

## 2025-02-19 NOTE — PLAN OF CARE
Problem: Psychotic Signs/Symptoms  Goal: Improved Behavioral Control (Psychotic Signs/Symptoms)  Outcome: Progressing  Flowsheets (Taken 2/19/2025 1345)  Mutually Determined Action Steps (Improved Behavioral Control): identifies symptoms triggers   Goal Outcome Evaluation:    Plan of Care Reviewed With: patient      Patient had ECT # 3 which reportedly went well. Pt has  been calm with blunted flat affect. Pt is hypoactive, quiet and withdrawn. She denies anxiety, depression, hallucinations and SI/SIB. Pt reported post ECT headache, requested and was given PRN Tylenol. Pt ate 75%of her meals, total fluid intake of 850 ml.

## 2025-02-19 NOTE — ANESTHESIA POSTPROCEDURE EVALUATION
Patient: Becky Decker    Procedure: * No procedures listed *       Anesthesia Type:  General    Note:  Disposition: Outpatient   Postop Pain Control: Uneventful            Sign Out: Well controlled pain   PONV: No   Neuro/Psych: Uneventful            Sign Out: Acceptable/Baseline neuro status   Airway/Respiratory: Uneventful            Sign Out: Acceptable/Baseline resp. status   CV/Hemodynamics: Uneventful            Sign Out: Acceptable CV status; No obvious hypovolemia; No obvious fluid overload   Other NRE: NONE   DID A NON-ROUTINE EVENT OCCUR?            Last vitals:  Vitals:    02/19/25 1050 02/19/25 1105 02/19/25 1126   BP: (!) 151/75 (!) 159/78 113/65   Pulse: (!) 121 (!) 124 109   Resp: 23 18    Temp: 36.6  C (97.8  F) 36.3  C (97.3  F)    SpO2: 95% 94% 98%       Electronically Signed By: Gilbert Reyes MD  February 19, 2025  11:57 AM

## 2025-02-19 NOTE — PLAN OF CARE
BEH IP Unit Acuity Rating Score (UARS)  Patient is given one point for every criteria they meet.    CRITERIA SCORING   On a 72 hour hold, court hold, committed, stay of commitment, or revocation. 1    Patient LOS on BEH unit exceeds 20 days. 1  LOS: 47   Patient under guardianship, 55+, otherwise medically complex, or under age 11. 1   Suicide ideation without relief of precipitating factors. 0   Current plan for suicide. 0   Current plan for homicide. 0   Imminent risk or actual attempt to seriously harm another without relief of factors precipitating the attempt. 0   Severe dysfunction in daily living (ex: complete neglect for self care, extreme disruption in vegetative function, extreme deterioration in social interactions). 1   Recent (last 7 days) or current physical aggression in the ED or on unit. 0   Restraints or seclusion episode in past 72 hours. 0   Recent (last 7 days) or current verbal aggression, agitation, yelling, etc., while in the ED or unit. 0   Active psychosis. 1   Need for constant or near constant redirection (from leaving, from others, etc).  0   Intrusive or disruptive behaviors. 0   Patient requires 3 or more hours of individualized nursing care per 8-hour shift (i.e. for ADLs, meds, therapeutic interventions). 0   TOTAL 5

## 2025-02-19 NOTE — PLAN OF CARE
Problem: Depressive Signs/Symptoms  Goal: Improved Sleep (Depressive Signs/Symptoms)  Outcome: Progressing   Goal Outcome Evaluation:             Received asleep, pt is scheduled for ECT # 3 @ 1240, NPO maintained.  No oral pre ECT medications administered this shift.  Pt has a medical bed to aid with mobility and remains on SIO 1:1 5 feet for self injury risk and intrusiveness, non of these were observed tonight.  Slept for 8.75 hours

## 2025-02-19 NOTE — ANESTHESIA PREPROCEDURE EVALUATION
Anesthesia Pre-Procedure Evaluation    Patient: Becky Decker   MRN: [id number] : 1965        Procedure : * No procedures listed *          No past medical history on file.   No past surgical history on file.   Allergies   Allergen Reactions    Pork-Derived Products       Social History     Tobacco Use    Smoking status: Not on file    Smokeless tobacco: Not on file   Substance Use Topics    Alcohol use: Not on file      Wt Readings from Last 1 Encounters:   25 100.2 kg (220 lb 14.4 oz)        Anesthesia Evaluation   Pt has had prior anesthetic.         ROS/MED HX  ENT/Pulmonary:       Neurologic:       Cardiovascular:       METS/Exercise Tolerance:     Hematologic:       Musculoskeletal:       GI/Hepatic:       Renal/Genitourinary:       Endo:     (+)               Obesity,       Psychiatric/Substance Use:     (+) psychiatric history anxiety, depression, schizophrenia and other (comment) (Catatonia, psychosis, SI)       Infectious Disease:       Malignancy:       Other:            Physical Exam    Airway   unable to assess          Respiratory Devices and Support         Dental    unable to assess        Cardiovascular    unable to assess         Pulmonary    Unable to assess               OUTSIDE LABS:  CBC:   Lab Results   Component Value Date    WBC 4.6 2025    WBC 6.8 2024    HGB 10.2 (L) 2025    HGB 12.6 2024    HCT 31.8 (L) 2025    HCT 38.8 2024    PLT  2025      Comment:      Platelet count unavailable - platelets are clumped.     2024     BMP:   Lab Results   Component Value Date     2025     2025    POTASSIUM 4.3 2025    POTASSIUM 4.6 2025    CHLORIDE 100 2025    CHLORIDE 102 2025    CO2 23 2025    CO2 23 2025    BUN 16.3 2025    BUN 15.8 2025    CR 0.82 2025    CR 0.75 2025    GLC 98 2025    GLC 94 2025     COAGS: No results found for:  "\"PTT\", \"INR\", \"FIBR\"  POC: No results found for: \"BGM\", \"HCG\", \"HCGS\"  HEPATIC:   Lab Results   Component Value Date    ALBUMIN 3.7 01/27/2025    PROTTOTAL 6.9 01/27/2025    ALT 15 01/27/2025    AST 15 01/27/2025    ALKPHOS 69 01/27/2025    BILITOTAL 0.2 01/27/2025    TARIQ 55 (H) 01/27/2025     OTHER:   Lab Results   Component Value Date    A1C 5.6 11/27/2024    AMADOR 9.4 02/03/2025    PHOS 3.8 01/07/2025    TSH 0.05 (L) 02/03/2025    T4 1.12 02/03/2025       Anesthesia Plan    ASA Status:  2    NPO Status:  NPO Appropriate    Anesthesia Type: General.     - Airway: Mask Only   Induction: Intravenous.           Consents            Postoperative Care            Comments:    Other Comments: Plan: IV induction, hyperventilation with mask and recovery.            Gilbert Reyes MD    I have reviewed the pertinent notes and labs in the chart from the past 30 days and (re)examined the patient.  Any updates or changes from those notes are reflected in this note.    Clinically Significant Risk Factors               # Hypoalbuminemia: Lowest albumin = 3.4 g/dL at 1/7/2025  7:45 AM, will monitor as appropriate                    # Financial/Environmental Concerns:             "

## 2025-02-19 NOTE — PROGRESS NOTES
Pt has met discharge criteria. Vital signs stable. PIV removed without issue. Pt moved to discharge area via wheelchair.  Report given to VANIA Alves and transported back to the unit with security and staff
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
## 2025-02-19 NOTE — PLAN OF CARE
Initial meeting note:    Therapist introduced self to patient and discussed psychotherapy service available to patient.     Pt response: Pt not interested currently in meeting 1:1; therapist will continue remaining available for pt     Plan: Pt was encouraged to attend groups and therapist will remain available for 1:1 sessions

## 2025-02-19 NOTE — PLAN OF CARE
Team Note Due:  Monday    Assessment/Intervention/Current Symtoms and Care Coordination:  Chart review and met with team, discussed pt progress, symptomology, and response to treatment.  Discussed the discharge plan and any potential impediments to discharge. Pt under commitment, edu and ekzia porras. Pt requires a [facility] . Pt had ECT #3 today.     Discharge Plan or Goal:  Pending stabilization and safe disposition planning; considerations include:  TBD. Per different reports from different family members, pt has an assisted living apartment but was staying in the family home PTA. It is unclear at this time where pt will return at discharge      Barriers to Discharge:  Patient requires further psychiatric stabilization due to current symptomology, medication management with changes subject to provider, coordination with outside supports, and aftercare planning. Pt started ECT on 2/14     Referral Status:  MA application was submitted on 1/29. Received by [facility]. Financial counselor is requesting letter from pt's employer confirming pt is no longer working, this was sent to financial counselor on 2/12     Legal Status:  Committed MI with ITP and Kezia Porras  [facility]  [id number]   ITP includes: Risperdal, Thorazine, Haldol, and Zyprexa  Expires: 06/10/2025    Contacts (include CLYDE status):  Gifty - daughter (CLYDE)  P: [phone]    Tevin - son (CLYDE)  P: [phone]     Patrick - son (CLYDE)  P: [phone]     Isaura - commitment CM through [facility] (CLYDE per commitment)  P: [phone]  E: [email or web address]     Upcoming Meetings and Dates/Important Information and next steps:  CTC to coordinate with pt, outside supports, and treatment team regarding discharge planning

## 2025-02-20 VITALS
DIASTOLIC BLOOD PRESSURE: 67 MMHG | TEMPERATURE: 98.4 F | OXYGEN SATURATION: 98 % | HEART RATE: 84 BPM | RESPIRATION RATE: 16 BRPM | WEIGHT: 220.9 LBS | SYSTOLIC BLOOD PRESSURE: 97 MMHG | BODY MASS INDEX: 32.62 KG/M2

## 2025-02-20 LAB
BACTERIA UR CULT: NORMAL
BACTERIA UR CULT: NORMAL

## 2025-02-20 PROCEDURE — 124N000002 HC R&B MH UMMC

## 2025-02-20 PROCEDURE — 250N000013 HC RX MED GY IP 250 OP 250 PS 637: Performed by: PSYCHIATRY & NEUROLOGY

## 2025-02-20 PROCEDURE — 99232 SBSQ HOSP IP/OBS MODERATE 35: CPT | Performed by: PSYCHIATRY & NEUROLOGY

## 2025-02-20 PROCEDURE — 250N000013 HC RX MED GY IP 250 OP 250 PS 637: Performed by: CLINICAL NURSE SPECIALIST

## 2025-02-20 PROCEDURE — 250N000013 HC RX MED GY IP 250 OP 250 PS 637

## 2025-02-20 RX ADMIN — Medication 500 ML: at 08:42

## 2025-02-20 RX ADMIN — LEVOTHYROXINE SODIUM 100 MCG: 0.05 TABLET ORAL at 08:36

## 2025-02-20 RX ADMIN — OXCARBAZEPINE 150 MG: 150 TABLET, FILM COATED ORAL at 08:36

## 2025-02-20 RX ADMIN — LORAZEPAM 1.5 MG: 1 TABLET ORAL at 08:35

## 2025-02-20 RX ADMIN — SERTRALINE HYDROCHLORIDE 25 MG: 25 TABLET ORAL at 08:36

## 2025-02-20 RX ADMIN — CHLORPROMAZINE HYDROCHLORIDE 300 MG: 100 TABLET, FILM COATED ORAL at 20:16

## 2025-02-20 RX ADMIN — HALOPERIDOL 5 MG: 5 TABLET ORAL at 08:36

## 2025-02-20 RX ADMIN — DOCUSATE SODIUM 100 MG: 100 CAPSULE, LIQUID FILLED ORAL at 08:36

## 2025-02-20 RX ADMIN — Medication 1 TABLET: at 08:36

## 2025-02-20 RX ADMIN — OXCARBAZEPINE 150 MG: 150 TABLET, FILM COATED ORAL at 19:04

## 2025-02-20 RX ADMIN — POLYETHYLENE GLYCOL 3350 17 G: 17 POWDER, FOR SOLUTION ORAL at 08:35

## 2025-02-20 RX ADMIN — HALOPERIDOL 5 MG: 5 TABLET ORAL at 20:16

## 2025-02-20 RX ADMIN — Medication 5 MG: at 20:16

## 2025-02-20 ASSESSMENT — ACTIVITIES OF DAILY LIVING (ADL)
ADLS_ACUITY_SCORE: 52
DRESS: INDEPENDENT
ADLS_ACUITY_SCORE: 52
HYGIENE/GROOMING: INDEPENDENT
ORAL_HYGIENE: INDEPENDENT
ADLS_ACUITY_SCORE: 52
ADLS_ACUITY_SCORE: 52
DRESS: INDEPENDENT
ADLS_ACUITY_SCORE: 52
ORAL_HYGIENE: INDEPENDENT
ADLS_ACUITY_SCORE: 52
HYGIENE/GROOMING: INDEPENDENT
ADLS_ACUITY_SCORE: 52
ADLS_ACUITY_SCORE: 52
LAUNDRY: WITH SUPERVISION
ADLS_ACUITY_SCORE: 52

## 2025-02-20 NOTE — PLAN OF CARE
Problem: Sleep Disturbance  Goal: Adequate Sleep/Rest  Outcome: Progressing   Goal Outcome Evaluation:       Patient is sleeping at the start of the shift. She is on SIO r/t intrusiveness. She is using a medical bed to help in her mobility. No complaints made.    Patient slept well the whole night for a total of 8.5 hours.without interruptions.

## 2025-02-20 NOTE — PLAN OF CARE
Initial meeting note:    Therapist introduced self to patient and discussed psychotherapy service available to patient.     Pt response: Pt not interested currently in meeting 1:1; therapist will continue remaining available for pt     Plan: Pt was encouraged to attend groups and therapist will remain available for 1:1 sessions    Attempted to meet with pt. Pt said she was ok and feeling a little bit better, she said she is a little happier and feels better. Pt smiled as writer sat next to her, but pt did not engage in conversation. Pt encouraged pt to continue to get to groups as she can.

## 2025-02-20 NOTE — PLAN OF CARE
Problem: Psychotic Signs/Symptoms  Goal: Improved Behavioral Control (Psychotic Signs/Symptoms)  Outcome: Progressing  Intervention: Manage Behavior  Recent Flowsheet Documentation  Taken 2/19/2025 4444 by Enedelia Harris RN  De-Escalation Techniques: 1:1 observation initiated   Goal Outcome Evaluation:    Plan of Care Reviewed With: spouse    /79   Pulse 76   Temp 97.1  F (36.2  C) (Temporal)   Resp 16   Wt 100.2 kg (220 lb 14.4 oz)   SpO2 98%   BMI 32.62 kg/m       Pt visible in the milieu at the start of shift. Presented with flat affect and mood calm. Pt verbalized ECT went well today. Denied SI/SIB/AVH. Denied anxiety and endorsed depression 5/10. Denied pain. Pt assessment done with . Had 75% of dinner, 800 fluid intakes. Compliant with medications. Remains on SIO 1:1 r/t self-injury risk. Safety checks maintained. Pt up ad shawn with steady gait. Pt was encouraged to attend unit group meeting. Had shower and family visited. Will continue to monitor per plan of care.

## 2025-02-20 NOTE — PLAN OF CARE
Team Note Due:  Monday    Assessment/Intervention/Current Symtoms and Care Coordination:  Chart review and met with team, discussed pt progress, symptomology, and response to treatment.  Discussed the discharge plan and any potential impediments to discharge. Pt under commitment, sorensen and mast porras. Pt requires a [facility] . Pt had ECT #3 yesterday. Per staff, pt has a brighter affect today. Per provider, pt was more engaged and talkative in conversation yesterday.     Writer spoke with Patrick ([phone]) who inquired about update regarding MA application. Writer provided most recent update from financial counselor.     Discharge Plan or Goal:  Pending stabilization and safe disposition planning; considerations include:  TBD. Per different reports from different family members, pt has an assisted living apartment but was staying in the family home PTA. It is unclear at this time where pt will return at discharge      Barriers to Discharge:  Patient requires further psychiatric stabilization due to current symptomology, medication management with changes subject to provider, coordination with outside supports, and aftercare planning. Pt started ECT on 2/14     Referral Status:  MA application was submitted on 1/29. Received by [facility]. Financial counselor is requesting letter from pt's employer confirming pt is no longer working, this was sent to financial counselor on 2/12     Legal Status:  Committed MI with ITP and Hill Porras  [facility]  [id number]   ITP includes: Risperdal, Thorazine, Haldol, and Zyprexa  Expires: 06/10/2025    Contacts (include CLYDE status):  Gifty - daughter (CLYDE)  P: [phone]    Tevin - son (CLYDE)  P: [phone]     Patrick - son (CLYDE)  P: [phone]     Isaura - commitment CM through [facility] (CLYDE per commitment)  P: [phone]  E: [email or web address]     Upcoming Meetings and Dates/Important Information and next  steps:  CTC to coordinate with pt, outside supports, and treatment team regarding discharge planning

## 2025-02-20 NOTE — PLAN OF CARE
Problem: Psychotic Signs/Symptoms  Goal: Improved Behavioral Control (Psychotic Signs/Symptoms)  Outcome: Progressing   Goal Outcome Evaluation:    Plan of Care Reviewed With: patient      Patient was mostly isolative to room and withdrawn from peers. Pt came out with encouragement from staff and has been medication complaint. Pt smiled during interactions and was thankful for the care provided, appeared more interactive with staff during med pass. Pt denies anxiety, depression and SI/SIB. Pt states she is doing well and feeling better. Pt has ECT #4 scheduled tomorrow at 1200.

## 2025-02-20 NOTE — PLAN OF CARE
BEH IP Unit Acuity Rating Score (UARS)  Patient is given one point for every criteria they meet.    CRITERIA SCORING   On a 72 hour hold, court hold, committed, stay of commitment, or revocation. 1    Patient LOS on BEH unit exceeds 20 days. 1  LOS: 48   Patient under guardianship, 55+, otherwise medically complex, or under age 11. 1   Suicide ideation without relief of precipitating factors. 0   Current plan for suicide. 0   Current plan for homicide. 0   Imminent risk or actual attempt to seriously harm another without relief of factors precipitating the attempt. 0   Severe dysfunction in daily living (ex: complete neglect for self care, extreme disruption in vegetative function, extreme deterioration in social interactions). 1   Recent (last 7 days) or current physical aggression in the ED or on unit. 0   Restraints or seclusion episode in past 72 hours. 0   Recent (last 7 days) or current verbal aggression, agitation, yelling, etc., while in the ED or unit. 0   Active psychosis. 1   Need for constant or near constant redirection (from leaving, from others, etc).  0   Intrusive or disruptive behaviors. 0   Patient requires 3 or more hours of individualized nursing care per 8-hour shift (i.e. for ADLs, meds, therapeutic interventions). 0   TOTAL 5

## 2025-02-21 ENCOUNTER — OFFICE VISIT (OUTPATIENT)
Dept: INTERPRETER SERVICES | Facility: CLINIC | Age: 60
DRG: 885 | End: 2025-02-21

## 2025-02-21 ENCOUNTER — APPOINTMENT (OUTPATIENT)
Dept: BEHAVIORAL HEALTH | Facility: CLINIC | Age: 60
DRG: 885 | End: 2025-02-21
Attending: PSYCHIATRY & NEUROLOGY

## 2025-02-21 PROCEDURE — 90870 ELECTROCONVULSIVE THERAPY: CPT

## 2025-02-21 PROCEDURE — 250N000013 HC RX MED GY IP 250 OP 250 PS 637: Performed by: PSYCHIATRY & NEUROLOGY

## 2025-02-21 PROCEDURE — 90870 ELECTROCONVULSIVE THERAPY: CPT | Performed by: PSYCHIATRY & NEUROLOGY

## 2025-02-21 PROCEDURE — 250N000013 HC RX MED GY IP 250 OP 250 PS 637: Performed by: CLINICAL NURSE SPECIALIST

## 2025-02-21 PROCEDURE — 250N000013 HC RX MED GY IP 250 OP 250 PS 637

## 2025-02-21 PROCEDURE — 124N000002 HC R&B MH UMMC

## 2025-02-21 PROCEDURE — 370N000017 HC ANESTHESIA TECHNICAL FEE, PER MIN: Performed by: ANESTHESIOLOGY

## 2025-02-21 PROCEDURE — T1013 SIGN LANG/ORAL INTERPRETER: HCPCS

## 2025-02-21 PROCEDURE — 99232 SBSQ HOSP IP/OBS MODERATE 35: CPT | Performed by: PSYCHIATRY & NEUROLOGY

## 2025-02-21 PROCEDURE — 90853 GROUP PSYCHOTHERAPY: CPT

## 2025-02-21 RX ORDER — FENTANYL CITRATE 50 UG/ML
50 INJECTION, SOLUTION INTRAMUSCULAR; INTRAVENOUS EVERY 5 MIN PRN
Status: DISCONTINUED | OUTPATIENT
Start: 2025-02-21 | End: 2025-02-21

## 2025-02-21 RX ORDER — HYDROMORPHONE HCL IN WATER/PF 6 MG/30 ML
0.2 PATIENT CONTROLLED ANALGESIA SYRINGE INTRAVENOUS EVERY 5 MIN PRN
Status: DISCONTINUED | OUTPATIENT
Start: 2025-02-21 | End: 2025-02-21

## 2025-02-21 RX ORDER — ONDANSETRON 4 MG/1
4 TABLET, ORALLY DISINTEGRATING ORAL EVERY 30 MIN PRN
Status: DISCONTINUED | OUTPATIENT
Start: 2025-02-21 | End: 2025-02-21

## 2025-02-21 RX ORDER — SODIUM CHLORIDE, SODIUM LACTATE, POTASSIUM CHLORIDE, CALCIUM CHLORIDE 600; 310; 30; 20 MG/100ML; MG/100ML; MG/100ML; MG/100ML
INJECTION, SOLUTION INTRAVENOUS CONTINUOUS
Status: DISCONTINUED | OUTPATIENT
Start: 2025-02-21 | End: 2025-02-21

## 2025-02-21 RX ORDER — ONDANSETRON 2 MG/ML
4 INJECTION INTRAMUSCULAR; INTRAVENOUS EVERY 30 MIN PRN
Status: DISCONTINUED | OUTPATIENT
Start: 2025-02-21 | End: 2025-02-21

## 2025-02-21 RX ORDER — FLUMAZENIL 0.1 MG/ML
0.3 INJECTION, SOLUTION INTRAVENOUS ONCE
Status: DISCONTINUED | OUTPATIENT
Start: 2025-02-21 | End: 2025-02-21

## 2025-02-21 RX ORDER — FENTANYL CITRATE 50 UG/ML
25 INJECTION, SOLUTION INTRAMUSCULAR; INTRAVENOUS EVERY 5 MIN PRN
Status: DISCONTINUED | OUTPATIENT
Start: 2025-02-21 | End: 2025-02-21

## 2025-02-21 RX ORDER — HYDROMORPHONE HCL IN WATER/PF 6 MG/30 ML
0.4 PATIENT CONTROLLED ANALGESIA SYRINGE INTRAVENOUS EVERY 5 MIN PRN
Status: DISCONTINUED | OUTPATIENT
Start: 2025-02-21 | End: 2025-02-21

## 2025-02-21 RX ORDER — DEXAMETHASONE SODIUM PHOSPHATE 4 MG/ML
4 INJECTION, SOLUTION INTRA-ARTICULAR; INTRALESIONAL; INTRAMUSCULAR; INTRAVENOUS; SOFT TISSUE
Status: DISCONTINUED | OUTPATIENT
Start: 2025-02-21 | End: 2025-02-21

## 2025-02-21 RX ORDER — NALOXONE HYDROCHLORIDE 0.4 MG/ML
0.1 INJECTION, SOLUTION INTRAMUSCULAR; INTRAVENOUS; SUBCUTANEOUS
Status: DISCONTINUED | OUTPATIENT
Start: 2025-02-21 | End: 2025-02-21

## 2025-02-21 RX ADMIN — OXCARBAZEPINE 150 MG: 150 TABLET, FILM COATED ORAL at 20:21

## 2025-02-21 RX ADMIN — Medication 5 MG: at 20:21

## 2025-02-21 RX ADMIN — OXCARBAZEPINE 150 MG: 150 TABLET, FILM COATED ORAL at 14:32

## 2025-02-21 RX ADMIN — HALOPERIDOL 5 MG: 5 TABLET ORAL at 20:21

## 2025-02-21 RX ADMIN — LORAZEPAM 1.5 MG: 1 TABLET ORAL at 20:21

## 2025-02-21 RX ADMIN — SERTRALINE HYDROCHLORIDE 25 MG: 25 TABLET ORAL at 13:56

## 2025-02-21 RX ADMIN — DOCUSATE SODIUM 100 MG: 100 CAPSULE, LIQUID FILLED ORAL at 13:56

## 2025-02-21 RX ADMIN — POLYETHYLENE GLYCOL 3350 17 G: 17 POWDER, FOR SOLUTION ORAL at 13:57

## 2025-02-21 RX ADMIN — Medication 1 TABLET: at 13:56

## 2025-02-21 RX ADMIN — CHLORPROMAZINE HYDROCHLORIDE 300 MG: 100 TABLET, FILM COATED ORAL at 20:20

## 2025-02-21 RX ADMIN — LEVOTHYROXINE SODIUM 100 MCG: 0.05 TABLET ORAL at 13:56

## 2025-02-21 RX ADMIN — LORAZEPAM 1.5 MG: 1 TABLET ORAL at 14:32

## 2025-02-21 RX ADMIN — HALOPERIDOL 5 MG: 5 TABLET ORAL at 14:36

## 2025-02-21 ASSESSMENT — ACTIVITIES OF DAILY LIVING (ADL)
ADLS_ACUITY_SCORE: 52
ORAL_HYGIENE: INDEPENDENT
LAUNDRY: UNABLE TO COMPLETE
ADLS_ACUITY_SCORE: 52
HYGIENE/GROOMING: INDEPENDENT
ADLS_ACUITY_SCORE: 52
LAUNDRY: UNABLE TO COMPLETE
HYGIENE/GROOMING: INDEPENDENT
ADLS_ACUITY_SCORE: 52
ORAL_HYGIENE: INDEPENDENT
ADLS_ACUITY_SCORE: 52
DRESS: INDEPENDENT
ADLS_ACUITY_SCORE: 52
DRESS: INDEPENDENT;STREET CLOTHES
ADLS_ACUITY_SCORE: 52
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
## 2025-02-21 NOTE — PLAN OF CARE
BEH IP Unit Acuity Rating Score (UARS)  Patient is given one point for every criteria they meet.    CRITERIA SCORING   On a 72 hour hold, court hold, committed, stay of commitment, or revocation. 1    Patient LOS on BEH unit exceeds 20 days. 1  LOS: 49   Patient under guardianship, 55+, otherwise medically complex, or under age 11. 1   Suicide ideation without relief of precipitating factors. 0   Current plan for suicide. 0   Current plan for homicide. 0   Imminent risk or actual attempt to seriously harm another without relief of factors precipitating the attempt. 0   Severe dysfunction in daily living (ex: complete neglect for self care, extreme disruption in vegetative function, extreme deterioration in social interactions). 1   Recent (last 7 days) or current physical aggression in the ED or on unit. 0   Restraints or seclusion episode in past 72 hours. 0   Recent (last 7 days) or current verbal aggression, agitation, yelling, etc., while in the ED or unit. 0   Active psychosis. 1   Need for constant or near constant redirection (from leaving, from others, etc).  0   Intrusive or disruptive behaviors. 0   Patient requires 3 or more hours of individualized nursing care per 8-hour shift (i.e. for ADLs, meds, therapeutic interventions). 0   TOTAL 5

## 2025-02-21 NOTE — PLAN OF CARE
Team Note Due:  Monday    Assessment/Intervention/Current Symtoms and Care Coordination:  Chart review and met with team, discussed pt progress, symptomology, and response to treatment.  Discussed the discharge plan and any potential impediments to discharge. Pt under commitment, carl and catherine puente. Pt requires a Peruvian . Pt had ECT #4 today. Per staff, pt has a brighter affect today. Per provider's conversation with pt, pt reports that ECT is improving her symptoms.     Discharge Plan or Goal:  Pending stabilization and safe disposition planning; considerations include:  TBD. Per different reports from different family members, pt has an assisted living apartment but was staying in the family home PTA. It is unclear at this time where pt will return at discharge      Barriers to Discharge:  Patient requires further psychiatric stabilization due to current symptomology, medication management with changes subject to provider, coordination with outside supports, and aftercare planning. Pt started ECT on 2/14     Referral Status:  MA application was submitted on 1/29. Received by [facility]. Financial counselor is requesting letter from pt's employer confirming pt is no longer working, this was sent to financial counselor on 2/12     Legal Status:  Committed MI with ITP and Catherine Puente  [facility]  [id number]   ITP includes: Risperdal, Thorazine, Haldol, and Zyprexa  Expires: 06/10/2025    Contacts (include DENI status):  Mya - daughter (DENI)  P: [phone]    Golden - son (DENI)  P: [phone]     Mohamed - son (DENI)  P: [phone]     Rani - commitment CM through [facility] (DENI per commitment)  P: [phone]  E: [email or web address]     Upcoming Meetings and Dates/Important Information and next steps:  CTC to coordinate with pt, outside supports, and treatment team regarding discharge planning

## 2025-02-21 NOTE — OR NURSING
1320  Report called to nurse on 3b.  Patient was incontinent of urine. When I explained it was due to the seizure, she got very worried that she had a seizure, unfortunately the  had to leave before her care was complete, so it was difficult to explain to her that ect is the treatment of creating a medically managed seizure.

## 2025-02-21 NOTE — PLAN OF CARE
Group Attendance:  attended full group    Time session began: 1415  Time session ended: 1500  Patient's total time in group: 45    Total # Attendees   7   Group Type psychotherapeutic     Group Topic Covered emotional regulation, symptom management, healthy coping skills, mindfulness, and relaxation and grounding techniques/coping skills     Group Session Detail Group members checked in with how they are feeling. The group discussed utilizing music as a therapeutic tool to explore emotions, expression, and promote relaxation. Writer discussed the significance of music in emotional regulation and coping strategies. Group members shared personal experiences and associations with songs they chose.      Patient's response to the group topic/interactions:  positive affect, cooperative with task, organized, supportive of peers, socially appropriate, listened actively, expressed understanding of topic, attentive, and actively engaged     Patient Details: Pt attended group, listened to the music, was noted to be singing along to some songs she chose, and shared they felt better after the group.           33697 - Group psychotherapy - 1 Session  Patient Active Problem List   Diagnosis    Mary (H)    Psychosis (H)    Schizoaffective disorder, bipolar type (H)    PTSD (post-traumatic stress disorder)    Catatonia
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
## 2025-02-21 NOTE — PLAN OF CARE
Goal Outcome Evaluation:    Plan of Care Reviewed With: patient      Problem: Adult Behavioral Health Plan of Care  Goal: Plan of Care Review  Outcome: Progressing  Flowsheets (Taken 2/20/2025 1800)  Patient Agreement with Plan of Care: agrees     Pt presented with a flat affect, smiles with interaction, calm mood during check in. Pt denied anxiety, depression, pain, SI, HI,hallucinations, and contracted for safety. Pt was visible in the milieu, social and engaged minimally with peers/staff. Attend and participated in committee meeting. Compliant with all scheduled medications, Ativan was hold for ECT tomorrow. Family visited, went well. Pt makes some needs known appropriately. No safety concerns observed this shift.

## 2025-02-21 NOTE — PLAN OF CARE
Goal Outcome Evaluation:         Pt on NPO for ECT since midnight. Blood pressure 90/64, pulse 96, temperature 97.2  F (36.2  C), temperature source Oral, resp. rate 18, weight 100.2 kg (220 lb 14.4 oz), SpO2 97%.     Escorted off unit by PA.         Pt returned to unit, @ 1,40 pm  ECT staff reported that pt had  been incontinent of Urine during ECT, pt wearing briefs and hospital scrubs, for protection. Pt alert, lunch offered, ate 100% pt c/o slight headache, eating lunch. Vitals upon return to unit. Blood pressure 105/57, pulse 81, temperature 97.1  F (36.2  C), temperature source Oral, resp. rate 18, weight 100.2 kg (220 lb 14.4 oz), SpO2 97%.     Attended groups , AM meds given.

## 2025-02-21 NOTE — PLAN OF CARE
Problem: Sleep Disturbance  Goal: Adequate Sleep/Rest  Outcome: Progressing   Goal Outcome Evaluation:       Patient is sound asleep at the start of the shift. She remains on SIO r/t intrusiveness. She is using a medical bed to aid in her mobility. Nothing unusual noted. No complaints made. She is scheduled for ECT today at 12:00 noon. NPO maintained. Vital signs taken and recorded. Patient slept the whole night for 9.5 hours without interruptions.

## 2025-02-22 ENCOUNTER — OFFICE VISIT (OUTPATIENT)
Dept: INTERPRETER SERVICES | Facility: CLINIC | Age: 60
DRG: 885 | End: 2025-02-22

## 2025-02-22 PROCEDURE — 250N000013 HC RX MED GY IP 250 OP 250 PS 637: Performed by: PSYCHIATRY & NEUROLOGY

## 2025-02-22 PROCEDURE — 124N000002 HC R&B MH UMMC

## 2025-02-22 PROCEDURE — 250N000013 HC RX MED GY IP 250 OP 250 PS 637: Performed by: CLINICAL NURSE SPECIALIST

## 2025-02-22 PROCEDURE — 250N000013 HC RX MED GY IP 250 OP 250 PS 637

## 2025-02-22 PROCEDURE — T1013 SIGN LANG/ORAL INTERPRETER: HCPCS

## 2025-02-22 RX ADMIN — CHLORPROMAZINE HYDROCHLORIDE 300 MG: 100 TABLET, FILM COATED ORAL at 20:14

## 2025-02-22 RX ADMIN — LORAZEPAM 1.5 MG: 1 TABLET ORAL at 09:44

## 2025-02-22 RX ADMIN — Medication 500 ML: at 09:45

## 2025-02-22 RX ADMIN — HALOPERIDOL 5 MG: 5 TABLET ORAL at 09:44

## 2025-02-22 RX ADMIN — Medication 1 TABLET: at 09:44

## 2025-02-22 RX ADMIN — HALOPERIDOL 5 MG: 5 TABLET ORAL at 20:14

## 2025-02-22 RX ADMIN — SERTRALINE HYDROCHLORIDE 25 MG: 25 TABLET ORAL at 09:44

## 2025-02-22 RX ADMIN — OXCARBAZEPINE 150 MG: 150 TABLET, FILM COATED ORAL at 20:14

## 2025-02-22 RX ADMIN — Medication 5 MG: at 20:14

## 2025-02-22 RX ADMIN — LEVOTHYROXINE SODIUM 100 MCG: 0.05 TABLET ORAL at 09:44

## 2025-02-22 RX ADMIN — OXCARBAZEPINE 150 MG: 150 TABLET, FILM COATED ORAL at 17:13

## 2025-02-22 RX ADMIN — POLYETHYLENE GLYCOL 3350 17 G: 17 POWDER, FOR SOLUTION ORAL at 09:44

## 2025-02-22 RX ADMIN — LORAZEPAM 1.5 MG: 1 TABLET ORAL at 20:14

## 2025-02-22 RX ADMIN — DOCUSATE SODIUM 100 MG: 100 CAPSULE, LIQUID FILLED ORAL at 09:44

## 2025-02-22 ASSESSMENT — ACTIVITIES OF DAILY LIVING (ADL)
ADLS_ACUITY_SCORE: 52
ORAL_HYGIENE: INDEPENDENT
DRESS: INDEPENDENT
ADLS_ACUITY_SCORE: 52
DRESS: INDEPENDENT
ADLS_ACUITY_SCORE: 52
HYGIENE/GROOMING: INDEPENDENT
ADLS_ACUITY_SCORE: 52
ADLS_ACUITY_SCORE: 52
ORAL_HYGIENE: INDEPENDENT
ADLS_ACUITY_SCORE: 52
HYGIENE/GROOMING: INDEPENDENT
ADLS_ACUITY_SCORE: 52
ADLS_ACUITY_SCORE: 52

## 2025-02-22 NOTE — PLAN OF CARE
Problem: Psychotic Signs/Symptoms  Goal: Improved Mood Symptoms (Psychotic Signs/Symptoms)  Outcome: Not Progressing  Intervention: Optimize Emotion and Mood  Recent Flowsheet Documentation  Taken 2/22/2025 1055 by Rc Elizabeth RN  Supportive Measures:   active listening utilized   positive reinforcement provided   relaxation techniques promoted   self-care encouraged   verbalization of feelings encouraged  Diversional Activity: television   Goal Outcome Evaluation:    Plan of Care Reviewed With: patient      Patient's affect is flat, mood is calm. Patient denies anxiety/depression/SI/SIB/AVH. Patient slept in, ate breakfast around 1000, visible for meals but withdrawn to self. Denies pain or any MH concerns, reports they were initially fearful of ECT but feel its going well. No observation of patient responding to internal stimuli this shift.  Appetite is good, ate 100% of meals.  /69 (BP Location: Right arm, Patient Position: Sitting, Cuff Size: Adult Large)   Pulse 97   Temp 98.1  F (36.7  C) (Oral)   Resp 16   Wt 100.2 kg (220 lb 14.4 oz)   SpO2 98%   BMI 32.62 kg/m

## 2025-02-22 NOTE — PLAN OF CARE
Problem: Sleep Disturbance  Goal: Adequate Sleep/Rest  Outcome: Progressing   Goal Outcome Evaluation:       Patient is sleeping soundly in the beginning of the shift. No complaints made. She is using a medical bed to facilitate her mobility.    She slept the whole shift for 9.75 hours without interruptions.

## 2025-02-22 NOTE — PLAN OF CARE
Problem: Adult Behavioral Health Plan of Care  Goal: Plan of Care Review  Outcome: Progressing  Flowsheets  Taken 2/21/2025 9343  Plan of Care Reviewed With: patient  Overall Patient Progress: improving  Patient Agreement with Plan of Care: agrees    Patient appeared improving with ECT. Mood was calm and affect getting brighter. She admitted she's a little confused after ECT but was resolved later. She denied dizziness, headache and nausea. She denied being anxious and depressed. Denied SI, SIB, AVH and HI. She attended the community meeting. She ate well at supper. She was med compliant. Pt answered questions asked with less help from the . She said she may not need the  anymore.

## 2025-02-23 PROCEDURE — 250N000013 HC RX MED GY IP 250 OP 250 PS 637: Performed by: PSYCHIATRY & NEUROLOGY

## 2025-02-23 PROCEDURE — 93010 ELECTROCARDIOGRAM REPORT: CPT | Performed by: INTERNAL MEDICINE

## 2025-02-23 PROCEDURE — 93005 ELECTROCARDIOGRAM TRACING: CPT

## 2025-02-23 PROCEDURE — 250N000013 HC RX MED GY IP 250 OP 250 PS 637

## 2025-02-23 PROCEDURE — 124N000002 HC R&B MH UMMC

## 2025-02-23 PROCEDURE — 250N000013 HC RX MED GY IP 250 OP 250 PS 637: Performed by: CLINICAL NURSE SPECIALIST

## 2025-02-23 RX ADMIN — CHLORPROMAZINE HYDROCHLORIDE 300 MG: 100 TABLET, FILM COATED ORAL at 20:09

## 2025-02-23 RX ADMIN — LORAZEPAM 1.5 MG: 1 TABLET ORAL at 20:09

## 2025-02-23 RX ADMIN — OXCARBAZEPINE 150 MG: 150 TABLET, FILM COATED ORAL at 20:09

## 2025-02-23 RX ADMIN — DOCUSATE SODIUM 100 MG: 100 CAPSULE, LIQUID FILLED ORAL at 08:04

## 2025-02-23 RX ADMIN — LORAZEPAM 1.5 MG: 1 TABLET ORAL at 08:03

## 2025-02-23 RX ADMIN — HALOPERIDOL 5 MG: 5 TABLET ORAL at 08:04

## 2025-02-23 RX ADMIN — OXCARBAZEPINE 150 MG: 150 TABLET, FILM COATED ORAL at 17:12

## 2025-02-23 RX ADMIN — HALOPERIDOL 5 MG: 5 TABLET ORAL at 20:09

## 2025-02-23 RX ADMIN — Medication 1 TABLET: at 08:04

## 2025-02-23 RX ADMIN — Medication 5 MG: at 20:09

## 2025-02-23 RX ADMIN — LEVOTHYROXINE SODIUM 100 MCG: 0.05 TABLET ORAL at 08:03

## 2025-02-23 RX ADMIN — SERTRALINE HYDROCHLORIDE 25 MG: 25 TABLET ORAL at 08:03

## 2025-02-23 RX ADMIN — POLYETHYLENE GLYCOL 3350 17 G: 17 POWDER, FOR SOLUTION ORAL at 08:03

## 2025-02-23 RX ADMIN — Medication 500 ML: at 08:03

## 2025-02-23 ASSESSMENT — ACTIVITIES OF DAILY LIVING (ADL)
ADLS_ACUITY_SCORE: 52
ADLS_ACUITY_SCORE: 52
ORAL_HYGIENE: INDEPENDENT
ADLS_ACUITY_SCORE: 52
DRESS: INDEPENDENT
DRESS: INDEPENDENT
ADLS_ACUITY_SCORE: 52
HYGIENE/GROOMING: INDEPENDENT
ADLS_ACUITY_SCORE: 52
HYGIENE/GROOMING: INDEPENDENT
ADLS_ACUITY_SCORE: 52
ORAL_HYGIENE: INDEPENDENT
ADLS_ACUITY_SCORE: 52

## 2025-02-23 NOTE — PLAN OF CARE
Problem: Depressive Signs/Symptoms  Goal: Increased Participation and Engagement (Depressive Signs/Symptoms)  Outcome: Not Progressing  Intervention: Facilitate Participation and Engagement  Recent Flowsheet Documentation  Taken 2/22/2025 2131 by Rc Elizabeth RN  Supportive Measures:   active listening utilized   positive reinforcement provided   relaxation techniques promoted   self-care encouraged   verbalization of feelings encouraged  Diversional Activity: television  Taken 2/22/2025 1055 by Rc Elizabeth RN  Supportive Measures:   active listening utilized   positive reinforcement provided   relaxation techniques promoted   self-care encouraged   verbalization of feelings encouraged  Diversional Activity: television     Problem: Psychotic Signs/Symptoms  Goal: Improved Mood Symptoms (Psychotic Signs/Symptoms)  Outcome: Not Progressing  Intervention: Optimize Emotion and Mood  Recent Flowsheet Documentation  Taken 2/22/2025 2131 by Rc Elizabeth RN  Supportive Measures:   active listening utilized   positive reinforcement provided   relaxation techniques promoted   self-care encouraged   verbalization of feelings encouraged  Diversional Activity: television  Taken 2/22/2025 1055 by Rc Elizabeth RN  Supportive Measures:   active listening utilized   positive reinforcement provided   relaxation techniques promoted   self-care encouraged   verbalization of feelings encouraged  Diversional Activity: television   Goal Outcome Evaluation:    Plan of Care Reviewed With: patient      Patient is calm, medication compliant. Visible in milieu, not social with peers. Family visited, patient appeared brighter but mostly flat. Patient denies MH concerns. Appetite is good.   /64 (BP Location: Left arm, Patient Position: Sitting, Cuff Size: Adult Large)   Pulse 93   Temp 97.9  F (36.6  C) (Oral)   Resp 16   Wt 100.2 kg (220 lb 14.4 oz)   SpO2 98%   BMI 32.62 kg/m    Declined shower

## 2025-02-23 NOTE — PLAN OF CARE
Problem: Depressive Signs/Symptoms  Goal: Increased Participation and Engagement (Depressive Signs/Symptoms)  Outcome: Progressing  Intervention: Facilitate Participation and Engagement  Recent Flowsheet Documentation  Taken 2/23/2025 0905 by Rc Elizabeth RN  Supportive Measures:   active listening utilized   positive reinforcement provided   relaxation techniques promoted   self-care encouraged   verbalization of feelings encouraged  Diversional Activity: television   Goal Outcome Evaluation:    Plan of Care Reviewed With: patient      Patient is calm and cooperative, medication compliant. Patients affect was flat but patient was more social and played cards and other games with a peer. Denies MH concerns. No observation of patient responding to internal stimuli.  ECT #5 @ 1220 2/24/25   BP 95/57   Pulse 95   Temp 96.9  F (36.1  C) (Temporal)   Resp 18   Wt 99.5 kg (219 lb 5.7 oz)   SpO2 98%   BMI 32.39 kg/m        PM:    Calm cooperative medication compliant. Visible in milieu, played cards with peer. Denies MH concerns. Appetite good.  Received Trileptal at 2009, ECT scheduled for 1220 tomorrow.  EKG ordered by psychiatry and completed this shift preliminary results available in results review
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
## 2025-02-23 NOTE — PLAN OF CARE
Problem: Sleep Disturbance  Goal: Adequate Sleep/Rest  Outcome: Progressing   Goal Outcome Evaluation:       Patient is sleeping in a side-lying position at the start of the shift. No behavioral issues observed. She is using a medical bed to facilitate her mobility. She slept the whole night for 9.25 hours without interruptions.

## 2025-02-24 LAB
ATRIAL RATE - MUSE: 82 BPM
ATRIAL RATE - MUSE: 82 BPM
DIASTOLIC BLOOD PRESSURE - MUSE: NORMAL MMHG
DIASTOLIC BLOOD PRESSURE - MUSE: NORMAL MMHG
INTERPRETATION ECG - MUSE: NORMAL
INTERPRETATION ECG - MUSE: NORMAL
P AXIS - MUSE: 73 DEGREES
P AXIS - MUSE: 73 DEGREES
PR INTERVAL - MUSE: 168 MS
PR INTERVAL - MUSE: 168 MS
QRS DURATION - MUSE: 74 MS
QRS DURATION - MUSE: 74 MS
QT - MUSE: 372 MS
QT - MUSE: 372 MS
QTC - MUSE: 434 MS
QTC - MUSE: 434 MS
R AXIS - MUSE: 26 DEGREES
R AXIS - MUSE: 26 DEGREES
SYSTOLIC BLOOD PRESSURE - MUSE: NORMAL MMHG
SYSTOLIC BLOOD PRESSURE - MUSE: NORMAL MMHG
T AXIS - MUSE: 33 DEGREES
T AXIS - MUSE: 33 DEGREES
VENTRICULAR RATE- MUSE: 82 BPM
VENTRICULAR RATE- MUSE: 82 BPM

## 2025-02-24 PROCEDURE — 250N000013 HC RX MED GY IP 250 OP 250 PS 637: Performed by: PSYCHIATRY & NEUROLOGY

## 2025-02-24 PROCEDURE — 99232 SBSQ HOSP IP/OBS MODERATE 35: CPT | Performed by: PSYCHIATRY & NEUROLOGY

## 2025-02-24 PROCEDURE — 124N000002 HC R&B MH UMMC

## 2025-02-24 PROCEDURE — 250N000013 HC RX MED GY IP 250 OP 250 PS 637: Performed by: CLINICAL NURSE SPECIALIST

## 2025-02-24 PROCEDURE — 250N000013 HC RX MED GY IP 250 OP 250 PS 637

## 2025-02-24 RX ADMIN — CHLORPROMAZINE HYDROCHLORIDE 300 MG: 100 TABLET, FILM COATED ORAL at 20:44

## 2025-02-24 RX ADMIN — HALOPERIDOL 5 MG: 5 TABLET ORAL at 08:38

## 2025-02-24 RX ADMIN — SERTRALINE HYDROCHLORIDE 25 MG: 25 TABLET ORAL at 08:38

## 2025-02-24 RX ADMIN — HALOPERIDOL 5 MG: 5 TABLET ORAL at 20:45

## 2025-02-24 RX ADMIN — Medication 500 ML: at 08:38

## 2025-02-24 RX ADMIN — DOCUSATE SODIUM 100 MG: 100 CAPSULE, LIQUID FILLED ORAL at 08:38

## 2025-02-24 RX ADMIN — LEVOTHYROXINE SODIUM 100 MCG: 0.05 TABLET ORAL at 08:38

## 2025-02-24 RX ADMIN — OXCARBAZEPINE 150 MG: 150 TABLET, FILM COATED ORAL at 17:49

## 2025-02-24 RX ADMIN — LORAZEPAM 1.5 MG: 1 TABLET ORAL at 20:44

## 2025-02-24 RX ADMIN — OXCARBAZEPINE 150 MG: 150 TABLET, FILM COATED ORAL at 20:44

## 2025-02-24 RX ADMIN — Medication 1 TABLET: at 08:38

## 2025-02-24 RX ADMIN — Medication 5 MG: at 20:44

## 2025-02-24 RX ADMIN — POLYETHYLENE GLYCOL 3350 17 G: 17 POWDER, FOR SOLUTION ORAL at 08:38

## 2025-02-24 RX ADMIN — LORAZEPAM 1.5 MG: 1 TABLET ORAL at 08:38

## 2025-02-24 ASSESSMENT — ACTIVITIES OF DAILY LIVING (ADL)
ADLS_ACUITY_SCORE: 52
DRESS: INDEPENDENT
ADLS_ACUITY_SCORE: 52
HYGIENE/GROOMING: INDEPENDENT
ADLS_ACUITY_SCORE: 52
ADLS_ACUITY_SCORE: 52
HYGIENE/GROOMING: INDEPENDENT
ADLS_ACUITY_SCORE: 52
DRESS: INDEPENDENT
ADLS_ACUITY_SCORE: 52
LAUNDRY: UNABLE TO COMPLETE
ADLS_ACUITY_SCORE: 52
ORAL_HYGIENE: INDEPENDENT
LAUNDRY: UNABLE TO COMPLETE
ADLS_ACUITY_SCORE: 52
ADLS_ACUITY_SCORE: 52
ORAL_HYGIENE: INDEPENDENT
ADLS_ACUITY_SCORE: 52
ADLS_ACUITY_SCORE: 52

## 2025-02-24 NOTE — PLAN OF CARE
BEH IP Unit Acuity Rating Score (UARS)  Patient is given one point for every criteria they meet.    CRITERIA SCORING   On a 72 hour hold, court hold, committed, stay of commitment, or revocation. 1    Patient LOS on BEH unit exceeds 20 days. 1  LOS: 52   Patient under guardianship, 55+, otherwise medically complex, or under age 11. 1   Suicide ideation without relief of precipitating factors. 0   Current plan for suicide. 0   Current plan for homicide. 0   Imminent risk or actual attempt to seriously harm another without relief of factors precipitating the attempt. 0   Severe dysfunction in daily living (ex: complete neglect for self care, extreme disruption in vegetative function, extreme deterioration in social interactions). 1   Recent (last 7 days) or current physical aggression in the ED or on unit. 0   Restraints or seclusion episode in past 72 hours. 0   Recent (last 7 days) or current verbal aggression, agitation, yelling, etc., while in the ED or unit. 0   Active psychosis. 0   Need for constant or near constant redirection (from leaving, from others, etc).  0   Intrusive or disruptive behaviors. 0   Patient requires 3 or more hours of individualized nursing care per 8-hour shift (i.e. for ADLs, meds, therapeutic interventions). 0   TOTAL 4

## 2025-02-24 NOTE — PLAN OF CARE
02/24/25 0950   Individualization/Patient Specific Goals   Patient Personal Strengths spiritual/Catholic support;community support;family/social support   Patient Vulnerabilities history of unsuccessful treatment   Anxieties, Fears or Concerns Pt appears to respond to internal stimuli and intermittently experiences AH   Individualized Care Needs Pt requires Japanese    Patient/Family-Specific Goals (Include Timeframe) Pt's family is involved   Interprofessional Rounds   Summary Pt transferred from [facility]. Pt was initially admitted for behavioral concerns and psychotic symptoms. Pt is committed. Pt had mata/price puente hearing on 1/17 with the hopes of treating pt with ECT. Mata and Hoffmann Puente orders were granted on 1/31. Pt started ECT on 2/14. Pt reports that ECT is helping her symptoms   Participants CTC;nursing;OT;psychiatrist   Behavioral Team Discussion   Participants Dr. Albania Mota MD; Maria E Ty [facility], CTC; Vera Navarro RN; Justa Carmichael OT   Progress Pt requires Japanese . Pt is medication compliant and eats well. Comes out of her room often but does not engage with other patients, but will engage with staff when they initiate. Pt continues with ECT. Pt reports improvement in symptoms from ECT. However, pt will intermittently endorse anixety, depression, and AH   Anticipated length of stay 3-4 weeks   Continued Stay Criteria/Rationale Medication management per psychiatry, stabilization of symptoms, aftercare planning, coordination with outside supports, ECT   Medical/Physical See H&P and provider notes   Precautions See below   Plan Medication management per psychiatry, stabilization of symptoms, aftercare planning, coordination with outside supports, ECT   Rationale for change in precautions or plan No change   Safety Plan Per unit protocol   Anticipated Discharge Disposition home with family;assisted living     PRECAUTIONS AND SAFETY    Behavioral Orders    Procedures    Cheeking Precautions (behavioral units)     Patient Observed swallowing PO medications; Patient asked to drink water after swallowing medication; Patient in Staff line of sight for 15 minutes after medication given; Mouth checks after PO administration (patient asked to open mouth and stick out their tongue).    Code 1 - Restrict to Unit    Code 2     Ultrasound    Code 2 - 1:1 Staff Supervision     For ECT only    Electroconvulsive therapy     Series of up to 12 treatments. Begin Date: tbd     Treating Psychiatrist providing ECT:  Stacey     Notified on:  2/11    Electroconvulsive therapy     Series of up to 12 treatments. Begin Date: TBD - when Hoffmann petition amended      Treating Psychiatrist providing ECT:  Stacey      Notified on:  2/11    Elopement precautions    Fall precautions    Fall precautions    Hoffmann Plascencia    Routine Programming     As clinically indicated    Status 15     Every 15 minutes.       Safety  Safety WDL: WDL  Patient Location: hallway, lounge, patient room, own  Observed Behavior: calm  Observed Behavior (Comment): calm  Airway Safety Measures: all equipment/monitors on and audible  Safety Measures: safety rounds completed, suicide check-in completed  Diversional Activity: television  De-Escalation Techniques: 1:1 observation initiated  Suicidality: Status 15  Assault: status 15  Elopement Interventions: status 15, status continuous sight, room away from unit doors  Additional Documentation:  (Fall)

## 2025-02-24 NOTE — PLAN OF CARE
Team Note Due:  Monday    Assessment/Intervention/Current Symtoms and Care Coordination:  Chart review and met with team, discussed pt progress, symptomology, and response to treatment.  Discussed the discharge plan and any potential impediments to discharge. Pt under commitment, carl and catherine puente. Pt requires a Burkinan . Pt was given her breakfast tray and therefore was unable to receive ECT today. Pt had ECT #4 on 2/21. Per RN, pt endorsed anxiety, depression, and AH. Staff report that pt appears calmer and clearer in conversation.     Discharge Plan or Goal:  Pending stabilization and safe disposition planning; considerations include:  TBD. Per different reports from different family members, pt has an assisted living apartment but was staying in the family home PTA. It is unclear at this time where pt will return at discharge      Barriers to Discharge:  Patient requires further psychiatric stabilization due to current symptomology, medication management with changes subject to provider, coordination with outside supports, and aftercare planning. Pt started ECT on 2/14     Referral Status:  MA application was submitted on 1/29. Received by [facility]. Financial counselor is requesting letter from pt's employer confirming pt is no longer working, this was sent to financial counselor on 2/12     Legal Status:  Committed MI with ITP and Catherine Puente  [facility]  [id number]   ITP includes: Risperdal, Thorazine, Haldol, and Zyprexa  Expires: 06/10/2025    Contacts (include DENI status):  Mya - daughter (DENI)  P: [phone]    Golden - son (DENI)  P: [phone]     Mohamed - son (DENI)  P: [phone]     Rani - commitment CM through [facility] (DENI per commitment)  P: [phone]  E: [email or web address]     Upcoming Meetings and Dates/Important Information and next steps:  CTC to coordinate with pt, outside supports, and treatment team regarding discharge  planning   Pt continues with ECT
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
## 2025-02-24 NOTE — PLAN OF CARE
Problem: Depressive Signs/Symptoms  Goal: Optimized Energy Level (Depressive Signs/Symptoms)  Outcome: Progressing  Flowsheets (Taken 2/24/2025 1335)  Mutually Determined Action Steps (Optimized Energy Level):   grooms self without prompting   dresses/ready for morning activity   participates in exercise activity   Goal Outcome Evaluation:    Plan of Care Reviewed With: patient      Patient has been calm, visible in milieu and medication compliant. Pt endorses moderate anxiety and depression, denies SI, HI and SIB. Pt also reports auditory hallucinations stating she hears the voices some times. Pt's ECT was cancelled today due to pt not adhering to NPO status, plan to place pt on SIO on ECT mornings.

## 2025-02-24 NOTE — PLAN OF CARE
Problem: Sleep Disturbance  Goal: Adequate Sleep/Rest  Outcome: Progressing   Goal Outcome Evaluation:       Patient is sleeping in a side-lying position in the beginning of the shift. No complaints made. She is using a medical bed to facilitate her mobility. Patient slept the whole night for 9.5 hours without interruptions. She is scheduled for ECT today at 12:20 PM. NPO maintained. Vial signs taken and recorded.

## 2025-02-25 ENCOUNTER — ANESTHESIA EVENT (OUTPATIENT)
Dept: BEHAVIORAL HEALTH | Facility: CLINIC | Age: 60
DRG: 885 | End: 2025-02-25

## 2025-02-25 ENCOUNTER — OFFICE VISIT (OUTPATIENT)
Dept: INTERPRETER SERVICES | Facility: CLINIC | Age: 60
DRG: 885 | End: 2025-02-25

## 2025-02-25 PROCEDURE — 250N000013 HC RX MED GY IP 250 OP 250 PS 637: Performed by: PSYCHIATRY & NEUROLOGY

## 2025-02-25 PROCEDURE — 250N000013 HC RX MED GY IP 250 OP 250 PS 637: Performed by: CLINICAL NURSE SPECIALIST

## 2025-02-25 PROCEDURE — 124N000002 HC R&B MH UMMC

## 2025-02-25 PROCEDURE — T1013 SIGN LANG/ORAL INTERPRETER: HCPCS

## 2025-02-25 PROCEDURE — 99232 SBSQ HOSP IP/OBS MODERATE 35: CPT | Performed by: PSYCHIATRY & NEUROLOGY

## 2025-02-25 PROCEDURE — 250N000013 HC RX MED GY IP 250 OP 250 PS 637

## 2025-02-25 RX ORDER — HYDRALAZINE HYDROCHLORIDE 20 MG/ML
2.5-5 INJECTION INTRAMUSCULAR; INTRAVENOUS EVERY 10 MIN PRN
Status: CANCELLED | OUTPATIENT
Start: 2025-02-25

## 2025-02-25 RX ORDER — ONDANSETRON 2 MG/ML
4 INJECTION INTRAMUSCULAR; INTRAVENOUS EVERY 30 MIN PRN
Status: CANCELLED | OUTPATIENT
Start: 2025-02-25

## 2025-02-25 RX ORDER — ONDANSETRON 4 MG/1
4 TABLET, ORALLY DISINTEGRATING ORAL EVERY 30 MIN PRN
Status: CANCELLED | OUTPATIENT
Start: 2025-02-25

## 2025-02-25 RX ORDER — METOPROLOL TARTRATE 1 MG/ML
1-2 INJECTION, SOLUTION INTRAVENOUS EVERY 5 MIN PRN
Status: CANCELLED | OUTPATIENT
Start: 2025-02-25

## 2025-02-25 RX ORDER — NALOXONE HYDROCHLORIDE 0.4 MG/ML
0.1 INJECTION, SOLUTION INTRAMUSCULAR; INTRAVENOUS; SUBCUTANEOUS
Status: CANCELLED | OUTPATIENT
Start: 2025-02-25

## 2025-02-25 RX ORDER — DEXAMETHASONE SODIUM PHOSPHATE 4 MG/ML
4 INJECTION, SOLUTION INTRA-ARTICULAR; INTRALESIONAL; INTRAMUSCULAR; INTRAVENOUS; SOFT TISSUE
Status: CANCELLED | OUTPATIENT
Start: 2025-02-25

## 2025-02-25 RX ADMIN — Medication 1 TABLET: at 08:23

## 2025-02-25 RX ADMIN — Medication 500 ML: at 08:23

## 2025-02-25 RX ADMIN — Medication 5 MG: at 20:06

## 2025-02-25 RX ADMIN — SERTRALINE HYDROCHLORIDE 25 MG: 25 TABLET ORAL at 08:23

## 2025-02-25 RX ADMIN — Medication 1 LOZENGE: at 20:06

## 2025-02-25 RX ADMIN — LORAZEPAM 1.5 MG: 1 TABLET ORAL at 08:23

## 2025-02-25 RX ADMIN — POLYETHYLENE GLYCOL 3350 17 G: 17 POWDER, FOR SOLUTION ORAL at 08:23

## 2025-02-25 RX ADMIN — LEVOTHYROXINE SODIUM 100 MCG: 0.05 TABLET ORAL at 08:23

## 2025-02-25 RX ADMIN — HALOPERIDOL 5 MG: 5 TABLET ORAL at 08:23

## 2025-02-25 RX ADMIN — DOCUSATE SODIUM 100 MG: 100 CAPSULE, LIQUID FILLED ORAL at 08:23

## 2025-02-25 RX ADMIN — CHLORPROMAZINE HYDROCHLORIDE 300 MG: 100 TABLET, FILM COATED ORAL at 20:07

## 2025-02-25 RX ADMIN — HALOPERIDOL 5 MG: 5 TABLET ORAL at 20:07

## 2025-02-25 ASSESSMENT — ACTIVITIES OF DAILY LIVING (ADL)
ADLS_ACUITY_SCORE: 52
HYGIENE/GROOMING: INDEPENDENT
ORAL_HYGIENE: INDEPENDENT
ADLS_ACUITY_SCORE: 52
DRESS: INDEPENDENT
ADLS_ACUITY_SCORE: 52
LAUNDRY: UNABLE TO COMPLETE
ADLS_ACUITY_SCORE: 52

## 2025-02-25 NOTE — PLAN OF CARE
Problem: Depressive Signs/Symptoms  Goal: Optimized Cognitive Function (Depressive Signs/Symptoms)  Outcome: Progressing     Problem: Psychotic Signs/Symptoms  Goal: Increased Participation and Engagement (Psychotic Signs/Symptoms)  Outcome: Progressing     Problem: Anxiety Signs/Symptoms  Goal: Optimized Cognitive Function (Anxiety Signs/Symptoms)  Outcome: Progressing         Patient up and visible on the unit  most part of the shift. Patient presented with flat and blunted affect. Brightens up on approach. Denies all psych symptoms. Tan pain. Artemio for safety. Medication compliant. Held medications for ECT, see MAR.  Adequate food and fluid intake. Patient had a shower.  Will continue to monitor

## 2025-02-25 NOTE — PLAN OF CARE
Problem: Anxiety Signs/Symptoms  Goal: Optimized Energy Level (Anxiety Signs/Symptoms)  Outcome: Progressing   Goal Outcome Evaluation:    Plan of Care Reviewed With: patient          Patient spent most of the shift sitting in the lounge quietly. Patient sisters were here tonight visiting. Patient answered  assessment questions via . Patient verbalized having little depression and anxiety, and denied all other mental health symptoms. No report of pain or any discomfort. Ate well for dinner. Took all scheduled medication, no concern with behavior.
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
## 2025-02-25 NOTE — PLAN OF CARE
Problem: Sleep Disturbance  Goal: Adequate Sleep/Rest  Outcome: Progressing   Goal Outcome Evaluation:       Patient is sleeping soundly in a supine position in the beginning of the shift. She is using a medical bed to facilitate her mobility. She had an uninterrupted sleep throughout the shift for 8 hours.

## 2025-02-25 NOTE — PLAN OF CARE
Problem: Psychotic Signs/Symptoms  Goal: Improved Behavioral Control (Psychotic Signs/Symptoms)  Outcome: Progressing   Goal Outcome Evaluation:    Patient has been calm, pleasant and cooperative. She endorses mild anxiety and depression, denies SI/SIB. Pt states auditory hallucinations have decreased and she only hears the voices some times. Pt was compliant with taking all of her scheduled medications, reports hopefulness about ECT. Pt appeared disheveled, was encouraged to shower but declined stating she is tired and wants to do it at a later time.

## 2025-02-25 NOTE — PLAN OF CARE
Team Note Due:  Monday    Assessment/Intervention/Current Symtoms and Care Coordination:  Chart review and met with team, discussed pt progress, symptomology, and response to treatment.  Discussed the discharge plan and any potential impediments to discharge. Pt under commitment, carl and catherine puente. Pt requires a Mauritian . Pt had ECT #4 on 2/21. Per RN, pt endorsed low anxiety and depression. Staff report that pt appears calmer and clearer in conversation.     Writer received email from Rani bañuelos CM ([email or web address]) inquiring about an update. Writer put out return email relaying pt's current presentation and improvement from ECT. Writer confirmed pt would be appropriate for visit.     Writer spoke with financial counselor, June, who reports that [facility] needs to close their MA application before [facility] can start to process their MA application. June reports that after speaking with both [facility], they both stated the other has the application. June reports that [facility] confirmed they did close the MA application. June relayed plan to send MA application into [facility] again and follow up later this week.     Discharge Plan or Goal:  Pending stabilization and safe disposition planning; considerations include:  TBD. Per different reports from different family members, pt has an assisted living apartment but was staying in the family home PTA. It is unclear at this time where pt will return at discharge      Barriers to Discharge:  Patient requires further psychiatric stabilization due to current symptomology, medication management with changes subject to provider, coordination with outside supports, and aftercare planning. Pt started ECT on 2/14     Referral Status:  MA application was submitted on 1/29. Received by [facility]. Financial counselor is requesting letter from pt's employer confirming pt is no longer working, this was sent to financial counselor  on 2/12     Legal Status:  Committed MI with ITP and Hoffmann Sang  [facility]  34-NW-RF-   ITP includes: Risperdal, Thorazine, Haldol, and Zyprexa  Expires: 06/10/2025    Contacts (include DENI status):  Mya - daughter (DENI)  P: [phone]    Golden - son (DENI)  P: [phone]     Staci - son (DENI)  P: [phone]     Rani - commitment CM through [facility] (DENI per commitment)  P: [phone]  E: [email or web address]     Upcoming Meetings and Dates/Important Information and next steps:  CTC to coordinate with pt, outside supports, and treatment team regarding discharge planning   Pt continues with ECT

## 2025-02-25 NOTE — PLAN OF CARE
BEH IP Unit Acuity Rating Score (UARS)  Patient is given one point for every criteria they meet.    CRITERIA SCORING   On a 72 hour hold, court hold, committed, stay of commitment, or revocation. 1    Patient LOS on BEH unit exceeds 20 days. 1  LOS: 53   Patient under guardianship, 55+, otherwise medically complex, or under age 11. 1   Suicide ideation without relief of precipitating factors. 0   Current plan for suicide. 0   Current plan for homicide. 0   Imminent risk or actual attempt to seriously harm another without relief of factors precipitating the attempt. 0   Severe dysfunction in daily living (ex: complete neglect for self care, extreme disruption in vegetative function, extreme deterioration in social interactions). 1   Recent (last 7 days) or current physical aggression in the ED or on unit. 0   Restraints or seclusion episode in past 72 hours. 0   Recent (last 7 days) or current verbal aggression, agitation, yelling, etc., while in the ED or unit. 0   Active psychosis. 1   Need for constant or near constant redirection (from leaving, from others, etc).  0   Intrusive or disruptive behaviors. 0   Patient requires 3 or more hours of individualized nursing care per 8-hour shift (i.e. for ADLs, meds, therapeutic interventions). 0   TOTAL 5

## 2025-02-26 ENCOUNTER — ANESTHESIA (OUTPATIENT)
Dept: BEHAVIORAL HEALTH | Facility: CLINIC | Age: 60
DRG: 885 | End: 2025-02-26

## 2025-02-26 ENCOUNTER — APPOINTMENT (OUTPATIENT)
Dept: BEHAVIORAL HEALTH | Facility: CLINIC | Age: 60
DRG: 885 | End: 2025-02-26
Attending: PSYCHIATRY & NEUROLOGY

## 2025-02-26 PROCEDURE — 370N000017 HC ANESTHESIA TECHNICAL FEE, PER MIN: Performed by: ANESTHESIOLOGY

## 2025-02-26 PROCEDURE — 90870 ELECTROCONVULSIVE THERAPY: CPT | Performed by: PSYCHIATRY & NEUROLOGY

## 2025-02-26 PROCEDURE — 250N000013 HC RX MED GY IP 250 OP 250 PS 637: Performed by: PSYCHIATRY & NEUROLOGY

## 2025-02-26 PROCEDURE — 250N000011 HC RX IP 250 OP 636: Performed by: ANESTHESIOLOGY

## 2025-02-26 PROCEDURE — 90870 ELECTROCONVULSIVE THERAPY: CPT

## 2025-02-26 PROCEDURE — 258N000003 HC RX IP 258 OP 636: Performed by: ANESTHESIOLOGY

## 2025-02-26 PROCEDURE — 97150 GROUP THERAPEUTIC PROCEDURES: CPT | Mod: GO

## 2025-02-26 PROCEDURE — 99232 SBSQ HOSP IP/OBS MODERATE 35: CPT | Performed by: PSYCHIATRY & NEUROLOGY

## 2025-02-26 PROCEDURE — 250N000013 HC RX MED GY IP 250 OP 250 PS 637

## 2025-02-26 PROCEDURE — 250N000009 HC RX 250: Performed by: PSYCHIATRY & NEUROLOGY

## 2025-02-26 PROCEDURE — 250N000013 HC RX MED GY IP 250 OP 250 PS 637: Performed by: CLINICAL NURSE SPECIALIST

## 2025-02-26 PROCEDURE — 250N000009 HC RX 250: Performed by: ANESTHESIOLOGY

## 2025-02-26 PROCEDURE — 124N000002 HC R&B MH UMMC

## 2025-02-26 RX ORDER — METHOHEXITAL IN WATER/PF 100MG/10ML
SYRINGE (ML) INTRAVENOUS PRN
Status: DISCONTINUED | OUTPATIENT
Start: 2025-02-26 | End: 2025-02-26

## 2025-02-26 RX ORDER — FLUMAZENIL 0.1 MG/ML
0.3 INJECTION, SOLUTION INTRAVENOUS ONCE
Status: DISCONTINUED | OUTPATIENT
Start: 2025-02-26 | End: 2025-02-26

## 2025-02-26 RX ADMIN — CHLORPROMAZINE HYDROCHLORIDE 300 MG: 100 TABLET, FILM COATED ORAL at 20:32

## 2025-02-26 RX ADMIN — SERTRALINE HYDROCHLORIDE 25 MG: 25 TABLET ORAL at 14:18

## 2025-02-26 RX ADMIN — HALOPERIDOL 5 MG: 5 TABLET ORAL at 20:32

## 2025-02-26 RX ADMIN — POLYETHYLENE GLYCOL 3350 17 G: 17 POWDER, FOR SOLUTION ORAL at 14:19

## 2025-02-26 RX ADMIN — FLUMAZENIL 0.3 MG: 0.1 INJECTION, SOLUTION INTRAVENOUS at 13:24

## 2025-02-26 RX ADMIN — Medication 5 MG: at 20:32

## 2025-02-26 RX ADMIN — OXCARBAZEPINE 150 MG: 150 TABLET, FILM COATED ORAL at 17:10

## 2025-02-26 RX ADMIN — LORAZEPAM 1.5 MG: 1 TABLET ORAL at 20:32

## 2025-02-26 RX ADMIN — HALOPERIDOL 5 MG: 5 TABLET ORAL at 14:18

## 2025-02-26 RX ADMIN — DOCUSATE SODIUM 100 MG: 100 CAPSULE, LIQUID FILLED ORAL at 14:18

## 2025-02-26 RX ADMIN — LEVOTHYROXINE SODIUM 100 MCG: 0.05 TABLET ORAL at 14:18

## 2025-02-26 RX ADMIN — SUCCINYLCHOLINE CHLORIDE 70 MG: 20 INJECTION, SOLUTION INTRAMUSCULAR; INTRAVENOUS; PARENTERAL at 13:30

## 2025-02-26 RX ADMIN — LORAZEPAM 1.5 MG: 1 TABLET ORAL at 14:17

## 2025-02-26 RX ADMIN — Medication 1 TABLET: at 14:18

## 2025-02-26 RX ADMIN — DEXMEDETOMIDINE HYDROCHLORIDE 20 MCG: 100 INJECTION, SOLUTION INTRAVENOUS at 13:43

## 2025-02-26 RX ADMIN — Medication 100 MG: at 13:30

## 2025-02-26 ASSESSMENT — ACTIVITIES OF DAILY LIVING (ADL)
ADLS_ACUITY_SCORE: 52
DRESS: INDEPENDENT
ADLS_ACUITY_SCORE: 52
LAUNDRY: UNABLE TO COMPLETE
ADLS_ACUITY_SCORE: 52
ORAL_HYGIENE: INDEPENDENT
ADLS_ACUITY_SCORE: 52
HYGIENE/GROOMING: INDEPENDENT
ADLS_ACUITY_SCORE: 52

## 2025-02-26 NOTE — PLAN OF CARE
Problem: Depressive Signs/Symptoms  Goal: Improved Sleep (Depressive Signs/Symptoms)  Outcome: Progressing   Goal Outcome Evaluation:               Received resting in bed, pt reminded to be on NPO for ECT # 5 scheduled at 1220, fluids taken out of her room, no food seen.  Pt had a medical bed to aid with  mobility. She is independent with her ADL's.  No oral pre ECT Medications administered.  Slept 8.75 hours

## 2025-02-26 NOTE — ANESTHESIA POSTPROCEDURE EVALUATION
Patient: Becky Decker    Procedure: * No procedures listed *       Anesthesia Type:  General    Note:  Disposition: Outpatient   Postop Pain Control: Uneventful            Sign Out: Well controlled pain   PONV: No   Neuro/Psych: Uneventful            Sign Out: Acceptable/Baseline neuro status   Airway/Respiratory: Uneventful            Sign Out: Acceptable/Baseline resp. status   CV/Hemodynamics: Uneventful            Sign Out: Acceptable CV status; No obvious hypovolemia; No obvious fluid overload   Other NRE:    DID A NON-ROUTINE EVENT OCCUR? No           Last vitals:  Vitals:    02/26/25 1335 02/26/25 1350 02/26/25 1400   BP: (!) 159/69 135/81 (!) 150/83   Pulse: 95 102 109   Resp: 16 20 22   Temp: 36.6  C (97.9  F) 36.7  C (98  F) 36.7  C (98  F)   SpO2: 94% 96% 97%       Electronically Signed By: Gerard Brown MD  February 26, 2025  2:14 PM

## 2025-02-26 NOTE — ANESTHESIA PREPROCEDURE EVALUATION
Anesthesia Pre-Procedure Evaluation    Patient: Becky Decker   MRN: [id number] : 1965        Procedure : * No procedures listed *          No past medical history on file.   No past surgical history on file.   Allergies   Allergen Reactions    Pork-Derived Products       Social History     Tobacco Use    Smoking status: Not on file    Smokeless tobacco: Not on file   Substance Use Topics    Alcohol use: Not on file      Wt Readings from Last 1 Encounters:   25 100.3 kg (221 lb 1.9 oz)        Anesthesia Evaluation   Pt has had prior anesthetic.         ROS/MED HX  ENT/Pulmonary:       Neurologic:       Cardiovascular:       METS/Exercise Tolerance:     Hematologic:       Musculoskeletal:       GI/Hepatic:       Renal/Genitourinary:       Endo:     (+)               Obesity,       Psychiatric/Substance Use:     (+) psychiatric history anxiety, depression, schizophrenia and other (comment) (Catatonia, psychosis, SI)       Infectious Disease:       Malignancy:       Other:            Physical Exam    Airway   unable to assess     Mallampati: II       Respiratory Devices and Support         Dental    unable to assess        Cardiovascular    unable to assess         Pulmonary    Unable to assess               OUTSIDE LABS:  CBC:   Lab Results   Component Value Date    WBC 4.6 2025    WBC 6.8 2024    HGB 10.2 (L) 2025    HGB 12.6 2024    HCT 31.8 (L) 2025    HCT 38.8 2024    PLT  2025      Comment:      Platelet count unavailable - platelets are clumped.     2024     BMP:   Lab Results   Component Value Date     2025     2025    POTASSIUM 4.3 2025    POTASSIUM 4.6 2025    CHLORIDE 100 2025    CHLORIDE 102 2025    CO2 23 2025    CO2 23 2025    BUN 16.3 2025    BUN 15.8 2025    CR 0.82 2025    CR 0.75 2025    GLC 98 2025    GLC 94 2025     COAGS: No results  "found for: \"PTT\", \"INR\", \"FIBR\"  POC: No results found for: \"BGM\", \"HCG\", \"HCGS\"  HEPATIC:   Lab Results   Component Value Date    ALBUMIN 3.7 01/27/2025    PROTTOTAL 6.9 01/27/2025    ALT 15 01/27/2025    AST 15 01/27/2025    ALKPHOS 69 01/27/2025    BILITOTAL 0.2 01/27/2025    TARIQ 55 (H) 01/27/2025     OTHER:   Lab Results   Component Value Date    A1C 5.6 11/27/2024    AMADOR 9.4 02/03/2025    PHOS 3.8 01/07/2025    TSH 0.05 (L) 02/03/2025    T4 1.12 02/03/2025       Anesthesia Plan    ASA Status:  2    NPO Status:  NPO Appropriate    Anesthesia Type: General.     - Airway: Mask Only   Induction: Intravenous.           Consents            Postoperative Care            Comments:    Other Comments: Plan: IV induction, hyperventilation with mask and recovery.            Gerard Brown MD    I have reviewed the pertinent notes and labs in the chart from the past 30 days and (re)examined the patient.  Any updates or changes from those notes are reflected in this note.    Clinically Significant Risk Factors               # Hypoalbuminemia: Lowest albumin = 3.4 g/dL at 1/7/2025  7:45 AM, will monitor as appropriate                    # Financial/Environmental Concerns:             "

## 2025-02-26 NOTE — PLAN OF CARE
Team Note Due:  Monday    Assessment/Intervention/Current Symtoms and Care Coordination:  Chart review and met with team, discussed pt progress, symptomology, and response to treatment.  Discussed the discharge plan and any potential impediments to discharge. Pt under commitment, carl and catherine puente. Pt requires a Cook Islander . Per RN, pt endorsed low anxiety and depression. Staff report that pt appears calmer and clearer in conversation. Pt had ECT #5 today.     Writer received VM from financial counselor, June, who reports that she contacted [facility] who again report that they cannot process pt's application until [facility] closes their application, despite [facility] confirming they did close their application. June relayed VM out to a supervisor to escalate the situation.     Discharge Plan or Goal:  Pending stabilization and safe disposition planning; considerations include:  TBD. Per different reports from different family members, pt has an assisted living apartment but was staying in the family home PTA. It is unclear at this time where pt will return at discharge      Barriers to Discharge:  Patient requires further psychiatric stabilization due to current symptomology, medication management with changes subject to provider, coordination with outside supports, and aftercare planning. Pt started ECT on 2/14     Referral Status:  MA application was submitted on 1/29. Received by [facility]. [facility] cannot process application without [facility] closing their application. Per financial counselor, [facility] reports they did close their application and [facility] reports that [facility] has not closed it yet. Voicemail out to supervisor to clarify      Legal Status:  Committed MI with ITP and Catherine Puente  [facility]  76-GX-AR-   ITP includes: Risperdal, Thorazine, Haldol, and Zyprexa  Expires: 06/10/2025    Contacts (include DENI status):  Mya Vicente  daughter (DENI)  P: [phone]    Golden - son (DENI)  P: [phone]     Staci - son (DENI)  P: [phone]     Rani - commitment CM through [facility] (DENI per commitment)  P: [phone]  E: [email or web address]     Upcoming Meetings and Dates/Important Information and next steps:  CTC to coordinate with pt, outside supports, and treatment team regarding discharge planning   Pt continues with ECT

## 2025-02-26 NOTE — PROGRESS NOTES
Pt trying to get out of bed & attempting to pull IV out.  Assist of 2-3 to keep patient in bed & safe.  Dr Frank Leung gave pt precedex 20 which was minimally effective in calming pt.  Plan is to have 1:1 unit staff move with patient from IV room & into PACU to assist with wake-ups.  Pt met phase II discharge criteria.  Pt is being transferred back to the unit via wheelchair with unit staff PA & security.
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
## 2025-02-26 NOTE — PLAN OF CARE
BEH IP Unit Acuity Rating Score (UARS)  Patient is given one point for every criteria they meet.    CRITERIA SCORING   On a 72 hour hold, court hold, committed, stay of commitment, or revocation. 1    Patient LOS on BEH unit exceeds 20 days. 1  LOS: 54   Patient under guardianship, 55+, otherwise medically complex, or under age 11. 1   Suicide ideation without relief of precipitating factors. 0   Current plan for suicide. 0   Current plan for homicide. 0   Imminent risk or actual attempt to seriously harm another without relief of factors precipitating the attempt. 0   Severe dysfunction in daily living (ex: complete neglect for self care, extreme disruption in vegetative function, extreme deterioration in social interactions). 1   Recent (last 7 days) or current physical aggression in the ED or on unit. 0   Restraints or seclusion episode in past 72 hours. 0   Recent (last 7 days) or current verbal aggression, agitation, yelling, etc., while in the ED or unit. 0   Active psychosis. 1   Need for constant or near constant redirection (from leaving, from others, etc).  0   Intrusive or disruptive behaviors. 0   Patient requires 3 or more hours of individualized nursing care per 8-hour shift (i.e. for ADLs, meds, therapeutic interventions). 0   TOTAL 5

## 2025-02-26 NOTE — PLAN OF CARE
Problem: Psychotic Signs/Symptoms  Goal: Improved Behavioral Control (Psychotic Signs/Symptoms)  2/26/2025 1427 by Vera Navarro RN  Outcome: Progressing  2/26/2025 1238 by Vera Navarro RN  Outcome: Progressing   Goal Outcome Evaluation:    Plan of Care Reviewed With: patient      Patient had ECT #5 which reportedly went well. Pt came back alert, oriented and in a calm mood. Pt denies any side effects from ECT. Pt took all morning scheduled medications after she came back from ECT. Pt denies anxiety, depression and SI/SIB.

## 2025-02-26 NOTE — PLAN OF CARE
Rehab Group    Start time: 1015  End time: 1145  Patient time total: 50 minutes    attended partial group    #7 attended   Group Type: OT Clinic   Group Topic Covered: cognitive activities, coping skills, healthy leisure time, and problem solving   Group Session Detail:OT: Education on healthy activity engagement and creative hands-on endeavor (OT clinic) to increase concentration, focus, attention to task/detail, decision making, problem solving, frustration tolerance, task follow through, coping with stress, healthy leisure engagement, creative expression, and social engagement    Patient Response/Contribution:  cooperative with task, worked intermittently, Limited eye contact, and required prompts or assistance to participate   Patient Detail:pt flat, calm. Pt arrived with [facility] staff, no  was present. Pt shared she was enjoying the sun today. With some staff encouragement pt chose to engage in beading task, pt was provided with set up of supplies and instructions. Pt used a random bead pattern for her design. Pt worked quietly through completion of task. Therapist was able to engage pt in some hands on clinic tasks including sorting and testing markers. Pt did require some hands on assist though was able to follow flow of activity.  Pt required max assist for 24 piece jigsaw puzzle engagement though was independent with taking apart and putting away task.      [id number] OT Group (2 or more in attendance)      Patient Active Problem List   Diagnosis    Mary (H)    Psychosis (H)    Schizoaffective disorder, bipolar type (H)    PTSD (post-traumatic stress disorder)    Catatonia          Called patient back with plan of treatment per acm.  Zyrtec daily and see an allergist. Mikhail Joshi, etc [phone] or Rosa Maria,etc [phone]

## 2025-02-27 PROCEDURE — 250N000013 HC RX MED GY IP 250 OP 250 PS 637: Performed by: PSYCHIATRY & NEUROLOGY

## 2025-02-27 PROCEDURE — 124N000002 HC R&B MH UMMC

## 2025-02-27 PROCEDURE — 99232 SBSQ HOSP IP/OBS MODERATE 35: CPT | Performed by: PSYCHIATRY & NEUROLOGY

## 2025-02-27 PROCEDURE — 250N000013 HC RX MED GY IP 250 OP 250 PS 637: Performed by: CLINICAL NURSE SPECIALIST

## 2025-02-27 PROCEDURE — 250N000013 HC RX MED GY IP 250 OP 250 PS 637

## 2025-02-27 PROCEDURE — 258N000001 HC RX 258

## 2025-02-27 RX ADMIN — OXCARBAZEPINE 150 MG: 150 TABLET, FILM COATED ORAL at 16:35

## 2025-02-27 RX ADMIN — LEVOTHYROXINE SODIUM 100 MCG: 0.05 TABLET ORAL at 08:26

## 2025-02-27 RX ADMIN — CHLORPROMAZINE HYDROCHLORIDE 300 MG: 100 TABLET, FILM COATED ORAL at 20:44

## 2025-02-27 RX ADMIN — HALOPERIDOL 5 MG: 5 TABLET ORAL at 08:26

## 2025-02-27 RX ADMIN — LORAZEPAM 1.5 MG: 1 TABLET ORAL at 08:26

## 2025-02-27 RX ADMIN — HALOPERIDOL 5 MG: 5 TABLET ORAL at 20:44

## 2025-02-27 RX ADMIN — Medication 500 ML: at 08:27

## 2025-02-27 RX ADMIN — OXCARBAZEPINE 150 MG: 150 TABLET, FILM COATED ORAL at 08:26

## 2025-02-27 RX ADMIN — DOCUSATE SODIUM 100 MG: 100 CAPSULE, LIQUID FILLED ORAL at 08:26

## 2025-02-27 RX ADMIN — Medication 5 MG: at 20:44

## 2025-02-27 RX ADMIN — POLYETHYLENE GLYCOL 3350 17 G: 17 POWDER, FOR SOLUTION ORAL at 08:27

## 2025-02-27 RX ADMIN — SERTRALINE HYDROCHLORIDE 25 MG: 25 TABLET ORAL at 08:26

## 2025-02-27 RX ADMIN — Medication 1 TABLET: at 08:26

## 2025-02-27 ASSESSMENT — ACTIVITIES OF DAILY LIVING (ADL)
ADLS_ACUITY_SCORE: 52
LAUNDRY: UNABLE TO COMPLETE
ADLS_ACUITY_SCORE: 52
ORAL_HYGIENE: INDEPENDENT
ADLS_ACUITY_SCORE: 52
LAUNDRY: UNABLE TO COMPLETE
ADLS_ACUITY_SCORE: 52
DRESS: INDEPENDENT;STREET CLOTHES
ORAL_HYGIENE: INDEPENDENT
ADLS_ACUITY_SCORE: 52
HYGIENE/GROOMING: INDEPENDENT
HYGIENE/GROOMING: INDEPENDENT
ADLS_ACUITY_SCORE: 52
ADLS_ACUITY_SCORE: 52
DRESS: INDEPENDENT
ADLS_ACUITY_SCORE: 52
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
## 2025-02-27 NOTE — PLAN OF CARE
Team Note Due:  Monday    Assessment/Intervention/Current Symtoms and Care Coordination:  Chart review and met with team, discussed pt progress, symptomology, and response to treatment.  Discussed the discharge plan and any potential impediments to discharge. Pt under commitmentcarl and catherine puente. Pt requires a [place] . Per RN, pt endorsed low anxiety and depression. Staff report that pt appears calmer and clearer in conversation. Pt had ECT #5 yesterday.     Writer received email from [facility] Rani KAUFMAN ([email or web address]) requesting to meet with pt for intake this afternoon. Writer put out return email confirming visit this afternoon. Rani requested writer complete DA and send to Rani. Writer relayed plan to complete DA and return to Rani.     Discharge Plan or Goal:  Pending stabilization and safe disposition planning; considerations include:  TBD. Per different reports from different family members, pt has an assisted living apartment but was staying in the family home PTA. It is unclear at this time where pt will return at discharge      Barriers to Discharge:  Patient requires further psychiatric stabilization due to current symptomology, medication management with changes subject to provider, coordination with outside supports, and aftercare planning. Pt started ECT on 2/14     Referral Status:  [place] application was submitted on 1/29. Received by [facility]. [facility] cannot process application without [facility] closing their application. Per financial counselor, [facility] reports they did close their application and [facility] reports that [facility] has not closed it yet. Voicemail out to supervisor to clarify      Legal Status:  Committed MI with ITP and Catherine Puente  [facility]  [id number]   ITP includes: Risperdal, Thorazine, Haldol, and Zyprexa  Expires: 06/10/2025    Contacts (include DENI status):  Mya - daughter (DENI)  P:  [phone]    Golden - son (DENI)  P: [phone]     Staci - son (DENI)  P: [phone]     Rani - commitment CM through [facility] (DENI per commitment)  P: [phone]  E: [email or web address]     Upcoming Meetings and Dates/Important Information and next steps:  [facility] to coordinate with pt, outside supports, and treatment team regarding discharge planning   Pt continues with ECT

## 2025-02-27 NOTE — PLAN OF CARE
"  Problem: Psychotic Signs/Symptoms  Goal: Improved Behavioral Control (Psychotic Signs/Symptoms)  Outcome: Progressing   Goal Outcome Evaluation:       Patient's mood is calm with a flat affect but brighter on approach. Per , patient said that she is doing \"okay and better\". Patient denied SI/SIB/Hallucinations. She stated that the ECT is \"doing good to me\". Patient unable to remember the previous events which happened when she first came in to the hospital and what happened right after ECT this morning. Per report, she got agitated right after ECT. She denied suicidal thoughts; SIB/Hallucinations. Patient spoke in English to articulate her needs after the  left. Patient is alert and oriented x 2. She is disoriented to time and place.  She is med compliant. No side effects of med reported. Patient was watching TV with her peers. VSS.                 "
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
## 2025-02-27 NOTE — PLAN OF CARE
Problem: Sleep Disturbance  Goal: Adequate Sleep/Rest  Outcome: Progressing   Goal Outcome Evaluation:       Patient is sleeping soundly in the beginning of the shift. She is using a medical bed to facilitate her mobility. No complaints made.Patient had an uninterrupted sleep the whole shift for 10 hours.

## 2025-02-27 NOTE — PLAN OF CARE
Problem: Depressive Signs/Symptoms  Goal: Optimized Energy Level (Depressive Signs/Symptoms)  Outcome: Progressing  Intervention: Optimize Energy Level    Goal Outcome Evaluation:    Plan of Care Reviewed With: patient      Patient was up visible at the milieu, is alert and oriented, pleasant and cooperative with staff. Presents with a flat affect, mood is calm,  reports okay appetite, ate about 100% of breakfast and lunch. Patient denies anxiety, depression SI/SIB/HI and all other MH symptoms, no PRN's given this shift, denied pain, says ECT is working and was medication compliant.

## 2025-02-27 NOTE — PLAN OF CARE
BEH IP Unit Acuity Rating Score (UARS)  Patient is given one point for every criteria they meet.    CRITERIA SCORING   On a 72 hour hold, court hold, committed, stay of commitment, or revocation. 1    Patient LOS on BEH unit exceeds 20 days. 1  LOS: 55   Patient under guardianship, 55+, otherwise medically complex, or under age 11. 1   Suicide ideation without relief of precipitating factors. 0   Current plan for suicide. 0   Current plan for homicide. 0   Imminent risk or actual attempt to seriously harm another without relief of factors precipitating the attempt. 0   Severe dysfunction in daily living (ex: complete neglect for self care, extreme disruption in vegetative function, extreme deterioration in social interactions). 1   Recent (last 7 days) or current physical aggression in the ED or on unit. 0   Restraints or seclusion episode in past 72 hours. 0   Recent (last 7 days) or current verbal aggression, agitation, yelling, etc., while in the ED or unit. 0   Active psychosis. 1   Need for constant or near constant redirection (from leaving, from others, etc).  0   Intrusive or disruptive behaviors. 0   Patient requires 3 or more hours of individualized nursing care per 8-hour shift (i.e. for ADLs, meds, therapeutic interventions). 0   TOTAL 5

## 2025-02-28 ENCOUNTER — APPOINTMENT (OUTPATIENT)
Dept: BEHAVIORAL HEALTH | Facility: CLINIC | Age: 60
DRG: 885 | End: 2025-02-28
Attending: PSYCHIATRY & NEUROLOGY

## 2025-02-28 PROCEDURE — 250N000013 HC RX MED GY IP 250 OP 250 PS 637: Performed by: PSYCHIATRY & NEUROLOGY

## 2025-02-28 PROCEDURE — 90870 ELECTROCONVULSIVE THERAPY: CPT | Performed by: PSYCHIATRY & NEUROLOGY

## 2025-02-28 PROCEDURE — 258N000001 HC RX 258

## 2025-02-28 PROCEDURE — 90853 GROUP PSYCHOTHERAPY: CPT

## 2025-02-28 PROCEDURE — 99232 SBSQ HOSP IP/OBS MODERATE 35: CPT | Performed by: PSYCHIATRY & NEUROLOGY

## 2025-02-28 PROCEDURE — 250N000013 HC RX MED GY IP 250 OP 250 PS 637: Performed by: CLINICAL NURSE SPECIALIST

## 2025-02-28 PROCEDURE — 250N000013 HC RX MED GY IP 250 OP 250 PS 637

## 2025-02-28 PROCEDURE — 90870 ELECTROCONVULSIVE THERAPY: CPT

## 2025-02-28 PROCEDURE — 370N000017 HC ANESTHESIA TECHNICAL FEE, PER MIN: Performed by: STUDENT IN AN ORGANIZED HEALTH CARE EDUCATION/TRAINING PROGRAM

## 2025-02-28 PROCEDURE — 124N000002 HC R&B MH UMMC

## 2025-02-28 RX ORDER — FLUMAZENIL 0.1 MG/ML
0.3 INJECTION, SOLUTION INTRAVENOUS ONCE
Status: DISCONTINUED | OUTPATIENT
Start: 2025-02-28 | End: 2025-02-28

## 2025-02-28 RX ADMIN — LORAZEPAM 1.5 MG: 1 TABLET ORAL at 10:03

## 2025-02-28 RX ADMIN — CHLORPROMAZINE HYDROCHLORIDE 300 MG: 100 TABLET, FILM COATED ORAL at 20:35

## 2025-02-28 RX ADMIN — HALOPERIDOL 5 MG: 5 TABLET ORAL at 20:35

## 2025-02-28 RX ADMIN — LEVOTHYROXINE SODIUM 100 MCG: 0.05 TABLET ORAL at 10:02

## 2025-02-28 RX ADMIN — OXCARBAZEPINE 150 MG: 150 TABLET, FILM COATED ORAL at 17:12

## 2025-02-28 RX ADMIN — DOCUSATE SODIUM 100 MG: 100 CAPSULE, LIQUID FILLED ORAL at 10:02

## 2025-02-28 RX ADMIN — HALOPERIDOL 5 MG: 5 TABLET ORAL at 10:02

## 2025-02-28 RX ADMIN — Medication 5 MG: at 20:35

## 2025-02-28 RX ADMIN — SERTRALINE HYDROCHLORIDE 25 MG: 25 TABLET ORAL at 10:02

## 2025-02-28 RX ADMIN — LORAZEPAM 1.5 MG: 1 TABLET ORAL at 20:35

## 2025-02-28 RX ADMIN — OXCARBAZEPINE 150 MG: 150 TABLET, FILM COATED ORAL at 10:02

## 2025-02-28 RX ADMIN — Medication 500 ML: at 10:04

## 2025-02-28 RX ADMIN — POLYETHYLENE GLYCOL 3350 17 G: 17 POWDER, FOR SOLUTION ORAL at 10:01

## 2025-02-28 RX ADMIN — Medication 1 TABLET: at 10:02

## 2025-02-28 ASSESSMENT — ACTIVITIES OF DAILY LIVING (ADL)
ADLS_ACUITY_SCORE: 52
HYGIENE/GROOMING: INDEPENDENT
ADLS_ACUITY_SCORE: 52
DRESS: INDEPENDENT;STREET CLOTHES
ADLS_ACUITY_SCORE: 52
LAUNDRY: UNABLE TO COMPLETE
HYGIENE/GROOMING: INDEPENDENT
LAUNDRY: UNABLE TO COMPLETE
ADLS_ACUITY_SCORE: 52
ADLS_ACUITY_SCORE: 52
ORAL_HYGIENE: INDEPENDENT
ADLS_ACUITY_SCORE: 52
ADLS_ACUITY_SCORE: 52
ORAL_HYGIENE: INDEPENDENT
ADLS_ACUITY_SCORE: 52
DRESS: INDEPENDENT;STREET CLOTHES
ADLS_ACUITY_SCORE: 52

## 2025-02-28 NOTE — PLAN OF CARE
BEH IP Unit Acuity Rating Score (UARS)  Patient is given one point for every criteria they meet.    CRITERIA SCORING   On a 72 hour hold, court hold, committed, stay of commitment, or revocation. 1    Patient LOS on BEH unit exceeds 20 days. 1  LOS: 56   Patient under guardianship, 55+, otherwise medically complex, or under age 11. 1   Suicide ideation without relief of precipitating factors. 0   Current plan for suicide. 0   Current plan for homicide. 0   Imminent risk or actual attempt to seriously harm another without relief of factors precipitating the attempt. 0   Severe dysfunction in daily living (ex: complete neglect for self care, extreme disruption in vegetative function, extreme deterioration in social interactions). 1   Recent (last 7 days) or current physical aggression in the ED or on unit. 0   Restraints or seclusion episode in past 72 hours. 0   Recent (last 7 days) or current verbal aggression, agitation, yelling, etc., while in the ED or unit. 0   Active psychosis. 1   Need for constant or near constant redirection (from leaving, from others, etc).  0   Intrusive or disruptive behaviors. 0   Patient requires 3 or more hours of individualized nursing care per 8-hour shift (i.e. for ADLs, meds, therapeutic interventions). 0   TOTAL 5

## 2025-02-28 NOTE — PLAN OF CARE
Team Note Due:  Monday    Assessment/Intervention/Current Symtoms and Care Coordination:  Chart review and met with team, discussed pt progress, symptomology, and response to treatment.  Discussed the discharge plan and any potential impediments to discharge. Pt under commitment, carl and catherine puente. Pt requires a Kyrgyz . Per RN, pt endorsed low anxiety and depression. Staff report that pt appears calmer and clearer in conversation. Pt had ECT #6 today.     Writer sent completed DA to commitment Rani KAUFMAN via email [email or web address]). Writer received return email reporting that pt will now be working with Chinmay Gould and provided contact information.     Discharge Plan or Goal:  Pending stabilization and safe disposition planning; considerations include:  TBD. Per different reports from different family members, pt has an assisted living apartment but was staying in the family home PTA. It is unclear at this time where pt will return at discharge      Barriers to Discharge:  Patient requires further psychiatric stabilization due to current symptomology, medication management with changes subject to provider, coordination with outside supports, and aftercare planning. Pt started ECT on 2/14     Referral Status:  MA application was submitted on 1/29. Received by [facility]. [facility] cannot process application without [facility] closing their application. Per financial counselor, [facility] reports they did close their application and [facility] reports that [facility] has not closed it yet. Voicemail out to supervisor to clarify      Legal Status:  Committed MI with ITP and Catherine Puente  [facility]  03-JJ-KC-   ITP includes: Risperdal, Thorazine, Haldol, and Zyprexa  Expires: 06/10/2025    Contacts (include DENI status):  Mya - daughter (DENI)  P: [phone]    Golden - son (DENI)  P: [phone]     Mohamed - son (DENI)  P: [phone]     Rani - commitment  CM supervisor through [facility] (DENI per commitment)  P: [phone]  E: [email or web address]    Chinmay Gould - commitment CM through [facility] (DENI per commitment)  P: [phone]  E: [email or web address]  F: [phone]  Note: Chinmay is off on Friday's     Upcoming Meetings and Dates/Important Information and next steps:  CTC to coordinate with pt, outside supports, and treatment team regarding discharge planning   Pt continues with ECT

## 2025-02-28 NOTE — PLAN OF CARE
Group Attendance:  attended full group    Time session began: 1400  Time session ended: 1445  Patient's total time in group: 45    Total # Attendees   6   Group Type psychotherapeutic     Group Topic Covered emotional regulation, symptom management, healthy coping skills, and relaxation and grounding techniques/coping skills     Group Session Detail Group members checked in with how they are feeling. The group discussed utilizing music as a therapeutic tool to explore emotions, expression, and promote relaxation. Writer discussed the significance of music in emotional regulation and coping strategies. Group members shared personal experiences and associations with songs they chose.      Patient's response to the group topic/interactions:  positive affect, cooperative with task, organized, supportive of peers, socially appropriate, listened actively, expressed understanding of topic, attentive, and actively engaged     Patient Details: Pt attended group, chose a couple of songs, was noted to be nodding along and at one point singing and humming along. Pt shared it helped lift their mood.            25793 - Group psychotherapy - 1 Session  Patient Active Problem List   Diagnosis    Mary (H)    Psychosis (H)    Schizoaffective disorder, bipolar type (H)    PTSD (post-traumatic stress disorder)    Catatonia

## 2025-02-28 NOTE — PLAN OF CARE
Problem: Depressive Signs/Symptoms  Goal: Optimized Energy Level (Depressive Signs/Symptoms)  Outcome: Progressing  Intervention: Optimize Energy Level     Goal Outcome Evaluation:    Plan of Care Reviewed With: patient      Patient got ECT # 6 today and that went well, patient reports that the treatment is working good for her and she can feel the difference already. Patient was visible at the unit, Is brighter upon approach, calm, pleasant and cooperative with cares. Was medication compliant, is eating and drinking adequately, reports sleeping good through the night and will continue with the plan of care.

## 2025-02-28 NOTE — PLAN OF CARE
Problem: Sleep Disturbance  Goal: Adequate Sleep/Rest  Outcome: Progressing   Goal Outcome Evaluation:             Pt  has been NPO since midnight for ECT today at 0800. Vitals done this morning, ECT check list and Ticket to ride completed. Pt will need an SIO for ECT. Slept 9.5 hrs. Safety Rounds completed every 15 minutes throughout the night. Visible respirations noted with no signes of distress. No safety or behavior concerns noted this shift. Will continue to monitor and provide support as needed.       Vitals: Temp: 97.9  F (36.6  C) Temp src: Oral BP: 114/69 Pulse: 75   Resp: 18 SpO2: 98 % O2 Device: None (Room air)

## 2025-02-28 NOTE — PLAN OF CARE
Problem: Psychotic Signs/Symptoms  Goal: Improved Behavioral Control (Psychotic Signs/Symptoms)  Outcome: Progressing  Intervention: Manage Behavior  Recent Flowsheet Documentation  Taken 2/27/2025 1700 by Nivia Arndt RN  De-Escalation Techniques: 1:1 observation initiated   Goal Outcome Evaluation:    Plan of Care Reviewed With: patient        Patient's mood is calm with a bright affect. She presented a relaxed facial expression. She was visible in the milieu  majority of the shift watching TV. Per , patient denied suicidal thoughts. She denied SIB/Hallucinations. She is not able to remember past events especially how and why she got here in the hospital as well as behaviors she presented. She further stated that the ECT is doing well to her and is looking forward to the next scheduled ECT, however, she is scared whenever she goes down for the procedure according to her.     Patient is med compliant. Her Ativan was held at HS since the patient will have the ECT tomorrow. Writer reiterated to patient not to eat nor drink after midnight. She verbalized understanding. All cups and bottled water were removed from her room. She went to bed early.
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
## 2025-03-01 ENCOUNTER — OFFICE VISIT (OUTPATIENT)
Dept: INTERPRETER SERVICES | Facility: CLINIC | Age: 60
DRG: 885 | End: 2025-03-01

## 2025-03-01 PROCEDURE — 93010 ELECTROCARDIOGRAM REPORT: CPT | Performed by: INTERNAL MEDICINE

## 2025-03-01 PROCEDURE — 250N000013 HC RX MED GY IP 250 OP 250 PS 637

## 2025-03-01 PROCEDURE — 93005 ELECTROCARDIOGRAM TRACING: CPT

## 2025-03-01 PROCEDURE — 250N000013 HC RX MED GY IP 250 OP 250 PS 637: Performed by: PSYCHIATRY & NEUROLOGY

## 2025-03-01 PROCEDURE — 258N000003 HC RX IP 258 OP 636

## 2025-03-01 PROCEDURE — 250N000013 HC RX MED GY IP 250 OP 250 PS 637: Performed by: CLINICAL NURSE SPECIALIST

## 2025-03-01 PROCEDURE — 124N000002 HC R&B MH UMMC

## 2025-03-01 PROCEDURE — 258N000001 HC RX 258

## 2025-03-01 PROCEDURE — T1013 SIGN LANG/ORAL INTERPRETER: HCPCS

## 2025-03-01 RX ORDER — WADDING
250 EACH MISCELLANEOUS 2 TIMES DAILY WITH MEALS
Status: DISCONTINUED | OUTPATIENT
Start: 2025-03-01 | End: 2025-03-02

## 2025-03-01 RX ADMIN — POLYETHYLENE GLYCOL 3350 17 G: 17 POWDER, FOR SOLUTION ORAL at 08:10

## 2025-03-01 RX ADMIN — LEVOTHYROXINE SODIUM 100 MCG: 0.05 TABLET ORAL at 08:09

## 2025-03-01 RX ADMIN — CHLORPROMAZINE HYDROCHLORIDE 300 MG: 100 TABLET, FILM COATED ORAL at 20:38

## 2025-03-01 RX ADMIN — OXCARBAZEPINE 150 MG: 150 TABLET, FILM COATED ORAL at 08:09

## 2025-03-01 RX ADMIN — HALOPERIDOL 5 MG: 5 TABLET ORAL at 20:38

## 2025-03-01 RX ADMIN — DOCUSATE SODIUM 100 MG: 100 CAPSULE, LIQUID FILLED ORAL at 08:09

## 2025-03-01 RX ADMIN — Medication 5 MG: at 20:38

## 2025-03-01 RX ADMIN — Medication 500 ML: at 08:03

## 2025-03-01 RX ADMIN — OXCARBAZEPINE 150 MG: 150 TABLET, FILM COATED ORAL at 16:56

## 2025-03-01 RX ADMIN — HALOPERIDOL 5 MG: 5 TABLET ORAL at 08:09

## 2025-03-01 RX ADMIN — SODIUM CHLORIDE, POTASSIUM CHLORIDE, SODIUM LACTATE AND CALCIUM CHLORIDE 500 ML: 600; 310; 30; 20 INJECTION, SOLUTION INTRAVENOUS at 11:38

## 2025-03-01 RX ADMIN — Medication 1 TABLET: at 08:09

## 2025-03-01 RX ADMIN — Medication 250 ML: at 17:00

## 2025-03-01 RX ADMIN — LORAZEPAM 1.5 MG: 1 TABLET ORAL at 20:38

## 2025-03-01 RX ADMIN — SERTRALINE HYDROCHLORIDE 25 MG: 25 TABLET ORAL at 08:09

## 2025-03-01 ASSESSMENT — ACTIVITIES OF DAILY LIVING (ADL)
ADLS_ACUITY_SCORE: 52
ADLS_ACUITY_SCORE: 52
ORAL_HYGIENE: INDEPENDENT
ORAL_HYGIENE: INDEPENDENT
ADLS_ACUITY_SCORE: 52
HYGIENE/GROOMING: INDEPENDENT;SHOWER
ADLS_ACUITY_SCORE: 52
HYGIENE/GROOMING: INDEPENDENT
LAUNDRY: UNABLE TO COMPLETE
ADLS_ACUITY_SCORE: 52
LAUNDRY: UNABLE TO COMPLETE
ADLS_ACUITY_SCORE: 52
DRESS: INDEPENDENT
DRESS: INDEPENDENT;STREET CLOTHES
ADLS_ACUITY_SCORE: 52

## 2025-03-01 NOTE — PLAN OF CARE
Problem: Depressive Signs/Symptoms  Goal: Optimized Cognitive Function (Depressive Signs/Symptoms)  Outcome: Progressing     Problem: Psychotic Signs/Symptoms  Goal: Increased Participation and Engagement (Psychotic Signs/Symptoms)  Outcome: Progressing  Intervention: Facilitate Participation and Engagement     Problem: Psychotic Signs/Symptoms  Goal: Increased Participation and Engagement (Psychotic Signs/Symptoms)  Intervention: Facilitate Participation and Engagement     Patient up and visible on the unit most part of the shift. Presented with full range affect on approach. Patient denies all psych symptoms, denies pain. Artemio for safety. Adequate food and fluid intake. Medication compliant. Will continue to monitor and offer therapeutic support as needed
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
## 2025-03-01 NOTE — PROGRESS NOTES
Brief Internal Medicine Note:     Was notified by nursing staff of orthostatic blood pressure 107/72 sitting and 62/47 standing.  Patient ate breakfast and had 500 mL of Pedialyte. I asked the nurse to recheck in 15 minutes x 2.  Next check was 110/70 with a pulse of 101 and standing was 64/46.I instructed nursing staff to administer IV fluids.  500 mL LR bolus ordered ordered.  Also asked nursing staff to talk with psychiatry about medication adjustments.  Will also obtain EKG since patient is complaining of dizziness.  Recommend slow sit to stand.    -Slow sit to stand  -EKG ordered  - mL ordered  -Please discuss with psychiatry to see if any medications can be titrated lower  -Blood pressure every 15 x 2 reordered  -Encourage p.o. fluids  -Recommend recheck with manual blood pressure cuffs    Khari Pino PA-C   [facility]  Securely message with the Vocera Web Console (learn more here)  Text page via [employer] Paging/Directory

## 2025-03-01 NOTE — PLAN OF CARE
Problem: Psychotic Signs/Symptoms  Goal: Improved Behavioral Control (Psychotic Signs/Symptoms)  Outcome: Progressing   Goal Outcome Evaluation:    Pt noted to have an orthostatic drop this AM ( 107/72 sitting and 62/47 standing). Pt sitting in the lounge, alert and interactive. Pt reports some dizziness when standing. Pt given AM dose of Pedialyte which she drank ( 500 ml's and ate 100% of her Sami toast, fried potatoes and a blue berry muffin.   0815- Khari/med PA web paged and informed of orthostatic drop. Request to re check blood pressure in 15 minutes.    Writer met with patient and interpretor. Pt denies depression, anxiety, SI/SIB/wishes to dead/hallucinations. Pt affect is appropriate,she has smiled during interactions and she has good eye contact.     Pt requested assistance with filling out her menu as she reported that she has a hard time seeing without her glasses. Pt was able to make appropriate requests and seemed to understand writer.     0835-Blood pressure rechecked (sitting 110/70 with a pulse of 101 and standing 64/46 with a pulse of 122). Khari web paged     Pt continues to have an orthostatic drop. Pt had a bolus of 500 ml's of lactated ringers this morning. Pt has a PIV in her left fore arm and order for Q 8 hours flushes has been placed.   Pt's last blood pressure was 121/69 sitting with a pulse of 96 and standing 87/57 with a pulse of 119. Pt denies dizziness at this time.  Pt initiated and was independent with showering after lunch. Pt has been visible in lounge watching TV this afternoon.     Pt has been eating well at meals and denies pain.

## 2025-03-01 NOTE — PLAN OF CARE
"Patient was in the lounge area for most of the shift. She presented with a bright and calm affect. She denied pain and all mental health symptoms and contracted for safety. Patient describes her mood as \"happy.\" She attended and participated in unit group activities. Patient's family visited and brought her food. Her vital signs were stable. Food and fluid intake were adequate. She was compliant with medications.       Problem: Psychotic Signs/Symptoms  Goal: Increased Participation and Engagement (Psychotic Signs/Symptoms)  Intervention: Facilitate Participation and Engagement  Recent Flowsheet Documentation  Taken 2/28/2025 1846 by Laura Alves, RN  Supportive Measures:   active listening utilized   positive reinforcement provided   relaxation techniques promoted   self-care encouraged   verbalization of feelings encouraged  Diversional Activity: television     Problem: Depressive Signs/Symptoms  Goal: Enhanced Self-Esteem and Confidence (Depressive Signs/Symptoms)  Intervention: Promote Confidence and Self-Esteem  Recent Flowsheet Documentation  Taken 2/28/2025 1846 by Laura Alves, RN  Supportive Measures:   active listening utilized   positive reinforcement provided   relaxation techniques promoted   self-care encouraged   verbalization of feelings encouraged         Goal Outcome Evaluation:    Plan of Care Reviewed With: patient                      "

## 2025-03-01 NOTE — PLAN OF CARE
Problem: Sleep Disturbance  Goal: Adequate Sleep/Rest  Outcome: Progressing   Goal Outcome Evaluation:     The 15-minute safety checks are in progress; no related incidents have occurred. She slept for 9.25 hours.                           .

## 2025-03-02 LAB
ANION GAP SERPL CALCULATED.3IONS-SCNC: 12 MMOL/L (ref 7–15)
ANION GAP SERPL CALCULATED.3IONS-SCNC: 12 MMOL/L (ref 7–15)
BUN SERPL-MCNC: 13.6 MG/DL (ref 6–20)
BUN SERPL-MCNC: 13.6 MG/DL (ref 6–20)
CALCIUM SERPL-MCNC: 9 MG/DL (ref 8.8–10.4)
CALCIUM SERPL-MCNC: 9 MG/DL (ref 8.8–10.4)
CHLORIDE SERPL-SCNC: 101 MMOL/L (ref 98–107)
CHLORIDE SERPL-SCNC: 101 MMOL/L (ref 98–107)
CREAT SERPL-MCNC: 0.72 MG/DL (ref 0.51–0.95)
CREAT SERPL-MCNC: 0.72 MG/DL (ref 0.51–0.95)
EGFRCR SERPLBLD CKD-EPI 2021: >90 ML/MIN/1.73M2
EGFRCR SERPLBLD CKD-EPI 2021: >90 ML/MIN/1.73M2
ERYTHROCYTE [DISTWIDTH] IN BLOOD BY AUTOMATED COUNT: 12.3 % (ref 10–15)
ERYTHROCYTE [DISTWIDTH] IN BLOOD BY AUTOMATED COUNT: 12.3 % (ref 10–15)
GLUCOSE SERPL-MCNC: 124 MG/DL (ref 70–99)
GLUCOSE SERPL-MCNC: 124 MG/DL (ref 70–99)
HCO3 SERPL-SCNC: 24 MMOL/L (ref 22–29)
HCO3 SERPL-SCNC: 24 MMOL/L (ref 22–29)
HCT VFR BLD AUTO: 35 % (ref 35–47)
HCT VFR BLD AUTO: 35 % (ref 35–47)
HGB BLD-MCNC: 11.2 G/DL (ref 11.7–15.7)
HGB BLD-MCNC: 11.2 G/DL (ref 11.7–15.7)
MAGNESIUM SERPL-MCNC: 1.5 MG/DL (ref 1.7–2.3)
MAGNESIUM SERPL-MCNC: 1.5 MG/DL (ref 1.7–2.3)
MCH RBC QN AUTO: 30.9 PG (ref 26.5–33)
MCH RBC QN AUTO: 30.9 PG (ref 26.5–33)
MCHC RBC AUTO-ENTMCNC: 32 G/DL (ref 31.5–36.5)
MCHC RBC AUTO-ENTMCNC: 32 G/DL (ref 31.5–36.5)
MCV RBC AUTO: 96 FL (ref 78–100)
MCV RBC AUTO: 96 FL (ref 78–100)
PLATELET # BLD AUTO: 253 10E3/UL (ref 150–450)
PLATELET # BLD AUTO: 253 10E3/UL (ref 150–450)
POTASSIUM SERPL-SCNC: 4.1 MMOL/L (ref 3.4–5.3)
POTASSIUM SERPL-SCNC: 4.1 MMOL/L (ref 3.4–5.3)
RBC # BLD AUTO: 3.63 10E6/UL (ref 3.8–5.2)
RBC # BLD AUTO: 3.63 10E6/UL (ref 3.8–5.2)
SODIUM SERPL-SCNC: 137 MMOL/L (ref 135–145)
SODIUM SERPL-SCNC: 137 MMOL/L (ref 135–145)
WBC # BLD AUTO: 6.4 10E3/UL (ref 4–11)
WBC # BLD AUTO: 6.4 10E3/UL (ref 4–11)

## 2025-03-02 PROCEDURE — 250N000013 HC RX MED GY IP 250 OP 250 PS 637

## 2025-03-02 PROCEDURE — 36415 COLL VENOUS BLD VENIPUNCTURE: CPT

## 2025-03-02 PROCEDURE — 85018 HEMOGLOBIN: CPT

## 2025-03-02 PROCEDURE — 250N000013 HC RX MED GY IP 250 OP 250 PS 637: Performed by: PSYCHIATRY & NEUROLOGY

## 2025-03-02 PROCEDURE — 80048 BASIC METABOLIC PNL TOTAL CA: CPT

## 2025-03-02 PROCEDURE — 93010 ELECTROCARDIOGRAM REPORT: CPT | Performed by: INTERNAL MEDICINE

## 2025-03-02 PROCEDURE — 83735 ASSAY OF MAGNESIUM: CPT

## 2025-03-02 PROCEDURE — 124N000002 HC R&B MH UMMC

## 2025-03-02 PROCEDURE — 250N000013 HC RX MED GY IP 250 OP 250 PS 637: Performed by: CLINICAL NURSE SPECIALIST

## 2025-03-02 RX ORDER — WADDING
500 EACH MISCELLANEOUS 2 TIMES DAILY WITH MEALS
Status: DISCONTINUED | OUTPATIENT
Start: 2025-03-02 | End: 2025-03-19 | Stop reason: HOSPADM

## 2025-03-02 RX ORDER — MAGNESIUM OXIDE 400 MG/1
800 TABLET ORAL ONCE
Status: COMPLETED | OUTPATIENT
Start: 2025-03-02 | End: 2025-03-02

## 2025-03-02 RX ORDER — MAGNESIUM OXIDE 400 MG/1
400 TABLET ORAL DAILY
Status: DISCONTINUED | OUTPATIENT
Start: 2025-03-03 | End: 2025-03-12

## 2025-03-02 RX ADMIN — Medication 500 ML: at 17:26

## 2025-03-02 RX ADMIN — HALOPERIDOL 5 MG: 5 TABLET ORAL at 08:11

## 2025-03-02 RX ADMIN — Medication 5 MG: at 20:52

## 2025-03-02 RX ADMIN — LEVOTHYROXINE SODIUM 100 MCG: 0.05 TABLET ORAL at 08:11

## 2025-03-02 RX ADMIN — MAGNESIUM OXIDE TAB 400 MG (241.3 MG ELEMENTAL MG) 800 MG: 400 (241.3 MG) TAB at 14:26

## 2025-03-02 RX ADMIN — OXCARBAZEPINE 150 MG: 150 TABLET, FILM COATED ORAL at 08:11

## 2025-03-02 RX ADMIN — HALOPERIDOL 5 MG: 5 TABLET ORAL at 20:52

## 2025-03-02 RX ADMIN — Medication 1 TABLET: at 08:11

## 2025-03-02 RX ADMIN — DOCUSATE SODIUM 100 MG: 100 CAPSULE, LIQUID FILLED ORAL at 08:11

## 2025-03-02 RX ADMIN — POLYETHYLENE GLYCOL 3350 17 G: 17 POWDER, FOR SOLUTION ORAL at 08:12

## 2025-03-02 RX ADMIN — CHLORPROMAZINE HYDROCHLORIDE 300 MG: 100 TABLET, FILM COATED ORAL at 20:51

## 2025-03-02 RX ADMIN — Medication 250 ML: at 08:04

## 2025-03-02 RX ADMIN — SERTRALINE HYDROCHLORIDE 25 MG: 25 TABLET ORAL at 08:11

## 2025-03-02 ASSESSMENT — ACTIVITIES OF DAILY LIVING (ADL)
ORAL_HYGIENE: INDEPENDENT
DRESS: INDEPENDENT;STREET CLOTHES
ADLS_ACUITY_SCORE: 52
HYGIENE/GROOMING: INDEPENDENT
ADLS_ACUITY_SCORE: 52
DRESS: INDEPENDENT;STREET CLOTHES
ORAL_HYGIENE: INDEPENDENT
ADLS_ACUITY_SCORE: 52
HYGIENE/GROOMING: INDEPENDENT
ADLS_ACUITY_SCORE: 52
LAUNDRY: UNABLE TO COMPLETE
ADLS_ACUITY_SCORE: 52
LAUNDRY: UNABLE TO COMPLETE

## 2025-03-02 NOTE — PROGRESS NOTES
Brief Internal Medicine Note:     # Orthostatic hypotension   # Hypomagnesemia, mild  Ongoing orthostasis with intermittent dizziness while standing. Orthostatic hypotension first noted during admission in Sep 2023. Previous workups for endocrine and cardiac causes were negative. Noted to have > 20 pt drop in systolic blood pressure between sitting and standing at that admission blood pressures. Today 106-108/70 sitting and have ranged 65-98 (systolic) while standing. Patient denies chest pain, shortness of breath, palpitations, headache, syncope. Patient notes dizziness when going from seated to standing but this resolves when sitting again. Yesterday, she was given fluid bolus with good response. Medications on patient's list that can contribute to tachycardia and/or orthostatic hypotension include chlorpromazine (highest likelihood), ativan (especially at 3 mg daily), sertraline, trileptal, haloperidol, hydroxyzine, olanzapine PRN; no changes have been made to regimen. Labs wnl besides magnesium which was mildly low at 1.5. EKG without ischemia or arrhythmia.     At the below ages, this percent of people experience orthostasis on trileptal.   50-59: 15.38 %  60+: 42.31 %    PLAN:   - EKG, CBC, BMP, mag   - Most recent TSH and T4 normal   - Echocardiogram ordered   - increase pedialyte dosing, Start magnesium supplementation   - Recommend comprehensive review of medications with changes in dosing to help blood pressure.   - Encourage use of compression stockings     Khari Pino PA-C   [facility]  Securely message with the Vocera Web Console (learn more here)  Text page via [employer] Paging/Directory

## 2025-03-02 NOTE — PLAN OF CARE
Problem: Sleep Disturbance  Goal: Adequate Sleep/Rest         Patient had an uneventful night.   Slept approximately 9.5 hours.   No PRN given or requested  Will continue to monitor as needed..

## 2025-03-02 NOTE — PLAN OF CARE
"  Problem: Depressive Signs/Symptoms  Goal: Optimized Energy Level (Depressive Signs/Symptoms)  Outcome: Progressing  Intervention: Optimize Energy Level  Recent Flowsheet Documentation  Taken 3/2/2025 4264 by Brigida Flowers RN  Patient Performed Hygiene: dressed  Diversional Activity: television  Activity (Behavioral Health):   up ad ascencion   activity encouraged     Problem: Psychotic Signs/Symptoms  Goal: Improved Behavioral Control (Psychotic Signs/Symptoms)  Outcome: Progressing   Goal Outcome Evaluation:    Plan of Care Reviewed With: patient      Pt  denies hearing voices this AM. Pt denies anxiety, depression, SI/SIB/wishes to be dead. Pt describes mood as \"ok\". Pt's affect is flat with appropriate eye contact.    Pt out early for breakfast. Pt dressed appropriately and her hygiene is good.     PA unable to get a standing blood pressure initially and patient requested to eat breakfast prior to trying again.   Writer attempted to get a standing blood pressure after breakfast and writer was unable to get a reading while standing as patient reported that she was getting dizzy. When writer obtained her sitting blood pressure after sitting again reading  was 70/42.   AM ativan was held per parameter. Pt received remaining medication.   Pt ate 100% of her pancakes and 75% of her fried potatoes. Pt drank 180 ml coffee, 120 orange juice, 240 ml of Pedialyte and 240 of water with miralax and has been working on finishing her bottle of water as well.    Pt returned to bed after eating.     Writer met with patient with interpretor. Pt reported that dizziness had resolved. Pt sitting blood pressure at that times was 106/66 and standing/without waiting 1 minute was 98/58. Pt denied dizziness.   Khari/daina PA informed of this morning's blood pressure reading. She requested to let her know if patient's dizziness return.    Pt informed that she is tentatively scheduled to have ECT at 0940 tomorrow morning. Pt acknowledged " this.     Pt denies pain.  Pt reports last BM was yesterday morning.   Pt denies pain or issus with urination.  Pt that she has been sleeping well.     1205-Pt came out for lunch and patients blood pressure rechecked. Pt noted to have an orthostatic drop again and reported dizziness when standing. Khari/daina CHAN web paged with info.

## 2025-03-02 NOTE — PLAN OF CARE
Problem: Depressive Signs/Symptoms  Goal: Optimized Cognitive Function (Depressive Signs/Symptoms)  Outcome: Progressing     Problem: Psychotic Signs/Symptoms  Goal: Increased Participation and Engagement (Psychotic Signs/Symptoms)  Outcome: Progressing    Problem: Anxiety Signs/Symptoms  Goal: Optimized Cognitive Function (Anxiety Signs/Symptoms)  Outcome: Progressing     Problem: Suicidal Behavior  Goal: Suicidal Behavior is Absent or Managed  Outcome: Progressing       Patient up on the unit most shift.   Patient presented with full range affect.   Denies all psych symptoms. Denies pain.   Isolative to self while in the longue.   Medication compliant. Held ECT medications.  Family was here to visit. Went well.   Adequate food and fluid intake.  Will monitor as needed.

## 2025-03-03 ENCOUNTER — APPOINTMENT (OUTPATIENT)
Dept: BEHAVIORAL HEALTH | Facility: CLINIC | Age: 60
DRG: 885 | End: 2025-03-03
Attending: PSYCHIATRY & NEUROLOGY

## 2025-03-03 ENCOUNTER — ANESTHESIA (OUTPATIENT)
Dept: BEHAVIORAL HEALTH | Facility: CLINIC | Age: 60
DRG: 885 | End: 2025-03-03

## 2025-03-03 ENCOUNTER — APPOINTMENT (OUTPATIENT)
Dept: CARDIOLOGY | Facility: CLINIC | Age: 60
DRG: 885 | End: 2025-03-03

## 2025-03-03 ENCOUNTER — ANESTHESIA EVENT (OUTPATIENT)
Dept: BEHAVIORAL HEALTH | Facility: CLINIC | Age: 60
DRG: 885 | End: 2025-03-03

## 2025-03-03 LAB
ATRIAL RATE - MUSE: 82 BPM
ATRIAL RATE - MUSE: 82 BPM
ATRIAL RATE - MUSE: 88 BPM
ATRIAL RATE - MUSE: 88 BPM
DIASTOLIC BLOOD PRESSURE - MUSE: NORMAL MMHG
INTERPRETATION ECG - MUSE: NORMAL
LVEF ECHO: NORMAL
LVEF ECHO: NORMAL
MAGNESIUM SERPL-MCNC: 1.8 MG/DL (ref 1.7–2.3)
MAGNESIUM SERPL-MCNC: 1.8 MG/DL (ref 1.7–2.3)
P AXIS - MUSE: 53 DEGREES
P AXIS - MUSE: 53 DEGREES
P AXIS - MUSE: 68 DEGREES
P AXIS - MUSE: 68 DEGREES
PR INTERVAL - MUSE: 172 MS
QRS DURATION - MUSE: 74 MS
QRS DURATION - MUSE: 74 MS
QRS DURATION - MUSE: 80 MS
QRS DURATION - MUSE: 80 MS
QT - MUSE: 370 MS
QT - MUSE: 370 MS
QT - MUSE: 372 MS
QT - MUSE: 372 MS
QTC - MUSE: 432 MS
QTC - MUSE: 432 MS
QTC - MUSE: 450 MS
QTC - MUSE: 450 MS
R AXIS - MUSE: 20 DEGREES
R AXIS - MUSE: 20 DEGREES
R AXIS - MUSE: 36 DEGREES
R AXIS - MUSE: 36 DEGREES
SYSTOLIC BLOOD PRESSURE - MUSE: NORMAL MMHG
T AXIS - MUSE: 35 DEGREES
T AXIS - MUSE: 35 DEGREES
T AXIS - MUSE: 37 DEGREES
T AXIS - MUSE: 37 DEGREES
VENTRICULAR RATE- MUSE: 82 BPM
VENTRICULAR RATE- MUSE: 82 BPM
VENTRICULAR RATE- MUSE: 88 BPM
VENTRICULAR RATE- MUSE: 88 BPM

## 2025-03-03 PROCEDURE — 250N000013 HC RX MED GY IP 250 OP 250 PS 637: Performed by: CLINICAL NURSE SPECIALIST

## 2025-03-03 PROCEDURE — 370N000017 HC ANESTHESIA TECHNICAL FEE, PER MIN: Performed by: ANESTHESIOLOGY

## 2025-03-03 PROCEDURE — 250N000011 HC RX IP 250 OP 636: Performed by: ANESTHESIOLOGY

## 2025-03-03 PROCEDURE — 250N000013 HC RX MED GY IP 250 OP 250 PS 637: Performed by: PSYCHIATRY & NEUROLOGY

## 2025-03-03 PROCEDURE — 99231 SBSQ HOSP IP/OBS SF/LOW 25: CPT | Mod: 25 | Performed by: PSYCHIATRY & NEUROLOGY

## 2025-03-03 PROCEDURE — 90870 ELECTROCONVULSIVE THERAPY: CPT | Performed by: PSYCHIATRY & NEUROLOGY

## 2025-03-03 PROCEDURE — 97150 GROUP THERAPEUTIC PROCEDURES: CPT | Mod: GO

## 2025-03-03 PROCEDURE — 93306 TTE W/DOPPLER COMPLETE: CPT | Mod: 26 | Performed by: STUDENT IN AN ORGANIZED HEALTH CARE EDUCATION/TRAINING PROGRAM

## 2025-03-03 PROCEDURE — 36415 COLL VENOUS BLD VENIPUNCTURE: CPT

## 2025-03-03 PROCEDURE — 93306 TTE W/DOPPLER COMPLETE: CPT

## 2025-03-03 PROCEDURE — 83735 ASSAY OF MAGNESIUM: CPT

## 2025-03-03 PROCEDURE — 124N000002 HC R&B MH UMMC

## 2025-03-03 PROCEDURE — 250N000013 HC RX MED GY IP 250 OP 250 PS 637

## 2025-03-03 PROCEDURE — 258N000003 HC RX IP 258 OP 636: Performed by: ANESTHESIOLOGY

## 2025-03-03 PROCEDURE — 250N000009 HC RX 250: Performed by: ANESTHESIOLOGY

## 2025-03-03 PROCEDURE — 90870 ELECTROCONVULSIVE THERAPY: CPT

## 2025-03-03 RX ORDER — ONDANSETRON 2 MG/ML
4 INJECTION INTRAMUSCULAR; INTRAVENOUS EVERY 30 MIN PRN
Status: CANCELLED | OUTPATIENT
Start: 2025-03-03

## 2025-03-03 RX ORDER — FENTANYL CITRATE 50 UG/ML
50 INJECTION, SOLUTION INTRAMUSCULAR; INTRAVENOUS EVERY 5 MIN PRN
Status: CANCELLED | OUTPATIENT
Start: 2025-03-03

## 2025-03-03 RX ORDER — HYDROMORPHONE HCL IN WATER/PF 6 MG/30 ML
0.4 PATIENT CONTROLLED ANALGESIA SYRINGE INTRAVENOUS EVERY 5 MIN PRN
Status: CANCELLED | OUTPATIENT
Start: 2025-03-03

## 2025-03-03 RX ORDER — SODIUM CHLORIDE, SODIUM LACTATE, POTASSIUM CHLORIDE, CALCIUM CHLORIDE 600; 310; 30; 20 MG/100ML; MG/100ML; MG/100ML; MG/100ML
INJECTION, SOLUTION INTRAVENOUS CONTINUOUS
Status: CANCELLED | OUTPATIENT
Start: 2025-03-03

## 2025-03-03 RX ORDER — HYDROMORPHONE HCL IN WATER/PF 6 MG/30 ML
0.2 PATIENT CONTROLLED ANALGESIA SYRINGE INTRAVENOUS EVERY 5 MIN PRN
Status: CANCELLED | OUTPATIENT
Start: 2025-03-03

## 2025-03-03 RX ORDER — ONDANSETRON 4 MG/1
4 TABLET, ORALLY DISINTEGRATING ORAL EVERY 30 MIN PRN
Status: CANCELLED | OUTPATIENT
Start: 2025-03-03

## 2025-03-03 RX ORDER — FENTANYL CITRATE 50 UG/ML
25 INJECTION, SOLUTION INTRAMUSCULAR; INTRAVENOUS EVERY 5 MIN PRN
Status: CANCELLED | OUTPATIENT
Start: 2025-03-03

## 2025-03-03 RX ORDER — DEXAMETHASONE SODIUM PHOSPHATE 4 MG/ML
4 INJECTION, SOLUTION INTRA-ARTICULAR; INTRALESIONAL; INTRAMUSCULAR; INTRAVENOUS; SOFT TISSUE
Status: CANCELLED | OUTPATIENT
Start: 2025-03-03

## 2025-03-03 RX ORDER — NALOXONE HYDROCHLORIDE 0.4 MG/ML
0.1 INJECTION, SOLUTION INTRAMUSCULAR; INTRAVENOUS; SUBCUTANEOUS
Status: CANCELLED | OUTPATIENT
Start: 2025-03-03

## 2025-03-03 RX ORDER — FLUMAZENIL 0.1 MG/ML
0.3 INJECTION, SOLUTION INTRAVENOUS ONCE
Status: DISCONTINUED | OUTPATIENT
Start: 2025-03-03 | End: 2025-03-03

## 2025-03-03 RX ORDER — METHOHEXITAL IN WATER/PF 100MG/10ML
SYRINGE (ML) INTRAVENOUS PRN
Status: DISCONTINUED | OUTPATIENT
Start: 2025-03-03 | End: 2025-03-03

## 2025-03-03 RX ADMIN — SUCCINYLCHOLINE CHLORIDE 70 MG: 20 INJECTION, SOLUTION INTRAMUSCULAR; INTRAVENOUS; PARENTERAL at 09:39

## 2025-03-03 RX ADMIN — HALOPERIDOL 5 MG: 5 TABLET ORAL at 10:52

## 2025-03-03 RX ADMIN — Medication 500 ML: at 10:53

## 2025-03-03 RX ADMIN — CHLORPROMAZINE HYDROCHLORIDE 300 MG: 100 TABLET, FILM COATED ORAL at 20:22

## 2025-03-03 RX ADMIN — Medication 5 MG: at 20:22

## 2025-03-03 RX ADMIN — DOCUSATE SODIUM 100 MG: 100 CAPSULE, LIQUID FILLED ORAL at 10:51

## 2025-03-03 RX ADMIN — Medication 100 MG: at 09:39

## 2025-03-03 RX ADMIN — OXCARBAZEPINE 150 MG: 150 TABLET, FILM COATED ORAL at 17:13

## 2025-03-03 RX ADMIN — Medication 500 ML: at 17:13

## 2025-03-03 RX ADMIN — HALOPERIDOL 5 MG: 5 TABLET ORAL at 20:22

## 2025-03-03 RX ADMIN — OXCARBAZEPINE 150 MG: 150 TABLET, FILM COATED ORAL at 10:52

## 2025-03-03 RX ADMIN — MAGNESIUM OXIDE TAB 400 MG (241.3 MG ELEMENTAL MG) 400 MG: 400 (241.3 MG) TAB at 10:52

## 2025-03-03 RX ADMIN — Medication 1 TABLET: at 10:51

## 2025-03-03 RX ADMIN — LEVOTHYROXINE SODIUM 100 MCG: 0.05 TABLET ORAL at 10:51

## 2025-03-03 RX ADMIN — DEXMEDETOMIDINE HYDROCHLORIDE 20 MCG: 100 INJECTION, SOLUTION INTRAVENOUS at 09:39

## 2025-03-03 RX ADMIN — POLYETHYLENE GLYCOL 3350 17 G: 17 POWDER, FOR SOLUTION ORAL at 10:52

## 2025-03-03 RX ADMIN — LORAZEPAM 1.5 MG: 1 TABLET ORAL at 20:22

## 2025-03-03 RX ADMIN — LORAZEPAM 1.5 MG: 1 TABLET ORAL at 10:52

## 2025-03-03 RX ADMIN — SERTRALINE HYDROCHLORIDE 25 MG: 25 TABLET ORAL at 10:52

## 2025-03-03 ASSESSMENT — ACTIVITIES OF DAILY LIVING (ADL)
ADLS_ACUITY_SCORE: 52
DRESS: STREET CLOTHES;INDEPENDENT
ADLS_ACUITY_SCORE: 52
ORAL_HYGIENE: INDEPENDENT
HYGIENE/GROOMING: INDEPENDENT
ADLS_ACUITY_SCORE: 52
LAUNDRY: UNABLE TO COMPLETE
ADLS_ACUITY_SCORE: 52

## 2025-03-03 NOTE — PLAN OF CARE
03/03/25 1320   Individualization/Patient Specific Goals   Patient Personal Strengths spiritual/Confucianism support;community support;family/social support   Patient Vulnerabilities history of unsuccessful treatment   Anxieties, Fears or Concerns Pt appears to respond to internal stimuli and intermittently experiences AH   Individualized Care Needs Pt requires Mauritanian    Patient/Family-Specific Goals (Include Timeframe) Pt's family is involved   Interprofessional Rounds   Summary Pt transferred from [facility]. Pt was initially admitted for behavioral concerns and psychotic symptoms. Pt is committed. Pt had mata/price puente hearing on 1/17 with the hopes of treating pt with ECT. Mata and Hoffmann Puente orders were granted on 1/31. Pt started ECT on 2/14. Pt reports that ECT is helping her symptoms   Participants CTC;nursing;OT;psychiatrist   Behavioral Team Discussion   Participants Dr. Albania Mota MD; Maria E Ty [facility], CTC; Gabriella Reagan RN; Justa Carmichael OT   Progress Pt requires Mauritanian . Pt is medication compliant and eats well. Comes out of her room often but does not engage with other patients, but will engage with staff when they initiate. Pt continues with ECT. Pt reports improvement in symptoms from ECT. However, pt will intermittently endorse anixety, depression, and AH   Anticipated length of stay 2-3 weeks   Continued Stay Criteria/Rationale Medication management per psychiatry, stabilization of symptoms, aftercare planning, coordination with outside supports, ECT   Medical/Physical See H&P and provider notes   Precautions See below   Plan Medication management per psychiatry, stabilization of symptoms, aftercare planning, coordination with outside supports, ECT   Rationale for change in precautions or plan No change   Safety Plan Per unit protocol   Anticipated Discharge Disposition home with family;assisted living     PRECAUTIONS AND SAFETY    Behavioral Orders    Procedures    Cheeking Precautions (behavioral units)     Patient Observed swallowing PO medications; Patient asked to drink water after swallowing medication; Patient in Staff line of sight for 15 minutes after medication given; Mouth checks after PO administration (patient asked to open mouth and stick out their tongue).    Code 1 - Restrict to Unit    Code 2     Ultrasound    Code 2 - 1:1 Staff Supervision     For ECT only    Electroconvulsive therapy     Series of up to 12 treatments. Begin Date: tbd     Treating Psychiatrist providing ECT:  Stacey     Notified on:  2/11    Electroconvulsive therapy     Series of up to 12 treatments. Begin Date: TBD - when Hoffmann petition amended      Treating Psychiatrist providing ECT:  Stacey      Notified on:  2/11    Elopement precautions    Fall precautions    Fall precautions    Hoffmann Plascencia    Routine Programming     As clinically indicated    Status 15     Every 15 minutes.    Status Individual Observation     Patient SIO status reviewed with team/RN.  Please also refer to RN/team documentation for add'l detail.    -SIO staff to monitor following which have contributed to patient being on SIO:  ON ECT MO < WE < FRI FROM  7AM TO GOING FOR ECT   -Possible interventions SIO staff could use to support patient's treatment progress:  Short term to prevent ingestion   -When following observed, team will review discontinuation of SIO:  When pt goes to ECT     Order Specific Question:   CONTINUOUS 24 hours / day     Answer:   5 feet     Order Specific Question:   Indications for SIO     Answer:   Self-injury risk       Safety  Safety WDL: WDL  [facility]  Observed Behavior: calm  Observed Behavior (Comment): Calm  Airway Safety Measures: other (see comments)  Safety Measures: safety rounds completed  Diversional Activity: television  De-Escalation Techniques: 1:1 observation initiated  Suicidality: Status 15, Minimal furniture in  room  Assault: status 15  Elopement Interventions: status 15, status continuous sight, room away from unit doors  Additional Documentation:  (Fall)

## 2025-03-03 NOTE — PROGRESS NOTES
Patients VSS, A/O, IV kept in place per unit request, meets phase 2 criteria and is able to move to discharge at this time. Report called to 3B RN. Patient transported back to  via wheelchair with staff, security and .

## 2025-03-03 NOTE — ANESTHESIA POSTPROCEDURE EVALUATION
Patient: Angela Basurto    Procedure: * No procedures listed *       Anesthesia Type:  General    Note:  Disposition: Outpatient   Postop Pain Control: Uneventful            Sign Out: Well controlled pain   PONV: No   Neuro/Psych: Uneventful            Sign Out: Acceptable/Baseline neuro status   Airway/Respiratory: Uneventful            Sign Out: Acceptable/Baseline resp. status   CV/Hemodynamics: Uneventful            Sign Out: Acceptable CV status; No obvious hypovolemia; No obvious fluid overload   Other NRE:    DID A NON-ROUTINE EVENT OCCUR?            Last vitals:  Vitals:    03/02/25 1613 03/03/25 0640 03/03/25 0943   BP: 111/70 118/71 (!) 143/121   Pulse: 72 80 92   Resp: 16 16 25   Temp: 36  C (96.8  F) 36.4  C (97.5  F) 36.4  C (97.6  F)   SpO2: 100% 96% 96%       Electronically Signed By: Yulissa Garcia MD  March 3, 2025  9:57 AM

## 2025-03-03 NOTE — PLAN OF CARE
Problem: Adult Behavioral Health Plan of Care  Goal: Optimized Coping Skills in Response to Life Stressors  Outcome: Progressing   Goal Outcome Evaluation:  Pt is calm and corporative . ECT want well today . Pt is medication complaint and ate well for both meal . Pt denied all MH SX

## 2025-03-03 NOTE — PLAN OF CARE
Team Note Due:  Monday    Assessment/Intervention/Current Symtoms and Care Coordination:  Chart review and met with team, discussed pt progress, symptomology, and response to treatment.  Discussed the discharge plan and any potential impediments to discharge. Pt under carl bañuelos and catherine puente. Pt requires a Zimbabwean . Per RN, pt endorsed low anxiety and depression. Staff report that pt appears calmer and clearer in conversation. Pt had ECT #7 today.     Writer received email from [facility] supervisor, Rani [email or web address]) who requested 60/90 report be completed.    Writer sent completed 60/90 [facility] email ([email or web address]).     Discharge Plan or Goal:  Pending stabilization and safe disposition planning; considerations include:  TBD. Per different reports from different family members, pt has an assisted living apartment but was staying in the family home PTA. It is unclear at this time where pt will return at discharge      Barriers to Discharge:  Patient requires further psychiatric stabilization due to current symptomology, medication management with changes subject to provider, coordination with outside supports, and aftercare planning. Pt started ECT on 2/14     Referral Status:  [place] application was submitted on 1/29. Received by [facility]. [place] cannot process application without [facility] closing their application. Per financial counselor, [facility] reports they did close their application and [facility] reports that [facility] has not closed it yet. Voicemail out to supervisor to clarify      Legal Status:  Committed MI with ITP and Catherine Puente  [facility]  [id number]   ITP includes: Risperdal, Thorazine, Haldol, and Zyprexa  Expires: 06/10/2025    Contacts (include DENI status):  Mya - daughter (DENI)  P: [phone]    Golden - son (DENI)  P: [phone]     Mohamed - son (DENI)  P: [phone]      Rani - commitment CM supervisor through [facility] (DENI per commitment)  P: [phone]  E: [email or web address]    Chinmay Gould - commitment CM through [facility] (DENI per commitment)  P: [phone]  E: [email or web address]  F: [phone]  Note: Chinmay is off on Friday's     Upcoming Meetings and Dates/Important Information and next steps:  CTC to coordinate with pt, outside supports, and treatment team regarding discharge planning   Pt continues with ECT

## 2025-03-03 NOTE — ANESTHESIA PREPROCEDURE EVALUATION
Anesthesia Pre-Procedure Evaluation    Patient: Angela Basurto   MRN: [id number] : 1965        Procedure : * No procedures listed *          No past medical history on file.   No past surgical history on file.   Allergies   Allergen Reactions    Pork-Derived Products       Social History     Tobacco Use    Smoking status: Not on file    Smokeless tobacco: Not on file   Substance Use Topics    Alcohol use: Not on file      Wt Readings from Last 1 Encounters:   25 101.5 kg (223 lb 12.3 oz)        Anesthesia Evaluation   Pt has had prior anesthetic.         ROS/MED HX  ENT/Pulmonary:  - neg pulmonary ROS     Neurologic:  - neg neurologic ROS     Cardiovascular:       METS/Exercise Tolerance:     Hematologic:       Musculoskeletal:       GI/Hepatic:       Renal/Genitourinary:       Endo:     (+)               Obesity,       Psychiatric/Substance Use:     (+) psychiatric history anxiety, depression, schizophrenia and other (comment) (Catatonia, psychosis, SI)       Infectious Disease:       Malignancy:       Other:            Physical Exam    Airway   unable to assess     Mallampati: II       Respiratory Devices and Support         Dental    unable to assess        Cardiovascular    unable to assess         Pulmonary    Unable to assess               OUTSIDE LABS:  CBC:   Lab Results   Component Value Date    WBC 6.4 2025    WBC 4.6 2025    HGB 11.2 (L) 2025    HGB 10.2 (L) 2025    HCT 35.0 2025    HCT 31.8 (L) 2025     2025    PLT  2025      Comment:      Platelet count unavailable - platelets are clumped.     BMP:   Lab Results   Component Value Date     2025     2025    POTASSIUM 4.1 2025    POTASSIUM 4.3 2025    CHLORIDE 101 2025    CHLORIDE 100 2025    CO2 24 2025    CO2 23 2025    BUN 13.6 2025    BUN 16.3 2025    CR 0.72 2025    CR 0.82 2025     (H)  "03/02/2025    GLC 98 02/03/2025     COAGS: No results found for: \"PTT\", \"INR\", \"FIBR\"  POC: No results found for: \"BGM\", \"HCG\", \"HCGS\"  HEPATIC:   Lab Results   Component Value Date    ALBUMIN 3.7 01/27/2025    PROTTOTAL 6.9 01/27/2025    ALT 15 01/27/2025    AST 15 01/27/2025    ALKPHOS 69 01/27/2025    BILITOTAL 0.2 01/27/2025    ERICKA 55 (H) 01/27/2025     OTHER:   Lab Results   Component Value Date    A1C 5.6 11/27/2024    FERMIN 9.0 03/02/2025    PHOS 3.8 01/07/2025    MAG 1.5 (L) 03/02/2025    TSH 0.05 (L) 02/03/2025    T4 1.12 02/03/2025       Anesthesia Plan    ASA Status:  2    NPO Status:  NPO Appropriate    Anesthesia Type: General.     - Airway: Mask Only   Induction: Intravenous.           Consents            Postoperative Care            Comments:    Other Comments: Plan: IV induction, hyperventilation with mask and recovery.            Yulissa Garcia MD    I have reviewed the pertinent notes and labs in the chart from the past 30 days and (re)examined the patient.  Any updates or changes from those notes are reflected in this note.    Clinically Significant Risk Factors             # Hypomagnesemia: Lowest Mg = 1.5 mg/dL in last 2 days, will replace as needed   # Hypoalbuminemia: Lowest albumin = 3.4 g/dL at 1/7/2025  7:45 AM, will monitor as appropriate                    # Financial/Environmental Concerns:             "

## 2025-03-03 NOTE — ANESTHESIA CARE TRANSFER NOTE
Patient: Angela Basurto    Procedure: * No procedures listed *       Diagnosis: * No pre-op diagnosis entered *  Diagnosis Additional Information: No value filed.    Anesthesia Type:   General     Note:    Oropharynx: spontaneously breathing  Level of Consciousness: drowsy  Oxygen Supplementation: room air    Independent Airway: airway patency satisfactory and stable  Dentition: dentition unchanged  Vital Signs Stable: post-procedure vital signs reviewed and stable  Report to RN Given: handoff report given  Patient transferred to: PACU    Handoff Report: Identifed the Patient, Identified the Reponsible Provider, Reviewed the pertinent medical history, Discussed the surgical course, Reviewed Intra-OP anesthesia mangement and issues during anesthesia, Set expectations for post-procedure period and Allowed opportunity for questions and acknowledgement of understanding      Vitals:  Vitals Value Taken Time   /60 03/03/25 0953   Temp 36.4  C (97.6  F) 03/03/25 0953   Pulse 102 03/03/25 0953   Resp 14 03/03/25 0953   SpO2 96 % 03/03/25 0953       Electronically Signed By: Yulissa Garcia MD  March 3, 2025  9:58 AM

## 2025-03-03 NOTE — PLAN OF CARE
BEH IP Unit Acuity Rating Score (UARS)  Patient is given one point for every criteria they meet.    CRITERIA SCORING   On a 72 hour hold, court hold, committed, stay of commitment, or revocation. 1    Patient LOS on BEH unit exceeds 20 days. 1  LOS: 59   Patient under guardianship, 55+, otherwise medically complex, or under age 11. 1   Suicide ideation without relief of precipitating factors. 0   Current plan for suicide. 0   Current plan for homicide. 0   Imminent risk or actual attempt to seriously harm another without relief of factors precipitating the attempt. 0   Severe dysfunction in daily living (ex: complete neglect for self care, extreme disruption in vegetative function, extreme deterioration in social interactions). 1   Recent (last 7 days) or current physical aggression in the ED or on unit. 0   Restraints or seclusion episode in past 72 hours. 0   Recent (last 7 days) or current verbal aggression, agitation, yelling, etc., while in the ED or unit. 0   Active psychosis. 1   Need for constant or near constant redirection (from leaving, from others, etc).  0   Intrusive or disruptive behaviors. 0   Patient requires 3 or more hours of individualized nursing care per 8-hour shift (i.e. for ADLs, meds, therapeutic interventions). 0   TOTAL 5

## 2025-03-03 NOTE — PLAN OF CARE
Problem: Sleep Disturbance  Goal: Adequate Sleep/Rest  Outcome: Progressing   Goal Outcome Evaluation:       Patient is sleeping soundly in a side-lying position. She is using a medical bed to facilitate her mobility. No complaints made. She is scheduled for ECT today at 9:40 AM. NPO maintained. She slept the whole night for 8.5 hours.
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
## 2025-03-03 NOTE — PLAN OF CARE
Rehab Group    Start time: 1015  End time: 1200  Patient time total: 33 minutes    attended partial group    #7 attended   Group Type: OT Clinic   Group Topic Covered: cognitive activities, coping skills, healthy leisure time, and problem solving   Group Session Detail:OT: Education on healthy activity engagement and creative hands-on endeavor (OT clinic) to increase concentration, focus, attention to task/detail, decision making, problem solving, frustration tolerance, task follow through, coping with stress, healthy leisure engagement, creative expression, and social engagement    Patient Response/Contribution:  cooperative with task and worked intermittently   Patient Detail: pt arrived after start of group. Pt pleasant on approach, pt smiled and made good eye contact. Pt chose to engage in familiar creative task. Pt was ind with decision making and task. Pt worked intermittently on coloring task. Pt worked in a neat manner while engaged. Pt thanked therapist prior to leaving group space      [id number] OT Group (2 or more in attendance)      Patient Active Problem List   Diagnosis    Mary (H)    Psychosis (H)    Schizoaffective disorder, bipolar type (H)    PTSD (post-traumatic stress disorder)    Catatonia
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
## 2025-03-04 PROCEDURE — 250N000013 HC RX MED GY IP 250 OP 250 PS 637

## 2025-03-04 PROCEDURE — 250N000013 HC RX MED GY IP 250 OP 250 PS 637: Performed by: PSYCHIATRY & NEUROLOGY

## 2025-03-04 PROCEDURE — 124N000002 HC R&B MH UMMC

## 2025-03-04 PROCEDURE — 99232 SBSQ HOSP IP/OBS MODERATE 35: CPT | Performed by: PSYCHIATRY & NEUROLOGY

## 2025-03-04 PROCEDURE — 250N000013 HC RX MED GY IP 250 OP 250 PS 637: Performed by: CLINICAL NURSE SPECIALIST

## 2025-03-04 RX ORDER — HALOPERIDOL 5 MG/1
5 TABLET ORAL EVERY MORNING
Status: DISCONTINUED | OUTPATIENT
Start: 2025-03-05 | End: 2025-03-11

## 2025-03-04 RX ORDER — HALOPERIDOL 5 MG/ML
5 INJECTION INTRAMUSCULAR EVERY MORNING
Status: DISCONTINUED | OUTPATIENT
Start: 2025-03-05 | End: 2025-03-11

## 2025-03-04 RX ADMIN — Medication 5 MG: at 20:17

## 2025-03-04 RX ADMIN — OXCARBAZEPINE 150 MG: 150 TABLET, FILM COATED ORAL at 08:46

## 2025-03-04 RX ADMIN — Medication 500 ML: at 18:44

## 2025-03-04 RX ADMIN — LEVOTHYROXINE SODIUM 100 MCG: 0.05 TABLET ORAL at 08:46

## 2025-03-04 RX ADMIN — CHLORPROMAZINE HYDROCHLORIDE 300 MG: 100 TABLET, FILM COATED ORAL at 20:17

## 2025-03-04 RX ADMIN — MAGNESIUM OXIDE TAB 400 MG (241.3 MG ELEMENTAL MG) 400 MG: 400 (241.3 MG) TAB at 08:46

## 2025-03-04 RX ADMIN — POLYETHYLENE GLYCOL 3350 17 G: 17 POWDER, FOR SOLUTION ORAL at 08:46

## 2025-03-04 RX ADMIN — SERTRALINE HYDROCHLORIDE 25 MG: 25 TABLET ORAL at 08:46

## 2025-03-04 RX ADMIN — Medication 500 ML: at 08:45

## 2025-03-04 RX ADMIN — DOCUSATE SODIUM 100 MG: 100 CAPSULE, LIQUID FILLED ORAL at 08:46

## 2025-03-04 RX ADMIN — HALOPERIDOL 5 MG: 5 TABLET ORAL at 08:46

## 2025-03-04 RX ADMIN — Medication 1 TABLET: at 08:46

## 2025-03-04 ASSESSMENT — ACTIVITIES OF DAILY LIVING (ADL)
ADLS_ACUITY_SCORE: 52
LAUNDRY: UNABLE TO COMPLETE
ADLS_ACUITY_SCORE: 52
ADLS_ACUITY_SCORE: 52
HYGIENE/GROOMING: INDEPENDENT
ADLS_ACUITY_SCORE: 52
ORAL_HYGIENE: INDEPENDENT
ADLS_ACUITY_SCORE: 52
DRESS: INDEPENDENT;STREET CLOTHES

## 2025-03-04 NOTE — PLAN OF CARE
Problem: Anxiety Signs/Symptoms  Goal: Improved Sleep (Anxiety Signs/Symptoms)  Outcome: Progressing   Goal Outcome Evaluation:    Patient was asleep at the start of the shift. No behavioral issues noted this shift. Slept for 9.5 hours.

## 2025-03-04 NOTE — PROGRESS NOTES
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 and 17 g prn   for constipation   Senna docusate 1 tablet bid prn constipation  Docusate 100 mg daily for constipation    Lisinopril 5 mg daily for HTN  Synthroid 100 mcg qam for hypothyroidism   We discussed side effects, benefits and alternative treatments and patient agrees .  Fall precautions  Full code  Legal:Commitment with [facility] neuroleptic order and Shun Plascencia ECT order    will collect collateral information and make outpatient referrals, needs to contact court regarding court order that ECT should be done at [facility], instead of this hospital.  Staff to provide emotional support and redirect as needed  Patient encouraged to attend groups  Lab results: Reviewed personally, EKG for qt qtc on Haldol and Thorazine completed  Consultation: medicine consult  for ECT clearance and ECT consult    Risk Assessment: commitment for safety , stabilization and medication management  due to noncompliance with tx     Coordination of Care:   Patient seen, medical record reviewed, care coordinated with the  team.    This document is created with the help of Dragon dictation system.  All grammatical/typing errors or context distortion are unintentional and inherent to software.    Albania Mota MD        Re-Certification I certify that the inpatient psychiatric facility services furnished since the previous certification were, and continue to be, medically necessary for, either, treatment which could reasonably be expected to improve the patient s condition or diagnostic study and that the hospital records indicate that the services furnished were, either, intensive treatment services, admission and related services necessary for diagnostic study, or equivalent services.     I certify that the patient continues to need, on a daily basis, active treatment furnished directly by or requiring the supervision of inpatient psychiatric facility personnel.   I estimate TBD days of hospitalization is necessary for proper treatment of the patient. My plans for post-hospital care for this patient are : Medications, appointments     [place]ia Mota MD

## 2025-03-04 NOTE — PLAN OF CARE
BEH IP Unit Acuity Rating Score (UARS)  Patient is given one point for every criteria they meet.    CRITERIA SCORING   On a 72 hour hold, court hold, committed, stay of commitment, or revocation. 1    Patient LOS on BEH unit exceeds 20 days. 1  LOS: 60   Patient under guardianship, 55+, otherwise medically complex, or under age 11. 1   Suicide ideation without relief of precipitating factors. 0   Current plan for suicide. 0   Current plan for homicide. 0   Imminent risk or actual attempt to seriously harm another without relief of factors precipitating the attempt. 0   Severe dysfunction in daily living (ex: complete neglect for self care, extreme disruption in vegetative function, extreme deterioration in social interactions). 0   Recent (last 7 days) or current physical aggression in the ED or on unit. 0   Restraints or seclusion episode in past 72 hours. 0   Recent (last 7 days) or current verbal aggression, agitation, yelling, etc., while in the ED or unit. 0   Active psychosis. 0   Need for constant or near constant redirection (from leaving, from others, etc).  0   Intrusive or disruptive behaviors. 0   Patient requires 3 or more hours of individualized nursing care per 8-hour shift (i.e. for ADLs, meds, therapeutic interventions). 0   TOTAL 3

## 2025-03-04 NOTE — PLAN OF CARE
Problem: Depressive Signs/Symptoms  Goal: Optimized Energy Level (Depressive Signs/Symptoms)  Outcome: Progressing  Intervention: Optimize Energy Level  Recent Flowsheet Documentation  Taken 3/3/2025 1800 by Nivia Arndt RN  Patient Performed Hygiene: dressed  Diversional Activity: television  Activity (Behavioral Health):   up ad ascencion   activity encouraged   Goal Outcome Evaluation:    Plan of Care Reviewed With: patient        Patient's mood is calm with a bright affect. She was visited by her family and it went well. Patient was visible in the milieu throughout the shift watching TV. She also attended group activities this shift. She is isolative and withdrawn. Her speech is clear and able to articulate her needs. Patient denied suicidal thoughts. Denied SIB/Hallucinations of any form. She denied mental health symptoms. She is independent on adl's. Her appetite is excellent. She had ECT.this morning and it went well as she claimed. VSS.

## 2025-03-04 NOTE — PROGRESS NOTES
Brief Medicine Progress Note    Follow up of Echocardiogram is overall normal with no concerning findings. Vitals have been stable over the last 24 hours. No issues surrounding ECT yesterday.      Jammie Texieira PA-C, [facility] [place], [place]  Hospitalist Service   [facility]  Securely message with Vocera   See AMCOM signed in provider for up to date coverage information

## 2025-03-04 NOTE — PLAN OF CARE
Initial meeting note:    Therapist introduced self to patient and discussed psychotherapy service available to patient.     Pt response: Pt expressed interest in meeting with therapist    Plan: Made plan to meet with pt again; began identifying goals     Pt shared she is feeling better, more like herself, and noted that ECT has been helpful. Pt was smiling while she was talking with writer. Pt did not have anything more she wanted to discuss.

## 2025-03-04 NOTE — PLAN OF CARE
Rehab Group    Start time: 13:00  End time: 13:50  Patient time total: 40 minutes    attended full group    #1 attended   Group Type: occupational therapy   Group Topic Covered: cognitive activities, coping skills, healthy leisure time, and social skills     Group Session Detail:  Patient engaged in a leisure based activity (Garbage) in order to: increase concentration, attend to task, promote memory recall, manage symptoms, and maximize social and cognitive wellness.      Patient Response/Contribution:  listened actively, actively engaged, and required prompts or assistance to participate     Patient Detail:  Patient was the sole participant in group this date. Patient was willing to learn a novel leisure activity with writer. Patient listened actively to the instructions of task; however, had a poor return demonstration of understanding. Patient required constant verbal cueing and demonstration as the game progressed; no new learning observed. Communication may have been a barrier and contributor to the lack of understanding and new learning. Patient appeared more attentive, engaged, and bright as compared to previous encounters with writer. Patient initiated and responded appropriately to conversational prompts. Patient was pleasant in brief interactions and thanked writer for group. No charge due to minimal group participants.       No Charge-not enough group participants to bill      Patient Active Problem List   Diagnosis    Mary (H)    Psychosis (H)    Schizoaffective disorder, bipolar type (H)    PTSD (post-traumatic stress disorder)    Catatonia

## 2025-03-04 NOTE — PLAN OF CARE
Team Note Due:  Monday    Assessment/Intervention/Current Symtoms and Care Coordination:  Chart review and met with team, discussed pt progress, symptomology, and response to treatment.  Discussed the discharge plan and any potential impediments to discharge. Pt under commitment, carl and catherine puente. Pt requires a Icelandic . Staff report that pt appears calmer and clearer in conversation. Pt had ECT #7 yesterday.     Writer received email from edda KAUFMAN, Chinmay ([email or web address]) reporting that secure email cannot be opened and requested 60/90 report be sent via fax.    Writer sent 60/90 report to Chinmay via fax (F: [phone]).    Discharge Plan or Goal:  Pending stabilization and safe disposition planning; considerations include:  TBD. Per different reports from different family members, pt has an assisted living apartment but was staying in the family home PTA. It is unclear at this time where pt will return at discharge      Barriers to Discharge:  Patient requires further psychiatric stabilization due to current symptomology, medication management with changes subject to provider, coordination with outside supports, and aftercare planning. Pt started ECT on 2/14     Referral Status:  [place] application was submitted on 1/29. Received by [facility]. [place] cannot process application without [facility] closing their application. Per financial counselor, [facility] reports they did close their application and [facility] reports that [facility] has not closed it yet. Voicemail out to supervisor to clarify      Legal Status:  Committed MI with ITP and Catherine Puente  [facility]  [id number]   ITP includes: Risperdal, Thorazine, Haldol, and Zyprexa  Expires: 06/10/2025    Contacts (include DENI status):  Mya - daughter (DENI)  P: [phone]    Golden - son (DENI)  P: [phone]     Mohamed - son (DENI)  P: [phone]     Rani - commitment  supervisor through  [facility] (DENI per commitment)  P: [phone]  E: [email or web address]    Chinmay Gould - commitment CM through [facility] (DENI per commitment)  P: [phone]  E: [email or web address]  F: [phone]  Note: Chinmay is off on Friday's     Upcoming Meetings and Dates/Important Information and next steps:  CTC to coordinate with pt, outside supports, and treatment team regarding discharge planning   Pt continues with ECT

## 2025-03-04 NOTE — PLAN OF CARE
"  Problem: Depressive Signs/Symptoms  Goal: Optimized Energy Level (Depressive Signs/Symptoms)  Outcome: Progressing  Intervention: Optimize Energy Level  Recent Flowsheet Documentation  Taken 3/4/2025 1326 by Brigida Flowers RN  Patient Performed Hygiene: dressed  Diversional Activity: television  Activity (Behavioral Health):   up ad ascencion   activity encouraged     Problem: Psychotic Signs/Symptoms  Goal: Improved Behavioral Control (Psychotic Signs/Symptoms)  Outcome: Progressing   Goal Outcome Evaluation:    Plan of Care Reviewed With: patient      Writer met with patient and interpretor. Pt denies depression, anxiety, SI/SIB/wishes to be dead. Pt described her mood as \"well\". Pt brightens during interactions and engages in conversation when approached. Pt has appropriate eye contact.   Pt oriented to Tuesday, March, 2025, and a hospital in [place].   Pt has been eating well at meals and has been taking an adequate amounts of fluids- including pedialyte.     Pt has been visible in common area's at times and has been watching TV. Pt does not interact with peers and has declined to go to groups.     AM dose of ativan held as her blood pressure did not meet parameters. ( 87/49 standing).                   "

## 2025-03-05 ENCOUNTER — APPOINTMENT (OUTPATIENT)
Dept: BEHAVIORAL HEALTH | Facility: CLINIC | Age: 60
DRG: 885 | End: 2025-03-05
Attending: PSYCHIATRY & NEUROLOGY

## 2025-03-05 ENCOUNTER — ANESTHESIA (OUTPATIENT)
Dept: BEHAVIORAL HEALTH | Facility: CLINIC | Age: 60
DRG: 885 | End: 2025-03-05

## 2025-03-05 ENCOUNTER — ANESTHESIA EVENT (OUTPATIENT)
Dept: BEHAVIORAL HEALTH | Facility: CLINIC | Age: 60
DRG: 885 | End: 2025-03-05

## 2025-03-05 PROCEDURE — 99232 SBSQ HOSP IP/OBS MODERATE 35: CPT | Performed by: PSYCHIATRY & NEUROLOGY

## 2025-03-05 PROCEDURE — 250N000013 HC RX MED GY IP 250 OP 250 PS 637: Performed by: PSYCHIATRY & NEUROLOGY

## 2025-03-05 PROCEDURE — 90853 GROUP PSYCHOTHERAPY: CPT

## 2025-03-05 PROCEDURE — 250N000009 HC RX 250: Performed by: STUDENT IN AN ORGANIZED HEALTH CARE EDUCATION/TRAINING PROGRAM

## 2025-03-05 PROCEDURE — 370N000017 HC ANESTHESIA TECHNICAL FEE, PER MIN: Performed by: STUDENT IN AN ORGANIZED HEALTH CARE EDUCATION/TRAINING PROGRAM

## 2025-03-05 PROCEDURE — 124N000002 HC R&B MH UMMC

## 2025-03-05 PROCEDURE — 250N000013 HC RX MED GY IP 250 OP 250 PS 637

## 2025-03-05 PROCEDURE — 90870 ELECTROCONVULSIVE THERAPY: CPT | Performed by: PSYCHIATRY & NEUROLOGY

## 2025-03-05 PROCEDURE — 90870 ELECTROCONVULSIVE THERAPY: CPT

## 2025-03-05 PROCEDURE — 258N000003 HC RX IP 258 OP 636: Performed by: STUDENT IN AN ORGANIZED HEALTH CARE EDUCATION/TRAINING PROGRAM

## 2025-03-05 PROCEDURE — 250N000011 HC RX IP 250 OP 636: Performed by: STUDENT IN AN ORGANIZED HEALTH CARE EDUCATION/TRAINING PROGRAM

## 2025-03-05 RX ORDER — HYDROMORPHONE HCL IN WATER/PF 6 MG/30 ML
0.4 PATIENT CONTROLLED ANALGESIA SYRINGE INTRAVENOUS EVERY 5 MIN PRN
Status: DISCONTINUED | OUTPATIENT
Start: 2025-03-05 | End: 2025-03-05

## 2025-03-05 RX ORDER — DEXAMETHASONE SODIUM PHOSPHATE 4 MG/ML
4 INJECTION, SOLUTION INTRA-ARTICULAR; INTRALESIONAL; INTRAMUSCULAR; INTRAVENOUS; SOFT TISSUE
Status: DISCONTINUED | OUTPATIENT
Start: 2025-03-05 | End: 2025-03-05

## 2025-03-05 RX ORDER — FENTANYL CITRATE 50 UG/ML
50 INJECTION, SOLUTION INTRAMUSCULAR; INTRAVENOUS EVERY 5 MIN PRN
Status: DISCONTINUED | OUTPATIENT
Start: 2025-03-05 | End: 2025-03-05

## 2025-03-05 RX ORDER — NALOXONE HYDROCHLORIDE 0.4 MG/ML
0.1 INJECTION, SOLUTION INTRAMUSCULAR; INTRAVENOUS; SUBCUTANEOUS
Status: DISCONTINUED | OUTPATIENT
Start: 2025-03-05 | End: 2025-03-05

## 2025-03-05 RX ORDER — OXYCODONE HYDROCHLORIDE 5 MG/1
10 TABLET ORAL
Status: DISCONTINUED | OUTPATIENT
Start: 2025-03-05 | End: 2025-03-05

## 2025-03-05 RX ORDER — OXYCODONE HYDROCHLORIDE 5 MG/1
5 TABLET ORAL
Status: DISCONTINUED | OUTPATIENT
Start: 2025-03-05 | End: 2025-03-05

## 2025-03-05 RX ORDER — FENTANYL CITRATE 50 UG/ML
25 INJECTION, SOLUTION INTRAMUSCULAR; INTRAVENOUS EVERY 5 MIN PRN
Status: DISCONTINUED | OUTPATIENT
Start: 2025-03-05 | End: 2025-03-05

## 2025-03-05 RX ORDER — ONDANSETRON 2 MG/ML
4 INJECTION INTRAMUSCULAR; INTRAVENOUS EVERY 30 MIN PRN
Status: DISCONTINUED | OUTPATIENT
Start: 2025-03-05 | End: 2025-03-05

## 2025-03-05 RX ORDER — SODIUM CHLORIDE, SODIUM LACTATE, POTASSIUM CHLORIDE, CALCIUM CHLORIDE 600; 310; 30; 20 MG/100ML; MG/100ML; MG/100ML; MG/100ML
INJECTION, SOLUTION INTRAVENOUS CONTINUOUS
Status: DISCONTINUED | OUTPATIENT
Start: 2025-03-05 | End: 2025-03-05

## 2025-03-05 RX ORDER — ONDANSETRON 4 MG/1
4 TABLET, ORALLY DISINTEGRATING ORAL EVERY 30 MIN PRN
Status: DISCONTINUED | OUTPATIENT
Start: 2025-03-05 | End: 2025-03-05

## 2025-03-05 RX ORDER — FLUMAZENIL 0.1 MG/ML
0.3 INJECTION, SOLUTION INTRAVENOUS ONCE
Status: DISCONTINUED | OUTPATIENT
Start: 2025-03-05 | End: 2025-03-05

## 2025-03-05 RX ORDER — HYDROMORPHONE HCL IN WATER/PF 6 MG/30 ML
0.2 PATIENT CONTROLLED ANALGESIA SYRINGE INTRAVENOUS EVERY 5 MIN PRN
Status: DISCONTINUED | OUTPATIENT
Start: 2025-03-05 | End: 2025-03-05

## 2025-03-05 RX ORDER — METHOHEXITAL IN WATER/PF 100MG/10ML
SYRINGE (ML) INTRAVENOUS PRN
Status: DISCONTINUED | OUTPATIENT
Start: 2025-03-05 | End: 2025-03-05

## 2025-03-05 RX ORDER — NICARDIPINE HCL-0.9% SOD CHLOR 1 MG/10 ML
SYRINGE (ML) INTRAVENOUS PRN
Status: DISCONTINUED | OUTPATIENT
Start: 2025-03-05 | End: 2025-03-05

## 2025-03-05 RX ADMIN — HALOPERIDOL 5 MG: 5 TABLET ORAL at 11:41

## 2025-03-05 RX ADMIN — LEVOTHYROXINE SODIUM 100 MCG: 0.05 TABLET ORAL at 11:40

## 2025-03-05 RX ADMIN — Medication 5 MG: at 19:56

## 2025-03-05 RX ADMIN — SUCCINYLCHOLINE CHLORIDE 70 MG: 20 INJECTION, SOLUTION INTRAMUSCULAR; INTRAVENOUS; PARENTERAL at 10:44

## 2025-03-05 RX ADMIN — MAGNESIUM OXIDE TAB 400 MG (241.3 MG ELEMENTAL MG) 400 MG: 400 (241.3 MG) TAB at 11:41

## 2025-03-05 RX ADMIN — POLYETHYLENE GLYCOL 3350 17 G: 17 POWDER, FOR SOLUTION ORAL at 11:40

## 2025-03-05 RX ADMIN — LORAZEPAM 1.5 MG: 1 TABLET ORAL at 19:56

## 2025-03-05 RX ADMIN — Medication 500 ML: at 11:42

## 2025-03-05 RX ADMIN — CHLORPROMAZINE HYDROCHLORIDE 300 MG: 100 TABLET, FILM COATED ORAL at 19:56

## 2025-03-05 RX ADMIN — OXCARBAZEPINE 150 MG: 150 TABLET, FILM COATED ORAL at 17:07

## 2025-03-05 RX ADMIN — SERTRALINE HYDROCHLORIDE 25 MG: 25 TABLET ORAL at 11:41

## 2025-03-05 RX ADMIN — OXCARBAZEPINE 150 MG: 150 TABLET, FILM COATED ORAL at 11:41

## 2025-03-05 RX ADMIN — DEXMEDETOMIDINE HYDROCHLORIDE 20 MCG: 100 INJECTION, SOLUTION INTRAVENOUS at 10:48

## 2025-03-05 RX ADMIN — LORAZEPAM 1.5 MG: 1 TABLET ORAL at 11:41

## 2025-03-05 RX ADMIN — Medication 500 MCG: at 10:44

## 2025-03-05 RX ADMIN — Medication 100 MG: at 10:44

## 2025-03-05 RX ADMIN — Medication 1 TABLET: at 11:41

## 2025-03-05 ASSESSMENT — ACTIVITIES OF DAILY LIVING (ADL)
ADLS_ACUITY_SCORE: 52

## 2025-03-05 NOTE — PROGRESS NOTES
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 and patient agrees .  Fall precautions  Full code  Legal:Commitment with [facility] neuroleptic order and Shun Plascencia ECT order    will collect collateral information and make outpatient referrals, needs to contact court regarding court order that ECT should be done at [facility], instead of [facility]"" hospital.  Staff to provide emotional support and redirect as needed  Patient encouraged to attend groups  Lab results: Reviewed personally, EKG for qt qtc on Haldol and Thorazine completed  Consultation: medicine consult  for ECT clearance and ECT consult    Risk Assessment: commitment for safety , stabilization and medication management  due to noncompliance with tx     Coordination of Care:   Patient seen, medical record reviewed, care coordinated with the team.    This document is created with the help of Dragon dictation system.  All grammatical/typing errors or context distortion are unintentional and inherent to software.    Albania Mota MD         Re-Certification I certify that the inpatient psychiatric facility services furnished since the previous certification were, and continue to be, medically necessary for, either, treatment which could reasonably be expected to improve the patient s condition or diagnostic study and that the hospital records indicate that the services furnished were, either, intensive treatment services, admission and related services necessary for diagnostic study, or equivalent services.     I certify that the patient continues to need, on a daily basis, active treatment furnished directly by or requiring the supervision of inpatient psychiatric facility personnel.   I estimate TBD days of hospitalization is necessary for proper treatment of the patient. My plans for post-hospital care for this patient are : Medications, appointments     Albania Mota MD
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
## 2025-03-05 NOTE — PLAN OF CARE
"Problem: Psychotic Signs/Symptoms  Goal: Improved Behavioral Control (Psychotic Signs/Symptoms)  Outcome: Progressing   Goal Outcome Evaluation:    Plan of Care Reviewed With: patient    /72 (BP Location: Right arm, Patient Position: Sitting, Cuff Size: Adult Large)   Pulse 77   Temp 97.8  F (36.6  C) (Temporal)   Resp 16   Wt 100.3 kg (221 lb 1.9 oz)   SpO2 99%   BMI 32.65 kg/m     Pt was notably out in the milieu. She presented as calm with a bright affect on approach. She reported mood as \"fine\". She denied all mental health symptoms including anxiety, depression, SI/SIB/HI and A/V hallucinations. She contracted for safety.  Thoughts process is linear and speech is clear and coherent. Pt ate % off her dinner tray, fluids encouraged. She's medication compliant. Oxcarbazepine and Ativan medications are held tonight prior to ECT tomorrow morning. Pt is reminded of the procedure and the importance clear liquids compliance till 2 hrs before the procedure. Pt verbalized understanding. She attended evening group therapy. No behavior concerns.     "

## 2025-03-05 NOTE — ANESTHESIA PREPROCEDURE EVALUATION
Anesthesia Pre-Procedure Evaluation    Patient: Angela Basurto   MRN: [id number] : 1965        Procedure : * No procedures listed *          No past medical history on file.   No past surgical history on file.   Allergies   Allergen Reactions    Pork-Derived Products       Social History     Tobacco Use    Smoking status: Not on file    Smokeless tobacco: Not on file   Substance Use Topics    Alcohol use: Not on file      Wt Readings from Last 1 Encounters:   25 100.3 kg (221 lb 1.9 oz)        Anesthesia Evaluation   Pt has had prior anesthetic.         ROS/MED HX  ENT/Pulmonary:       Neurologic:       Cardiovascular:       METS/Exercise Tolerance:     Hematologic:       Musculoskeletal:       GI/Hepatic:       Renal/Genitourinary:       Endo:     (+)               Obesity,       Psychiatric/Substance Use:     (+) psychiatric history anxiety, depression, schizophrenia and other (comment) (Catatonia, psychosis, SI)       Infectious Disease:       Malignancy:       Other:            Physical Exam    Airway        Mallampati: II   TM distance: > 3 FB   Neck ROM: full   Mouth opening: > 3 cm    Respiratory Devices and Support         Dental       (+) Modest Abnormalities - crowns, retainers, 1 or 2 missing teeth      Cardiovascular   cardiovascular exam normal          Pulmonary   pulmonary exam normal                OUTSIDE LABS:  CBC:   Lab Results   Component Value Date    WBC 6.4 2025    WBC 4.6 2025    HGB 11.2 (L) 2025    HGB 10.2 (L) 2025    HCT 35.0 2025    HCT 31.8 (L) 2025     2025    PLT  2025      Comment:      Platelet count unavailable - platelets are clumped.     BMP:   Lab Results   Component Value Date     2025     2025    POTASSIUM 4.1 2025    POTASSIUM 4.3 2025    CHLORIDE 101 2025    CHLORIDE 100 2025    CO2 24 2025    CO2 23 2025    BUN 13.6 2025    BUN 16.3  "02/03/2025    CR 0.72 03/02/2025    CR 0.82 02/03/2025     (H) 03/02/2025    GLC 98 02/03/2025     COAGS: No results found for: \"PTT\", \"INR\", \"FIBR\"  POC: No results found for: \"BGM\", \"HCG\", \"HCGS\"  HEPATIC:   Lab Results   Component Value Date    ALBUMIN 3.7 01/27/2025    PROTTOTAL 6.9 01/27/2025    ALT 15 01/27/2025    AST 15 01/27/2025    ALKPHOS 69 01/27/2025    BILITOTAL 0.2 01/27/2025    ERICKA 55 (H) 01/27/2025     OTHER:   Lab Results   Component Value Date    A1C 5.6 11/27/2024    FERMIN 9.0 03/02/2025    PHOS 3.8 01/07/2025    MAG 1.8 03/03/2025    TSH 0.05 (L) 02/03/2025    T4 1.12 02/03/2025       Anesthesia Plan    ASA Status:  2    NPO Status:  NPO Appropriate    Anesthesia Type: General.     - Airway: Mask Only   Induction: Intravenous.           Consents            Postoperative Care            Comments:    Other Comments: Plan: IV induction, hyperventilation with mask and recovery.            Karely Sy MD    I have reviewed the pertinent notes and labs in the chart from the past 30 days and (re)examined the patient.  Any updates or changes from those notes are reflected in this note.    Clinically Significant Risk Factors               # Hypoalbuminemia: Lowest albumin = 3.4 g/dL at 1/7/2025  7:45 AM, will monitor as appropriate                    # Financial/Environmental Concerns:             "

## 2025-03-05 NOTE — PLAN OF CARE
Problem: Depressive Signs/Symptoms  Goal: Optimized Cognitive Function (Depressive Signs/Symptoms)  Outcome: Progressing     Problem: Psychotic Signs/Symptoms  Goal: Increased Participation and Engagement (Psychotic Signs/Symptoms)  Outcome: Progressing     Problem: Anxiety Signs/Symptoms  Goal: Optimized Cognitive Function (Anxiety Signs/Symptoms)  Outcome: Progressing      Patient up and visible on the unit most part of the shift. Patient affect is calm,presented  with full range affect on approach. Patient denies all psych symptoms. Denies pain. Patient is chloé for safety. Medication compliant. Adequate food and fluid intake. Family was here to visit, visit went well.  Will continue to monitor as needed.

## 2025-03-05 NOTE — ANESTHESIA POSTPROCEDURE EVALUATION
Patient: Angela Basurto    Procedure: * No procedures listed *       Anesthesia Type:  General    Note:  Disposition: Inpatient   Postop Pain Control: Uneventful            Sign Out: Well controlled pain   PONV: No   Neuro/Psych: Uneventful            Sign Out: Acceptable/Baseline neuro status   Airway/Respiratory: Uneventful            Sign Out: Acceptable/Baseline resp. status   CV/Hemodynamics: Uneventful            Sign Out: Acceptable CV status; No obvious hypovolemia; No obvious fluid overload   Other NRE: NONE   DID A NON-ROUTINE EVENT OCCUR? No           Last vitals:  Vitals:    03/05/25 1050 03/05/25 1100 03/05/25 1110   BP: 131/66 134/66 135/61   Pulse: 105 97 98   Resp: 16 17 14   Temp: 36.7  C (98.1  F) 36.8  C (98.3  F) 36.8  C (98.3  F)   SpO2: 93% 94% 96%       Electronically Signed By: Karely Sy MD  March 5, 2025  11:14 AM

## 2025-03-05 NOTE — PLAN OF CARE
Team Note Due:  Monday    Assessment/Intervention/Current Symtoms and Care Coordination:  Chart review and met with team, discussed pt progress, symptomology, and response to treatment.  Discussed the discharge plan and any potential impediments to discharge. Pt under commitment, carl and catherine puente. Pt requires a Indian . Staff report that pt appears calmer and clearer in conversation. Pt had ECT #8 today.     Discharge Plan or Goal:  Pending stabilization and safe disposition planning; considerations include:  TBD. Per different reports from different family members, pt has an assisted living apartment but was staying in the family home PTA. It is unclear at this time where pt will return at discharge      Barriers to Discharge:  Patient requires further psychiatric stabilization due to current symptomology, medication management with changes subject to provider, coordination with outside supports, and aftercare planning. Pt started ECT on 2/14     Referral Status:  MA application was submitted on 1/29. Received by [facility]. [facility] cannot process application without [facility] closing their application. Per financial counselor, [facility] reports they did close their application and [facility] reports that [facility] has not closed it yet. Voicemail out to supervisor to clarify      Legal Status:  Committed MI with ITP and Catherine Puente  [facility]  [id number]   ITP includes: Risperdal, Thorazine, Haldol, and Zyprexa  Expires: 06/10/2025    Contacts (include DENI status):  Mya - daughter (DENI)  P: [phone]    Golden - son (DENI)  P: [phone]     Mohamed - son (DENI)  P: [phone]     Rani - commitment CM supervisor through [facility] (DENI per commitment)  P: [phone]  E: [email or web address]    Chinmay Gould - commitment CM through [facility] (DENI per commitment)  P: [phone]  E: [email or web address]  F: [phone]  Note: Chinmay  is off on Friday's     Upcoming Meetings and Dates/Important Information and next steps:  CTC to coordinate with pt, outside supports, and treatment team regarding discharge planning   Pt continues with ECT

## 2025-03-05 NOTE — PROGRESS NOTES
Patient adequate for discharge back to care unit. Report called to unit nurse Gabriella. IV removed and hemostasis achieved less than 2 min.  VSS.  Patient safely transported back to unit with  staff person an security.

## 2025-03-05 NOTE — ANESTHESIA CARE TRANSFER NOTE
Patient: Angela Basurto    Procedure: * No procedures listed *       Diagnosis: * No pre-op diagnosis entered *  Diagnosis Additional Information: No value filed.    Anesthesia Type:   General     Note:    Oropharynx: oropharynx clear of all foreign objects  Level of Consciousness: drowsy  Oxygen Supplementation: room air    Independent Airway: airway patency satisfactory and stable  Dentition: dentition unchanged  Vital Signs Stable: post-procedure vital signs reviewed and stable  Report to RN Given: handoff report given  Patient transferred to: PACU    Handoff Report: Identifed the Patient, Identified the Reponsible Provider, Reviewed the pertinent medical history, Discussed the surgical course, Reviewed Intra-OP anesthesia mangement and issues during anesthesia, Set expectations for post-procedure period and Allowed opportunity for questions and acknowledgement of understanding      Vitals:  Vitals Value Taken Time   /66 03/05/25 1100   Temp 36.8  C (98.3  F) 03/05/25 1100   Pulse 97 03/05/25 1100   Resp 17 03/05/25 1100   SpO2 94 % 03/05/25 1100       Electronically Signed By: Karely Sy MD  March 5, 2025  11:14 AM

## 2025-03-05 NOTE — PLAN OF CARE
Problem: Anxiety Signs/Symptoms  Goal: Improved Sleep (Anxiety Signs/Symptoms)  Outcome: Progressing   Goal Outcome Evaluation:    Patient was asleep at the start of the shift. She is using a medical bed to assist with mobility.She has been maintained on NPO post midnight as she is scheduled for ECT @ 1220 today. No anxiety and other behavioral issues noted this shift. Slept for 9.5 hours.

## 2025-03-05 NOTE — PLAN OF CARE
BEH IP Unit Acuity Rating Score (UARS)  Patient is given one point for every criteria they meet.    CRITERIA SCORING   On a 72 hour hold, court hold, committed, stay of commitment, or revocation. 1    Patient LOS on BEH unit exceeds 20 days. 1  LOS: 61   Patient under guardianship, 55+, otherwise medically complex, or under age 11. 1   Suicide ideation without relief of precipitating factors. 0   Current plan for suicide. 0   Current plan for homicide. 0   Imminent risk or actual attempt to seriously harm another without relief of factors precipitating the attempt. 0   Severe dysfunction in daily living (ex: complete neglect for self care, extreme disruption in vegetative function, extreme deterioration in social interactions). 0   Recent (last 7 days) or current physical aggression in the ED or on unit. 0   Restraints or seclusion episode in past 72 hours. 0   Recent (last 7 days) or current verbal aggression, agitation, yelling, etc., while in the ED or unit. 0   Active psychosis. 0   Need for constant or near constant redirection (from leaving, from others, etc).  0   Intrusive or disruptive behaviors. 0   Patient requires 3 or more hours of individualized nursing care per 8-hour shift (i.e. for ADLs, meds, therapeutic interventions). 0   TOTAL 3

## 2025-03-05 NOTE — PLAN OF CARE
Problem: Adult Behavioral Health Plan of Care  Goal: Optimized Coping Skills in Response to Life Stressors  Outcome: Progressing   Goal Outcome Evaluation:  Pt calm and corporative ECT done today without any problem . Pt Vital signs were within normal limits before and after ECT .  Pt admits ECT is working but wonders when will the treatment be completed . Pt was reminded that today was ECT number 8.   Pt denied all Mh Sx and have out of her room but isolative on the milieu .   Will continue POC

## 2025-03-06 PROCEDURE — 250N000013 HC RX MED GY IP 250 OP 250 PS 637: Performed by: PSYCHIATRY & NEUROLOGY

## 2025-03-06 PROCEDURE — 97150 GROUP THERAPEUTIC PROCEDURES: CPT | Mod: GO

## 2025-03-06 PROCEDURE — 99232 SBSQ HOSP IP/OBS MODERATE 35: CPT | Performed by: PSYCHIATRY & NEUROLOGY

## 2025-03-06 PROCEDURE — 250N000013 HC RX MED GY IP 250 OP 250 PS 637

## 2025-03-06 PROCEDURE — 124N000002 HC R&B MH UMMC

## 2025-03-06 PROCEDURE — 258N000001 HC RX 258

## 2025-03-06 RX ADMIN — HALOPERIDOL 5 MG: 5 TABLET ORAL at 08:25

## 2025-03-06 RX ADMIN — SERTRALINE HYDROCHLORIDE 25 MG: 25 TABLET ORAL at 08:25

## 2025-03-06 RX ADMIN — POLYETHYLENE GLYCOL 3350 17 G: 17 POWDER, FOR SOLUTION ORAL at 08:26

## 2025-03-06 RX ADMIN — LEVOTHYROXINE SODIUM 100 MCG: 0.05 TABLET ORAL at 08:25

## 2025-03-06 RX ADMIN — OXCARBAZEPINE 150 MG: 150 TABLET, FILM COATED ORAL at 08:25

## 2025-03-06 RX ADMIN — MAGNESIUM OXIDE TAB 400 MG (241.3 MG ELEMENTAL MG) 400 MG: 400 (241.3 MG) TAB at 08:25

## 2025-03-06 RX ADMIN — DOCUSATE SODIUM 100 MG: 100 CAPSULE, LIQUID FILLED ORAL at 08:25

## 2025-03-06 RX ADMIN — Medication 500 ML: at 17:13

## 2025-03-06 RX ADMIN — Medication 500 ML: at 08:47

## 2025-03-06 RX ADMIN — Medication 5 MG: at 20:10

## 2025-03-06 RX ADMIN — LORAZEPAM 1.5 MG: 1 TABLET ORAL at 08:24

## 2025-03-06 RX ADMIN — Medication 1 TABLET: at 08:25

## 2025-03-06 RX ADMIN — CHLORPROMAZINE HYDROCHLORIDE 300 MG: 100 TABLET, FILM COATED ORAL at 20:10

## 2025-03-06 ASSESSMENT — ACTIVITIES OF DAILY LIVING (ADL)
ADLS_ACUITY_SCORE: 52

## 2025-03-06 NOTE — PLAN OF CARE
Problem: Depressive Signs/Symptoms  Goal: Optimized Energy Level (Depressive Signs/Symptoms)  Outcome: Progressing   Goal Outcome Evaluation:    Plan of Care Reviewed With: patient      Patient alert and conversant. Patient was pleasant on approach, mood was calm, full range affect noted. She denied pain and all mental health symptoms. She ate 100% of her meals. She came out for meals, appeared quiet, but was seen engaging conversation with staff.     Patient's initial HR was 113. Patient was symptomatic. Rechecked vitals again and it came down to 105. Repeat vitals done after an hour per doctor's instruction. HR was 102 bpm.

## 2025-03-06 NOTE — PROGRESS NOTES
Rehab Group    Start time: 1325  End time: 1420  Patient time total: 55 minutes    attended full group    #5 attended   Group Type: occupational therapy   Group Topic Covered: healthy leisure time and social skills     Group Session Detail:  Self-reflection group game     Patient Response/Contribution:  cooperative with task, attentive, and actively engaged     Patient Detail:    Pt actively participated in a structured occupational therapy group involving a self-reflecting, group leisure task. Pt appeared comfortable sharing positive past experiences and personal information with peers, and was respectful in listening and responding to peers. Pt was able to follow 3 step directions for the novel task with occasional verbal cues. Identified her mom and cousin as positive social supports. Shared that she moved from [facility] to [place] when she was 21 years old and was surprised when she saw snow for the first time. Calm, pleasant, and engaged. Affect appeared bright in interactions.       [id number] OT Group (2 or more in attendance)      Patient Active Problem List   Diagnosis    Mary (H)    Psychosis (H)    Schizoaffective disorder, bipolar type (H)    PTSD (post-traumatic stress disorder)    Catatonia

## 2025-03-06 NOTE — PLAN OF CARE
Problem: Anxiety Signs/Symptoms  Goal: Improved Sleep (Anxiety Signs/Symptoms)  Outcome: Progressing   Goal Outcome Evaluation:    Patient was asleep at the start of the shift. She is using a medical bed to assist with mobility. No behavioral issues noted this shift. Slept for 8.75 hours.

## 2025-03-06 NOTE — PLAN OF CARE
Group Attendance:  attended full group    Time session began: 1915  Time session ended: 1945  Patient's total time in group: 30    Total # Attendees   7   Group Type psychotherapeutic     Group Topic Covered insight improvement, symptom management, goals and motivation, and relationships and boundaries     Group Session Detail Structured therapeutic process group with a focus on building self-esteem, insight, positive change, choices and problem solving via questions and verbal interactions.      Patient's response to the group topic/interactions:  cooperative with task, organized, listened actively, attentive, and actively engaged     Patient Details: Pt was engaged throughout activity, shared when prompted, at times declined to answer question. Reports that exercise is a healthy coping skill for her, makes her happy and positive.           [id number] - Group psychotherapy - 1 Session  Patient Active Problem List   Diagnosis    Mary (H)    Psychosis (H)    Schizoaffective disorder, bipolar type (H)    PTSD (post-traumatic stress disorder)    Catatonia

## 2025-03-06 NOTE — PLAN OF CARE
Team Note Due:  Monday    Assessment/Intervention/Current Symtoms and Care Coordination:  Chart review and met with team, discussed pt progress, symptomology, and response to treatment.  Discussed the discharge plan and any potential impediments to discharge. Pt under commitment, carl and catherine puente. Pt requires a Sudanese . Staff report that pt appears calmer and clearer in conversation. Pt had ECT #8 yesterday. Per provider, pt to have ECT #9 tomorrow and then start maintenance.     Writer spoke with pt's uncle, Aric ([phone]) and confirmed that no discharge date has been set yet. Writer reported that a covering provider will start working with pt on Monday and CTC will discuss a discharge plan with covering provider and will update the family.     Discharge Plan or Goal:  Pending stabilization and safe disposition planning; considerations include:  TBD. Per different reports from different family members, pt has an assisted living apartment but was staying in the family home PTA. It is unclear at this time where pt will return at discharge      Barriers to Discharge:  Patient requires further psychiatric stabilization due to current symptomology, medication management with changes subject to provider, coordination with outside supports, and aftercare planning. Pt started ECT on 2/14     Referral Status:  MA application was submitted on 1/29. Received by [facility]. [place] cannot process application without [facility] closing their application. Per financial counselor, [facility] reports they did close their application and [facility] reports that [facility] has not closed it yet. Voicemail out to supervisor to clarify      Legal Status:  Committed [place] with ITP and Catherine Puente  [facility]  [id number]   ITP includes: Risperdal, Thorazine, Haldol, and Zyprexa  Expires: 06/10/2025    Contacts (include DENI status):  Mya - daughter (DENI)  P: [phone]    Golden  - son (DENI)  P: [phone]     Mohamed - son (DENI)  P: [phone]     Aric - uncle (DENI)  P: [phone]    Rani - commitment CM supervisor through [facility] (DENI per commitment)  P: [phone]  E: [email or web address]    Chinmay Gould - commitment CM through [facility] (DENI per commitment)  P: [phone]  E: [email or web address]  F: [phone]  Note: Chinmay is off on Friday's     Upcoming Meetings and Dates/Important Information and next steps:  CTC to discuss discharge plan with covering provider on 3/10  CTC to schedule outpatient appointments  CTC to discuss PD with pt

## 2025-03-06 NOTE — DISCHARGE INSTRUCTIONS
Behavioral Discharge Planning and Instructions    Summary: You were admitted on 1/3/2025  due to Psychotic Symptomology.  You were treated by Albania Mota MD and discharged on 03/19/2025 from [facility] to Home    Main Diagnosis: Schizoaffective disorder bipolar type     Commitment:   [facility] and you are being discharged on a Provisional Discharge Agreement which shall remain in effect for the duration of the Commitment which expires on 06/10/2025., Mata: You are also court ordered to take the medications that the doctor ordered for you. , and Shun Everett: You also had a [place] court order granting Electroconvulsive therapy, (ECT)     Health Care Follow-up:   Primary Care Appointment Date/Time: Friday, March 28th at 2:10pm and 2:40pm (two appts, back to back)  Quyen García, POLINA, APRN, CNP  [facility]  [address], [address]  Phone: [phone]  Note: Your first appt at 2:10pm is for a sodium lab draw. Your second appt at 2:40pm is with your primary. You will have to check in for both appts.  Please note if you are still uninsured at the time of the appointment, they do require a $50 deposit at the time of the appointment (for each appointment). Please bring the hard copy of the order for the sodium lab draw to the appointment as well as your ID.    Psychiatry Appointment Date/Time: Monday, March 31st at 2:00pm in-person  Staci Mcdonough MD  [facility]   [address] [place] [address]  Phone: [phone]      Patient Navigation Hub:    PS DEPT. s Navigators work to be your point-of-contact for trustworthy and compassionate care from Inpatient services to  [facility] s Programmatic Care. We will provide resources and communication to help guide you into programmatic care. Ultimately, our goal is to be  the one-stop-shop of communication, coordination, and support for your journey to programmatic care.    Phone: [phone]    An Medical Assistance application was completed and sent in to [facility]. They anticipate at least 90 days to process the application. For follow up, please call your assigned worker Elmojaneth at [phone].    Continue to follow up with your , Chinmay Gould at [phone].    Information will be faxed to your outpatient providers to ensure a healthy continuity of care for you.     Attend all scheduled appointments with your outpatient providers. Call at least 24 hours in advance if you need to reschedule an appointment to ensure continued access to your outpatient providers.     Major Treatments, Procedures and Findings:  You were provided with: a psychiatric assessment, assessed for medical stability, medication evaluation and/or management, group therapy, and milieu management    Symptoms to Report: feeling more aggressive, increased confusion, losing more sleep, mood getting worse, or thoughts of suicide    Early warning signs can include: increased depression or anxiety sleep disturbances increased thoughts or behaviors of suicide or self-harm  increased unusual thinking, such as paranoia or hearing voices    Safety and Wellness:  Take all medicines as directed.  Make no changes unless your doctor suggests them.      Follow treatment recommendations.  Refrain from alcohol and non-prescribed drugs.  Ask your support system to help you reduce your access to items that could harm yourself or others. If there is a concern for safety, call 911.    Resources:   Mental Health Crisis Resources  Throughout [place]: call **CRISIS (**652390)  Crisis Text Line: is available for free, 24/7 by texting [place] to 240973  Suicide Awareness Voices of Education (SAVE) (www.save.org): 934-085-NGJF (5443)  The National Suicide Prevention Lifeline is now: 988 Suicide and Crisis Lifeline. Call  988 anytime.  National [place] on Mental Illness (www.[place].vidal.org): 705.558.2902 or 577-713-6721.  Mrhp0tuup: text the word LIFE to 78297 for immediate support and crisis intervention  Mental Health Consumer/Survivor Network of [place] (www.mhcsn.net): 415.394.4935 or 328-429-9654  Mental Health Association of [place] (www.mentalhealth.org): 216.686.9340 or 499-887-6827  Peer Support Connection [place] Warmline (PSC) 1-994.627.6426 Available from 5pm - 9am (7 days a week/365 days a year)  Call or Text 988 or [facility] 1-869.422.4195 [facility]    General Medication Instructions:   See your medication sheet(s) for instructions.   Take all medicines as directed.  Make no changes unless your doctor suggests them.   Go to all your doctor visits.  Be sure to have all your required lab tests. This way, your medicines can be refilled on time.  Do not use any drugs not prescribed by your doctor.  Avoid alcohol.    Advance Directives:   Scanned document on file with [employer]? No scanned doc  Is document scanned? Pt unable to confirm  Honoring Choices Your Rights Handout: Informed and given  Was more information offered? Materials given    The Treatment team has appreciated the opportunity to work with you. If you have any questions or concerns about your recent admission, you can contact the unit which can receive your call 24 hours a day, 7 days a week. They will be able to get in touch with a Provider if needed. The unit number is [phone] .

## 2025-03-06 NOTE — PLAN OF CARE
Problem: Depressive Signs/Symptoms  Goal: Optimized Cognitive Function (Depressive Signs/Symptoms)  Outcome: Progressing     Problem: Psychotic Signs/Symptoms  Goal: Improved Mood Symptoms (Psychotic Signs/Symptoms)  Outcome: Progressing  Intervention: Optimize Emotion and Mood    Problem: Anxiety Signs/Symptoms  Goal: Optimized Cognitive Function (Anxiety Signs/Symptoms)  Outcome: Progressing     Patient up and visible on the unit most part of the shift. Mood is calm. Presented with full range affect on approach. Patient denies all psych symptoms, denies pain or discomfort. Attended community meeting. Adequate food and fluid intake. Family was here to visit. Visit went well. Medication compliant. Held medications due to ECT. See MAR. Will monitor as needed per plan of care.

## 2025-03-06 NOTE — PLAN OF CARE
BEH IP Unit Acuity Rating Score (UARS)  Patient is given one point for every criteria they meet.    CRITERIA SCORING   On a 72 hour hold, court hold, committed, stay of commitment, or revocation. 1    Patient LOS on BEH unit exceeds 20 days. 1  LOS: 62   Patient under guardianship, 55+, otherwise medically complex, or under age 11. 1   Suicide ideation without relief of precipitating factors. 0   Current plan for suicide. 0   Current plan for homicide. 0   Imminent risk or actual attempt to seriously harm another without relief of factors precipitating the attempt. 0   Severe dysfunction in daily living (ex: complete neglect for self care, extreme disruption in vegetative function, extreme deterioration in social interactions). 0   Recent (last 7 days) or current physical aggression in the ED or on unit. 0   Restraints or seclusion episode in past 72 hours. 0   Recent (last 7 days) or current verbal aggression, agitation, yelling, etc., while in the ED or unit. 0   Active psychosis. 0   Need for constant or near constant redirection (from leaving, from others, etc).  0   Intrusive or disruptive behaviors. 0   Patient requires 3 or more hours of individualized nursing care per 8-hour shift (i.e. for ADLs, meds, therapeutic interventions). 0   TOTAL 3

## 2025-03-06 NOTE — PLAN OF CARE
Group Attendance:  attended full group    Time session began: 1400  Time session ended: 1445  Patient's total time in group: 45    Total # Attendees   9   Group Type psychoeducational     Group Topic Covered emotional regulation, insight improvement, mindfulness, and relationships and boundaries     Group Session Detail Group members checked in with how they are feeling and took time to process. Group then watched a ANGIE talk about shame and vulnerability. After the video group members discussed what they took away from the video, how they can incorporate empathy to work through shame, and the ideas discussed about vulnerability.      Patient's response to the group topic/interactions:  cooperative with task, organized, supportive of peers, socially appropriate, listened actively, attentive      Patient Details: Pt attended group, check in briefly,  and listened to the video and discussion at the end.           [id number] - Group psychotherapy - 1 Session  Patient Active Problem List   Diagnosis    Mary (H)    Psychosis (H)    Schizoaffective disorder, bipolar type (H)    PTSD (post-traumatic stress disorder)    Catatonia

## 2025-03-07 ENCOUNTER — APPOINTMENT (OUTPATIENT)
Dept: BEHAVIORAL HEALTH | Facility: CLINIC | Age: 60
DRG: 885 | End: 2025-03-07
Attending: PSYCHIATRY & NEUROLOGY

## 2025-03-07 PROCEDURE — 250N000013 HC RX MED GY IP 250 OP 250 PS 637: Performed by: PSYCHIATRY & NEUROLOGY

## 2025-03-07 PROCEDURE — 124N000002 HC R&B MH UMMC

## 2025-03-07 PROCEDURE — 258N000003 HC RX IP 258 OP 636: Performed by: ANESTHESIOLOGY

## 2025-03-07 PROCEDURE — 90870 ELECTROCONVULSIVE THERAPY: CPT | Performed by: PSYCHIATRY & NEUROLOGY

## 2025-03-07 PROCEDURE — 999N000040 HC STATISTIC CONSULT NO CHARGE VASC ACCESS

## 2025-03-07 PROCEDURE — 90870 ELECTROCONVULSIVE THERAPY: CPT

## 2025-03-07 PROCEDURE — 250N000013 HC RX MED GY IP 250 OP 250 PS 637

## 2025-03-07 PROCEDURE — 99232 SBSQ HOSP IP/OBS MODERATE 35: CPT | Performed by: PSYCHIATRY & NEUROLOGY

## 2025-03-07 PROCEDURE — 97150 GROUP THERAPEUTIC PROCEDURES: CPT | Mod: GO

## 2025-03-07 PROCEDURE — 999N000203 HC STATISTICAL VASC ACCESS NURSE TIME, 16-31 MINUTES

## 2025-03-07 PROCEDURE — 999N000127 HC STATISTIC PERIPHERAL IV START W US GUIDANCE

## 2025-03-07 PROCEDURE — 370N000017 HC ANESTHESIA TECHNICAL FEE, PER MIN: Performed by: ANESTHESIOLOGY

## 2025-03-07 RX ORDER — ONDANSETRON 2 MG/ML
4 INJECTION INTRAMUSCULAR; INTRAVENOUS EVERY 30 MIN PRN
Status: CANCELLED | OUTPATIENT
Start: 2025-03-07

## 2025-03-07 RX ORDER — NALOXONE HYDROCHLORIDE 0.4 MG/ML
0.1 INJECTION, SOLUTION INTRAMUSCULAR; INTRAVENOUS; SUBCUTANEOUS
Status: CANCELLED | OUTPATIENT
Start: 2025-03-07

## 2025-03-07 RX ORDER — SODIUM CHLORIDE, SODIUM LACTATE, POTASSIUM CHLORIDE, CALCIUM CHLORIDE 600; 310; 30; 20 MG/100ML; MG/100ML; MG/100ML; MG/100ML
INJECTION, SOLUTION INTRAVENOUS CONTINUOUS
Status: CANCELLED | OUTPATIENT
Start: 2025-03-07

## 2025-03-07 RX ORDER — FLUMAZENIL 0.1 MG/ML
0.3 INJECTION, SOLUTION INTRAVENOUS ONCE
Status: DISCONTINUED | OUTPATIENT
Start: 2025-03-07 | End: 2025-03-07

## 2025-03-07 RX ORDER — ONDANSETRON 4 MG/1
4 TABLET, ORALLY DISINTEGRATING ORAL EVERY 30 MIN PRN
Status: CANCELLED | OUTPATIENT
Start: 2025-03-07

## 2025-03-07 RX ORDER — DEXAMETHASONE SODIUM PHOSPHATE 4 MG/ML
4 INJECTION, SOLUTION INTRA-ARTICULAR; INTRALESIONAL; INTRAMUSCULAR; INTRAVENOUS; SOFT TISSUE
Status: CANCELLED | OUTPATIENT
Start: 2025-03-07

## 2025-03-07 RX ORDER — CHLORPROMAZINE HYDROCHLORIDE 100 MG/1
200 TABLET, FILM COATED ORAL AT BEDTIME
Status: DISCONTINUED | OUTPATIENT
Start: 2025-03-07 | End: 2025-03-10

## 2025-03-07 RX ADMIN — MAGNESIUM OXIDE TAB 400 MG (241.3 MG ELEMENTAL MG) 400 MG: 400 (241.3 MG) TAB at 14:41

## 2025-03-07 RX ADMIN — Medication 500 ML: at 20:21

## 2025-03-07 RX ADMIN — LORAZEPAM 1.5 MG: 1 TABLET ORAL at 20:19

## 2025-03-07 RX ADMIN — CHLORPROMAZINE HYDROCHLORIDE 200 MG: 100 TABLET, FILM COATED ORAL at 20:19

## 2025-03-07 RX ADMIN — OXCARBAZEPINE 150 MG: 150 TABLET, FILM COATED ORAL at 16:20

## 2025-03-07 RX ADMIN — SERTRALINE HYDROCHLORIDE 25 MG: 25 TABLET ORAL at 14:41

## 2025-03-07 RX ADMIN — Medication 5 MG: at 20:16

## 2025-03-07 RX ADMIN — LORAZEPAM 1.5 MG: 1 TABLET ORAL at 14:41

## 2025-03-07 RX ADMIN — HALOPERIDOL 5 MG: 5 TABLET ORAL at 14:41

## 2025-03-07 RX ADMIN — SODIUM CHLORIDE, POTASSIUM CHLORIDE, SODIUM LACTATE AND CALCIUM CHLORIDE 1000 ML: 600; 310; 30; 20 INJECTION, SOLUTION INTRAVENOUS at 11:04

## 2025-03-07 ASSESSMENT — ACTIVITIES OF DAILY LIVING (ADL)
ADLS_ACUITY_SCORE: 52

## 2025-03-07 NOTE — PLAN OF CARE
BEH IP Unit Acuity Rating Score (UARS)  Patient is given one point for every criteria they meet.    CRITERIA SCORING   On a 72 hour hold, court hold, committed, stay of commitment, or revocation. 1    Patient LOS on BEH unit exceeds 20 days. 1  LOS: 63   Patient under guardianship, 55+, otherwise medically complex, or under age 11. 1   Suicide ideation without relief of precipitating factors. 0   Current plan for suicide. 0   Current plan for homicide. 0   Imminent risk or actual attempt to seriously harm another without relief of factors precipitating the attempt. 0   Severe dysfunction in daily living (ex: complete neglect for self care, extreme disruption in vegetative function, extreme deterioration in social interactions). 0   Recent (last 7 days) or current physical aggression in the ED or on unit. 0   Restraints or seclusion episode in past 72 hours. 0   Recent (last 7 days) or current verbal aggression, agitation, yelling, etc., while in the ED or unit. 0   Active psychosis. 0   Need for constant or near constant redirection (from leaving, from others, etc).  0   Intrusive or disruptive behaviors. 0   Patient requires 3 or more hours of individualized nursing care per 8-hour shift (i.e. for ADLs, meds, therapeutic interventions). 0   TOTAL 3

## 2025-03-07 NOTE — PROGRESS NOTES
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 order that ECT should be done at [facility], instead of [facility] hospital.  Staff to provide emotional support and redirect as needed  Patient encouraged to attend groups  Lab results: Reviewed personally, EKG for qt qtc on Haldol and Thorazine completed  Consultation: medicine consult  for ECT clearance and ECT consult    Risk Assessment: commitment for safety , stabilization and medication management  due to noncompliance with tx     Coordination of Care:   Patient seen, medical record reviewed, care coordinated with the team.    This document is created with the help of Dragon dictation system.  All grammatical/typing errors or context distortion are unintentional and inherent to software.    Albania Mota MD        Re-Certification I certify that the inpatient psychiatric facility services furnished since the previous certification were, and continue to be, medically necessary for, either, treatment which could reasonably be expected to improve the patient s condition or diagnostic study and that the  hospital records indicate that the services furnished were, either, intensive treatment services, admission and related services necessary for diagnostic study, or equivalent services.     I certify that the patient continues to need, on a daily basis, active treatment furnished directly by or requiring the supervision of inpatient psychiatric facility personnel.   I estimate TBD days of hospitalization is necessary for proper treatment of the patient. My plans for post-hospital care for this patient are : Medications, appointments     [place]bania Mota MD

## 2025-03-07 NOTE — PLAN OF CARE
Problem: Depressive Signs/Symptoms  Goal: Improved Sleep (Depressive Signs/Symptoms)  Outcome: Progressing   Goal Outcome Evaluation:    Patient was asleep at the start of the shift. She is using a medical bed to assist with mobility. She has been maintained on NPO post midnight as she is scheduled for her last ECT @ 1220 today. No behavioral issues noted this shift. Slept for 8.5
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
## 2025-03-07 NOTE — PLAN OF CARE
Problem: Psychotic Signs/Symptoms  Goal: Improved Mood Symptoms (Psychotic Signs/Symptoms)  Outcome: Progressing   Goal Outcome Evaluation:    Pt had ECT # 9 today. Per provider's note from today, she has completed her course of ECT and will discharge on maintenance ECT. No date yet. She also had a bolus of IVF for low BP on the previous shift. BP at 1600 128/77, P:73.     Pt's family visited at beginning of shift, brought in cultural food and some clothes. They had questions about discharge plans wondering when she will be going home. They were told , mostly like soon as she is done with ECT and will be on maintenance but not discharge date yet.     Pt smiled on approach. Ate her food brought from home. Complied with meds. Denied pain or discomfort. No evidence of psychosis or aggression toward staff or family. Observed talking with family in her native language.     B/P: 128/77, T: 96.4, P: 73, R: 16

## 2025-03-07 NOTE — PROGRESS NOTES
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 medications:Haldol, Risperdal, Zyprexa and Thorazine .We are still waiting for court papers    These are treatment recommendations:    Medications:  Decrease Thorazine 200 mg at bedtime due to risk for hypotension and improvement on ECT,  and and monitor response   Haldol 5 mg qam  for delusions and hallucinations with plan to eventually discontinue i   Chlorpromazine Thorazine 50 mg tid prn agitation  Zoloft 25 mg qam for depression     Ativan 1.5 mg bid for catatonia    Trileptal 150 mg bid for mood stabilization      Melatonin 5 mg at bedtime for sleep and 3 mg prn  Hydroxyzine 25 mg q 6 hours prn anxiety  Zyprexa 10 mg tid prn agitation   Miralax 17 g daily for constipation and 17 g prn   for constipation   Senna docusate 1 tablet bid prn constipation  Docusate 100 mg daily for constipation    Lisinopril 5 mg daily for HTN  Synthroid 100 mcg qam for hypothyroidism   We discussed side effects, benefits and alternative treatments and patient agrees .  Fall precautions  Full code  Legal:Commitment with Adolfo neuroleptic order and Shun  Plascencia ECT order    will collect collateral information and make outpatient referrals, needs to contact court regarding court order that ECT should be done at [facility], instead of this hospital.  Staff to provide emotional support and redirect as needed  Patient encouraged to attend groups  Lab results: Reviewed personally, EKG for qt qtc on Haldol and Thorazine completed  Consultation: medicine consult  for ECT clearance and ECT consult    Risk Assessment: commitment for safety , stabilization and medication management  due to noncompliance with tx     Coordination of Care:   Patient seen, medical record reviewed, care coordinated with the team.      This document is created with the help of Dragon dictation system.  All grammatical/typing errors or context distortion are unintentional and inherent to software.    Albania Mota MD        Re-Certification I certify that the inpatient psychiatric facility services furnished since the previous certification were, and continue to be, medically necessary for, either, treatment which could reasonably be expected to improve the patient s condition or diagnostic study and that the hospital records indicate that the services furnished were, either, intensive treatment services, admission and related services necessary for diagnostic study, or equivalent services.     I certify that the patient continues to need, on a daily basis, active treatment furnished directly by or requiring the supervision of inpatient psychiatric facility personnel.   I estimate TBD days of hospitalization is necessary for proper treatment of the patient. My plans for post-hospital care for this patient are : Medications, appointments     [place]bania Mota MD

## 2025-03-07 NOTE — PLAN OF CARE
Team Note Due:  Monday    Assessment/Intervention/Current Symtoms and Care Coordination:  Chart review and met with team, discussed pt progress, symptomology, and response to treatment.  Discussed the discharge plan and any potential impediments to discharge. Pt under commitment, carl and catherine puente. Pt requires a [place]inamese . Staff report that pt appears calmer and clearer in conversation. Pt had ECT # 9 today. Plan is this to be the last session for acute series. Pt will start maintenance next week. Per provider, she states pt should complete one maintenance session while still inpatient to ensure pt's current symptomology stays consistent.    Discharge Plan or Goal:  Pending stabilization and safe disposition planning; considerations include:  TBD. Per different reports from different family members, pt has an assisted living apartment but was staying in the family home PTA. It is unclear at this time where pt will return at discharge      Barriers to Discharge:  Patient requires further psychiatric stabilization due to current symptomology, medication management with changes subject to provider, coordination with outside supports, and aftercare planning. Pt started ECT on 2/14     Referral Status:  MA application was submitted on 1/29. Received by [facility]. [facility] cannot process application without [facility] closing their application. Per financial counselor, [facility] reports they did close their application and [facility] reports that [facility] has not closed it yet. Voicemail out to supervisor to clarify      Legal Status:  Committed MI with ITP and Catherine Puente  [facility]  [id number]   ITP includes: Risperdal, Thorazine, Haldol, and Zyprexa  Expires: 06/10/2025    Contacts (include DENI status):  Mya - daughter (DENI)  P: [phone]    Golden - son (DENI)  P: [phone]     Mohamed - son (DENI)  P: [phone]     Aric - uncle (DENI)  P:  [phone]    Rani - commitment CM supervisor through [employer] (DENI per commitment)  P: [phone]  E: [email or web address]@"[email or web address]    Chinmay Gould - commitment CM through [employer] (DENI per commitment)  P: [phone]  E: [email or web address]  F: [phone]  Note: Chinmay is off on Friday's     Upcoming Meetings and Dates/Important Information and next steps:  CTC to discuss discharge plan with covering provider on 3/10  CTC to schedule outpatient appointments  CTC to discuss PD with pt

## 2025-03-07 NOTE — PLAN OF CARE
Problem: Adult Behavioral Health Plan of Care  Goal: Optimized Coping Skills in Response to Life Stressors  Outcome: Progressing   Goal Outcome Evaluation:  Pt started her day having a low orthostatic B/P (see flowsheet ) a liter of 1000 L/R was given . Pt B/P improved . ( See Flowsheet ) pt want down and completed ECT . Pt is during better and ate her lunch after she returned from ECT . Pt denied all Mh Sx

## 2025-03-07 NOTE — PLAN OF CARE
Rehab Group    Start time: 1015  End time: 1200  Patient time total: 30 minutes    attended partial group    #8 attended   Group Type: OT Clinic   Group Topic Covered: cognitive activities, coping skills, healthy leisure time, and problem solving   Group Session Detail:OT: Education on healthy activity engagement and creative hands-on endeavor (OT clinic) to increase concentration, focus, attention to task/detail, decision making, problem solving, frustration tolerance, task follow through, coping with stress, healthy leisure engagement, creative expression, and social engagement    Patient Response/Contribution:  cooperative with task, worked intermittently, and required prompts or assistance to participate   Patient Detail:pt arrived with SIO staff. Pt pleasant on approach. Pt chose to engage in previous familiar creative task. Pt was provided with set up of materials. Pt calm. Pt worked quietly. Pt was initially ind with initiating and engaging in task. As time went on pt required more hands on assist with coloring task, therapist would hand pt different color markers to assist with engagement. Pt was pulled from group for ECT      [id number] OT Group (2 or more in attendance)      Patient Active Problem List   Diagnosis    Mary (H)    Psychosis (H)    Schizoaffective disorder, bipolar type (H)    PTSD (post-traumatic stress disorder)    Catatonia

## 2025-03-07 NOTE — CONSULTS
"Consult received for Vascular access care.  See LDA for details. For additional needs place \"Nursing to Consult for Vascular Access\" [id number] order in EPIC.  "

## 2025-03-07 NOTE — PROGRESS NOTES
Pt has met discharge criteria. Vital signs stable. PIV removed without issue. Pt moved to discharge area via wheelchair.  Report given to deshaun RING and transported back to the unit via security

## 2025-03-08 PROCEDURE — 250N000013 HC RX MED GY IP 250 OP 250 PS 637: Performed by: PSYCHIATRY & NEUROLOGY

## 2025-03-08 PROCEDURE — 124N000002 HC R&B MH UMMC

## 2025-03-08 PROCEDURE — 250N000013 HC RX MED GY IP 250 OP 250 PS 637

## 2025-03-08 PROCEDURE — 97150 GROUP THERAPEUTIC PROCEDURES: CPT | Mod: GO

## 2025-03-08 RX ADMIN — OXCARBAZEPINE 150 MG: 150 TABLET, FILM COATED ORAL at 16:00

## 2025-03-08 RX ADMIN — Medication 1 TABLET: at 08:46

## 2025-03-08 RX ADMIN — Medication 5 MG: at 20:29

## 2025-03-08 RX ADMIN — LORAZEPAM 1.5 MG: 1 TABLET ORAL at 08:46

## 2025-03-08 RX ADMIN — OXCARBAZEPINE 150 MG: 150 TABLET, FILM COATED ORAL at 08:46

## 2025-03-08 RX ADMIN — LORAZEPAM 1.5 MG: 1 TABLET ORAL at 20:28

## 2025-03-08 RX ADMIN — HALOPERIDOL 5 MG: 5 TABLET ORAL at 08:46

## 2025-03-08 RX ADMIN — DOCUSATE SODIUM 100 MG: 100 CAPSULE, LIQUID FILLED ORAL at 08:46

## 2025-03-08 RX ADMIN — MAGNESIUM OXIDE TAB 400 MG (241.3 MG ELEMENTAL MG) 400 MG: 400 (241.3 MG) TAB at 08:46

## 2025-03-08 RX ADMIN — SERTRALINE HYDROCHLORIDE 25 MG: 25 TABLET ORAL at 08:46

## 2025-03-08 RX ADMIN — CHLORPROMAZINE HYDROCHLORIDE 200 MG: 100 TABLET, FILM COATED ORAL at 20:28

## 2025-03-08 RX ADMIN — LEVOTHYROXINE SODIUM 100 MCG: 0.05 TABLET ORAL at 08:46

## 2025-03-08 RX ADMIN — Medication 500 ML: at 08:42

## 2025-03-08 RX ADMIN — Medication 500 ML: at 18:06

## 2025-03-08 RX ADMIN — POLYETHYLENE GLYCOL 3350 17 G: 17 POWDER, FOR SOLUTION ORAL at 08:46

## 2025-03-08 ASSESSMENT — ACTIVITIES OF DAILY LIVING (ADL)
ADLS_ACUITY_SCORE: 52
ADLS_ACUITY_SCORE: 52
ORAL_HYGIENE: INDEPENDENT
ADLS_ACUITY_SCORE: 52
DRESS: INDEPENDENT
ADLS_ACUITY_SCORE: 52
HYGIENE/GROOMING: INDEPENDENT
ADLS_ACUITY_SCORE: 52

## 2025-03-08 NOTE — PLAN OF CARE
Problem: Sleep Disturbance  Goal: Adequate Sleep/Rest  Outcome: Progressing     Goal Outcome Evaluation:    Pt received sleeping at report. Slept for 5.75 hours. Awake for sometimes.Uses medical bed to aid mobility. Up independently to the bathroom and voiding without difficulty. Safety checks in place and maintained. Will continue to monitor per POC.

## 2025-03-08 NOTE — PLAN OF CARE
Problem: Adult Behavioral Health Plan of Care  Goal: Optimized Coping Skills in Response to Life Stressors  Outcome: Progressing  Intervention: Promote Effective Coping Strategies  Recent Flowsheet Documentation  Taken 3/8/2025 0903 by Gabriella Reagan RN  Supportive Measures:   active listening utilized   positive reinforcement provided   relaxation techniques promoted   self-care encouraged   verbalization of feelings encouraged   Goal Outcome Evaluation:    Plan of Care Reviewed With: patient    Pt is calm and corporative up ate and was medication complaint . Pt is isolative and withdrawn . Started the shift with a soft orthostatic B/P standing 74/54 with sitting 114/74 after drinking Pedialyte sitting B/P 103/69 standing 109/64 . Pt denied all MH Sx . Will continue POC

## 2025-03-09 PROCEDURE — 250N000013 HC RX MED GY IP 250 OP 250 PS 637: Performed by: PSYCHIATRY & NEUROLOGY

## 2025-03-09 PROCEDURE — 250N000013 HC RX MED GY IP 250 OP 250 PS 637

## 2025-03-09 PROCEDURE — 124N000002 HC R&B MH UMMC

## 2025-03-09 RX ADMIN — OXCARBAZEPINE 150 MG: 150 TABLET, FILM COATED ORAL at 17:15

## 2025-03-09 RX ADMIN — OXCARBAZEPINE 150 MG: 150 TABLET, FILM COATED ORAL at 08:27

## 2025-03-09 RX ADMIN — Medication 500 ML: at 08:28

## 2025-03-09 RX ADMIN — LORAZEPAM 1.5 MG: 1 TABLET ORAL at 08:27

## 2025-03-09 RX ADMIN — LEVOTHYROXINE SODIUM 100 MCG: 0.05 TABLET ORAL at 08:27

## 2025-03-09 RX ADMIN — DOCUSATE SODIUM 100 MG: 100 CAPSULE, LIQUID FILLED ORAL at 08:27

## 2025-03-09 RX ADMIN — Medication 5 MG: at 20:31

## 2025-03-09 RX ADMIN — Medication 500 ML: at 18:47

## 2025-03-09 RX ADMIN — POLYETHYLENE GLYCOL 3350 17 G: 17 POWDER, FOR SOLUTION ORAL at 08:27

## 2025-03-09 RX ADMIN — HALOPERIDOL 5 MG: 5 TABLET ORAL at 08:27

## 2025-03-09 RX ADMIN — MAGNESIUM OXIDE TAB 400 MG (241.3 MG ELEMENTAL MG) 400 MG: 400 (241.3 MG) TAB at 08:27

## 2025-03-09 RX ADMIN — Medication 1 TABLET: at 08:27

## 2025-03-09 RX ADMIN — CHLORPROMAZINE HYDROCHLORIDE 200 MG: 100 TABLET, FILM COATED ORAL at 20:31

## 2025-03-09 RX ADMIN — SERTRALINE HYDROCHLORIDE 25 MG: 25 TABLET ORAL at 08:27

## 2025-03-09 ASSESSMENT — ACTIVITIES OF DAILY LIVING (ADL)
ADLS_ACUITY_SCORE: 52
HYGIENE/GROOMING: INDEPENDENT
ORAL_HYGIENE: INDEPENDENT
ADLS_ACUITY_SCORE: 52
DRESS: SCRUBS (BEHAVIORAL HEALTH);INDEPENDENT
ADLS_ACUITY_SCORE: 52
LAUNDRY: UNABLE TO COMPLETE
ADLS_ACUITY_SCORE: 52

## 2025-03-09 NOTE — PLAN OF CARE
Problem: Depressive Signs/Symptoms  Goal: Improved Mood Symptoms (Depressive Signs/Symptoms)  Outcome: Progressing  Intervention: Promote Mood Improvement  Recent Flowsheet Documentation  Taken 3/8/2025 1553 by Rc Elizabeth, RN  Supportive Measures:   active listening utilized   positive reinforcement provided   relaxation techniques promoted   self-care encouraged   verbalization of feelings encouraged  Trust Relationship/Rapport:   care explained   reassurance provided   thoughts/feelings acknowledged   questions encouraged     Problem: Psychotic Signs/Symptoms  Goal: Improved Mood Symptoms (Psychotic Signs/Symptoms)  Outcome: Progressing  Intervention: Optimize Emotion and Mood  Recent Flowsheet Documentation  Taken 3/8/2025 1553 by Rc Elizabeth, RN  Supportive Measures:   active listening utilized   positive reinforcement provided   relaxation techniques promoted   self-care encouraged   verbalization of feelings encouraged  Diversional Activity: television   Goal Outcome Evaluation:    Plan of Care Reviewed With: patient      Visible but withdrawn to self, patient brightens up on approach. Spent evening watching tv and attended community meeting. Patient denies SI/SIB/AVH/Anxiety/Depression. Medication compliant.   /73 (BP Location: Left arm)   Pulse 72   Temp 98.3  F (36.8  C) (Oral)   Resp 16   Wt 100.6 kg (221 lb 12.5 oz)   SpO2 96%   BMI 32.75 kg/m

## 2025-03-09 NOTE — PLAN OF CARE
Problem: Adult Behavioral Health Plan of Care  Goal: Optimized Coping Skills in Response to Life Stressors  Outcome: Progressing   Goal Outcome Evaluation:    Plan of Care Reviewed With: patient     Pt calm and corporative out for both meals and is medication complaint . Pt blood started being soft  110/72 sitting , standing 62/52  after Pedialyte pt B/P sitting 132/79 standing 110/63 . Pt denied all MH SX

## 2025-03-09 NOTE — PLAN OF CARE
Problem: Sleep Disturbance  Goal: Adequate Sleep/Rest  Outcome: Progressing     Goal Outcome Evaluation:    Pt received sleeping at report. Slept for 8.5 hours. Uses medical bed to aid mobility. Up independently to the bathroom and voiding without difficulty. Safety checks in place and maintained. Will continue to monitor per POC.

## 2025-03-09 NOTE — PLAN OF CARE
Rehab Group    Start time: 1815  End time: 1900  Patient time total: 30 minutes    attended partial group    #9 attended   Group Type: OT Clinic   Group Topic Covered: cognitive activities, coping skills, healthy leisure time, and problem solving   Group Session Detail:OT: Education on healthy activity engagement and creative hands-on endeavor (OT clinic) to increase concentration, focus, attention to task/detail, decision making, problem solving, frustration tolerance, task follow through, coping with stress, healthy leisure engagement, creative expression, and social engagement    Patient Response/Contribution:  cooperative with task, worked intermittently, and required prompts or assistance to participate   Patient Detail:pt calm. Pt polite and pleasant during interactions with therapist. Pt withdrawn from peers. Pt chose to engage in familiar creative task. Pt was ind with decision making and task following set up of supplies. Pt worked quietly for duration. Pt appeared to require encouragement from therapist for continued engagement. Pt was provided with verbal direction for next steps and continued engagement. Pt shared she was done and politely thanked therapist for group. Pt assisted with clean up of supplies following cue      [id number] OT Group (2 or more in attendance)      Patient Active Problem List   Diagnosis    Mary (H)    Psychosis (H)    Schizoaffective disorder, bipolar type (H)    PTSD (post-traumatic stress disorder)    Catatonia

## 2025-03-10 PROCEDURE — 258N000001 HC RX 258: Performed by: STUDENT IN AN ORGANIZED HEALTH CARE EDUCATION/TRAINING PROGRAM

## 2025-03-10 PROCEDURE — 250N000013 HC RX MED GY IP 250 OP 250 PS 637: Performed by: PSYCHIATRY & NEUROLOGY

## 2025-03-10 PROCEDURE — 124N000002 HC R&B MH UMMC

## 2025-03-10 PROCEDURE — 250N000013 HC RX MED GY IP 250 OP 250 PS 637

## 2025-03-10 RX ORDER — CHLORPROMAZINE HYDROCHLORIDE 100 MG/1
100 TABLET, FILM COATED ORAL AT BEDTIME
Status: DISCONTINUED | OUTPATIENT
Start: 2025-03-10 | End: 2025-03-14

## 2025-03-10 RX ORDER — LORAZEPAM 1 MG/1
1 TABLET ORAL 2 TIMES DAILY
Status: DISCONTINUED | OUTPATIENT
Start: 2025-03-10 | End: 2025-03-17

## 2025-03-10 RX ORDER — WADDING
500 EACH MISCELLANEOUS ONCE
Status: COMPLETED | OUTPATIENT
Start: 2025-03-10 | End: 2025-03-10

## 2025-03-10 RX ADMIN — Medication 500 ML: at 08:39

## 2025-03-10 RX ADMIN — HALOPERIDOL 5 MG: 5 TABLET ORAL at 09:57

## 2025-03-10 RX ADMIN — DOCUSATE SODIUM 100 MG: 100 CAPSULE, LIQUID FILLED ORAL at 09:57

## 2025-03-10 RX ADMIN — SERTRALINE HYDROCHLORIDE 25 MG: 25 TABLET ORAL at 09:58

## 2025-03-10 RX ADMIN — Medication 500 ML: at 17:00

## 2025-03-10 RX ADMIN — LEVOTHYROXINE SODIUM 100 MCG: 0.05 TABLET ORAL at 09:57

## 2025-03-10 RX ADMIN — MAGNESIUM OXIDE TAB 400 MG (241.3 MG ELEMENTAL MG) 400 MG: 400 (241.3 MG) TAB at 09:58

## 2025-03-10 RX ADMIN — OXCARBAZEPINE 150 MG: 150 TABLET, FILM COATED ORAL at 09:58

## 2025-03-10 RX ADMIN — Medication 5 MG: at 19:55

## 2025-03-10 RX ADMIN — Medication 500 ML: at 11:27

## 2025-03-10 RX ADMIN — OXCARBAZEPINE 150 MG: 150 TABLET, FILM COATED ORAL at 16:55

## 2025-03-10 RX ADMIN — CHLORPROMAZINE HYDROCHLORIDE 100 MG: 100 TABLET, FILM COATED ORAL at 19:55

## 2025-03-10 RX ADMIN — LORAZEPAM 1 MG: 1 TABLET ORAL at 19:56

## 2025-03-10 RX ADMIN — Medication 1 TABLET: at 09:58

## 2025-03-10 ASSESSMENT — ACTIVITIES OF DAILY LIVING (ADL)
ADLS_ACUITY_SCORE: 52
ADLS_ACUITY_SCORE: 52
HYGIENE/GROOMING: INDEPENDENT
ADLS_ACUITY_SCORE: 52
DRESS: SCRUBS (BEHAVIORAL HEALTH)
ADLS_ACUITY_SCORE: 52
ADLS_ACUITY_SCORE: 52
ORAL_HYGIENE: INDEPENDENT
ADLS_ACUITY_SCORE: 52
LAUNDRY: UNABLE TO COMPLETE
ADLS_ACUITY_SCORE: 52

## 2025-03-10 NOTE — PLAN OF CARE
BEH IP Unit Acuity Rating Score (UARS)  Patient is given one point for every criteria they meet.    CRITERIA SCORING   On a 72 hour hold, court hold, committed, stay of commitment, or revocation. 1    Patient LOS on BEH unit exceeds 20 days. 1  LOS: 66   Patient under guardianship, 55+, otherwise medically complex, or under age 11. 1   Suicide ideation without relief of precipitating factors. 0   Current plan for suicide. 0   Current plan for homicide. 0   Imminent risk or actual attempt to seriously harm another without relief of factors precipitating the attempt. 0   Severe dysfunction in daily living (ex: complete neglect for self care, extreme disruption in vegetative function, extreme deterioration in social interactions). 0   Recent (last 7 days) or current physical aggression in the ED or on unit. 0   Restraints or seclusion episode in past 72 hours. 0   Recent (last 7 days) or current verbal aggression, agitation, yelling, etc., while in the ED or unit. 0   Active psychosis. 0   Need for constant or near constant redirection (from leaving, from others, etc).  0   Intrusive or disruptive behaviors. 0   Patient requires 3 or more hours of individualized nursing care per 8-hour shift (i.e. for ADLs, meds, therapeutic interventions). 0   TOTAL 3

## 2025-03-10 NOTE — PLAN OF CARE
Problem: Adult Behavioral Health Plan of Care  Goal: Optimized Coping Skills in Response to Life Stressors  Outcome: Progressing   Goal Outcome Evaluation:  Pt calm and corporative out for both meals and is medication complaint . Pt blood started being soft  112/70 lying  sitting , standing 110/72, standing 82/56 after the first 500ml of Pedialyte.this morning .  Another Pedialyte was given  during the afternoon B/P now 108/69 sitting standing 93/66    . Pt admits she is feeling better . No ECT today . Next ECT Friday .  Pt also had some medication adjustment . See MAR .    Pt denied all MH SX . Pt over all is in a good mood . Will continue POC

## 2025-03-10 NOTE — PLAN OF CARE
Problem: Sleep Disturbance  Goal: Adequate Sleep/Rest  Outcome: Progressing     Goal Outcome Evaluation:    Pt seems to be sleeping in bed at report. No behavioral concerns. Angela will not have ECT this morning per ECT staff. she was made NPO over night for possible ECT. Slept for 8 hours. Safety check in place. Will continue to monitor per plan of care.

## 2025-03-10 NOTE — PROGRESS NOTES
Patient seen, chart reviewed, care discussed with staff.  Blood pressure (!) 87/50, pulse 108, temperature 98.1  F (36.7  C), temperature source Temporal, resp. rate 20, weight 101.3 kg (223 lb 5.2 oz), SpO2 99%.  Postural drop to unreadable systolic BP this am. Dizzy.    General appearance: good.  Seen by herself at her request  Alert.   Affect: fair  Mood: fair    Speech:  production a bit low   Eye contact:  good.    Psychomotor behavior: decreased, in bed  Gait: steady usually by report, dizzy today  Abnormal movements:  none  Delusions: none  Hallucinations:  denied  Thoughts: linear  Associations: intact  Judgement: fair  Insight: fair  Cognitions: intact in conversation  Memory:  intact in conversation  Orientation: not checked  Marked improvement since ECT  Not suicidal.    Imp:  Her postural drop is not safe for discharge due to fall risk.    Schizoaffective disorder bipolar type  Catatonia resolved          Patient Active Problem List   Diagnosis    Mary (H)    Psychosis (H)    Schizoaffective disorder, bipolar type (H)    PTSD (post-traumatic stress disorder)    Catatonia        Plan:  Decrease Thorizine to 100mg from 200mg.  2.  Decrease Ativan.    Current Facility-Administered Medications   Medication Dose Route Frequency Provider Last Rate Last Admin    acetaminophen (TYLENOL) tablet 650 mg  650 mg Oral Q4H PRN Octavio Parikh MD   650 mg at 02/19/25 1341    alum & mag hydroxide-simethicone (MAALOX) suspension 30 mL  30 mL Oral Q4H PRN Octavio Parikh MD        benzocaine-menthol (CHLORASEPTIC) 6-10 MG lozenge 1 lozenge  1 lozenge Buccal Q1H PRN Octavio Parikh MD   1 lozenge at 02/25/25 2006    chlorproMAZINE (THORAZINE) tablet 100 mg  100 mg Oral At Bedtime Chinmay Villeda MD        chlorproMAZINE (THORAZINE) tablet 50 mg  50 mg Oral Q8H PRN Octavio Parikh MD        docusate sodium (COLACE) capsule 100 mg  100 mg Oral Daily Octavio Parikh MD   100 mg at 03/10/25 3041     guaiFENesin-dextromethorphan (ROBITUSSIN DM) 100-10 MG/5ML syrup 10 mL  10 mL Oral Q4H PRN Octavio Parikh MD        haloperidol (HALDOL) tablet 5 mg  5 mg Oral QAM [place]ia Mota MD   5 mg at 03/10/25 0957    Or    haloperidol lactate (HALDOL) injection 5 mg  5 mg Intramuscular QAM [place]ia Mota MD        Hold Medications for ECT (select and list medications to be held)   Does not apply HOLD Lorena Ibarra MD        hydrOXYzine HCl (ATARAX) tablet 25 mg  25 mg Oral Q6H PRN Octavio Parikh MD   25 mg at 02/15/25 0046    levothyroxine (SYNTHROID/LEVOTHROID) tablet 100 mcg  100 mcg Oral QAM AC Hunter Miranda PA-C   100 mcg at 03/10/25 0957    LORazepam (ATIVAN) tablet 1 mg  1 mg Oral BID Chinmay Villeda MD        LORazepam (ATIVAN) tablet 1 mg  1 mg Oral Q4H PRN Octavio Parikh MD        magnesium hydroxide (MILK OF MAGNESIA) suspension 30 mL  30 mL Oral Daily PRN Octavio Parikh MD        magnesium oxide (MAG-OX) tablet 400 mg  400 mg Oral Daily Khari Pino PA-C   400 mg at 03/10/25 0958    melatonin tablet 3 mg  3 mg Oral At Bedtime PRN Octavio Parikh MD   3 mg at 02/14/25 2347    melatonin tablet 5 mg  5 mg Oral At Bedtime Albania Mota MD   5 mg at 03/09/25 2031    multivitamin w/minerals (THERA-VIT-M) tablet 1 tablet  1 tablet Oral Daily Octavio Parikh MD   1 tablet at 03/10/25 0958    OLANZapine (zyPREXA) tablet 10 mg  10 mg Oral TID PRN Octavio Parikh MD        Or    OLANZapine (zyPREXA) injection 10 mg  10 mg Intramuscular TID PRN Octavio Parikh MD        OXcarbazepine (TRILEPTAL) tablet 150 mg  150 mg Oral BID Albania Mota MD   150 mg at 03/10/25 0958    pediatric electrolyte (PEDIALYTE) solution 500 mL  500 mL Oral BID w/meals Khari Pino PA-C   500 mL at 03/10/25 0839    polyethylene glycol (MIRALAX) Packet 17 g  17 g Oral Daily Octavio Parikh MD   17 g at 03/09/25 0827    polyethylene glycol (MIRALAX) Packet 17 g  17 g Oral Daily PRN  Octavio Parikh MD        senna-docusate (SENOKOT-S/PERICOLACE) 8.6-50 MG per tablet 1 tablet  1 tablet Oral BID PRN Octavio Parikh MD   1 tablet at 01/16/25 1215    sertraline (ZOLOFT) tablet 25 mg  25 mg Oral Daily Albania Mota MD   25 mg at 03/10/25 0958    sodium chloride (PF) 0.9% PF flush 3 mL  3 mL Intracatheter Q8H Khari Pino PA-C   3 mL at 03/05/25 0431     Recent Results (from the past week)   Echocardiogram Complete    Collection Time: 03/03/25  3:13 PM   Result Value Ref Range    LVEF  60-65%

## 2025-03-10 NOTE — PLAN OF CARE
Problem: Depressive Signs/Symptoms  Goal: Optimized Cognitive Function (Depressive Signs/Symptoms)  Outcome: Progressing     Problem: Psychotic Signs/Symptoms  Goal: Increased Participation and Engagement (Psychotic Signs/Symptoms)  Outcome: Progressing    Problem: Anxiety Signs/Symptoms  Goal: Optimized Cognitive Function (Anxiety Signs/Symptoms)  Outcome: Progressing     Problem: Suicidal Behavior  Goal: Suicidal Behavior is Absent or Managed  Outcome: Progressing    Patient up and visible on the unit'  Patient presented with full range affect.   Mood is calm. Denies all psych symptoms.  Patient is chloé for safety.  Patient denies pain or discomfort.  Medication compliant. No PRN given   Eating and drinking adequately.  Will continue to monitor and offer support.

## 2025-03-10 NOTE — PLAN OF CARE
Team Note Due:  Monday    Assessment/Intervention/Current Symtoms and Care Coordination:  Chart review and met with team, discussed pt progress, symptomology, and response to treatment.  Discussed the discharge plan and any potential impediments to discharge. Pt completed ECT acute series and will have maintenance ECT on Friday's. Per RN, pt has had issues with low blood pressure and internal medicine is following. Writer relayed plan to discuss discharge plans with family.     Writer put out call to Ramon ([phone]) to discuss discharge plans. Writer LVM and requested CB.    Writer met with pt and discussed discharge plans. Pt declined to have an  present for conversation. Pt confirms she wants to discharge to her family's home. Writer provided update regarding MA application. Pt asked appropriate questions and was bright during interaction. Writer relayed plan to continue to discuss discharge planning with pt as needed.     Writer spoke with Staci who confirms pt will stay with family at discharge. Staci also confirms family can provide transportation at discharge. Writer relayed update regarding MA application status and recommended only a primary care appointment while pt is uninsured. Staci is agreeable and confirms pt goes to the [facility]. Writer relayed plan to ask provider if pt can discharge with more than a 30-day supply of medications due to being uninsured at this time. Writer relayed plan to discuss with provider and update Staci once a discharge plan is confirmed.     Writer received order confirming pt has been transferred to [facility]. Writer placed copy in the chart. Writer met with pt and provided copy and explanation. Pt asked appropriate questions and reported understanding.     Discharge Plan or Goal:  Pending stabilization and safe disposition planning; considerations include:  staying with family     Barriers to Discharge:  Patient requires further  psychiatric stabilization due to current symptomology, medication management with changes subject to provider, coordination with outside supports, and aftercare planning     Referral Status:  MA application was submitted on 1/29. Per financial counselor, application was assigned to a [facility] worker on 3/4     Legal Status:  Committed MI with ITP and Hoffmann Sang  [facility]  [id number]   ITP includes: Risperdal, Thorazine, Haldol, and Zyprexa  Expires: 06/10/2025    Contacts (include DENI status):  Mya - daughter (DENI)  P: [phone]    Golden - son (DENI)  P: [phone]     Mohamed - son (DENI)  P: [phone]     Aric - uncle (DENI)  P: [phone]    Rani - commitment CM supervisor through [employer] (DENI per commitment)  P: [phone]  E: [email or web address]    Chinmay Gould - commitment CM through R (DENI per commitment)  P: [phone]  E: [email or web address]  F: [phone]  Note: Chinmay is off on Friday's     Upcoming Meetings and Dates/Important Information and next steps:  CTC to schedule outpatient appointments  CTC to discuss PD with pt

## 2025-03-10 NOTE — PLAN OF CARE
Goal Outcome Evaluation:    Plan of Care Reviewed With: patient      Problem: Adult Behavioral Health Plan of Care  Goal: Plan of Care Review  Outcome: Progressing  Flowsheets (Taken 3/9/2025 2000)  Patient Agreement with Plan of Care: agrees     Pt presented with a bright affect, calm mood during check in. Pt denied anxiety, depression, pain, SI, HI,hallucinations, and contracted for safety. Pt was visible in the milieu, social and engaged minimal with peers/staff. Compliant with all scheduled medications, Ativan held by order, will pass in report for incoming RN to check in the morning if is going to ECT tomorrow, no schedule for the weekly maintenance, pt is aware to be NPO. Pt makes needs known appropriately. No safety concerns observed this shift.

## 2025-03-10 NOTE — PLAN OF CARE
03/10/25 1147   Individualization/Patient Specific Goals   Patient Personal Strengths spiritual/Yazidi support;community support;family/social support   Patient Vulnerabilities history of unsuccessful treatment   Anxieties, Fears or Concerns None   Individualized Care Needs None   Patient/Family-Specific Goals (Include Timeframe) Pt's family is involved in her care   Interprofessional Rounds   Summary Pt transferred from [facility]. Pt was initially admitted for behavioral concerns and psychotic symptoms. Pt is committed. Pt had mata/price puente hearing on 1/17 with the hopes of treating pt with ECT. Mata and Hoffmann Puente orders were granted on 1/31. Pt started ECT on 2/14. Pt completed ECT acute series and now has maintenance on Friday's. Discharge has been paused due to issues with low blood pressure   Participants CTC;nursing;OT;psychiatrist   Behavioral Team Discussion   Participants Dr. Chinmay Villeda MD; [facility], [facility]; Gabriella Reagan RN; Justa Carmichael OT   Progress Pt no longer requests . Pt has been bright and no longer endorses . Pt has been calm and cooperative. Pt has had issues with low blood pressure and symptoms, internal medicine following   Anticipated length of stay 3-5 days   Continued Stay Criteria/Rationale Pt's low blood pressure is being monitored by internal medicine   Medical/Physical See H&P and provider notes   Precautions See below   Plan Internal medicine to continue to manage pt's low blood pressure. CTC coordinating discharge and aftercare plans with pt and family   Rationale for change in precautions or plan No change   Safety Plan Per unit protocol   Anticipated Discharge Disposition home with family     PRECAUTIONS AND SAFETY    Behavioral Orders   Procedures    Cheeking Precautions (behavioral units)     Patient Observed swallowing PO medications; Patient asked to drink water after swallowing medication; Patient in Staff line of sight for 15 minutes  after medication given; Mouth checks after PO administration (patient asked to open mouth and stick out their tongue).    Code 1 - Restrict to Unit    Code 2     Ultrasound    Code 2 - 1:1 Staff Supervision     For ECT only    Electroconvulsive therapy     Series of up to 12 treatments. Begin Date: tbd     Treating Psychiatrist providing ECT:  Stacey     Notified on:  2/11    Electroconvulsive therapy     Series of up to 12 treatments. Begin Date: TBD - when Hoffmann petition amended      Treating Psychiatrist providing ECT:  Stacey      Notified on:  2/11    Elopement precautions    Fall precautions    Fall precautions    Hoffmann Plascencia    Routine Programming     As clinically indicated    Status 15     Every 15 minutes.    Status Individual Observation     Patient SIO status reviewed with team/RN.  Please also refer to RN/team documentation for add'l detail.    -SIO staff to monitor following which have contributed to patient being on SIO:  ON ECT MO < WE < FRI FROM  7AM TO GOING FOR ECT   -Possible interventions SIO staff could use to support patient's treatment progress:  Short term to prevent ingestion   -When following observed, team will review discontinuation of SIO:  When pt goes to ECT     Order Specific Question:   CONTINUOUS 24 hours / day     Answer:   5 feet     Order Specific Question:   Indications for SIO     Answer:   Self-injury risk       Safety  Safety WDL: WDL  Patient [facility] patient room, own  Observed Behavior: calm  Observed Behavior (Comment): eating  Airway Safety Measures: all equipment/monitors on and audible  Safety Measures: safety rounds completed, suicide check-in completed  Diversional Activity: television  De-Escalation Techniques: 1:1 observation initiated  Suicidality: Status 15, Minimal furniture in room, Minimal personal belongings in room, Unpredictable frequency of checking on patient  Assault: status 15  Elopement Interventions: status 15, status continuous  sight, room away from unit doors  Additional Documentation:  (Fall)           no

## 2025-03-11 LAB
ANION GAP SERPL CALCULATED.3IONS-SCNC: 10 MMOL/L (ref 7–15)
ANION GAP SERPL CALCULATED.3IONS-SCNC: 10 MMOL/L (ref 7–15)
BUN SERPL-MCNC: 13.1 MG/DL (ref 6–20)
BUN SERPL-MCNC: 13.1 MG/DL (ref 6–20)
CALCIUM SERPL-MCNC: 9.3 MG/DL (ref 8.8–10.4)
CALCIUM SERPL-MCNC: 9.3 MG/DL (ref 8.8–10.4)
CHLORIDE SERPL-SCNC: 99 MMOL/L (ref 98–107)
CHLORIDE SERPL-SCNC: 99 MMOL/L (ref 98–107)
CREAT SERPL-MCNC: 0.68 MG/DL (ref 0.51–0.95)
CREAT SERPL-MCNC: 0.68 MG/DL (ref 0.51–0.95)
EGFRCR SERPLBLD CKD-EPI 2021: >90 ML/MIN/1.73M2
EGFRCR SERPLBLD CKD-EPI 2021: >90 ML/MIN/1.73M2
GLUCOSE SERPL-MCNC: 96 MG/DL (ref 70–99)
GLUCOSE SERPL-MCNC: 96 MG/DL (ref 70–99)
HCO3 SERPL-SCNC: 24 MMOL/L (ref 22–29)
HCO3 SERPL-SCNC: 24 MMOL/L (ref 22–29)
HOLD SPECIMEN: NORMAL
HOLD SPECIMEN: NORMAL
MAGNESIUM SERPL-MCNC: 1.5 MG/DL (ref 1.7–2.3)
MAGNESIUM SERPL-MCNC: 1.5 MG/DL (ref 1.7–2.3)
PHOSPHATE SERPL-MCNC: 2.7 MG/DL (ref 2.5–4.5)
PHOSPHATE SERPL-MCNC: 2.7 MG/DL (ref 2.5–4.5)
POTASSIUM SERPL-SCNC: 4.4 MMOL/L (ref 3.4–5.3)
POTASSIUM SERPL-SCNC: 4.4 MMOL/L (ref 3.4–5.3)
SODIUM SERPL-SCNC: 133 MMOL/L (ref 135–145)
SODIUM SERPL-SCNC: 133 MMOL/L (ref 135–145)

## 2025-03-11 PROCEDURE — 124N000002 HC R&B MH UMMC

## 2025-03-11 PROCEDURE — 36415 COLL VENOUS BLD VENIPUNCTURE: CPT | Performed by: STUDENT IN AN ORGANIZED HEALTH CARE EDUCATION/TRAINING PROGRAM

## 2025-03-11 PROCEDURE — 250N000013 HC RX MED GY IP 250 OP 250 PS 637

## 2025-03-11 PROCEDURE — 250N000013 HC RX MED GY IP 250 OP 250 PS 637: Performed by: PSYCHIATRY & NEUROLOGY

## 2025-03-11 PROCEDURE — 84100 ASSAY OF PHOSPHORUS: CPT | Performed by: STUDENT IN AN ORGANIZED HEALTH CARE EDUCATION/TRAINING PROGRAM

## 2025-03-11 PROCEDURE — 250N000013 HC RX MED GY IP 250 OP 250 PS 637: Performed by: STUDENT IN AN ORGANIZED HEALTH CARE EDUCATION/TRAINING PROGRAM

## 2025-03-11 PROCEDURE — H2032 ACTIVITY THERAPY, PER 15 MIN: HCPCS

## 2025-03-11 PROCEDURE — 99232 SBSQ HOSP IP/OBS MODERATE 35: CPT | Performed by: STUDENT IN AN ORGANIZED HEALTH CARE EDUCATION/TRAINING PROGRAM

## 2025-03-11 PROCEDURE — 258N000003 HC RX IP 258 OP 636: Performed by: STUDENT IN AN ORGANIZED HEALTH CARE EDUCATION/TRAINING PROGRAM

## 2025-03-11 PROCEDURE — 83735 ASSAY OF MAGNESIUM: CPT | Performed by: STUDENT IN AN ORGANIZED HEALTH CARE EDUCATION/TRAINING PROGRAM

## 2025-03-11 PROCEDURE — 97150 GROUP THERAPEUTIC PROCEDURES: CPT | Mod: GO

## 2025-03-11 PROCEDURE — 80048 BASIC METABOLIC PNL TOTAL CA: CPT | Performed by: STUDENT IN AN ORGANIZED HEALTH CARE EDUCATION/TRAINING PROGRAM

## 2025-03-11 RX ORDER — MAGNESIUM OXIDE 400 MG/1
400 TABLET ORAL ONCE
Status: COMPLETED | OUTPATIENT
Start: 2025-03-11 | End: 2025-03-11

## 2025-03-11 RX ORDER — HALOPERIDOL 2 MG/1
2 TABLET ORAL EVERY MORNING
Status: DISCONTINUED | OUTPATIENT
Start: 2025-03-12 | End: 2025-03-17

## 2025-03-11 RX ORDER — HALOPERIDOL 5 MG/ML
5 INJECTION INTRAMUSCULAR EVERY MORNING
Status: DISCONTINUED | OUTPATIENT
Start: 2025-03-12 | End: 2025-03-17

## 2025-03-11 RX ADMIN — POLYETHYLENE GLYCOL 3350 17 G: 17 POWDER, FOR SOLUTION ORAL at 08:21

## 2025-03-11 RX ADMIN — DOCUSATE SODIUM 100 MG: 100 CAPSULE, LIQUID FILLED ORAL at 08:18

## 2025-03-11 RX ADMIN — OXCARBAZEPINE 150 MG: 150 TABLET, FILM COATED ORAL at 17:12

## 2025-03-11 RX ADMIN — Medication 500 ML: at 17:13

## 2025-03-11 RX ADMIN — LORAZEPAM 1 MG: 1 TABLET ORAL at 20:03

## 2025-03-11 RX ADMIN — CHLORPROMAZINE HYDROCHLORIDE 100 MG: 100 TABLET, FILM COATED ORAL at 20:03

## 2025-03-11 RX ADMIN — LEVOTHYROXINE SODIUM 100 MCG: 0.05 TABLET ORAL at 08:17

## 2025-03-11 RX ADMIN — MAGNESIUM OXIDE TAB 400 MG (241.3 MG ELEMENTAL MG) 400 MG: 400 (241.3 MG) TAB at 14:22

## 2025-03-11 RX ADMIN — SERTRALINE HYDROCHLORIDE 25 MG: 25 TABLET ORAL at 08:18

## 2025-03-11 RX ADMIN — MAGNESIUM OXIDE TAB 400 MG (241.3 MG ELEMENTAL MG) 400 MG: 400 (241.3 MG) TAB at 08:18

## 2025-03-11 RX ADMIN — SODIUM CHLORIDE, POTASSIUM CHLORIDE, SODIUM LACTATE AND CALCIUM CHLORIDE 1000 ML: 600; 310; 30; 20 INJECTION, SOLUTION INTRAVENOUS at 12:35

## 2025-03-11 RX ADMIN — Medication 5 MG: at 20:03

## 2025-03-11 RX ADMIN — Medication 1 TABLET: at 08:18

## 2025-03-11 RX ADMIN — Medication 500 ML: at 08:21

## 2025-03-11 RX ADMIN — OXCARBAZEPINE 150 MG: 150 TABLET, FILM COATED ORAL at 08:18

## 2025-03-11 ASSESSMENT — ACTIVITIES OF DAILY LIVING (ADL)
ADLS_ACUITY_SCORE: 52
HYGIENE/GROOMING: INDEPENDENT
ADLS_ACUITY_SCORE: 52
ORAL_HYGIENE: INDEPENDENT
ADLS_ACUITY_SCORE: 52
DRESS: SCRUBS (BEHAVIORAL HEALTH)
ADLS_ACUITY_SCORE: 52
DRESS: STREET CLOTHES;INDEPENDENT
ADLS_ACUITY_SCORE: 52
ORAL_HYGIENE: INDEPENDENT
ADLS_ACUITY_SCORE: 52
LAUNDRY: UNABLE TO COMPLETE
ADLS_ACUITY_SCORE: 52
LAUNDRY: UNABLE TO COMPLETE
HYGIENE/GROOMING: INDEPENDENT

## 2025-03-11 NOTE — PLAN OF CARE
BEH IP Unit Acuity Rating Score (UARS)  Patient is given one point for every criteria they meet.    CRITERIA SCORING   On a 72 hour hold, court hold, committed, stay of commitment, or revocation. 1    Patient LOS on BEH unit exceeds 20 days. 1  LOS: 67   Patient under guardianship, 55+, otherwise medically complex, or under age 11. 1   Suicide ideation without relief of precipitating factors. 0   Current plan for suicide. 0   Current plan for homicide. 0   Imminent risk or actual attempt to seriously harm another without relief of factors precipitating the attempt. 0   Severe dysfunction in daily living (ex: complete neglect for self care, extreme disruption in vegetative function, extreme deterioration in social interactions). 0   Recent (last 7 days) or current physical aggression in the ED or on unit. 0   Restraints or seclusion episode in past 72 hours. 0   Recent (last 7 days) or current verbal aggression, agitation, yelling, etc., while in the ED or unit. 0   Active psychosis. 0   Need for constant or near constant redirection (from leaving, from others, etc).  0   Intrusive or disruptive behaviors. 0   Patient requires 3 or more hours of individualized nursing care per 8-hour shift (i.e. for ADLs, meds, therapeutic interventions). 0   TOTAL 3

## 2025-03-11 NOTE — PROGRESS NOTES
Brief Hospital Progress Note      Assessment and plan:    #Orthostatic hypotension  I was paged by nursing that patient had orthostatic blood pressure drop from 95/58 to 56/38 with lightheadedness upon standing this morning.  Patient has had ongoing issues with orthostatic hypotension over the past several weeks.  Echocardiogram was obtained on 3/2 and showed normal EF 60-65% with no valvular abnormalities. Pt has been eating and drinking fine per nursing. Yesterday, 3/10, psychiatry did reduce her Thorazine and Ativan to see if this would help with orthostatics however appears to still be occurring as of this morning.   - Check electrolytes with BMP, magnesium, and phosphorus   - Bolus 1 L LR over a couple hours   - Continue close BP and orthostatic monitoring. Can re-bolus as needed       Update 1300  Labs returned notable for hypomagnesemia with magnesium 1.5.  Mild hyponatremia with sodium 133.  Patient with known, chronic hypomagnesemia for which she takes 400 mg of magnesium oxide daily.  -Given additional 400 mg of magnesium oxide now  -Continue prior magnesium oxide 400 mg daily  - Recheck BMP and magnesium in the a.m.    Jose Narvaez PA-C  Internal Medicine [facility]  Pager [phone]

## 2025-03-11 NOTE — PROGRESS NOTES
"Patient seen, chart reviewed, care discussed with staff.  Blood pressure 121/73, pulse 119, temperature 97  F (36.1  C), temperature source Temporal, resp. rate 20, weight 100.8 kg (222 lb 3.6 oz), SpO2 98%.  Postural drop continues, Thorazine was decreased 3/10.  Nurse noted \"rabbit\" tremor.    General appearance: good  Alert.   Affect: good  Mood:good  Speech:  normal.   Eye contact:  good.    Psychomotor behavior: normal, up walking, faint lower jaw tremor noted.  Good arm swing, no cogwheel  Gait: steady   Abnormal movements:  none  Delusions: none  Hallucinations:  denied  Thoughts: logical  Associations: intact  Judgement: fair  Insight: fair  Cognitions: intact in conversation  Memory:  intact in conversation  Orientation: normal    Not suicidal.    Plan:  1.  Decrease Haldol  2.  Look at discharge 3/14.    Current Facility-Administered Medications   Medication Dose Route Frequency Provider Last Rate Last Admin    acetaminophen (TYLENOL) tablet 650 mg  650 mg Oral Q4H PRN Octavio Parikh MD   650 mg at 02/19/25 1341    alum & mag hydroxide-simethicone (MAALOX) suspension 30 mL  30 mL Oral Q4H PRN Octavio Parikh MD        benzocaine-menthol (CHLORASEPTIC) 6-10 MG lozenge 1 lozenge  1 lozenge Buccal Q1H PRN Octavio Parikh MD   1 lozenge at 02/25/25 2006    chlorproMAZINE (THORAZINE) tablet 100 mg  100 mg Oral At Bedtime Chinmay Villeda MD   100 mg at 03/10/25 1955    chlorproMAZINE (THORAZINE) tablet 50 mg  50 mg Oral Q8H PRN Octavio Parikh MD        docusate sodium (COLACE) capsule 100 mg  100 mg Oral Daily Octavio Parikh MD   100 mg at 03/11/25 0818    guaiFENesin-dextromethorphan (ROBITUSSIN DM) 100-10 MG/5ML syrup 10 mL  10 mL Oral Q4H PRN Octavio Parikh MD        [START ON 3/12/2025] haloperidol (HALDOL) tablet 2 mg  2 mg Oral Chinmay Guerrero MD        Or    [START ON 3/12/2025] haloperidol lactate (HALDOL) injection 5 mg  5 mg Intramuscular Chinmay Guerrero MD        " Hold Medications for ECT (select and list medications to be held)   Does not apply HOLD Lorena Ibarra MD        hydrOXYzine HCl (ATARAX) tablet 25 mg  25 mg Oral Q6H PRN Octavio Parikh MD   25 mg at 02/15/25 0046    lactated ringers BOLUS 1,000 mL  1,000 mL Intravenous Once Jose Narvaez PA-C 500 mL/hr at 03/11/25 1235 1,000 mL at 03/11/25 1235    levothyroxine (SYNTHROID/LEVOTHROID) tablet 100 mcg  100 mcg Oral QAM AC Hunter Miranda PA-C   100 mcg at 03/11/25 0817    LORazepam (ATIVAN) tablet 1 mg  1 mg Oral BID Chinmay Villeda MD   1 mg at 03/10/25 1956    LORazepam (ATIVAN) tablet 1 mg  1 mg Oral Q4H PRN Octavio Parikh MD        magnesium hydroxide (MILK OF MAGNESIA) suspension 30 mL  30 mL Oral Daily PRN Octavio Parikh MD        magnesium oxide (MAG-OX) tablet 400 mg  400 mg Oral Daily Khari Pino PA-C   400 mg at 03/11/25 0818    melatonin tablet 3 mg  3 mg Oral At Bedtime PRN Octavio Parikh MD   3 mg at 02/14/25 2347    melatonin tablet 5 mg  5 mg Oral At Bedtime [place] Mota MD   5 mg at 03/10/25 1955    multivitamin w/minerals (THERA-VIT-M) tablet 1 tablet  1 tablet Oral Daily Octavio Parikh MD   1 tablet at 03/11/25 0818    OLANZapine (zyPREXA) tablet 10 mg  10 mg Oral TID PRN Octavio Parikh MD        Or    OLANZapine (zyPREXA) injection 10 mg  10 mg Intramuscular TID PRN Octavio Parikh MD        OXcarbazepine (TRILEPTAL) tablet 150 mg  150 mg Oral BID [place] Mota MD   150 mg at 03/11/25 0818    pediatric electrolyte (PEDIALYTE) solution 500 mL  500 mL Oral BID w/meals Khari Pino PA-C   500 mL at 03/11/25 0821    polyethylene glycol (MIRALAX) Packet 17 g  17 g Oral Daily Octavio Parikh MD   17 g at 03/11/25 0821    polyethylene glycol (MIRALAX) Packet 17 g  17 g Oral Daily PRN Octavio Parikh MD        senna-docusate (SENOKOT-S/PERICOLACE) 8.6-50 MG per tablet 1 tablet  1 tablet Oral BID PRN Octavio Parikh MD   1 tablet at  01/16/25 1215    sertraline (ZOLOFT) tablet 25 mg  25 mg Oral Daily [place] Mota MD   25 mg at 03/11/25 0818    sodium chloride (PF) 0.9% PF flush 3 mL  3 mL Intracatheter Q8H Khari Pino PA-C   3 mL at 03/05/25 0431     No results found for this or any previous visit (from the past week).

## 2025-03-11 NOTE — PLAN OF CARE
"  Problem: Adult Behavioral Health Plan of Care  Goal: Plan of Care Review  Outcome: Progressing  Flowsheets  Taken 3/11/2025 1129  Plan of Care Reviewed With: patient  Overall Patient Progress: improving  Patient Agreement with Plan of Care: agrees    At the start of the shift patient was found resting in bed. She woke up and came to the dining room for breakfast. She ate well. She had bright affect and calm mood. Agreeable to participate in RN check in. Reported feeling \"ok\", reported mild anxiety and no depression. Denied suicidal or homicidal thinking. Denied all forms of hallucinations. Thinking is organized, able to stay on topic. Speech is clear, coherent and linear.   Mild tremor in lower lip noted, patient stated is new. Provider notified, morning dose of haldol held and Haldol dose decreased starting tomorrow.  Hygiene is mildly neglected, room is somewhat messy but overall clean.     PRNs given this shift: none  Medical concerns: Orthostatic hypotension continues. Morning sitting BP 95/58 , standing drop to 56/38  patient symptomatic/dizziness.  IM and attending psychiatrist notified. Oral fluids encouraged, BMP, magnesium and phosphorus ordered,  + 1L IV LR over 2H.    Around 11am, after 500ml of pedia light and 300ml of water BP improved to sitting 121/73 , standing 106/60 . IM notified about the improvement. Lab completed /see results, IV started at 12:42pm  and currently still infusing.        "

## 2025-03-11 NOTE — PLAN OF CARE
Team Note Due:  Monday    Assessment/Intervention/Current Symtoms and Care Coordination:  Chart review and met with team, discussed pt progress, symptomology, and response to treatment.  Discussed the discharge plan and any potential impediments to discharge. Pt completed ECT acute series and will have maintenance ECT on Friday's. Per RN, pt continues to have issues with low blood pressure and internal medicine is following.     Writer met with pt and discussed PD. Pt signed PD. Writer relayed plan to provide copy after CM signs.     Provider relayed plan to discharge pt with a 30-day supply of medications with refills. Provider relayed plan for discharge on 3/14 after ECT.     Writer put out email to commitment MANDEEP, Chinmay ([email or web address]) with update regarding discharge and aftercare plans.     Writer sent PD to Chinmay to sign and return via fax (F: [phone])    Writer put out call to [facility] ([phone]) and scheduled primary care appointment. Located within [facility] updated.     Writer received return email from Chinmay relaying plan to visit pt on 3/13 at 9:30am for initial assessment.     Writer put out call to Staci ([phone]) and [facility] detailing current discharge and aftercare plans.     Discharge Plan or Goal:  Pt to discharge home with family on 3/14 after ECT. As pt is still uninsured, pt will discharge with a 30-day supply of medications with refills. Pt has a primary care appointment scheduled for continued care and medication management      Barriers to Discharge:  Patient requires further psychiatric stabilization due to current symptomology, medication management with changes subject to provider, coordination with outside supports, and aftercare planning     Referral Status:  MA application was submitted on 1/29. Per financial counselor, application was assigned to a [employer] worker on 3/4. Contact information for follow up after discharge added to the Located within [facility]    Primary care appointment scheduled      Legal Status:  Committed MI with ITP and Hoffmann Sang  [facility]  [id number]   ITP includes: Risperdal, Thorazine, Haldol, and Zyprexa  Expires: 06/10/2025    Contacts (include DENI status):  Mya - daughter (DENI)  P: [phone]    Golden - son (DENI)  P: [phone]     Mohamed - son (DENI)  P: [phone]     Aric - uncle (DENI)  P: [phone]    Rani - commitment CM supervisor through [employer] (DENI per commitment)  P: [phone]  E: [email or web address]    Chinmay Gould - commitment CM through [employer] (DENI per commitment)  P: [phone]  E: [email or web address]  F: [phone]  Note: Chinmay is off on Friday's     Upcoming Meetings and Dates/Important Information and next steps:  CTC to inform ECT of discharge date  CTC to provide pt with signed copy of PD  CTC to send PD and COS to [facility] at discharge   CM to visit pt on 3/13 at 9:30am

## 2025-03-11 NOTE — PLAN OF CARE
Problem: Anxiety Signs/Symptoms  Goal: Optimized Cognitive Function (Anxiety Signs/Symptoms)  Outcome: Progressing     Problem: Depressive Signs/Symptoms  Goal: Optimized Energy Level (Depressive Signs/Symptoms)  Intervention: Optimize Energy Level        Patient up and visible on the unit this  shift.   Presented with flat affect. Brightens up on approach. Patient denies all psych, symptoms,mood is calm, denies pain or discomfort. Watched TV. Patient is eating and drinking adequately. Medication compliant. No known concerns. Will monitor patient per plan of care.

## 2025-03-11 NOTE — PLAN OF CARE
Problem: Depressive Signs/Symptoms  Goal: Improved Sleep (Depressive Signs/Symptoms)  Outcome: Progressing   Goal Outcome Evaluation:             Received asleep, breathing unlabored .Pt is independent with her ADL's.  No behavioral or safety concerns encountered tonight. Pt is utilizing a medical bed to aid with mobility .  Slept for 8.75 hours

## 2025-03-11 NOTE — PROGRESS NOTES
This writer changed the bed linen in patient's room. Patient was notified and allowed verbally this writer to change her bed linens.

## 2025-03-12 LAB
ANION GAP SERPL CALCULATED.3IONS-SCNC: 9 MMOL/L (ref 7–15)
ANION GAP SERPL CALCULATED.3IONS-SCNC: 9 MMOL/L (ref 7–15)
BUN SERPL-MCNC: 12.8 MG/DL (ref 6–20)
BUN SERPL-MCNC: 12.8 MG/DL (ref 6–20)
CALCIUM SERPL-MCNC: 9.2 MG/DL (ref 8.8–10.4)
CALCIUM SERPL-MCNC: 9.2 MG/DL (ref 8.8–10.4)
CHLORIDE SERPL-SCNC: 100 MMOL/L (ref 98–107)
CHLORIDE SERPL-SCNC: 100 MMOL/L (ref 98–107)
CREAT SERPL-MCNC: 0.66 MG/DL (ref 0.51–0.95)
CREAT SERPL-MCNC: 0.66 MG/DL (ref 0.51–0.95)
EGFRCR SERPLBLD CKD-EPI 2021: >90 ML/MIN/1.73M2
EGFRCR SERPLBLD CKD-EPI 2021: >90 ML/MIN/1.73M2
GLUCOSE SERPL-MCNC: 87 MG/DL (ref 70–99)
GLUCOSE SERPL-MCNC: 87 MG/DL (ref 70–99)
HCO3 SERPL-SCNC: 24 MMOL/L (ref 22–29)
HCO3 SERPL-SCNC: 24 MMOL/L (ref 22–29)
HOLD SPECIMEN: NORMAL
HOLD SPECIMEN: NORMAL
MAGNESIUM SERPL-MCNC: 1.6 MG/DL (ref 1.7–2.3)
MAGNESIUM SERPL-MCNC: 1.6 MG/DL (ref 1.7–2.3)
POTASSIUM SERPL-SCNC: 4.2 MMOL/L (ref 3.4–5.3)
POTASSIUM SERPL-SCNC: 4.2 MMOL/L (ref 3.4–5.3)
SODIUM SERPL-SCNC: 133 MMOL/L (ref 135–145)
SODIUM SERPL-SCNC: 133 MMOL/L (ref 135–145)

## 2025-03-12 PROCEDURE — 250N000013 HC RX MED GY IP 250 OP 250 PS 637

## 2025-03-12 PROCEDURE — 250N000013 HC RX MED GY IP 250 OP 250 PS 637: Performed by: PSYCHIATRY & NEUROLOGY

## 2025-03-12 PROCEDURE — 80183 DRUG SCRN QUANT OXCARBAZEPIN: CPT | Performed by: PSYCHIATRY & NEUROLOGY

## 2025-03-12 PROCEDURE — 83735 ASSAY OF MAGNESIUM: CPT | Performed by: STUDENT IN AN ORGANIZED HEALTH CARE EDUCATION/TRAINING PROGRAM

## 2025-03-12 PROCEDURE — 124N000002 HC R&B MH UMMC

## 2025-03-12 PROCEDURE — 250N000013 HC RX MED GY IP 250 OP 250 PS 637: Performed by: STUDENT IN AN ORGANIZED HEALTH CARE EDUCATION/TRAINING PROGRAM

## 2025-03-12 PROCEDURE — 36415 COLL VENOUS BLD VENIPUNCTURE: CPT | Performed by: STUDENT IN AN ORGANIZED HEALTH CARE EDUCATION/TRAINING PROGRAM

## 2025-03-12 PROCEDURE — 97150 GROUP THERAPEUTIC PROCEDURES: CPT | Mod: GO

## 2025-03-12 PROCEDURE — 80048 BASIC METABOLIC PNL TOTAL CA: CPT | Performed by: STUDENT IN AN ORGANIZED HEALTH CARE EDUCATION/TRAINING PROGRAM

## 2025-03-12 PROCEDURE — 90853 GROUP PSYCHOTHERAPY: CPT

## 2025-03-12 PROCEDURE — 99231 SBSQ HOSP IP/OBS SF/LOW 25: CPT | Performed by: STUDENT IN AN ORGANIZED HEALTH CARE EDUCATION/TRAINING PROGRAM

## 2025-03-12 PROCEDURE — 36415 COLL VENOUS BLD VENIPUNCTURE: CPT | Performed by: PSYCHIATRY & NEUROLOGY

## 2025-03-12 RX ORDER — AMOXICILLIN 250 MG
1 CAPSULE ORAL 2 TIMES DAILY PRN
Qty: 60 TABLET | Refills: 3 | Status: SHIPPED | OUTPATIENT
Start: 2025-03-12

## 2025-03-12 RX ORDER — DOCUSATE SODIUM 100 MG/1
100 CAPSULE, LIQUID FILLED ORAL DAILY
Qty: 90 CAPSULE | Refills: 1 | Status: SHIPPED | OUTPATIENT
Start: 2025-03-12

## 2025-03-12 RX ORDER — LORAZEPAM 1 MG/1
0.5 TABLET ORAL 2 TIMES DAILY
Qty: 60 TABLET | Refills: 2 | Status: SHIPPED | OUTPATIENT
Start: 2025-03-12 | End: 2025-03-18

## 2025-03-12 RX ORDER — OXCARBAZEPINE 150 MG/1
150 TABLET, FILM COATED ORAL 2 TIMES DAILY
Qty: 60 TABLET | Refills: 3 | Status: SHIPPED | OUTPATIENT
Start: 2025-03-12

## 2025-03-12 RX ORDER — POLYETHYLENE GLYCOL 3350 17 G/17G
17 POWDER, FOR SOLUTION ORAL DAILY
Qty: 850 G | Refills: 3 | Status: SHIPPED | OUTPATIENT
Start: 2025-03-12

## 2025-03-12 RX ORDER — LEVOTHYROXINE SODIUM 100 UG/1
100 TABLET ORAL
Qty: 30 TABLET | Refills: 3 | Status: SHIPPED | OUTPATIENT
Start: 2025-03-13 | End: 2025-03-18

## 2025-03-12 RX ORDER — MAGNESIUM OXIDE 400 MG/1
400 TABLET ORAL 2 TIMES DAILY
Status: DISCONTINUED | OUTPATIENT
Start: 2025-03-12 | End: 2025-03-19 | Stop reason: HOSPADM

## 2025-03-12 RX ORDER — HYDROXYZINE HYDROCHLORIDE 25 MG/1
25 TABLET, FILM COATED ORAL EVERY 6 HOURS PRN
Qty: 60 TABLET | Refills: 3 | Status: SHIPPED | OUTPATIENT
Start: 2025-03-12

## 2025-03-12 RX ORDER — MAGNESIUM OXIDE 400 MG/1
400 TABLET ORAL 2 TIMES DAILY
Qty: 60 TABLET | Refills: 3 | Status: SHIPPED | OUTPATIENT
Start: 2025-03-12

## 2025-03-12 RX ORDER — CHLORPROMAZINE HYDROCHLORIDE 100 MG/1
100 TABLET, FILM COATED ORAL AT BEDTIME
Qty: 30 TABLET | Refills: 3 | Status: SHIPPED | OUTPATIENT
Start: 2025-03-12 | End: 2025-03-14

## 2025-03-12 RX ORDER — MULTIPLE VITAMINS W/ MINERALS TAB 9MG-400MCG
1 TAB ORAL DAILY
Qty: 90 TABLET | Refills: 1 | Status: SHIPPED | OUTPATIENT
Start: 2025-03-12

## 2025-03-12 RX ORDER — HALOPERIDOL 2 MG/1
2 TABLET ORAL EVERY MORNING
Qty: 30 TABLET | Refills: 3 | Status: SHIPPED | OUTPATIENT
Start: 2025-03-13 | End: 2025-03-17

## 2025-03-12 RX ADMIN — OXCARBAZEPINE 150 MG: 150 TABLET, FILM COATED ORAL at 19:45

## 2025-03-12 RX ADMIN — SERTRALINE HYDROCHLORIDE 25 MG: 25 TABLET ORAL at 08:23

## 2025-03-12 RX ADMIN — CHLORPROMAZINE HYDROCHLORIDE 100 MG: 100 TABLET, FILM COATED ORAL at 20:37

## 2025-03-12 RX ADMIN — POLYETHYLENE GLYCOL 3350 17 G: 17 POWDER, FOR SOLUTION ORAL at 08:23

## 2025-03-12 RX ADMIN — LEVOTHYROXINE SODIUM 100 MCG: 0.05 TABLET ORAL at 08:23

## 2025-03-12 RX ADMIN — OXCARBAZEPINE 150 MG: 150 TABLET, FILM COATED ORAL at 08:24

## 2025-03-12 RX ADMIN — MAGNESIUM OXIDE TAB 400 MG (241.3 MG ELEMENTAL MG) 400 MG: 400 (241.3 MG) TAB at 20:36

## 2025-03-12 RX ADMIN — DOCUSATE SODIUM 100 MG: 100 CAPSULE, LIQUID FILLED ORAL at 08:23

## 2025-03-12 RX ADMIN — Medication 5 MG: at 20:36

## 2025-03-12 RX ADMIN — MAGNESIUM OXIDE TAB 400 MG (241.3 MG ELEMENTAL MG) 400 MG: 400 (241.3 MG) TAB at 08:23

## 2025-03-12 RX ADMIN — Medication 1 TABLET: at 08:24

## 2025-03-12 RX ADMIN — Medication 500 ML: at 19:45

## 2025-03-12 RX ADMIN — LORAZEPAM 1 MG: 1 TABLET ORAL at 20:36

## 2025-03-12 RX ADMIN — HALOPERIDOL 2 MG: 2 TABLET ORAL at 08:23

## 2025-03-12 RX ADMIN — Medication 500 ML: at 08:23

## 2025-03-12 ASSESSMENT — ACTIVITIES OF DAILY LIVING (ADL)
ADLS_ACUITY_SCORE: 52
LAUNDRY: UNABLE TO COMPLETE
ADLS_ACUITY_SCORE: 52
ADLS_ACUITY_SCORE: 52
DRESS: STREET CLOTHES;INDEPENDENT
ADLS_ACUITY_SCORE: 52
ORAL_HYGIENE: INDEPENDENT
ADLS_ACUITY_SCORE: 52
HYGIENE/GROOMING: INDEPENDENT

## 2025-03-12 NOTE — PROGRESS NOTES
Rehab Group     Start time: 1900  End time: 1945  Patient time total: 45 minutes     attended full group     #9 attended   Group Type: music   Group Topic Covered: mindfulness, relaxation , and sensory intervention      Group Session Detail: Original Music Sharing - MT shared a series of 7 songs, performed live for the group this evening, on keyboard and voice, with themes of comfort and restoration.      Patient Response/Contribution:  attentive and engaged      Patient Detail: Calm affect, appropriately engaged in session, responding well to the music.  Participated more receptively.        Activity Therapy Per 15 min ()    Patient Active Problem List   Diagnosis    Mary (H)    Psychosis (H)    Schizoaffective disorder, bipolar type (H)    PTSD (post-traumatic stress disorder)    Catatonia

## 2025-03-12 NOTE — PLAN OF CARE
Rehab Group    Start time: 13:00  End time: 13:50  Patient time total: 45 minutes    attended full group    #5 attended   Group Type: occupational therapy   Group Topic Covered: cognitive activities, coping skills, healthy leisure time, and social skills     Group Session Detail:  Patient engaged in a cognitive-based leisure activity (Ruel) with peers. Therapeutic benefits and skills addressed during today's leisure activity include: problem solving, promoting attention/focus, improving social skills, and exploring healthy leisure opportunities.        Patient Response/Contribution:  cooperative with task, socially appropriate, listened actively, and actively engaged     Patient Detail:  Patient agreeable to engage in a leisure based activity with peers. Patient listened actively to the instructions of task and had a fair return demonstration of understanding. Patient required repetition of instructions with simplified cueing for successful engagement. Patient demonstrated the ability to sustain attention throughout duration of group. Affect: blunted; brightens upon approach and interaction. Mood: calm, cooperative, and pleasant.       [id number] OT Group (2 or more in attendance)      Patient Active Problem List   Diagnosis    Mary (H)    Psychosis (H)    Schizoaffective disorder, bipolar type (H)    PTSD (post-traumatic stress disorder)    Catatonia

## 2025-03-12 NOTE — PROGRESS NOTES
Patient seen, chart reviewed, care discussed with staff.  Blood pressure 132/74, pulse 70, temperature 98.2  F (36.8  C), temperature source Oral, resp. rate 20, weight 100.8 kg (222 lb 3.6 oz), SpO2 99%.  Moderate postural drop, NOT DIZZY.     General appearance: good  Alert.   Affect: good, smiles  Mood: euthymic  Speech:  normal.   Eye contact:  good.    Psychomotor behavior: normal  Gait: steady   Abnormal movements:  none  Delusions: none  Hallucinations:  denied  Thoughts: linear  Associations: intact  Judgement: good  Insight: good  Cognitions: intact in conversation  Memory:  intact in conversation  Orientation: normal    Not suicidal.    Imp:   Patient Active Problem List   Diagnosis    Mary (H)    Psychosis (H)    Schizoaffective disorder, bipolar type (H)    PTSD (post-traumatic stress disorder)    Catatonia      Plan:  Stop Zoloft due to Sodium  2.Discharge Friday    Current Facility-Administered Medications   Medication Dose Route Frequency Provider Last Rate Last Admin    acetaminophen (TYLENOL) tablet 650 mg  650 mg Oral Q4H PRN Octavio Parikh MD   650 mg at 02/19/25 1341    alum & mag hydroxide-simethicone (MAALOX) suspension 30 mL  30 mL Oral Q4H PRN Octavio Parikh MD        benzocaine-menthol (CHLORASEPTIC) 6-10 MG lozenge 1 lozenge  1 lozenge Buccal Q1H PRN Octavio Parikh MD   1 lozenge at 02/25/25 2006    chlorproMAZINE (THORAZINE) tablet 100 mg  100 mg Oral At Bedtime Chinmay Villeda MD   100 mg at 03/11/25 2003    chlorproMAZINE (THORAZINE) tablet 50 mg  50 mg Oral Q8H PRN Octavio Parikh MD        docusate sodium (COLACE) capsule 100 mg  100 mg Oral Daily Octavio Parikh MD   100 mg at 03/12/25 0823    guaiFENesin-dextromethorphan (ROBITUSSIN DM) 100-10 MG/5ML syrup 10 mL  10 mL Oral Q4H PRN Octavio Parikh MD        haloperidol (HALDOL) tablet 2 mg  2 mg Oral QAM Chinmay Villeda MD   2 mg at 03/12/25 0823    Or    haloperidol lactate (HALDOL) injection 5 mg  5  mg Intramuscular QAM Chinmay Villeda MD        Hold Medications for ECT (select and list medications to be held)   Does not apply HOLD Lorena Ibarra MD        hydrOXYzine HCl (ATARAX) tablet 25 mg  25 mg Oral Q6H PRN Octavio Parikh MD   25 mg at 02/15/25 0046    levothyroxine (SYNTHROID/LEVOTHROID) tablet 100 mcg  100 mcg Oral QAM AC Hunter Miranda PA-C   100 mcg at 03/12/25 0823    LORazepam (ATIVAN) tablet 1 mg  1 mg Oral BID Chinmay Villeda MD   1 mg at 03/11/25 2003    LORazepam (ATIVAN) tablet 1 mg  1 mg Oral Q4H PRN Octavio Parikh MD        magnesium hydroxide (MILK OF MAGNESIA) suspension 30 mL  30 mL Oral Daily PRN Octavio Parikh MD        magnesium oxide (MAG-OX) tablet 400 mg  400 mg Oral BID Jose Narvaez PA-C        melatonin tablet 3 mg  3 mg Oral At Bedtime PRN Octavio Parikh MD   3 mg at 02/14/25 2347    melatonin tablet 5 mg  5 mg Oral At Bedtime [place] Mota MD   5 mg at 03/11/25 2003    multivitamin w/minerals (THERA-VIT-M) tablet 1 tablet  1 tablet Oral Daily Octavio Parikh MD   1 tablet at 03/12/25 0824    OLANZapine (zyPREXA) tablet 10 mg  10 mg Oral TID PRN Octavio Parikh MD        Or    OLANZapine (zyPREXA) injection 10 mg  10 mg Intramuscular TID PRN Octavio Parikh MD        OXcarbazepine (TRILEPTAL) tablet 150 mg  150 mg Oral BID [place] Mota MD   150 mg at 03/12/25 0824    pediatric electrolyte (PEDIALYTE) solution 500 mL  500 mL Oral BID w/meals Khari Pino PA-C   500 mL at 03/12/25 0823    polyethylene glycol (MIRALAX) Packet 17 g  17 g Oral Daily Octavio Parikh MD   17 g at 03/12/25 0823    polyethylene glycol (MIRALAX) Packet 17 g  17 g Oral Daily PRN Octavio Parikh MD        senna-docusate (SENOKOT-S/PERICOLACE) 8.6-50 MG per tablet 1 tablet  1 tablet Oral BID PRN Octavio Parikh MD   1 tablet at 01/16/25 1215    sodium chloride (PF) 0.9% PF flush 3 mL  3 mL Intracatheter Q8H Khari Pino PA-C   3 mL at  03/12/25 0557     Recent Results (from the past week)   Basic metabolic panel    Collection Time: 03/11/25 11:40 AM   Result Value Ref Range    Sodium 133 (L) 135 - 145 mmol/L    Potassium 4.4 3.4 - 5.3 mmol/L    Chloride 99 98 - 107 mmol/L    Carbon Dioxide (CO2) 24 22 - 29 mmol/L    Anion Gap 10 7 - 15 mmol/L    Urea Nitrogen 13.1 6.0 - 20.0 mg/dL    Creatinine 0.68 0.51 - 0.95 mg/dL    GFR Estimate >90 >60 mL/min/1.73m2    Calcium 9.3 8.8 - 10.4 mg/dL    Glucose 96 70 - 99 mg/dL   Magnesium    Collection Time: 03/11/25 11:40 AM   Result Value Ref Range    Magnesium 1.5 (L) 1.7 - 2.3 mg/dL   Phosphorus    Collection Time: 03/11/25 11:40 AM   Result Value Ref Range    Phosphorus 2.7 2.5 - 4.5 mg/dL   Extra Purple Top Tube    Collection Time: 03/11/25 11:40 AM   Result Value Ref Range    Hold Specimen JIC    Magnesium    Collection Time: 03/12/25  6:59 AM   Result Value Ref Range    Magnesium 1.6 (L) 1.7 - 2.3 mg/dL   Basic metabolic panel    Collection Time: 03/12/25  6:59 AM   Result Value Ref Range    Sodium 133 (L) 135 - 145 mmol/L    Potassium 4.2 3.4 - 5.3 mmol/L    Chloride 100 98 - 107 mmol/L    Carbon Dioxide (CO2) 24 22 - 29 mmol/L    Anion Gap 9 7 - 15 mmol/L    Urea Nitrogen 12.8 6.0 - 20.0 mg/dL    Creatinine 0.66 0.51 - 0.95 mg/dL    GFR Estimate >90 >60 mL/min/1.73m2    Calcium 9.2 8.8 - 10.4 mg/dL    Glucose 87 70 - 99 mg/dL   Extra Purple Top Tube    Collection Time: 03/12/25  6:59 AM   Result Value Ref Range    Hold Specimen JIC

## 2025-03-12 NOTE — PLAN OF CARE
Problem: Depressive Signs/Symptoms  Goal: Improved Sleep (Depressive Signs/Symptoms)  Outcome: Progressing   Goal Outcome Evaluation:      Received asleep, pt has a medical bed to aid with mobility, HOB elevated at 30  for comfort. PIV in place and patent.  No behavioral or safety concerns tonight, all precautions in place.  Slept for 8.75 hours

## 2025-03-12 NOTE — PLAN OF CARE
Rehab Group    Start time: 10:20  End time: 11:45  Patient time total: 60 minutes    attended partial group    #9 attended-11   Group Type: OT Clinic x2   Group Topic Covered: balanced lifestyle, cognitive activities, coping skills, emotional regulation, healthy leisure time, mindfulness, relaxation , and social skills     Group Session Detail:  Pt actively participated in occupational therapy clinic to facilitate coping skill exploration, creative expression within personally meaningful activities, and clinical observation of social, cognitive, and kinesthetic performance skills.       Patient Response/Contribution:  cooperative with task, attentive, and actively engaged     Patient Detail: Patient entered group late. Patient was able to continue work on a previously begun task; good recall of instructions observed.  Pt response: set-up assist to initiate, gather materials, sequence, and adjust to workspace demands as needed. Demonstrated good focus, planning, and problem solving for selected yarn bowl creative task. Able to ask for assistance as needed. No social interaction with peers observed. Better initiation, concentration, and engagement observed this date as compared to previous encounters. Affect: blunted, brightens upon approach. Mood: calm, cooperative, and pleasant.       [id number] OT Group (2 or more in attendance)      Patient Active Problem List   Diagnosis    Mary (H)    Psychosis (H)    Schizoaffective disorder, bipolar type (H)    PTSD (post-traumatic stress disorder)    Catatonia

## 2025-03-12 NOTE — PROGRESS NOTES
Brief Medicine Progress Note     Chart checked today.     #Orthostatic hypotension  Patient has had ongoing issues with orthostatic hypotension over the past several weeks. Echocardiogram was obtained on 3/2 and showed normal EF 60-65% with no valvular abnormalities. Pt has been eating and drinking fine per nursing. On 3/10 psychiatry did reduce her Thorazine and Ativan to see if this would help with orthostatics. Received 1 L LR bolus on 3/11  - discussed pt with RN this morning. Pt did have 1x BP drop to the 90s but has otherwise been stable.  - Hold off on further IVFs for now   - Continue to monitor BP      #Hypomagnesemia, chronic   Patient is on 400 mg magnesium oxide at baseline.  On 3/11 magnesium noted to be low at 1.5 so she was given an additional 400 mg magnesium oxide with magnesium up to 1.6 this morning.  - increase magnesium oxide to 400 mg BID   - Recheck magnesium and BMP in 2 days     # Hyponatremia, mild   Na stable at 133 following 1 L of LR yesterday.   - Check BMP in 2 days if remains hospitalized, otherwise can recheck as an outpatient within the next week if discharged.       Medicine will continue to follow     Jose Narvaez PA-C  Internal Medicine [facility]  Pager [phone]      Approximately 15 minutes was spent on date review, discussion with nursing, medical decision making and documentation. Pt was not physically seen today.

## 2025-03-12 NOTE — PLAN OF CARE
BEH IP Unit Acuity Rating Score (UARS)  Patient is given one point for every criteria they meet.    CRITERIA SCORING   On a 72 hour hold, court hold, committed, stay of commitment, or revocation. 1    Patient LOS on BEH unit exceeds 20 days. 1  LOS: 68   Patient under guardianship, 55+, otherwise medically complex, or under age 11. 1   Suicide ideation without relief of precipitating factors. 0   Current plan for suicide. 0   Current plan for homicide. 0   Imminent risk or actual attempt to seriously harm another without relief of factors precipitating the attempt. 0   Severe dysfunction in daily living (ex: complete neglect for self care, extreme disruption in vegetative function, extreme deterioration in social interactions). 0   Recent (last 7 days) or current physical aggression in the ED or on unit. 0   Restraints or seclusion episode in past 72 hours. 0   Recent (last 7 days) or current verbal aggression, agitation, yelling, etc., while in the ED or unit. 0   Active psychosis. 0   Need for constant or near constant redirection (from leaving, from others, etc).  0   Intrusive or disruptive behaviors. 0   Patient requires 3 or more hours of individualized nursing care per 8-hour shift (i.e. for ADLs, meds, therapeutic interventions). 0   TOTAL 3

## 2025-03-12 NOTE — PLAN OF CARE
Group Attendance:  attended full group    Time session began: 1400  Time session ended: 1445  Patient's total time in group: 45    Total # Attendees   12   Group Type psychotherapeutic and psychoeducational     Group Topic Covered emotional regulation and healthy coping skills     Group Session Detail Group discussion focused on anger management. Participants discussed the difference between healthy and unhealthy anger, triggers to anger and coping skills to manage it in a healthy way.      Patient's response to the group topic/interactions:  positive affect, cooperative with task, organized, attentive, and actively engaged     Patient Details: Patient was calm and pleasant. She engaged in the discussion and shared personal experience with triggers and coping skills           [id number] - Group psychotherapy - 1 Session  Patient Active Problem List   Diagnosis    Mary (H)    Psychosis (H)    Schizoaffective disorder, bipolar type (H)    PTSD (post-traumatic stress disorder)    Catatonia

## 2025-03-12 NOTE — PLAN OF CARE
Problem: Adult Behavioral Health Plan of Care  Goal: Optimized Coping Skills in Response to Life Stressors  Outcome: Progressing   Goal Outcome Evaluation:    Plan of Care Reviewed With: patient    Pt orthostatic B/P sitting 116/71standing 77/53 patient drank 500ml of Pedialyte  B/P sitting 132/74 with sitting 93/63 medical team was updated.  patient denies all MH Sx.pt is bright and responding well with peers and staff . Attended groups and participated . Pt is in a good mood . Discharge planning

## 2025-03-12 NOTE — PLAN OF CARE
Initial meeting note:    Therapist introduced self to patient and discussed psychotherapy service available to patient.     Pt response: Pt expressed interest in meeting with therapist    Plan: Made plan to meet with pt again; began identifying goals     Met with pt in her room. Pt went over safety plan with writer. After pt went to group.

## 2025-03-12 NOTE — PLAN OF CARE
Team Note Due:  Monday    Assessment/Intervention/Current Symtoms and Care Coordination:  Chart review and met with team, discussed pt progress, symptomology, and response to treatment.  Discussed the discharge plan and any potential impediments to discharge. Pt completed ECT acute series and will have maintenance ECT on Friday's. Per RN, pt continues to have issues with low blood pressure and internal medicine is following.     Writer spoke with ECT and relayed plan for discharge on 3/14 after ECT. Writer confirmed that pt cannot receive outpatient ECT while uninsured. Writer informed provider. Plan for pt to discharge on 3/14 pending improvement in blood pressure issues.     Discharge Plan or Goal:  Pt to discharge home with family on 3/14 after ECT. As pt is still uninsured, pt will discharge with a 30-day supply of medications with refills. Pt has a primary care appointment scheduled for continued care and medication management      Barriers to Discharge:  Patient requires further psychiatric stabilization due to current symptomology, medication management with changes subject to provider, coordination with outside supports, and aftercare planning     Referral Status:  MA application was submitted on 1/29. Per financial counselor, application was assigned to a [facility] worker on 3/4. Contact information for follow up after discharge added to the [facility]    Primary care appointment scheduled     Legal Status:  Committed MI with ITP and [facility]  [id number]-IF-   ITP includes: Risperdal, Thorazine, Haldol, and Zyprexa  Expires: 06/10/2025    Contacts (include DENI status):  Mya - daughter (DENI)  P: [phone]    Golden - son (DENI)  P: [phone]     Mohamed - son (DENI)  P: [phone]     Aric - uncle (DENI)  P: [phone]    Rani - commitment CM supervisor through [employer] (DENI per commitment)  P: [phone]  E: [email or web address]    Chinmay Vicente  commitment CM through [employer] (DENI per commitment)  P: [phone]  E: [email or web address]  F: [phone]  Note: Chinmay is off on Friday's     Upcoming Meetings and Dates/Important Information and next steps:  CTC to provide pt with signed copy of PD  CTC to send PD and COS to [facility] at discharge   CM to visit pt on 3/13 at 9:30am

## 2025-03-13 PROCEDURE — 250N000013 HC RX MED GY IP 250 OP 250 PS 637: Performed by: PSYCHIATRY & NEUROLOGY

## 2025-03-13 PROCEDURE — 250N000013 HC RX MED GY IP 250 OP 250 PS 637

## 2025-03-13 PROCEDURE — 250N000013 HC RX MED GY IP 250 OP 250 PS 637: Performed by: STUDENT IN AN ORGANIZED HEALTH CARE EDUCATION/TRAINING PROGRAM

## 2025-03-13 PROCEDURE — 124N000002 HC R&B MH UMMC

## 2025-03-13 RX ADMIN — OXCARBAZEPINE 150 MG: 150 TABLET, FILM COATED ORAL at 17:31

## 2025-03-13 RX ADMIN — LORAZEPAM 1 MG: 1 TABLET ORAL at 08:07

## 2025-03-13 RX ADMIN — Medication 500 ML: at 18:28

## 2025-03-13 RX ADMIN — OXCARBAZEPINE 150 MG: 150 TABLET, FILM COATED ORAL at 08:07

## 2025-03-13 RX ADMIN — CHLORPROMAZINE HYDROCHLORIDE 100 MG: 100 TABLET, FILM COATED ORAL at 20:42

## 2025-03-13 RX ADMIN — MAGNESIUM OXIDE TAB 400 MG (241.3 MG ELEMENTAL MG) 400 MG: 400 (241.3 MG) TAB at 08:07

## 2025-03-13 RX ADMIN — POLYETHYLENE GLYCOL 3350 17 G: 17 POWDER, FOR SOLUTION ORAL at 08:07

## 2025-03-13 RX ADMIN — HALOPERIDOL 2 MG: 2 TABLET ORAL at 08:07

## 2025-03-13 RX ADMIN — MAGNESIUM OXIDE TAB 400 MG (241.3 MG ELEMENTAL MG) 400 MG: 400 (241.3 MG) TAB at 20:42

## 2025-03-13 RX ADMIN — Medication 1 TABLET: at 08:07

## 2025-03-13 RX ADMIN — DOCUSATE SODIUM 100 MG: 100 CAPSULE, LIQUID FILLED ORAL at 08:07

## 2025-03-13 RX ADMIN — Medication 5 MG: at 20:42

## 2025-03-13 RX ADMIN — Medication 500 ML: at 08:08

## 2025-03-13 RX ADMIN — LEVOTHYROXINE SODIUM 100 MCG: 0.05 TABLET ORAL at 08:07

## 2025-03-13 ASSESSMENT — ACTIVITIES OF DAILY LIVING (ADL)
ADLS_ACUITY_SCORE: 52
LAUNDRY: UNABLE TO COMPLETE
ADLS_ACUITY_SCORE: 52
ORAL_HYGIENE: INDEPENDENT
ADLS_ACUITY_SCORE: 52
ORAL_HYGIENE: INDEPENDENT
ADLS_ACUITY_SCORE: 52
DRESS: STREET CLOTHES;INDEPENDENT
ADLS_ACUITY_SCORE: 52
HYGIENE/GROOMING: INDEPENDENT
ADLS_ACUITY_SCORE: 52
HYGIENE/GROOMING: INDEPENDENT
ADLS_ACUITY_SCORE: 52

## 2025-03-13 NOTE — PLAN OF CARE
BEH IP Unit Acuity Rating Score (UARS)  Patient is given one point for every criteria they meet.    CRITERIA SCORING   On a 72 hour hold, court hold, committed, stay of commitment, or revocation. 1    Patient LOS on BEH unit exceeds 20 days. 1  LOS: 69   Patient under guardianship, 55+, otherwise medically complex, or under age 11. 1   Suicide ideation without relief of precipitating factors. 0   Current plan for suicide. 0   Current plan for homicide. 0   Imminent risk or actual attempt to seriously harm another without relief of factors precipitating the attempt. 0   Severe dysfunction in daily living (ex: complete neglect for self care, extreme disruption in vegetative function, extreme deterioration in social interactions). 0   Recent (last 7 days) or current physical aggression in the ED or on unit. 0   Restraints or seclusion episode in past 72 hours. 0   Recent (last 7 days) or current verbal aggression, agitation, yelling, etc., while in the ED or unit. 0   Active psychosis. 0   Need for constant or near constant redirection (from leaving, from others, etc).  0   Intrusive or disruptive behaviors. 0   Patient requires 3 or more hours of individualized nursing care per 8-hour shift (i.e. for ADLs, meds, therapeutic interventions). 0   TOTAL 3

## 2025-03-13 NOTE — PLAN OF CARE
Problem: Psychotic Signs/Symptoms  Goal: Improved Psychomotor Symptoms (Psychotic Signs/Symptoms)  Outcome: Progressing   Goal Outcome Evaluation:    Plan of Care Reviewed With: patient        Patient alert and oriented, standing blood pressure was 71/51 patient denied feeling dizzy. Paged Lukas in medicine. Provider said,  to encourage fluid intake, and recheck standing blood pressure at noon. Blood pressure rechecked  at noon, sitting 121/86 and standing 104/68. Updated Lukas in medicine with blood pressure, no new order. provider said,  to continue to monitor patient's blood pressure. Patient spent most of the shift  in and out of her room. Calmed and cooperative, denied all mental health symptoms. Ate well for breakfast and lunch. No concern with behaviors.

## 2025-03-13 NOTE — PLAN OF CARE
Goal Outcome Evaluation:    Plan of Care Reviewed With: patient      Problem: Adult Behavioral Health Plan of Care  Goal: Plan of Care Review  Outcome: Progressing  Flowsheets (Taken 3/12/2025 1951)  Patient Agreement with Plan of Care: agrees     Pt presented with a bright affect, calm mood during check in. Pt denied anxiety, depression, pain, SI, HI,hallucinations, and contracted for safety. Pt was visible in the milieu, social and engaged some with peers/staff. Attend and participated evening group. Compliant with all scheduled medications. Pt has hand tremor more noticeable when holding to drink and orthostasis. Pt makes needs known appropriately. Pt expressed appreciation of the care she has received here, looking forward to discharge home. No safety concerns observed this shift.

## 2025-03-13 NOTE — PLAN OF CARE
Team Note Due:  Monday    Assessment/Intervention/Current Symtoms and Care Coordination:  Chart review and met with team, discussed pt progress, symptomology, and response to treatment.  Discussed the discharge plan and any potential impediments to discharge. Pt to discharge home tomorrow after ECT.      met with CM, Chinmay, after he met with pt. Chinmay requested medication list be sent.  and Chinmay confirmed discharge and aftercare plans.     Writemarily met with pt and confirmed discharge plans. Pt was bright and smiling during interaction. Pt reported excitement for discharge.     Writer sent AVS to Chinmay via fax (F: [phone]). Writer also requested signed PD be sent to writer.     Discharge Plan or Goal:  Pt to discharge home with family tomorrow after ECT. As pt is still uninsured, pt will discharge with a 30-day supply of medications with refills. Pt has a primary care appointment scheduled for continued care and medication management      Barriers to Discharge:  Pt to discharge home with family tomorrow after ECT     Referral Status:  MA application was submitted on 1/29. Per financial counselor, application was assigned to a [facility] worker on 3/4. Contact information for follow up after discharge added to the S    Primary care appointment scheduled     Legal Status:  Committed MI with ITP and Shun Domínguez  [facility]  [id number]-  ITP includes: Risperdal, Thorazine, Haldol, and Zyprexa  Expires: 06/10/2025    Contacts (include DENI status):  Mya - daughter (DENI)  P: [phone]    Golden - son (DENI)  P: [phone]     Mohamed - son (DENI)  P: [phone]     Aric - uncle (DENI)  P: [phone]    Rani - commitment CM supervisor through [employer] (DENI per commitment)  P: [phone]  E: [email or web address] Ch[employer]    Chinmay Gould - commitment CM through [employer] (DENI per commitment)  P: [phone]  E: [email or web address]ating by Chantilly Lace  F: [phone]  Note: Chinmay is off on  Friday's     Upcoming Meetings and Dates/Important Information and next steps:  Pt to discharge home with family tomorrow after ECT  CTC to provide pt with signed copy of PD  CTC to send PD and COS to [facility] at discharge

## 2025-03-13 NOTE — PLAN OF CARE
Goal Outcome Evaluation:    Plan of Care Reviewed With: patient      Problem: Adult Behavioral Health Plan of Care  Goal: Plan of Care Review  Outcome: Progressing  Flowsheets (Taken 3/13/2025 1616)  Patient Agreement with Plan of Care: agrees     Pt presented with a bright affect, calm mood during check in. Pt denied anxiety, depression, pain, SI, HI,hallucinations, and contracted for safety. Pt was visible in the milieu, social and engaged more than before with peers/staff. Attended and participated in the evening unit play, corn hole in the broad. Pt also watched TV with peers. Compliant with all scheduled medications, Ativan was held due to ECT tomorrow. No side effects reported. Pt makes needs known appropriately. No safety concerns observed this shift.

## 2025-03-13 NOTE — PLAN OF CARE
Problem: Sleep Disturbance  Goal: Adequate Sleep/Rest  Outcome: Progressing  Intervention: Promote Sleep/Rest  Patient in bed sleeping when received .  Slept approximately 9.5 hours.  Safety checks and precautions in place.   No PRN given or requested.   Will monitor as needed.

## 2025-03-14 ENCOUNTER — ANESTHESIA (OUTPATIENT)
Dept: BEHAVIORAL HEALTH | Facility: CLINIC | Age: 60
End: 2025-03-14

## 2025-03-14 ENCOUNTER — ANESTHESIA EVENT (OUTPATIENT)
Dept: BEHAVIORAL HEALTH | Facility: CLINIC | Age: 60
End: 2025-03-14

## 2025-03-14 LAB
10OH-CARBAZEPINE SERPL-MCNC: 4.7 UG/ML
ANION GAP SERPL CALCULATED.3IONS-SCNC: 11 MMOL/L (ref 7–15)
BUN SERPL-MCNC: 14.8 MG/DL (ref 6–20)
CALCIUM SERPL-MCNC: 9.3 MG/DL (ref 8.8–10.4)
CHLORIDE SERPL-SCNC: 99 MMOL/L (ref 98–107)
CORTIS SERPL-MCNC: 13.5 UG/DL
CREAT SERPL-MCNC: 0.7 MG/DL (ref 0.51–0.95)
EGFRCR SERPLBLD CKD-EPI 2021: >90 ML/MIN/1.73M2
GLUCOSE SERPL-MCNC: 91 MG/DL (ref 70–99)
HCO3 SERPL-SCNC: 24 MMOL/L (ref 22–29)
HOLD SPECIMEN: NORMAL
MAGNESIUM SERPL-MCNC: 1.7 MG/DL (ref 1.7–2.3)
POTASSIUM SERPL-SCNC: 4.3 MMOL/L (ref 3.4–5.3)
SODIUM SERPL-SCNC: 134 MMOL/L (ref 135–145)
T4 FREE SERPL-MCNC: 1.27 NG/DL (ref 0.9–1.7)
TSH SERPL DL<=0.005 MIU/L-ACNC: 0.02 UIU/ML (ref 0.3–4.2)

## 2025-03-14 PROCEDURE — 83735 ASSAY OF MAGNESIUM: CPT | Performed by: STUDENT IN AN ORGANIZED HEALTH CARE EDUCATION/TRAINING PROGRAM

## 2025-03-14 PROCEDURE — 124N000002 HC R&B MH UMMC

## 2025-03-14 PROCEDURE — 90853 GROUP PSYCHOTHERAPY: CPT

## 2025-03-14 PROCEDURE — 250N000013 HC RX MED GY IP 250 OP 250 PS 637: Performed by: STUDENT IN AN ORGANIZED HEALTH CARE EDUCATION/TRAINING PROGRAM

## 2025-03-14 PROCEDURE — 250N000013 HC RX MED GY IP 250 OP 250 PS 637: Performed by: PSYCHIATRY & NEUROLOGY

## 2025-03-14 PROCEDURE — 258N000003 HC RX IP 258 OP 636: Performed by: STUDENT IN AN ORGANIZED HEALTH CARE EDUCATION/TRAINING PROGRAM

## 2025-03-14 PROCEDURE — 36415 COLL VENOUS BLD VENIPUNCTURE: CPT | Performed by: STUDENT IN AN ORGANIZED HEALTH CARE EDUCATION/TRAINING PROGRAM

## 2025-03-14 PROCEDURE — 99232 SBSQ HOSP IP/OBS MODERATE 35: CPT | Performed by: STUDENT IN AN ORGANIZED HEALTH CARE EDUCATION/TRAINING PROGRAM

## 2025-03-14 PROCEDURE — 82533 TOTAL CORTISOL: CPT | Performed by: STUDENT IN AN ORGANIZED HEALTH CARE EDUCATION/TRAINING PROGRAM

## 2025-03-14 PROCEDURE — 84439 ASSAY OF FREE THYROXINE: CPT | Performed by: STUDENT IN AN ORGANIZED HEALTH CARE EDUCATION/TRAINING PROGRAM

## 2025-03-14 PROCEDURE — 84443 ASSAY THYROID STIM HORMONE: CPT | Performed by: STUDENT IN AN ORGANIZED HEALTH CARE EDUCATION/TRAINING PROGRAM

## 2025-03-14 PROCEDURE — 80048 BASIC METABOLIC PNL TOTAL CA: CPT | Performed by: STUDENT IN AN ORGANIZED HEALTH CARE EDUCATION/TRAINING PROGRAM

## 2025-03-14 PROCEDURE — 97150 GROUP THERAPEUTIC PROCEDURES: CPT | Mod: GO

## 2025-03-14 RX ORDER — CHLORPROMAZINE HYDROCHLORIDE 50 MG/1
50 TABLET, FILM COATED ORAL AT BEDTIME
Status: DISCONTINUED | OUTPATIENT
Start: 2025-03-14 | End: 2025-03-19 | Stop reason: HOSPADM

## 2025-03-14 RX ORDER — CHLORPROMAZINE HYDROCHLORIDE 50 MG/1
50 TABLET, FILM COATED ORAL AT BEDTIME
Qty: 30 TABLET | Refills: 3 | Status: SHIPPED | OUTPATIENT
Start: 2025-03-14

## 2025-03-14 RX ORDER — LEVOTHYROXINE SODIUM 88 UG/1
88 TABLET ORAL
Status: DISCONTINUED | OUTPATIENT
Start: 2025-03-15 | End: 2025-03-19 | Stop reason: HOSPADM

## 2025-03-14 RX ORDER — MIDODRINE HYDROCHLORIDE 5 MG/1
5 TABLET ORAL 2 TIMES DAILY
Status: DISCONTINUED | OUTPATIENT
Start: 2025-03-14 | End: 2025-03-17

## 2025-03-14 RX ADMIN — Medication 500 ML: at 17:16

## 2025-03-14 RX ADMIN — LORAZEPAM 1 MG: 1 TABLET ORAL at 20:00

## 2025-03-14 RX ADMIN — SODIUM CHLORIDE 500 ML: 0.9 INJECTION, SOLUTION INTRAVENOUS at 16:35

## 2025-03-14 RX ADMIN — MIDODRINE HYDROCHLORIDE 5 MG: 5 TABLET ORAL at 20:00

## 2025-03-14 RX ADMIN — Medication 1 TABLET: at 13:53

## 2025-03-14 RX ADMIN — Medication 500 ML: at 14:00

## 2025-03-14 RX ADMIN — Medication 5 MG: at 20:00

## 2025-03-14 RX ADMIN — POLYETHYLENE GLYCOL 3350 17 G: 17 POWDER, FOR SOLUTION ORAL at 13:53

## 2025-03-14 RX ADMIN — HALOPERIDOL 2 MG: 2 TABLET ORAL at 13:53

## 2025-03-14 RX ADMIN — DOCUSATE SODIUM 100 MG: 100 CAPSULE, LIQUID FILLED ORAL at 13:53

## 2025-03-14 RX ADMIN — MAGNESIUM OXIDE TAB 400 MG (241.3 MG ELEMENTAL MG) 400 MG: 400 (241.3 MG) TAB at 20:00

## 2025-03-14 RX ADMIN — SODIUM CHLORIDE 500 ML: 0.9 INJECTION, SOLUTION INTRAVENOUS at 10:43

## 2025-03-14 RX ADMIN — MAGNESIUM OXIDE TAB 400 MG (241.3 MG ELEMENTAL MG) 400 MG: 400 (241.3 MG) TAB at 14:00

## 2025-03-14 ASSESSMENT — ACTIVITIES OF DAILY LIVING (ADL)
ADLS_ACUITY_SCORE: 52
ADLS_ACUITY_SCORE: 52
ORAL_HYGIENE: INDEPENDENT
LAUNDRY: UNABLE TO COMPLETE
ADLS_ACUITY_SCORE: 52
DRESS: STREET CLOTHES;INDEPENDENT
ADLS_ACUITY_SCORE: 52
HYGIENE/GROOMING: INDEPENDENT
ADLS_ACUITY_SCORE: 52

## 2025-03-14 NOTE — PLAN OF CARE
Goal Outcome Evaluation:    Problem: Psychotic Signs/Symptoms  Goal: Increased Participation and Engagement (Psychotic Signs/Symptoms)  Outcome: Not Progressing     Patient appeared flat and blunted. She was on SIO prior to ECT to prevent her from eating prior to ECT. She has been partially visible, patiently waiting to go to   ECT. Patient's BP has been running low all shift. Provider believes that its due to scheduled thorazine; medication has been reduced to 50mg from 100mg.    Internal medicine was in formed regarding patient's condition; Sodium Chloride 9% 500ml was ordered and administered. Patient's BP continued to be low. Attending psychiatrist and internal medicine were informed. ECT cancelled her scheduled ECT due to patients condition. Patient endorsed weakness.     /65 (BP Location: Right arm, Patient Position: Chair, Cuff Size: Adult Large)   Pulse 100   Temp 97.7  F (36.5  C) (Oral)   Resp 16   Wt 100.1 kg (220 lb 10.9 oz)   SpO2 97%   BMI 32.59 kg/m      Please continue monitoring patient closely. Complete orthostatic BP during each vital signs assessment. Patient has been ordered another Sodium Chloride 9% 500ml that will be started soon; medical flier informed. Medical flier on their way to start IV fluids.

## 2025-03-14 NOTE — PLAN OF CARE
Problem: Depressive Signs/Symptoms  Goal: Optimized Cognitive Function (Depressive Signs/Symptoms)  Outcome: Progressing     Problem: Psychotic Signs/Symptoms  Goal: Increased Participation and Engagement (Psychotic Signs/Symptoms)  Outcome: Progressing     Problem: Anxiety Signs/Symptoms  Goal: Improved Mood Symptoms (Anxiety Signs/Symptoms)  Outcome: Progressing     Problem: Suicidal Behavior  Goal: Suicidal Behavior is Absent or Managed  Outcome: Progressing    Patient up and visible on the unit most part of the shift. Presented with full range affect on approach. Denies all psych symptoms, denies pain. Patient continues to be hypotensive. Another bolus of fluid administered. BG rechecked and it was, 130/88.Patient is eating and drinking okay. Family was here to visit, visit went well, no other known concerns. Will continue to monitor as needed.

## 2025-03-14 NOTE — PLAN OF CARE
Goal Outcome Evaluation:    Problem: Sleep Disturbance  Goal: Adequate Sleep/Rest  Outcome: Progressing     Patient appeared to sleep throughout the night; total of 8 hours. No concerns voiced. We'll continue to monitor.

## 2025-03-14 NOTE — PROGRESS NOTES
Patient seen, chart reviewed, care discussed with staff.  Blood pressure 105/65, pulse 100, temperature 97.7  F (36.5  C), temperature source Oral, resp. rate 16, weight 100.1 kg (220 lb 10.9 oz), SpO2 99%.    Systolic BP low after standing, minimal dizziness., but I worry about her fall risk at home.    General appearance: good  Alert.   Affect: good, smiles  Mood: euthymic  Speech:  normal.   Eye contact:  good.    Psychomotor behavior: normal  Gait: steady   Abnormal movements:  none  Delusions: none  Hallucinations:  denied  Thoughts: linear  Associations: intact  Judgement: good  Insight: good  Cognitions: intact in conversation  Memory:  intact in conversation  Orientation: normal    Not suicidal.     Imp:       Patient Active Problem List   Diagnosis    Mary (H)    Psychosis (H)    Schizoaffective disorder, bipolar type (H)    PTSD (post-traumatic stress disorder)    Catatonia     Plan:  Decrease Thorazine further to 50mg  Plan discharge 3/17.    Current Facility-Administered Medications   Medication Dose Route Frequency Provider Last Rate Last Admin    acetaminophen (TYLENOL) tablet 650 mg  650 mg Oral Q4H PRN Octavio Parikh MD   650 mg at 02/19/25 1341    alum & mag hydroxide-simethicone (MAALOX) suspension 30 mL  30 mL Oral Q4H PRN Octavio Parikh MD        benzocaine-menthol (CHLORASEPTIC) 6-10 MG lozenge 1 lozenge  1 lozenge Buccal Q1H PRN Octavio Parikh MD   1 lozenge at 02/25/25 2006    chlorproMAZINE (THORAZINE) tablet 50 mg  50 mg Oral At Bedtime Chinmay Villeda MD        chlorproMAZINE (THORAZINE) tablet 50 mg  50 mg Oral Q8H PRN Octavio Parikh MD        docusate sodium (COLACE) capsule 100 mg  100 mg Oral Daily Octavio Parikh MD   100 mg at 03/13/25 0807    guaiFENesin-dextromethorphan (ROBITUSSIN DM) 100-10 MG/5ML syrup 10 mL  10 mL Oral Q4H PRN Octavio Parikh MD        haloperidol (HALDOL) tablet 2 mg  2 mg Oral QAM Chinmay Villeda MD   2 mg at 03/13/25 0807     Or    haloperidol lactate (HALDOL) injection 5 mg  5 mg Intramuscular QAM Chinmay Villeda MD        Hold Medications for ECT (select and list medications to be held)   Does not apply HOLD Lorena Ibarra MD        hydrOXYzine HCl (ATARAX) tablet 25 mg  25 mg Oral Q6H PRN Octavio Parikh MD   25 mg at 02/15/25 0046    levothyroxine (SYNTHROID/LEVOTHROID) tablet 100 mcg  100 mcg Oral QAM AC Hunter Miranda PA-C   100 mcg at 03/13/25 0807    LORazepam (ATIVAN) tablet 1 mg  1 mg Oral BID Chinmay Villeda MD   1 mg at 03/13/25 0807    LORazepam (ATIVAN) tablet 1 mg  1 mg Oral Q4H PRN Octavio Parikh MD        magnesium hydroxide (MILK OF MAGNESIA) suspension 30 mL  30 mL Oral Daily PRN Octavio Parikh MD        magnesium oxide (MAG-OX) tablet 400 mg  400 mg Oral BID Jose Narvaez PA-C   400 mg at 03/13/25 2042    melatonin tablet 3 mg  3 mg Oral At Bedtime PRN Octavio Parikh MD   3 mg at 02/14/25 2347    melatonin tablet 5 mg  5 mg Oral At Bedtime [place] Mota MD   5 mg at 03/13/25 2042    multivitamin w/minerals (THERA-VIT-M) tablet 1 tablet  1 tablet Oral Daily Octavio Parikh MD   1 tablet at 03/13/25 0807    OLANZapine (zyPREXA) tablet 10 mg  10 mg Oral TID PRN Octavio Parikh MD        Or    OLANZapine (zyPREXA) injection 10 mg  10 mg Intramuscular TID PRN Octavio Parikh MD        OXcarbazepine (TRILEPTAL) tablet 150 mg  150 mg Oral BID [place] Mota MD   150 mg at 03/13/25 1731    pediatric electrolyte (PEDIALYTE) solution 500 mL  500 mL Oral BID w/meals Khari Pino PA-C   500 mL at 03/13/25 1828    polyethylene glycol (MIRALAX) Packet 17 g  17 g Oral Daily Octavio Parikh MD   17 g at 03/13/25 0807    polyethylene glycol (MIRALAX) Packet 17 g  17 g Oral Daily PRN Octavio Parikh MD        senna-docusate (SENOKOT-S/PERICOLACE) 8.6-50 MG per tablet 1 tablet  1 tablet Oral BID PRN Octavio Parikh MD   1 tablet at 01/16/25 1504    sodium chloride  (PF) 0.9% PF flush 3 mL  3 mL Intracatheter Q8H Khari Pino, EDELMIRA   3 mL at 03/14/25 0642     Recent Results (from the past week)   Basic metabolic panel    Collection Time: 03/11/25 11:40 AM   Result Value Ref Range    Sodium 133 (L) 135 - 145 mmol/L    Potassium 4.4 3.4 - 5.3 mmol/L    Chloride 99 98 - 107 mmol/L    Carbon Dioxide (CO2) 24 22 - 29 mmol/L    Anion Gap 10 7 - 15 mmol/L    Urea Nitrogen 13.1 6.0 - 20.0 mg/dL    Creatinine 0.68 0.51 - 0.95 mg/dL    GFR Estimate >90 >60 mL/min/1.73m2    Calcium 9.3 8.8 - 10.4 mg/dL    Glucose 96 70 - 99 mg/dL   Magnesium    Collection Time: 03/11/25 11:40 AM   Result Value Ref Range    Magnesium 1.5 (L) 1.7 - 2.3 mg/dL   Phosphorus    Collection Time: 03/11/25 11:40 AM   Result Value Ref Range    Phosphorus 2.7 2.5 - 4.5 mg/dL   Extra Purple Top Tube    Collection Time: 03/11/25 11:40 AM   Result Value Ref Range    Hold Specimen JIC    Magnesium    Collection Time: 03/12/25  6:59 AM   Result Value Ref Range    Magnesium 1.6 (L) 1.7 - 2.3 mg/dL   Basic metabolic panel    Collection Time: 03/12/25  6:59 AM   Result Value Ref Range    Sodium 133 (L) 135 - 145 mmol/L    Potassium 4.2 3.4 - 5.3 mmol/L    Chloride 100 98 - 107 mmol/L    Carbon Dioxide (CO2) 24 22 - 29 mmol/L    Anion Gap 9 7 - 15 mmol/L    Urea Nitrogen 12.8 6.0 - 20.0 mg/dL    Creatinine 0.66 0.51 - 0.95 mg/dL    GFR Estimate >90 >60 mL/min/1.73m2    Calcium 9.2 8.8 - 10.4 mg/dL    Glucose 87 70 - 99 mg/dL   Extra Purple Top Tube    Collection Time: 03/12/25  6:59 AM   Result Value Ref Range    Hold Specimen JIC    Magnesium    Collection Time: 03/14/25  7:15 AM   Result Value Ref Range    Magnesium 1.7 1.7 - 2.3 mg/dL   Basic metabolic panel    Collection Time: 03/14/25  7:15 AM   Result Value Ref Range    Sodium 134 (L) 135 - 145 mmol/L    Potassium 4.3 3.4 - 5.3 mmol/L    Chloride 99 98 - 107 mmol/L    Carbon Dioxide (CO2) 24 22 - 29 mmol/L    Anion Gap 11 7 - 15 mmol/L    Urea Nitrogen 14.8 6.0 - 20.0  mg/dL    Creatinine 0.70 0.51 - 0.95 mg/dL    GFR Estimate >90 >60 mL/min/1.73m2    Calcium 9.3 8.8 - 10.4 mg/dL    Glucose 91 70 - 99 mg/dL   Extra Purple Top Tube    Collection Time: 03/14/25  7:15 AM   Result Value Ref Range    Hold Specimen JIC    Cortisol    Collection Time: 03/14/25  7:15 AM   Result Value Ref Range    Cortisol 13.5   ug/dL   TSH with free T4 reflex    Collection Time: 03/14/25  7:15 AM   Result Value Ref Range    TSH 0.02 (L) 0.30 - 4.20 uIU/mL   T4 free    Collection Time: 03/14/25  7:15 AM   Result Value Ref Range    Free T4 1.27 0.90 - 1.70 ng/dL

## 2025-03-14 NOTE — PLAN OF CARE
BEH IP Unit Acuity Rating Score (UARS)  Patient is given one point for every criteria they meet.    CRITERIA SCORING   On a 72 hour hold, court hold, committed, stay of commitment, or revocation. 1    Patient LOS on BEH unit exceeds 20 days. 1  LOS: 70   Patient under guardianship, 55+, otherwise medically complex, or under age 11. 1   Suicide ideation without relief of precipitating factors. 0   Current plan for suicide. 0   Current plan for homicide. 0   Imminent risk or actual attempt to seriously harm another without relief of factors precipitating the attempt. 0   Severe dysfunction in daily living (ex: complete neglect for self care, extreme disruption in vegetative function, extreme deterioration in social interactions). 0   Recent (last 7 days) or current physical aggression in the ED or on unit. 0   Restraints or seclusion episode in past 72 hours. 0   Recent (last 7 days) or current verbal aggression, agitation, yelling, etc., while in the ED or unit. 0   Active psychosis. 0   Need for constant or near constant redirection (from leaving, from others, etc).  0   Intrusive or disruptive behaviors. 0   Patient requires 3 or more hours of individualized nursing care per 8-hour shift (i.e. for ADLs, meds, therapeutic interventions). 0   TOTAL 3

## 2025-03-14 NOTE — PROGRESS NOTES
Brief Medicine Progress Note     Chart checked today.     #Orthostatic hypotension  Patient has had ongoing issues with orthostatic hypotension over the past several weeks. Echocardiogram was obtained on 3/2 and showed normal EF 60-65% with no valvular abnormalities. Pt has been eating and drinking fine per nursing. On 3/10 psychiatry did reduce her Thorazine and Ativan to see if this would help with orthostatics. Received 1 L LR bolus on 3/11.   - This morning she was again orthostatic with drop 105/65->65/48  - Will give 500 ml NS bolus now   - Added TSH with reflux and AM cortisol to this mornings labs   - Medication review for possible culprits of hypotension: Thorazine, halperidol, lorazapam, oxcarbazepine, and PRN olanzapine  - The most likely culprit amongst these medications is Thorazine so I would recommend further dose reduction   - if orthostatic BP is not improved with medication dose reduction we could consider adding on Midodrine in the mornings but I would prefer to trial medication reduction first       Update 1440  Paged by nursing that ECT was canceled today due to low blood pressures.  This afternoon patient again orthostatic with sitting blood pressure 107/72 with pulse 106 and standing blood pressure of 62/41, pulse 133.  A.m. cortisol returned within normal limits.  TSH returned low with normal free T4, indicating patient is likely on too high dose of Synthroid but this is likely noncontributory to blood pressure.  - Suspicion remains high that medication, specifically Thorazine, is the culprit of orthostatics  -Given degree of orthostatics will give another 500 cc of normal saline this afternoon  - Will start midodrine 5 mg twice daily this evening. Hopefully this will support blood pressures enough to allow the pt to undergo ECT while also weaning off Thorazine       # Hypothyroidism   Patient on levothyroxine 100 mcg.  TSH low at 0.02, free T4 normal at 1.27.  - Reduce dose of levothyroxine  to 88 mcg daily  - Recheck TSH with reflex in 6 weeks    #Hypomagnesemia, chronic   Patient is on 400 mg magnesium oxide at baseline.  On 3/11 magnesium noted to be low at 1.5 so she was given an additional 400 mg magnesium oxide with magnesium up to 1.6 the following morning.   - Mag 1.7 this morning, low end of normal range but is increasing   - Continue Magnesium oxide BID   - Check magnesium as needed     # Hyponatremia, mild   Baseline Na appears to be 132-137. Na has now been stable within this range since 3/11.   - No need to trend further  - Check metabolic panel as needed         Medicine will continue to follow     Jose Narvaez PA-C  Internal Medicine [facility]  [facility]  Pager [phone]      Approximately 30 minutes was spent on date review, discussion with nursing, medical decision making and documentation. Pt was not physically seen today.

## 2025-03-14 NOTE — PLAN OF CARE
Team Note Due:  Monday    Assessment/Intervention/Current Symtoms and Care Coordination:  Chart review and met with team, discussed pt progress, symptomology, and response to treatment.  Discussed the discharge plan and any potential impediments to discharge. Per provider, pt continues to struggle with low blood pressure and discharge has been postponed to 3/17.      Writer spoke with Staci ([phone]) and relayed plan for delayed discharge. Writer relayed plan to confirm discharge plans with Staci on Monday.     Discharge Plan or Goal:  Pt to discharge home with family on 3/17, pending improved blood pressure. As pt is still uninsured, pt will discharge with a 30-day supply of medications with refills. Pt has a primary care appointment scheduled for continued care and medication management      Barriers to Discharge:  Pt to discharge home with family on 3/17      Referral Status:  MA application was submitted on 1/29. Per financial counselor, application was assigned to a [facility] worker on 3/4. Contact information for follow up after discharge added to the [facility]    Primary care appointment scheduled     Legal Status:  Committed MI with ITP and Shun Domínguez  [facility]  [id number]  ITP includes: Risperdal, Thorazine, Haldol, and Zyprexa  Expires: 06/10/2025    Contacts (include DENI status):  Mya - daughter (DENI)  P: [phone]    Golden - son (DENI)  P: [phone]     Staci - son (DENI)  P: [phone]     Aric - uncle (DENI)  P: [phone]    Rani - commitment CM supervisor through [employer] (DENI per commitment)  P: [phone]  E: [email or web address]    Chinmay Gould - commitment CM through [employer] (DENI per commitment)  P: [phone]  E: [email or web address]  F: [phone]  Note: Chinmay is off on Friday's     Upcoming Meetings and Dates/Important Information and next steps:  Pt to discharge home with family on 3/17  Writer to send PD and COS to [facility] at  discharge

## 2025-03-15 PROCEDURE — 250N000013 HC RX MED GY IP 250 OP 250 PS 637: Performed by: STUDENT IN AN ORGANIZED HEALTH CARE EDUCATION/TRAINING PROGRAM

## 2025-03-15 PROCEDURE — 250N000013 HC RX MED GY IP 250 OP 250 PS 637: Performed by: PSYCHIATRY & NEUROLOGY

## 2025-03-15 PROCEDURE — 97150 GROUP THERAPEUTIC PROCEDURES: CPT | Mod: GO

## 2025-03-15 PROCEDURE — 124N000002 HC R&B MH UMMC

## 2025-03-15 RX ADMIN — CHLORPROMAZINE HYDROCHLORIDE 50 MG: 50 TABLET, FILM COATED ORAL at 19:43

## 2025-03-15 RX ADMIN — MAGNESIUM OXIDE TAB 400 MG (241.3 MG ELEMENTAL MG) 400 MG: 400 (241.3 MG) TAB at 19:43

## 2025-03-15 RX ADMIN — Medication 500 ML: at 08:31

## 2025-03-15 RX ADMIN — OXCARBAZEPINE 150 MG: 150 TABLET, FILM COATED ORAL at 08:29

## 2025-03-15 RX ADMIN — MIDODRINE HYDROCHLORIDE 5 MG: 5 TABLET ORAL at 19:43

## 2025-03-15 RX ADMIN — MAGNESIUM OXIDE TAB 400 MG (241.3 MG ELEMENTAL MG) 400 MG: 400 (241.3 MG) TAB at 08:29

## 2025-03-15 RX ADMIN — POLYETHYLENE GLYCOL 3350 17 G: 17 POWDER, FOR SOLUTION ORAL at 08:29

## 2025-03-15 RX ADMIN — LEVOTHYROXINE SODIUM 88 MCG: 88 TABLET ORAL at 08:29

## 2025-03-15 RX ADMIN — Medication 5 MG: at 19:43

## 2025-03-15 RX ADMIN — Medication 500 ML: at 19:42

## 2025-03-15 RX ADMIN — Medication 1 TABLET: at 08:29

## 2025-03-15 RX ADMIN — LORAZEPAM 1 MG: 1 TABLET ORAL at 19:43

## 2025-03-15 RX ADMIN — OXCARBAZEPINE 150 MG: 150 TABLET, FILM COATED ORAL at 16:38

## 2025-03-15 RX ADMIN — MIDODRINE HYDROCHLORIDE 5 MG: 5 TABLET ORAL at 08:29

## 2025-03-15 RX ADMIN — DOCUSATE SODIUM 100 MG: 100 CAPSULE, LIQUID FILLED ORAL at 08:29

## 2025-03-15 RX ADMIN — HALOPERIDOL 2 MG: 2 TABLET ORAL at 08:29

## 2025-03-15 ASSESSMENT — ACTIVITIES OF DAILY LIVING (ADL)
ADLS_ACUITY_SCORE: 52
DRESS: SCRUBS (BEHAVIORAL HEALTH);INDEPENDENT
ADLS_ACUITY_SCORE: 52
LAUNDRY: WITH SUPERVISION
ADLS_ACUITY_SCORE: 52
ORAL_HYGIENE: INDEPENDENT
ADLS_ACUITY_SCORE: 52
LAUNDRY: UNABLE TO COMPLETE
HYGIENE/GROOMING: INDEPENDENT
DRESS: INDEPENDENT;STREET CLOTHES
ADLS_ACUITY_SCORE: 52
HYGIENE/GROOMING: INDEPENDENT
ADLS_ACUITY_SCORE: 52
ORAL_HYGIENE: INDEPENDENT

## 2025-03-15 NOTE — PLAN OF CARE
Problem: Adult Behavioral Health Plan of Care  Goal: Plan of Care Review  Outcome: Progressing    Problem: Depressive Signs/Symptoms  Goal: Optimized Energy Level (Depressive Signs/Symptoms)  Outcome: Progressing  Intervention: Optimize Energy Level    Goal Outcome Evaluation:    Plan of Care Reviewed With: patient      Patient presents with a flat affect but brightens during conversations, mood is calm, been up visible at the milieu, watching TV at times and attended group. Patient denied all psych symptoms, no complain pain, had orthostatic BP drop but better than yesterday and IM notified, held the AM scheduled ativan, is eating and drinking adequately, IV to the left arm is patent and flushing, was medication compliant.

## 2025-03-15 NOTE — PLAN OF CARE
Problem: Depressive Signs/Symptoms  Goal: Optimized Cognitive Function (Depressive Signs/Symptoms)  Outcome: Progressing     Problem: Psychotic Signs/Symptoms  Goal: Increased Participation and Engagement (Psychotic Signs/Symptoms)  Outcome: Progressing     Problem: Suicidal Behavior  Goal: Suicidal Behavior is Absent or Managed  Outcome: Progressing       Patient up and visible on the unit.   Presented with flat affect and blunted affect, but brightens up on approach. Patient denies all psych symptoms, denies pain, patient is chloé for safety. Medication compliant. Eating and drinking adequately. Will monitor as needed for the rest of the shift.

## 2025-03-15 NOTE — PLAN OF CARE
Group Attendance:  attended full group    Time session began: 1900  Time session ended: 1945  Patient's total time in group: 45    Total # Attendees   11   Group Type psychotherapeutic     Group Topic Covered emotional regulation, healthy coping skills, mindfulness, and relaxation and grounding techniques/coping skills     Group Session Detail Group members checked in with how they are feeling today. Group played the game  LiveStubte  where they had to think of word combinations as a way to relax, take their mind off of their anxieties and worries, and engaged in playfulness. When member's words matched, they got points. Pt's practiced mindfulness as they thought of word combinations, paid attention to others answers for matched to their own and read off cards for group as their turn came up. Group members engaged in discussions about different combinations and matches.      Patient's response to the group topic/interactions:  cooperative with task, organized, supportive of peers, socially appropriate, listened actively, expressed understanding of topic, attentive, and actively engaged     Patient Details: Pt played game and engaged with peers a couple of times. Pt was more playful with the game and her peers.           [id number] - Group psychotherapy - 1 Session  Patient Active Problem List   Diagnosis    Mary (H)    Psychosis (H)    Schizoaffective disorder, bipolar type (H)    PTSD (post-traumatic stress disorder)    Catatonia

## 2025-03-16 PROCEDURE — 97150 GROUP THERAPEUTIC PROCEDURES: CPT | Mod: GO

## 2025-03-16 PROCEDURE — 250N000013 HC RX MED GY IP 250 OP 250 PS 637: Performed by: PSYCHIATRY & NEUROLOGY

## 2025-03-16 PROCEDURE — 250N000013 HC RX MED GY IP 250 OP 250 PS 637: Performed by: STUDENT IN AN ORGANIZED HEALTH CARE EDUCATION/TRAINING PROGRAM

## 2025-03-16 PROCEDURE — 124N000002 HC R&B MH UMMC

## 2025-03-16 RX ADMIN — OXCARBAZEPINE 150 MG: 150 TABLET, FILM COATED ORAL at 16:12

## 2025-03-16 RX ADMIN — POLYETHYLENE GLYCOL 3350 17 G: 17 POWDER, FOR SOLUTION ORAL at 08:24

## 2025-03-16 RX ADMIN — Medication 500 ML: at 08:26

## 2025-03-16 RX ADMIN — HALOPERIDOL 2 MG: 2 TABLET ORAL at 08:24

## 2025-03-16 RX ADMIN — Medication 1 TABLET: at 08:24

## 2025-03-16 RX ADMIN — Medication 5 MG: at 20:15

## 2025-03-16 RX ADMIN — Medication 500 ML: at 17:43

## 2025-03-16 RX ADMIN — MAGNESIUM OXIDE TAB 400 MG (241.3 MG ELEMENTAL MG) 400 MG: 400 (241.3 MG) TAB at 20:15

## 2025-03-16 RX ADMIN — MIDODRINE HYDROCHLORIDE 5 MG: 5 TABLET ORAL at 20:15

## 2025-03-16 RX ADMIN — MAGNESIUM OXIDE TAB 400 MG (241.3 MG ELEMENTAL MG) 400 MG: 400 (241.3 MG) TAB at 08:24

## 2025-03-16 RX ADMIN — MIDODRINE HYDROCHLORIDE 5 MG: 5 TABLET ORAL at 08:24

## 2025-03-16 RX ADMIN — CHLORPROMAZINE HYDROCHLORIDE 50 MG: 50 TABLET, FILM COATED ORAL at 20:15

## 2025-03-16 RX ADMIN — LEVOTHYROXINE SODIUM 88 MCG: 88 TABLET ORAL at 08:24

## 2025-03-16 RX ADMIN — DOCUSATE SODIUM 100 MG: 100 CAPSULE, LIQUID FILLED ORAL at 08:24

## 2025-03-16 RX ADMIN — OXCARBAZEPINE 150 MG: 150 TABLET, FILM COATED ORAL at 08:24

## 2025-03-16 ASSESSMENT — ACTIVITIES OF DAILY LIVING (ADL)
ADLS_ACUITY_SCORE: 52
LAUNDRY: UNABLE TO COMPLETE
ADLS_ACUITY_SCORE: 52
ADLS_ACUITY_SCORE: 52
ORAL_HYGIENE: INDEPENDENT
DRESS: INDEPENDENT
ADLS_ACUITY_SCORE: 52
ADLS_ACUITY_SCORE: 52
DRESS: INDEPENDENT;STREET CLOTHES
ADLS_ACUITY_SCORE: 52
HYGIENE/GROOMING: INDEPENDENT
ORAL_HYGIENE: INDEPENDENT
ADLS_ACUITY_SCORE: 52
HYGIENE/GROOMING: INDEPENDENT

## 2025-03-16 NOTE — PLAN OF CARE
Problem: Depressive Signs/Symptoms  Goal: Improved Sleep (Depressive Signs/Symptoms)  Outcome: Progressing   Goal Outcome Evaluation:                    Received asleep, pt has a medical bed to aid with mobility. PIV in place and patent. No complaints made tonight. Calm and brightened on approach.  Slept for 10 hours

## 2025-03-16 NOTE — PLAN OF CARE
Problem: Adult Behavioral Health Plan of Care  Goal: Plan of Care Review  Outcome: Progressing  Goal Outcome Evaluation:    Plan of Care Reviewed With: patient      Patient was visible on the unit, withdrawn to self watching TV at times, bright upon approach, Attended unit activities and ate 100% of her meals with good fluid intake. Was pleasant, cooperative, and medication compliant. Denied all psych symptoms,  denies pain, Ativan held due to orthostatic BP,  Went up for a shower and no other concerns noted.

## 2025-03-16 NOTE — PLAN OF CARE
Problem: Adult Behavioral Health Plan of Care  Goal: Plan of Care Review  Outcome: Progressing  Flowsheets  Taken 3/16/2025 1636  Plan of Care Reviewed With: patient  Taken 3/16/2025 1627  Patient Agreement with Plan of Care: agrees   Goal Outcome Evaluation:    Plan of Care Reviewed With: patient Plan of Care Reviewed With: patient    Pt pleasant and cooperative upon approach; Flat affect,Mood calm,quiet and withdrawn , minimal interaction with peers, eating 100 % of meals and hydrating adequately; Pt denied SI, SIB, HI, and hallucinations;  Attending to ADL's appropriately; no behavioral escalation or concerns noted this shift.     Pt going for ECT tomorrow@1240, scheduled ATIVAN held due to ECT tomorrow   Discharge Plan: Pt to discharge home with family on 3/17     ./75 (BP Location: Right arm, Patient Position: Chair, Cuff Size: Adult Large)   Pulse 70   Temp 97.5  F (36.4  C) (Oral)   Resp 16   Wt 99.5 kg (219 lb 5.7 oz)   SpO2 98%   BMI 32.39 kg/m

## 2025-03-16 NOTE — PLAN OF CARE
Rehab Group    Start time: 1815  End time: 1910  Patient time total: 45 minutes    attended full group    #9 attended   Group Type: occupational therapy and general health and coping   Group Topic Covered: cognitive activities, coping skills, and healthy leisure time   Group Session Detail:OT Cognitive Wellness group:Patient engaged in a leisure activity with a visuospatial and cognitive component in order to promote: problem solving skills, improve attention/focus/concentration, emphasize new learning, exercise cognitive skills/recall, and foster healthy leisure and healthy distraction    Patient Response/Contribution:  cooperative with task and worked intermittently   Patient Detail:pt calm. Pt polite and pleasant during interactions when staff initiated. Pt does brighten with engagement. Pt shared she talked with her daughter today. Pt readily able to follow flow of activity. Pt was somewhat independent with task following initial instructions and demonstration. Therapist provided additional assistance and instruction since English is patient's second language. Pt able to remain mostly focused for each round of activity. Pt responses were in context. Pt endorsed importance of cognitive engagement.       [id number] OT Group (2 or more in attendance)      Patient Active Problem List   Diagnosis    Mary (H)    Psychosis (H)    Schizoaffective disorder, bipolar type (H)    PTSD (post-traumatic stress disorder)    Catatonia

## 2025-03-16 NOTE — PLAN OF CARE
Rehab Group    Start time: 1015  End time: 1110  Patient time total: 42 minutes    attended full group    #8 attended   Group Type: OT Clinic   Group Topic Covered: cognitive activities, coping skills, healthy leisure time, and problem solving   Group Session Detail:OT: Education on healthy activity engagement and creative hands-on endeavor (OT clinic) to increase concentration, focus, attention to task/detail, decision making, problem solving, frustration tolerance, task follow through, coping with stress, healthy leisure engagement, creative expression, and social engagement    Patient Response/Contribution:  cooperative with task, socially appropriate, and actively engaged   Patient Detail:pt quiet, calm. Pt pleasant during interactions with therapist. Pt brightened during engagement with therapist. Pt does not readily engage with peers. Pt chose to engage in previous familiar creative task. Pt was ind with supplies and decision making. Pt used monochromatic scheme with a minimal details. Pt was able to complete yarn bowl task at start of group-pt worked quietly and remained focused through completion      [id number] OT Group (2 or more in attendance)      Patient Active Problem List   Diagnosis    Mary (H)    Psychosis (H)    Schizoaffective disorder, bipolar type (H)    PTSD (post-traumatic stress disorder)    Catatonia

## 2025-03-17 ENCOUNTER — ANESTHESIA EVENT (OUTPATIENT)
Dept: BEHAVIORAL HEALTH | Facility: CLINIC | Age: 60
DRG: 885 | End: 2025-03-17

## 2025-03-17 ENCOUNTER — APPOINTMENT (OUTPATIENT)
Dept: BEHAVIORAL HEALTH | Facility: CLINIC | Age: 60
DRG: 885 | End: 2025-03-17
Attending: PSYCHIATRY & NEUROLOGY

## 2025-03-17 ENCOUNTER — ANESTHESIA (OUTPATIENT)
Dept: BEHAVIORAL HEALTH | Facility: CLINIC | Age: 60
DRG: 885 | End: 2025-03-17

## 2025-03-17 PROCEDURE — 250N000013 HC RX MED GY IP 250 OP 250 PS 637: Performed by: PHYSICIAN ASSISTANT

## 2025-03-17 PROCEDURE — 250N000009 HC RX 250

## 2025-03-17 PROCEDURE — 250N000013 HC RX MED GY IP 250 OP 250 PS 637: Performed by: PSYCHIATRY & NEUROLOGY

## 2025-03-17 PROCEDURE — 250N000011 HC RX IP 250 OP 636

## 2025-03-17 PROCEDURE — T1013 SIGN LANG/ORAL INTERPRETER: HCPCS

## 2025-03-17 PROCEDURE — 90870 ELECTROCONVULSIVE THERAPY: CPT

## 2025-03-17 PROCEDURE — GZB2ZZZ ELECTROCONVULSIVE THERAPY, BILATERAL-SINGLE SEIZURE: ICD-10-PCS | Performed by: PSYCHIATRY & NEUROLOGY

## 2025-03-17 PROCEDURE — 250N000013 HC RX MED GY IP 250 OP 250 PS 637: Performed by: NURSE PRACTITIONER

## 2025-03-17 PROCEDURE — 370N000017 HC ANESTHESIA TECHNICAL FEE, PER MIN: Performed by: ANESTHESIOLOGY

## 2025-03-17 PROCEDURE — 90870 ELECTROCONVULSIVE THERAPY: CPT | Performed by: PSYCHIATRY & NEUROLOGY

## 2025-03-17 PROCEDURE — 99232 SBSQ HOSP IP/OBS MODERATE 35: CPT | Performed by: NURSE PRACTITIONER

## 2025-03-17 PROCEDURE — 124N000002 HC R&B MH UMMC

## 2025-03-17 PROCEDURE — 258N000003 HC RX IP 258 OP 636

## 2025-03-17 PROCEDURE — 250N000013 HC RX MED GY IP 250 OP 250 PS 637: Performed by: STUDENT IN AN ORGANIZED HEALTH CARE EDUCATION/TRAINING PROGRAM

## 2025-03-17 RX ORDER — FLUMAZENIL 0.1 MG/ML
0.3 INJECTION, SOLUTION INTRAVENOUS ONCE
Start: 2025-03-17 | End: 2025-03-17

## 2025-03-17 RX ORDER — MIDODRINE HYDROCHLORIDE 5 MG/1
5 TABLET ORAL
Qty: 90 TABLET | Refills: 0 | Status: SHIPPED | OUTPATIENT
Start: 2025-03-17

## 2025-03-17 RX ORDER — METHOHEXITAL IN WATER/PF 100MG/10ML
SYRINGE (ML) INTRAVENOUS PRN
Status: DISCONTINUED | OUTPATIENT
Start: 2025-03-17 | End: 2025-03-17

## 2025-03-17 RX ORDER — LORAZEPAM 0.5 MG/1
0.5 TABLET ORAL AT BEDTIME
Qty: 30 TABLET | Refills: 0 | Status: SHIPPED | OUTPATIENT
Start: 2025-03-17

## 2025-03-17 RX ORDER — LABETALOL 20 MG/4 ML (5 MG/ML) INTRAVENOUS SYRINGE
PRN
Status: DISCONTINUED | OUTPATIENT
Start: 2025-03-17 | End: 2025-03-17

## 2025-03-17 RX ORDER — FLUMAZENIL 0.1 MG/ML
0.3 INJECTION, SOLUTION INTRAVENOUS ONCE
Status: DISCONTINUED | OUTPATIENT
Start: 2025-03-17 | End: 2025-03-17

## 2025-03-17 RX ORDER — MIDODRINE HYDROCHLORIDE 5 MG/1
5 TABLET ORAL
Status: DISCONTINUED | OUTPATIENT
Start: 2025-03-17 | End: 2025-03-19 | Stop reason: HOSPADM

## 2025-03-17 RX ORDER — AMANTADINE HYDROCHLORIDE 100 MG/1
100 CAPSULE, GELATIN COATED ORAL 2 TIMES DAILY
Status: DISCONTINUED | OUTPATIENT
Start: 2025-03-17 | End: 2025-03-17

## 2025-03-17 RX ORDER — LEVOTHYROXINE SODIUM 88 UG/1
88 TABLET ORAL
Qty: 30 TABLET | Refills: 0 | Status: SHIPPED | OUTPATIENT
Start: 2025-03-17

## 2025-03-17 RX ORDER — AMANTADINE HYDROCHLORIDE 100 MG/1
100 CAPSULE, GELATIN COATED ORAL 2 TIMES DAILY
Qty: 60 CAPSULE | Refills: 0 | Status: SHIPPED | OUTPATIENT
Start: 2025-03-17 | End: 2025-03-18

## 2025-03-17 RX ORDER — MIDODRINE HYDROCHLORIDE 5 MG/1
5 TABLET ORAL 2 TIMES DAILY
Qty: 60 TABLET | Refills: 0 | Status: SHIPPED | OUTPATIENT
Start: 2025-03-17 | End: 2025-03-17

## 2025-03-17 RX ORDER — LORAZEPAM 0.5 MG/1
0.5 TABLET ORAL AT BEDTIME
Status: DISCONTINUED | OUTPATIENT
Start: 2025-03-17 | End: 2025-03-19 | Stop reason: HOSPADM

## 2025-03-17 RX ADMIN — CHLORPROMAZINE HYDROCHLORIDE 50 MG: 50 TABLET, FILM COATED ORAL at 19:58

## 2025-03-17 RX ADMIN — Medication 1 TABLET: at 14:31

## 2025-03-17 RX ADMIN — LEVOTHYROXINE SODIUM 88 MCG: 88 TABLET ORAL at 14:31

## 2025-03-17 RX ADMIN — LORAZEPAM 0.5 MG: 0.5 TABLET ORAL at 20:02

## 2025-03-17 RX ADMIN — LABETALOL 20 MG/4 ML (5 MG/ML) INTRAVENOUS SYRINGE 20 MG: at 13:14

## 2025-03-17 RX ADMIN — MAGNESIUM OXIDE TAB 400 MG (241.3 MG ELEMENTAL MG) 400 MG: 400 (241.3 MG) TAB at 14:33

## 2025-03-17 RX ADMIN — MIDODRINE HYDROCHLORIDE 5 MG: 5 TABLET ORAL at 17:23

## 2025-03-17 RX ADMIN — Medication 100 MG: at 13:04

## 2025-03-17 RX ADMIN — MAGNESIUM OXIDE TAB 400 MG (241.3 MG ELEMENTAL MG) 400 MG: 400 (241.3 MG) TAB at 19:58

## 2025-03-17 RX ADMIN — DEXMEDETOMIDINE HYDROCHLORIDE 20 MCG: 100 INJECTION, SOLUTION INTRAVENOUS at 13:09

## 2025-03-17 RX ADMIN — Medication 500 ML: at 14:35

## 2025-03-17 RX ADMIN — POLYETHYLENE GLYCOL 3350 17 G: 17 POWDER, FOR SOLUTION ORAL at 14:31

## 2025-03-17 RX ADMIN — DOCUSATE SODIUM 100 MG: 100 CAPSULE, LIQUID FILLED ORAL at 14:31

## 2025-03-17 RX ADMIN — MIDODRINE HYDROCHLORIDE 5 MG: 5 TABLET ORAL at 10:20

## 2025-03-17 RX ADMIN — Medication 500 ML: at 18:35

## 2025-03-17 RX ADMIN — OXCARBAZEPINE 150 MG: 150 TABLET, FILM COATED ORAL at 17:23

## 2025-03-17 RX ADMIN — Medication 5 MG: at 19:58

## 2025-03-17 RX ADMIN — SUCCINYLCHOLINE CHLORIDE 70 MG: 20 INJECTION, SOLUTION INTRAMUSCULAR; INTRAVENOUS; PARENTERAL at 13:05

## 2025-03-17 RX ADMIN — ACETAMINOPHEN 650 MG: 325 TABLET, FILM COATED ORAL at 16:21

## 2025-03-17 ASSESSMENT — ACTIVITIES OF DAILY LIVING (ADL)
ADLS_ACUITY_SCORE: 52
HYGIENE/GROOMING: INDEPENDENT
ADLS_ACUITY_SCORE: 52

## 2025-03-17 NOTE — PROGRESS NOTES
Patients VSS, A/O X4 , Denies pain and denies nausea, IV flushed with saline and left in place, meets phase 2 criteria and is able to move to discharge at this time.

## 2025-03-17 NOTE — PLAN OF CARE
BEH IP Unit Acuity Rating Score (UARS)  Patient is given one point for every criteria they meet.    CRITERIA SCORING   On a 72 hour hold, court hold, committed, stay of commitment, or revocation. 1    Patient LOS on BEH unit exceeds 20 days. 1  LOS: 73   Patient under guardianship, 55+, otherwise medically complex, or under age 11. 1   Suicide ideation without relief of precipitating factors. 0   Current plan for suicide. 0   Current plan for homicide. 0   Imminent risk or actual attempt to seriously harm another without relief of factors precipitating the attempt. 0   Severe dysfunction in daily living (ex: complete neglect for self care, extreme disruption in vegetative function, extreme deterioration in social interactions). 0   Recent (last 7 days) or current physical aggression in the ED or on unit. 0   Restraints or seclusion episode in past 72 hours. 0   Recent (last 7 days) or current verbal aggression, agitation, yelling, etc., while in the ED or unit. 0   Active psychosis. 0   Need for constant or near constant redirection (from leaving, from others, etc).  0   Intrusive or disruptive behaviors. 0   Patient requires 3 or more hours of individualized nursing care per 8-hour shift (i.e. for ADLs, meds, therapeutic interventions). 0   TOTAL 3

## 2025-03-17 NOTE — ANESTHESIA POSTPROCEDURE EVALUATION
Patient: Angela Basurto    Procedure: * No procedures listed *       Anesthesia Type:  General    Note:  Disposition: Outpatient   Postop Pain Control: Uneventful            Sign Out: Well controlled pain   PONV: No   Neuro/Psych: Uneventful            Sign Out: Acceptable/Baseline neuro status   Airway/Respiratory: Uneventful            Sign Out: Acceptable/Baseline resp. status   CV/Hemodynamics: Uneventful            Sign Out: Acceptable CV status; No obvious hypovolemia; No obvious fluid overload   Other NRE: NONE   DID A NON-ROUTINE EVENT OCCUR? No       Last vitals:  Vitals:    03/17/25 1330 03/17/25 1345 03/17/25 1411   BP: (!) 141/67 (!) 145/65 115/75   Pulse: 76 73 63   Resp: 19 16    Temp: 36.8  C (98.3  F) 36.7  C (98.1  F) 36.7  C (98  F)   SpO2: 96% 95% 98%       Electronically Signed By: Sushma Heller MD  March 17, 2025  2:53 PM

## 2025-03-17 NOTE — ANESTHESIA CARE TRANSFER NOTE
Patient: Angela Basurto    Procedure: * No procedures listed *       Diagnosis: * No pre-op diagnosis entered *  Diagnosis Additional Information: No value filed.    Anesthesia Type:   General     Note:    Oropharynx: oropharynx clear of all foreign objects and spontaneously breathing  Level of Consciousness: drowsy  Oxygen Supplementation: room air    Independent Airway: airway patency satisfactory and stable  Dentition: dentition unchanged  Vital Signs Stable: post-procedure vital signs reviewed and stable  Report to RN Given: handoff report given  Patient transferred to: Phase II    Handoff Report: Identifed the Patient, Identified the Reponsible Provider, Reviewed the pertinent medical history, Discussed the surgical course, Reviewed Intra-OP anesthesia mangement and issues during anesthesia, Set expectations for post-procedure period and Allowed opportunity for questions and acknowledgement of understanding  Vitals:  Vitals Value Taken Time   BP     Temp     Pulse     Resp     SpO2         Electronically Signed By: Sushma Heller MD  March 17, 2025  1:23 PM
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
## 2025-03-17 NOTE — PLAN OF CARE
Team Note Due:  Monday    Assessment/Intervention/Current Symtoms and Care Coordination:  Chart review and met with team, discussed pt progress, symptomology, and response to treatment.  Discussed the discharge plan and any potential impediments to discharge. Per provider, pt continues to struggle with low blood pressure and discharge has been postponed to 3/19.     Writer put out call to Staci ([phone]) and left detailed voicemail relaying plan to delay discharge until 3/19 to ensure pt's blood pressure is better controlled.     Discharge Plan or Goal:  Pt to discharge home with family on 3/19, pending improved blood pressure. As pt is still uninsured, pt will discharge with a 30-day supply of medications with refills. Pt has a primary care appointment scheduled for continued care and medication management      Barriers to Discharge:  Pt to discharge home with family on 3/19      Referral Status:  MA application was submitted on 1/29. Per financial counselor, application was assigned to a [facility] worker on 3/4. Contact information for follow up after discharge added to the [facility]    Primary care appointment scheduled     Legal Status:  Committed MI with ITP and Shun Domínguez  [facility]  [id number]  ITP includes: Risperdal, Thorazine, Haldol, and Zyprexa  Expires: 06/10/2025    Contacts (include DENI status):  Mya - daughter (DENI)  P: [phone]    Golden - son (DENI)  P: [phone]     Staci - son (DENI)  P: [phone]     Aric - uncle (DENI)  P: [phone]    Rani - commitment CM supervisor through [employer] (DENI per commitment)  P: [phone]  E: [email or web address]    Chinmay Gould - commitment CM through [employer] (DENI per commitment)  P: [phone]  E: [email or web address]  F: [phone]  Note: Chinmay is off on Friday's     Upcoming Meetings and Dates/Important Information and next steps:  Pt to discharge home with family on 3/19  Writer to send PD and COS to  [facility] at discharge

## 2025-03-17 NOTE — PLAN OF CARE
Goal Outcome Evaluation:    Plan of Care Reviewed With: patient      Problem: Adult Behavioral Health Plan of Care  Goal: Plan of Care Review  Outcome: Progressing  Flowsheets (Taken 3/17/2025 1100)  Patient Agreement with Plan of Care: agrees    Pt presented with a bright affect, calm mood during check in. Pt denied anxiety, depression, pain, SI, HI,hallucinations, and contracted for safety. Pt was visible in the milieu, social and engaged with peers/staff. Did not attend group, waiting. Compliant with all scheduled medications, Trileptal, too close to next dose after pt came from ECT. More hand/mouth involuntary movement. ECT procedure went well . Pt makes needs known appropriately. No safety concerns observed this shift.

## 2025-03-17 NOTE — ANESTHESIA PREPROCEDURE EVALUATION
Anesthesia Pre-Procedure Evaluation    Patient: Angela Basurto   MRN: [id number] : 1965        Procedure : * No procedures listed *          No past medical history on file.   No past surgical history on file.   Allergies   Allergen Reactions     Pork-Derived Products       Social History     Tobacco Use     Smoking status: Not on file     Smokeless tobacco: Not on file   Substance Use Topics     Alcohol use: Not on file      Wt Readings from Last 1 Encounters:   25 99.5 kg (219 lb 5.7 oz)        Anesthesia Evaluation   Pt has had prior anesthetic. Type: General.    No history of anesthetic complications       ROS/MED HX  ENT/Pulmonary:       Neurologic:       Cardiovascular:       METS/Exercise Tolerance:     Hematologic:       Musculoskeletal:       GI/Hepatic:       Renal/Genitourinary:       Endo:     (+)               Obesity,       Psychiatric/Substance Use:     (+) psychiatric history anxiety, depression, schizophrenia and other (comment) (Catatonia, psychosis, SI)       Infectious Disease:       Malignancy:       Other:            Physical Exam    Airway        Mallampati: III   TM distance: > 3 FB   Neck ROM: full   Mouth opening: < 3 cm    Respiratory Devices and Support         Dental       (+) Minor Abnormalities - some fillings, tiny chips    B=Bridge, C=Chipped, L=Loose, M=Missing    Cardiovascular   cardiovascular exam normal          Pulmonary   pulmonary exam normal              OUTSIDE LABS:  CBC:   Lab Results   Component Value Date    WBC 6.4 2025    WBC 4.6 2025    HGB 11.2 (L) 2025    HGB 10.2 (L) 2025    HCT 35.0 2025    HCT 31.8 (L) 2025     2025    PLT  2025      Comment:      Platelet count unavailable - platelets are clumped.     BMP:   Lab Results   Component Value Date     (L) 2025     (L) 2025    POTASSIUM 4.3 2025    POTASSIUM 4.2 2025    CHLORIDE 99 2025    CHLORIDE 100  "03/12/2025    CO2 24 03/14/2025    CO2 24 03/12/2025    BUN 14.8 03/14/2025    BUN 12.8 03/12/2025    CR 0.70 03/14/2025    CR 0.66 03/12/2025    GLC 91 03/14/2025    GLC 87 03/12/2025     COAGS: No results found for: \"PTT\", \"INR\", \"FIBR\"  POC: No results found for: \"BGM\", \"HCG\", \"HCGS\"  HEPATIC:   Lab Results   Component Value Date    ALBUMIN 3.7 01/27/2025    PROTTOTAL 6.9 01/27/2025    ALT 15 01/27/2025    AST 15 01/27/2025    ALKPHOS 69 01/27/2025    BILITOTAL 0.2 01/27/2025    ERICKA 55 (H) 01/27/2025     OTHER:   Lab Results   Component Value Date    A1C 5.6 11/27/2024    FERMIN 9.3 03/14/2025    PHOS 2.7 03/11/2025    MAG 1.7 03/14/2025    TSH 0.02 (L) 03/14/2025    T4 1.27 03/14/2025       Anesthesia Plan    ASA Status:  3    NPO Status:  NPO Appropriate    Anesthesia Type: General.     - Airway: Mask Only   Induction: Intravenous.   Maintenance: N/A.        Consents    Anesthesia Plan(s) and associated risks, benefits, and realistic alternatives discussed. Questions answered and patient/representative(s) expressed understanding.     - Discussed: Risks, Benefits and Alternatives for BOTH SEDATION and the PROCEDURE were discussed     - Discussed with:  Patient      - Extended Intubation/Ventilatory Support Discussed: No.      - Patient is DNR/DNI Status: No     Use of blood products discussed: No .     Postoperative Care    Pain management: Oral pain medications.        Comments:    Other Comments: Plan: IV induction, hyperventilation with mask and recovery, for ECT.            Sushma Heller MD    I have reviewed the pertinent notes and labs in the chart from the past 30 days and (re)examined the patient.  Any updates or changes from those notes are reflected in this note.    Clinically Significant Risk Factors               # Hypoalbuminemia: Lowest albumin = 3.4 g/dL at 1/7/2025  7:45 AM, will monitor as appropriate                    # Financial/Environmental Concerns:             "

## 2025-03-17 NOTE — PLAN OF CARE
Problem: Depressive Signs/Symptoms  Goal: Improved Mood Symptoms (Depressive Signs/Symptoms)  Outcome: Progressing   Goal Outcome Evaluation:                 Received asleep, kept on NPO for maintenance ECT scheduled for 1240 today. PIV in place and patent. Calm mood and bright affect upon interaction.Pt is aware she is having ECT today and reminded to be NPO.No oral pre ECT medications administered.  Plan is for pt to discharge home today.  Pt has a medical bed to aid with mobility.  Slept for 9 hours
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
## 2025-03-17 NOTE — PROGRESS NOTES
Brief medicine note. Discharge canceled due to ongoing orthostatic hypotension. Midodrine increased to tid with meals. Will continue to follow. Requested orthostatics be done both before and after midodrine to monitor effect.
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
## 2025-03-18 PROCEDURE — 250N000013 HC RX MED GY IP 250 OP 250 PS 637: Performed by: STUDENT IN AN ORGANIZED HEALTH CARE EDUCATION/TRAINING PROGRAM

## 2025-03-18 PROCEDURE — 250N000013 HC RX MED GY IP 250 OP 250 PS 637: Performed by: NURSE PRACTITIONER

## 2025-03-18 PROCEDURE — 250N000013 HC RX MED GY IP 250 OP 250 PS 637: Performed by: PSYCHIATRY & NEUROLOGY

## 2025-03-18 PROCEDURE — H2032 ACTIVITY THERAPY, PER 15 MIN: HCPCS

## 2025-03-18 PROCEDURE — 124N000002 HC R&B MH UMMC

## 2025-03-18 PROCEDURE — 250N000013 HC RX MED GY IP 250 OP 250 PS 637: Performed by: PHYSICIAN ASSISTANT

## 2025-03-18 PROCEDURE — 99232 SBSQ HOSP IP/OBS MODERATE 35: CPT | Performed by: PSYCHIATRY & NEUROLOGY

## 2025-03-18 RX ADMIN — LEVOTHYROXINE SODIUM 88 MCG: 88 TABLET ORAL at 08:08

## 2025-03-18 RX ADMIN — ACETAMINOPHEN 650 MG: 325 TABLET, FILM COATED ORAL at 08:08

## 2025-03-18 RX ADMIN — MIDODRINE HYDROCHLORIDE 5 MG: 5 TABLET ORAL at 17:24

## 2025-03-18 RX ADMIN — Medication 5 MG: at 20:12

## 2025-03-18 RX ADMIN — OXCARBAZEPINE 150 MG: 150 TABLET, FILM COATED ORAL at 08:08

## 2025-03-18 RX ADMIN — Medication 500 ML: at 17:34

## 2025-03-18 RX ADMIN — MIDODRINE HYDROCHLORIDE 5 MG: 5 TABLET ORAL at 08:08

## 2025-03-18 RX ADMIN — LORAZEPAM 0.5 MG: 0.5 TABLET ORAL at 20:12

## 2025-03-18 RX ADMIN — MIDODRINE HYDROCHLORIDE 5 MG: 5 TABLET ORAL at 11:51

## 2025-03-18 RX ADMIN — MAGNESIUM OXIDE TAB 400 MG (241.3 MG ELEMENTAL MG) 400 MG: 400 (241.3 MG) TAB at 20:12

## 2025-03-18 RX ADMIN — POLYETHYLENE GLYCOL 3350 17 G: 17 POWDER, FOR SOLUTION ORAL at 08:08

## 2025-03-18 RX ADMIN — DOCUSATE SODIUM 100 MG: 100 CAPSULE, LIQUID FILLED ORAL at 08:08

## 2025-03-18 RX ADMIN — OXCARBAZEPINE 150 MG: 150 TABLET, FILM COATED ORAL at 17:24

## 2025-03-18 RX ADMIN — MAGNESIUM OXIDE TAB 400 MG (241.3 MG ELEMENTAL MG) 400 MG: 400 (241.3 MG) TAB at 08:08

## 2025-03-18 RX ADMIN — CHLORPROMAZINE HYDROCHLORIDE 50 MG: 50 TABLET, FILM COATED ORAL at 20:12

## 2025-03-18 RX ADMIN — Medication 500 ML: at 08:09

## 2025-03-18 RX ADMIN — Medication 1 TABLET: at 08:08

## 2025-03-18 ASSESSMENT — ACTIVITIES OF DAILY LIVING (ADL)
ADLS_ACUITY_SCORE: 52
ADLS_ACUITY_SCORE: 52
DRESS: INDEPENDENT
ADLS_ACUITY_SCORE: 52
LAUNDRY: WITH SUPERVISION
ADLS_ACUITY_SCORE: 52
HYGIENE/GROOMING: INDEPENDENT
ADLS_ACUITY_SCORE: 52
ORAL_HYGIENE: INDEPENDENT

## 2025-03-18 NOTE — PLAN OF CARE
BEH IP Unit Acuity Rating Score (UARS)  Patient is given one point for every criteria they meet.    CRITERIA SCORING   On a 72 hour hold, court hold, committed, stay of commitment, or revocation. 1    Patient LOS on BEH unit exceeds 20 days. 1  LOS: 74   Patient under guardianship, 55+, otherwise medically complex, or under age 11. 1   Suicide ideation without relief of precipitating factors. 0   Current plan for suicide. 0   Current plan for homicide. 0   Imminent risk or actual attempt to seriously harm another without relief of factors precipitating the attempt. 0   Severe dysfunction in daily living (ex: complete neglect for self care, extreme disruption in vegetative function, extreme deterioration in social interactions). 0   Recent (last 7 days) or current physical aggression in the ED or on unit. 0   Restraints or seclusion episode in past 72 hours. 0   Recent (last 7 days) or current verbal aggression, agitation, yelling, etc., while in the ED or unit. 0   Active psychosis. 0   Need for constant or near constant redirection (from leaving, from others, etc).  0   Intrusive or disruptive behaviors. 0   Patient requires 3 or more hours of individualized nursing care per 8-hour shift (i.e. for ADLs, meds, therapeutic interventions). 0   TOTAL 3

## 2025-03-18 NOTE — PLAN OF CARE
Team Note Due:  Monday    Assessment/Intervention/Current Symtoms and Care Coordination:  Chart review and met with team, discussed pt progress, symptomology, and response to treatment.  Discussed the discharge plan and any potential impediments to discharge. Per provider, pt continues to struggle with low blood pressure and discharge has been postponed to tomorrow. Per RN, pt's blood pressure was improved today.     Discharge Plan or Goal:  Pt to discharge home with family tomorrow, pending improved blood pressure. As pt is still uninsured, pt will discharge with a 30-day supply of medications with refills. Pt has a primary care appointment scheduled for continued care and medication management      Barriers to Discharge:  Pt to discharge home with family tomorrow      Referral Status:  MA application was submitted on 1/29. Per financial counselor, application was assigned to a [facility] worker on 3/4. Contact information for follow up after discharge added to the [facility]    Primary care appointment scheduled     Legal Status:  Committed MI with ITP and Shun Domínguez  [facility]  51-BH-DC-  ITP includes: Risperdal, Thorazine, Haldol, and Zyprexa  Expires: 06/10/2025    Contacts (include DENI status):  Mya - daughter (DENI)  P: [phone]    Golden - son (DENI)  P: [phone]     Mohamed - son (DENI)  P: [phone]     Aric - uncle (DENI)  P: [phone]    Rani - commitment CM supervisor through [facility] (DENI per commitment)  P: [phone]  E: [email or web address]    Chinmay Gould - commitment CM through [facility] (DENI per commitment)  P: [phone]  E: [email or web address]  F: [phone]  Note: Chinmay is off on Friday's     Upcoming Meetings and Dates/Important Information and next steps:  Pt to discharge home with family tomorrow  Writer to send PD and COS to [facility] at discharge

## 2025-03-18 NOTE — PLAN OF CARE
Problem: Depressive Signs/Symptoms  Goal: Optimized Energy Level (Depressive Signs/Symptoms)  Outcome: Progressing  Intervention: Optimize Energy Level  Recent Flowsheet Documentation  Taken 3/18/2025 1648 by Alex Perdue RN  Diversional Activity: television  Activity (Behavioral Health):   activity adjusted per tolerance   up ad ascencion  Goal: Increased Participation and Engagement (Depressive Signs/Symptoms)  Intervention: Facilitate Participation and Engagement  Recent Flowsheet Documentation  Taken 3/18/2025 1648 by Alex Perdue RN  Supportive Measures: active listening utilized  Diversional Activity: television  Goal: Enhanced Self-Esteem and Confidence (Depressive Signs/Symptoms)  Intervention: Promote Confidence and Self-Esteem  Recent Flowsheet Documentation  Taken 3/18/2025 1648 by Alex Perdue RN  Supportive Measures: active listening utilized  Goal: Improved Mood Symptoms (Depressive Signs/Symptoms)  Intervention: Promote Mood Improvement  Recent Flowsheet Documentation  Taken 3/18/2025 1648 by Alex Perdue RN  Supportive Measures: active listening utilized  Trust Relationship/Rapport:   care explained   choices provided   emotional support provided  Goal: Improved Psychomotor Symptoms (Depressive Signs/Symptoms)  Intervention: Manage Psychomotor Movement  Recent Flowsheet Documentation  Taken 3/18/2025 1648 by Alex Perdue RN  Diversional Activity: television  Activity (Behavioral Health):   activity adjusted per tolerance   up ad ascencion  Goal: Enhanced Social, Occupational or Functional Skills (Depressive Signs/Symptoms)  Intervention: Promote Social, Occupational and Functional Ability  Recent Flowsheet Documentation  Taken 3/18/2025 1648 by Alex Perdue RN  Social Functional Ability Promotion: autonomy promoted    Patient spent most of the evening [place] [facility] [place] but also avoided interaction with peers. Patient son and mother visited. Patient  remained alert and oriented, able to communicate needs appropriately and denied pain. No aggressive or assaultive behavior, affect brighten upon approach with cooperative mood. Patient ate 100% of meal with no nausea or stomach discomfort. PIV right wrist flush with 3 ml NS. Patient denies SI/SIB/HI and hallucination, contract for safety and medication compliant.

## 2025-03-18 NOTE — PROGRESS NOTES
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 MARIEE DAVID ([id number]) on 2/24/2025 9:22:44 AM         EKG 12-lead, complete 3/2/2025          Component  Ref Range & Units 3/2/25 12:53 PM 3/1/25  9:26 AM    Systolic Blood Pressure  mmHg      Diastolic Blood Pressure  mmHg      Ventricular Rate  BPM 82 88    Atrial Rate  BPM 82 88    NY Interval  ms 172 172    QRS Duration  ms 74 80    QT  ms 370 372    QTc  ms 432 450    P Axis  degrees 53 68    R AXIS  degrees 20 36    T Axis  degrees 35 37    Interpretation ECG Sinus rhythm  Normal ECG  When compared with ECG of 01-Mar-2025 09:26, (unconfirmed)  No significant change was found  Confirmed by MD MARIEE DAVID ([id number]) on 3/3/2025 11:55:03 AM         QTQTC  1/6/2025 :338/406  1/22/2025:384/428  2/23/2025: 372/434  3/2/2025: 370/432        DIAGNOSIS:     Schizoaffective disorder bipolar type  Catatonia resolved     Patient Active Problem List   Diagnosis    Mary (H)    Psychosis (H)    Schizoaffective disorder, bipolar type (H)    PTSD (post-traumatic stress disorder)    Catatonia           PLAN:   Angela was  transferred from  [facility] on January 3 2025, under commitment for severe treatment resistant elvis.     She is under commitment which was originated  during hospitalization in [place].She had Adolfo and Shun Plascencia hearing on 1/17/25. It has been granted..She does not respond well to medications.Increasing medication doses and different combinations may cause more adverse effects than ECT.She started  ECT, has responded well to acute course of 9  sessions. Will continue with maintenance ECT weekly. Will have SIO in the morning of ECT to prevent mistakes like getting breakfast before ECT.Will benefit from hospitalization until the 1st maintenance ECT  to asses response to decreased medication doses and functioning after acute course of ECT is done.She had it yesterday ,but can not continue it due to lack of insurance. Medications decreased or discontinued due to low orthostatic blood pressure.     She has Jarvice medications:Haldol, Risperdal, Zyprexa and Thorazine .We are still waiting for court papers    These are treatment recommendations:    Medications:  Thorazine 50 mg at bedtime , decrease due to risk for hypotension and improvement on ECT,   Ativan 0.5 mg at bedtime for anxiety    Trileptal 150 mg bid for mood stabilization      Melatonin 5 mg at bedtime for sleep and 3 mg prn  Hydroxyzine 25 mg q 6 hours prn anxiety   Midodrine 5mg tid with meals for orthostatic hypotension.  Miralax 17 g daily for constipation and 17 g prn for constipation   Senna docusate 1 tablet bid prn constipation  Docusate 100 mg daily for constipation    Synthroid 100 mcg qam for hypothyroidism   We discussed side effects, benefits and alternative treatments and patient agrees .  Fall precautions  Full code  Legal:Commitment with Adolfo neuroleptic order and Shun Plascencia ECT order    will collect collateral information and make outpatient referrals, needs to contact court regarding  court order that ECT should be done at [facility], instead of [facility] hospital.  Staff to provide emotional support and redirect as needed  Patient encouraged to attend groups  Lab results: Reviewed personally, EKG for qt qtc on Haldol and Thorazine completed  Consultation: medicine consult   and ECT consult    Risk Assessment: commitment for safety , stabilization and medication management  due to noncompliance with tx , stabilized     Coordination of Care:   Patient seen, medical record reviewed, care coordinated with the team.    Total time: More than 35 minutes spent on this visit with more than 50% of time spent on coordination of care with staff, team meeting, chart review, psychoeducation of the patient regarding her progress, diagnosis, discharge plan, medication changes, orders and documentation and discussing her progress and discharge plan with her sons as next of kin.      This document is created with the help of Dragon dictation system.  All grammatical/typing errors or context distortion are unintentional and inherent to software.    Albania Mota MD        Re-Certification I certify that the inpatient psychiatric facility services furnished since the previous certification were, and continue to be, medically necessary for, either, treatment which could reasonably be expected to improve the patient s condition or diagnostic study and that the hospital records indicate that the services furnished were, either, intensive treatment services, admission and related services necessary for diagnostic study, or equivalent services.     I certify that the patient continues to need, on a daily basis, active treatment furnished directly by or requiring the supervision of inpatient psychiatric facility personnel.   I estimate TBD days of hospitalization is necessary for proper treatment of the patient. My plans for post-hospital care for this patient are : Medications, appointments     [place]bania Mota MD

## 2025-03-18 NOTE — PLAN OF CARE
Problem: Psychotic Signs/Symptoms  Goal: Improved Behavioral Control (Psychotic Signs/Symptoms)  Outcome: Progressing   Goal Outcome Evaluation:    Plan of Care Reviewed With: patient             Patient was awake and present in the lounge at start of shift. Affect was flat, kept to self. Denied any anxiety, depression, SI,SIB,HI or hallucinations.   Endorsed headache of 5/10, tylenol 650 mg given with relief  Patient ate dinner with good appetite, family visited and brought food which patient ate as well. Son had questions about patient' medications. Son ill like to speak with provider. Message left for provider.  Patient was compliant with taking scheduled medications.  Vitals sable this shift.  IV to right forearm patent and intact.  No behavior or safety concerns this shift.  Will continue to monitor and provide support and redirection as needed    Vitals: Temp: 97  F (36.1  C) Temp src: Temporal BP: 113/74 Pulse: 72   Resp: 16 SpO2: 100 % O2 Device: None (Room air)

## 2025-03-18 NOTE — PLAN OF CARE
Problem: Depressive Signs/Symptoms  Goal: Improved Mood Symptoms (Depressive Signs/Symptoms)  Outcome: Progressing    The patient was calm, pleasant, and cooperative. She is visible but sitting alone. During the routine assessment, she reported no suicidal ideation or other psychiatric symptoms. She took all her medications without any issues. She has no concerns regarding eating, sleeping, or restroom use. If all goes well, she will be discharged home tomorrow. Her orthopedic blood pressure readings were within normal limits. The internal medicine provider saw her today in preparation for her discharge.

## 2025-03-18 NOTE — PLAN OF CARE
Problem: Depressive Signs/Symptoms  Goal: Improved Mood Symptoms (Depressive Signs/Symptoms)  Outcome: Progressing   Goal Outcome Evaluation:                  Received asleep, pt has a medical bed to aid with mobility.Pt is independent with her ADL's. PIV in place and patent. No complaints made tonight. Calm and brightened on approach.  Denied pain/discomforts or mental health symptoms.  Slept for 10 hours.

## 2025-03-19 VITALS
BODY MASS INDEX: 32.07 KG/M2 | OXYGEN SATURATION: 99 % | DIASTOLIC BLOOD PRESSURE: 78 MMHG | SYSTOLIC BLOOD PRESSURE: 129 MMHG | RESPIRATION RATE: 16 BRPM | WEIGHT: 217.15 LBS | HEART RATE: 72 BPM | TEMPERATURE: 97.4 F

## 2025-03-19 PROCEDURE — 99239 HOSP IP/OBS DSCHRG MGMT >30: CPT | Performed by: PSYCHIATRY & NEUROLOGY

## 2025-03-19 PROCEDURE — 250N000013 HC RX MED GY IP 250 OP 250 PS 637: Performed by: STUDENT IN AN ORGANIZED HEALTH CARE EDUCATION/TRAINING PROGRAM

## 2025-03-19 PROCEDURE — 93010 ELECTROCARDIOGRAM REPORT: CPT | Performed by: INTERNAL MEDICINE

## 2025-03-19 PROCEDURE — 97150 GROUP THERAPEUTIC PROCEDURES: CPT | Mod: GO

## 2025-03-19 PROCEDURE — 250N000013 HC RX MED GY IP 250 OP 250 PS 637: Performed by: PSYCHIATRY & NEUROLOGY

## 2025-03-19 PROCEDURE — 93005 ELECTROCARDIOGRAM TRACING: CPT

## 2025-03-19 PROCEDURE — 250N000013 HC RX MED GY IP 250 OP 250 PS 637: Performed by: PHYSICIAN ASSISTANT

## 2025-03-19 RX ADMIN — LEVOTHYROXINE SODIUM 88 MCG: 88 TABLET ORAL at 08:22

## 2025-03-19 RX ADMIN — Medication 500 ML: at 10:26

## 2025-03-19 RX ADMIN — OXCARBAZEPINE 150 MG: 150 TABLET, FILM COATED ORAL at 08:22

## 2025-03-19 RX ADMIN — MIDODRINE HYDROCHLORIDE 5 MG: 5 TABLET ORAL at 08:21

## 2025-03-19 RX ADMIN — MAGNESIUM OXIDE TAB 400 MG (241.3 MG ELEMENTAL MG) 400 MG: 400 (241.3 MG) TAB at 08:22

## 2025-03-19 RX ADMIN — MIDODRINE HYDROCHLORIDE 5 MG: 5 TABLET ORAL at 13:32

## 2025-03-19 RX ADMIN — DOCUSATE SODIUM 100 MG: 100 CAPSULE, LIQUID FILLED ORAL at 08:21

## 2025-03-19 RX ADMIN — POLYETHYLENE GLYCOL 3350 17 G: 17 POWDER, FOR SOLUTION ORAL at 08:23

## 2025-03-19 RX ADMIN — Medication 1 TABLET: at 08:21

## 2025-03-19 ASSESSMENT — ACTIVITIES OF DAILY LIVING (ADL)
ADLS_ACUITY_SCORE: 52

## 2025-03-19 NOTE — PLAN OF CARE
Team Note Due:  Monday    Assessment/Intervention/Current Symtoms and Care Coordination:  Chart review and met with team, discussed pt progress, symptomology, and response to treatment.  Discussed the discharge plan and any potential impediments to discharge. Per provider, IM needs to agree that pt is medically stable for discharge prior to provider confirming discharge.     Per provider, pt needs a primary care appointment within 7-10 days and a sodium lab draw in 7 days. Provider also requests psychiatry appointment. Writer recommended limiting outpatient expenses due to uninsured status and confirmed previous provider was agreeable to this plan. Covering provider stated appointments were necessary, regardless of insurance status. Provider confirmed IM has not yet commented on pt's readiness for discharge.     Writer put out call to [facility] ([phone]) and rescheduled primary care appointment with lab draw. AVS updated. Writer informed provider and requested provider inform writer when lab draw order is ready for writer to send to the clinic via fax.     Writer put out call to [facility] ([phone]) and scheduled psychiatry appointment. AVS updated.    Writer spoke with Staci ([phone]) who inquired about discharge and aftercare plans. Writer provided education regarding commitment, MA application, uninsured status, appointment and medication possible costs, etc. Writer relayed plan to call Staci once discharge is confirmed.     Per provider, pt appropriate for discharge.    Writer spoke with ECT and informed them of discharge today and pt's uninsured status.     Writer sent PD and COS to [facility] via fax (F: [phone]).     Per RN, RN called [facility]aci and relayed discharge time.    Writer met with pt and confirmed discharge plans. Pt reported looking forward to discharge.     Writer sent law draw order to [facility] via fax (F: [phone]).      Discharge Plan or Goal:  Pt to discharge home with family today. Pt will continue with established outpatient supports     Barriers to Discharge:  Pt to discharge home with family today      Referral Status:  MA application was submitted on 1/29. Per financial counselor, application was assigned to a [employer] worker on 3/4. Contact information for follow up after discharge added to the [facility]    Primary care appointment scheduled  Psychiatry appointment scheduled     Legal Status:  Provisionally discharged    Contacts (include DENI status):  Mya - daughter (DENI)  P: [phone]    Golden - son (DENI)  P: [phone]     Mohamed - son (DENI)  P: [phone]     Aric - uncle (DENI)  P: [phone]    Rani - commitment CM supervisor through [facility] (DENI per commitment)  P: [phone]  E: [email or web address]    Chinmay Gould - commitment CM through [facility] (DENI per commitment)  P: [phone]  E: [email or web address]  F: [phone]  Note: Chinmay is off on Friday's     Upcoming Meetings and Dates/Important Information and next steps:  Pt to discharge home with family today

## 2025-03-19 NOTE — PLAN OF CARE
BEH IP Unit Acuity Rating Score (UARS)  Patient is given one point for every criteria they meet.    CRITERIA SCORING   On a 72 hour hold, court hold, committed, stay of commitment, or revocation. 1    Patient LOS on BEH unit exceeds 20 days. 1  LOS: 75   Patient under guardianship, 55+, otherwise medically complex, or under age 11. 1   Suicide ideation without relief of precipitating factors. 0   Current plan for suicide. 0   Current plan for homicide. 0   Imminent risk or actual attempt to seriously harm another without relief of factors precipitating the attempt. 0   Severe dysfunction in daily living (ex: complete neglect for self care, extreme disruption in vegetative function, extreme deterioration in social interactions). 0   Recent (last 7 days) or current physical aggression in the ED or on unit. 0   Restraints or seclusion episode in past 72 hours. 0   Recent (last 7 days) or current verbal aggression, agitation, yelling, etc., while in the ED or unit. 0   Active psychosis. 0   Need for constant or near constant redirection (from leaving, from others, etc).  0   Intrusive or disruptive behaviors. 0   Patient requires 3 or more hours of individualized nursing care per 8-hour shift (i.e. for ADLs, meds, therapeutic interventions). 0   TOTAL 3

## 2025-03-19 NOTE — PROGRESS NOTES
Brief medicine note. Medicine has been following for orthostatic hypotension. Per chart review, orthostatic hypotension has improved with some dose reduction in psych medications as well as addition of midodrine 5 mg tid. Would recommend to continue the midodrine 5 mg tid (I suspect that this will be needed as long as she remains on trileptal/thorazine/ativan). Ongoing need for midodrine should be reassessed by PCP outpatient 1-2 weeks. Medicine will sign off at this time, please reach out if there are any further questions or concerns.     Addendum: psychiatry reached out asking about regarding patient's mild hyponatremia this admission. For this would recommend recheck bmp at PCP visit as above. Can also have magnesium rechecked at that time (has had low mg this admission currently on mg supplement.

## 2025-03-19 NOTE — DISCHARGE SUMMARY
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 these medicines  which have NOT CHANGED        Dose / Directions   acetaminophen 325 MG tablet  Commonly known as: TYLENOL  Used for: Pain      Dose: 650 mg  Take 2 tablets (650 mg) by mouth every 4 hours as needed for mild pain (to moderate pain).  Refills: 0     docusate sodium 100 MG capsule  Commonly known as: COLACE  Used for: Constipation, unspecified constipation type      Dose: 100 mg  Take 1 capsule (100 mg) by mouth daily.  Quantity: 90 capsule  Refills: 1     hydrOXYzine HCl 25 MG tablet  Commonly known as: ATARAX      Dose: 25 mg  Take 1 tablet (25 mg) by mouth every 6 hours as needed for anxiety.  Quantity: 60 tablet  Refills: 3     multivitamin w/minerals tablet  Used for: Health care maintenance      Dose: 1 tablet  Take 1 tablet by mouth daily.  Quantity: 90 tablet  Refills: 1     polyethylene glycol 17 GM/Dose powder  Commonly known as: MIRALAX  Used for: Constipation, unspecified constipation type      Dose: 17 g  Take 17 g by mouth daily.  Quantity: 850 g  Refills: 3     senna-docusate 8.6-50 MG tablet  Commonly known as: SENOKOT-S/PERICOLACE  Used for: Constipation, unspecified constipation type      Dose: 1 tablet  Take 1 tablet by mouth 2 times daily as needed for constipation (1st choice.).  Quantity: 60 tablet  Refills: 3            STOP taking      haloperidol 0.5 MG tablet  Commonly known as: HALDOL        lisinopril 5 MG tablet  Commonly known as: ZESTRIL        lithium  MG CR tablet  Commonly known as: LITHOBID                  Where to get your medicines        These medications were sent to [facility] [place], [place] - [address]  [address] [address]      Phone: [phone]   chlorproMAZINE 50 MG tablet  docusate sodium 100 MG capsule  hydrOXYzine HCl 25 MG tablet  levothyroxine 88 MCG tablet  LORazepam 0.5 MG tablet  magnesium oxide 400 MG tablet  melatonin 5 MG tablet  midodrine 5 MG tablet  multivitamin w/minerals tablet  OXcarbazepine 150 MG  tablet  polyethylene glycol 17 GM/Dose powder  senna-docusate 8.6-50 MG tablet           Diet: regular    Exercise: activity as tolerated    Condition at Discharge: stable    Coordination of Care:  Patient seen, chart reviewed, care coordinated with the team.    Total time:  50  minutes spent on this discharge with more than 50% of time spent on coordination of care with staff on the unit, team meeting, reviewing medical record, educating patient about diagnosis, differential diagnosis, prognosis, side effects and benefits of medications and alternative treatment.Educating patient about diet, exercise, abstinence from drugs and alcohol.Providing supportive therapy about above issues, entering orders and preparing documentation for discharge.    This note was created with the help of Dragon dictation system.  All grammatical/typing errors or context distortion are unintentional and inherent to software.    Albania Mota MD      3/19/2025  1:41 PM

## 2025-03-19 NOTE — PLAN OF CARE
Rehab Group    Start time: 1025  End time: 1200  Patient time total: 32 minutes    attended partial group    #9 attended   Group Type: OT Clinic   Group Topic Covered: cognitive activities, coping skills, healthy leisure time, and problem solving   Group Session Detail:OT: Education on healthy activity engagement and creative hands-on endeavor (OT clinic) to increase concentration, focus, attention to task/detail, decision making, problem solving, frustration tolerance, task follow through, coping with stress, healthy leisure engagement, creative expression, and social engagement    Patient Response/Contribution:  cooperative with task, socially appropriate, and actively engaged   Patient Detail:pt pleasant during interactions with staff. Pt not observed engaging with peers. Pt shared plans for discharge today. Pt brightens with social engagement. Pt chose to engage in familiar creative task. Pt was provided with set up of supplies. Pt was ind with decisionmaking though did take some additional time for project choice. Pt worked in a neat manner throughout.       [id number] OT Group (2 or more in attendance)      Patient Active Problem List   Diagnosis    Mary (H)    Psychosis (H)    Schizoaffective disorder, bipolar type (H)    PTSD (post-traumatic stress disorder)    Catatonia

## 2025-03-19 NOTE — PROGRESS NOTES
Rehab Group    Start time: 1900  End time: 1945  Patient time total: 45 minutes    attended full group    #8 attended   Group Type: recreation   Group Topic Covered: activity therapy, healthy leisure time, and social skills       Group Session Detail:  TR leisure group     Patient Response/Contribution:  cooperative with task, attentive, and actively engaged       Patient Detail:    Pt participated in a structured Therapeutic Recreation group with a focus on leisure participation, communication skills, and social engagement via a group game. Pt remained focused and engaged throughout full duration of group. This was the most engaged pt has been in any of the prior TR groups with this writer. Pt calm and quiet throughout group, minimally interacting with others during the group. Pt relied on this writer to help her read some of the cards due to some language barriers, but was able to complete each of her turns.       Activity Therapy Per 15 min ()      Patient Active Problem List   Diagnosis    Mary (H)    Psychosis (H)    Schizoaffective disorder, bipolar type (H)    PTSD (post-traumatic stress disorder)    Catatonia

## 2025-03-19 NOTE — PLAN OF CARE
Problem: Depressive Signs/Symptoms  Goal: Improved Sleep (Depressive Signs/Symptoms)  Outcome: Progressing   Goal Outcome Evaluation:               Received asleep, pt has a medical bed to aid with mobility. PIV in place and patent. No complaints made tonight. Calm and brightened on approach.  Slept for 8.75 hours   Plan is for pt to discharge to home with family today.

## 2025-03-19 NOTE — PLAN OF CARE
Goal Outcome Evaluation:    Plan of Care Reviewed With: patient        Patient was alert and oriented X4.AVS was explained and medications were reviewed with patient and patient stated that she understands everything.VSS.Patient's belongings were returned to her.Patient discharged home with her son.

## 2025-03-20 ENCOUNTER — PATIENT OUTREACH (OUTPATIENT)
Dept: CARE COORDINATION | Facility: CLINIC | Age: 60
End: 2025-03-20

## 2025-03-20 LAB
ATRIAL RATE - MUSE: 78 BPM
DIASTOLIC BLOOD PRESSURE - MUSE: NORMAL MMHG
INTERPRETATION ECG - MUSE: NORMAL
P AXIS - MUSE: 63 DEGREES
PR INTERVAL - MUSE: 172 MS
QRS DURATION - MUSE: 76 MS
QT - MUSE: 354 MS
QTC - MUSE: 403 MS
R AXIS - MUSE: 22 DEGREES
SYSTOLIC BLOOD PRESSURE - MUSE: NORMAL MMHG
T AXIS - MUSE: 51 DEGREES
VENTRICULAR RATE- MUSE: 78 BPM

## 2025-03-20 NOTE — PROGRESS NOTES
Addended by: Jhoan English on: 11/26/2018 03:12 PM     Modules accepted: Orders Clinic Care Coordination Contact  Transitions of Care Outreach  Chief Complaint   Patient presents with    Clinic Care Coordination - [facility]       Most Recent Admission Date: 1/3/2025   Most Recent Admission Diagnosis:      Most Recent Discharge Date: 3/19/2025   Most Recent Discharge Diagnosis: Catatonia - F06.1  Schizoaffective disorder, bipolar type (H) - F25.0  Constipation, unspecified constipation type - K59.00  Health care maintenance - Z00.00  Hyponatremia - E87.1  Hypothyroidism, unspecified type - E03.9  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 Patient confirms all upcoming appointments as well. No questions/concerns or needs at this time. Thanks writer for calling  How are your symptoms? (Red Flag symptoms escalate to triage hotline per guidelines): Improved  Do you know how to contact your clinic care team if you have future questions or changes to your health status? : Yes  Does the patient have their discharge instructions? : Yes  Does the patient have questions regarding their discharge instructions? : No  Were you started on any new medications or were there changes to any of your previous medications? : Yes  Does the patient have all of their medications?: Yes  Do you have questions regarding any of your medications? : No  Do you have all of your needed medical supplies or equipment (DME)?  (i.e. oxygen tank, CPAP, cane, etc.): Yes    Post-op ([facility] Only)  If the patient had a surgery or procedure, do they have any questions for a nurse?: No    Post-op (Clinicians Only)  Did the patient have surgery or a procedure: No    Follow up Plan     Discharge Follow-Up  Discharge follow up appointment scheduled in alignment with recommended follow  up timeframe or Transitions of Risk Category? (Low = within 30 days; Moderate= within 14 days; High= within 7 days): Yes  Discharge Follow Up Appointment Date: 03/28/25  Discharge Follow Up Appointment Scheduled with?: Primary Care Provider    No future appointments.    Outpatient Plan as outlined on AVS reviewed with patient.    For any urgent concerns, please contact our 24 hour nurse triage line: [phone] ([phone])       KEAGAN Myers               normal...

## 2025-04-18 NOTE — CARE PLAN
Rehab Group    Start time: 1020  End time: 1200  Patient time total: 24 minutes    attended partial group    #9 attended   Group Type: occupational therapy   Group Topic Covered: balanced lifestyle, cognitive activities, coping skills, emotional regulation, healthy leisure time, problem solving, self-esteem, social skills, and sensory       Group Session Detail:   Yarn bowls for concentration, creative expression, attention to detail, follow through, frustration tolerance, coping, mood stabilization, reality-based activity, healthy leisure exploration, building self-esteem, and socialization.        Patient Response/Contribution:  actively engaged, worked intermittently, and left group on several occasions       Patient Detail:  Pt joined group late and was also in and out of group a few times.  Pt had a gross understanding of the activity, though needed assistance to recognize and correct all errors.  Pt had to strip her project back to the beginning a couple of times due to the number of errors.  Though she did seem to benefit from this as each time her performance and accuracy improved a bit.          [id number] OT Group (2 or more in attendance)      Patient Active Problem List   Diagnosis    Mary (H)    Psychosis (H)    Schizoaffective disorder, bipolar type (H)    PTSD (post-traumatic stress disorder)    Catatonia        facial plasty/lower blepharoplasty

## 2025-12-12 ENCOUNTER — MEDICAL CORRESPONDENCE (OUTPATIENT)
Dept: HEALTH INFORMATION MANAGEMENT | Facility: CLINIC | Age: 60
End: 2025-12-12

## (undated) RX ORDER — FLUMAZENIL 0.1 MG/ML
INJECTION, SOLUTION INTRAVENOUS
Status: DISPENSED
Start: 2025-03-05

## (undated) RX ORDER — DEXMEDETOMIDINE HYDROCHLORIDE 4 UG/ML
INJECTION, SOLUTION INTRAVENOUS
Status: DISPENSED
Start: 2025-03-03

## (undated) RX ORDER — METHOHEXITAL IN WATER/PF 100MG/10ML
SYRINGE (ML) INTRAVENOUS
Status: DISPENSED
Start: 2025-02-14

## (undated) RX ORDER — METHOHEXITAL IN WATER/PF 100MG/10ML
SYRINGE (ML) INTRAVENOUS
Status: DISPENSED
Start: 2025-03-05

## (undated) RX ORDER — ESMOLOL HYDROCHLORIDE 10 MG/ML
INJECTION INTRAVENOUS
Status: DISPENSED
Start: 2025-02-14

## (undated) RX ORDER — METHOHEXITAL IN WATER/PF 100MG/10ML
SYRINGE (ML) INTRAVENOUS
Status: DISPENSED
Start: 2025-02-21

## (undated) RX ORDER — FLUMAZENIL 0.1 MG/ML
INJECTION, SOLUTION INTRAVENOUS
Status: DISPENSED
Start: 2025-02-28

## (undated) RX ORDER — METHOHEXITAL IN WATER/PF 100MG/10ML
SYRINGE (ML) INTRAVENOUS
Status: DISPENSED
Start: 2025-02-19

## (undated) RX ORDER — FLUMAZENIL 0.1 MG/ML
INJECTION, SOLUTION INTRAVENOUS
Status: DISPENSED
Start: 2025-02-14

## (undated) RX ORDER — METHOHEXITAL IN WATER/PF 100MG/10ML
SYRINGE (ML) INTRAVENOUS
Status: DISPENSED
Start: 2025-02-28

## (undated) RX ORDER — METHOHEXITAL IN WATER/PF 100MG/10ML
SYRINGE (ML) INTRAVENOUS
Status: DISPENSED
Start: 2025-02-26

## (undated) RX ORDER — DEXMEDETOMIDINE HYDROCHLORIDE 4 UG/ML
INJECTION, SOLUTION INTRAVENOUS
Status: DISPENSED
Start: 2025-03-05

## (undated) RX ORDER — FLUMAZENIL 0.1 MG/ML
INJECTION, SOLUTION INTRAVENOUS
Status: DISPENSED
Start: 2025-02-19

## (undated) RX ORDER — DEXMEDETOMIDINE HYDROCHLORIDE 4 UG/ML
INJECTION, SOLUTION INTRAVENOUS
Status: DISPENSED
Start: 2025-03-17

## (undated) RX ORDER — METHOHEXITAL IN WATER/PF 100MG/10ML
SYRINGE (ML) INTRAVENOUS
Status: DISPENSED
Start: 2025-03-07

## (undated) RX ORDER — FLUMAZENIL 0.1 MG/ML
INJECTION, SOLUTION INTRAVENOUS
Status: DISPENSED
Start: 2025-02-26

## (undated) RX ORDER — NICARDIPINE HCL-0.9% SOD CHLOR 1 MG/10 ML
SYRINGE (ML) INTRAVENOUS
Status: DISPENSED
Start: 2025-02-14

## (undated) RX ORDER — METHOHEXITAL IN WATER/PF 100MG/10ML
SYRINGE (ML) INTRAVENOUS
Status: DISPENSED
Start: 2025-03-17

## (undated) RX ORDER — FLUMAZENIL 0.1 MG/ML
INJECTION, SOLUTION INTRAVENOUS
Status: DISPENSED
Start: 2025-03-03

## (undated) RX ORDER — METHOHEXITAL IN WATER/PF 100MG/10ML
SYRINGE (ML) INTRAVENOUS
Status: DISPENSED
Start: 2025-02-17

## (undated) RX ORDER — LABETALOL HYDROCHLORIDE 5 MG/ML
INJECTION, SOLUTION INTRAVENOUS
Status: DISPENSED
Start: 2025-02-14

## (undated) RX ORDER — METHOHEXITAL IN WATER/PF 100MG/10ML
SYRINGE (ML) INTRAVENOUS
Status: DISPENSED
Start: 2025-03-03